# Patient Record
Sex: FEMALE | Race: BLACK OR AFRICAN AMERICAN | Employment: OTHER | URBAN - METROPOLITAN AREA
[De-identification: names, ages, dates, MRNs, and addresses within clinical notes are randomized per-mention and may not be internally consistent; named-entity substitution may affect disease eponyms.]

---

## 2017-01-03 RX ORDER — SPIRONOLACTONE 25 MG/1
25 TABLET ORAL DAILY
Qty: 30 TAB | Refills: 5 | Status: SHIPPED | OUTPATIENT
Start: 2017-01-03 | End: 2017-07-02 | Stop reason: SDUPTHER

## 2017-02-13 RX ORDER — CARVEDILOL 25 MG/1
TABLET ORAL
Qty: 180 TAB | Refills: 0 | Status: SHIPPED | OUTPATIENT
Start: 2017-02-13 | End: 2017-06-02 | Stop reason: SDUPTHER

## 2017-02-23 RX ORDER — GABAPENTIN 100 MG/1
100 CAPSULE ORAL DAILY
Qty: 30 CAP | Refills: 0 | Status: SHIPPED | OUTPATIENT
Start: 2017-02-23 | End: 2017-04-14 | Stop reason: SDUPTHER

## 2017-03-13 DIAGNOSIS — G62.9 NEUROPATHY: ICD-10-CM

## 2017-03-14 RX ORDER — GABAPENTIN 300 MG/1
300 CAPSULE ORAL
Qty: 90 CAP | Refills: 1 | Status: SHIPPED | OUTPATIENT
Start: 2017-03-14 | End: 2017-08-31 | Stop reason: SDUPTHER

## 2017-03-14 RX ORDER — VENLAFAXINE HYDROCHLORIDE 75 MG/1
75 CAPSULE, EXTENDED RELEASE ORAL DAILY
Qty: 30 CAP | Refills: 5 | Status: SHIPPED | OUTPATIENT
Start: 2017-03-14 | End: 2017-07-24

## 2017-03-17 NOTE — TELEPHONE ENCOUNTER
----- Message from [facility] sent at 3/17/2017 10:53 AM EDT -----  Regarding: Dr. Kaya Stevens stated her pharmacy will be faxing a request for \"Calcitriol' 0.5 mcg) today and would like the doctor to approve, because she is out of medication. Best contact number Malinda Jose [phone].

## 2017-03-20 ENCOUNTER — TELEPHONE (OUTPATIENT)
Dept: INTERNAL MEDICINE CLINIC | Age: 62
End: 2017-03-20

## 2017-03-20 ENCOUNTER — OFFICE VISIT (OUTPATIENT)
Dept: INTERNAL MEDICINE CLINIC | Age: 62
End: 2017-03-20

## 2017-03-20 VITALS
RESPIRATION RATE: 14 BRPM | BODY MASS INDEX: 41.25 KG/M2 | HEART RATE: 97 BPM | HEIGHT: 67 IN | TEMPERATURE: 98 F | SYSTOLIC BLOOD PRESSURE: 100 MMHG | WEIGHT: 262.8 LBS | DIASTOLIC BLOOD PRESSURE: 43 MMHG | OXYGEN SATURATION: 96 %

## 2017-03-20 DIAGNOSIS — I42.8 CARDIOMYOPATHY, NONISCHEMIC (HCC): ICD-10-CM

## 2017-03-20 DIAGNOSIS — L40.9 PSORIASIS: ICD-10-CM

## 2017-03-20 DIAGNOSIS — F34.1 DYSTHYMIA: ICD-10-CM

## 2017-03-20 DIAGNOSIS — E11.29 TYPE 2 DIABETES MELLITUS WITH OTHER KIDNEY COMPLICATION: Primary | ICD-10-CM

## 2017-03-20 DIAGNOSIS — E03.9 ACQUIRED HYPOTHYROIDISM: ICD-10-CM

## 2017-03-20 DIAGNOSIS — N18.3 CKD (CHRONIC KIDNEY DISEASE), STAGE 3 (MODERATE): ICD-10-CM

## 2017-03-20 DIAGNOSIS — I10 ESSENTIAL HYPERTENSION: ICD-10-CM

## 2017-03-20 RX ORDER — CALCITRIOL 0.5 UG/1
0.5 CAPSULE ORAL DAILY
Qty: 30 CAP | Refills: 5 | Status: SHIPPED | OUTPATIENT
Start: 2017-03-20 | End: 2017-07-24

## 2017-03-20 RX ORDER — CALCITRIOL 0.5 UG/1
0.5 CAPSULE ORAL DAILY
Qty: 30 CAP | Refills: 5 | OUTPATIENT
Start: 2017-03-20 | End: 2017-04-19

## 2017-03-20 NOTE — PROGRESS NOTES
HISTORY OF PRESENT ILLNESS  Lasandra Moritz is a 57 y.o. female. HPI     Diabetic Review of Systems - medication compliance: compliant all of the time, diabetic diet compliance: compliant most of the time, home glucose monitoring: is performed, further diabetic ROS: no polyuria or polydipsia, no chest pain, dyspnea or TIA's, no numbness, tingling or pain in extremities. Other symptoms and concerns: She saw Dr. Lew Arguello endocrinologist 3-4 months ago. She does not know her last A1C and states her sugars go up and down and she has crashed a few times. Pt not able to function when her sugar is in the 80's. Pt is trying to count carbohydrates. Pt has neuropathy from diabetes and takes Gabapentin. She does phototherapy for psoriasis. Pt is followed by Dr. Shana Saini. She states that it hurts to wear pants because of the psoriasis on her legs. She is taking Otezla 30 mg BID. She is not able to take Humira. Hypertension ROS: taking medications as instructed, no medication side effects noted, no TIA's, no chest pain on exertion, no dyspnea on exertion, no swelling of ankles. New concerns: Stable- bp was 100/43 in the office today. She is followed by Dr. Barbara Walters and kidneys have been stable. Cardiomyopathy- Cardiologist office called and said she did not do her defibrillator check or labs. Pt denies chest pain or shortness of breath. No increase in fluid level. Pt takes Bumex 2 mg in the morning and 1 mg at night and Atacand. Pt not able to take Lovastatin due to myalgias. She was not able to tolerate Lipitor due to myalgias. Pt is still taking Effexor and goes to psychotherapy once/week. Hypothyroidism:  Lab Results   Component Value Date/Time    TSH 0.519 02/21/2012 10:53 AM   Thyroid ROS: denies fatigue, weight changes, heat/cold intolerance, bowel/skin changes or CVS symptoms. Review of Systems   All other systems reviewed and are negative.       Physical Exam   Constitutional: She is oriented to person, place, and time. She appears well-developed and well-nourished. HENT:   Head: Normocephalic and atraumatic. Right Ear: External ear normal.   Left Ear: External ear normal.   Nose: Nose normal.   Mouth/Throat: Oropharynx is clear and moist.   Eyes: Conjunctivae and EOM are normal.   Neck: Normal range of motion. Neck supple. Carotid bruit is not present. No thyroid mass and no thyromegaly present. Cardiovascular: Normal rate, regular rhythm, S1 normal, S2 normal, normal heart sounds and intact distal pulses. Pulmonary/Chest: Effort normal and breath sounds normal.   Abdominal: Soft. Normal appearance and bowel sounds are normal. There is no hepatosplenomegaly. There is no tenderness. Musculoskeletal: Normal range of motion. Neurological: She is alert and oriented to person, place, and time. She has normal strength. No cranial nerve deficit or sensory deficit. Coordination normal.   Skin: Skin is warm, dry and intact. No abrasion and no rash noted. Psychiatric: She has a normal mood and affect. Her behavior is normal. Judgment and thought content normal.   Nursing note and vitals reviewed. ASSESSMENT and PLAN  Mckee  was seen today for medication evaluation. Diagnoses and all orders for this visit:    Type 2 diabetes mellitus with other kidney complication (HCC)  Stable- Continue current medications. Pt is followed by Dr. Jessa Lozano. Psoriasis  Psoriasis on her legs is really bothering her- she should continue current medications and phototherapy. Pt is followed by Dr. Lise Boateng. Essential hypertension  BP is at goal. I do not recommend any change in medications. CKD (chronic kidney disease), stage 3 (moderate)  Stable- Continue current medications. Pt is followed by Dr. Washington Lockhart.   -     calcitRIOL (ROCALTROL) 0.5 mcg capsule; Take 1 Cap by mouth daily for 30 days. Cardiomyopathy, nonischemic (HCC)  Stable- Continue current medications.  She should talk to cardiologist about cholesterol medication as she has had myalgias as side effects. Pt is part of a study and needs to do defibrillator check. Dysthymia  Stable- Continue current medications and psychotherapy. Acquired hypothyroidism  Stable- Continue current medications. reviewed medications and side effects in detail     Written by Mckenna Evans, as dictated by Abilio Patsor MD.     Current diagnosis and concerns discussed with pt at length. Understands risks and benefits or current treatment plan and medications and accepts the treatment and medication with any possible risks. Pt asks appropriate questions which were answered. Pt instructed to call with any concerns or problems.

## 2017-03-20 NOTE — TELEPHONE ENCOUNTER
Phuong Mcguire, from [facility], calling to request notification be given to provider that she needs a return visit to their office for repeat LDL and defibrillator check. The LDL needs to be done in their office as she is taking part in a fish oil study and defibrillator check needs to be done in office also, cannot be a phone check, as due to length of follow up time that has been disconnected. Please have her call their office.

## 2017-03-22 ENCOUNTER — CLINICAL SUPPORT (OUTPATIENT)
Dept: CARDIOLOGY CLINIC | Age: 62
End: 2017-03-22

## 2017-03-22 DIAGNOSIS — Z95.810 PRESENCE OF BIVENTRICULAR AUTOMATIC CARDIOVERTER/DEFIBRILLATOR (AICD): Primary | ICD-10-CM

## 2017-04-14 RX ORDER — GABAPENTIN 100 MG/1
100 CAPSULE ORAL DAILY
Qty: 30 CAP | Refills: 3 | Status: SHIPPED | OUTPATIENT
Start: 2017-04-14 | End: 2017-07-24

## 2017-04-20 DIAGNOSIS — M1A.9XX0 CHRONIC GOUT WITHOUT TOPHUS, UNSPECIFIED CAUSE, UNSPECIFIED SITE: Chronic | ICD-10-CM

## 2017-04-20 RX ORDER — ALLOPURINOL 100 MG/1
TABLET ORAL
Qty: 30 TAB | Refills: 3 | Status: SHIPPED | OUTPATIENT
Start: 2017-04-20 | End: 2017-08-30 | Stop reason: SDUPTHER

## 2017-07-02 RX ORDER — SPIRONOLACTONE 25 MG/1
TABLET ORAL
Qty: 30 TAB | Refills: 5 | Status: SHIPPED | OUTPATIENT
Start: 2017-07-02 | End: 2017-09-05 | Stop reason: SDUPTHER

## 2017-07-07 RX ORDER — SPIRONOLACTONE 25 MG/1
TABLET ORAL
Qty: 30 TAB | Refills: 5 | Status: SHIPPED | OUTPATIENT
Start: 2017-07-07 | End: 2017-07-24

## 2017-07-24 ENCOUNTER — CLINICAL SUPPORT (OUTPATIENT)
Dept: CARDIOLOGY CLINIC | Age: 62
End: 2017-07-24

## 2017-07-24 ENCOUNTER — OFFICE VISIT (OUTPATIENT)
Dept: CARDIOLOGY CLINIC | Age: 62
End: 2017-07-24

## 2017-07-24 VITALS
BODY MASS INDEX: 39.24 KG/M2 | OXYGEN SATURATION: 97 % | RESPIRATION RATE: 16 BRPM | DIASTOLIC BLOOD PRESSURE: 70 MMHG | HEART RATE: 92 BPM | WEIGHT: 250 LBS | HEIGHT: 67 IN | SYSTOLIC BLOOD PRESSURE: 100 MMHG

## 2017-07-24 DIAGNOSIS — Z95.810 PRESENCE OF BIVENTRICULAR AUTOMATIC CARDIOVERTER/DEFIBRILLATOR (AICD): Primary | ICD-10-CM

## 2017-07-24 DIAGNOSIS — R42 DIZZINESS: ICD-10-CM

## 2017-07-24 DIAGNOSIS — E11.29 TYPE 2 DIABETES MELLITUS WITH OTHER DIABETIC KIDNEY COMPLICATION (HCC): ICD-10-CM

## 2017-07-24 DIAGNOSIS — I42.8 CARDIOMYOPATHY, NONISCHEMIC (HCC): ICD-10-CM

## 2017-07-24 DIAGNOSIS — Z95.810 PRESENCE OF AUTOMATIC CARDIOVERTER/DEFIBRILLATOR (AICD): Primary | ICD-10-CM

## 2017-07-24 DIAGNOSIS — E66.09 NON MORBID OBESITY DUE TO EXCESS CALORIES: ICD-10-CM

## 2017-07-24 DIAGNOSIS — E66.9 OBESITY, UNSPECIFIED OBESITY SEVERITY, UNSPECIFIED OBESITY TYPE: ICD-10-CM

## 2017-07-24 NOTE — MR AVS SNAPSHOT
Visit Information Date & Time Provider Department Dept. Phone Encounter #  
 7/24/2017  1:40 PM Alexis Villavicencio MD [facility] [place] [phone] [id number] Your Appointments 7/26/2017  8:40 AM  
ESTABLISHED PATIENT with Fara De Anda MD  
[facility] ([facility]) Appt Note: 6 MO FUP; 6 Month Follow Up rs from 06/16  
 [address] [facility] [address]  
[phone]  
  
   
 [address] [facility] [address] [address] [address]  
  
    
 11/2/2017  1:30 PM  
PACEMAKER with Marx [facility] ([facility] SCHEDULING) Appt Note: sonido bi-v icd, rc, no CL b 7/24/17  
 [address] [facility] [address] [place] [address]  
[address] [facility]  
  
    
 11/30/2017 10:30 AM  
RESEARCH with RESEARCH, STFRANCES  
[facility] ([facility]) Appt Note: ANNUAL RESEARCH AT 11 30 [facility]; [address] [address] [facility] [address] [facility] [address]  
[phone]  
  
   
 [address] [facility] [address] [address]  
  
    
 11/30/2017 11:00 AM  
ESTABLISHED PATIENT with Mark Florez NP  
[facility] ([facility]) Appt Note: ANNUAL RESEARCH AT 11 00 sonia [address] [facility] [address]  
[phone]  
  
   
 [address] [address]  
  
    
 8/30/2018  1:00 PM  
PACEMAKER with Francisco J Fox [facility] ([facility] SCHEDULING) Appt Note: sonido bi-v icd, annual thresh/CL, see [place], no [address] [place] [address]  
[phone]  
  
    
 8/30/2018  1:00 PM  
ESTABLISHED PATIENT with Alexis Villavicencio MD  
[facility] ([facility]) Appt Note: mdt bi-v icd, annual th[place], [place] [address] [facility] [address] [place] [address]  
[address] [place] [address] Upcoming Health Maintenance Date Due Pneumococcal 19-64 Medium Risk (1 of 1 - PPSV23) 7/9/1973 DTaP/Tdap/Td series (1 - Tdap) 7/9/1975 BREAST CANCER SCRN MAMMOGRAM 12/10/2008 PAP AKA CERVICAL CYTOLOGY 12/10/2010 ZOSTER VACCINE AGE 60> 5/9/2014 COLONOSCOPY 12/10/2015 EYE EXAM RETINAL OR DILATED Q1 3/4/2016 FOOT EXAM Q1 1/4/2017 MICROALBUMIN Q1 1/4/2017 HEMOGLOBIN A1C Q6M 1/7/2017 LIPID PANEL Q1 8/15/2017 INFLUENZA AGE 9 TO ADULT 8/1/2017 Allergies as of 7/24/2017  Review Complete On: 7/24/2017 By: German Uribe MD  
  
 Severity Noted Reaction Type Reactions Ciprofloxacin High 01/23/2013   Systemic Anaphylaxis Shellfish Derived High 01/23/2013   Systemic Anaphylaxis Ace Inhibitors  12/10/2008    Unknown (comments) Biaxin [Clarithromycin]  09/14/2011    Other (comments) Metal taste Candesartan  08/12/2016    Cough Pcn [Penicillins]  12/10/2008    Hives Current Immunizations  Never Reviewed Name Date H1N1 FLU VACCINE 1/20/2010 Influenza Vaccine 10/1/2015 Influenza Vaccine Split 10/17/2012, 9/17/2010 Not reviewed this visit You Were Diagnosed With   
  
 Codes Comments Presence of biventricular automatic cardioverter/defibrillator (AICD)    -  Primary ICD-10-CM: Z95.810 ICD-9-CM: V45.02 Cardiomyopathy, nonischemic ([facility] [address]     ICD-10-CM: I42.8 ICD-9-CM: 425.4 Dizziness     ICD-10-CM: P89 ICD-9-CM: 780.4 Non morbid obesity due to excess calories     ICD-10-CM: E66.09 
ICD-9-CM: 278.00 Type 2 diabetes mellitus with other diabetic kidney complication (HCC)     SYJ-01-HV: E11.29 ICD-9-CM: 250.40 Vitals BP Pulse Resp Height(growth percentile) Weight(growth percentile) SpO2 100/70 (BP 1 Location: Right arm, BP Patient Position: Sitting) 92 16 5' 7\" (1.702 m) 250 lb (113.4 kg) 97% BMI OB Status Smoking Status 39.16 kg/m2 Postmenopausal Never Smoker BMI and BSA Data Body Mass Index Body Surface Area  
 39.16 kg/m 2 2.32 m 2 Preferred Pharmacy Pharmacy Name Phone [facility][id number] [address], [address] [facility] [facility] [phone] Your Updated Medication List  
  
   
This list is accurate as of: 7/24/17  2:41 PM.  Always use your most recent med list.  
  
  
  
  
 allopurinol 100 mg tablet Commonly known as:  ZYLOPRIM  
TAKE 1 TAB BY MOUTH DAILY FOR 30 DAYS. apremilast 30 mg Tab Take 60 mg by mouth. aspirin 81 mg tablet Take 81 mg by mouth daily. Azelastine 0.15 % (205.5 mcg) nasal spray Commonly known as:  ASTEPRO  
2 Sprays by Both Nostrils route two (2) times a day. bumetanide 0.5 mg tablet Commonly known as:  Samantha Lathe 1 pill by mouth every morning and 1/2 pill at night. calcipotriene 0.005 % topical cream  
Commonly known as:  Hwang Dawson Apply  to affected area three (3) times daily. candesartan 4 mg tablet Commonly known as:  ATACAND Take 1 Tab by mouth daily. Indications: CHRONIC HEART FAILURE  
  
 carvedilol 25 mg tablet Commonly known as:  COREG  
TAKE 1 TAB BY MOUTH TWO (2) TIMES A DAY FOR 30 DAYS. EPINEPHrine 0.3 mg/0.3 mL injection Commonly known as:  EPIPEN  
0.3 mL by IntraMUSCular route once as needed for 1 dose. FISH OIL 1,000 mg Cap Generic drug:  omega-3 fatty acids-vitamin e Take 2 Caps by mouth two (2) times a day.  
  
 gabapentin 300 mg capsule Commonly known as:  NEURONTIN Take 1 Cap by mouth nightly. hydrOXYzine HCl 10 mg tablet Commonly known as:  ATARAX Take  by mouth three (3) times daily as needed for Itching. insulin NPH/insulin regular 100 unit/mL (70-30) injection Commonly known as:  NovoLIN 70/30  
70 units two times a day Iron 325 mg (65 mg iron) tablet Generic drug:  ferrous sulfate Take  by mouth three (3) times daily (with meals). levothyroxine 100 mcg tablet Commonly known as:  SYNTHROID Take 1 Tab by mouth Daily (before breakfast). Take every day but Sunday  
  
 lovastatin 10 mg tablet Commonly known as:  MEVACOR Take 1 Tab by mouth nightly. meclizine 25 mg tablet Commonly known as:  ANTIVERT Take 1 Tab by mouth three (3) times daily as needed for Dizziness. multivitamin tablet Commonly known as:  ONE A DAY Take 1 Tab by mouth daily. PULMICORT IN Take 2 Puffs by inhalation every twelve (12) hours. As needed  
  
 spironolactone 25 mg tablet Commonly known as:  ALDACTONE  
TAKE 1 TAB BY MOUTH DAILY. STOOL SOFTENER PO Take 1 tablet by mouth daily. triamcinolone acetonide 0.5 % ointment Commonly known as:  KENALOG Apply  to affected area two (2) times a day. use thin layer ZyrTEC 10 mg Cap Generic drug:  Cetirizine Take  by mouth. Patient Instructions You will need to follow up in clinic with Dr. Diaz Plummer in 1 year. Introducing [facility]! Dear Barnet Heimlich: 
Thank you for requesting a [employer] account. Our records indicate that you already have an active [employer] account. You can access your account anytime at [email or web address] [employer] Did you know that you can access your hospital and ER discharge instructions at any time in [employer]? You can also review all of your test results from your hospital stay or ER visit. Additional Information If you have questions, please visit the Frequently Asked Questions section of the [employer] website at [email or web address] Remember, [employer] is NOT to be used for urgent needs. For medical emergencies, dial 911. Now available from your iPhone and Android! Please provide this summary of care documentation to your next provider. Your primary care clinician is listed as Kermit Francisco. If you have any questions after today's visit, please call [phone].

## 2017-07-24 NOTE — PROGRESS NOTES
Cardiac Electrophysiology Office Note     Subjective:      Nina Londono is a 60 y.o. patient who is seen for evaluation of BIV ICD  She has a Medtronic biventricular pacemaker AICD that I replaced back in 2015. Her left ventricular ejection fraction has normalized with bi-V pacing. She is NYHA Class I-II. She has not lost a lot of weight and is worried about her kidney function. The last lab tests showed she has stage 4 renal failure. Patient Active Problem List   Diagnosis Code    Anemia in chronic renal disease N18.9, D63.1    HTN (hypertension) I10    GERD (gastroesophageal reflux disease) K21.9    Gout M10.9    Pulmonary HTN ([facility] [address] I27.2    Obesity E66.9    Cardiomyopathy, nonischemic ([facility] [address] I42.8    DM (diabetes mellitus) ([facility] [address] E11.9    CKD (chronic kidney disease) N18.9    Dyslipidemia E78.5    ICD (implantable cardioverter-defibrillator), biventricular, in situ Z95.810    Acquired hypothyroidism E03.9    Dizziness R42    Dysthymia F34.1     Current Outpatient Prescriptions   Medication Sig Dispense Refill    spironolactone (ALDACTONE) 25 mg tablet TAKE 1 TAB BY MOUTH DAILY. 30 Tab 5    carvedilol (COREG) 25 mg tablet TAKE 1 TAB BY MOUTH TWO (2) TIMES A DAY FOR 30 DAYS. 180 Tab 1    allopurinol (ZYLOPRIM) 100 mg tablet TAKE 1 TAB BY MOUTH DAILY FOR 30 DAYS. 30 Tab 3    gabapentin (NEURONTIN) 300 mg capsule Take 1 Cap by mouth nightly. 90 Cap 1    bumetanide (BUMEX) 0.5 mg tablet 1 pill by mouth every morning and 1/2 pill at night. (Patient taking differently: 2 mg. 1 pill by mouth every morning and 1/2 pill at night.) 30 Tab 1    lovastatin (MEVACOR) 10 mg tablet Take 1 Tab by mouth nightly. 30 Tab 4    insulin NPH/insulin regular (NOVOLIN 70/30) 100 unit/mL (70-30) injection 70 units two times a day 10 mL 3    candesartan (ATACAND) 4 mg tablet Take 1 Tab by mouth daily. Indications: CHRONIC HEART FAILURE 30 Tab 6    apremilast 30 mg tab Take 60 mg by mouth.       levothyroxine (SYNTHROID) 100 mcg tablet Take 1 Tab by mouth Daily (before breakfast). Take every day but Sunday 30 Tab 5    hydrOXYzine (ATARAX) 10 mg tablet Take  by mouth three (3) times daily as needed for Itching.  calcipotriene (DOVONEX) 0.005 % topical cream Apply  to affected area three (3) times daily.  triamcinolone acetonide (KENALOG) 0.5 % ointment Apply  to affected area two (2) times a day. use thin layer      multivitamin (ONE A DAY) tablet Take 1 Tab by mouth daily.  DOCUSATE CALCIUM (STOOL SOFTENER PO) Take 1 tablet by mouth daily.  meclizine (ANTIVERT) 25 mg tablet Take 1 Tab by mouth three (3) times daily as needed for Dizziness. 20 Tab 3    Azelastine (ASTEPRO) 0.15 % (205.5 mcg) nasal spray 2 Sprays by Both Nostrils route two (2) times a day. (Patient taking differently: 2 Sprays by Both Nostrils route two (2) times daily as needed.) 1 Bottle 4    Cetirizine (ZYRTEC) 10 mg cap Take  by mouth.  omega-3 fatty acids-vitamin e (FISH OIL) 1,000 mg cap Take 2 Caps by mouth two (2) times a day.  EPINEPHrine (EPIPEN) 0.3 mg/0.3 mL injection 0.3 mL by IntraMUSCular route once as needed for 1 dose. 1 Each 3    ferrous sulfate (IRON) 325 mg (65 mg elemental iron) tablet Take  by mouth three (3) times daily (with meals).  aspirin 81 mg tablet Take 81 mg by mouth daily.  PULMICORT IN Take 2 Puffs by inhalation every twelve (12) hours.  As needed         Allergies   Allergen Reactions    Ciprofloxacin Anaphylaxis    Shellfish Derived Anaphylaxis    Ace Inhibitors Unknown (comments)    Biaxin [Clarithromycin] Other (comments)     Metal taste    Candesartan Cough    Pcn [Penicillins] Hives     Past Medical History:   Diagnosis Date    Acquired hypothyroidism 8/15/2016    Anemia     RED-HF study    Asthma     Cardiomyopathy, nonischemic ([facility] [address]     initial dx 2001, bivHF 2008 with EF 15%, s/p biV-ICD 9/08, significant improvment in EF to 45-50%    CKD (chronic kidney disease)     Dr Delisa Garcia    CKD (chronic kidney disease) 8/15/2012    Diabetes ([facility] [address]     Diabetic neuropathy ([facility] [address]     DM (diabetes mellitus) ([facility] [address] 8/15/2012    GERD (gastroesophageal reflux disease)     Gout     Hypothyroidism     ICD (implantable cardioverter-defibrillator), biventricular, in situ 6/5/2014    Psoriasis      Past Surgical History:   Procedure Laterality Date    CARDIAC CATHETERIZATION  2007; 01/06/15    normal cors    ECHO 2D ADULT  4/2010    EF 45%, improved from 1/09 (25%)    ECHO 2D ADULT  11/2011    LVH, EF 55-60%    HX ORTHOPAEDIC      knee    HX PACEMAKER PLACEMENT      AICD    STRESS TEST LEXISCAN/CARDIOLITE  3/21/12    normal perfusion, global HK 40%       Social History   Substance Use Topics    Smoking status: Never Smoker    Smokeless tobacco: Never Used    Alcohol use No        Review of Systems:   Constitutional: Negative for fever, chills, weight loss, + malaise/fatigue. HEENT: Negative for nosebleeds, vision changes. Respiratory: Negative for cough, hemoptysis  Cardiovascular: Negative for chest pain, palpitations, orthopnea, claudication, leg swelling, syncope, and PND. Gastrointestinal: Negative for nausea, vomiting, diarrhea, blood in stool and melena. Genitourinary: Negative for dysuria, and hematuria. Musculoskeletal: Negative for myalgias, arthralgia. Skin: Negative for rash. Heme: Does not bleed or bruise easily. Neurological: Negative for speech change and focal weakness     Objective:     Visit Vitals    /70 (BP 1 Location: Right arm, BP Patient Position: Sitting)    Pulse 92    Resp 16    Ht 5' 7\" (1.702 m)    Wt 250 lb (113.4 kg)    SpO2 97%    BMI 39.16 kg/m2      Physical Exam:   Constitutional: well-developed and well-nourished. No respiratory distress. Head: Normocephalic and atraumatic. Eyes: Pupils are equal, round  ENT: hearing normal  Neck: supple. No JVD present.    Cardiovascular: Normal rate, regular rhythm. Exam reveals no gallop and no friction rub. No murmur heard. Pulmonary/Chest: Effort normal and breath sounds normal. No wheezes. Abdominal: Soft, no tenderness. Obese  Musculoskeletal: no edema. Neurological: alert,oriented. Skin: Skin is warm and dry left sided BIV ICD  Psychiatric: normal mood and affect. Behavior is normal. Judgment and thought content normal.        Assessment/Plan:       ICD-10-CM ICD-9-CM    1. Presence of biventricular automatic cardioverter/defibrillator (AICD) Z95.810 V45.02    2. Cardiomyopathy, nonischemic (HCC) I42.8 425.4    3. Dizziness R42 780.4    4. Non morbid obesity due to excess calories E66.09 278.00    5. Type 2 diabetes mellitus with other diabetic kidney complication (HCC) M24.96 250.40    6. Obesity, unspecified obesity severity, unspecified obesity type E66.9 278.00      reviewed diet, exercise and weight control  reviewed medications and side effects in detail   I have reviewed with the patient the device check today. It has proper function for all 3 leads and she has no ventricular arrhythmia, atrial flutter or atrial fibrillation. She is biventricular paced at an adequate amount, more than 97%. The patient is in sinus rhythm with NYHA Class I-II. She will continue to take the guideline directed medical therapy and will probably repeat labs for kidney function in next few months with her primary care physician. Follow-up Disposition:  Return in about 1 year (around 7/24/2018). check device remotely every 3 months  She will see Dr Lyons Handing as usual    Thank you for involving me in this patient's care and please call with further concerns or questions. Jesica Oconnor M.D.   Electrophysiology/Cardiology  [facility] [place]  [address] [facility], [address]  [phone]

## 2017-08-30 DIAGNOSIS — M1A.9XX0 CHRONIC GOUT WITHOUT TOPHUS, UNSPECIFIED CAUSE, UNSPECIFIED SITE: Chronic | ICD-10-CM

## 2017-08-30 DIAGNOSIS — N18.3 CKD (CHRONIC KIDNEY DISEASE), STAGE 3 (MODERATE): ICD-10-CM

## 2017-08-30 DIAGNOSIS — I42.8 CARDIOMYOPATHY, NONISCHEMIC (HCC): ICD-10-CM

## 2017-08-30 RX ORDER — ALLOPURINOL 100 MG/1
TABLET ORAL
Qty: 30 TAB | Refills: 3 | Status: SHIPPED | OUTPATIENT
Start: 2017-08-30 | End: 2017-12-27 | Stop reason: SDUPTHER

## 2017-08-30 RX ORDER — CALCITRIOL 0.5 UG/1
CAPSULE ORAL
Qty: 30 CAP | Refills: 5 | Status: SHIPPED | OUTPATIENT
Start: 2017-08-30 | End: 2017-09-05 | Stop reason: SDUPTHER

## 2017-08-31 DIAGNOSIS — G62.9 NEUROPATHY: ICD-10-CM

## 2017-08-31 DIAGNOSIS — I42.8 CARDIOMYOPATHY, NONISCHEMIC (HCC): ICD-10-CM

## 2017-08-31 RX ORDER — CANDESARTAN 4 MG/1
4 TABLET ORAL DAILY
Qty: 45 TAB | Refills: 0 | Status: SHIPPED | OUTPATIENT
Start: 2017-08-31 | End: 2017-10-14 | Stop reason: SDUPTHER

## 2017-08-31 RX ORDER — HYDROXYZINE HYDROCHLORIDE 10 MG/1
10 TABLET, FILM COATED ORAL
Qty: 90 TAB | Refills: 0 | Status: SHIPPED | OUTPATIENT
Start: 2017-08-31 | End: 2017-10-17 | Stop reason: SDUPTHER

## 2017-08-31 RX ORDER — GABAPENTIN 300 MG/1
300 CAPSULE ORAL
Qty: 30 CAP | Refills: 0 | Status: SHIPPED | OUTPATIENT
Start: 2017-08-31 | End: 2017-09-12 | Stop reason: SDUPTHER

## 2017-08-31 RX ORDER — VENLAFAXINE HYDROCHLORIDE 75 MG/1
75 CAPSULE, EXTENDED RELEASE ORAL DAILY
Qty: 30 CAP | Refills: 0 | Status: SHIPPED | OUTPATIENT
Start: 2017-08-31 | End: 2017-10-09 | Stop reason: SDUPTHER

## 2017-09-01 DIAGNOSIS — I42.8 CARDIOMYOPATHY, NONISCHEMIC (HCC): ICD-10-CM

## 2017-09-01 RX ORDER — BUMETANIDE 0.5 MG/1
TABLET ORAL
Qty: 45 TAB | Refills: 3 | Status: SHIPPED | OUTPATIENT
Start: 2017-09-01 | End: 2018-03-12 | Stop reason: SDUPTHER

## 2017-09-05 RX ORDER — CALCITRIOL 0.5 UG/1
CAPSULE ORAL
Qty: 90 CAP | Refills: 3 | Status: SHIPPED | OUTPATIENT
Start: 2017-09-05 | End: 2018-10-08 | Stop reason: SDUPTHER

## 2017-09-05 RX ORDER — SPIRONOLACTONE 25 MG/1
TABLET ORAL
Qty: 90 TAB | Refills: 0 | Status: SHIPPED | OUTPATIENT
Start: 2017-09-05 | End: 2018-01-01 | Stop reason: SDUPTHER

## 2017-09-05 RX ORDER — BUMETANIDE 0.5 MG/1
TABLET ORAL
Qty: 30 TAB | Refills: 1 | OUTPATIENT
Start: 2017-09-05

## 2017-09-12 DIAGNOSIS — G62.9 NEUROPATHY: ICD-10-CM

## 2017-09-12 RX ORDER — GABAPENTIN 300 MG/1
300 CAPSULE ORAL
Qty: 30 CAP | Refills: 1 | Status: SHIPPED | OUTPATIENT
Start: 2017-09-12 | End: 2018-04-04 | Stop reason: ALTCHOICE

## 2017-09-15 RX ORDER — GABAPENTIN 100 MG/1
CAPSULE ORAL
Qty: 30 CAP | Refills: 3 | Status: SHIPPED | OUTPATIENT
Start: 2017-09-15 | End: 2018-04-04 | Stop reason: ALTCHOICE

## 2017-09-29 DIAGNOSIS — G62.9 NEUROPATHY: ICD-10-CM

## 2017-09-29 RX ORDER — HYDROXYZINE HYDROCHLORIDE 10 MG/1
TABLET, FILM COATED ORAL
Qty: 90 TAB | Refills: 0 | Status: CANCELLED | OUTPATIENT
Start: 2017-09-29

## 2017-10-14 DIAGNOSIS — I42.8 CARDIOMYOPATHY, NONISCHEMIC (HCC): ICD-10-CM

## 2017-10-14 RX ORDER — CANDESARTAN 4 MG/1
TABLET ORAL
Qty: 45 TAB | Refills: 0 | Status: SHIPPED | OUTPATIENT
Start: 2017-10-14 | End: 2018-01-02 | Stop reason: SDUPTHER

## 2017-10-16 ENCOUNTER — TELEPHONE (OUTPATIENT)
Dept: INTERNAL MEDICINE CLINIC | Age: 62
End: 2017-10-16

## 2017-10-17 ENCOUNTER — TELEPHONE (OUTPATIENT)
Dept: INTERNAL MEDICINE CLINIC | Age: 62
End: 2017-10-17

## 2017-10-17 RX ORDER — GABAPENTIN 300 MG/1
CAPSULE ORAL
Qty: 30 CAP | Refills: 0 | Status: SHIPPED | OUTPATIENT
Start: 2017-10-17 | End: 2019-04-22 | Stop reason: SDUPTHER

## 2017-10-17 RX ORDER — VENLAFAXINE HYDROCHLORIDE 75 MG/1
CAPSULE, EXTENDED RELEASE ORAL
Qty: 30 CAP | Refills: 0 | Status: SHIPPED | OUTPATIENT
Start: 2017-10-17 | End: 2017-11-14 | Stop reason: SDUPTHER

## 2017-10-17 RX ORDER — HYDROXYZINE HYDROCHLORIDE 10 MG/1
10 TABLET, FILM COATED ORAL
Qty: 30 TAB | Refills: 0 | Status: SHIPPED | OUTPATIENT
Start: 2017-10-17 | End: 2018-04-04 | Stop reason: ALTCHOICE

## 2017-10-17 NOTE — TELEPHONE ENCOUNTER
The Pharmacist is call on a follow up on two prescription hydrOXYzine HCl (ATARAX) 10 mg tablet  And her Effexor [phone]

## 2017-10-19 ENCOUNTER — OFFICE VISIT (OUTPATIENT)
Dept: INTERNAL MEDICINE CLINIC | Age: 62
End: 2017-10-19

## 2017-10-19 VITALS
TEMPERATURE: 97.9 F | WEIGHT: 256.4 LBS | DIASTOLIC BLOOD PRESSURE: 62 MMHG | RESPIRATION RATE: 14 BRPM | OXYGEN SATURATION: 99 % | HEART RATE: 91 BPM | HEIGHT: 67 IN | BODY MASS INDEX: 40.24 KG/M2 | SYSTOLIC BLOOD PRESSURE: 104 MMHG

## 2017-10-19 DIAGNOSIS — N18.9 ANEMIA IN CHRONIC KIDNEY DISEASE, UNSPECIFIED CKD STAGE: Chronic | ICD-10-CM

## 2017-10-19 DIAGNOSIS — E11.29 TYPE 2 DIABETES MELLITUS WITH OTHER DIABETIC KIDNEY COMPLICATION, WITH LONG-TERM CURRENT USE OF INSULIN (HCC): ICD-10-CM

## 2017-10-19 DIAGNOSIS — E78.5 DYSLIPIDEMIA: ICD-10-CM

## 2017-10-19 DIAGNOSIS — Z79.4 TYPE 2 DIABETES MELLITUS WITH OTHER DIABETIC KIDNEY COMPLICATION, WITH LONG-TERM CURRENT USE OF INSULIN (HCC): ICD-10-CM

## 2017-10-19 DIAGNOSIS — I42.8 CARDIOMYOPATHY, NONISCHEMIC (HCC): ICD-10-CM

## 2017-10-19 DIAGNOSIS — Z00.00 MEDICARE ANNUAL WELLNESS VISIT, SUBSEQUENT: Primary | ICD-10-CM

## 2017-10-19 DIAGNOSIS — E03.9 ACQUIRED HYPOTHYROIDISM: ICD-10-CM

## 2017-10-19 DIAGNOSIS — F34.1 DYSTHYMIA: ICD-10-CM

## 2017-10-19 DIAGNOSIS — D63.1 ANEMIA IN CHRONIC KIDNEY DISEASE, UNSPECIFIED CKD STAGE: Chronic | ICD-10-CM

## 2017-10-19 DIAGNOSIS — M10.9 GOUT, UNSPECIFIED CAUSE, UNSPECIFIED CHRONICITY, UNSPECIFIED SITE: ICD-10-CM

## 2017-10-19 DIAGNOSIS — I10 ESSENTIAL HYPERTENSION: ICD-10-CM

## 2017-10-19 RX ORDER — AZELASTINE HCL 205.5 UG/1
2 SPRAY NASAL 2 TIMES DAILY
Qty: 1 BOTTLE | Refills: 5 | Status: SHIPPED | OUTPATIENT
Start: 2017-10-19 | End: 2018-04-04 | Stop reason: ALTCHOICE

## 2017-10-19 NOTE — PROGRESS NOTES
This is a Subsequent Medicare Annual Wellness Exam (AWV) (Performed 12 months after IPPE or effective date of Medicare Part B enrollment)    I have reviewed the patient's medical history in detail and updated the computerized patient record. History     Past Medical History:   Diagnosis Date    Acquired hypothyroidism 8/15/2016    Anemia     RED-HF study    Asthma     Cardiomyopathy, nonischemic ([facility] [address]     initial dx 2001, bivHF 2008 with EF 15%, s/p biV-ICD 9/08, significant improvment in EF to 45-50%    CKD (chronic kidney disease)     Dr Leonard Worley CKD (chronic kidney disease) 8/15/2012    Diabetes ([facility] [address]     Diabetic neuropathy ([facility] [address]     DM (diabetes mellitus) ([facility] [address] 8/15/2012    GERD (gastroesophageal reflux disease)     Gout     Hypothyroidism     ICD (implantable cardioverter-defibrillator), biventricular, in situ 6/5/2014    Psoriasis       Past Surgical History:   Procedure Laterality Date    CARDIAC CATHETERIZATION  2007; 01/06/15    normal cors    ECHO 2D ADULT  4/2010    EF 45%, improved from 1/09 (25%)    ECHO 2D ADULT  11/2011    LVH, EF 55-60%    HX ORTHOPAEDIC      knee    HX PACEMAKER PLACEMENT      AICD    STRESS TEST LEXISCAN/CARDIOLITE  3/21/12    normal perfusion, global HK 40%     Current Outpatient Prescriptions   Medication Sig Dispense Refill    venlafaxine-SR (EFFEXOR-XR) 75 mg capsule TAKE 1 CAP BY MOUTH DAILY FOR 30 DAYS. 30 Cap 0    hydrOXYzine HCl (ATARAX) 10 mg tablet Take 1 Tab by mouth three (3) times daily as needed for Itching for up to 15 days. 30 Tab 0    candesartan (ATACAND) 4 mg tablet TAKE 1 TABLET BY MOUTH EVERY DAY 45 Tab 0    calcitRIOL (ROCALTROL) 0.5 mcg capsule TAKE 1 CAP BY MOUTH DAILY FOR 30 DAYS. 90 Cap 3    spironolactone (ALDACTONE) 25 mg tablet TAKE 1 TAB BY MOUTH DAILY. 90 Tab 0    bumetanide (BUMEX) 0.5 mg tablet 1 pill by mouth every morning and 1/2 pill at night.  45 Tab 3    allopurinol (ZYLOPRIM) 100 mg tablet TAKE 1 TAB BY MOUTH DAILY FOR 30 DAYS. 30 Tab 3    carvedilol (COREG) 25 mg tablet TAKE 1 TAB BY MOUTH TWO (2) TIMES A DAY FOR 30 DAYS. 180 Tab 1    lovastatin (MEVACOR) 10 mg tablet Take 1 Tab by mouth nightly. 30 Tab 4    insulin NPH/insulin regular (NOVOLIN 70/30) 100 unit/mL (70-30) injection 70 units two times a day 10 mL 3    apremilast 30 mg tab Take 60 mg by mouth.  levothyroxine (SYNTHROID) 100 mcg tablet Take 1 Tab by mouth Daily (before breakfast). Take every day but Sunday 30 Tab 5    calcipotriene (DOVONEX) 0.005 % topical cream Apply  to affected area three (3) times daily.  triamcinolone acetonide (KENALOG) 0.5 % ointment Apply  to affected area two (2) times a day. use thin layer      multivitamin (ONE A DAY) tablet Take 1 Tab by mouth daily.  DOCUSATE CALCIUM (STOOL SOFTENER PO) Take 1 tablet by mouth daily.  meclizine (ANTIVERT) 25 mg tablet Take 1 Tab by mouth three (3) times daily as needed for Dizziness. 20 Tab 3    Azelastine (ASTEPRO) 0.15 % (205.5 mcg) nasal spray 2 Sprays by Both Nostrils route two (2) times a day. (Patient taking differently: 2 Sprays by Both Nostrils route two (2) times daily as needed.) 1 Bottle 4    Cetirizine (ZYRTEC) 10 mg cap Take  by mouth.  omega-3 fatty acids-vitamin e (FISH OIL) 1,000 mg cap Take 2 Caps by mouth two (2) times a day.  candesartan (ATACAND) 4 mg tablet Take 1 Tab by mouth as directed for 30 days. One in am depending on pressure and 1/2 tablet at night 45 Tab 6    ferrous sulfate (IRON) 325 mg (65 mg elemental iron) tablet Take  by mouth three (3) times daily (with meals).  aspirin 81 mg tablet Take 81 mg by mouth daily.  PULMICORT IN Take 2 Puffs by inhalation every twelve (12) hours. As needed        gabapentin (NEURONTIN) 300 mg capsule TAKE 1 CAP BY MOUTH NIGHTLY FOR 30 DAYS.  30 Cap 0    gabapentin (NEURONTIN) 100 mg capsule TAKE ONE CAPSULE BY MOUTH EVERY DAY 30 Cap 3    gabapentin (NEURONTIN) 300 mg capsule Take 1 Cap by mouth nightly for 30 days. 30 Cap 1    EPINEPHrine (EPIPEN) 0.3 mg/0.3 mL injection 0.3 mL by IntraMUSCular route once as needed for 1 dose. 1 Each 3     Allergies   Allergen Reactions    Ciprofloxacin Anaphylaxis    Shellfish Derived Anaphylaxis    Ace Inhibitors Unknown (comments)    Biaxin [Clarithromycin] Other (comments)     Metal taste    Candesartan Cough    Pcn [Penicillins] Hives     History reviewed. No pertinent family history. Social History   Substance Use Topics    Smoking status: Never Smoker    Smokeless tobacco: Never Used    Alcohol use No     Patient Active Problem List   Diagnosis Code    Anemia in chronic renal disease N18.9, D63.1    HTN (hypertension) I10    GERD (gastroesophageal reflux disease) K21.9    Gout M10.9    Pulmonary HTN I27.20    Obesity E66.9    Cardiomyopathy, nonischemic ([facility] [address] I42.8    DM (diabetes mellitus) ([facility] [address] E11.9    CKD (chronic kidney disease) N18.9    Dyslipidemia E78.5    ICD (implantable cardioverter-defibrillator), biventricular, in situ Z95.810    Acquired hypothyroidism E03.9    Dizziness R42    Dysthymia F34.1       Depression Risk Factor Screening:     PHQ over the last two weeks 8/15/2016   Little interest or pleasure in doing things Not at all   Feeling down, depressed or hopeless Not at all   Total Score PHQ 2 0     Alcohol Risk Factor Screening: You do not drink alcohol or very rarely. Functional Ability and Level of Safety:   Hearing Loss  Hearing is good. Activities of Daily Living  The home contains: no safety equipment. Patient does need help with shower, needs help with houseworkl- she can dress herself     Fall RiskNo flowsheet data found.     Abuse Screen  Patient is not abused    Cognitive Screening   Evaluation of Cognitive Function:  Has your family/caregiver stated any concerns about your memory: no  Normal    Patient Care Team   Patient Care Team:  Nino Arguello MD as PCP - General  Stuart Dean Blanca Castro MD as Consulting Provider (Internal Medicine)  Jackson Osborne MD (Dermatology)  Ras Slater MD (Nephrology)  Dangelo Ga MD as Consulting Provider (Cardiology)    Assessment/Plan   Education and counseling provided:  Are appropriate based on today's review and evaluation    Diagnoses and all orders for this visit:    1. Type 2 diabetes mellitus with other diabetic kidney complication, with long-term current use of insulin (HCC)    2. Cardiomyopathy, nonischemic ([facility] [address]    3. Anemia in chronic kidney disease, unspecified CKD stage    4. Acquired hypothyroidism- as     5. Dysthymia-cont with effexor as doing     6. Essential hypertension- at goal     7. Dyslipidemia-stable on medicine     8.  Gout, unspecified cause, unspecified chronicity, unspecified site-no signs of gout        Health Maintenance Due   Topic Date Due    BREAST CANCER SCRN MAMMOGRAM  12/10/2008    PAP AKA CERVICAL CYTOLOGY  12/10/2010    COLONOSCOPY  12/10/2015    EYE EXAM RETINAL OR DILATED Q1  03/04/2016    FOOT EXAM Q1  01/04/2017    MICROALBUMIN Q1  01/04/2017    INFLUENZA AGE 9 TO ADULT  08/01/2017    LIPID PANEL Q1  08/15/2017    HEMOGLOBIN A1C Q6M  09/21/2017

## 2017-10-19 NOTE — PATIENT INSTRUCTIONS

## 2017-10-19 NOTE — PROGRESS NOTES
HISTORY OF PRESENT ILLNESS  Petra Chaudhry is a 60 y.o. female. HPI   Patient states nurse called and told her the kidney function had gotten worse and had followed up with nephrology, Dr. Maria Late. She states she has been taking fish oil. Patient denies more fluid build up. She continues on Bumex 2 mg during the day and 1 mg at night per Dr. Sherry Busch or  Dr. Ernesto Cassidy. She states the lower dosage of 1.5 mg daily, did not work and her legs would swell. Patient reports she is taking iron supplements. Patient continues on Effexor. She states she has been managing her mood. Patient continues on Xyzol for allergy management. Patient has not had any gout flare ups. hypothyroidism. Lab Results   Component Value Date/Time    TSH 0.519 02/21/2012 10:53 AM     Thyroid ROS: denies fatigue, weight changes, heat/cold intolerance, bowel/skin changes or CVS symptoms. Diabetic Review of Systems - medication compliance: compliant all of the time, diabetic diet compliance: compliant most of the time, home glucose monitoring: is performed sporadically, further diabetic ROS: no polyuria or polydipsia, no chest pain, dyspnea or TIA's, no numbness, tingling or pain in extremities. Other symptoms and concerns: Last A1C was 8.1% per patient. She states when checking her sugars, the number have been stable. Patient states she has been managing sugars with 70 units either once or twice a day, depending on her diet for that day. Hypertension ROS: taking medications as instructed, no medication side effects noted, no TIA's, no chest pain on exertion, no dyspnea on exertion, no swelling of ankles. New concerns:  Patient's BP in office today is 104/62. Hyperlipidemia:  Cardiovascular risk analysis - 60 y.o. female LDL goal is under 130. ROS: taking medications as instructed, no medication side effects noted, no TIA's, no chest pain on exertion, no dyspnea on exertion, no swelling of ankles.  Tolerating meds, no myalgias or other side effects noted  New concerns: Last LDL was 146. Patient reports her  helps her to shower and dress sometimes. She notes she has not fallen. She states overall he memory is doing well. She states her short term memory has been suffering. Review of Systems   All other systems reviewed and are negative. Physical Exam   Constitutional: She is oriented to person, place, and time. She appears well-developed and well-nourished. HENT:   Head: Normocephalic and atraumatic. Right Ear: External ear normal.   Left Ear: External ear normal.   Nose: Nose normal.   Mouth/Throat: Oropharynx is clear and moist.   Eyes: Conjunctivae and EOM are normal.   Neck: Normal range of motion. Neck supple. Carotid bruit is not present. No thyroid mass and no thyromegaly present. Cardiovascular: Normal rate, regular rhythm, S1 normal, S2 normal, normal heart sounds and intact distal pulses. Pulmonary/Chest: Effort normal and breath sounds normal.   Abdominal: Soft. Normal appearance and bowel sounds are normal. There is no hepatosplenomegaly. There is no tenderness. Musculoskeletal: Normal range of motion. Neurological: She is alert and oriented to person, place, and time. She has normal strength. No cranial nerve deficit or sensory deficit. Coordination normal.   Skin: Skin is warm, dry and intact. No abrasion and no rash noted. Psychiatric: She has a normal mood and affect. Her behavior is normal. Judgment and thought content normal.   Nursing note and vitals reviewed. ASSESSMENT and PLAN  Diagnoses and all orders for this visit:    1. Medicare annual wellness visit, subsequent    2. Type 2 diabetes mellitus with other diabetic kidney complication, with long-term current use of insulin (HCC)  Sugars stable. Continue to follow diabetic diet and monitor sugars. 3. Cardiomyopathy, nonischemic (HCC)  Stable, no acute SOB, swelling, or chest tightness - continue current medications. 4. Anemia in chronic kidney disease, unspecified CKD stage  Patient will follow up nephrology and monitor kidney function. 5. Acquired hypothyroidism  Stable. I do not recommend a change in medications. Continue to follow up with endocrinology. 6. Dysthymia  Mood stable overall, continue current medications. 7. Essential hypertension  BP is at goal. I do not recommend any change in medications. 8. Dyslipidemia  Stable, patient tolerating meds, no myalgias. I do not recommend any change in medications. 9. Gout, unspecified cause, unspecified chronicity, unspecified site  Stable - continue current medications. Other orders  -     Azelastine (ASTEPRO) 0.15 % (205.5 mcg) nasal spray; 2 Sprays by Both Nostrils route two (2) times a day. lab results and schedule of future lab studies reviewed with patient  reviewed diet, exercise and weight control    Written by Ronda Elizabeth, as dictated by Myah Ngo MD.     Current diagnosis and concerns discussed with pt at length. Understands risks and benefits or current treatment plan and medications and accepts the treatment and medication with any possible risks. Pt asks appropriate questions which were answered. Pt instructed to call with any concerns or problems.

## 2017-11-13 ENCOUNTER — HOSPITAL ENCOUNTER (EMERGENCY)
Age: 62
Discharge: HOME | End: 2017-11-13
Attending: EMERGENCY MEDICINE
Payer: MEDICARE

## 2017-11-13 VITALS
HEIGHT: 67 IN | BODY MASS INDEX: 40.02 KG/M2 | DIASTOLIC BLOOD PRESSURE: 62 MMHG | WEIGHT: 255 LBS | RESPIRATION RATE: 18 BRPM | SYSTOLIC BLOOD PRESSURE: 125 MMHG | TEMPERATURE: 97.4 F | OXYGEN SATURATION: 98 % | HEART RATE: 81 BPM

## 2017-11-13 DIAGNOSIS — R04.0 EPISTAXIS: Primary | ICD-10-CM

## 2017-11-13 PROCEDURE — 93005 ELECTROCARDIOGRAM TRACING: CPT

## 2017-11-13 PROCEDURE — 99283 EMERGENCY DEPT VISIT LOW MDM: CPT

## 2017-11-13 RX ORDER — DIAZEPAM 5 MG/1
5 TABLET ORAL
Status: DISCONTINUED | OUTPATIENT
Start: 2017-11-13 | End: 2017-11-13

## 2017-11-13 RX ORDER — IBUPROFEN 800 MG/1
800 TABLET ORAL
Status: DISCONTINUED | OUTPATIENT
Start: 2017-11-13 | End: 2017-11-13

## 2017-11-13 NOTE — ED TRIAGE NOTES
Pt reports nose bleed that began 2 hours ago. Pt c/o dizziness now. Bleeding appears to be controlled in triage.

## 2017-11-14 NOTE — ED NOTES
Pt ambulated to ecg room safely and steadily denying dizziness, sob, and cp. Pt placed back in holland bed with wheels locked and bed lowered. Pt has shoes and socks on.

## 2017-11-14 NOTE — DISCHARGE INSTRUCTIONS
Nosebleeds: Care Instructions  Your Care Instructions    Nosebleeds are common, especially if you have colds or allergies. Many things can cause a nosebleed. Some nosebleeds stop on their own with pressure. Others need packing. Some get cauterized (sealed). If you have gauze or other packing materials in your nose, you will need to follow up with your doctor to have the packing removed. You may need more treatment if you get nosebleeds a lot. The doctor has checked you carefully, but problems can develop later. If you notice any problems or new symptoms, get medical treatment right away. Follow-up care is a key part of your treatment and safety. Be sure to make and go to all appointments, and call your doctor if you are having problems. It's also a good idea to know your test results and keep a list of the medicines you take. How can you care for yourself at home? · If you get another nosebleed:  ¨ Sit up and tilt your head slightly forward. This keeps blood from going down your throat. ¨ Use your thumb and index finger to pinch your nose shut for 10 minutes. Use a clock. Do not check to see if the bleeding has stopped before the 10 minutes are up. If the bleeding has not stopped, pinch your nose shut for another 10 minutes. ¨ When the bleeding has stopped, try not to pick, rub, or blow your nose for 12 hours. Avoiding these things helps keep your nose from bleeding again. · If your doctor prescribed antibiotics, take them as directed. Do not stop taking them just because you feel better. You need to take the full course of antibiotics. To prevent nosebleeds  · Do not blow your nose too hard. · Try not to lift or strain after a nosebleed. · Raise your head on a pillow while you sleep. · Put a thin layer of a saline- or water-based nasal gel, such as NasoGel, inside your nose. Put it on the septum, which divides your nostrils. This will prevent dryness that can cause nosebleeds.   · Use a vaporizer or humidifier to add moisture to your bedroom. Follow the directions for cleaning the machine. · Do not use aspirin, ibuprofen (Advil, Motrin), or naproxen (Aleve) for 36 to 48 hours after a nosebleed unless your doctor tells you to. You can use acetaminophen (Tylenol) for pain relief. · Talk to your doctor about stopping any other medicines you are taking. Some medicines may make you more likely to get a nosebleed. · Do not use cold medicines or nasal sprays without first talking to your doctor. They can make your nose dry. When should you call for help? Call 911 anytime you think you may need emergency care. For example, call if:  ? · You passed out (lost consciousness). ?Call your doctor now or seek immediate medical care if:  ? · You get another nosebleed and your nose is still bleeding after you have applied pressure 3 times for 10 minutes each time (30 minutes total). ? · There is a lot of blood running down the back of your throat even after you pinch your nose and tilt your head forward. ? · You have a fever. ? · You have sinus pain. ? Watch closely for changes in your health, and be sure to contact your doctor if:  ? · You get nosebleeds often, even if they stop. ? · You do not get better as expected. Where can you learn more? Go to http://clemente-linda.info/. Enter S156 in the search box to learn more about \"Nosebleeds: Care Instructions. \"  Current as of: March 20, 2017  Content Version: 11.4  © 5761-1504 [employer]. Care instructions adapted under license by [employer] (which disclaims liability or warranty for this information). If you have questions about a medical condition or this instruction, always ask your healthcare professional. [address] any warranty or liability for your use of this information. We hope that we have addressed all of your medical concerns.  The examination and treatment you received in the Emergency Department were for an emergent problem and were not intended as complete care. It is important that you follow up with your healthcare provider(s) for ongoing care. If your symptoms worsen or do not improve as expected, and you are unable to reach your usual health care provider(s), you should return to the Emergency Department. Today's healthcare is undergoing tremendous change, and patient satisfaction surveys are one of the many tools to assess the quality of medical care. You may receive a survey from the [employer] organization regarding your experience in the Emergency Department. I hope that your experience has been completely positive, particularly the medical care that I provided. As such, please participate in the survey; anything less than excellent does not meet my expectations or intentions. [facility] and [facility] participate in nationally recognized quality of care measures. If your blood pressure is greater than 120/80, as reported below, we urge that you seek medical care to address the potential of high blood pressure, commonly known as hypertension. Hypertension can be hereditary or can be caused by certain medical conditions, pain, stress, or \"white coat syndrome. \"       Please make an appointment with your health care provider(s) for follow up of your Emergency Department visit. VITALS:   Patient Vitals for the past 8 hrs:   Temp Pulse Resp BP SpO2   11/13/17 1952 - - 16 129/65 98 %   11/13/17 1753 97.4 °F (36.3 °C) 87 18 133/63 96 %          Thank you for allowing us to provide you with medical care today. We realize that you have many choices for your emergency care needs. Please choose us in the future for any continued health care needs. Riddhi Flores, [facility].   Office: [phone]                No results found.

## 2017-11-14 NOTE — ED PROVIDER NOTES
HPI Comments: Sean José is a 60 y.o. female  who presents by private vehicle to ER with c/o Patient presents with:  Epistaxis  Dizziness  Patient reports nose bleed from left nare for 2 hours. Since resolved. Patient reports feeling dizzy after nose bleed. Denies chest pain or shortness of breath. She specifically denies any fevers, chills, nausea, vomiting, chest pain, shortness of breath, headache, rash, diarrhea, abdominal pain, urinary/bowel changes, sweating or weight loss. PCP: Jojo Dunaway MD   PMHx significant for: Past Medical History:  8/15/2016: Acquired hypothyroidism  No date: Anemia      Comment: RED-HF study  No date: Asthma  No date: Cardiomyopathy, nonischemic ([facility] [address]      Comment: initial dx 2001, bivHF 2008 with EF 15%, s/p                biV-ICD 9/08, significant improvment in EF to                45-50%  No date: CKD (chronic kidney disease)      Comment: Dr Humza Delgado  8/15/2012: CKD (chronic kidney disease)  No date: Diabetes ([facility] [address]  No date: Diabetic neuropathy ([facility] [address]  8/15/2012: DM (diabetes mellitus) ([facility] [address]  No date: GERD (gastroesophageal reflux disease)  No date: Gout  No date: Hypothyroidism  6/5/2014: ICD (implantable cardioverter-defibrillator), *  No date: Psoriasis   PSHx significant for: Past Surgical History:  2007; 01/06/15: CARDIAC CATHETERIZATION      Comment: normal cors  4/2010: ECHO 2D ADULT      Comment: EF 45%, improved from 1/09 (25%)  11/2011: ECHO 2D ADULT      Comment: LVH, EF 55-60%  No date: HX ORTHOPAEDIC      Comment: knee  No date: HX PACEMAKER PLACEMENT      Comment: AICD  3/21/12: STRESS TEST LEXISCAN/CARDIOLITE      Comment: normal perfusion, global HK 40%  Social Hx: Tobacco use: Smoking status: Never Smoker                                                              Smokeless status: Never Used                      ; EtOH use: The patient states she drinks 0 per week.; Illicit Drug use:      Allergies:   -- Ciprofloxacin -- Anaphylaxis   -- Shellfish Derived -- Anaphylaxis   -- Ace Inhibitors -- Unknown (comments)   -- Biaxin (Clarithromycin) -- Other (comments)    --  Metal taste   -- Candesartan -- Cough   -- Pcn (Penicillins) -- Hives    There are no other complaints, changes or physical findings at this time. Patient is a 60 y.o. female presenting with epistaxis and dizziness. The history is provided by the patient. Epistaxis    This is a new problem. The current episode started 1 to 2 hours ago. The problem occurs constantly. The problem has been resolved. The problem is associated with nothing. The bleeding has been from the left nare. She has tried nothing for the symptoms. Her past medical history is significant for HTN. Her past medical history does not include bleeding disorder, colds, sinus problems, frequent nosebleeds, cancer, allergies or trauma. Dizziness   Associated medical issues do not include trauma or bleeding disorder.         Past Medical History:   Diagnosis Date    Acquired hypothyroidism 8/15/2016    Anemia     RED-HF study    Asthma     Cardiomyopathy, nonischemic ([address]     initial dx 2001, bivHF 2008 with EF 15%, s/p biV-ICD 9/08, significant improvment in EF to 45-50%    CKD (chronic kidney disease)     Dr Hoang [facility] CKD (chronic kidney disease) 8/15/2012    Diabetes ([address]     Diabetic neuropathy ([address]     DM (diabetes mellitus) ([address] 8/15/2012    GERD (gastroesophageal reflux disease)     Gout     Hypothyroidism     ICD (implantable cardioverter-defibrillator), biventricular, in situ 6/5/2014    Psoriasis        Past Surgical History:   Procedure Laterality Date    CARDIAC CATHETERIZATION  2007; 01/06/15    normal cors    ECHO 2D ADULT  4/2010    EF 45%, improved from 1/09 (25%)    ECHO 2D ADULT  11/2011    LVH, EF 55-60%    HX ORTHOPAEDIC      knee    HX PACEMAKER PLACEMENT      AICD    STRESS TEST LEXISCAN/CARDIOLITE  3/21/12    normal perfusion, global HK 40%         No family history on file.    Social History     Social History    Marital status:      Spouse name: N/A    Number of children: N/A    Years of education: N/A     Occupational History    Not on file. Social History Main Topics    Smoking status: Never Smoker    Smokeless tobacco: Never Used    Alcohol use No    Drug use: No    Sexual activity: No     Other Topics Concern    Not on file     Social History Narrative    . Nonsmoker. Disability         ALLERGIES: Ciprofloxacin; Shellfish derived; Ace inhibitors; Biaxin [clarithromycin]; Candesartan; and Pcn [penicillins]    Review of Systems   Constitutional: Negative. HENT: Positive for nosebleeds. Eyes: Negative. Respiratory: Negative. Cardiovascular: Negative. Gastrointestinal: Negative. Endocrine: Negative. Genitourinary: Negative. Musculoskeletal: Negative. Skin: Negative. Allergic/Immunologic: Negative. Neurological: Positive for dizziness. Hematological: Negative. Psychiatric/Behavioral: Negative. All other systems reviewed and are negative. Vitals:    11/13/17 1753   BP: 133/63   Pulse: 87   Resp: 18   Temp: 97.4 °F (36.3 °C)   SpO2: 96%   Weight: 115.7 kg (255 lb)   Height: 5' 7\" (1.702 m)            Physical Exam   Constitutional: She is oriented to person, place, and time. She appears well-developed. Patient ambulatory to exam bed with out difficulty. HENT:   Head: Normocephalic and atraumatic. Right Ear: External ear normal.   Left Ear: External ear normal.   Nose: Nose normal. No mucosal edema, rhinorrhea, nose lacerations, sinus tenderness, nasal deformity, septal deviation or nasal septal hematoma. No epistaxis. No foreign bodies. Mouth/Throat: Oropharynx is clear and moist. No oropharyngeal exudate. Left septum appears irritated, no active bleeding. Eyes: Conjunctivae, EOM and lids are normal. Right eye exhibits no discharge. Left eye exhibits no discharge. Neck: Normal range of motion. No tracheal deviation present. No thyromegaly present. Cardiovascular: Normal rate, regular rhythm, normal heart sounds and intact distal pulses. Pulmonary/Chest: Effort normal and breath sounds normal.   Abdominal: Soft. Normal appearance and bowel sounds are normal.   Musculoskeletal: Normal range of motion. Neurological: She is alert and oriented to person, place, and time. GCS eye subscore is 4. GCS verbal subscore is 5. GCS motor subscore is 6. Skin: Skin is warm and dry. Psychiatric: She has a normal mood and affect. Judgment normal.        MDM  Number of Diagnoses or Management Options  Epistaxis:   Diagnosis management comments: Assesment/Plan- 60 y.o. Patient presents with:  Epistaxis  Dizziness  differential includes: nose bleed. No active nose bleed in ED. Normal orthostatics. Patient feeling better after resting in ED. Recommend PCP follow up. Patient educated on reasons to return to the ED. Amount and/or Complexity of Data Reviewed  Discuss the patient with other providers: yes (Attending- Dr. Pop Hampton, also saw patient and agrees with plan. )      ED Course       Procedures

## 2017-11-15 LAB
ATRIAL RATE: 87 BPM
CALCULATED R AXIS, ECG10: -98 DEGREES
CALCULATED T AXIS, ECG11: 0 DEGREES
DIAGNOSIS, 93000: NORMAL
Q-T INTERVAL, ECG07: 126 MS
QRS DURATION, ECG06: 130 MS

## 2017-11-28 RX ORDER — CARVEDILOL 25 MG/1
TABLET ORAL
Qty: 180 TAB | Refills: 1 | Status: SHIPPED | OUTPATIENT
Start: 2017-11-28 | End: 2018-06-08 | Stop reason: SDUPTHER

## 2017-12-08 ENCOUNTER — OFFICE VISIT (OUTPATIENT)
Dept: CARDIOLOGY CLINIC | Age: 62
End: 2017-12-08

## 2017-12-08 VITALS
HEIGHT: 67 IN | HEART RATE: 80 BPM | DIASTOLIC BLOOD PRESSURE: 60 MMHG | BODY MASS INDEX: 40.71 KG/M2 | RESPIRATION RATE: 18 BRPM | WEIGHT: 259.4 LBS | SYSTOLIC BLOOD PRESSURE: 94 MMHG | OXYGEN SATURATION: 96 %

## 2017-12-08 DIAGNOSIS — E78.5 DYSLIPIDEMIA: Primary | ICD-10-CM

## 2017-12-08 PROBLEM — E66.01 OBESITY, MORBID (HCC): Status: ACTIVE | Noted: 2017-12-08

## 2017-12-08 NOTE — MR AVS SNAPSHOT
Visit Information Date & Time Provider Department Dept. Phone Encounter #  
 12/8/2017 11:00 AM Silvano Escalante NP [facility] Reynal[facility] Stoddard [phone] [id number] Your Appointments 4/19/2018  3:00 PM  
ROUTINE CARE with Anthony Farmer MD  
[facility] [address]) Appt Note: [address] [facility] [address]  
  
   
 [address] [facility]  
  
    
 8/30/2018  1:00 PM  
PACEMAKER with Tutu Perez [facility] (RONEY SCHEDULING) Appt Note: mdt bi-v icd, annual thresh/CL, see [place], no [address] [place] [address]  
[address] [facility]  
  
    
 8/30/2018  1:00 PM  
ESTABLISHED PATIENT with Evan Robles MD  
[facility] ([address]) Appt Note: mdt bi-v icd, annual thresh/CL, see [address], no [address] [place] [address]  
[address] [id number] Upcoming Health Maintenance Date Due  
 BREAST CANCER SCRN MAMMOGRAM 12/10/2008 PAP AKA CERVICAL CYTOLOGY 12/10/2010 COLONOSCOPY 12/10/2015 EYE EXAM RETINAL OR DILATED Q1 3/4/2016 FOOT EXAM Q1 1/4/2017 MICROALBUMIN Q1 1/4/2017 Influenza Age 5 to Adult 8/1/2017 HEMOGLOBIN A1C Q6M 3/29/2018 LIPID PANEL Q1 9/29/2018 DTaP/Tdap/Td series (2 - Td) 10/19/2027 Allergies as of 12/8/2017  Review Complete On: 12/8/2017 By: Silvano Escalante NP Severity Noted Reaction Type Reactions Ciprofloxacin High 01/23/2013   Systemic Anaphylaxis Shellfish Derived High 01/23/2013   Systemic Anaphylaxis Ace Inhibitors  12/10/2008    Unknown (comments) Biaxin [Clarithromycin]  09/14/2011    Other (comments) Metal taste Candesartan  08/12/2016    Cough Pcn [Penicillins]  12/10/2008    Hives Current Immunizations  Never Reviewed Name Date H1N1 FLU VACCINE 1/20/2010 Influenza Vaccine 10/1/2015 Influenza Vaccine Split 10/17/2012, 9/17/2010 Not reviewed this visit You Were Diagnosed With   
  
 Codes Comments Dyslipidemia    -  Primary ICD-10-CM: E78.5 ICD-9-CM: 272.4 Vitals BP Pulse Resp Height(growth percentile) Weight(growth percentile) SpO2  
 94/60 80 18 5' 7\" (1.702 m) 259 lb 6.4 oz (117.7 kg) 96% BMI OB Status Smoking Status 40.63 kg/m2 Postmenopausal Never Smoker BMI and BSA Data Body Mass Index Body Surface Area  
 40.63 kg/m 2 2.36 m 2 Preferred Pharmacy Pharmacy Name Phone [id number] Jesse Allen, [address] [place] [address] [phone] Your Updated Medication List  
  
   
This list is accurate as of: 12/8/17 11:55 AM.  Always use your most recent med list.  
  
  
  
  
 allopurinol 100 mg tablet Commonly known as:  ZYLOPRIM  
TAKE 1 TAB BY MOUTH DAILY FOR 30 DAYS. apremilast 30 mg Tab Take 60 mg by mouth. aspirin 81 mg tablet Take 81 mg by mouth daily. * Azelastine 0.15 % (205.5 mcg) nasal spray Commonly known as:  ASTEPRO  
2 Sprays by Both Nostrils route two (2) times a day. * Azelastine 0.15 % (205.5 mcg) nasal spray Commonly known as:  ASTEPRO  
2 Sprays by Both Nostrils route two (2) times a day. bumetanide 0.5 mg tablet Commonly known as:  Shakir Hy 1 pill by mouth every morning and 1/2 pill at night. calcipotriene 0.005 % topical cream  
Commonly known as:  [place] Apply  to affected area three (3) times daily. calcitRIOL 0.5 mcg capsule Commonly known as:  ROCALTROL  
TAKE 1 CAP BY MOUTH DAILY FOR 30 DAYS. * candesartan 4 mg tablet Commonly known as:  ATACAND Take 1 Tab by mouth as directed for 30 days. One in am depending on pressure and 1/2 tablet at night * candesartan 4 mg tablet Commonly known as:  ATACAND TAKE 1 TABLET BY MOUTH EVERY DAY  
  
 carvedilol 25 mg tablet Commonly known as:  COREG  
TAKE 1 TABLET BY MOUTH TWICE A DAY  
  
 EPINEPHrine 0.3 mg/0.3 mL injection Commonly known as:  EPIPEN  
0.3 mL by IntraMUSCular route once as needed for 1 dose. FISH OIL 1,000 mg Cap Generic drug:  omega-3 fatty acids-vitamin e Take 2 Caps by mouth two (2) times a day. * gabapentin 300 mg capsule Commonly known as:  NEURONTIN Take 1 Cap by mouth nightly for 30 days. * gabapentin 100 mg capsule Commonly known as:  NEURONTIN  
TAKE ONE CAPSULE BY MOUTH EVERY DAY  
  
 * gabapentin 300 mg capsule Commonly known as:  NEURONTIN  
TAKE 1 CAP BY MOUTH NIGHTLY FOR 30 DAYS. hydrOXYzine HCl 10 mg tablet Commonly known as:  ATARAX Take 1 Tab by mouth three (3) times daily as needed for Itching for up to 15 days. insulin NPH/insulin regular 100 unit/mL (70-30) injection Commonly known as:  NovoLIN 70/30  
70 units two times a day Iron 325 mg (65 mg iron) tablet Generic drug:  ferrous sulfate Take  by mouth three (3) times daily (with meals). levothyroxine 100 mcg tablet Commonly known as:  SYNTHROID Take 1 Tab by mouth Daily (before breakfast). Take every day but Sunday  
  
 lovastatin 10 mg tablet Commonly known as:  MEVACOR Take 1 Tab by mouth nightly. meclizine 25 mg tablet Commonly known as:  ANTIVERT Take 1 Tab by mouth three (3) times daily as needed for Dizziness. multivitamin tablet Commonly known as:  ONE A DAY Take 1 Tab by mouth daily. PULMICORT IN Take 2 Puffs by inhalation every twelve (12) hours. As needed  
  
 spironolactone 25 mg tablet Commonly known as:  ALDACTONE  
TAKE 1 TAB BY MOUTH DAILY. STOOL SOFTENER PO Take 1 tablet by mouth daily. triamcinolone acetonide 0.5 % ointment Commonly known as:  KENALOG Apply  to affected area two (2) times a day. use thin layer  
  
 venlafaxine-SR 75 mg capsule Commonly known as:  EFFEXOR-XR  
TAKE ONE CAPSULE BY MOUTH EVERY DAY ZyrTEC 10 mg Cap Generic drug:  Cetirizine Take  by mouth. * Notice: This list has 7 medication(s) that are the same as other medications prescribed for you. Read the directions carefully, and ask your doctor or other care provider to review them with you. Introducing \[facility]\"" & HEALTH SERVICES! Dear Wing Flores: 
Thank you for requesting a ZUCHEM account. Our records indicate that you already have an active ZUCHEM account. You can access your account anytime at [email or web address] Did you know that you can access your hospital and ER discharge instructions at any time in ZUCHEM? You can also review all of your test results from your hospital stay or ER visit. Additional Information If you have questions, please visit the Frequently Asked Questions section of the ZUCHEM website at [email or web address] Remember, ZUCHEM is NOT to be used for urgent needs. For medical emergencies, dial 911. Now available from your iPhone and Android! Please provide this summary of care documentation to your next provider. Your primary care clinician is listed as Jaron Cordova. If you have any questions after today's visit, please call [phone].

## 2017-12-27 DIAGNOSIS — M1A.9XX0 CHRONIC GOUT WITHOUT TOPHUS, UNSPECIFIED CAUSE, UNSPECIFIED SITE: Chronic | ICD-10-CM

## 2017-12-27 RX ORDER — ALLOPURINOL 100 MG/1
TABLET ORAL
Qty: 30 TAB | Refills: 3 | Status: SHIPPED | OUTPATIENT
Start: 2017-12-27 | End: 2018-04-30 | Stop reason: SDUPTHER

## 2018-01-01 RX ORDER — SPIRONOLACTONE 25 MG/1
TABLET ORAL
Qty: 90 TAB | Refills: 0 | Status: SHIPPED | OUTPATIENT
Start: 2018-01-01 | End: 2018-03-27 | Stop reason: SDUPTHER

## 2018-01-16 ENCOUNTER — APPOINTMENT (OUTPATIENT)
Dept: CT IMAGING | Age: 63
End: 2018-01-16
Attending: EMERGENCY MEDICINE
Payer: MEDICARE

## 2018-01-16 ENCOUNTER — HOSPITAL ENCOUNTER (EMERGENCY)
Age: 63
Discharge: HOME | End: 2018-01-17
Attending: EMERGENCY MEDICINE
Payer: MEDICARE

## 2018-01-16 DIAGNOSIS — N93.9 VAGINAL BLEEDING: Primary | ICD-10-CM

## 2018-01-16 DIAGNOSIS — D25.9 UTERINE LEIOMYOMA, UNSPECIFIED LOCATION: ICD-10-CM

## 2018-01-16 LAB
ABO + RH BLD: NORMAL
ALBUMIN SERPL-MCNC: 3.8 G/DL (ref 3.5–5)
ALBUMIN/GLOB SERPL: 0.8 {RATIO} (ref 1.1–2.2)
ALP SERPL-CCNC: 79 U/L (ref 45–117)
ALT SERPL-CCNC: 24 U/L (ref 12–78)
ANION GAP SERPL CALC-SCNC: 8 MMOL/L (ref 5–15)
APPEARANCE UR: ABNORMAL
APTT PPP: 26.1 SEC (ref 22.1–32.5)
AST SERPL-CCNC: 18 U/L (ref 15–37)
BACTERIA URNS QL MICRO: NEGATIVE /HPF
BASOPHILS # BLD: 0 K/UL (ref 0–0.1)
BASOPHILS NFR BLD: 0 % (ref 0–1)
BILIRUB SERPL-MCNC: 0.4 MG/DL (ref 0.2–1)
BILIRUB UR QL: NEGATIVE
BLOOD GROUP ANTIBODIES SERPL: NORMAL
BUN SERPL-MCNC: 49 MG/DL (ref 6–20)
BUN/CREAT SERPL: 17 (ref 12–20)
CALCIUM SERPL-MCNC: 10.3 MG/DL (ref 8.5–10.1)
CHLORIDE SERPL-SCNC: 99 MMOL/L (ref 97–108)
CK MB CFR SERPL CALC: ABNORMAL % (ref 0–2.5)
CK MB SERPL-MCNC: <1 NG/ML (ref 5–25)
CK SERPL-CCNC: 465 U/L (ref 26–192)
CLUE CELLS VAG QL WET PREP: NORMAL
CO2 SERPL-SCNC: 30 MMOL/L (ref 21–32)
COLOR UR: ABNORMAL
CREAT SERPL-MCNC: 2.86 MG/DL (ref 0.55–1.02)
EOSINOPHIL # BLD: 0.4 K/UL (ref 0–0.4)
EOSINOPHIL NFR BLD: 3 % (ref 0–7)
EPITH CASTS URNS QL MICRO: ABNORMAL /LPF
ERYTHROCYTE [DISTWIDTH] IN BLOOD BY AUTOMATED COUNT: 14.8 % (ref 11.5–14.5)
GLOBULIN SER CALC-MCNC: 4.8 G/DL (ref 2–4)
GLUCOSE SERPL-MCNC: 104 MG/DL (ref 65–100)
GLUCOSE UR STRIP.AUTO-MCNC: NEGATIVE MG/DL
HCT VFR BLD AUTO: 38.5 % (ref 35–47)
HGB BLD-MCNC: 11.7 G/DL (ref 11.5–16)
HGB UR QL STRIP: ABNORMAL
HYALINE CASTS URNS QL MICRO: ABNORMAL /LPF (ref 0–5)
INR PPP: 1 (ref 0.9–1.1)
KETONES UR QL STRIP.AUTO: NEGATIVE MG/DL
LACTATE SERPL-SCNC: 1.2 MMOL/L (ref 0.4–2)
LEUKOCYTE ESTERASE UR QL STRIP.AUTO: ABNORMAL
LYMPHOCYTES # BLD: 1.6 K/UL (ref 0.8–3.5)
LYMPHOCYTES NFR BLD: 11 % (ref 12–49)
MCH RBC QN AUTO: 29.3 PG (ref 26–34)
MCHC RBC AUTO-ENTMCNC: 30.4 G/DL (ref 30–36.5)
MCV RBC AUTO: 96.5 FL (ref 80–99)
MONOCYTES # BLD: 0.8 K/UL (ref 0–1)
MONOCYTES NFR BLD: 6 % (ref 5–13)
NEUTS SEG # BLD: 11.3 K/UL (ref 1.8–8)
NEUTS SEG NFR BLD: 80 % (ref 32–75)
NITRITE UR QL STRIP.AUTO: NEGATIVE
PH UR STRIP: 5 [PH] (ref 5–8)
PLATELET # BLD AUTO: 241 K/UL (ref 150–400)
POTASSIUM SERPL-SCNC: 4.1 MMOL/L (ref 3.5–5.1)
PROT SERPL-MCNC: 8.6 G/DL (ref 6.4–8.2)
PROT UR STRIP-MCNC: ABNORMAL MG/DL
PROTHROMBIN TIME: 10.4 SEC (ref 9–11.1)
RBC # BLD AUTO: 3.99 M/UL (ref 3.8–5.2)
RBC #/AREA URNS HPF: >100 /HPF (ref 0–5)
SODIUM SERPL-SCNC: 137 MMOL/L (ref 136–145)
SP GR UR REFRACTOMETRY: 1.02 (ref 1–1.03)
SPECIMEN EXP DATE BLD: NORMAL
T VAGINALIS VAG QL WET PREP: NORMAL
THERAPEUTIC RANGE,PTTT: NORMAL SECS (ref 58–77)
TROPONIN I SERPL-MCNC: <0.04 NG/ML
UR CULT HOLD, URHOLD: NORMAL
UROBILINOGEN UR QL STRIP.AUTO: 0.2 EU/DL (ref 0.2–1)
WBC # BLD AUTO: 14.1 K/UL (ref 3.6–11)
WBC URNS QL MICRO: ABNORMAL /HPF (ref 0–4)

## 2018-01-16 PROCEDURE — 83605 ASSAY OF LACTIC ACID: CPT | Performed by: EMERGENCY MEDICINE

## 2018-01-16 PROCEDURE — 85610 PROTHROMBIN TIME: CPT | Performed by: PHYSICIAN ASSISTANT

## 2018-01-16 PROCEDURE — 81001 URINALYSIS AUTO W/SCOPE: CPT | Performed by: PHYSICIAN ASSISTANT

## 2018-01-16 PROCEDURE — 82550 ASSAY OF CK (CPK): CPT | Performed by: EMERGENCY MEDICINE

## 2018-01-16 PROCEDURE — 99285 EMERGENCY DEPT VISIT HI MDM: CPT

## 2018-01-16 PROCEDURE — 96360 HYDRATION IV INFUSION INIT: CPT

## 2018-01-16 PROCEDURE — 74011250636 HC RX REV CODE- 250/636: Performed by: EMERGENCY MEDICINE

## 2018-01-16 PROCEDURE — 74176 CT ABD & PELVIS W/O CONTRAST: CPT

## 2018-01-16 PROCEDURE — 86850 RBC ANTIBODY SCREEN: CPT | Performed by: PHYSICIAN ASSISTANT

## 2018-01-16 PROCEDURE — 85730 THROMBOPLASTIN TIME PARTIAL: CPT | Performed by: PHYSICIAN ASSISTANT

## 2018-01-16 PROCEDURE — 80053 COMPREHEN METABOLIC PANEL: CPT | Performed by: PHYSICIAN ASSISTANT

## 2018-01-16 PROCEDURE — 87210 SMEAR WET MOUNT SALINE/INK: CPT | Performed by: EMERGENCY MEDICINE

## 2018-01-16 PROCEDURE — 36415 COLL VENOUS BLD VENIPUNCTURE: CPT | Performed by: EMERGENCY MEDICINE

## 2018-01-16 PROCEDURE — 87491 CHLMYD TRACH DNA AMP PROBE: CPT | Performed by: EMERGENCY MEDICINE

## 2018-01-16 PROCEDURE — 93005 ELECTROCARDIOGRAM TRACING: CPT

## 2018-01-16 PROCEDURE — 85025 COMPLETE CBC W/AUTO DIFF WBC: CPT | Performed by: PHYSICIAN ASSISTANT

## 2018-01-16 PROCEDURE — 96361 HYDRATE IV INFUSION ADD-ON: CPT

## 2018-01-16 PROCEDURE — 84484 ASSAY OF TROPONIN QUANT: CPT | Performed by: EMERGENCY MEDICINE

## 2018-01-16 RX ADMIN — SODIUM CHLORIDE 1000 ML: 900 INJECTION, SOLUTION INTRAVENOUS at 20:49

## 2018-01-17 VITALS
WEIGHT: 255 LBS | SYSTOLIC BLOOD PRESSURE: 189 MMHG | HEIGHT: 67 IN | RESPIRATION RATE: 16 BRPM | BODY MASS INDEX: 40.02 KG/M2 | DIASTOLIC BLOOD PRESSURE: 104 MMHG | OXYGEN SATURATION: 96 % | TEMPERATURE: 99 F | HEART RATE: 102 BPM

## 2018-01-17 LAB
ATRIAL RATE: 125 BPM
CALCULATED R AXIS, ECG10: 125 DEGREES
CALCULATED T AXIS, ECG11: -8 DEGREES
DIAGNOSIS, 93000: NORMAL
Q-T INTERVAL, ECG07: 150 MS
QRS DURATION, ECG06: 68 MS
QTC CALCULATION (BEZET), ECG08: 265 MS
VENTRICULAR RATE, ECG03: 188 BPM

## 2018-01-17 NOTE — ED NOTES
Pt's heart rate between 140's-216. Dr. Gt Bazan made verbally aware. Dr. Gt Bazan at bedside. Pt in no acute distress. Pt denies any symptoms at this time. IVF started. Pt tried bearing down 3 times. Pt on cardiac monitor x 3 and hooked up to EKG machine. RN checked radial pulse manually and it was 94 bpm and reads 101 on pulse ox. No other orders received at this time. Pt reports having a defibrillator that is by [employer].

## 2018-01-17 NOTE — ED NOTES
Pt reports pt's heart rate on monitor always \"acts up\" from her ICD when she comes to the hospital. Pt's manual heart rate is 90 bpm radially. Hourly rounds completed. Concerns and questions addressed at this time. Pt in no acute distress at this time. Call bell within reach. Side rails x 2. Cardiac Monitor x 3. Stretcher locked in lowest position.

## 2018-01-17 NOTE — ED PROVIDER NOTES
HPI Comments: 60 y.o. female with past medical history significant for DM, asthma, hypothyroidism, CKD, diabetic neuropathy, cardiomyopathy, anemia ICD, acquired hypothyroidism, psoriasis, gout, and GERD who presents from home with chief complaint of diarrhea. Pt has been experiencing diarrhea and dizziness for the past 3 days and then began experiencing vaginal bleeding with clots today. She now c/o some lower abd \"cramping\" but her dizziness has somewhat resolved. Her LMP was 5 years ago. She reports hx of UTI 2 months ago. Pt denies dysuria, fever, chills, chest pain, cough, or SOB. There are no other acute medical concerns at this time. Social hx: no tobacco use; no EtOH use   PCP: Citlaly Cedeño MD    Note written by Brian Duarte, as dictated by Scott Soria MD 8:07 PM    The history is provided by the patient. No  was used. Past Medical History:   Diagnosis Date    Acquired hypothyroidism 8/15/2016    Anemia     RED-HF study    Asthma     Cardiomyopathy, nonischemic ([address]     initial dx 2001, bivHF 2008 with EF 15%, s/p biV-ICD 9/08, significant improvment in EF to 45-50%    CKD (chronic kidney disease)     Dr Gianni Israel CKD (chronic kidney disease) 8/15/2012    Diabetes ([address]     Diabetic neuropathy ([address]     DM (diabetes mellitus) ([address] 8/15/2012    GERD (gastroesophageal reflux disease)     Gout     Hypothyroidism     ICD (implantable cardioverter-defibrillator), biventricular, in situ 6/5/2014    Psoriasis        Past Surgical History:   Procedure Laterality Date    CARDIAC CATHETERIZATION  2007; 01/06/15    normal cors    ECHO 2D ADULT  4/2010    EF 45%, improved from 1/09 (25%)    ECHO 2D ADULT  11/2011    LVH, EF 55-60%    HX ORTHOPAEDIC      knee    HX PACEMAKER PLACEMENT      AICD    STRESS TEST LEXISCAN/CARDIOLITE  3/21/12    normal perfusion, global HK 40%         No family history on file.     Social History     Social History  Marital status:      Spouse name: N/A    Number of children: N/A    Years of education: N/A     Occupational History    Not on file. Social History Main Topics    Smoking status: Never Smoker    Smokeless tobacco: Never Used    Alcohol use No    Drug use: No    Sexual activity: No     Other Topics Concern    Not on file     Social History Narrative    . Nonsmoker. Disability         ALLERGIES: Ciprofloxacin; Shellfish derived; Ace inhibitors; Biaxin [clarithromycin]; Candesartan; and Pcn [penicillins]    Review of Systems   Constitutional: Negative for chills and fever. Respiratory: Negative for cough and shortness of breath. Cardiovascular: Negative for chest pain. Gastrointestinal: Positive for diarrhea. Genitourinary: Positive for vaginal bleeding. Negative for dysuria. Neurological: Positive for dizziness. All other systems reviewed and are negative. Vitals:    01/16/18 1936   BP: 149/76   Pulse: (!) 105   Resp: 16   Temp: 99 °F (37.2 °C)   SpO2: 99%   Weight: 115.7 kg (255 lb)   Height: 5' 7\" (1.702 m)            Physical Exam   Constitutional: She is oriented to person, place, and time. She appears well-developed and well-nourished. NAD, AxOx4, speaking in complete sentences, GCS = 15     HENT:   Head: Normocephalic and atraumatic. Nose: Nose normal.   Mouth/Throat: Oropharynx is clear and moist.   Cn intact       Eyes: Conjunctivae are normal. Pupils are equal, round, and reactive to light. Right eye exhibits no discharge. No scleral icterus. Neck: Normal range of motion. Neck supple. No JVD present. No tracheal deviation present. Cardiovascular: Normal rate, regular rhythm, normal heart sounds and intact distal pulses. Exam reveals no gallop and no friction rub. No murmur heard. L ant cw - noted AICD;    Pulmonary/Chest: Effort normal and breath sounds normal. No respiratory distress. She has no wheezes. She has no rales.  She exhibits no tenderness. Abdominal: Soft. Bowel sounds are normal. There is no tenderness. There is no rebound and no guarding. nttp   Genitourinary: No vaginal discharge found. Genitourinary Comments: Noted blood in vag vault/ from os / closed/ neg ttp;    Musculoskeletal: Normal range of motion. She exhibits no edema, tenderness or deformity. Neurological: She is alert and oriented to person, place, and time. She has normal reflexes. No cranial nerve deficit. She exhibits normal muscle tone. Coordination normal.   pt has motor/ CV/ Sensation grossly intact to all extremities, R = L in strength;   Skin: Skin is warm and dry. No rash noted. No erythema. No pallor. Psychiatric: She has a normal mood and affect. Her behavior is normal. Thought content normal.   Nursing note and vitals reviewed. [facility]  ED Course       Pelvic Exam  Date/Time: 1/16/2018 9:44 PM  Performed by: attending  Procedure duration (minutes): 15.  Documented by:  Last Dumont. Exam assisted by:  Nurse. Type of exam performed: speculum. External genitalia appearance: normal.    Vaginal exam:  bleeding and odor. Bleeding: mild and moderate  Cervical exam:  normal, no cervical motion tenderness and active bleeding from os. Specimen(s) collected:  chlamydia, GC and vaginal culture. Bimanual exam:  normal.    Patient tolerance: Patient tolerated the procedure well with no immediate complications          Chief Complaint   Patient presents with    Vaginal Bleeding    Diarrhea    Dizziness       8:50 PM  The patients presenting problems have been discussed, and they are in agreement with the care plan formulated and outlined with them. I have encouraged them to ask questions as they arise throughout their visit.     MEDICATIONS GIVEN:  Medications   sodium chloride 0.9 % bolus infusion 1,000 mL (1,000 mL IntraVENous New Bag 1/16/18 2049)       LABS REVIEWED:  Labs Reviewed   CBC WITH AUTOMATED DIFF - Abnormal; Notable for the following: Result Value    WBC 14.1 (*)     RDW 14.8 (*)     NEUTROPHILS 80 (*)     LYMPHOCYTES 11 (*)     ABS. NEUTROPHILS 11.3 (*)     All other components within normal limits   METABOLIC PANEL, COMPREHENSIVE - Abnormal; Notable for the following:     Glucose 104 (*)     BUN 49 (*)     Creatinine 2.86 (*)     GFR est AA 20 (*)     GFR est non-AA 17 (*)     Calcium 10.3 (*)     Protein, total 8.6 (*)     Globulin 4.8 (*)     A-G Ratio 0.8 (*)     All other components within normal limits   PROTHROMBIN TIME + INR   PTT   SAMPLES BEING HELD   TROPONIN I   LACTIC ACID   CK W/ CKMB & INDEX   URINALYSIS W/ RFLX MICROSCOPIC   SAMPLE TO BLOOD BANK   TYPE & SCREEN       RADIOLOGY RESULTS:  The following have been ordered and reviewed:  _____________________________________________________________________  _____________________________________________________________________    EKG interpretation: (Preliminary)  Rhythm: paced rhythm; and ir- regular due to a PVC;  . Rate (approx.): 104; Axis: normal; P wave: normal; QRS interval: normal ; ST/T wave: normal; Negative acute significant segmental elevations/ unchanged compared to study dated 11/13/2017    PROCEDURES:        CONSULTATIONS:       PROGRESS NOTES:      DIAGNOSIS:    No diagnosis found. PLAN:  1-      ED COURSE: The patients hospital course has been uncomplicated. 8:50 PM  Pt ambulated to restroom/ now tachycardic/ 'I didn't take any of my BP meds today';       10:09 PM  Change of shift. Care of patient signed over to Dr LONG Handoff complete.

## 2018-01-17 NOTE — ED NOTES
1000pm 1/16/18  Change of shift. Care of patient taken over from Dr. Dilip Strong; H&P reviewed, handoff complete. Awaiting labs/imaging/consultant. 1240am 1/17/18 - d/c patient home to f/u with OB about uterine fibroids  Ct Abd Pelv Wo Cont    Result Date: 1/16/2018  INDICATION: diarrhea/ vaginal bleeding COMPARISON: None TECHNIQUE: Noncontrast thin axial images were obtained through the abdomen and pelvis. Coronal and sagittal reconstructions were generated. CT dose reduction was achieved through use of a standardized protocol tailored for this examination and automatic exposure control for dose modulation. FINDINGS: LUNG BASES: No abnormality. LIVER: No mass or biliary dilatation. GALLBLADDER: Unremarkable. SPLEEN: No enlargement or lesion. PANCREAS: No mass or ductal dilatation. ADRENALS: No mass. KIDNEYS: 9 mm calculus in the left renal pelvis without significant hydronephrosis. Right extrarenal pelvis. Probable cyst lower pole right kidney. No right-sided hydronephrosis. Mild bilateral perinephric stranding. GI TRACT:  No bowel obstruction. Colonic diverticulosis without diverticulitis. Difficult to assess bowel wall thickening given lack of oral contrast material. PERITONEUM: No free air or free fluid. APPENDIX: Unremarkable. RETROPERITONEUM: No lymphadenopathy or aortic aneurysm. ADDITIONAL COMMENTS: N/A. URINARY BLADDER: Unremarkable. REPRODUCTIVE ORGANS: Uterus is present and contains calcified fibroids. LYMPH NODES:  None enlarged. FREE FLUID:  None. BONES: No destructive bone lesion. ADDITIONAL COMMENTS: N/A. IMPRESSION: 1. Colonic diverticulosis without diverticulitis. No bowel obstruction. 2. Uterine fibroids. 3. 9 mm calculus in the left renal pelvis without hydronephrosis.

## 2018-01-17 NOTE — ED TRIAGE NOTES
Dizziness with diarrhea times several days and now having vaginal bleeding. Post menopause times several years.

## 2018-01-17 NOTE — ED NOTES
Assumed care of pt from triage. Pt presents to ED with chief complaint of vaginal bleeding and diarrhea x a few days. PT reports dizziness as well x a few days, but denies dizziness today. Pt is A&O x 4. Pt denies any other symptoms at this time. Pt resting comfortably on the stretcher in a position of comfort. Pt in no acute distress at this time. Call bell within reach. Side rails x 2. Cardiac monitor x 3. Stretcher locked in the lowest position. Pt aware of plan to await for MD/PA-C/NP assessment, and pt/family verbalizes understanding. Will continue to monitor.

## 2018-01-19 LAB
C TRACH DNA SPEC QL NAA+PROBE: NEGATIVE
N GONORRHOEA DNA SPEC QL NAA+PROBE: NEGATIVE
SAMPLE TYPE: NORMAL
SERVICE CMNT-IMP: NORMAL
SPECIMEN SOURCE: NORMAL

## 2018-03-12 DIAGNOSIS — I42.8 CARDIOMYOPATHY, NONISCHEMIC (HCC): ICD-10-CM

## 2018-03-12 RX ORDER — BUMETANIDE 0.5 MG/1
TABLET ORAL
Qty: 45 TAB | Refills: 3 | Status: SHIPPED | OUTPATIENT
Start: 2018-03-12 | End: 2018-04-03 | Stop reason: DRUGHIGH

## 2018-03-27 RX ORDER — SPIRONOLACTONE 25 MG/1
TABLET ORAL
Qty: 90 TAB | Refills: 0 | Status: SHIPPED | OUTPATIENT
Start: 2018-03-27 | End: 2018-06-23 | Stop reason: SDUPTHER

## 2018-04-03 ENCOUNTER — OFFICE VISIT (OUTPATIENT)
Dept: CARDIOLOGY CLINIC | Age: 63
End: 2018-04-03

## 2018-04-03 VITALS
BODY MASS INDEX: 40.53 KG/M2 | OXYGEN SATURATION: 95 % | HEART RATE: 91 BPM | RESPIRATION RATE: 18 BRPM | TEMPERATURE: 98.6 F | DIASTOLIC BLOOD PRESSURE: 60 MMHG | WEIGHT: 258.8 LBS | SYSTOLIC BLOOD PRESSURE: 101 MMHG

## 2018-04-03 VITALS
TEMPERATURE: 98.6 F | SYSTOLIC BLOOD PRESSURE: 101 MMHG | HEIGHT: 67 IN | RESPIRATION RATE: 18 BRPM | HEART RATE: 91 BPM | DIASTOLIC BLOOD PRESSURE: 60 MMHG | BODY MASS INDEX: 40.62 KG/M2 | WEIGHT: 258.8 LBS | OXYGEN SATURATION: 95 %

## 2018-04-03 DIAGNOSIS — E78.5 DYSLIPIDEMIA: Primary | ICD-10-CM

## 2018-04-03 DIAGNOSIS — I10 ESSENTIAL HYPERTENSION: ICD-10-CM

## 2018-04-03 DIAGNOSIS — E78.5 DYSLIPIDEMIA: ICD-10-CM

## 2018-04-03 DIAGNOSIS — Z95.810 ICD (IMPLANTABLE CARDIOVERTER-DEFIBRILLATOR), BIVENTRICULAR, IN SITU: ICD-10-CM

## 2018-04-03 DIAGNOSIS — N18.30 STAGE 3 CHRONIC KIDNEY DISEASE (HCC): ICD-10-CM

## 2018-04-03 DIAGNOSIS — E11.21 TYPE 2 DIABETES WITH NEPHROPATHY (HCC): ICD-10-CM

## 2018-04-03 DIAGNOSIS — N18.9 ANEMIA IN CHRONIC KIDNEY DISEASE, UNSPECIFIED CKD STAGE: Chronic | ICD-10-CM

## 2018-04-03 DIAGNOSIS — I42.8 CARDIOMYOPATHY, NONISCHEMIC (HCC): Primary | ICD-10-CM

## 2018-04-03 DIAGNOSIS — E66.01 OBESITY, MORBID (HCC): ICD-10-CM

## 2018-04-03 DIAGNOSIS — I27.20 PULMONARY HTN (HCC): Chronic | ICD-10-CM

## 2018-04-03 DIAGNOSIS — E03.9 ACQUIRED HYPOTHYROIDISM: ICD-10-CM

## 2018-04-03 DIAGNOSIS — E11.22 TYPE 2 DIABETES MELLITUS WITH STAGE 3 CHRONIC KIDNEY DISEASE, WITH LONG-TERM CURRENT USE OF INSULIN (HCC): ICD-10-CM

## 2018-04-03 DIAGNOSIS — D63.1 ANEMIA IN CHRONIC KIDNEY DISEASE, UNSPECIFIED CKD STAGE: Chronic | ICD-10-CM

## 2018-04-03 DIAGNOSIS — Z79.4 TYPE 2 DIABETES MELLITUS WITH STAGE 3 CHRONIC KIDNEY DISEASE, WITH LONG-TERM CURRENT USE OF INSULIN (HCC): ICD-10-CM

## 2018-04-03 DIAGNOSIS — N18.30 TYPE 2 DIABETES MELLITUS WITH STAGE 3 CHRONIC KIDNEY DISEASE, WITH LONG-TERM CURRENT USE OF INSULIN (HCC): ICD-10-CM

## 2018-04-03 RX ORDER — BUMETANIDE 2 MG/1
0.5 TABLET ORAL DAILY
COMMUNITY
End: 2018-10-09 | Stop reason: SDUPTHER

## 2018-04-03 NOTE — PROGRESS NOTES
Patient seen for the End-of-study visit for the REDUCE-IT Study-  \"A Multi-Center, Prospective, Randomized, Double-Blind, Placebo-Controlled, Parallel-Group Study to Evaluate the Effect of QFN113 on Cardiovascular Health and Mortality in Hypertriglyceridemic Patients with Cardiovascular Disease or at [address] [facility] for Cardiovascular Disease:  REDUCE-IT (Reduction of Cardiovascular Events with EPA- Intervention Trial)    See research paper chart.

## 2018-04-03 NOTE — PATIENT INSTRUCTIONS
Please continue to work on staying active with walking. Monitor for worsening shortness of breath, change in fatigue or chest pain. We will update your Echo again in the near future and I will then call you with the near future. We will see you in 6 months, but please call with any questions or concerns.

## 2018-04-03 NOTE — PROGRESS NOTES
Visit Vitals    /60    Pulse 91    Temp 98.6 °F (37 °C)    Resp 18    Ht 5' 7\" (1.702 m)    Wt 258 lb 12.8 oz (117.4 kg)    SpO2 95%    BMI 40.53 kg/m2

## 2018-04-03 NOTE — PROGRESS NOTES
HISTORY OF PRESENT ILLNESS   Macy Johnson is a 60 y.o. female. Last seen 12/2014 by Dr. Rhonda Rios. Seen by JAIDA Holloway 12/2016. Here today for routine follow up visit and final visit for REDUCE-it study. Problem List  Date Reviewed: 4/3/2018          Codes Class Noted    Type 2 diabetes with nephropathy ([facility] ICD-10-CM: E11.21  ICD-9-CM: 250.40, 583.81  4/3/2018        Obesity, morbid ([facility] ICD-10-CM: E66.01  ICD-9-CM: 278.01  12/8/2017        Acquired hypothyroidism ICD-10-CM: E03.9  ICD-9-CM: 244.9  8/15/2016        Dizziness ICD-10-CM: R42  ICD-9-CM: 780.4  8/15/2016        Dysthymia ICD-10-CM: F34.1  ICD-9-CM: 300.4  8/15/2016        ICD (implantable cardioverter-defibrillator), biventricular, in situ ICD-10-CM: Z95.810  ICD-9-CM: V45.02  6/5/2014    Overview Signed 1/14/2015 11:11 AM by Jamal Walton MD     Generator [employer] change 1/14/2015             Dyslipidemia ICD-10-CM: O99.4  ICD-9-CM: 272.4  1/14/2014        CKD (chronic kidney disease) ICD-10-CM: N18.9  ICD-9-CM: 585.9  8/15/2012        Cardiomyopathy, nonischemic (HCC) ICD-10-CM: I42.8  ICD-9-CM: 425.4  Unknown    Overview Signed 10/10/2011  6:40 AM by Mallory Torres MD     initial dx 2001, bivHF 2008 with EF 15%, s/p biV-ICD 9/08, significant improvment in EF to 45-50%             Anemia in chronic renal disease (Chronic) ICD-10-CM: N18.9, D63.1  ICD-9-CM: 285.21  12/10/2008        HTN (hypertension) ICD-10-CM: I10  ICD-9-CM: 401.9  12/10/2008        GERD (gastroesophageal reflux disease) (Chronic) ICD-10-CM: K21.9  ICD-9-CM: 530.81  12/10/2008        Gout (Chronic) ICD-10-CM: M10.9  ICD-9-CM: 274.9  12/10/2008        Pulmonary HTN (HCC) (Chronic) ICD-10-CM: I27.20  ICD-9-CM: 416.8  12/10/2008            Cardiac testing:  Cardiac cath 2007 - normal cors   Echo 4/2010 - EF 45%, improved from 1/09 (25%)  Echo 11/2011 - LVH, EF 55-60%  Lexiscan/cardiolite 3/21/12 - normal perfusion, global HK 40%   5/21/2012: Scheduled [facility] download. There were 1135 ventricular sensing episodes noted with her night-time heart rate greater than 85 bpm for 7 days (HR from 122-128 bpm). This has not affected her CRT pacing percentage of 99.8% since January 2012. The Optivol fluid index began rising in April, 2012 and is ongoing, suggesting possible fluid accumulation (>200). 4/8/2013 BIV ICD scheduled carelink download 9 b NSVT 2/18/13, 1445 sensing episodes without BIV pacing probably AT longest one 1 min (98.9%  still)  Echo 6/8/14-EF 55 % to 60 %. Mitral valve: There was mild posterior annular calcification, Mild TR  Echo 6/18/14 - LVEF 55-60%,   Lexiscan cardiolite 12/16/14 - anterior,apical reversible defect, stress induced LV dilatation, EF 13%  Echo 1/13/15: EF 55-60%, no regional WMA, no change from previous echo of 6/14  Cath 1/6/2015 - EDP 11, no AVG, no LVG due to CKD, normal cors with right dominance. Echo 12/15/16 - EF 60-65%. No WMA. Grade 1 diastolic dysfunction. No significant change compared to previous study 1/9/15. HPI  Ms. Vi Denis denies any interval issues. She continues to feel chronic fatigue, unchanged. She remains active walking around stores. She feels safe and is able to avoid the weather with this plan. No regular exercise routine due to chronic joint pain. She denies any exertional chest pain. Stable mild HAILE with moderate activities. No orthopnea, edema or PND. She denies any palpitations, syncope, or near syncope. She is frustrated with a lack of weight loss. \"I'm really trying hard to eat the right things\". Dr. Raymundo Quinones is managing patient's DM and dyslipidemia. Last A1c was 8.1%. Dr. Inessa Montana follows her CKD and he is managing her fluid balance. Scheduled to see him again this week. Routine lab work per Dr. Blanca Meade. ICD followed by Dr. Lizz Walls. Last seen 7/2017.      Current Outpatient Prescriptions   Medication Sig Dispense Refill    bumetanide (BUMEX) 2 mg tablet Take 0.5 mg by mouth daily. TAKE 0.5  MG IN AM AND 1/2 TABLET IN PM      OTHER ZYZAL. TAKE 1 TABLET BY MOUTH DAILY      spironolactone (ALDACTONE) 25 mg tablet TAKE 1 TABLET BY MOUTH DAILY. 90 Tab 0    candesartan (ATACAND) 4 mg tablet TAKE 1 TABLET BY MOUTH EVERY DAY 45 Tab 5    allopurinol (ZYLOPRIM) 100 mg tablet TAKE 1 TAB BY MOUTH DAILY FOR 30 DAYS. 30 Tab 3    carvedilol (COREG) 25 mg tablet TAKE 1 TABLET BY MOUTH TWICE A  Tab 1    venlafaxine-SR (EFFEXOR-XR) 75 mg capsule TAKE ONE CAPSULE BY MOUTH EVERY DAY 30 Cap 5    gabapentin (NEURONTIN) 300 mg capsule TAKE 1 CAP BY MOUTH NIGHTLY FOR 30 DAYS. 30 Cap 0    calcitRIOL (ROCALTROL) 0.5 mcg capsule TAKE 1 CAP BY MOUTH DAILY FOR 30 DAYS. 90 Cap 3    insulin NPH/insulin regular (NOVOLIN 70/30) 100 unit/mL (70-30) injection 70 units two times a day 10 mL 3    apremilast 30 mg tab Take 60 mg by mouth.  levothyroxine (SYNTHROID) 100 mcg tablet Take 1 Tab by mouth Daily (before breakfast). Take every day but Sunday 30 Tab 5    calcipotriene (DOVONEX) 0.005 % topical cream Apply  to affected area three (3) times daily.  triamcinolone acetonide (KENALOG) 0.5 % ointment Apply  to affected area two (2) times a day. use thin layer      multivitamin (ONE A DAY) tablet Take 1 Tab by mouth daily.  meclizine (ANTIVERT) 25 mg tablet Take 1 Tab by mouth three (3) times daily as needed for Dizziness. 20 Tab 3    Azelastine (ASTEPRO) 0.15 % (205.5 mcg) nasal spray 2 Sprays by Both Nostrils route two (2) times a day. (Patient taking differently: 2 Sprays by Both Nostrils route two (2) times daily as needed.) 1 Bottle 4    omega-3 fatty acids-vitamin e (FISH OIL) 1,000 mg cap Take 2 Caps by mouth two (2) times a day.  EPINEPHrine (EPIPEN) 0.3 mg/0.3 mL injection 0.3 mL by IntraMUSCular route once as needed for 1 dose. 1 Each 3    ferrous sulfate (IRON) 325 mg (65 mg elemental iron) tablet Take  by mouth three (3) times daily (with meals).       aspirin 81 mg tablet Take 81 mg by mouth daily.  PULMICORT IN Take 2 Puffs by inhalation every twelve (12) hours. As needed        DOCUSATE CALCIUM (STOOL SOFTENER PO) Take 1 tablet by mouth daily.  Cetirizine (ZYRTEC) 10 mg cap Take  by mouth. Allergies   Allergen Reactions    Ciprofloxacin Anaphylaxis    Shellfish Derived Anaphylaxis    Ace Inhibitors Unknown (comments)    Biaxin [Clarithromycin] Other (comments)     Metal taste    Candesartan Cough    Pcn [Penicillins] Hives       Review of Systems   Constitutional: Positive for malaise/fatigue. Negative for fever, chills and diaphoresis. Respiratory:  Negative for shortness of breath, cough, hemoptysis, sputum production and wheezing. Cardiovascular:  Negative for chest pain, edema, palpitations, orthopnea, claudication and PND. Gastrointestinal: Negative for heartburn, nausea, vomiting, blood in stool and melena. Genitourinary: Negative for dysuria and flank pain. Musculoskeletal: Positive for joint pain. Negative for back pain. Skin: Negative for rash. Neurological: Negative for focal weakness, seizures, loss of consciousness, weakness and headaches. Endo/Heme/Allergies: Does not bruise/bleed easily. Psychiatric/Behavioral: Negative for memory loss. The patient does not have insomnia. Visit Vitals    /60    Pulse 91    Temp 98.6 °F (37 °C)    Resp 18    Ht 5' 7\" (1.702 m)    Wt 258 lb 12.8 oz (117.4 kg)    SpO2 95%    BMI 40.53 kg/m2     Wt Readings from Last 3 Encounters:   04/03/18 258 lb 12.8 oz (117.4 kg)   01/16/18 255 lb (115.7 kg)   12/08/17 259 lb 6.4 oz (117.7 kg)     Physical Exam   Vitals reviewed. Constitutional: She is oriented to person, place, and time. She appears well-developed. Obese. HENT:   Head: Normocephalic. Neck: Neck supple. No JVD present. No tracheal deviation present. Cardiovascular: Normal rate, regular rhythm, S1 normal and S2 normal.  PMI is not displaced.   Exam reveals no gallop and no friction rub. No murmur heard. Pulses: Carotid pulses are 2+ on the right side, and 2+ on the left side. Pulmonary/Chest: Effort normal and breath sounds normal. She has no decreased breath sounds, wheezes, rhonchi or rales. Abdominal: Soft. Normal appearance and normal aorta. No tenderness. She has no rebound. Musculoskeletal: She exhibits no edema. Neurological: She is alert and oriented to person, place, and time. Skin: Skin is warm and dry. Psychiatric: She has a normal mood and affect. Cardiographics:  EKG 1/14/14:  Atrial sensed, ventricular paced rhythm   EKG 12/5/14 - AV sequential pacing   EKG 4/3/18 - Atrial sense; intermittent ventricular pacing    ASSESSMENT and PLAN  Encounter Diagnoses   Name Primary?  Cardiomyopathy, nonischemic (HCC) Yes    Dyslipidemia     Essential hypertension     Pulmonary HTN ([address]     Obesity, morbid ([facility]     ICD (implantable cardioverter-defibrillator), biventricular, in situ     Stage 3 chronic kidney disease     Type 2 diabetes mellitus with stage 3 chronic kidney disease, with long-term current use of insulin (HCC)     Acquired hypothyroidism     Type 2 diabetes with nephropathy (HCC)     Anemia in chronic kidney disease, unspecified CKD stage      Ms. Dru Flanagan has a history of NICM with normalization of LV function per Echo 12/2016; following ICD-CRT therapy. She denies any progression of HF symptoms and is euvolemic on exam on her current diuretic regimen. Continue Aldactone, Coreg, Candersartan and Bumex. Plan to update Echo again in the near future. Chronic fatigue, likely multifactorial, is unchanged. She remains at risk for CAD due to diabetes and CKD, however cardiac catheterization 1/15 showed normal cors. Encouraged her to become more active and continue dietary modification to improve DM control and optimize weight loss.       The patient was encouraged to continue current medications and call with any new complaints or concerns. Follow-up Disposition:  Return in about 6 months (around 10/3/2018).      JAIDA Smith MD

## 2018-04-03 NOTE — MR AVS SNAPSHOT
[address] [facility] [address] 100[address] [facility] 
[phone] Patient: Hi Gutierrez MRN: [id number] NORMA:[birth year] Visit Information Date & Time Provider Department Dept. Phone Encounter #  
 4/3/2018  1:00 PM Iban Duncan MD [facility] [employer] [phone] [id number] Follow-up Instructions Return in about 6 months (around 10/3/2018). Your Appointments 4/19/2018  3:00 PM  
ROUTINE CARE with Kristian Miller MD  
[facility] [address] [place] [address]) [facility]t Note: [facility] [address]  
  
   
 [address] [place] [address]  
  
    
 4/24/2018  3:00 PM  
ECHO CARDIOGRAMS 2D with MIRTA GIVENS [facility] ([facility]ONEY SCHEDULING) Appt Note: echo Dr Cesar De La Rosa Pt wanted Pati Cedric office sll  
 [address] [facility] [address] [place] [address]  
[address] [place] [id number]  
  
    
 8/30/2018  1:00 PM  
PACEMAKER with Parker Bergman [facility] ([facility]ONEY SCHEDULING) Appt Note: mdt bi-v icd, annual thresh/CL, see [place], [address] [place]  
[address]   
  
    
 8/30/2018  1:00 PM  
ESTABLISHED PATIENT with Ruddy Jean MD  
[facility] ([address]) Appt Note: mdt bi-v icd, annual thresh/CL, see [address], no [address] [place]  
[address]   
  
    
 10/9/2018  2:40 PM  
ESTABLISHED PATIENT with Iban Duncan MD  
[facility] ([facility]ONEY SCHEDULING) Appt Note: 6 mo fu appt sll [address] [facility] 600 100[address] [facility]  
[phone] [address] [place] [address] Upcoming Health Maintenance Date Due  
 BREAST CANCER SCRN MAMMOGRAM 12/10/2008 PAP AKA CERVICAL CYTOLOGY 12/10/2010 COLONOSCOPY 12/10/2015 EYE EXAM RETINAL OR DILATED Q1 3/4/2016 FOOT EXAM Q1 1/4/2017 MICROALBUMIN Q1 1/4/2017 Influenza Age 5 to Adult 8/1/2017 HEMOGLOBIN A1C Q6M 6/9/2018 LIPID PANEL Q1 9/29/2018 MEDICARE YEARLY EXAM 10/20/2018 DTaP/Tdap/Td series (2 - Td) 10/19/2027 Allergies as of 4/3/2018  Review Complete On: 4/3/2018 By: Mallika Simon NP Severity Noted Reaction Type Reactions Ciprofloxacin High 01/23/2013   Systemic Anaphylaxis Shellfish Derived High 01/23/2013   Systemic Anaphylaxis Ace Inhibitors  12/10/2008    Unknown (comments) Biaxin [Clarithromycin]  09/14/2011    Other (comments) Metal taste Candesartan  08/12/2016    Cough Pcn [Penicillins]  12/10/2008    Hives Current Immunizations  Never Reviewed Name Date H1N1 FLU VACCINE 1/20/2010 Influenza Vaccine 10/1/2015 Influenza Vaccine Split 10/17/2012, 9/17/2010 Not reviewed this visit You Were Diagnosed With   
  
 Codes Comments Cardiomyopathy, nonischemic ([facility]    -  Primary ICD-10-CM: I42.8 ICD-9-CM: 425.4 Dyslipidemia     ICD-10-CM: E78.5 ICD-9-CM: 272.4 Essential hypertension     ICD-10-CM: I10 
ICD-9-CM: 401.9 Pulmonary HTN ([facility]     ICD-10-CM: I27.20 ICD-9-CM: 416.8 Obesity, morbid ([facility]     ICD-10-CM: E66.01 
ICD-9-CM: 278.01   
 ICD (implantable cardioverter-defibrillator), biventricular, in situ     ICD-10-CM: Z95.810 ICD-9-CM: V45.02 Stage 3 chronic kidney disease     ICD-10-CM: N18.3 ICD-9-CM: 564. 3 Type 2 diabetes mellitus with stage 3 chronic kidney disease, with long-term current use of insulin (HCC)     ICD-10-CM: E11.22, N18.3, Z79.4 ICD-9-CM: 250.40, 585.3, V58.67 Acquired hypothyroidism     ICD-10-CM: E03.9 ICD-9-CM: 555. 9 Type 2 diabetes with nephropathy (HCC)     ICD-10-CM: E11.21 
ICD-9-CM: 250.40, 583.81 Anemia in chronic kidney disease, unspecified CKD stage     ICD-10-CM: N18.9, D63.1 ICD-9-CM: 285.21 Vitals BP Pulse Temp Resp Height(growth percentile) Weight(growth percentile) 101/60 91 98.6 °F (37 °C) 18 5' 7\" (1.702 m) 258 lb 12.8 oz (117.4 kg) SpO2 BMI OB Status Smoking Status 95% 40.53 kg/m2 Postmenopausal Never Smoker BMI and BSA Data Body Mass Index Body Surface Area 40.53 kg/m 2 2.36 m 2 Preferred Pharmacy Pharmacy Name Phone [id number] [place], [address] [place], [place] [phone] Your Updated Medication List  
  
   
This list is accurate as of 4/3/18  1:58 PM.  Always use your most recent med list.  
  
  
  
  
 allopurinol 100 mg tablet Commonly known as:  ZYLOPRIM  
TAKE 1 TAB BY MOUTH DAILY FOR 30 DAYS. apremilast 30 mg Tab Take 60 mg by mouth. aspirin 81 mg tablet Take 81 mg by mouth daily. * Azelastine 0.15 % (205.5 mcg) nasal spray Commonly known as:  ASTEPRO  
2 Sprays by Both Nostrils route two (2) times a day. * Azelastine 0.15 % (205.5 mcg) nasal spray Commonly known as:  ASTEPRO  
2 Sprays by Both Nostrils route two (2) times a day. bumetanide 2 mg tablet Commonly known as:  Homero Dilling Take  by mouth daily. TAKE 2 MG IN AM AND 1/2 TABLET IN PM  
  
 calcipotriene 0.005 % topical cream  
Commonly known as:  Casimiro Xiomara Apply  to affected area three (3) times daily. calcitRIOL 0.5 mcg capsule Commonly known as:  ROCALTROL  
TAKE 1 CAP BY MOUTH DAILY FOR 30 DAYS. * candesartan 4 mg tablet Commonly known as:  ATACAND Take 1 Tab by mouth as directed for 30 days. One in am depending on pressure and 1/2 tablet at night * candesartan 4 mg tablet Commonly known as:  ATACAND TAKE 1 TABLET BY MOUTH EVERY DAY  
  
 carvedilol 25 mg tablet Commonly known as:  COREG  
TAKE 1 TABLET BY MOUTH TWICE A DAY  
  
 EPINEPHrine 0.3 mg/0.3 mL injection Commonly known as:  EPIPEN  
0.3 mL by IntraMUSCular route once as needed for 1 dose. FISH OIL 1,000 mg Cap Generic drug:  omega-3 fatty acids-vitamin e Take 2 Caps by mouth two (2) times a day. * gabapentin 300 mg capsule Commonly known as:  NEURONTIN Take 1 Cap by mouth nightly for 30 days. * gabapentin 100 mg capsule Commonly known as:  NEURONTIN  
TAKE ONE CAPSULE BY MOUTH EVERY DAY  
  
 * gabapentin 300 mg capsule Commonly known as:  NEURONTIN  
TAKE 1 CAP BY MOUTH NIGHTLY FOR 30 DAYS. hydrOXYzine HCl 10 mg tablet Commonly known as:  ATARAX Take 1 Tab by mouth three (3) times daily as needed for Itching for up to 15 days. insulin NPH/insulin regular 100 unit/mL (70-30) injection Commonly known as:  NovoLIN 70/30 U-100 Insulin 70 units two times a day Iron 325 mg (65 mg iron) tablet Generic drug:  ferrous sulfate Take  by mouth three (3) times daily (with meals). levothyroxine 100 mcg tablet Commonly known as:  SYNTHROID Take 1 Tab by mouth Daily (before breakfast). Take every day but Sunday  
  
 lovastatin 10 mg tablet Commonly known as:  MEVACOR Take 1 Tab by mouth nightly. meclizine 25 mg tablet Commonly known as:  ANTIVERT Take 1 Tab by mouth three (3) times daily as needed for Dizziness. multivitamin tablet Commonly known as:  ONE A DAY Take 1 Tab by mouth daily. OTHER  
ZYZAL. TAKE 1 TABLET BY MOUTH DAILY PULMICORT IN Take 2 Puffs by inhalation every twelve (12) hours. As needed  
  
 spironolactone 25 mg tablet Commonly known as:  ALDACTONE  
TAKE 1 TABLET BY MOUTH DAILY. STOOL SOFTENER PO Take 1 tablet by mouth daily. triamcinolone acetonide 0.5 % ointment Commonly known as:  KENALOG Apply  to affected area two (2) times a day. use thin layer venlafaxine-SR 75 mg capsule Commonly known as:  EFFEXOR-XR  
TAKE ONE CAPSULE BY MOUTH EVERY DAY ZyrTEC 10 mg Cap Generic drug:  Cetirizine Take  by mouth. * Notice: This list has 7 medication(s) that are the same as other medications prescribed for you. Read the directions carefully, and ask your doctor or other care provider to review them with you. Follow-up Instructions Return in about 6 months (around 10/3/2018). To-Do List   
 Around 04/03/2018 ECHO:  2D ECHO COMPLETE ADULT (TTE) W OR WO CONTR Patient Instructions Please continue to work on staying active with walking. Monitor for worsening shortness of breath, change in fatigue or chest pain. We will update your Echo again in the near future and I will then call you with the near future. We will see you in 6 months, but please call with any questions or concerns. Introducing [facility]! Dear Mary Mera: 
Thank you for requesting a [employer] account. Our records indicate that you already have an active [employer] account. You can access your account anytime at [email or web address] Did you know that you can access your hospital and ER discharge instructions at any time in [employer]? You can also review all of your test results from your hospital stay or ER visit. Additional Information If you have questions, please visit the Frequently Asked Questions section of the [employer] website at [email or web address] Remember, [employer] is NOT to be used for urgent needs. For medical emergencies, dial 911. Now available from your iPhone and Android! Please provide this summary of care documentation to your next provider. Your primary care clinician is listed as Valdez Pryor. If you have any questions after today's visit, please call [phone].

## 2018-04-11 ENCOUNTER — TELEPHONE (OUTPATIENT)
Dept: CARDIOLOGY CLINIC | Age: 63
End: 2018-04-11

## 2018-04-11 DIAGNOSIS — R89.9 ELEVATED LABORATORY TEST RESULT: Primary | ICD-10-CM

## 2018-04-11 NOTE — TELEPHONE ENCOUNTER
Lab work received for the REDUCE-IT study- drawn 4/3/2018. Troponin-T= 21.13 (<14.0 pg/ml)    The patient was asymptomatic for an chest pain at rest or exertion at the time of the blood draw. Information discussed with Dr. Mingo West. Repeat Troponin-I ordered. Left message for patient to have this re-drawn and return my phone call.

## 2018-04-24 ENCOUNTER — CLINICAL SUPPORT (OUTPATIENT)
Dept: CARDIOLOGY CLINIC | Age: 63
End: 2018-04-24

## 2018-04-24 DIAGNOSIS — I42.8 CARDIOMYOPATHY, NONISCHEMIC (HCC): ICD-10-CM

## 2018-04-27 RX ORDER — GABAPENTIN 100 MG/1
CAPSULE ORAL
Qty: 30 CAP | Refills: 3 | Status: SHIPPED | OUTPATIENT
Start: 2018-04-27 | End: 2018-06-07 | Stop reason: SDUPTHER

## 2018-05-14 RX ORDER — VENLAFAXINE HYDROCHLORIDE 75 MG/1
CAPSULE, EXTENDED RELEASE ORAL
Qty: 30 CAP | Refills: 5 | Status: SHIPPED | OUTPATIENT
Start: 2018-05-14 | End: 2018-07-17 | Stop reason: SDUPTHER

## 2018-06-07 ENCOUNTER — OFFICE VISIT (OUTPATIENT)
Dept: INTERNAL MEDICINE CLINIC | Age: 63
End: 2018-06-07

## 2018-06-07 ENCOUNTER — HOSPITAL ENCOUNTER (OUTPATIENT)
Dept: LAB | Age: 63
Discharge: HOME | End: 2018-06-07
Payer: MEDICARE

## 2018-06-07 VITALS
BODY MASS INDEX: 41.06 KG/M2 | SYSTOLIC BLOOD PRESSURE: 83 MMHG | DIASTOLIC BLOOD PRESSURE: 58 MMHG | RESPIRATION RATE: 16 BRPM | HEIGHT: 67 IN | OXYGEN SATURATION: 97 % | WEIGHT: 261.6 LBS | TEMPERATURE: 98.9 F | HEART RATE: 96 BPM

## 2018-06-07 DIAGNOSIS — D63.1 ANEMIA IN CHRONIC KIDNEY DISEASE, UNSPECIFIED CKD STAGE: Chronic | ICD-10-CM

## 2018-06-07 DIAGNOSIS — E78.5 DYSLIPIDEMIA: ICD-10-CM

## 2018-06-07 DIAGNOSIS — M10.9 GOUT, UNSPECIFIED CAUSE, UNSPECIFIED CHRONICITY, UNSPECIFIED SITE: ICD-10-CM

## 2018-06-07 DIAGNOSIS — I42.8 CARDIOMYOPATHY, NONISCHEMIC (HCC): ICD-10-CM

## 2018-06-07 DIAGNOSIS — N18.9 ANEMIA IN CHRONIC KIDNEY DISEASE, UNSPECIFIED CKD STAGE: Chronic | ICD-10-CM

## 2018-06-07 DIAGNOSIS — J30.1 SEASONAL ALLERGIC RHINITIS DUE TO POLLEN: ICD-10-CM

## 2018-06-07 DIAGNOSIS — E11.21 TYPE 2 DIABETES WITH NEPHROPATHY (HCC): ICD-10-CM

## 2018-06-07 DIAGNOSIS — L40.9 PSORIASIS: ICD-10-CM

## 2018-06-07 DIAGNOSIS — M1A.9XX0 CHRONIC GOUT WITHOUT TOPHUS, UNSPECIFIED CAUSE, UNSPECIFIED SITE: Primary | Chronic | ICD-10-CM

## 2018-06-07 PROCEDURE — 80061 LIPID PANEL: CPT

## 2018-06-07 PROCEDURE — 85027 COMPLETE CBC AUTOMATED: CPT

## 2018-06-07 PROCEDURE — 82043 UR ALBUMIN QUANTITATIVE: CPT

## 2018-06-07 PROCEDURE — 80053 COMPREHEN METABOLIC PANEL: CPT

## 2018-06-07 PROCEDURE — 83036 HEMOGLOBIN GLYCOSYLATED A1C: CPT

## 2018-06-07 PROCEDURE — 84550 ASSAY OF BLOOD/URIC ACID: CPT

## 2018-06-07 RX ORDER — GABAPENTIN 100 MG/1
CAPSULE ORAL
Qty: 90 CAP | Refills: 1 | Status: SHIPPED | OUTPATIENT
Start: 2018-06-07 | End: 2019-01-31 | Stop reason: SDUPTHER

## 2018-06-07 RX ORDER — LEVOCETIRIZINE DIHYDROCHLORIDE 5 MG/1
TABLET, FILM COATED ORAL
COMMUNITY
End: 2019-04-11 | Stop reason: SDUPTHER

## 2018-06-07 RX ORDER — ALLOPURINOL 100 MG/1
TABLET ORAL
Qty: 90 TAB | Refills: 1 | Status: SHIPPED | OUTPATIENT
Start: 2018-06-07 | End: 2018-12-12 | Stop reason: SDUPTHER

## 2018-06-07 NOTE — PROGRESS NOTES
HISTORY OF PRESENT ILLNESS  Yaritza Galan is a 60 y.o. female. HPI   Psoriasis: Patient reports she needs to be cleared to resume [place] with dermatology - Dr. Monserrat Francisco. Patient notes she may start on phototherapy as well to manage psoriasis. Patient has been holding since January. She was sick earlier this year. Anemia: Patient reports chronic fatigue. She continues to follow up with Dr. Zaida Whittaker. Cardiomyopathy: Denies regular exercise routine or exertional CP. She continues to follow up with Dr. Radha Pineda. Allergies: Patient reports congestion, cough in march. She was holding [facility]. She notes her congestion and cough have cleared. She continues allergy meds. Gout: She continues allopurinol, stable. Diabetic Review of Systems - further diabetic ROS: no polyuria or polydipsia, no chest pain, dyspnea or TIA's, no numbness, tingling or pain in extremities. Other symptoms and concerns: Patient continues to follow up with endocrinology, last visit was in 2/2018. Dyslipidemia:  Cardiovascular risk analysis - 60 y.o. female LDL goal is under 130. ROS: taking medications as instructed, no medication side effects noted, no TIA's, no chest pain on exertion, no dyspnea on exertion, no swelling of ankles. Tolerating meds, no myalgias or other side effects noted  New concerns: She continues fish oil. Review of Systems   All other systems reviewed and are negative. Physical Exam   Constitutional: She is oriented to person, place, and time. She appears well-developed and well-nourished. HENT:   Head: Normocephalic and atraumatic. Right Ear: External ear normal.   Left Ear: External ear normal.   Nose: Nose normal.   Mouth/Throat: Oropharynx is clear and moist.   Eyes: Conjunctivae and EOM are normal.   Neck: Normal range of motion. Neck supple. Carotid bruit is not present. No thyroid mass and no thyromegaly present.    Cardiovascular: Normal rate, regular rhythm, S1 normal, S2 normal, normal heart sounds and intact distal pulses. Pulmonary/Chest: Effort normal and breath sounds normal.   Abdominal: Soft. Normal appearance and bowel sounds are normal. There is no hepatosplenomegaly. There is no tenderness. Musculoskeletal: Normal range of motion. Neurological: She is alert and oriented to person, place, and time. She has normal strength. No cranial nerve deficit or sensory deficit. Coordination normal.   Skin: Skin is warm, dry and intact. No abrasion and no rash noted. Psychiatric: She has a normal mood and affect. Her behavior is normal. Judgment and thought content normal.   Nursing note and vitals reviewed. ASSESSMENT and PLAN  Diagnoses and all orders for this visit:    1. Chronic gout without tophus, unspecified cause, unspecified site  Stable and well managed on allopurinol. Continue current meds. -     allopurinol (ZYLOPRIM) 100 mg tablet; TAKE 1 TAB BY MOUTH DAILY    2. Type 2 diabetes with nephropathy (HCC)  Sugars stable. Continue to follow diabetic diet and monitor sugars.   -     gabapentin (NEURONTIN) 100 mg capsule; TAKE ONE CAPSULE BY MOUTH EVERY DAY  -     LIPID PANEL  -     HEMOGLOBIN A1C WITH EAG  -     MICROALBUMIN, UR, RAND    3. Cardiomyopathy, nonischemic (HCC)  Stable, patient continues to follow up with cardiology. Continue current medications.   -     METABOLIC PANEL, COMPREHENSIVE    4. Anemia in chronic kidney disease, unspecified CKD stage  Stable, unchanged. She continues to follow up with Dr. John Caldwell. Continue current meds. -     CBC W/O DIFF    5. Dyslipidemia  Stable, patient tolerating meds, no myalgias. I do not recommend any change in medications. 6. Psoriasis  We will contact [facility] to clear patient to resume [place]. 7. Gout, unspecified cause, unspecified chronicity, unspecified site  Stable, will monitor labs. -     URIC ACID    8. Seasonal allergic rhinitis due to pollen  Stable, no congestion or drainage today in office.  Continue current meds. lab results and schedule of future lab studies reviewed with patient  reviewed diet, exercise and weight control    Written by Lina Christensen, as dictated by Nicholas Marti MD.   This note will not be viewable in [address] Current diagnosis and concerns discussed with pt at length. Understands risks and benefits or current treatment plan and medications and accepts the treatment and medication with any possible risks. Pt asks appropriate questions which were answered. Pt instructed to call with any concerns or problems.

## 2018-06-08 ENCOUNTER — TELEPHONE (OUTPATIENT)
Dept: INTERNAL MEDICINE CLINIC | Age: 63
End: 2018-06-08

## 2018-06-08 LAB
ALBUMIN SERPL-MCNC: 4.3 G/DL (ref 3.6–4.8)
ALBUMIN/GLOB SERPL: 1.5 {RATIO} (ref 1.2–2.2)
ALP SERPL-CCNC: 72 IU/L (ref 39–117)
ALT SERPL-CCNC: 15 IU/L (ref 0–32)
AST SERPL-CCNC: 17 IU/L (ref 0–40)
BILIRUB SERPL-MCNC: 0.2 MG/DL (ref 0–1.2)
BUN SERPL-MCNC: 66 MG/DL (ref 8–27)
BUN/CREAT SERPL: 19 (ref 12–28)
CALCIUM SERPL-MCNC: 10 MG/DL (ref 8.7–10.3)
CHLORIDE SERPL-SCNC: 97 MMOL/L (ref 96–106)
CHOLEST SERPL-MCNC: 240 MG/DL (ref 100–199)
CO2 SERPL-SCNC: 22 MMOL/L (ref 18–29)
CREAT SERPL-MCNC: 3.48 MG/DL (ref 0.57–1)
ERYTHROCYTE [DISTWIDTH] IN BLOOD BY AUTOMATED COUNT: 16.2 % (ref 12.3–15.4)
EST. AVERAGE GLUCOSE BLD GHB EST-MCNC: 180 MG/DL
GFR SERPLBLD CREATININE-BSD FMLA CKD-EPI: 13 ML/MIN/1.73
GFR SERPLBLD CREATININE-BSD FMLA CKD-EPI: 15 ML/MIN/1.73
GLOBULIN SER CALC-MCNC: 2.8 G/DL (ref 1.5–4.5)
GLUCOSE SERPL-MCNC: 200 MG/DL (ref 65–99)
HBA1C MFR BLD: 7.9 % (ref 4.8–5.6)
HCT VFR BLD AUTO: 35 % (ref 34–46.6)
HDLC SERPL-MCNC: 47 MG/DL
HGB BLD-MCNC: 11 G/DL (ref 11.1–15.9)
INTERPRETATION, 910389: NORMAL
INTERPRETATION: NORMAL
LDLC SERPL CALC-MCNC: 147 MG/DL (ref 0–99)
Lab: NORMAL
MCH RBC QN AUTO: 29.3 PG (ref 26.6–33)
MCHC RBC AUTO-ENTMCNC: 31.4 G/DL (ref 31.5–35.7)
MCV RBC AUTO: 93 FL (ref 79–97)
MICROALBUMIN UR-MCNC: 7.4 UG/ML
PDF IMAGE, 910387: NORMAL
PLATELET # BLD AUTO: 223 X10E3/UL (ref 150–379)
POTASSIUM SERPL-SCNC: 5.1 MMOL/L (ref 3.5–5.2)
PROT SERPL-MCNC: 7.1 G/DL (ref 6–8.5)
RBC # BLD AUTO: 3.76 X10E6/UL (ref 3.77–5.28)
SODIUM SERPL-SCNC: 139 MMOL/L (ref 134–144)
TRIGL SERPL-MCNC: 231 MG/DL (ref 0–149)
URATE SERPL-MCNC: 5.5 MG/DL (ref 2.5–7.1)
VLDLC SERPL CALC-MCNC: 46 MG/DL (ref 5–40)
WBC # BLD AUTO: 8.9 X10E3/UL (ref 3.4–10.8)

## 2018-06-08 RX ORDER — CARVEDILOL 25 MG/1
TABLET ORAL
Qty: 180 TAB | Refills: 1 | Status: SHIPPED | OUTPATIENT
Start: 2018-06-08 | End: 2018-11-27 | Stop reason: SDUPTHER

## 2018-06-23 RX ORDER — SPIRONOLACTONE 25 MG/1
TABLET ORAL
Qty: 90 TAB | Refills: 0 | Status: SHIPPED | OUTPATIENT
Start: 2018-06-23 | End: 2018-09-23 | Stop reason: SDUPTHER

## 2018-07-17 RX ORDER — VENLAFAXINE HYDROCHLORIDE 75 MG/1
CAPSULE, EXTENDED RELEASE ORAL
Qty: 90 CAP | Refills: 0 | Status: SHIPPED | OUTPATIENT
Start: 2018-07-17 | End: 2018-11-15 | Stop reason: SDUPTHER

## 2018-08-18 DIAGNOSIS — I42.8 CARDIOMYOPATHY, NONISCHEMIC (HCC): ICD-10-CM

## 2018-08-18 RX ORDER — BUMETANIDE 0.5 MG/1
TABLET ORAL
Qty: 45 TAB | Refills: 3 | Status: SHIPPED | OUTPATIENT
Start: 2018-08-18 | End: 2018-10-09 | Stop reason: CLARIF

## 2018-09-23 RX ORDER — SPIRONOLACTONE 25 MG/1
TABLET ORAL
Qty: 90 TAB | Refills: 0 | Status: SHIPPED | OUTPATIENT
Start: 2018-09-23 | End: 2018-12-26 | Stop reason: SDUPTHER

## 2018-10-08 DIAGNOSIS — N18.30 STAGE 3 CHRONIC KIDNEY DISEASE (HCC): ICD-10-CM

## 2018-10-08 RX ORDER — CALCITRIOL 0.5 UG/1
CAPSULE ORAL
Qty: 90 CAP | Refills: 3 | Status: SHIPPED | OUTPATIENT
Start: 2018-10-08 | End: 2019-04-11 | Stop reason: SDUPTHER

## 2018-10-09 ENCOUNTER — OFFICE VISIT (OUTPATIENT)
Dept: CARDIOLOGY CLINIC | Age: 63
End: 2018-10-09

## 2018-10-09 VITALS
RESPIRATION RATE: 20 BRPM | DIASTOLIC BLOOD PRESSURE: 58 MMHG | SYSTOLIC BLOOD PRESSURE: 92 MMHG | OXYGEN SATURATION: 97 % | WEIGHT: 251 LBS | HEIGHT: 67 IN | BODY MASS INDEX: 39.39 KG/M2 | HEART RATE: 100 BPM

## 2018-10-09 DIAGNOSIS — E78.5 DYSLIPIDEMIA: ICD-10-CM

## 2018-10-09 DIAGNOSIS — I42.8 CARDIOMYOPATHY, NONISCHEMIC (HCC): Primary | ICD-10-CM

## 2018-10-09 DIAGNOSIS — N18.30 STAGE 3 CHRONIC KIDNEY DISEASE (HCC): ICD-10-CM

## 2018-10-09 DIAGNOSIS — Z95.810 ICD (IMPLANTABLE CARDIOVERTER-DEFIBRILLATOR), BIVENTRICULAR, IN SITU: ICD-10-CM

## 2018-10-09 DIAGNOSIS — Z79.4 TYPE 2 DIABETES MELLITUS WITH STAGE 3 CHRONIC KIDNEY DISEASE, WITH LONG-TERM CURRENT USE OF INSULIN (HCC): ICD-10-CM

## 2018-10-09 DIAGNOSIS — E66.01 OBESITY, MORBID (HCC): ICD-10-CM

## 2018-10-09 DIAGNOSIS — N18.30 TYPE 2 DIABETES MELLITUS WITH STAGE 3 CHRONIC KIDNEY DISEASE, WITH LONG-TERM CURRENT USE OF INSULIN (HCC): ICD-10-CM

## 2018-10-09 DIAGNOSIS — I10 ESSENTIAL HYPERTENSION: ICD-10-CM

## 2018-10-09 DIAGNOSIS — E11.22 TYPE 2 DIABETES MELLITUS WITH STAGE 3 CHRONIC KIDNEY DISEASE, WITH LONG-TERM CURRENT USE OF INSULIN (HCC): ICD-10-CM

## 2018-10-09 RX ORDER — CANDESARTAN 4 MG/1
2 TABLET ORAL DAILY
Qty: 45 TAB | Refills: 5 | Status: SHIPPED | OUTPATIENT
Start: 2018-10-09 | End: 2019-04-11 | Stop reason: SDUPTHER

## 2018-10-09 RX ORDER — BUMETANIDE 2 MG/1
2 TABLET ORAL 2 TIMES DAILY
Qty: 60 TAB | Refills: 5
Start: 2018-10-09 | End: 2019-04-11 | Stop reason: SDUPTHER

## 2018-10-09 NOTE — MR AVS SNAPSHOT
[address] [facility] [address] [facility] 
[phone] Patient: Juan Baez MRN: [id number] BKP:[id number]/[id number] Visit Information Date & Time Provider Department Dept. Phone Encounter #  
 10/9/2018  2:40 PM Wendi Ramos MD [facility] Anshu Worthy [phone] [id number] Follow-up Instructions Return in about 6 months (around 4/9/2019). Upcoming Health Maintenance Date Due Shingrix Vaccine Age 50> (1 of 2) 7/9/2004 BREAST CANCER SCRN MAMMOGRAM 12/10/2008 PAP AKA CERVICAL CYTOLOGY 12/10/2010 COLONOSCOPY 12/10/2015 EYE EXAM RETINAL OR DILATED Q1 3/4/2016 Influenza Age 5 to Adult 8/1/2018 MEDICARE YEARLY EXAM 10/20/2018 HEMOGLOBIN A1C Q6M 12/7/2018 FOOT EXAM Q1 6/7/2019 MICROALBUMIN Q1 6/7/2019 LIPID PANEL Q1 6/7/2019 DTaP/Tdap/Td series (2 - Td) 10/19/2027 Allergies as of 10/9/2018  Review Complete On: 10/9/2018 By: Aurora Corley Severity Noted Reaction Type Reactions Ciprofloxacin High 01/23/2013   Systemic Anaphylaxis Shellfish Derived High 01/23/2013   Systemic Anaphylaxis Ace Inhibitors  12/10/2008    Unknown (comments) Biaxin [Clarithromycin]  09/14/2011    Other (comments) Metal taste Candesartan  08/12/2016    Cough Pcn [Penicillins]  12/10/2008    Hives Current Immunizations  Never Reviewed Name Date H1N1 FLU VACCINE 1/20/2010 Influenza Vaccine 10/1/2015 Influenza Vaccine Split 10/17/2012, 9/17/2010 Not reviewed this visit You Were Diagnosed With   
  
 Codes Comments ICD (implantable cardioverter-defibrillator), biventricular, in situ    -  Primary ICD-10-CM: Z95.810 ICD-9-CM: V45.02 Cardiomyopathy, nonischemic ([facility]     ICD-10-CM: I42.8 ICD-9-CM: 425.4 Dyslipidemia     ICD-10-CM: E78.5 ICD-9-CM: 272.4 Essential hypertension     ICD-10-CM: I10 
ICD-9-CM: 401.9 Pulmonary HTN ([facility]     ICD-10-CM: I27.20 ICD-9-CM: 416.8 Type 2 diabetes mellitus with stage 3 chronic kidney disease, with long-term current use of insulin ([facility])     ICD-10-CM: E11.22, N18.3, Z79.4 ICD-9-CM: 250.40, 585.3, V58.67 Vitals BP Pulse Resp Height(growth percentile) Weight(growth percentile) SpO2  
 92/58 100 20 5' 7\" (1.702 m) 251 lb (113.9 kg) 97% BMI OB Status Smoking Status 39.31 kg/m2 Postmenopausal Never Smoker BMI and BSA Data Body Mass Index Body Surface Area  
 39.31 kg/m 2 2.32 m 2 Preferred Pharmacy Pharmacy Name Phone [id number] Mookrip Lopez, [address] [place] [phone] Your Updated Medication List  
  
   
This list is accurate as of 10/9/18  3:46 PM.  Always use your most recent med list.  
  
  
  
  
 allopurinol 100 mg tablet Commonly known as:  ZYLOPRIM  
TAKE 1 TAB BY MOUTH DAILY  
  
 apremilast 30 mg Tab Take 60 mg by mouth. aspirin 81 mg tablet Take 81 mg by mouth daily. Azelastine 0.15 % (205.5 mcg) nasal spray Commonly known as:  ASTEPRO  
2 Sprays by Both Nostrils route two (2) times a day. * bumetanide 2 mg tablet Commonly known as:  Jessie Triana Take 0.5 mg by mouth daily. TAKE 0.5  MG IN AM AND 1/2 TABLET IN PM  
  
 * bumetanide 0.5 mg tablet Commonly known as:  Jessie Triana TAKE 1 TABLET BY MOUTH EVERY MORNING AND 1/2 TABLET AT NIGHT  
  
 calcipotriene 0.005 % topical cream  
Commonly known as:  Sana Feathers Apply  to affected area three (3) times daily. calcitRIOL 0.5 mcg capsule Commonly known as:  ROCALTROL  
TAKE 1 CAPSULE BY MOUTH DAILY FOR 30 DAYS. candesartan 4 mg tablet Commonly known as:  ATACAND TAKE 1 TABLET BY MOUTH EVERY DAY  
  
 carvedilol 25 mg tablet Commonly known as:  COREG  
TAKE 1 TABLET BY MOUTH TWICE A DAY  
  
 EPINEPHrine 0.3 mg/0.3 mL injection Commonly known as:  Christina Ferrara  
 0.3 mL by IntraMUSCular route once as needed for 1 dose. FISH OIL 1,000 mg Cap Generic drug:  omega-3 fatty acids-vitamin e Take 2 Caps by mouth two (2) times a day. * gabapentin 300 mg capsule Commonly known as:  NEURONTIN  
TAKE 1 CAP BY MOUTH NIGHTLY FOR 30 DAYS. * gabapentin 100 mg capsule Commonly known as:  NEURONTIN  
TAKE ONE CAPSULE BY MOUTH EVERY DAY  
  
 insulin NPH/insulin regular 100 unit/mL (70-30) injection Commonly known as:  NovoLIN 70/30 U-100 Insulin 70 units two times a day Iron 325 mg (65 mg iron) tablet Generic drug:  ferrous sulfate Take  by mouth three (3) times daily (with meals). levothyroxine 100 mcg tablet Commonly known as:  SYNTHROID Take 1 Tab by mouth Daily (before breakfast). Take every day but Sunday  
  
 meclizine 25 mg tablet Commonly known as:  ANTIVERT Take 1 Tab by mouth three (3) times daily as needed for Dizziness. multivitamin tablet Commonly known as:  ONE A DAY Take 1 Tab by mouth daily. OTHER  
ZYZAL. TAKE 1 TABLET BY MOUTH DAILY PULMICORT IN Take 2 Puffs by inhalation every twelve (12) hours. As needed  
  
 spironolactone 25 mg tablet Commonly known as:  ALDACTONE  
TAKE 1 TABLET BY MOUTH EVERY DAY  
  
 STOOL SOFTENER PO Take 1 tablet by mouth daily. triamcinolone acetonide 0.5 % ointment Commonly known as:  KENALOG Apply  to affected area two (2) times a day. use thin layer  
  
 venlafaxine-SR 75 mg capsule Commonly known as:  EFFEXOR-XR  
TAKE ONE CAPSULE BY MOUTH EVERY DAY  
  
 XYZAL 5 mg tablet Generic drug:  levocetirizine Take  by mouth. ZyrTEC 10 mg Cap Generic drug:  Cetirizine Take  by mouth. * Notice: This list has 4 medication(s) that are the same as other medications prescribed for you. Read the directions carefully, and ask your doctor or other care provider to review them with you. Follow-up Instructions Return in about 6 months (around 4/9/2019). Patient Instructions Take 1/2 dose Atacand daily Introducing [facility]! Dear Tali Wick: 
Thank you for requesting a [employer] account. Our records indicate that you already have an active Beepi account. You can access your account anytime at [email or web address] Did you know that you can access your hospital and ER discharge instructions at any time in [employer]? You can also review all of your test results from your hospital stay or ER visit. Additional Information If you have questions, please visit the Frequently Asked Questions section of the Be[employer] website at [email or web address] Remember, Be[employer] is NOT to be used for urgent needs. For medical emergencies, dial 911. Now available from your iPhone and Android! Please provide this summary of care documentation to your next provider. Your primary care clinician is listed as Estephania Shaver. If you have any questions after today's visit, please call [phone].

## 2018-10-09 NOTE — PROGRESS NOTES
HISTORY OF PRESENT ILLNESS   Frank William is a 58 y.o. female. Last seen 6 months ago. Problem List  Date Reviewed: 8/30/2018          Codes Class Noted    Type 2 diabetes with nephropathy ([facility] ICD-10-CM: E11.21  ICD-9-CM: 250.40, 583.81  4/3/2018        Obesity, morbid ([facility] ICD-10-CM: E66.01  ICD-9-CM: 278.01  12/8/2017        Acquired hypothyroidism ICD-10-CM: E03.9  ICD-9-CM: 244.9  8/15/2016        Dizziness ICD-10-CM: R42  ICD-9-CM: 780.4  8/15/2016        Dysthymia ICD-10-CM: F34.1  ICD-9-CM: 300.4  8/15/2016        ICD (implantable cardioverter-defibrillator), biventricular, in situ ICD-10-CM: Z95.810  ICD-9-CM: V45.02  6/5/2014    Overview Signed 1/14/2015 11:11 AM by Penny German MD     Generator [employer] change 1/14/2015             Dyslipidemia ICD-10-CM: A52.8  ICD-9-CM: 272.4  1/14/2014        CKD (chronic kidney disease) ICD-10-CM: N18.9  ICD-9-CM: 585.9  8/15/2012        Cardiomyopathy, nonischemic (HCC) ICD-10-CM: I42.8  ICD-9-CM: 425.4  Unknown    Overview Signed 10/10/2011  6:40 AM by Ina Lundy MD     initial dx 2001, bivHF 2008 with EF 15%, s/p biV-ICD 9/08, significant improvment in EF to 45-50%             Anemia in chronic renal disease (Chronic) ICD-10-CM: N18.9, D63.1  ICD-9-CM: 285.21  12/10/2008        HTN (hypertension) ICD-10-CM: I10  ICD-9-CM: 401.9  12/10/2008        GERD (gastroesophageal reflux disease) (Chronic) ICD-10-CM: K21.9  ICD-9-CM: 530.81  12/10/2008        Gout (Chronic) ICD-10-CM: M10.9  ICD-9-CM: 274.9  12/10/2008        Pulmonary HTN (HCC) (Chronic) ICD-10-CM: I27.20  ICD-9-CM: 416.8  12/10/2008            Cardiac testing:  Cardiac cath 2007 - normal cors   Echo 4/2010 - EF 45%, improved from 1/09 (25%)  Echo 11/2011 - LVH, EF 55-60%  Lexiscan/cardiolite 3/21/12 - normal perfusion, global HK 40%   5/21/2012: Scheduled [facility] download.  There were 1135 ventricular sensing episodes noted with her night-time heart rate greater than 85 bpm for 7 days (HR from 122-128 bpm). This has not affected her CRT pacing percentage of 99.8% since January 2012. The Optivol fluid index began rising in April, 2012 and is ongoing, suggesting possible fluid accumulation (>200). 4/8/2013 BIV ICD scheduled carelink download 9 b NSVT 2/18/13, 1445 sensing episodes without BIV pacing probably AT longest one 1 min (98.9%  still)  Echo 6/8/14-EF 55 % to 60 %. Mitral valve: There was mild posterior annular calcification, Mild TR  Echo 6/18/14 - LVEF 55-60%,   Lexiscan cardiolite 12/16/14 - anterior,apical reversible defect, stress induced LV dilatation, EF 13%  Echo 1/13/15: EF 55-60%, no regional WMA, no change from previous echo of 6/14  Cath 1/6/2015 - EDP 11, no AVG, no LVG due to CKD, normal cors with right dominance. Echo 12/15/16 - EF 60-65%. No WMA. Grade 1 diastolic dysfunction. No significant change compared to previous study 1/9/15. Echo 4/24/18 - EF 60-65%. No WMA. HPI  Ms. Rebeca Nettles states she becomes fatigued 1-2 hours after waking up. She notes she often stays up late watching TV and playing games on her phone. She takes Bumex 2 mg QAM and 1 mg QPM per her nephrologist.     She checks her blood pressures at home intermittently. She states the systolic values are usually in the low 100s. Patient denies any exertional chest pain, dyspnea, palpitations, syncope, orthopnea, edema or paroxysmal nocturnal dyspnea. Current Outpatient Prescriptions   Medication Sig Dispense Refill    calcitRIOL (ROCALTROL) 0.5 mcg capsule TAKE 1 CAPSULE BY MOUTH DAILY FOR 30 DAYS.  90 Cap 3    spironolactone (ALDACTONE) 25 mg tablet TAKE 1 TABLET BY MOUTH EVERY DAY 90 Tab 0    bumetanide (BUMEX) 0.5 mg tablet TAKE 1 TABLET BY MOUTH EVERY MORNING AND 1/2 TABLET AT NIGHT 45 Tab 3    venlafaxine-SR (EFFEXOR-XR) 75 mg capsule TAKE ONE CAPSULE BY MOUTH EVERY DAY 90 Cap 0    carvedilol (COREG) 25 mg tablet TAKE 1 TABLET BY MOUTH TWICE A  Tab 1    levocetirizine (XYZAL) 5 mg tablet Take  by mouth.  gabapentin (NEURONTIN) 100 mg capsule TAKE ONE CAPSULE BY MOUTH EVERY DAY 90 Cap 1    allopurinol (ZYLOPRIM) 100 mg tablet TAKE 1 TAB BY MOUTH DAILY 90 Tab 1    bumetanide (BUMEX) 2 mg tablet Take 0.5 mg by mouth daily. TAKE 0.5  MG IN AM AND 1/2 TABLET IN PM      OTHER ZYZAL. TAKE 1 TABLET BY MOUTH DAILY      candesartan (ATACAND) 4 mg tablet TAKE 1 TABLET BY MOUTH EVERY DAY 45 Tab 5    gabapentin (NEURONTIN) 300 mg capsule TAKE 1 CAP BY MOUTH NIGHTLY FOR 30 DAYS. 30 Cap 0    insulin NPH/insulin regular (NOVOLIN 70/30) 100 unit/mL (70-30) injection 70 units two times a day 10 mL 3    apremilast 30 mg tab Take 60 mg by mouth.  levothyroxine (SYNTHROID) 100 mcg tablet Take 1 Tab by mouth Daily (before breakfast). Take every day but Sunday 30 Tab 5    calcipotriene (DOVONEX) 0.005 % topical cream Apply  to affected area three (3) times daily.  triamcinolone acetonide (KENALOG) 0.5 % ointment Apply  to affected area two (2) times a day. use thin layer      multivitamin (ONE A DAY) tablet Take 1 Tab by mouth daily.  DOCUSATE CALCIUM (STOOL SOFTENER PO) Take 1 tablet by mouth daily.  meclizine (ANTIVERT) 25 mg tablet Take 1 Tab by mouth three (3) times daily as needed for Dizziness. 20 Tab 3    Azelastine (ASTEPRO) 0.15 % (205.5 mcg) nasal spray 2 Sprays by Both Nostrils route two (2) times a day. (Patient taking differently: 2 Sprays by Both Nostrils route two (2) times daily as needed.) 1 Bottle 4    Cetirizine (ZYRTEC) 10 mg cap Take  by mouth.  omega-3 fatty acids-vitamin e (FISH OIL) 1,000 mg cap Take 2 Caps by mouth two (2) times a day.  EPINEPHrine (EPIPEN) 0.3 mg/0.3 mL injection 0.3 mL by IntraMUSCular route once as needed for 1 dose. 1 Each 3    ferrous sulfate (IRON) 325 mg (65 mg elemental iron) tablet Take  by mouth three (3) times daily (with meals).  aspirin 81 mg tablet Take 81 mg by mouth daily.       PULMICORT IN Take 2 Puffs by inhalation every twelve (12) hours. As needed         Allergies   Allergen Reactions    Ciprofloxacin Anaphylaxis    Shellfish Derived Anaphylaxis    Ace Inhibitors Unknown (comments)    Biaxin [Clarithromycin] Other (comments)     Metal taste    Candesartan Cough    Pcn [Penicillins] Hives       Review of Systems   Constitutional: Positive for malaise/fatigue. Negative for fever, chills and diaphoresis. Respiratory:  Negative for shortness of breath, cough, hemoptysis, sputum production and wheezing. Cardiovascular:  Negative for chest pain, edema, palpitations, orthopnea, claudication and PND. Gastrointestinal: Negative for heartburn, nausea, vomiting, blood in stool and melena. Genitourinary: Negative for dysuria and flank pain. Musculoskeletal: Positive for joint pain. Negative for back pain. Skin: Negative for rash. Neurological: Negative for focal weakness, seizures, loss of consciousness, weakness and headaches. Endo/Heme/Allergies: Does not bruise/bleed easily. Psychiatric/Behavioral: Negative for memory loss. The patient does not have insomnia. There were no vitals taken for this visit. Wt Readings from Last 3 Encounters:   06/07/18 261 lb 9.6 oz (118.7 kg)   04/03/18 258 lb 12.8 oz (117.4 kg)   04/03/18 258 lb 12.8 oz (117.4 kg)     Physical Exam   Vitals reviewed. Constitutional: She is oriented to person, place, and time. She appears well-developed. Obese. HENT:   Head: Normocephalic. Neck: Neck supple. No JVD present. No tracheal deviation present. Cardiovascular: Normal rate, regular rhythm, S1 normal and S2 normal.  PMI is not displaced. Exam reveals no gallop and no friction rub. No murmur heard. Pulses: Carotid pulses are 2+ on the right side, and 2+ on the left side. Pulmonary/Chest: Effort normal and breath sounds normal. She has no decreased breath sounds, wheezes, rhonchi or rales. Abdominal: Soft. Normal appearance and normal aorta. No tenderness. She has no rebound. Musculoskeletal: She exhibits no edema. Neurological: She is alert and oriented to person, place, and time. Skin: Skin is warm and dry. Psychiatric: She has a normal mood and affect. Cardiographics:  EKG 1/14/14:  Atrial sensed, ventricular paced rhythm   EKG 12/5/14 - AV sequential pacing   EKG 4/3/18 - Atrial sense; intermittent ventricular pacing    ASSESSMENT and PLAN  Encounter Diagnoses   Name Primary?  Cardiomyopathy, nonischemic ([address] Yes    ICD (implantable cardioverter-defibrillator), biventricular, in situ     Dyslipidemia     Essential hypertension     Type 2 diabetes mellitus with stage 3 chronic kidney disease, with long-term current use of insulin (HCC)     Obesity, morbid (HCC)     Stage 3 chronic kidney disease ([address]      Ms. Tae Fuentes has a history of NICM with normalization of LV function s/p CRT-d therapy. LV function has remained normal, most recently by echo 6 months ago. She has no HF sxs at a fair functional capacity. Due to her low BP, will reduce candesartan to 2 mg/d. Continue regular ICD checks. Chronic fatigue, likely due to poor sleep habits. T2DM managed by Dr. Darylene Rouse. Recent A1c from 9/2018 was 8.3%. She is on insulin alone. CKD managed by Dr. Dev Ghosh. Follow-up Disposition:  Return in about 6 months (around 4/9/2019). Written by Amy Hodgson, as dictated by Dr. Parker Cesar.      Parker Cesar MD

## 2018-10-10 ENCOUNTER — TELEPHONE (OUTPATIENT)
Dept: CARDIOLOGY CLINIC | Age: 63
End: 2018-10-10

## 2018-10-10 NOTE — LETTER
10/10/2018 11:15 AM 
 
Ms. Antonino Munoz [address] [facility] [address] We have missed you at you last appointments with Dr Abdias Roque and device check. It is important that we monitor you at least once a year in our office. Please contact our office at [phone] for a follow up appointment with Dr Abdias Roque and to check your device.    
 
 
 
 
 
 
 
Sincerely, 
 
 
Liliana Sierra MD

## 2018-10-10 NOTE — TELEPHONE ENCOUNTER
Per Dr Rod Barriga missed last two appts and device check. Need f/u appt with Dr Rod Barriga and device clinic. Message left to call for an appt and letter mailed to contact us for an appt.

## 2018-10-23 ENCOUNTER — CLINICAL SUPPORT (OUTPATIENT)
Dept: CARDIOLOGY CLINIC | Age: 63
End: 2018-10-23

## 2018-10-23 DIAGNOSIS — Z95.810 PRESENCE OF BIVENTRICULAR AICD: Primary | ICD-10-CM

## 2018-10-26 ENCOUNTER — TELEPHONE (OUTPATIENT)
Dept: CARDIOLOGY CLINIC | Age: 63
End: 2018-10-26

## 2018-10-26 NOTE — TELEPHONE ENCOUNTER
Patient stated she received her home device and took a reading today and she got a green check   Phone:  [phone]

## 2018-11-15 RX ORDER — VENLAFAXINE HYDROCHLORIDE 75 MG/1
CAPSULE, EXTENDED RELEASE ORAL
Qty: 90 CAP | Refills: 0 | Status: SHIPPED | OUTPATIENT
Start: 2018-11-15 | End: 2019-01-31 | Stop reason: SDUPTHER

## 2018-11-27 NOTE — TELEPHONE ENCOUNTER
Refill is per verbal order of Dr. Jen Bautista.     Requested Prescriptions     Pending Prescriptions Disp Refills    carvedilol (COREG) 25 mg tablet 180 Tab 1     Sig: TAKE 1 TABLET BY MOUTH TWICE A DAY

## 2018-11-28 RX ORDER — CARVEDILOL 25 MG/1
TABLET ORAL
Qty: 180 TAB | Refills: 1 | Status: SHIPPED | OUTPATIENT
Start: 2018-11-28 | End: 2019-04-11 | Stop reason: SDUPTHER

## 2018-12-27 RX ORDER — SPIRONOLACTONE 25 MG/1
TABLET ORAL
Qty: 90 TAB | Refills: 0 | Status: SHIPPED | OUTPATIENT
Start: 2018-12-27 | End: 2019-02-24 | Stop reason: SDUPTHER

## 2019-01-10 RX ORDER — AZELASTINE HCL 205.5 UG/1
SPRAY NASAL
Qty: 30 SPRAY | Refills: 2 | Status: SHIPPED | OUTPATIENT
Start: 2019-01-10 | End: 2019-04-22 | Stop reason: SDUPTHER

## 2019-01-14 DIAGNOSIS — M1A.9XX0 CHRONIC GOUT WITHOUT TOPHUS, UNSPECIFIED CAUSE, UNSPECIFIED SITE: Chronic | ICD-10-CM

## 2019-01-14 RX ORDER — ALLOPURINOL 100 MG/1
TABLET ORAL
Qty: 30 TAB | Refills: 0 | Status: SHIPPED | OUTPATIENT
Start: 2019-01-14 | End: 2019-02-15 | Stop reason: SDUPTHER

## 2019-01-31 ENCOUNTER — OFFICE VISIT (OUTPATIENT)
Dept: CARDIOLOGY CLINIC | Age: 64
End: 2019-01-31

## 2019-01-31 ENCOUNTER — CLINICAL SUPPORT (OUTPATIENT)
Dept: CARDIOLOGY CLINIC | Age: 64
End: 2019-01-31

## 2019-01-31 VITALS
HEART RATE: 97 BPM | OXYGEN SATURATION: 92 % | HEIGHT: 67 IN | DIASTOLIC BLOOD PRESSURE: 62 MMHG | RESPIRATION RATE: 14 BRPM | SYSTOLIC BLOOD PRESSURE: 110 MMHG | BODY MASS INDEX: 39.08 KG/M2 | WEIGHT: 249 LBS

## 2019-01-31 DIAGNOSIS — Z95.810 PRESENCE OF DOUBLE CHAMBER AUTOMATIC CARDIOVERTER/DEFIBRILLATOR (AICD): Primary | ICD-10-CM

## 2019-01-31 DIAGNOSIS — E11.21 TYPE 2 DIABETES WITH NEPHROPATHY (HCC): ICD-10-CM

## 2019-01-31 DIAGNOSIS — E66.01 OBESITY, MORBID (HCC): ICD-10-CM

## 2019-01-31 DIAGNOSIS — I42.8 CARDIOMYOPATHY, NONISCHEMIC (HCC): ICD-10-CM

## 2019-01-31 DIAGNOSIS — I10 ESSENTIAL HYPERTENSION: ICD-10-CM

## 2019-01-31 DIAGNOSIS — Z95.810 ICD (IMPLANTABLE CARDIOVERTER-DEFIBRILLATOR), BIVENTRICULAR, IN SITU: Primary | ICD-10-CM

## 2019-01-31 RX ORDER — GABAPENTIN 100 MG/1
CAPSULE ORAL
Qty: 90 CAP | Refills: 1 | Status: SHIPPED | OUTPATIENT
Start: 2019-01-31 | End: 2019-04-11 | Stop reason: SDUPTHER

## 2019-01-31 RX ORDER — VENLAFAXINE HYDROCHLORIDE 75 MG/1
CAPSULE, EXTENDED RELEASE ORAL
Qty: 90 CAP | Refills: 0 | Status: SHIPPED | OUTPATIENT
Start: 2019-01-31 | End: 2019-04-11 | Stop reason: SDUPTHER

## 2019-01-31 NOTE — PROGRESS NOTES
Cardiac Electrophysiology Office Note     Subjective:      Noemi Goff is a 58 y.o. patient who is seen for evaluation of BIV ICD    The patient had follow up for 18 months, but missed that appointment. She is doing well except for trying to lose weight but that has not happened. She remains on a low salt diet and does not have much lower extremity edema. She denies chest pain, shortness of breath, syncope. She has a [employer]tronic biventricular pacemaker AICD that I replaced back in 2015. Her left ventricular ejection fraction has normalized with bi-V pacing. She is NYHA Class I-II. She has not lost a lot of weight and is worried about her kidney function. The last lab tests showed she has stage 4 renal failure. Patient Active Problem List   Diagnosis Code    Anemia in chronic renal disease N18.9, D63.1    HTN (hypertension) I10    GERD (gastroesophageal reflux disease) K21.9    Gout M10.9    Pulmonary HTN (HCC) I27.20    Cardiomyopathy, nonischemic ([facility]) I42.8    CKD (chronic kidney disease) N18.9    Dyslipidemia E78.5    ICD (implantable cardioverter-defibrillator), biventricular, in situ Z95.810    Acquired hypothyroidism E03.9    Dysthymia F34.1    Obesity, morbid (HCC) E66.01    Type 2 diabetes with nephropathy ([facility]) E11.21     Current Outpatient Medications   Medication Sig Dispense Refill    venlafaxine-SR (EFFEXOR-XR) 75 mg capsule TAKE 1 CAPSULE BY MOUTH EVERY DAY 90 Cap 0    gabapentin (NEURONTIN) 100 mg capsule TAKE 1 CAPSULE BY MOUTH EVERY DAY 90 Cap 1    allopurinol (ZYLOPRIM) 100 mg tablet TAKE 1 TABLET BY MOUTH EVERY DAY 30 Tab 0    azelastine (ASTEPRO) 0.15 % (205.5 mcg) INHALE 2 SPRAYS BY BOTH NOSTRILS ROUTE TWO (2) TIMES A DAY.  30 Spray 2    spironolactone (ALDACTONE) 25 mg tablet TAKE 1 TABLET BY MOUTH EVERY DAY 90 Tab 0    carvedilol (COREG) 25 mg tablet TAKE 1 TABLET BY MOUTH TWICE A  Tab 1    candesartan (ATACAND) 4 mg tablet Take 0.5 Tabs by mouth daily. 45 Tab 5    bumetanide (BUMEX) 2 mg tablet Take 1 Tab by mouth two (2) times a day. TAKE 1 tab IN AM AND 1/2 TABLET IN PM 60 Tab 5    calcitRIOL (ROCALTROL) 0.5 mcg capsule TAKE 1 CAPSULE BY MOUTH DAILY FOR 30 DAYS. 90 Cap 3    levocetirizine (XYZAL) 5 mg tablet Take  by mouth.  gabapentin (NEURONTIN) 300 mg capsule TAKE 1 CAP BY MOUTH NIGHTLY FOR 30 DAYS. 30 Cap 0    insulin NPH/insulin regular (NOVOLIN 70/30) 100 unit/mL (70-30) injection 70 units two times a day 10 mL 3    levothyroxine (SYNTHROID) 100 mcg tablet Take 1 Tab by mouth Daily (before breakfast). Take every day but Sunday 30 Tab 5    calcipotriene (DOVONEX) 0.005 % topical cream Apply  to affected area three (3) times daily.  triamcinolone acetonide (KENALOG) 0.5 % ointment Apply  to affected area two (2) times a day. use thin layer      multivitamin (ONE A DAY) tablet Take 1 Tab by mouth daily.  DOCUSATE CALCIUM (STOOL SOFTENER PO) Take 1 tablet by mouth daily.  meclizine (ANTIVERT) 25 mg tablet Take 1 Tab by mouth three (3) times daily as needed for Dizziness. 20 Tab 3    Azelastine (ASTEPRO) 0.15 % (205.5 mcg) nasal spray 2 Sprays by Both Nostrils route two (2) times a day. (Patient taking differently: 2 Sprays by Both Nostrils route two (2) times daily as needed.) 1 Bottle 4    EPINEPHrine (EPIPEN) 0.3 mg/0.3 mL injection 0.3 mL by IntraMUSCular route once as needed for 1 dose. 1 Each 3    ferrous sulfate (IRON) 325 mg (65 mg elemental iron) tablet Take  by mouth three (3) times daily (with meals).  aspirin 81 mg tablet Take 81 mg by mouth daily.  PULMICORT IN Take 2 Puffs by inhalation every twelve (12) hours.  As needed         Allergies   Allergen Reactions    Ciprofloxacin Anaphylaxis    Shellfish Derived Anaphylaxis    Ace Inhibitors Unknown (comments)    Biaxin [Clarithromycin] Other (comments)     Metal taste    Candesartan Cough    Pcn [Penicillins] Hives     Past Medical History: Diagnosis Date    Acquired hypothyroidism 8/15/2016    Anemia     RED-HF study    Asthma     Cardiomyopathy, nonischemic ([facility]     initial dx 2001, bivHF 2008 with EF 15%, s/p biV-ICD 9/08, significant improvment in EF to 45-50%    CKD (chronic kidney disease)     Dr Jj Payton    CKD (chronic kidney disease) 8/15/2012    Diabetes ([facility]     Diabetic neuropathy ([facility]     DM (diabetes mellitus) ([facility] 8/15/2012    GERD (gastroesophageal reflux disease)     Gout     Hypothyroidism     ICD (implantable cardioverter-defibrillator), biventricular, in situ 6/5/2014    Psoriasis      Past Surgical History:   Procedure Laterality Date    CARDIAC CATHETERIZATION  2007; 01/06/15    normal cors    ECHO 2D ADULT  4/2010    EF 45%, improved from 1/09 (25%)    ECHO 2D ADULT  11/2011    LVH, EF 55-60%    HX ORTHOPAEDIC      knee    HX PACEMAKER PLACEMENT      AICD    STRESS TEST LEXISCAN/CARDIOLITE  3/21/12    normal perfusion, global HK 40%       Social History     Tobacco Use    Smoking status: Never Smoker    Smokeless tobacco: Never Used   Substance Use Topics    Alcohol use: No        Review of Systems:   Constitutional: Negative for fever, chills, weight loss  HEENT: Negative for nosebleeds, vision changes. Respiratory: Negative for cough, hemoptysis  Cardiovascular: Negative for chest pain, palpitations, orthopnea, claudication, leg swelling, syncope, and PND. Gastrointestinal: Negative for nausea, vomiting, diarrhea, blood in stool and melena. Genitourinary: Negative for dysuria, and hematuria. Musculoskeletal: Negative for myalgias, arthralgia. Skin: Negative for rash. Heme: Does not bleed or bruise easily.    Neurological: Negative for speech change and focal weakness     Objective:     Visit Vitals  /62 (BP 1 Location: Left arm, BP Patient Position: Sitting)   Pulse 97   Resp 14   Ht 5' 7\" (1.702 m)   Wt 249 lb (112.9 kg)   SpO2 92%   BMI 39.00 kg/m²      Physical Exam: Constitutional: well-developed and well-nourished. No respiratory distress. Head: Normocephalic and atraumatic. Eyes: Pupils are equal, round  ENT: hearing normal  Neck: supple. No JVD present. Cardiovascular: Normal rate, regular rhythm. Exam reveals no gallop and no friction rub. No murmur heard. Pulmonary/Chest: Effort normal and breath sounds normal. No wheezes. Abdominal: Soft, no tenderness. Obese  Musculoskeletal: no edema. Neurological: alert,oriented. Skin: Skin is warm and dry left sided BIV ICD  Psychiatric: normal mood and affect. Behavior is normal. Judgment and thought content normal.        Assessment/Plan:       ICD-10-CM ICD-9-CM    1. ICD (implantable cardioverter-defibrillator), biventricular, in situ Z95.810 V45.02    2. Cardiomyopathy, nonischemic (HCC) I42.8 425.4    3. Essential hypertension I10 401.9    4. Obesity, morbid ([facility] E66.01 278.01      reviewed diet, exercise and weight control  reviewed medications and side effects in detail   I have reviewed with the patient the device check today. It has proper function for all 3 leads and she has no ventricular arrhythmia, atrial flutter or atrial fibrillation. She is biventricular paced at an adequate amount, more than 96%. The patient is in sinus rhythm with NYHA Class I-II. She will continue to take the guideline directed medical therapy and will probably repeat labs for kidney function in next few months with her primary care physician. The device has 2.5 years battery life. There was a generator change on 1/14/15. Her initial implant was 9/25/08. Follow-up Disposition:  Return in about 1 year (around 1/31/2020). check device remotely every 3 months  She will see Dr Darrin Qureshi as usual    Thank you for involving me in this patient's care and please call with further concerns or questions. Kathlyne Snellen, M.D.   Electrophysiology/Cardiology  [facility] [place]  [address] [facility] Yvrose Eduardo [facility] [place], [address]                             [address] [place] [address]  [phone]

## 2019-01-31 NOTE — PROGRESS NOTES
Chief Complaint   Patient presents with    Pacemaker Check     1 yr follow up     1. Have you been to the ER, urgent care clinic since your last visit? Hospitalized since your last visit? No    2. Have you seen or consulted any other health care providers outside of the [address] [place] [address] since your last visit? Include any pap smears or colon screening.  No

## 2019-02-15 DIAGNOSIS — M1A.9XX0 CHRONIC GOUT WITHOUT TOPHUS, UNSPECIFIED CAUSE, UNSPECIFIED SITE: Chronic | ICD-10-CM

## 2019-02-15 RX ORDER — ALLOPURINOL 100 MG/1
TABLET ORAL
Qty: 30 TAB | Refills: 0 | Status: SHIPPED | OUTPATIENT
Start: 2019-02-15 | End: 2019-04-05 | Stop reason: SDUPTHER

## 2019-02-25 RX ORDER — SPIRONOLACTONE 25 MG/1
TABLET ORAL
Qty: 90 TAB | Refills: 0 | Status: SHIPPED | OUTPATIENT
Start: 2019-02-25 | End: 2019-04-05 | Stop reason: SDUPTHER

## 2019-04-03 ENCOUNTER — TELEPHONE (OUTPATIENT)
Dept: CARDIOLOGY CLINIC | Age: 64
End: 2019-04-03

## 2019-04-03 NOTE — TELEPHONE ENCOUNTER
Notified patient that her treatment assignment for the duration of the REDUCE-IT study was placebo.       REDUCE-IT Study-  \"A Multi-Center, Prospective, Randomized, Double-Blind, Placebo-Controlled, Parallel-Group Study to Evaluate the Effect of [id number] on Cardiovascular Health and Mortality in Hypertriglyceridemic Patients with Cardiovascular Disease or at [facility]:  REDUCE-IT (Reduction of Cardiovascular Events with EPA- Intervention Trial)

## 2019-04-05 DIAGNOSIS — M1A.9XX0 CHRONIC GOUT WITHOUT TOPHUS, UNSPECIFIED CAUSE, UNSPECIFIED SITE: Chronic | ICD-10-CM

## 2019-04-05 RX ORDER — ALLOPURINOL 100 MG/1
100 TABLET ORAL DAILY
Qty: 30 TAB | Refills: 0 | Status: SHIPPED | OUTPATIENT
Start: 2019-04-05 | End: 2019-04-11 | Stop reason: SDUPTHER

## 2019-04-05 RX ORDER — SPIRONOLACTONE 25 MG/1
TABLET ORAL
Qty: 30 TAB | Refills: 0 | Status: SHIPPED | OUTPATIENT
Start: 2019-04-05 | End: 2019-04-11 | Stop reason: SDUPTHER

## 2019-04-05 NOTE — TELEPHONE ENCOUNTER
Patient called following up on medication that was supposed to be sent to pharmacy. Patient stated that she was advised that enough medication would be filled of both medications until her next appointment 4/16/19 per Dr. Andrew Alvarenga. Please call patient if you have any questions regarding. Patient would like to  from pharmacy tonight.          [facility]/PHARMACY #[id number] Taniya Fenton, [address] [place] [address]  [phone]

## 2019-04-11 ENCOUNTER — OFFICE VISIT (OUTPATIENT)
Dept: PRIMARY CARE CLINIC | Age: 64
End: 2019-04-11

## 2019-04-11 VITALS
RESPIRATION RATE: 16 BRPM | HEIGHT: 67 IN | SYSTOLIC BLOOD PRESSURE: 119 MMHG | OXYGEN SATURATION: 97 % | BODY MASS INDEX: 39.99 KG/M2 | HEART RATE: 96 BPM | TEMPERATURE: 98.1 F | WEIGHT: 254.8 LBS | DIASTOLIC BLOOD PRESSURE: 78 MMHG

## 2019-04-11 DIAGNOSIS — N18.30 STAGE 3 CHRONIC KIDNEY DISEASE (HCC): ICD-10-CM

## 2019-04-11 DIAGNOSIS — I42.8 CARDIOMYOPATHY, NONISCHEMIC (HCC): ICD-10-CM

## 2019-04-11 DIAGNOSIS — I10 HYPERTENSION, UNSPECIFIED TYPE: Primary | ICD-10-CM

## 2019-04-11 DIAGNOSIS — E11.21 TYPE 2 DIABETES WITH NEPHROPATHY (HCC): ICD-10-CM

## 2019-04-11 DIAGNOSIS — E66.01 OBESITY, MORBID (HCC): ICD-10-CM

## 2019-04-11 DIAGNOSIS — Z76.89 ENCOUNTER TO ESTABLISH CARE: ICD-10-CM

## 2019-04-11 DIAGNOSIS — J30.89 ENVIRONMENTAL AND SEASONAL ALLERGIES: ICD-10-CM

## 2019-04-11 DIAGNOSIS — M1A.9XX0 CHRONIC GOUT WITHOUT TOPHUS, UNSPECIFIED CAUSE, UNSPECIFIED SITE: Chronic | ICD-10-CM

## 2019-04-11 DIAGNOSIS — Z76.0 ENCOUNTER FOR MEDICATION REFILL: ICD-10-CM

## 2019-04-11 DIAGNOSIS — G62.9 NEUROPATHY: ICD-10-CM

## 2019-04-11 DIAGNOSIS — E03.9 ACQUIRED HYPOTHYROIDISM: ICD-10-CM

## 2019-04-11 DIAGNOSIS — L40.0 PLAQUE PSORIASIS: ICD-10-CM

## 2019-04-11 RX ORDER — ALLOPURINOL 100 MG/1
100 TABLET ORAL DAILY
Qty: 90 TAB | Refills: 0 | Status: SHIPPED | OUTPATIENT
Start: 2019-04-11 | End: 2019-07-23 | Stop reason: SDUPTHER

## 2019-04-11 RX ORDER — BUMETANIDE 2 MG/1
2 TABLET ORAL 2 TIMES DAILY
Qty: 180 TAB | Refills: 0 | Status: SHIPPED | OUTPATIENT
Start: 2019-04-11 | End: 2019-04-22 | Stop reason: DRUGHIGH

## 2019-04-11 RX ORDER — GABAPENTIN 100 MG/1
CAPSULE ORAL
Qty: 90 CAP | Refills: 0 | Status: SHIPPED | OUTPATIENT
Start: 2019-04-11 | End: 2019-07-19 | Stop reason: SDUPTHER

## 2019-04-11 RX ORDER — LEVOTHYROXINE SODIUM 100 UG/1
100 TABLET ORAL
Qty: 90 TAB | Refills: 0 | Status: SHIPPED | OUTPATIENT
Start: 2019-04-11 | End: 2019-08-04 | Stop reason: SDUPTHER

## 2019-04-11 RX ORDER — LEVOCETIRIZINE DIHYDROCHLORIDE 5 MG/1
5 TABLET, FILM COATED ORAL DAILY
Qty: 90 TAB | Refills: 0 | Status: SHIPPED | OUTPATIENT
Start: 2019-04-11 | End: 2019-07-09 | Stop reason: SDUPTHER

## 2019-04-11 RX ORDER — VENLAFAXINE HYDROCHLORIDE 75 MG/1
CAPSULE, EXTENDED RELEASE ORAL
Qty: 90 CAP | Refills: 0 | Status: SHIPPED | OUTPATIENT
Start: 2019-04-11 | End: 2019-07-15 | Stop reason: SDUPTHER

## 2019-04-11 RX ORDER — SPIRONOLACTONE 25 MG/1
TABLET ORAL
Qty: 30 TAB | Refills: 0 | Status: SHIPPED | OUTPATIENT
Start: 2019-04-11 | End: 2019-08-27 | Stop reason: SDUPTHER

## 2019-04-11 RX ORDER — CARVEDILOL 25 MG/1
TABLET ORAL
Qty: 180 TAB | Refills: 0 | Status: SHIPPED | OUTPATIENT
Start: 2019-04-11 | End: 2019-10-04 | Stop reason: SDUPTHER

## 2019-04-11 RX ORDER — CALCITRIOL 0.5 UG/1
CAPSULE ORAL
Qty: 90 CAP | Refills: 0 | Status: SHIPPED | OUTPATIENT
Start: 2019-04-11 | End: 2019-10-23 | Stop reason: SDUPTHER

## 2019-04-11 RX ORDER — MECLIZINE HYDROCHLORIDE 25 MG/1
25 TABLET ORAL
Qty: 20 TAB | Refills: 3 | Status: CANCELLED | OUTPATIENT
Start: 2019-04-11

## 2019-04-11 RX ORDER — CANDESARTAN 4 MG/1
2 TABLET ORAL DAILY
Qty: 45 TAB | Refills: 0 | Status: SHIPPED | OUTPATIENT
Start: 2019-04-11 | End: 2019-10-23 | Stop reason: SDUPTHER

## 2019-04-11 RX ORDER — AZELASTINE HCL 205.5 UG/1
2 SPRAY NASAL
Qty: 3 BOTTLE | Refills: 1 | Status: SHIPPED | OUTPATIENT
Start: 2019-04-11 | End: 2021-01-08 | Stop reason: SDUPTHER

## 2019-04-11 RX ORDER — GABAPENTIN 300 MG/1
CAPSULE ORAL
Qty: 30 CAP | Refills: 0 | Status: CANCELLED | OUTPATIENT
Start: 2019-04-11

## 2019-04-11 NOTE — Clinical Note
I spoke with patient about establishing care with you as pcp. Very complicated chronic conditions.  Thanks, Lu

## 2019-04-11 NOTE — PROGRESS NOTES
Chief Complaint   Patient presents with    Establish Care    Medication Refill     Visit Vitals  /78 (BP 1 Location: Right arm, BP Patient Position: Sitting)   Pulse 96   Temp 98.1 °F (36.7 °C) (Oral)   Resp 16   Ht 5' 7\" (1.702 m)   Wt 254 lb 12.8 oz (115.6 kg)   SpO2 97%   BMI 39.91 kg/m²     1. Have you been to the ER, urgent care clinic since your last visit? Hospitalized since your last visit? No    2. Have you seen or consulted any other health care providers outside of the [address] [place] [address] since your last visit? Include any pap smears or colon screening.  Cardio

## 2019-04-11 NOTE — PROGRESS NOTES
This note will not be viewable in [address] Bairon Taveras is a  58 y.o. female presents for visit. Chief Complaint   Patient presents with    Establish Care    Medication Refill     HPI  Patient presents to establish care. She is a long-standing patient of Dr. Tejinder De Jesus but reports the office in [place] is too far for her to travel. Patient has multiple complex chronic medical conditions including cardiomyopathy, dyslipidemia, type 2 diabetes with neuropathy, hypothyroidism, chronic kidney disease, plaque psoriasis, gout and depression and anxiety. She is followed by multiple specialists including Cardiology with Cecilia Michel and Anshul Lezama. She has Medtronic biventricular pacemaker. Followed by [facility], Dr. Twyla Kate for diabetes and hypothyroidism. Patient reports she recently had an eye exam with Benigno Naylor O.D. She will request records forwarded here to update her chart. Mental health provider is Mike Crain. Takes effexor for depression/anxiety which is effective. Review of Systems   Constitutional: Negative for chills and fever. Cardiovascular: Negative for chest pain and palpitations. Musculoskeletal: Negative. Skin: Positive for rash (psoriasis). Psychiatric/Behavioral: Positive for depression (improved with Effexor). The patient is nervous/anxious (controlled with effexor. ). Visit Vitals  /78 (BP 1 Location: Right arm, BP Patient Position: Sitting)   Pulse 96   Temp 98.1 °F (36.7 °C) (Oral)   Resp 16   Ht 5' 7\" (1.702 m)   Wt 254 lb 12.8 oz (115.6 kg)   SpO2 97%   BMI 39.91 kg/m²     Physical Exam   Constitutional: She is oriented to person, place, and time. She appears well-developed and well-nourished. No distress. Obese   HENT:   Head: Normocephalic and atraumatic. Right Ear: External ear normal.   Left Ear: External ear normal.   Nose: Nose normal.   Eyes: Conjunctivae are normal.   Cardiovascular: Normal rate. Pulmonary/Chest: Effort normal and breath sounds normal. She has no wheezes. Neurological: She is alert and oriented to person, place, and time. Skin: Skin is warm and dry. Rash (Psoriasis plaques) noted. Psychiatric: She has a normal mood and affect. Her speech is normal and behavior is normal.   Nursing note and vitals reviewed. Patient Active Problem List    Diagnosis Date Noted    Type 2 diabetes with nephropathy ([facility] 04/03/2018    Obesity, morbid ([facility] 12/08/2017    Acquired hypothyroidism 08/15/2016    Dysthymia 08/15/2016    ICD (implantable cardioverter-defibrillator), biventricular, in situ 06/05/2014    Dyslipidemia 01/14/2014    CKD (chronic kidney disease) 08/15/2012    Cardiomyopathy, nonischemic ([facility]     Anemia in chronic renal disease 12/10/2008    HTN (hypertension) 12/10/2008    GERD (gastroesophageal reflux disease) 12/10/2008    Gout 12/10/2008    Pulmonary HTN ([facility] 12/10/2008         ASSESSMENT AND PLAN:      ICD-10-CM ICD-9-CM   1. Hypertension, unspecified type I10 401.9   2. Type 2 diabetes with nephropathy (HCC) E11.21 250.40     583.81   3. Cardiomyopathy, nonischemic (HCC) I42.8 425.4   4. Stage 3 chronic kidney disease (HCC) N18.3 585.3   5. Chronic gout without tophus, unspecified cause, unspecified site M1A. 9XX0 274.02   6. Neuropathy G62.9 355.9   7. Acquired hypothyroidism E03.9 244.9   8. Obesity, morbid ([facility] E66.01 278.01   9. Encounter for medication refill Z76.0 V68.1   10. Environmental and seasonal allergies J30.89 477.8   11. Plaque psoriasis L40.0 696.1   12. Encounter to establish care Z76.89 V65.8     Orders Placed This Encounter    apremilast (OTEZLA) 30 mg tab     Sig: Take  by mouth two (2) times a day.  allopurinol (ZYLOPRIM) 100 mg tablet     Sig: Take 1 Tab by mouth daily.  Indications: treatment to prevent acute gout attack     Dispense:  90 Tab     Refill:  0    spironolactone (ALDACTONE) 25 mg tablet     Sig: TAKE 1 TABLET BY MOUTH EVERY DAY     Dispense:  30 Tab     Refill:  0    venlafaxine-SR (EFFEXOR-XR) 75 mg capsule     Sig: TAKE 1 CAPSULE BY MOUTH EVERY DAY     Dispense:  90 Cap     Refill:  0    gabapentin (NEURONTIN) 100 mg capsule     Sig: TAKE 1 CAPSULE BY MOUTH EVERY DAY     Dispense:  90 Cap     Refill:  0    carvedilol (COREG) 25 mg tablet     Sig: TAKE 1 TABLET BY MOUTH TWICE A DAY  Indications: chronic heart failure, high blood pressure     Dispense:  180 Tab     Refill:  0    candesartan (ATACAND) 4 mg tablet     Sig: Take 0.5 Tabs by mouth daily. Dispense:  45 Tab     Refill:  0    bumetanide (BUMEX) 2 mg tablet     Sig: Take 1 Tab by mouth two (2) times a day. TAKE 1 tab IN AM AND 1/2 TABLET IN PM  Indications: Accumulation of Fluid Resulting from Chronic Heart Failure     Dispense:  180 Tab     Refill:  0    calcitRIOL (ROCALTROL) 0.5 mcg capsule     Sig: TAKE 1 CAPSULE BY MOUTH DAILY FOR 30 DAYS. Dispense:  90 Cap     Refill:  0    levocetirizine (XYZAL) 5 mg tablet     Sig: Take 1 Tab by mouth daily. Dispense:  90 Tab     Refill:  0    levothyroxine (SYNTHROID) 100 mcg tablet     Sig: Take 1 Tab by mouth Daily (before breakfast). Take every day but      Dispense:  90 Tab     Refill:  0    azelastine (ASTEPRO) 0.15 % (205.5 mcg)     Si Sprays by Both Nostrils route two (2) times daily as needed (congestion). Indications: Seasonal Runny Nose     Dispense:  3 Bottle     Refill:  1     Diagnoses and all orders for this visit:    1. Hypertension, unspecified type  -     carvedilol (COREG) 25 mg tablet; TAKE 1 TABLET BY MOUTH TWICE A DAY  Indications: chronic heart failure, high blood pressure  -     candesartan (ATACAND) 4 mg tablet; Take 0.5 Tabs by mouth daily. 2. Type 2 diabetes with nephropathy (HCC)  -     gabapentin (NEURONTIN) 100 mg capsule; TAKE 1 CAPSULE BY MOUTH EVERY DAY    3. Cardiomyopathy, nonischemic (HCC)  -     candesartan (ATACAND) 4 mg tablet;  Take 0.5 Tabs by mouth daily.  -     bumetanide (BUMEX) 2 mg tablet; Take 1 Tab by mouth two (2) times a day. TAKE 1 tab IN AM AND 1/2 TABLET IN PM  Indications: Accumulation of Fluid Resulting from Chronic Heart Failure    4. Stage 3 chronic kidney disease (HCC)  -     calcitRIOL (ROCALTROL) 0.5 mcg capsule; TAKE 1 CAPSULE BY MOUTH DAILY FOR 30 DAYS. 5. Chronic gout without tophus, unspecified cause, unspecified site  -     allopurinol (ZYLOPRIM) 100 mg tablet; Take 1 Tab by mouth daily. Indications: treatment to prevent acute gout attack    6. Neuropathy    7. Acquired hypothyroidism  -     levothyroxine (SYNTHROID) 100 mcg tablet; Take 1 Tab by mouth Daily (before breakfast). Take every day but Sunday    8. Obesity, morbid ([address]    9. Encounter for medication refill  -     allopurinol (ZYLOPRIM) 100 mg tablet; Take 1 Tab by mouth daily. Indications: treatment to prevent acute gout attack  -     spironolactone (ALDACTONE) 25 mg tablet; TAKE 1 TABLET BY MOUTH EVERY DAY  -     venlafaxine-SR (EFFEXOR-XR) 75 mg capsule; TAKE 1 CAPSULE BY MOUTH EVERY DAY  -     gabapentin (NEURONTIN) 100 mg capsule; TAKE 1 CAPSULE BY MOUTH EVERY DAY  -     carvedilol (COREG) 25 mg tablet; TAKE 1 TABLET BY MOUTH TWICE A DAY  Indications: chronic heart failure, high blood pressure  -     candesartan (ATACAND) 4 mg tablet; Take 0.5 Tabs by mouth daily. -     bumetanide (BUMEX) 2 mg tablet; Take 1 Tab by mouth two (2) times a day. TAKE 1 tab IN AM AND 1/2 TABLET IN PM  Indications: Accumulation of Fluid Resulting from Chronic Heart Failure  -     calcitRIOL (ROCALTROL) 0.5 mcg capsule; TAKE 1 CAPSULE BY MOUTH DAILY FOR 30 DAYS. -     levocetirizine (XYZAL) 5 mg tablet; Take 1 Tab by mouth daily. -     levothyroxine (SYNTHROID) 100 mcg tablet; Take 1 Tab by mouth Daily (before breakfast). Take every day but Sunday  -     azelastine (ASTEPRO) 0.15 % (205.5 mcg); 2 Sprays by Both Nostrils route two (2) times daily as needed (congestion).  Indications: Seasonal Runny Nose    10. Environmental and seasonal allergies  -     levocetirizine (XYZAL) 5 mg tablet; Take 1 Tab by mouth daily. -     azelastine (ASTEPRO) 0.15 % (205.5 mcg); 2 Sprays by Both Nostrils route two (2) times daily as needed (congestion). Indications: Seasonal Runny Nose    11. Plaque psoriasis    12. Encounter to establish care      reviewed diet, exercise and weight control        Follow-up and Dispositions    · Return in about 1 month (around 5/9/2019), or if symptoms worsen or fail to improve, for chronic care. Disclaimer:  Advised her to call back or return to office if symptoms worsen/change/persist.  Discussed expected course/resolution/complications of diagnosis in detail with patient. Medication risks/benefits/alternatives discussed with patient. She was given an after visit summary which includes diagnoses, current medications, & vitals. Discussed patient instructions and advised to read to all patient instructions regarding care. She expressed understanding with the diagnosis and plan.

## 2019-04-16 ENCOUNTER — TELEPHONE (OUTPATIENT)
Dept: INTERNAL MEDICINE CLINIC | Age: 64
End: 2019-04-16

## 2019-04-16 NOTE — TELEPHONE ENCOUNTER
Patient called to cancel her appointment for today. Patient stated that she is going to find another PCP closer to her home. Patient didn't want to just leave practice without letting DR. Epifanio Foley know. Stated that she loves DR. Epifanio Foley its just gotten hard to come see her as its further from her home than she would like. Patient stated that if you had any questions to call her back.      [phone]

## 2019-04-22 ENCOUNTER — OFFICE VISIT (OUTPATIENT)
Dept: CARDIOLOGY CLINIC | Age: 64
End: 2019-04-22

## 2019-04-22 VITALS
WEIGHT: 253 LBS | HEIGHT: 67 IN | HEART RATE: 98 BPM | SYSTOLIC BLOOD PRESSURE: 108 MMHG | DIASTOLIC BLOOD PRESSURE: 70 MMHG | BODY MASS INDEX: 39.71 KG/M2 | OXYGEN SATURATION: 98 %

## 2019-04-22 DIAGNOSIS — I10 ESSENTIAL HYPERTENSION: ICD-10-CM

## 2019-04-22 DIAGNOSIS — I42.8 CARDIOMYOPATHY, NONISCHEMIC (HCC): Primary | ICD-10-CM

## 2019-04-22 DIAGNOSIS — Z95.810 ICD (IMPLANTABLE CARDIOVERTER-DEFIBRILLATOR), BIVENTRICULAR, IN SITU: ICD-10-CM

## 2019-04-22 DIAGNOSIS — N18.30 TYPE 2 DIABETES MELLITUS WITH STAGE 3 CHRONIC KIDNEY DISEASE, WITH LONG-TERM CURRENT USE OF INSULIN (HCC): ICD-10-CM

## 2019-04-22 DIAGNOSIS — Z79.4 TYPE 2 DIABETES MELLITUS WITH STAGE 3 CHRONIC KIDNEY DISEASE, WITH LONG-TERM CURRENT USE OF INSULIN (HCC): ICD-10-CM

## 2019-04-22 DIAGNOSIS — E66.01 OBESITY, MORBID (HCC): ICD-10-CM

## 2019-04-22 DIAGNOSIS — E11.22 TYPE 2 DIABETES MELLITUS WITH STAGE 3 CHRONIC KIDNEY DISEASE, WITH LONG-TERM CURRENT USE OF INSULIN (HCC): ICD-10-CM

## 2019-04-22 RX ORDER — BUMETANIDE 2 MG/1
2 TABLET ORAL DAILY
Qty: 90 TAB | Refills: 3
Start: 2019-04-22 | End: 2020-02-20

## 2019-04-22 RX ORDER — BUMETANIDE 1 MG/1
1 TABLET ORAL DAILY
Qty: 90 TAB | Refills: 3 | Status: SHIPPED | OUTPATIENT
Start: 2019-04-22 | End: 2020-03-19

## 2019-04-22 NOTE — PROGRESS NOTES
HISTORY OF PRESENT ILLNESS   Orquidea Chow is a 58 y.o. female. Last seen 6 months ago. Problem List  Date Reviewed: 1/31/2019          Codes Class Noted    Type 2 diabetes with nephropathy ([facility] ICD-10-CM: E11.21  ICD-9-CM: 250.40, 583.81  4/3/2018        Obesity, morbid ([facility] ICD-10-CM: E66.01  ICD-9-CM: 278.01  12/8/2017        Acquired hypothyroidism ICD-10-CM: E03.9  ICD-9-CM: 244.9  8/15/2016        Dysthymia ICD-10-CM: F34.1  ICD-9-CM: 300.4  8/15/2016        ICD (implantable cardioverter-defibrillator), biventricular, in situ ICD-10-CM: Z95.810  ICD-9-CM: V45.02  6/5/2014    Overview Signed 1/14/2015 11:11 AM by Charu Gomes MD     Generator [employer] change 1/14/2015             Dyslipidemia ICD-10-CM: S93.7  ICD-9-CM: 272.4  1/14/2014        CKD (chronic kidney disease) ICD-10-CM: N18.9  ICD-9-CM: 585.9  8/15/2012        Cardiomyopathy, nonischemic (HCC) ICD-10-CM: I42.8  ICD-9-CM: 425.4  Unknown    Overview Signed 10/10/2011  6:40 AM by Vinh Mills MD     initial dx 2001, bivHF 2008 with EF 15%, s/p biV-ICD 9/08, significant improvment in EF to 45-50%             Anemia in chronic renal disease (Chronic) ICD-10-CM: N18.9, D63.1  ICD-9-CM: 285.21  12/10/2008        HTN (hypertension) ICD-10-CM: I10  ICD-9-CM: 401.9  12/10/2008        GERD (gastroesophageal reflux disease) (Chronic) ICD-10-CM: K21.9  ICD-9-CM: 530.81  12/10/2008        Gout (Chronic) ICD-10-CM: M10.9  ICD-9-CM: 274.9  12/10/2008        Pulmonary HTN (HCC) (Chronic) ICD-10-CM: I27.20  ICD-9-CM: 416.8  12/10/2008            Cardiac testing:  Cardiac cath 2007 - normal cors   Echo 4/2010 - EF 45%, improved from 1/09 (25%)  Echo 11/2011 - LVH, EF 55-60%  Lexiscan/cardiolite 3/21/12 - normal perfusion, global HK 40%   5/21/2012: Scheduled [facility] download. There were 1135 ventricular sensing episodes noted with her night-time heart rate greater than 85 bpm for 7 days (HR from 122-128 bpm).  This has not affected her CRT pacing percentage of 99.8% since January 2012. The Optivol fluid index began rising in April, 2012 and is ongoing, suggesting possible fluid accumulation (>200). 4/8/2013 BIV ICD scheduled carelink download 9 b NSVT 2/18/13, 1445 sensing episodes without BIV pacing probably AT longest one 1 min (98.9%  still)  Echo 6/8/14-EF 55 % to 60 %. Mitral valve: There was mild posterior annular calcification, Mild TR  Echo 6/18/14 - LVEF 55-60%,   Lexiscan cardiolite 12/16/14 - anterior,apical reversible defect, stress induced LV dilatation, EF 13%  Echo 1/13/15: EF 55-60%, no regional WMA, no change from previous echo of 6/14  Cath 1/6/2015 - EDP 11, no AVG, no LVG due to CKD, normal cors with right dominance. Echo 12/15/16 - EF 60-65%. No WMA. Grade 1 diastolic dysfunction. No significant change compared to previous study 1/9/15. Echo 4/24/18 - EF 60-65%. No WMA. Echo 4/22/19 - EF 60%    HPI  Ms. Leonel Samano reports she's doing well; Attempts to keep active; reports she does enjoy \"retail therapy\"  walking around different shops; does fatigue but denies any saritha dyspnea or chest pain     Diabetes is controlled on insulin BID, managed by Dr. Ervin Nash. Patient denies any exertional chest pain, dyspnea, palpitations, syncope, orthopnea, edema or paroxysmal nocturnal dyspnea. .      Current Outpatient Medications   Medication Sig Dispense Refill    bumetanide (BUMEX) 1 mg tablet Take 1 Tab by mouth daily. Take at 4pm  Indications: Accumulation of Fluid Resulting from Chronic Heart Failure 90 Tab 3    bumetanide (BUMEX) 2 mg tablet Take 1 Tab by mouth daily. Indications: Accumulation of Fluid Resulting from Chronic Heart Failure 90 Tab 3    apremilast (OTEZLA) 30 mg tab Take  by mouth two (2) times a day.  allopurinol (ZYLOPRIM) 100 mg tablet Take 1 Tab by mouth daily.  Indications: treatment to prevent acute gout attack 90 Tab 0    spironolactone (ALDACTONE) 25 mg tablet TAKE 1 TABLET BY MOUTH EVERY DAY 30 Tab 0    venlafaxine-SR (EFFEXOR-XR) 75 mg capsule TAKE 1 CAPSULE BY MOUTH EVERY DAY 90 Cap 0    gabapentin (NEURONTIN) 100 mg capsule TAKE 1 CAPSULE BY MOUTH EVERY DAY 90 Cap 0    carvedilol (COREG) 25 mg tablet TAKE 1 TABLET BY MOUTH TWICE A DAY  Indications: chronic heart failure, high blood pressure 180 Tab 0    candesartan (ATACAND) 4 mg tablet Take 0.5 Tabs by mouth daily. 45 Tab 0    levocetirizine (XYZAL) 5 mg tablet Take 1 Tab by mouth daily. 90 Tab 0    levothyroxine (SYNTHROID) 100 mcg tablet Take 1 Tab by mouth Daily (before breakfast). Take every day but Sunday 90 Tab 0    azelastine (ASTEPRO) 0.15 % (205.5 mcg) 2 Sprays by Both Nostrils route two (2) times daily as needed (congestion). Indications: Seasonal Runny Nose 3 Bottle 1    insulin NPH/insulin regular (NOVOLIN 70/30) 100 unit/mL (70-30) injection 70 units two times a day 10 mL 3    calcipotriene (DOVONEX) 0.005 % topical cream Apply  to affected area three (3) times daily.  triamcinolone acetonide (KENALOG) 0.5 % ointment Apply  to affected area two (2) times a day. use thin layer      multivitamin (ONE A DAY) tablet Take 1 Tab by mouth daily.  DOCUSATE CALCIUM (STOOL SOFTENER PO) Take 1 tablet by mouth daily.  meclizine (ANTIVERT) 25 mg tablet Take 1 Tab by mouth three (3) times daily as needed for Dizziness. 20 Tab 3    EPINEPHrine (EPIPEN) 0.3 mg/0.3 mL injection 0.3 mL by IntraMUSCular route once as needed for 1 dose. 1 Each 3    ferrous sulfate (IRON) 325 mg (65 mg elemental iron) tablet Take  by mouth three (3) times daily (with meals).  aspirin 81 mg tablet Take 81 mg by mouth daily.  PULMICORT IN Take 2 Puffs by inhalation every twelve (12) hours. As needed        calcitRIOL (ROCALTROL) 0.5 mcg capsule TAKE 1 CAPSULE BY MOUTH DAILY FOR 30 DAYS.  90 Cap 0     Allergies   Allergen Reactions    Ciprofloxacin Anaphylaxis    Shellfish Derived Anaphylaxis    Ace Inhibitors Unknown (comments)    Biaxin [Clarithromycin] Other (comments)     Metal taste    Candesartan Cough    Pcn [Penicillins] Hives       Review of Systems   Constitutional: Positive for malaise/fatigue. Negative for fever, chills and diaphoresis. Respiratory:  Negative for shortness of breath, cough, hemoptysis, sputum production and wheezing. Cardiovascular:  Negative for chest pain, edema, palpitations, orthopnea, claudication and PND. Gastrointestinal: Negative for heartburn, nausea, vomiting, blood in stool and melena. Genitourinary: Negative for dysuria and flank pain. Musculoskeletal: Positive for joint pain. Negative for back pain. Skin: Negative for rash. Neurological: Negative for focal weakness, seizures, loss of consciousness, weakness and headaches. Endo/Heme/Allergies: Does not bruise/bleed easily. Psychiatric/Behavioral: Negative for memory loss. The patient does not have insomnia. Visit Vitals  /70 (BP 1 Location: Right arm, BP Patient Position: Sitting)   Pulse 98   Ht 5' 7\" (1.702 m)   Wt 253 lb (114.8 kg)   SpO2 98%   BMI 39.63 kg/m²     Wt Readings from Last 3 Encounters:   04/30/19 253 lb 9.6 oz (115 kg)   04/22/19 253 lb (114.8 kg)   04/11/19 254 lb 12.8 oz (115.6 kg)     Physical Exam   Vitals reviewed. Constitutional: She is oriented to person, place, and time. She appears well-developed. Obese. HENT:   Head: Normocephalic. Neck: Neck supple. No JVD present. No tracheal deviation present. Cardiovascular: Normal rate, regular rhythm, S1 normal and S2 normal.  PMI is not displaced. Exam reveals no gallop and no friction rub. No murmur heard. Pulses: Carotid pulses are 2+ on the right side, and 2+ on the left side. Pulmonary/Chest: Effort normal and breath sounds normal. She has no decreased breath sounds, wheezes, rhonchi or rales. Abdominal: Soft. Normal appearance and normal aorta. No tenderness. She has no rebound. Musculoskeletal: She exhibits no edema.    Neurological: She is alert and oriented to person, place, and time. Skin: Skin is warm and dry. Psychiatric: She has a normal mood and affect. Cardiographics:  EKG 14:  Atrial sensed, ventricular paced rhythm   EKG 14 - AV sequential pacing   EKG 4/3/18 - Atrial sense; intermittent ventricular pacing  Echo 19 - EF 60%   EK19 -  A sensed V paced     ASSESSMENT and PLAN  Encounter Diagnoses   Name Primary?  Cardiomyopathy, nonischemic ([address] Yes    ICD (implantable cardioverter-defibrillator), biventricular, in situ     Essential hypertension     Obesity, morbid ([address]     Type 2 diabetes mellitus with stage 3 chronic kidney disease, with long-term current use of insulin ([facility]      Ms. Cole Pelletier has a history of NICM with normalization of LV function s/p CRT-d therapy.  LV function has remained normal, most recently by echo today. She has no HF sxs at a fair functional capacity. her medical regimen is good, consider Jardiance given her HF history. she will discuss with Dr. Veronica Quinn regular ICD checks. T2DM managed by Dr. Cirilo Corado. She is on insulin alone. Consider addition of jardiance for Cv benefit - see above     CKD managed by Dr. Alejo Eugene. Written by Margarito Fisher, as dictated by Dr. Mandi Mccall Md.    Mandi Mccall MD

## 2019-04-22 NOTE — PATIENT INSTRUCTIONS
Talk to Dr. Ayala Monaco about adding Fercho Parra to Diabetes regimen  Will Take Bumex 1 mg in the early afternoon (4 pm) continue 2 mg in the morning

## 2019-04-22 NOTE — PROGRESS NOTES
Patient says that she is tired all the time  Patient says that she would like to take to have bumetanide 0.5 instead of 2 mg that she has to cut.   Visit Vitals  /70 (BP 1 Location: Right arm, BP Patient Position: Sitting)   Pulse 98   Ht 5' 7\" (1.702 m)   Wt 253 lb (114.8 kg)   SpO2 98%   BMI 39.63 kg/m²

## 2019-04-30 ENCOUNTER — OFFICE VISIT (OUTPATIENT)
Dept: PRIMARY CARE CLINIC | Age: 64
End: 2019-04-30

## 2019-04-30 ENCOUNTER — HOSPITAL ENCOUNTER (OUTPATIENT)
Dept: MAMMOGRAPHY | Age: 64
Discharge: HOME | End: 2019-04-30
Attending: INTERNAL MEDICINE
Payer: MEDICARE

## 2019-04-30 VITALS
SYSTOLIC BLOOD PRESSURE: 118 MMHG | WEIGHT: 253.6 LBS | OXYGEN SATURATION: 98 % | BODY MASS INDEX: 39.8 KG/M2 | RESPIRATION RATE: 17 BRPM | HEIGHT: 67 IN | HEART RATE: 100 BPM | TEMPERATURE: 98.5 F | DIASTOLIC BLOOD PRESSURE: 77 MMHG

## 2019-04-30 DIAGNOSIS — I42.8 CARDIOMYOPATHY, NONISCHEMIC (HCC): ICD-10-CM

## 2019-04-30 DIAGNOSIS — N18.30 STAGE 3 CHRONIC KIDNEY DISEASE (HCC): ICD-10-CM

## 2019-04-30 DIAGNOSIS — Z23 ENCOUNTER FOR IMMUNIZATION: ICD-10-CM

## 2019-04-30 DIAGNOSIS — M1A.9XX0 CHRONIC GOUT WITHOUT TOPHUS, UNSPECIFIED CAUSE, UNSPECIFIED SITE: ICD-10-CM

## 2019-04-30 DIAGNOSIS — E03.9 ACQUIRED HYPOTHYROIDISM: ICD-10-CM

## 2019-04-30 DIAGNOSIS — K57.90 DIVERTICULOSIS: ICD-10-CM

## 2019-04-30 DIAGNOSIS — N20.0 RENAL STONE: ICD-10-CM

## 2019-04-30 DIAGNOSIS — L40.0 PLAQUE PSORIASIS: ICD-10-CM

## 2019-04-30 DIAGNOSIS — Z00.00 MEDICARE ANNUAL WELLNESS VISIT, INITIAL: Primary | ICD-10-CM

## 2019-04-30 DIAGNOSIS — Z78.0 POST-MENOPAUSAL: ICD-10-CM

## 2019-04-30 DIAGNOSIS — Z12.39 BREAST CANCER SCREENING: ICD-10-CM

## 2019-04-30 DIAGNOSIS — E11.21 TYPE 2 DIABETES WITH NEPHROPATHY (HCC): ICD-10-CM

## 2019-04-30 DIAGNOSIS — I10 HYPERTENSION, UNSPECIFIED TYPE: ICD-10-CM

## 2019-04-30 PROCEDURE — 77063 BREAST TOMOSYNTHESIS BI: CPT

## 2019-04-30 NOTE — ACP (ADVANCE CARE PLANNING)
Advance Care Planning (ACP) Provider Note - Comprehensive Date of ACP Conversation: 04/30/19 Persons included in Conversation:  patient Length of ACP Conversation in minutes:  <16 minutes (Non-Billable) Authorized Decision Maker (if patient is incapable of making informed decisions): This person is: 
pts , Sadaf Nix General ACP for ALL Patients with Decision Making Capacity: 
 Importance of advance care planning, including choosing a healthcare agent to communicate patient's healthcare decisions if patient lost the ability to make decisions, such as after a sudden illness or accident Review of Existing Advance Directive: 
Does this advance directive still reflect your preferences? Yes (Provide new form/Refer for assistance in updating) For Serious or Chronic Illness: 
Understanding of medical condition Interventions Provided: 
Reviewed existing Advance Directive

## 2019-04-30 NOTE — PATIENT INSTRUCTIONS
Medicare Wellness Visit, Female The best way to live healthy is to have a lifestyle where you eat a well-balanced diet, exercise regularly, limit alcohol use, and quit all forms of tobacco/nicotine, if applicable. Regular preventive services are another way to keep healthy. Preventive services (vaccines, screening tests, monitoring & exams) can help personalize your care plan, which helps you manage your own care. Screening tests can find health problems at the earliest stages, when they are easiest to treat. [facility] follows the current, evidence-based guidelines published by the [facility] ([facility]) when recommending preventive services for our patients. Because we follow these guidelines, sometimes recommendations change over time as research supports it. (For example, mammograms used to be recommended annually. Even though Medicare will still pay for an annual mammogram, the newer guidelines recommend a mammogram every two years for women of average risk.) Of course, you and your doctor may decide to screen more often for some diseases, based on your risk and your health status. Preventive services for you include: - Medicare offers their members a free annual wellness visit, which is time for you and your primary care provider to discuss and plan for your preventive service needs. Take advantage of this benefit every year! 
-All adults over the age of 72 should receive the recommended pneumonia vaccines. Current USPSTF guidelines recommend a series of two vaccines for the best pneumonia protection.  
-All adults should have a flu vaccine yearly and a tetanus vaccine every 10 years. All adults age 61 and older should receive a shingles vaccine once in their lifetime.   
-A bone mass density test is recommended when a woman turns 65 to screen for osteoporosis. This test is only recommended one time, as a screening. Some providers will use this same test as a disease monitoring tool if you already have osteoporosis. -All adults age 38-68 who are overweight should have a diabetes screening test once every three years.  
-Other screening tests and preventive services for persons with diabetes include: an eye exam to screen for diabetic retinopathy, a kidney function test, a foot exam, and stricter control over your cholesterol.  
-Cardiovascular screening for adults with routine risk involves an electrocardiogram (ECG) at intervals determined by your doctor.  
-Colorectal cancer screenings should be done for adults age 54-65 with no increased risk factors for colorectal cancer. There are a number of acceptable methods of screening for this type of cancer. Each test has its own benefits and drawbacks. Discuss with your doctor what is most appropriate for you during your annual wellness visit. The different tests include: colonoscopy (considered the best screening method), a fecal occult blood test, a fecal DNA test, and sigmoidoscopy. -Breast cancer screenings are recommended every other year for women of normal risk, age 54-69. 
-Cervical cancer screenings for women over age 72 are only recommended with certain risk factors.  
-All adults born between [facility] should be screened once for Hepatitis C. Here is a list of your current Health Maintenance items (your personalized list of preventive services) with a due date: 
Health Maintenance Due Topic Date Due  Pneumococcal Vaccine (1 of 1 - PPSV23) 07/09/1961  Shingles Vaccine (1 of 2) 07/09/2004  Colonoscopy  12/10/2015 [address] [place] [address] Eye Exam  07/10/2016 [address] [place] [address] Annual Well Visit  10/20/2018  Hemoglobin A1C    12/07/2018  Bone Mineral Density   07/09/2019

## 2019-04-30 NOTE — PROGRESS NOTES
This is an Initial Medicare Annual Wellness Exam (AWV) (Performed 12 months after IPPE or effective date of Medicare Part B enrollment, Once in a lifetime) I have reviewed the patient's medical history in detail and updated the computerized patient record. History Past Medical History:  
Diagnosis Date  Acquired hypothyroidism 8/15/2016  Anemia RED-HF study  Asthma  Cardiomyopathy, nonischemic ([facility]   
 initial dx 2001, bivHF 2008 with EF 15%, s/p biV-ICD 9/08, significant improvment in EF to 45-50%  CKD (chronic kidney disease) Dr Donald Mckinney  CKD (chronic kidney disease) 8/15/2012  Depression  Diabetes ([facility]  Diabetic neuropathy ([facility]  DM (diabetes mellitus) ([facility] 8/15/2012  GERD (gastroesophageal reflux disease)  Gout  Hypothyroidism  ICD (implantable cardioverter-defibrillator), biventricular, in situ 6/5/2014  Psoriasis Past Surgical History:  
Procedure Laterality Date [address]  2007; 01/06/15  
 normal cors  ECHO 2D ADULT  4/2010 EF 45%, improved from 1/09 (25%)  ECHO 2D ADULT  11/2011 LVH, EF 55-60%  HX ORTHOPAEDIC    
 knee [facility]  STRESS TEST LEXISCAN/CARDIOLITE  3/21/12  
 normal perfusion, global HK 40% Current Outpatient Medications Medication Sig Dispense Refill  bumetanide (BUMEX) 1 mg tablet Take 1 Tab by mouth daily. Take at 4pm  Indications: Accumulation of Fluid Resulting from Chronic Heart Failure 90 Tab 3  
 bumetanide (BUMEX) 2 mg tablet Take 1 Tab by mouth daily. Indications: Accumulation of Fluid Resulting from Chronic Heart Failure 90 Tab 3  
 apremilast (OTEZLA) 30 mg tab Take  by mouth two (2) times a day.  allopurinol (ZYLOPRIM) 100 mg tablet Take 1 Tab by mouth daily.  Indications: treatment to prevent acute gout attack 90 Tab 0  
 spironolactone (ALDACTONE) 25 mg tablet TAKE 1 TABLET BY MOUTH EVERY DAY 30 Tab 0  
  venlafaxine-SR (EFFEXOR-XR) 75 mg capsule TAKE 1 CAPSULE BY MOUTH EVERY DAY 90 Cap 0  
 gabapentin (NEURONTIN) 100 mg capsule TAKE 1 CAPSULE BY MOUTH EVERY DAY 90 Cap 0  carvedilol (COREG) 25 mg tablet TAKE 1 TABLET BY MOUTH TWICE A DAY  Indications: chronic heart failure, high blood pressure 180 Tab 0  
 candesartan (ATACAND) 4 mg tablet Take 0.5 Tabs by mouth daily. 45 Tab 0  
 calcitRIOL (ROCALTROL) 0.5 mcg capsule TAKE 1 CAPSULE BY MOUTH DAILY FOR 30 DAYS. 90 Cap 0  
 levocetirizine (XYZAL) 5 mg tablet Take 1 Tab by mouth daily. 90 Tab 0  
 levothyroxine (SYNTHROID) 100 mcg tablet Take 1 Tab by mouth Daily (before breakfast). Take every day but Sunday 90 Tab 0  
 insulin NPH/insulin regular (NOVOLIN 70/30) 100 unit/mL (70-30) injection 70 units two times a day 10 mL 3  
 calcipotriene (DOVONEX) 0.005 % topical cream Apply  to affected area three (3) times daily.  multivitamin (ONE A DAY) tablet Take 1 Tab by mouth daily.  ferrous sulfate (IRON) 325 mg (65 mg elemental iron) tablet Take  by mouth three (3) times daily (with meals).  aspirin 81 mg tablet Take 81 mg by mouth daily.  azelastine (ASTEPRO) 0.15 % (205.5 mcg) 2 Sprays by Both Nostrils route two (2) times daily as needed (congestion). Indications: Seasonal Runny Nose 3 Bottle 1  
 triamcinolone acetonide (KENALOG) 0.5 % ointment Apply  to affected area two (2) times a day. use thin layer  DOCUSATE CALCIUM (STOOL SOFTENER PO) Take 1 tablet by mouth daily.  meclizine (ANTIVERT) 25 mg tablet Take 1 Tab by mouth three (3) times daily as needed for Dizziness. 20 Tab 3  
 EPINEPHrine (EPIPEN) 0.3 mg/0.3 mL injection 0.3 mL by IntraMUSCular route once as needed for 1 dose. 1 Each 3  
 PULMICORT IN Take 2 Puffs by inhalation every twelve (12) hours. As needed Allergies Allergen Reactions  Ciprofloxacin Anaphylaxis  Shellfish Derived Anaphylaxis  Ace Inhibitors Unknown (comments)  Biaxin [Clarithromycin] Other (comments) Metal taste  Candesartan Cough  Pcn [Penicillins] Hives Family History Problem Relation Age of Onset  Heart Disease Mother  Hypertension Mother  Lupus Sister  Diabetes Brother Social History Tobacco Use  Smoking status: Never Smoker  Smokeless tobacco: Never Used Substance Use Topics  Alcohol use: No  
 
Patient Active Problem List  
Diagnosis Code  Anemia in chronic renal disease N18.9, D63.1  HTN (hypertension) I10  
 GERD (gastroesophageal reflux disease) K21.9  Gout M10.9  Pulmonary HTN (HCC) I27.20  Cardiomyopathy, nonischemic (HCC) I42.8  CKD (chronic kidney disease) N18.9  Dyslipidemia E78.5  ICD (implantable cardioverter-defibrillator), biventricular, in situ Z95.810  
 Acquired hypothyroidism E03.9  Dysthymia F34.1  Obesity, morbid ([facility] E66.01  
 Type 2 diabetes with nephropathy ([facility] E11.21 Depression Risk Factor Screening:  
 
3 most recent PHQ Screens 4/30/2019 PHQ Not Done Active Diagnosis of Depression or Bipolar Disorder Little interest or pleasure in doing things - Feeling down, depressed, irritable, or hopeless - Total Score PHQ 2 - Alcohol Risk Factor Screening: You do not drink alcohol or very rarely. Functional Ability and Level of Safety:  
 
Hearing Loss Hearing is good. Activities of Daily Living The home contains: no safety equipment. and has raised toilet seat, grab bars Patient does total self care Fall Risk Fall Risk Assessment, last 12 mths 4/30/2019 Able to walk? Yes Fall in past 12 months? No  
 
 
Abuse Screen Patient is not abused Cognitive Screening Evaluation of Cognitive Function: 
Has your family/caregiver stated any concerns about your memory: no 
Normal 
 
Patient Care Team  
Patient Care Team: 
Steven Barnett NP as PCP - General (Nurse Practitioner) Annabelle Bolton MD as Consulting Provider (Internal Medicine) Yvonne West MD (Dermatology) Bridget Evans MD (Nephrology) Jose Juan Light MD as Consulting Provider (Cardiology) Bryce Egan MD (Cardiology) Assessment/Plan Education and counseling provided: 
Are appropriate based on today's review and evaluation Diagnoses and all orders for this visit: 
 
1. Medicare annual wellness visit, initial 
 
2. Type 2 diabetes with nephropathy (HCC) -     METABOLIC PANEL, COMPREHENSIVE 
-     LIPID PANEL 
-     HEMOGLOBIN A1C WITH EAG 
-     MICROALBUMIN, UR, RAND W/ MICROALB/CREAT RATIO; Future -     CBC WITH AUTOMATED DIFF 3. Hypertension, unspecified type 4. Cardiomyopathy, nonischemic ([facility] 5. Acquired hypothyroidism 
-     TSH 3RD GENERATION 6. Plaque psoriasis 7. Chronic gout without tophus, unspecified cause, unspecified site 
-     URIC ACID 8. Stage 3 chronic kidney disease ([facility] 9. Post-menopausal 
 
10. Breast cancer screening -     [facility] 3D ALBERTO W MAMMO BI SCREENING INCL CAD; Future 11. Diverticulosis 12. Renal stone 13. Encounter for immunization -     ADMIN PNEUMOCOCCAL VACCINE 
-     PNEUMOCOCCAL CONJ VACCINE 13 VALENT IM Health Maintenance Due Topic Date Due  Pneumococcal 0-64 years (1 of 1 - PPSV23) 07/09/1961  Shingrix Vaccine Age 50> (1 of 2) 07/09/2004  COLONOSCOPY  12/10/2015  
 EYE EXAM RETINAL OR DILATED  07/10/2016  MEDICARE YEARLY EXAM  10/20/2018  HEMOGLOBIN A1C Q6M  12/07/2018  Bone Densitometry (Dexa) Screening  07/09/2019 Left ear wax - pt wants to try OTC ear drops TDAP - pt declined

## 2019-05-01 ENCOUNTER — TELEPHONE (OUTPATIENT)
Dept: PRIMARY CARE CLINIC | Age: 64
End: 2019-05-01

## 2019-05-01 LAB
ALBUMIN SERPL-MCNC: 4.3 G/DL (ref 3.6–4.8)
ALBUMIN/CREAT UR: 28 MG/G CREAT (ref 0–30)
ALBUMIN/GLOB SERPL: 1.5 {RATIO} (ref 1.2–2.2)
ALP SERPL-CCNC: 83 IU/L (ref 39–117)
ALT SERPL-CCNC: 6 IU/L (ref 0–32)
APPEARANCE UR: CLEAR
AST SERPL-CCNC: 12 IU/L (ref 0–40)
BACTERIA #/AREA URNS HPF: ABNORMAL /[HPF]
BASOPHILS # BLD AUTO: 0 X10E3/UL (ref 0–0.2)
BASOPHILS NFR BLD AUTO: 0 %
BILIRUB SERPL-MCNC: <0.2 MG/DL (ref 0–1.2)
BILIRUB UR QL STRIP: NEGATIVE
BUN SERPL-MCNC: 67 MG/DL (ref 8–27)
BUN/CREAT SERPL: 22 (ref 12–28)
CALCIUM SERPL-MCNC: 10.4 MG/DL (ref 8.7–10.3)
CASTS URNS QL MICRO: ABNORMAL /LPF
CHLORIDE SERPL-SCNC: 97 MMOL/L (ref 96–106)
CHOLEST SERPL-MCNC: 249 MG/DL (ref 100–199)
CO2 SERPL-SCNC: 20 MMOL/L (ref 20–29)
COLOR UR: YELLOW
CREAT SERPL-MCNC: 2.98 MG/DL (ref 0.57–1)
CREAT UR-MCNC: 127.9 MG/DL
EOSINOPHIL # BLD AUTO: 0.2 X10E3/UL (ref 0–0.4)
EOSINOPHIL NFR BLD AUTO: 2 %
EPI CELLS #/AREA URNS HPF: ABNORMAL /HPF (ref 0–10)
ERYTHROCYTE [DISTWIDTH] IN BLOOD BY AUTOMATED COUNT: 15.2 % (ref 12.3–15.4)
EST. AVERAGE GLUCOSE BLD GHB EST-MCNC: 183 MG/DL
GLOBULIN SER CALC-MCNC: 2.8 G/DL (ref 1.5–4.5)
GLUCOSE SERPL-MCNC: 224 MG/DL (ref 65–99)
GLUCOSE UR QL: NEGATIVE
HBA1C MFR BLD: 8 % (ref 4.8–5.6)
HCT VFR BLD AUTO: 34.9 % (ref 34–46.6)
HDLC SERPL-MCNC: 46 MG/DL
HGB BLD-MCNC: 11 G/DL (ref 11.1–15.9)
HGB UR QL STRIP: NEGATIVE
IMM GRANULOCYTES # BLD AUTO: 0 X10E3/UL (ref 0–0.1)
IMM GRANULOCYTES NFR BLD AUTO: 0 %
KETONES UR QL STRIP: NEGATIVE
LDLC SERPL CALC-MCNC: 150 MG/DL (ref 0–99)
LEUKOCYTE ESTERASE UR QL STRIP: ABNORMAL
LYMPHOCYTES # BLD AUTO: 1.5 X10E3/UL (ref 0.7–3.1)
LYMPHOCYTES NFR BLD AUTO: 14 %
MCH RBC QN AUTO: 28.6 PG (ref 26.6–33)
MCHC RBC AUTO-ENTMCNC: 31.5 G/DL (ref 31.5–35.7)
MCV RBC AUTO: 91 FL (ref 79–97)
MICRO URNS: ABNORMAL
MICROALBUMIN UR-MCNC: 35.8 UG/ML
MONOCYTES # BLD AUTO: 0.3 X10E3/UL (ref 0.1–0.9)
MONOCYTES NFR BLD AUTO: 3 %
MUCOUS THREADS URNS QL MICRO: PRESENT
NEUTROPHILS # BLD AUTO: 8.1 X10E3/UL (ref 1.4–7)
NEUTROPHILS NFR BLD AUTO: 81 %
NITRITE UR QL STRIP: NEGATIVE
PH UR STRIP: 5 [PH] (ref 5–7.5)
PLATELET # BLD AUTO: 245 X10E3/UL (ref 150–379)
POTASSIUM SERPL-SCNC: 4.7 MMOL/L (ref 3.5–5.2)
PROT SERPL-MCNC: 7.1 G/DL (ref 6–8.5)
PROT UR QL STRIP: NEGATIVE
RBC # BLD AUTO: 3.84 X10E6/UL (ref 3.77–5.28)
RBC #/AREA URNS HPF: ABNORMAL /HPF (ref 0–2)
SODIUM SERPL-SCNC: 137 MMOL/L (ref 134–144)
SP GR UR: 1.02 (ref 1–1.03)
TRIGL SERPL-MCNC: 264 MG/DL (ref 0–149)
TSH SERPL DL<=0.005 MIU/L-ACNC: 2.74 UIU/ML (ref 0.45–4.5)
URATE SERPL-MCNC: 5.7 MG/DL (ref 2.5–7.1)
UROBILINOGEN UR STRIP-MCNC: 0.2 MG/DL (ref 0.2–1)
VLDLC SERPL CALC-MCNC: 53 MG/DL (ref 5–40)
WBC # BLD AUTO: 10.1 X10E3/UL (ref 3.4–10.8)
WBC #/AREA URNS HPF: ABNORMAL /HPF (ref 0–5)

## 2019-05-01 NOTE — TELEPHONE ENCOUNTER
----- Message from Annalee Hoover MD sent at 5/1/2019  2:31 PM EDT -----  Urine analysis shows , leukocyte esterase pos and WBC. If pt has symptoms of dysuria, frequent uriantion I can send abx to pharmacy.

## 2019-05-01 NOTE — PROGRESS NOTES
Patient:   MARIA TERESA MAIN            MRN: [id number]            FIN: [id number]              Age:   24 years     Sex:  FEMALE     :  94   Associated Diagnoses:   Single Liveborn, Born in Hospital, Delivered without Mention of  Section  Author:   URSULA KEMP     Basic Information   Admitted from:  Labor and delivery.    Present at bedside:  Family member, Mother.      Review of Systems   Not applicable:  Patient is .      Health Status   Allergies:    Allergic Reactions (All)  No Known Medication Allergies   Current medications: None   Problem list:    Medical  Pregnant / [id number] / Confirmed, unremarkable     Histories   [place] information     Full term.     Normal vaginal delivery.     Family History: unremarkable     Physical Examination   VS/Measurements   Measurements from flowsheet : Height and Weight   19 07:59 CLINICALWEIGHT 93.6 kg    Weight Method Stated    MEDDOSEWT 93.6 kg    CLINICALHEIGHT 170 cm    Height Method Stated    BSA - Medical History 2.05    BMI-Medical History 32.4 kg/m2     ,      Vitals between:   2019 14:34:34   TO   2019 14:34:34                   LAST RESULT MINIMUM MAXIMUM  Temperature 36.5 36.5 36.9  Heart Rate 74 74 92  Respiratory Rate 16 16 20  NISBP           125 112 134  NIDBP           85 66 90  NIMBP           88 83 91  SpO2                    100 98 100    General:  No acute distress, Alert, Responsive, Under radiant warmer, Playful.   Eye:  Pupils are equal, round and reactive to light, Normal conjunctiva.         Red reflex: Bilaterally, Present, Symmetrical.    HENT:  Normocephalic, Nares patent, Anterior fontanelle open/soft/flat, Ears normally set and rotated, Palate intact.   Neck:  Supple, No lymphadenopathy, Full range of motion.    Respiratory:  Lungs are clear to auscultation, Respirations are non-labored, Breath sounds are equal, Symmetrical chest wall expansion.   Cardiovascular:  Normal rate, Regular  Urine analysis shows , leukocyte esterase pos and WBC. If pt has symptoms of dysuria, frequent uriantion I can send abx to pharmacy. rhythm, No murmur, Good pulses equal in all extremities, Normal peripheral perfusion, No edema.   Gastrointestinal:  Soft, Non-tender, Non-distended, Normal bowel sounds, No organomegaly, Anus patent.   Genitourinary:  Normal genitalia for age and sex, No scrotal tenderness, No inguinal tenderness, Testes descended bilaterally, No lesions.   Musculoskeletal     Normal range of motion.     Normal strength.     No tenderness.     No swelling.     No deformity.     No hip clicks.     Normal Chan's.     Normal Ortolani's.     Upper extremity exam: Upper extremity exam is within normal limits.    Spine/torso exam: spine/torso exam is within normal limits.     Lower extremity exam: Lower extremity exam is within normal limits.    Integumentary:  Warm, Pink, Intact, Moist, No pallor, No rash.    Neurologic:  Alert, Normal sensory, Normal motor function, Moves all extremities appropriately, [place], rooting, sucking reflexes are normal, No focal deficits, Gag reflex normal, Hand grasp present, Toe grasp present.      Review / Management   Results review:       Labs between:  2019 14:34 to 2019 14:34    CBC:                 WBC  HgB  Hct  Plt  MCV  RDW   2019 9.3  12.0  36.6  249  79.4  (H) 15.5     DIFF:                 Seg  Neutroph//ABS  Lymph//ABS  Mono//ABS  EOS/ABS  2019 NOT APPLICABLE  70 // 6.4 22 // 2.1 7 // 0.7 1 // 0.1                 .      Health Maintenance   Medication Administered:  Medication administered Phytonadione, and erythromycin 0.5% ophthalmic ointment applied in both eyes.   Care Practices/ Screens   Care Practices/ Screens (R).   state screen: Pending.       Impression and Plan   Diagnosis     Single Liveborn, Born in Hospital, Delivered without Mention of  Section ([id number] Z38.00, Diagnosis, Hospitalized, Medical).    No signs of D.S.     Course:  Progressing as expected.    Education and Follow-up:          Counseled: Family, Guardian, Regarding  diagnosis, Regarding treatment, Regarding medications.         Discharge Planning: Plan to discharge ( To home ).

## 2019-05-02 DIAGNOSIS — N39.0 URINARY TRACT INFECTION WITHOUT HEMATURIA, SITE UNSPECIFIED: Primary | ICD-10-CM

## 2019-05-02 RX ORDER — NITROFURANTOIN (MACROCRYSTALS) 100 MG/1
100 CAPSULE ORAL 2 TIMES DAILY
Qty: 14 CAP | Refills: 0 | Status: SHIPPED | OUTPATIENT
Start: 2019-05-02 | End: 2019-05-09

## 2019-05-13 ENCOUNTER — OFFICE VISIT (OUTPATIENT)
Dept: CARDIOLOGY CLINIC | Age: 64
End: 2019-05-13

## 2019-05-13 DIAGNOSIS — Z95.810 PRESENCE OF BIVENTRICULAR AICD: Primary | ICD-10-CM

## 2019-07-09 DIAGNOSIS — Z76.0 ENCOUNTER FOR MEDICATION REFILL: ICD-10-CM

## 2019-07-09 DIAGNOSIS — J30.89 ENVIRONMENTAL AND SEASONAL ALLERGIES: ICD-10-CM

## 2019-07-11 RX ORDER — LEVOCETIRIZINE DIHYDROCHLORIDE 5 MG/1
TABLET, FILM COATED ORAL
Qty: 90 TAB | Refills: 0 | Status: SHIPPED | OUTPATIENT
Start: 2019-07-11 | End: 2019-10-12 | Stop reason: SDUPTHER

## 2019-07-15 DIAGNOSIS — Z76.0 ENCOUNTER FOR MEDICATION REFILL: ICD-10-CM

## 2019-07-15 RX ORDER — VENLAFAXINE HYDROCHLORIDE 75 MG/1
CAPSULE, EXTENDED RELEASE ORAL
Qty: 90 CAP | Refills: 0 | Status: SHIPPED | OUTPATIENT
Start: 2019-07-15 | End: 2019-10-13 | Stop reason: SDUPTHER

## 2019-07-19 DIAGNOSIS — Z76.0 ENCOUNTER FOR MEDICATION REFILL: ICD-10-CM

## 2019-07-19 DIAGNOSIS — E11.21 TYPE 2 DIABETES WITH NEPHROPATHY (HCC): ICD-10-CM

## 2019-07-22 RX ORDER — GABAPENTIN 100 MG/1
CAPSULE ORAL
Qty: 90 CAP | Refills: 0 | Status: SHIPPED | OUTPATIENT
Start: 2019-07-22 | End: 2019-08-29 | Stop reason: SDUPTHER

## 2019-07-23 DIAGNOSIS — M1A.9XX0 CHRONIC GOUT WITHOUT TOPHUS, UNSPECIFIED CAUSE, UNSPECIFIED SITE: Chronic | ICD-10-CM

## 2019-07-23 DIAGNOSIS — Z76.0 ENCOUNTER FOR MEDICATION REFILL: ICD-10-CM

## 2019-07-23 RX ORDER — ALLOPURINOL 100 MG/1
100 TABLET ORAL DAILY
Qty: 90 TAB | Refills: 0 | Status: SHIPPED | OUTPATIENT
Start: 2019-07-23 | End: 2019-10-23 | Stop reason: SDUPTHER

## 2019-08-04 DIAGNOSIS — Z76.0 ENCOUNTER FOR MEDICATION REFILL: ICD-10-CM

## 2019-08-04 DIAGNOSIS — E03.9 ACQUIRED HYPOTHYROIDISM: ICD-10-CM

## 2019-08-06 RX ORDER — LEVOTHYROXINE SODIUM 100 UG/1
100 TABLET ORAL
Qty: 90 TAB | Refills: 0 | Status: SHIPPED | OUTPATIENT
Start: 2019-08-06 | End: 2019-10-29 | Stop reason: SDUPTHER

## 2019-08-21 ENCOUNTER — OFFICE VISIT (OUTPATIENT)
Dept: CARDIOLOGY CLINIC | Age: 64
End: 2019-08-21

## 2019-08-21 DIAGNOSIS — Z95.810 PRESENCE OF BIVENTRICULAR AUTOMATIC CARDIOVERTER/DEFIBRILLATOR (AICD): Primary | ICD-10-CM

## 2019-08-29 DIAGNOSIS — Z76.0 ENCOUNTER FOR MEDICATION REFILL: ICD-10-CM

## 2019-08-29 DIAGNOSIS — E11.21 TYPE 2 DIABETES WITH NEPHROPATHY (HCC): ICD-10-CM

## 2019-08-30 RX ORDER — GABAPENTIN 100 MG/1
CAPSULE ORAL
Qty: 90 CAP | Refills: 0 | Status: SHIPPED | OUTPATIENT
Start: 2019-08-30 | End: 2019-12-20 | Stop reason: SDUPTHER

## 2019-08-30 NOTE — TELEPHONE ENCOUNTER
----- Message from Yulia Smith sent at 8/29/2019  4:33 PM EDT -----  Regarding: Dr. Berger/ Telephone   Contact: [phone]  Pt would like to know if her medication \"Spiralactone\" can be called into [facility]. Pt is following up on a request that was already put in.  Phone: [phone]

## 2019-09-25 DIAGNOSIS — E11.21 TYPE 2 DIABETES WITH NEPHROPATHY (HCC): ICD-10-CM

## 2019-09-25 DIAGNOSIS — Z76.0 ENCOUNTER FOR MEDICATION REFILL: ICD-10-CM

## 2019-09-25 RX ORDER — GABAPENTIN 100 MG/1
CAPSULE ORAL
Qty: 90 CAP | Refills: 0 | OUTPATIENT
Start: 2019-09-25

## 2019-10-12 DIAGNOSIS — J30.89 ENVIRONMENTAL AND SEASONAL ALLERGIES: ICD-10-CM

## 2019-10-12 DIAGNOSIS — Z76.0 ENCOUNTER FOR MEDICATION REFILL: ICD-10-CM

## 2019-10-13 DIAGNOSIS — Z76.0 ENCOUNTER FOR MEDICATION REFILL: ICD-10-CM

## 2019-10-14 RX ORDER — VENLAFAXINE HYDROCHLORIDE 75 MG/1
CAPSULE, EXTENDED RELEASE ORAL
Qty: 90 CAP | Refills: 0 | Status: SHIPPED | OUTPATIENT
Start: 2019-10-14 | End: 2020-01-04

## 2019-10-14 RX ORDER — LEVOCETIRIZINE DIHYDROCHLORIDE 5 MG/1
TABLET, FILM COATED ORAL
Qty: 90 TAB | Refills: 0 | Status: SHIPPED | OUTPATIENT
Start: 2019-10-14 | End: 2019-10-15 | Stop reason: SDUPTHER

## 2019-10-15 ENCOUNTER — OFFICE VISIT (OUTPATIENT)
Dept: PRIMARY CARE CLINIC | Age: 64
End: 2019-10-15

## 2019-10-15 VITALS
BODY MASS INDEX: 39.55 KG/M2 | HEIGHT: 67 IN | SYSTOLIC BLOOD PRESSURE: 94 MMHG | TEMPERATURE: 97.9 F | DIASTOLIC BLOOD PRESSURE: 70 MMHG | RESPIRATION RATE: 16 BRPM | OXYGEN SATURATION: 98 % | HEART RATE: 104 BPM | WEIGHT: 252 LBS

## 2019-10-15 DIAGNOSIS — J30.89 ENVIRONMENTAL AND SEASONAL ALLERGIES: ICD-10-CM

## 2019-10-15 DIAGNOSIS — E83.52 HYPERCALCEMIA: ICD-10-CM

## 2019-10-15 DIAGNOSIS — E11.21 TYPE 2 DIABETES WITH NEPHROPATHY (HCC): Primary | ICD-10-CM

## 2019-10-15 DIAGNOSIS — E66.01 OBESITY, MORBID (HCC): ICD-10-CM

## 2019-10-15 DIAGNOSIS — Z76.0 ENCOUNTER FOR MEDICATION REFILL: ICD-10-CM

## 2019-10-15 DIAGNOSIS — J30.1 SEASONAL ALLERGIC RHINITIS DUE TO POLLEN: ICD-10-CM

## 2019-10-15 DIAGNOSIS — L40.0 PLAQUE PSORIASIS: ICD-10-CM

## 2019-10-15 DIAGNOSIS — E03.9 ACQUIRED HYPOTHYROIDISM: ICD-10-CM

## 2019-10-15 DIAGNOSIS — N20.0 LEFT RENAL STONE: ICD-10-CM

## 2019-10-15 DIAGNOSIS — N18.30 STAGE 3 CHRONIC KIDNEY DISEASE (HCC): ICD-10-CM

## 2019-10-15 DIAGNOSIS — N39.0 URINARY TRACT INFECTION WITHOUT HEMATURIA, SITE UNSPECIFIED: ICD-10-CM

## 2019-10-15 RX ORDER — LEVOCETIRIZINE DIHYDROCHLORIDE 5 MG/1
5 TABLET, FILM COATED ORAL DAILY
Qty: 90 TAB | Refills: 0 | Status: SHIPPED | OUTPATIENT
Start: 2019-10-15 | End: 2020-03-19

## 2019-10-15 RX ORDER — EZETIMIBE 10 MG/1
10 TABLET ORAL DAILY
Qty: 30 TAB | Refills: 3 | Status: SHIPPED | OUTPATIENT
Start: 2019-10-15 | End: 2020-01-31

## 2019-10-16 NOTE — PROGRESS NOTES
Sybil Oneal is a 71 y.o.  female and presents with Chief Complaint Patient presents with  Blood Pressure Check  Diabetes 90 day refills  Hypothyroidism Pt is here for follow up. Pt sees various specialists. Pt says she feels fine today. No chest pain, SOB, orthopnea, elg swelling. Past Medical History:  
Diagnosis Date  Acquired hypothyroidism 8/15/2016  Anemia RED-HF study  Asthma  Cardiomyopathy, nonischemic ([facility] [address]   
 initial dx 2001, bivHF 2008 with EF 15%, s/p biV-ICD 9/08, significant improvment in EF to 45-50%  CKD (chronic kidney disease) Dr Carissa Rose  CKD (chronic kidney disease) 8/15/2012  Depression  Diabetes ([facility] [address]  Diabetic neuropathy ([facility] [address]  DM (diabetes mellitus) ([facility] [address] 8/15/2012  GERD (gastroesophageal reflux disease)  Gout  Hypothyroidism  ICD (implantable cardioverter-defibrillator), biventricular, in situ 6/5/2014  Psoriasis Past Surgical History:  
Procedure Laterality Date [address]  2007; 01/06/15  
 normal cors  ECHO 2D ADULT  4/2010 EF 45%, improved from 1/09 (25%)  ECHO 2D ADULT  11/2011 LVH, EF 55-60%  HX ORTHOPAEDIC    
 knee [facility]  STRESS TEST LEXISCAN/CARDIOLITE  3/21/12  
 normal perfusion, global HK 40% Current Outpatient Medications Medication Sig  levocetirizine (XYZAL) 5 mg tablet Take 1 Tab by mouth daily.  ezetimibe (ZETIA) 10 mg tablet Take 1 Tab by mouth daily.  venlafaxine-SR (EFFEXOR-XR) 75 mg capsule TAKE ONE CAPSULE BY MOUTH EVERY DAY  carvedilol (COREG) 25 mg tablet TAKE 1 TABLET BY MOUTH TWICE A DAY INDICATIONS: CHRONIC HEART FAILURE, HIGH BLOOD PRESSURE  
 spironolactone (ALDACTONE) 25 mg tablet TAKE 1 TABLET BY MOUTH EVERY DAY  gabapentin (NEURONTIN) 100 mg capsule TAKE 1 CAPSULE BY MOUTH EVERY DAY  levothyroxine (SYNTHROID) 100 mcg tablet TAKE 1 TAB BY MOUTH DAILY (BEFORE BREAKFAST). TAKE EVERY DAY BUT SUNDAY  allopurinol (ZYLOPRIM) 100 mg tablet TAKE 1 TAB BY MOUTH DAILY. INDICATIONS: TREATMENT TO PREVENT ACUTE GOUT ATTACK  bumetanide (BUMEX) 1 mg tablet Take 1 Tab by mouth daily. Take at 4pm  Indications: Accumulation of Fluid Resulting from Chronic Heart Failure  bumetanide (BUMEX) 2 mg tablet Take 1 Tab by mouth daily. Indications: Accumulation of Fluid Resulting from Chronic Heart Failure  apremilast (OTEZLA) 30 mg tab Take  by mouth two (2) times a day.  candesartan (ATACAND) 4 mg tablet Take 0.5 Tabs by mouth daily.  calcitRIOL (ROCALTROL) 0.5 mcg capsule TAKE 1 CAPSULE BY MOUTH DAILY FOR 30 DAYS.  azelastine (ASTEPRO) 0.15 % (205.5 mcg) 2 Sprays by Both Nostrils route two (2) times daily as needed (congestion). Indications: Seasonal Runny Nose  insulin NPH/insulin regular (NOVOLIN 70/30) 100 unit/mL (70-30) injection 70 units two times a day  calcipotriene (DOVONEX) 0.005 % topical cream Apply  to affected area three (3) times daily.  triamcinolone acetonide (KENALOG) 0.5 % ointment Apply  to affected area two (2) times a day. use thin layer  multivitamin (ONE A DAY) tablet Take 1 Tab by mouth daily.  DOCUSATE CALCIUM (STOOL SOFTENER PO) Take 1 tablet by mouth daily.  meclizine (ANTIVERT) 25 mg tablet Take 1 Tab by mouth three (3) times daily as needed for Dizziness.  EPINEPHrine (EPIPEN) 0.3 mg/0.3 mL injection 0.3 mL by IntraMUSCular route once as needed for 1 dose.  ferrous sulfate (IRON) 325 mg (65 mg elemental iron) tablet Take  by mouth three (3) times daily (with meals).  aspirin 81 mg tablet Take 81 mg by mouth daily.  PULMICORT IN Take 2 Puffs by inhalation every twelve (12) hours. As needed No current facility-administered medications for this visit. Health Maintenance Topic Date Due  Shingrix Vaccine Age 50> (1 of 2) 07/09/2004  COLONOSCOPY  12/10/2015  EYE EXAM RETINAL OR DILATED  07/10/2016  
 FOOT EXAM Q1  06/07/2019  GLAUCOMA SCREENING Q2Y  07/09/2019  Bone Densitometry (Dexa) Screening  07/09/2019  Influenza Age 5 to Adult  08/01/2019  
 HEMOGLOBIN A1C Q6M  10/30/2019  MEDICARE YEARLY EXAM  04/30/2020  BREAST CANCER SCRN MAMMOGRAM  04/30/2020  MICROALBUMIN Q1  04/30/2020  LIPID PANEL Q1  04/30/2020  Pneumococcal 65+ years (2 of 2 - PPSV23) 04/30/2020  
 DTaP/Tdap/Td series (2 - Td) 10/19/2027  Hepatitis C Screening  Addressed Immunization History Administered Date(s) Administered  H1N1 Influenza Virus Vaccine 01/20/2010  Influenza Vaccine 10/01/2015  Influenza Vaccine Split 09/17/2010, 10/17/2012  Pneumococcal Conjugate (PCV-13) 04/30/2019  TB Skin Test (PPD) 01/01/2008 No LMP recorded. Patient is postmenopausal. 
 
 
 
Allergies and Intolerances: Allergies Allergen Reactions  Ciprofloxacin Anaphylaxis  Shellfish Derived Anaphylaxis  Ace Inhibitors Unknown (comments)  Biaxin [Clarithromycin] Other (comments) Metal taste  Candesartan Cough  Pcn [Penicillins] Hives Family History:  
Family History Problem Relation Age of Onset  Heart Disease Mother  Hypertension Mother  Lupus Sister  Diabetes Brother Social History: She  reports that she has never smoked. She has never used smokeless tobacco.  She  reports that she does not drink alcohol. Review of Systems:  
General: negative for - chills, fatigue, fever, weight change Psych: negative for - anxiety, depression, irritability or mood swings ENT: negative for - headaches, hearing change, nasal congestion, oral lesions, sneezing or sore throat Heme/ Lymph: negative for - bleeding problems, bruising, pallor or swollen lymph nodes Endo: negative for - hot flashes, polydipsia/polyuria or temperature intolerance Resp: negative for - cough, shortness of breath or wheezing CV: negative for - chest pain, edema or palpitations GI: negative for - abdominal pain, change in bowel habits, constipation, diarrhea or nausea/vomiting : negative for - dysuria, hematuria, incontinence, pelvic pain or vulvar/vaginal symptoms MSK: negative for - joint pain, joint swelling or muscle pain Neuro: negative for - confusion, headaches, seizures or weakness Derm: negative for - dry skin, hair changes, rash or skin lesion changes Physical:  
Vitals:  
Vitals:  
 10/15/19 1426 BP: 94/70 Pulse: (!) 104 Resp: 16 Temp: 97.9 °F (36.6 °C) TempSrc: Oral  
SpO2: 98% Weight: 252 lb (114.3 kg) Height: 5' 7\" (1.702 m) Exam:  
HEENT- atraumatic,normocephalic, awake, oriented, well nourished Neck - supple,no enlarged lymph nodes, no JVD, no thyromegaly Chest- CTA, no rhonchi, no crackles Heart- rrr, no murmurs / gallop/rub Abdomen- soft,BS+,NT, no hepatosplenomegaly Ext - no c/c/edema Neuro- no focal deficits. Power 5/5 all extremities Skin - warm,dry, no obvious rashes. Review of Data:  
LABS:  
Lab Results Component Value Date/Time WBC 10.1 04/30/2019 11:21 AM  
 HGB 11.0 (L) 04/30/2019 11:21 AM  
 HCT 34.9 04/30/2019 11:21 AM  
 PLATELET 395 85/77/3588 11:21 AM  
 
Lab Results Component Value Date/Time Sodium 137 04/30/2019 11:21 AM  
 Potassium 4.7 04/30/2019 11:21 AM  
 Chloride 97 04/30/2019 11:21 AM  
 CO2 20 04/30/2019 11:21 AM  
 Glucose 224 (H) 04/30/2019 11:21 AM  
 BUN 67 (H) 04/30/2019 11:21 AM  
 Creatinine 2.98 (H) 04/30/2019 11:21 AM  
 
Lab Results Component Value Date/Time Cholesterol, total 249 (H) 04/30/2019 11:21 AM  
 HDL Cholesterol 46 04/30/2019 11:21 AM  
 LDL, calculated 150 (H) 04/30/2019 11:21 AM  
 Triglyceride 264 (H) 04/30/2019 11:21 AM  
 
No results found for: GPT Impression / Plan: ICD-10-CM ICD-9-CM 1.  Type 2 diabetes with nephropathy (HCC) E11.21 250.40 HEMOGLOBIN A1C WITH EAG  
 583.81 LIPID PANEL  
   METABOLIC PANEL, COMPREHENSIVE 2. Environmental and seasonal allergies J30.89 477.8 levocetirizine (XYZAL) 5 mg tablet 3. Encounter for medication refill Z76.0 V68.1 levocetirizine (XYZAL) 5 mg tablet 4. Plaque psoriasis L40.0 696.1 5. Stage 3 chronic kidney disease (HCC) N18.3 585.3 6. Acquired hypothyroidism E03.9 244.9 7. Obesity, morbid ([address] E66.01 278.01   
8. Urinary tract infection without hematuria, site unspecified N39.0 599.0 URINALYSIS W/ RFLX MICROSCOPIC  
   CULTURE, URINE  
   URINALYSIS W/ RFLX MICROSCOPIC 9. Hypercalcemia E83.52 275.42 CALCIUM, IONIZED  
   PTH INTACT 10. Left renal stone N20.0 592.0 11. Seasonal allergic rhinitis due to pollen J30.1 477.0 ezetimibe (ZETIA) 10 mg tablet Explained to patient risk benefits of the medications. Advised patient to stop meds if having any side effects. Pt verbalized understanding of the instructions. I have discussed the diagnosis with the patient and the intended plan as seen in the above orders. The patient has received an after-visit summary and questions were answered concerning future plans. I have discussed medication side effects and warnings with the patient as well. I have reviewed the plan of care with the patient, accepted their input and they are in agreement with the treatment goals. Reviewed plan of care. Patient has provided input and agrees with goals. Follow-up and Dispositions · Return in about 2 months (around 12/15/2019).  
  
 
 
Nita Ruiz MD

## 2019-10-23 DIAGNOSIS — M1A.9XX0 CHRONIC GOUT WITHOUT TOPHUS, UNSPECIFIED CAUSE, UNSPECIFIED SITE: Chronic | ICD-10-CM

## 2019-10-23 DIAGNOSIS — Z76.0 ENCOUNTER FOR MEDICATION REFILL: ICD-10-CM

## 2019-10-23 DIAGNOSIS — I42.8 CARDIOMYOPATHY, NONISCHEMIC (HCC): ICD-10-CM

## 2019-10-23 DIAGNOSIS — N18.30 STAGE 3 CHRONIC KIDNEY DISEASE (HCC): ICD-10-CM

## 2019-10-23 DIAGNOSIS — I10 HYPERTENSION, UNSPECIFIED TYPE: ICD-10-CM

## 2019-10-23 RX ORDER — CALCITRIOL 0.5 UG/1
CAPSULE ORAL
Qty: 90 CAP | Refills: 0 | Status: SHIPPED | OUTPATIENT
Start: 2019-10-23 | End: 2020-01-22

## 2019-10-23 RX ORDER — SPIRONOLACTONE 25 MG/1
TABLET ORAL
Qty: 30 TAB | Refills: 0 | Status: SHIPPED | OUTPATIENT
Start: 2019-10-23 | End: 2019-11-21 | Stop reason: SDUPTHER

## 2019-10-23 RX ORDER — CANDESARTAN 4 MG/1
2 TABLET ORAL DAILY
Qty: 45 TAB | Refills: 0 | Status: SHIPPED | OUTPATIENT
Start: 2019-10-23 | End: 2020-01-22

## 2019-10-23 RX ORDER — ALLOPURINOL 100 MG/1
100 TABLET ORAL DAILY
Qty: 90 TAB | Refills: 0 | Status: SHIPPED | OUTPATIENT
Start: 2019-10-23 | End: 2019-11-24 | Stop reason: SDUPTHER

## 2019-10-29 DIAGNOSIS — E03.9 ACQUIRED HYPOTHYROIDISM: ICD-10-CM

## 2019-10-29 DIAGNOSIS — Z76.0 ENCOUNTER FOR MEDICATION REFILL: ICD-10-CM

## 2019-10-29 RX ORDER — LEVOTHYROXINE SODIUM 100 UG/1
100 TABLET ORAL
Qty: 90 TAB | Refills: 0 | Status: SHIPPED | OUTPATIENT
Start: 2019-10-29 | End: 2020-01-20

## 2019-11-18 ENCOUNTER — OFFICE VISIT (OUTPATIENT)
Dept: CARDIOLOGY CLINIC | Age: 64
End: 2019-11-18

## 2019-11-18 DIAGNOSIS — Z95.810 PRESENCE OF BIVENTRICULAR AUTOMATIC CARDIOVERTER/DEFIBRILLATOR (AICD): Primary | ICD-10-CM

## 2019-11-24 DIAGNOSIS — M1A.9XX0 CHRONIC GOUT WITHOUT TOPHUS, UNSPECIFIED CAUSE, UNSPECIFIED SITE: Chronic | ICD-10-CM

## 2019-11-24 DIAGNOSIS — Z76.0 ENCOUNTER FOR MEDICATION REFILL: ICD-10-CM

## 2019-11-25 RX ORDER — ALLOPURINOL 100 MG/1
100 TABLET ORAL DAILY
Qty: 90 TAB | Refills: 0 | Status: SHIPPED | OUTPATIENT
Start: 2019-11-25 | End: 2020-11-04 | Stop reason: ALTCHOICE

## 2019-11-26 ENCOUNTER — OFFICE VISIT (OUTPATIENT)
Dept: CARDIOLOGY CLINIC | Age: 64
End: 2019-11-26

## 2019-11-26 VITALS
OXYGEN SATURATION: 93 % | SYSTOLIC BLOOD PRESSURE: 108 MMHG | HEART RATE: 69 BPM | HEIGHT: 67 IN | BODY MASS INDEX: 39.9 KG/M2 | WEIGHT: 254.2 LBS | DIASTOLIC BLOOD PRESSURE: 50 MMHG

## 2019-11-26 DIAGNOSIS — I42.8 CARDIOMYOPATHY, NONISCHEMIC (HCC): Primary | ICD-10-CM

## 2019-11-26 DIAGNOSIS — N18.30 STAGE 3 CHRONIC KIDNEY DISEASE (HCC): ICD-10-CM

## 2019-11-26 DIAGNOSIS — E66.01 OBESITY, MORBID (HCC): ICD-10-CM

## 2019-11-26 DIAGNOSIS — Z95.810 ICD (IMPLANTABLE CARDIOVERTER-DEFIBRILLATOR), BIVENTRICULAR, IN SITU: ICD-10-CM

## 2019-11-26 DIAGNOSIS — E11.21 TYPE 2 DIABETES WITH NEPHROPATHY (HCC): ICD-10-CM

## 2019-11-26 NOTE — LETTER
11/29/19 Patient: Gilberto Hernandez YOB: 1955 Date of Visit: 11/26/2019 Jasper Gilman MD 
[address] [place] [id number] VIA In Basket Dear Jasper Gilman MD, Thank you for referring Ms. Gilberto Hernandez to [facility] for evaluation. My notes for this consultation are attached. If you have questions, please do not hesitate to call me. I look forward to following your patient along with you. Sincerely, Liliana Lechuga MD

## 2019-11-26 NOTE — PROGRESS NOTES
Luis Mcleangenaro Hines, [facility] [address]  [address],  [address]  [place], [address] [facility]    Office [phone]  Fax [phone]  Cell [phone]      Uriel Witt is a 71 y.o. female. Last seen by me 7 months ago. DIAGNOSES  Encounter Diagnoses     ICD-10-CM ICD-9-CM   1. Cardiomyopathy, nonischemic (HCC) I42.8 425.4   2. Type 2 diabetes with nephropathy (HCC) E11.21 250.40     583.81   3. Stage 3 chronic kidney disease (HCC) N18.3 585.3   4. Obesity, morbid ([facility] [address] E66.01 278.01   5. ICD (implantable cardioverter-defibrillator), biventricular, in situ Z95.810 V45.02       ASSESSMENT/PLAN    Hx of NICM with normalization of LV function s/p CRT-d therapy. 2015 LV function has remained normal, most recently by echo April 2019. She has class 1 HF. Her medical regimen is good. Reassess LV function in 6 months    S/p CRT-D. Continue regular ICD checks. T2DM managed by Dr. Ramu Aguirre. She is on insulin alone. CKD previously managed by Dr. Jaswant Guidry, who is now retired. She is looking for another nephrologist.     Follow-up and Dispositions    · Return in about 6 months (around 5/26/2020). HPI    Uriel Witt reports she is feeling well other than feeling fatigue often. She sleeps about 10 hours nightly. She reports a stable pattern of HAILE with stairs and moderate activity. Patient denies any exertional chest pain, palpitations, syncope, orthopnea, edema or paroxysmal nocturnal dyspnea. Diabetes is controlled on insulin BID, managed by Dr. Ramu Aguirre. Her kidney specialist Dr. Jaswant Guidry retired. She has not found a new one. .    Cardiac testing  Cardiac cath 2007 - normal cors   Echo 4/2010 - EF 45%, improved from 1/09 (25%)  Echo 11/2011 - LVH, EF 55-60%  Lexiscan/cardiolite 3/21/12 - normal perfusion, global HK 40%   5/21/2012: Scheduled [facility] download.  There were 1135 ventricular sensing episodes noted with her night-time heart rate greater than 85 bpm for 7 days (HR from 122-128 bpm). This has not affected her CRT pacing percentage of 99.8% since January 2012. The Optivol fluid index began rising in April, 2012 and is ongoing, suggesting possible fluid accumulation (>200). 4/8/2013 BIV ICD scheduled carelink download 9 b NSVT 2/18/13, 1445 sensing episodes without BIV pacing probably AT longest one 1 min (98.9%  still)  Echo 6/8/14-EF 55 % to 60 %. Mitral valve: There was mild posterior annular calcification, Mild TR  Echo 6/18/14 - LVEF 55-60%,   Lexiscan cardiolite 12/16/14 - anterior,apical reversible defect, stress induced LV dilatation, EF 13%  Echo 1/13/15: EF 55-60%, no regional WMA, no change from previous echo of 6/14  Cath 1/6/2015 - EDP 11, no AVG, no LVG due to CKD, normal cors with right dominance. Echo 12/15/16 - EF 60-65%. No WMA. Grade 1 diastolic dysfunction. No significant change compared to previous study 1/9/15. Echo 4/24/18 - EF 60-65%. No WMA. Echo 4/22/19 - EF 60%      Current Outpatient Medications   Medication Sig Dispense Refill    allopurinol (ZYLOPRIM) 100 mg tablet TAKE 1 TAB BY MOUTH DAILY. INDICATIONS: TREATMENT TO PREVENT ACUTE GOUT ATTACK 90 Tab 0    spironolactone (ALDACTONE) 25 mg tablet TAKE 1 TABLET BY MOUTH EVERY DAY 30 Tab 0    levothyroxine (SYNTHROID) 100 mcg tablet TAKE 1 TAB BY MOUTH DAILY (BEFORE BREAKFAST). TAKE EVERY DAY BUT SUNDAY 90 Tab 0    calcitRIOL (ROCALTROL) 0.5 mcg capsule TAKE 1 CAPSULE BY MOUTH EVERY DAY 90 Cap 0    candesartan (ATACAND) 4 mg tablet TAKE 0.5 TABS BY MOUTH DAILY. 45 Tab 0    levocetirizine (XYZAL) 5 mg tablet Take 1 Tab by mouth daily. 90 Tab 0    ezetimibe (ZETIA) 10 mg tablet Take 1 Tab by mouth daily.  30 Tab 3    venlafaxine-SR (EFFEXOR-XR) 75 mg capsule TAKE ONE CAPSULE BY MOUTH EVERY DAY 90 Cap 0    carvedilol (COREG) 25 mg tablet TAKE 1 TABLET BY MOUTH TWICE A DAY INDICATIONS: CHRONIC HEART FAILURE, HIGH BLOOD PRESSURE 180 Tab 0    gabapentin (NEURONTIN) 100 mg capsule TAKE 1 CAPSULE BY MOUTH EVERY DAY 90 Cap 0    bumetanide (BUMEX) 1 mg tablet Take 1 Tab by mouth daily. Take at 4pm  Indications: Accumulation of Fluid Resulting from Chronic Heart Failure (Patient taking differently: Take 1 mg by mouth daily. Take at 4pm  Indications: Accumulation of Fluid Resulting from Chronic Heart Failure, 2mg in the a.m. and 1 in p.m.) 90 Tab 3    bumetanide (BUMEX) 2 mg tablet Take 1 Tab by mouth daily. Indications: Accumulation of Fluid Resulting from Chronic Heart Failure 90 Tab 3    apremilast (OTEZLA) 30 mg tab Take  by mouth two (2) times a day.  azelastine (ASTEPRO) 0.15 % (205.5 mcg) 2 Sprays by Both Nostrils route two (2) times daily as needed (congestion). Indications: Seasonal Runny Nose 3 Bottle 1    insulin NPH/insulin regular (NOVOLIN 70/30) 100 unit/mL (70-30) injection 70 units two times a day 10 mL 3    calcipotriene (DOVONEX) 0.005 % topical cream Apply  to affected area three (3) times daily.  triamcinolone acetonide (KENALOG) 0.5 % ointment Apply  to affected area two (2) times a day. use thin layer      multivitamin (ONE A DAY) tablet Take 1 Tab by mouth daily.  DOCUSATE CALCIUM (STOOL SOFTENER PO) Take 1 tablet by mouth daily.  meclizine (ANTIVERT) 25 mg tablet Take 1 Tab by mouth three (3) times daily as needed for Dizziness. 20 Tab 3    EPINEPHrine (EPIPEN) 0.3 mg/0.3 mL injection 0.3 mL by IntraMUSCular route once as needed for 1 dose. 1 Each 3    ferrous sulfate (IRON) 325 mg (65 mg elemental iron) tablet Take  by mouth three (3) times daily (with meals).  aspirin 81 mg tablet Take 81 mg by mouth daily.  PULMICORT IN Take 2 Puffs by inhalation every twelve (12) hours.  As needed         Allergies   Allergen Reactions    Ciprofloxacin Anaphylaxis    Shellfish Derived Anaphylaxis    Ace Inhibitors Unknown (comments)    Biaxin [Clarithromycin] Other (comments)     Metal taste    Candesartan Cough    Pcn [Penicillins] Hives       Review of Systems   Constitutional:  Negative for fever, chills and diaphoresis. +fatigue  Respiratory:  Negative for shortness of breath, cough, hemoptysis, sputum production and wheezing. +HAILE  Cardiovascular:  Negative for chest pain, edema, palpitations, orthopnea, claudication and PND. Gastrointestinal: Negative for heartburn, nausea, vomiting, blood in stool and melena. Genitourinary: Negative for dysuria and flank pain. Musculoskeletal: Negative for back pain. Skin: Negative for rash. Neurological: Negative for focal weakness, seizures, loss of consciousness, weakness and headaches. Endo/Heme/Allergies: Does not bruise/bleed easily. Psychiatric/Behavioral: Negative for memory loss. The patient does not have insomnia. Visit Vitals  /50 (BP 1 Location: Left arm, BP Patient Position: Sitting)   Pulse 69   Ht 5' 7\" (1.702 m)   Wt 254 lb 3.2 oz (115.3 kg)   SpO2 93%   BMI 39.81 kg/m²     Wt Readings from Last 3 Encounters:   11/26/19 254 lb 3.2 oz (115.3 kg)   10/15/19 252 lb (114.3 kg)   04/30/19 253 lb 9.6 oz (115 kg)     Physical Exam   Vitals reviewed. Constitutional: She is oriented to person, place, and time. She appears well-developed. Obese. HENT:   Head: Normocephalic. Neck: Neck supple. No JVD present. No tracheal deviation present. Cardiovascular: Normal rate, regular rhythm, S1 normal and S2 normal.  PMI is not displaced. Exam reveals no gallop and no friction rub. No murmur heard. Pulses: Carotid pulses are 2+ on the right side, and 2+ on the left side. Pulmonary/Chest: Effort normal and breath sounds normal. She has no decreased breath sounds, wheezes, rhonchi or rales. Abdominal: Soft. Normal appearance and normal aorta. No tenderness. She has no rebound. Musculoskeletal: She exhibits no edema. Neurological: She is alert and oriented to person, place, and time. Skin: Skin is warm and dry. Psychiatric: She has a normal mood and affect. Cardiographics:  EKG 14:  Atrial sensed, ventricular paced rhythm   EKG 14 - AV sequential pacing   EKG 4/3/18 - Atrial sense; intermittent ventricular pacing  Echo 19 - EF 60%   EK19 -  A sensed V paced             Written by Christinia Severe, as dictated by Kathy Kennedy M.D.      Kathy Kennedy MD

## 2019-11-26 NOTE — PROGRESS NOTES
Sheyla Jon is a 71 y.o. female    Chief Complaint   Patient presents with    Cardiomyopathy    Cholesterol Problem    Hypertension       Chest pain No    SOB patient states some SOB with walking     Dizziness No    Swelling No    Refills No    Visit Vitals  /50 (BP 1 Location: Left arm, BP Patient Position: Sitting)   Pulse 69   Ht 5' 7\" (1.702 m)   Wt 254 lb 3.2 oz (115.3 kg)   SpO2 93%   BMI 39.81 kg/m²       1. Have you been to the ER, urgent care clinic since your last visit? Hospitalized since your last visit? No    2. Have you seen or consulted any other health care providers outside of the [address] [place] [address] since your last visit? Include any pap smears or colon screening.   No

## 2019-12-05 DIAGNOSIS — Z76.0 ENCOUNTER FOR MEDICATION REFILL: ICD-10-CM

## 2019-12-05 RX ORDER — SPIRONOLACTONE 25 MG/1
TABLET ORAL
Qty: 30 TAB | Refills: 0 | Status: SHIPPED | OUTPATIENT
Start: 2019-12-05 | End: 2019-12-12 | Stop reason: SDUPTHER

## 2019-12-05 NOTE — TELEPHONE ENCOUNTER
----- Message from Elisa Leon sent at 12/4/2019 11:24 AM EST -----  Regarding: Dr. Bao Turner (if not patient):      Relationship of caller (if not patient):      Best contact number(s):[phone]      Name of medication and dosage if known: Spironolactone E 25 milligrams      Is patient out of this medication (yes/no): yes      Pharmacy name: [facility]    Pharmacy listed in chart? (yes/no): yes  Pharmacy phone number:      Details to clarify the request: Patient is out of the medication Spironolactone and needs a refill sent to the [facility] pharmacy on file      Elisa Leon

## 2019-12-06 RX ORDER — MECLIZINE HYDROCHLORIDE 25 MG/1
25 TABLET ORAL
Qty: 20 TAB | Refills: 3 | Status: SHIPPED | OUTPATIENT
Start: 2019-12-06 | End: 2020-06-10

## 2019-12-12 DIAGNOSIS — Z76.0 ENCOUNTER FOR MEDICATION REFILL: ICD-10-CM

## 2019-12-12 RX ORDER — SPIRONOLACTONE 25 MG/1
TABLET ORAL
Qty: 30 TAB | Refills: 0 | OUTPATIENT
Start: 2019-12-12

## 2019-12-12 RX ORDER — SPIRONOLACTONE 25 MG/1
TABLET ORAL
Qty: 30 TAB | Refills: 0 | Status: SHIPPED | OUTPATIENT
Start: 2019-12-12 | End: 2020-01-12

## 2019-12-20 DIAGNOSIS — E11.21 TYPE 2 DIABETES WITH NEPHROPATHY (HCC): ICD-10-CM

## 2019-12-20 DIAGNOSIS — Z76.0 ENCOUNTER FOR MEDICATION REFILL: ICD-10-CM

## 2019-12-23 RX ORDER — GABAPENTIN 100 MG/1
100 CAPSULE ORAL
Qty: 90 CAP | Refills: 0 | Status: SHIPPED | OUTPATIENT
Start: 2019-12-23 | End: 2020-03-31

## 2020-01-02 ENCOUNTER — OFFICE VISIT (OUTPATIENT)
Dept: PRIMARY CARE CLINIC | Age: 65
End: 2020-01-02

## 2020-01-02 VITALS
TEMPERATURE: 98.3 F | RESPIRATION RATE: 16 BRPM | OXYGEN SATURATION: 99 % | HEIGHT: 67 IN | BODY MASS INDEX: 39.87 KG/M2 | SYSTOLIC BLOOD PRESSURE: 99 MMHG | DIASTOLIC BLOOD PRESSURE: 50 MMHG | WEIGHT: 254 LBS | HEART RATE: 89 BPM

## 2020-01-02 DIAGNOSIS — E11.40 TYPE 2 DIABETES MELLITUS WITH DIABETIC NEUROPATHY, UNSPECIFIED WHETHER LONG TERM INSULIN USE (HCC): ICD-10-CM

## 2020-01-02 DIAGNOSIS — N20.0 LEFT RENAL STONE: ICD-10-CM

## 2020-01-02 DIAGNOSIS — E03.9 ACQUIRED HYPOTHYROIDISM: ICD-10-CM

## 2020-01-02 DIAGNOSIS — E66.01 OBESITY, MORBID (HCC): ICD-10-CM

## 2020-01-02 DIAGNOSIS — N20.0 RENAL STONE: ICD-10-CM

## 2020-01-02 DIAGNOSIS — E83.52 HYPERCALCEMIA: ICD-10-CM

## 2020-01-02 DIAGNOSIS — L40.0 PLAQUE PSORIASIS: ICD-10-CM

## 2020-01-02 DIAGNOSIS — E11.21 TYPE 2 DIABETES WITH NEPHROPATHY (HCC): Primary | ICD-10-CM

## 2020-01-02 DIAGNOSIS — E79.0 HYPERURICEMIA: ICD-10-CM

## 2020-01-02 DIAGNOSIS — R82.81 PYURIA: ICD-10-CM

## 2020-01-02 DIAGNOSIS — N18.30 STAGE 3 CHRONIC KIDNEY DISEASE (HCC): ICD-10-CM

## 2020-01-02 NOTE — PROGRESS NOTES
Chief Complaint   Patient presents with    Blood Pressure Check     2 mo follow up     Diabetes     1. Have you been to the ER, urgent care clinic since your last visit? Hospitalized since your last visit? No    2. Have you seen or consulted any other health care providers outside of the [facility] since your last visit? Include any pap smears or colon screening.  No

## 2020-01-02 NOTE — PROGRESS NOTES
Mary Blackwood is a 71 y.o.  female and presents with     Chief Complaint   Patient presents with    Blood Pressure Check     2 mo follow up     Diabetes    Cholesterol Problem    Anemia    Chronic Kidney Disease    Hypothyroidism    Gout   Patient is here for a follow-up visit. She has not done the repeat labs as ordered. She used to see an endocrinologist and nephrologist in the past.  Lab work reveals mild anemia. She is has a history of hyperuricemia but she reports that she never had gout. She does see a cardiologist.  She is currently taking spironolactone. She does not have any symptoms of fluid overload. She is also taking Bumex as recommended by cardiology. Past Medical History:   Diagnosis Date    Acquired hypothyroidism 8/15/2016    Anemia     RED-HF study    Asthma     Cardiomyopathy, nonischemic ([facility]     initial dx 2001, bivHF 2008 with EF 15%, s/p biV-ICD 9/08, significant improvment in EF to 45-50%    CKD (chronic kidney disease)     Dr Keri Mladonado CKD (chronic kidney disease) 8/15/2012    Depression     Diabetes ([facility]     Diabetic neuropathy ([facility]     DM (diabetes mellitus) ([facility] 8/15/2012    GERD (gastroesophageal reflux disease)     Gout     Hypothyroidism     ICD (implantable cardioverter-defibrillator), biventricular, in situ 6/5/2014    Psoriasis      Past Surgical History:   Procedure Laterality Date    CARDIAC CATHETERIZATION  2007; 01/06/15    normal cors    ECHO 2D ADULT  4/2010    EF 45%, improved from 1/09 (25%)    ECHO 2D ADULT  11/2011    LVH, EF 55-60%    HX ORTHOPAEDIC      knee    HX PACEMAKER PLACEMENT      AICD    STRESS TEST LEXISCAN/CARDIOLITE  3/21/12    normal perfusion, global HK 40%     Current Outpatient Medications   Medication Sig    gabapentin (NEURONTIN) 100 mg capsule Take 1 Cap by mouth nightly. Max Daily Amount: 100 mg.     spironolactone (ALDACTONE) 25 mg tablet One po daily    meclizine (ANTIVERT) 25 mg tablet Take 1 Tab by mouth three (3) times daily as needed for Dizziness.  allopurinol (ZYLOPRIM) 100 mg tablet TAKE 1 TAB BY MOUTH DAILY. INDICATIONS: TREATMENT TO PREVENT ACUTE GOUT ATTACK    levothyroxine (SYNTHROID) 100 mcg tablet TAKE 1 TAB BY MOUTH DAILY (BEFORE BREAKFAST). TAKE EVERY DAY BUT SUNDAY    calcitRIOL (ROCALTROL) 0.5 mcg capsule TAKE 1 CAPSULE BY MOUTH EVERY DAY    candesartan (ATACAND) 4 mg tablet TAKE 0.5 TABS BY MOUTH DAILY.  levocetirizine (XYZAL) 5 mg tablet Take 1 Tab by mouth daily.  ezetimibe (ZETIA) 10 mg tablet Take 1 Tab by mouth daily.  venlafaxine-SR (EFFEXOR-XR) 75 mg capsule TAKE ONE CAPSULE BY MOUTH EVERY DAY    carvedilol (COREG) 25 mg tablet TAKE 1 TABLET BY MOUTH TWICE A DAY INDICATIONS: CHRONIC HEART FAILURE, HIGH BLOOD PRESSURE    bumetanide (BUMEX) 1 mg tablet Take 1 Tab by mouth daily. Take at 4pm  Indications: Accumulation of Fluid Resulting from Chronic Heart Failure (Patient taking differently: Take 1 mg by mouth daily. Take at 4pm  Indications: Accumulation of Fluid Resulting from Chronic Heart Failure, 2mg in the a.m. and 1 in p.m.)    bumetanide (BUMEX) 2 mg tablet Take 1 Tab by mouth daily. Indications: Accumulation of Fluid Resulting from Chronic Heart Failure    apremilast (OTEZLA) 30 mg tab Take  by mouth two (2) times a day.  azelastine (ASTEPRO) 0.15 % (205.5 mcg) 2 Sprays by Both Nostrils route two (2) times daily as needed (congestion). Indications: Seasonal Runny Nose    insulin NPH/insulin regular (NOVOLIN 70/30) 100 unit/mL (70-30) injection 70 units two times a day    calcipotriene (DOVONEX) 0.005 % topical cream Apply  to affected area three (3) times daily.  triamcinolone acetonide (KENALOG) 0.5 % ointment Apply  to affected area two (2) times a day. use thin layer    multivitamin (ONE A DAY) tablet Take 1 Tab by mouth daily.  DOCUSATE CALCIUM (STOOL SOFTENER PO) Take 1 tablet by mouth daily.     EPINEPHrine (EPIPEN) 0.3 mg/0.3 mL injection 0.3 mL by IntraMUSCular route once as needed for 1 dose.  ferrous sulfate (IRON) 325 mg (65 mg elemental iron) tablet Take  by mouth three (3) times daily (with meals).  aspirin 81 mg tablet Take 81 mg by mouth daily.  PULMICORT IN Take 2 Puffs by inhalation every twelve (12) hours. As needed       No current facility-administered medications for this visit. Health Maintenance   Topic Date Due    Shingrix Vaccine Age 49> (1 of 2) 07/09/2004    COLONOSCOPY  12/10/2015    EYE EXAM RETINAL OR DILATED  07/10/2016    FOOT EXAM Q1  06/07/2019    GLAUCOMA SCREENING Q2Y  07/09/2019    Bone Densitometry (Dexa) Screening  07/09/2019    Influenza Age 9 to Adult  08/01/2019    HEMOGLOBIN A1C Q6M  10/30/2019    BREAST CANCER SCRN MAMMOGRAM  04/30/2020    MEDICARE YEARLY EXAM  04/30/2020    MICROALBUMIN Q1  04/30/2020    LIPID PANEL Q1  04/30/2020    Pneumococcal 65+ years (2 of 2 - PPSV23) 04/30/2020    DTaP/Tdap/Td series (2 - Td) 10/19/2027    Hepatitis C Screening  Addressed     Immunization History   Administered Date(s) Administered    H1N1 Influenza Virus Vaccine 01/20/2010    Influenza Vaccine 10/01/2015    Influenza Vaccine Split 09/17/2010, 10/17/2012    Pneumococcal Conjugate (PCV-13) 04/30/2019    TB Skin Test (PPD) 01/01/2008     No LMP recorded. Patient is postmenopausal.        Allergies and Intolerances: Allergies   Allergen Reactions    Ciprofloxacin Anaphylaxis    Shellfish Derived Anaphylaxis    Ace Inhibitors Unknown (comments)    Biaxin [Clarithromycin] Other (comments)     Metal taste    Candesartan Cough    Pcn [Penicillins] Hives       Family History:   Family History   Problem Relation Age of Onset    Heart Disease Mother     Hypertension Mother     Lupus Sister     Diabetes Brother        Social History:   She  reports that she has never smoked. She has never used smokeless tobacco.  She  reports no history of alcohol use.             Review of Systems:   General: negative for - chills, fatigue, fever, weight change  Psych: negative for - anxiety, depression, irritability or mood swings  ENT: negative for - headaches, hearing change, nasal congestion, oral lesions, sneezing or sore throat  Heme/ Lymph: negative for - bleeding problems, bruising, pallor or swollen lymph nodes  Endo: negative for - hot flashes, polydipsia/polyuria or temperature intolerance  Resp: negative for - cough, shortness of breath or wheezing  CV: negative for - chest pain, edema or palpitations  GI: negative for - abdominal pain, change in bowel habits, constipation, diarrhea or nausea/vomiting  : negative for - dysuria, hematuria, incontinence, pelvic pain or vulvar/vaginal symptoms  MSK: negative for - joint pain, joint swelling or muscle pain  Neuro: negative for - confusion, headaches, seizures or weakness  Derm: negative for - dry skin, hair changes, rash or skin lesion changes          Physical:   Vitals:   Vitals:    01/02/20 1634   BP: 99/50   Pulse: 89   Resp: 16   Temp: 98.3 °F (36.8 °C)   TempSrc: Oral   SpO2: 99%   Weight: 254 lb (115.2 kg)   Height: 5' 7\" (1.702 m)           Exam:   HEENT- atraumatic,normocephalic, awake, oriented, well nourished  Neck - supple,no enlarged lymph nodes, no JVD, no thyromegaly  Chest- CTA, no rhonchi, no crackles  Heart- rrr, no murmurs / gallop/rub  Abdomen- soft,BS+,NT, no hepatosplenomegaly  Ext - no c/c/edema   Neuro- no focal deficits. Power 5/5 all extremities  Skin - warm,dry, no obvious rashes.           Review of Data:   LABS:   Lab Results   Component Value Date/Time    WBC 10.1 04/30/2019 11:21 AM    HGB 11.0 (L) 04/30/2019 11:21 AM    HCT 34.9 04/30/2019 11:21 AM    PLATELET 481 64/84/0734 11:21 AM     Lab Results   Component Value Date/Time    Sodium 137 04/30/2019 11:21 AM    Potassium 4.7 04/30/2019 11:21 AM    Chloride 97 04/30/2019 11:21 AM    CO2 20 04/30/2019 11:21 AM    Glucose 224 (H) 04/30/2019 11:21 AM    BUN 67 (H) 04/30/2019 11:21 AM    Creatinine 2.98 (H) 04/30/2019 11:21 AM     Lab Results   Component Value Date/Time    Cholesterol, total 249 (H) 04/30/2019 11:21 AM    HDL Cholesterol 46 04/30/2019 11:21 AM    LDL, calculated 150 (H) 04/30/2019 11:21 AM    Triglyceride 264 (H) 04/30/2019 11:21 AM     No results found for: GPT        Impression / Plan:        ICD-10-CM ICD-9-CM    1. Type 2 diabetes with nephropathy ([facility]) G83.07 048.08 METABOLIC PANEL, COMPREHENSIVE     583.81 LIPID PANEL      HEMOGLOBIN A1C WITH EAG   2. Acquired hypothyroidism E03.9 244.9    3. Stage 3 chronic kidney disease ([facility]) N18.3 585. 3 PRINCESS COMPREHENSIVE PANEL   4. Plaque psoriasis L40.0 696.1    5. Obesity, morbid ([facility] [address] E66.01 278.01    6. Left renal stone N20.0 592.0    7. Hypercalcemia E83.52 275.42 PTH INTACT      CALCIUM, IONIZED      ANGIOTENSIN CONVERTING ENZYME   8. Renal stone N20.0 592.0    9. Pyuria R82.81 791.9 URINALYSIS W/ RFLX MICROSCOPIC   10. Hyperuricemia E79.0 790.6 URIC ACID   11. Type 2 diabetes mellitus with diabetic neuropathy, unspecified whether long term insulin use ([facility]) E11.40 250.60      357.2      History of kidney stones, may need repeat CT scan. History of hyperuricemia, recent uric acid levels have been normal, asked patient to stop Spironolactone. Will repeat uric acid levels. Chronic kidney disease-stop spironolactone. Hypercalcemia-rule out sarcoidosis, patient has a family history of sarcoidosis. Explained to patient risk benefits of the medications. Advised patient to stop meds if having any side effects. Pt verbalized understanding of the instructions. I have discussed the diagnosis with the patient and the intended plan as seen in the above orders. The patient has received an after-visit summary and questions were answered concerning future plans. I have discussed medication side effects and warnings with the patient as well.  I have reviewed the plan of care with the patient, accepted their input and they are in agreement with the treatment goals. Reviewed plan of care. Patient has provided input and agrees with goals. Follow-up and Dispositions    · Return in about 3 months (around 4/2/2020).          Kalyan Staton MD

## 2020-01-03 DIAGNOSIS — I10 HYPERTENSION, UNSPECIFIED TYPE: ICD-10-CM

## 2020-01-03 DIAGNOSIS — Z76.0 ENCOUNTER FOR MEDICATION REFILL: ICD-10-CM

## 2020-01-04 DIAGNOSIS — Z76.0 ENCOUNTER FOR MEDICATION REFILL: ICD-10-CM

## 2020-01-04 RX ORDER — CARVEDILOL 25 MG/1
TABLET ORAL
Qty: 180 TAB | Refills: 0 | Status: SHIPPED | OUTPATIENT
Start: 2020-01-04 | End: 2020-03-19

## 2020-01-04 RX ORDER — VENLAFAXINE HYDROCHLORIDE 75 MG/1
CAPSULE, EXTENDED RELEASE ORAL
Qty: 90 CAP | Refills: 0 | Status: SHIPPED | OUTPATIENT
Start: 2020-01-04 | End: 2020-03-19

## 2020-01-10 DIAGNOSIS — Z76.0 ENCOUNTER FOR MEDICATION REFILL: ICD-10-CM

## 2020-01-12 RX ORDER — SPIRONOLACTONE 25 MG/1
TABLET ORAL
Qty: 30 TAB | Refills: 0 | Status: SHIPPED | OUTPATIENT
Start: 2020-01-12 | End: 2020-04-13

## 2020-01-19 DIAGNOSIS — E03.9 ACQUIRED HYPOTHYROIDISM: ICD-10-CM

## 2020-01-19 DIAGNOSIS — Z76.0 ENCOUNTER FOR MEDICATION REFILL: ICD-10-CM

## 2020-01-20 RX ORDER — LEVOTHYROXINE SODIUM 100 UG/1
TABLET ORAL
Qty: 90 TAB | Refills: 0 | Status: SHIPPED | OUTPATIENT
Start: 2020-01-20 | End: 2020-04-20

## 2020-01-21 DIAGNOSIS — N18.30 STAGE 3 CHRONIC KIDNEY DISEASE (HCC): ICD-10-CM

## 2020-01-21 DIAGNOSIS — I42.8 CARDIOMYOPATHY, NONISCHEMIC (HCC): ICD-10-CM

## 2020-01-21 DIAGNOSIS — I10 HYPERTENSION, UNSPECIFIED TYPE: ICD-10-CM

## 2020-01-21 DIAGNOSIS — Z76.0 ENCOUNTER FOR MEDICATION REFILL: ICD-10-CM

## 2020-01-22 RX ORDER — CANDESARTAN 4 MG/1
TABLET ORAL
Qty: 45 TAB | Refills: 0 | Status: SHIPPED | OUTPATIENT
Start: 2020-01-22 | End: 2020-04-20

## 2020-01-22 RX ORDER — CALCITRIOL 0.5 UG/1
CAPSULE ORAL
Qty: 90 CAP | Refills: 0 | Status: SHIPPED | OUTPATIENT
Start: 2020-01-22 | End: 2020-04-20

## 2020-01-31 DIAGNOSIS — J30.1 SEASONAL ALLERGIC RHINITIS DUE TO POLLEN: ICD-10-CM

## 2020-01-31 RX ORDER — EZETIMIBE 10 MG/1
TABLET ORAL
Qty: 90 TAB | Refills: 1 | Status: SHIPPED | OUTPATIENT
Start: 2020-01-31 | End: 2020-11-04

## 2020-02-20 ENCOUNTER — OFFICE VISIT (OUTPATIENT)
Dept: CARDIOLOGY CLINIC | Age: 65
End: 2020-02-20

## 2020-02-20 ENCOUNTER — CLINICAL SUPPORT (OUTPATIENT)
Dept: CARDIOLOGY CLINIC | Age: 65
End: 2020-02-20

## 2020-02-20 VITALS
BODY MASS INDEX: 40.34 KG/M2 | HEIGHT: 67 IN | SYSTOLIC BLOOD PRESSURE: 112 MMHG | DIASTOLIC BLOOD PRESSURE: 70 MMHG | WEIGHT: 257 LBS | HEART RATE: 100 BPM

## 2020-02-20 DIAGNOSIS — E66.01 OBESITY, MORBID (HCC): ICD-10-CM

## 2020-02-20 DIAGNOSIS — Z95.810 PRESENCE OF BIVENTRICULAR AUTOMATIC CARDIOVERTER/DEFIBRILLATOR (AICD): Primary | ICD-10-CM

## 2020-02-20 DIAGNOSIS — E11.21 TYPE 2 DIABETES WITH NEPHROPATHY (HCC): ICD-10-CM

## 2020-02-20 DIAGNOSIS — I42.8 NICM (NONISCHEMIC CARDIOMYOPATHY) (HCC): ICD-10-CM

## 2020-02-20 DIAGNOSIS — I10 ESSENTIAL HYPERTENSION: ICD-10-CM

## 2020-02-20 DIAGNOSIS — N18.30 STAGE 3 CHRONIC KIDNEY DISEASE (HCC): ICD-10-CM

## 2020-02-20 RX ORDER — GUAIFENESIN 600 MG/1
600 TABLET, EXTENDED RELEASE ORAL AS NEEDED
COMMUNITY
End: 2021-01-01

## 2020-02-20 RX ORDER — HYDROXYZINE HYDROCHLORIDE 10 MG/1
TABLET, FILM COATED ORAL
COMMUNITY
End: 2020-06-10

## 2020-02-20 NOTE — PROGRESS NOTES
Cardiac Electrophysiology Office Note     Subjective:      Deepthi Huber is a 71 y.o. patient who is seen for evaluation of BIV ICD    She has a Medtronic biventricular pacemaker AICD that I replaced back in 2015. Her left ventricular ejection fraction has normalized with bi-V pacing. She is NYHA Class I-II. She has chronic renal failure    04/22/19   ECHO ADULT COMPLETE 04/23/2019 4/23/2019    Narrative · Heart rate 67 bpm.  · Mild aortic valve sclerosis with no significant stenosis. · Calculated left ventricular ejection fraction is 60%. Left ventricular   mild concentric hypertrophy. No regional wall motion abnormality noted. Age-appropriate left ventricular diastolic function. · Mitral valve thickening. Trace mitral valve regurgitation.         Signed by: Carie Marti MD         Problem List  Date Reviewed: 2/20/2020          Codes Class Noted    Type 2 diabetes mellitus with diabetic neuropathy ([facility] ICD-10-CM: E11.40  ICD-9-CM: 250.60, 357.2  1/2/2020        Type 2 diabetes with nephropathy ([facility] ICD-10-CM: E11.21  ICD-9-CM: 250.40, 583.81  4/3/2018        Obesity, morbid ([facility] [address] ICD-10-CM: E66.01  ICD-9-CM: 278.01  12/8/2017        Acquired hypothyroidism ICD-10-CM: E03.9  ICD-9-CM: 244.9  8/15/2016        Dysthymia ICD-10-CM: F34.1  ICD-9-CM: 300.4  8/15/2016        ICD (implantable cardioverter-defibrillator), biventricular, in situ ICD-10-CM: Z95.810  ICD-9-CM: V45.02  6/5/2014    Overview Signed 1/14/2015 11:11 AM by Vick Syed MD     Generator Medtronic change 1/14/2015             Dyslipidemia ICD-10-CM: X26.7  ICD-9-CM: 272.4  1/14/2014        CKD (chronic kidney disease) ICD-10-CM: N18.9  ICD-9-CM: 585.9  8/15/2012        Cardiomyopathy, nonischemic ([facility] ICD-10-CM: I42.8  ICD-9-CM: 425.4  Unknown    Overview Signed 10/10/2011  6:40 AM by Carie Marti MD     initial dx 2001, bivHF 2008 with EF 15%, s/p biV-ICD 9/08, significant improvment in EF to 45-50% Anemia in chronic renal disease (Chronic) ICD-10-CM: N18.9, D63.1  ICD-9-CM: 285.21  12/10/2008        HTN (hypertension) ICD-10-CM: I10  ICD-9-CM: 401.9  12/10/2008        GERD (gastroesophageal reflux disease) (Chronic) ICD-10-CM: K21.9  ICD-9-CM: 530.81  12/10/2008        Gout (Chronic) ICD-10-CM: M10.9  ICD-9-CM: 274.9  12/10/2008        Pulmonary HTN (HCC) (Chronic) ICD-10-CM: A32.45  ICD-9-CM: 416.8  12/10/2008              Current Outpatient Medications   Medication Sig Dispense Refill    hydroxyzine HCL (ATARAX) 10 mg tablet 2 tab(s)      guaiFENesin ER (MUCINEX) 600 mg ER tablet Take 600 mg by mouth as needed for Congestion.  ezetimibe (ZETIA) 10 mg tablet TAKE 1 TABLET BY MOUTH EVERY DAY 90 Tab 1    calcitRIOL (ROCALTROL) 0.5 mcg capsule TAKE 1 CAPSULE BY MOUTH EVERY DAY 90 Cap 0    candesartan (ATACAND) 4 mg tablet TAKE 1/2 TABLET BY MOUTH EVERY DAY 45 Tab 0    levothyroxine (SYNTHROID) 100 mcg tablet TAKE 1 TABLET BY MOUTH EVERY MORNING BEFORE BREAKFAST (DO NOT TAKE ON SUNDAY) 90 Tab 0    spironolactone (ALDACTONE) 25 mg tablet TAKE 1 TABLET BY MOUTH EVERY DAY 30 Tab 0    carvedilol (COREG) 25 mg tablet TAKE 1 TABLET BY MOUTH TWICE A DAY INDICATIONS: CHRONIC HEART FAILURE, HIGH BLOOD PRESSURE 180 Tab 0    venlafaxine-SR (EFFEXOR-XR) 75 mg capsule TAKE 1 CAPSULE BY MOUTH EVERY DAY 90 Cap 0    gabapentin (NEURONTIN) 100 mg capsule Take 1 Cap by mouth nightly. Max Daily Amount: 100 mg. 90 Cap 0    meclizine (ANTIVERT) 25 mg tablet Take 1 Tab by mouth three (3) times daily as needed for Dizziness. 20 Tab 3    allopurinol (ZYLOPRIM) 100 mg tablet TAKE 1 TAB BY MOUTH DAILY. INDICATIONS: TREATMENT TO PREVENT ACUTE GOUT ATTACK 90 Tab 0    levocetirizine (XYZAL) 5 mg tablet Take 1 Tab by mouth daily. 90 Tab 0    bumetanide (BUMEX) 1 mg tablet Take 1 Tab by mouth daily. Take at 4pm  Indications:  Accumulation of Fluid Resulting from Chronic Heart Failure (Patient taking differently: Take 1 mg by mouth daily. Take at 4pm  Indications: Accumulation of Fluid Resulting from Chronic Heart Failure, 2mg in the a.m. and 1 in p.m.) 90 Tab 3    apremilast (OTEZLA) 30 mg tab Take  by mouth two (2) times a day.  azelastine (ASTEPRO) 0.15 % (205.5 mcg) 2 Sprays by Both Nostrils route two (2) times daily as needed (congestion). Indications: Seasonal Runny Nose 3 Bottle 1    insulin NPH/insulin regular (NOVOLIN 70/30) 100 unit/mL (70-30) injection 70 units two times a day 10 mL 3    calcipotriene (DOVONEX) 0.005 % topical cream Apply  to affected area three (3) times daily.  triamcinolone acetonide (KENALOG) 0.5 % ointment Apply  to affected area two (2) times a day. use thin layer      multivitamin (ONE A DAY) tablet Take 1 Tab by mouth daily.  DOCUSATE CALCIUM (STOOL SOFTENER PO) Take 1 tablet by mouth daily.  EPINEPHrine (EPIPEN) 0.3 mg/0.3 mL injection 0.3 mL by IntraMUSCular route once as needed for 1 dose. 1 Each 3    ferrous sulfate (IRON) 325 mg (65 mg elemental iron) tablet Take  by mouth three (3) times daily (with meals).  aspirin 81 mg tablet Take 81 mg by mouth daily.  PULMICORT IN Take 2 Puffs by inhalation every twelve (12) hours. As needed        bumetanide (BUMEX) 2 mg tablet Take 1 Tab by mouth daily. Indications:  Accumulation of Fluid Resulting from Chronic Heart Failure 90 Tab 3     Allergies   Allergen Reactions    Ciprofloxacin Anaphylaxis    Shellfish Derived Anaphylaxis    Ace Inhibitors Unknown (comments)    Biaxin [Clarithromycin] Other (comments)     Metal taste    Candesartan Cough    Pcn [Penicillins] Hives     Past Medical History:   Diagnosis Date    Acquired hypothyroidism 8/15/2016    Anemia     RED-HF study    Asthma     Cardiomyopathy, nonischemic ([facility]     initial dx 2001, bivHF 2008 with EF 15%, s/p biV-ICD 9/08, significant improvment in EF to 45-50%    CKD (chronic kidney disease)     Dr Leonard Barnhart CKD (chronic kidney disease) 8/15/2012    Depression     Diabetes ([facility]     Diabetic neuropathy (HCC)     DM (diabetes mellitus) ([facility] 8/15/2012    GERD (gastroesophageal reflux disease)     Gout     Hypothyroidism     ICD (implantable cardioverter-defibrillator), biventricular, in situ 6/5/2014    Psoriasis      Past Surgical History:   Procedure Laterality Date    CARDIAC CATHETERIZATION  2007; 01/06/15    normal cors    ECHO 2D ADULT  4/2010    EF 45%, improved from 1/09 (25%)    ECHO 2D ADULT  11/2011    LVH, EF 55-60%    HX ORTHOPAEDIC      knee    HX PACEMAKER PLACEMENT      AICD    STRESS TEST LEXISCAN/CARDIOLITE  3/21/12    normal perfusion, global HK 40%       Social History     Tobacco Use    Smoking status: Never Smoker    Smokeless tobacco: Never Used   Substance Use Topics    Alcohol use: No        Review of Systems:   Constitutional: Negative for fever, chills, weight loss  HEENT: Negative for nosebleeds, vision changes. Respiratory: Negative for cough, hemoptysis  Cardiovascular: Negative for chest pain, palpitations, orthopnea, claudication, leg swelling, syncope, and PND. Gastrointestinal: Negative for nausea, vomiting, diarrhea, blood in stool and melena. Genitourinary: Negative for dysuria, and hematuria. Musculoskeletal: Negative for myalgias, arthralgia. Skin: Negative for rash. Heme: Does not bleed or bruise easily. Neurological: Negative for speech change and focal weakness     Objective:     Visit Vitals  /70   Pulse 100   Ht 5' 7\" (1.702 m)   Wt 257 lb (116.6 kg)   BMI 40.25 kg/m²      Physical Exam:   Constitutional: well-developed and well-nourished. No respiratory distress. Head: Normocephalic and atraumatic. Eyes: Pupils are equal, round  ENT: hearing normal  Neck: supple. No JVD present. Cardiovascular: Normal rate, regular rhythm. Exam reveals no gallop and no friction rub. No murmur heard. Pulmonary/Chest: Effort normal and breath sounds normal. No wheezes. Abdominal: Soft, no tenderness. Obese  Musculoskeletal: + edema. Neurological: alert,oriented. Skin: Skin is warm and dry left sided BIV ICD  Psychiatric: normal mood and affect. Behavior is normal. Judgment and thought content normal.        Assessment/Plan:       ICD-10-CM ICD-9-CM    1. Presence of biventricular automatic cardioverter/defibrillator (AICD) Z95.810 V45.02    2. Stage 3 chronic kidney disease (HCC) N18.3 585.3    3. Type 2 diabetes with nephropathy ([facility]) E11.21 250.40      583.81    4. Obesity, morbid ([facility] E66.01 278.01    5. NICM (nonischemic cardiomyopathy) ([facility]) I42.8 425.4    6. Essential hypertension I10 401.9      reviewed diet, exercise and weight control  reviewed medications and side effects in detail   She is on GDMT  I have reviewed with the patient the device check today. It shows proper function  PCP stopped aldactone and she is on zetia  The device has 2 years battery life. She had a generator change on 1/14/15. Her initial implant was 9/25/08. Follow-up and Dispositions    · Return in about 1 year (around 2/20/2021). check device remotely every 3 months  She will see Dr Hien Zambrano as usual    Thank you for involving me in this patient's care and please call with further concerns or questions. Kunal Cobian M.D.   Electrophysiology/Cardiology  [facility]  [address] [place] [address]  [phone]

## 2020-03-13 ENCOUNTER — TELEPHONE (OUTPATIENT)
Dept: PRIMARY CARE CLINIC | Age: 65
End: 2020-03-13

## 2020-03-13 DIAGNOSIS — L40.0 PLAQUE PSORIASIS: Primary | ICD-10-CM

## 2020-03-13 NOTE — TELEPHONE ENCOUNTER
Patient states she will be seeing Dr. Harjit Hennessy at [facility] Dermat[facility] office.  Please send in referral for psoriasis

## 2020-03-16 NOTE — TELEPHONE ENCOUNTER
I dont do authorzation.  If the dermatologist is not in pts network , pt needs to call insurance company

## 2020-03-19 DIAGNOSIS — Z76.0 ENCOUNTER FOR MEDICATION REFILL: ICD-10-CM

## 2020-03-19 DIAGNOSIS — J30.89 ENVIRONMENTAL AND SEASONAL ALLERGIES: ICD-10-CM

## 2020-03-19 DIAGNOSIS — I10 HYPERTENSION, UNSPECIFIED TYPE: ICD-10-CM

## 2020-03-19 RX ORDER — CARVEDILOL 25 MG/1
TABLET ORAL
Qty: 180 TAB | Refills: 0 | Status: SHIPPED | OUTPATIENT
Start: 2020-03-19 | End: 2020-06-10

## 2020-03-19 RX ORDER — LEVOCETIRIZINE DIHYDROCHLORIDE 5 MG/1
TABLET, FILM COATED ORAL
Qty: 90 TAB | Refills: 0 | Status: SHIPPED | OUTPATIENT
Start: 2020-03-19 | End: 2020-06-10

## 2020-03-19 RX ORDER — VENLAFAXINE HYDROCHLORIDE 75 MG/1
CAPSULE, EXTENDED RELEASE ORAL
Qty: 90 CAP | Refills: 0 | Status: SHIPPED | OUTPATIENT
Start: 2020-03-19 | End: 2020-06-10

## 2020-03-20 RX ORDER — BUMETANIDE 1 MG/1
TABLET ORAL
Qty: 90 TAB | Refills: 1 | Status: SHIPPED | OUTPATIENT
Start: 2020-03-20 | End: 2020-08-24

## 2020-03-20 NOTE — TELEPHONE ENCOUNTER
Requested Prescriptions     Pending Prescriptions Disp Refills    bumetanide (BUMEX) 1 mg tablet [Pharmacy Med Name: BUMETANIDE 1 MG TABLET] 90 Tab 1     Sig: TAKE 1 TABLET BY MOUTH EVERY DAY (AT 4 PM FOR FLUID RESULTING FROM CHRONIC HEART FAILURE)     DONAVON Fountain

## 2020-03-24 ENCOUNTER — TELEPHONE (OUTPATIENT)
Dept: PRIMARY CARE CLINIC | Age: 65
End: 2020-03-24

## 2020-03-24 NOTE — TELEPHONE ENCOUNTER
Patient states she wanted a new derm doctor due to Dr. Anju Ledezma being too far from patients home. Patient has been instructed to call her insurance company to see who would be covered as far as dermatology.  Call the office back with the dermatology information and Dr. José Miguel Fontenot will place referral in system for patient

## 2020-03-31 DIAGNOSIS — Z76.0 ENCOUNTER FOR MEDICATION REFILL: ICD-10-CM

## 2020-03-31 DIAGNOSIS — E11.21 TYPE 2 DIABETES WITH NEPHROPATHY (HCC): ICD-10-CM

## 2020-03-31 RX ORDER — GABAPENTIN 100 MG/1
CAPSULE ORAL
Qty: 90 CAP | Refills: 0 | Status: SHIPPED | OUTPATIENT
Start: 2020-03-31 | End: 2020-06-28

## 2020-04-10 DIAGNOSIS — Z76.0 ENCOUNTER FOR MEDICATION REFILL: ICD-10-CM

## 2020-04-13 RX ORDER — SPIRONOLACTONE 25 MG/1
TABLET ORAL
Qty: 30 TAB | Refills: 0 | Status: SHIPPED | OUTPATIENT
Start: 2020-04-13 | End: 2020-05-07

## 2020-04-18 DIAGNOSIS — I10 HYPERTENSION, UNSPECIFIED TYPE: ICD-10-CM

## 2020-04-18 DIAGNOSIS — Z76.0 ENCOUNTER FOR MEDICATION REFILL: ICD-10-CM

## 2020-04-18 DIAGNOSIS — N18.30 STAGE 3 CHRONIC KIDNEY DISEASE (HCC): ICD-10-CM

## 2020-04-18 DIAGNOSIS — I42.8 CARDIOMYOPATHY, NONISCHEMIC (HCC): ICD-10-CM

## 2020-04-18 DIAGNOSIS — E03.9 ACQUIRED HYPOTHYROIDISM: ICD-10-CM

## 2020-04-20 RX ORDER — LEVOTHYROXINE SODIUM 100 UG/1
TABLET ORAL
Qty: 30 TAB | Refills: 1 | Status: SHIPPED | OUTPATIENT
Start: 2020-04-20 | End: 2020-11-04 | Stop reason: SDUPTHER

## 2020-04-20 RX ORDER — CALCITRIOL 0.5 UG/1
CAPSULE ORAL
Qty: 30 CAP | Refills: 1 | Status: SHIPPED | OUTPATIENT
Start: 2020-04-20 | End: 2020-05-18

## 2020-04-20 RX ORDER — CANDESARTAN 4 MG/1
TABLET ORAL
Qty: 15 TAB | Refills: 1 | Status: SHIPPED | OUTPATIENT
Start: 2020-04-20 | End: 2020-05-18

## 2020-04-20 NOTE — TELEPHONE ENCOUNTER
One months supply with one refill done. P tneeds to make follow up appt in 6- 8 weeks. WIll need to do labs during the visit .

## 2020-04-20 NOTE — TELEPHONE ENCOUNTER
LVM for patient conveying message per Dr. Sundeep Dasilva.  Patient to call office to schedule for follow up and further medication refills

## 2020-04-27 ENCOUNTER — VIRTUAL VISIT (OUTPATIENT)
Dept: PRIMARY CARE CLINIC | Age: 65
End: 2020-04-27

## 2020-04-27 ENCOUNTER — TELEPHONE (OUTPATIENT)
Dept: PRIMARY CARE CLINIC | Age: 65
End: 2020-04-27

## 2020-04-27 DIAGNOSIS — L40.0 PLAQUE PSORIASIS: ICD-10-CM

## 2020-04-27 DIAGNOSIS — N18.30 STAGE 3 CHRONIC KIDNEY DISEASE (HCC): Primary | ICD-10-CM

## 2020-04-27 DIAGNOSIS — E03.9 ACQUIRED HYPOTHYROIDISM: ICD-10-CM

## 2020-04-27 DIAGNOSIS — E11.21 TYPE 2 DIABETES WITH NEPHROPATHY (HCC): ICD-10-CM

## 2020-04-27 DIAGNOSIS — N20.0 RENAL STONE: ICD-10-CM

## 2020-04-27 DIAGNOSIS — G62.9 NEUROPATHY: ICD-10-CM

## 2020-04-27 DIAGNOSIS — I10 HYPERTENSION, UNSPECIFIED TYPE: ICD-10-CM

## 2020-04-27 DIAGNOSIS — I42.8 CARDIOMYOPATHY, NONISCHEMIC (HCC): ICD-10-CM

## 2020-04-27 NOTE — PROGRESS NOTES
Pradeep Moonye is a 71 y.o. female who was seen by synchronous (real-time) audio-video technology on 4/27/2020. Consent: Pradeep Mooney, who was seen by synchronous (real-time) audio-video technology, and/or her healthcare decision maker, is aware that this patient-initiated, Telehealth encounter on 4/27/2020 is a billable service, with coverage as determined by her insurance carrier. She is aware that she may receive a bill and has provided verbal consent to proceed: Yes. Assessment & Plan:  
Diagnoses and all orders for this visit: 1. Stage 3 chronic kidney disease ([facility] [address] 2. Acquired hypothyroidism 3. Cardiomyopathy, nonischemic ([facility] [address] 4. Type 2 diabetes with nephropathy (HCC) 5. Hypertension, unspecified type 6. Renal stone 7. Neuropathy 8. Plaque psoriasis 
 
sees Endocrinology, cardiology , Dermatologist. 
Will need to see GI, Nephrology in the future. Will ask nurse to call [facility] pharmacy to check what meds the pt needs refills on At this time DM/HTN/hyperchol/ renal disease - seem to be stable I spent at least 15 minutes on this visit with this established patient. Subjective:  
Pradeep Mooney is a 71 y.o. female who was seen for Diabetes; Chronic Kidney Disease; Hypertension; Gout; and Hypothyroidism Prior to Admission medications Medication Sig Start Date End Date Taking?  Authorizing Provider  
calcitRIOL (ROCALTROL) 0.5 mcg capsule TAKE 1 CAPSULE BY MOUTH EVERY DAY 4/20/20   Kaci Gould MD  
levothyroxine (SYNTHROID) 100 mcg tablet TAKE 1 TABLET BY MOUTH EVERY MORNING BEFORE BREAKFAST (DO NOT TAKE ON SUNDAY) 4/20/20   Kaci Gould MD  
candesartan (ATACAND) 4 mg tablet TAKE 1/2 TABLET BY MOUTH EVERY DAY 4/20/20   Kaci Gould MD  
spironolactone (ALDACTONE) 25 mg tablet TAKE 1 TABLET BY MOUTH EVERY DAY 4/13/20   Kaci Gould MD  
 gabapentin (NEURONTIN) 100 mg capsule TAKE 1 CAPSULE BY MOUTH NIGHTLY 3/31/20   Margarito Britt MD  
bumetanide (BUMEX) 1 mg tablet TAKE 1 TABLET BY MOUTH EVERY DAY (AT 4 PM FOR FLUID RESULTING FROM CHRONIC HEART FAILURE) 3/20/20   Susana Reed MD  
levocetirizine (XYZAL) 5 mg tablet TAKE 1 TABLET BY MOUTH EVERY DAY 3/19/20   Holger Medina MD  
venlafaxine-SR (EFFEXOR-XR) 75 mg capsule TAKE 1 CAPSULE BY MOUTH EVERY DAY 3/19/20   Holger Medina MD  
carvediloL (COREG) 25 mg tablet TAKE 1 TABLET BY MOUTH TWICE A DAY 3/19/20   Holger Medina MD  
hydroxyzine HCL (ATARAX) 10 mg tablet 2 tab(s)    Provider, Historical  
guaiFENesin ER (MUCINEX) 600 mg ER tablet Take 600 mg by mouth as needed for Congestion. Provider, Historical  
ezetimibe (ZETIA) 10 mg tablet TAKE 1 TABLET BY MOUTH EVERY DAY 1/31/20   Holger Medina MD  
meclizine (ANTIVERT) 25 mg tablet Take 1 Tab by mouth three (3) times daily as needed for Dizziness. 12/6/19   Holger Medina MD  
allopurinol (ZYLOPRIM) 100 mg tablet TAKE 1 TAB BY MOUTH DAILY. INDICATIONS: TREATMENT TO PREVENT ACUTE GOUT ATTACK 11/25/19   Jazlyn Carrillo NP  
apremilast (OTEZLA) 30 mg tab Take  by mouth two (2) times a day. Provider, Historical  
azelastine (ASTEPRO) 0.15 % (205.5 mcg) 2 Sprays by Both Nostrils route two (2) times daily as needed (congestion). Indications: Seasonal Runny Nose 4/11/19   Jazlyn Carrillo NP  
insulin NPH/insulin regular (NOVOLIN 70/30) 100 unit/mL (70-30) injection 70 units two times a day 8/15/16   Triston Holland MD  
calcipotriene (DOVONEX) 0.005 % topical cream Apply  to affected area three (3) times daily. Provider, Historical  
triamcinolone acetonide (KENALOG) 0.5 % ointment Apply  to affected area two (2) times a day. use thin layer    Provider, Historical  
multivitamin (ONE A DAY) tablet Take 1 Tab by mouth daily.     Provider, Historical  
 DOCUSATE CALCIUM (STOOL SOFTENER PO) Take 1 tablet by mouth daily. Provider, Historical  
EPINEPHrine (EPIPEN) 0.3 mg/0.3 mL injection 0.3 mL by IntraMUSCular route once as needed for 1 dose. 1/4/12   Fransico Holland MD  
ferrous sulfate (IRON) 325 mg (65 mg elemental iron) tablet Take  by mouth three (3) times daily (with meals). Provider, Historical  
aspirin 81 mg tablet Take 81 mg by mouth daily. Provider, Historical  
PULMICORT IN Take 2 Puffs by inhalation every twelve (12) hours. As needed Provider, Historical  
 
Allergies Allergen Reactions  Ciprofloxacin Anaphylaxis  Shellfish Derived Anaphylaxis  Ace Inhibitors Unknown (comments)  Biaxin [Clarithromycin] Other (comments) Metal taste  Candesartan Cough  Pcn [Penicillins] Hives History. Pt reports that she is doing fine today. Pt had a virtual visit with her Endocrinologist.Kale new medicien was precribed but it did not work out with her insurance so she is taking NOvolin 70/30 40-60 units twice daily. She has been administering it afetr breakfast and dinner. She stopped taking allopurinaol. She used to see Nephrology about 4 years back. She denies chest pain, SOB, leg swelling, bleeding No gout  Attack in in long time ROS Objective:  
Vital Signs: (As obtained by patient/caregiver at home) There were no vitals taken for this visit. [INSTRUCTIONS:  \"[x]\" Indicates a positive item  \"[]\" Indicates a negative item  -- DELETE ALL ITEMS NOT EXAMINED] Constitutional: [x] Appears well-developed and well-nourished [x] No apparent distress   
  [] Abnormal - Mental status: [x] Alert and awake  [x] Oriented to person/place/time [x] Able to follow commands   
[] Abnormal - Eyes:   EOM    [x]  Normal    [] Abnormal -  
Sclera  [x]  Normal    [] Abnormal - 
        Discharge [x]  None visible   [] Abnormal - HENT: [x] Normocephalic, atraumatic  [] Abnormal -  
 [x] Mouth/Throat: Mucous membranes are moist 
 
External Ears [x] Normal  [] Abnormal - Neck: [x] No visualized mass [] Abnormal - Pulmonary/Chest: [x] Respiratory effort normal   [x] No visualized signs of difficulty breathing or respiratory distress 
      [] Abnormal - Musculoskeletal:   [x] Normal gait with no signs of ataxia [x] Normal range of motion of neck [] Abnormal -  
 
Neurological:        [x] No Facial Asymmetry (Cranial nerve 7 motor function) (limited exam due to video visit) [x] No gaze palsy  
     [] Abnormal -   
     
Skin:        [x] No significant exanthematous lesions or discoloration noted on facial skin   
     [] Abnormal - Psychiatric:       [x] Normal Affect [] Abnormal -  
     [x] No Hallucinations Other pertinent observable physical exam findings:- 
 
 
 
We discussed the expected course, resolution and complications of the diagnosis(es) in detail. Medication risks, benefits, costs, interactions, and alternatives were discussed as indicated. I advised her to contact the office if her condition worsens, changes or fails to improve as anticipated. She expressed understanding with the diagnosis(es) and plan. Cynthia Fischer is a 71 y.o. female who was evaluated by a video visit encounter for concerns as above. Patient identification was verified prior to start of the visit. A caregiver was present when appropriate. Due to this being a TeleHealth encounter (During ELE-10 public health emergency), evaluation of the following organ systems was limited: Vitals/Constitutional/EENT/Resp/CV/GI//MS/Neuro/Skin/Heme-Lymph-Imm.  
Pursuant to the emergency declaration under the [facility] waiver authority and the [employer] Act, this Virtual  Visit was conducted, with patient's (and/or legal guardian's) consent, to reduce the patient's risk of exposure to COVID-19 and provide necessary medical care. Services were provided through a video synchronous discussion virtually to substitute for in-person clinic visit. Patient and provider were located at their individual homes. Emanuel Chaves MD 
kidney

## 2020-04-27 NOTE — TELEPHONE ENCOUNTER
Pt says currently damian ha all the meds but she will need refills on her meds. She is not sure shat meds she needs refills on. She is on a lot of meds ans dome of them I refulled last week. [facility] and get list of meds she needs refills on and pend them , I can refill them.

## 2020-05-07 DIAGNOSIS — Z76.0 ENCOUNTER FOR MEDICATION REFILL: ICD-10-CM

## 2020-05-07 RX ORDER — SPIRONOLACTONE 25 MG/1
TABLET ORAL
Qty: 30 TAB | Refills: 0 | Status: SHIPPED | OUTPATIENT
Start: 2020-05-07 | End: 2020-06-07

## 2020-05-16 DIAGNOSIS — I10 HYPERTENSION, UNSPECIFIED TYPE: ICD-10-CM

## 2020-05-16 DIAGNOSIS — I42.8 CARDIOMYOPATHY, NONISCHEMIC (HCC): ICD-10-CM

## 2020-05-16 DIAGNOSIS — Z76.0 ENCOUNTER FOR MEDICATION REFILL: ICD-10-CM

## 2020-05-16 DIAGNOSIS — N18.30 STAGE 3 CHRONIC KIDNEY DISEASE (HCC): ICD-10-CM

## 2020-05-18 RX ORDER — CANDESARTAN 4 MG/1
TABLET ORAL
Qty: 15 TAB | Refills: 1 | Status: SHIPPED | OUTPATIENT
Start: 2020-05-18 | End: 2020-06-15

## 2020-05-18 RX ORDER — CALCITRIOL 0.5 UG/1
CAPSULE ORAL
Qty: 30 CAP | Refills: 1 | Status: SHIPPED | OUTPATIENT
Start: 2020-05-18 | End: 2020-06-15

## 2020-06-07 DIAGNOSIS — Z76.0 ENCOUNTER FOR MEDICATION REFILL: ICD-10-CM

## 2020-06-07 RX ORDER — SPIRONOLACTONE 25 MG/1
TABLET ORAL
Qty: 30 TAB | Refills: 0 | Status: SHIPPED | OUTPATIENT
Start: 2020-06-07 | End: 2020-07-07 | Stop reason: SDUPTHER

## 2020-06-10 DIAGNOSIS — J30.89 ENVIRONMENTAL AND SEASONAL ALLERGIES: ICD-10-CM

## 2020-06-10 DIAGNOSIS — I10 HYPERTENSION, UNSPECIFIED TYPE: ICD-10-CM

## 2020-06-10 DIAGNOSIS — Z76.0 ENCOUNTER FOR MEDICATION REFILL: ICD-10-CM

## 2020-06-10 RX ORDER — LEVOCETIRIZINE DIHYDROCHLORIDE 5 MG/1
TABLET, FILM COATED ORAL
Qty: 90 TAB | Refills: 0 | Status: SHIPPED | OUTPATIENT
Start: 2020-06-10 | End: 2020-07-06

## 2020-06-10 RX ORDER — VENLAFAXINE HYDROCHLORIDE 75 MG/1
CAPSULE, EXTENDED RELEASE ORAL
Qty: 90 CAP | Refills: 0 | Status: SHIPPED | OUTPATIENT
Start: 2020-06-10 | End: 2020-07-26

## 2020-06-10 RX ORDER — CARVEDILOL 25 MG/1
TABLET ORAL
Qty: 180 TAB | Refills: 0 | Status: SHIPPED | OUTPATIENT
Start: 2020-06-10 | End: 2020-07-26

## 2020-06-15 DIAGNOSIS — Z76.0 ENCOUNTER FOR MEDICATION REFILL: ICD-10-CM

## 2020-06-15 DIAGNOSIS — I10 HYPERTENSION, UNSPECIFIED TYPE: ICD-10-CM

## 2020-06-15 DIAGNOSIS — N18.30 STAGE 3 CHRONIC KIDNEY DISEASE (HCC): ICD-10-CM

## 2020-06-15 DIAGNOSIS — I42.8 CARDIOMYOPATHY, NONISCHEMIC (HCC): ICD-10-CM

## 2020-06-15 RX ORDER — CALCITRIOL 0.5 UG/1
CAPSULE ORAL
Qty: 30 CAP | Refills: 1 | Status: SHIPPED | OUTPATIENT
Start: 2020-06-15 | End: 2020-07-07

## 2020-06-15 RX ORDER — CANDESARTAN 4 MG/1
TABLET ORAL
Qty: 15 TAB | Refills: 1 | Status: SHIPPED | OUTPATIENT
Start: 2020-06-15 | End: 2020-07-09

## 2020-06-28 DIAGNOSIS — Z76.0 ENCOUNTER FOR MEDICATION REFILL: ICD-10-CM

## 2020-06-28 DIAGNOSIS — E11.21 TYPE 2 DIABETES WITH NEPHROPATHY (HCC): ICD-10-CM

## 2020-06-28 RX ORDER — GABAPENTIN 100 MG/1
CAPSULE ORAL
Qty: 90 CAP | Refills: 0 | Status: SHIPPED | OUTPATIENT
Start: 2020-06-28 | End: 2020-10-21 | Stop reason: SDUPTHER

## 2020-07-03 DIAGNOSIS — J30.89 ENVIRONMENTAL AND SEASONAL ALLERGIES: ICD-10-CM

## 2020-07-03 DIAGNOSIS — Z76.0 ENCOUNTER FOR MEDICATION REFILL: ICD-10-CM

## 2020-07-06 RX ORDER — LEVOCETIRIZINE DIHYDROCHLORIDE 5 MG/1
TABLET, FILM COATED ORAL
Qty: 90 TAB | Refills: 0 | Status: SHIPPED | OUTPATIENT
Start: 2020-07-06 | End: 2020-12-31 | Stop reason: SDUPTHER

## 2020-07-07 DIAGNOSIS — Z76.0 ENCOUNTER FOR MEDICATION REFILL: ICD-10-CM

## 2020-07-07 DIAGNOSIS — N18.30 STAGE 3 CHRONIC KIDNEY DISEASE (HCC): ICD-10-CM

## 2020-07-07 RX ORDER — CALCITRIOL 0.5 UG/1
CAPSULE ORAL
Qty: 30 CAP | Refills: 1 | Status: SHIPPED | OUTPATIENT
Start: 2020-07-07 | End: 2020-08-03

## 2020-07-07 RX ORDER — SPIRONOLACTONE 25 MG/1
25 TABLET ORAL DAILY
Qty: 30 TAB | Refills: 0 | Status: SHIPPED | OUTPATIENT
Start: 2020-07-07 | End: 2020-07-27 | Stop reason: SDUPTHER

## 2020-07-08 DIAGNOSIS — Z76.0 ENCOUNTER FOR MEDICATION REFILL: ICD-10-CM

## 2020-07-08 DIAGNOSIS — I10 HYPERTENSION, UNSPECIFIED TYPE: ICD-10-CM

## 2020-07-08 DIAGNOSIS — I42.8 CARDIOMYOPATHY, NONISCHEMIC (HCC): ICD-10-CM

## 2020-07-09 RX ORDER — CANDESARTAN 4 MG/1
TABLET ORAL
Qty: 15 TAB | Refills: 1 | Status: SHIPPED | OUTPATIENT
Start: 2020-07-09 | End: 2020-11-04 | Stop reason: SDUPTHER

## 2020-07-25 DIAGNOSIS — I10 HYPERTENSION, UNSPECIFIED TYPE: ICD-10-CM

## 2020-07-25 DIAGNOSIS — Z76.0 ENCOUNTER FOR MEDICATION REFILL: ICD-10-CM

## 2020-07-26 RX ORDER — VENLAFAXINE HYDROCHLORIDE 75 MG/1
CAPSULE, EXTENDED RELEASE ORAL
Qty: 90 CAP | Refills: 0 | Status: SHIPPED | OUTPATIENT
Start: 2020-07-26 | End: 2020-12-30 | Stop reason: SDUPTHER

## 2020-07-26 RX ORDER — CARVEDILOL 25 MG/1
TABLET ORAL
Qty: 180 TAB | Refills: 0 | Status: SHIPPED | OUTPATIENT
Start: 2020-07-26 | End: 2020-11-04 | Stop reason: SDUPTHER

## 2020-07-27 DIAGNOSIS — Z76.0 ENCOUNTER FOR MEDICATION REFILL: ICD-10-CM

## 2020-07-27 RX ORDER — SPIRONOLACTONE 25 MG/1
25 TABLET ORAL DAILY
Qty: 30 TAB | Refills: 0 | Status: SHIPPED | OUTPATIENT
Start: 2020-07-27 | End: 2020-09-23 | Stop reason: SDUPTHER

## 2020-08-03 DIAGNOSIS — Z76.0 ENCOUNTER FOR MEDICATION REFILL: ICD-10-CM

## 2020-08-03 DIAGNOSIS — N18.30 STAGE 3 CHRONIC KIDNEY DISEASE (HCC): ICD-10-CM

## 2020-08-03 RX ORDER — CALCITRIOL 0.5 UG/1
CAPSULE ORAL
Qty: 30 CAP | Refills: 1 | Status: SHIPPED | OUTPATIENT
Start: 2020-08-03 | End: 2020-09-08 | Stop reason: SDUPTHER

## 2020-09-08 DIAGNOSIS — Z76.0 ENCOUNTER FOR MEDICATION REFILL: ICD-10-CM

## 2020-09-08 DIAGNOSIS — N18.30 STAGE 3 CHRONIC KIDNEY DISEASE (HCC): ICD-10-CM

## 2020-09-14 RX ORDER — CALCITRIOL 0.5 UG/1
0.5 CAPSULE ORAL DAILY
Qty: 30 CAP | Refills: 1 | Status: SHIPPED | OUTPATIENT
Start: 2020-09-14 | End: 2020-10-12

## 2020-09-23 DIAGNOSIS — Z76.0 ENCOUNTER FOR MEDICATION REFILL: ICD-10-CM

## 2020-09-24 RX ORDER — SPIRONOLACTONE 25 MG/1
25 TABLET ORAL DAILY
Qty: 30 TAB | Refills: 0 | Status: SHIPPED | OUTPATIENT
Start: 2020-09-24 | End: 2021-01-01

## 2020-10-12 DIAGNOSIS — Z76.0 ENCOUNTER FOR MEDICATION REFILL: ICD-10-CM

## 2020-10-12 DIAGNOSIS — N18.30 STAGE 3 CHRONIC KIDNEY DISEASE (HCC): ICD-10-CM

## 2020-10-12 RX ORDER — CALCITRIOL 0.5 UG/1
CAPSULE ORAL
Qty: 30 CAP | Refills: 1 | Status: SHIPPED | OUTPATIENT
Start: 2020-10-12 | End: 2020-11-09

## 2020-11-04 ENCOUNTER — OFFICE VISIT (OUTPATIENT)
Dept: PRIMARY CARE CLINIC | Age: 65
End: 2020-11-04
Payer: MEDICARE

## 2020-11-04 VITALS
RESPIRATION RATE: 16 BRPM | HEART RATE: 100 BPM | SYSTOLIC BLOOD PRESSURE: 110 MMHG | BODY MASS INDEX: 41.44 KG/M2 | HEIGHT: 67 IN | TEMPERATURE: 97.1 F | OXYGEN SATURATION: 97 % | DIASTOLIC BLOOD PRESSURE: 72 MMHG | WEIGHT: 264 LBS

## 2020-11-04 DIAGNOSIS — N18.30 STAGE 3 CHRONIC KIDNEY DISEASE, UNSPECIFIED WHETHER STAGE 3A OR 3B CKD (HCC): ICD-10-CM

## 2020-11-04 DIAGNOSIS — I10 HYPERTENSION, UNSPECIFIED TYPE: ICD-10-CM

## 2020-11-04 DIAGNOSIS — M1A.9XX0 CHRONIC GOUT WITHOUT TOPHUS, UNSPECIFIED CAUSE, UNSPECIFIED SITE: ICD-10-CM

## 2020-11-04 DIAGNOSIS — N20.0 LEFT RENAL STONE: ICD-10-CM

## 2020-11-04 DIAGNOSIS — Z76.0 ENCOUNTER FOR MEDICATION REFILL: ICD-10-CM

## 2020-11-04 DIAGNOSIS — E79.0 HYPERURICEMIA: ICD-10-CM

## 2020-11-04 DIAGNOSIS — D64.9 ANEMIA, UNSPECIFIED TYPE: ICD-10-CM

## 2020-11-04 DIAGNOSIS — L40.0 PLAQUE PSORIASIS: ICD-10-CM

## 2020-11-04 DIAGNOSIS — E83.52 HYPERCALCEMIA: ICD-10-CM

## 2020-11-04 DIAGNOSIS — N20.0 RENAL STONE: ICD-10-CM

## 2020-11-04 DIAGNOSIS — N18.30 STAGE 3 CHRONIC KIDNEY DISEASE (HCC): ICD-10-CM

## 2020-11-04 DIAGNOSIS — E55.9 VITAMIN D DEFICIENCY: ICD-10-CM

## 2020-11-04 DIAGNOSIS — E11.21 TYPE 2 DIABETES WITH NEPHROPATHY (HCC): Primary | ICD-10-CM

## 2020-11-04 DIAGNOSIS — E03.9 ACQUIRED HYPOTHYROIDISM: ICD-10-CM

## 2020-11-04 DIAGNOSIS — L85.3 XEROSIS OF SKIN: ICD-10-CM

## 2020-11-04 DIAGNOSIS — Z12.11 COLON CANCER SCREENING: ICD-10-CM

## 2020-11-04 DIAGNOSIS — I42.8 CARDIOMYOPATHY, NONISCHEMIC (HCC): ICD-10-CM

## 2020-11-04 PROCEDURE — 3017F COLORECTAL CA SCREEN DOC REV: CPT | Performed by: INTERNAL MEDICINE

## 2020-11-04 PROCEDURE — G9899 SCRN MAM PERF RSLTS DOC: HCPCS | Performed by: INTERNAL MEDICINE

## 2020-11-04 PROCEDURE — G9717 DOC PT DX DEP/BP F/U NT REQ: HCPCS | Performed by: INTERNAL MEDICINE

## 2020-11-04 PROCEDURE — 3046F HEMOGLOBIN A1C LEVEL >9.0%: CPT | Performed by: INTERNAL MEDICINE

## 2020-11-04 PROCEDURE — G8752 SYS BP LESS 140: HCPCS | Performed by: INTERNAL MEDICINE

## 2020-11-04 PROCEDURE — G8536 NO DOC ELDER MAL SCRN: HCPCS | Performed by: INTERNAL MEDICINE

## 2020-11-04 PROCEDURE — G8417 CALC BMI ABV UP PARAM F/U: HCPCS | Performed by: INTERNAL MEDICINE

## 2020-11-04 PROCEDURE — 2022F DILAT RTA XM EVC RTNOPTHY: CPT | Performed by: INTERNAL MEDICINE

## 2020-11-04 PROCEDURE — G8754 DIAS BP LESS 90: HCPCS | Performed by: INTERNAL MEDICINE

## 2020-11-04 PROCEDURE — 1090F PRES/ABSN URINE INCON ASSESS: CPT | Performed by: INTERNAL MEDICINE

## 2020-11-04 PROCEDURE — G8427 DOCREV CUR MEDS BY ELIG CLIN: HCPCS | Performed by: INTERNAL MEDICINE

## 2020-11-04 PROCEDURE — 99214 OFFICE O/P EST MOD 30 MIN: CPT | Performed by: INTERNAL MEDICINE

## 2020-11-04 PROCEDURE — G8400 PT W/DXA NO RESULTS DOC: HCPCS | Performed by: INTERNAL MEDICINE

## 2020-11-04 PROCEDURE — 1101F PT FALLS ASSESS-DOCD LE1/YR: CPT | Performed by: INTERNAL MEDICINE

## 2020-11-04 RX ORDER — CANDESARTAN 4 MG/1
4 TABLET ORAL DAILY
Qty: 90 TAB | Refills: 1 | Status: SHIPPED | OUTPATIENT
Start: 2020-11-04 | End: 2021-01-01

## 2020-11-04 RX ORDER — LEVOTHYROXINE SODIUM 100 UG/1
100 TABLET ORAL
Qty: 90 TAB | Refills: 1 | Status: SHIPPED | OUTPATIENT
Start: 2020-11-04 | End: 2021-01-01

## 2020-11-04 RX ORDER — GABAPENTIN 100 MG/1
100 CAPSULE ORAL DAILY
Qty: 90 CAP | Refills: 1 | Status: SHIPPED | OUTPATIENT
Start: 2020-11-04 | End: 2021-01-01

## 2020-11-04 RX ORDER — AMMONIUM LACTATE 12 G/100G
CREAM TOPICAL 2 TIMES DAILY
Qty: 280 G | Refills: 2 | Status: SHIPPED | OUTPATIENT
Start: 2020-11-04

## 2020-11-04 RX ORDER — CARVEDILOL 25 MG/1
25 TABLET ORAL 2 TIMES DAILY WITH MEALS
Qty: 180 TAB | Refills: 1 | Status: SHIPPED | OUTPATIENT
Start: 2020-11-04 | End: 2021-01-01

## 2020-11-04 RX ORDER — ALLOPURINOL 100 MG/1
100 TABLET ORAL DAILY
Qty: 90 TAB | Refills: 1 | Status: SHIPPED | OUTPATIENT
Start: 2020-11-04 | End: 2021-01-01

## 2020-11-04 NOTE — PROGRESS NOTES
Sujata Cole is a 76 y.o. female Chief Complaint Patient presents with  Diabetes  Chronic Kidney Disease  Cardiomyopathy  Medication Refill Health Maintenance Due Topic Date Due  Shingrix Vaccine Age 50> (1 of 2) 07/09/2004  Colorectal Cancer Screening Combo  12/10/2015  Eye Exam Retinal or Dilated  07/10/2016  Foot Exam Q1  06/07/2019  Bone Densitometry (Dexa) Screening  07/09/2019  Medicare Yearly Exam  04/30/2020  A1C test (Diabetic or Prediabetic)  04/30/2020  MICROALBUMIN Q1  04/30/2020  Lipid Screen  04/30/2020  Pneumococcal 65+ years (2 of 2 - PPSV23) 04/30/2020  Flu Vaccine (1) 09/01/2020 Visit Vitals /72 (BP 1 Location: Right arm, BP Patient Position: Sitting) Pulse 100 Temp 97.1 °F (36.2 °C) (Temporal) Resp 16 Ht 5' 7\" (1.702 m) Wt 264 lb (119.7 kg) SpO2 97% BMI 41.35 kg/m² 1. Have you been to the ER, urgent care clinic since your last visit? Hospitalized since your last visit? No 
 
2. Have you seen or consulted any other health care providers outside of the [address] [place] [address] since your last visit? Include any pap smears or colon screening.  No

## 2020-11-05 DIAGNOSIS — E03.9 ACQUIRED HYPOTHYROIDISM: ICD-10-CM

## 2020-11-05 DIAGNOSIS — N18.30 STAGE 3 CHRONIC KIDNEY DISEASE, UNSPECIFIED WHETHER STAGE 3A OR 3B CKD (HCC): ICD-10-CM

## 2020-11-05 DIAGNOSIS — E11.21 TYPE 2 DIABETES WITH NEPHROPATHY (HCC): ICD-10-CM

## 2020-11-05 DIAGNOSIS — E83.52 HYPERCALCEMIA: ICD-10-CM

## 2020-11-05 DIAGNOSIS — E79.0 HYPERURICEMIA: ICD-10-CM

## 2020-11-05 DIAGNOSIS — E55.9 VITAMIN D DEFICIENCY: ICD-10-CM

## 2020-11-05 DIAGNOSIS — D64.9 ANEMIA, UNSPECIFIED TYPE: ICD-10-CM

## 2020-11-05 DIAGNOSIS — N20.0 RENAL STONE: ICD-10-CM

## 2020-11-05 LAB
COMMENT, HOLDF: NORMAL
SAMPLES BEING HELD,HOLD: NORMAL

## 2020-11-05 NOTE — PROGRESS NOTES
Leobardo Whitley is a 76 y.o.  female and presents with Chief Complaint Patient presents with  Diabetes  Chronic Kidney Disease  Cardiomyopathy  Medication Refill Patient is here for a follow-up. She has not been seen in a while. She is running out of her medications and is requesting refills. She has dry skin over her lower legs. She has a history of psoriasis. She has a history of kidney stone and chronic renal disease. She is currently not seeing a nephrologist.  She informed that she used to see a nephrologist but he retired She is also not seen her cardiologist. 
Patient says she is 76. She says her creatinine has been around 3 for many years. She informed that she has a history of gout but has not had a gout attack in a while. Past Medical History:  
Diagnosis Date  Acquired hypothyroidism 8/15/2016  Anemia RED-HF study  Asthma  Cardiomyopathy, nonischemic ([facility] [address]   
 initial dx 2001, bivHF 2008 with EF 15%, s/p biV-ICD 9/08, significant improvment in EF to 45-50%  CKD (chronic kidney disease) Dr Farzana Bradley  CKD (chronic kidney disease) 8/15/2012  Depression  Diabetes ([address]  Diabetic neuropathy ([address]  DM (diabetes mellitus) ([facility] [address] 8/15/2012  GERD (gastroesophageal reflux disease)  Gout  Hypothyroidism  ICD (implantable cardioverter-defibrillator), biventricular, in situ 6/5/2014  Psoriasis Past Surgical History:  
Procedure Laterality Date [address]  2007; 01/06/15  
 normal cors  ECHO 2D ADULT  4/2010 EF 45%, improved from 1/09 (25%)  ECHO 2D ADULT  11/2011 LVH, EF 55-60%  HX ORTHOPAEDIC    
 knee [facility]  STRESS TEST LEXISCAN/CARDIOLITE  3/21/12  
 normal perfusion, global HK 40% Current Outpatient Medications Medication Sig  
 candesartan (ATACAND) 4 mg tablet Take 1 Tab by mouth daily.  levothyroxine (SYNTHROID) 100 mcg tablet Take 1 Tab by mouth Daily (before breakfast).  carvediloL (COREG) 25 mg tablet Take 1 Tab by mouth two (2) times daily (with meals).  allopurinoL (ZYLOPRIM) 100 mg tablet Take 1 Tab by mouth daily.  gabapentin (NEURONTIN) 100 mg capsule Take 1 Cap by mouth daily. Max Daily Amount: 100 mg.  ammonium lactate (AMLACTIN) 12 % topical cream Apply  to affected area two (2) times a day. rub in to affected area well  calcitRIOL (ROCALTROL) 0.5 mcg capsule TAKE 1 CAPSULE BY MOUTH EVERY DAY  spironolactone (ALDACTONE) 25 mg tablet Take 1 Tab by mouth daily.  bumetanide (BUMEX) 1 mg tablet TAKE 1 TABLET BY MOUTH EVERY DAY (AT 4 PM FOR FLUID RESULTING FROM CHRONIC HEART FAILURE)  venlafaxine-SR (EFFEXOR-XR) 75 mg capsule TAKE 1 CAPSULE BY MOUTH EVERY DAY  levocetirizine (XYZAL) 5 mg tablet TAKE 1 TABLET BY MOUTH EVERY DAY  guaiFENesin ER (MUCINEX) 600 mg ER tablet Take 600 mg by mouth as needed for Congestion.  apremilast (OTEZLA) 30 mg tab Take  by mouth two (2) times a day.  azelastine (ASTEPRO) 0.15 % (205.5 mcg) 2 Sprays by Both Nostrils route two (2) times daily as needed (congestion). Indications: Seasonal Runny Nose  insulin NPH/insulin regular (NOVOLIN 70/30) 100 unit/mL (70-30) injection 70 units two times a day  calcipotriene (DOVONEX) 0.005 % topical cream Apply  to affected area three (3) times daily.  triamcinolone acetonide (KENALOG) 0.5 % ointment Apply  to affected area two (2) times a day. use thin layer  multivitamin (ONE A DAY) tablet Take 1 Tab by mouth daily.  DOCUSATE CALCIUM (STOOL SOFTENER PO) Take 1 tablet by mouth daily.  EPINEPHrine (EPIPEN) 0.3 mg/0.3 mL injection 0.3 mL by IntraMUSCular route once as needed for 1 dose.  ferrous sulfate (IRON) 325 mg (65 mg elemental iron) tablet Take  by mouth three (3) times daily (with meals).  aspirin 81 mg tablet Take 81 mg by mouth daily.  PULMICORT IN Take 2 Puffs by inhalation every twelve (12) hours. As needed No current facility-administered medications for this visit. Health Maintenance Topic Date Due  Shingrix Vaccine Age 50> (1 of 2) 07/09/2004  Colorectal Cancer Screening Combo  12/10/2015  Eye Exam Retinal or Dilated  07/10/2016  Foot Exam Q1  06/07/2019  Bone Densitometry (Dexa) Screening  07/09/2019  Medicare Yearly Exam  04/30/2020  A1C test (Diabetic or Prediabetic)  04/30/2020  MICROALBUMIN Q1  04/30/2020  Lipid Screen  04/30/2020  Pneumococcal 65+ years (2 of 2 - PPSV23) 04/30/2020  Flu Vaccine (1) 09/01/2020  GLAUCOMA SCREENING Q2Y  04/09/2021  Breast Cancer Screen Mammogram  04/30/2021  
 DTaP/Tdap/Td series (2 - Td) 10/19/2027  Hepatitis C Screening  Addressed Immunization History Administered Date(s) Administered  H1N1 Influenza Virus Vaccine 01/20/2010  Influenza Vaccine 10/01/2015  Influenza Vaccine Split 09/17/2010, 10/17/2012  Pneumococcal Conjugate (PCV-13) 04/30/2019  TB Skin Test (PPD) 01/01/2008 No LMP recorded. Patient is postmenopausal. 
 
 
 
Allergies and Intolerances: Allergies Allergen Reactions  Ciprofloxacin Anaphylaxis  Shellfish Derived Anaphylaxis  Ace Inhibitors Unknown (comments)  Biaxin [Clarithromycin] Other (comments) Metal taste  Candesartan Cough  Pcn [Penicillins] Hives Family History:  
Family History Problem Relation Age of Onset  Heart Disease Mother  Hypertension Mother  Lupus Sister  Diabetes Brother Social History: She  reports that she has never smoked. She has never used smokeless tobacco.  She  reports no history of alcohol use. Review of Systems:  
General: negative for - chills, fatigue, fever, weight change Psych: negative for - anxiety, depression, irritability or mood swings ENT: negative for - headaches, hearing change, nasal congestion, oral lesions, sneezing or sore throat Heme/ Lymph: negative for - bleeding problems, bruising, pallor or swollen lymph nodes Endo: negative for - hot flashes, polydipsia/polyuria or temperature intolerance Resp: negative for - cough, shortness of breath or wheezing CV: negative for - chest pain, edema or palpitations GI: negative for - abdominal pain, change in bowel habits, constipation, diarrhea or nausea/vomiting : negative for - dysuria, hematuria, incontinence, pelvic pain or vulvar/vaginal symptoms MSK: negative for - joint pain, joint swelling or muscle pain Neuro: negative for - confusion, headaches, seizures or weakness Derm: negative for - dry skin, hair changes, rash or skin lesion changes Physical:  
Vitals:  
Vitals:  
 11/04/20 1126 BP: 110/72 Pulse: 100 Resp: 16 Temp: 97.1 °F (36.2 °C) TempSrc: Temporal  
SpO2: 97% Weight: 264 lb (119.7 kg) Height: 5' 7\" (1.702 m) Exam:  
HEENT- atraumatic,normocephalic, awake, oriented, well nourished Neck - supple,no enlarged lymph nodes, no JVD, no thyromegaly Chest- CTA, no rhonchi, no crackles Heart- rrr, no murmurs / gallop/rub Abdomen- soft,BS+,NT, no hepatosplenomegaly Ext - no c/c/edema , xerosis of both lower extremities, postinflammatory hyperpigmentation from repeated scratching. Neuro- no focal deficits. Power 5/5 all extremities Skin - warm,dry, no obvious rashes. Review of Data:  
LABS:  
Lab Results Component Value Date/Time WBC 10.1 04/30/2019 11:21 AM  
 HGB 11.0 (L) 04/30/2019 11:21 AM  
 HCT 34.9 04/30/2019 11:21 AM  
 PLATELET 780 47/11/1893 11:21 AM  
 
Lab Results Component Value Date/Time  Sodium 137 04/30/2019 11:21 AM  
 Potassium 4.7 04/30/2019 11:21 AM  
 Chloride 97 04/30/2019 11:21 AM  
 CO2 20 04/30/2019 11:21 AM  
 Glucose 224 (H) 04/30/2019 11:21 AM  
 BUN 67 (H) 04/30/2019 11:21 AM  
 Creatinine 2.98 (H) 04/30/2019 11:21 AM  
 
Lab Results Component Value Date/Time Cholesterol, total 249 (H) 04/30/2019 11:21 AM  
 HDL Cholesterol 46 04/30/2019 11:21 AM  
 LDL, calculated 150 (H) 04/30/2019 11:21 AM  
 Triglyceride 264 (H) 04/30/2019 11:21 AM  
 
No components found for: GPT Impression / Plan: ICD-10-CM ICD-9-CM 1. Type 2 diabetes with nephropathy (HCC)  E11.21 250.40 HEMOGLOBIN A1C WITH EAG  
  612.38 METABOLIC PANEL, COMPREHENSIVE  
   LIPID PANEL  
   CBC WITH AUTOMATED DIFF  
   gabapentin (NEURONTIN) 100 mg capsule 2. Cardiomyopathy, nonischemic (HCC)  I42.8 425.4 candesartan (ATACAND) 4 mg tablet 3. Hypertension, unspecified type  I10 401.9 candesartan (ATACAND) 4 mg tablet  
   carvediloL (COREG) 25 mg tablet 4. Stage 3 chronic kidney disease, unspecified whether stage 3a or 3b CKD  N18.30 585.3 REFERRAL TO NEPHROLOGY  
   ERYTHROPOIETIN  
5. Acquired hypothyroidism  E03.9 244.9 TSH 3RD GENERATION  
   levothyroxine (SYNTHROID) 100 mcg tablet 6. Left renal stone  N20.0 592.0 REFERRAL TO NEPHROLOGY 7. Hyperuricemia  E79.0 790.6 URIC ACID 8. Renal stone  N20.0 592.0 URINALYSIS W/ RFLX MICROSCOPIC 9. Plaque psoriasis  L40.0 696.1 10. Chronic gout without tophus, unspecified cause, unspecified site  M1A. 9XX0 274.02   
11. Hypercalcemia  E83.52 275.42 ANGIOTENSIN CONVERTING ENZYME  
   CALCIUM, IONIZED  
   PTH INTACT 12. Encounter for medication refill  Z76.0 V68.1 candesartan (ATACAND) 4 mg tablet  
   levothyroxine (SYNTHROID) 100 mcg tablet  
   carvediloL (COREG) 25 mg tablet  
   gabapentin (NEURONTIN) 100 mg capsule 13. Stage 3 chronic kidney disease  N18.30 585.3 14. Anemia, unspecified type  D64.9 285.9 ERYTHROPOIETIN  
   IRON PROFILE FERRITIN 15. Vitamin D deficiency  E55.9 268.9 VITAMIN D, 25 HYDROXY 16. Colon cancer screening  Z12.11 V76.51 COLOGUARD TEST (FECAL DNA COLORECTAL CANCER SCREENING) 17. Xerosis of skin  L85.3 706.8 ammonium lactate (AMLACTIN) 12 % topical cream  
 
 
 
Explained to patient risk benefits of the medications. Advised patient to stop meds if having any side effects. Pt verbalized understanding of the instructions. I have discussed the diagnosis with the patient and the intended plan as seen in the above orders. The patient has received an after-visit summary and questions were answered concerning future plans. I have discussed medication side effects and warnings with the patient as well. I have reviewed the plan of care with the patient, accepted their input and they are in agreement with the treatment goals. Reviewed plan of care. Patient has provided input and agrees with goals. Follow-up and Dispositions · Return in about 3 months (around 2/4/2021).  
  
 
 
Billie Baxter MD

## 2020-11-06 ENCOUNTER — TELEPHONE (OUTPATIENT)
Dept: PRIMARY CARE CLINIC | Age: 65
End: 2020-11-06

## 2020-11-06 LAB
25(OH)D3 SERPL-MCNC: 11.5 NG/ML (ref 30–100)
ACE SERPL-CCNC: 44 U/L (ref 14–82)
ALBUMIN SERPL-MCNC: 3.6 G/DL (ref 3.5–5)
ALBUMIN/GLOB SERPL: 1 {RATIO} (ref 1.1–2.2)
ALP SERPL-CCNC: 76 U/L (ref 45–117)
ALT SERPL-CCNC: 18 U/L (ref 12–78)
ANION GAP SERPL CALC-SCNC: 8 MMOL/L (ref 5–15)
APPEARANCE UR: CLEAR
AST SERPL-CCNC: 10 U/L (ref 15–37)
BACTERIA URNS QL MICRO: NEGATIVE /HPF
BASOPHILS # BLD: 0.1 K/UL (ref 0–0.1)
BASOPHILS NFR BLD: 1 % (ref 0–1)
BILIRUB SERPL-MCNC: 0.3 MG/DL (ref 0.2–1)
BILIRUB UR QL: NEGATIVE
BUN SERPL-MCNC: 43 MG/DL (ref 6–20)
BUN/CREAT SERPL: 13 (ref 12–20)
CA-I SERPL ISE-MCNC: 5.2 MG/DL (ref 4.5–5.6)
CALCIUM SERPL-MCNC: 10 MG/DL (ref 8.5–10.1)
CALCIUM SERPL-MCNC: 10.1 MG/DL (ref 8.5–10.1)
CHLORIDE SERPL-SCNC: 100 MMOL/L (ref 97–108)
CHOLEST SERPL-MCNC: 255 MG/DL
CO2 SERPL-SCNC: 30 MMOL/L (ref 21–32)
COLOR UR: ABNORMAL
CREAT SERPL-MCNC: 3.39 MG/DL (ref 0.55–1.02)
DIFFERENTIAL METHOD BLD: ABNORMAL
EOSINOPHIL # BLD: 0.3 K/UL (ref 0–0.4)
EOSINOPHIL NFR BLD: 3 % (ref 0–7)
EPITH CASTS URNS QL MICRO: ABNORMAL /LPF
EPO SERPL-ACNC: 26.5 MIU/ML (ref 2.6–18.5)
ERYTHROCYTE [DISTWIDTH] IN BLOOD BY AUTOMATED COUNT: 14.7 % (ref 11.5–14.5)
EST. AVERAGE GLUCOSE BLD GHB EST-MCNC: 177 MG/DL
FERRITIN SERPL-MCNC: 900 NG/ML (ref 8–252)
GLOBULIN SER CALC-MCNC: 3.6 G/DL (ref 2–4)
GLUCOSE SERPL-MCNC: 135 MG/DL (ref 65–100)
GLUCOSE UR STRIP.AUTO-MCNC: NEGATIVE MG/DL
HBA1C MFR BLD: 7.8 % (ref 4–5.6)
HCT VFR BLD AUTO: 37.1 % (ref 35–47)
HDLC SERPL-MCNC: 67 MG/DL
HDLC SERPL: 3.8 {RATIO} (ref 0–5)
HGB BLD-MCNC: 10.6 G/DL (ref 11.5–16)
HGB UR QL STRIP: NEGATIVE
HYALINE CASTS URNS QL MICRO: ABNORMAL /LPF (ref 0–5)
IMM GRANULOCYTES # BLD AUTO: 0.2 K/UL (ref 0–0.04)
IMM GRANULOCYTES NFR BLD AUTO: 2 % (ref 0–0.5)
IRON SATN MFR SERPL: 20 % (ref 20–50)
IRON SERPL-MCNC: 56 UG/DL (ref 35–150)
KETONES UR QL STRIP.AUTO: NEGATIVE MG/DL
LDLC SERPL CALC-MCNC: 153 MG/DL (ref 0–100)
LEUKOCYTE ESTERASE UR QL STRIP.AUTO: ABNORMAL
LIPID PROFILE,FLP: ABNORMAL
LYMPHOCYTES # BLD: 1.5 K/UL (ref 0.8–3.5)
LYMPHOCYTES NFR BLD: 15 % (ref 12–49)
MCH RBC QN AUTO: 28 PG (ref 26–34)
MCHC RBC AUTO-ENTMCNC: 28.6 G/DL (ref 30–36.5)
MCV RBC AUTO: 97.9 FL (ref 80–99)
MONOCYTES # BLD: 0.7 K/UL (ref 0–1)
MONOCYTES NFR BLD: 7 % (ref 5–13)
NEUTS SEG # BLD: 7.2 K/UL (ref 1.8–8)
NEUTS SEG NFR BLD: 72 % (ref 32–75)
NITRITE UR QL STRIP.AUTO: NEGATIVE
NRBC # BLD: 0 K/UL (ref 0–0.01)
NRBC BLD-RTO: 0 PER 100 WBC
PH UR STRIP: 5 [PH] (ref 5–8)
PLATELET # BLD AUTO: 277 K/UL (ref 150–400)
PMV BLD AUTO: 9.4 FL (ref 8.9–12.9)
POTASSIUM SERPL-SCNC: 5 MMOL/L (ref 3.5–5.1)
PROT SERPL-MCNC: 7.2 G/DL (ref 6.4–8.2)
PROT UR STRIP-MCNC: NEGATIVE MG/DL
PTH-INTACT SERPL-MCNC: 68 PG/ML (ref 18.4–88)
RBC # BLD AUTO: 3.79 M/UL (ref 3.8–5.2)
RBC #/AREA URNS HPF: ABNORMAL /HPF (ref 0–5)
RBC MORPH BLD: ABNORMAL
SODIUM SERPL-SCNC: 138 MMOL/L (ref 136–145)
SP GR UR REFRACTOMETRY: 1.01 (ref 1–1.03)
TIBC SERPL-MCNC: 277 UG/DL (ref 250–450)
TRIGL SERPL-MCNC: 175 MG/DL (ref ?–150)
TSH SERPL DL<=0.05 MIU/L-ACNC: 1.01 UIU/ML (ref 0.36–3.74)
URATE SERPL-MCNC: 10.6 MG/DL (ref 2.6–6)
UROBILINOGEN UR QL STRIP.AUTO: 0.2 EU/DL (ref 0.2–1)
VLDLC SERPL CALC-MCNC: 35 MG/DL
WBC # BLD AUTO: 10 K/UL (ref 3.6–11)
WBC URNS QL MICRO: ABNORMAL /HPF (ref 0–4)

## 2020-11-06 NOTE — PROGRESS NOTES
Vitamin D is low. Already prescribed calcitriol . Uric acid is 10.6. Pt should take allopurinol daily as prescribed. Avoid sea food, red meat. I referred pt to Nephrology , pt should make appt with Nephrology.

## 2020-11-06 NOTE — TELEPHONE ENCOUNTER
----- Message from [employer] sent at 11/6/2020  3:21 PM EST -----  Regarding: /Telephone  Contact: [phone]  General Message/Vendor Calls    Caller's first and last name:pt      Reason for call:discuss POC       Callback required yes/no and why:yes to confirm POC      Best contact number(s):[phone]      Details to clarify the request:pt says she wanted to review her labs and things that she should avoid eating/doing to reduce negative findings, please advise      [employer]

## 2020-11-06 NOTE — PROGRESS NOTES
Called [facility] lab results are on mychart but Dr. Maria Guadalupe Aragon has also made some suggestions about diet and a referral

## 2020-11-08 DIAGNOSIS — N18.30 STAGE 3 CHRONIC KIDNEY DISEASE (HCC): ICD-10-CM

## 2020-11-08 DIAGNOSIS — Z76.0 ENCOUNTER FOR MEDICATION REFILL: ICD-10-CM

## 2020-11-09 RX ORDER — CALCITRIOL 0.5 UG/1
CAPSULE ORAL
Qty: 30 CAP | Refills: 1 | Status: SHIPPED | OUTPATIENT
Start: 2020-11-09 | End: 2020-12-02

## 2020-11-18 RX ORDER — BUMETANIDE 1 MG/1
TABLET ORAL
Qty: 90 TAB | Refills: 0 | Status: SHIPPED | OUTPATIENT
Start: 2020-11-18 | End: 2021-01-01 | Stop reason: SDUPTHER

## 2020-12-01 DIAGNOSIS — N18.30 STAGE 3 CHRONIC KIDNEY DISEASE (HCC): ICD-10-CM

## 2020-12-01 DIAGNOSIS — Z76.0 ENCOUNTER FOR MEDICATION REFILL: ICD-10-CM

## 2020-12-02 RX ORDER — CALCITRIOL 0.5 UG/1
CAPSULE ORAL
Qty: 30 CAP | Refills: 1 | Status: SHIPPED | OUTPATIENT
Start: 2020-12-02 | End: 2020-12-07 | Stop reason: SDUPTHER

## 2020-12-07 DIAGNOSIS — Z76.0 ENCOUNTER FOR MEDICATION REFILL: ICD-10-CM

## 2020-12-07 DIAGNOSIS — N18.30 STAGE 3 CHRONIC KIDNEY DISEASE (HCC): ICD-10-CM

## 2020-12-08 RX ORDER — CALCITRIOL 0.5 UG/1
CAPSULE ORAL
Qty: 90 CAP | Refills: 0 | Status: SHIPPED | OUTPATIENT
Start: 2020-12-08 | End: 2021-01-01 | Stop reason: SDUPTHER

## 2020-12-08 NOTE — TELEPHONE ENCOUNTER
Rx sent to pharmacy on 12/2/2020, receipt confirmed but spoke with pharmacist who states the patient's insurance plan requires a 90 day supply. Need new script sent in for 90-day supply instead.

## 2020-12-30 DIAGNOSIS — Z76.0 ENCOUNTER FOR MEDICATION REFILL: ICD-10-CM

## 2020-12-31 DIAGNOSIS — J30.89 ENVIRONMENTAL AND SEASONAL ALLERGIES: ICD-10-CM

## 2020-12-31 DIAGNOSIS — Z76.0 ENCOUNTER FOR MEDICATION REFILL: ICD-10-CM

## 2020-12-31 RX ORDER — VENLAFAXINE HYDROCHLORIDE 75 MG/1
75 CAPSULE, EXTENDED RELEASE ORAL DAILY
Qty: 90 CAP | Refills: 0 | Status: SHIPPED | OUTPATIENT
Start: 2020-12-31 | End: 2021-01-01

## 2020-12-31 RX ORDER — LEVOCETIRIZINE DIHYDROCHLORIDE 5 MG/1
5 TABLET, FILM COATED ORAL DAILY
Qty: 90 TAB | Refills: 0 | Status: SHIPPED | OUTPATIENT
Start: 2020-12-31 | End: 2021-01-01

## 2021-01-01 ENCOUNTER — TELEPHONE (OUTPATIENT)
Dept: CARDIOLOGY CLINIC | Age: 66
End: 2021-01-01

## 2021-01-01 ENCOUNTER — OFFICE VISIT (OUTPATIENT)
Dept: PRIMARY CARE CLINIC | Age: 66
End: 2021-01-01
Payer: MEDICARE

## 2021-01-01 ENCOUNTER — OFFICE VISIT (OUTPATIENT)
Dept: CARDIOLOGY CLINIC | Age: 66
End: 2021-01-01
Payer: MEDICARE

## 2021-01-01 ENCOUNTER — CLINICAL SUPPORT (OUTPATIENT)
Dept: CARDIOLOGY CLINIC | Age: 66
End: 2021-01-01
Payer: MEDICARE

## 2021-01-01 ENCOUNTER — TELEPHONE (OUTPATIENT)
Dept: PRIMARY CARE CLINIC | Age: 66
End: 2021-01-01

## 2021-01-01 ENCOUNTER — PATIENT OUTREACH (OUTPATIENT)
Dept: CASE MANAGEMENT | Age: 66
End: 2021-01-01

## 2021-01-01 ENCOUNTER — HOSPITAL ENCOUNTER (OUTPATIENT)
Dept: GENERAL RADIOLOGY | Age: 66
Discharge: HOME | End: 2021-05-26
Payer: MEDICARE

## 2021-01-01 ENCOUNTER — VIRTUAL VISIT (OUTPATIENT)
Dept: PRIMARY CARE CLINIC | Age: 66
End: 2021-01-01
Payer: MEDICARE

## 2021-01-01 ENCOUNTER — IMMUNIZATION (OUTPATIENT)
Dept: INTERNAL MEDICINE CLINIC | Age: 66
End: 2021-01-01
Payer: MEDICARE

## 2021-01-01 ENCOUNTER — APPOINTMENT (OUTPATIENT)
Dept: NON INVASIVE DIAGNOSTICS | Age: 66
DRG: 286 | End: 2021-01-01
Attending: FAMILY MEDICINE
Payer: MEDICARE

## 2021-01-01 ENCOUNTER — DOCUMENTATION ONLY (OUTPATIENT)
Dept: CARDIOLOGY CLINIC | Age: 66
End: 2021-01-01

## 2021-01-01 ENCOUNTER — PATIENT MESSAGE (OUTPATIENT)
Dept: PRIMARY CARE CLINIC | Age: 66
End: 2021-01-01

## 2021-01-01 ENCOUNTER — APPOINTMENT (OUTPATIENT)
Dept: GENERAL RADIOLOGY | Age: 66
DRG: 286 | End: 2021-01-01
Attending: EMERGENCY MEDICINE
Payer: MEDICARE

## 2021-01-01 ENCOUNTER — HOSPITAL ENCOUNTER (INPATIENT)
Age: 66
LOS: 7 days | Discharge: HOME | DRG: 286 | End: 2021-12-13
Attending: STUDENT IN AN ORGANIZED HEALTH CARE EDUCATION/TRAINING PROGRAM | Admitting: FAMILY MEDICINE
Payer: MEDICARE

## 2021-01-01 ENCOUNTER — HOSPITAL ENCOUNTER (OUTPATIENT)
Dept: GENERAL RADIOLOGY | Age: 66
Discharge: HOME | End: 2021-12-15
Attending: INTERNAL MEDICINE
Payer: MEDICARE

## 2021-01-01 VITALS
WEIGHT: 255.4 LBS | HEIGHT: 67 IN | TEMPERATURE: 99.4 F | HEART RATE: 103 BPM | SYSTOLIC BLOOD PRESSURE: 112 MMHG | DIASTOLIC BLOOD PRESSURE: 72 MMHG | OXYGEN SATURATION: 94 % | BODY MASS INDEX: 40.09 KG/M2 | RESPIRATION RATE: 18 BRPM

## 2021-01-01 VITALS
DIASTOLIC BLOOD PRESSURE: 84 MMHG | HEART RATE: 100 BPM | OXYGEN SATURATION: 97 % | WEIGHT: 255.4 LBS | SYSTOLIC BLOOD PRESSURE: 134 MMHG | RESPIRATION RATE: 14 BRPM | BODY MASS INDEX: 40.09 KG/M2 | HEIGHT: 67 IN

## 2021-01-01 VITALS
OXYGEN SATURATION: 98 % | SYSTOLIC BLOOD PRESSURE: 118 MMHG | RESPIRATION RATE: 16 BRPM | HEIGHT: 67 IN | DIASTOLIC BLOOD PRESSURE: 75 MMHG | TEMPERATURE: 97.5 F | BODY MASS INDEX: 41.4 KG/M2 | HEART RATE: 103 BPM | WEIGHT: 263.8 LBS

## 2021-01-01 VITALS
DIASTOLIC BLOOD PRESSURE: 66 MMHG | TEMPERATURE: 98.2 F | OXYGEN SATURATION: 93 % | HEIGHT: 67 IN | RESPIRATION RATE: 18 BRPM | HEART RATE: 106 BPM | WEIGHT: 256 LBS | SYSTOLIC BLOOD PRESSURE: 98 MMHG | BODY MASS INDEX: 40.18 KG/M2

## 2021-01-01 VITALS
RESPIRATION RATE: 20 BRPM | OXYGEN SATURATION: 99 % | HEIGHT: 67 IN | WEIGHT: 256.4 LBS | DIASTOLIC BLOOD PRESSURE: 78 MMHG | HEART RATE: 117 BPM | SYSTOLIC BLOOD PRESSURE: 96 MMHG | BODY MASS INDEX: 40.24 KG/M2 | TEMPERATURE: 97.1 F

## 2021-01-01 VITALS
TEMPERATURE: 98 F | RESPIRATION RATE: 16 BRPM | HEIGHT: 67 IN | OXYGEN SATURATION: 96 % | WEIGHT: 264.8 LBS | DIASTOLIC BLOOD PRESSURE: 68 MMHG | HEART RATE: 92 BPM | SYSTOLIC BLOOD PRESSURE: 110 MMHG | BODY MASS INDEX: 41.56 KG/M2

## 2021-01-01 VITALS
OXYGEN SATURATION: 94 % | SYSTOLIC BLOOD PRESSURE: 110 MMHG | DIASTOLIC BLOOD PRESSURE: 60 MMHG | HEIGHT: 67 IN | WEIGHT: 261 LBS | HEART RATE: 99 BPM | RESPIRATION RATE: 16 BRPM | BODY MASS INDEX: 40.97 KG/M2

## 2021-01-01 DIAGNOSIS — R09.89 RESPIRATORY CRACKLES AT RIGHT LUNG BASE: Primary | ICD-10-CM

## 2021-01-01 DIAGNOSIS — Z12.11 COLON CANCER SCREENING: ICD-10-CM

## 2021-01-01 DIAGNOSIS — I42.9 CARDIOMYOPATHY, UNSPECIFIED TYPE (HCC): ICD-10-CM

## 2021-01-01 DIAGNOSIS — E11.21 TYPE 2 DIABETES WITH NEPHROPATHY (HCC): ICD-10-CM

## 2021-01-01 DIAGNOSIS — Z95.810 PRESENCE OF BIVENTRICULAR AUTOMATIC CARDIOVERTER/DEFIBRILLATOR (AICD): Primary | ICD-10-CM

## 2021-01-01 DIAGNOSIS — Z23 ENCOUNTER FOR IMMUNIZATION: Primary | ICD-10-CM

## 2021-01-01 DIAGNOSIS — R09.89 RESPIRATORY CRACKLES AT RIGHT LUNG BASE: ICD-10-CM

## 2021-01-01 DIAGNOSIS — N76.0 ACUTE VAGINITIS: ICD-10-CM

## 2021-01-01 DIAGNOSIS — Z00.00 MEDICARE ANNUAL WELLNESS VISIT, INITIAL: Primary | ICD-10-CM

## 2021-01-01 DIAGNOSIS — I10 HYPERTENSION, UNSPECIFIED TYPE: ICD-10-CM

## 2021-01-01 DIAGNOSIS — J30.1 SEASONAL ALLERGIC RHINITIS DUE TO POLLEN: ICD-10-CM

## 2021-01-01 DIAGNOSIS — E55.9 VITAMIN D DEFICIENCY: ICD-10-CM

## 2021-01-01 DIAGNOSIS — D63.8 ANEMIA, CHRONIC DISEASE: ICD-10-CM

## 2021-01-01 DIAGNOSIS — Z76.0 ENCOUNTER FOR MEDICATION REFILL: ICD-10-CM

## 2021-01-01 DIAGNOSIS — R06.09 DYSPNEA ON MINIMAL EXERTION: ICD-10-CM

## 2021-01-01 DIAGNOSIS — E03.9 ACQUIRED HYPOTHYROIDISM: ICD-10-CM

## 2021-01-01 DIAGNOSIS — E79.0 HYPERURICEMIA: ICD-10-CM

## 2021-01-01 DIAGNOSIS — I50.23 ACUTE ON CHRONIC SYSTOLIC CONGESTIVE HEART FAILURE (HCC): ICD-10-CM

## 2021-01-01 DIAGNOSIS — E11.21 TYPE 2 DIABETES WITH NEPHROPATHY (HCC): Primary | ICD-10-CM

## 2021-01-01 DIAGNOSIS — R06.83 SNORING: ICD-10-CM

## 2021-01-01 DIAGNOSIS — N18.4 STAGE 4 CHRONIC KIDNEY DISEASE (HCC): ICD-10-CM

## 2021-01-01 DIAGNOSIS — R06.09 DOE (DYSPNEA ON EXERTION): ICD-10-CM

## 2021-01-01 DIAGNOSIS — E66.01 OBESITY, MORBID (HCC): ICD-10-CM

## 2021-01-01 DIAGNOSIS — I42.8 CARDIOMYOPATHY, NONISCHEMIC (HCC): ICD-10-CM

## 2021-01-01 DIAGNOSIS — I10 HYPERTENSION, UNSPECIFIED TYPE: Primary | ICD-10-CM

## 2021-01-01 DIAGNOSIS — N18.30 STAGE 3 CHRONIC KIDNEY DISEASE (HCC): ICD-10-CM

## 2021-01-01 DIAGNOSIS — E78.00 HYPERCHOLESTEREMIA: ICD-10-CM

## 2021-01-01 DIAGNOSIS — I50.22 SYSTOLIC CHF, CHRONIC (HCC): ICD-10-CM

## 2021-01-01 DIAGNOSIS — R05.9 COUGH: ICD-10-CM

## 2021-01-01 DIAGNOSIS — I10 PRIMARY HYPERTENSION: Primary | ICD-10-CM

## 2021-01-01 DIAGNOSIS — R11.0 NAUSEA: ICD-10-CM

## 2021-01-01 DIAGNOSIS — J30.89 ENVIRONMENTAL AND SEASONAL ALLERGIES: ICD-10-CM

## 2021-01-01 DIAGNOSIS — R05.9 COUGH: Primary | ICD-10-CM

## 2021-01-01 DIAGNOSIS — Z78.0 POST-MENOPAUSAL: ICD-10-CM

## 2021-01-01 DIAGNOSIS — N18.31 STAGE 3A CHRONIC KIDNEY DISEASE (HCC): ICD-10-CM

## 2021-01-01 DIAGNOSIS — H66.90 EAR INFECTION: ICD-10-CM

## 2021-01-01 DIAGNOSIS — I27.20 PULMONARY HYPERTENSION (HCC): ICD-10-CM

## 2021-01-01 DIAGNOSIS — I50.9 ACUTE ON CHRONIC CONGESTIVE HEART FAILURE, UNSPECIFIED HEART FAILURE TYPE (HCC): Primary | ICD-10-CM

## 2021-01-01 DIAGNOSIS — Z23 ENCOUNTER FOR IMMUNIZATION: ICD-10-CM

## 2021-01-01 DIAGNOSIS — I42.8 NICM (NONISCHEMIC CARDIOMYOPATHY) (HCC): Primary | ICD-10-CM

## 2021-01-01 DIAGNOSIS — I42.8 NICM (NONISCHEMIC CARDIOMYOPATHY) (HCC): ICD-10-CM

## 2021-01-01 DIAGNOSIS — Z12.31 ENCOUNTER FOR SCREENING MAMMOGRAM FOR MALIGNANT NEOPLASM OF BREAST: ICD-10-CM

## 2021-01-01 DIAGNOSIS — G62.9 NEUROPATHY: ICD-10-CM

## 2021-01-01 DIAGNOSIS — Z95.810 PRESENCE OF BIVENTRICULAR AUTOMATIC CARDIOVERTER/DEFIBRILLATOR (AICD): ICD-10-CM

## 2021-01-01 DIAGNOSIS — J30.2 SEASONAL ALLERGIC RHINITIS, UNSPECIFIED TRIGGER: ICD-10-CM

## 2021-01-01 DIAGNOSIS — I10 ESSENTIAL HYPERTENSION: ICD-10-CM

## 2021-01-01 DIAGNOSIS — N18.32 STAGE 3B CHRONIC KIDNEY DISEASE (HCC): Primary | ICD-10-CM

## 2021-01-01 DIAGNOSIS — N18.32 STAGE 3B CHRONIC KIDNEY DISEASE (HCC): ICD-10-CM

## 2021-01-01 DIAGNOSIS — N18.4 CHRONIC KIDNEY DISEASE (CKD), STAGE IV (SEVERE) (HCC): ICD-10-CM

## 2021-01-01 DIAGNOSIS — H93.8X9 SENSATION OF FULLNESS IN EAR, UNSPECIFIED LATERALITY: ICD-10-CM

## 2021-01-01 LAB
ALBUMIN SERPL-MCNC: 3.1 G/DL (ref 3.5–5)
ALBUMIN/GLOB SERPL: 0.6 {RATIO} (ref 1.1–2.2)
ALP SERPL-CCNC: 77 U/L (ref 45–117)
ALT SERPL-CCNC: 19 U/L (ref 12–78)
ANION GAP SERPL CALC-SCNC: 4 MMOL/L (ref 5–15)
ANION GAP SERPL CALC-SCNC: 5 MMOL/L (ref 5–15)
ANION GAP SERPL CALC-SCNC: 6 MMOL/L (ref 5–15)
ANION GAP SERPL CALC-SCNC: 7 MMOL/L (ref 5–15)
ANION GAP SERPL CALC-SCNC: 8 MMOL/L (ref 5–15)
ANION GAP SERPL CALC-SCNC: 9 MMOL/L (ref 5–15)
AST SERPL-CCNC: 9 U/L (ref 15–37)
ATRIAL RATE: 103 BPM
BASOPHILS # BLD: 0 K/UL (ref 0–0.1)
BASOPHILS # BLD: 0 K/UL (ref 0–0.1)
BASOPHILS NFR BLD: 0 % (ref 0–1)
BASOPHILS NFR BLD: 0 % (ref 0–1)
BILIRUB SERPL-MCNC: 0.4 MG/DL (ref 0.2–1)
BNP SERPL-MCNC: 7908 PG/ML (ref 0–125)
BUN SERPL-MCNC: 35 MG/DL (ref 6–20)
BUN SERPL-MCNC: 38 MG/DL (ref 6–20)
BUN SERPL-MCNC: 43 MG/DL (ref 6–20)
BUN SERPL-MCNC: 51 MG/DL (ref 6–20)
BUN SERPL-MCNC: 57 MG/DL (ref 6–20)
BUN SERPL-MCNC: 59 MG/DL (ref 6–20)
BUN SERPL-MCNC: 60 MG/DL (ref 6–20)
BUN SERPL-MCNC: 60 MG/DL (ref 6–20)
BUN/CREAT SERPL: 14 (ref 12–20)
BUN/CREAT SERPL: 16 (ref 12–20)
BUN/CREAT SERPL: 16 (ref 12–20)
BUN/CREAT SERPL: 20 (ref 12–20)
BUN/CREAT SERPL: 21 (ref 12–20)
BUN/CREAT SERPL: 21 (ref 12–20)
BUN/CREAT SERPL: 23 (ref 12–20)
BUN/CREAT SERPL: 23 (ref 12–20)
CALCIUM SERPL-MCNC: 9.4 MG/DL (ref 8.5–10.1)
CALCIUM SERPL-MCNC: 9.5 MG/DL (ref 8.5–10.1)
CALCIUM SERPL-MCNC: 9.5 MG/DL (ref 8.5–10.1)
CALCIUM SERPL-MCNC: 9.6 MG/DL (ref 8.5–10.1)
CALCIUM SERPL-MCNC: 9.6 MG/DL (ref 8.5–10.1)
CALCIUM SERPL-MCNC: 9.8 MG/DL (ref 8.5–10.1)
CALCIUM SERPL-MCNC: 9.8 MG/DL (ref 8.5–10.1)
CALCIUM SERPL-MCNC: 9.9 MG/DL (ref 8.5–10.1)
CALCULATED P AXIS, ECG09: 48 DEGREES
CALCULATED R AXIS, ECG10: -46 DEGREES
CALCULATED T AXIS, ECG11: 95 DEGREES
CHLORIDE SERPL-SCNC: 100 MMOL/L (ref 97–108)
CHLORIDE SERPL-SCNC: 101 MMOL/L (ref 97–108)
CHLORIDE SERPL-SCNC: 101 MMOL/L (ref 97–108)
CHLORIDE SERPL-SCNC: 102 MMOL/L (ref 97–108)
CHLORIDE SERPL-SCNC: 103 MMOL/L (ref 97–108)
CHLORIDE SERPL-SCNC: 103 MMOL/L (ref 97–108)
CO2 SERPL-SCNC: 27 MMOL/L (ref 21–32)
CO2 SERPL-SCNC: 29 MMOL/L (ref 21–32)
CO2 SERPL-SCNC: 31 MMOL/L (ref 21–32)
CO2 SERPL-SCNC: 32 MMOL/L (ref 21–32)
COMMENT, HOLDF: NORMAL
COVID-19 RAPID TEST, COVR: NOT DETECTED
CREAT SERPL-MCNC: 2.45 MG/DL (ref 0.55–1.02)
CREAT SERPL-MCNC: 2.47 MG/DL (ref 0.55–1.02)
CREAT SERPL-MCNC: 2.5 MG/DL (ref 0.55–1.02)
CREAT SERPL-MCNC: 2.5 MG/DL (ref 0.55–1.02)
CREAT SERPL-MCNC: 2.59 MG/DL (ref 0.55–1.02)
CREAT SERPL-MCNC: 2.61 MG/DL (ref 0.55–1.02)
CREAT SERPL-MCNC: 2.8 MG/DL (ref 0.55–1.02)
CREAT SERPL-MCNC: 2.97 MG/DL (ref 0.55–1.02)
DIAGNOSIS, 93000: NORMAL
DIFFERENTIAL METHOD BLD: ABNORMAL
DIFFERENTIAL METHOD BLD: ABNORMAL
ECHO AO ROOT DIAM: 3.35 CM
ECHO AV AREA PEAK VELOCITY: 2.05 CM2
ECHO AV AREA/BSA PEAK VELOCITY: 0.9 CM2/M2
ECHO AV PEAK GRADIENT: 4.8 MMHG
ECHO AV PEAK VELOCITY: 109.52 CM/S
ECHO EST RA PRESSURE: 3 MMHG
ECHO LA AREA 4C: 18.96 CM2
ECHO LA MAJOR AXIS: 4.12 CM
ECHO LA MINOR AXIS: 1.85 CM
ECHO LA VOL 4C: 52.39 ML (ref 22–52)
ECHO LA VOLUME INDEX A4C: 23.49 ML/M2 (ref 16–28)
ECHO LV E' LATERAL VELOCITY: 6.59 CM/S
ECHO LV E' SEPTAL VELOCITY: 7.28 CM/S
ECHO LV EDV A2C: 69.79 ML
ECHO LV EDV A4C: 41.94 ML
ECHO LV EDV BP: 58.62 ML (ref 56–104)
ECHO LV EDV INDEX A4C: 18.8 ML/M2
ECHO LV EDV INDEX BP: 26.3 ML/M2
ECHO LV EDV NDEX A2C: 31.3 ML/M2
ECHO LV EJECTION FRACTION A2C: 63 PERCENT
ECHO LV EJECTION FRACTION A4C: 137 PERCENT
ECHO LV EJECTION FRACTION BIPLANE: 82.2 PERCENT (ref 55–100)
ECHO LV ESV A2C: 113.74 ML
ECHO LV ESV A4C: 99.48 ML
ECHO LV ESV BP: 106.82 ML (ref 19–49)
ECHO LV ESV INDEX A2C: 51 ML/M2
ECHO LV ESV INDEX A4C: 44.6 ML/M2
ECHO LV ESV INDEX BP: 47.9 ML/M2
ECHO LV INTERNAL DIMENSION DIASTOLIC: 5.09 CM (ref 3.9–5.3)
ECHO LV INTERNAL DIMENSION SYSTOLIC: 4.14 CM
ECHO LV IVSD: 1.1 CM (ref 0.6–0.9)
ECHO LV MASS 2D: 189.9 G (ref 67–162)
ECHO LV MASS INDEX 2D: 85.2 G/M2 (ref 43–95)
ECHO LV POSTERIOR WALL DIASTOLIC: 0.92 CM (ref 0.6–0.9)
ECHO LVOT DIAM: 2 CM
ECHO LVOT PEAK GRADIENT: 2.06 MMHG
ECHO LVOT PEAK VELOCITY: 71.71 CM/S
ECHO LVOT SV: 43.6 ML
ECHO LVOT VTI: 13.94 CM
ECHO MV E VELOCITY: 121.38 CM/S
ECHO MV E/E' LATERAL: 18.42
ECHO MV E/E' RATIO (AVERAGED): 17.55
ECHO MV E/E' SEPTAL: 16.67
ECHO PV PEAK INSTANTANEOUS GRADIENT SYSTOLIC: 1.05 MMHG
ECHO RIGHT VENTRICULAR SYSTOLIC PRESSURE (RVSP): 47.8 MMHG
ECHO RV TAPSE: 1.94 CM (ref 1.5–2)
ECHO TV REGURGITANT MAX VELOCITY: 334.67 CM/S
ECHO TV REGURGITANT PEAK GRADIENT: 44.8 MMHG
EOSINOPHIL # BLD: 0.2 K/UL (ref 0–0.4)
EOSINOPHIL # BLD: 0.2 K/UL (ref 0–0.4)
EOSINOPHIL NFR BLD: 2 % (ref 0–7)
EOSINOPHIL NFR BLD: 3 % (ref 0–7)
ERYTHROCYTE [DISTWIDTH] IN BLOOD BY AUTOMATED COUNT: 17.9 % (ref 11.5–14.5)
ERYTHROCYTE [DISTWIDTH] IN BLOOD BY AUTOMATED COUNT: 18.3 % (ref 11.5–14.5)
GLOBULIN SER CALC-MCNC: 4.8 G/DL (ref 2–4)
GLUCOSE BLD STRIP.AUTO-MCNC: 104 MG/DL (ref 65–117)
GLUCOSE BLD STRIP.AUTO-MCNC: 112 MG/DL (ref 65–117)
GLUCOSE BLD STRIP.AUTO-MCNC: 129 MG/DL (ref 65–117)
GLUCOSE BLD STRIP.AUTO-MCNC: 143 MG/DL (ref 65–117)
GLUCOSE BLD STRIP.AUTO-MCNC: 145 MG/DL (ref 65–117)
GLUCOSE BLD STRIP.AUTO-MCNC: 157 MG/DL (ref 65–117)
GLUCOSE BLD STRIP.AUTO-MCNC: 171 MG/DL (ref 65–117)
GLUCOSE BLD STRIP.AUTO-MCNC: 180 MG/DL (ref 65–117)
GLUCOSE BLD STRIP.AUTO-MCNC: 184 MG/DL (ref 65–117)
GLUCOSE BLD STRIP.AUTO-MCNC: 194 MG/DL (ref 65–117)
GLUCOSE BLD STRIP.AUTO-MCNC: 209 MG/DL (ref 65–117)
GLUCOSE BLD STRIP.AUTO-MCNC: 210 MG/DL (ref 65–117)
GLUCOSE BLD STRIP.AUTO-MCNC: 211 MG/DL (ref 65–117)
GLUCOSE BLD STRIP.AUTO-MCNC: 212 MG/DL (ref 65–117)
GLUCOSE BLD STRIP.AUTO-MCNC: 222 MG/DL (ref 65–117)
GLUCOSE BLD STRIP.AUTO-MCNC: 235 MG/DL (ref 65–117)
GLUCOSE BLD STRIP.AUTO-MCNC: 237 MG/DL (ref 65–117)
GLUCOSE BLD STRIP.AUTO-MCNC: 242 MG/DL (ref 65–117)
GLUCOSE BLD STRIP.AUTO-MCNC: 252 MG/DL (ref 65–117)
GLUCOSE BLD STRIP.AUTO-MCNC: 253 MG/DL (ref 65–117)
GLUCOSE BLD STRIP.AUTO-MCNC: 260 MG/DL (ref 65–117)
GLUCOSE BLD STRIP.AUTO-MCNC: 261 MG/DL (ref 65–117)
GLUCOSE BLD STRIP.AUTO-MCNC: 270 MG/DL (ref 65–117)
GLUCOSE BLD STRIP.AUTO-MCNC: 287 MG/DL (ref 65–117)
GLUCOSE BLD STRIP.AUTO-MCNC: 291 MG/DL (ref 65–117)
GLUCOSE BLD STRIP.AUTO-MCNC: 295 MG/DL (ref 65–117)
GLUCOSE BLD STRIP.AUTO-MCNC: 302 MG/DL (ref 65–117)
GLUCOSE SERPL-MCNC: 136 MG/DL (ref 65–100)
GLUCOSE SERPL-MCNC: 142 MG/DL (ref 65–100)
GLUCOSE SERPL-MCNC: 170 MG/DL (ref 65–100)
GLUCOSE SERPL-MCNC: 199 MG/DL (ref 65–100)
GLUCOSE SERPL-MCNC: 206 MG/DL (ref 65–100)
GLUCOSE SERPL-MCNC: 207 MG/DL (ref 65–100)
GLUCOSE SERPL-MCNC: 235 MG/DL (ref 65–100)
GLUCOSE SERPL-MCNC: 263 MG/DL (ref 65–100)
HCT VFR BLD AUTO: 36.8 % (ref 35–47)
HCT VFR BLD AUTO: 41.2 % (ref 35–47)
HGB BLD-MCNC: 11.2 G/DL (ref 11.5–16)
HGB BLD-MCNC: 9.8 G/DL (ref 11.5–16)
IMM GRANULOCYTES # BLD AUTO: 0.1 K/UL (ref 0–0.04)
IMM GRANULOCYTES # BLD AUTO: 0.1 K/UL (ref 0–0.04)
IMM GRANULOCYTES NFR BLD AUTO: 1 % (ref 0–0.5)
IMM GRANULOCYTES NFR BLD AUTO: 1 % (ref 0–0.5)
LYMPHOCYTES # BLD: 1.2 K/UL (ref 0.8–3.5)
LYMPHOCYTES # BLD: 1.3 K/UL (ref 0.8–3.5)
LYMPHOCYTES NFR BLD: 12 % (ref 12–49)
LYMPHOCYTES NFR BLD: 14 % (ref 12–49)
MAGNESIUM SERPL-MCNC: 2.3 MG/DL (ref 1.6–2.4)
MAGNESIUM SERPL-MCNC: 2.4 MG/DL (ref 1.6–2.4)
MAGNESIUM SERPL-MCNC: 2.4 MG/DL (ref 1.6–2.4)
MCH RBC QN AUTO: 24.6 PG (ref 26–34)
MCH RBC QN AUTO: 24.6 PG (ref 26–34)
MCHC RBC AUTO-ENTMCNC: 26.6 G/DL (ref 30–36.5)
MCHC RBC AUTO-ENTMCNC: 27.2 G/DL (ref 30–36.5)
MCV RBC AUTO: 90.5 FL (ref 80–99)
MCV RBC AUTO: 92.5 FL (ref 80–99)
MONOCYTES # BLD: 0.5 K/UL (ref 0–1)
MONOCYTES # BLD: 0.6 K/UL (ref 0–1)
MONOCYTES NFR BLD: 5 % (ref 5–13)
MONOCYTES NFR BLD: 6 % (ref 5–13)
NEUTS SEG # BLD: 7.2 K/UL (ref 1.8–8)
NEUTS SEG # BLD: 7.5 K/UL (ref 1.8–8)
NEUTS SEG NFR BLD: 77 % (ref 32–75)
NEUTS SEG NFR BLD: 79 % (ref 32–75)
NRBC # BLD: 0 K/UL (ref 0–0.01)
NRBC # BLD: 0.02 K/UL (ref 0–0.01)
NRBC BLD-RTO: 0 PER 100 WBC
NRBC BLD-RTO: 0.2 PER 100 WBC
P-R INTERVAL, ECG05: 152 MS
PLATELET # BLD AUTO: 352 K/UL (ref 150–400)
PLATELET # BLD AUTO: 381 K/UL (ref 150–400)
PLATELET COMMENTS,PCOM: ABNORMAL
PMV BLD AUTO: 8.9 FL (ref 8.9–12.9)
PMV BLD AUTO: 8.9 FL (ref 8.9–12.9)
POTASSIUM SERPL-SCNC: 4.2 MMOL/L (ref 3.5–5.1)
POTASSIUM SERPL-SCNC: 4.2 MMOL/L (ref 3.5–5.1)
POTASSIUM SERPL-SCNC: 4.4 MMOL/L (ref 3.5–5.1)
POTASSIUM SERPL-SCNC: 4.7 MMOL/L (ref 3.5–5.1)
POTASSIUM SERPL-SCNC: 4.8 MMOL/L (ref 3.5–5.1)
POTASSIUM SERPL-SCNC: 4.8 MMOL/L (ref 3.5–5.1)
PROT SERPL-MCNC: 7.9 G/DL (ref 6.4–8.2)
Q-T INTERVAL, ECG07: 408 MS
QRS DURATION, ECG06: 112 MS
QTC CALCULATION (BEZET), ECG08: 534 MS
RBC # BLD AUTO: 3.98 M/UL (ref 3.8–5.2)
RBC # BLD AUTO: 4.55 M/UL (ref 3.8–5.2)
RBC MORPH BLD: ABNORMAL
RBC MORPH BLD: ABNORMAL
SAMPLES BEING HELD,HOLD: NORMAL
SERVICE CMNT-IMP: ABNORMAL
SERVICE CMNT-IMP: NORMAL
SERVICE CMNT-IMP: NORMAL
SODIUM SERPL-SCNC: 135 MMOL/L (ref 136–145)
SODIUM SERPL-SCNC: 136 MMOL/L (ref 136–145)
SODIUM SERPL-SCNC: 137 MMOL/L (ref 136–145)
SODIUM SERPL-SCNC: 139 MMOL/L (ref 136–145)
SODIUM SERPL-SCNC: 139 MMOL/L (ref 136–145)
SODIUM SERPL-SCNC: 140 MMOL/L (ref 136–145)
SOURCE, COVRS: NORMAL
TROPONIN-HIGH SENSITIVITY: 28 NG/L (ref 0–51)
VENTRICULAR RATE, ECG03: 103 BPM
WBC # BLD AUTO: 9.3 K/UL (ref 3.6–11)
WBC # BLD AUTO: 9.6 K/UL (ref 3.6–11)

## 2021-01-01 PROCEDURE — 87635 SARS-COV-2 COVID-19 AMP PRB: CPT

## 2021-01-01 PROCEDURE — 99233 SBSQ HOSP IP/OBS HIGH 50: CPT | Performed by: INTERNAL MEDICINE

## 2021-01-01 PROCEDURE — 99152 MOD SED SAME PHYS/QHP 5/>YRS: CPT | Performed by: SPECIALIST

## 2021-01-01 PROCEDURE — G8754 DIAS BP LESS 90: HCPCS | Performed by: INTERNAL MEDICINE

## 2021-01-01 PROCEDURE — 85025 COMPLETE CBC W/AUTO DIFF WBC: CPT

## 2021-01-01 PROCEDURE — 93288 INTERROG EVL PM/LDLS PM IP: CPT | Performed by: INTERNAL MEDICINE

## 2021-01-01 PROCEDURE — 99223 1ST HOSP IP/OBS HIGH 75: CPT | Performed by: INTERNAL MEDICINE

## 2021-01-01 PROCEDURE — 82962 GLUCOSE BLOOD TEST: CPT

## 2021-01-01 PROCEDURE — 2022F DILAT RTA XM EVC RTNOPTHY: CPT | Performed by: INTERNAL MEDICINE

## 2021-01-01 PROCEDURE — 74011000250 HC RX REV CODE- 250: Performed by: FAMILY MEDICINE

## 2021-01-01 PROCEDURE — G8752 SYS BP LESS 140: HCPCS | Performed by: INTERNAL MEDICINE

## 2021-01-01 PROCEDURE — 97161 PT EVAL LOW COMPLEX 20 MIN: CPT

## 2021-01-01 PROCEDURE — G0009 ADMIN PNEUMOCOCCAL VACCINE: HCPCS | Performed by: INTERNAL MEDICINE

## 2021-01-01 PROCEDURE — 74011636637 HC RX REV CODE- 636/637: Performed by: FAMILY MEDICINE

## 2021-01-01 PROCEDURE — G0463 HOSPITAL OUTPT CLINIC VISIT: HCPCS | Performed by: NURSE PRACTITIONER

## 2021-01-01 PROCEDURE — G8427 DOCREV CUR MEDS BY ELIG CLIN: HCPCS | Performed by: INTERNAL MEDICINE

## 2021-01-01 PROCEDURE — 65660000000 HC RM CCU STEPDOWN

## 2021-01-01 PROCEDURE — G8417 CALC BMI ABV UP PARAM F/U: HCPCS | Performed by: INTERNAL MEDICINE

## 2021-01-01 PROCEDURE — 74011000250 HC RX REV CODE- 250: Performed by: HOSPITALIST

## 2021-01-01 PROCEDURE — 74011000250 HC RX REV CODE- 250: Performed by: SPECIALIST

## 2021-01-01 PROCEDURE — C1751 CATH, INF, PER/CENT/MIDLINE: HCPCS | Performed by: SPECIALIST

## 2021-01-01 PROCEDURE — 71046 X-RAY EXAM CHEST 2 VIEWS: CPT

## 2021-01-01 PROCEDURE — G9717 DOC PT DX DEP/BP F/U NT REQ: HCPCS | Performed by: INTERNAL MEDICINE

## 2021-01-01 PROCEDURE — 99214 OFFICE O/P EST MOD 30 MIN: CPT | Performed by: NURSE PRACTITIONER

## 2021-01-01 PROCEDURE — 80048 BASIC METABOLIC PNL TOTAL CA: CPT

## 2021-01-01 PROCEDURE — 99233 SBSQ HOSP IP/OBS HIGH 50: CPT | Performed by: SPECIALIST

## 2021-01-01 PROCEDURE — G8400 PT W/DXA NO RESULTS DOC: HCPCS | Performed by: INTERNAL MEDICINE

## 2021-01-01 PROCEDURE — 36415 COLL VENOUS BLD VENIPUNCTURE: CPT

## 2021-01-01 PROCEDURE — G0438 PPPS, INITIAL VISIT: HCPCS | Performed by: INTERNAL MEDICINE

## 2021-01-01 PROCEDURE — 94640 AIRWAY INHALATION TREATMENT: CPT

## 2021-01-01 PROCEDURE — 74011250637 HC RX REV CODE- 250/637: Performed by: HOSPITALIST

## 2021-01-01 PROCEDURE — G9717 DOC PT DX DEP/BP F/U NT REQ: HCPCS | Performed by: NURSE PRACTITIONER

## 2021-01-01 PROCEDURE — 74011250636 HC RX REV CODE- 250/636: Performed by: SPECIALIST

## 2021-01-01 PROCEDURE — 99214 OFFICE O/P EST MOD 30 MIN: CPT | Performed by: INTERNAL MEDICINE

## 2021-01-01 PROCEDURE — 74011250637 HC RX REV CODE- 250/637: Performed by: FAMILY MEDICINE

## 2021-01-01 PROCEDURE — 71045 X-RAY EXAM CHEST 1 VIEW: CPT

## 2021-01-01 PROCEDURE — 93005 ELECTROCARDIOGRAM TRACING: CPT

## 2021-01-01 PROCEDURE — 74011250636 HC RX REV CODE- 250/636: Performed by: FAMILY MEDICINE

## 2021-01-01 PROCEDURE — 3046F HEMOGLOBIN A1C LEVEL >9.0%: CPT | Performed by: INTERNAL MEDICINE

## 2021-01-01 PROCEDURE — 77010033678 HC OXYGEN DAILY

## 2021-01-01 PROCEDURE — C1769 GUIDE WIRE: HCPCS | Performed by: SPECIALIST

## 2021-01-01 PROCEDURE — 74011250637 HC RX REV CODE- 250/637: Performed by: INTERNAL MEDICINE

## 2021-01-01 PROCEDURE — 97116 GAIT TRAINING THERAPY: CPT

## 2021-01-01 PROCEDURE — 83735 ASSAY OF MAGNESIUM: CPT

## 2021-01-01 PROCEDURE — 1101F PT FALLS ASSESS-DOCD LE1/YR: CPT | Performed by: INTERNAL MEDICINE

## 2021-01-01 PROCEDURE — 97166 OT EVAL MOD COMPLEX 45 MIN: CPT

## 2021-01-01 PROCEDURE — 1090F PRES/ABSN URINE INCON ASSESS: CPT | Performed by: INTERNAL MEDICINE

## 2021-01-01 PROCEDURE — G0463 HOSPITAL OUTPT CLINIC VISIT: HCPCS | Performed by: INTERNAL MEDICINE

## 2021-01-01 PROCEDURE — 91300 COVID-19, MRNA, LNP-S, PF, 30MCG/0.3ML DOSE(PFIZER): CPT | Performed by: FAMILY MEDICINE

## 2021-01-01 PROCEDURE — 1090F PRES/ABSN URINE INCON ASSESS: CPT | Performed by: NURSE PRACTITIONER

## 2021-01-01 PROCEDURE — 90732 PPSV23 VACC 2 YRS+ SUBQ/IM: CPT | Performed by: INTERNAL MEDICINE

## 2021-01-01 PROCEDURE — 97535 SELF CARE MNGMENT TRAINING: CPT

## 2021-01-01 PROCEDURE — 3017F COLORECTAL CA SCREEN DOC REV: CPT | Performed by: INTERNAL MEDICINE

## 2021-01-01 PROCEDURE — 97530 THERAPEUTIC ACTIVITIES: CPT

## 2021-01-01 PROCEDURE — G8536 NO DOC ELDER MAL SCRN: HCPCS | Performed by: NURSE PRACTITIONER

## 2021-01-01 PROCEDURE — 99442 PR PHYS/QHP TELEPHONE EVALUATION 11-20 MIN: CPT | Performed by: INTERNAL MEDICINE

## 2021-01-01 PROCEDURE — 74011636637 HC RX REV CODE- 636/637: Performed by: HOSPITALIST

## 2021-01-01 PROCEDURE — 77030013797 HC KT TRNSDUC PRSSR EDWD -A: Performed by: SPECIALIST

## 2021-01-01 PROCEDURE — 3051F HG A1C>EQUAL 7.0%<8.0%: CPT | Performed by: INTERNAL MEDICINE

## 2021-01-01 PROCEDURE — 99153 MOD SED SAME PHYS/QHP EA: CPT | Performed by: SPECIALIST

## 2021-01-01 PROCEDURE — G8536 NO DOC ELDER MAL SCRN: HCPCS | Performed by: INTERNAL MEDICINE

## 2021-01-01 PROCEDURE — 0002A COVID-19, MRNA, LNP-S, PF, 30MCG/0.3ML DOSE(PFIZER): CPT | Performed by: FAMILY MEDICINE

## 2021-01-01 PROCEDURE — 93296 REM INTERROG EVL PM/IDS: CPT | Performed by: INTERNAL MEDICINE

## 2021-01-01 PROCEDURE — 0001A COVID-19, MRNA, LNP-S, PF, 30MCG/0.3ML DOSE(PFIZER): CPT | Performed by: FAMILY MEDICINE

## 2021-01-01 PROCEDURE — 1111F DSCHRG MED/CURRENT MED MERGE: CPT | Performed by: NURSE PRACTITIONER

## 2021-01-01 PROCEDURE — 99496 TRANSJ CARE MGMT HIGH F2F 7D: CPT | Performed by: INTERNAL MEDICINE

## 2021-01-01 PROCEDURE — 4A023N6 MEASUREMENT OF CARDIAC SAMPLING AND PRESSURE, RIGHT HEART, PERCUTANEOUS APPROACH: ICD-10-PCS | Performed by: SPECIALIST

## 2021-01-01 PROCEDURE — 74011000250 HC RX REV CODE- 250: Performed by: STUDENT IN AN ORGANIZED HEALTH CARE EDUCATION/TRAINING PROGRAM

## 2021-01-01 PROCEDURE — G8400 PT W/DXA NO RESULTS DOC: HCPCS | Performed by: NURSE PRACTITIONER

## 2021-01-01 PROCEDURE — G8752 SYS BP LESS 140: HCPCS | Performed by: NURSE PRACTITIONER

## 2021-01-01 PROCEDURE — 83880 ASSAY OF NATRIURETIC PEPTIDE: CPT

## 2021-01-01 PROCEDURE — 80053 COMPREHEN METABOLIC PANEL: CPT

## 2021-01-01 PROCEDURE — 1101F PT FALLS ASSESS-DOCD LE1/YR: CPT | Performed by: NURSE PRACTITIONER

## 2021-01-01 PROCEDURE — 99284 EMERGENCY DEPT VISIT MOD MDM: CPT

## 2021-01-01 PROCEDURE — 93451 RIGHT HEART CATH: CPT | Performed by: SPECIALIST

## 2021-01-01 PROCEDURE — G9899 SCRN MAM PERF RSLTS DOC: HCPCS | Performed by: NURSE PRACTITIONER

## 2021-01-01 PROCEDURE — G8754 DIAS BP LESS 90: HCPCS | Performed by: NURSE PRACTITIONER

## 2021-01-01 PROCEDURE — 3017F COLORECTAL CA SCREEN DOC REV: CPT | Performed by: NURSE PRACTITIONER

## 2021-01-01 PROCEDURE — 93284 PRGRMG EVAL IMPLANTABLE DFB: CPT | Performed by: INTERNAL MEDICINE

## 2021-01-01 PROCEDURE — 77030039046 HC PAD DEFIB RADIOTRNSPNT CNMD -B: Performed by: SPECIALIST

## 2021-01-01 PROCEDURE — G9899 SCRN MAM PERF RSLTS DOC: HCPCS | Performed by: INTERNAL MEDICINE

## 2021-01-01 PROCEDURE — 93295 DEV INTERROG REMOTE 1/2/MLT: CPT | Performed by: INTERNAL MEDICINE

## 2021-01-01 PROCEDURE — G8427 DOCREV CUR MEDS BY ELIG CLIN: HCPCS | Performed by: NURSE PRACTITIONER

## 2021-01-01 PROCEDURE — 93306 TTE W/DOPPLER COMPLETE: CPT

## 2021-01-01 PROCEDURE — 84484 ASSAY OF TROPONIN QUANT: CPT

## 2021-01-01 PROCEDURE — 99213 OFFICE O/P EST LOW 20 MIN: CPT | Performed by: INTERNAL MEDICINE

## 2021-01-01 PROCEDURE — 96374 THER/PROPH/DIAG INJ IV PUSH: CPT

## 2021-01-01 PROCEDURE — B2141ZZ FLUOROSCOPY OF RIGHT HEART USING LOW OSMOLAR CONTRAST: ICD-10-PCS | Performed by: SPECIALIST

## 2021-01-01 PROCEDURE — C1894 INTRO/SHEATH, NON-LASER: HCPCS | Performed by: SPECIALIST

## 2021-01-01 PROCEDURE — 77030027138 HC INCENT SPIROMETER -A

## 2021-01-01 PROCEDURE — 93306 TTE W/DOPPLER COMPLETE: CPT | Performed by: SPECIALIST

## 2021-01-01 PROCEDURE — G8417 CALC BMI ABV UP PARAM F/U: HCPCS | Performed by: NURSE PRACTITIONER

## 2021-01-01 RX ORDER — VENLAFAXINE HYDROCHLORIDE 37.5 MG/1
75 CAPSULE, EXTENDED RELEASE ORAL DAILY
Status: DISCONTINUED | OUTPATIENT
Start: 2021-01-01 | End: 2021-01-01 | Stop reason: HOSPADM

## 2021-01-01 RX ORDER — MECLIZINE HYDROCHLORIDE 25 MG/1
25 TABLET ORAL
Qty: 20 TAB | Refills: 3 | Status: ON HOLD | OUTPATIENT
Start: 2021-01-01 | End: 2022-01-01

## 2021-01-01 RX ORDER — LEVOCETIRIZINE DIHYDROCHLORIDE 5 MG/1
TABLET, FILM COATED ORAL
Qty: 90 TAB | Refills: 0 | Status: SHIPPED | OUTPATIENT
Start: 2021-01-01 | End: 2021-01-01

## 2021-01-01 RX ORDER — GABAPENTIN 100 MG/1
100 CAPSULE ORAL DAILY
Status: DISCONTINUED | OUTPATIENT
Start: 2021-01-01 | End: 2021-01-01 | Stop reason: HOSPADM

## 2021-01-01 RX ORDER — ONDANSETRON 2 MG/ML
4 INJECTION INTRAMUSCULAR; INTRAVENOUS
Status: DISCONTINUED | OUTPATIENT
Start: 2021-01-01 | End: 2021-01-01 | Stop reason: HOSPADM

## 2021-01-01 RX ORDER — ISOSORBIDE DINITRATE 5 MG/1
5 TABLET ORAL 3 TIMES DAILY
Qty: 270 TABLET | Refills: 1 | Status: SHIPPED | OUTPATIENT
Start: 2021-01-01

## 2021-01-01 RX ORDER — HYDROXYZINE HYDROCHLORIDE 10 MG/1
TABLET, FILM COATED ORAL
Status: ON HOLD | COMMUNITY
End: 2022-01-01

## 2021-01-01 RX ORDER — GABAPENTIN 100 MG/1
100 CAPSULE ORAL DAILY
Qty: 90 CAPSULE | Refills: 0 | Status: CANCELLED | OUTPATIENT
Start: 2021-01-01

## 2021-01-01 RX ORDER — SIMETHICONE 80 MG
80 TABLET,CHEWABLE ORAL
Status: DISCONTINUED | OUTPATIENT
Start: 2021-01-01 | End: 2021-01-01 | Stop reason: HOSPADM

## 2021-01-01 RX ORDER — SODIUM CHLORIDE 0.9 % (FLUSH) 0.9 %
5-40 SYRINGE (ML) INJECTION AS NEEDED
Status: DISCONTINUED | OUTPATIENT
Start: 2021-01-01 | End: 2021-01-01 | Stop reason: HOSPADM

## 2021-01-01 RX ORDER — BUMETANIDE 1 MG/1
1 TABLET ORAL DAILY
Qty: 90 TABLET | Refills: 0 | Status: SHIPPED | OUTPATIENT
Start: 2021-01-01 | End: 2021-01-01

## 2021-01-01 RX ORDER — BENZONATATE 100 MG/1
100 CAPSULE ORAL
COMMUNITY

## 2021-01-01 RX ORDER — GABAPENTIN 100 MG/1
100 CAPSULE ORAL
Qty: 30 CAPSULE | Refills: 0 | Status: SHIPPED | OUTPATIENT
Start: 2021-01-01

## 2021-01-01 RX ORDER — CARVEDILOL 25 MG/1
TABLET ORAL
Qty: 180 TABLET | Refills: 0 | Status: SHIPPED | OUTPATIENT
Start: 2021-01-01 | End: 2021-01-01

## 2021-01-01 RX ORDER — BUMETANIDE 0.25 MG/ML
2 INJECTION INTRAMUSCULAR; INTRAVENOUS 2 TIMES DAILY
Status: DISCONTINUED | OUTPATIENT
Start: 2021-01-01 | End: 2021-01-01

## 2021-01-01 RX ORDER — CARVEDILOL 6.25 MG/1
25 TABLET ORAL 2 TIMES DAILY WITH MEALS
Status: DISCONTINUED | OUTPATIENT
Start: 2021-01-01 | End: 2021-01-01

## 2021-01-01 RX ORDER — BUDESONIDE 0.25 MG/2ML
250 INHALANT ORAL 2 TIMES DAILY
Status: DISCONTINUED | OUTPATIENT
Start: 2021-01-01 | End: 2021-01-01

## 2021-01-01 RX ORDER — FLUTICASONE PROPIONATE 50 MCG
SPRAY, SUSPENSION (ML) NASAL
Qty: 1 EACH | Refills: 3 | Status: ON HOLD | OUTPATIENT
Start: 2021-01-01 | End: 2022-01-01

## 2021-01-01 RX ORDER — MONTELUKAST SODIUM 10 MG/1
TABLET ORAL
Qty: 90 TABLET | Refills: 0 | Status: ON HOLD | OUTPATIENT
Start: 2021-01-01 | End: 2022-01-01

## 2021-01-01 RX ORDER — BENZONATATE 100 MG/1
100 CAPSULE ORAL
Qty: 30 CAPSULE | Refills: 0 | Status: SHIPPED | OUTPATIENT
Start: 2021-01-01 | End: 2021-01-01

## 2021-01-01 RX ORDER — CARVEDILOL 6.25 MG/1
6.25 TABLET ORAL 2 TIMES DAILY WITH MEALS
Qty: 60 TABLET | Refills: 0 | Status: SHIPPED | OUTPATIENT
Start: 2021-01-01 | End: 2021-01-01 | Stop reason: SDUPTHER

## 2021-01-01 RX ORDER — ROSUVASTATIN CALCIUM 5 MG/1
5 TABLET, COATED ORAL
Qty: 90 TABLET | Refills: 1 | Status: SHIPPED | OUTPATIENT
Start: 2021-01-01 | End: 2021-01-01

## 2021-01-01 RX ORDER — ENOXAPARIN SODIUM 100 MG/ML
40 INJECTION SUBCUTANEOUS DAILY
Status: DISCONTINUED | OUTPATIENT
Start: 2021-01-01 | End: 2021-01-01

## 2021-01-01 RX ORDER — HYDRALAZINE HYDROCHLORIDE 10 MG/1
10 TABLET, FILM COATED ORAL 3 TIMES DAILY
Qty: 180 TABLET | Refills: 1 | Status: SHIPPED | OUTPATIENT
Start: 2021-01-01

## 2021-01-01 RX ORDER — ASPIRIN 81 MG/1
81 TABLET ORAL DAILY
Status: DISCONTINUED | OUTPATIENT
Start: 2021-01-01 | End: 2021-01-01 | Stop reason: HOSPADM

## 2021-01-01 RX ORDER — VENLAFAXINE HYDROCHLORIDE 75 MG/1
CAPSULE, EXTENDED RELEASE ORAL
Qty: 90 CAP | Refills: 0 | Status: SHIPPED | OUTPATIENT
Start: 2021-01-01 | End: 2021-01-01

## 2021-01-01 RX ORDER — VENLAFAXINE HYDROCHLORIDE 75 MG/1
CAPSULE, EXTENDED RELEASE ORAL
Qty: 90 CAPSULE | Refills: 0 | Status: ON HOLD | OUTPATIENT
Start: 2021-01-01 | End: 2022-01-01

## 2021-01-01 RX ORDER — MONTELUKAST SODIUM 10 MG/1
10 TABLET ORAL DAILY
Status: DISCONTINUED | OUTPATIENT
Start: 2021-01-01 | End: 2021-01-01 | Stop reason: HOSPADM

## 2021-01-01 RX ORDER — CANDESARTAN 4 MG/1
TABLET ORAL
Qty: 90 TABLET | Refills: 0 | Status: SHIPPED | OUTPATIENT
Start: 2021-01-01 | End: 2021-01-01

## 2021-01-01 RX ORDER — FENTANYL CITRATE 50 UG/ML
INJECTION, SOLUTION INTRAMUSCULAR; INTRAVENOUS AS NEEDED
Status: DISCONTINUED | OUTPATIENT
Start: 2021-01-01 | End: 2021-01-01 | Stop reason: HOSPADM

## 2021-01-01 RX ORDER — ISOSORBIDE DINITRATE 5 MG/1
5 TABLET ORAL 3 TIMES DAILY
Qty: 90 TABLET | Refills: 0 | Status: SHIPPED | OUTPATIENT
Start: 2021-01-01 | End: 2021-01-01 | Stop reason: SDUPTHER

## 2021-01-01 RX ORDER — BUMETANIDE 2 MG/1
TABLET ORAL
Qty: 135 TABLET | Refills: 1 | Status: SHIPPED | OUTPATIENT
Start: 2021-01-01

## 2021-01-01 RX ORDER — ONDANSETRON 4 MG/1
4 TABLET, ORALLY DISINTEGRATING ORAL
Status: DISCONTINUED | OUTPATIENT
Start: 2021-01-01 | End: 2021-01-01 | Stop reason: HOSPADM

## 2021-01-01 RX ORDER — LEVOCETIRIZINE DIHYDROCHLORIDE 5 MG/1
TABLET, FILM COATED ORAL
Qty: 90 TABLET | Refills: 0 | Status: ON HOLD | OUTPATIENT
Start: 2021-01-01 | End: 2022-01-01

## 2021-01-01 RX ORDER — BUMETANIDE 2 MG/1
TABLET ORAL
Qty: 120 TABLET | Refills: 5 | OUTPATIENT
Start: 2021-01-01

## 2021-01-01 RX ORDER — CHLORHEXIDINE GLUCONATE 4 G/100ML
SOLUTION TOPICAL
Qty: 1 EACH | Refills: 0 | Status: ON HOLD | OUTPATIENT
Start: 2021-01-01 | End: 2022-01-01

## 2021-01-01 RX ORDER — SODIUM CHLORIDE 0.9 % (FLUSH) 0.9 %
5-40 SYRINGE (ML) INJECTION EVERY 8 HOURS
Status: DISCONTINUED | OUTPATIENT
Start: 2021-01-01 | End: 2021-01-01 | Stop reason: HOSPADM

## 2021-01-01 RX ORDER — AZELASTINE HCL 205.5 UG/1
SPRAY NASAL
Qty: 90 SPRAY | Refills: 1 | Status: SHIPPED | OUTPATIENT
Start: 2021-01-01 | End: 2021-01-01 | Stop reason: SDUPTHER

## 2021-01-01 RX ORDER — ACETAMINOPHEN 500 MG
2000 TABLET ORAL 2 TIMES DAILY
Qty: 180 CAPSULE | Refills: 0 | Status: ON HOLD | OUTPATIENT
Start: 2021-01-01 | End: 2022-01-01

## 2021-01-01 RX ORDER — CALCITRIOL 0.5 UG/1
CAPSULE ORAL
Qty: 90 CAP | Refills: 0 | Status: SHIPPED | OUTPATIENT
Start: 2021-01-01 | End: 2021-01-01

## 2021-01-01 RX ORDER — BUMETANIDE 2 MG/1
TABLET ORAL
Qty: 135 TABLET | Refills: 0 | OUTPATIENT
Start: 2021-01-01

## 2021-01-01 RX ORDER — BUMETANIDE 0.25 MG/ML
1 INJECTION INTRAMUSCULAR; INTRAVENOUS 2 TIMES DAILY
Status: DISCONTINUED | OUTPATIENT
Start: 2021-01-01 | End: 2021-01-01

## 2021-01-01 RX ORDER — TERCONAZOLE 4 MG/G
1 CREAM VAGINAL
Qty: 45 G | Refills: 0 | Status: SHIPPED | OUTPATIENT
Start: 2021-01-01 | End: 2021-01-01

## 2021-01-01 RX ORDER — ISOSORBIDE DINITRATE 10 MG/1
5 TABLET ORAL 3 TIMES DAILY
Status: DISCONTINUED | OUTPATIENT
Start: 2021-01-01 | End: 2021-01-01 | Stop reason: HOSPADM

## 2021-01-01 RX ORDER — AZITHROMYCIN 250 MG/1
TABLET, FILM COATED ORAL
Qty: 6 TAB | Refills: 0 | Status: SHIPPED | OUTPATIENT
Start: 2021-01-01 | End: 2021-01-01

## 2021-01-01 RX ORDER — INSULIN LISPRO 100 [IU]/ML
INJECTION, SOLUTION INTRAVENOUS; SUBCUTANEOUS
Status: DISCONTINUED | OUTPATIENT
Start: 2021-01-01 | End: 2021-01-01 | Stop reason: HOSPADM

## 2021-01-01 RX ORDER — BUDESONIDE 0.25 MG/2ML
250 INHALANT ORAL
Status: DISCONTINUED | OUTPATIENT
Start: 2021-01-01 | End: 2021-01-01 | Stop reason: HOSPADM

## 2021-01-01 RX ORDER — CETIRIZINE HCL 10 MG
10 TABLET ORAL DAILY
Status: DISCONTINUED | OUTPATIENT
Start: 2021-01-01 | End: 2021-01-01 | Stop reason: HOSPADM

## 2021-01-01 RX ORDER — MONTELUKAST SODIUM 10 MG/1
10 TABLET ORAL DAILY
Qty: 30 TABLET | Refills: 2 | Status: SHIPPED | OUTPATIENT
Start: 2021-01-01 | End: 2021-01-01

## 2021-01-01 RX ORDER — AZELASTINE HCL 205.5 UG/1
2 SPRAY NASAL 2 TIMES DAILY
Qty: 1 EACH | Refills: 3 | Status: ON HOLD | OUTPATIENT
Start: 2021-01-01 | End: 2022-01-01

## 2021-01-01 RX ORDER — BUMETANIDE 2 MG/1
TABLET ORAL
Qty: 135 TABLET | Refills: 1 | Status: SHIPPED | OUTPATIENT
Start: 2021-01-01 | End: 2021-01-01

## 2021-01-01 RX ORDER — BUMETANIDE 0.25 MG/ML
1 INJECTION INTRAMUSCULAR; INTRAVENOUS 2 TIMES DAILY
Status: DISCONTINUED | OUTPATIENT
Start: 2021-01-01 | End: 2021-01-01 | Stop reason: HOSPADM

## 2021-01-01 RX ORDER — GABAPENTIN 100 MG/1
CAPSULE ORAL
Qty: 90 CAPSULE | Refills: 0 | Status: ON HOLD | OUTPATIENT
Start: 2021-01-01 | End: 2022-01-01

## 2021-01-01 RX ORDER — CARVEDILOL 6.25 MG/1
6.25 TABLET ORAL 2 TIMES DAILY WITH MEALS
Status: DISCONTINUED | OUTPATIENT
Start: 2021-01-01 | End: 2021-01-01 | Stop reason: HOSPADM

## 2021-01-01 RX ORDER — MECLIZINE HCL 12.5 MG 12.5 MG/1
TABLET ORAL
COMMUNITY
End: 2021-01-01

## 2021-01-01 RX ORDER — CALCITRIOL 0.25 UG/1
0.5 CAPSULE ORAL DAILY
Status: DISCONTINUED | OUTPATIENT
Start: 2021-01-01 | End: 2021-01-01 | Stop reason: HOSPADM

## 2021-01-01 RX ORDER — BUMETANIDE 2 MG/1
TABLET ORAL
Qty: 135 TABLET | Refills: 1 | Status: SHIPPED | OUTPATIENT
Start: 2021-01-01 | End: 2021-01-01 | Stop reason: SDUPTHER

## 2021-01-01 RX ORDER — CALCITRIOL 0.5 UG/1
CAPSULE ORAL
Qty: 90 CAPSULE | Refills: 0 | Status: SHIPPED | OUTPATIENT
Start: 2021-01-01 | End: 2021-01-01

## 2021-01-01 RX ORDER — BUMETANIDE 2 MG/1
TABLET ORAL
Qty: 135 TABLET | Refills: 1 | OUTPATIENT
Start: 2021-01-01

## 2021-01-01 RX ORDER — LEVOTHYROXINE SODIUM 100 UG/1
100 TABLET ORAL
Status: DISCONTINUED | OUTPATIENT
Start: 2021-01-01 | End: 2021-01-01 | Stop reason: HOSPADM

## 2021-01-01 RX ORDER — MAGNESIUM SULFATE 100 %
4 CRYSTALS MISCELLANEOUS AS NEEDED
Status: DISCONTINUED | OUTPATIENT
Start: 2021-01-01 | End: 2021-01-01 | Stop reason: HOSPADM

## 2021-01-01 RX ORDER — BUMETANIDE 1 MG/1
1 TABLET ORAL EVERY EVENING
Status: DISCONTINUED | OUTPATIENT
Start: 2021-01-01 | End: 2021-01-01

## 2021-01-01 RX ORDER — LEVOTHYROXINE SODIUM 100 UG/1
TABLET ORAL
Qty: 90 TABLET | Refills: 1 | Status: ON HOLD | OUTPATIENT
Start: 2021-01-01 | End: 2022-01-01

## 2021-01-01 RX ORDER — HEPARIN SODIUM 5000 [USP'U]/ML
5000 INJECTION, SOLUTION INTRAVENOUS; SUBCUTANEOUS EVERY 8 HOURS
Status: DISCONTINUED | OUTPATIENT
Start: 2021-01-01 | End: 2021-01-01 | Stop reason: HOSPADM

## 2021-01-01 RX ORDER — HYDRALAZINE HYDROCHLORIDE 10 MG/1
10 TABLET, FILM COATED ORAL 3 TIMES DAILY
Qty: 60 TABLET | Refills: 0 | Status: SHIPPED | OUTPATIENT
Start: 2021-01-01 | End: 2021-01-01 | Stop reason: SDUPTHER

## 2021-01-01 RX ORDER — ACETAMINOPHEN 325 MG/1
650 TABLET ORAL
Status: DISCONTINUED | OUTPATIENT
Start: 2021-01-01 | End: 2021-01-01 | Stop reason: HOSPADM

## 2021-01-01 RX ORDER — CALCITRIOL 0.5 UG/1
CAPSULE ORAL
Qty: 90 CAPSULE | Refills: 0 | Status: ON HOLD | OUTPATIENT
Start: 2021-01-01 | End: 2022-01-01

## 2021-01-01 RX ORDER — ACETAMINOPHEN 650 MG/1
650 SUPPOSITORY RECTAL
Status: DISCONTINUED | OUTPATIENT
Start: 2021-01-01 | End: 2021-01-01 | Stop reason: HOSPADM

## 2021-01-01 RX ORDER — LEVOCETIRIZINE DIHYDROCHLORIDE 5 MG/1
TABLET, FILM COATED ORAL
Qty: 90 TABLET | Refills: 0 | Status: SHIPPED | OUTPATIENT
Start: 2021-01-01 | End: 2021-01-01

## 2021-01-01 RX ORDER — VENLAFAXINE HYDROCHLORIDE 75 MG/1
CAPSULE, EXTENDED RELEASE ORAL
Qty: 90 CAPSULE | Refills: 0 | Status: SHIPPED | OUTPATIENT
Start: 2021-01-01 | End: 2021-01-01

## 2021-01-01 RX ORDER — BUMETANIDE 1 MG/1
2 TABLET ORAL DAILY
Status: DISCONTINUED | OUTPATIENT
Start: 2021-01-01 | End: 2021-01-01

## 2021-01-01 RX ORDER — MONTELUKAST SODIUM 10 MG/1
TABLET ORAL
Qty: 90 TABLET | Refills: 0 | Status: SHIPPED | OUTPATIENT
Start: 2021-01-01 | End: 2021-01-01

## 2021-01-01 RX ORDER — ALLOPURINOL 100 MG/1
TABLET ORAL
Qty: 90 TABLET | Refills: 0 | Status: ON HOLD | OUTPATIENT
Start: 2021-01-01 | End: 2022-01-01

## 2021-01-01 RX ORDER — GABAPENTIN 100 MG/1
100 CAPSULE ORAL DAILY
Qty: 90 CAPSULE | Refills: 0 | Status: SHIPPED | OUTPATIENT
Start: 2021-01-01 | End: 2021-01-01

## 2021-01-01 RX ORDER — ALLOPURINOL 100 MG/1
TABLET ORAL
Qty: 90 TABLET | Refills: 0 | Status: SHIPPED | OUTPATIENT
Start: 2021-01-01 | End: 2021-01-01

## 2021-01-01 RX ORDER — ALLOPURINOL 100 MG/1
100 TABLET ORAL DAILY
Status: DISCONTINUED | OUTPATIENT
Start: 2021-01-01 | End: 2021-01-01 | Stop reason: HOSPADM

## 2021-01-01 RX ORDER — DEXTROSE 50 % IN WATER (D50W) INTRAVENOUS SYRINGE
25-50 AS NEEDED
Status: DISCONTINUED | OUTPATIENT
Start: 2021-01-01 | End: 2021-01-01 | Stop reason: HOSPADM

## 2021-01-01 RX ORDER — GABAPENTIN 100 MG/1
100 CAPSULE ORAL
Status: DISCONTINUED | OUTPATIENT
Start: 2021-01-01 | End: 2021-01-01 | Stop reason: HOSPADM

## 2021-01-01 RX ORDER — CARVEDILOL 25 MG/1
TABLET ORAL
Qty: 180 TABLET | Refills: 0 | Status: ON HOLD | OUTPATIENT
Start: 2021-01-01 | End: 2021-01-01 | Stop reason: SDUPTHER

## 2021-01-01 RX ORDER — LIDOCAINE HYDROCHLORIDE 10 MG/ML
INJECTION INFILTRATION; PERINEURAL AS NEEDED
Status: DISCONTINUED | OUTPATIENT
Start: 2021-01-01 | End: 2021-01-01 | Stop reason: HOSPADM

## 2021-01-01 RX ORDER — CARVEDILOL 6.25 MG/1
6.25 TABLET ORAL 2 TIMES DAILY WITH MEALS
Qty: 180 TABLET | Refills: 1 | Status: SHIPPED | OUTPATIENT
Start: 2021-01-01

## 2021-01-01 RX ORDER — BUMETANIDE 0.25 MG/ML
1 INJECTION INTRAMUSCULAR; INTRAVENOUS
Status: COMPLETED | OUTPATIENT
Start: 2021-01-01 | End: 2021-01-01

## 2021-01-01 RX ORDER — MIDAZOLAM HYDROCHLORIDE 1 MG/ML
INJECTION, SOLUTION INTRAMUSCULAR; INTRAVENOUS AS NEEDED
Status: DISCONTINUED | OUTPATIENT
Start: 2021-01-01 | End: 2021-01-01 | Stop reason: HOSPADM

## 2021-01-01 RX ORDER — HYDRALAZINE HYDROCHLORIDE 10 MG/1
10 TABLET, FILM COATED ORAL 3 TIMES DAILY
Status: DISCONTINUED | OUTPATIENT
Start: 2021-01-01 | End: 2021-01-01 | Stop reason: HOSPADM

## 2021-01-01 RX ORDER — ACETAMINOPHEN 500 MG
2000 TABLET ORAL 2 TIMES DAILY
Qty: 90 CAPSULE | Refills: 1 | Status: SHIPPED | OUTPATIENT
Start: 2021-01-01 | End: 2021-01-01

## 2021-01-01 RX ADMIN — INSULIN LISPRO 3 UNITS: 100 INJECTION, SOLUTION INTRAVENOUS; SUBCUTANEOUS at 12:49

## 2021-01-01 RX ADMIN — CARVEDILOL 25 MG: 6.25 TABLET, FILM COATED ORAL at 09:16

## 2021-01-01 RX ADMIN — CALCITRIOL CAPSULES 0.25 MCG 0.5 MCG: 0.25 CAPSULE ORAL at 08:30

## 2021-01-01 RX ADMIN — INSULIN LISPRO 3 UNITS: 100 INJECTION, SOLUTION INTRAVENOUS; SUBCUTANEOUS at 11:47

## 2021-01-01 RX ADMIN — LEVOTHYROXINE SODIUM 100 MCG: 0.1 TABLET ORAL at 07:05

## 2021-01-01 RX ADMIN — HEPARIN SODIUM 5000 UNITS: 5000 INJECTION INTRAVENOUS; SUBCUTANEOUS at 14:01

## 2021-01-01 RX ADMIN — CETIRIZINE HYDROCHLORIDE 10 MG: 10 TABLET, FILM COATED ORAL at 09:36

## 2021-01-01 RX ADMIN — CALCITRIOL CAPSULES 0.25 MCG 0.5 MCG: 0.25 CAPSULE ORAL at 08:35

## 2021-01-01 RX ADMIN — BUDESONIDE 250 MCG: 0.25 INHALANT RESPIRATORY (INHALATION) at 20:52

## 2021-01-01 RX ADMIN — GABAPENTIN 100 MG: 100 CAPSULE ORAL at 21:32

## 2021-01-01 RX ADMIN — CARVEDILOL 25 MG: 6.25 TABLET, FILM COATED ORAL at 17:10

## 2021-01-01 RX ADMIN — BUDESONIDE 250 MCG: 0.25 INHALANT RESPIRATORY (INHALATION) at 20:58

## 2021-01-01 RX ADMIN — HEPARIN SODIUM 5000 UNITS: 5000 INJECTION INTRAVENOUS; SUBCUTANEOUS at 06:35

## 2021-01-01 RX ADMIN — INSULIN LISPRO 3 UNITS: 100 INJECTION, SOLUTION INTRAVENOUS; SUBCUTANEOUS at 12:17

## 2021-01-01 RX ADMIN — GABAPENTIN 100 MG: 100 CAPSULE ORAL at 22:46

## 2021-01-01 RX ADMIN — HYDRALAZINE HYDROCHLORIDE 10 MG: 10 TABLET, FILM COATED ORAL at 21:19

## 2021-01-01 RX ADMIN — Medication 10 ML: at 13:57

## 2021-01-01 RX ADMIN — Medication 10 ML: at 22:47

## 2021-01-01 RX ADMIN — GABAPENTIN 100 MG: 100 CAPSULE ORAL at 21:35

## 2021-01-01 RX ADMIN — VENLAFAXINE HYDROCHLORIDE 75 MG: 37.5 CAPSULE, EXTENDED RELEASE ORAL at 09:12

## 2021-01-01 RX ADMIN — CARVEDILOL 6.25 MG: 6.25 TABLET, FILM COATED ORAL at 09:22

## 2021-01-01 RX ADMIN — CETIRIZINE HYDROCHLORIDE 10 MG: 10 TABLET, FILM COATED ORAL at 09:23

## 2021-01-01 RX ADMIN — ASPIRIN 81 MG: 81 TABLET, COATED ORAL at 09:12

## 2021-01-01 RX ADMIN — HYDRALAZINE HYDROCHLORIDE 10 MG: 10 TABLET, FILM COATED ORAL at 16:47

## 2021-01-01 RX ADMIN — MONTELUKAST 10 MG: 10 TABLET, FILM COATED ORAL at 08:34

## 2021-01-01 RX ADMIN — ISOSORBIDE DINITRATE 5 MG: 10 TABLET ORAL at 16:47

## 2021-01-01 RX ADMIN — CARVEDILOL 25 MG: 6.25 TABLET, FILM COATED ORAL at 09:37

## 2021-01-01 RX ADMIN — BUMETANIDE 1 MG: 0.25 INJECTION INTRAMUSCULAR; INTRAVENOUS at 18:24

## 2021-01-01 RX ADMIN — HEPARIN SODIUM 5000 UNITS: 5000 INJECTION INTRAVENOUS; SUBCUTANEOUS at 22:46

## 2021-01-01 RX ADMIN — CARVEDILOL 6.25 MG: 6.25 TABLET, FILM COATED ORAL at 08:31

## 2021-01-01 RX ADMIN — HYDRALAZINE HYDROCHLORIDE 10 MG: 10 TABLET, FILM COATED ORAL at 08:31

## 2021-01-01 RX ADMIN — INSULIN LISPRO 2 UNITS: 100 INJECTION, SOLUTION INTRAVENOUS; SUBCUTANEOUS at 09:16

## 2021-01-01 RX ADMIN — HYDRALAZINE HYDROCHLORIDE 10 MG: 10 TABLET, FILM COATED ORAL at 22:47

## 2021-01-01 RX ADMIN — CETIRIZINE HYDROCHLORIDE 10 MG: 10 TABLET, FILM COATED ORAL at 09:12

## 2021-01-01 RX ADMIN — HYDRALAZINE HYDROCHLORIDE 10 MG: 10 TABLET, FILM COATED ORAL at 09:12

## 2021-01-01 RX ADMIN — LEVOTHYROXINE SODIUM 100 MCG: 0.1 TABLET ORAL at 06:43

## 2021-01-01 RX ADMIN — HYDRALAZINE HYDROCHLORIDE 10 MG: 10 TABLET, FILM COATED ORAL at 08:35

## 2021-01-01 RX ADMIN — BUMETANIDE 2 MG: 0.25 INJECTION INTRAMUSCULAR; INTRAVENOUS at 09:15

## 2021-01-01 RX ADMIN — BUDESONIDE 250 MCG: 0.25 INHALANT RESPIRATORY (INHALATION) at 12:09

## 2021-01-01 RX ADMIN — ISOSORBIDE DINITRATE 5 MG: 10 TABLET ORAL at 21:35

## 2021-01-01 RX ADMIN — HUMAN INSULIN 40 UNITS: 100 INJECTION, SUSPENSION SUBCUTANEOUS at 17:16

## 2021-01-01 RX ADMIN — CARVEDILOL 6.25 MG: 6.25 TABLET, FILM COATED ORAL at 16:46

## 2021-01-01 RX ADMIN — GABAPENTIN 100 MG: 100 CAPSULE ORAL at 09:23

## 2021-01-01 RX ADMIN — HYDRALAZINE HYDROCHLORIDE 10 MG: 10 TABLET, FILM COATED ORAL at 21:35

## 2021-01-01 RX ADMIN — BUMETANIDE 2 MG: 1 TABLET ORAL at 08:30

## 2021-01-01 RX ADMIN — BUDESONIDE 250 MCG: 0.25 INHALANT RESPIRATORY (INHALATION) at 09:23

## 2021-01-01 RX ADMIN — BUDESONIDE 250 MCG: 0.25 INHALANT RESPIRATORY (INHALATION) at 09:30

## 2021-01-01 RX ADMIN — INSULIN LISPRO 1 UNITS: 100 INJECTION, SOLUTION INTRAVENOUS; SUBCUTANEOUS at 21:40

## 2021-01-01 RX ADMIN — CALCITRIOL CAPSULES 0.25 MCG 0.5 MCG: 0.25 CAPSULE ORAL at 09:34

## 2021-01-01 RX ADMIN — VENLAFAXINE HYDROCHLORIDE 75 MG: 37.5 CAPSULE, EXTENDED RELEASE ORAL at 08:34

## 2021-01-01 RX ADMIN — CALCITRIOL CAPSULES 0.25 MCG 0.5 MCG: 0.25 CAPSULE ORAL at 08:46

## 2021-01-01 RX ADMIN — ALLOPURINOL 100 MG: 100 TABLET ORAL at 09:37

## 2021-01-01 RX ADMIN — CARVEDILOL 25 MG: 6.25 TABLET, FILM COATED ORAL at 07:09

## 2021-01-01 RX ADMIN — INSULIN LISPRO 2 UNITS: 100 INJECTION, SOLUTION INTRAVENOUS; SUBCUTANEOUS at 21:39

## 2021-01-01 RX ADMIN — Medication 10 ML: at 07:05

## 2021-01-01 RX ADMIN — ASPIRIN 81 MG: 81 TABLET, COATED ORAL at 08:30

## 2021-01-01 RX ADMIN — ISOSORBIDE DINITRATE 5 MG: 10 TABLET ORAL at 09:12

## 2021-01-01 RX ADMIN — ALLOPURINOL 100 MG: 100 TABLET ORAL at 08:35

## 2021-01-01 RX ADMIN — VENLAFAXINE HYDROCHLORIDE 75 MG: 37.5 CAPSULE, EXTENDED RELEASE ORAL at 09:15

## 2021-01-01 RX ADMIN — HUMAN INSULIN 30 UNITS: 100 INJECTION, SUSPENSION SUBCUTANEOUS at 09:34

## 2021-01-01 RX ADMIN — HYDRALAZINE HYDROCHLORIDE 10 MG: 10 TABLET, FILM COATED ORAL at 16:45

## 2021-01-01 RX ADMIN — ALLOPURINOL 100 MG: 100 TABLET ORAL at 09:16

## 2021-01-01 RX ADMIN — ASPIRIN 81 MG: 81 TABLET, COATED ORAL at 09:23

## 2021-01-01 RX ADMIN — ALLOPURINOL 100 MG: 100 TABLET ORAL at 08:30

## 2021-01-01 RX ADMIN — GABAPENTIN 100 MG: 100 CAPSULE ORAL at 08:31

## 2021-01-01 RX ADMIN — INSULIN LISPRO 4 UNITS: 100 INJECTION, SOLUTION INTRAVENOUS; SUBCUTANEOUS at 17:09

## 2021-01-01 RX ADMIN — HYDRALAZINE HYDROCHLORIDE 10 MG: 10 TABLET, FILM COATED ORAL at 09:23

## 2021-01-01 RX ADMIN — HEPARIN SODIUM 5000 UNITS: 5000 INJECTION INTRAVENOUS; SUBCUTANEOUS at 13:06

## 2021-01-01 RX ADMIN — CALCITRIOL CAPSULES 0.25 MCG 0.5 MCG: 0.25 CAPSULE ORAL at 09:12

## 2021-01-01 RX ADMIN — CALCITRIOL CAPSULES 0.25 MCG 0.5 MCG: 0.25 CAPSULE ORAL at 09:22

## 2021-01-01 RX ADMIN — ISOSORBIDE DINITRATE 5 MG: 10 TABLET ORAL at 16:45

## 2021-01-01 RX ADMIN — BUMETANIDE 1 MG: 0.25 INJECTION INTRAMUSCULAR; INTRAVENOUS at 08:35

## 2021-01-01 RX ADMIN — HUMAN INSULIN 38 UNITS: 100 INJECTION, SUSPENSION SUBCUTANEOUS at 18:24

## 2021-01-01 RX ADMIN — LEVOTHYROXINE SODIUM 100 MCG: 0.1 TABLET ORAL at 06:10

## 2021-01-01 RX ADMIN — Medication 10 ML: at 06:09

## 2021-01-01 RX ADMIN — CETIRIZINE HYDROCHLORIDE 10 MG: 10 TABLET, FILM COATED ORAL at 09:16

## 2021-01-01 RX ADMIN — MONTELUKAST 10 MG: 10 TABLET, FILM COATED ORAL at 08:47

## 2021-01-01 RX ADMIN — INSULIN LISPRO 3 UNITS: 100 INJECTION, SOLUTION INTRAVENOUS; SUBCUTANEOUS at 12:00

## 2021-01-01 RX ADMIN — ISOSORBIDE DINITRATE 5 MG: 10 TABLET ORAL at 08:31

## 2021-01-01 RX ADMIN — MONTELUKAST 10 MG: 10 TABLET, FILM COATED ORAL at 08:30

## 2021-01-01 RX ADMIN — HEPARIN SODIUM 5000 UNITS: 5000 INJECTION INTRAVENOUS; SUBCUTANEOUS at 06:43

## 2021-01-01 RX ADMIN — INSULIN LISPRO 2 UNITS: 100 INJECTION, SOLUTION INTRAVENOUS; SUBCUTANEOUS at 16:45

## 2021-01-01 RX ADMIN — MONTELUKAST 10 MG: 10 TABLET, FILM COATED ORAL at 09:12

## 2021-01-01 RX ADMIN — VENLAFAXINE HYDROCHLORIDE 75 MG: 37.5 CAPSULE, EXTENDED RELEASE ORAL at 09:34

## 2021-01-01 RX ADMIN — INSULIN LISPRO 3 UNITS: 100 INJECTION, SOLUTION INTRAVENOUS; SUBCUTANEOUS at 17:15

## 2021-01-01 RX ADMIN — ISOSORBIDE DINITRATE 5 MG: 10 TABLET ORAL at 08:34

## 2021-01-01 RX ADMIN — LEVOTHYROXINE SODIUM 100 MCG: 0.1 TABLET ORAL at 06:08

## 2021-01-01 RX ADMIN — GABAPENTIN 100 MG: 100 CAPSULE ORAL at 21:52

## 2021-01-01 RX ADMIN — Medication 80 MG: at 07:07

## 2021-01-01 RX ADMIN — Medication 10 ML: at 21:57

## 2021-01-01 RX ADMIN — GABAPENTIN 100 MG: 100 CAPSULE ORAL at 09:35

## 2021-01-01 RX ADMIN — CARVEDILOL 6.25 MG: 6.25 TABLET, FILM COATED ORAL at 16:47

## 2021-01-01 RX ADMIN — HUMAN INSULIN 40 UNITS: 100 INJECTION, SUSPENSION SUBCUTANEOUS at 08:30

## 2021-01-01 RX ADMIN — BUMETANIDE 2 MG: 1 TABLET ORAL at 09:11

## 2021-01-01 RX ADMIN — HUMAN INSULIN 48 UNITS: 100 INJECTION, SUSPENSION SUBCUTANEOUS at 08:34

## 2021-01-01 RX ADMIN — VENLAFAXINE HYDROCHLORIDE 75 MG: 37.5 CAPSULE, EXTENDED RELEASE ORAL at 11:08

## 2021-01-01 RX ADMIN — CETIRIZINE HYDROCHLORIDE 10 MG: 10 TABLET, FILM COATED ORAL at 08:31

## 2021-01-01 RX ADMIN — CARVEDILOL 6.25 MG: 6.25 TABLET, FILM COATED ORAL at 08:34

## 2021-01-01 RX ADMIN — GABAPENTIN 100 MG: 100 CAPSULE ORAL at 21:31

## 2021-01-01 RX ADMIN — LEVOTHYROXINE SODIUM 100 MCG: 0.1 TABLET ORAL at 06:44

## 2021-01-01 RX ADMIN — Medication 10 ML: at 05:34

## 2021-01-01 RX ADMIN — HUMAN INSULIN 40 UNITS: 100 INJECTION, SUSPENSION SUBCUTANEOUS at 16:45

## 2021-01-01 RX ADMIN — HUMAN INSULIN 30 UNITS: 100 INJECTION, SUSPENSION SUBCUTANEOUS at 17:18

## 2021-01-01 RX ADMIN — Medication 10 ML: at 02:00

## 2021-01-01 RX ADMIN — LEVOTHYROXINE SODIUM 100 MCG: 0.1 TABLET ORAL at 07:20

## 2021-01-01 RX ADMIN — HYDRALAZINE HYDROCHLORIDE 10 MG: 10 TABLET, FILM COATED ORAL at 15:42

## 2021-01-01 RX ADMIN — Medication 10 ML: at 14:00

## 2021-01-01 RX ADMIN — ASPIRIN 81 MG: 81 TABLET, COATED ORAL at 09:36

## 2021-01-01 RX ADMIN — HUMAN INSULIN 40 UNITS: 100 INJECTION, SUSPENSION SUBCUTANEOUS at 17:02

## 2021-01-01 RX ADMIN — INSULIN LISPRO 3 UNITS: 100 INJECTION, SOLUTION INTRAVENOUS; SUBCUTANEOUS at 17:18

## 2021-01-01 RX ADMIN — CALCITRIOL CAPSULES 0.25 MCG 0.5 MCG: 0.25 CAPSULE ORAL at 09:00

## 2021-01-01 RX ADMIN — Medication 10 ML: at 09:43

## 2021-01-01 RX ADMIN — Medication 10 ML: at 21:34

## 2021-01-01 RX ADMIN — HUMAN INSULIN 48 UNITS: 100 INJECTION, SUSPENSION SUBCUTANEOUS at 09:21

## 2021-01-01 RX ADMIN — BUMETANIDE 2 MG: 1 TABLET ORAL at 09:36

## 2021-01-01 RX ADMIN — INSULIN LISPRO 2 UNITS: 100 INJECTION, SOLUTION INTRAVENOUS; SUBCUTANEOUS at 16:47

## 2021-01-01 RX ADMIN — ASPIRIN 81 MG: 81 TABLET, COATED ORAL at 08:34

## 2021-01-01 RX ADMIN — GABAPENTIN 100 MG: 100 CAPSULE ORAL at 09:11

## 2021-01-01 RX ADMIN — MONTELUKAST 10 MG: 10 TABLET, FILM COATED ORAL at 09:16

## 2021-01-01 RX ADMIN — BUMETANIDE 1 MG: 1 TABLET ORAL at 17:02

## 2021-01-01 RX ADMIN — ALLOPURINOL 100 MG: 100 TABLET ORAL at 09:23

## 2021-01-01 RX ADMIN — Medication 10 ML: at 15:02

## 2021-01-01 RX ADMIN — ALLOPURINOL 100 MG: 100 TABLET ORAL at 08:47

## 2021-01-01 RX ADMIN — HYDRALAZINE HYDROCHLORIDE 10 MG: 10 TABLET, FILM COATED ORAL at 12:48

## 2021-01-01 RX ADMIN — MONTELUKAST 10 MG: 10 TABLET, FILM COATED ORAL at 11:08

## 2021-01-01 RX ADMIN — CARVEDILOL 25 MG: 6.25 TABLET, FILM COATED ORAL at 17:17

## 2021-01-01 RX ADMIN — BUMETANIDE 1 MG: 1 TABLET ORAL at 17:16

## 2021-01-01 RX ADMIN — HEPARIN SODIUM 5000 UNITS: 5000 INJECTION INTRAVENOUS; SUBCUTANEOUS at 06:02

## 2021-01-01 RX ADMIN — BUMETANIDE 1 MG: 1 TABLET ORAL at 17:17

## 2021-01-01 RX ADMIN — BUDESONIDE 250 MCG: 0.25 INHALANT RESPIRATORY (INHALATION) at 19:33

## 2021-01-01 RX ADMIN — BUDESONIDE 250 MCG: 0.25 INHALANT RESPIRATORY (INHALATION) at 20:40

## 2021-01-01 RX ADMIN — CETIRIZINE HYDROCHLORIDE 10 MG: 10 TABLET, FILM COATED ORAL at 08:47

## 2021-01-01 RX ADMIN — GABAPENTIN 100 MG: 100 CAPSULE ORAL at 21:19

## 2021-01-01 RX ADMIN — HEPARIN SODIUM 5000 UNITS: 5000 INJECTION INTRAVENOUS; SUBCUTANEOUS at 21:52

## 2021-01-01 RX ADMIN — INSULIN LISPRO 3 UNITS: 100 INJECTION, SOLUTION INTRAVENOUS; SUBCUTANEOUS at 12:03

## 2021-01-01 RX ADMIN — CARVEDILOL 6.25 MG: 6.25 TABLET, FILM COATED ORAL at 09:12

## 2021-01-01 RX ADMIN — CETIRIZINE HYDROCHLORIDE 10 MG: 10 TABLET, FILM COATED ORAL at 08:35

## 2021-01-01 RX ADMIN — INSULIN LISPRO 1 UNITS: 100 INJECTION, SOLUTION INTRAVENOUS; SUBCUTANEOUS at 23:13

## 2021-01-01 RX ADMIN — BUDESONIDE 250 MCG: 0.25 INHALANT RESPIRATORY (INHALATION) at 08:57

## 2021-01-01 RX ADMIN — ISOSORBIDE DINITRATE 5 MG: 10 TABLET ORAL at 09:35

## 2021-01-01 RX ADMIN — BUDESONIDE 250 MCG: 0.25 INHALANT RESPIRATORY (INHALATION) at 08:08

## 2021-01-01 RX ADMIN — Medication 10 ML: at 21:20

## 2021-01-01 RX ADMIN — HYDRALAZINE HYDROCHLORIDE 10 MG: 10 TABLET, FILM COATED ORAL at 21:33

## 2021-01-01 RX ADMIN — INSULIN LISPRO 1 UNITS: 100 INJECTION, SOLUTION INTRAVENOUS; SUBCUTANEOUS at 21:38

## 2021-01-01 RX ADMIN — Medication 80 MG: at 10:29

## 2021-01-01 RX ADMIN — INSULIN LISPRO 2 UNITS: 100 INJECTION, SOLUTION INTRAVENOUS; SUBCUTANEOUS at 09:34

## 2021-01-01 RX ADMIN — ISOSORBIDE DINITRATE 5 MG: 10 TABLET ORAL at 22:46

## 2021-01-01 RX ADMIN — VENLAFAXINE HYDROCHLORIDE 75 MG: 37.5 CAPSULE, EXTENDED RELEASE ORAL at 08:30

## 2021-01-01 RX ADMIN — BUDESONIDE 250 MCG: 0.25 INHALANT RESPIRATORY (INHALATION) at 21:14

## 2021-01-01 RX ADMIN — CARVEDILOL 6.25 MG: 6.25 TABLET, FILM COATED ORAL at 17:16

## 2021-01-01 RX ADMIN — HEPARIN SODIUM 5000 UNITS: 5000 INJECTION INTRAVENOUS; SUBCUTANEOUS at 14:00

## 2021-01-01 RX ADMIN — ISOSORBIDE DINITRATE 5 MG: 10 TABLET ORAL at 09:22

## 2021-01-01 RX ADMIN — MONTELUKAST 10 MG: 10 TABLET, FILM COATED ORAL at 09:36

## 2021-01-01 RX ADMIN — HUMAN INSULIN 40 UNITS: 100 INJECTION, SUSPENSION SUBCUTANEOUS at 09:20

## 2021-01-01 RX ADMIN — ISOSORBIDE DINITRATE 5 MG: 10 TABLET ORAL at 21:33

## 2021-01-01 RX ADMIN — BUMETANIDE 1 MG: 0.25 INJECTION INTRAMUSCULAR; INTRAVENOUS at 18:00

## 2021-01-01 RX ADMIN — VENLAFAXINE HYDROCHLORIDE 75 MG: 37.5 CAPSULE, EXTENDED RELEASE ORAL at 08:46

## 2021-01-01 RX ADMIN — BUMETANIDE 1 MG: 0.25 INJECTION INTRAMUSCULAR; INTRAVENOUS at 09:23

## 2021-01-01 RX ADMIN — BUMETANIDE 1 MG: 0.25 INJECTION INTRAMUSCULAR; INTRAVENOUS at 16:09

## 2021-01-01 RX ADMIN — Medication 10 ML: at 07:20

## 2021-01-01 RX ADMIN — GABAPENTIN 100 MG: 100 CAPSULE ORAL at 08:35

## 2021-01-01 RX ADMIN — INSULIN LISPRO 2 UNITS: 100 INJECTION, SOLUTION INTRAVENOUS; SUBCUTANEOUS at 12:54

## 2021-01-01 RX ADMIN — LEVOTHYROXINE SODIUM 100 MCG: 0.1 TABLET ORAL at 07:09

## 2021-01-01 RX ADMIN — BUMETANIDE 2 MG: 0.25 INJECTION INTRAMUSCULAR; INTRAVENOUS at 08:45

## 2021-01-01 RX ADMIN — ALLOPURINOL 100 MG: 100 TABLET ORAL at 09:12

## 2021-01-01 RX ADMIN — HEPARIN SODIUM 5000 UNITS: 5000 INJECTION INTRAVENOUS; SUBCUTANEOUS at 21:28

## 2021-01-01 RX ADMIN — ISOSORBIDE DINITRATE 5 MG: 10 TABLET ORAL at 15:42

## 2021-01-01 RX ADMIN — BUMETANIDE 2 MG: 0.25 INJECTION INTRAMUSCULAR; INTRAVENOUS at 17:13

## 2021-01-07 DIAGNOSIS — J30.89 ENVIRONMENTAL AND SEASONAL ALLERGIES: ICD-10-CM

## 2021-01-07 DIAGNOSIS — R11.0 NAUSEA: Primary | ICD-10-CM

## 2021-01-07 DIAGNOSIS — Z76.0 ENCOUNTER FOR MEDICATION REFILL: ICD-10-CM

## 2021-01-07 NOTE — TELEPHONE ENCOUNTER
Pt is calling in for request for meclizine refill for 25 mg tablet along with refill request for nose spray, was unaware the name.   Please call back at [phone]

## 2021-01-08 DIAGNOSIS — Z76.0 ENCOUNTER FOR MEDICATION REFILL: ICD-10-CM

## 2021-01-08 DIAGNOSIS — J30.89 ENVIRONMENTAL AND SEASONAL ALLERGIES: ICD-10-CM

## 2021-01-08 RX ORDER — AZELASTINE HCL 205.5 UG/1
2 SPRAY NASAL
Qty: 3 BOTTLE | Refills: 1 | Status: CANCELLED | OUTPATIENT
Start: 2021-01-08

## 2021-01-08 RX ORDER — AZELASTINE HCL 205.5 UG/1
2 SPRAY NASAL
Qty: 3 BOTTLE | Refills: 1 | Status: SHIPPED | OUTPATIENT
Start: 2021-01-08 | End: 2021-01-01

## 2021-01-08 RX ORDER — MECLIZINE HYDROCHLORIDE 25 MG/1
25 TABLET ORAL
Qty: 20 TAB | Refills: 3 | Status: SHIPPED | OUTPATIENT
Start: 2021-01-08 | End: 2021-01-01

## 2021-01-12 DIAGNOSIS — R11.0 NAUSEA: ICD-10-CM

## 2021-01-12 RX ORDER — MECLIZINE HYDROCHLORIDE 25 MG/1
25 TABLET ORAL
Qty: 20 TAB | Refills: 3 | OUTPATIENT
Start: 2021-01-12

## 2021-02-12 NOTE — PROGRESS NOTES
Ervin Sanchez is a 76 y.o. female, evaluated via audio-only technology on 2/12/2021 for Ear Fullness [facility] Billing Assessment & Plan:  
Diagnoses and all orders for this visit: 
 
1. Type 2 diabetes with nephropathy (HCC) 2. Cardiomyopathy, nonischemic ([address] 3. Acquired hypothyroidism 4. Sensation of fullness in ear, unspecified laterality 5. Ear infection 
-     azithromycin (ZITHROMAX) 250 mg tablet; Take 2 tablets today, then take 1 tablet daily 6. Acute vaginitis -     terconazole (TERAZOL 7) 0.4 % vaginal cream; Insert 1 Applicator into vagina nightly. 12 
Subjective:  
 
 
Prior to Admission medications Medication Sig Start Date End Date Taking? Authorizing Provider  
azithromycin (ZITHROMAX) 250 mg tablet Take 2 tablets today, then take 1 tablet daily 2/12/21 2/17/21 Yes Holger Medina MD  
terconazole (TERAZOL 7) 0.4 % vaginal cream Insert 1 Applicator into vagina nightly. 2/12/21  Yes Holger Medina MD  
azelastine (Astepro) 0.15 % (205.5 mcg) 2 Sprays by Both Nostrils route two (2) times daily as needed (congestion). Indications: seasonal runny nose 1/8/21   Holger Medina MD  
meclizine (ANTIVERT) 25 mg tablet Take 1 Tab by mouth three (3) times daily as needed for Dizziness. 1/8/21   Holger Medina MD  
venlafaxine-SR [facility] 75 mg capsule Take 1 Cap by mouth daily. 12/31/20   Holger Medina MD  
levocetirizine (XYZAL) 5 mg tablet Take 1 Tab by mouth daily. 12/31/20   Holger Medina MD  
calcitRIOL (ROCALTROL) 0.5 mcg capsule TAKE 1 CAPSULE BY MOUTH EVERY DAY 12/8/20   Margarito Britt MD  
bumetanide (BUMEX) 1 mg tablet TAKE 1 TABLET BY MOUTH EVERY DAY (AT 4 PM FOR FLUID RESULTING FROM CHRONIC HEART FAILURE) 11/18/20   Sari Raya NP  
candesartan (ATACAND) 4 mg tablet Take 1 Tab by mouth daily.  11/4/20   Holger Medina MD  
 levothyroxine (SYNTHROID) 100 mcg tablet Take 1 Tab by mouth Daily (before breakfast). 11/4/20   Bang Flynn MD  
carvediloL (COREG) 25 mg tablet Take 1 Tab by mouth two (2) times daily (with meals). 11/4/20   Bang Flynn MD  
allopurinoL (ZYLOPRIM) 100 mg tablet Take 1 Tab by mouth daily. 11/4/20   Bang Flynn MD  
gabapentin (NEURONTIN) 100 mg capsule Take 1 Cap by mouth daily. Max Daily Amount: 100 mg. 11/4/20   Bang Flynn MD  
ammonium lactate (AMLACTIN) 12 % topical cream Apply  to affected area two (2) times a day. rub in to affected area well 11/4/20   Bang Flynn MD  
spironolactone (ALDACTONE) 25 mg tablet Take 1 Tab by mouth daily. 9/24/20   Bang Flynn MD  
guaiFENesin ER (MUCINEX) 600 mg ER tablet Take 600 mg by mouth as needed for Congestion. Provider, Historical  
apremilast (OTEZLA) 30 mg tab Take  by mouth two (2) times a day. Provider, Historical  
insulin NPH/insulin regular (NOVOLIN 70/30) 100 unit/mL (70-30) injection 70 units two times a day 8/15/16   Brandon Holland MD  
calcipotriene (DOVONEX) 0.005 % topical cream Apply  to affected area three (3) times daily. Provider, Historical  
triamcinolone acetonide (KENALOG) 0.5 % ointment Apply  to affected area two (2) times a day. use thin layer    Provider, Historical  
multivitamin (ONE A DAY) tablet Take 1 Tab by mouth daily. Provider, Historical  
DOCUSATE CALCIUM (STOOL SOFTENER PO) Take 1 tablet by mouth daily. Provider, Historical  
EPINEPHrine (EPIPEN) 0.3 mg/0.3 mL injection 0.3 mL by IntraMUSCular route once as needed for 1 dose. 1/4/12   Brandon Holland MD  
ferrous sulfate (IRON) 325 mg (65 mg elemental iron) tablet Take  by mouth three (3) times daily (with meals). Provider, Historical  
aspirin 81 mg tablet Take 81 mg by mouth daily. Provider, Historical  
PULMICORT IN Take 2 Puffs by inhalation every twelve (12) hours. As needed Provider, Historical  
 
HIstory Patient could not logon to a virtual video visit so this was a phone call only visit. Patient reported that she has been having ear fullness in both ears for the past few weeks. She is concerned about infection. She is not sure if she has wax in her ears. She tried some over-the-counter medications to clean her ears and that has not helped. She wants to try the antibiotics and if that does not improve she wants to make an appointment in the office. She reports that she makes good urine output and drinks plenty of fluids. She also reports that she has not had the gout attack and or pain in her toes. She says she is not allergic to Z-Humphrey although she had reaction to clarithromycin. She is also requesting antifungal cream as she gets yeast infection after taking antibiotic. ROS No flowsheet data found. Kirsitn Hernandez, who was evaluated through a patient-initiated, synchronous (real-time) audio only encounter, and/or her healthcare decision maker, is aware that it is a billable service, with coverage as determined by her insurance carrier. She provided verbal consent to proceed: Yes. She has not had a related appointment within my department in the past 7 days or scheduled within the next 24 hours. Total Time: minutes: 11-20 minutes Kenn Knox MD

## 2021-04-09 NOTE — TELEPHONE ENCOUNTER
Left a detailed voice message on 4/9/2021 requesting a return call in attempt to coordinate an overdue in-office Pacer and Dr. Poonam Gonsalez appointment. Attempt to reach patient has been made multiple times by clinic via [employer] and/or Phone.

## 2021-04-21 NOTE — LETTER
4/21/2021 11:07 AM 
 
Ms. Delta Maldonado 
[address] [facility] [id number] After careful review of your remote check of your implated device on 3-03-29. I have concluded that your device is working properly. Your next: In clinic device check is scheduled for   7-13-21 at  1:20pm. 
 
 
 
 
If you have any questions, please call [facility] [address] at [phone]. Additional Comments: ________________________________________________ 
 
__________________________________________________________________ 
 
__________________________________________________________________ Kuldeep Medrano MD Elzie Ache

## 2021-05-26 NOTE — PROGRESS NOTES
Chief Complaint Patient presents with  Sinus Infection  Cough Visit Vitals /75 (BP 1 Location: Left upper arm, BP Patient Position: Sitting, BP Cuff Size: Adult) Pulse (!) 103 Temp 97.5 °F (36.4 °C) (Temporal) Resp 16 Ht 5' 7\" (1.702 m) Wt 263 lb 12.8 oz (119.7 kg) SpO2 98% BMI 41.32 kg/m² 1. Have you been to the ER, urgent care clinic since your last visit? Hospitalized since your last visit? No 
 
2. Have you seen or consulted any other health care providers outside of the [address] [place] [address] since your last visit? Include any pap smears or colon screening.  No

## 2021-05-26 NOTE — PROGRESS NOTES
William Bee is a 76 y.o.  female and presents with Chief Complaint Patient presents with  Sinus Infection  Cough  Diabetes  Gout  Hypothyroidism  Hypertension Patient has not been in the clinic in a while. She missed appointment with cardiology. She also did not make appointment with nephrology. Patient has a history of chronic renal insufficiency. She stopped spironolactone as directed She is on Bumex for leg swelling She has been having postnasal drip and cough, she does not bring up any phlegm She takes the gout medications when she remembers. She has not been checking her blood sugars She is taking medication for allergies including Xyzal and Flonase but still has some dry cough and postnasal drip Past Medical History:  
Diagnosis Date  Acquired hypothyroidism 8/15/2016  Anemia RED-HF study  Asthma  Cardiomyopathy, nonischemic ([facility]   
 initial dx 2001, bivHF 2008 with EF 15%, s/p biV-ICD 9/08, significant improvment in EF to 45-50%  CKD (chronic kidney disease) Dr Livan Murrieta  CKD (chronic kidney disease) 8/15/2012  Depression  Diabetes ([address]  Diabetic neuropathy ([address]  DM (diabetes mellitus) ([facility] 8/15/2012  GERD (gastroesophageal reflux disease)  Gout  Hypothyroidism  ICD (implantable cardioverter-defibrillator), biventricular, in situ 6/5/2014  Psoriasis Past Surgical History:  
Procedure Laterality Date [address]  2007; 01/06/15  
 normal cors  ECHO 2D ADULT  4/2010 EF 45%, improved from 1/09 (25%)  ECHO 2D ADULT  11/2011 [facility], EF 55-60%  HX ORTHOPAEDIC    
 knee [facility]  STRESS TEST LEXISCAN/CARDIOLITE  3/21/12  
 normal perfusion, global HK 40% Current Outpatient Medications Medication Sig  
 montelukast (SINGULAIR) 10 mg tablet Take 1 Tablet by mouth daily.   
 benzonatate (TESSALON) 100 mg capsule Take 1 Capsule by mouth three (3) times daily as needed for Cough for up to 10 days.  carvediloL (COREG) 25 mg tablet TAKE 1 TABLET BY MOUTH TWICE A DAY WITH MEALS  meclizine (ANTIVERT) 25 mg tablet TAKE 1 TAB BY MOUTH THREE (3) TIMES DAILY AS NEEDED FOR DIZZINESS.  allopurinoL (ZYLOPRIM) 100 mg tablet TAKE 1 TABLET BY MOUTH EVERY DAY  candesartan (ATACAND) 4 mg tablet TAKE 1 TABLET BY MOUTH EVERY DAY  calcitRIOL (ROCALTROL) 0.5 mcg capsule TAKE 1 CAPSULE BY MOUTH EVERY DAY  venlafaxine-SR (EFFEXOR-XR) 75 mg capsule TAKE 1 CAPSULE BY MOUTH EVERY DAY  levocetirizine (XYZAL) 5 mg tablet TAKE 1 TABLET BY MOUTH EVERY DAY  terconazole (TERAZOL 7) 0.4 % vaginal cream Insert 1 Applicator into vagina nightly.  azelastine (Astepro) 0.15 % (205.5 mcg) 2 Sprays by Both Nostrils route two (2) times daily as needed (congestion). Indications: seasonal runny nose  bumetanide (BUMEX) 1 mg tablet TAKE 1 TABLET BY MOUTH EVERY DAY (AT 4 PM FOR FLUID RESULTING FROM CHRONIC HEART FAILURE)  levothyroxine (SYNTHROID) 100 mcg tablet Take 1 Tab by mouth Daily (before breakfast).  gabapentin (NEURONTIN) 100 mg capsule Take 1 Cap by mouth daily. Max Daily Amount: 100 mg.  ammonium lactate (AMLACTIN) 12 % topical cream Apply  to affected area two (2) times a day. rub in to affected area well  
 guaiFENesin ER (MUCINEX) 600 mg ER tablet Take 600 mg by mouth as needed for Congestion.  apremilast (OTEZLA) 30 mg tab Take  by mouth two (2) times a day.  insulin NPH/insulin regular (NOVOLIN 70/30) 100 unit/mL (70-30) injection 70 units two times a day  calcipotriene (DOVONEX) 0.005 % topical cream Apply  to affected area three (3) times daily.  triamcinolone acetonide (KENALOG) 0.5 % ointment Apply  to affected area two (2) times a day. use thin layer  multivitamin (ONE A DAY) tablet Take 1 Tab by mouth daily.  DOCUSATE CALCIUM (STOOL SOFTENER PO) Take 1 tablet by mouth daily.   
 EPINEPHrine (EPIPEN) 0.3 mg/0.3 mL injection 0.3 mL by IntraMUSCular route once as needed for 1 dose.  ferrous sulfate (IRON) 325 mg (65 mg elemental iron) tablet Take  by mouth three (3) times daily (with meals).  aspirin 81 mg tablet Take 81 mg by mouth daily.  PULMICORT IN Take 2 Puffs by inhalation every twelve (12) hours. As needed No current facility-administered medications for this visit. Health Maintenance Topic Date Due  Shingrix Vaccine Age 50> (1 of 2) Never done  Colorectal Cancer Screening Combo  12/10/2015  Eye Exam Retinal or Dilated  07/10/2016  Foot Exam Q1  06/07/2019  Bone Densitometry (Dexa) Screening  Never done  Medicare Yearly Exam  04/30/2020  MICROALBUMIN Q1  04/30/2020  Pneumococcal 65+ years (2 of 2 - PPSV23) 04/30/2020  Breast Cancer Screen Mammogram  04/30/2021  Flu Vaccine (Season Ended) 09/01/2021  A1C test (Diabetic or Prediabetic)  11/05/2021  Lipid Screen  11/05/2021  
 DTaP/Tdap/Td series (2 - Td) 10/19/2027  COVID-19 Vaccine  Completed  Hepatitis C Screening  Addressed Immunization History Administered Date(s) Administered  COVID-19, PFIZER, MRNA, LNP-S, PF, 30MCG/0.3ML DOSE 03/15/2021, 04/05/2021  
 H1N1 Influenza Virus Vaccine 01/20/2010  Influenza Vaccine 10/01/2015  Influenza Vaccine Split 09/17/2010, 10/17/2012  Pneumococcal Conjugate (PCV-13) 04/30/2019  TB Skin Test (PPD) 01/01/2008 No LMP recorded. Patient is postmenopausal. 
 
 
 
Allergies and Intolerances: Allergies Allergen Reactions  Ciprofloxacin Anaphylaxis  Shellfish Derived Anaphylaxis  Ace Inhibitors Unknown (comments)  Biaxin [Clarithromycin] Other (comments) Metal taste  Candesartan Cough  Pcn [Penicillins] Hives Family History:  
Family History Problem Relation Age of Onset  Heart Disease Mother  Hypertension Mother  Lupus Sister  Diabetes Brother Social History: She  reports that she has never smoked. She has never used smokeless tobacco.  She  reports no history of alcohol use. Review of Systems:  
General: negative for - chills, fatigue, fever, weight change Psych: negative for - anxiety, depression, irritability or mood swings ENT: negative for - headaches, hearing change, nasal congestion, oral lesions, sneezing or sore throat Heme/ Lymph: negative for - bleeding problems, bruising, pallor or swollen lymph nodes Endo: negative for - hot flashes, polydipsia/polyuria or temperature intolerance Resp: negative for - cough, shortness of breath or wheezing CV: negative for - chest pain, edema or palpitations GI: negative for - abdominal pain, change in bowel habits, constipation, diarrhea or nausea/vomiting : negative for - dysuria, hematuria, incontinence, pelvic pain or vulvar/vaginal symptoms MSK: negative for - joint pain, joint swelling or muscle pain Neuro: negative for - confusion, headaches, seizures or weakness Derm: negative for - dry skin, hair changes, rash or skin lesion changes Physical:  
Vitals:  
Vitals:  
 05/26/21 1007 BP: 118/75 Pulse: (!) 103 Resp: 16 Temp: 97.5 °F (36.4 °C) TempSrc: Temporal  
SpO2: 98% Weight: 263 lb 12.8 oz (119.7 kg) Height: 5' 7\" (1.702 m) Exam:  
HEENT- atraumatic,normocephalic, awake, oriented, well nourished Neck - supple,no enlarged lymph nodes, no JVD, no thyromegaly Chest- CTA, no rhonchi, crackles right base Heart- rrr, no murmurs / gallop/rub Abdomen- soft,BS+,NT, no hepatosplenomegaly Ext - no c/c/edema Neuro- no focal deficits. Power 5/5 all extremities Skin - warm,dry, no obvious rashes. Review of Data:  
LABS:  
Lab Results Component Value Date/Time WBC 10.0 11/05/2020 09:11 AM  
 HGB 10.6 (L) 11/05/2020 09:11 AM  
 HCT 37.1 11/05/2020 09:11 AM  
 PLATELET 672 05/36/7144 09:11 AM  
 
Lab Results Component Value Date/Time  Sodium 138 11/05/2020 09:11 AM  
 Potassium 5.0 11/05/2020 09:11 AM  
 Chloride 100 11/05/2020 09:11 AM  
 CO2 30 11/05/2020 09:11 AM  
 Glucose 135 (H) 11/05/2020 09:11 AM  
 BUN 43 (H) 11/05/2020 09:11 AM  
 Creatinine 3.39 (H) 11/05/2020 09:11 AM  
 
Lab Results Component Value Date/Time Cholesterol, total 255 (H) 11/05/2020 09:11 AM  
 HDL Cholesterol 67 11/05/2020 09:11 AM  
 LDL, calculated 153 (H) 11/05/2020 09:11 AM  
 Triglyceride 175 (H) 11/05/2020 09:11 AM  
 
No components found for: GPT Impression / Plan: ICD-10-CM ICD-9-CM 1. Hypertension, unspecified type  I10 401.9 2. Cardiomyopathy, nonischemic (HCC)  I42.8 425.4 3. Stage 3a chronic kidney disease (HCC)  N18.31 585.3 4. Type 2 diabetes with nephropathy (HCC)  E11.21 250.40 LIPID PANEL  
  255.52 METABOLIC PANEL, COMPREHENSIVE  
   HEMOGLOBIN A1C WITH EAG  
5. Acquired hypothyroidism  E03.9 244.9 6. Hyperuricemia  E79.0 790.6 URIC ACID 7. Vitamin D deficiency  E55.9 268.9 VITAMIN D, 25 HYDROXY 8. Cough  R05 786.2 9. Seasonal allergic rhinitis due to pollen  J30.1 477.0 montelukast (SINGULAIR) 10 mg tablet  
   benzonatate (TESSALON) 100 mg capsule REFERRAL TO ENT-OTOLARYNGOLOGY 10. Respiratory crackles at right lung base  R09.89 786.7 XR CHEST PA LAT Rule out fluid overload. Chronic kidney disease -asked patient to make appointment with nephrology Explained to patient risk benefits of the medications. Advised patient to stop meds if having any side effects. Pt verbalized understanding of the instructions. I have discussed the diagnosis with the patient and the intended plan as seen in the above orders. The patient has received an after-visit summary and questions were answered concerning future plans. I have discussed medication side effects and warnings with the patient as well. I have reviewed the plan of care with the patient, accepted their input and they are in agreement with the treatment goals. Reviewed plan of care.  Patient has provided input and agrees with goals. Follow-up and Dispositions · Return in about 6 weeks (around 7/7/2021).  
  
 
 
Fernando Calloway MD

## 2021-07-13 NOTE — TELEPHONE ENCOUNTER
Received refill from [facility] stating patient has Bumex 2 mg- take 1 tablet by mouth in AM and 0.5 tab in PM. VO per Dr. Shay Kang refill as previously prescribed.

## 2021-07-13 NOTE — PROGRESS NOTES
Cardiac Electrophysiology Office Note     Subjective:      Carly Villanueva is a 78 y.o. patient who is seen for evaluation of BIV ICD    She has a Medtronic biventricular pacemaker AICD that I replaced back in 2015. Her left ventricular ejection fraction has normalized with bi-V pacing. She is NYHA Class II. She has chronic renal failure now stage IV so she cannot take aldactone any more  She said she has increased bumex per PCP  She said 3 mg every day and still makes urine, not on dialysis    04/22/19   ECHO ADULT COMPLETE 04/23/2019 4/23/2019    Narrative · Heart rate 67 bpm.  · Mild aortic valve sclerosis with no significant stenosis. · Calculated left ventricular ejection fraction is 60%. Left ventricular   mild concentric hypertrophy. No regional wall motion abnormality noted. Age-appropriate left ventricular diastolic function. · Mitral valve thickening. Trace mitral valve regurgitation.         Signed by: Jairo Long MD         Problem List  Date Reviewed: 7/13/2021        Codes Class Noted    Type 2 diabetes mellitus with diabetic neuropathy ([facility] ICD-10-CM: E11.40  ICD-9-CM: 250.60, 357.2  1/2/2020        Type 2 diabetes with nephropathy ([facility] ICD-10-CM: E11.21  ICD-9-CM: 250.40, 583.81  4/3/2018        Obesity, morbid ([facility] ICD-10-CM: E66.01  ICD-9-CM: 278.01  12/8/2017        Acquired hypothyroidism ICD-10-CM: E03.9  ICD-9-CM: 244.9  8/15/2016        Dysthymia ICD-10-CM: F34.1  ICD-9-CM: 300.4  8/15/2016        ICD (implantable cardioverter-defibrillator), biventricular, in situ ICD-10-CM: Z95.810  ICD-9-CM: V45.02  6/5/2014    Overview Signed 1/14/2015 11:11 AM by James Valiente MD     Generator Medtronic change 1/14/2015             Dyslipidemia ICD-10-CM: J90.0  ICD-9-CM: 272.4  1/14/2014        CKD (chronic kidney disease) ICD-10-CM: N18.9  ICD-9-CM: 585.9  8/15/2012        Cardiomyopathy, nonischemic (HCC) ICD-10-CM: I42.8  ICD-9-CM: 425.4  Unknown    Overview Signed 10/10/2011  6:40 AM by Brigido Nur MD     initial dx 2001, bivHF 2008 with EF 15%, s/p biV-ICD 9/08, significant improvment in EF to 45-50%             Anemia in chronic renal disease (Chronic) ICD-10-CM: N18.9, D63.1  ICD-9-CM: 285.21  12/10/2008        HTN (hypertension) ICD-10-CM: I10  ICD-9-CM: 401.9  12/10/2008        GERD (gastroesophageal reflux disease) (Chronic) ICD-10-CM: K21.9  ICD-9-CM: 530.81  12/10/2008        Gout (Chronic) ICD-10-CM: M10.9  ICD-9-CM: 274.9  12/10/2008        Pulmonary HTN (HCC) (Chronic) ICD-10-CM: N68.80  ICD-9-CM: 416.8  12/10/2008              Current Outpatient Medications   Medication Sig Dispense Refill    hydrOXYzine HCL (ATARAX) 10 mg tablet 2 tab(s)      benzonatate (TESSALON) 100 mg capsule TAKE 1 CAPSULE BY MOUTH THREE (3) TIMES DAILY AS NEEDED FOR COUGH FOR UP TO 10 DAYS. 30 Capsule 0    calcitRIOL (ROCALTROL) 0.5 mcg capsule TAKE 1 CAPSULE BY MOUTH EVERY DAY 90 Capsule 0    allopurinoL (ZYLOPRIM) 100 mg tablet TAKE 1 TABLET BY MOUTH EVERY DAY 90 Tablet 0    candesartan (ATACAND) 4 mg tablet TAKE 1 TABLET BY MOUTH EVERY DAY 90 Tablet 0    bumetanide (BUMEX) 1 mg tablet Take 1 Tablet by mouth daily. 90 Tablet 0    gabapentin (NEURONTIN) 100 mg capsule TAKE 1 CAP BY MOUTH DAILY. MAX DAILY AMOUNT: 100 MG. 90 Capsule 0    levothyroxine (SYNTHROID) 100 mcg tablet TAKE 1 TABLET BY MOUTH EVERY DAY BEFORE BREAKFAST 90 Tablet 1    azelastine (ASTEPRO) 205.5 mcg (0.15 %) USE 2 SPRAYS BY BOTH NOSTRILS ROUTE TWO (2) TIMES DAILY AS NEEDED (CONGESTION) 90 Spray 1    levocetirizine (XYZAL) 5 mg tablet TAKE 1 TABLET BY MOUTH EVERY DAY 90 Tablet 0    venlafaxine-SR (EFFEXOR-XR) 75 mg capsule TAKE 1 CAPSULE BY MOUTH EVERY DAY 90 Capsule 0    budesonide (PULMICORT) 180 mcg/actuation aepb inhaler Take 2 Puffs by inhalation two (2) times a day. 2 Inhaler 5    montelukast (SINGULAIR) 10 mg tablet Take 1 Tablet by mouth daily.  30 Tablet 2    carvediloL (COREG) 25 mg tablet TAKE 1 TABLET BY MOUTH TWICE A DAY WITH MEALS 180 Tablet 0    meclizine (ANTIVERT) 25 mg tablet TAKE 1 TAB BY MOUTH THREE (3) TIMES DAILY AS NEEDED FOR DIZZINESS. 20 Tab 3    ammonium lactate (AMLACTIN) 12 % topical cream Apply  to affected area two (2) times a day. rub in to affected area well 280 g 2    apremilast (OTEZLA) 30 mg tab Take  by mouth two (2) times a day.  insulin NPH/insulin regular (NOVOLIN 70/30) 100 unit/mL (70-30) injection 70 units two times a day 10 mL 3    calcipotriene (DOVONEX) 0.005 % topical cream Apply  to affected area three (3) times daily.  triamcinolone acetonide (KENALOG) 0.5 % ointment Apply  to affected area two (2) times a day. use thin layer      multivitamin (ONE A DAY) tablet Take 1 Tab by mouth daily.  DOCUSATE CALCIUM (STOOL SOFTENER PO) Take 1 tablet by mouth daily.  EPINEPHrine (EPIPEN) 0.3 mg/0.3 mL injection 0.3 mL by IntraMUSCular route once as needed for 1 dose. 1 Each 3    ferrous sulfate (IRON) 325 mg (65 mg elemental iron) tablet Take  by mouth three (3) times daily (with meals).  aspirin 81 mg tablet Take 81 mg by mouth daily.  PULMICORT IN Take 2 Puffs by inhalation every twelve (12) hours.  As needed         Allergies   Allergen Reactions    Ciprofloxacin Anaphylaxis    Shellfish Derived Anaphylaxis    Ace Inhibitors Unknown (comments)    Biaxin [Clarithromycin] Other (comments)     Metal taste    Candesartan Cough    Pcn [Penicillins] Hives     Past Medical History:   Diagnosis Date    Acquired hypothyroidism 8/15/2016    Anemia     RED-HF study    Asthma     Cardiomyopathy, nonischemic ([facility]     initial dx 2001, bivHF 2008 with EF 15%, s/p biV-ICD 9/08, significant improvment in EF to 45-50%    CKD (chronic kidney disease)     Dr Janis Abbott    CKD (chronic kidney disease) 8/15/2012    Depression     Diabetes ([facility]     Diabetic neuropathy ([facility]     DM (diabetes mellitus) ([facility] 8/15/2012    GERD (gastroesophageal reflux disease)     Gout     Hypothyroidism     ICD (implantable cardioverter-defibrillator), biventricular, in situ 6/5/2014    Psoriasis      Past Surgical History:   Procedure Laterality Date    CARDIAC CATHETERIZATION  2007; 01/06/15    normal cors    ECHO 2D ADULT  4/2010    EF 45%, improved from 1/09 (25%)    ECHO 2D ADULT  11/2011    LVH, EF 55-60%    HX ORTHOPAEDIC      knee    HX PACEMAKER PLACEMENT      AICD    STRESS TEST LEXISCAN/CARDIOLITE  3/21/12    normal perfusion, global HK 40%       Social History     Tobacco Use    Smoking status: Never Smoker    Smokeless tobacco: Never Used   Substance Use Topics    Alcohol use: No        Review of Systems:   Constitutional: Negative for fever, chills, weight loss  HEENT: Negative for nosebleeds, vision changes. Respiratory: Negative for cough, hemoptysis  Cardiovascular: Negative for chest pain, palpitations, orthopnea, claudication, leg swelling, syncope, and PND. Gastrointestinal: Negative for nausea, vomiting, diarrhea, blood in stool and melena. Genitourinary: Negative for dysuria, and hematuria. Musculoskeletal: Negative for myalgias, arthralgia. Skin: Negative for rash. Heme: Does not bleed or bruise easily. Neurological: Negative for speech change and focal weakness     Objective:     Visit Vitals  /60   Pulse 99   Resp 16   Ht 5' 7\" (1.702 m)   Wt 261 lb (118.4 kg)   SpO2 94%   BMI 40.88 kg/m²      Physical Exam:   Constitutional: well-developed and well-nourished. No respiratory distress. Head: Normocephalic and atraumatic. Eyes: Pupils are equal, round  ENT: hearing normal  Neck: supple. No JVD present. Cardiovascular: Normal rate, regular rhythm. Exam reveals no gallop and no friction rub. No murmur heard. Pulmonary/Chest: Effort normal and breath sounds normal. No wheezes. Abdominal: Soft, no tenderness. Obese  Musculoskeletal: + 1 leg edema. Neurological: alert,oriented.    Skin: unremarkable left sided BIV ICD  Psychiatric: normal mood and affect. Behavior is normal. Judgment and thought content normal.        Assessment/Plan:       ICD-10-CM ICD-9-CM    1. Presence of biventricular automatic cardioverter/defibrillator (AICD)  Z95.810 V45.02 ECHO ADULT COMPLETE   2. Type 2 diabetes with nephropathy (HCC)  E11.21 250.40      583.81    3. Chronic kidney disease (CKD), stage IV (severe) (HCC)  N18.4 585.4 ECHO ADULT COMPLETE   4. NICM (nonischemic cardiomyopathy) ([facility])  I42.8 425.4 ECHO ADULT COMPLETE   5. Obesity, morbid ([address]  E66.01 278.01    6. Essential hypertension  I10 401.9      reviewed diet, exercise and weight control  reviewed medications and side effects in detail   She is on GDMT  Ok to stop aldactone given hx of worsening CKD and K was 5  Need to clarify if she takes addition one mg bumex based on weight or daily extra  I have reviewed with the patient the device check today. It shows proper function  PAT noted   She is asymptomatic  Battery 11 months  Check 2 D echo before BIV ICD generator change next 11 months  Future Appointments   Date Time Provider Lizabeth Ennis   7/14/2021 10:30 AM Esequiel Gonzalez MD [facility] BS AMB   10/13/2021  3:00 PM REMOTE1, MIRTA MEDINA BS AMB   1/17/2022  2:45 PM REMOTE1, MIRTA MEDINA BS AMB   4/18/2022 11:45 AM REMOTE1, MIRTA MEDINA BS AMB   6/20/2022  1:00 PM ECHOTWOMIRTA BS AMB   7/21/2022  2:20 PM PACEMAKER3, MIRTA MEDINA BS AMB   7/21/2022  2:40 PM Rebeca Holden MD [facility] BS AMB       Thank you for involving me in this patient's care and please call with further concerns or questions. Patrick Aragon M.D.   Electrophysiology/Cardiology  [facility] [place]  [address] , [address]  [address]  [phone]

## 2021-07-14 NOTE — PROGRESS NOTES
Catrina Mortimer is a 78 y.o.  female and presents with     Chief Complaint   Patient presents with    Diabetes    Gout    Anemia    Cholesterol Problem    Chronic Kidney Disease    Cough     Patient here for a follow-up visit. She reported after she took the Singulair her cough is improved and she is happy about it. She saw cardiologist who agreed that she should stop spironolactone due to her kidney function being diminished. She has not seen nephrology yet. She does want a referral.  She wonders why she is anemic. She denies bleeding from any site. Past work-up indicated she has anemia of chronic disease. She is taking allopurinol low dose and her uric acid has improved. Also her kidney function has improved. Cholesterol is elevated. In the past she had muscle aches from cholesterol medicine. However she is willing to try a low-dose of cholesterol medicine  She has a follow-up appointment with cardiology. Overall she feels much better.         Past Medical History:   Diagnosis Date    Acquired hypothyroidism 8/15/2016    Anemia     RED-HF study    Asthma     Cardiomyopathy, nonischemic ([address]     initial dx 2001, bivHF 2008 with EF 15%, s/p biV-ICD 9/08, significant improvment in EF to 45-50%    CKD (chronic kidney disease)     Dr Wade Oquendo CKD (chronic kidney disease) 8/15/2012    Depression     Diabetes ([address]     Diabetic neuropathy ([address]     DM (diabetes mellitus) ([address] 8/15/2012    GERD (gastroesophageal reflux disease)     Gout     Hypothyroidism     ICD (implantable cardioverter-defibrillator), biventricular, in situ 6/5/2014    Psoriasis      Past Surgical History:   Procedure Laterality Date    CARDIAC CATHETERIZATION  2007; 01/06/15    normal cors    ECHO 2D ADULT  4/2010    EF 45%, improved from 1/09 (25%)    ECHO 2D ADULT  11/2011    LVH, EF 55-60%    HX ORTHOPAEDIC      knee    HX PACEMAKER PLACEMENT      AICD    STRESS TEST LEXISCAN/CARDIOLITE  3/21/12 normal perfusion, global HK 40%     Current Outpatient Medications   Medication Sig    OTHER     rosuvastatin (CRESTOR) 5 mg tablet Take 1 Tablet by mouth nightly.  cholecalciferol (VITAMIN D3) (2,000 UNITS /50 MCG) cap capsule Take 1 Capsule by mouth two (2) times a day.  hydrOXYzine HCL (ATARAX) 10 mg tablet 2 tab(s)    bumetanide (BUMEX) 2 mg tablet Take one tablet by mouth every morning and take one-half tablet by mouth every evening    allopurinoL (ZYLOPRIM) 100 mg tablet TAKE 1 TABLET BY MOUTH EVERY DAY    gabapentin (NEURONTIN) 100 mg capsule TAKE 1 CAP BY MOUTH DAILY. MAX DAILY AMOUNT: 100 MG.  levothyroxine (SYNTHROID) 100 mcg tablet TAKE 1 TABLET BY MOUTH EVERY DAY BEFORE BREAKFAST    azelastine (ASTEPRO) 205.5 mcg (0.15 %) USE 2 SPRAYS BY BOTH NOSTRILS ROUTE TWO (2) TIMES DAILY AS NEEDED (CONGESTION)    levocetirizine (XYZAL) 5 mg tablet TAKE 1 TABLET BY MOUTH EVERY DAY    venlafaxine-SR (EFFEXOR-XR) 75 mg capsule TAKE 1 CAPSULE BY MOUTH EVERY DAY    budesonide (PULMICORT) 180 mcg/actuation aepb inhaler Take 2 Puffs by inhalation two (2) times a day.  montelukast (SINGULAIR) 10 mg tablet Take 1 Tablet by mouth daily.  carvediloL (COREG) 25 mg tablet TAKE 1 TABLET BY MOUTH TWICE A DAY WITH MEALS    meclizine (ANTIVERT) 25 mg tablet TAKE 1 TAB BY MOUTH THREE (3) TIMES DAILY AS NEEDED FOR DIZZINESS.  apremilast (OTEZLA) 30 mg tab Take  by mouth two (2) times a day.  insulin NPH/insulin regular (NOVOLIN 70/30) 100 unit/mL (70-30) injection 70 units two times a day    triamcinolone acetonide (KENALOG) 0.5 % ointment Apply  to affected area two (2) times a day. use thin layer    multivitamin (ONE A DAY) tablet Take 1 Tab by mouth daily.  DOCUSATE CALCIUM (STOOL SOFTENER PO) Take 1 tablet by mouth daily.  EPINEPHrine (EPIPEN) 0.3 mg/0.3 mL injection 0.3 mL by IntraMUSCular route once as needed for 1 dose.     ferrous sulfate (IRON) 325 mg (65 mg elemental iron) tablet Take  by mouth three (3) times daily (with meals).  aspirin 81 mg tablet Take 81 mg by mouth daily.  benzonatate (TESSALON) 100 mg capsule TAKE 1 CAPSULE BY MOUTH THREE (3) TIMES DAILY AS NEEDED FOR COUGH FOR UP TO 10 DAYS. (Patient not taking: Reported on 7/14/2021)    calcitRIOL (ROCALTROL) 0.5 mcg capsule TAKE 1 CAPSULE BY MOUTH EVERY DAY    candesartan (ATACAND) 4 mg tablet TAKE 1 TABLET BY MOUTH EVERY DAY    ammonium lactate (AMLACTIN) 12 % topical cream Apply  to affected area two (2) times a day. rub in to affected area well    calcipotriene (DOVONEX) 0.005 % topical cream Apply  to affected area three (3) times daily. No current facility-administered medications for this visit. Health Maintenance   Topic Date Due    Shingrix Vaccine Age 49> (1 of 2) Never done    Colorectal Cancer Screening Combo  12/10/2015    Eye Exam Retinal or Dilated  07/10/2016    Foot Exam Q1  06/07/2019    Medicare Yearly Exam  04/30/2020    MICROALBUMIN Q1  04/30/2020    Pneumococcal 65+ yrs at Risk Vaccine (2 of 2 - PPSV23) 04/30/2020    Breast Cancer Screen Mammogram  04/30/2021    Bone Densitometry (Dexa) Screening  07/14/2022 (Originally 7/9/2019)    Flu Vaccine (1) 09/01/2021    A1C test (Diabetic or Prediabetic)  07/13/2022    Lipid Screen  07/13/2022    DTaP/Tdap/Td series (2 - Td or Tdap) 10/19/2027    COVID-19 Vaccine  Completed    Hepatitis C Screening  Addressed     Immunization History   Administered Date(s) Administered    COVID-19, PFIZER, MRNA, LNP-S, PF, 30MCG/0.3ML DOSE 03/15/2021, 04/05/2021    H1N1 Influenza Virus Vaccine 01/20/2010    Influenza Vaccine 10/01/2015    Influenza Vaccine Split 09/17/2010, 10/17/2012    Pneumococcal Conjugate (PCV-13) 04/30/2019    TB Skin Test (PPD) 01/01/2008     No LMP recorded. Patient is postmenopausal.        Allergies and Intolerances:    Allergies   Allergen Reactions    Ciprofloxacin Anaphylaxis    Shellfish Derived Anaphylaxis    Ace Inhibitors Unknown (comments)    Biaxin [Clarithromycin] Other (comments)     Metal taste    Candesartan Cough    Pcn [Penicillins] Hives       Family History:   Family History   Problem Relation Age of Onset    Heart Disease Mother     Hypertension Mother     Lupus Sister     Diabetes Brother        Social History:   She  reports that she has never smoked. She has never used smokeless tobacco.  She  reports no history of alcohol use. Review of Systems:   General: negative for - chills, fatigue, fever, weight change  Psych: negative for - anxiety, depression, irritability or mood swings  ENT: negative for - headaches, hearing change, nasal congestion, oral lesions, sneezing or sore throat  Heme/ Lymph: negative for - bleeding problems, bruising, pallor or swollen lymph nodes  Endo: negative for - hot flashes, polydipsia/polyuria or temperature intolerance  Resp: negative for - cough, shortness of breath or wheezing  CV: negative for - chest pain, edema or palpitations  GI: negative for - abdominal pain, change in bowel habits, constipation, diarrhea or nausea/vomiting  : negative for - dysuria, hematuria, incontinence, pelvic pain or vulvar/vaginal symptoms  MSK: negative for - joint pain, joint swelling or muscle pain  Neuro: negative for - confusion, headaches, seizures or weakness  Derm: negative for - dry skin, hair changes, rash or skin lesion changes          Physical:   Vitals:   Vitals:    07/14/21 1027   BP: 110/68   Pulse: 92   Resp: 16   Temp: 98 °F (36.7 °C)   TempSrc: Oral   SpO2: 96%   Weight: 264 lb 12.8 oz (120.1 kg)   Height: 5' 7\" (1.702 m)           Exam:   HEENT- atraumatic,normocephalic, awake, oriented, well nourished  Neck - supple,no enlarged lymph nodes, no JVD, no thyromegaly  Chest- CTA, no rhonchi, no crackles  Heart- rrr, no murmurs / gallop/rub  Abdomen- soft,BS+,NT, no hepatosplenomegaly  Ext - no c/c/edema   Neuro- no focal deficits. Power 5/5 all extremities  Skin - warm,dry, no obvious rashes. Review of Data:   LABS:   Lab Results   Component Value Date/Time    WBC 10.0 11/05/2020 09:11 AM    HGB 10.6 (L) 11/05/2020 09:11 AM    HCT 37.1 11/05/2020 09:11 AM    PLATELET 525 70/29/2183 09:11 AM     Lab Results   Component Value Date/Time    Sodium 137 07/13/2021 04:00 PM    Potassium 4.9 07/13/2021 04:00 PM    Chloride 100 07/13/2021 04:00 PM    CO2 29 07/13/2021 04:00 PM    Glucose 291 (H) 07/13/2021 04:00 PM    BUN 32 (H) 07/13/2021 04:00 PM    Creatinine 2.51 (H) 07/13/2021 04:00 PM     Lab Results   Component Value Date/Time    Cholesterol, total 282 (H) 07/13/2021 04:00 PM    HDL Cholesterol 65 07/13/2021 04:00 PM    LDL, calculated 178.2 (H) 07/13/2021 04:00 PM    Triglyceride 194 (H) 07/13/2021 04:00 PM     No components found for: GPT        Impression / Plan:        ICD-10-CM ICD-9-CM    1. Stage 3b chronic kidney disease (HCC)  N18.32 585.3 REFERRAL TO NEPHROLOGY   2. Acquired hypothyroidism  E03.9 244.9    3. Type 2 diabetes with nephropathy (HCC)  E11.21 250.40      583.81    4. Hypertension, unspecified type  I10 401.9    5. Cardiomyopathy, nonischemic (HCC)  I42.8 425.4    6. Cough  R05 786.2    7. Hyperuricemia  E79.0 790.6    8. Seasonal allergic rhinitis due to pollen  J30.1 477.0    9. Hypercholesteremia  E78.00 272.0 rosuvastatin (CRESTOR) 5 mg tablet   10. Anemia, chronic disease  D63.8 285.29 REFERRAL TO NEPHROLOGY   11. Vitamin D deficiency  E55.9 268.9 cholecalciferol (VITAMIN D3) (2,000 UNITS /50 MCG) cap capsule     Hyperuricemia-improved    Chronic kidney disease-avoid NSAIDs, referred to nephrology  Cough-resolved    Hypertension/cardiomyopathy-stable    Patient declined mammogram and bone density. Explained to patient risk benefits of the medications. Advised patient to stop meds if having any side effects. Pt verbalized understanding of the instructions.     I have discussed the diagnosis with the patient and the intended plan as seen in the above orders. The patient has received an after-visit summary and questions were answered concerning future plans. I have discussed medication side effects and warnings with the patient as well. I have reviewed the plan of care with the patient, accepted their input and they are in agreement with the treatment goals. Reviewed plan of care. Patient has provided input and agrees with goals. Follow-up and Dispositions    · Return in about 4 months (around 11/14/2021) for FULL PHYSICAL - 30 minutes.          Howard Bailey MD

## 2021-07-14 NOTE — PROGRESS NOTES
Chief Complaint   Patient presents with    Diabetes        Visit Vitals  /68 (BP 1 Location: Right upper arm, BP Patient Position: Sitting)   Pulse 92   Temp 98 °F (36.7 °C) (Oral)   Resp 16   Ht 5' 7\" (1.702 m)   Wt 264 lb 12.8 oz (120.1 kg)   SpO2 96%   BMI 41.47 kg/m²        1. Have you been to the ER, urgent care clinic since your last visit? Hospitalized since your last visit? No    2. Have you seen or consulted any other health care providers outside of the [facility] since your last visit? Include any pap smears or colon screening.  No

## 2021-07-16 NOTE — TELEPHONE ENCOUNTER
Verified patient with two types of identifiers. Patient states the way it was sent in is correct. No further questions at this time. Patient verbalized understanding and will call with any other questions.

## 2021-08-11 NOTE — TELEPHONE ENCOUNTER
Received alert for LV lead warning on 08/06/2021. Impedance gradually increasing, now at >3000 Ohms. Dr. Rosangela Calderon saw patient on 07/13/2021, reviewed device check at that time, showed proper function. Generator longevity then 11 mos. Pacing impedance 1615 then, so has almost doubled in the past month. Will forward to Dr. Rosangela Calderon for further advisement.

## 2021-08-12 NOTE — TELEPHONE ENCOUNTER
Need to recheck and confirm abnormality in person check before considering lead is fractured and need replacement  If so then get PA and lat CXR echo and office discussion visit with me afterwards

## 2021-08-13 NOTE — TELEPHONE ENCOUNTER
LVM for patient asking her to call back to make an appt to see us in the device clinic when Dr. Reginold Bumpers is here. He is off next week so asked if she was available on the 24th as we have times all day. Will wait for return call.

## 2021-08-19 NOTE — TELEPHONE ENCOUNTER
Pt stating she still cough will like refill of benzonatate. Pt states this medication is helping her cough.  Please reach out to pt if medication cant be refilled

## 2021-08-24 NOTE — PROGRESS NOTES
See scanned ICD report in Chart Review.   Chargeable visit -  Interrogation only-  Assessment of LV lead

## 2021-08-25 NOTE — PROGRESS NOTES
Medtronic left ventricular lead from September 25, 2008 with the LV tip not capturing anymore. Currently she is pacing from LV ring to RV coil with a pacing threshold 1.375 V at 0.4 ms but the pacing impedance is 247 ohms and there are 9 months left on the battery.       She will likely need a new left ventricular pacing lead when the battery is replaced    Future Appointments   Date Time Provider Lizabeth Ennis   10/13/2021  3:00 PM REMOTE1, MIRTA HAYWARD AMB   1/17/2022  2:45 PM REMOTE1, MIRTA HAYWARD AMB   4/18/2022 11:45 AM REMOTE1, MIRTA HAYWARD AMB   6/20/2022  1:00 PM ECHOTWO, MIRTA HAYWARD AMB   7/21/2022  2:20 PM PACEMAKER3, MIRTA HAYWARD AMB   7/21/2022  2:40 PM MD ADAM Fraser BS AMB

## 2021-10-13 NOTE — LETTER
10/13/2021 2:03 PM    Ms. Ervin Dumont 85 [id number]        Dear Patient,    This letter is to remind you that as of  [facility] will no longer be sending out confirmation letters for remote transmissions through the [employer]. All letters in the future will be posted in an electronic medical records format therefore we highly encourage enrollment in [employer] patient's portal. Once enrolled you will have access to any letters we send as well as appointment information that can be found in your medical record. Our EP team would contact you via phone if there are significant abnormal findings so you can discuss with Dr. Martha Green further in office. Missed transmission letters will also be digitized in [employer] but we will continue to send those out through the mail as well. How to register for [employer]:    - Visit [employer]  - Click Create an Account  - Provide your name, address, and   - You will then be asked a short series of questions to verify your identity. This helps to ensure that no one else can access your information.   - If you have any further questions, please contact our office at [phone]. If you choose not to enroll in [employer] or do not have internet access, please kindly let device clinic know and we will accommodate your preference. We are grateful for your understanding and looking forward to this new, improved and more efficient way of communication.            Warm Regards,  [facility] Staff

## 2021-10-13 NOTE — LETTER
10/13/2021 2:09 PM    Ms. Vaca Noss  [address] [facility] [phone]              This letter confirms that we have received your scheduled remote check of your implanted     device on 10-13-21 . Our EP team will contact you via phone if there are significant abnormal    findings. Your next remote check from home is scheduled for 1-17-22 . If you have any questions, please call [address] [place] [address] at [phone].                Sincerely,    Tonja Davalos MD [facility] - [place], [place]

## 2021-10-13 NOTE — LETTER
10/13/2021 2:08 PM    Ms. Luis Alberto Dumont 85 [phone]      Dear Patient,    This letter is to remind you that as of  [facility] will no longer be sending out confirmation letters for remote transmissions through the [employer]. All letters in the future will be posted in an electronic medical records format therefore we highly encourage enrollment in [email or web address] patient's portal. Once enrolled you will have access to any letters we send as well as appointment information that can be found in your medical record. Our EP team would contact you via phone if there are significant abnormal findings so you can discuss with Dr. Eduar Meraz further in office. Missed transmission letters will also be digitized in [email or web address] but we will continue to send those out through the mail as well. How to register for [email or web address]:    - Visit [email or web address]  - Click Create an Account  - Provide your name, address, and   - You will then be asked a short series of questions to verify your identity. This helps to ensure that no one else can access your information.   - If you have any further questions, please contact our office at [phone]. If you choose not to enroll in [email or web address] or do not have internet access, please kindly let device clinic know and we will accommodate your preference. We are grateful for your understanding and looking forward to this new, improved and more efficient way of communication.            Warm Regards,  [facility] Staff

## 2021-10-13 NOTE — LETTER
10/13/2021 2:03 PM    Ms. Erna Parish  [address] [facility] [phone]              This letter confirms that we have received your scheduled remote check of your implanted     device on 10-13-21 . Our EP team will contact you via phone if there are significant abnormal    findings. Your next remote check from home is scheduled for 1-17-22 . If you have any questions, please call [address] [place] [address] at [phone].                Sincerely,    Damaris Slaughter MD [facility] - [place]

## 2021-10-22 NOTE — TELEPHONE ENCOUNTER
Requested Prescriptions     Pending Prescriptions Disp Refills    gabapentin (NEURONTIN) 100 mg capsule 90 Capsule 0     Sig: Take 1 Capsule by mouth daily. Max Daily Amount: 100 mg.         Last Visit 07/14/21  Last Refill 07/07/21

## 2021-10-25 NOTE — TELEPHONE ENCOUNTER
Request for Bumex 2 mg- take 1 tab in the AM and 0.5 tab in the PM.  Last office visit 7/13/21, next office visit 7/21/22. Refills per verbal order from Dr. Carmita Barry.

## 2021-10-26 NOTE — TELEPHONE ENCOUNTER
Patient calling about medication refill gabapentin wants the nurse to call her back about this medication and why it hasn't been refilled

## 2021-10-26 NOTE — TELEPHONE ENCOUNTER
Patient aware Dr. Ingrid Roblero is out of the office, waiting on him to approved gabapentin. The wait time is 48 to 72 hours for medication refills.

## 2021-10-26 NOTE — TELEPHONE ENCOUNTER
Please schedule an office visit for a complete physical.    Per Dr. Cathy Ely notes from 7/14/2021    Follow-up and Dispositions    · Return in about 4 months (around 11/14/2021) for FULL PHYSICAL - 30 minutes.

## 2021-10-27 NOTE — TELEPHONE ENCOUNTER
Spoke to pt regarding gabapentin, pt reports that she will take a extra pill when she is in more pain than usual Pt has ran out of medication on 10/19/21. Pt understands that the order is only for one pill a day.  Pt is scheduled for a physical on 11/01/21 at 3:45 pm.

## 2021-10-27 NOTE — TELEPHONE ENCOUNTER
Requested Prescriptions     Pending Prescriptions Disp Refills    gabapentin (NEURONTIN) 100 mg capsule 90 Capsule 0     Sig: Take 1 Capsule by mouth daily. Max Daily Amount: 100 mg.         Last Visit 07/4/21  Last Refill 07/07/21

## 2021-10-27 NOTE — TELEPHONE ENCOUNTER
Patient would like Dr. Rafal Gutierrez nurse to call her back. The patient had questions regarding her prescriptions and also had questions regarding what does a physical appointment [place].

## 2021-10-28 NOTE — TELEPHONE ENCOUNTER
Per Gwendolyn Dowell to pt regarding gabapentin, pt reports that she will take a extra pill when she is in more pain than usual Pt has ran out of medication on 10/19/21. Pt understands that the order is only for one pill a day.  Pt is scheduled for a physical on 11/01/21 at 3:45 pm.

## 2021-11-01 NOTE — PROGRESS NOTES
Jordon Post is a 78 y.o.  female and presents with     Chief Complaint   Patient presents with    Physical    Cough     cough x 2 weeks     Patient is here for a follow-up visit. She informed that she did  the gabapentin for the restless leg syndrome. She has a mild cough. She mentioned that it is better than before since she started taking montelukast.  However the cough still gets worse at night. He is taking medication for diabetes, hypothyroidism and gout. Denies any acute flareup of gout. Past Medical History:   Diagnosis Date    Acquired hypothyroidism 8/15/2016    Anemia     RED-HF study    Asthma     Cardiomyopathy, nonischemic ([facility] [address]     initial dx 2001, bivHF 2008 with EF 15%, s/p biV-ICD 9/08, significant improvment in EF to 45-50%    CKD (chronic kidney disease)     Dr Olvin Fiore CKD (chronic kidney disease) 8/15/2012    Depression     Diabetes ([facility] [address]     Diabetic neuropathy ([facility] [address]     DM (diabetes mellitus) ([facility] [address] 8/15/2012    GERD (gastroesophageal reflux disease)     Gout     Hypothyroidism     ICD (implantable cardioverter-defibrillator), biventricular, in situ 6/5/2014    Psoriasis      Past Surgical History:   Procedure Laterality Date    CARDIAC CATHETERIZATION  2007; 01/06/15    normal cors    ECHO 2D ADULT  4/2010    EF 45%, improved from 1/09 (25%)    ECHO 2D ADULT  11/2011    LVH, EF 55-60%    HX ORTHOPAEDIC      knee    HX PACEMAKER PLACEMENT      AICD    STRESS TEST LEXISCAN/CARDIOLITE  3/21/12    normal perfusion, global HK 40%     Current Outpatient Medications   Medication Sig    fluticasone propionate (FLONASE) 50 mcg/actuation nasal spray One spray each nostril daily    gabapentin (NEURONTIN) 100 mg capsule Take 1 Capsule by mouth nightly.  Max Daily Amount: 100 mg.    venlafaxine-SR (EFFEXOR-XR) 75 mg capsule TAKE 1 CAPSULE BY MOUTH EVERY DAY    cholecalciferol (VITAMIN D3) (2,000 UNITS /50 MCG) cap capsule TAKE 1 CAPSULE BY MOUTH TWO (2) TIMES A DAY.  levocetirizine (XYZAL) 5 mg tablet TAKE 1 TABLET BY MOUTH EVERY DAY    allopurinoL (ZYLOPRIM) 100 mg tablet TAKE 1 TABLET BY MOUTH EVERY DAY    carvediloL (COREG) 25 mg tablet TAKE 1 TABLET BY MOUTH TWICE A DAY WITH MEALS    montelukast (SINGULAIR) 10 mg tablet TAKE 1 TABLET BY MOUTH EVERY DAY    OTHER     hydrOXYzine HCL (ATARAX) 10 mg tablet 2 tab(s)    candesartan (ATACAND) 4 mg tablet TAKE 1 TABLET BY MOUTH EVERY DAY    gabapentin (NEURONTIN) 100 mg capsule TAKE 1 CAP BY MOUTH DAILY. MAX DAILY AMOUNT: 100 MG.  levothyroxine (SYNTHROID) 100 mcg tablet TAKE 1 TABLET BY MOUTH EVERY DAY BEFORE BREAKFAST    azelastine (ASTEPRO) 205.5 mcg (0.15 %) USE 2 SPRAYS BY BOTH NOSTRILS ROUTE TWO (2) TIMES DAILY AS NEEDED (CONGESTION)    budesonide (PULMICORT) 180 mcg/actuation aepb inhaler Take 2 Puffs by inhalation two (2) times a day.  meclizine (ANTIVERT) 25 mg tablet TAKE 1 TAB BY MOUTH THREE (3) TIMES DAILY AS NEEDED FOR DIZZINESS.  ammonium lactate (AMLACTIN) 12 % topical cream Apply  to affected area two (2) times a day. rub in to affected area well    apremilast (OTEZLA) 30 mg tab Take  by mouth two (2) times a day.  insulin NPH/insulin regular (NOVOLIN 70/30) 100 unit/mL (70-30) injection 70 units two times a day    calcipotriene (DOVONEX) 0.005 % topical cream Apply  to affected area three (3) times daily.  triamcinolone acetonide (KENALOG) 0.5 % ointment Apply  to affected area two (2) times a day. use thin layer    multivitamin (ONE A DAY) tablet Take 1 Tab by mouth daily.  DOCUSATE CALCIUM (STOOL SOFTENER PO) Take 1 tablet by mouth daily.  EPINEPHrine (EPIPEN) 0.3 mg/0.3 mL injection 0.3 mL by IntraMUSCular route once as needed for 1 dose.  ferrous sulfate (IRON) 325 mg (65 mg elemental iron) tablet Take  by mouth three (3) times daily (with meals).  aspirin 81 mg tablet Take 81 mg by mouth daily.     calcitRIOL (ROCALTROL) 0.5 mcg capsule TAKE 1 CAPSULE BY MOUTH EVERY DAY     No current facility-administered medications for this visit. Health Maintenance   Topic Date Due    Colorectal Cancer Screening Combo  12/10/2015    Eye Exam Retinal or Dilated  07/10/2016    Foot Exam Q1  06/07/2019    Medicare Yearly Exam  04/30/2020    MICROALBUMIN Q1  04/30/2020    Pneumococcal 65+ yrs at Risk Vaccine (2 of 2 - PPSV23) 04/30/2020    Breast Cancer Screen Mammogram  04/30/2021    COVID-19 Vaccine (3 - Pfizer booster) 10/05/2021    Shingrix Vaccine Age 50> (1 of 2) 02/06/2022 (Originally 7/9/2004)    Flu Vaccine (1) 06/30/2022 (Originally 9/1/2021)    Bone Densitometry (Dexa) Screening  07/14/2022 (Originally 7/9/2019)    A1C test (Diabetic or Prediabetic)  07/13/2022    Lipid Screen  07/13/2022    DTaP/Tdap/Td series (2 - Td or Tdap) 10/19/2027    Hepatitis C Screening  Addressed     Immunization History   Administered Date(s) Administered    COVID-19, PFIZER, MRNA, LNP-S, PF, 30MCG/0.3ML DOSE 03/15/2021, 04/05/2021    H1N1 Influenza Virus Vaccine 01/20/2010    Influenza Vaccine 10/01/2015    Influenza Vaccine Split 09/17/2010, 10/17/2012    Pneumococcal Conjugate (PCV-13) 04/30/2019    TB Skin Test (PPD) 01/01/2008     No LMP recorded. Patient is postmenopausal.        Allergies and Intolerances: Allergies   Allergen Reactions    Ciprofloxacin Anaphylaxis    Shellfish Derived Anaphylaxis    Ace Inhibitors Unknown (comments)    Biaxin [Clarithromycin] Other (comments)     Metal taste    Candesartan Cough    Pcn [Penicillins] Hives       Family History:   Family History   Problem Relation Age of Onset    Heart Disease Mother     Hypertension Mother     Lupus Sister     Diabetes Brother        Social History:   She  reports that she has never smoked. She has never used smokeless tobacco.  She  reports no history of alcohol use.             Review of Systems:   General: negative for - chills, fatigue, fever, weight change  Psych: negative for - anxiety, depression, irritability or mood swings  ENT: negative for - headaches, hearing change, nasal congestion, oral lesions, sneezing or sore throat  Heme/ Lymph: negative for - bleeding problems, bruising, pallor or swollen lymph nodes  Endo: negative for - hot flashes, polydipsia/polyuria or temperature intolerance  Resp: negative for - cough, shortness of breath or wheezing  CV: negative for - chest pain, edema or palpitations  GI: negative for - abdominal pain, change in bowel habits, constipation, diarrhea or nausea/vomiting  : negative for - dysuria, hematuria, incontinence, pelvic pain or vulvar/vaginal symptoms  MSK: negative for - joint pain, joint swelling or muscle pain  Neuro: negative for - confusion, headaches, seizures or weakness  Derm: negative for - dry skin, hair changes, rash or skin lesion changes          Physical:   Vitals:   Vitals:    11/01/21 1550   BP: 112/72   Pulse: (!) 103   Resp: 18   Temp: 99.4 °F (37.4 °C)   TempSrc: Oral   SpO2: 94%   Weight: 255 lb 6.4 oz (115.8 kg)   Height: 5' 7\" (1.702 m)           Exam:   HEENT- atraumatic,normocephalic, awake, oriented, well nourished  Neck - supple,no enlarged lymph nodes, no JVD, no thyromegaly  Chest- CTA, no rhonchi, no crackles  Heart- rrr, no murmurs / gallop/rub  Abdomen- soft,BS+,NT, no hepatosplenomegaly  Ext - no c/c/edema   Neuro- no focal deficits. Power 5/5 all extremities  Skin - warm,dry, no obvious rashes.           Review of Data:   LABS:   Lab Results   Component Value Date/Time    WBC 10.0 11/05/2020 09:11 AM    HGB 10.6 (L) 11/05/2020 09:11 AM    HCT 37.1 11/05/2020 09:11 AM    PLATELET 911 84/64/4552 09:11 AM     Lab Results   Component Value Date/Time    Sodium 137 07/13/2021 04:00 PM    Potassium 4.9 07/13/2021 04:00 PM    Chloride 100 07/13/2021 04:00 PM    CO2 29 07/13/2021 04:00 PM    Glucose 291 (H) 07/13/2021 04:00 PM    BUN 32 (H) 07/13/2021 04:00 PM    Creatinine 2.51 (H) 07/13/2021 04:00 PM Lab Results   Component Value Date/Time    Cholesterol, total 282 (H) 07/13/2021 04:00 PM    HDL Cholesterol 65 07/13/2021 04:00 PM    LDL, calculated 178.2 (H) 07/13/2021 04:00 PM    Triglyceride 194 (H) 07/13/2021 04:00 PM     No components found for: GPT        Impression / Plan:        ICD-10-CM ICD-9-CM    1. Encounter for immunization  Z23 V03.89 PNEUMOCOCCAL POLYSACCHARIDE VACCINE, 23-VALENT, ADULT OR IMMUNOSUPPRESSED PT DOSE,   2. Encounter for screening mammogram for malignant neoplasm of breast  Z12.31 V76.12 REGINA 3D ALBERTO W MAMMO BI SCREENING INCL CAD   3. Type 2 diabetes with nephropathy (HCC)  E11.21 250.40 HEMOGLOBIN A1C WITH EAG     583.81    4. Vitamin D deficiency  E55.9 268.9 VITAMIN D, 25 HYDROXY   5. Hypertension, unspecified type  I10 401.9    6. Acquired hypothyroidism  E03.9 244.9 TSH 3RD GENERATION      T4, FREE   7. Hyperuricemia  E79.0 790.6    8. Hypercholesteremia  E78.00 272.0    9. Medicare annual wellness visit, initial  Z00.00 V70.0 LIPID PANEL      METABOLIC PANEL, COMPREHENSIVE   10. Cough  R05.9 786.2    11. Seasonal allergic rhinitis, unspecified trigger  J30.2 477.9 fluticasone propionate (FLONASE) 50 mcg/actuation nasal spray      REFERRAL TO ENT-OTOLARYNGOLOGY         Explained to patient risk benefits of the medications. Advised patient to stop meds if having any side effects. Pt verbalized understanding of the instructions. I have discussed the diagnosis with the patient and the intended plan as seen in the above orders. The patient has received an after-visit summary and questions were answered concerning future plans. I have discussed medication side effects and warnings with the patient as well. I have reviewed the plan of care with the patient, accepted their input and they are in agreement with the treatment goals. Reviewed plan of care. Patient has provided input and agrees with goals.         Alyse Kohler, MD      ---------------------------------------------------------------------------------------------------------------------------------------------------------------------------    Leo Price) The Initial Medicare Annual Wellness Exam PROGRESS NOTE    This is an Initial Medicare Annual Wellness Exam (AWV) (Performed 12 months after IPPE or effective date of Medicare Part B enrollment, Once in a lifetime)    I have reviewed the patient's medical history in detail and updated the computerized patient record. Billy Ramirez is a 78 y.o.  female and presents for an annual wellness exam         ROS   Negative except mild cough, allergies    All other systems reviewed and are negative.     History     Past Medical History:   Diagnosis Date    Acquired hypothyroidism 8/15/2016    Anemia     RED-HF study    Asthma     Cardiomyopathy, nonischemic ([facility] [address]     initial dx 2001, bivHF 2008 with EF 15%, s/p biV-ICD 9/08, significant improvment in EF to 45-50%    CKD (chronic kidney disease)     Dr Soliz Slidedylan    CKD (chronic kidney disease) 8/15/2012    Depression     Diabetes ([facility] [address]     Diabetic neuropathy ([facility] [address]     DM (diabetes mellitus) ([facility] [address] 8/15/2012    GERD (gastroesophageal reflux disease)     Gout     Hypothyroidism     ICD (implantable cardioverter-defibrillator), biventricular, in situ 6/5/2014    Psoriasis       Past Surgical History:   Procedure Laterality Date    CARDIAC CATHETERIZATION  2007; 01/06/15    normal cors    ECHO 2D ADULT  4/2010    EF 45%, improved from 1/09 (25%)    ECHO 2D ADULT  11/2011    LVH, EF 55-60%    HX ORTHOPAEDIC      knee    HX PACEMAKER PLACEMENT      AICD    STRESS TEST LEXISCAN/CARDIOLITE  3/21/12    normal perfusion, global HK 40%     Current Outpatient Medications   Medication Sig Dispense Refill    fluticasone propionate (FLONASE) 50 mcg/actuation nasal spray One spray each nostril daily 1 Each 3    gabapentin (NEURONTIN) 100 mg capsule Take 1 Capsule by mouth nightly. Max Daily Amount: 100 mg. 30 Capsule 0    venlafaxine-SR (EFFEXOR-XR) 75 mg capsule TAKE 1 CAPSULE BY MOUTH EVERY DAY 90 Capsule 0    cholecalciferol (VITAMIN D3) (2,000 UNITS /50 MCG) cap capsule TAKE 1 CAPSULE BY MOUTH TWO (2) TIMES A DAY. 180 Capsule 0    levocetirizine (XYZAL) 5 mg tablet TAKE 1 TABLET BY MOUTH EVERY DAY 90 Tablet 0    allopurinoL (ZYLOPRIM) 100 mg tablet TAKE 1 TABLET BY MOUTH EVERY DAY 90 Tablet 0    carvediloL (COREG) 25 mg tablet TAKE 1 TABLET BY MOUTH TWICE A DAY WITH MEALS 180 Tablet 0    montelukast (SINGULAIR) 10 mg tablet TAKE 1 TABLET BY MOUTH EVERY DAY 90 Tablet 0    OTHER       hydrOXYzine HCL (ATARAX) 10 mg tablet 2 tab(s)      candesartan (ATACAND) 4 mg tablet TAKE 1 TABLET BY MOUTH EVERY DAY 90 Tablet 0    gabapentin (NEURONTIN) 100 mg capsule TAKE 1 CAP BY MOUTH DAILY. MAX DAILY AMOUNT: 100 MG. 90 Capsule 0    levothyroxine (SYNTHROID) 100 mcg tablet TAKE 1 TABLET BY MOUTH EVERY DAY BEFORE BREAKFAST 90 Tablet 1    azelastine (ASTEPRO) 205.5 mcg (0.15 %) USE 2 SPRAYS BY BOTH NOSTRILS ROUTE TWO (2) TIMES DAILY AS NEEDED (CONGESTION) 90 Spray 1    budesonide (PULMICORT) 180 mcg/actuation aepb inhaler Take 2 Puffs by inhalation two (2) times a day. 2 Inhaler 5    meclizine (ANTIVERT) 25 mg tablet TAKE 1 TAB BY MOUTH THREE (3) TIMES DAILY AS NEEDED FOR DIZZINESS. 20 Tab 3    ammonium lactate (AMLACTIN) 12 % topical cream Apply  to affected area two (2) times a day. rub in to affected area well 280 g 2    apremilast (OTEZLA) 30 mg tab Take  by mouth two (2) times a day.  insulin NPH/insulin regular (NOVOLIN 70/30) 100 unit/mL (70-30) injection 70 units two times a day 10 mL 3    calcipotriene (DOVONEX) 0.005 % topical cream Apply  to affected area three (3) times daily.  triamcinolone acetonide (KENALOG) 0.5 % ointment Apply  to affected area two (2) times a day.  use thin layer      multivitamin (ONE A DAY) tablet Take 1 Tab by mouth daily.  DOCUSATE CALCIUM (STOOL SOFTENER PO) Take 1 tablet by mouth daily.  EPINEPHrine (EPIPEN) 0.3 mg/0.3 mL injection 0.3 mL by IntraMUSCular route once as needed for 1 dose. 1 Each 3    ferrous sulfate (IRON) 325 mg (65 mg elemental iron) tablet Take  by mouth three (3) times daily (with meals).  aspirin 81 mg tablet Take 81 mg by mouth daily.       calcitRIOL (ROCALTROL) 0.5 mcg capsule TAKE 1 CAPSULE BY MOUTH EVERY DAY 90 Capsule 0     Allergies   Allergen Reactions    Ciprofloxacin Anaphylaxis    Shellfish Derived Anaphylaxis    Ace Inhibitors Unknown (comments)    Biaxin [Clarithromycin] Other (comments)     Metal taste    Candesartan Cough    Pcn [Penicillins] Hives     Family History   Problem Relation Age of Onset    Heart Disease Mother     Hypertension Mother    [employer] Lupus Sister     Diabetes Brother      Social History     Tobacco Use    Smoking status: Never Smoker    Smokeless tobacco: Never Used   Substance Use Topics    Alcohol use: No     Patient Active Problem List   Diagnosis Code    Anemia in chronic renal disease N18.9, D63.1    HTN (hypertension) I10    GERD (gastroesophageal reflux disease) K21.9    Gout M10.9    Pulmonary HTN (HCC) I27.20    Cardiomyopathy, nonischemic (HCC) I42.8    CKD (chronic kidney disease) N18.9    Dyslipidemia E78.5    ICD (implantable cardioverter-defibrillator), biventricular, in situ Z95.810    Acquired hypothyroidism E03.9    Dysthymia F34.1    Obesity, morbid ([address] E66.01    Type 2 diabetes with nephropathy ([address] E11.21    Type 2 diabetes mellitus with diabetic neuropathy ([address] E11.40       Health Maintenance History  Immunizations reviewed, dtap allergic to the vaccine , pneumovax - never, flu- allergic to , zoster- refused  colonoscopy:refused  Eye exam:- sees Ophthal  Mammo - 2019          Depression Risk Factor Screening:      Patient Health Questionnaire (PHQ-2)   Over the last 2 weeks, how often have you been bothered by any of the following problems? · Little interest or pleasure in doing things? · Not at all. [0]  · Feeling down, depressed, or hopeless? · Not at all. [0]    Total Score: 0/6  PHQ-2 Assessment Scoring:   A score of 2 or more requires further screening with the PHQ-9    Alcohol Risk Factor Screening:     Women: On any occasion during the past 3 months, have you had more than 3 drinks containing alcohol? Do you average more than 7 drinks per week? Men: On any occasion during the past 3 months, have you had more than 4 drinks containing alcohol? Do you average more than 14 drinks per week? Functional Ability and Level of Safety:     Hearing Loss    Hearing is good. Activities of Daily Living   Self-care. Requires assistance with: no ADLs    Fall Risk   No fall risk factors    Abuse Screen   None    Examination   Physical Examination  Vitals:    11/01/21 1550   BP: 112/72   Pulse: (!) 103   Resp: 18   Temp: 99.4 °F (37.4 °C)   TempSrc: Oral   SpO2: 94%   Weight: 255 lb 6.4 oz (115.8 kg)   Height: 5' 7\" (1.702 m)   PainSc:   0 - No pain      Body mass index is 40 kg/m².      Evaluation of Cognitive Function:  Mood/affect:good  Appearance:normal  Family member/caregiver input:none    alert, well appearing, and in no distress    Patient Care Team:  Norvel Mcburney, MD as PCP - General (Internal Medicine)  Norvel Mcburney, MD as PCP - [facility] Empaneled Provider  Tia Nogueira MD as Consulting Provider (Internal Medicine)  Rox Glaser MD (Dermatology)  Popeye Caldwell MD (Nephrology)  Javan Teran MD as Consulting Provider (Cardiology)  Dodie Licea MD (Cardiology)    End-of-life planning  Advanced Directive in the case than an injury or illness causes the patient to be unable to make health care decisions    Health Care Directive or Living Will: yes    Advice/Referrals/Counselling/Plan:   Education and counseling provided:  Are appropriate based on today's review and evaluation  Include in education list (weight loss, physical activity, smoking cessation, fall prevention, and nutrition)  current treatment plan is effective, no change in therapy. Brief written plan, checklist    I have discussed the diagnosis with the patient and the intended plan as seen in the above orders. The patient has received an after-visit summary and questions were answered concerning future plans. I have discussed medication side effects and warnings with the patient as well. I have reviewed the plan of care with the patient, accepted their input and they are in agreement with the treatment goals.                ____________________________________________________________

## 2021-11-01 NOTE — PROGRESS NOTES
Chief Complaint   Patient presents with    Physical    Cough     cough x 2 weeks        Visit Vitals  /72 (BP 1 Location: Left upper arm, BP Patient Position: Sitting)   Pulse (!) 103   Temp 99.4 °F (37.4 °C) (Oral)   Resp 18   Ht 5' 7\" (1.702 m)   Wt 255 lb 6.4 oz (115.8 kg)   SpO2 94%   BMI 40.00 kg/m²        1. Have you been to the ER, urgent care clinic since your last visit? Hospitalized since your last visit? No    2. Have you seen or consulted any other health care providers outside of the [address] [place] [address] since your last visit? Include any pap smears or colon screening.  No

## 2021-11-04 NOTE — TELEPHONE ENCOUNTER
Patient said she would like a refill of her water pill perscription. Pt said she forgot the name but it starts with a B.

## 2021-11-04 NOTE — TELEPHONE ENCOUNTER
Called and left a message for the patient- unsure of what medication patient is wanting refilled.  Needing the patient to call and let us know

## 2021-11-04 NOTE — TELEPHONE ENCOUNTER
From: Erna Parish  To: Madeleine Hernandez MD  Sent: 11/4/2021 8:14 AM EDT  Subject: Please fix the my prescription for bumetanide. Please fix the my prescription for bumetanide. I take 2mg tablet in day and 2 mg tablet at night. My after visit notes said stop taking bumetanide. I have no more of them. This is my heart medication that's needed everyday. Please refill.  Thanks

## 2021-11-04 NOTE — TELEPHONE ENCOUNTER
Pt sent a [employer] message re the prescription for Bumetanide. She needs a refill and needs the OV not updated.

## 2021-11-04 NOTE — TELEPHONE ENCOUNTER
Requested Prescriptions     Pending Prescriptions Disp Refills    bumetanide (BUMEX) 2 mg tablet 135 Tablet 1     Sig: Take one tablet by mouth every morning and  every evening        Last Visit 11/1/21  Last Refill 10/25/21

## 2021-11-04 NOTE — TELEPHONE ENCOUNTER
Requested Prescriptions     Pending Prescriptions Disp Refills    bumetanide (BUMEX) 2 mg tablet 135 Tablet 0     Sig: Take one tablet by mouth every morning and  every evening        Last Visit 11/1/21  Last Refill 10/25/21

## 2021-11-05 NOTE — TELEPHONE ENCOUNTER
Patient request refill, patient states she is completely out of her prescription for bumatanide Bumex 2.0mg        [facility] pharmacy [phone]

## 2021-11-05 NOTE — TELEPHONE ENCOUNTER
Verified patient with two types of identifiers. Notified patient that a refill was sent in on 10/25/21. Patient reports pharmacy never received prescription. Also noted that Dr. Roc Kennedy recent d/c due to not being a current medication. Patient states she is still taking Bumex. Refill VO per Dr. Fritz Tsai. Verified pharmacy. Patient verbalized understanding and will call with any other questions.       Future Appointments   Date Time Provider Lizabeth Ennis   1/17/2022  2:45 PM Jayme Jones BS AMB   4/18/2022 11:45 AM [email or web address] BS AMB   6/20/2022  1:00 PM [email or web address] BS AMB   7/21/2022  2:20 PM [email or web address], MIRTA HAYWARD AMB   7/21/2022  2:40 PM MD ADAM Buck BS AMB

## 2021-12-06 PROBLEM — I50.9 CHF EXACERBATION (HCC): Status: ACTIVE | Noted: 2021-01-01

## 2021-12-06 NOTE — ED TRIAGE NOTES
Triage: pt c/o cough and SOB starting x2 weeks ago. +COVID vaccinated. Denies fever, N/V/D, nasal congestion, chest pain.

## 2021-12-06 NOTE — PROGRESS NOTES
1850: TRANSFER - IN REPORT:    Verbal report received from Levy(name) on Pilar Mitts A Claudia Bulla  being received from [facility](unit) for routine progression of care      Report consisted of patients Situation, Background, Assessment and   Recommendations(SBAR). Information from the following report(s) SBAR, Kardex, Intake/Output, MAR, Recent Results and Med Rec Status was reviewed with the receiving nurse. Opportunity for questions and clarification was provided. Asked RN at [facility] to inquire about performing rapid covid test prior to transfer to [facility]. Assessment completed upon patients arrival to unit and care assumed. 2005: Bedside and Verbal shift change report given to Funmi Hoang RN (oncoming nurse) by Meir Cotto RN (offgoing nurse). Report included the following information SBAR, Kardex, Intake/Output, MAR, Recent Results and Med Rec Status.

## 2021-12-06 NOTE — ED NOTES
TRANSFER - OUT REPORT:    Verbal report given to ARTURO DIALLO(name) on Cynthia Fischer  being transferred to [facility](unit) for routine progression of care       Report consisted of patients Situation, Background, Assessment and   Recommendations(SBAR). Information from the following report(s) SBAR, Kardex, ED Summary, [facility] and Recent Results was reviewed with the receiving nurse. Lines:   Peripheral IV 12/06/21 Anterior; Distal; Left Forearm (Active)        Opportunity for questions and clarification was provided.       Patient transported with:   Monitor

## 2021-12-06 NOTE — ED PROVIDER NOTES
Patient is a 42-year-old female with multiple comorbidities who presents with 2 weeks of increasing dyspnea on exertion and shortness of breath. States she has been on multiple therapeutics for her symptoms which include steroid and fluid pill, all without relief. She states her weight has been stable she weighed herself every day and has not noticed a weight gain. Denies any fever, chest pain, vomiting, diarrhea, bloody bowel movements, syncope, leg pain or swelling, pleurisy. She has been vaccinated against Covid. Exertion seems to make her symptoms worse, rest makes it better. She does not wear oxygen at home at baseline. Was found to be 91% at triage and therefore placed on 2 L by nasal cannula. No other complaints today. Cough  Associated symptoms include shortness of breath (see HPI). Pertinent negatives include no chest pain, no chills, no headaches and no vomiting.         Past Medical History:   Diagnosis Date    Acquired hypothyroidism 8/15/2016    Anemia     RED-HF study    Asthma     Cardiomyopathy, nonischemic ([facility] [address]     initial dx 2001, bivHF 2008 with EF 15%, s/p biV-ICD 9/08, significant improvment in EF to 45-50%    CKD (chronic kidney disease)     Dr Tosin Quiros    CKD (chronic kidney disease) 8/15/2012    Depression     Diabetes ([address]     Diabetic neuropathy ([address]     DM (diabetes mellitus) ([address] 8/15/2012    GERD (gastroesophageal reflux disease)     Gout     Hypothyroidism     ICD (implantable cardioverter-defibrillator), biventricular, in situ 6/5/2014    Psoriasis        Past Surgical History:   Procedure Laterality Date    CARDIAC CATHETERIZATION  2007; 01/06/15    normal cors    ECHO 2D ADULT  4/2010    EF 45%, improved from 1/09 (25%)    ECHO 2D ADULT  11/2011    LVH, EF 55-60%    HX ORTHOPAEDIC      knee    HX PACEMAKER PLACEMENT      AICD    STRESS TEST LEXISCAN/CARDIOLITE  3/21/12    normal perfusion, global HK 40%         Family History:   Problem Relation Age of Onset    Heart Disease Mother     Hypertension Mother    Floydene Ada Lupus Sister     Diabetes Brother        Social History     Socioeconomic History    Marital status:      Spouse name: Not on file    Number of children: Not on file    Years of education: Not on file    Highest education level: Not on file   Occupational History    Not on file   Tobacco Use    Smoking status: Never Smoker    Smokeless tobacco: Never Used   Substance and Sexual Activity    Alcohol use: No    Drug use: No    Sexual activity: Never   Other Topics Concern    Not on file   Social History Narrative    . Nonsmoker. Disability     Social Determinants of Health     Financial Resource Strain:     Difficulty of Paying Living Expenses: Not on file   Food Insecurity:     Worried About Running Out of Food in the Last Year: Not on file    Gladis of Food in the Last Year: Not on file   Transportation Needs:     Lack of Transportation (Medical): Not on file    Lack of Transportation (Non-Medical):  Not on file   Physical Activity:     Days of Exercise per Week: Not on file    Minutes of Exercise per Session: Not on file   Stress:     Feeling of Stress : Not on file   Social Connections:     Frequency of Communication with Friends and Family: Not on file    Frequency of Social Gatherings with Friends and Family: Not on file    Attends Caodaism Services: Not on file    Active Member of [address] [place] [address] [employer] or Organizations: Not on file    Attends Club or Organization Meetings: Not on file    Marital Status: Not on file   Intimate Partner Violence:     Fear of Current or Ex-Partner: Not on file    Emotionally Abused: Not on file    Physically Abused: Not on file    Sexually Abused: Not on file   Housing Stability:     Unable to Pay for Housing in the Last Year: Not on file    Number of [place] in the Last Year: Not on file    Unstable Housing in the Last Year: Not on file         ALLERGIES: Ciprofloxacin, Shellfish derived, Ace inhibitors, Biaxin [clarithromycin], Candesartan, and Pcn [penicillins]    Review of Systems   Constitutional: Positive for fatigue. Negative for chills and fever. Respiratory: Positive for cough and shortness of breath (see HPI). Cardiovascular: Negative for chest pain and leg swelling. Gastrointestinal: Negative for abdominal pain, blood in stool and vomiting. Neurological: Negative for syncope and headaches. All other systems reviewed and are negative. Vitals:    12/06/21 1429   BP: 93/68   Pulse: (!) 103   Resp: 24   Temp: 98.8 °F (37.1 °C)   SpO2: 91%            Physical Exam  Vitals and nursing note reviewed. Constitutional:       General: She is not in acute distress. Appearance: She is well-developed. She is obese. HENT:      Head: Normocephalic and atraumatic. Eyes:      Conjunctiva/sclera: Conjunctivae normal.   Cardiovascular:      Rate and Rhythm: Normal rate and regular rhythm. Comments: Paced on monitor    Pulmonary:      Effort: Tachypnea and accessory muscle usage present. No respiratory distress. Abdominal:      Palpations: Abdomen is soft. Tenderness: There is no abdominal tenderness. There is no guarding. Musculoskeletal:         General: Normal range of motion. Cervical back: Normal range of motion and neck supple. Right lower leg: Edema (trace) present. Left lower leg: Edema (trace) present. Skin:     General: Skin is warm and dry. Neurological:      Mental Status: She is alert and oriented to person, place, and time. Motor: No abnormal muscle tone. [facility]       Procedures      Perfect Serve Consult for Admission  3:55 PM    ED Room Number: SER02/02  Patient Name and age:  Rosey Oppenheim 78 y.o.  female  Working Diagnosis:   1. Acute on chronic congestive heart failure, unspecified heart failure type ([address]    2.  Dyspnea on minimal exertion        COVID-19 Suspicion:  no  Sepsis present:  no Reassessment needed: no  Code Status:  Full Code  Readmission: no  Isolation Requirements:  no  Recommended Level of Care:  telemetry  Department:[place] [facility] - [phone]  Other:  Bumex 1 mg initiated.

## 2021-12-07 NOTE — PROGRESS NOTES
Problem: Self Care Deficits Care Plan (Adult)  Goal: *Acute Goals and Plan of Care (Insert Text)  Description: FUNCTIONAL STATUS PRIOR TO ADMISSION: Patient was modified independent using a single point cane for functional mobility. Patient required moderate assistance for basic and instrumental ADLs from spouse at baseline for LB dressing and bathing. Fairly sedentary lifestyle per patient, not driving. HOME SUPPORT: The patient lived with spouse and required moderate assistance  for ADLs and IADLs. Occupational Therapy Goals  Initiated 12/7/2021  1. Patient will perform grooming at sink with supervision/set-up within 7 day(s). 2.  Patient will perform bathing with MOD A within 7 day(s). 3.  Patient will perform lower body dressing with moderate assistance  within 7 day(s). 4.  Patient will perform toilet transfers with supervision/set-up within 7 day(s). 5.  Patient will perform all aspects of toileting with supervision/set-up within 7 day(s). 6.  Patient will participate in upper extremity therapeutic exercise/activities with supervision/set-up for 5 minutes within 7 day(s). 7.  Patient will utilize energy conservation techniques during functional activities with verbal cues within 7 day(s). Outcome: Progressing Towards Goal     OCCUPATIONAL THERAPY EVALUATION  Patient: Ervin Sanchez (2 y.o. female)  Date: 12/7/2021  Primary Diagnosis: CHF exacerbation ([facility] [I50.9]        Precautions:        ASSESSMENT  Based on the objective data described below, the patient presents with fair activity tolerance, endurance and strength s/p admission with CHF and SOB, currently titrated to 1L to maintain SPO2 >92% with activity, tolerated up out of chair and seated ADls with overall Setup/SBA, decreased endurance with ADls with HAILE, patient with good compensatory methods already in place, recommend 12 more OT sessions and HH OT at this time with husbands A.     Current Level of Function Impacting Discharge (ADLs/self-care): SBA up to  mod A for LB ADLs    Functional Outcome Measure: The patient scored Total: 55/100 on the Barthel Index outcome measure which is indicative of being below baseline in basic self-care. Other factors to consider for discharge: sedentary lifestyle per patient     Patient will benefit from skilled therapy intervention to address the above noted impairments. PLAN :  Recommendations and Planned Interventions: self care training, functional mobility training, therapeutic exercise, balance training, therapeutic activities, endurance activities, patient education, and home safety training    Frequency/Duration: Patient will be followed by occupational therapy 3 times a week to address goals. Recommendation for discharge: (in order for the patient to meet his/her long term goals)  Occupational therapy at least 2 days/week in the home AND ensure assist and/or supervision for safety with ADLs    This discharge recommendation:  Has not yet been discussed the attending provider and/or case management    IF patient discharges home will need the following DME: shower chair       SUBJECTIVE:   Patient stated my  helps me with everything.     OBJECTIVE DATA SUMMARY:   HISTORY:   Past Medical History:   Diagnosis Date    Acquired hypothyroidism 8/15/2016    Anemia     RED-HF study    Asthma     Cardiomyopathy, nonischemic ([facility] [address]     initial dx 2001, bivHF 2008 with EF 15%, s/p biV-ICD 9/08, significant improvment in EF to 45-50%    CKD (chronic kidney disease)     Dr Darrin Roberts    CKD (chronic kidney disease) 8/15/2012    Depression     Diabetes ([address]     Diabetic neuropathy ([facility] [address]     DM (diabetes mellitus) ([facility] [address] 8/15/2012    GERD (gastroesophageal reflux disease)     Gout     Hypothyroidism     ICD (implantable cardioverter-defibrillator), biventricular, in situ 6/5/2014    Psoriasis      Past Surgical History:   Procedure Laterality Date    CARDIAC CATHETERIZATION  2007; 01/06/15    normal cors    ECHO 2D ADULT  4/2010    EF 45%, improved from 1/09 (25%)    ECHO 2D ADULT  11/2011    LVH, EF 55-60%    HX ORTHOPAEDIC      knee    HX PACEMAKER PLACEMENT      AICD    STRESS TEST LEXISCAN/CARDIOLITE  3/21/12    normal perfusion, global HK 40%       Expanded or extensive additional review of patient history:     Home Situation  Home Environment: Private residence  # Steps to Enter: 4  Rails to Enter: Yes  [employer] : Right  One/Two Story Residence: One story  Living Alone: No  Support Systems: Spouse/Significant Other  Current DME Used/Available at Home: Cane, straight  Tub or Shower Type: Shower    Hand dominance: Right    EXAMINATION OF PERFORMANCE DEFICITS:  Cognitive/Behavioral Status:  Neurologic State: Alert; Appropriate for age  Orientation Level: Oriented X4  Cognition: Appropriate decision making; Appropriate for age attention/concentration; Appropriate safety awareness  Perception: Appears intact          Skin: intact    Edema: mild    Hearing: Auditory  Auditory Impairment: None    Vision/Perceptual:                           Acuity: Within Defined Limits; Impaired near vision; Impaired far vision         Range of Motion:  B UE  AROM: Generally decreased, functional                         Strength:  B UE  Strength: Generally decreased, functional                Coordination:     Fine Motor Skills-Upper: Left Intact; Right Intact         Tone & Sensation:  B UE  Tone: Normal  Sensation: Impaired (neuropathy)                      Balance:  Sitting: Intact  Standing: Intact    Functional Mobility and Transfers for ADLs:  Bed Mobility:  Supine to Sit:  (up in chair up arrival)    Transfers:  Sit to Stand: Stand-by assistance  Stand to Sit: Stand-by assistance  Bed to Chair: Stand-by assistance  Bathroom Mobility: Stand-by assistance  Toilet Transfer : Stand-by assistance    ADL Assessment:  Feeding: Modified independent;  Additional time    Oral Facial Hygiene/Grooming: Setup; Additional time    Bathing: Minimum assistance; Additional time    Upper Body Dressing: Setup; Additional time    Lower Body Dressing: Minimum assistance; Additional time    Toileting: Contact guard assistance; Additional time         Completed OT evaluation and ADLs seated EOB and standing as able with HHA for balance. Educated on safety and endurance training with encouragement for full participation in ADLs while in hospital. Good understanding noted. ADL Intervention and task modifications:  Feeding  Feeding Assistance: Independent    Grooming  Position Performed: Seated in chair  Washing Face: Set-up; Compensatory technique training  Washing Hands: Set-up  Brushing Teeth: Set-up  Cues: Visual/perceptual training/retraining; Verbal cues provided       Patient instructed and indicated understanding the benefits of maintaining activity tolerance, functional mobility, and independence with self care tasks during acute stay  to ensure safe return home and to baseline. Encouraged patient to increase frequency and duration OOB, be out of bed for all meals, perform daily ADLs (as approved by RN/MD regarding bathing etc), and performing functional mobility to/from bathroom. Patient instructed and indicated understanding home modifications (ie raise height of ADL objects, appropriate chair heights, recliner safety), safety (ie change of floor surfaces, clear pathways), to increase independence and fall prevention with SPC. Lower Body Dressing Assistance  Socks: Set-up; Compensatory technique training  Position Performed: Seated in chair  Cues: Visual/perceptual training/retraining;  Sharon Benz    Toileting  Bladder Hygiene: Stand-by assistance  Cues: Verbal cues provided; Visual/perceptual training/retraining         Therapeutic Exercise:     Functional Measure:    Barthel Index:  Bathin  Bladder: 10  Bowels: 10  Groomin  Dressin  Feeding: 10  Mobility: 0  Stairs: 0  Toilet Use: 5  Transfer (Bed to Chair and Back): 10  Total: 55/100      The Barthel ADL Index: Guidelines  1. The index should be used as a record of what a patient does, not as a record of what a patient could do. 2. The main aim is to establish degree of independence from any help, physical or verbal, however minor and for whatever reason. 3. The need for supervision renders the patient not independent. 4. A patient's performance should be established using the best available evidence. Asking the patient, friends/relatives and nurses are the usual sources, but direct observation and common sense are also important. However direct testing is not needed. 5. Usually the patient's performance over the preceding 24-48 hours is important, but occasionally longer periods will be relevant. 6. Middle categories imply that the patient supplies over 50 per cent of the effort. 7. Use of aids to be independent is allowed. Score Interpretation (from [address] [address])    Independent   60-79 Minimally independent   40-59 Partially dependent   20-39 Very dependent   <20 Totally dependent     -Javier Mariano., Barthel, D.W. (1965). Functional evaluation: the Barthel Index. [facility] ([address] (1997). The Barthel activities of daily living index: self-reporting versus actual performance in the old (> or = 75 years). Journal [address] 45(7), [facility], ..M.F, Hank Corona., Mabel Felty. (1999). Measuring the change in disability after inpatient rehabilitation; comparison of the responsiveness of the Barthel Index and Functional [place] Measure. Journal of Neurology, Neurosurgery, and Psychiatry, 66(4), 338-531. Nadya Santiago, N.J.A, JOSHUA Vitale.CYNTHIA, & Eben Valera M.A. (2004) Assessment of post-stroke quality of life in cost-effectiveness studies: The usefulness of the Barthel Index and the EuroQoL-5D.  Quality of Life Research, 13, 689-     Occupational Therapy Evaluation Charge Determination   History Examination Decision-Making   MEDIUM Complexity : Expanded review of history including physical, cognitive and psychosocial  history  MEDIUM Complexity : 3-5 performance deficits relating to physical, cognitive , or psychosocial skils that result in activity limitations and / or participation restrictions MEDIUM Complexity : Patient may present with comorbidities that affect occupational performnce. Miniml to moderate modification of tasks or assistance (eg, physical or verbal ) with assesment(s) is necessary to enable patient to complete evaluation       Based on the above components, the patient evaluation is determined to be of the following complexity level: MEDIUM  Pain Rating:  None noted    Activity Tolerance:   Fair, desaturates with exertion and requires oxygen, requires rest breaks, and observed SOB with activity    After treatment patient left in no apparent distress:    Sitting in chair and Call bell within reach    COMMUNICATION/EDUCATION:   The patients plan of care was discussed with: Physical therapist and Registered nurse. Home safety education was provided and the patient/caregiver indicated understanding., Patient/family have participated as able in goal setting and plan of care. , and Patient/family agree to work toward stated goals and plan of care. This patients plan of care is appropriate for delegation to [facility].     Thank you for this referral.  [place] Doroteo Avila OT  Time Calculation: 37 mins

## 2021-12-07 NOTE — PROGRESS NOTES
1958: Bedside and Verbal shift change report given to Trish Schmidt RN (oncoming nurse) by Modesto Dyer RN (offgoing nurse). Report included the following information SBAR, Kardex, Intake/Output, MAR, Recent Results, Med Rec Status and Cardiac Rhythm Biventricular Paced. I agree with Chano Rodgers RN's documentation as her preceptor.     Problem: Heart Failure: Day 1  Goal: Activity/Safety  Outcome: Progressing Towards Goal  Goal: Consults, if ordered  Outcome: Progressing Towards Goal  Goal: Diagnostic Test/Procedures  Outcome: Progressing Towards Goal  Goal: Nutrition/Diet  Outcome: Progressing Towards Goal  Goal: Discharge Planning  Outcome: Progressing Towards Goal  Goal: Medications  Outcome: Progressing Towards Goal  Goal: Respiratory  Outcome: Progressing Towards Goal  Goal: Treatments/Interventions/Procedures  Outcome: Progressing Towards Goal  Goal: Psychosocial  Outcome: Progressing Towards Goal  Goal: *Oxygen saturation within defined limits  Outcome: Progressing Towards Goal  Goal: *Hemodynamically stable  Outcome: Progressing Towards Goal  Goal: *Optimal pain control at patient's stated goal  Outcome: Progressing Towards Goal  Goal: *Anxiety reduced or absent  Outcome: Progressing Towards Goal

## 2021-12-07 NOTE — PROGRESS NOTES
Transition of Care Plan   RUR- Low 12%   DISPOSITION: Pending medical progress, likely home with spouse +/- home health    F/U with PCP/Specialist     Transport: Spouse    Reason for Admission:  Acute on chronic congestive heart failure                     RUR Score:   12%                  Plan for utilizing home health:      Pending PT/OT jarod, no hx of [place]    PCP: First and Last name:  Cruz Tobin MD     Name of Practice:    Are you a current patient: Yes/No: Yes   Approximate date of last visit: Nov 2021   Can you participate in a virtual visit with your PCP:                     Current Advanced Directive/Advance Care Plan: Full Code      Healthcare Decision Maker:   Click here to complete [address] including selection of the [address] Relationship (ie \"Primary\")             Primary Decision MakerMjudie Montana - Spouse - [phone]                  Transition of Care Plan:                   CM met with patient at bedside to introduce self and role. Living situation: lives with spouse in 1 story home, 4 steps to enter  ADLs: needs assistance with bathing/dressing and IADLs from spouse  DME: cane  Previous IPR/SNF: none  Previous home health: none  Demographics: confirmed  Pharm[facility] [address]  Main point of contact: Brook Cbaral [phone]    FARZANA to follow patient progress and assist as recommended with JAD plan. Care Management Interventions  PCP Verified by CM: Yes  Palliative Care Criteria Met (RRAT>21 & CHF Dx)?: No  Mode of Transport at Discharge:  Other (see comment) (spouse)  MyChart Signup: Yes  Discharge Durable Medical Equipment: No  Health Maintenance Reviewed: Yes  Physical Therapy Consult: Yes  Occupational Therapy Consult: Yes  Speech Therapy Consult: No  Support Systems: Spouse/Significant Other  Confirm Follow Up Transport: Family  Discharge Location  Discharge Placement: Home with home health    Medicare pt has received, reviewed, and signed 2nd IM letter informing them of their right to appeal the discharge. Signed copy has been placed on pt bedside chart. BELLA Izquierdo.

## 2021-12-07 NOTE — ED NOTES
Bedside verbal report given to [facility]. Transfer Assessment: Patient A&O x4 and in no distress. Physical re-examination reveals improvement in pts condition with reassessment of vital signs completed at the time of admission transfer. Verification of hospital location [facility] and room 652 completed with EMS provider.

## 2021-12-07 NOTE — ED NOTES
Bedside verbal report from Tim Bravo communication provided and informed of purposeful rounding to include collaboration of entire care team; patient acknowledged understanding.

## 2021-12-07 NOTE — PROGRESS NOTES
Problem: Mobility Impaired (Adult and Pediatric)  Goal: *Acute Goals and Plan of Care (Insert Text)  Description: FUNCTIONAL STATUS PRIOR TO ADMISSION: At baseline patient ambulates indep without device, requires paced activity due to fatigue/ SOB. HOME SUPPORT PRIOR TO ADMISSION: The patient lived with spouse but did not require assist.    Physical Therapy Goals  Initiated 12/7/2021  1. Patient will move from supine to sit and sit to supine  in bed with independence within 7 day(s). 2.  Patient will transfer from bed to chair and chair to bed with independence using the least restrictive device within 7 day(s). 3.  Patient will perform sit to stand with independence within 7 day(s). 4.  Patient will ambulate with independence for 75 feet with the least restrictive device within 7 day(s). 5.  Patient will ascend/descend 4 stairs with single handrail(s) with supervision/set-up within 7 day(s). Outcome: Not Met   PHYSICAL THERAPY EVALUATION  Patient: Billy Ramirez (88 y.o. female)  Date: 12/7/2021  Primary Diagnosis: CHF exacerbation ([facility] [address] [I50.9]        Precautions:        ASSESSMENT  Based on the objective data described below, the patient presents with near baseline LOF s/p admit with acute on chronic CHF. Pt received in chair with 2 L O2 in place. Removed O2 to assess pt tolerance to activity on RA. Noted fluctuating SpO2 down to 86% at rest, therefore reapplied O2 @ 1 L. Pt mobilized with SBA for transfers and household distance amb without device; maintained O2 sat with 1L in place. Pt instructed in PLB due to HAILE. Agreed to return to chair at end of session. Discussed paced activity and energy conservation strategies; pt familiar due to use at baseline. Encouraged pt to continue sitting in chair and mobilizing with nursing staff as tolerated. Will benefit from acute PT for mobility progression in prep for return home with .  Do not anticipate follow up PT needs at d/c.    Current Level of Function Impacting Discharge (mobility/balance): SBA transfers and household distance amb    Functional Outcome Measure: The patient scored 25/28 on the Tinetti outcome measure which is indicative of low risk for fall. Other factors to consider for discharge: ongoing O2 needs     Patient will benefit from skilled therapy intervention to address the above noted impairments. PLAN :  Recommendations and Planned Interventions: bed mobility training, transfer training, gait training, therapeutic exercises, patient and family training/education, and therapeutic activities      Frequency/Duration: Patient will be followed by physical therapy:  4 times a week to address goals. Recommendation for discharge: (in order for the patient to meet his/her long term goals)  No skilled physical therapy/ follow up rehabilitation needs identified at this time. This discharge recommendation:  Has not yet been discussed the attending provider and/or case management    IF patient discharges home will need the following DME: none         SUBJECTIVE:   Patient stated I have to do a little then rest, then do a little more at home.     OBJECTIVE DATA SUMMARY:   HISTORY:    Past Medical History:   Diagnosis Date    Acquired hypothyroidism 8/15/2016    Anemia     RED-HF study    Asthma     Cardiomyopathy, nonischemic ([facility] [address]     initial dx 2001, bivHF 2008 with EF 15%, s/p biV-ICD 9/08, significant improvment in EF to 45-50%    CKD (chronic kidney disease)     Dr Violette Terry    CKD (chronic kidney disease) 8/15/2012    Depression     Diabetes ([facility] [address]     Diabetic neuropathy ([facility] [address]     DM (diabetes mellitus) ([facility] [address] 8/15/2012    GERD (gastroesophageal reflux disease)     Gout     Hypothyroidism     ICD (implantable cardioverter-defibrillator), biventricular, in situ 6/5/2014    Psoriasis      Past Surgical History:   Procedure Laterality Date    CARDIAC CATHETERIZATION  2007; 01/06/15    normal cors    ECHO 2D ADULT  4/2010    EF 45%, improved from 1/09 (25%)    ECHO 2D ADULT  11/2011    LVH, EF 55-60%    HX ORTHOPAEDIC      knee    HX PACEMAKER PLACEMENT      AICD    STRESS TEST LEXISCAN/CARDIOLITE  3/21/12    normal perfusion, global HK 40%       Personal factors and/or comorbidities impacting plan of care: h/o CHF    Home Situation  Home Environment: Private residence  # Steps to Enter: 4  Rails to Enter: Yes  Hand Rails : Right  One/Two Story Residence: One story  Living Alone: No  Support Systems: Spouse/Significant Other  Current DME Used/Available at Home: Cane, straight  Tub or Shower Type: Shower    EXAMINATION/PRESENTATION/DECISION MAKING:   Critical Behavior:  Neurologic State: Alert, Appropriate for age  Orientation Level: Oriented X4  Cognition: Appropriate decision making, Appropriate for age attention/concentration, Appropriate safety awareness     Hearing: Auditory  Auditory Impairment: None  Skin:  Dry scaly skin on UEs/ LEs; reports h/o psoriasis    Range Of Motion:  AROM: Generally decreased, functional                       Strength:    Strength: Generally decreased, functional                    Tone & Sensation:   Tone: Normal              Sensation: Impaired (neuropathy)               Coordination:     Vision:   Acuity: Within Defined Limits; Impaired near vision;  Impaired far vision  Functional Mobility:  Bed Mobility:     Supine to Sit:  (up in chair up arrival)        Transfers:  Sit to Stand: Stand-by assistance  Stand to Sit: Stand-by assistance        Bed to Chair: Stand-by assistance              Balance:   Sitting: Intact  Standing: Intact  Ambulation/Gait Training:  Distance (ft): 50 Feet (ft)  Assistive Device:  (none)  Ambulation - Level of Assistance: Stand-by assistance            Functional Measure:  Tinetti test:    Sitting Balance: 1  Arises: 1  Attempts to Rise: 2  Immediate Standing Balance: 2  Standing Balance: 2  Nudged: 2  Eyes Closed: 1  Turn 360 Degrees - Continuous/Discontinuous: 1  Turn 360 Degrees - Steady/Unsteady: 1  Sitting Down: 1  Balance Score: 14 Balance total score  Indication of Gait: 1  R Step Length/Height: 1  L Step Length/Height: 1  R Foot Clearance: 1  L Foot Clearance: 1  Step Symmetry: 1  Step Continuity: 1  Path: 2  Trunk: 2  Walking Time: 0  Gait Score: 11 Gait total score  Total Score: 25/28 Overall total score         Tinetti Tool Score Risk of Falls  <19 = High Fall Risk  19-24 = Moderate Fall Risk  25-28 = Low Fall Risk  Tinetti ME. Performance-Oriented Assessment of Mobility Problems in Elderly Patients. [facility] 66; [id number]. (Scoring Description: PT Bulletin Feb. 10, 1993)    Older adults: Sony Loera et al, 2009; n = 1000 [facility] elderly evaluated with ABC, NINI, ADL, and IADL)  · Mean NINI score for males aged 69-68 years = 26.21(3.40)  · Mean NINI score for females age 69-68 years = 25.16(4.30)  · Mean NINI score for males over 80 years = 23.29(6.02)  · Mean NINI score for females over 80 years = 17.20(8.32)           Physical Therapy Evaluation Charge Determination   History Examination Presentation Decision-Making   HIGH Complexity :3+ comorbidities / personal factors will impact the outcome/ POC  MEDIUM Complexity : 3 Standardized tests and measures addressing body structure, function, activity limitation and / or participation in recreation  MEDIUM Complexity : Evolving with changing characteristics  LOW Complexity : FOTO score of       Based on the above components, the patient evaluation is determined to be of the following complexity level: LOW     Pain Rating:  No c/o pain    Activity Tolerance:   Fair, desaturates with exertion and requires oxygen and observed SOB with activity    After treatment patient left in no apparent distress:   Sitting in chair, Heels elevated for pressure relief and Call bell within reach    COMMUNICATION/EDUCATION:   The patients plan of care was discussed with: Registered nurse.      Fall prevention education was provided and the patient/caregiver indicated understanding., Patient/family have participated as able in goal setting and plan of care. and Patient/family agree to work toward stated goals and plan of care.     Thank you for this referral.  Kalie Mackenzie, PT   Time Calculation: 30 mins

## 2021-12-07 NOTE — PROGRESS NOTES
Spiritual Care Partner Volunteer visited patient in  [id number] on 12/07/2021. Documented by:  Chaplain Perez MDiv, [address] PRAY ([id number])

## 2021-12-07 NOTE — PROGRESS NOTES
Bedside and Verbal shift change report given to [address] [place] [address] (oncoming nurse) by Jose Alfredo Phillips (offgoing nurse). Report included the following information SBAR, Kardex, ED Summary, Intake/Output, MAR, Cardiac Rhythm SR/ST and Dual Neuro Assessment.

## 2021-12-07 NOTE — NURSE NAVIGATOR
Chart reviewed by Heart Failure Nurse Navigator. Heart Failure database completed. EF:  Prior echo 4/2019 60%; repeat echo pending    ACEi/ARB/ARNi: not currently indicated    BB: coreg 25 mg twice daily    Aldosterone Antagonist: not currently indicated    Obstructive Sleep Apnea Screening: screening priority 1   STOP-BANG score:   Referred to Sleep Medicine:     CRT: ICD implanted. NYHA Functional Class documentation requested. Heart Failure Teach Back in Patient Education. Heart Failure Avoiding Triggers on Discharge Instructions. Cardiologist: not yet consulted      Post discharge follow up phone call to be made within 48-72 hours of discharge.

## 2021-12-07 NOTE — PROGRESS NOTES
Hospitalist    Pt seen and examined  Admitted by Cecilia Mendosa this AM   Admitted for SOb and o2 sat 91% on RA  Improved Bumex    Visit Vitals  BP 95/82 (BP Patient Position: Other (Comment); Sitting)   Pulse (!) 101   Temp 98 °F (36.7 °C)   Resp 20   Wt 119.2 kg (262 lb 12.6 oz)   SpO2 95%   BMI 41.16 kg/m²     Ao3  Lung: decrease BS bilaterally  CVS: RRR  ABd; soft NT ND   Ext: no edema     Recent Results (from the past 12 hour(s))   METABOLIC PANEL, BASIC    Collection Time: 12/07/21  3:33 AM   Result Value Ref Range    Sodium 139 136 - 145 mmol/L    Potassium 4.4 3.5 - 5.1 mmol/L    Chloride 102 97 - 108 mmol/L    CO2 29 21 - 32 mmol/L    Anion gap 8 5 - 15 mmol/L    Glucose 263 (H) 65 - 100 mg/dL    BUN 38 (H) 6 - 20 MG/DL    Creatinine 2.45 (H) 0.55 - 1.02 MG/DL    BUN/Creatinine ratio 16 12 - 20      GFR est AA 24 (L) >60 ml/min/1.73m2    GFR est non-AA 20 (L) >60 ml/min/1.73m2    Calcium 9.6 8.5 - 10.1 MG/DL   CBC WITH AUTOMATED DIFF    Collection Time: 12/07/21  3:33 AM   Result Value Ref Range    WBC 9.6 3.6 - 11.0 K/uL    RBC 3.98 3.80 - 5.20 M/uL    HGB 9.8 (L) 11.5 - 16.0 g/dL    HCT 36.8 35.0 - 47.0 %    MCV 92.5 80.0 - 99.0 FL    MCH 24.6 (L) 26.0 - 34.0 PG    MCHC 26.6 (L) 30.0 - 36.5 g/dL    RDW 17.9 (H) 11.5 - 14.5 %    PLATELET 969 521 - 642 K/uL    MPV 8.9 8.9 - 12.9 FL    NRBC 0.0 0  WBC    ABSOLUTE NRBC 0.00 0.00 - 0.01 K/uL    NEUTROPHILS 79 (H) 32 - 75 %    LYMPHOCYTES 12 12 - 49 %    MONOCYTES 6 5 - 13 %    EOSINOPHILS 2 0 - 7 %    BASOPHILS 0 0 - 1 %    IMMATURE GRANULOCYTES 1 (H) 0.0 - 0.5 %    ABS. NEUTROPHILS 7.5 1.8 - 8.0 K/UL    ABS. LYMPHOCYTES 1.2 0.8 - 3.5 K/UL    ABS. MONOCYTES 0.6 0.0 - 1.0 K/UL    ABS. EOSINOPHILS 0.2 0.0 - 0.4 K/UL    ABS. BASOPHILS 0.0 0.0 - 0.1 K/UL    ABS. IMM.  GRANS. 0.1 (H) 0.00 - 0.04 K/UL    DF SMEAR SCANNED      PLATELET COMMENTS Large Platelets      RBC COMMENTS POLYCHROMASIA  1+        RBC COMMENTS ANISOCYTOSIS  1+       GLUCOSE, POC    Collection Time: 12/07/21  6:43 AM   Result Value Ref Range    Glucose (POC) 194 (H) 65 - 117 mg/dL    Performed by Eric Aguilera ([facility])    GLUCOSE, POC    Collection Time: 12/07/21 11:08 AM   Result Value Ref Range    Glucose (POC) 291 (H) 65 - 117 mg/dL    Performed by Garland Bae  PCT          #Acute on chronic HFrEF with improved EF, s/p ICD, NYHA IV  Continue bumex IV 2 mg, strict I&O, daily weights  Continued GDMT with carvedilol, ACE/ARB indicated in the setting of CKD  Echocardiogram pending, will get Cardiology consult if change in EF      #Asthma  Continue Pulmicort per hospital formulary, and Singulair     #DM2 with neuropathy  ISS  Continue Neurontin     #CKD IV  Creatinine at baseline   Avoid renal toxins, or hypotension.   Renally dose medications  Continue calcitriol     #Gout  Continue allopurinol     #Hypothyroidism  Continue levothyroxine     #Depression  Continue Ieshaexor    Laly Angel MD  Internal Medicine  Mobile/text: [phone]

## 2021-12-07 NOTE — H&P
[facility] Adult  Hospitalist Group  History and Physical    Primary Care Provider: Yvan Sawyer MD  Date of Service:  12/7/2021    Subjective:     Billy Ramirez is a 78 y.o. female with past medical history of heart failure 2/2 and NICM (last EF 60% 2019) s/p ICD, DM 2, asthma, CKD, depression, GERD, and hypothyroidism who presents with a chief complaint of dyspnea at rest.  She was taking a nasal decongestant prescribed by her PCP with no improvement in her symptoms. Associated symptoms include orthopnea, cough, and PND. She denies palpitations, lightheadedness, chest pain, or  lower extremity edema. In the ED, she was initially tachycardic to 103, 91% on room air with improvement to 96% on 2 L nasal cannula. Labs were significant for glucose of 206, creatinine 2.5 (b/l 2-3), and proBNP 7,908. Rapid Covid was negative. Chest x-ray with poor inspiration. In the ED, she was treated with 1 mg Bumex IV. Review of Systems:    All other review of systems were negative except for that written in the History of Present Illness.      Past Medical History:   Diagnosis Date    Acquired hypothyroidism 8/15/2016    Anemia     RED-HF study    Asthma     Cardiomyopathy, nonischemic ([address]     initial dx 2001, bivHF 2008 with EF 15%, s/p biV-ICD 9/08, significant improvment in EF to 45-50%    CKD (chronic kidney disease)     Dr Heydi Prabhakar    CKD (chronic kidney disease) 8/15/2012    Depression     Diabetes ([address]     Diabetic neuropathy ([address]     DM (diabetes mellitus) ([address] 8/15/2012    GERD (gastroesophageal reflux disease)     Gout     Hypothyroidism     ICD (implantable cardioverter-defibrillator), biventricular, in situ 6/5/2014    Psoriasis       Past Surgical History:   Procedure Laterality Date    CARDIAC CATHETERIZATION  2007; 01/06/15    normal cors    ECHO 2D ADULT  4/2010    EF 45%, improved from 1/09 (25%)    ECHO 2D ADULT  11/2011    LVH, EF 55-60%    HX ORTHOPAEDIC      knee    HX PACEMAKER PLACEMENT      AICD    STRESS TEST LEXISCAN/CARDIOLITE  3/21/12    normal perfusion, global HK 40%     Prior to Admission medications    Medication Sig Start Date End Date Taking? Authorizing Provider   benzonatate (Tessalon Perles) 100 mg capsule Take 100 mg by mouth three (3) times daily as needed for Cough. Yes Other, MD Bee   carvediloL (COREG) 25 mg tablet TAKE 1 TABLET BY MOUTH TWICE A DAY WITH MEALS 12/4/21   Ricardo MARIE MD   gabapentin (NEURONTIN) 100 mg capsule TAKE 1 CAP BY MOUTH DAILY. 11/23/21   Holger Medina MD   montelukast (SINGULAIR) 10 mg tablet TAKE 1 TABLET BY MOUTH EVERY DAY 11/5/21   Holger Medina MD   bumetanide (BUMEX) 2 mg tablet Take 1 tab in the morning and 0.5 tab in the evening 11/5/21   Sam Whitley MD   fluticasone propionate [facility]) 50 mcg/actuation nasal spray One spray each nostril daily 11/1/21   Holger Medina MD   gabapentin (NEURONTIN) 100 mg capsule Take 1 Capsule by mouth nightly. Max Daily Amount: 100 mg. 10/29/21   Holger Medina MD   venlafaxine-SR [facility] 75 mg capsule TAKE 1 CAPSULE BY MOUTH EVERY DAY 10/21/21   Holger Medina MD   cholecalciferol (VITAMIN D3) (2,000 UNITS /50 MCG) cap capsule TAKE 1 CAPSULE BY MOUTH TWO (2) TIMES A DAY.  10/18/21   Tramaine Castillo MD   levocetirizine (XYZAL) 5 mg tablet TAKE 1 TABLET BY MOUTH EVERY DAY 10/10/21   Holger Medina MD   allopurinoL (ZYLOPRIM) 100 mg tablet TAKE 1 TABLET BY MOUTH EVERY DAY 10/5/21   Holger Medina MD   calcitRIOL (ROCALTROL) 0.5 mcg capsule TAKE 1 CAPSULE BY MOUTH EVERY DAY 10/5/21   Holger Medina MD   OTHER     Provider, Historical   hydrOXYzine HCL (ATARAX) 10 mg tablet 2 tab(s)    Provider, Historical   candesartan (ATACAND) 4 mg tablet TAKE 1 TABLET BY MOUTH EVERY DAY 7/11/21   Holger Medina MD   levothyroxine (SYNTHROID) 100 mcg tablet TAKE 1 TABLET BY MOUTH EVERY DAY BEFORE BREAKFAST 7/7/21 Dalton Lang MD   azelastine (ASTEPRO) 205.5 mcg (0.15 %) USE 2 SPRAYS BY BOTH NOSTRILS ROUTE TWO (2) TIMES DAILY AS NEEDED (CONGESTION) 7/1/21   Scot MAIRE MD   budesonide (PULMICORT) 180 mcg/actuation aepb inhaler Take 2 Puffs by inhalation two (2) times a day. 5/27/21   Dalton Lang MD   meclizine (ANTIVERT) 25 mg tablet TAKE 1 TAB BY MOUTH THREE (3) TIMES DAILY AS NEEDED FOR DIZZINESS. 4/30/21   Dalton Lang MD   ammonium lactate (AMLACTIN) 12 % topical cream Apply  to affected area two (2) times a day. rub in to affected area well 11/4/20   Dalton Lang MD   apremilast (OTEZLA) 30 mg tab Take  by mouth two (2) times a day. Provider, Historical   insulin NPH/insulin regular (NOVOLIN 70/30) 100 unit/mL (70-30) injection 70 units two times a day 8/15/16   Fransico Holland MD   calcipotriene (DOVONEX) 0.005 % topical cream Apply  to affected area three (3) times daily. Provider, Historical   triamcinolone acetonide (KENALOG) 0.5 % ointment Apply  to affected area two (2) times a day. use thin layer    Provider, Historical   multivitamin (ONE A DAY) tablet Take 1 Tab by mouth daily. Provider, Historical   DOCUSATE CALCIUM (STOOL SOFTENER PO) Take 1 tablet by mouth daily. Provider, Historical   EPINEPHrine (EPIPEN) 0.3 mg/0.3 mL injection 0.3 mL by IntraMUSCular route once as needed for 1 dose. 1/4/12   Fransico Holland MD   ferrous sulfate (IRON) 325 mg (65 mg elemental iron) tablet Take  by mouth three (3) times daily (with meals). Provider, Historical   aspirin 81 mg tablet Take 81 mg by mouth daily.     Provider, Historical     Allergies   Allergen Reactions    Ciprofloxacin Anaphylaxis    Shellfish Derived Anaphylaxis    Ace Inhibitors Unknown (comments)    Biaxin [Clarithromycin] Other (comments)     Metal taste    Candesartan Cough    Pcn [Penicillins] Hives      Family History   Problem Relation Age of Onset    Heart Disease Mother    Med Zaidi Hypertension Mother     Lupus Sister     Diabetes Brother         SOCIAL HISTORY:  Patient resides at Home. Patient ambulates with cane. Smoking history: none  Alcohol history: none        Objective:       Physical Exam:   Visit Vitals  BP (!) 118/103 (BP 1 Location: Left upper arm, BP Patient Position: At rest)   Pulse (!) 101   Temp 98.6 °F (37 °C)   Resp 17   Wt 119.2 kg (262 lb 12.6 oz)   SpO2 90%   BMI 41.16 kg/m²     General:  Alert, cooperative, no distress, appears stated age. Head:  Normocephalic, without obvious abnormality, atraumatic. Eyes:  Conjunctivae/corneas clear. EOMs intact. Nose: Nares normal. Septum midline. Throat: Lips, mucosa, and tongue normal.    Neck: Supple, symmetrical, trachea midline   Back:   Symmetric, no curvature. ROM normal. No CVA tenderness. Lungs:   Clear to auscultation bilaterally. Chest wall:  No tenderness or deformity. Heart:  Regular rate and rhythm, S1, S2 normal, no murmur, click, rub or gallop. Abdomen:   Soft, non-tender. Bowel sounds normal.            Extremities: Extremities normal, atraumatic, no cyanosis or edema. Pulses: 2+ radial pulses   Skin: Skin color, texture, turgor normal. No rashes or lesions. ECG: ordered, pending    Data Review: All diagnostic labs and studies have been reviewed. Chest x-ray: poor inspiratory effort. Assessment:     Active Problems:    CHF exacerbation ([facility] [address] (12/6/2021)        Plan:     #Acute on chronic HFrEF with improved EF, s/p ICD, NYHA IV  Continue bumex IV 2 mg, strict I&O, daily weights  Continued GDMT with carvedilol, ACE/ARB indicated in the setting of CKD  Echocardiogram    #Asthma  Continue Pulmicort per hospital formulary, and Singulair    #DM2 with neuropathy  ISS  Continue Neurontin    #CKD IV  Creatinine 2.5  Avoid renal toxins, or hypotension.   Renally dose medications  Continue calcitriol    #Gout  Continue allopurinol    #Hypothyroidism  Continue levothyroxine    #Depression  Continue Effexor    FEN: cardiac, diabetic  dvt ppx: heparin  MPOA: Renea Slovenian (spouse)  Code: Full    FUNCTIONAL STATUS PRIOR TO HOSPITALIZATION Ambulatory with Use of Assistive Devices     Signed By: Sabrina Bustos MD     December 7, 2021

## 2021-12-08 NOTE — PROGRESS NOTES
Transition of Care Plan   RUR- Low 13%   DISPOSITION: Home with spouse when stable + home O2   F/U with PCP/Specialist     Transport: Family    Patient declined home health services at this time. Patient expressed interest in home O2. CM spoke with Dr. Aamir Campbell, patient to go home on 2L. Plan to send referral to [facility] once documentation/testing is available. 11:55am: CM fax attached O2 testing, DC summary and order in [employer] for [facility] and spoke with Jese Burleson [phone], liaison regarding referral.     3:01pm: Patient's home O2 delivered at the bedside. Medicare pt has received, reviewed, and signed 2nd IM letter informing them of their right to appeal the discharge. Signed copy has been placed on pt bedside chart. Darnell Najjar) Barbar Cosier, M.S.W.

## 2021-12-08 NOTE — DISCHARGE SUMMARY
Discharge Summary     Patient:  Kaci Tamayo       MRN: [id number]       YOB: 1955       Age: 78 y.o. Date of admission:  12/6/2021    Date of discharge:  12/8/2021    Primary care provider: Dr. Jose Manuel Aguero MD    Admitting provider:  Dyana Ferris MD    Discharging provider:  Taylor Wu MD - [phone]  If unavailable, call [phone] and ask the  to page the triage hospitalist.    Consultations  · None    Procedures  · * No surgery found *    Discharge destination: HOME. The patient is stable for discharge. Admission diagnosis  · CHF exacerbation ([facility] [I50.9]    Current Discharge Medication List      CONTINUE these medications which have NOT CHANGED    Details   benzonatate (Tessalon Perles) 100 mg capsule Take 100 mg by mouth three (3) times daily as needed for Cough. carvediloL (COREG) 25 mg tablet TAKE 1 TABLET BY MOUTH TWICE A DAY WITH MEALS  Qty: 180 Tablet, Refills: 0    Associated Diagnoses: Hypertension, unspecified type; Encounter for medication refill      !! gabapentin (NEURONTIN) 100 mg capsule TAKE 1 CAP BY MOUTH DAILY. Qty: 90 Capsule, Refills: 0    Comments: Not to exceed 5 additional fills before 01/03/2022 DX Code Needed  NEEDS REFILLS. Associated Diagnoses: Type 2 diabetes with nephropathy ([facility]  Encounter for medication refill      montelukast (SINGULAIR) 10 mg tablet TAKE 1 TABLET BY MOUTH EVERY DAY  Qty: 90 Tablet, Refills: 0    Associated Diagnoses: Seasonal allergic rhinitis due to pollen      bumetanide (BUMEX) 2 mg tablet Take 1 tab in the morning and 0.5 tab in the evening  Qty: 135 Tablet, Refills: 1      fluticasone propionate (FLONASE) 50 mcg/actuation nasal spray One spray each nostril daily  Qty: 1 Each, Refills: 3    Associated Diagnoses: Seasonal allergic rhinitis, unspecified trigger      !! gabapentin (NEURONTIN) 100 mg capsule Take 1 Capsule by mouth nightly. Max Daily Amount: 100 mg. Qty: 30 Capsule, Refills: 0    Associated Diagnoses: Neuropathy      venlafaxine-SR (EFFEXOR-XR) 75 mg capsule TAKE 1 CAPSULE BY MOUTH EVERY DAY  Qty: 90 Capsule, Refills: 0    Associated Diagnoses: Encounter for medication refill      cholecalciferol (VITAMIN D3) (2,000 UNITS /50 MCG) cap capsule TAKE 1 CAPSULE BY MOUTH TWO (2) TIMES A DAY. Qty: 180 Capsule, Refills: 0    Associated Diagnoses: Vitamin D deficiency      levocetirizine (XYZAL) 5 mg tablet TAKE 1 TABLET BY MOUTH EVERY DAY  Qty: 90 Tablet, Refills: 0    Associated Diagnoses: Environmental and seasonal allergies; Encounter for medication refill      allopurinoL (ZYLOPRIM) 100 mg tablet TAKE 1 TABLET BY MOUTH EVERY DAY  Qty: 90 Tablet, Refills: 0      calcitRIOL (ROCALTROL) 0.5 mcg capsule TAKE 1 CAPSULE BY MOUTH EVERY DAY  Qty: 90 Capsule, Refills: 0    Associated Diagnoses: Stage 3 chronic kidney disease ([facility] Encounter for medication refill      OTHER       hydrOXYzine HCL (ATARAX) 10 mg tablet 2 tab(s)      candesartan (ATACAND) 4 mg tablet TAKE 1 TABLET BY MOUTH EVERY DAY  Qty: 90 Tablet, Refills: 0    Associated Diagnoses: Cardiomyopathy, nonischemic ([facility] Hypertension, unspecified type; Encounter for medication refill      levothyroxine (SYNTHROID) 100 mcg tablet TAKE 1 TABLET BY MOUTH EVERY DAY BEFORE BREAKFAST  Qty: 90 Tablet, Refills: 1    Comments: DX Code Needed  . Associated Diagnoses: Acquired hypothyroidism; Encounter for medication refill      azelastine (ASTEPRO) 205.5 mcg (0.15 %) USE 2 SPRAYS BY BOTH NOSTRILS ROUTE TWO (2) TIMES DAILY AS NEEDED (CONGESTION)  Qty: 90 Spray, Refills: 1    Associated Diagnoses: Environmental and seasonal allergies; Encounter for medication refill      budesonide (PULMICORT) 180 mcg/actuation aepb inhaler Take 2 Puffs by inhalation two (2) times a day.   Qty: 2 Inhaler, Refills: 5    Associated Diagnoses: Respiratory crackles at right lung base meclizine (ANTIVERT) 25 mg tablet TAKE 1 TAB BY MOUTH THREE (3) TIMES DAILY AS NEEDED FOR DIZZINESS. Qty: 20 Tab, Refills: 3    Comments: DX Code Needed  . Associated Diagnoses: Nausea      ammonium lactate (AMLACTIN) 12 % topical cream Apply  to affected area two (2) times a day. rub in to affected area well  Qty: 280 g, Refills: 2    Associated Diagnoses: Xerosis of skin      apremilast (OTEZLA) 30 mg tab Take  by mouth two (2) times a day. insulin NPH/insulin regular (NOVOLIN 70/30) 100 unit/mL (70-30) injection 70 units two times a day  Qty: 10 mL, Refills: 3      calcipotriene (DOVONEX) 0.005 % topical cream Apply  to affected area three (3) times daily. triamcinolone acetonide (KENALOG) 0.5 % ointment Apply  to affected area two (2) times a day. use thin layer      multivitamin (ONE A DAY) tablet Take 1 Tab by mouth daily. DOCUSATE CALCIUM (STOOL SOFTENER PO) Take 1 tablet by mouth daily. EPINEPHrine (EPIPEN) 0.3 mg/0.3 mL injection 0.3 mL by IntraMUSCular route once as needed for 1 dose. Qty: 1 Each, Refills: 3    Associated Diagnoses: Rash      ferrous sulfate (IRON) 325 mg (65 mg elemental iron) tablet Take  by mouth three (3) times daily (with meals). aspirin 81 mg tablet Take 81 mg by mouth daily. !! - Potential duplicate medications found. Please discuss with provider. Follow-up Information     Follow up With Specialties Details Why Richard Aguilar MD Internal Medicine In 2 weeks Discharge follow up  [address]      Aimee Mendoza MD Cardiology, Clinical Cardiac Electrophysiology In 2 weeks [facility] follow up  [address] [facility] [address] [facility] [phone]            Final discharge diagnoses and brief hospital course  Please also refer to the admission H&P for details on the presenting problem.      78 y.o. female with past medical history of heart failure 2/2 and NICM (last EF 60% 2019) s/p ICD, DM 2, asthma, CKD, depression, GERD, and hypothyroidism who presents with a chief complaint of dyspnea at rest.  She was taking a nasal decongestant prescribed by her PCP with no improvement in her symptoms. Associated symptoms include orthopnea, cough, and PND. #Acute on chronic CHF, suspect diastolic as her EF is normal, s/p ICD, NYHA IV  improved with IV bumex, resume home dose on discharge   Continued GDMT with carvedilol, ACE/ARB indicated in the setting of CKD  Echocardiogram pending      #Asthma  Continue Pulmicort per hospital formulary, and Singulair     #DM2 with neuropathy  ISS  Continue Neurontin     #CKD IV  Creatinine at baseline   Avoid renal toxins, or hypotension.  Renally dose medications  Continue calcitriol     #Gout  Continue allopurinol     #Hypothyroidism  Continue levothyroxine     #Depression  Continue Effexor      FOLLOW-UP TESTS recommended: NONE  - monitor weight daily  - limit fluids to 1250-1350ml per day  - 2gram sodium diet      PENDING TEST RESULTS:  At the time of your discharge the following test results are still pending: NONE  Please make sure you review these results with your outpatient follow-up provider(s). SYMPTOMS to watch for: chest pain, shortness of breath, fever, chills, nausea, vomiting, diarrhea, change in mentation, falling, weakness, bleeding.     DIET/what to eat:  Cardiac Diet    ACTIVITY:  Activity as tolerated    WOUND CARE: NONE    EQUIPMENT needed:  NONE      Physical examination at discharge  Visit Vitals  /73   Pulse (!) 102   Temp 98.5 °F (36.9 °C)   Resp 23   Wt 114.7 kg (252 lb 12.8 oz)   SpO2 94%   BMI 39.59 kg/m²     Ao3  Lung clear  CVS: RRR  Abd: soft NT ND   Ext: no edema     Pertinent imaging studies:        Recent Labs     12/07/21  0333 12/06/21  1444   WBC 9.6 9.3   HGB 9.8* 11.2*   HCT 36.8 41.2    381     Recent Labs     12/08/21  0620 12/07/21  0333 12/06/21  1444    139 140   K 4.8 4.4 4.2    102 100   CO2 32 29 32   BUN 43* 38* 35*   CREA 2.61* 2.45* 2.50*   * 263* 206*   CA 9.9 9.6 9.8   MG 2.4  --   --      Recent Labs     12/06/21  1444   AP 77   TP 7.9   ALB 3.1*   GLOB 4.8*     No results for input(s): INR, PTP, APTT, INREXT in the last 72 hours. No results for input(s): FE, TIBC, PSAT, FERR in the last 72 hours. No results for input(s): PH, PCO2, PO2 in the last 72 hours. No results for input(s): CPK, CKMB in the last 72 hours.     No lab exists for component: TROPONINI  No components found for: [place]    Chronic Diagnoses:    Problem List as of 12/8/2021 Date Reviewed: 7/13/2021          Codes Class Noted - Resolved    CHF exacerbation ([facility] ICD-10-CM: I50.9  ICD-9-CM: 428.0  12/6/2021 - Present        Type 2 diabetes mellitus with diabetic neuropathy ([facility] ICD-10-CM: E11.40  ICD-9-CM: 250.60, 357.2  1/2/2020 - Present        Type 2 diabetes with nephropathy ([facility] ICD-10-CM: E11.21  ICD-9-CM: 250.40, 583.81  4/3/2018 - Present        Obesity, morbid ([facility] ICD-10-CM: E66.01  ICD-9-CM: 278.01  12/8/2017 - Present        Acquired hypothyroidism ICD-10-CM: E03.9  ICD-9-CM: 244.9  8/15/2016 - Present        Dysthymia ICD-10-CM: F34.1  ICD-9-CM: 300.4  8/15/2016 - Present        ICD (implantable cardioverter-defibrillator), biventricular, in situ ICD-10-CM: Z95.810  ICD-9-CM: V45.02  6/5/2014 - Present    Overview Signed 1/14/2015 11:11 AM by Noni Capps MD     Generator [employer] change 1/14/2015             Dyslipidemia ICD-10-CM: M62.7  ICD-9-CM: 272.4  1/14/2014 - Present        CKD (chronic kidney disease) ICD-10-CM: N18.9  ICD-9-CM: 585.9  8/15/2012 - Present        Cardiomyopathy, nonischemic ([facility] ICD-10-CM: I42.8  ICD-9-CM: 425.4  Unknown - Present    Overview Signed 10/10/2011  6:40 AM by Ray Miles MD     initial dx 2001, bivHF 2008 with EF 15%, s/p biV-ICD 9/08, significant improvment in EF to 45-50%             Anemia in chronic renal disease (Chronic) ICD-10-CM: N18.9, D63.1  ICD-9-CM: 285.21  12/10/2008 - Present        HTN (hypertension) ICD-10-CM: I10  ICD-9-CM: 401.9  12/10/2008 - Present        GERD (gastroesophageal reflux disease) (Chronic) ICD-10-CM: K21.9  ICD-9-CM: 530.81  12/10/2008 - Present        Gout (Chronic) ICD-10-CM: M10.9  ICD-9-CM: 274.9  12/10/2008 - Present        Pulmonary HTN (HCC) (Chronic) ICD-10-CM: I27.20  ICD-9-CM: 416.8  12/10/2008 - Present        RESOLVED: Dizziness ICD-10-CM: R42  ICD-9-CM: 780.4  8/15/2016 - 10/9/2018        RESOLVED: DM (diabetes mellitus) ([facility] ICD-10-CM: E11.9  ICD-9-CM: 250.00  8/15/2012 - 4/3/2018        RESOLVED: Other primary cardiomyopathies ICD-10-CM: I42.8  ICD-9-CM: 425.4  10/18/2010 - 1/14/2014        RESOLVED: Other and unspecified hyperlipidemia ICD-10-CM: E78.5  ICD-9-CM: 272.4  10/18/2010 - 1/14/2014        RESOLVED: Obesity ICD-10-CM: E66.9  ICD-9-CM: 278.00  10/18/2010 - 4/3/2018        RESOLVED: Diabetes ([facility] (Chronic) ICD-10-CM: E11.9  ICD-9-CM: 250.00  12/10/2008 - 1/14/2014              Time spent on discharge related activities today greater than 30 minutes.       Signed:  Jose Menezes MD                 Hospitalist, Internal Medicine      Cc: Matthew Holter, MD

## 2021-12-08 NOTE — PROGRESS NOTES
Monitoring patients O2 status    SpO2: 83% on RA at rest  SpO2: 92% on 2L NC at rest    Ambulated patient in the [place]:     SpO2: 89% on 2L NC with activity  SpO2: 94% on 3 L NC with activity    Discussed with the patient and MD.  Patient will require 3L NC for at home oxygen. She will continue to monitor her SpO2 and SOB at home.

## 2021-12-08 NOTE — CONSULTS
Cardiac Electrophysiology Hospital Consultation Note   REFERRING PROVIDER: Dr Elizabeth العراقي  Subjective:      Erna Parish is a 78 y.o. patient who is seen for evaluation of  Biventricular pacing since she is having low LVEF again on echo  She was admitted with acute systolic CHF  She is still dyspneic but improved    Cardiac cath in 2015 at [facility]: There was no angiographic evidence for coronary artery disease. [facility]    ECHO ADULT COMPLETE 12/08/2021 12/8/2021    Interpretation Summary  · LV: Estimated LVEF is 15 - 20%. Normal cavity size. Upper normal wall thickness. Severely reduced systolic function. Left ventricular diastolic dysfunction. · LA: Moderately dilated left atrium. · RV: Borderline low systolic function. Pacer/ICD present. · RA: Mildly dilated right atrium. · MV: Mitral valve non-specific thickening. Mild to moderate mitral valve regurgitation is present. · TV: Mild tricuspid valve regurgitation is present. · PA: Mild to moderate pulmonary hypertension. Pulmonary arterial systolic pressure is 47 mmHg. Signed by: Burke Baker MD on 12/8/2021  1:01 PM    She has a Medtronic biventricular pacemaker AICD that I replaced back in 2015. Her left ventricular ejection fraction had normalized with bi-V pacing. She has chronic renal failure stage IV so she cannot take aldactone             04/22/19   ECHO ADULT COMPLETE 04/23/2019 4/23/2019     Narrative · Heart rate 67 bpm.  · Mild aortic valve sclerosis with no significant stenosis. · Calculated left ventricular ejection fraction is 60%. Left ventricular   mild concentric hypertrophy. No regional wall motion abnormality noted. Age-appropriate left ventricular diastolic function. · Mitral valve thickening.  Trace mitral valve regurgitation.          Signed by: Diony Gardner MD         Patient Active Problem List   Diagnosis Code    Anemia in chronic renal disease N18.9, D63.1    HTN (hypertension) I10    GERD (gastroesophageal reflux disease) K21.9    Gout M10.9    Pulmonary HTN (HCC) I27.20    Cardiomyopathy, nonischemic (HCC) I42.8    CKD (chronic kidney disease) N18.9    Dyslipidemia E78.5    ICD (implantable cardioverter-defibrillator), biventricular, in situ Z95.810    Acquired hypothyroidism E03.9    Dysthymia F34.1    Obesity, morbid ([facility]) E66.01    Type 2 diabetes with nephropathy (HCC) E11.21    Type 2 diabetes mellitus with diabetic neuropathy ([facility]) E11.40    CHF exacerbation ([facility]) I50.9     Current Facility-Administered Medications   Medication Dose Route Frequency Provider Last Rate Last Admin    [START ON 12/9/2021] bumetanide (BUMEX) tablet 2 mg  2 mg Oral DAILY Ajay uLx MD        bumetanide (BUMEX) tablet 1 mg  1 mg Oral QPM Ajay Lux MD   1 mg at 12/08/21 1717    [START ON 12/9/2021] aspirin delayed-release tablet 81 mg  81 mg Oral DAILY Marquis Lux MD        [START ON 12/9/2021] gabapentin (NEURONTIN) capsule 100 mg  100 mg Oral DAILY Ajay Lux MD        insulin NPH (NOVOLIN N, HUMULIN N) injection 30 Units  30 Units SubCUTAneous ACB&D Kun Maldonado MD   30 Units at 12/08/21 1718    sodium chloride (NS) flush 5-40 mL  5-40 mL IntraVENous Q8H Rafael Parker MD   10 mL at 12/08/21 1502    sodium chloride (NS) flush 5-40 mL  5-40 mL IntraVENous PRN Rafael Parker MD        acetaminophen (TYLENOL) tablet 650 mg  650 mg Oral Q6H PRN Rafael Parker MD        Or    acetaminophen (TYLENOL) suppository 650 mg  650 mg Rectal Q6H PRN Rafael Parker MD        ondansetron (ZOFRAN ODT) tablet 4 mg  4 mg Oral Q8H PRN Rafael Parker MD        Or    ondansetron [facility]) injection 4 mg  4 mg IntraVENous Q6H PRN Rafael Parker MD        glucose chewable tablet 16 g  4 Tablet Oral PRN Rafael Parker MD        dextrose (D50W) injection syrg 12.5-25 g  25-50 mL IntraVENous PRN Rafael Parker MD        glucagon [facility]) injection 1 mg  1 mg IntraMUSCular PRN Dany Simons MD        insulin lispro (HUMALOG) injection   SubCUTAneous AC&HS Dany Simons MD   3 Units at 12/08/21 1718    allopurinoL (ZYLOPRIM) tablet 100 mg  100 mg Oral DAILY Dany Simons MD   100 mg at 12/08/21 0652    calcitRIOL (ROCALTROL) capsule 0.5 mcg  0.5 mcg Oral DAILY Dany Simons MD   0.5 mcg at 12/08/21 0900    carvediloL (COREG) tablet 25 mg  25 mg Oral BID WITH MEALS Dany Simons MD   25 mg at 12/08/21 1717    gabapentin (NEURONTIN) capsule 100 mg  100 mg Oral QHS Dany Simons MD   100 mg at 12/07/21 2152    cetirizine (ZYRTEC) tablet 10 mg  10 mg Oral DAILY Dany Simons MD   10 mg at 12/08/21 7934    levothyroxine (SYNTHROID) tablet 100 mcg  100 mcg Oral ACB Dany Simons MD   100 mcg at 12/08/21 2590    montelukast (SINGULAIR) tablet 10 mg  10 mg Oral DAILY Dany Simons MD   10 mg at 12/08/21 0969    venlafaxine-SR (EFFEXOR-XR) capsule 75 mg  75 mg Oral DAILY Dany Simons MD   75 mg at 12/08/21 0915    heparin (porcine) injection 5,000 Units  5,000 Units SubCUTAneous Q8H Dany Simons MD   5,000 Units at 12/08/21 1400    simethicone (MYLICON) tablet 80 mg  80 mg Oral QID PRN Rickey Jefferson MD   80 mg at 12/07/21 1029    budesonide (PULMICORT) 250 mcg/2ml nebulizer susp  250 mcg Nebulization BID RT Rickey Jefferson MD   250 mcg at 12/08/21 6980     Allergies   Allergen Reactions    Ciprofloxacin Anaphylaxis    Shellfish Derived Anaphylaxis    Ace Inhibitors Unknown (comments)    Biaxin [Clarithromycin] Other (comments)     Metal taste    Candesartan Cough    Pcn [Penicillins] Hives     Past Medical History:   Diagnosis Date    Acquired hypothyroidism 8/15/2016    Anemia     RED-HF study    Asthma     Cardiomyopathy, nonischemic ([facility]     initial dx 2001, bivHF 2008 with EF 15%, s/p biV-ICD 9/08, significant improvment in EF to 45-50%    CKD (chronic kidney disease)     Dr Anselm Galeazzi CKD (chronic kidney disease) 8/15/2012    Depression     Diabetes ([facility]     Diabetic neuropathy (HCC)     DM (diabetes mellitus) ([facility] 8/15/2012    GERD (gastroesophageal reflux disease)     Gout     Hypothyroidism     ICD (implantable cardioverter-defibrillator), biventricular, in situ 6/5/2014    Psoriasis      Past Surgical History:   Procedure Laterality Date    CARDIAC CATHETERIZATION  2007; 01/06/15    normal cors    ECHO 2D ADULT  4/2010    EF 45%, improved from 1/09 (25%)    ECHO 2D ADULT  11/2011    LVH, EF 55-60%    HX ORTHOPAEDIC      knee    HX PACEMAKER PLACEMENT      AICD    STRESS TEST LEXISCAN/CARDIOLITE  3/21/12    normal perfusion, global HK 40%     Family History   Problem Relation Age of Onset    Heart Disease Mother     Hypertension Mother    Angus Loser Lupus Sister     Diabetes Brother      Social History     Tobacco Use    Smoking status: Never Smoker    Smokeless tobacco: Never Used   Substance Use Topics    Alcohol use: No        Review of Systems:   Constitutional: Negative for fever, chills, weight loss, + malaise/fatigue. HEENT: Negative for nosebleeds, vision changes. Respiratory: Negative for cough, hemoptysis  Cardiovascular: Negative for chest pain, palpitations, orthopnea, claudication, + leg swelling, no syncope, and PND. Gastrointestinal: Negative for nausea, vomiting, diarrhea, blood in stool and melena. Genitourinary: Negative for dysuria, and hematuria. Musculoskeletal: Negative for myalgias, arthralgia. Skin: Negative for rash. Heme: Does not bleed or bruise easily. Neurological: Negative for speech change and focal weakness     Objective:     Visit Vitals  /78   Pulse 94   Temp 98 °F (36.7 °C)   Resp 21   Ht 5' 7\" (1.702 m)   Wt 252 lb (114.3 kg)   SpO2 98%   BMI 39.47 kg/m²      Physical Exam:   Constitutional: well-developed and well-nourished. No respiratory distress. Head: Normocephalic and atraumatic.    Eyes: Pupils are equal, round  ENT: hearing normal  Neck: supple. No JVD present. Cardiovascular: Normal rate, regular rhythm. Exam reveals no gallop and no friction rub. 2/6 systolic LSB murmur heard. Pulmonary/Chest: Effort normal and breath sounds normal. No wheezes. Abdominal: Soft, moderate obesity   Musculoskeletal: 1+ leg edema. Neurological: alert, oriented. Skin: Skin is warm and dry  Psychiatric: normal mood and affect. Behavior is normal. Judgment and thought content normal.      CMP:   Lab Results   Component Value Date/Time     12/08/2021 06:20 AM    K 4.8 12/08/2021 06:20 AM     12/08/2021 06:20 AM    CO2 32 12/08/2021 06:20 AM    AGAP 8 12/08/2021 06:20 AM     (H) 12/08/2021 06:20 AM    BUN 43 (H) 12/08/2021 06:20 AM    CREA 2.61 (H) 12/08/2021 06:20 AM    GFRAA 22 (L) 12/08/2021 06:20 AM    GFRNA 18 (L) 12/08/2021 06:20 AM    CA 9.9 12/08/2021 06:20 AM    MG 2.4 12/08/2021 06:20 AM        Assessment/Plan:   Acute systolic CHF decompensation with LVEF down to 15%  CKD and so she cannot be on ACEI or ARB    on August 25 2021: Medtronic left ventricular lead from September 25, 2008 with the LV tip not capturing anymore. it was then pacing from LV ring to RV coil with a pacing threshold 1.375 V at 0.4 ms but the pacing impedance is 247 ohms (low) and there are 9 months left on the battery. ECG 12/7 showed atrial sensing and biventricular pacing    Will check her lead again   I spoke to [employer] rep Jody Molina who will do this  Will arrange for BIV ICD generator change with lead replacement if it does not work but since it was reprogrammed in August it appears to be BIV pacing  She wants to do inpatient   bp is not high enough to add more meds  I do not think she needs another cardiac cath now given her renal failure is severe and it was known that she has non ischemic cardiomyopathy    Thank you for involving me in this patient's care and please call with further concerns or questions.       Claudell Hotter, M.D.  Electrophysiology/Cardiology  [facility] [place]  [address] [place] [address], [address] [place] [address] Avenue                                [phone]

## 2021-12-08 NOTE — PROGRESS NOTES
[address] [facility] Adult  Hospitalist Group                                                                                          Hospitalist Progress Note  Kaycee Lewis MD  Answering service: [phone] OR 36 from in house phone        Date of Service:  2021  NAME:  Patrick Cabello  :  1955  MRN:  [id number]      Admission Summary:   78 y.o. female with past medical history of heart failure 2/2 and NICM (last EF 60% ) s/p ICD, DM 2, asthma, CKD, depression, GERD, and hypothyroidism who presents with a chief complaint of dyspnea at rest    Interval history / Subjective:   Pt seen and examined  Breathing has improved however is hypoxic when trying to wean   SpO2: 83% on RA at rest  SpO2: 92% on 2L NC at rest  SpO2: 89% on 2L NC with activity  SpO2: 94% on 3 L NC with activity     Patient had normal EF 2019 on ECHO   ECHO today showed EF 15-20%     Assessment & Plan:     #Acute on chronic HFrEF, EF had improved now 15-20%, s/p ICD, NYHA IV  resume Bumex PO 2mg Am and 1mg PM  - ECHO with EF 15-20%  --d/w , patient's LV lead does not capture which is likely reason for LV EF drop.  Plan for BiV ICD LV Lead reimplant per EP   Continued GDMT with carvedilol, ACE/ARB indicated in the setting of CKD  Echo noted  --Will need home o2 on discharge, cm aware, discussed with patient who is agreeable.     #Asthma  Continue Pulmicort per hospital formulary, and Singulair     #DM2 with neuropathy  ISS  Continue Neurontin  --start NPH 30U BID, home med dose of 70/30 70U BID will confirm with patient      #CKD IV  Creatinine at baseline   Avoid renal toxins, or hypotension.  Renally dose medications  Continue calcitriol     #Gout  Continue allopurinol     #Hypothyroidism  Continue levothyroxine     #Depression  Continue Effexor    Code status: FULL   DVT prophylaxis:  discussed with: Patient/Family, Nurse and Consultant   Anticipated Disposition: Home w/Family  Anticipated Discharge: 24 hours to 48 hours     Hospital Problems  Date Reviewed: 7/13/2021          Codes Class Noted POA    CHF exacerbation ([address] ICD-10-CM: I50.9  ICD-9-CM: 428.0  12/6/2021 Unknown                Review of Systems:   A comprehensive review of systems was negative except for that written in the HPI. Vital Signs:    Last 24hrs VS reviewed since prior progress note. Most recent are:  Visit Vitals  /78   Pulse 94   Temp 98 °F (36.7 °C)   Resp 21   Ht 5' 7\" (1.702 m)   Wt 114.3 kg (252 lb)   SpO2 98%   BMI 39.47 kg/m²         Intake/Output Summary (Last 24 hours) at 12/8/2021 1532  Last data filed at 12/8/2021 0800  Gross per 24 hour   Intake 1650 ml   Output 900 ml   Net 750 ml        Physical Examination:     I had a face to face encounter with this patient and independently examined them on 12/8/2021 as outlined below:          Constitutional:  No acute distress, cooperative, pleasant    ENT:  Oral mucosa moist, oropharynx benign. Resp:  decreased BS    CV:  Regular rhythm, normal rate,    GI:  Soft, non distended, non tender. normoactive bowel sounds,     Musculoskeletal:  No edema, warm, 2+ pulses throughout    Neurologic:  Moves all extremities.   AAOx3, CN II-XII reviewed            Data Review:    Review and/or order of clinical lab test  Review and/or order of tests in the radiology section of CPT  Review and/or order of tests in the medicine section of CPT      Labs:     Recent Labs     12/07/21  0333 12/06/21  1444   WBC 9.6 9.3   HGB 9.8* 11.2*   HCT 36.8 41.2    381     Recent Labs     12/08/21  0620 12/07/21  0333 12/06/21  1444    139 140   K 4.8 4.4 4.2    102 100   CO2 32 29 32   BUN 43* 38* 35*   CREA 2.61* 2.45* 2.50*   * 263* 206*   CA 9.9 9.6 9.8   MG 2.4  --   --      Recent Labs     12/06/21  1444   ALT 19   AP 77   TBILI 0.4   TP 7.9   ALB 3.1*   GLOB 4.8*     No results for input(s): INR, PTP, APTT, INREXT in the last 72 hours. No results for input(s): FE, TIBC, PSAT, FERR in the last 72 hours. No results found for: FOL, RBCF   No results for input(s): PH, PCO2, PO2 in the last 72 hours. No results for input(s): CPK, CKNDX, TROIQ in the last 72 hours.     No lab exists for component: CPKMB  Lab Results   Component Value Date/Time    Cholesterol, total 282 (H) 07/13/2021 04:00 PM    HDL Cholesterol 65 07/13/2021 04:00 PM    LDL, calculated 178.2 (H) 07/13/2021 04:00 PM    Triglyceride 194 (H) 07/13/2021 04:00 PM    CHOL/HDL Ratio 4.3 07/13/2021 04:00 PM     Lab Results   Component Value Date/Time    Glucose (POC) 287 (H) 12/08/2021 11:09 AM    Glucose (POC) 212 (H) 12/08/2021 08:41 AM    Glucose (POC) 242 (H) 12/07/2021 10:35 PM    Glucose (POC) 302 (H) 12/07/2021 05:00 PM    Glucose (POC) 291 (H) 12/07/2021 11:08 AM     Lab Results   Component Value Date/Time    Color YELLOW/STRAW 11/05/2020 09:11 AM    Appearance CLEAR 11/05/2020 09:11 AM    Specific gravity 1.012 11/05/2020 09:11 AM    pH (UA) 5.0 11/05/2020 09:11 AM    Protein Negative 11/05/2020 09:11 AM    Glucose Negative 11/05/2020 09:11 AM    Ketone Negative 11/05/2020 09:11 AM    Bilirubin Negative 11/05/2020 09:11 AM    Urobilinogen 0.2 11/05/2020 09:11 AM    Nitrites Negative 11/05/2020 09:11 AM    Leukocyte Esterase SMALL (A) 11/05/2020 09:11 AM    Epithelial cells MANY (A) 11/05/2020 09:11 AM    Bacteria Negative 11/05/2020 09:11 AM    WBC 5-10 11/05/2020 09:11 AM    RBC 0-5 11/05/2020 09:11 AM         Medications Reviewed:     Current Facility-Administered Medications   Medication Dose Route Frequency    sodium chloride (NS) flush 5-40 mL  5-40 mL IntraVENous Q8H    sodium chloride (NS) flush 5-40 mL  5-40 mL IntraVENous PRN    acetaminophen (TYLENOL) tablet 650 mg  650 mg Oral Q6H PRN    Or    acetaminophen (TYLENOL) suppository 650 mg  650 mg Rectal Q6H PRN    ondansetron (ZOFRAN ODT) tablet 4 mg  4 mg Oral Q8H PRN    Or    ondansetron (ZOFRAN) injection 4 mg  4 mg IntraVENous Q6H PRN    glucose chewable tablet 16 g  4 Tablet Oral PRN    dextrose (D50W) injection syrg 12.5-25 g  25-50 mL IntraVENous PRN    glucagon (GLUCAGEN) injection 1 mg  1 mg IntraMUSCular PRN    insulin lispro (HUMALOG) injection   SubCUTAneous AC&HS    allopurinoL (ZYLOPRIM) tablet 100 mg  100 mg Oral DAILY    calcitRIOL (ROCALTROL) capsule 0.5 mcg  0.5 mcg Oral DAILY    carvediloL (COREG) tablet 25 mg  25 mg Oral BID WITH MEALS    gabapentin (NEURONTIN) capsule 100 mg  100 mg Oral QHS    cetirizine (ZYRTEC) tablet 10 mg  10 mg Oral DAILY    levothyroxine (SYNTHROID) tablet 100 mcg  100 mcg Oral ACB    montelukast (SINGULAIR) tablet 10 mg  10 mg Oral DAILY    venlafaxine-SR (EFFEXOR-XR) capsule 75 mg  75 mg Oral DAILY    bumetanide (BUMEX) injection 2 mg  2 mg IntraVENous BID    heparin (porcine) injection 5,000 Units  5,000 Units SubCUTAneous Q8H    simethicone (MYLICON) tablet 80 mg  80 mg Oral QID PRN    budesonide (PULMICORT) 250 mcg/2ml nebulizer susp  250 mcg Nebulization BID RT     ______________________________________________________________________  EXPECTED LENGTH OF STAY: 3d 0h  ACTUAL LENGTH OF STAY:          2                 Benjamin Johnson MD

## 2021-12-08 NOTE — NURSE NAVIGATOR
[facility] secured post hospital follow up appt with Soledad Michael NP (CAV). First available appointment is 12.20.21 @ 940 AM.  AVS updated. email sent to [facility] specialist for PCP appt.   Reese White RN-[facility]FN/[facility]

## 2021-12-08 NOTE — PROGRESS NOTES
Pharmacist Discharge Medication Reconciliation    Discharging Provider: Dr. Jenelle Madrigal PMH:   Past Medical History:   Diagnosis Date    Acquired hypothyroidism 8/15/2016    Anemia     RED-HF study    Asthma     Cardiomyopathy, nonischemic ([facility]     initial dx 2001, bivHF 2008 with EF 15%, s/p biV-ICD 9/08, significant improvment in EF to 45-50%    CKD (chronic kidney disease)     Dr Alvin Abbott    CKD (chronic kidney disease) 8/15/2012    Depression     Diabetes ([facility]     Diabetic neuropathy (HCC)     DM (diabetes mellitus) ([facility] 8/15/2012    GERD (gastroesophageal reflux disease)     Gout     Hypothyroidism     ICD (implantable cardioverter-defibrillator), biventricular, in situ 6/5/2014    Psoriasis      Chief Complaint for this Admission:   Chief Complaint   Patient presents with    Cough     Allergies: Ciprofloxacin, Shellfish derived, Ace inhibitors, Biaxin [clarithromycin], Candesartan, and Pcn [penicillins]    Discharge Medications:   Current Discharge Medication List        CONTINUE these medications which have NOT CHANGED    Details   benzonatate (Tessalon Perles) 100 mg capsule Take 100 mg by mouth three (3) times daily as needed for Cough. carvediloL (COREG) 25 mg tablet TAKE 1 TABLET BY MOUTH TWICE A DAY WITH MEALS  Qty: 180 Tablet, Refills: 0    Associated Diagnoses: Hypertension, unspecified type; Encounter for medication refill      !! gabapentin (NEURONTIN) 100 mg capsule TAKE 1 CAP BY MOUTH DAILY. Qty: 90 Capsule, Refills: 0    Comments: Not to exceed 5 additional fills before 01/03/2022 DX Code Needed  NEEDS REFILLS. Associated Diagnoses: Type 2 diabetes with nephropathy ([facility]  Encounter for medication refill      montelukast (SINGULAIR) 10 mg tablet TAKE 1 TABLET BY MOUTH EVERY DAY  Qty: 90 Tablet, Refills: 0    Associated Diagnoses: Seasonal allergic rhinitis due to pollen      bumetanide (BUMEX) 2 mg tablet Take 1 tab in the morning and 0.5 tab in the evening  Qty: 135 Tablet, Refills: 1      fluticasone propionate (FLONASE) 50 mcg/actuation nasal spray One spray each nostril daily  Qty: 1 Each, Refills: 3    Associated Diagnoses: Seasonal allergic rhinitis, unspecified trigger      !! gabapentin (NEURONTIN) 100 mg capsule Take 1 Capsule by mouth nightly. Max Daily Amount: 100 mg. Qty: 30 Capsule, Refills: 0    Associated Diagnoses: Neuropathy      venlafaxine-SR (EFFEXOR-XR) 75 mg capsule TAKE 1 CAPSULE BY MOUTH EVERY DAY  Qty: 90 Capsule, Refills: 0    Associated Diagnoses: Encounter for medication refill      cholecalciferol (VITAMIN D3) (2,000 UNITS /50 MCG) cap capsule TAKE 1 CAPSULE BY MOUTH TWO (2) TIMES A DAY. Qty: 180 Capsule, Refills: 0    Associated Diagnoses: Vitamin D deficiency      levocetirizine (XYZAL) 5 mg tablet TAKE 1 TABLET BY MOUTH EVERY DAY  Qty: 90 Tablet, Refills: 0    Associated Diagnoses: Environmental and seasonal allergies; Encounter for medication refill      allopurinoL (ZYLOPRIM) 100 mg tablet TAKE 1 TABLET BY MOUTH EVERY DAY  Qty: 90 Tablet, Refills: 0      calcitRIOL (ROCALTROL) 0.5 mcg capsule TAKE 1 CAPSULE BY MOUTH EVERY DAY  Qty: 90 Capsule, Refills: 0    Associated Diagnoses: Stage 3 chronic kidney disease ([facility] Encounter for medication refill      OTHER       hydrOXYzine HCL (ATARAX) 10 mg tablet 2 tab(s)      candesartan (ATACAND) 4 mg tablet TAKE 1 TABLET BY MOUTH EVERY DAY  Qty: 90 Tablet, Refills: 0    Associated Diagnoses: Cardiomyopathy, nonischemic ([facility] Hypertension, unspecified type; Encounter for medication refill      levothyroxine (SYNTHROID) 100 mcg tablet TAKE 1 TABLET BY MOUTH EVERY DAY BEFORE BREAKFAST  Qty: 90 Tablet, Refills: 1    Comments: DX Code Needed  .   Associated Diagnoses: Acquired hypothyroidism; Encounter for medication refill      azelastine (ASTEPRO) 205.5 mcg (0.15 %) USE 2 SPRAYS BY BOTH NOSTRILS ROUTE TWO (2) TIMES DAILY AS NEEDED (CONGESTION)  Qty: 90 Spray, Refills: 1    Associated Diagnoses: Environmental and seasonal allergies; Encounter for medication refill      budesonide (PULMICORT) 180 mcg/actuation aepb inhaler Take 2 Puffs by inhalation two (2) times a day. Qty: 2 Inhaler, Refills: 5    Associated Diagnoses: Respiratory crackles at right lung base      meclizine (ANTIVERT) 25 mg tablet TAKE 1 TAB BY MOUTH THREE (3) TIMES DAILY AS NEEDED FOR DIZZINESS. Qty: 20 Tab, Refills: 3    Comments: DX Code Needed  . Associated Diagnoses: Nausea      ammonium lactate (AMLACTIN) 12 % topical cream Apply  to affected area two (2) times a day. rub in to affected area well  Qty: 280 g, Refills: 2    Associated Diagnoses: Xerosis of skin      apremilast (OTEZLA) 30 mg tab Take  by mouth two (2) times a day. insulin NPH/insulin regular (NOVOLIN 70/30) 100 unit/mL (70-30) injection 70 units two times a day  Qty: 10 mL, Refills: 3      calcipotriene (DOVONEX) 0.005 % topical cream Apply  to affected area three (3) times daily. triamcinolone acetonide (KENALOG) 0.5 % ointment Apply  to affected area two (2) times a day. use thin layer      multivitamin (ONE A DAY) tablet Take 1 Tab by mouth daily. DOCUSATE CALCIUM (STOOL SOFTENER PO) Take 1 tablet by mouth daily. EPINEPHrine (EPIPEN) 0.3 mg/0.3 mL injection 0.3 mL by IntraMUSCular route once as needed for 1 dose. Qty: 1 Each, Refills: 3    Associated Diagnoses: Rash      ferrous sulfate (IRON) 325 mg (65 mg elemental iron) tablet Take  by mouth three (3) times daily (with meals). aspirin 81 mg tablet Take 81 mg by mouth daily. !! - Potential duplicate medications found. Please discuss with provider. The patient's chart, MAR and AVS were reviewed by Estee Goff.

## 2021-12-08 NOTE — PROGRESS NOTES
Attempted to schedule hospital follow up PCP appointment. Office nurse will contact the patient with appointment information as there are no available appointments in [place] [id number] within the required time frames.   Ko Cortez, Care Management Specialist.

## 2021-12-08 NOTE — PROGRESS NOTES
Problem: Patient Education: Go to Patient Education Activity  Goal: Patient/Family Education  Outcome: Progressing Towards Goal     Problem: Heart Failure: Day 1  Goal: Off Pathway (Use only if patient is Off Pathway)  Outcome: Progressing Towards Goal  Goal: Activity/Safety  Outcome: Progressing Towards Goal  Goal: Consults, if ordered  Outcome: Progressing Towards Goal  Goal: Diagnostic Test/Procedures  Outcome: Progressing Towards Goal  Goal: Nutrition/Diet  Outcome: Progressing Towards Goal  Goal: Discharge Planning  Outcome: Progressing Towards Goal  Goal: Medications  Outcome: Progressing Towards Goal  Goal: Respiratory  Outcome: Progressing Towards Goal  Goal: Treatments/Interventions/Procedures  Outcome: Progressing Towards Goal  Goal: Psychosocial  Outcome: Progressing Towards Goal  Goal: *Oxygen saturation within defined limits  Outcome: Progressing Towards Goal  Goal: *Hemodynamically stable  Outcome: Progressing Towards Goal  Goal: *Optimal pain control at patient's stated goal  Outcome: Progressing Towards Goal  Goal: *Anxiety reduced or absent  Outcome: Progressing Towards Goal     Problem: Heart Failure: Day 2  Goal: Off Pathway (Use only if patient is Off Pathway)  Outcome: Progressing Towards Goal  Goal: Activity/Safety  Outcome: Progressing Towards Goal  Goal: Consults, if ordered  Outcome: Progressing Towards Goal  Goal: Diagnostic Test/Procedures  Outcome: Progressing Towards Goal  Goal: Nutrition/Diet  Outcome: Progressing Towards Goal  Goal: Discharge Planning  Outcome: Progressing Towards Goal  Goal: Medications  Outcome: Progressing Towards Goal  Goal: Respiratory  Outcome: Progressing Towards Goal  Goal: Treatments/Interventions/Procedures  Outcome: Progressing Towards Goal  Goal: Psychosocial  Outcome: Progressing Towards Goal  Goal: *Oxygen saturation within defined limits  Outcome: Progressing Towards Goal  Goal: *Hemodynamically stable  Outcome: Progressing Towards Goal  Goal: *Optimal pain control at patient's stated goal  Outcome: Progressing Towards Goal  Goal: *Anxiety reduced or absent  Outcome: Progressing Towards Goal  Goal: *Demonstrates progressive activity  Outcome: Progressing Towards Goal     Problem: Heart Failure: Day 3  Goal: Off Pathway (Use only if patient is Off Pathway)  Outcome: Progressing Towards Goal  Goal: Activity/Safety  Outcome: Progressing Towards Goal  Goal: Diagnostic Test/Procedures  Outcome: Progressing Towards Goal  Goal: Nutrition/Diet  Outcome: Progressing Towards Goal  Goal: Discharge Planning  Outcome: Progressing Towards Goal  Goal: Medications  Outcome: Progressing Towards Goal  Goal: Respiratory  Outcome: Progressing Towards Goal  Goal: Treatments/Interventions/Procedures  Outcome: Progressing Towards Goal  Goal: Psychosocial  Outcome: Progressing Towards Goal  Goal: *Oxygen saturation within defined limits  Outcome: Progressing Towards Goal  Goal: *Hemodynamically stable  Outcome: Progressing Towards Goal  Goal: *Optimal pain control at patient's stated goal  Outcome: Progressing Towards Goal  Goal: *Anxiety reduced or absent  Outcome: Progressing Towards Goal  Goal: *Demonstrates progressive activity  Outcome: Progressing Towards Goal

## 2021-12-08 NOTE — TELEPHONE ENCOUNTER
Mendy the Care Coordinator at [facility] call to see if she can make an appointment for the patient for  transitional care 12//10/21or 12/13/21. Sandy Mondragon said the patient is being released from the hospital today and the patient also has an appointment on 12/20/21 to see a cardiologist. Sandy Mondragon asked if we can call the patient back it to schedule her an appointment with Dr. Indio Silveira.

## 2021-12-09 NOTE — PROGRESS NOTES
f  Problem: Heart Failure: Day 3  Goal: Off Pathway (Use only if patient is Off Pathway)  Outcome: Progressing Towards Goal  Goal: Activity/Safety  Outcome: Progressing Towards Goal  Goal: Diagnostic Test/Procedures  Outcome: Progressing Towards Goal  Goal: Nutrition/Diet  Outcome: Progressing Towards Goal  Goal: Discharge Planning  Outcome: Progressing Towards Goal  Goal: Medications  Outcome: Progressing Towards Goal  Goal: Respiratory  Outcome: Progressing Towards Goal  Goal: Treatments/Interventions/Procedures  Outcome: Progressing Towards Goal  Goal: Psychosocial  Outcome: Progressing Towards Goal  Goal: *Oxygen saturation within defined limits  Outcome: Progressing Towards Goal  Goal: *Hemodynamically stable  Outcome: Progressing Towards Goal  Goal: *Optimal pain control at patient's stated goal  Outcome: Progressing Towards Goal  Goal: *Anxiety reduced or absent  Outcome: Progressing Towards Goal  Goal: *Demonstrates progressive activity  Outcome: Progressing Towards Goal

## 2021-12-09 NOTE — PROGRESS NOTES
Transition of Care Plan   RUR- Low 13%   DISPOSITION: Home with spouse when stable + home O2   F/U with PCP/Specialist     Transport: Family    CM provided update to [facility] regarding patient's expected discharge date. Patient pending cath today or tomorrow. CM to continue to follow. BELLA Urias.

## 2021-12-09 NOTE — PROGRESS NOTES
[facility]                                                                                          Hospitalist Progress Note  Dao Carbajal MD  Answering service: [phone] from in house phone        Date of Service:  2021  NAME:  Ervin Sanchez  :  1955  MRN:  [id number]      Admission Summary:   78 y.o. female with past medical history of heart failure 2/2 and NICM (last EF 60% ) s/p ICD, DM 2, asthma, CKD, depression, GERD, and hypothyroidism who presents with a chief complaint of dyspnea at rest    Interval history / Subjective:   Pt seen and examined  Breathing has improved however is hypoxic when trying to wean   SpO2: 83% on RA at rest  SpO2: 92% on 2L NC at rest  SpO2: 89% on 2L NC with activity  SpO2: 94% on 3 L NC with activity     Patient had normal EF  on ECHO   ECHO today showed EF 15-20%     Assessment & Plan:     #Acute on chronic HFrEF, EF had improved now 15-20%, s/p ICD, NYHA IV  resume Bumex PO 2mg Am and 1mg PM  - ECHO with EF 15-20%  --d/w , plan for cardiac cath in AM , NPO at mN   Continued GDMT with carvedilol, ACE/ARB indicated in the setting of CKD  Echo noted  --Will need home o2 on discharge, cm aware, discussed with patient who is agreeable.     #Asthma  Continue Pulmicort per hospital formulary, and Singulair     #DM2 with neuropathy  ISS  Continue Neurontin  --start NPH 30U BID, home med dose of 70/30 70U BID will confirm with patient      #CKD IV  Creatinine at baseline   Avoid renal toxins, or hypotension.  Renally dose medications  Continue calcitriol     #Gout  Continue allopurinol     #Hypothyroidism  Continue levothyroxine     #Depression  Continue Effexor    Code status: FULL   DVT prophylaxis: [facility] discussed with: Patient/Family, Nurse and Consultant   Anticipated Disposition: Home w/Family  Anticipated Discharge: 24 hours to 48 hours     Hospital Problems  Date Reviewed: 7/13/2021          Codes Class Noted POA    CHF exacerbation ([facility] [address] ICD-10-CM: I50.9  ICD-9-CM: 428.0  12/6/2021 Unknown                Review of Systems:   A comprehensive review of systems was negative except for that written in the HPI. Vital Signs:    Last 24hrs VS reviewed since prior progress note. Most recent are:  Visit Vitals  BP 92/60   Pulse 95   Temp 97.6 °F (36.4 °C)   Resp 19   Ht 5' 7\" (1.702 m)   Wt 114.3 kg (252 lb)   SpO2 95%   BMI 39.47 kg/m²         Intake/Output Summary (Last 24 hours) at 12/9/2021 1426  Last data filed at 12/9/2021 0800  Gross per 24 hour   Intake 450 ml   Output 400 ml   Net 50 ml        Physical Examination:     I had a face to face encounter with this patient and independently examined them on 12/9/2021 as outlined below:          Constitutional:  No acute distress, cooperative, pleasant    ENT:  Oral mucosa moist, oropharynx benign. Resp:  decreased BS    CV:  Regular rhythm, normal rate,    GI:  Soft, non distended, non tender. normoactive bowel sounds,     Musculoskeletal:  No edema, warm, 2+ pulses throughout    Neurologic:  Moves all extremities. AAOx3, CN II-XII reviewed            Data Review:    Review and/or order of clinical lab test  Review and/or order of tests in the radiology section of CPT  Review and/or order of tests in the medicine section of CPT      Labs:     Recent Labs     12/07/21  0333 12/06/21  1444   WBC 9.6 9.3   HGB 9.8* 11.2*   HCT 36.8 41.2    381     Recent Labs     12/09/21  0653 12/08/21  0620 12/07/21  0333    140 139   K 4.2 4.8 4.4    100 102   CO2 29 32 29   BUN 51* 43* 38*   CREA 2.47* 2.61* 2.45*   * 207* 263*   CA 9.4 9.9 9.6   MG 2.3 2.4  --      Recent Labs     12/06/21  1444   ALT 19   AP 77   TBILI 0.4   TP 7.9   ALB 3.1*   GLOB 4.8*     No results for input(s): INR, PTP, APTT, INREXT, INREXT in the last 72 hours.    No results for input(s): FE, TIBC, PSAT, FERR in the last 72 hours. No results found for: FOL, RBCF   No results for input(s): PH, PCO2, PO2 in the last 72 hours. No results for input(s): CPK, CKNDX, TROIQ in the last 72 hours.     No lab exists for component: CPKMB  Lab Results   Component Value Date/Time    Cholesterol, total 282 (H) 07/13/2021 04:00 PM    HDL Cholesterol 65 07/13/2021 04:00 PM    LDL, calculated 178.2 (H) 07/13/2021 04:00 PM    Triglyceride 194 (H) 07/13/2021 04:00 PM    CHOL/HDL Ratio 4.3 07/13/2021 04:00 PM     Lab Results   Component Value Date/Time    Glucose (POC) 253 (H) 12/09/2021 11:47 AM    Glucose (POC) 211 (H) 12/09/2021 08:55 AM    Glucose (POC) 237 (H) 12/08/2021 09:32 PM    Glucose (POC) 270 (H) 12/08/2021 04:49 PM    Glucose (POC) 287 (H) 12/08/2021 11:09 AM     Lab Results   Component Value Date/Time    Color YELLOW/STRAW 11/05/2020 09:11 AM    Appearance CLEAR 11/05/2020 09:11 AM    Specific gravity 1.012 11/05/2020 09:11 AM    pH (UA) 5.0 11/05/2020 09:11 AM    Protein Negative 11/05/2020 09:11 AM    Glucose Negative 11/05/2020 09:11 AM    Ketone Negative 11/05/2020 09:11 AM    Bilirubin Negative 11/05/2020 09:11 AM    Urobilinogen 0.2 11/05/2020 09:11 AM    Nitrites Negative 11/05/2020 09:11 AM    Leukocyte Esterase SMALL (A) 11/05/2020 09:11 AM    Epithelial cells MANY (A) 11/05/2020 09:11 AM    Bacteria Negative 11/05/2020 09:11 AM    WBC 5-10 11/05/2020 09:11 AM    RBC 0-5 11/05/2020 09:11 AM         Medications Reviewed:     Current Facility-Administered Medications   Medication Dose Route Frequency    carvediloL (COREG) tablet 6.25 mg  6.25 mg Oral BID WITH MEALS    hydrALAZINE (APRESOLINE) tablet 10 mg  10 mg Oral TID    isosorbide dinitrate (ISORDIL) tablet 5 mg  5 mg Oral TID    insulin NPH (NOVOLIN N, HUMULIN N) injection 40 Units  40 Units SubCUTAneous ACB&D    bumetanide (BUMEX) tablet 2 mg  2 mg Oral DAILY    bumetanide (BUMEX) tablet 1 mg  1 mg Oral QPM    aspirin delayed-release tablet 81 mg  81 mg Oral DAILY  gabapentin (NEURONTIN) capsule 100 mg  100 mg Oral DAILY    sodium chloride (NS) flush 5-40 mL  5-40 mL IntraVENous Q8H    sodium chloride (NS) flush 5-40 mL  5-40 mL IntraVENous PRN    acetaminophen (TYLENOL) tablet 650 mg  650 mg Oral Q6H PRN    Or    acetaminophen (TYLENOL) suppository 650 mg  650 mg Rectal Q6H PRN    ondansetron (ZOFRAN ODT) tablet 4 mg  4 mg Oral Q8H PRN    Or    ondansetron (ZOFRAN) injection 4 mg  4 mg IntraVENous Q6H PRN    glucose chewable tablet 16 g  4 Tablet Oral PRN    dextrose (D50W) injection syrg 12.5-25 g  25-50 mL IntraVENous PRN    glucagon (GLUCAGEN) injection 1 mg  1 mg IntraMUSCular PRN    insulin lispro (HUMALOG) injection   SubCUTAneous AC&HS    allopurinoL (ZYLOPRIM) tablet 100 mg  100 mg Oral DAILY    calcitRIOL (ROCALTROL) capsule 0.5 mcg  0.5 mcg Oral DAILY    gabapentin (NEURONTIN) capsule 100 mg  100 mg Oral QHS    cetirizine (ZYRTEC) tablet 10 mg  10 mg Oral DAILY    levothyroxine (SYNTHROID) tablet 100 mcg  100 mcg Oral ACB    montelukast (SINGULAIR) tablet 10 mg  10 mg Oral DAILY    venlafaxine-SR (EFFEXOR-XR) capsule 75 mg  75 mg Oral DAILY    heparin (porcine) injection 5,000 Units  5,000 Units SubCUTAneous Q8H    simethicone (MYLICON) tablet 80 mg  80 mg Oral QID PRN    budesonide (PULMICORT) 250 mcg/2ml nebulizer susp  250 mcg Nebulization BID RT     ______________________________________________________________________  EXPECTED LENGTH OF STAY: 3d 19h  ACTUAL LENGTH OF STAY:          3                 Jose Menezes MD

## 2021-12-09 NOTE — PROGRESS NOTES
Cardiac Electrophysiology Hospital Consultation Note   REFERRING PROVIDER: Dr Kamron Samuels  Subjective:      Madelin Dean is a 78 y.o. patient who is seen for evaluation of  biventricular pacing since she is having low LVEF again on echo. Interim:  Check of biventricular ICD showed proper lead function, good CRT %. Generator longevity estimated 4 months. Appears euvolemic on exam.    Biventricular paced rhythm on monitor. BP trends low normal on current dose of carvedilol. Cr marginally increased from initial, now 2.61. HPI:  Admitted with acute systolic CHF, LVEF now 56-87%. Still dyspneic but improved. Cardiac cath in 2015 at [facility] showed no evidence of CAD. She has a Medtronic biventricular pacemaker AICD that I replaced back in 2015. NICM, LVEF had previously normalized with CRT pacing. She did require LV lead reprogramming over the summer, but good LV capture since. Previous:  CKD stage IV.            Patient Active Problem List   Diagnosis Code    Anemia in chronic renal disease N18.9, D63.1    HTN (hypertension) I10    GERD (gastroesophageal reflux disease) K21.9    Gout M10.9    Pulmonary HTN (HCC) I27.20    Cardiomyopathy, nonischemic ([facility]) I42.8    CKD (chronic kidney disease) N18.9    Dyslipidemia E78.5    ICD (implantable cardioverter-defibrillator), biventricular, in situ Z95.810    Acquired hypothyroidism E03.9    Dysthymia F34.1    Obesity, morbid ([facility]) E66.01    Type 2 diabetes with nephropathy (HCC) E11.21    Type 2 diabetes mellitus with diabetic neuropathy ([facility]) E11.40    CHF exacerbation ([facility]) I50.9     Current Facility-Administered Medications   Medication Dose Route Frequency Provider Last Rate Last Admin    bumetanide (BUMEX) tablet 2 mg  2 mg Oral DAILY Ajay Lux MD        bumetanide (BUMEX) tablet 1 mg  1 mg Oral QPM Abel Chiu MD   1 mg at 12/08/21 1717    aspirin delayed-release tablet 81 mg  81 mg Oral DAILY Shefali Panchal MD        gabapentin (NEURONTIN) capsule 100 mg  100 mg Oral DAILY Ajay Lux MD        insulin NPH (NOVOLIN N, HUMULIN N) injection 30 Units  30 Units SubCUTAneous ACB&D Shefali Panchal MD   30 Units at 12/08/21 1718    sodium chloride (NS) flush 5-40 mL  5-40 mL IntraVENous Q8H Kalina Kang MD   10 mL at 12/08/21 1502    sodium chloride (NS) flush 5-40 mL  5-40 mL IntraVENous PRN Kalina Kang MD        acetaminophen (TYLENOL) tablet 650 mg  650 mg Oral Q6H PRN Kalnia Kang MD        Or    acetaminophen (TYLENOL) suppository 650 mg  650 mg Rectal Q6H PRN Kalina Kang MD        ondansetron (ZOFRAN ODT) tablet 4 mg  4 mg Oral Q8H PRN Kalina Kang MD        Or    ondansetron [facility]) injection 4 mg  4 mg IntraVENous Q6H PRN Kalina Kang MD        glucose chewable tablet 16 g  4 Tablet Oral PRN Kalina Kang MD        dextrose (D50W) injection syrg 12.5-25 g  25-50 mL IntraVENous PRN Kalina Kang MD        glucagon (GLUCAGEN) injection 1 mg  1 mg IntraMUSCular PRN Kalina Kang MD        insulin lispro (HUMALOG) injection   SubCUTAneous AC&HS Kalina Kang MD   1 Units at 12/08/21 2138    allopurinoL (ZYLOPRIM) tablet 100 mg  100 mg Oral DAILY Kalina Kang MD   100 mg at 12/08/21 7514    calcitRIOL (ROCALTROL) capsule 0.5 mcg  0.5 mcg Oral DAILY Kalina Kang MD   0.5 mcg at 12/08/21 0900    carvediloL (COREG) tablet 25 mg  25 mg Oral BID WITH MEALS Kalina Kang MD   25 mg at 12/08/21 1717    gabapentin (NEURONTIN) capsule 100 mg  100 mg Oral QHS Kalina Kang MD   100 mg at 12/08/21 2131    cetirizine (ZYRTEC) tablet 10 mg  10 mg Oral DAILY Kalina Kang MD   10 mg at 12/08/21 0916    levothyroxine (SYNTHROID) tablet 100 mcg  100 mcg Oral ACB Kalina Kang MD   100 mcg at 12/09/21 0643    montelukast (SINGULAIR) tablet 10 mg  10 mg Oral DAILY Kalina Kang MD   10 mg at 12/08/21 0916    venlafaxine-SR (EFFEXOR-XR) capsule 75 mg  75 mg Oral DAILY Dany Simons MD   75 mg at 12/08/21 0915    heparin (porcine) injection 5,000 Units  5,000 Units SubCUTAneous Q8H Dany Simons MD   5,000 Units at 12/09/21 6805    simethicone (MYLICON) tablet 80 mg  80 mg Oral QID PRN Rickey Jefferson MD   80 mg at 12/07/21 1029    budesonide (PULMICORT) 250 mcg/2ml nebulizer susp  250 mcg Nebulization BID RT Rickey Jefferson MD   250 mcg at 12/08/21 2058     Allergies   Allergen Reactions    Ciprofloxacin Anaphylaxis    Shellfish Derived Anaphylaxis    Ace Inhibitors Unknown (comments)    Biaxin [Clarithromycin] Other (comments)     Metal taste    Candesartan Cough    Pcn [Penicillins] Hives     Past Medical History:   Diagnosis Date    Acquired hypothyroidism 8/15/2016    Anemia     RED-HF study    Asthma     Cardiomyopathy, nonischemic ([facility] [address]     initial dx 2001, bivHF 2008 with EF 15%, s/p biV-ICD 9/08, significant improvment in EF to 45-50%    CKD (chronic kidney disease)     Dr Homar Stein    CKD (chronic kidney disease) 8/15/2012    Depression     Diabetes ([address]     Diabetic neuropathy ([address]     DM (diabetes mellitus) ([facility] [address] 8/15/2012    GERD (gastroesophageal reflux disease)     Gout     Hypothyroidism     ICD (implantable cardioverter-defibrillator), biventricular, in situ 6/5/2014    Psoriasis      Past Surgical History:   Procedure Laterality Date    CARDIAC CATHETERIZATION  2007; 01/06/15    normal cors    ECHO 2D ADULT  4/2010    EF 45%, improved from 1/09 (25%)    ECHO 2D ADULT  11/2011    LVH, EF 55-60%    HX ORTHOPAEDIC      knee    HX PACEMAKER PLACEMENT      AICD    STRESS TEST LEXISCAN/CARDIOLITE  3/21/12    normal perfusion, global HK 40%     Family History   Problem Relation Age of Onset    Heart Disease Mother     Hypertension Mother     Lupus Sister     Diabetes Brother      Social History     Tobacco Use    Smoking status: Never Smoker    Smokeless tobacco: Never Used   Substance Use Topics    Alcohol use:  No Review of Systems:   Constitutional: Negative for fever, chills, weight loss, + malaise/fatigue. HEENT: Negative for nosebleeds, vision changes. Respiratory: Negative for cough, hemoptysis  Cardiovascular: Negative for chest pain, palpitations, orthopnea, claudication, + leg swelling, no syncope, and PND. + HAILE  Gastrointestinal: Negative for nausea, vomiting, diarrhea, blood in stool and melena. Genitourinary: Negative for dysuria, and hematuria. Musculoskeletal: Negative for myalgias, arthralgia. Skin: Negative for rash. Heme: Does not bleed or bruise easily. Neurological: Negative for speech change and focal weakness. Objective:     Visit Vitals  BP 94/77   Pulse 88   Temp 97.6 °F (36.4 °C)   Resp 17   Ht 5' 7\" (1.702 m)   Wt 252 lb (114.3 kg)   SpO2 97%   BMI 39.47 kg/m²      Physical Exam:   Constitutional: Well-developed and well-nourished. No respiratory distress. Head: Normocephalic and atraumatic. Eyes: Pupils are equal, round. ENT: Hearing grossly normal.  Neck: supple. No JVD present. Cardiovascular: Normal rate, regular rhythm. Exam reveals no gallop and no friction rub. 2/6 systolic LSB murmur heard. Pulmonary/Chest: Effort normal and breath sounds normal. No wheezes. Abdominal: Soft, moderate obesity. Musculoskeletal: No edema. Neurological: alert, oriented. Skin: Skin is warm and dry. Psychiatric: Normal mood and affect. Behavior is normal. Judgment and thought content normal.        Assessment/Plan:     Imaging/Studies:  Echo (12/08/2021): LVEF 15-20%, upper normal wall thickness, LV diastolic dysfunction. Borderline low RVEF. Mod dilated LA, mildly dilated RA. Mild to mod MR. Mild TR. Mild to mod PH. LHC/RHC (01/2015): No significant CAD. Acute on chronic CHF: Historically NICM, previously had LVEF normalize with CRT. LVEF now 15-20%. Proper LV pacing noted via device check, not cause for decline. NYHA II-III. Now euvolemic.   Continue carvedilol, but no ACEi/ARB/ARNi due to CKD. Low BP limits ability to titrate meds. Prior cardiac cath in 2015 showed no significant CAD & Cr is high, so I asked Dr Melody Rico to do [address] and may hold off [facility] as I do not think she has CAD that caused this LVEF to decline. Medtronic biventricular ICD (gen change 01/14/2015, leads 09/25/2008): Device check 12/08/2021 showed proper lead & generator function. Adequate CRT. Generator longevity estimated 4 mos. She will need generator change soon, but will eval worsening CHF first.      Roney Ortega, SUMAN [facility] [address] [facility] [place]  12/09/21    Addendum from EP attending: This patient was seen and examined by me in a face-to-face visit today. I reviewed the medical record including lab results, imaging and other provider notes. I confirmed the history as described above. I spoke to the patient, obtained history and examined the patient. I discussed assessments and plans in details with nurse practitioner. Below are my treatment plans and recommendations. She is feeling better and was going home when echo result came back and Dr Nedra Jorgensen asked me to see her  Visit Vitals  BP 94/77   Pulse 88   Temp 97.6 °F (36.4 °C)   Resp 17   Ht 5' 7\" (1.702 m)   Wt 252 lb (114.3 kg)   SpO2 97%   BMI 39.47 kg/m²       Exam shows     Nursing note and vitals reviewed. Constitutional: well-developed and well-nourished. No distress. Head: Normocephalic and atraumatic. Eyes: Pupils are equal, round   Neck: Neck supple. No JVD present. Cardiovascular: Normal rate, regular rhythm and normal heart sounds. Exam reveals no gallop and no friction rub. No murmur heard. Pulmonary/Chest: Effort normal and breath sounds normal. No wheezes. Abdominal: Soft, + obesity  Musculoskeletal: no edema and no tenderness. Neurological: alert,oriented. Skin: Skin is warm and dry   Psychiatric: normal mood and affect.  Behavior is normal. Judgment and thought content normal.      Assessment and Plan:   Acute systolic CHF with severe decline LVEF: non ischemic with normal LVEF and BIV pacing from BIV ICD in the past  Medtronic has checked device and has 4 months left with LV capturing so she did not have LVEF declined as a result of lack of biv pacing  Etiology of non ischemic cardiomyopathy was not known. MRI is not possible with 4194 LV lead (2008). Unless she has a form of amyloid that can be treated, I do not think MRI will add more information for treatment. May consider PYP nuc test but usually result came back equivocal.  Will reduce coreg and add hydralazine and isordil      Thank you for involving me in this patient's care and please call with further concerns or questions. Jonnie Perez M.D.   Electrophysiology/Cardiology  [facility] [place]  [address] [place] [address]                                [phone]

## 2021-12-09 NOTE — TELEPHONE ENCOUNTER
Patient stated she is in [facility] room 652.   Patient aware to give us a call to schedule a Hospital follow up once she is release from the hospital.

## 2021-12-09 NOTE — PROGRESS NOTES
I will ask Dr Olivia Person to do right and left heart cath today or tomorrow since today cath lab is full  Patient agrees  He will schedule  Her biv icd has 4 month battery and LV lead actually is capturing since we made adjustment in clinic

## 2021-12-09 NOTE — PROGRESS NOTES
Problem: Heart Failure: Day 3  Goal: Off Pathway (Use only if patient is Off Pathway)  Outcome: Progressing Towards Goal  Goal: Activity/Safety  Outcome: Progressing Towards Goal  Goal: Diagnostic Test/Procedures  Outcome: Progressing Towards Goal  Goal: Nutrition/Diet  Outcome: Progressing Towards Goal  Goal: Discharge Planning  Outcome: Progressing Towards Goal  Goal: Medications  Outcome: Progressing Towards Goal  Goal: Respiratory  Outcome: Progressing Towards Goal  Goal: Treatments/Interventions/Procedures  Outcome: Progressing Towards Goal  Goal: Psychosocial  Outcome: Progressing Towards Goal  Goal: *Oxygen saturation within defined limits  Outcome: Progressing Towards Goal  Goal: *Hemodynamically stable  Outcome: Progressing Towards Goal  Goal: *Optimal pain control at patient's stated goal  Outcome: Progressing Towards Goal  Goal: *Anxiety reduced or absent  Outcome: Progressing Towards Goal  Goal: *Demonstrates progressive activity  Outcome: Progressing Towards Goal

## 2021-12-09 NOTE — PROGRESS NOTES
Physician Progress Note      PATIENT:               Aleena Larry  CSN #:                  [id number]  :                       1955  ADMIT DATE:       2021 2:32 PM  [address] [place] DATE:  RESPONDING  PROVIDER #:        Sveta Hernández MD          QUERY TEXT:    Dr. Eve Wilson,    Pt admitted with acute on chronic CHF. Pt with asthma noted to have presented after 2 weeks of increasing dyspnea on exertion and shortness of breath. Pt had RA pulse ox of 91 percent at admission and pulse ox as low as 83 percent on RA during admission. Pt did not wear O2 at home prior to admission but is being discharged with O2. If possible, please document in the progress notes and discharge summary if you are evaluating and/or treating any of the following: The medical record reflects the following:    Risk Factors: CHF, asthma    Clinical Indicators:  -- ED noted that the pt presented with 2 weeks of increasing dyspnea on exertion and shortness of breath, does not wear oxygen at home at baseline  -- D/C summary noted acute on chronic CHF, suspect diastolic as her EF is normal, s/p ICD, NYHA IV & asthma  -- Pulse ox = 91% on RA on  at admission, 86% on RA on , 83% on RA on , 89% on 2 LPM on   -- Care Management noted that the pt expressed interest in home O2.  CM spoke with Dr. Eve Wilson, patient to go home on 2 LPM.    Treatment: Supplemental O2 up to 3 LPM, plans to d/c with home O2, IV Bumex, GDMT for CHF, Pulmicort, Singulair    Thank-you,  Mena Miller RN, [facility]  Clinical   [phone]  Options provided:  -- Acute respiratory failure with hypoxia POA  -- Acute respiratory failure with hypoxia not POA  -- Chronic respiratory failure with hypoxia  -- No respiratory failure  -- Other - I will add my own diagnosis  -- Disagree - Not applicable / Not valid  -- Disagree - Clinically unable to determine / Unknown  -- Refer to Clinical Documentation Reviewer    PROVIDER RESPONSE TEXT:    This patient was in acute respiratory failure with hypoxia, which was present on admission.     Query created by: Kiki Harley on 12/9/2021 6:50 AM      Electronically signed by:  Lai Mercer MD 12/9/2021 7:45 AM

## 2021-12-10 NOTE — PROGRESS NOTES
[address] [facility] Adult  Hospitalist Group                                                                                          Hospitalist Progress Note  Regino Blanco MD  Answering service: [phone] OR 36 from in house phone        Date of Service:  12/10/2021  NAME:  Yuko Denton  :  1955  MRN:  [id number]      Admission Summary:   78 y.o. female with past medical history of heart failure 2/2 and NICM (last EF 60% ) s/p ICD, DM 2, asthma, CKD, depression, GERD, and hypothyroidism who presents with a chief complaint of dyspnea at rest    Interval history / Subjective:   Pt seen and examined  Breathing has improved however is hypoxic when trying to wean  For [facility] Cath today      Assessment & Plan:     #Acute on chronic HFrEF, EF had improved now 15-20%, s/p ICD, NYHA IV  resume Bumex PO 2mg Am and 1mg PM  - ECHO with EF 15-20%  --d/w   --s/p Rt Heart cath on 12/10, severe pulm HTN, will ask Pulmonary to see in AM   --did not do LHC due to CKD , likely will get outpatient Stress and  to address LV Lead outpatient   Continued GDMT with carvedilol, ACE/ARB indicated in the setting of CKD  Echo noted  --Will need home o2 on discharge, cm aware, discussed with patient who is agreeable.     #Asthma  Continue Pulmicort per hospital formulary, and Singulair     #DM2 with neuropathy  ISS  Continue Neurontin  --start NPH 30U BID, home med dose of 70/30 70U BID will confirm with patient      #CKD IV  Creatinine at baseline   Avoid renal toxins, or hypotension.  Renally dose medications  Continue calcitriol     #Gout  Continue allopurinol     #Hypothyroidism  Continue levothyroxine     #Depression  Continue Effexor    Code status: FULL   DVT prophylaxis:  discussed with: Patient/Family, Nurse and Consultant   Anticipated Disposition: Home w/Family  Anticipated Discharge: 24 hours to 48 hours     Hospital Problems  Date Reviewed: 7/13/2021          Codes Class Noted POA    CHF exacerbation ([facility] [address] ICD-10-CM: I50.9  ICD-9-CM: 428.0  12/6/2021 Unknown                Review of Systems:   A comprehensive review of systems was negative except for that written in the HPI. Vital Signs:    Last 24hrs VS reviewed since prior progress note. Most recent are:  Visit Vitals  BP 94/75 (BP 1 Location: Left upper arm, BP Patient Position: At rest)   Pulse 95   Temp 97.8 °F (36.6 °C)   Resp 19   Ht 5' 7\" (1.702 m)   Wt 111.1 kg (244 lb 14.9 oz)   SpO2 100%   BMI 38.36 kg/m²         Intake/Output Summary (Last 24 hours) at 12/10/2021 1749  Last data filed at 12/9/2021 2359  Gross per 24 hour   Intake 100 ml   Output    Net 100 ml        Physical Examination:     I had a face to face encounter with this patient and independently examined them on 12/10/2021 as outlined below:          Constitutional:  No acute distress, cooperative, pleasant    ENT:  Oral mucosa moist, oropharynx benign. Resp:  decreased BS    CV:  Regular rhythm, normal rate,    GI:  Soft, non distended, non tender. normoactive bowel sounds,     Musculoskeletal:  No edema, warm, 2+ pulses throughout    Neurologic:  Moves all extremities. AAOx3, CN II-XII reviewed            Data Review:    Review and/or order of clinical lab test  Review and/or order of tests in the radiology section of CPT  Review and/or order of tests in the medicine section of CPT      Labs:     No results for input(s): WBC, HGB, HCT, PLT, HGBEXT, HCTEXT, PLTEXT, HGBEXT, HCTEXT, PLTEXT in the last 72 hours. Recent Labs     12/10/21  0305 12/09/21  0653 12/08/21  0620   * 139 140   K 4.8 4.2 4.8    101 100   CO2 27 29 32   BUN 60* 51* 43*   CREA 2.80* 2.47* 2.61*   * 136* 207*   CA 9.5 9.4 9.9   MG 2.3 2.3 2.4     No results for input(s): ALT, AP, TBIL, TBILI, TP, ALB, GLOB, GGT, AML, LPSE in the last 72 hours.     No lab exists for component: SGOT, GPT, AMYP, HLPSE  No results for input(s): INR, PTP, APTT, INREXT, INREXT in the last 72 hours. No results for input(s): FE, TIBC, PSAT, FERR in the last 72 hours. No results found for: FOL, RBCF   No results for input(s): PH, PCO2, PO2 in the last 72 hours. No results for input(s): CPK, CKNDX, TROIQ in the last 72 hours.     No lab exists for component: CPKMB  Lab Results   Component Value Date/Time    Cholesterol, total 282 (H) 07/13/2021 04:00 PM    HDL Cholesterol 65 07/13/2021 04:00 PM    LDL, calculated 178.2 (H) 07/13/2021 04:00 PM    Triglyceride 194 (H) 07/13/2021 04:00 PM    CHOL/HDL Ratio 4.3 07/13/2021 04:00 PM     Lab Results   Component Value Date/Time    Glucose (POC) 143 (H) 12/10/2021 04:52 PM    Glucose (POC) 235 (H) 12/10/2021 12:49 PM    Glucose (POC) 180 (H) 12/10/2021 08:08 AM    Glucose (POC) 171 (H) 12/09/2021 10:50 PM    Glucose (POC) 252 (H) 12/09/2021 04:22 PM     Lab Results   Component Value Date/Time    Color YELLOW/STRAW 11/05/2020 09:11 AM    Appearance CLEAR 11/05/2020 09:11 AM    Specific gravity 1.012 11/05/2020 09:11 AM    pH (UA) 5.0 11/05/2020 09:11 AM    Protein Negative 11/05/2020 09:11 AM    Glucose Negative 11/05/2020 09:11 AM    Ketone Negative 11/05/2020 09:11 AM    Bilirubin Negative 11/05/2020 09:11 AM    Urobilinogen 0.2 11/05/2020 09:11 AM    Nitrites Negative 11/05/2020 09:11 AM    Leukocyte Esterase SMALL (A) 11/05/2020 09:11 AM    Epithelial cells MANY (A) 11/05/2020 09:11 AM    Bacteria Negative 11/05/2020 09:11 AM    WBC 5-10 11/05/2020 09:11 AM    RBC 0-5 11/05/2020 09:11 AM         Medications Reviewed:     Current Facility-Administered Medications   Medication Dose Route Frequency    carvediloL (COREG) tablet 6.25 mg  6.25 mg Oral BID WITH MEALS    hydrALAZINE (APRESOLINE) tablet 10 mg  10 mg Oral TID    isosorbide dinitrate (ISORDIL) tablet 5 mg  5 mg Oral TID    insulin NPH (NOVOLIN N, HUMULIN N) injection 40 Units  40 Units SubCUTAneous ACB&D    bumetanide (BUMEX) tablet 2 mg  2 mg Oral DAILY    bumetanide (BUMEX) tablet 1 mg  1 mg Oral QPM    aspirin delayed-release tablet 81 mg  81 mg Oral DAILY    gabapentin (NEURONTIN) capsule 100 mg  100 mg Oral DAILY    sodium chloride (NS) flush 5-40 mL  5-40 mL IntraVENous Q8H    sodium chloride (NS) flush 5-40 mL  5-40 mL IntraVENous PRN    acetaminophen (TYLENOL) tablet 650 mg  650 mg Oral Q6H PRN    Or    acetaminophen (TYLENOL) suppository 650 mg  650 mg Rectal Q6H PRN    ondansetron (ZOFRAN ODT) tablet 4 mg  4 mg Oral Q8H PRN    Or    ondansetron (ZOFRAN) injection 4 mg  4 mg IntraVENous Q6H PRN    glucose chewable tablet 16 g  4 Tablet Oral PRN    dextrose (D50W) injection syrg 12.5-25 g  25-50 mL IntraVENous PRN    glucagon (GLUCAGEN) injection 1 mg  1 mg IntraMUSCular PRN    insulin lispro (HUMALOG) injection   SubCUTAneous AC&HS    allopurinoL (ZYLOPRIM) tablet 100 mg  100 mg Oral DAILY    calcitRIOL (ROCALTROL) capsule 0.5 mcg  0.5 mcg Oral DAILY    gabapentin (NEURONTIN) capsule 100 mg  100 mg Oral QHS    cetirizine (ZYRTEC) tablet 10 mg  10 mg Oral DAILY    levothyroxine (SYNTHROID) tablet 100 mcg  100 mcg Oral ACB    montelukast (SINGULAIR) tablet 10 mg  10 mg Oral DAILY    venlafaxine-SR (EFFEXOR-XR) capsule 75 mg  75 mg Oral DAILY    [Held by provider] heparin (porcine) injection 5,000 Units  5,000 Units SubCUTAneous Q8H    simethicone (MYLICON) tablet 80 mg  80 mg Oral QID PRN    budesonide (PULMICORT) 250 mcg/2ml nebulizer susp  250 mcg Nebulization BID RT     ______________________________________________________________________  EXPECTED LENGTH OF STAY: 5d 4h  ACTUAL LENGTH OF STAY:          [address] [facility] Eleuterio Rust MD

## 2021-12-10 NOTE — PROGRESS NOTES
Patient is seen this morning  She agrees to cardiac cath  She is convinced that the LV lead stopped pacing for some time and caused her LV dysfunction   She wants to have BIV ICD replaced and possible LV lead replacement  I told her I will decide with her after cardiac cath in afternoon today

## 2021-12-10 NOTE — PROGRESS NOTES
Physical Therapy Note  12/10/2021    Chart reviewed in prep for PT tx session. Attempted to see however currently off unit in CCL. Will continue to follow.     Thank you,  Dwight Daly, PT, DPT

## 2021-12-10 NOTE — PROGRESS NOTES
Bedside shift change report given to Aishwarya Torres RN (oncoming nurse) by [facility], RN (offgoing nurse). Report included the following information SBAR and Cardiac Rhythm bivent paced.

## 2021-12-10 NOTE — PROGRESS NOTES
1520  TRANSFER - IN REPORT:    Verbal report received from Allan Lee 58 on Pam Vogel  being received from [facility] Procedural area for routine progression of care. Report consisted of patients Situation, Background, Assessment and Recommendations(SBAR). Information from the following report(s) SBAR, OR Summary, Procedure Summary, MAR and Recent Results was reviewed with the receiving clinician. Opportunity for questions and clarification was provided. Assessment completed upon patients arrival to [address] [place] [address] and care assumed. Cardiac Cath Lab Recovery Arrival Note:    Pam Vogel arrived to [facility] recovery area. Patient procedure= RHC. Patient on cardiac monitor, non-invasive blood pressure, SPO2 monitor. On O2 @ 2 lpm via NC.  IV saline locked. Patient status doing well without problems. Patient is A&Ox 4. Patient reports no complaints. PROCEDURE SITE CHECK:    Procedure site:without any bleeding and no hematoma, no pain/discomfort reported at procedure site. No change in patient status. Continue to monitor patient and status.

## 2021-12-10 NOTE — PROGRESS NOTES
Occupational Therapy    Patient chart reviewed up to date. Attempted visit, however patient currently PENELOPE to [facility]. Will defer and continue to follow as able and appropriate. Thank you.   Vangie Davis, OTR/L

## 2021-12-10 NOTE — PROGRESS NOTES
Transition of Care Plan   RUR- Low 13%   DISPOSITION: Home when stable with home O2   F/U with PCP/Specialist     Transport: Family        Patient/family seen: YES      Informed patient/family of BPCI-A Bundle Program. Also advised of potential outreach by Care Transitions Team.    Bundle Payment Care Improvement Beneficiary Letter Delivered to Beneficiary or Representative:YES. Date BCPI -A was given:12/10/21    BELLA Blancas.

## 2021-12-10 NOTE — PROCEDURES
RHC done  No complications  See report  Marked severe PA HTN with high PCWP  Will continue diuresis and work up Pulmonary HTN

## 2021-12-10 NOTE — PROGRESS NOTES
Cardiac Electrophysiology Hospital Consultation Note   REFERRING PROVIDER: Dr Vee Magaña  Subjective:      Pradeep Mooney is a 78 y.o. patient who is seen for evaluation of  biventricular pacing since she is having low LVEF again on echo. Interim:  Check of biventricular ICD on 12/08/2021 showed proper lead function, good CRT %. Generator longevity estimated 4 months. Still with appropriately biventricular paced rhythm on monitor. Reduced carvedilol yesterday, added hydralazine & Isordil. Scheduled for cardiac cath today. Appears euvolemic on exam, but has mild SOB. BP trends low normal on current dose of carvedilol. Cr mildly increased, now 2.8. HPI:  Admitted with acute systolic CHF, LVEF now 77-13%. Still dyspneic but improved. Cardiac cath in 2015 at [facility] showed no evidence of CAD. She has a Medtronic biventricular pacemaker AICD that I replaced back in 2015. NICM, LVEF had previously normalized with CRT pacing. She did require LV lead reprogramming over the summer, but good LV capture since. Previous:  CKD stage IV.            Patient Active Problem List   Diagnosis Code    Anemia in chronic renal disease N18.9, D63.1    HTN (hypertension) I10    GERD (gastroesophageal reflux disease) K21.9    Gout M10.9    Pulmonary HTN (HCC) I27.20    Cardiomyopathy, nonischemic (HCC) I42.8    CKD (chronic kidney disease) N18.9    Dyslipidemia E78.5    ICD (implantable cardioverter-defibrillator), biventricular, in situ Z95.810    Acquired hypothyroidism E03.9    Dysthymia F34.1    Obesity, morbid (HCC) E66.01    Type 2 diabetes with nephropathy (HCC) E11.21    Type 2 diabetes mellitus with diabetic neuropathy (HCC) E11.40    CHF exacerbation ([facility]) I50.9     Current Facility-Administered Medications   Medication Dose Route Frequency Provider Last Rate Last Admin    carvediloL (COREG) tablet 6.25 mg  6.25 mg Oral BID WITH MEALS Modesta Kwan MD   6.25 mg at 12/10/21 0912    hydrALAZINE (APRESOLINE) tablet 10 mg  10 mg Oral TID Aurora Mcconnell MD   10 mg at 12/10/21 4890    isosorbide dinitrate (ISORDIL) tablet 5 mg  5 mg Oral TID Aurora Mcconnell MD   5 mg at 12/10/21 0912    insulin NPH (NOVOLIN N, HUMULIN N) injection 40 Units  40 Units SubCUTAneous ACB&D Bibi Zamarripa MD   40 Units at 12/10/21 0920    bumetanide (BUMEX) tablet 2 mg  2 mg Oral DAILY Bibi Zamarripa MD   2 mg at 12/10/21 0911    bumetanide (BUMEX) tablet 1 mg  1 mg Oral QPM Bibi Zamarripa MD   1 mg at 12/09/21 1716    aspirin delayed-release tablet 81 mg  81 mg Oral DAILY Bibi Zamarripa MD   81 mg at 12/10/21 0912    gabapentin (NEURONTIN) capsule 100 mg  100 mg Oral DAILY Bibi Zamarripa MD   100 mg at 12/10/21 0911    sodium chloride (NS) flush 5-40 mL  5-40 mL IntraVENous Q8H Verdene Runner, MD   10 mL at 12/09/21 2247    sodium chloride (NS) flush 5-40 mL  5-40 mL IntraVENous PRN Verdene Runner, MD        acetaminophen (TYLENOL) tablet 650 mg  650 mg Oral Q6H PRN Verdene Runner, MD        Or    acetaminophen (TYLENOL) suppository 650 mg  650 mg Rectal Q6H PRN Verdene Runner, MD        ondansetron (ZOFRAN ODT) tablet 4 mg  4 mg Oral Q8H PRN Verdene Runner, MD        Or    ondansetron [facility]) injection 4 mg  4 mg IntraVENous Q6H PRN Verdene Runner, MD        glucose chewable tablet 16 g  4 Tablet Oral PRN Verdene Runner, MD        dextrose (D50W) injection syrg 12.5-25 g  25-50 mL IntraVENous PRN Verdene Runner, MD        glucagon (GLUCAGEN) injection 1 mg  1 mg IntraMUSCular PRN Verdene Runner, MD        insulin lispro (HUMALOG) injection   SubCUTAneous AC&HS Verdene Runner, MD   3 Units at 12/09/21 1715    allopurinoL (ZYLOPRIM) tablet 100 mg  100 mg Oral DAILY Verdene Runner, MD   100 mg at 12/10/21 4074    calcitRIOL (ROCALTROL) capsule 0.5 mcg  0.5 mcg Oral DAILY Verdene Runner, MD   0.5 mcg at 12/10/21 0912    gabapentin (NEURONTIN) capsule 100 mg  100 mg Oral QHS Trell Darling MD   100 mg at 12/09/21 2246    cetirizine (ZYRTEC) tablet 10 mg  10 mg Oral DAILY Trell Darling MD   10 mg at 12/10/21 0912    levothyroxine (SYNTHROID) tablet 100 mcg  100 mcg Oral ACB Trell Darling MD   100 mcg at 12/10/21 0644    montelukast (SINGULAIR) tablet 10 mg  10 mg Oral DAILY Trell Darling MD   10 mg at 12/10/21 0912    venlafaxine-SR (EFFEXOR-XR) capsule 75 mg  75 mg Oral DAILY Trell Darling MD   75 mg at 12/10/21 0912    [Held by provider] heparin (porcine) injection 5,000 Units  5,000 Units SubCUTAneous Q8H Trell Darling MD   5,000 Units at 12/09/21 2246    simethicone (MYLICON) tablet 80 mg  80 mg Oral QID PRN Stuart Zacarias MD   80 mg at 12/10/21 0707    budesonide (PULMICORT) 250 mcg/2ml nebulizer susp  250 mcg Nebulization BID RT Stuart Zacarias MD   250 mcg at 12/10/21 0930     Allergies   Allergen Reactions    Ciprofloxacin Anaphylaxis    Shellfish Derived Anaphylaxis    Ace Inhibitors Unknown (comments)    Biaxin [Clarithromycin] Other (comments)     Metal taste    Candesartan Cough    Pcn [Penicillins] Hives     Past Medical History:   Diagnosis Date    Acquired hypothyroidism 8/15/2016    Anemia     RED-HF study    Asthma     Cardiomyopathy, nonischemic ([facility] [address]     initial dx 2001, bivHF 2008 with EF 15%, s/p biV-ICD 9/08, significant improvment in EF to 45-50%    CKD (chronic kidney disease)     Dr Alvaro Carranza    CKD (chronic kidney disease) 8/15/2012    Depression     Diabetes ([address]     Diabetic neuropathy ([address]     DM (diabetes mellitus) ([address] 8/15/2012    GERD (gastroesophageal reflux disease)     Gout     Hypothyroidism     ICD (implantable cardioverter-defibrillator), biventricular, in situ 6/5/2014    Psoriasis      Past Surgical History:   Procedure Laterality Date    CARDIAC CATHETERIZATION  2007; 01/06/15    normal cors    ECHO 2D ADULT  4/2010    EF 45%, improved from 1/09 (25%)    ECHO 2D ADULT  11/2011    LVH, EF 55-60%    HX ORTHOPAEDIC      knee    HX PACEMAKER PLACEMENT      AICD    STRESS TEST LEXISCAN/CARDIOLITE  3/21/12    normal perfusion, global HK 40%     Family History   Problem Relation Age of Onset    Heart Disease Mother     Hypertension Mother     Lupus Sister     Diabetes Brother      Social History     Tobacco Use    Smoking status: Never Smoker    Smokeless tobacco: Never Used   Substance Use Topics    Alcohol use: No        Review of Systems:   Constitutional: Negative for fever, chills, weight loss, + malaise/fatigue. HEENT: Negative for nosebleeds, vision changes. Respiratory: Negative for cough, hemoptysis  Cardiovascular: Negative for chest pain, palpitations, orthopnea, claudication, + leg swelling, no syncope, and PND. + SOB  Gastrointestinal: Negative for nausea, vomiting, diarrhea, blood in stool and melena. Genitourinary: Negative for dysuria, and hematuria. Musculoskeletal: Negative for myalgias, arthralgia. Skin: Negative for rash. Heme: Does not bleed or bruise easily. Neurological: Negative for speech change and focal weakness. Objective:     Visit Vitals  /85   Pulse 95   Temp 98.1 °F (36.7 °C)   Resp 20   Ht 5' 7\" (1.702 m)   Wt 244 lb 14.9 oz (111.1 kg)   SpO2 98%   BMI 38.36 kg/m²      Physical Exam:   Constitutional: Well-developed and well-nourished. No respiratory distress. Head: Normocephalic and atraumatic. Eyes: Pupils are equal, round. ENT: Hearing grossly normal.  Neck: Supple. No JVD present. Cardiovascular: Normal rate, regular rhythm. Exam reveals no gallop and no friction rub. 2/6 systolic LSB murmur heard. Pulmonary/Chest: Effort normal and breath sounds normal. No wheezes. Abdominal: Soft, moderate obesity. Musculoskeletal: No edema. Neurological: Alert, oriented. Skin: Skin is warm and dry. Psychiatric: Normal mood and affect.  Behavior is normal. Judgment and thought content normal.        Assessment/Plan:     Imaging/Studies:  Echo (12/08/2021): LVEF 15-20%, upper normal wall thickness, LV diastolic dysfunction. Borderline low RVEF. Mod dilated LA, mildly dilated RA. Mild to mod MR. Mild TR. Mild to mod PH. LHC/RHC (01/2015): No significant CAD. Acute on chronic CHF: Historically NICM, previously had LVEF normalize with CRT. Still with proper biventricular pacing per device check during current admission. LVEF now 15-20%. Proper LV pacing noted via device check, not cause for decline. NYHA II-III. Now euvolemic. Continue carvedilol/hydralazine/isordil, but no ACEi/ARB/ARNi due to CKD. Low BP limits ability to titrate meds. Prior cardiac cath in 2015 showed no significant CAD & Cr is high. Planning right & left heart cath today for further evaluation, but need to use caution with amount of contrast dye. MRI is not possible with 4194 LV lead (2008). Unless she has a form of amyloid that can be treated, I do not think MRI will add more information for treatment. May consider PYP nuc test but usually results come back equivocal.    Medtronic biventricular ICD (gen change 01/14/2015, leads 09/25/2008): Device check 12/08/2021 showed proper lead & generator function. Adequate CRT. Generator longevity estimated 4 mos. She will need generator change soon, but will eval cause of worsening CHF first.      SUMAN HerreraTaran  [facility] [place]  12/10/21    Addendum from EP attending: This patient was seen and examined by me in a face-to-face visit today. I reviewed the medical record including lab results, imaging and other provider notes. I confirmed the history as described above. I spoke to the patient, obtained history and examined the patient. I discussed assessments and plans in details with nurse practitioner. Below are my treatment plans and recommendations.       She is feeling better and was going home when echo result came back and Dr Wiliam Chamorro asked me to see her  She says she does not see  Jennifer Gaytan at his new office  Dr. Logan Huertas is planning to do the right and possible left heart cath today  Exam shows      Constitutional: well-developed and well-nourished. No distress. Head: Normocephalic and atraumatic. Eyes: Pupils are equal, round   Neck: Neck supple. No JVD present. Cardiovascular: Normal rate, regular rhythm and normal heart sounds. Exam reveals no gallop and no friction rub. No murmur heard. Pulmonary/Chest: Effort normal and breath sounds normal. No wheezes. Abdominal: Soft, + obesity  Musculoskeletal: no edema and no tenderness. Neurological: alert,oriented. Skin: Skin is warm and dry   Psychiatric: normal mood and affect. Behavior is normal. Judgment and thought content normal.      Assessment and Plan:   Acute systolic CHF with severe decline LVEF: She has a history of non ischemic but previously normal LVEF after BIV pacing from BIV ICD   Medtronic has checked device and it has 4 months left with LV capturing so she did not have LVEF declined as a result of lack of biv pacing. However the patient is convinced that her left ventricular ejection fraction declined because of the intermittent loss of LV pacing and she wants to generator along with the LV lead be replaced  Etiology of non ischemic cardiomyopathy was not known clearly at this time. MRI is not possible with 4194 LV lead (2008). Her blood pressure tolerate lower dose of Coreg and hydralazine/isordil  Right heart cath and possible left heart cath will be done today. She has underlying severe kidney failure so low amount of contrast must be used. I plan to replace the patient's biventricular ICD outpatient and will test for left ventricular lead intraoperatively and replace it if there is issue with proper capturing. For now on telemetry, she has appropriate biventricular pacing.   Med[employer] representative did check the device and the lead and also confirmed that there was proper left ventricular pacing threshold at the current pacing vector    Thank you for involving me in this patient's care and please call with further concerns or questions. Adri Verdin M.D.   Electrophysiology/Cardiology  [facility] [place]  [address] [place] [address]                                [phone]

## 2021-12-10 NOTE — PROGRESS NOTES
Problem: Heart Failure: Discharge Outcomes  Goal: *Left ventricular function assessment completed prior to or during stay, or planned for post-discharge  Outcome: Progressing Towards Goal  Goal: *ACEI prescribed if LVEF less than 40% and no contraindications or ARB prescribed  Outcome: Progressing Towards Goal  Goal: *Verbalizes understanding and describes prescribed diet  Outcome: Progressing Towards Goal     Problem: Falls - Risk of  Goal: *Absence of Falls  Description: Document Selvin Fall Risk and appropriate interventions in the flowsheet.   Outcome: Progressing Towards Goal  Note: Fall Risk Interventions:  Mobility Interventions: Communicate number of staff needed for ambulation/transfer, Patient to call before getting OOB, PT Consult for mobility concerns         Medication Interventions: Patient to call before getting OOB, Teach patient to arise slowly

## 2021-12-10 NOTE — PROGRESS NOTES
1625  .TRANSFER - OUT REPORT:    Verbal report given to [place](name) on Lio Goetz  being transferred to 6S (unit) for routine progression of care       Report consisted of patients Situation, Background, Assessment and   Recommendations(SBAR). Information from the following report(s) SBAR, Procedure Summary, MAR and Recent Results was reviewed with the receiving nurse. Lines:   Peripheral IV 12/06/21 Anterior; Distal; Left Forearm (Active)   Site Assessment Clean, dry, & intact 12/10/21 1630   Phlebitis Assessment 0 12/10/21 1630   Infiltration Assessment 0 12/10/21 1630   Dressing Status Clean, dry, & intact 12/10/21 1630   Dressing Type Transparent; Tape 12/10/21 1630   Hub Color/Line Status Pink 12/10/21 1630   Action Taken Open ports on tubing capped 12/10/21 1630   Alcohol Cap Used Yes 12/10/21 1630        Opportunity for questions and clarification was provided. Patient transported with:   Monitor  Registered Nurse    1630  Pt transfer complete. No complications.

## 2021-12-11 NOTE — PROGRESS NOTES
Pt seen full note to follow. Agree with home if rest of team agrees. Home with oxygen. Will follow up In office.

## 2021-12-11 NOTE — PROGRESS NOTES
Problem: Heart Failure: Day 4  Goal: Activity/Safety  Outcome: Progressing Towards Goal  Goal: Diagnostic Test/Procedures  Outcome: Progressing Towards Goal  Goal: Nutrition/Diet  Outcome: Progressing Towards Goal  Goal: Discharge Planning  Outcome: Progressing Towards Goal  Goal: Medications  Outcome: Progressing Towards Goal  Goal: Respiratory  Outcome: Progressing Towards Goal  Goal: Treatments/Interventions/Procedures  Outcome: Progressing Towards Goal  Goal: Psychosocial  Outcome: Progressing Towards Goal  Goal: *Oxygen saturation within defined limits  Outcome: Progressing Towards Goal  Goal: *Hemodynamically stable  Outcome: Progressing Towards Goal  Goal: *Optimal pain control at patient's stated goal  Outcome: Progressing Towards Goal  Goal: *Anxiety reduced or absent  Outcome: Progressing Towards Goal  Goal: *Demonstrates progressive activity  Outcome: Progressing Towards Goal

## 2021-12-11 NOTE — CONSULTS
Pulmonary, Critical Care, and Sleep Medicine    Initial Patient Consult    Name: Rafael Johns MRN: [id number]   : 1955 Hospital: [place] [address]   Date: 2021        IMPRESSION:   · New diagnosis of congestive heart failure with an EF of about 15% has mild to moderate mitral regurg and tricuspid regurg  · Secondary pulmonary hypertension, noted to have an increased wedge pressure on right heart cath. · Rhinitis and postnasal drainage. · Increased orthopnea  · Obese  · Acute on chronic renal sufficiency  · Anemia with a hemoglobin of 10.9  · Hypertension but now with low blood pressure as she is being treated for congestive heart failure  · Lifelong nonsmoker  · Secondhand smoke from her father      RECOMMENDATIONS:   · At this point would agree with treatment of her underlying cardiomyopathy  · She can follow up with Dr. Whitney Wilson as an outpatient  · Agree with ongoing diuretics  · She may benefit from an outpatient sleep evaluation  · Agree with discharge had discussed this with patient's nurse     Subjective: This patient has been seen and evaluated at the request of Dr. Alisia Green for above. Patient is a 78 y.o. female who was admitted on 2021. Had a chief complaint of cough and shortness of breath. Have been going on for about 2 weeks prior to admission. Patient had increased chest congestion and chest pressure and orthopnea; these symptoms had worsened over several weeks prior to admission. She fell at the cough may have been underlying allergic rhinitis issues. Had been  taken nasal steroids without much improvement. Her cough had been an hacking cough and was nonproductive. Her cough did not improve with Tessalon Perles. She denies significant leg pain or swelling. Denies any fevers or chills. She has been a lifelong nonsmoker but she does report her father was a smoker.   She did report some anorexia because of difficulty with her breathing about several weeks prior to admission. She is currently retired from teaching    Past Medical History:   Diagnosis Date    Acquired hypothyroidism 8/15/2016    Anemia     RED-HF study    Asthma     Cardiomyopathy, nonischemic ([facility]     initial dx 2001, bivHF 2008 with EF 15%, s/p biV-ICD 9/08, significant improvment in EF to 45-50%    CKD (chronic kidney disease)     Dr Leah Izaguirre    CKD (chronic kidney disease) 8/15/2012    Depression     Diabetes ([facility]     [facility]     DM (diabetes mellitus) ([facility] 8/15/2012    GERD (gastroesophageal reflux disease)     Gout     Hypothyroidism     ICD (implantable cardioverter-defibrillator), biventricular, in situ 6/5/2014    Psoriasis       Past Surgical History:   Procedure Laterality Date    CARDIAC CATHETERIZATION  2007; 01/06/15    normal cors    ECHO 2D ADULT  4/2010    EF 45%, improved from 1/09 (25%)    ECHO 2D ADULT  11/2011    LVH, EF 55-60%    HX ORTHOPAEDIC      knee    HX PACEMAKER PLACEMENT      AICD    STRESS TEST LEXISCAN/CARDIOLITE  3/21/12    normal perfusion, global HK 40%      Prior to Admission medications    Medication Sig Start Date End Date Taking? Authorizing Provider   benzonatate (Tessalon Perles) 100 mg capsule Take 100 mg by mouth three (3) times daily as needed for Cough. Yes Other, MD Bee   carvediloL (COREG) 25 mg tablet TAKE 1 TABLET BY MOUTH TWICE A DAY WITH MEALS 12/4/21   Jessi MARIE MD   gabapentin (NEURONTIN) 100 mg capsule TAKE 1 CAP BY MOUTH DAILY. 11/23/21   Bert Barba MD   montelukast (SINGULAIR) 10 mg tablet TAKE 1 TABLET BY MOUTH EVERY DAY 11/5/21   Bert Barba MD   bumetanide (BUMEX) 2 mg tablet Take 1 tab in the morning and 0.5 tab in the evening 11/5/21   Kwaku James MD   fluticasone propionate [facility]) 50 mcg/actuation nasal spray One spray each nostril daily 11/1/21   Bert Barba MD   gabapentin (NEURONTIN) 100 mg capsule Take 1 Capsule by mouth nightly.  Max Daily Amount: 100 mg. 10/29/21   Whitley Morrison MD   venlafaxine-SR [facility] 75 mg capsule TAKE 1 CAPSULE BY MOUTH EVERY DAY 10/21/21   Whitley Morrison MD   cholecalciferol (VITAMIN D3) (2,000 UNITS /50 MCG) cap capsule TAKE 1 CAPSULE BY MOUTH TWO (2) TIMES A DAY. 10/18/21   Tramaine Castillo MD   levocetirizine (XYZAL) 5 mg tablet TAKE 1 TABLET BY MOUTH EVERY DAY 10/10/21   Whitley Morrison MD   allopurinoL (ZYLOPRIM) 100 mg tablet TAKE 1 TABLET BY MOUTH EVERY DAY 10/5/21   Whitley Morrison MD   calcitRIOL (ROCALTROL) 0.5 mcg capsule TAKE 1 CAPSULE BY MOUTH EVERY DAY 10/5/21   Whitley Morrison MD   OTHER     Provider, Historical   hydrOXYzine HCL (ATARAX) 10 mg tablet 2 tab(s)    Provider, Historical   candesartan (ATACAND) 4 mg tablet TAKE 1 TABLET BY MOUTH EVERY DAY 7/11/21   Whitley Morrison MD   levothyroxine (SYNTHROID) 100 mcg tablet TAKE 1 TABLET BY MOUTH EVERY DAY BEFORE BREAKFAST 7/7/21   Whitley Morrison MD   azelastine (ASTEPRO) 205.5 mcg (0.15 %) USE 2 SPRAYS BY BOTH NOSTRILS ROUTE TWO (2) TIMES DAILY AS NEEDED (CONGESTION) 7/1/21   Maranda MARIE MD   budesonide (PULMICORT) 180 mcg/actuation aepb inhaler Take 2 Puffs by inhalation two (2) times a day. 5/27/21   Whitley Morrison MD   meclizine (ANTIVERT) 25 mg tablet TAKE 1 TAB BY MOUTH THREE (3) TIMES DAILY AS NEEDED FOR DIZZINESS. 4/30/21   Whitley Morrison MD   ammonium lactate (AMLACTIN) 12 % topical cream Apply  to affected area two (2) times a day. rub in to affected area well 11/4/20   Whitley Morrison MD   apremilast (OTEZLA) 30 mg tab Take  by mouth two (2) times a day. Provider, Historical   insulin NPH/insulin regular (NOVOLIN 70/30) 100 unit/mL (70-30) injection 70 units two times a day 8/15/16   Pia Holland MD   calcipotriene (DOVONEX) 0.005 % topical cream Apply  to affected area three (3) times daily.     Provider, Samra   triamcinolone acetonide (KENALOG) 0.5 % ointment Apply  to affected area two (2) times a day. use thin layer    Provider, Historical   multivitamin (ONE A DAY) tablet Take 1 Tab by mouth daily. Provider, Historical   DOCUSATE CALCIUM (STOOL SOFTENER PO) Take 1 tablet by mouth daily. Provider, Historical   EPINEPHrine (EPIPEN) 0.3 mg/0.3 mL injection 0.3 mL by IntraMUSCular route once as needed for 1 dose. 1/4/12   Elena Holland MD   ferrous sulfate (IRON) 325 mg (65 mg elemental iron) tablet Take  by mouth three (3) times daily (with meals). Provider, Historical   aspirin 81 mg tablet Take 81 mg by mouth daily.     Provider, Historical     Allergies   Allergen Reactions    Ciprofloxacin Anaphylaxis    Shellfish Derived Anaphylaxis    Ace Inhibitors Unknown (comments)    Biaxin [Clarithromycin] Other (comments)     Metal taste    Candesartan Cough    Pcn [Penicillins] Hives      Social History     Tobacco Use    Smoking status: Never Smoker    Smokeless tobacco: Never Used   Substance Use Topics    Alcohol use: No      Family History   Problem Relation Age of Onset    Heart Disease Mother     Hypertension Mother     Lupus Sister     Diabetes Brother         Current Facility-Administered Medications   Medication Dose Route Frequency    carvediloL (COREG) tablet 6.25 mg  6.25 mg Oral BID WITH MEALS    hydrALAZINE (APRESOLINE) tablet 10 mg  10 mg Oral TID    isosorbide dinitrate (ISORDIL) tablet 5 mg  5 mg Oral TID    insulin NPH (NOVOLIN N, HUMULIN N) injection 40 Units  40 Units SubCUTAneous ACB&D    bumetanide (BUMEX) tablet 2 mg  2 mg Oral DAILY    bumetanide (BUMEX) tablet 1 mg  1 mg Oral QPM    aspirin delayed-release tablet 81 mg  81 mg Oral DAILY    gabapentin (NEURONTIN) capsule 100 mg  100 mg Oral DAILY    sodium chloride (NS) flush 5-40 mL  5-40 mL IntraVENous Q8H    insulin lispro (HUMALOG) injection   SubCUTAneous AC&HS    allopurinoL (ZYLOPRIM) tablet 100 mg  100 mg Oral DAILY    calcitRIOL (ROCALTROL) capsule 0.5 mcg  0.5 mcg Oral DAILY    gabapentin (NEURONTIN) capsule 100 mg  100 mg Oral QHS    cetirizine (ZYRTEC) tablet 10 mg  10 mg Oral DAILY    levothyroxine (SYNTHROID) tablet 100 mcg  100 mcg Oral ACB    montelukast (SINGULAIR) tablet 10 mg  10 mg Oral DAILY    venlafaxine-SR (EFFEXOR-XR) capsule 75 mg  75 mg Oral DAILY    [Held by provider] heparin (porcine) injection 5,000 Units  5,000 Units SubCUTAneous Q8H    budesonide (PULMICORT) 250 mcg/2ml nebulizer susp  250 mcg Nebulization BID RT       Review of Systems:  Constitutional: positive for fatigue  Eyes: negative  Ears, nose, mouth, throat, and face: positive for nasal congestion  Respiratory: positive for cough or dyspnea on exertion  Cardiovascular: positive for chest pressure/discomfort, dyspnea, fatigue, orthopnea, paroxysmal nocturnal dyspnea, dyspnea on exertion  Gastrointestinal: negative  Genitourinary:negative  Integument/breast: negative  Hematologic/lymphatic: negative  Musculoskeletal:negative  Neurological: negative  Behavioral/Psych: negative  Endocrine: negative  Allergic/Immunologic: positive for hay fever    Objective:   Vital Signs:    Visit Vitals  BP 91/71   Pulse (!) 106   Temp 99.5 °F (37.5 °C)   Resp 20   Ht 5' 7\" (1.702 m)   Wt 114.4 kg (252 lb 3.3 oz)   SpO2 95%   BMI 39.50 kg/m²       O2 Device: Nasal cannula   O2 Flow Rate (L/min): 2 l/min   Temp (24hrs), Av.3 °F (36.8 °C), Min:97.8 °F (36.6 °C), Max:99.5 °F (37.5 °C)       Intake/Output:   Last shift:      701 - 1900  In: 120 [P.O.:120]  Out: 400 [Urine:400]  Last 3 shifts: 1901 -  0700  In: 320 [P.O.:320]  Out: 1500 [Urine:1500]    Intake/Output Summary (Last 24 hours) at 2021 1155  Last data filed at 2021 0735  Gross per 24 hour   Intake 340 ml   Output 1900 ml   Net -1560 ml      Physical Exam:   General:  Alert, cooperative, no distress, appears stated age. Head:  Normocephalic, without obvious abnormality, atraumatic.    Eyes:  Conjunctivae/corneas clear. PERRL, EOMs intact. Nose: Nares normal. Septum midline. Mucosa normal. No drainage or sinus tenderness. Throat: Lips, mucosa, and tongue normal. Teeth and gums normal.   Neck: Supple, symmetrical, trachea midline, no adenopathy, thyroid: no enlargment/tenderness/nodules, no carotid bruit and no JVD. Back:   Symmetric, no curvature. ROM normal.   Lungs:   Clear to auscultation bilaterally. Chest wall:  No tenderness or deformity. Heart:  Regular rate and rhythm, S1, S2 normal, no murmur, click, rub or gallop. Abdomen:   Soft, non-tender. Bowel sounds normal. No masses,  No organomegaly. Extremities: Extremities normal, atraumatic, no cyanosis or edema. Pulses: 2+ and symmetric all extremities.    Skin: Skin color, texture, turgor normal. No rashes or lesions   Lymph nodes: Cervical, supraclavicular, and axillary nodes normal.   Neurologic: Grossly nonfocal     Data review:     Recent Results (from the past 24 hour(s))   GLUCOSE, POC    Collection Time: 12/10/21 12:49 PM   Result Value Ref Range    Glucose (POC) 235 (H) 65 - 117 mg/dL    Performed by Teo Darby    GLUCOSE, POC    Collection Time: 12/10/21  4:52 PM   Result Value Ref Range    Glucose (POC) 143 (H) 65 - 117 mg/dL    Performed by Kalyani Maynard    GLUCOSE, POC    Collection Time: 12/10/21  9:19 PM   Result Value Ref Range    Glucose (POC) 104 65 - 117 mg/dL    Performed by Arturo Bright    METABOLIC PANEL, BASIC    Collection Time: 12/11/21  1:24 AM   Result Value Ref Range    Sodium 140 136 - 145 mmol/L    Potassium 4.7 3.5 - 5.1 mmol/L    Chloride 103 97 - 108 mmol/L    CO2 31 21 - 32 mmol/L    Anion gap 6 5 - 15 mmol/L    Glucose 142 (H) 65 - 100 mg/dL    BUN 59 (H) 6 - 20 MG/DL    Creatinine 2.97 (H) 0.55 - 1.02 MG/DL    BUN/Creatinine ratio 20 12 - 20      GFR est AA 19 (L) >60 ml/min/1.73m2    GFR est non-AA 16 (L) >60 ml/min/1.73m2    Calcium 9.5 8.5 - 10.1 MG/DL   MAGNESIUM    Collection Time: 12/11/21  1:24 AM   Result Value Ref Range    Magnesium 2.3 1.6 - 2.4 mg/dL   GLUCOSE, POC    Collection Time: 12/11/21  7:23 AM   Result Value Ref Range    Glucose (POC) 129 (H) 65 - 117 mg/dL    Performed by Adry Rapp    GLUCOSE, POC    Collection Time: 12/11/21 11:22 AM   Result Value Ref Range    Glucose (POC) 295 (H) 65 - 117 mg/dL    Performed by Harriet Huynh ([employer])          Dr. Lua Room did RHC: on 12/10/21:   Noted to have Severe Pulm Htn: but also had increased PCWP. Imaging:  I have personally reviewed the patients radiographs and have reviewed the reports:  12-6-21: CXR:   Comparison:5/26/2021     Findings: Cardiac silhouette is enlarged. Pulmonary vasculature is not engorged. There are no focal parenchymal opacities, effusions, or pneumothorax. Left chest  AICD unchanged     IMPRESSION  1.  Enlarged cardiac silhouette, otherwise no acute disease        Yulissa Nunes MD

## 2021-12-11 NOTE — PROGRESS NOTES
Bedside and Verbal shift change report given to Leonel Carver RN (oncoming nurse) by Rubina Silvestre (offgoing nurse). Report included the following information SBAR, Kardex, Intake/Output, MAR, Recent Results, Cardiac Rhythm paved and Dual Neuro Assessment.

## 2021-12-11 NOTE — PROGRESS NOTES
12/11/2021   Allyssa Rob MD  [facility]    . Mallika Tena Lora Sanchez is a 78 y.o. female   LOS 5   Feels much better for first time in awhile after diuresis  However still very sob just going to the bathroom and HR went to 114 (paced)  No chest pain  Mild lower extremity edema    No palps  She had thought this was pacer but RHC yesterday showed very high wedge pressure in the mid 30s and PA pressures in the 56Y systolic  Mag and K are ok this am  On bumex and coreg  Discussion/Plans/Recs  1. Acute CHF systolic    EF 99-51% echo 12-8-21 -mid mod MR, TR   Cath [facility] 2015 no CAD  With high LDL she may have developed CAD since not taking statin, need to clarify if intolerant  Non-ischemic cardiomyopathy (NICM)   High wedge and PA pressure  CHF not as apparent by exam but RHC very helpful  Change to IV diuretics for next 2-3 days, follow renal fx, BUN and Bicarb  Renal fx may tolerate IV diuretics better than po   Long term will be difficult to balance renal fx and diuresis, aim for patient ADLs and consider for hemodialysis  She is still having HAILE at less than 25 feet  Reduced Coreg 2 days ago, added bidil drugs    2. BiV ICD   on 12-8-21 showed proper fx and good CRT %    3. CKD 4  Cr runs high now 2.8 -2.97  Lab Results   Component Value Date/Time    Creatinine (POC) 2.1 (H) 01/23/2013 04:12 PM    Creatinine 2.97 (H) 12/11/2021 01:24 AM      4. Chronic anemia  Lab Results   Component Value Date/Time    Hemoglobin (POC) 10.9 (L) 01/23/2013 04:12 PM    HGB 9.8 (L) 12/07/2021 03:33 AM      5.  HTN  Now low bp with CHF and CHF meds  At goal , meds and possible side effects reviewed and patient denies  PTA meds  Key CAD CHF Meds             carvediloL (COREG) 25 mg tablet TAKE 1 TABLET BY MOUTH TWICE A DAY WITH MEALS    bumetanide (BUMEX) 2 mg tablet Take 1 tab in the morning and 0.5 tab in the evening    candesartan (ATACAND) 4 mg tablet TAKE 1 TABLET BY MOUTH EVERY DAY aspirin 81 mg tablet Take 81 mg by mouth daily. BP Readings from Last 6 Encounters:   12/11/21 91/71   11/01/21 112/72   07/14/21 110/68   07/13/21 110/60   05/26/21 118/75   11/04/20 110/72        6. XOL/MD2- cardiovascular disease risk factors   NOT At goal , denies excess muscle aches or new liver issues  Key Antihyperlipidemia Meds     The patient is on no antihyperlipidemia meds. Lab Results   Component Value Date/Time    LDL, calculated 178.2 (H) 07/13/2021 04:00 PM          Cardiac Studies/Hx:  Cardiac cath 2007 - normal cors   Echo 4/2010 - EF 45%, improved from 1/09 (25%)  Echo 11/2011 - LVH, EF 55-60%  Lexiscan/cardiolite 3/21/12 - normal perfusion, global HK 40%   5/21/2012: Scheduled [facility] download. There were 1135 ventricular sensing episodes noted with her night-time heart rate greater than 85 bpm for 7 days (HR from 122-128 bpm). This has not affected her CRT pacing percentage of 99.8% since January 2012. The Optivol fluid index began rising in April, 2012 and is ongoing, suggesting possible fluid accumulation (>200). 4/8/2013 BIV ICD scheduled carelink download 9 b NSVT 2/18/13, 1445 sensing episodes without BIV pacing probably AT longest one 1 min (98.9%  still)  Echo 6/8/14-EF 55 % to 60 %. Mitral valve: There was mild posterior annular calcification, Mild TR  Echo 6/18/14 - LVEF 55-60%,   Lexiscan cardiolite 12/16/14 - anterior,apical reversible defect, stress induced LV dilatation, EF 13%  Echo 1/13/15: EF 55-60%, no regional WMA, no change from previous echo of 6/14  Cath 1/6/2015 - EDP 11, no AVG, no LVG due to CKD, normal cors with right dominance. Echo 12/15/16 - EF 60-65%. No WMA. Grade 1 diastolic dysfunction. No significant change compared to previous study 1/9/15. Echo 4/24/18 - EF 60-65%. No WMA.   Echo 4/22/19 - EF 60%   Past Medical History:   Diagnosis Date    Acquired hypothyroidism 8/15/2016    Anemia     RED-HF study    Asthma     Cardiomyopathy, nonischemic ([facility] [address]     initial dx 2001, bivHF 2008 with EF 15%, s/p biV-ICD 9/08, significant improvment in EF to 45-50%    CKD (chronic kidney disease)     Dr Waunita Kocher CKD (chronic kidney disease) 8/15/2012    Depression     Diabetes ([facility] [address]     Diabetic neuropathy ([facility] [address]     [facility] 8/15/2012    GERD (gastroesophageal reflux disease)     Gout     Hypothyroidism     ICD (implantable cardioverter-defibrillator), biventricular, in situ 6/5/2014    Psoriasis       ROS-pertinents  negative except as above  The pertinent portions of the medical history,physician and nursing notes, meds,vitals , labs and Ins/Outs,are reviewed in the electronic record. Results for orders placed or performed during the hospital encounter of 12/06/21   EKG, 12 LEAD, INITIAL   Result Value Ref Range    Ventricular Rate 103 BPM    Atrial Rate 103 BPM    P-R Interval 152 ms    QRS Duration 112 ms    Q-T Interval 408 ms    QTC Calculation (Bezet) 534 ms    Calculated P Axis 48 degrees    Calculated R Axis -46 degrees    Calculated T Axis 95 degrees    Diagnosis       Electronic ventricular pacemaker  When compared with ECG of 16-JAN-2018 20:33,  Vent. rate has decreased BY  85 BPM  Confirmed by Joseluis Brown M.D., Paco Burnham ([id number]) on 12/8/2021 2:09:18 PM     Results for orders placed or performed in visit on 10/03/11   AMB POC EKG ROUTINE W/ 12 LEADS, INTER & REP    Narrative    See scanned report. 12/06/21    ECHO ADULT COMPLETE 12/08/2021 12/8/2021    Interpretation Summary  · LV: Estimated LVEF is 15 - 20%. Normal cavity size. Upper normal wall thickness. Severely reduced systolic function. Left ventricular diastolic dysfunction. · LA: Moderately dilated left atrium. · RV: Borderline low systolic function. Pacer/ICD present. · RA: Mildly dilated right atrium. · MV: Mitral valve non-specific thickening. Mild to moderate mitral valve regurgitation is present.   · TV: Mild tricuspid valve regurgitation is present. · PA: Mild to moderate pulmonary hypertension. Pulmonary arterial systolic pressure is 47 mmHg.     Signed by: Mirna Urbina MD on 12/8/2021  1:01 PM         Objective:    Physical Exam:   BP 91/71   Pulse (!) 108   Temp 99.5 °F (37.5 °C)   Resp 20   Ht 5' 7\" (1.702 m)   Wt 252 lb 3.3 oz (114.4 kg)   SpO2 95%   BMI 39.50 kg/m²    General:  alert, cooperative, mild distress, appears stated age   ENT, Neck:  no jvd   Chest Wall: inspection normal - no chest wall deformities or tenderness, respiratory effort normal, barrel chest   Lung: diminished breath sounds R base, L base   Heart:  normal rate, regular rhythm, normal S1, S2, no murmurs, rubs, clicks or gallops   Abdomen: nondistended   Extremities: no ulcers, gangrene or trophic changes, edema 1+      Patient Vitals for the past 12 hrs:   Temp Pulse Resp BP SpO2   12/11/21 1200  (!) 108      12/11/21 1000  (!) 106      12/11/21 0947 99.5 °F (37.5 °C) (!) 107 20 91/71 95 %   12/11/21 0830  (!) 116  (!) 110/91    12/11/21 0600  96      12/11/21 0551 98.2 °F (36.8 °C) 97 16 131/77 97 %   12/11/21 0356  (!) 104      12/11/21 0218  96      12/11/21 0200 98.2 °F (36.8 °C) 95 19 129/73 99 %      Lab Results   Component Value Date/Time    WBC 9.6 12/07/2021 03:33 AM    Hemoglobin (POC) 10.9 (L) 01/23/2013 04:12 PM    HGB 9.8 (L) 12/07/2021 03:33 AM    Hematocrit (POC) 32 (L) 01/23/2013 04:12 PM    HCT 36.8 12/07/2021 03:33 AM    PLATELET 710 14/07/8269 03:33 AM    MCV 92.5 12/07/2021 03:33 AM     Lab Results   Component Value Date/Time    Sodium 140 12/11/2021 01:24 AM    Potassium 4.7 12/11/2021 01:24 AM    Chloride 103 12/11/2021 01:24 AM    CO2 31 12/11/2021 01:24 AM    Anion gap 6 12/11/2021 01:24 AM    Glucose 142 (H) 12/11/2021 01:24 AM    BUN 59 (H) 12/11/2021 01:24 AM    Creatinine 2.97 (H) 12/11/2021 01:24 AM    BUN/Creatinine ratio 20 12/11/2021 01:24 AM    GFR est AA 19 (L) 12/11/2021 01:24 AM    GFR est non-AA 16 (L) 12/11/2021 01:24 AM    Calcium 9.5 12/11/2021 01:24 AM     Lab Results   Component Value Date/Time     (H) 01/16/2018 08:05 PM    CK - MB <1.0 01/16/2018 08:05 PM    CK-MB Index Cannot be calculated 01/16/2018 08:05 PM    Troponin-I, Qt. <0.04 01/16/2018 08:05 PM     Lab Results   Component Value Date/Time    NT pro-BNP 7,908 (H) 12/06/2021 02:44 PM    NT pro-BNP 21 01/23/2013 04:00 PM    NT pro-BNP 54 03/04/2011 01:42 AM       reports that she has never smoked. She has never used smokeless tobacco. She reports that she does not drink alcohol and does not use drugs. family history includes Diabetes in her brother; Heart Disease in her mother; Hypertension in her mother; Lupus in her sister. Last 24hr Input/Output:    Intake/Output Summary (Last 24 hours) at 12/11/2021 1324  Last data filed at 12/11/2021 0735  Gross per 24 hour   Intake 340 ml   Output 1900 ml   Net -1560 ml        Data Review:   Lab Results   Component Value Date/Time    WBC 9.6 12/07/2021 03:33 AM    Hemoglobin (POC) 10.9 (L) 01/23/2013 04:12 PM    HGB 9.8 (L) 12/07/2021 03:33 AM    Hematocrit (POC) 32 (L) 01/23/2013 04:12 PM    HCT 36.8 12/07/2021 03:33 AM    PLATELET 226 00/22/4295 03:33 AM    MCV 92.5 12/07/2021 03:33 AM     Lab Results   Component Value Date/Time    Sodium 140 12/11/2021 01:24 AM    Potassium 4.7 12/11/2021 01:24 AM    Chloride 103 12/11/2021 01:24 AM    CO2 31 12/11/2021 01:24 AM    Anion gap 6 12/11/2021 01:24 AM    Glucose 142 (H) 12/11/2021 01:24 AM    BUN 59 (H) 12/11/2021 01:24 AM    Creatinine 2.97 (H) 12/11/2021 01:24 AM    BUN/Creatinine ratio 20 12/11/2021 01:24 AM    GFR est AA 19 (L) 12/11/2021 01:24 AM    GFR est non-AA 16 (L) 12/11/2021 01:24 AM    Calcium 9.5 12/11/2021 01:24 AM    Bilirubin, total 0.4 12/06/2021 02:44 PM    Alk.  phosphatase 77 12/06/2021 02:44 PM    Protein, total 7.9 12/06/2021 02:44 PM    Albumin 3.1 (L) 12/06/2021 02:44 PM    Globulin 4.8 (H) 12/06/2021 02:44 PM    A-G Ratio 0.6 (L) 12/06/2021 02:44 PM    ALT (SGPT) 19 12/06/2021 02:44 PM    AST (SGOT) 9 (L) 12/06/2021 02:44 PM     Lab Results   Component Value Date/Time     (H) 01/16/2018 08:05 PM    CK - MB <1.0 01/16/2018 08:05 PM    CK-MB Index Cannot be calculated 01/16/2018 08:05 PM    Troponin-I, Qt. <0.04 01/16/2018 08:05 PM     Lab Results   Component Value Date/Time    NT pro-BNP 7,908 (H) 12/06/2021 02:44 PM    NT pro-BNP 21 01/23/2013 04:00 PM    NT pro-BNP 54 03/04/2011 01:42 AM       Recent Results (from the past 24 hour(s))   GLUCOSE, POC    Collection Time: 12/10/21  4:52 PM   Result Value Ref Range    Glucose (POC) 143 (H) 65 - 117 mg/dL    Performed by Valdo Aj    GLUCOSE, POC    Collection Time: 12/10/21  9:19 PM   Result Value Ref Range    Glucose (POC) 104 65 - 117 mg/dL    Performed by Marcella Ash    METABOLIC PANEL, BASIC    Collection Time: 12/11/21  1:24 AM   Result Value Ref Range    Sodium 140 136 - 145 mmol/L    Potassium 4.7 3.5 - 5.1 mmol/L    Chloride 103 97 - 108 mmol/L    CO2 31 21 - 32 mmol/L    Anion gap 6 5 - 15 mmol/L    Glucose 142 (H) 65 - 100 mg/dL    BUN 59 (H) 6 - 20 MG/DL    Creatinine 2.97 (H) 0.55 - 1.02 MG/DL    BUN/Creatinine ratio 20 12 - 20      GFR est AA 19 (L) >60 ml/min/1.73m2    GFR est non-AA 16 (L) >60 ml/min/1.73m2    Calcium 9.5 8.5 - 10.1 MG/DL   MAGNESIUM    Collection Time: 12/11/21  1:24 AM   Result Value Ref Range    Magnesium 2.3 1.6 - 2.4 mg/dL   GLUCOSE, POC    Collection Time: 12/11/21  7:23 AM   Result Value Ref Range    Glucose (POC) 129 (H) 65 - 117 mg/dL    Performed by Marcella Ash    GLUCOSE, POC    Collection Time: 12/11/21 11:22 AM   Result Value Ref Range    Glucose (POC) 295 (H) 65 - 117 mg/dL    Performed by Angela Dickson (CNA Traveler)           Future Appointments   Date Time Provider Lizabeth Loli   12/20/2021  9:40 AM JAIDA Morton BS AMB   1/17/2022  2:45 PM KASSIMIRTA AMB   4/18/2022 11:45 AM Antwon Vo ADAM BS AMB   6/20/2022  1:00 PM ECHOTWOMIRTA BS AMB   7/21/2022  2:20 PM [id number] AMB   7/21/2022  2:40 PM MD Phillip Brandon MD 12/11/2021

## 2021-12-11 NOTE — PROGRESS NOTES
[facility]  Hospitalist Group                                                                                          Hospitalist Progress Note  Wandy Cason MD  Answering service: [phone] from in house phone        Date of Service:  2021  NAME:  Erna Parish  :  1955  MRN:  [id number]      Admission Summary:   78 y.o. female with past medical history of heart failure 2/2 and NICM (last EF 60% ) s/p ICD, DM 2, asthma, CKD, depression, GERD, and hypothyroidism who presents with a chief complaint of dyspnea at rest    Interval history / Subjective:   Breathing better than yesterday but still w/ significant dyspnea with minimal exertion. No chest pain, cough, n/v/d, leg swelling. Wants to go home. Assessment & Plan:     #Acute on chronic HFrEF, EF had improved now 15-20%, s/p ICD, NYHA IV  diuretics changed to IV per cardiology  - ECHO with EF 15-20%  --s/p Rt Heart cath on 12/10, severe pulm HTN w/ elevated PCWP. Pulm consulted today - can f/u as an outpatient.    --did not do LHC due to CKD , likely will get outpatient Stress and  to address LV Lead outpatient   Continued GDMT with carvedilol, ACE/ARB indicated in the setting of CKD  --Will need home o2 on discharge, cm aware, discussed with patient who is agreeable.     #Asthma  Continue Pulmicort per hospital formulary, and Singulair     #DM2 with neuropathy  ISS  Continue Neurontin  --continue NPH     #CKD IV  Creatinine at baseline - monitor w/ diuresis   Avoid renal toxins, or hypotension.  Renally dose medications  Continue calcitriol     #Gout  Continue allopurinol     #Hypothyroidism  Continue levothyroxine     #Depression  Continue Effexor    Code status: FULL   DVT prophylaxis: [facility] discussed with: Patient/Family, Nurse and Consultant   Anticipated Disposition: Home w/Family  Anticipated Discharge: 24 hours to 48 hours     Hospital Problems  Date Reviewed: 7/13/2021          Codes Class Noted POA    CHF exacerbation ([facility] [address] ICD-10-CM: I50.9  ICD-9-CM: 428.0  12/6/2021 Unknown                Review of Systems:   Review of systems not obtained due to patient factors. Vital Signs:    Last 24hrs VS reviewed since prior progress note. Most recent are:  Visit Vitals  /75   Pulse (!) 103   Temp 98.4 °F (36.9 °C)   Resp 20   Ht 5' 7\" (1.702 m)   Wt 114.4 kg (252 lb 3.3 oz)   SpO2 95%   BMI 39.50 kg/m²         Intake/Output Summary (Last 24 hours) at 12/11/2021 1625  Last data filed at 12/11/2021 0735  Gross per 24 hour   Intake 340 ml   Output 1900 ml   Net -1560 ml        Physical Examination:     I had a face to face encounter with this patient and independently examined them on 12/11/2021 as outlined below:          Constitutional:  No acute distress, cooperative, pleasant    ENT:  anicteric sclerae   Resp:  CTA b/l, normal WOB   CV:  Regular rhythm, normal rate,    GI:  Soft, non distended, non tender. normoactive bowel sounds,     Musculoskeletal:  No edema, warm    Neurologic:  Moves all extremities. AAOx3, CN II-XII reviewed            Data Review:    Review and/or order of clinical lab test  Review and/or order of tests in the radiology section of CPT  Review and/or order of tests in the medicine section of CPT      Labs:     No results for input(s): WBC, HGB, HCT, PLT, HGBEXT, HCTEXT, PLTEXT, HGBEXT, HCTEXT, PLTEXT in the last 72 hours. Recent Labs     12/11/21  0124 12/10/21  0305 12/09/21  0653    135* 139   K 4.7 4.8 4.2    103 101   CO2 31 27 29   BUN 59* 60* 51*   CREA 2.97* 2.80* 2.47*   * 235* 136*   CA 9.5 9.5 9.4   MG 2.3 2.3 2.3     No results for input(s): ALT, AP, TBIL, TBILI, TP, ALB, GLOB, GGT, AML, LPSE in the last 72 hours. No lab exists for component: SGOT, GPT, AMYP, HLPSE  No results for input(s): INR, PTP, APTT, INREXT, INREXT in the last 72 hours.    No results for input(s): FE, TIBC, PSAT, FERR in the last 72 hours. No results found for: FOL, RBCF   No results for input(s): PH, PCO2, PO2 in the last 72 hours. No results for input(s): CPK, CKNDX, TROIQ in the last 72 hours.     No lab exists for component: CPKMB  Lab Results   Component Value Date/Time    Cholesterol, total 282 (H) 07/13/2021 04:00 PM    HDL Cholesterol 65 07/13/2021 04:00 PM    LDL, calculated 178.2 (H) 07/13/2021 04:00 PM    Triglyceride 194 (H) 07/13/2021 04:00 PM    CHOL/HDL Ratio 4.3 07/13/2021 04:00 PM     Lab Results   Component Value Date/Time    Glucose (POC) 295 (H) 12/11/2021 11:22 AM    Glucose (POC) 129 (H) 12/11/2021 07:23 AM    Glucose (POC) 104 12/10/2021 09:19 PM    Glucose (POC) 143 (H) 12/10/2021 04:52 PM    Glucose (POC) 235 (H) 12/10/2021 12:49 PM     Lab Results   Component Value Date/Time    Color YELLOW/STRAW 11/05/2020 09:11 AM    Appearance CLEAR 11/05/2020 09:11 AM    Specific gravity 1.012 11/05/2020 09:11 AM    pH (UA) 5.0 11/05/2020 09:11 AM    Protein Negative 11/05/2020 09:11 AM    Glucose Negative 11/05/2020 09:11 AM    Ketone Negative 11/05/2020 09:11 AM    Bilirubin Negative 11/05/2020 09:11 AM    Urobilinogen 0.2 11/05/2020 09:11 AM    Nitrites Negative 11/05/2020 09:11 AM    Leukocyte Esterase SMALL (A) 11/05/2020 09:11 AM    Epithelial cells MANY (A) 11/05/2020 09:11 AM    Bacteria Negative 11/05/2020 09:11 AM    WBC 5-10 11/05/2020 09:11 AM    RBC 0-5 11/05/2020 09:11 AM         Medications Reviewed:     Current Facility-Administered Medications   Medication Dose Route Frequency    bumetanide (BUMEX) injection 1 mg  1 mg IntraVENous BID    carvediloL (COREG) tablet 6.25 mg  6.25 mg Oral BID WITH MEALS    hydrALAZINE (APRESOLINE) tablet 10 mg  10 mg Oral TID    isosorbide dinitrate (ISORDIL) tablet 5 mg  5 mg Oral TID    insulin NPH (NOVOLIN N, HUMULIN N) injection 40 Units  40 Units SubCUTAneous ACB&D    aspirin delayed-release tablet 81 mg  81 mg Oral DAILY    gabapentin (NEURONTIN) capsule 100 mg 100 mg Oral DAILY    sodium chloride (NS) flush 5-40 mL  5-40 mL IntraVENous Q8H    sodium chloride (NS) flush 5-40 mL  5-40 mL IntraVENous PRN    acetaminophen (TYLENOL) tablet 650 mg  650 mg Oral Q6H PRN    Or    acetaminophen (TYLENOL) suppository 650 mg  650 mg Rectal Q6H PRN    ondansetron (ZOFRAN ODT) tablet 4 mg  4 mg Oral Q8H PRN    Or    ondansetron (ZOFRAN) injection 4 mg  4 mg IntraVENous Q6H PRN    glucose chewable tablet 16 g  4 Tablet Oral PRN    dextrose (D50W) injection syrg 12.5-25 g  25-50 mL IntraVENous PRN    glucagon (GLUCAGEN) injection 1 mg  1 mg IntraMUSCular PRN    insulin lispro (HUMALOG) injection   SubCUTAneous AC&HS    allopurinoL (ZYLOPRIM) tablet 100 mg  100 mg Oral DAILY    calcitRIOL (ROCALTROL) capsule 0.5 mcg  0.5 mcg Oral DAILY    gabapentin (NEURONTIN) capsule 100 mg  100 mg Oral QHS    cetirizine (ZYRTEC) tablet 10 mg  10 mg Oral DAILY    levothyroxine (SYNTHROID) tablet 100 mcg  100 mcg Oral ACB    montelukast (SINGULAIR) tablet 10 mg  10 mg Oral DAILY    venlafaxine-SR (EFFEXOR-XR) capsule 75 mg  75 mg Oral DAILY    [Held by provider] heparin (porcine) injection 5,000 Units  5,000 Units SubCUTAneous Q8H    simethicone (MYLICON) tablet 80 mg  80 mg Oral QID PRN    budesonide (PULMICORT) 250 mcg/2ml nebulizer susp  250 mcg Nebulization BID RT     ______________________________________________________________________  EXPECTED LENGTH OF STAY: 5d 4h  ACTUAL LENGTH OF STAY:          5                 Yahaira Leiva MD

## 2021-12-12 NOTE — PROGRESS NOTES
[address] [facility] Adult  Hospitalist Group                                                                                          Hospitalist Progress Note  Maxim Ceballos MD  Answering service: [phone] OR 36 from in house phone        Date of Service:  2021  NAME:  Adal Kincaid  :  1955  MRN:  [id number]      Admission Summary:   78 y.o. female with past medical history of heart failure 2/2 and NICM (last EF 60% ) s/p ICD, DM 2, asthma, CKD, depression, GERD, and hypothyroidism who presents with a chief complaint of dyspnea at rest    Interval history / Subjective:   Feels better today after initiating IV diuretics, no chest pain, n/v/d     Assessment & Plan:     #Acute on chronic HFrEF, EF had improved now 15-20%, s/p ICD, NYHA IV  diuretics changed to IV per cardiology - pt tolerating well  - ECHO with EF 15-20%  --s/p Rt Heart cath on 12/10, severe pulm HTN w/ elevated PCWP. Pulm consulted - can f/u as an outpatient.    --did not do LHC due to CKD, likely will get outpatient Stress and  to address LV Lead outpatient   Continued GDMT with carvedilol, ACE/ARB indicated in the setting of CKD  --Will need home o2 on discharge, cm aware, discussed with patient who is agreeable.     #Asthma  Continue Pulmicort per hospital formulary, and Singulair     #DM2 with neuropathy  ISS  Continue Neurontin  --continue NPH - increase AM dose today     #CKD IV  Creatinine at baseline - monitor w/ diuresis   Avoid renal toxins, or hypotension.  Renally dose medications  Continue calcitriol     #Gout  Continue allopurinol     #Hypothyroidism  Continue levothyroxine     #Depression  Continue Effexor    Code status: FULL   DVT prophylaxis:  [facility] discussed with: Patient/Family, Nurse and Consultant   Anticipated Disposition: Home w/Family  Anticipated Discharge: 24 hours to 48 hours     Hospital Problems  Date Reviewed: 2021          Codes Class Noted POA    CHF exacerbation ([facility] [address] ICD-10-CM: I50.9  ICD-9-CM: 428.0  12/6/2021 Unknown                Review of Systems:   Pertinent items are noted in HPI. Vital Signs:    Last 24hrs VS reviewed since prior progress note. Most recent are:  Visit Vitals  /74   Pulse (!) 103   Temp 98.5 °F (36.9 °C)   Resp 20   Ht 5' 7\" (1.702 m)   Wt 116.3 kg (256 lb 6.4 oz)   SpO2 96%   BMI 40.16 kg/m²         Intake/Output Summary (Last 24 hours) at 12/12/2021 1135  Last data filed at 12/12/2021 0800  Gross per 24 hour   Intake 100 ml   Output 1000 ml   Net -900 ml        Physical Examination:     I had a face to face encounter with this patient and independently examined them on 12/12/2021 as outlined below:          Constitutional:  No acute distress, cooperative, pleasant    ENT:  anicteric sclerae   Resp:  CTA b/l, normal WOB   CV:  Regular rhythm, normal rate,    GI:  Soft, non distended, non tender. normoactive bowel sounds,     Musculoskeletal:  No edema, warm    Neurologic:  Moves all extremities. AAOx3, CN II-XII reviewed            Data Review:    Review and/or order of clinical lab test  Review and/or order of tests in the radiology section of CPT  Review and/or order of tests in the medicine section of CPT      Labs:     No results for input(s): WBC, HGB, HCT, PLT, HGBEXT, HCTEXT, PLTEXT, HGBEXT, HCTEXT, PLTEXT in the last 72 hours. Recent Labs     12/12/21  0205 12/11/21  0124 12/10/21  0305    140 135*   K 4.7 4.7 4.8    103 103   CO2 29 31 27   BUN 60* 59* 60*   CREA 2.59* 2.97* 2.80*   * 142* 235*   CA 9.6 9.5 9.5   MG 2.4 2.3 2.3     No results for input(s): ALT, AP, TBIL, TBILI, TP, ALB, GLOB, GGT, AML, LPSE in the last 72 hours. No lab exists for component: SGOT, GPT, AMYP, HLPSE  No results for input(s): INR, PTP, APTT, INREXT, INREXT in the last 72 hours. No results for input(s): FE, TIBC, PSAT, FERR in the last 72 hours.    No results found for: FOL, RBCF   No results for input(s): PH, PCO2, PO2 in the last 72 hours. No results for input(s): CPK, CKNDX, TROIQ in the last 72 hours.     No lab exists for component: CPKMB  Lab Results   Component Value Date/Time    Cholesterol, total 282 (H) 07/13/2021 04:00 PM    HDL Cholesterol 65 07/13/2021 04:00 PM    LDL, calculated 178.2 (H) 07/13/2021 04:00 PM    Triglyceride 194 (H) 07/13/2021 04:00 PM    CHOL/HDL Ratio 4.3 07/13/2021 04:00 PM     Lab Results   Component Value Date/Time    Glucose (POC) 157 (H) 12/12/2021 11:20 AM    Glucose (POC) 112 12/12/2021 08:00 AM    Glucose (POC) 222 (H) 12/11/2021 09:32 PM    Glucose (POC) 210 (H) 12/11/2021 04:35 PM    Glucose (POC) 295 (H) 12/11/2021 11:22 AM     Lab Results   Component Value Date/Time    Color YELLOW/STRAW 11/05/2020 09:11 AM    Appearance CLEAR 11/05/2020 09:11 AM    Specific gravity 1.012 11/05/2020 09:11 AM    pH (UA) 5.0 11/05/2020 09:11 AM    Protein Negative 11/05/2020 09:11 AM    Glucose Negative 11/05/2020 09:11 AM    Ketone Negative 11/05/2020 09:11 AM    Bilirubin Negative 11/05/2020 09:11 AM    Urobilinogen 0.2 11/05/2020 09:11 AM    Nitrites Negative 11/05/2020 09:11 AM    Leukocyte Esterase SMALL (A) 11/05/2020 09:11 AM    Epithelial cells MANY (A) 11/05/2020 09:11 AM    Bacteria Negative 11/05/2020 09:11 AM    WBC 5-10 11/05/2020 09:11 AM    RBC 0-5 11/05/2020 09:11 AM         Medications Reviewed:     Current Facility-Administered Medications   Medication Dose Route Frequency    insulin NPH (NOVOLIN N, HUMULIN N) injection 48 Units  48 Units SubCUTAneous ACB&D    bumetanide (BUMEX) injection 1 mg  1 mg IntraVENous BID    carvediloL (COREG) tablet 6.25 mg  6.25 mg Oral BID WITH MEALS    hydrALAZINE (APRESOLINE) tablet 10 mg  10 mg Oral TID    isosorbide dinitrate (ISORDIL) tablet 5 mg  5 mg Oral TID    aspirin delayed-release tablet 81 mg  81 mg Oral DAILY    gabapentin (NEURONTIN) capsule 100 mg  100 mg Oral DAILY    sodium chloride (NS) flush 5-40 mL  5-40 mL IntraVENous Q8H    sodium chloride (NS) flush 5-40 mL  5-40 mL IntraVENous PRN    acetaminophen (TYLENOL) tablet 650 mg  650 mg Oral Q6H PRN    Or    acetaminophen (TYLENOL) suppository 650 mg  650 mg Rectal Q6H PRN    ondansetron (ZOFRAN ODT) tablet 4 mg  4 mg Oral Q8H PRN    Or    ondansetron (ZOFRAN) injection 4 mg  4 mg IntraVENous Q6H PRN    glucose chewable tablet 16 g  4 Tablet Oral PRN    dextrose (D50W) injection syrg 12.5-25 g  25-50 mL IntraVENous PRN    glucagon (GLUCAGEN) injection 1 mg  1 mg IntraMUSCular PRN    insulin lispro (HUMALOG) injection   SubCUTAneous AC&HS    allopurinoL (ZYLOPRIM) tablet 100 mg  100 mg Oral DAILY    calcitRIOL (ROCALTROL) capsule 0.5 mcg  0.5 mcg Oral DAILY    gabapentin (NEURONTIN) capsule 100 mg  100 mg Oral QHS    cetirizine (ZYRTEC) tablet 10 mg  10 mg Oral DAILY    levothyroxine (SYNTHROID) tablet 100 mcg  100 mcg Oral ACB    montelukast (SINGULAIR) tablet 10 mg  10 mg Oral DAILY    venlafaxine-SR (EFFEXOR-XR) capsule 75 mg  75 mg Oral DAILY    [Held by provider] heparin (porcine) injection 5,000 Units  5,000 Units SubCUTAneous Q8H    simethicone (MYLICON) tablet 80 mg  80 mg Oral QID PRN    budesonide (PULMICORT) 250 mcg/2ml nebulizer susp  250 mcg Nebulization BID RT     ______________________________________________________________________  EXPECTED LENGTH OF STAY: 5d 4h  ACTUAL LENGTH OF STAY:          6                 Kitty Espinoza MD

## 2021-12-12 NOTE — PROGRESS NOTES
12/12/2021   Jyotsna Beltran MD  [facility]    . Austen Denton is a 78 y.o. female   LOS 6   Today--- she is glad she stayed , she feels shortness of breath is better, with IV diuretics the creatinine is down      Feels much better for first time in awhile after diuresis  Yesterday  still very sob just going to the bathroom and HR went to 114 (paced)  No chest pain  Mild lower extremity edema    No palps  She had thought this was pacer but RHC Friday  showed very high wedge pressure in the mid 30s and PA pressures in the 70I systolic  Mag and K are ok this am  On Bumex and Coreg    Discussion/Plans/Recs  1. Acute CHF systolic    EF 04-47% echo 12-8-21 -mid mod MR, TR   Cath [facility] 2015 no CAD  With high LDL she may have developed CAD since not taking statin, need to clarify if intolerant  Non-ischemic cardiomyopathy (NICM)   High wedge and PA pressure  CHF not as apparent by exam but RHC very helpful  Change to IV diuretics for next 2-3 days, follow renal fx, BUN and Bicarb  Renal fx may tolerate IV diuretics better than po   Long term will be difficult to balance renal fx and diuresis, aim for patient ADLs and consider for hemodialysis  She is still having HAILE at less than 25 feet  Reduced Coreg 2 days ago, added bidil drugs    2. BiV ICD   on 12-8-21 showed proper fx and good CRT %    3. CKD 4  Cr runs high now 2.8 -2.97  Lab Results   Component Value Date/Time    Creatinine (POC) 2.1 (H) 01/23/2013 04:12 PM    Creatinine 2.59 (H) 12/12/2021 02:05 AM      4. Chronic anemia  Lab Results   Component Value Date/Time    Hemoglobin (POC) 10.9 (L) 01/23/2013 04:12 PM    HGB 9.8 (L) 12/07/2021 03:33 AM      5. HTN  Now low bp with CHF and CHF meds  At goal , meds and possible side effects reviewed and patient denies  PTA meds  Key CAD CHF Meds             carvediloL (COREG) 6.25 mg tablet (Taking) Take 1 Tablet by mouth two (2) times daily (with meals).     isosorbide dinitrate (ISORDIL) 5 mg tablet (Taking) Take 1 Tablet by mouth three (3) times daily. hydrALAZINE (APRESOLINE) 10 mg tablet (Taking) Take 1 Tablet by mouth three (3) times daily. bumetanide (BUMEX) 2 mg tablet Take 1 tab in the morning and 0.5 tab in the evening    candesartan (ATACAND) 4 mg tablet TAKE 1 TABLET BY MOUTH EVERY DAY    aspirin 81 mg tablet Take 81 mg by mouth daily. BP Readings from Last 6 Encounters:   12/12/21 (!) 89/71   11/01/21 112/72   07/14/21 110/68   07/13/21 110/60   05/26/21 118/75   11/04/20 110/72        6. XOL/MD2- cardiovascular disease risk factors   NOT At goal , denies excess muscle aches or new liver issues  Key Antihyperlipidemia Meds     The patient is on no antihyperlipidemia meds. Lab Results   Component Value Date/Time    LDL, calculated 178.2 (H) 07/13/2021 04:00 PM          Cardiac Studies/Hx:  Cardiac cath 2007 - normal cors   Echo 4/2010 - EF 45%, improved from 1/09 (25%)  Echo 11/2011 - LVH, EF 55-60%  Lexiscan/cardiolite 3/21/12 - normal perfusion, global HK 40%   5/21/2012: Scheduled [facility] download. There were 1135 ventricular sensing episodes noted with her night-time heart rate greater than 85 bpm for 7 days (HR from 122-128 bpm). This has not affected her CRT pacing percentage of 99.8% since January 2012. The Optivol fluid index began rising in April, 2012 and is ongoing, suggesting possible fluid accumulation (>200). 4/8/2013 BIV ICD scheduled carelink download 9 b NSVT 2/18/13, 1445 sensing episodes without BIV pacing probably AT longest one 1 min (98.9%  still)  Echo 6/8/14-EF 55 % to 60 %. Mitral valve:  There was mild posterior annular calcification, Mild TR  Echo 6/18/14 - LVEF 55-60%,   Lexiscan cardiolite 12/16/14 - anterior,apical reversible defect, stress induced LV dilatation, EF 13%  Echo 1/13/15: EF 55-60%, no regional WMA, no change from previous echo of 6/14  Cath 1/6/2015 - EDP 11, no AVG, no LVG due to CKD, normal cors with right dominance. Echo 12/15/16 - EF 60-65%. No WMA. Grade 1 diastolic dysfunction. No significant change compared to previous study 1/9/15. Echo 4/24/18 - EF 60-65%. No WMA. Echo 4/22/19 - EF 60%   Past Medical History:   Diagnosis Date    Acquired hypothyroidism 8/15/2016    Anemia     RED-HF study    Asthma     Cardiomyopathy, nonischemic ([facility] [address]     initial dx 2001, bivHF 2008 with EF 15%, s/p biV-ICD 9/08, significant improvment in EF to 45-50%    CKD (chronic kidney disease)     Dr Dante Dunbar CKD (chronic kidney disease) 8/15/2012    Depression     Diabetes ([facility] [address]     Diabetic neuropathy ([facility] [address]     DM (diabetes mellitus) ([facility] [address] 8/15/2012    GERD (gastroesophageal reflux disease)     Gout     Hypothyroidism     ICD (implantable cardioverter-defibrillator), biventricular, in situ 6/5/2014    Psoriasis       ROS-pertinents  negative except as above  The pertinent portions of the medical history,physician and nursing notes, meds,vitals , labs and Ins/Outs,are reviewed in the electronic record. Results for orders placed or performed during the hospital encounter of 12/06/21   EKG, 12 LEAD, INITIAL   Result Value Ref Range    Ventricular Rate 103 BPM    Atrial Rate 103 BPM    P-R Interval 152 ms    QRS Duration 112 ms    Q-T Interval 408 ms    QTC Calculation (Bezet) 534 ms    Calculated P Axis 48 degrees    Calculated R Axis -46 degrees    Calculated T Axis 95 degrees    Diagnosis       Electronic ventricular pacemaker  When compared with ECG of 16-JAN-2018 20:33,  Vent. rate has decreased BY  85 BPM  Confirmed by Jeremy Sharpe M.D., [facility] ([id number]) on 12/8/2021 2:09:18 PM     Results for orders placed or performed in visit on 10/03/11   AMB POC EKG ROUTINE W/ 12 LEADS, INTER & REP    Narrative    See scanned report. 12/06/21    ECHO ADULT COMPLETE 12/08/2021 12/8/2021    Interpretation Summary  · LV: Estimated LVEF is 15 - 20%. Normal cavity size. Upper normal wall thickness.  Severely reduced systolic function. Left ventricular diastolic dysfunction. · LA: Moderately dilated left atrium. · RV: Borderline low systolic function. Pacer/ICD present. · RA: Mildly dilated right atrium. · MV: Mitral valve non-specific thickening. Mild to moderate mitral valve regurgitation is present. · TV: Mild tricuspid valve regurgitation is present. · PA: Mild to moderate pulmonary hypertension. Pulmonary arterial systolic pressure is 47 mmHg.     Signed by: Brett Patton MD on 12/8/2021  1:01 PM         Objective:    Physical Exam:   BP (!) 89/71   Pulse (!) 106   Temp 98.1 °F (36.7 °C)   Resp 23   Ht 5' 7\" (1.702 m)   Wt 256 lb 6.4 oz (116.3 kg)   SpO2 92%   BMI 40.16 kg/m²    General:  alert, cooperative, mild distress, appears stated age   ENT, Neck:  no jvd   Chest Wall: inspection normal - no chest wall deformities or tenderness, respiratory effort normal, barrel chest   Lung: diminished breath sounds R base, L base   Heart:  normal rate, regular rhythm, normal S1, S2, no murmurs, rubs, clicks or gallops   Abdomen: nondistended   Extremities: no ulcers, gangrene or trophic changes, edema 1+      Patient Vitals for the past 12 hrs:   Temp Pulse Resp BP SpO2   12/12/21 1400  (!) 106      12/12/21 1355 98.1 °F (36.7 °C) (!) 108 23 (!) 89/71 92 %   12/12/21 1200  (!) 102      12/12/21 1011 98.5 °F (36.9 °C) (!) 103 20 105/74 96 %   12/12/21 0834  (!) 116  120/86    12/12/21 0600 98.5 °F (36.9 °C) (!) 105 17 (!) 126/94 99 %      Lab Results   Component Value Date/Time    WBC 9.6 12/07/2021 03:33 AM    Hemoglobin (POC) 10.9 (L) 01/23/2013 04:12 PM    HGB 9.8 (L) 12/07/2021 03:33 AM    Hematocrit (POC) 32 (L) 01/23/2013 04:12 PM    HCT 36.8 12/07/2021 03:33 AM    PLATELET 513 43/47/2704 03:33 AM    MCV 92.5 12/07/2021 03:33 AM     Lab Results   Component Value Date/Time    Sodium 137 12/12/2021 02:05 AM    Potassium 4.7 12/12/2021 02:05 AM    Chloride 101 12/12/2021 02:05 AM    CO2 29 12/12/2021 02:05 AM    Anion gap 7 12/12/2021 02:05 AM    Glucose 199 (H) 12/12/2021 02:05 AM    BUN 60 (H) 12/12/2021 02:05 AM    Creatinine 2.59 (H) 12/12/2021 02:05 AM    BUN/Creatinine ratio 23 (H) 12/12/2021 02:05 AM    GFR est AA 22 (L) 12/12/2021 02:05 AM    GFR est non-AA 18 (L) 12/12/2021 02:05 AM    Calcium 9.6 12/12/2021 02:05 AM     Lab Results   Component Value Date/Time     (H) 01/16/2018 08:05 PM    CK - MB <1.0 01/16/2018 08:05 PM    CK-MB Index Cannot be calculated 01/16/2018 08:05 PM    Troponin-I, Qt. <0.04 01/16/2018 08:05 PM     Lab Results   Component Value Date/Time    NT pro-BNP 7,908 (H) 12/06/2021 02:44 PM    NT pro-BNP 21 01/23/2013 04:00 PM    NT pro-BNP 54 03/04/2011 01:42 AM       reports that she has never smoked. She has never used smokeless tobacco. She reports that she does not drink alcohol and does not use drugs. family history includes Diabetes in her brother; Heart Disease in her mother; Hypertension in her mother; Lupus in her sister.    Last 24hr Input/Output:    Intake/Output Summary (Last 24 hours) at 12/12/2021 1426  Last data filed at 12/12/2021 0800  Gross per 24 hour   Intake 100 ml   Output 1000 ml   Net -900 ml        Data Review:   Lab Results   Component Value Date/Time    WBC 9.6 12/07/2021 03:33 AM    Hemoglobin (POC) 10.9 (L) 01/23/2013 04:12 PM    HGB 9.8 (L) 12/07/2021 03:33 AM    Hematocrit (POC) 32 (L) 01/23/2013 04:12 PM    HCT 36.8 12/07/2021 03:33 AM    PLATELET 767 95/65/6057 03:33 AM    MCV 92.5 12/07/2021 03:33 AM     Lab Results   Component Value Date/Time    Sodium 137 12/12/2021 02:05 AM    Potassium 4.7 12/12/2021 02:05 AM    Chloride 101 12/12/2021 02:05 AM    CO2 29 12/12/2021 02:05 AM    Anion gap 7 12/12/2021 02:05 AM    Glucose 199 (H) 12/12/2021 02:05 AM    BUN 60 (H) 12/12/2021 02:05 AM    Creatinine 2.59 (H) 12/12/2021 02:05 AM    BUN/Creatinine ratio 23 (H) 12/12/2021 02:05 AM    GFR est AA 22 (L) 12/12/2021 02:05 AM    GFR est non-AA 18 (L) 12/12/2021 02:05 AM    Calcium 9.6 12/12/2021 02:05 AM    Bilirubin, total 0.4 12/06/2021 02:44 PM    Alk.  phosphatase 77 12/06/2021 02:44 PM    Protein, total 7.9 12/06/2021 02:44 PM    Albumin 3.1 (L) 12/06/2021 02:44 PM    Globulin 4.8 (H) 12/06/2021 02:44 PM    A-G Ratio 0.6 (L) 12/06/2021 02:44 PM    ALT (SGPT) 19 12/06/2021 02:44 PM    AST (SGOT) 9 (L) 12/06/2021 02:44 PM     Lab Results   Component Value Date/Time     (H) 01/16/2018 08:05 PM    CK - MB <1.0 01/16/2018 08:05 PM    CK-MB Index Cannot be calculated 01/16/2018 08:05 PM    Troponin-I, Qt. <0.04 01/16/2018 08:05 PM     Lab Results   Component Value Date/Time    NT pro-BNP 7,908 (H) 12/06/2021 02:44 PM    NT pro-BNP 21 01/23/2013 04:00 PM    NT pro-BNP 54 03/04/2011 01:42 AM       Recent Results (from the past 24 hour(s))   GLUCOSE, POC    Collection Time: 12/11/21  4:35 PM   Result Value Ref Range    Glucose (POC) 210 (H) 65 - 117 mg/dL    Performed by Azra Bowling ([employer])    GLUCOSE, POC    Collection Time: 12/11/21  9:32 PM   Result Value Ref Range    Glucose (POC) 222 (H) 65 - 117 mg/dL    Performed by Mauricio Liu    METABOLIC PANEL, BASIC    Collection Time: 12/12/21  2:05 AM   Result Value Ref Range    Sodium 137 136 - 145 mmol/L    Potassium 4.7 3.5 - 5.1 mmol/L    Chloride 101 97 - 108 mmol/L    CO2 29 21 - 32 mmol/L    Anion gap 7 5 - 15 mmol/L    Glucose 199 (H) 65 - 100 mg/dL    BUN 60 (H) 6 - 20 MG/DL    Creatinine 2.59 (H) 0.55 - 1.02 MG/DL    BUN/Creatinine ratio 23 (H) 12 - 20      GFR est AA 22 (L) >60 ml/min/1.73m2    GFR est non-AA 18 (L) >60 ml/min/1.73m2    Calcium 9.6 8.5 - 10.1 MG/DL   MAGNESIUM    Collection Time: 12/12/21  2:05 AM   Result Value Ref Range    Magnesium 2.4 1.6 - 2.4 mg/dL   GLUCOSE, POC    Collection Time: 12/12/21  8:00 AM   Result Value Ref Range    Glucose (POC) 112 65 - 117 mg/dL    Performed by Dimas Canales [id number], POC    Collection Time: 12/12/21 11:20 AM   Result Value Ref Range Glucose (POC) 157 (H) 65 - 117 mg/dL    Performed by Melyssa Barron           Future Appointments   Date Time Provider Lizabeth Ennis   12/20/2021  9:40 AM JAIDA Grady AMB   1/17/2022  2:45 PM REMOTE1, MIRTA HAYWARD AMB   4/18/2022 11:45 AM REMOTE1, MIRTA HAYWARD AMB   6/20/2022  1:00 PM ECHOTWOMIRTA AMB   7/21/2022  2:20 PM PACEMAKER3, MIRTA HAYWARD AMB   7/21/2022  2:40 PM MD ADAM Barton BS AMB        Marion Mcdaniels MD 12/12/2021

## 2021-12-13 NOTE — PROGRESS NOTES
Transition of Care Plan   RUR- Low  13%   DISPOSITION: Home with O2 ([address])   F/U with PCP/Specialist     Transport: Family    CM provided update on DC to [facility]. Patient's O2 was delivered to the bedside last week, follow up info placed on AVS.    Medicare pt has received, reviewed, and signed 2nd IM letter informing them of their right to appeal the discharge. Signed copy has been placed on pt bedside chart. 11:19am: [address] has requested new testing for Home O2, CM informed patient's RN, Basim Morales. 11:51am: New testing for home O2 uploaded in [employer]. Taylor Weber, M.S.W.

## 2021-12-13 NOTE — PROGRESS NOTES
Attempted to schedule hospital follow up PCP appointment. Office nurse will contact the patient with appointment information as there are no available appointments in [place] [address] within the required time frames. Catarina Ashford, Care Management Specialist.     Received call back from practice. Hospital follow-up PCP transitional care appointment has been scheduled with Dr. Kalia Jeong for Wednesday, 12/15/21 at 7:45 a.m. Spoke with RN providing appointment information.  Catarina Ashford, Care Management Specialist.

## 2021-12-13 NOTE — DISCHARGE INSTRUCTIONS
Please bring this form with you to show your primary care provider at your follow-up appointment. Primary care provider:  Dr. Monse Newman MD    Discharging provider:  Anni Julian MD    You have been admitted to the hospital with the following diagnoses:  CHF exacerbation [facility]) [I50.9]    FOLLOW-UP CARE RECOMMENDATIONS:    APPOINTMENTS:  Follow-up Information     Follow up With Specialties Details Why Ang Gant MD Internal Medicine In 2 weeks Discharge follow up  [place] [id number]       Vince Lala NP Nurse Practitioner On 12/20/2021 APPT TIME [address]  [address] [facility] [address]  [facility] [address] [facility]      Oliver De La Rosa MD Pulmonary Disease In 2 weeks Hospital Follow Up.   [address] [facility]  [address]  [facility] [address] Alena Hills MD Cardiology, Clinical Cardiac Electrophysiology Schedule an appointment as soon as possible for a visit  [place] [address]  [address] [facility]  [phone]              FOLLOW-UP TESTS recommended: NONE  - monitor weight daily  - 2gram sodium diet      PENDING TEST RESULTS:  At the time of your discharge the following test results are still pending: NONE  Please make sure you review these results with your outpatient follow-up provider(s). SYMPTOMS to watch for: chest pain, shortness of breath, fever, chills, nausea, vomiting, diarrhea, change in mentation, falling, weakness, bleeding. DIET/what to eat:  Cardiac Diet    ACTIVITY:  Activity as tolerated    WOUND CARE: NONE    EQUIPMENT needed:  NONE      What to do if new or unexpected symptoms occur? If you experience any of the above symptoms (or should other concerns or questions arise after discharge) please call your primary care physician. Return to the emergency room if you cannot get hold of your doctor.     · It is very important that you keep your follow-up appointment(s). · Please bring discharge papers, medication list (and/or medication bottles) to your follow-up appointments for review by your outpatient provider(s). · Please check the list of medications and be sure it includes every medication (even non-prescription medications) that your provider wants you to take. · It is important that you take the medication exactly as they are prescribed. · Keep your medication in the bottles provided by the pharmacist and keep a list of the medication names, dosages, and times to be taken in your wallet. · Do not take other medications without consulting your doctor. · If you have any questions about your medications or other instructions, please talk to your nurse or care provider before you leave the hospital.    I understand that if any problems occur once I am at home I am to contact my physician. These instructions were explained to me and I had the opportunity to ask questions.

## 2021-12-13 NOTE — PROGRESS NOTES
Problem: Heart Failure: Discharge Outcomes  Goal: *Demonstrates ability to perform prescribed activity without shortness of breath or discomfort  Outcome: Progressing Towards Goal  Goal: *Left ventricular function assessment completed prior to or during stay, or planned for post-discharge  Outcome: Progressing Towards Goal  Goal: *ACEI prescribed if LVEF less than 40% and no contraindications or ARB prescribed  Outcome: Progressing Towards Goal  Goal: *Verbalizes understanding and describes prescribed diet  Outcome: Progressing Towards Goal  Goal: *Verbalizes understanding/describes prescribed medications  Outcome: Progressing Towards Goal  Goal: *Describes available resources and support systems  Description: (eg: Home Health, Palliative Care, Advanced Medical Directive)  Outcome: Progressing Towards Goal  Goal: *Describes smoking cessation resources  Outcome: Progressing Towards Goal  Goal: *Understands and describes signs and symptoms to report to providers(Stroke Metric)  Outcome: Progressing Towards Goal  Goal: *Describes/verbalizes understanding of follow-up/return appt  Description: (eg: to physicians, diabetes treatment coordinator, and other resources  Outcome: Progressing Towards Goal  Goal: *Describes importance of continuing daily weights and changes to report to physician  Outcome: Progressing Towards Goal

## 2021-12-13 NOTE — PROGRESS NOTES
Bedside shift change report given to Duane Carton, RN (oncoming nurse) by Jocelyne Jimenez RN (offgoing nurse). Report included the following information SBAR and Cardiac Rhythm biventricular paced.

## 2021-12-13 NOTE — PROGRESS NOTES
Monitoring patient's Oxygen status:    SpO2: 83% on RA at rest  SpO2: 93% on 2L NC at rest    Ambulated patient in room:   SpO2: 86% on 1L NC with activity  SpO2: 93% on 2L NC with activity    Discussed with patient and MD. Patient will require 2L NC for at home oxygen. She will continue to monitor her SpO2 and shortness of breath at home.

## 2021-12-13 NOTE — DISCHARGE SUMMARY
Discharge Summary     Patient: Nick Whitley MRN: [id number]  SSN: [id number]    YOB: 1955  Age: 78 y.o. Sex: female       Admit Date: 12/6/2021    Discharge Date: 12/13/2021      Admission Diagnoses: CHF exacerbation ([facility] [address] [I50.9]    Discharge Diagnoses:   Acute on chronic HFrEF  Severe pulmonary hypertension    Chronic diagnoses:  Asthma  Type 2 DM  CKD stage 4  Gout  Hypothyroidism  Depression  Morbid obesity      Discharge Condition: Stable    Hospital Course: 78 y.o. female with past medical history of heart failure 2/2 and NICM (last EF 60% 2019) s/p ICD, DM 2, asthma, CKD, depression, GERD, and hypothyroidism who presents with a chief complaint of dyspnea at rest.    Acute on chronic HFrEF, LVEF now 15-20%, s/p ICD. NYHA IV  -improved with IV diuresis  -Rt heart cath showed severe pulmonary HTN w/ elevated PCWP  -did not undergo a LHC due to renal dysfunction, will be followed up by Dr. Neal Jin as an outpatient  -added hydralazine and isosorbide, candesartan stopped, carvedilol dose reduced  -required home O2 for discharge which has been arranged      Consults: Cardiology and Pulmonary/Critical Care     Significant Diagnostic Studies: see above    Disposition: home, she refused home health    S: feels much better today, wants to go home    Visit Vitals  BP 98/66 (BP 1 Location: Left upper arm, BP Patient Position: At rest)   Pulse (!) 110   Temp 98.2 °F (36.8 °C)   Resp 18   Ht 5' 7\" (1.702 m)   Wt 116.1 kg (256 lb)   SpO2 93%   BMI 40.10 kg/m²     NAD  Heart RR tachycardic  Lungs CTAB  No edema      Discharge Medications:   Current Discharge Medication List      START taking these medications    Details   isosorbide dinitrate (ISORDIL) 5 mg tablet Take 1 Tablet by mouth three (3) times daily. Qty: 90 Tablet, Refills: 0      hydrALAZINE (APRESOLINE) 10 mg tablet Take 1 Tablet by mouth three (3) times daily.   Qty: 60 Tablet, Refills: 0         CONTINUE these medications which have CHANGED    Details   carvediloL (COREG) 6.25 mg tablet Take 1 Tablet by mouth two (2) times daily (with meals). Qty: 60 Tablet, Refills: 0    Associated Diagnoses: Hypertension, unspecified type; Encounter for medication refill         CONTINUE these medications which have NOT CHANGED    Details   benzonatate (Tessalon Perles) 100 mg capsule Take 100 mg by mouth three (3) times daily as needed for Cough. !! gabapentin (NEURONTIN) 100 mg capsule TAKE 1 CAP BY MOUTH DAILY. Qty: 90 Capsule, Refills: 0    Comments: Not to exceed 5 additional fills before 01/03/2022 DX Code Needed  NEEDS REFILLS. Associated Diagnoses: Type 2 diabetes with nephropathy ([facility] [address] Encounter for medication refill      montelukast (SINGULAIR) 10 mg tablet TAKE 1 TABLET BY MOUTH EVERY DAY  Qty: 90 Tablet, Refills: 0    Associated Diagnoses: Seasonal allergic rhinitis due to pollen      bumetanide (BUMEX) 2 mg tablet Take 1 tab in the morning and 0.5 tab in the evening  Qty: 135 Tablet, Refills: 1      fluticasone propionate (FLONASE) 50 mcg/actuation nasal spray One spray each nostril daily  Qty: 1 Each, Refills: 3    Associated Diagnoses: Seasonal allergic rhinitis, unspecified trigger      !! gabapentin (NEURONTIN) 100 mg capsule Take 1 Capsule by mouth nightly. Max Daily Amount: 100 mg. Qty: 30 Capsule, Refills: 0    Associated Diagnoses: Neuropathy      venlafaxine-SR (EFFEXOR-XR) 75 mg capsule TAKE 1 CAPSULE BY MOUTH EVERY DAY  Qty: 90 Capsule, Refills: 0    Associated Diagnoses: Encounter for medication refill      cholecalciferol (VITAMIN D3) (2,000 UNITS /50 MCG) cap capsule TAKE 1 CAPSULE BY MOUTH TWO (2) TIMES A DAY. Qty: 180 Capsule, Refills: 0    Associated Diagnoses: Vitamin D deficiency      levocetirizine (XYZAL) 5 mg tablet TAKE 1 TABLET BY MOUTH EVERY DAY  Qty: 90 Tablet, Refills: 0    Associated Diagnoses: Environmental and seasonal allergies;  Encounter for medication refill      allopurinoL (ZYLOPRIM) 100 mg tablet TAKE 1 TABLET BY MOUTH EVERY DAY  Qty: 90 Tablet, Refills: 0      calcitRIOL (ROCALTROL) 0.5 mcg capsule TAKE 1 CAPSULE BY MOUTH EVERY DAY  Qty: 90 Capsule, Refills: 0    Associated Diagnoses: Stage 3 chronic kidney disease ([facility] [address] Encounter for medication refill      OTHER       hydrOXYzine HCL (ATARAX) 10 mg tablet 2 tab(s)      levothyroxine (SYNTHROID) 100 mcg tablet TAKE 1 TABLET BY MOUTH EVERY DAY BEFORE BREAKFAST  Qty: 90 Tablet, Refills: 1    Comments: DX Code Needed  . Associated Diagnoses: Acquired hypothyroidism; Encounter for medication refill      azelastine (ASTEPRO) 205.5 mcg (0.15 %) USE 2 SPRAYS BY BOTH NOSTRILS ROUTE TWO (2) TIMES DAILY AS NEEDED (CONGESTION)  Qty: 90 Spray, Refills: 1    Associated Diagnoses: Environmental and seasonal allergies; Encounter for medication refill      budesonide (PULMICORT) 180 mcg/actuation aepb inhaler Take 2 Puffs by inhalation two (2) times a day. Qty: 2 Inhaler, Refills: 5    Associated Diagnoses: Respiratory crackles at right lung base      meclizine (ANTIVERT) 25 mg tablet TAKE 1 TAB BY MOUTH THREE (3) TIMES DAILY AS NEEDED FOR DIZZINESS. Qty: 20 Tab, Refills: 3    Comments: DX Code Needed  . Associated Diagnoses: Nausea      ammonium lactate (AMLACTIN) 12 % topical cream Apply  to affected area two (2) times a day. rub in to affected area well  Qty: 280 g, Refills: 2    Associated Diagnoses: Xerosis of skin      apremilast (OTEZLA) 30 mg tab Take  by mouth two (2) times a day. insulin NPH/insulin regular (NOVOLIN 70/30) 100 unit/mL (70-30) injection 70 units two times a day  Qty: 10 mL, Refills: 3      calcipotriene (DOVONEX) 0.005 % topical cream Apply  to affected area three (3) times daily. triamcinolone acetonide (KENALOG) 0.5 % ointment Apply  to affected area two (2) times a day. use thin layer      multivitamin (ONE A DAY) tablet Take 1 Tab by mouth daily. DOCUSATE CALCIUM (STOOL SOFTENER PO) Take 1 tablet by mouth daily. EPINEPHrine (EPIPEN) 0.3 mg/0.3 mL injection 0.3 mL by IntraMUSCular route once as needed for 1 dose. Qty: 1 Each, Refills: 3    Associated Diagnoses: Rash      ferrous sulfate (IRON) 325 mg (65 mg elemental iron) tablet Take  by mouth three (3) times daily (with meals). aspirin 81 mg tablet Take 81 mg by mouth daily. !! - Potential duplicate medications found. Please discuss with provider. STOP taking these medications       candesartan (ATACAND) 4 mg tablet Comments:   Reason for Stopping: Follow-up Information     Follow up With Specialties Details Why Juan Sood MD Internal Medicine In 2 weeks Discharge follow up  [facility] [address] [place] [address]      Sofie Langley NP Nurse Practitioner On 12/20/2021 APPT TIME [address] [facility],  [facility] [address]      Yves Lindsey MD Pulmonary Disease In 2 weeks Hospital Follow Up.   [address] [facility]  [address]  [address] Alena Jackson MD Cardiology, Clinical Cardiac Electrophysiology Schedule an appointment as soon as possible for a visit  [address]  [phone]            Signed By: Alejandra Guzman MD     December 13, 2021      Greater than 30 minutes spent on discharge management.

## 2021-12-13 NOTE — PROGRESS NOTES
Cardiac Electrophysiology Hospital Progress Note   REFERRING PROVIDER: Dr Torrey Reyes  Subjective:      Adal Kincaid is a 78 y.o. patient who is seen for evaluation of  biventricular pacing since she is having low LVEF again on echo. She has improved with diuretic while in hospital  Breathing is more comfortable and there is no orthopnea    Interim:  Check of biventricular ICD on 12/08/2021 showed proper lead function, good CRT %. Generator longevity estimated 4 months. Still with appropriately biventricular paced rhythm on monitor. Tolerating carvedilol with addition of hydralazine & Isordil. [address] on 12/10/2021 had shown wedge pressure 34 mmHg & PAP 80 mmHg. Diuresed over the weekend, down 1L in the past 24 hrs. Cr still elevated (2.5), but trending down. Still on supplemental oxygen (2 LPM), but vocalizes breathing easier. BP trends low normal on current dose of carvedilol. Cr mildly increased, now 2.8. HPI:  Admitted with acute systolic CHF, LVEF now 21-55%. Still dyspneic but improved. Cardiac cath in 2015 at [facility] showed no evidence of CAD. She has a Medtronic biventricular pacemaker AICD that I replaced back in 2015. NICM, LVEF had previously normalized with CRT pacing. She did require LV lead reprogramming over the summer, but good LV capture since. Previous:  S/p Medtronic biventricular ICD (gen change 39/401600, leads 09/25/2008). CKD stage IV. Previously followed by Dr. Michelle Pond, states did not follow him to new practice.      Patient Active Problem List   Diagnosis Code    Anemia in chronic renal disease N18.9, D63.1    HTN (hypertension) I10    GERD (gastroesophageal reflux disease) K21.9    Gout M10.9    Pulmonary HTN (HCC) I27.20    Cardiomyopathy, nonischemic (HCC) I42.8    CKD (chronic kidney disease) N18.9    Dyslipidemia E78.5    ICD (implantable cardioverter-defibrillator), biventricular, in situ Z95.810    Acquired hypothyroidism E03.9    Dysthymia F34.1    Obesity, morbid (HCC) E66.01    Type 2 diabetes with nephropathy ([facility]) E11.21    Type 2 diabetes mellitus with diabetic neuropathy ([facility]) E11.40    CHF exacerbation ([facility] [address] I50.9     Current Facility-Administered Medications   Medication Dose Route Frequency Provider Last Rate Last Admin    insulin NPH (Lang Grise N, HUMULIN N) injection 48 Units  48 Units SubCUTAneous DAILY April Anderson MD   48 Units at 12/13/21 0921    insulin NPH (NOVOLIN N, HUMULIN N) injection 38 Units  38 Units SubCUTAneous QPM April Anderson MD   38 Units at 12/12/21 1824    bumetanide (BUMEX) injection 1 mg  1 mg IntraVENous BID Ayad Pablo MD   1 mg at 12/13/21 9018    carvediloL (COREG) tablet 6.25 mg  6.25 mg Oral BID WITH MEALS Davian Medina MD   6.25 mg at 12/13/21 3331    hydrALAZINE (APRESOLINE) tablet 10 mg  10 mg Oral TID Davian Medina MD   10 mg at 12/13/21 9794    isosorbide dinitrate (ISORDIL) tablet 5 mg  5 mg Oral TID Davian Medina MD   5 mg at 12/13/21 0098    aspirin delayed-release tablet 81 mg  81 mg Oral DAILY Norm Echevarria MD   81 mg at 12/13/21 5580    gabapentin (NEURONTIN) capsule 100 mg  100 mg Oral DAILY Norm Echevarria MD   100 mg at 12/13/21 0058    sodium chloride (NS) flush 5-40 mL  5-40 mL IntraVENous Q8H Peter William MD   10 mL at 12/12/21 0720    sodium chloride (NS) flush 5-40 mL  5-40 mL IntraVENous PRN Peter William MD        acetaminophen (TYLENOL) tablet 650 mg  650 mg Oral Q6H PRN Peter William MD        Or    acetaminophen (TYLENOL) suppository 650 mg  650 mg Rectal Q6H PRN Peter William MD        ondansetron (ZOFRAN ODT) tablet 4 mg  4 mg Oral Q8H PRN Peter William MD        Or    ondansetron [facility]) injection 4 mg  4 mg IntraVENous Q6H PRN Peter William MD        glucose chewable tablet 16 g  4 Tablet Oral PRN Peter William MD        dextrose (D50W) injection syrg 12.5-25 g  25-50 mL IntraVENous PRN Marsh Ice, Charito Aguirre MD        glucagon (GLUCAGEN) injection 1 mg  1 mg IntraMUSCular PRN Feliz Oconnor MD        insulin lispro (HUMALOG) injection   SubCUTAneous AC&HS Feliz Oconnor MD   2 Units at 12/12/21 2139    allopurinoL (ZYLOPRIM) tablet 100 mg  100 mg Oral DAILY Feliz Oconnor MD   100 mg at 12/13/21 0706    calcitRIOL (ROCALTROL) capsule 0.5 mcg  0.5 mcg Oral DAILY Feliz Oconnor MD   0.5 mcg at 12/13/21 3897    gabapentin (NEURONTIN) capsule 100 mg  100 mg Oral QHS Feliz Oconnor MD   100 mg at 12/12/21 2135    cetirizine (ZYRTEC) tablet 10 mg  10 mg Oral DAILY Feliz Oconnor MD   10 mg at 12/13/21 5067    levothyroxine (SYNTHROID) tablet 100 mcg  100 mcg Oral ACB Feliz Oconnor MD   100 mcg at 12/13/21 0610    montelukast (SINGULAIR) tablet 10 mg  10 mg Oral DAILY Feliz Oconnor MD   10 mg at 12/12/21 0834    venlafaxine-SR (EFFEXOR-XR) capsule 75 mg  75 mg Oral DAILY Feliz Oconnor MD   75 mg at 12/12/21 2860    [Held by provider] heparin (porcine) injection 5,000 Units  5,000 Units SubCUTAneous Q8H Feliz Oconnor MD   5,000 Units at 12/09/21 2246    simethicone (MYLICON) tablet 80 mg  80 mg Oral QID PRN Karla Sawyer MD   80 mg at 12/10/21 0707    budesonide (PULMICORT) 250 mcg/2ml nebulizer susp  250 mcg Nebulization BID RT Karla Sawyer MD   250 mcg at 12/13/21 8421     Allergies   Allergen Reactions    Ciprofloxacin Anaphylaxis    Shellfish Derived Anaphylaxis    Ace Inhibitors Unknown (comments)    Biaxin [Clarithromycin] Other (comments)     Metal taste    Candesartan Cough    Pcn [Penicillins] Hives     Past Medical History:   Diagnosis Date    Acquired hypothyroidism 8/15/2016    Anemia     RED-HF study    Asthma     Cardiomyopathy, nonischemic ([facility] [address]     initial dx 2001, bivHF 2008 with EF 15%, s/p biV-ICD 9/08, significant improvment in EF to 45-50%    CKD (chronic kidney disease)     Dr Wes Jansen    CKD (chronic kidney disease) 8/15/2012    Depression     Di[facility]     Diabetic neuropathy ([facility]     DM (diabetes mellitus) ([facility] 8/15/2012    GERD (gastroesophageal reflux disease)     Gout     Hypothyroidism     ICD (implantable cardioverter-defibrillator), biventricular, in situ 6/5/2014    Psoriasis      Past Surgical History:   Procedure Laterality Date    CARDIAC CATHETERIZATION  2007; 01/06/15    normal cors    ECHO 2D ADULT  4/2010    EF 45%, improved from 1/09 (25%)    ECHO 2D ADULT  11/2011    LVH, EF 55-60%    HX ORTHOPAEDIC      knee    HX PACEMAKER PLACEMENT      AICD    STRESS TEST LEXISCAN/CARDIOLITE  3/21/12    normal perfusion, global HK 40%     Family History   Problem Relation Age of Onset    Heart Disease Mother     Hypertension Mother     Lupus Sister     Diabetes Brother      Social History     Tobacco Use    Smoking status: Never Smoker    Smokeless tobacco: Never Used   Substance Use Topics    Alcohol use: No        Review of Systems:   Constitutional: Negative for fever, chills, weight loss, + malaise/fatigue. HEENT: Negative for nosebleeds, vision changes. Respiratory: Negative for cough, hemoptysis  Cardiovascular: Negative for chest pain, palpitations, orthopnea, claudication, + leg swelling, no syncope, and PND. + SOB  Gastrointestinal: Negative for nausea, vomiting, diarrhea, blood in stool and melena. Genitourinary: Negative for dysuria, and hematuria. Musculoskeletal: Negative for myalgias, arthralgia. Skin: Negative for rash. Heme: Does not bleed or bruise easily. Neurological: Negative for speech change and focal weakness. Objective:     Visit Vitals  BP 98/66 (BP 1 Location: Left upper arm, BP Patient Position: At rest)   Pulse (!) 110   Temp 98.2 °F (36.8 °C)   Resp 18   Ht 5' 7\" (1.702 m)   Wt 256 lb (116.1 kg)   SpO2 93%   BMI 40.10 kg/m²      Physical Exam:   Constitutional: Well-developed and well-nourished. No respiratory distress. Head: Normocephalic and atraumatic. Eyes: Pupils are equal, round.   ENT: Hearing grossly normal.  Neck: Supple. No JVD present. Cardiovascular: Mildly tachy rate, regular rhythm. Exam reveals no gallop and no friction rub. 2/6 systolic LSB murmur heard. Pulmonary/Chest: Effort normal and breath sounds normal. No wheezes. Abdominal: Soft, moderate obesity. Musculoskeletal: No edema. Neurological: Alert, oriented. Skin: Skin is warm and dry. Psychiatric: Normal mood and affect. Behavior is normal. Judgment and thought content normal.        Assessment/Plan:     Imaging/Studies:  Echo (12/08/2021): LVEF 15-20%, upper normal wall thickness, LV diastolic dysfunction. Borderline low RVEF. Mod dilated LA, mildly dilated RA. Mild to mod MR. Mild TR. Mild to mod PH. LHC/RHC (01/2015): No significant CAD. Acute on chronic systolic CHF: Historically NICM, previously had LVEF normalize with CRT. Still with proper biventricular pacing per device check during current admission. LVEF now 15-20%. Proper LV pacing noted via device check, not cause for decline. NYHA II-III. Continue carvedilol/hydralazine/isordil, but no ACEi/ARB/ARNi due to CKD. Low BP limits ability to titrate meds. Diuresing well, had wedge pressure 34 mmHg & PAP 80 mmHg noted on RHC. Recommend discharging home on Bumex 1 mg po bid. Prior cardiac cath in 2015 showed no significant CAD & Cr is high, thus LHC avoided. MRI is not possible with 4194 LV lead (2008). Unless she has a form of amyloid that can be treated, I do not think MRI will add more information for treatment. May consider PYP nuc test but usually results come back equivocal.    Medtronic biventricular ICD (gen change 01/14/2015, leads 09/25/2008): Device check 12/08/2021 showed proper lead & generator function. Adequate CRT. Generator longevity estimated 4 mos. She will need generator change soon       Follow up in clinic as noted below.     Future Appointments   Date Time Provider Lizabeth Ennis   12/20/2021  9:40 AM Marquis Hernandez Luma Crowell, JAIDA MEDINA [facility]   1/17/2022  2:45 PM REMOTE1, MIRTA MEDINA BS AMB   4/18/2022 11:45 AM REMOTE1, MIRTA MEDINA  AMB   6/20/2022  1:00 PM ECHOTWO, MIRTA MEDINA  AMB   7/21/2022  2:20 PM PACEMAKER3, MIRTA MEDINA [facility]   7/21/2022  2:40 PM MD ADAM Gould [facility]     May Haines, FNP-C  [facility]  12/13/21    Addendum from EP attending: This patient was seen and examined by me in a face-to-face visit today. I reviewed the medical record including lab results, imaging and other provider notes. I confirmed the history as described above. I spoke to the patient, obtained history and examined the patient. I discussed assessments and plans in details with nurse practitioner. Below are my treatment plans and recommendations. She is feeling better and was going home when echo result came back and Dr Wiliam Chamorro asked me to see her  She says she does not see Dr. Archana Espinal at his new office  Dr. Charlie Rodgers did the right and deferred left heart cath due to severe CKD and previous 2 cardiac caths without obstructive CAD    Exam shows      Constitutional: well-developed and well-nourished. No distress. Head: Normocephalic and atraumatic. Eyes: Pupils are equal, round   Neck: Neck supple. No JVD present. Cardiovascular: Normal rate, regular rhythm and normal heart sounds. Exam reveals no gallop and no friction rub. No murmur heard. Pulmonary/Chest: Effort normal and breath sounds normal. No wheezes. Abdominal: Soft, + obesity  Musculoskeletal: no edema and no tenderness. Neurological: alert,oriented. Skin: Skin is warm and dry   Psychiatric: normal mood and affect.  Behavior is normal. Judgment and thought content normal.      Assessment and Plan:   Acute systolic CHF with severe decline LVEF: She has a history of non ischemic but previously normal LVEF after BIV pacing from BIV ICD   Medtronic has checked device and it has 4 months left with LV capturing so she did not have LVEF declined as a result of lack of biv pacing. However the patient is convinced that her left ventricular ejection fraction declined because of the intermittent loss of LV pacing and she wants to generator along with the LV lead be replaced  Etiology of non ischemic cardiomyopathy was not known clearly at this time. MRI is not possible with 4194 LV lead (2008). Her blood pressure tolerated lower dose of Coreg and hydralazine/isordil and bumex  Right heart cath did not show severely depressed cardiac output but she had severe pulmonary hypertension and high wedge pressure  I plan to replace the patient's biventricular ICD outpatient and will test for left ventricular lead intraoperatively and replace it if there is issue with proper capturing. If she can afford I will add corlanor    Thank you for involving me in this patient's care and please call with further concerns or questions. Keturah Chiu M.D.   Electrophysiology/Cardiology  [facility]  [address] [place] [address]                                [phone]

## 2021-12-13 NOTE — PROGRESS NOTES
1311: I have reviewed discharge instructions with the patient. The patient verbalized understanding. PIV discontinued.     1430: Patient discharged to home via family vehicle    1510: Patient voicemail left regarding f/u appointment with PCP for 12/15/21 at 7:45am.

## 2021-12-13 NOTE — TELEPHONE ENCOUNTER
----- Message from Ursula Harrington sent at 12/13/2021 12:21 PM EST -----  Subject: Hospital Follow Up    QUESTIONS  What hospital [facility] the [facility]  Date of Discharge? 2021-12-13  Discharge Location? Home  Reason for hospitalization as patient stated? Patient was in hospital for   congestive heart failure. Patient was admitted on Dec. 6 and is being   discharged home today with home health. Set to see cardiologist on Dec.   20. Isiah Armstrong [phone]  What question does the patient have, if applicable?   ---------------------------------------------------------------------------  --------------  CALL BACK INFO  What is the best way for the office to contact you? OK to leave message on   voicemail  Preferred Call Back Phone Number? [phone]  ---------------------------------------------------------------------------  --------------  SCRIPT ANSWERS  Relationship to Patient? Third Party  Representative Name?  Isiah Armstrong

## 2021-12-14 NOTE — LETTER
12/15/2021 10:03 AM    Ms. Mariela Church  [address]    Dear Mariela Church    My name is Harpal Akins and I am a Care Transition Nurse who partners with  Dr Balbir Gomes to improve patients' health. I've been trying to reach you via phone to let you know that, as a member of your care team, I will work with other providers involved in your care, offer education for your specific health conditions, and connect you with more resources as needed. This program is designed to provide you with the opportunity to have a ([address] [facility]/[address]) care manager partner with you for the following situations:     1) if you come home from the hospital or emergency room   2) to help manage your disease   3) when you would like assistance coordinating services or appointments    This added support is provided at no additional cost to you. My primary focus is to help you achieve specific goals and improve your health. Please call me at [phone] to further discuss how I can support your health care needs.     Sincerely,    Harpal Akins MSN, RN, [address] [place] [address] Transition Nurse  [phone]

## 2021-12-15 NOTE — PROGRESS NOTES
Chief Complaint   Patient presents with   [facility] Follow Up     12/6/2021 [facility]        Visit Vitals  BP 96/78 (BP 1 Location: Left upper arm, BP Patient Position: Sitting)   Pulse (!) 117   Temp 97.1 °F (36.2 °C) (Temporal)   Resp 20   Ht 5' 7\" (1.702 m)   Wt 256 lb 6.4 oz (116.3 kg)   BMI 40.16 kg/m²        1. Have you been to the ER, urgent care clinic since your last visit? Hospitalized since your last visit? Yes When: 12/6/2021 [facility]    2. Have you seen or consulted any other health care providers outside of the [address] [place] [address] since your last visit? Include any pap smears or colon screening.  No

## 2021-12-15 NOTE — PROGRESS NOTES
Pradeep Mooney is a 78 y.o.  female and presents with     Chief Complaint   Patient presents with   [facility] Follow Up     2021 [address] [place] [address] note    Admit Date: 2021     Discharge Date: 2021    Reason for Admission  - SOB  Discharge diagnosis - acute on chronic CHF    Pt is here for post hospital visit. Pt was admitted with  and leg swelling  Pt ahd ECHO that showed reduction in EF to 15-20 percent. Rt heart cath showed pulmonary hypertension. Pt does snore. Pt was referred to Pulmonary specilaist  Atacnad was stopped and coreg was reduced to 6.25 mg bid from 25 mg po bid. Pt was started on isosoribid. Hyradlazine 10 mg tid was started. Pt has chronic renal insuff. HAs not seen Nephrology. Pt was sent home on 2 liters of oxgen. Pt has follow up appt with cardiology  Pt feels tired. NO fluid or chills  FLuid was pulled from the system.     Past Medical History:   Diagnosis Date    Acquired hypothyroidism 8/15/2016    Anemia     RED-HF study    Asthma     Cardiomyopathy, nonischemic ([address]     initial dx , bivHF  with EF 15%, s/p biV-ICD , significant improvment in EF to 45-50%    CKD (chronic kidney disease)     Dr Alvaro Carranza    CKD (chronic kidney disease) 8/15/2012    Depression     Diabetes ([address]     Diabetic neuropathy ([address]     DM (di[facility] mellitus) [facility] 8/15/2012    GERD (gastroesophageal reflux disease)     Gout     Hypothyroidism     ICD (implantable cardioverter-defibrillator), biventricular, in situ 2014    Psoriasis      Past Surgical History:   Procedure Laterality Date    CARDIAC CATHETERIZATION  ; 01/06/15    normal cors    ECHO 2D ADULT  2010    EF 45%, improved from  (25%)    ECHO 2D ADULT  2011    LVH, EF 55-60%    HX ORTHOPAEDIC      knee    HX PACEMAKER PLACEMENT      AICD    STRESS TEST LEXISCAN/CARDIOLITE  3/21/12    normal perfusion, global HK 40%     Current Outpatient Medications   Medication Sig    azelastine (ASTEPRO) 205.5 mcg (0.15 %) 2 Sprays by Both Nostrils route two (2) times a day.  carvediloL (COREG) 6.25 mg tablet Take 1 Tablet by mouth two (2) times daily (with meals).  isosorbide dinitrate (ISORDIL) 5 mg tablet Take 1 Tablet by mouth three (3) times daily.  bumetanide (BUMEX) 2 mg tablet Take 1 tab in the morning and 0.5 tab in the evening    cholecalciferol (VITAMIN D3) (2,000 UNITS /50 MCG) cap capsule TAKE 1 CAPSULE BY MOUTH TWO (2) TIMES A DAY.  allopurinoL (ZYLOPRIM) 100 mg tablet TAKE 1 TABLET BY MOUTH EVERY DAY    calcitRIOL (ROCALTROL) 0.5 mcg capsule TAKE 1 CAPSULE BY MOUTH EVERY DAY    hydrOXYzine HCL (ATARAX) 10 mg tablet 2 tab(s)    budesonide (PULMICORT) 180 mcg/actuation aepb inhaler Take 2 Puffs by inhalation two (2) times a day.  ammonium lactate (AMLACTIN) 12 % topical cream Apply  to affected area two (2) times a day. rub in to affected area well    calcipotriene (DOVONEX) 0.005 % topical cream Apply  to affected area three (3) times daily.  aspirin 81 mg tablet Take 81 mg by mouth daily.  hydrALAZINE (APRESOLINE) 10 mg tablet Take 1 Tablet by mouth three (3) times daily.  benzonatate (Tessalon Perles) 100 mg capsule Take 100 mg by mouth three (3) times daily as needed for Cough.  gabapentin (NEURONTIN) 100 mg capsule TAKE 1 CAP BY MOUTH DAILY.  montelukast (SINGULAIR) 10 mg tablet TAKE 1 TABLET BY MOUTH EVERY DAY    fluticasone propionate (FLONASE) 50 mcg/actuation nasal spray One spray each nostril daily    gabapentin (NEURONTIN) 100 mg capsule Take 1 Capsule by mouth nightly.  Max Daily Amount: 100 mg.    venlafaxine-SR (EFFEXOR-XR) 75 mg capsule TAKE 1 CAPSULE BY MOUTH EVERY DAY    levocetirizine (XYZAL) 5 mg tablet TAKE 1 TABLET BY MOUTH EVERY DAY    OTHER     levothyroxine (SYNTHROID) 100 mcg tablet TAKE 1 TABLET BY MOUTH EVERY DAY BEFORE BREAKFAST    meclizine (ANTIVERT) 25 mg tablet TAKE 1 TAB BY MOUTH THREE (3) TIMES DAILY AS NEEDED FOR DIZZINESS.  apremilast (OTEZLA) 30 mg tab Take  by mouth two (2) times a day.  insulin NPH/insulin regular (NOVOLIN 70/30) 100 unit/mL (70-30) injection 70 units two times a day    triamcinolone acetonide (KENALOG) 0.5 % ointment Apply  to affected area two (2) times a day. use thin layer    multivitamin (ONE A DAY) tablet Take 1 Tab by mouth daily.  DOCUSATE CALCIUM (STOOL SOFTENER PO) Take 1 tablet by mouth daily.  EPINEPHrine (EPIPEN) 0.3 mg/0.3 mL injection 0.3 mL by IntraMUSCular route once as needed for 1 dose.  ferrous sulfate (IRON) 325 mg (65 mg elemental iron) tablet Take  by mouth three (3) times daily (with meals). No current facility-administered medications for this visit. Health Maintenance   Topic Date Due    Colorectal Cancer Screening Combo  12/10/2015    Eye Exam Retinal or Dilated  07/10/2016    Foot Exam Q1  06/07/2019    MICROALBUMIN Q1  04/30/2020    Breast Cancer Screen Mammogram  04/30/2021    COVID-19 Vaccine (3 - Booster for Pfizer series) 10/05/2021    Shingrix Vaccine Age 50> (1 of 2) 02/06/2022 (Originally 7/9/2004)    Flu Vaccine (1) 06/30/2022 (Originally 9/1/2021)    Bone Densitometry (Dexa) Screening  07/14/2022 (Originally 7/9/2019)    A1C test (Diabetic or Prediabetic)  07/13/2022    Lipid Screen  07/13/2022    Medicare Yearly Exam  11/02/2022    DTaP/Tdap/Td series (2 - Td or Tdap) 10/19/2027    Pneumococcal 65+ yrs at Risk Vaccine  Completed    Hepatitis C Screening  Addressed     Immunization History   Administered Date(s) Administered    COVID-19, PFIZER, MRNA, LNP-S, PF, 30MCG/0.3ML DOSE 03/15/2021, 04/05/2021    H1N1 Influenza Virus Vaccine 01/20/2010    Influenza Vaccine 10/01/2015    Influenza Vaccine Split 09/17/2010, 10/17/2012    Pneumococcal Conjugate (PCV-13) 04/30/2019    Pneumococcal Polysaccharide (PPSV-23) 11/01/2021    TB Skin Test (PPD) 01/01/2008     No LMP recorded. Patient is postmenopausal.        Allergies and Intolerances: Allergies   Allergen Reactions    Ciprofloxacin Anaphylaxis    Shellfish Derived Anaphylaxis    Ace Inhibitors Unknown (comments)    Biaxin [Clarithromycin] Other (comments)     Metal taste    Candesartan Cough    Pcn [Penicillins] Hives       Family History:   Family History   Problem Relation Age of Onset    Heart Disease Mother     Hypertension Mother     Lupus Sister     Diabetes Brother        Social History:   She  reports that she has never smoked. She has never used smokeless tobacco.  She  reports no history of alcohol use.             Review of Systems:   General: negative for - chills, fatigue, fever, weight change  Psych: negative for - anxiety, depression, irritability or mood swings  ENT: negative for - headaches, hearing change, nasal congestion, oral lesions, sneezing or sore throat  Heme/ Lymph: negative for - bleeding problems, bruising, pallor or swollen lymph nodes  Endo: negative for - hot flashes, polydipsia/polyuria or temperature intolerance  Resp: negative for - cough, shortness of breath or wheezing  CV: negative for - chest pain, edema or palpitations  GI: negative for - abdominal pain, change in bowel habits, constipation, diarrhea or nausea/vomiting  : negative for - dysuria, hematuria, incontinence, pelvic pain or vulvar/vaginal symptoms  MSK: negative for - joint pain, joint swelling or muscle pain  Neuro: negative for - confusion, headaches, seizures or weakness  Derm: negative for - dry skin, hair changes, rash or skin lesion changes          Physical:   Vitals:   Vitals:    12/15/21 1156 12/15/21 2016   BP: 96/78    Pulse: (!) 117    Resp: 20    Temp: 97.1 °F (36.2 °C)    TempSrc: Temporal    SpO2: (!) 85% 99%   Weight: 256 lb 6.4 oz (116.3 kg)    Height: 5' 7\" (1.702 m)            Exam:   HEENT- atraumatic,normocephalic, awake, oriented, well nourished, on 2 liters of oxygen , sats 98 % after increasing the oxygen to 3 liters in the office  Neck - supple,no enlarged lymph nodes, no JVD, no thyromegaly  Chest- CTA, no rhonchi, no crackles  Heart- rrr, no murmurs / gallop/rub  Abdomen- soft,BS+,NT, no hepatosplenomegaly  Ext - no c/c/edema   Neuro- no focal deficits. Power 5/5 all extremities  Skin - warm,dry, no obvious rashes. Review of Data:   LABS:   Lab Results   Component Value Date/Time    WBC 9.6 12/07/2021 03:33 AM    HGB 9.8 (L) 12/07/2021 03:33 AM    HCT 36.8 12/07/2021 03:33 AM    PLATELET 734 69/68/9896 03:33 AM     Lab Results   Component Value Date/Time    Sodium 136 12/13/2021 06:14 AM    Potassium 4.7 12/13/2021 06:14 AM    Chloride 100 12/13/2021 06:14 AM    CO2 32 12/13/2021 06:14 AM    Glucose 170 (H) 12/13/2021 06:14 AM    BUN 57 (H) 12/13/2021 06:14 AM    Creatinine 2.50 (H) 12/13/2021 06:14 AM     Lab Results   Component Value Date/Time    Cholesterol, total 282 (H) 07/13/2021 04:00 PM    HDL Cholesterol 65 07/13/2021 04:00 PM    LDL, calculated 178.2 (H) 07/13/2021 04:00 PM    Triglyceride 194 (H) 07/13/2021 04:00 PM     No components found for: GPT        Impression / Plan:        ICD-10-CM ICD-9-CM    1. Hypertension, unspecified type  I10 401.9    2. Type 2 diabetes with nephropathy (HCC)  E11.21 250.40      583.81    3. Acquired hypothyroidism  E03.9 244.9 TSH 3RD GENERATION      T4, FREE   4. Hypercholesteremia  E78.00 272.0    5. Cardiomyopathy, nonischemic (HCC)  H55.6 114.4 METABOLIC PANEL, COMPREHENSIVE      CBC WITH AUTOMATED DIFF   6. Stage 3b chronic kidney disease (HCC)  N18.32 585.3    7. HAILE (dyspnea on exertion)  R06.00 786.09 NT-PRO BNP      D DIMER      XR CHEST PA LAT   8. Pulmonary hypertension (HCC)  I27.20 416.8 REFERRAL TO PULMONARY DISEASE   9. Snoring  R06.83 786.09 SLEEP MEDICINE REFERRAL   10. Vitamin D deficiency  E55.9 268.9 VITAMIN D, 25 HYDROXY   11. Environmental and seasonal allergies  J30.89 477.8 azelastine (ASTEPRO) 205.5 mcg (0.15 %)   12.  Encounter for medication refill  Z76.0 V68.1 azelastine (ASTEPRO) 205.5 mcg (0.15 %)     Pt does not appear to be in CHF. R/o PE, pneumonia. Increased oxygen to 3 liters per min. Make appt with Pulmonary. Explained to patient risk benefits of the medications. Advised patient to stop meds if having any side effects. Pt verbalized understanding of the instructions. I have discussed the diagnosis with the patient and the intended plan as seen in the above orders. The patient has received an after-visit summary and questions were answered concerning future plans. I have discussed medication side effects and warnings with the patient as well. I have reviewed the plan of care with the patient, accepted their input and they are in agreement with the treatment goals. Reviewed plan of care. Patient has provided input and agrees with goals. Follow-up and Dispositions    · Return in about 1 month (around 1/15/2022).          Osvaldo Mariano MD

## 2021-12-15 NOTE — TELEPHONE ENCOUNTER
Per call center pt unable to come to hospital fuv appt of today. Pt is asking to be rescheduled to another day. No avail appt's.   Please advice

## 2021-12-16 NOTE — PROGRESS NOTES
I saw the patient in the office today as a post hospital follow-up for CHF  Patient saturations were around 85 % on 2 L of oxygen. I ordered repeat CXR     As per radiology, chest x-ray done today suggested interstitial opacity. There is a mention of possible Covid infection. Patient should get checked for Covid infection Or Go to the ER for rule out Covid pneumonia.   I am not sure if patient acquired COVID infection while in the hospital

## 2021-12-17 NOTE — PROGRESS NOTES
Just saw this message today, Tried to call and left message as im not sure if the patient has been contacted or not

## 2021-12-20 NOTE — PROGRESS NOTES
Cardiac Electrophysiology OFFICE Note     Subjective:      Adal Kincaid is a 78 y.o. patient who presents for hospital follow up of NICM, systolic CHF.      Admitted with acute systolic CHF earlier this month, discharged 12/13/2021. Down 1 lb today compared to discharge weight. She was discharged home on supplemental oxygen (3 LPM currently), has PFTs scheduled for next month. NICM, LVEF noted 15-20% during recent admission. LVEF had previously normalized with CRT pacing. NYHA II-III; still dyspneic after diuresis but improved. No ACEi/ARB due to renal dysfunction. GDMT dosing limited by low BP. [address] 12/10/2021 showed severe pulmonary HTN (wedge 34 mmHg, PAP 80 mmHg). Cardiac cath in 2015 at [facility] showed no evidence of CAD.     She did require LV lead reprogramming over the summer, but good LV capture since.   Good capture/function when checked during admission. BP controlled.        Previous:  S/p Medtronic biventricular ICD (gen change 01/994213, leads 09/25/2008).    CKD stage IV.      Previously followed by Dr. Michelle Pond, states did not follow him to new practice.       Problem List  Date Reviewed: 7/13/2021          Codes Class Noted    CHF exacerbation ([facility] ICD-10-CM: I50.9  ICD-9-CM: 428.0  12/6/2021        Type 2 diabetes mellitus with diabetic neuropathy ([facility] ICD-10-CM: E11.40  ICD-9-CM: 250.60, 357.2  1/2/2020        Type 2 diabetes with nephropathy ([facility] ICD-10-CM: E11.21  ICD-9-CM: 250.40, 583.81  4/3/2018        Obesity, morbid ([facility] ICD-10-CM: E66.01  ICD-9-CM: 278.01  12/8/2017        Acquired hypothyroidism ICD-10-CM: E03.9  ICD-9-CM: 244.9  8/15/2016        Dysthymia ICD-10-CM: F34.1  ICD-9-CM: 300.4  8/15/2016        ICD (implantable cardioverter-defibrillator), biventricular, in situ ICD-10-CM: Z95.810  ICD-9-CM: V45.02  6/5/2014    Overview Signed 1/14/2015 11:11 AM by Aimee Mendoza MD     Generator Medtronic change 1/14/2015             Dyslipidemia ICD-10-CM: E78.5  ICD-9-CM: 272.4  1/14/2014        CKD (chronic kidney disease) ICD-10-CM: N18.9  ICD-9-CM: 585.9  8/15/2012        Cardiomyopathy, nonischemic ([facility] ICD-10-CM: I42.8  ICD-9-CM: 425.4  Unknown    Overview Signed 10/10/2011  6:40 AM by Shazia Garcia MD     initial dx 2001, bivHF 2008 with EF 15%, s/p biV-ICD 9/08, significant improvment in EF to 45-50%             Anemia in chronic renal disease (Chronic) ICD-10-CM: N18.9, D63.1  ICD-9-CM: 285.21  12/10/2008        HTN (hypertension) ICD-10-CM: I10  ICD-9-CM: 401.9  12/10/2008        GERD (gastroesophageal reflux disease) (Chronic) ICD-10-CM: K21.9  ICD-9-CM: 530.81  12/10/2008        Gout (Chronic) ICD-10-CM: M10.9  ICD-9-CM: 274.9  12/10/2008        Pulmonary HTN (HCC) (Chronic) ICD-10-CM: I27.20  ICD-9-CM: 416.8  12/10/2008              Current Outpatient Medications   Medication Sig Dispense Refill    carvediloL (COREG) 6.25 mg tablet Take 1 Tablet by mouth two (2) times daily (with meals). 180 Tablet 1    isosorbide dinitrate (ISORDIL) 5 mg tablet Take 1 Tablet by mouth three (3) times daily. 270 Tablet 1    hydrALAZINE (APRESOLINE) 10 mg tablet Take 1 Tablet by mouth three (3) times daily. 180 Tablet 1    chlorhexidine (Hibiclens) 4 % liquid Apply to the upper chest area from shoulder/neck to mid line of chest and to below the nipple every day, 5 days prior to the procedure. 1 Each 0    azelastine (ASTEPRO) 205.5 mcg (0.15 %) 2 Sprays by Both Nostrils route two (2) times a day. 1 Each 3    benzonatate (Tessalon Perles) 100 mg capsule Take 100 mg by mouth three (3) times daily as needed for Cough.       montelukast (SINGULAIR) 10 mg tablet TAKE 1 TABLET BY MOUTH EVERY DAY 90 Tablet 0    bumetanide (BUMEX) 2 mg tablet Take 1 tab in the morning and 0.5 tab in the evening 135 Tablet 1    fluticasone propionate (FLONASE) 50 mcg/actuation nasal spray One spray each nostril daily 1 Each 3    gabapentin (NEURONTIN) 100 mg capsule Take 1 Capsule by mouth nightly. Max Daily Amount: 100 mg. 30 Capsule 0    venlafaxine-SR (EFFEXOR-XR) 75 mg capsule TAKE 1 CAPSULE BY MOUTH EVERY DAY 90 Capsule 0    cholecalciferol (VITAMIN D3) (2,000 UNITS /50 MCG) cap capsule TAKE 1 CAPSULE BY MOUTH TWO (2) TIMES A DAY. 180 Capsule 0    levocetirizine (XYZAL) 5 mg tablet TAKE 1 TABLET BY MOUTH EVERY DAY 90 Tablet 0    allopurinoL (ZYLOPRIM) 100 mg tablet TAKE 1 TABLET BY MOUTH EVERY DAY 90 Tablet 0    calcitRIOL (ROCALTROL) 0.5 mcg capsule TAKE 1 CAPSULE BY MOUTH EVERY DAY 90 Capsule 0    hydrOXYzine HCL (ATARAX) 10 mg tablet 2 tab(s)      levothyroxine (SYNTHROID) 100 mcg tablet TAKE 1 TABLET BY MOUTH EVERY DAY BEFORE BREAKFAST 90 Tablet 1    budesonide (PULMICORT) 180 mcg/actuation aepb inhaler Take 2 Puffs by inhalation two (2) times a day. 2 Inhaler 5    meclizine (ANTIVERT) 25 mg tablet TAKE 1 TAB BY MOUTH THREE (3) TIMES DAILY AS NEEDED FOR DIZZINESS. 20 Tab 3    ammonium lactate (AMLACTIN) 12 % topical cream Apply  to affected area two (2) times a day. rub in to affected area well 280 g 2    apremilast (OTEZLA) 30 mg tab Take  by mouth two (2) times a day.  insulin NPH/insulin regular (NOVOLIN 70/30) 100 unit/mL (70-30) injection 70 units two times a day 10 mL 3    calcipotriene (DOVONEX) 0.005 % topical cream Apply  to affected area three (3) times daily.  triamcinolone acetonide (KENALOG) 0.5 % ointment Apply  to affected area two (2) times a day. use thin layer      multivitamin (ONE A DAY) tablet Take 1 Tab by mouth daily.  DOCUSATE CALCIUM (STOOL SOFTENER PO) Take 1 tablet by mouth daily.  EPINEPHrine (EPIPEN) 0.3 mg/0.3 mL injection 0.3 mL by IntraMUSCular route once as needed for 1 dose. 1 Each 3    ferrous sulfate (IRON) 325 mg (65 mg elemental iron) tablet Take  by mouth two (2) times a day.  aspirin 81 mg tablet Take 81 mg by mouth daily.  gabapentin (NEURONTIN) 100 mg capsule TAKE 1 CAP BY MOUTH DAILY.  (Patient not taking: Reported on 12/22/2021) 90 Capsule 0    OTHER  (Patient not taking: Reported on 12/22/2021)       Allergies   Allergen Reactions    Ciprofloxacin Anaphylaxis    Shellfish Derived Anaphylaxis    Ace Inhibitors Unknown (comments)    Biaxin [Clarithromycin] Other (comments)     Metal taste    Candesartan Cough    Pcn [Penicillins] Hives     Past Medical History:   Diagnosis Date    Acquired hypothyroidism 8/15/2016    Anemia     RED-HF study    Asthma     Cardiomyopathy, nonischemic ([facility]     initial dx 2001, bivHF 2008 with EF 15%, s/p biV-ICD 9/08, significant improvment in EF to 45-50%    CKD (chronic kidney disease)     Dr Brent Busby    CKD (chronic kidney disease) 8/15/2012    Depression     Diabetes ([address]     Diabetic neuropathy ([address]     DM (diabetes mellitus) ([facility] 8/15/2012    GERD (gastroesophageal reflux disease)     Gout     Hypothyroidism     ICD (implantable cardioverter-defibrillator), biventricular, in situ 6/5/2014    Psoriasis      Past Surgical History:   Procedure Laterality Date    CARDIAC CATHETERIZATION  2007; 01/06/15    normal cors    ECHO 2D ADULT  4/2010    EF 45%, improved from 1/09 (25%)    ECHO 2D ADULT  11/2011    LVH, EF 55-60%    HX ORTHOPAEDIC      knee    HX PACEMAKER PLACEMENT      AICD    STRESS TEST LEXISCAN/CARDIOLITE  3/21/12    normal perfusion, global HK 40%     Family History   Problem Relation Age of Onset    Heart Disease Mother     Hypertension Mother    [facility] Lupus Sister     Diabetes Brother      Social History     Tobacco Use    Smoking status: Never Smoker    Smokeless tobacco: Never Used   Substance Use Topics    Alcohol use: No        Review of Systems: Review of all other systems otherwise negative. Constitutional: Negative for fever, chills, weight loss, + malaise/fatigue. HEENT: Negative for nosebleeds, vision changes. Respiratory: Negative for cough, hemoptysis.   Cardiovascular: Negative for chest pain, palpitations, orthopnea, claudication, + leg swelling, no syncope, and PND. + SOB  Gastrointestinal: Negative for nausea, vomiting, diarrhea, blood in stool and melena. Genitourinary: Negative for dysuria, and hematuria. Musculoskeletal: Negative for myalgias, arthralgia. Skin: Negative for rash. Heme: Does not bleed or bruise easily. Neurological: Negative for speech change and focal weakness. Objective:     Visit Vitals  /84 (BP 1 Location: Left arm, BP Patient Position: Sitting, BP Cuff Size: Adult)   Pulse 100   Resp 14   Ht 5' 7\" (1.702 m)   Wt 255 lb 6.4 oz (115.8 kg)   SpO2 97%   BMI 40.00 kg/m²        Physical Exam:   Constitutional: Well-developed and well-nourished. No respiratory distress. Head: Normocephalic and atraumatic. Eyes: Pupils are equal, round. ENT: Hearing grossly normal.  Neck: Supple. No JVD present. Cardiovascular: Mildly tachy rate, regular rhythm. Exam reveals no gallop and no friction rub. 2/6 systolic LSB murmur heard. Pulmonary/Chest: Effort normal on supplemental oxygen (3 LPM). Coarse in left base. No wheezes. Abdominal: Soft, moderate obesity. Musculoskeletal: Moves extremities independently. Vasc/lymphatic: No edema. Neurological: Alert, oriented. Skin: Skin is warm and dry. Left chest ICD site well healed. Psychiatric: Normal mood and affect. Behavior is normal. Judgment and thought content normal.      Assessment/Plan:     Imaging/Studies:  Echo (12/08/2021): LVEF 15-20%, upper normal wall thickness, LV diastolic dysfunction. Borderline low RVEF. Mod dilated LA, mildly dilated RA. Mild to mod MR. Mild TR. Mild to mod PH.     LHC/RHC (01/2015): No significant CAD. ICD-10-CM ICD-9-CM    1. NICM (nonischemic cardiomyopathy) (HCC)  I42.8 425.4 CBC WITH AUTOMATED DIFF      METABOLIC PANEL, BASIC   2. Presence of biventricular automatic cardioverter/defibrillator (AICD)  Z95.810 V45.02 CBC WITH AUTOMATED DIFF      METABOLIC PANEL, BASIC   3. Essential hypertension  I10 401.9 CBC WITH AUTOMATED DIFF      METABOLIC PANEL, BASIC   4. Systolic CHF, chronic (HCC)  I50.22 428.22 CBC WITH AUTOMATED DIFF     901.2 METABOLIC PANEL, BASIC   5. Hypertension, unspecified type  I10 401.9 carvediloL (COREG) 6.25 mg tablet      CBC WITH AUTOMATED DIFF      METABOLIC PANEL, BASIC   6. Encounter for medication refill  Z76.0 V68.1 carvediloL (COREG) 6.25 mg tablet      CBC WITH AUTOMATED DIFF      METABOLIC PANEL, BASIC       NICM:  Recent admission for acute on chronic CHF. Previously had LVEF normalize with CRT. Still with proper biventricular pacing per device check during recent admission, not cause for decline. LVEF now 15-20%. NYHA II-III. Severe pulmonary HTN noted on RHC. Last LHC in 2015 showed no significant CAD. Continue carvedilol/hydralazine/isordil, but no ACEi/ARB/ARNi due to CKD. Intermittently low BP limits ability to titrate meds. Currently taking Bumex 2 mg po q AM & 1 mg po q PM, slowly decreasing weight but still with HAILE & coarse breath sounds in left base. She has a CXR pending later today (ordered by a different provider). She will increase to 2 mg po bid x 2 days, then go back to current dosing.     MRI is not possible with 4194 LV lead (2008). Unless she has a form of amyloid that can be treated, I do not think MRI will add more information for treatment. May consider PYP nuc test but usually results come back equivocal.     Medtronic biventricular ICD (gen change 01/14/2015, leads 09/25/2008): Device check 12/08/2021 showed proper lead & generator function. Adequate CRT. Generator longevity estimated 4 mos. Reviewed risks/benefits of ICD generator change. Shared decision making was utilized between the physician/provider and patient/family in regards to ICD generator change and therapy. She agrees to proceed with scheduling. Labs ordered. Reviewed Hibiclens.   Discussed necessary COVID test.    HTN: BP intermittently trends low normal on current regimen, ok to continue.       She will see me in approximately 1 month for H&P update, can recheck CHF status then as well. Future Appointments   Date Time Provider Lizabeth Loli   1/17/2022  2:45 PM Brett HAYWARD AMB   1/28/2022 11:00 AM JAIDA Magdaleno AMB   2/25/2022 10:20 AM PACEMAKER3, MIRTA HAYWARD AMB   2/25/2022 10:30 AM WOUND CHECKS, MIRTA HAYWARD AMB   4/18/2022 11:45 AM REMOTE1, MIRTA HAYWARD AMB   6/20/2022  1:00 PM ECHOTWO, MIRTA HAYWARD AMB   7/21/2022  2:20 PM PACEMAKER3, MIRTA HAYWARD AMB   7/21/2022  2:40 PM MD ADAM Meza BS AMB     Thank you for involving me in this patient's care and please call with further concerns or questions.     SUMAN Eldridge  [facility] [place]  12/22/21

## 2021-12-20 NOTE — TELEPHONE ENCOUNTER
Returned patient call, left message to call office. RECOMMENDATIONS:  I saw the patient in the office today as a post hospital follow-up for CHF   Patient saturations were around 85 % on 2 L of oxygen. I ordered repeat CXR     As per radiology, chest x-ray done today suggested interstitial opacity. Amedeo Nails is a mention of possible Covid infection.  Patient should get checked for Covid infection Or Go to the ER for rule out Covid pneumonia.    I am not sure if patient acquired COVID infection while in the hospital

## 2021-12-20 NOTE — TELEPHONE ENCOUNTER
----- Message from Aletha Schultz sent at 12/18/2021 10:24 AM EST -----  Subject: Message to Provider    QUESTIONS  Information for Provider? Pt is returning a call to the office. Please   advise.   ---------------------------------------------------------------------------  --------------  CALL BACK INFO  What is the best way for the office to contact you? OK to leave message on   voicemail  Preferred Call Back Phone Number?  [phone]  ---------------------------------------------------------------------------  --------------  SCRIPT ANSWERS  undefined

## 2021-12-20 NOTE — TELEPHONE ENCOUNTER
Spoke with pt. Pt does not believe she has pneumonia  Asked pt to alteast repeat the CXR.   Informed that it was read by the Radiologist.Pt agreed to do so

## 2021-12-22 NOTE — PATIENT INSTRUCTIONS
Increase bumex to 2 mg twice a day for two days, then go back to taking 2 mg every morning and 1 mg every evening. Your BiV ICD generator change procedure has been scheduled for 02/11/2022 at 1:00 PM, at [facility].    Please report to Admitting Department by 11:00 AM, or 2 hours prior to your scheduled procedure. Please bring a list of your current medications and medication bottles, if able, to the hospital on this day. You will be unable to drive after your procedure so please make sure to bring someone with you to your procedure. You will need to have nothing to eat or drink after midnight, the night prior to your procedure. You may have small sips of water, if needed, to take with your medication. You will need labs drawn prior to your procedure. Please go to have this done no sooner than 01/10/2022 and no later than 02/04/2022. You will also need to see Dr. Angie Aguilar Nurse Practitioner, Mansi Reyna, in office prior to your procedure. An appointment has been scheduled for 01/28/2022 at 11:00. You should not stop your medication prior to your scheduled procedure. After your procedure, you will need to follow up with Dr. Angie Aguilar nurse for a wound and device check. Your follow-up appointment has been scheduled for 02/25/2022 at 10:30AM.     Hibiclens 4% topical solution has been ordered and sent into your pharmacy  Patient it start Hibiclens application 5 days prior to procedure date    Directions Hibiclens 4%: Start cleanse 5 days prior to procedure   1. Rinse area (upper chest and upper arms) with water. 2. Apply minimum amount necessary to scrub the upper chest area from shoulder/neck to mid line of chest and to below the nipple each of  5 nights before the day of the procedure  3. Let solution dry. COVID testing 3-4 day prior to procedure.   [facility]:  Ann location: Patient discharge area at the [facility] and [address] [place] [address] Road: [place] [id number]  Hours: Monday-Friday 7 am to 3 pm

## 2021-12-28 NOTE — PROGRESS NOTES
12/28/21   No response to call or letter, episode closed.     Krystal Monday MSN, RN, CCM / Care Transition Nurse / [phone]

## 2022-01-01 ENCOUNTER — DOCUMENTATION ONLY (OUTPATIENT)
Dept: CARDIOLOGY CLINIC | Age: 67
End: 2022-01-01

## 2022-01-01 ENCOUNTER — APPOINTMENT (OUTPATIENT)
Dept: GENERAL RADIOLOGY | Age: 67
DRG: 215 | End: 2022-01-01
Attending: NURSE PRACTITIONER
Payer: MEDICARE

## 2022-01-01 ENCOUNTER — APPOINTMENT (OUTPATIENT)
Dept: NUCLEAR MEDICINE | Age: 67
DRG: 215 | End: 2022-01-01
Attending: INTERNAL MEDICINE
Payer: MEDICARE

## 2022-01-01 ENCOUNTER — HOSPITAL ENCOUNTER (OUTPATIENT)
Dept: NON INVASIVE DIAGNOSTICS | Age: 67
Discharge: HOME | DRG: 215 | End: 2022-01-18
Attending: THORACIC SURGERY (CARDIOTHORACIC VASCULAR SURGERY)
Payer: MEDICARE

## 2022-01-01 ENCOUNTER — APPOINTMENT (OUTPATIENT)
Dept: NON INVASIVE DIAGNOSTICS | Age: 67
DRG: 215 | End: 2022-01-01
Attending: INTERNAL MEDICINE
Payer: MEDICARE

## 2022-01-01 ENCOUNTER — TRANSCRIBE ORDER (OUTPATIENT)
Dept: CARDIAC REHAB | Age: 67
End: 2022-01-01

## 2022-01-01 ENCOUNTER — APPOINTMENT (OUTPATIENT)
Dept: GENERAL RADIOLOGY | Age: 67
DRG: 215 | End: 2022-01-01
Attending: INTERNAL MEDICINE
Payer: MEDICARE

## 2022-01-01 ENCOUNTER — APPOINTMENT (OUTPATIENT)
Dept: GENERAL RADIOLOGY | Age: 67
DRG: 215 | End: 2022-01-01
Attending: PHYSICIAN ASSISTANT
Payer: MEDICARE

## 2022-01-01 ENCOUNTER — APPOINTMENT (OUTPATIENT)
Dept: NON INVASIVE DIAGNOSTICS | Age: 67
DRG: 215 | End: 2022-01-01
Attending: NURSE PRACTITIONER
Payer: MEDICARE

## 2022-01-01 ENCOUNTER — APPOINTMENT (OUTPATIENT)
Dept: VASCULAR SURGERY | Age: 67
DRG: 215 | End: 2022-01-01
Attending: NURSE PRACTITIONER
Payer: MEDICARE

## 2022-01-01 ENCOUNTER — APPOINTMENT (OUTPATIENT)
Dept: GENERAL RADIOLOGY | Age: 67
DRG: 215 | End: 2022-01-01
Attending: ANESTHESIOLOGY
Payer: MEDICARE

## 2022-01-01 ENCOUNTER — HOSPITAL ENCOUNTER (INPATIENT)
Age: 67
LOS: 30 days | DRG: 215 | End: 2022-02-03
Attending: EMERGENCY MEDICINE | Admitting: HOSPITALIST
Payer: MEDICARE

## 2022-01-01 ENCOUNTER — APPOINTMENT (OUTPATIENT)
Dept: GENERAL RADIOLOGY | Age: 67
DRG: 215 | End: 2022-01-01
Attending: EMERGENCY MEDICINE
Payer: MEDICARE

## 2022-01-01 ENCOUNTER — APPOINTMENT (OUTPATIENT)
Dept: CT IMAGING | Age: 67
DRG: 215 | End: 2022-01-01
Attending: INTERNAL MEDICINE
Payer: MEDICARE

## 2022-01-01 ENCOUNTER — APPOINTMENT (OUTPATIENT)
Dept: ULTRASOUND IMAGING | Age: 67
DRG: 215 | End: 2022-01-01
Attending: NURSE PRACTITIONER
Payer: MEDICARE

## 2022-01-01 ENCOUNTER — ANESTHESIA (OUTPATIENT)
Dept: SURGERY | Age: 67
DRG: 215 | End: 2022-01-01
Payer: MEDICARE

## 2022-01-01 ENCOUNTER — ANESTHESIA EVENT (OUTPATIENT)
Dept: SURGERY | Age: 67
DRG: 215 | End: 2022-01-01
Payer: MEDICARE

## 2022-01-01 ENCOUNTER — PATIENT MESSAGE (OUTPATIENT)
Dept: SLEEP MEDICINE | Age: 67
End: 2022-01-01

## 2022-01-01 ENCOUNTER — APPOINTMENT (OUTPATIENT)
Dept: NUCLEAR MEDICINE | Age: 67
DRG: 215 | End: 2022-01-01
Attending: FAMILY MEDICINE
Payer: MEDICARE

## 2022-01-01 ENCOUNTER — TELEPHONE (OUTPATIENT)
Dept: CARDIOLOGY CLINIC | Age: 67
End: 2022-01-01

## 2022-01-01 ENCOUNTER — APPOINTMENT (OUTPATIENT)
Dept: ULTRASOUND IMAGING | Age: 67
DRG: 215 | End: 2022-01-01
Attending: INTERNAL MEDICINE
Payer: MEDICARE

## 2022-01-01 ENCOUNTER — APPOINTMENT (OUTPATIENT)
Dept: GENERAL RADIOLOGY | Age: 67
DRG: 215 | End: 2022-01-01
Attending: FAMILY MEDICINE
Payer: MEDICARE

## 2022-01-01 VITALS
OXYGEN SATURATION: 97 % | HEART RATE: 80 BPM | HEIGHT: 67 IN | BODY MASS INDEX: 36.61 KG/M2 | DIASTOLIC BLOOD PRESSURE: 75 MMHG | TEMPERATURE: 101.5 F | WEIGHT: 233.25 LBS | SYSTOLIC BLOOD PRESSURE: 107 MMHG | RESPIRATION RATE: 14 BRPM

## 2022-01-01 DIAGNOSIS — I50.23 ACUTE ON CHRONIC SYSTOLIC CONGESTIVE HEART FAILURE (HCC): ICD-10-CM

## 2022-01-01 DIAGNOSIS — J30.1 SEASONAL ALLERGIC RHINITIS DUE TO POLLEN: ICD-10-CM

## 2022-01-01 DIAGNOSIS — Z51.5 PALLIATIVE CARE BY SPECIALIST: ICD-10-CM

## 2022-01-01 DIAGNOSIS — J30.2 SEASONAL ALLERGIC RHINITIS, UNSPECIFIED TRIGGER: ICD-10-CM

## 2022-01-01 DIAGNOSIS — Z76.0 ENCOUNTER FOR MEDICATION REFILL: ICD-10-CM

## 2022-01-01 DIAGNOSIS — E55.9 VITAMIN D DEFICIENCY: ICD-10-CM

## 2022-01-01 DIAGNOSIS — N18.6 ESRD (END STAGE RENAL DISEASE) (HCC): ICD-10-CM

## 2022-01-01 DIAGNOSIS — I50.9 HEART FAILURE, UNSPECIFIED HF CHRONICITY, UNSPECIFIED HEART FAILURE TYPE (HCC): ICD-10-CM

## 2022-01-01 DIAGNOSIS — I42.8 CARDIOMYOPATHY, NONISCHEMIC (HCC): ICD-10-CM

## 2022-01-01 DIAGNOSIS — E66.01 OBESITY, MORBID (HCC): ICD-10-CM

## 2022-01-01 DIAGNOSIS — J30.89 ENVIRONMENTAL AND SEASONAL ALLERGIES: ICD-10-CM

## 2022-01-01 DIAGNOSIS — R06.09 DYSPNEA ON EXERTION: ICD-10-CM

## 2022-01-01 DIAGNOSIS — Z79.899 RECEIVING INOTROPIC MEDICATION: ICD-10-CM

## 2022-01-01 DIAGNOSIS — J81.0 ACUTE PULMONARY EDEMA (HCC): Primary | ICD-10-CM

## 2022-01-01 DIAGNOSIS — E03.9 ACQUIRED HYPOTHYROIDISM: ICD-10-CM

## 2022-01-01 DIAGNOSIS — Z95.810 ICD (IMPLANTABLE CARDIOVERTER-DEFIBRILLATOR), BIVENTRICULAR, IN SITU: ICD-10-CM

## 2022-01-01 DIAGNOSIS — I25.10 CORONARY ARTERY DISEASE, UNSPECIFIED VESSEL OR LESION TYPE, UNSPECIFIED WHETHER ANGINA PRESENT, UNSPECIFIED WHETHER NATIVE OR TRANSPLANTED HEART: ICD-10-CM

## 2022-01-01 DIAGNOSIS — N18.9 CHRONIC KIDNEY DISEASE, UNSPECIFIED CKD STAGE: ICD-10-CM

## 2022-01-01 DIAGNOSIS — N18.30 STAGE 3 CHRONIC KIDNEY DISEASE (HCC): ICD-10-CM

## 2022-01-01 DIAGNOSIS — D63.1 ANEMIA IN CHRONIC KIDNEY DISEASE, UNSPECIFIED CKD STAGE: Chronic | ICD-10-CM

## 2022-01-01 DIAGNOSIS — Z01.818 PREOPERATIVE EVALUATION TO RULE OUT SURGICAL CONTRAINDICATION: ICD-10-CM

## 2022-01-01 DIAGNOSIS — Z95.5 STENTED CORONARY ARTERY: Primary | ICD-10-CM

## 2022-01-01 DIAGNOSIS — R79.89 ELEVATED D-DIMER: ICD-10-CM

## 2022-01-01 DIAGNOSIS — J96.01 ACUTE RESPIRATORY FAILURE WITH HYPOXIA (HCC): ICD-10-CM

## 2022-01-01 DIAGNOSIS — N18.9 ANEMIA IN CHRONIC KIDNEY DISEASE, UNSPECIFIED CKD STAGE: Chronic | ICD-10-CM

## 2022-01-01 DIAGNOSIS — R57.0 CARDIOGENIC SHOCK (HCC): ICD-10-CM

## 2022-01-01 LAB
25(OH)D3 SERPL-MCNC: 25.2 NG/ML (ref 30–100)
25(OH)D3 SERPL-MCNC: 39.7 NG/ML (ref 30–100)
ABO + RH BLD: NORMAL
ACT BLD: 267 SECS (ref 79–138)
ACT BLD: 309 SECS (ref 79–138)
ADMINISTERED INITIALS, ADMINIT: NORMAL
ALBUMIN SERPL ELPH-MCNC: 2.9 G/DL (ref 2.9–4.4)
ALBUMIN SERPL-MCNC: 2.5 G/DL (ref 3.5–5)
ALBUMIN SERPL-MCNC: 2.6 G/DL (ref 3.5–5)
ALBUMIN SERPL-MCNC: 2.7 G/DL (ref 3.5–5)
ALBUMIN SERPL-MCNC: 2.8 G/DL (ref 3.5–5)
ALBUMIN SERPL-MCNC: 2.9 G/DL (ref 3.5–5)
ALBUMIN SERPL-MCNC: 3 G/DL (ref 3.5–5)
ALBUMIN SERPL-MCNC: 3 G/DL (ref 3.5–5)
ALBUMIN SERPL-MCNC: 3.1 G/DL (ref 3.5–5)
ALBUMIN SERPL-MCNC: 3.2 G/DL (ref 3.5–5)
ALBUMIN SERPL-MCNC: 3.3 G/DL (ref 3.5–5)
ALBUMIN SERPL-MCNC: 3.4 G/DL (ref 3.5–5)
ALBUMIN SERPL-MCNC: 3.5 G/DL (ref 3.5–5)
ALBUMIN SERPL-MCNC: 3.6 G/DL (ref 3.5–5)
ALBUMIN SERPL-MCNC: 3.7 G/DL (ref 3.5–5)
ALBUMIN SERPL-MCNC: 3.8 G/DL (ref 3.5–5)
ALBUMIN SERPL-MCNC: 3.8 G/DL (ref 3.5–5)
ALBUMIN SERPL-MCNC: 4 G/DL (ref 3.5–5)
ALBUMIN/GLOB SERPL: 0.6 {RATIO} (ref 1.1–2.2)
ALBUMIN/GLOB SERPL: 0.7 {RATIO} (ref 1.1–2.2)
ALBUMIN/GLOB SERPL: 0.8 {RATIO} (ref 1.1–2.2)
ALBUMIN/GLOB SERPL: 0.9 {RATIO} (ref 0.7–1.7)
ALBUMIN/GLOB SERPL: 0.9 {RATIO} (ref 1.1–2.2)
ALBUMIN/GLOB SERPL: 1 {RATIO} (ref 1.1–2.2)
ALBUMIN/GLOB SERPL: 1.1 {RATIO} (ref 1.1–2.2)
ALBUMIN/GLOB SERPL: 1.1 {RATIO} (ref 1.1–2.2)
ALP SERPL-CCNC: 101 U/L (ref 45–117)
ALP SERPL-CCNC: 112 U/L (ref 45–117)
ALP SERPL-CCNC: 112 U/L (ref 45–117)
ALP SERPL-CCNC: 116 U/L (ref 45–117)
ALP SERPL-CCNC: 143 U/L (ref 45–117)
ALP SERPL-CCNC: 58 U/L (ref 45–117)
ALP SERPL-CCNC: 60 U/L (ref 45–117)
ALP SERPL-CCNC: 64 U/L (ref 45–117)
ALP SERPL-CCNC: 65 U/L (ref 45–117)
ALP SERPL-CCNC: 67 U/L (ref 45–117)
ALP SERPL-CCNC: 69 U/L (ref 45–117)
ALP SERPL-CCNC: 69 U/L (ref 45–117)
ALP SERPL-CCNC: 70 U/L (ref 45–117)
ALP SERPL-CCNC: 72 U/L (ref 45–117)
ALP SERPL-CCNC: 72 U/L (ref 45–117)
ALP SERPL-CCNC: 75 U/L (ref 45–117)
ALP SERPL-CCNC: 75 U/L (ref 45–117)
ALP SERPL-CCNC: 77 U/L (ref 45–117)
ALP SERPL-CCNC: 79 U/L (ref 45–117)
ALP SERPL-CCNC: 80 U/L (ref 45–117)
ALP SERPL-CCNC: 84 U/L (ref 45–117)
ALP SERPL-CCNC: 87 U/L (ref 45–117)
ALP SERPL-CCNC: 88 U/L (ref 45–117)
ALPHA1 GLOB SERPL ELPH-MCNC: 0.3 G/DL (ref 0–0.4)
ALPHA2 GLOB SERPL ELPH-MCNC: 0.8 G/DL (ref 0.4–1)
ALT SERPL-CCNC: 10 U/L (ref 12–78)
ALT SERPL-CCNC: 107 U/L (ref 12–78)
ALT SERPL-CCNC: 12 U/L (ref 12–78)
ALT SERPL-CCNC: 12 U/L (ref 12–78)
ALT SERPL-CCNC: 163 U/L (ref 12–78)
ALT SERPL-CCNC: 26 U/L (ref 12–78)
ALT SERPL-CCNC: 29 U/L (ref 12–78)
ALT SERPL-CCNC: 32 U/L (ref 12–78)
ALT SERPL-CCNC: 32 U/L (ref 12–78)
ALT SERPL-CCNC: 37 U/L (ref 12–78)
ALT SERPL-CCNC: 48 U/L (ref 12–78)
ALT SERPL-CCNC: 58 U/L (ref 12–78)
ALT SERPL-CCNC: 6 U/L (ref 12–78)
ALT SERPL-CCNC: 7 U/L (ref 12–78)
ALT SERPL-CCNC: 8 U/L (ref 12–78)
ALT SERPL-CCNC: 80 U/L (ref 12–78)
ALT SERPL-CCNC: 9 U/L (ref 12–78)
ALT SERPL-CCNC: 9 U/L (ref 12–78)
ALT SERPL-CCNC: <6 U/L (ref 12–78)
AMPHET UR QL SCN: NEGATIVE
ANA TITR SER IF: NEGATIVE {TITER}
ANION GAP SERPL CALC-SCNC: 10 MMOL/L (ref 5–15)
ANION GAP SERPL CALC-SCNC: 12 MMOL/L (ref 5–15)
ANION GAP SERPL CALC-SCNC: 2 MMOL/L (ref 5–15)
ANION GAP SERPL CALC-SCNC: 3 MMOL/L (ref 5–15)
ANION GAP SERPL CALC-SCNC: 4 MMOL/L (ref 5–15)
ANION GAP SERPL CALC-SCNC: 5 MMOL/L (ref 5–15)
ANION GAP SERPL CALC-SCNC: 6 MMOL/L (ref 5–15)
ANION GAP SERPL CALC-SCNC: 7 MMOL/L (ref 5–15)
ANION GAP SERPL CALC-SCNC: 8 MMOL/L (ref 5–15)
ANION GAP SERPL CALC-SCNC: 8 MMOL/L (ref 5–15)
ANION GAP SERPL CALC-SCNC: 9 MMOL/L (ref 5–15)
APPEARANCE UR: CLEAR
APPEARANCE UR: CLEAR
APTT PPP: 23.1 SEC (ref 22.1–31)
APTT PPP: 24.5 SEC (ref 22.1–31)
APTT PPP: 24.9 SEC (ref 22.1–31)
APTT PPP: 25.4 SEC (ref 22.1–31)
APTT PPP: 25.9 SEC (ref 22.1–31)
APTT PPP: 26.3 SEC (ref 22.1–31)
APTT PPP: 26.4 SEC (ref 22.1–31)
APTT PPP: 26.7 SEC (ref 22.1–31)
APTT PPP: 26.8 SEC (ref 22.1–31)
APTT PPP: 27.5 SEC (ref 22.1–31)
APTT PPP: 30.7 SEC (ref 22.1–31)
APTT PPP: 38 SEC (ref 22.1–31)
APTT PPP: 42.4 SEC (ref 22.1–31)
APTT PPP: 50.2 SEC (ref 22.1–31)
APTT PPP: 52.2 SEC (ref 22.1–31)
APTT PPP: 52.6 SEC (ref 22.1–31)
APTT PPP: 53 SEC (ref 22.1–31)
APTT PPP: 54 SEC (ref 22.1–31)
APTT PPP: 54.7 SEC (ref 22.1–31)
APTT PPP: 54.9 SEC (ref 22.1–31)
APTT PPP: 55 SEC (ref 22.1–31)
APTT PPP: 55.1 SEC (ref 22.1–31)
APTT PPP: 55.1 SEC (ref 22.1–31)
APTT PPP: 55.5 SEC (ref 22.1–31)
APTT PPP: 55.7 SEC (ref 22.1–31)
APTT PPP: 56.1 SEC (ref 22.1–31)
APTT PPP: 56.4 SEC (ref 22.1–31)
APTT PPP: 56.5 SEC (ref 22.1–31)
APTT PPP: 57.5 SEC (ref 22.1–31)
APTT PPP: 58.4 SEC (ref 22.1–31)
APTT PPP: 59.5 SEC (ref 22.1–31)
APTT PPP: 59.5 SEC (ref 22.1–31)
APTT PPP: 59.8 SEC (ref 22.1–31)
APTT PPP: 60.4 SEC (ref 22.1–31)
APTT PPP: 60.6 SEC (ref 22.1–31)
APTT PPP: 63.5 SEC (ref 22.1–31)
APTT PPP: 77.5 SEC (ref 22.1–31)
APTT PPP: >130 SEC (ref 22.1–31)
APTT PPP: >130 SEC (ref 22.1–31)
ARTERIAL PATENCY WRIST A: ABNORMAL
ARTERIAL PATENCY WRIST A: POSITIVE
AST SERPL-CCNC: 10 U/L (ref 15–37)
AST SERPL-CCNC: 11 U/L (ref 15–37)
AST SERPL-CCNC: 13 U/L (ref 15–37)
AST SERPL-CCNC: 14 U/L (ref 15–37)
AST SERPL-CCNC: 14 U/L (ref 15–37)
AST SERPL-CCNC: 16 U/L (ref 15–37)
AST SERPL-CCNC: 16 U/L (ref 15–37)
AST SERPL-CCNC: 17 U/L (ref 15–37)
AST SERPL-CCNC: 17 U/L (ref 15–37)
AST SERPL-CCNC: 19 U/L (ref 15–37)
AST SERPL-CCNC: 191 U/L (ref 15–37)
AST SERPL-CCNC: 20 U/L (ref 15–37)
AST SERPL-CCNC: 20 U/L (ref 15–37)
AST SERPL-CCNC: 21 U/L (ref 15–37)
AST SERPL-CCNC: 22 U/L (ref 15–37)
AST SERPL-CCNC: 24 U/L (ref 15–37)
AST SERPL-CCNC: 4 U/L (ref 15–37)
AST SERPL-CCNC: 5 U/L (ref 15–37)
AST SERPL-CCNC: 7 U/L (ref 15–37)
AST SERPL-CCNC: 8 U/L (ref 15–37)
ATRIAL RATE: 116 BPM
ATRIAL RATE: 37 BPM
ATRIAL RATE: 76 BPM
B PERT DNA SPEC QL NAA+PROBE: NOT DETECTED
B-GLOBULIN SERPL ELPH-MCNC: 0.9 G/DL (ref 0.7–1.3)
BACTERIA SPEC CULT: ABNORMAL
BACTERIA SPEC CULT: ABNORMAL
BACTERIA SPEC CULT: NORMAL
BACTERIA URNS QL MICRO: NEGATIVE /HPF
BACTERIA URNS QL MICRO: NEGATIVE /HPF
BARBITURATES UR QL SCN: NEGATIVE
BASE EXCESS BLD CALC-SCNC: 2.3 MMOL/L
BASE EXCESS BLD CALC-SCNC: 2.9 MMOL/L
BASE EXCESS BLD CALC-SCNC: 5.4 MMOL/L
BASE EXCESS BLD CALC-SCNC: 7.5 MMOL/L
BASE EXCESS BLD CALC-SCNC: 7.5 MMOL/L
BASE EXCESS BLD CALC-SCNC: 8.2 MMOL/L
BASE EXCESS BLD CALC-SCNC: 9.1 MMOL/L
BASE EXCESS BLD CALC-SCNC: 9.6 MMOL/L
BASE EXCESS BLDA CALC-SCNC: 1.5 MMOL/L
BASE EXCESS BLDA CALC-SCNC: 3 MMOL/L
BASE EXCESS BLDA CALC-SCNC: 5.3 MMOL/L
BASE EXCESS BLDV CALC-SCNC: 5.9 MMOL/L
BASOPHILS # BLD: 0 K/UL (ref 0–0.1)
BASOPHILS # BLD: 0.1 K/UL (ref 0–0.1)
BASOPHILS NFR BLD: 0 % (ref 0–1)
BASOPHILS NFR BLD: 1 % (ref 0–1)
BDY SITE: ABNORMAL
BDY SITE: NORMAL
BENZODIAZ UR QL: POSITIVE
BILIRUB SERPL-MCNC: 0.4 MG/DL (ref 0.2–1)
BILIRUB SERPL-MCNC: 0.5 MG/DL (ref 0.2–1)
BILIRUB SERPL-MCNC: 0.6 MG/DL (ref 0.2–1)
BILIRUB SERPL-MCNC: 0.7 MG/DL (ref 0.2–1)
BILIRUB SERPL-MCNC: 0.7 MG/DL (ref 0.2–1)
BILIRUB SERPL-MCNC: 0.8 MG/DL (ref 0.2–1)
BILIRUB SERPL-MCNC: 0.9 MG/DL (ref 0.2–1)
BILIRUB SERPL-MCNC: 1.2 MG/DL (ref 0.2–1)
BILIRUB UR QL: NEGATIVE
BILIRUB UR QL: NEGATIVE
BLD PROD TYP BPU: NORMAL
BLOOD GROUP ANTIBODIES SERPL: NORMAL
BNP SERPL-MCNC: 5397 PG/ML
BNP SERPL-MCNC: 7240 PG/ML
BNP SERPL-MCNC: 7472 PG/ML
BNP SERPL-MCNC: 9144 PG/ML
BNP SERPL-MCNC: 9219 PG/ML
BNP SERPL-MCNC: 9884 PG/ML
BNP SERPL-MCNC: ABNORMAL PG/ML
BNP SERPL-MCNC: ABNORMAL PG/ML (ref 0–125)
BORDETELLA PARAPERTUSSIS PCR, BORPAR: NOT DETECTED
BPU ID: NORMAL
BUN SERPL-MCNC: 110 MG/DL (ref 6–20)
BUN SERPL-MCNC: 39 MG/DL (ref 6–20)
BUN SERPL-MCNC: 42 MG/DL (ref 6–20)
BUN SERPL-MCNC: 44 MG/DL (ref 6–20)
BUN SERPL-MCNC: 46 MG/DL (ref 6–20)
BUN SERPL-MCNC: 46 MG/DL (ref 6–20)
BUN SERPL-MCNC: 50 MG/DL (ref 6–20)
BUN SERPL-MCNC: 50 MG/DL (ref 6–20)
BUN SERPL-MCNC: 51 MG/DL (ref 6–20)
BUN SERPL-MCNC: 51 MG/DL (ref 6–20)
BUN SERPL-MCNC: 52 MG/DL (ref 6–20)
BUN SERPL-MCNC: 53 MG/DL (ref 6–20)
BUN SERPL-MCNC: 54 MG/DL (ref 6–20)
BUN SERPL-MCNC: 54 MG/DL (ref 6–20)
BUN SERPL-MCNC: 55 MG/DL (ref 6–20)
BUN SERPL-MCNC: 56 MG/DL (ref 6–20)
BUN SERPL-MCNC: 57 MG/DL (ref 6–20)
BUN SERPL-MCNC: 59 MG/DL (ref 6–20)
BUN SERPL-MCNC: 61 MG/DL (ref 6–20)
BUN SERPL-MCNC: 63 MG/DL (ref 6–20)
BUN SERPL-MCNC: 64 MG/DL (ref 6–20)
BUN SERPL-MCNC: 65 MG/DL (ref 6–20)
BUN SERPL-MCNC: 67 MG/DL (ref 6–20)
BUN SERPL-MCNC: 68 MG/DL (ref 6–20)
BUN SERPL-MCNC: 68 MG/DL (ref 6–20)
BUN SERPL-MCNC: 69 MG/DL (ref 6–20)
BUN SERPL-MCNC: 70 MG/DL (ref 6–20)
BUN SERPL-MCNC: 71 MG/DL (ref 6–20)
BUN SERPL-MCNC: 72 MG/DL (ref 6–20)
BUN SERPL-MCNC: 73 MG/DL (ref 6–20)
BUN SERPL-MCNC: 73 MG/DL (ref 6–20)
BUN SERPL-MCNC: 76 MG/DL (ref 6–20)
BUN SERPL-MCNC: 77 MG/DL (ref 6–20)
BUN SERPL-MCNC: 78 MG/DL (ref 6–20)
BUN SERPL-MCNC: 80 MG/DL (ref 6–20)
BUN SERPL-MCNC: 83 MG/DL (ref 6–20)
BUN SERPL-MCNC: 85 MG/DL (ref 6–20)
BUN SERPL-MCNC: 86 MG/DL (ref 6–20)
BUN SERPL-MCNC: 86 MG/DL (ref 6–20)
BUN SERPL-MCNC: 98 MG/DL (ref 6–20)
BUN/CREAT SERPL: 18 (ref 12–20)
BUN/CREAT SERPL: 18 (ref 12–20)
BUN/CREAT SERPL: 19 (ref 12–20)
BUN/CREAT SERPL: 19 (ref 12–20)
BUN/CREAT SERPL: 20 (ref 12–20)
BUN/CREAT SERPL: 20 (ref 12–20)
BUN/CREAT SERPL: 21 (ref 12–20)
BUN/CREAT SERPL: 21 (ref 12–20)
BUN/CREAT SERPL: 22 (ref 12–20)
BUN/CREAT SERPL: 23 (ref 12–20)
BUN/CREAT SERPL: 24 (ref 12–20)
BUN/CREAT SERPL: 24 (ref 12–20)
BUN/CREAT SERPL: 25 (ref 12–20)
BUN/CREAT SERPL: 26 (ref 12–20)
BUN/CREAT SERPL: 26 (ref 12–20)
BUN/CREAT SERPL: 27 (ref 12–20)
BUN/CREAT SERPL: 28 (ref 12–20)
BUN/CREAT SERPL: 29 (ref 12–20)
BUN/CREAT SERPL: 30 (ref 12–20)
BUN/CREAT SERPL: 31 (ref 12–20)
BUN/CREAT SERPL: 32 (ref 12–20)
BUN/CREAT SERPL: 33 (ref 12–20)
C PNEUM DNA SPEC QL NAA+PROBE: NOT DETECTED
CA-I BLD-SCNC: 1.17 MMOL/L (ref 1.12–1.32)
CALCIUM SERPL-MCNC: 10 MG/DL (ref 8.5–10.1)
CALCIUM SERPL-MCNC: 10.1 MG/DL (ref 8.5–10.1)
CALCIUM SERPL-MCNC: 10.2 MG/DL (ref 8.5–10.1)
CALCIUM SERPL-MCNC: 10.4 MG/DL (ref 8.5–10.1)
CALCIUM SERPL-MCNC: 10.6 MG/DL (ref 8.5–10.1)
CALCIUM SERPL-MCNC: 8.1 MG/DL (ref 8.5–10.1)
CALCIUM SERPL-MCNC: 8.3 MG/DL (ref 8.5–10.1)
CALCIUM SERPL-MCNC: 8.7 MG/DL (ref 8.5–10.1)
CALCIUM SERPL-MCNC: 8.8 MG/DL (ref 8.5–10.1)
CALCIUM SERPL-MCNC: 8.8 MG/DL (ref 8.5–10.1)
CALCIUM SERPL-MCNC: 8.9 MG/DL (ref 8.5–10.1)
CALCIUM SERPL-MCNC: 8.9 MG/DL (ref 8.5–10.1)
CALCIUM SERPL-MCNC: 9 MG/DL (ref 8.5–10.1)
CALCIUM SERPL-MCNC: 9.1 MG/DL (ref 8.5–10.1)
CALCIUM SERPL-MCNC: 9.1 MG/DL (ref 8.5–10.1)
CALCIUM SERPL-MCNC: 9.2 MG/DL (ref 8.5–10.1)
CALCIUM SERPL-MCNC: 9.3 MG/DL (ref 8.5–10.1)
CALCIUM SERPL-MCNC: 9.4 MG/DL (ref 8.5–10.1)
CALCIUM SERPL-MCNC: 9.5 MG/DL (ref 8.5–10.1)
CALCIUM SERPL-MCNC: 9.6 MG/DL (ref 8.5–10.1)
CALCIUM SERPL-MCNC: 9.7 MG/DL (ref 8.5–10.1)
CALCIUM SERPL-MCNC: 9.8 MG/DL (ref 8.5–10.1)
CALCIUM SERPL-MCNC: 9.9 MG/DL (ref 8.5–10.1)
CALCULATED P AXIS, ECG09: 36 DEGREES
CALCULATED R AXIS, ECG10: -5 DEGREES
CALCULATED R AXIS, ECG10: -62 DEGREES
CALCULATED R AXIS, ECG10: -87 DEGREES
CALCULATED T AXIS, ECG11: 107 DEGREES
CALCULATED T AXIS, ECG11: 113 DEGREES
CALCULATED T AXIS, ECG11: 87 DEGREES
CANNABINOIDS UR QL SCN: NEGATIVE
CHLORIDE SERPL-SCNC: 100 MMOL/L (ref 97–108)
CHLORIDE SERPL-SCNC: 100 MMOL/L (ref 97–108)
CHLORIDE SERPL-SCNC: 101 MMOL/L (ref 97–108)
CHLORIDE SERPL-SCNC: 102 MMOL/L (ref 97–108)
CHLORIDE SERPL-SCNC: 104 MMOL/L (ref 97–108)
CHLORIDE SERPL-SCNC: 105 MMOL/L (ref 97–108)
CHLORIDE SERPL-SCNC: 105 MMOL/L (ref 97–108)
CHLORIDE SERPL-SCNC: 106 MMOL/L (ref 97–108)
CHLORIDE SERPL-SCNC: 106 MMOL/L (ref 97–108)
CHLORIDE SERPL-SCNC: 107 MMOL/L (ref 97–108)
CHLORIDE SERPL-SCNC: 108 MMOL/L (ref 97–108)
CHLORIDE SERPL-SCNC: 109 MMOL/L (ref 97–108)
CHLORIDE SERPL-SCNC: 110 MMOL/L (ref 97–108)
CHLORIDE SERPL-SCNC: 110 MMOL/L (ref 97–108)
CHLORIDE SERPL-SCNC: 111 MMOL/L (ref 97–108)
CHLORIDE SERPL-SCNC: 113 MMOL/L (ref 97–108)
CHLORIDE SERPL-SCNC: 114 MMOL/L (ref 97–108)
CHLORIDE SERPL-SCNC: 115 MMOL/L (ref 97–108)
CHLORIDE SERPL-SCNC: 115 MMOL/L (ref 97–108)
CHLORIDE SERPL-SCNC: 96 MMOL/L (ref 97–108)
CHLORIDE SERPL-SCNC: 97 MMOL/L (ref 97–108)
CHLORIDE SERPL-SCNC: 98 MMOL/L (ref 97–108)
CHLORIDE SERPL-SCNC: 99 MMOL/L (ref 97–108)
CHOLEST SERPL-MCNC: 151 MG/DL
CK MB CFR SERPL CALC: 2 % (ref 0–2.5)
CK MB CFR SERPL CALC: NORMAL % (ref 0–2.5)
CK MB SERPL-MCNC: 1.2 NG/ML (ref 5–25)
CK MB SERPL-MCNC: <1 NG/ML (ref 5–25)
CK SERPL-CCNC: 35 U/L (ref 26–192)
CK SERPL-CCNC: 49 U/L (ref 26–192)
CK SERPL-CCNC: 60 U/L (ref 26–192)
CK SERPL-CCNC: 62 U/L (ref 26–192)
CMV IGG SERPL IA-ACNC: >10 U/ML (ref 0–0.59)
CO2 SERPL-SCNC: 25 MMOL/L (ref 21–32)
CO2 SERPL-SCNC: 25 MMOL/L (ref 21–32)
CO2 SERPL-SCNC: 26 MMOL/L (ref 21–32)
CO2 SERPL-SCNC: 26 MMOL/L (ref 21–32)
CO2 SERPL-SCNC: 27 MMOL/L (ref 21–32)
CO2 SERPL-SCNC: 27 MMOL/L (ref 21–32)
CO2 SERPL-SCNC: 28 MMOL/L (ref 21–32)
CO2 SERPL-SCNC: 29 MMOL/L (ref 21–32)
CO2 SERPL-SCNC: 30 MMOL/L (ref 21–32)
CO2 SERPL-SCNC: 31 MMOL/L (ref 21–32)
CO2 SERPL-SCNC: 32 MMOL/L (ref 21–32)
CO2 SERPL-SCNC: 33 MMOL/L (ref 21–32)
CO2 SERPL-SCNC: 34 MMOL/L (ref 21–32)
CO2 SERPL-SCNC: 35 MMOL/L (ref 21–32)
COCAINE UR QL SCN: NEGATIVE
COHGB MFR BLD: 0.8 % (ref 1–2)
COHGB MFR BLD: 0.8 % (ref 1–2)
COHGB MFR BLD: 0.9 % (ref 1–2)
COHGB MFR BLD: 1 % (ref 1–2)
COHGB MFR BLD: 1.1 % (ref 1–2)
COHGB MFR BLD: 1.2 % (ref 1–2)
COHGB MFR BLD: 1.3 % (ref 1–2)
COHGB MFR BLD: 1.4 % (ref 1–2)
COHGB MFR BLD: 1.5 % (ref 1–2)
COHGB MFR BLD: 1.6 % (ref 1–2)
COHGB MFR BLD: 1.8 % (ref 1–2)
COHGB MFR BLD: 1.8 % (ref 1–2)
COHGB MFR BLD: 1.9 % (ref 1–2)
COHGB MFR BLD: 1.9 % (ref 1–2)
COHGB MFR BLD: 2.3 % (ref 1–2)
COLOR UR: ABNORMAL
COLOR UR: ABNORMAL
COMMENT, HOLDF: NORMAL
CORTIS SERPL-MCNC: 18.7 UG/DL
CORTIS SERPL-MCNC: 29.3 UG/DL
COTININE UR QL SCN: NEGATIVE NG/ML
COVID-19 RAPID TEST, COVR: ABNORMAL
COVID-19 RAPID TEST, COVR: NOT DETECTED
COXSACKIE A7 IGM, 163291: NEGATIVE TITER
CREAT SERPL-MCNC: 2.18 MG/DL (ref 0.55–1.02)
CREAT SERPL-MCNC: 2.22 MG/DL (ref 0.55–1.02)
CREAT SERPL-MCNC: 2.24 MG/DL (ref 0.55–1.02)
CREAT SERPL-MCNC: 2.25 MG/DL (ref 0.55–1.02)
CREAT SERPL-MCNC: 2.25 MG/DL (ref 0.55–1.02)
CREAT SERPL-MCNC: 2.27 MG/DL (ref 0.55–1.02)
CREAT SERPL-MCNC: 2.28 MG/DL (ref 0.55–1.02)
CREAT SERPL-MCNC: 2.29 MG/DL (ref 0.55–1.02)
CREAT SERPL-MCNC: 2.32 MG/DL (ref 0.55–1.02)
CREAT SERPL-MCNC: 2.33 MG/DL (ref 0.55–1.02)
CREAT SERPL-MCNC: 2.33 MG/DL (ref 0.55–1.02)
CREAT SERPL-MCNC: 2.34 MG/DL (ref 0.55–1.02)
CREAT SERPL-MCNC: 2.36 MG/DL (ref 0.55–1.02)
CREAT SERPL-MCNC: 2.37 MG/DL (ref 0.55–1.02)
CREAT SERPL-MCNC: 2.38 MG/DL (ref 0.55–1.02)
CREAT SERPL-MCNC: 2.39 MG/DL (ref 0.55–1.02)
CREAT SERPL-MCNC: 2.42 MG/DL (ref 0.55–1.02)
CREAT SERPL-MCNC: 2.42 MG/DL (ref 0.55–1.02)
CREAT SERPL-MCNC: 2.43 MG/DL (ref 0.55–1.02)
CREAT SERPL-MCNC: 2.44 MG/DL (ref 0.55–1.02)
CREAT SERPL-MCNC: 2.44 MG/DL (ref 0.55–1.02)
CREAT SERPL-MCNC: 2.54 MG/DL (ref 0.55–1.02)
CREAT SERPL-MCNC: 2.56 MG/DL (ref 0.55–1.02)
CREAT SERPL-MCNC: 2.62 MG/DL (ref 0.55–1.02)
CREAT SERPL-MCNC: 2.62 MG/DL (ref 0.55–1.02)
CREAT SERPL-MCNC: 2.68 MG/DL (ref 0.55–1.02)
CREAT SERPL-MCNC: 2.69 MG/DL (ref 0.55–1.02)
CREAT SERPL-MCNC: 2.7 MG/DL (ref 0.55–1.02)
CREAT SERPL-MCNC: 2.74 MG/DL (ref 0.55–1.02)
CREAT SERPL-MCNC: 2.74 MG/DL (ref 0.55–1.02)
CREAT SERPL-MCNC: 2.76 MG/DL (ref 0.55–1.02)
CREAT SERPL-MCNC: 2.84 MG/DL (ref 0.55–1.02)
CREAT SERPL-MCNC: 2.89 MG/DL (ref 0.55–1.02)
CREAT SERPL-MCNC: 2.95 MG/DL (ref 0.55–1.02)
CREAT SERPL-MCNC: 3.04 MG/DL (ref 0.55–1.02)
CREAT SERPL-MCNC: 3.36 MG/DL (ref 0.55–1.02)
CREAT SERPL-MCNC: 3.49 MG/DL (ref 0.55–1.02)
CREAT SERPL-MCNC: 3.5 MG/DL (ref 0.55–1.02)
CREAT SERPL-MCNC: 3.95 MG/DL (ref 0.55–1.02)
CREAT UR-MCNC: 65.8 MG/DL
CROSSMATCH RESULT,%XM: NORMAL
CRP SERPL HS-MCNC: >9.5 MG/L
CV A16 IGG TITR SER IF: NEGATIVE TITER
CV A16 IGM TITR SER IF: NEGATIVE TITER
CV A24 IGG TITR SER IF: NEGATIVE TITER
CV A24 IGM TITR SER IF: NEGATIVE TITER
CV A7 IGG TITR SER IF: NEGATIVE TITER
CV A9 IGG TITR SER IF: NEGATIVE TITER
CV A9 IGM TITR SER IF: NEGATIVE TITER
CV B1 AB TITR SER CF: NORMAL {TITER}
CV B2 AB TITR SER CF: NORMAL {TITER}
CV B3 AB TITR SER CF: NORMAL {TITER}
CV B4 AB TITR SER CF: NORMAL {TITER}
CV B5 AB TITR SER CF: NORMAL {TITER}
CV B6 AB TITR SER CF: NORMAL {TITER}
D DIMER PPP FEU-MCNC: >35.2 MG/L FEU (ref 0–0.65)
D50 ADMINISTERED, D50ADM: 0 ML
D50 ORDER, D50ORD: 0 ML
DIAGNOSIS, 93000: NORMAL
DIFFERENTIAL METHOD BLD: ABNORMAL
DIGOXIN SERPL-MCNC: 0.3 NG/ML (ref 0.9–2)
DIGOXIN SERPL-MCNC: 0.4 NG/ML (ref 0.9–2)
DIGOXIN SERPL-MCNC: 0.5 NG/ML (ref 0.9–2)
DIGOXIN SERPL-MCNC: 0.6 NG/ML (ref 0.9–2)
DIGOXIN SERPL-MCNC: 0.7 NG/ML (ref 0.9–2)
DIGOXIN SERPL-MCNC: 0.8 NG/ML (ref 0.9–2)
DIGOXIN SERPL-MCNC: 0.9 NG/ML (ref 0.9–2)
DIGOXIN SERPL-MCNC: 1 NG/ML (ref 0.9–2)
DIGOXIN SERPL-MCNC: 1.2 NG/ML (ref 0.9–2)
DIGOXIN SERPL-MCNC: 1.2 NG/ML (ref 0.9–2)
DRUG SCREEN COMMENT:, 753798: NORMAL
DRUG SCRN COMMENT,DRGCM: ABNORMAL
EBV NA IGG SER-ACNC: 414 U/ML (ref 0–17.9)
ECHO AR MAX VEL PISA: 4.4 M/S
ECHO AV REGURGITANT PHT: 429.9 MILLISECOND
ECHO EST RA PRESSURE: 3 MMHG
ECHO LV FRACTIONAL SHORTENING: 13 % (ref 28–44)
ECHO LV FRACTIONAL SHORTENING: 15 % (ref 28–44)
ECHO LV FRACTIONAL SHORTENING: 16 % (ref 28–44)
ECHO LV INTERNAL DIMENSION DIASTOLE INDEX: 2.29 CM/M2
ECHO LV INTERNAL DIMENSION DIASTOLE INDEX: 2.43 CM/M2
ECHO LV INTERNAL DIMENSION DIASTOLE INDEX: 2.66 CM/M2
ECHO LV INTERNAL DIMENSION DIASTOLIC MMODE: 5.6 CM (ref 3.9–5.3)
ECHO LV INTERNAL DIMENSION DIASTOLIC: 5 CM (ref 3.9–5.3)
ECHO LV INTERNAL DIMENSION DIASTOLIC: 5.2 CM (ref 3.9–5.3)
ECHO LV INTERNAL DIMENSION DIASTOLIC: 5.9 CM (ref 3.9–5.3)
ECHO LV INTERNAL DIMENSION SYSTOLIC INDEX: 1.93 CM/M2
ECHO LV INTERNAL DIMENSION SYSTOLIC INDEX: 2.1 CM/M2
ECHO LV INTERNAL DIMENSION SYSTOLIC INDEX: 2.25 CM/M2
ECHO LV INTERNAL DIMENSION SYSTOLIC MMODE: 4.6 CM
ECHO LV INTERNAL DIMENSION SYSTOLIC: 4.2 CM
ECHO LV INTERNAL DIMENSION SYSTOLIC: 4.5 CM
ECHO LV INTERNAL DIMENSION SYSTOLIC: 5 CM
ECHO LV IVSD MMODE: 1 CM (ref 0.6–0.9)
ECHO LV IVSD: 0.8 CM (ref 0.6–0.9)
ECHO LV IVSD: 1.1 CM (ref 0.6–0.9)
ECHO LV IVSD: 1.2 CM (ref 0.6–0.9)
ECHO LV IVSS MMODE: 1.4 CM
ECHO LV MASS 2D: 146.8 G (ref 67–162)
ECHO LV MASS 2D: 220.8 G (ref 67–162)
ECHO LV MASS 2D: 239.9 G (ref 67–162)
ECHO LV MASS INDEX 2D: 103.2 G/M2 (ref 43–95)
ECHO LV MASS INDEX 2D: 108.1 G/M2 (ref 43–95)
ECHO LV MASS INDEX 2D: 67.4 G/M2 (ref 43–95)
ECHO LV POSTERIOR WALL DIASTOLIC MMODE: 0.9 CM (ref 0.6–0.9)
ECHO LV POSTERIOR WALL DIASTOLIC: 0.9 CM (ref 0.6–0.9)
ECHO LV POSTERIOR WALL DIASTOLIC: 0.9 CM (ref 0.6–0.9)
ECHO LV POSTERIOR WALL DIASTOLIC: 1 CM (ref 0.6–0.9)
ECHO LV RELATIVE WALL THICKNESS RATIO: 0.31
ECHO LV RELATIVE WALL THICKNESS RATIO: 0.36
ECHO LV RELATIVE WALL THICKNESS RATIO: 0.38
ECHO RIGHT VENTRICULAR SYSTOLIC PRESSURE (RVSP): 88 MMHG
ECHO RV FREE WALL PEAK S': 11 CM/S
ECHO RV INTERNAL DIMENSION: 4.9 CM
ECHO RV TAPSE: 1.3 CM (ref 1.5–2)
ECHO RV TAPSE: 1.5 CM (ref 1.5–2)
ECHO RV TAPSE: 1.9 CM (ref 1.5–2)
ECHO TV REGURGITANT MAX VELOCITY: 2.17 M/S
ECHO TV REGURGITANT MAX VELOCITY: 4.61 M/S
ECHO TV REGURGITANT PEAK GRADIENT: 19 MMHG
ECHO TV REGURGITANT PEAK GRADIENT: 85 MMHG
EOSINOPHIL # BLD: 0 K/UL (ref 0–0.4)
EOSINOPHIL # BLD: 0.1 K/UL (ref 0–0.4)
EOSINOPHIL # BLD: 0.2 K/UL (ref 0–0.4)
EOSINOPHIL # BLD: 0.2 K/UL (ref 0–0.4)
EOSINOPHIL # BLD: 0.4 K/UL (ref 0–0.4)
EOSINOPHIL NFR BLD: 0 % (ref 0–7)
EOSINOPHIL NFR BLD: 1 % (ref 0–7)
EOSINOPHIL NFR BLD: 2 % (ref 0–7)
EOSINOPHIL NFR BLD: 2 % (ref 0–7)
EOSINOPHIL NFR BLD: 5 % (ref 0–7)
EPITH CASTS URNS QL MICRO: ABNORMAL /LPF
EPITH CASTS URNS QL MICRO: ABNORMAL /LPF
EPO SERPL-ACNC: 145 MIU/ML (ref 2.6–18.5)
EPO SERPL-ACNC: 145.2 MIU/ML (ref 2.6–18.5)
EPO SERPL-ACNC: 31.4 MIU/ML (ref 2.6–18.5)
ERYTHROCYTE [DISTWIDTH] IN BLOOD BY AUTOMATED COUNT: 18.6 % (ref 11.5–14.5)
ERYTHROCYTE [DISTWIDTH] IN BLOOD BY AUTOMATED COUNT: 18.7 % (ref 11.5–14.5)
ERYTHROCYTE [DISTWIDTH] IN BLOOD BY AUTOMATED COUNT: 18.8 % (ref 11.5–14.5)
ERYTHROCYTE [DISTWIDTH] IN BLOOD BY AUTOMATED COUNT: 18.8 % (ref 11.5–14.5)
ERYTHROCYTE [DISTWIDTH] IN BLOOD BY AUTOMATED COUNT: 18.9 % (ref 11.5–14.5)
ERYTHROCYTE [DISTWIDTH] IN BLOOD BY AUTOMATED COUNT: 18.9 % (ref 11.5–14.5)
ERYTHROCYTE [DISTWIDTH] IN BLOOD BY AUTOMATED COUNT: 19 % (ref 11.5–14.5)
ERYTHROCYTE [DISTWIDTH] IN BLOOD BY AUTOMATED COUNT: 19 % (ref 11.5–14.5)
ERYTHROCYTE [DISTWIDTH] IN BLOOD BY AUTOMATED COUNT: 19.1 % (ref 11.5–14.5)
ERYTHROCYTE [DISTWIDTH] IN BLOOD BY AUTOMATED COUNT: 19.2 % (ref 11.5–14.5)
ERYTHROCYTE [DISTWIDTH] IN BLOOD BY AUTOMATED COUNT: 19.8 % (ref 11.5–14.5)
ERYTHROCYTE [DISTWIDTH] IN BLOOD BY AUTOMATED COUNT: 19.9 % (ref 11.5–14.5)
ERYTHROCYTE [DISTWIDTH] IN BLOOD BY AUTOMATED COUNT: 19.9 % (ref 11.5–14.5)
ERYTHROCYTE [DISTWIDTH] IN BLOOD BY AUTOMATED COUNT: 20 % (ref 11.5–14.5)
ERYTHROCYTE [DISTWIDTH] IN BLOOD BY AUTOMATED COUNT: 20.3 % (ref 11.5–14.5)
ERYTHROCYTE [DISTWIDTH] IN BLOOD BY AUTOMATED COUNT: 20.4 % (ref 11.5–14.5)
ERYTHROCYTE [DISTWIDTH] IN BLOOD BY AUTOMATED COUNT: 20.4 % (ref 11.5–14.5)
ERYTHROCYTE [DISTWIDTH] IN BLOOD BY AUTOMATED COUNT: 20.6 % (ref 11.5–14.5)
ERYTHROCYTE [DISTWIDTH] IN BLOOD BY AUTOMATED COUNT: 20.7 % (ref 11.5–14.5)
ERYTHROCYTE [DISTWIDTH] IN BLOOD BY AUTOMATED COUNT: 20.8 % (ref 11.5–14.5)
ERYTHROCYTE [DISTWIDTH] IN BLOOD BY AUTOMATED COUNT: 20.9 % (ref 11.5–14.5)
ERYTHROCYTE [DISTWIDTH] IN BLOOD BY AUTOMATED COUNT: 21 % (ref 11.5–14.5)
ERYTHROCYTE [DISTWIDTH] IN BLOOD BY AUTOMATED COUNT: 21.1 % (ref 11.5–14.5)
ERYTHROCYTE [SEDIMENTATION RATE] IN BLOOD: 5 MM/HR (ref 0–30)
ERYTHROCYTE [SEDIMENTATION RATE] IN BLOOD: 68 MM/HR (ref 0–30)
ERYTHROCYTE [SEDIMENTATION RATE] IN BLOOD: 81 MM/HR (ref 0–30)
ERYTHROCYTE [SEDIMENTATION RATE] IN BLOOD: 90 MM/HR (ref 0–30)
ERYTHROCYTE [SEDIMENTATION RATE] IN BLOOD: 97 MM/HR (ref 0–30)
EST. AVERAGE GLUCOSE BLD GHB EST-MCNC: 174 MG/DL
F2 GENE MUT ANL BLD/T: NORMAL
FERRITIN SERPL-MCNC: 126 NG/ML (ref 26–388)
FERRITIN SERPL-MCNC: 1815 NG/ML (ref 26–388)
FERRITIN SERPL-MCNC: 764 NG/ML (ref 8–252)
FIO2 ON VENT: 40 %
FLUAV H1 2009 PAND RNA SPEC QL NAA+PROBE: NOT DETECTED
FLUAV H1 RNA SPEC QL NAA+PROBE: NOT DETECTED
FLUAV H3 RNA SPEC QL NAA+PROBE: NOT DETECTED
FLUAV SUBTYP SPEC NAA+PROBE: NOT DETECTED
FLUBV RNA SPEC QL NAA+PROBE: NOT DETECTED
G6PD BLD QN: 379 U/10E12 RBC (ref 127–427)
GAMMA GLOB SERPL ELPH-MCNC: 1.2 G/DL (ref 0.4–1.8)
GAS FLOW.O2 O2 DELIVERY SYS: ABNORMAL L/MIN
GAS FLOW.O2 SETTING OXYMISER: 12 BPM
GAS FLOW.O2 SETTING OXYMISER: 12 L/MIN
GAS FLOW.O2 SETTING OXYMISER: 14 BPM
GAS FLOW.O2 SETTING OXYMISER: 16 BPM
GAS FLOW.O2 SETTING OXYMISER: 8 BPM
GAS FLOW.O2 SETTING OXYMISER: 8 BPM
GLOBULIN SER CALC-MCNC: 3.1 G/DL (ref 2–4)
GLOBULIN SER CALC-MCNC: 3.2 G/DL (ref 2–4)
GLOBULIN SER CALC-MCNC: 3.5 G/DL (ref 2–4)
GLOBULIN SER CALC-MCNC: 3.6 G/DL (ref 2–4)
GLOBULIN SER CALC-MCNC: 3.6 G/DL (ref 2–4)
GLOBULIN SER CALC-MCNC: 3.7 G/DL (ref 2–4)
GLOBULIN SER CALC-MCNC: 3.8 G/DL (ref 2–4)
GLOBULIN SER CALC-MCNC: 3.8 G/DL (ref 2–4)
GLOBULIN SER CALC-MCNC: 3.9 G/DL (ref 2–4)
GLOBULIN SER CALC-MCNC: 4.1 G/DL (ref 2–4)
GLOBULIN SER CALC-MCNC: 4.2 G/DL (ref 2–4)
GLOBULIN SER CALC-MCNC: 4.3 G/DL (ref 2–4)
GLOBULIN SER CALC-MCNC: 4.4 G/DL (ref 2–4)
GLOBULIN SER CALC-MCNC: 4.4 G/DL (ref 2–4)
GLOBULIN SER CALC-MCNC: 4.5 G/DL (ref 2–4)
GLOBULIN SER CALC-MCNC: 4.5 G/DL (ref 2–4)
GLOBULIN SER CALC-MCNC: 4.6 G/DL (ref 2–4)
GLOBULIN SER CALC-MCNC: 4.6 G/DL (ref 2–4)
GLOBULIN SER-MCNC: 3.3 G/DL (ref 2.2–3.9)
GLSCOM COMMENTS: NORMAL
GLUCOSE BLD STRIP.AUTO-MCNC: 103 MG/DL (ref 65–117)
GLUCOSE BLD STRIP.AUTO-MCNC: 107 MG/DL (ref 65–117)
GLUCOSE BLD STRIP.AUTO-MCNC: 110 MG/DL (ref 65–117)
GLUCOSE BLD STRIP.AUTO-MCNC: 112 MG/DL (ref 65–117)
GLUCOSE BLD STRIP.AUTO-MCNC: 113 MG/DL (ref 65–117)
GLUCOSE BLD STRIP.AUTO-MCNC: 116 MG/DL (ref 65–117)
GLUCOSE BLD STRIP.AUTO-MCNC: 117 MG/DL (ref 65–117)
GLUCOSE BLD STRIP.AUTO-MCNC: 119 MG/DL (ref 65–117)
GLUCOSE BLD STRIP.AUTO-MCNC: 121 MG/DL (ref 65–117)
GLUCOSE BLD STRIP.AUTO-MCNC: 123 MG/DL (ref 65–117)
GLUCOSE BLD STRIP.AUTO-MCNC: 125 MG/DL (ref 65–117)
GLUCOSE BLD STRIP.AUTO-MCNC: 131 MG/DL (ref 65–117)
GLUCOSE BLD STRIP.AUTO-MCNC: 136 MG/DL (ref 65–117)
GLUCOSE BLD STRIP.AUTO-MCNC: 141 MG/DL (ref 65–117)
GLUCOSE BLD STRIP.AUTO-MCNC: 142 MG/DL (ref 65–117)
GLUCOSE BLD STRIP.AUTO-MCNC: 142 MG/DL (ref 65–117)
GLUCOSE BLD STRIP.AUTO-MCNC: 143 MG/DL (ref 65–117)
GLUCOSE BLD STRIP.AUTO-MCNC: 145 MG/DL (ref 65–117)
GLUCOSE BLD STRIP.AUTO-MCNC: 145 MG/DL (ref 65–117)
GLUCOSE BLD STRIP.AUTO-MCNC: 146 MG/DL (ref 65–117)
GLUCOSE BLD STRIP.AUTO-MCNC: 147 MG/DL (ref 65–117)
GLUCOSE BLD STRIP.AUTO-MCNC: 151 MG/DL (ref 65–117)
GLUCOSE BLD STRIP.AUTO-MCNC: 153 MG/DL (ref 65–117)
GLUCOSE BLD STRIP.AUTO-MCNC: 153 MG/DL (ref 65–117)
GLUCOSE BLD STRIP.AUTO-MCNC: 155 MG/DL (ref 65–117)
GLUCOSE BLD STRIP.AUTO-MCNC: 156 MG/DL (ref 65–117)
GLUCOSE BLD STRIP.AUTO-MCNC: 158 MG/DL (ref 65–117)
GLUCOSE BLD STRIP.AUTO-MCNC: 161 MG/DL (ref 65–117)
GLUCOSE BLD STRIP.AUTO-MCNC: 161 MG/DL (ref 65–117)
GLUCOSE BLD STRIP.AUTO-MCNC: 163 MG/DL (ref 65–117)
GLUCOSE BLD STRIP.AUTO-MCNC: 164 MG/DL (ref 65–117)
GLUCOSE BLD STRIP.AUTO-MCNC: 166 MG/DL (ref 65–117)
GLUCOSE BLD STRIP.AUTO-MCNC: 167 MG/DL (ref 65–117)
GLUCOSE BLD STRIP.AUTO-MCNC: 180 MG/DL (ref 65–117)
GLUCOSE BLD STRIP.AUTO-MCNC: 181 MG/DL (ref 65–117)
GLUCOSE BLD STRIP.AUTO-MCNC: 183 MG/DL (ref 65–117)
GLUCOSE BLD STRIP.AUTO-MCNC: 184 MG/DL (ref 65–117)
GLUCOSE BLD STRIP.AUTO-MCNC: 187 MG/DL (ref 65–117)
GLUCOSE BLD STRIP.AUTO-MCNC: 188 MG/DL (ref 65–117)
GLUCOSE BLD STRIP.AUTO-MCNC: 190 MG/DL (ref 65–117)
GLUCOSE BLD STRIP.AUTO-MCNC: 192 MG/DL (ref 65–117)
GLUCOSE BLD STRIP.AUTO-MCNC: 193 MG/DL (ref 65–117)
GLUCOSE BLD STRIP.AUTO-MCNC: 194 MG/DL (ref 65–117)
GLUCOSE BLD STRIP.AUTO-MCNC: 195 MG/DL (ref 65–117)
GLUCOSE BLD STRIP.AUTO-MCNC: 195 MG/DL (ref 65–117)
GLUCOSE BLD STRIP.AUTO-MCNC: 197 MG/DL (ref 65–117)
GLUCOSE BLD STRIP.AUTO-MCNC: 197 MG/DL (ref 65–117)
GLUCOSE BLD STRIP.AUTO-MCNC: 202 MG/DL (ref 65–117)
GLUCOSE BLD STRIP.AUTO-MCNC: 202 MG/DL (ref 65–117)
GLUCOSE BLD STRIP.AUTO-MCNC: 204 MG/DL (ref 65–117)
GLUCOSE BLD STRIP.AUTO-MCNC: 207 MG/DL (ref 65–117)
GLUCOSE BLD STRIP.AUTO-MCNC: 207 MG/DL (ref 65–117)
GLUCOSE BLD STRIP.AUTO-MCNC: 208 MG/DL (ref 65–117)
GLUCOSE BLD STRIP.AUTO-MCNC: 209 MG/DL (ref 65–117)
GLUCOSE BLD STRIP.AUTO-MCNC: 210 MG/DL (ref 65–117)
GLUCOSE BLD STRIP.AUTO-MCNC: 211 MG/DL (ref 65–117)
GLUCOSE BLD STRIP.AUTO-MCNC: 212 MG/DL (ref 65–117)
GLUCOSE BLD STRIP.AUTO-MCNC: 214 MG/DL (ref 65–117)
GLUCOSE BLD STRIP.AUTO-MCNC: 216 MG/DL (ref 65–117)
GLUCOSE BLD STRIP.AUTO-MCNC: 216 MG/DL (ref 65–117)
GLUCOSE BLD STRIP.AUTO-MCNC: 218 MG/DL (ref 65–117)
GLUCOSE BLD STRIP.AUTO-MCNC: 221 MG/DL (ref 65–117)
GLUCOSE BLD STRIP.AUTO-MCNC: 223 MG/DL (ref 65–117)
GLUCOSE BLD STRIP.AUTO-MCNC: 224 MG/DL (ref 65–117)
GLUCOSE BLD STRIP.AUTO-MCNC: 225 MG/DL (ref 65–117)
GLUCOSE BLD STRIP.AUTO-MCNC: 225 MG/DL (ref 65–117)
GLUCOSE BLD STRIP.AUTO-MCNC: 227 MG/DL (ref 65–117)
GLUCOSE BLD STRIP.AUTO-MCNC: 231 MG/DL (ref 65–117)
GLUCOSE BLD STRIP.AUTO-MCNC: 234 MG/DL (ref 65–117)
GLUCOSE BLD STRIP.AUTO-MCNC: 238 MG/DL (ref 65–117)
GLUCOSE BLD STRIP.AUTO-MCNC: 239 MG/DL (ref 65–117)
GLUCOSE BLD STRIP.AUTO-MCNC: 241 MG/DL (ref 65–117)
GLUCOSE BLD STRIP.AUTO-MCNC: 243 MG/DL (ref 65–117)
GLUCOSE BLD STRIP.AUTO-MCNC: 243 MG/DL (ref 65–117)
GLUCOSE BLD STRIP.AUTO-MCNC: 245 MG/DL (ref 65–117)
GLUCOSE BLD STRIP.AUTO-MCNC: 247 MG/DL (ref 65–117)
GLUCOSE BLD STRIP.AUTO-MCNC: 249 MG/DL (ref 65–117)
GLUCOSE BLD STRIP.AUTO-MCNC: 252 MG/DL (ref 65–117)
GLUCOSE BLD STRIP.AUTO-MCNC: 253 MG/DL (ref 65–117)
GLUCOSE BLD STRIP.AUTO-MCNC: 254 MG/DL (ref 65–117)
GLUCOSE BLD STRIP.AUTO-MCNC: 255 MG/DL (ref 65–117)
GLUCOSE BLD STRIP.AUTO-MCNC: 256 MG/DL (ref 65–117)
GLUCOSE BLD STRIP.AUTO-MCNC: 260 MG/DL (ref 65–117)
GLUCOSE BLD STRIP.AUTO-MCNC: 260 MG/DL (ref 65–117)
GLUCOSE BLD STRIP.AUTO-MCNC: 261 MG/DL (ref 65–117)
GLUCOSE BLD STRIP.AUTO-MCNC: 263 MG/DL (ref 65–117)
GLUCOSE BLD STRIP.AUTO-MCNC: 263 MG/DL (ref 65–117)
GLUCOSE BLD STRIP.AUTO-MCNC: 264 MG/DL (ref 65–117)
GLUCOSE BLD STRIP.AUTO-MCNC: 264 MG/DL (ref 65–117)
GLUCOSE BLD STRIP.AUTO-MCNC: 265 MG/DL (ref 65–117)
GLUCOSE BLD STRIP.AUTO-MCNC: 268 MG/DL (ref 65–117)
GLUCOSE BLD STRIP.AUTO-MCNC: 269 MG/DL (ref 65–117)
GLUCOSE BLD STRIP.AUTO-MCNC: 273 MG/DL (ref 65–117)
GLUCOSE BLD STRIP.AUTO-MCNC: 273 MG/DL (ref 65–117)
GLUCOSE BLD STRIP.AUTO-MCNC: 275 MG/DL (ref 65–117)
GLUCOSE BLD STRIP.AUTO-MCNC: 275 MG/DL (ref 65–117)
GLUCOSE BLD STRIP.AUTO-MCNC: 278 MG/DL (ref 65–117)
GLUCOSE BLD STRIP.AUTO-MCNC: 281 MG/DL (ref 65–117)
GLUCOSE BLD STRIP.AUTO-MCNC: 284 MG/DL (ref 65–117)
GLUCOSE BLD STRIP.AUTO-MCNC: 285 MG/DL (ref 65–117)
GLUCOSE BLD STRIP.AUTO-MCNC: 287 MG/DL (ref 65–117)
GLUCOSE BLD STRIP.AUTO-MCNC: 291 MG/DL (ref 65–117)
GLUCOSE BLD STRIP.AUTO-MCNC: 292 MG/DL (ref 65–117)
GLUCOSE BLD STRIP.AUTO-MCNC: 296 MG/DL (ref 65–117)
GLUCOSE BLD STRIP.AUTO-MCNC: 297 MG/DL (ref 65–117)
GLUCOSE BLD STRIP.AUTO-MCNC: 300 MG/DL (ref 65–117)
GLUCOSE BLD STRIP.AUTO-MCNC: 303 MG/DL (ref 65–117)
GLUCOSE BLD STRIP.AUTO-MCNC: 303 MG/DL (ref 65–117)
GLUCOSE BLD STRIP.AUTO-MCNC: 313 MG/DL (ref 65–117)
GLUCOSE BLD STRIP.AUTO-MCNC: 316 MG/DL (ref 65–117)
GLUCOSE BLD STRIP.AUTO-MCNC: 348 MG/DL (ref 65–117)
GLUCOSE BLD STRIP.AUTO-MCNC: 353 MG/DL (ref 65–117)
GLUCOSE BLD STRIP.AUTO-MCNC: 372 MG/DL (ref 65–117)
GLUCOSE BLD STRIP.AUTO-MCNC: 379 MG/DL (ref 65–117)
GLUCOSE BLD STRIP.AUTO-MCNC: 45 MG/DL (ref 65–117)
GLUCOSE BLD STRIP.AUTO-MCNC: 60 MG/DL (ref 65–117)
GLUCOSE BLD STRIP.AUTO-MCNC: 61 MG/DL (ref 65–117)
GLUCOSE BLD STRIP.AUTO-MCNC: 75 MG/DL (ref 65–117)
GLUCOSE BLD STRIP.AUTO-MCNC: 96 MG/DL (ref 65–117)
GLUCOSE SERPL-MCNC: 103 MG/DL (ref 65–100)
GLUCOSE SERPL-MCNC: 115 MG/DL (ref 65–100)
GLUCOSE SERPL-MCNC: 116 MG/DL (ref 65–100)
GLUCOSE SERPL-MCNC: 118 MG/DL (ref 65–100)
GLUCOSE SERPL-MCNC: 137 MG/DL (ref 65–100)
GLUCOSE SERPL-MCNC: 141 MG/DL (ref 65–100)
GLUCOSE SERPL-MCNC: 144 MG/DL (ref 65–100)
GLUCOSE SERPL-MCNC: 148 MG/DL (ref 65–100)
GLUCOSE SERPL-MCNC: 158 MG/DL (ref 65–100)
GLUCOSE SERPL-MCNC: 159 MG/DL (ref 65–100)
GLUCOSE SERPL-MCNC: 164 MG/DL (ref 65–100)
GLUCOSE SERPL-MCNC: 165 MG/DL (ref 65–100)
GLUCOSE SERPL-MCNC: 168 MG/DL (ref 65–100)
GLUCOSE SERPL-MCNC: 173 MG/DL (ref 65–100)
GLUCOSE SERPL-MCNC: 176 MG/DL (ref 65–100)
GLUCOSE SERPL-MCNC: 179 MG/DL (ref 65–100)
GLUCOSE SERPL-MCNC: 185 MG/DL (ref 65–100)
GLUCOSE SERPL-MCNC: 192 MG/DL (ref 65–100)
GLUCOSE SERPL-MCNC: 193 MG/DL (ref 65–100)
GLUCOSE SERPL-MCNC: 196 MG/DL (ref 65–100)
GLUCOSE SERPL-MCNC: 199 MG/DL (ref 65–100)
GLUCOSE SERPL-MCNC: 204 MG/DL (ref 65–100)
GLUCOSE SERPL-MCNC: 204 MG/DL (ref 65–100)
GLUCOSE SERPL-MCNC: 206 MG/DL (ref 65–100)
GLUCOSE SERPL-MCNC: 207 MG/DL (ref 65–100)
GLUCOSE SERPL-MCNC: 215 MG/DL (ref 65–100)
GLUCOSE SERPL-MCNC: 217 MG/DL (ref 65–100)
GLUCOSE SERPL-MCNC: 218 MG/DL (ref 65–100)
GLUCOSE SERPL-MCNC: 230 MG/DL (ref 65–100)
GLUCOSE SERPL-MCNC: 233 MG/DL (ref 65–100)
GLUCOSE SERPL-MCNC: 233 MG/DL (ref 65–100)
GLUCOSE SERPL-MCNC: 238 MG/DL (ref 65–100)
GLUCOSE SERPL-MCNC: 242 MG/DL (ref 65–100)
GLUCOSE SERPL-MCNC: 244 MG/DL (ref 65–100)
GLUCOSE SERPL-MCNC: 251 MG/DL (ref 65–100)
GLUCOSE SERPL-MCNC: 256 MG/DL (ref 65–100)
GLUCOSE SERPL-MCNC: 262 MG/DL (ref 65–100)
GLUCOSE SERPL-MCNC: 262 MG/DL (ref 65–100)
GLUCOSE SERPL-MCNC: 264 MG/DL (ref 65–100)
GLUCOSE SERPL-MCNC: 265 MG/DL (ref 65–100)
GLUCOSE SERPL-MCNC: 270 MG/DL (ref 65–100)
GLUCOSE SERPL-MCNC: 283 MG/DL (ref 65–100)
GLUCOSE SERPL-MCNC: 283 MG/DL (ref 65–100)
GLUCOSE SERPL-MCNC: 287 MG/DL (ref 65–100)
GLUCOSE SERPL-MCNC: 293 MG/DL (ref 65–100)
GLUCOSE SERPL-MCNC: 295 MG/DL (ref 65–100)
GLUCOSE SERPL-MCNC: 311 MG/DL (ref 65–100)
GLUCOSE SERPL-MCNC: 84 MG/DL (ref 65–100)
GLUCOSE SERPL-MCNC: 93 MG/DL (ref 65–100)
GLUCOSE UR STRIP.AUTO-MCNC: NEGATIVE MG/DL
GLUCOSE UR STRIP.AUTO-MCNC: NEGATIVE MG/DL
GLUCOSE, GLC: 170 MG/DL
GLUCOSE, GLC: 205 MG/DL
GLUCOSE, GLC: 231 MG/DL
GRAM STN SPEC: ABNORMAL
H PYLORI IGA SER-ACNC: <9 UNITS (ref 0–8.9)
H PYLORI IGG SER IA-ACNC: 0.14 INDEX VALUE (ref 0–0.79)
H PYLORI IGM SER-ACNC: <9 UNITS (ref 0–8.9)
HADV DNA SPEC QL NAA+PROBE: NOT DETECTED
HAV AB SER QL IA: NEGATIVE
HAV IGM SER QL: NONREACTIVE
HAV IGM SERPL QL IA: NEGATIVE
HBA1C MFR BLD: 7.7 % (ref 4–5.6)
HBV CORE IGM SER QL: NONREACTIVE
HBV CORE IGM SER QL: NONREACTIVE
HBV SURFACE AB SER QL: NONREACTIVE
HBV SURFACE AB SER-ACNC: <3.1 MIU/ML
HBV SURFACE AG SER QL: <0.1 INDEX
HBV SURFACE AG SER QL: <0.1 INDEX
HBV SURFACE AG SER QL: NEGATIVE
HBV SURFACE AG SER QL: NEGATIVE
HCG UR QL: NEGATIVE
HCO3 BLD-SCNC: 27.1 MMOL/L (ref 22–26)
HCO3 BLD-SCNC: 27.3 MMOL/L (ref 22–26)
HCO3 BLD-SCNC: 31 MMOL/L (ref 22–26)
HCO3 BLD-SCNC: 31 MMOL/L (ref 22–26)
HCO3 BLD-SCNC: 31.5 MMOL/L (ref 22–26)
HCO3 BLD-SCNC: 32.9 MMOL/L (ref 22–26)
HCO3 BLD-SCNC: 33.3 MMOL/L (ref 22–26)
HCO3 BLD-SCNC: 34.7 MMOL/L (ref 22–26)
HCO3 BLDA-SCNC: 27 MMOL/L (ref 22–26)
HCO3 BLDA-SCNC: 27 MMOL/L (ref 22–26)
HCO3 BLDA-SCNC: 30 MMOL/L (ref 22–26)
HCO3 BLDV-SCNC: 29 MMOL/L (ref 23–28)
HCOV 229E RNA SPEC QL NAA+PROBE: NOT DETECTED
HCOV HKU1 RNA SPEC QL NAA+PROBE: NOT DETECTED
HCOV NL63 RNA SPEC QL NAA+PROBE: NOT DETECTED
HCOV OC43 RNA SPEC QL NAA+PROBE: NOT DETECTED
HCT VFR BLD AUTO: 24.1 % (ref 35–47)
HCT VFR BLD AUTO: 26.4 % (ref 35–47)
HCT VFR BLD AUTO: 26.6 % (ref 35–47)
HCT VFR BLD AUTO: 27.1 % (ref 35–47)
HCT VFR BLD AUTO: 27.2 % (ref 35–47)
HCT VFR BLD AUTO: 27.3 % (ref 35–47)
HCT VFR BLD AUTO: 27.3 % (ref 35–47)
HCT VFR BLD AUTO: 27.9 % (ref 35–47)
HCT VFR BLD AUTO: 28.1 % (ref 35–47)
HCT VFR BLD AUTO: 28.5 % (ref 35–47)
HCT VFR BLD AUTO: 28.5 % (ref 35–47)
HCT VFR BLD AUTO: 28.8 % (ref 35–47)
HCT VFR BLD AUTO: 29 % (ref 35–47)
HCT VFR BLD AUTO: 29.1 % (ref 35–47)
HCT VFR BLD AUTO: 29.2 % (ref 35–47)
HCT VFR BLD AUTO: 29.4 % (ref 35–47)
HCT VFR BLD AUTO: 29.9 % (ref 35–47)
HCT VFR BLD AUTO: 30 % (ref 35–47)
HCT VFR BLD AUTO: 30.1 % (ref 35–47)
HCT VFR BLD AUTO: 30.3 % (ref 35–47)
HCT VFR BLD AUTO: 30.7 % (ref 35–47)
HCT VFR BLD AUTO: 30.7 % (ref 35–47)
HCT VFR BLD AUTO: 30.9 % (ref 35–47)
HCT VFR BLD AUTO: 31 % (ref 35–47)
HCT VFR BLD AUTO: 31 % (ref 35–47)
HCT VFR BLD AUTO: 31.3 % (ref 35–47)
HCT VFR BLD AUTO: 31.4 % (ref 35–47)
HCT VFR BLD AUTO: 31.6 % (ref 35–47)
HCT VFR BLD AUTO: 31.8 % (ref 35–47)
HCT VFR BLD AUTO: 32.1 % (ref 35–47)
HCT VFR BLD AUTO: 32.6 % (ref 35–47)
HCT VFR BLD AUTO: 33 % (ref 35–47)
HCT VFR BLD AUTO: 35 % (ref 35–47)
HCT VFR BLD AUTO: 37.8 % (ref 35–47)
HCT VFR BLD AUTO: 38.3 % (ref 35–47)
HCV AB SERPL QL IA: NONREACTIVE
HDLC SERPL-MCNC: 60 MG/DL
HDLC SERPL: 2.5 {RATIO} (ref 0–5)
HGB BLD OXIMETRY-MCNC: 10.1 G/DL (ref 14–17)
HGB BLD OXIMETRY-MCNC: 10.9 G/DL (ref 14–17)
HGB BLD OXIMETRY-MCNC: 11.2 G/DL (ref 14–17)
HGB BLD OXIMETRY-MCNC: 15.1 G/DL (ref 14–17)
HGB BLD OXIMETRY-MCNC: 5.2 G/DL (ref 14–17)
HGB BLD OXIMETRY-MCNC: 7.3 G/DL (ref 14–17)
HGB BLD OXIMETRY-MCNC: 7.5 G/DL (ref 14–17)
HGB BLD OXIMETRY-MCNC: 7.6 G/DL (ref 14–17)
HGB BLD OXIMETRY-MCNC: 7.7 G/DL (ref 14–17)
HGB BLD OXIMETRY-MCNC: 8 G/DL (ref 14–17)
HGB BLD OXIMETRY-MCNC: 8.1 G/DL (ref 14–17)
HGB BLD OXIMETRY-MCNC: 8.3 G/DL (ref 14–17)
HGB BLD OXIMETRY-MCNC: 8.6 G/DL (ref 14–17)
HGB BLD OXIMETRY-MCNC: 8.7 G/DL (ref 14–17)
HGB BLD OXIMETRY-MCNC: 8.8 G/DL (ref 14–17)
HGB BLD OXIMETRY-MCNC: 9.1 G/DL (ref 14–17)
HGB BLD OXIMETRY-MCNC: 9.2 G/DL (ref 14–17)
HGB BLD OXIMETRY-MCNC: 9.5 G/DL (ref 14–17)
HGB BLD OXIMETRY-MCNC: 9.6 G/DL (ref 14–17)
HGB BLD-MCNC: 10 G/DL (ref 11.5–16)
HGB BLD-MCNC: 6.4 G/DL (ref 11.5–16)
HGB BLD-MCNC: 6.7 G/DL (ref 11.5–16)
HGB BLD-MCNC: 6.9 G/DL (ref 11.5–16)
HGB BLD-MCNC: 7 G/DL (ref 11.5–16)
HGB BLD-MCNC: 7.1 G/DL (ref 11.5–16)
HGB BLD-MCNC: 7.2 G/DL (ref 11.5–16)
HGB BLD-MCNC: 7.3 G/DL (ref 11.5–16)
HGB BLD-MCNC: 7.4 G/DL (ref 11.5–16)
HGB BLD-MCNC: 7.4 G/DL (ref 11.5–16)
HGB BLD-MCNC: 7.5 G/DL (ref 11.5–16)
HGB BLD-MCNC: 7.6 G/DL (ref 11.5–16)
HGB BLD-MCNC: 7.7 G/DL (ref 11.5–16)
HGB BLD-MCNC: 7.7 G/DL (ref 11.5–16)
HGB BLD-MCNC: 7.8 G/DL (ref 11.5–16)
HGB BLD-MCNC: 7.8 G/DL (ref 11.5–16)
HGB BLD-MCNC: 7.9 G/DL (ref 11.5–16)
HGB BLD-MCNC: 8 G/DL (ref 11.5–16)
HGB BLD-MCNC: 8.1 G/DL (ref 11.5–16)
HGB BLD-MCNC: 8.1 G/DL (ref 11.5–16)
HGB BLD-MCNC: 8.2 G/DL (ref 11.5–16)
HGB BLD-MCNC: 8.2 G/DL (ref 11.5–16)
HGB BLD-MCNC: 8.3 G/DL (ref 11.5–16)
HGB BLD-MCNC: 8.4 G/DL (ref 11.5–16)
HGB BLD-MCNC: 8.5 G/DL (ref 11.5–16)
HGB BLD-MCNC: 8.7 G/DL (ref 11.5–16)
HGB BLD-MCNC: 8.7 G/DL (ref 11.5–16)
HGB BLD-MCNC: 8.8 G/DL (ref 11.5–16)
HGB BLD-MCNC: 9.7 G/DL (ref 11.5–16)
HGB BLD-MCNC: 9.8 G/DL (ref 11.5–16)
HGB FREE PLAS-MCNC: 0.7 MG/DL (ref 0–4.9)
HGB FREE PLAS-MCNC: 3.2 MG/DL (ref 0–4.9)
HGB UR QL STRIP: ABNORMAL
HGB UR QL STRIP: ABNORMAL
HIGH TARGET, HITG: 140 MG/DL
HISTORY CHECKED?,CKHIST: NORMAL
HIV 1+2 AB+HIV1 P24 AG SERPL QL IA: NONREACTIVE
HIV12 RESULT COMMENT, HHIVC: NORMAL
HMPV RNA SPEC QL NAA+PROBE: NOT DETECTED
HPIV1 RNA SPEC QL NAA+PROBE: NOT DETECTED
HPIV2 RNA SPEC QL NAA+PROBE: NOT DETECTED
HPIV3 RNA SPEC QL NAA+PROBE: NOT DETECTED
HPIV4 RNA SPEC QL NAA+PROBE: NOT DETECTED
HSV1 DNA SPEC QL NAA+PROBE: NEGATIVE
HSV2 DNA SPEC QL NAA+PROBE: NEGATIVE
HYALINE CASTS URNS QL MICRO: ABNORMAL /LPF (ref 0–5)
HYALINE CASTS URNS QL MICRO: ABNORMAL /LPF (ref 0–5)
IGA SERPL-MCNC: 207 MG/DL (ref 87–352)
IGG SERPL-MCNC: 1188 MG/DL (ref 586–1602)
IGM SERPL-MCNC: 175 MG/DL (ref 26–217)
IMM GRANULOCYTES # BLD AUTO: 0.1 K/UL (ref 0–0.04)
IMM GRANULOCYTES # BLD AUTO: 0.2 K/UL (ref 0–0.04)
IMM GRANULOCYTES # BLD AUTO: 0.2 K/UL (ref 0–0.04)
IMM GRANULOCYTES NFR BLD AUTO: 1 % (ref 0–0.5)
IMM GRANULOCYTES NFR BLD AUTO: 2 % (ref 0–0.5)
IMM GRANULOCYTES NFR BLD AUTO: 2 % (ref 0–0.5)
INR PPP: 1.2 (ref 0.9–1.1)
INR PPP: 1.4 (ref 0.9–1.1)
INSPIRATION.DURATION SETTING TIME VENT: 12 SEC
INSULIN ADMINSTERED, INSADM: 5.1 UNITS/HOUR
INSULIN ADMINSTERED, INSADM: 5.5 UNITS/HOUR
INSULIN ADMINSTERED, INSADM: 5.8 UNITS/HOUR
INSULIN ORDER, INSORD: 5.1 UNITS/HOUR
INSULIN ORDER, INSORD: 5.5 UNITS/HOUR
INSULIN ORDER, INSORD: 5.8 UNITS/HOUR
INTERPRETATION SERPL IEP-IMP: ABNORMAL
INTERPRETATION, 117893: NORMAL
IRON SATN MFR SERPL: 17 % (ref 20–50)
IRON SATN MFR SERPL: 8 % (ref 20–50)
IRON SATN MFR SERPL: ABNORMAL % (ref 20–50)
IRON SERPL-MCNC: 20 UG/DL (ref 35–150)
IRON SERPL-MCNC: 22 UG/DL (ref 35–150)
IRON SERPL-MCNC: <5 UG/DL (ref 35–150)
KAPPA LC FREE SER-MCNC: 87.1 MG/L (ref 3.3–19.4)
KAPPA LC FREE/LAMBDA FREE SER: 1.37 {RATIO} (ref 0.26–1.65)
KETONES UR QL STRIP.AUTO: NEGATIVE MG/DL
KETONES UR QL STRIP.AUTO: NEGATIVE MG/DL
LACTATE SERPL-SCNC: 0.4 MMOL/L (ref 0.4–2)
LACTATE SERPL-SCNC: 0.5 MMOL/L (ref 0.4–2)
LACTATE SERPL-SCNC: 0.5 MMOL/L (ref 0.4–2)
LACTATE SERPL-SCNC: 0.6 MMOL/L (ref 0.4–2)
LACTATE SERPL-SCNC: 0.7 MMOL/L (ref 0.4–2)
LACTATE SERPL-SCNC: 0.7 MMOL/L (ref 0.4–2)
LACTATE SERPL-SCNC: 0.8 MMOL/L (ref 0.4–2)
LACTATE SERPL-SCNC: 0.8 MMOL/L (ref 0.4–2)
LACTATE SERPL-SCNC: 0.9 MMOL/L (ref 0.4–2)
LACTATE SERPL-SCNC: 0.9 MMOL/L (ref 0.4–2)
LACTATE SERPL-SCNC: 1.1 MMOL/L (ref 0.4–2)
LACTATE SERPL-SCNC: 1.2 MMOL/L (ref 0.4–2)
LACTATE SERPL-SCNC: 1.3 MMOL/L (ref 0.4–2)
LACTATE SERPL-SCNC: 1.4 MMOL/L (ref 0.4–2)
LACTATE SERPL-SCNC: 3.1 MMOL/L (ref 0.4–2)
LACTATE SERPL-SCNC: 3.1 MMOL/L (ref 0.4–2)
LAMBDA LC FREE SERPL-MCNC: 63.8 MG/L (ref 5.7–26.3)
LDH SERPL L TO P-CCNC: 217 U/L (ref 81–246)
LDH SERPL L TO P-CCNC: 244 U/L (ref 81–246)
LDH SERPL L TO P-CCNC: 256 U/L (ref 81–246)
LDH SERPL L TO P-CCNC: 275 U/L (ref 81–246)
LDH SERPL L TO P-CCNC: 315 U/L (ref 81–246)
LDH SERPL L TO P-CCNC: 320 U/L (ref 81–246)
LDH SERPL L TO P-CCNC: 329 U/L (ref 81–246)
LDH SERPL L TO P-CCNC: 360 U/L (ref 81–246)
LDH SERPL L TO P-CCNC: 361 U/L (ref 81–246)
LDH SERPL L TO P-CCNC: 385 U/L (ref 81–246)
LDH SERPL L TO P-CCNC: 388 U/L (ref 81–246)
LDH SERPL L TO P-CCNC: 406 U/L (ref 81–246)
LDH SERPL L TO P-CCNC: 425 U/L (ref 81–246)
LDH SERPL L TO P-CCNC: 446 U/L (ref 81–246)
LDH SERPL L TO P-CCNC: 566 U/L (ref 81–246)
LDH SERPL L TO P-CCNC: 631 U/L (ref 81–246)
LDLC SERPL CALC-MCNC: 75.8 MG/DL (ref 0–100)
LEFT ABI: 1.77
LEFT CCA DIST DIAS: 11.4 CM/S
LEFT CCA DIST SYS: 76.2 CM/S
LEFT CCA PROX DIAS: 15.7 CM/S
LEFT CCA PROX SYS: 89.2 CM/S
LEFT ECA DIAS: 7.5 CM/S
LEFT ECA SYS: 71.3 CM/S
LEFT ICA DIST DIAS: 26.1 CM/S
LEFT ICA DIST SYS: 84.1 CM/S
LEFT ICA MID DIAS: 22.2 CM/S
LEFT ICA MID SYS: 69.4 CM/S
LEFT ICA PROX DIAS: 16.3 CM/S
LEFT ICA PROX SYS: 67.4 CM/S
LEFT ICA/CCA SYS: 1.1
LEFT POSTERIOR TIBIAL: 138 MMHG
LEFT TBI: 0.76
LEFT TOE PRESSURE: 77 MMHG
LEFT VERTEBRAL DIAS: 15.3 CM/S
LEFT VERTEBRAL SYS: 67.4 CM/S
LEUKOCYTE ESTERASE UR QL STRIP.AUTO: ABNORMAL
LEUKOCYTE ESTERASE UR QL STRIP.AUTO: NEGATIVE
LOW TARGET, LOT: 100 MG/DL
LYMPHOCYTES # BLD: 0.5 K/UL (ref 0.8–3.5)
LYMPHOCYTES # BLD: 0.8 K/UL (ref 0.8–3.5)
LYMPHOCYTES # BLD: 0.8 K/UL (ref 0.8–3.5)
LYMPHOCYTES # BLD: 0.9 K/UL (ref 0.8–3.5)
LYMPHOCYTES # BLD: 1 K/UL (ref 0.8–3.5)
LYMPHOCYTES NFR BLD: 10 % (ref 12–49)
LYMPHOCYTES NFR BLD: 6 % (ref 12–49)
LYMPHOCYTES NFR BLD: 7 % (ref 12–49)
LYMPHOCYTES NFR BLD: 7 % (ref 12–49)
LYMPHOCYTES NFR BLD: 8 % (ref 12–49)
LYMPHOCYTES NFR BLD: 8 % (ref 12–49)
LYMPHOCYTES NFR BLD: 9 % (ref 12–49)
Lab: NORMAL
M PNEUMO DNA SPEC QL NAA+PROBE: NOT DETECTED
M PNEUMO IGG SER IA-ACNC: 869 U/ML (ref 0–99)
M PNEUMO IGM SER IA-ACNC: <770 U/ML (ref 0–769)
M PROTEIN SERPL ELPH-MCNC: ABNORMAL G/DL
M TB IFN-G BLD-IMP: NEGATIVE
MAGNESIUM SERPL-MCNC: 1.4 MG/DL (ref 1.6–2.4)
MAGNESIUM SERPL-MCNC: 1.5 MG/DL (ref 1.6–2.4)
MAGNESIUM SERPL-MCNC: 1.5 MG/DL (ref 1.6–2.4)
MAGNESIUM SERPL-MCNC: 1.6 MG/DL (ref 1.6–2.4)
MAGNESIUM SERPL-MCNC: 1.8 MG/DL (ref 1.6–2.4)
MAGNESIUM SERPL-MCNC: 1.9 MG/DL (ref 1.6–2.4)
MAGNESIUM SERPL-MCNC: 1.9 MG/DL (ref 1.6–2.4)
MAGNESIUM SERPL-MCNC: 2 MG/DL (ref 1.6–2.4)
MAGNESIUM SERPL-MCNC: 2.1 MG/DL (ref 1.6–2.4)
MAGNESIUM SERPL-MCNC: 2.2 MG/DL (ref 1.6–2.4)
MAGNESIUM SERPL-MCNC: 2.2 MG/DL (ref 1.6–2.4)
MAGNESIUM SERPL-MCNC: 2.3 MG/DL (ref 1.6–2.4)
MAGNESIUM SERPL-MCNC: 2.4 MG/DL (ref 1.6–2.4)
MAGNESIUM SERPL-MCNC: 2.5 MG/DL (ref 1.6–2.4)
MAGNESIUM SERPL-MCNC: 2.6 MG/DL (ref 1.6–2.4)
MAGNESIUM SERPL-MCNC: 2.7 MG/DL (ref 1.6–2.4)
MAGNESIUM SERPL-MCNC: 2.8 MG/DL (ref 1.6–2.4)
MAGNESIUM SERPL-MCNC: 2.8 MG/DL (ref 1.6–2.4)
MAGNESIUM SERPL-MCNC: 2.9 MG/DL (ref 1.6–2.4)
MAGNESIUM SERPL-MCNC: 3 MG/DL (ref 1.6–2.4)
MAGNESIUM SERPL-MCNC: 3.2 MG/DL (ref 1.6–2.4)
MCH RBC QN AUTO: 23.6 PG (ref 26–34)
MCH RBC QN AUTO: 23.7 PG (ref 26–34)
MCH RBC QN AUTO: 23.8 PG (ref 26–34)
MCH RBC QN AUTO: 23.9 PG (ref 26–34)
MCH RBC QN AUTO: 23.9 PG (ref 26–34)
MCH RBC QN AUTO: 24 PG (ref 26–34)
MCH RBC QN AUTO: 24.1 PG (ref 26–34)
MCH RBC QN AUTO: 24.2 PG (ref 26–34)
MCH RBC QN AUTO: 24.2 PG (ref 26–34)
MCH RBC QN AUTO: 24.3 PG (ref 26–34)
MCH RBC QN AUTO: 24.3 PG (ref 26–34)
MCH RBC QN AUTO: 24.4 PG (ref 26–34)
MCH RBC QN AUTO: 24.5 PG (ref 26–34)
MCH RBC QN AUTO: 24.6 PG (ref 26–34)
MCH RBC QN AUTO: 24.7 PG (ref 26–34)
MCH RBC QN AUTO: 24.7 PG (ref 26–34)
MCH RBC QN AUTO: 24.9 PG (ref 26–34)
MCH RBC QN AUTO: 25 PG (ref 26–34)
MCH RBC QN AUTO: 25.1 PG (ref 26–34)
MCHC RBC AUTO-ENTMCNC: 25.4 G/DL (ref 30–36.5)
MCHC RBC AUTO-ENTMCNC: 25.8 G/DL (ref 30–36.5)
MCHC RBC AUTO-ENTMCNC: 25.8 G/DL (ref 30–36.5)
MCHC RBC AUTO-ENTMCNC: 25.9 G/DL (ref 30–36.5)
MCHC RBC AUTO-ENTMCNC: 26 G/DL (ref 30–36.5)
MCHC RBC AUTO-ENTMCNC: 26.1 G/DL (ref 30–36.5)
MCHC RBC AUTO-ENTMCNC: 26.3 G/DL (ref 30–36.5)
MCHC RBC AUTO-ENTMCNC: 26.3 G/DL (ref 30–36.5)
MCHC RBC AUTO-ENTMCNC: 26.4 G/DL (ref 30–36.5)
MCHC RBC AUTO-ENTMCNC: 26.5 G/DL (ref 30–36.5)
MCHC RBC AUTO-ENTMCNC: 26.5 G/DL (ref 30–36.5)
MCHC RBC AUTO-ENTMCNC: 26.6 G/DL (ref 30–36.5)
MCHC RBC AUTO-ENTMCNC: 26.6 G/DL (ref 30–36.5)
MCHC RBC AUTO-ENTMCNC: 26.8 G/DL (ref 30–36.5)
MCHC RBC AUTO-ENTMCNC: 26.8 G/DL (ref 30–36.5)
MCHC RBC AUTO-ENTMCNC: 26.9 G/DL (ref 30–36.5)
MCHC RBC AUTO-ENTMCNC: 27.1 G/DL (ref 30–36.5)
MCHC RBC AUTO-ENTMCNC: 27.1 G/DL (ref 30–36.5)
MCHC RBC AUTO-ENTMCNC: 27.4 G/DL (ref 30–36.5)
MCHC RBC AUTO-ENTMCNC: 27.4 G/DL (ref 30–36.5)
MCHC RBC AUTO-ENTMCNC: 27.6 G/DL (ref 30–36.5)
MCHC RBC AUTO-ENTMCNC: 27.7 G/DL (ref 30–36.5)
MCHC RBC AUTO-ENTMCNC: 28.1 G/DL (ref 30–36.5)
MCV RBC AUTO: 87.2 FL (ref 80–99)
MCV RBC AUTO: 88.2 FL (ref 80–99)
MCV RBC AUTO: 89.1 FL (ref 80–99)
MCV RBC AUTO: 89.5 FL (ref 80–99)
MCV RBC AUTO: 89.8 FL (ref 80–99)
MCV RBC AUTO: 90 FL (ref 80–99)
MCV RBC AUTO: 90.1 FL (ref 80–99)
MCV RBC AUTO: 90.3 FL (ref 80–99)
MCV RBC AUTO: 90.9 FL (ref 80–99)
MCV RBC AUTO: 90.9 FL (ref 80–99)
MCV RBC AUTO: 91.3 FL (ref 80–99)
MCV RBC AUTO: 91.4 FL (ref 80–99)
MCV RBC AUTO: 91.9 FL (ref 80–99)
MCV RBC AUTO: 91.9 FL (ref 80–99)
MCV RBC AUTO: 92.1 FL (ref 80–99)
MCV RBC AUTO: 92.1 FL (ref 80–99)
MCV RBC AUTO: 92.2 FL (ref 80–99)
MCV RBC AUTO: 92.3 FL (ref 80–99)
MCV RBC AUTO: 92.4 FL (ref 80–99)
MCV RBC AUTO: 92.5 FL (ref 80–99)
MCV RBC AUTO: 92.6 FL (ref 80–99)
MCV RBC AUTO: 93 FL (ref 80–99)
MCV RBC AUTO: 93 FL (ref 80–99)
MCV RBC AUTO: 93.7 FL (ref 80–99)
MCV RBC AUTO: 93.8 FL (ref 80–99)
MCV RBC AUTO: 94.1 FL (ref 80–99)
MCV RBC AUTO: 94.3 FL (ref 80–99)
METHADONE UR QL: NEGATIVE
METHGB MFR BLD: 0.1 % (ref 0–1.4)
METHGB MFR BLD: 0.1 % (ref 0–1.4)
METHGB MFR BLD: 0.2 % (ref 0–1.4)
METHGB MFR BLD: 0.3 % (ref 0–1.4)
METHGB MFR BLD: 0.4 % (ref 0–1.4)
METHGB MFR BLD: 0.5 % (ref 0–1.4)
METHGB MFR BLD: 0.5 % (ref 0–1.4)
MEV IGG SER IA-ACNC: >300 AU/ML
MICROALBUMIN UR-MCNC: 17.3 MG/DL
MICROALBUMIN/CREAT UR-RTO: 263 MG/G (ref 0–30)
MINUTES UNTIL NEXT BG, NBG: 60 MIN
MONOCYTES # BLD: 0.6 K/UL (ref 0–1)
MONOCYTES # BLD: 0.8 K/UL (ref 0–1)
MONOCYTES # BLD: 0.9 K/UL (ref 0–1)
MONOCYTES NFR BLD: 6 % (ref 5–13)
MONOCYTES NFR BLD: 6 % (ref 5–13)
MONOCYTES NFR BLD: 7 % (ref 5–13)
MONOCYTES NFR BLD: 8 % (ref 5–13)
MULTIPLIER, MUL: 0.03
MULTIPLIER, MUL: 0.04
MULTIPLIER, MUL: 0.05
MUV IGG SER IA-ACNC: >300 AU/ML
MYOGLOBIN SERPL-MCNC: 109 NG/ML (ref 13–71)
NEUTS SEG # BLD: 12.2 K/UL (ref 1.8–8)
NEUTS SEG # BLD: 6.2 K/UL (ref 1.8–8)
NEUTS SEG # BLD: 6.7 K/UL (ref 1.8–8)
NEUTS SEG # BLD: 8.5 K/UL (ref 1.8–8)
NEUTS SEG # BLD: 9.1 K/UL (ref 1.8–8)
NEUTS SEG # BLD: 9.3 K/UL (ref 1.8–8)
NEUTS SEG # BLD: 9.8 K/UL (ref 1.8–8)
NEUTS SEG NFR BLD: 79 % (ref 32–75)
NEUTS SEG NFR BLD: 81 % (ref 32–75)
NEUTS SEG NFR BLD: 82 % (ref 32–75)
NEUTS SEG NFR BLD: 83 % (ref 32–75)
NEUTS SEG NFR BLD: 86 % (ref 32–75)
NITRITE UR QL STRIP.AUTO: NEGATIVE
NITRITE UR QL STRIP.AUTO: NEGATIVE
NRBC # BLD: 0 K/UL (ref 0–0.01)
NRBC # BLD: 0.02 K/UL (ref 0–0.01)
NRBC # BLD: 0.03 K/UL (ref 0–0.01)
NRBC # BLD: 0.04 K/UL (ref 0–0.01)
NRBC # BLD: 0.07 K/UL (ref 0–0.01)
NRBC # BLD: 0.09 K/UL (ref 0–0.01)
NRBC BLD-RTO: 0 PER 100 WBC
NRBC BLD-RTO: 0.2 PER 100 WBC
NRBC BLD-RTO: 0.3 PER 100 WBC
NRBC BLD-RTO: 0.4 PER 100 WBC
NRBC BLD-RTO: 0.5 PER 100 WBC
NRBC BLD-RTO: 0.8 PER 100 WBC
NRBC BLD-RTO: 0.8 PER 100 WBC
NRBC BLD-RTO: 0.9 PER 100 WBC
O2/TOTAL GAS SETTING VFR VENT: 100 %
O2/TOTAL GAS SETTING VFR VENT: 35 %
O2/TOTAL GAS SETTING VFR VENT: 4 %
O2/TOTAL GAS SETTING VFR VENT: 40 %
O2/TOTAL GAS SETTING VFR VENT: 40 %
O2/TOTAL GAS SETTING VFR VENT: 50 %
O2/TOTAL GAS SETTING VFR VENT: 50 %
OPIATES UR QL: NEGATIVE
ORDER INITIALS, ORDINIT: NORMAL
OXYHGB MFR BLD: 44.9 % (ref 94–97)
OXYHGB MFR BLD: 55.7 % (ref 94–97)
OXYHGB MFR BLD: 58.1 % (ref 94–97)
OXYHGB MFR BLD: 59.2 % (ref 94–97)
OXYHGB MFR BLD: 64.4 % (ref 94–97)
OXYHGB MFR BLD: 64.4 % (ref 94–97)
OXYHGB MFR BLD: 65.8 % (ref 94–97)
OXYHGB MFR BLD: 67.6 % (ref 94–97)
OXYHGB MFR BLD: 68.6 % (ref 94–97)
OXYHGB MFR BLD: 69.9 % (ref 94–97)
OXYHGB MFR BLD: 71.8 % (ref 94–97)
OXYHGB MFR BLD: 72.7 % (ref 94–97)
OXYHGB MFR BLD: 75.9 % (ref 94–97)
OXYHGB MFR BLD: 80.8 % (ref 94–97)
OXYHGB MFR BLD: 83.5 % (ref 94–97)
OXYHGB MFR BLD: 95.7 % (ref 94–97)
OXYHGB MFR BLD: 95.7 % (ref 94–97)
OXYHGB MFR BLD: 96.3 % (ref 94–97)
OXYHGB MFR BLD: 98.3 % (ref 94–97)
P-R INTERVAL, ECG05: 122 MS
PCO2 BLD: 31.8 MMHG (ref 35–45)
PCO2 BLD: 40.3 MMHG (ref 35–45)
PCO2 BLD: 40.8 MMHG (ref 35–45)
PCO2 BLD: 42.2 MMHG (ref 35–45)
PCO2 BLD: 48.5 MMHG (ref 35–45)
PCO2 BLD: 48.9 MMHG (ref 35–45)
PCO2 BLD: 49.6 MMHG (ref 35–45)
PCO2 BLD: 50.4 MMHG (ref 35–45)
PCO2 BLDA: 39 MMHG (ref 35–45)
PCO2 BLDA: 45 MMHG (ref 35–45)
PCO2 BLDA: 48 MMHG (ref 35–45)
PCO2 BLDV: 37.6 MMHG (ref 41–51)
PCP UR QL: NEGATIVE
PEEP RESPIRATORY: 12 CMH2O
PEEP RESPIRATORY: 5 CMH2O
PEEP RESPIRATORY: 5 CMH2O
PEEP RESPIRATORY: 5 CM[H2O]
PEEP RESPIRATORY: 8 CMH2O
PEEP RESPIRATORY: 8 CMH2O
PF4 HEPARIN CMPLX AB SER-ACNC: 0.08 OD (ref 0–0.4)
PH BLD: 7.4 [PH] (ref 7.35–7.45)
PH BLD: 7.42 [PH] (ref 7.35–7.45)
PH BLD: 7.43 [PH] (ref 7.35–7.45)
PH BLD: 7.44 [PH] (ref 7.35–7.45)
PH BLD: 7.44 [PH] (ref 7.35–7.45)
PH BLD: 7.46 [PH] (ref 7.35–7.45)
PH BLD: 7.5 [PH] (ref 7.35–7.45)
PH BLD: 7.6 [PH] (ref 7.35–7.45)
PH BLDA: 7.39 [PH] (ref 7.35–7.45)
PH BLDA: 7.42 [PH] (ref 7.35–7.45)
PH BLDA: 7.46 [PH] (ref 7.35–7.45)
PH BLDV: 7.51 [PH] (ref 7.32–7.42)
PH UR STRIP: 5 [PH] (ref 5–8)
PH UR STRIP: 6 [PH] (ref 5–8)
PHOSPHATE SERPL-MCNC: 1.6 MG/DL (ref 2.6–4.7)
PHOSPHATE SERPL-MCNC: 1.6 MG/DL (ref 2.6–4.7)
PHOSPHATE SERPL-MCNC: 2.4 MG/DL (ref 2.6–4.7)
PHOSPHATE SERPL-MCNC: 2.5 MG/DL (ref 2.6–4.7)
PHOSPHATE SERPL-MCNC: 2.5 MG/DL (ref 2.6–4.7)
PHOSPHATE SERPL-MCNC: 2.6 MG/DL (ref 2.6–4.7)
PHOSPHATE SERPL-MCNC: 2.7 MG/DL (ref 2.6–4.7)
PHOSPHATE SERPL-MCNC: 2.8 MG/DL (ref 2.6–4.7)
PHOSPHATE SERPL-MCNC: 2.8 MG/DL (ref 2.6–4.7)
PHOSPHATE SERPL-MCNC: 3.1 MG/DL (ref 2.6–4.7)
PHOSPHATE SERPL-MCNC: 3.2 MG/DL (ref 2.6–4.7)
PHOSPHATE SERPL-MCNC: 3.5 MG/DL (ref 2.6–4.7)
PHOSPHATE SERPL-MCNC: 3.5 MG/DL (ref 2.6–4.7)
PHOSPHATE SERPL-MCNC: 3.6 MG/DL (ref 2.6–4.7)
PHOSPHATE SERPL-MCNC: 4.5 MG/DL (ref 2.6–4.7)
PHOSPHATE SERPL-MCNC: 4.7 MG/DL (ref 2.6–4.7)
PLATELET # BLD AUTO: 174 K/UL (ref 150–400)
PLATELET # BLD AUTO: 184 K/UL (ref 150–400)
PLATELET # BLD AUTO: 188 K/UL (ref 150–400)
PLATELET # BLD AUTO: 188 K/UL (ref 150–400)
PLATELET # BLD AUTO: 194 K/UL (ref 150–400)
PLATELET # BLD AUTO: 195 K/UL (ref 150–400)
PLATELET # BLD AUTO: 210 K/UL (ref 150–400)
PLATELET # BLD AUTO: 212 K/UL (ref 150–400)
PLATELET # BLD AUTO: 215 K/UL (ref 150–400)
PLATELET # BLD AUTO: 258 K/UL (ref 150–400)
PLATELET # BLD AUTO: 259 K/UL (ref 150–400)
PLATELET # BLD AUTO: 259 K/UL (ref 150–400)
PLATELET # BLD AUTO: 269 K/UL (ref 150–400)
PLATELET # BLD AUTO: 277 K/UL (ref 150–400)
PLATELET # BLD AUTO: 281 K/UL (ref 150–400)
PLATELET # BLD AUTO: 283 K/UL (ref 150–400)
PLATELET # BLD AUTO: 298 K/UL (ref 150–400)
PLATELET # BLD AUTO: 306 K/UL (ref 150–400)
PLATELET # BLD AUTO: 313 K/UL (ref 150–400)
PLATELET # BLD AUTO: 328 K/UL (ref 150–400)
PLATELET # BLD AUTO: 331 K/UL (ref 150–400)
PLATELET # BLD AUTO: 344 K/UL (ref 150–400)
PLATELET # BLD AUTO: 360 K/UL (ref 150–400)
PLATELET # BLD AUTO: 364 K/UL (ref 150–400)
PLATELET # BLD AUTO: 365 K/UL (ref 150–400)
PLATELET # BLD AUTO: 400 K/UL (ref 150–400)
PLATELET # BLD AUTO: 409 K/UL (ref 150–400)
PLATELET # BLD AUTO: 435 K/UL (ref 150–400)
PLATELET # BLD AUTO: 436 K/UL (ref 150–400)
PLATELET COMMENTS,PCOM: ABNORMAL
PMV BLD AUTO: 10.2 FL (ref 8.9–12.9)
PMV BLD AUTO: 10.3 FL (ref 8.9–12.9)
PMV BLD AUTO: 10.7 FL (ref 8.9–12.9)
PMV BLD AUTO: 8.6 FL (ref 8.9–12.9)
PMV BLD AUTO: 8.8 FL (ref 8.9–12.9)
PMV BLD AUTO: 8.9 FL (ref 8.9–12.9)
PMV BLD AUTO: 9 FL (ref 8.9–12.9)
PMV BLD AUTO: 9.1 FL (ref 8.9–12.9)
PMV BLD AUTO: 9.2 FL (ref 8.9–12.9)
PMV BLD AUTO: 9.3 FL (ref 8.9–12.9)
PMV BLD AUTO: 9.4 FL (ref 8.9–12.9)
PMV BLD AUTO: 9.6 FL (ref 8.9–12.9)
PMV BLD AUTO: 9.6 FL (ref 8.9–12.9)
PMV BLD AUTO: 9.7 FL (ref 8.9–12.9)
PMV BLD AUTO: 9.8 FL (ref 8.9–12.9)
PMV BLD AUTO: 9.8 FL (ref 8.9–12.9)
PMV BLD AUTO: 9.9 FL (ref 8.9–12.9)
PO2 BLD: 107 MMHG (ref 80–100)
PO2 BLD: 151 MMHG (ref 80–100)
PO2 BLD: 173 MMHG (ref 80–100)
PO2 BLD: 393 MMHG (ref 80–100)
PO2 BLD: 73 MMHG (ref 80–100)
PO2 BLD: 87 MMHG (ref 80–100)
PO2 BLD: 89 MMHG (ref 80–100)
PO2 BLD: 97 MMHG (ref 80–100)
PO2 BLDA: 100 MMHG (ref 80–100)
PO2 BLDA: 139 MMHG (ref 80–100)
PO2 BLDA: 97 MMHG (ref 80–100)
PO2 BLDV: 36 MMHG (ref 25–40)
PO2 BLDV: 39 MMHG (ref 25–40)
POTASSIUM SERPL-SCNC: 3 MMOL/L (ref 3.5–5.1)
POTASSIUM SERPL-SCNC: 3.1 MMOL/L (ref 3.5–5.1)
POTASSIUM SERPL-SCNC: 3.1 MMOL/L (ref 3.5–5.1)
POTASSIUM SERPL-SCNC: 3.4 MMOL/L (ref 3.5–5.1)
POTASSIUM SERPL-SCNC: 3.5 MMOL/L (ref 3.5–5.1)
POTASSIUM SERPL-SCNC: 3.6 MMOL/L (ref 3.5–5.1)
POTASSIUM SERPL-SCNC: 3.7 MMOL/L (ref 3.5–5.1)
POTASSIUM SERPL-SCNC: 3.8 MMOL/L (ref 3.5–5.1)
POTASSIUM SERPL-SCNC: 3.8 MMOL/L (ref 3.5–5.1)
POTASSIUM SERPL-SCNC: 3.9 MMOL/L (ref 3.5–5.1)
POTASSIUM SERPL-SCNC: 4 MMOL/L (ref 3.5–5.1)
POTASSIUM SERPL-SCNC: 4.1 MMOL/L (ref 3.5–5.1)
POTASSIUM SERPL-SCNC: 4.2 MMOL/L (ref 3.5–5.1)
POTASSIUM SERPL-SCNC: 4.3 MMOL/L (ref 3.5–5.1)
POTASSIUM SERPL-SCNC: 4.4 MMOL/L (ref 3.5–5.1)
POTASSIUM SERPL-SCNC: 4.4 MMOL/L (ref 3.5–5.1)
POTASSIUM SERPL-SCNC: 4.5 MMOL/L (ref 3.5–5.1)
POTASSIUM SERPL-SCNC: 4.5 MMOL/L (ref 3.5–5.1)
POTASSIUM SERPL-SCNC: 5.2 MMOL/L (ref 3.5–5.1)
POTASSIUM SERPL-SCNC: 5.5 MMOL/L (ref 3.5–5.1)
POTASSIUM SERPL-SCNC: 5.7 MMOL/L (ref 3.5–5.1)
PREALB SERPL-MCNC: 13.3 MG/DL (ref 20–40)
PRESSURE SUPPORT SETTING VENT: 5 CMH2O
PROCALCITONIN SERPL-MCNC: 0.42 NG/ML
PROCALCITONIN SERPL-MCNC: 0.51 NG/ML
PROCALCITONIN SERPL-MCNC: 0.52 NG/ML
PROCALCITONIN SERPL-MCNC: 0.55 NG/ML
PROCALCITONIN SERPL-MCNC: 0.57 NG/ML
PROCALCITONIN SERPL-MCNC: 0.62 NG/ML
PROCALCITONIN SERPL-MCNC: 0.62 NG/ML
PROCALCITONIN SERPL-MCNC: 0.67 NG/ML
PROCALCITONIN SERPL-MCNC: 0.68 NG/ML
PROCALCITONIN SERPL-MCNC: 0.71 NG/ML
PROCALCITONIN SERPL-MCNC: 0.75 NG/ML
PROCALCITONIN SERPL-MCNC: 0.77 NG/ML
PROCALCITONIN SERPL-MCNC: 0.9 NG/ML
PROCALCITONIN SERPL-MCNC: 0.95 NG/ML
PROCALCITONIN SERPL-MCNC: 0.98 NG/ML
PROCALCITONIN SERPL-MCNC: 2.11 NG/ML
PROT C AG PPP IA-ACNC: 85 % (ref 60–150)
PROT S ACT/NOR PPP: 54 % (ref 63–140)
PROT SERPL-MCNC: 6.2 G/DL (ref 6–8.5)
PROT SERPL-MCNC: 6.6 G/DL (ref 6.4–8.2)
PROT SERPL-MCNC: 6.7 G/DL (ref 6.4–8.2)
PROT SERPL-MCNC: 6.8 G/DL (ref 6.4–8.2)
PROT SERPL-MCNC: 6.9 G/DL (ref 6.4–8.2)
PROT SERPL-MCNC: 7 G/DL (ref 6.4–8.2)
PROT SERPL-MCNC: 7 G/DL (ref 6.4–8.2)
PROT SERPL-MCNC: 7.2 G/DL (ref 6.4–8.2)
PROT SERPL-MCNC: 7.3 G/DL (ref 6.4–8.2)
PROT SERPL-MCNC: 7.5 G/DL (ref 6.4–8.2)
PROT SERPL-MCNC: 7.5 G/DL (ref 6.4–8.2)
PROT SERPL-MCNC: 7.6 G/DL (ref 6.4–8.2)
PROT SERPL-MCNC: 7.7 G/DL (ref 6.4–8.2)
PROT SERPL-MCNC: 7.9 G/DL (ref 6.4–8.2)
PROT SERPL-MCNC: 8 G/DL (ref 6.4–8.2)
PROT SERPL-MCNC: 8.1 G/DL (ref 6.4–8.2)
PROT UR STRIP-MCNC: 30 MG/DL
PROT UR STRIP-MCNC: 30 MG/DL
PROTHROMBIN TIME: 12.4 SEC (ref 9–11.1)
PROTHROMBIN TIME: 14 SEC (ref 9–11.1)
PTH-INTACT SERPL-MCNC: 133.3 PG/ML (ref 18.4–88)
PTH-INTACT SERPL-MCNC: 151.1 PG/ML (ref 18.4–88)
Q-T INTERVAL, ECG07: 400 MS
Q-T INTERVAL, ECG07: 404 MS
Q-T INTERVAL, ECG07: 454 MS
QRS DURATION, ECG06: 140 MS
QRS DURATION, ECG06: 140 MS
QRS DURATION, ECG06: 146 MS
QTC CALCULATION (BEZET), ECG08: 523 MS
QTC CALCULATION (BEZET), ECG08: 561 MS
QTC CALCULATION (BEZET), ECG08: 586 MS
QUANTIFERON CRITERIA, QFI1T: NORMAL
QUANTIFERON MITOGEN VALUE: 1.05 IU/ML
QUANTIFERON NIL VALUE: 0 IU/ML
QUANTIFERON TB1 AG: 0 IU/ML
QUANTIFERON TB2 AG: 0 IU/ML
RBC # BLD AUTO: 2.56 M/UL (ref 3.8–5.2)
RBC # BLD AUTO: 2.8 M/UL (ref 3.8–5.2)
RBC # BLD AUTO: 2.95 M/UL (ref 3.8–5.2)
RBC # BLD AUTO: 3.02 M/UL (ref 3.8–5.2)
RBC # BLD AUTO: 3.02 M/UL (ref 3.8–5.2)
RBC # BLD AUTO: 3.05 M/UL (ref 3.8–5.2)
RBC # BLD AUTO: 3.08 M/UL (ref 3.8–5.2)
RBC # BLD AUTO: 3.14 M/UL (ref 3.8–5.2)
RBC # BLD AUTO: 3.16 M/UL (ref 3.8–5.2)
RBC # BLD AUTO: 3.19 M/UL (ref 3.8–5.2)
RBC # BLD AUTO: 3.2 M/UL (ref 3.8–5.2)
RBC # BLD AUTO: 3.2 M/UL (ref 3.8–5.2)
RBC # BLD AUTO: 3.29 M/UL (ref 3.8–5.2)
RBC # BLD AUTO: 3.29 M/UL (ref 3.8–5.2)
RBC # BLD AUTO: 3.32 M/UL (ref 3.8–5.2)
RBC # BLD AUTO: 3.34 M/UL (ref 3.8–5.2)
RBC # BLD AUTO: 3.35 M/UL (ref 3.8–5.2)
RBC # BLD AUTO: 3.36 M/UL (ref 3.8–5.2)
RBC # BLD AUTO: 3.39 M/UL (ref 3.8–5.2)
RBC # BLD AUTO: 3.46 M/UL (ref 3.8–5.2)
RBC # BLD AUTO: 3.52 M/UL (ref 3.8–5.2)
RBC # BLD AUTO: 3.54 X10E6/UL (ref 3.77–5.28)
RBC # BLD AUTO: 3.59 M/UL (ref 3.8–5.2)
RBC # BLD AUTO: 3.97 M/UL (ref 3.8–5.2)
RBC # BLD AUTO: 4.14 M/UL (ref 3.8–5.2)
RBC # BLD AUTO: 4.24 M/UL (ref 3.8–5.2)
RBC #/AREA URNS HPF: ABNORMAL /HPF (ref 0–5)
RBC #/AREA URNS HPF: ABNORMAL /HPF (ref 0–5)
RBC MORPH BLD: ABNORMAL
REFERENCE LAB,REFLB: NORMAL
RIGHT ABI: 1.35
RIGHT ARM BP: 101 MMHG
RIGHT ECA DIAS: 12.4 CM/S
RIGHT ECA SYS: 108.7 CM/S
RIGHT ICA DIST DIAS: 20.8 CM/S
RIGHT ICA DIST SYS: 68.9 CM/S
RIGHT ICA MID DIAS: 23.3 CM/S
RIGHT ICA MID SYS: 70.6 CM/S
RIGHT ICA PROX DIAS: 21.6 CM/S
RIGHT ICA PROX SYS: 65.5 CM/S
RIGHT TBI: 0.77
RIGHT TOE PRESSURE: 78 MMHG
RIGHT VERTEBRAL DIAS: 20.2 CM/S
RIGHT VERTEBRAL SYS: 72.3 CM/S
RPR SER QL: NONREACTIVE
RSV RNA SPEC QL NAA+PROBE: NOT DETECTED
RUBV IGG SER-IMP: REACTIVE
RUBV IGG SERPL IA-ACNC: >500 IU/ML
RV+EV RNA SPEC QL NAA+PROBE: NOT DETECTED
SAMPLES BEING HELD,HOLD: NORMAL
SAO2 % BLD: 100 % (ref 92–97)
SAO2 % BLD: 46 % (ref 95–99)
SAO2 % BLD: 56 % (ref 95–99)
SAO2 % BLD: 59 % (ref 95–99)
SAO2 % BLD: 60 % (ref 95–99)
SAO2 % BLD: 65 % (ref 95–99)
SAO2 % BLD: 66 % (ref 95–99)
SAO2 % BLD: 68 % (ref 95–99)
SAO2 % BLD: 69 % (ref 95–99)
SAO2 % BLD: 70 % (ref 95–99)
SAO2 % BLD: 72 % (ref 95–99)
SAO2 % BLD: 73 % (ref 95–99)
SAO2 % BLD: 74 % (ref 95–99)
SAO2 % BLD: 77 % (ref 95–99)
SAO2 % BLD: 82 % (ref 95–99)
SAO2 % BLD: 86 % (ref 95–99)
SAO2 % BLD: 94.7 % (ref 92–97)
SAO2 % BLD: 96.7 % (ref 92–97)
SAO2 % BLD: 97.2 % (ref 92–97)
SAO2 % BLD: 97.3 % (ref 92–97)
SAO2 % BLD: 98 % (ref 95–99)
SAO2 % BLD: 98.2 % (ref 92–97)
SAO2 % BLD: 99 % (ref 92–97)
SAO2 % BLD: 99 % (ref 95–99)
SAO2 % BLD: 99.4 % (ref 92–97)
SAO2 % BLD: 99.8 % (ref 92–97)
SAO2% DEVICE SAO2% SENSOR NAME: ABNORMAL
SAO2% DEVICE SAO2% SENSOR NAME: NORMAL
SARS-COV-2 PCR, COVPCR: NOT DETECTED
SCREEN APTT: 27.7 SEC (ref 0–51.9)
SCREEN DRVVT: 37.9 SEC (ref 0–47)
SERVICE CMNT-IMP: ABNORMAL
SERVICE CMNT-IMP: NORMAL
SODIUM SERPL-SCNC: 133 MMOL/L (ref 136–145)
SODIUM SERPL-SCNC: 134 MMOL/L (ref 136–145)
SODIUM SERPL-SCNC: 135 MMOL/L (ref 136–145)
SODIUM SERPL-SCNC: 135 MMOL/L (ref 136–145)
SODIUM SERPL-SCNC: 136 MMOL/L (ref 136–145)
SODIUM SERPL-SCNC: 137 MMOL/L (ref 136–145)
SODIUM SERPL-SCNC: 138 MMOL/L (ref 136–145)
SODIUM SERPL-SCNC: 139 MMOL/L (ref 136–145)
SODIUM SERPL-SCNC: 140 MMOL/L (ref 136–145)
SODIUM SERPL-SCNC: 141 MMOL/L (ref 136–145)
SODIUM SERPL-SCNC: 142 MMOL/L (ref 136–145)
SODIUM SERPL-SCNC: 143 MMOL/L (ref 136–145)
SODIUM SERPL-SCNC: 144 MMOL/L (ref 136–145)
SODIUM SERPL-SCNC: 145 MMOL/L (ref 136–145)
SODIUM SERPL-SCNC: 145 MMOL/L (ref 136–145)
SODIUM SERPL-SCNC: 146 MMOL/L (ref 136–145)
SODIUM SERPL-SCNC: 146 MMOL/L (ref 136–145)
SODIUM SERPL-SCNC: 147 MMOL/L (ref 136–145)
SODIUM SERPL-SCNC: 150 MMOL/L (ref 136–145)
SODIUM SERPL-SCNC: 151 MMOL/L (ref 136–145)
SODIUM SERPL-SCNC: 152 MMOL/L (ref 136–145)
SODIUM SERPL-SCNC: 154 MMOL/L (ref 136–145)
SOURCE, COVRS: ABNORMAL
SOURCE, COVRS: NORMAL
SP GR UR REFRACTOMETRY: 1.01 (ref 1–1.03)
SP GR UR REFRACTOMETRY: 1.01 (ref 1–1.03)
SP1: NORMAL
SP2: NORMAL
SP3: NORMAL
SPECIMEN EXP DATE BLD: NORMAL
SPECIMEN SITE: ABNORMAL
SPECIMEN SITE: NORMAL
SPECIMEN SOURCE: NORMAL
SPECIMEN TYPE: ABNORMAL
STATUS OF UNIT,%ST: NORMAL
STRESS TARGET HR: 153 BPM
T GONDII IGG SERPL IA-ACNC: <3 IU/ML (ref 0–7.1)
T3FREE SERPL-MCNC: 1.1 PG/ML (ref 2.2–4)
T4 FREE SERPL-MCNC: 1 NG/DL (ref 0.8–1.5)
TEST DESCRIPTION:,ATST: NORMAL
THERAPEUTIC RANGE,PTTT: ABNORMAL SECS (ref 58–77)
THERAPEUTIC RANGE,PTTT: NORMAL SECS (ref 58–77)
TIBC SERPL-MCNC: 133 UG/DL (ref 250–450)
TIBC SERPL-MCNC: 262 UG/DL (ref 250–450)
TIBC SERPL-MCNC: <8 UG/DL (ref 250–450)
TRIGL SERPL-MCNC: 151 MG/DL (ref ?–150)
TRIGL SERPL-MCNC: 76 MG/DL (ref ?–150)
TROPONIN-HIGH SENSITIVITY: 1108 NG/L (ref 0–51)
TROPONIN-HIGH SENSITIVITY: 1217 NG/L (ref 0–51)
TROPONIN-HIGH SENSITIVITY: 470 NG/L (ref 0–51)
TROPONIN-HIGH SENSITIVITY: 49 NG/L (ref 0–51)
TROPONIN-HIGH SENSITIVITY: 55 NG/L (ref 0–51)
TROPONIN-HIGH SENSITIVITY: 66 NG/L (ref 0–51)
TSH SERPL DL<=0.05 MIU/L-ACNC: 3.08 UIU/ML (ref 0.36–3.74)
UA: UC IF INDICATED,UAUC: ABNORMAL
UA: UC IF INDICATED,UAUC: ABNORMAL
UNIT DIVISION, %UDIV: 0
URATE SERPL-MCNC: 7.1 MG/DL (ref 2.6–6)
URATE SERPL-MCNC: 8.2 MG/DL (ref 2.6–6)
UROBILINOGEN UR QL STRIP.AUTO: 0.2 EU/DL (ref 0.2–1)
UROBILINOGEN UR QL STRIP.AUTO: 0.2 EU/DL (ref 0.2–1)
VAS LEFT DORSALIS PEDIS BP: 179 MMHG
VAS RIGHT DORSALIS PEDIS BP: 136 MMHG
VENTILATION MODE VENT: ABNORMAL
VENTRICULAR RATE, ECG03: 116 BPM
VENTRICULAR RATE, ECG03: 129 BPM
VENTRICULAR RATE, ECG03: 80 BPM
VLDLC SERPL CALC-MCNC: 15.2 MG/DL
VT SETTING VENT: 460 ML
VZV IGG SER IA-ACNC: 419 INDEX
WBC # BLD AUTO: 10 K/UL (ref 3.6–11)
WBC # BLD AUTO: 10.3 K/UL (ref 3.6–11)
WBC # BLD AUTO: 10.3 K/UL (ref 3.6–11)
WBC # BLD AUTO: 10.9 K/UL (ref 3.6–11)
WBC # BLD AUTO: 11.1 K/UL (ref 3.6–11)
WBC # BLD AUTO: 11.3 K/UL (ref 3.6–11)
WBC # BLD AUTO: 11.3 K/UL (ref 3.6–11)
WBC # BLD AUTO: 11.4 K/UL (ref 3.6–11)
WBC # BLD AUTO: 11.7 K/UL (ref 3.6–11)
WBC # BLD AUTO: 14.2 K/UL (ref 3.6–11)
WBC # BLD AUTO: 16.5 K/UL (ref 3.6–11)
WBC # BLD AUTO: 21.3 K/UL (ref 3.6–11)
WBC # BLD AUTO: 7.1 K/UL (ref 3.6–11)
WBC # BLD AUTO: 7.2 K/UL (ref 3.6–11)
WBC # BLD AUTO: 7.3 K/UL (ref 3.6–11)
WBC # BLD AUTO: 7.4 K/UL (ref 3.6–11)
WBC # BLD AUTO: 7.9 K/UL (ref 3.6–11)
WBC # BLD AUTO: 8 K/UL (ref 3.6–11)
WBC # BLD AUTO: 8.2 K/UL (ref 3.6–11)
WBC # BLD AUTO: 8.3 K/UL (ref 3.6–11)
WBC # BLD AUTO: 8.3 K/UL (ref 3.6–11)
WBC # BLD AUTO: 8.5 K/UL (ref 3.6–11)
WBC # BLD AUTO: 8.5 K/UL (ref 3.6–11)
WBC # BLD AUTO: 8.8 K/UL (ref 3.6–11)
WBC # BLD AUTO: 9 K/UL (ref 3.6–11)
WBC # BLD AUTO: 9.1 K/UL (ref 3.6–11)
WBC # BLD AUTO: 9.5 K/UL (ref 3.6–11)
WBC # BLD AUTO: 9.6 K/UL (ref 3.6–11)
WBC # BLD AUTO: 9.7 K/UL (ref 3.6–11)
WBC URNS QL MICRO: ABNORMAL /HPF (ref 0–4)
WBC URNS QL MICRO: ABNORMAL /HPF (ref 0–4)

## 2022-01-01 PROCEDURE — 74011250636 HC RX REV CODE- 250/636: Performed by: NURSE PRACTITIONER

## 2022-01-01 PROCEDURE — 82962 GLUCOSE BLOOD TEST: CPT

## 2022-01-01 PROCEDURE — 93308 TTE F-UP OR LMTD: CPT | Performed by: INTERNAL MEDICINE

## 2022-01-01 PROCEDURE — 83615 LACTATE (LD) (LDH) ENZYME: CPT

## 2022-01-01 PROCEDURE — 74011250636 HC RX REV CODE- 250/636: Performed by: THORACIC SURGERY (CARDIOTHORACIC VASCULAR SURGERY)

## 2022-01-01 PROCEDURE — 71045 X-RAY EXAM CHEST 1 VIEW: CPT

## 2022-01-01 PROCEDURE — 74011250636 HC RX REV CODE- 250/636: Performed by: EMERGENCY MEDICINE

## 2022-01-01 PROCEDURE — 74011250636 HC RX REV CODE- 250/636: Performed by: HOSPITALIST

## 2022-01-01 PROCEDURE — 84100 ASSAY OF PHOSPHORUS: CPT

## 2022-01-01 PROCEDURE — 99152 MOD SED SAME PHYS/QHP 5/>YRS: CPT | Performed by: INTERNAL MEDICINE

## 2022-01-01 PROCEDURE — 74011250636 HC RX REV CODE- 250/636: Performed by: INTERNAL MEDICINE

## 2022-01-01 PROCEDURE — 97161 PT EVAL LOW COMPLEX 20 MIN: CPT

## 2022-01-01 PROCEDURE — 74011000250 HC RX REV CODE- 250: Performed by: NURSE PRACTITIONER

## 2022-01-01 PROCEDURE — 74011250637 HC RX REV CODE- 250/637: Performed by: FAMILY MEDICINE

## 2022-01-01 PROCEDURE — 77010033678 HC OXYGEN DAILY

## 2022-01-01 PROCEDURE — 80162 ASSAY OF DIGOXIN TOTAL: CPT

## 2022-01-01 PROCEDURE — C1751 CATH, INF, PER/CENT/MIDLINE: HCPCS

## 2022-01-01 PROCEDURE — 94003 VENT MGMT INPAT SUBQ DAY: CPT

## 2022-01-01 PROCEDURE — 82668 ASSAY OF ERYTHROPOIETIN: CPT

## 2022-01-01 PROCEDURE — 65620000000 HC RM CCU GENERAL

## 2022-01-01 PROCEDURE — 80069 RENAL FUNCTION PANEL: CPT

## 2022-01-01 PROCEDURE — 97535 SELF CARE MNGMENT TRAINING: CPT

## 2022-01-01 PROCEDURE — 80053 COMPREHEN METABOLIC PANEL: CPT

## 2022-01-01 PROCEDURE — 84484 ASSAY OF TROPONIN QUANT: CPT

## 2022-01-01 PROCEDURE — 74011000250 HC RX REV CODE- 250: Performed by: PHYSICIAN ASSISTANT

## 2022-01-01 PROCEDURE — 74011250637 HC RX REV CODE- 250/637: Performed by: INTERNAL MEDICINE

## 2022-01-01 PROCEDURE — 74011000250 HC RX REV CODE- 250: Performed by: FAMILY MEDICINE

## 2022-01-01 PROCEDURE — 82803 BLOOD GASES ANY COMBINATION: CPT

## 2022-01-01 PROCEDURE — 84132 ASSAY OF SERUM POTASSIUM: CPT

## 2022-01-01 PROCEDURE — 74011000258 HC RX REV CODE- 258: Performed by: NURSE PRACTITIONER

## 2022-01-01 PROCEDURE — 85025 COMPLETE CBC W/AUTO DIFF WBC: CPT

## 2022-01-01 PROCEDURE — 83605 ASSAY OF LACTIC ACID: CPT

## 2022-01-01 PROCEDURE — 82550 ASSAY OF CK (CPK): CPT

## 2022-01-01 PROCEDURE — 83735 ASSAY OF MAGNESIUM: CPT

## 2022-01-01 PROCEDURE — 74011636637 HC RX REV CODE- 636/637: Performed by: PHYSICIAN ASSISTANT

## 2022-01-01 PROCEDURE — 82375 ASSAY CARBOXYHB QUANT: CPT

## 2022-01-01 PROCEDURE — 74011636637 HC RX REV CODE- 636/637: Performed by: ANESTHESIOLOGY

## 2022-01-01 PROCEDURE — P9045 ALBUMIN (HUMAN), 5%, 250 ML: HCPCS

## 2022-01-01 PROCEDURE — 74011636637 HC RX REV CODE- 636/637: Performed by: EMERGENCY MEDICINE

## 2022-01-01 PROCEDURE — 74011250637 HC RX REV CODE- 250/637: Performed by: PHYSICIAN ASSISTANT

## 2022-01-01 PROCEDURE — 30233N1 TRANSFUSION OF NONAUTOLOGOUS RED BLOOD CELLS INTO PERIPHERAL VEIN, PERCUTANEOUS APPROACH: ICD-10-PCS | Performed by: INTERNAL MEDICINE

## 2022-01-01 PROCEDURE — 83880 ASSAY OF NATRIURETIC PEPTIDE: CPT

## 2022-01-01 PROCEDURE — 33990 INSJ PERQ VAD L HRT ARTERIAL: CPT | Performed by: THORACIC SURGERY (CARDIOTHORACIC VASCULAR SURGERY)

## 2022-01-01 PROCEDURE — 74011000250 HC RX REV CODE- 250: Performed by: EMERGENCY MEDICINE

## 2022-01-01 PROCEDURE — 94760 N-INVAS EAR/PLS OXIMETRY 1: CPT

## 2022-01-01 PROCEDURE — 94660 CPAP INITIATION&MGMT: CPT

## 2022-01-01 PROCEDURE — 77030013744: Performed by: INTERNAL MEDICINE

## 2022-01-01 PROCEDURE — 85027 COMPLETE CBC AUTOMATED: CPT

## 2022-01-01 PROCEDURE — 94640 AIRWAY INHALATION TREATMENT: CPT

## 2022-01-01 PROCEDURE — 74011250637 HC RX REV CODE- 250/637: Performed by: NURSE PRACTITIONER

## 2022-01-01 PROCEDURE — 80307 DRUG TEST PRSMV CHEM ANLYZR: CPT

## 2022-01-01 PROCEDURE — 0DB68ZX EXCISION OF STOMACH, VIA NATURAL OR ARTIFICIAL OPENING ENDOSCOPIC, DIAGNOSTIC: ICD-10-PCS | Performed by: INTERNAL MEDICINE

## 2022-01-01 PROCEDURE — 36415 COLL VENOUS BLD VENIPUNCTURE: CPT

## 2022-01-01 PROCEDURE — 74011000250 HC RX REV CODE- 250: Performed by: ANESTHESIOLOGY

## 2022-01-01 PROCEDURE — 2709999900 HC NON-CHARGEABLE SUPPLY

## 2022-01-01 PROCEDURE — 87040 BLOOD CULTURE FOR BACTERIA: CPT

## 2022-01-01 PROCEDURE — 03HC33Z INSERTION OF INFUSION DEVICE INTO LEFT RADIAL ARTERY, PERCUTANEOUS APPROACH: ICD-10-PCS | Performed by: STUDENT IN AN ORGANIZED HEALTH CARE EDUCATION/TRAINING PROGRAM

## 2022-01-01 PROCEDURE — 77030018729 HC ELECTRD DEFIB PAD CARD -B

## 2022-01-01 PROCEDURE — 93325 DOPPLER ECHO COLOR FLOW MAPG: CPT | Performed by: INTERNAL MEDICINE

## 2022-01-01 PROCEDURE — 97165 OT EVAL LOW COMPLEX 30 MIN: CPT

## 2022-01-01 PROCEDURE — 74011250636 HC RX REV CODE- 250/636: Performed by: ANESTHESIOLOGY

## 2022-01-01 PROCEDURE — 74011250636 HC RX REV CODE- 250/636: Performed by: STUDENT IN AN ORGANIZED HEALTH CARE EDUCATION/TRAINING PROGRAM

## 2022-01-01 PROCEDURE — 93308 TTE F-UP OR LMTD: CPT

## 2022-01-01 PROCEDURE — 85018 HEMOGLOBIN: CPT

## 2022-01-01 PROCEDURE — 93005 ELECTROCARDIOGRAM TRACING: CPT

## 2022-01-01 PROCEDURE — 77030018798 HC PMP KT ENTRL FED COVD -A

## 2022-01-01 PROCEDURE — P9047 ALBUMIN (HUMAN), 25%, 50ML: HCPCS | Performed by: NURSE PRACTITIONER

## 2022-01-01 PROCEDURE — 87389 HIV-1 AG W/HIV-1&-2 AB AG IA: CPT

## 2022-01-01 PROCEDURE — APPSS60 APP SPLIT SHARED TIME 46-60 MINUTES: Performed by: NURSE PRACTITIONER

## 2022-01-01 PROCEDURE — 83874 ASSAY OF MYOGLOBIN: CPT

## 2022-01-01 PROCEDURE — 94664 DEMO&/EVAL PT USE INHALER: CPT

## 2022-01-01 PROCEDURE — P9045 ALBUMIN (HUMAN), 5%, 250 ML: HCPCS | Performed by: NURSE PRACTITIONER

## 2022-01-01 PROCEDURE — 74011636637 HC RX REV CODE- 636/637: Performed by: INTERNAL MEDICINE

## 2022-01-01 PROCEDURE — 77030029684 HC NEB SM VOL KT MONA -A

## 2022-01-01 PROCEDURE — 83051 HEMOGLOBIN PLASMA: CPT

## 2022-01-01 PROCEDURE — 82728 ASSAY OF FERRITIN: CPT

## 2022-01-01 PROCEDURE — 76937 US GUIDE VASCULAR ACCESS: CPT

## 2022-01-01 PROCEDURE — P9016 RBC LEUKOCYTES REDUCED: HCPCS

## 2022-01-01 PROCEDURE — 36573 INSJ PICC RS&I 5 YR+: CPT | Performed by: INTERNAL MEDICINE

## 2022-01-01 PROCEDURE — 36430 TRANSFUSION BLD/BLD COMPNT: CPT

## 2022-01-01 PROCEDURE — 5A0221D ASSISTANCE WITH CARDIAC OUTPUT USING IMPELLER PUMP, CONTINUOUS: ICD-10-PCS | Performed by: THORACIC SURGERY (CARDIOTHORACIC VASCULAR SURGERY)

## 2022-01-01 PROCEDURE — A9540 TC99M MAA: HCPCS

## 2022-01-01 PROCEDURE — 74011000250 HC RX REV CODE- 250: Performed by: INTERNAL MEDICINE

## 2022-01-01 PROCEDURE — 94002 VENT MGMT INPAT INIT DAY: CPT

## 2022-01-01 PROCEDURE — 86644 CMV ANTIBODY: CPT

## 2022-01-01 PROCEDURE — 86900 BLOOD TYPING SEROLOGIC ABO: CPT

## 2022-01-01 PROCEDURE — 83970 ASSAY OF PARATHORMONE: CPT

## 2022-01-01 PROCEDURE — 74011636637 HC RX REV CODE- 636/637: Performed by: FAMILY MEDICINE

## 2022-01-01 PROCEDURE — 02HA3RZ INSERTION OF SHORT-TERM EXTERNAL HEART ASSIST SYSTEM INTO HEART, PERCUTANEOUS APPROACH: ICD-10-PCS | Performed by: THORACIC SURGERY (CARDIOTHORACIC VASCULAR SURGERY)

## 2022-01-01 PROCEDURE — P9040 RBC LEUKOREDUCED IRRADIATED: HCPCS

## 2022-01-01 PROCEDURE — 77030041076 HC DRSG AG OPTICELL MDII -A

## 2022-01-01 PROCEDURE — 86480 TB TEST CELL IMMUN MEASURE: CPT

## 2022-01-01 PROCEDURE — 80048 BASIC METABOLIC PNL TOTAL CA: CPT

## 2022-01-01 PROCEDURE — 83036 HEMOGLOBIN GLYCOSYLATED A1C: CPT

## 2022-01-01 PROCEDURE — 99233 SBSQ HOSP IP/OBS HIGH 50: CPT | Performed by: INTERNAL MEDICINE

## 2022-01-01 PROCEDURE — 99233 SBSQ HOSP IP/OBS HIGH 50: CPT | Performed by: NURSE PRACTITIONER

## 2022-01-01 PROCEDURE — C9113 INJ PANTOPRAZOLE SODIUM, VIA: HCPCS | Performed by: EMERGENCY MEDICINE

## 2022-01-01 PROCEDURE — 85730 THROMBOPLASTIN TIME PARTIAL: CPT

## 2022-01-01 PROCEDURE — 77030026908

## 2022-01-01 PROCEDURE — C9113 INJ PANTOPRAZOLE SODIUM, VIA: HCPCS | Performed by: ANESTHESIOLOGY

## 2022-01-01 PROCEDURE — 77030002996 HC SUT SLK J&J -A: Performed by: THORACIC SURGERY (CARDIOTHORACIC VASCULAR SURGERY)

## 2022-01-01 PROCEDURE — 74011250636 HC RX REV CODE- 250/636: Performed by: PHYSICIAN ASSISTANT

## 2022-01-01 PROCEDURE — P9045 ALBUMIN (HUMAN), 5%, 250 ML: HCPCS | Performed by: STUDENT IN AN ORGANIZED HEALTH CARE EDUCATION/TRAINING PROGRAM

## 2022-01-01 PROCEDURE — 93321 DOPPLER ECHO F-UP/LMTD STD: CPT | Performed by: INTERNAL MEDICINE

## 2022-01-01 PROCEDURE — 74011636637 HC RX REV CODE- 636/637: Performed by: HOSPITALIST

## 2022-01-01 PROCEDURE — 77030008771 HC TU NG SALEM SUMP -A: Performed by: STUDENT IN AN ORGANIZED HEALTH CARE EDUCATION/TRAINING PROGRAM

## 2022-01-01 PROCEDURE — 99232 SBSQ HOSP IP/OBS MODERATE 35: CPT | Performed by: INTERNAL MEDICINE

## 2022-01-01 PROCEDURE — 77030010505 HC ADH BIOGLU CRYO -F: Performed by: THORACIC SURGERY (CARDIOTHORACIC VASCULAR SURGERY)

## 2022-01-01 PROCEDURE — 65660000001 HC RM ICU INTERMED STEPDOWN

## 2022-01-01 PROCEDURE — 34716 OPN AX/SUBCLA ART EXPOS CNDT: CPT | Performed by: THORACIC SURGERY (CARDIOTHORACIC VASCULAR SURGERY)

## 2022-01-01 PROCEDURE — 77030013797 HC KT TRNSDUC PRSSR EDWD -A

## 2022-01-01 PROCEDURE — 86592 SYPHILIS TEST NON-TREP QUAL: CPT

## 2022-01-01 PROCEDURE — 77030008462 HC STPLR SKN PROX J&J -A: Performed by: THORACIC SURGERY (CARDIOTHORACIC VASCULAR SURGERY)

## 2022-01-01 PROCEDURE — APPSS180 APP SPLIT SHARED TIME > 60 MINUTES: Performed by: NURSE PRACTITIONER

## 2022-01-01 PROCEDURE — 5A09357 ASSISTANCE WITH RESPIRATORY VENTILATION, LESS THAN 24 CONSECUTIVE HOURS, CONTINUOUS POSITIVE AIRWAY PRESSURE: ICD-10-PCS | Performed by: INTERNAL MEDICINE

## 2022-01-01 PROCEDURE — 74011250637 HC RX REV CODE- 250/637: Performed by: EMERGENCY MEDICINE

## 2022-01-01 PROCEDURE — 81001 URINALYSIS AUTO W/SCOPE: CPT

## 2022-01-01 PROCEDURE — 70450 CT HEAD/BRAIN W/O DYE: CPT

## 2022-01-01 PROCEDURE — APPSS45 APP SPLIT SHARED TIME 31-45 MINUTES: Performed by: NURSE PRACTITIONER

## 2022-01-01 PROCEDURE — 74011250636 HC RX REV CODE- 250/636

## 2022-01-01 PROCEDURE — 84145 PROCALCITONIN (PCT): CPT

## 2022-01-01 PROCEDURE — 87529 HSV DNA AMP PROBE: CPT

## 2022-01-01 PROCEDURE — 92978 ENDOLUMINL IVUS OCT C 1ST: CPT | Performed by: INTERNAL MEDICINE

## 2022-01-01 PROCEDURE — 2709999900 HC NON-CHARGEABLE SUPPLY: Performed by: INTERNAL MEDICINE

## 2022-01-01 PROCEDURE — C1769 GUIDE WIRE: HCPCS | Performed by: INTERNAL MEDICINE

## 2022-01-01 PROCEDURE — 86022 PLATELET ANTIBODIES: CPT

## 2022-01-01 PROCEDURE — 77030020365 HC SOL INJ SOD CL 0.9% 50ML

## 2022-01-01 PROCEDURE — 85652 RBC SED RATE AUTOMATED: CPT

## 2022-01-01 PROCEDURE — 77030041076 HC DRSG AG OPTICELL MDII -A: Performed by: THORACIC SURGERY (CARDIOTHORACIC VASCULAR SURGERY)

## 2022-01-01 PROCEDURE — 74011000250 HC RX REV CODE- 250: Performed by: NURSE ANESTHETIST, CERTIFIED REGISTERED

## 2022-01-01 PROCEDURE — 93308 TTE F-UP OR LMTD: CPT | Performed by: SPECIALIST

## 2022-01-01 PROCEDURE — 74176 CT ABD & PELVIS W/O CONTRAST: CPT

## 2022-01-01 PROCEDURE — 027034Z DILATION OF CORONARY ARTERY, ONE ARTERY WITH DRUG-ELUTING INTRALUMINAL DEVICE, PERCUTANEOUS APPROACH: ICD-10-PCS | Performed by: INTERNAL MEDICINE

## 2022-01-01 PROCEDURE — 97530 THERAPEUTIC ACTIVITIES: CPT

## 2022-01-01 PROCEDURE — 86141 C-REACTIVE PROTEIN HS: CPT

## 2022-01-01 PROCEDURE — 74018 RADEX ABDOMEN 1 VIEW: CPT

## 2022-01-01 PROCEDURE — 85379 FIBRIN DEGRADATION QUANT: CPT

## 2022-01-01 PROCEDURE — 77030005402 HC CATH RAD ART LN KT TELE -B

## 2022-01-01 PROCEDURE — 92928 PRQ TCAT PLMT NTRAC ST 1 LES: CPT | Performed by: INTERNAL MEDICINE

## 2022-01-01 PROCEDURE — 86923 COMPATIBILITY TEST ELECTRIC: CPT

## 2022-01-01 PROCEDURE — 77030040922 HC BLNKT HYPOTHRM STRY -A

## 2022-01-01 PROCEDURE — 76770 US EXAM ABDO BACK WALL COMP: CPT

## 2022-01-01 PROCEDURE — 84478 ASSAY OF TRIGLYCERIDES: CPT

## 2022-01-01 PROCEDURE — 77030042317 HC BND COMPR HEMSTAT -B: Performed by: INTERNAL MEDICINE

## 2022-01-01 PROCEDURE — 74011250637 HC RX REV CODE- 250/637: Performed by: HOSPITALIST

## 2022-01-01 PROCEDURE — C1769 GUIDE WIRE: HCPCS | Performed by: THORACIC SURGERY (CARDIOTHORACIC VASCULAR SURGERY)

## 2022-01-01 PROCEDURE — 85347 COAGULATION TIME ACTIVATED: CPT

## 2022-01-01 PROCEDURE — 77030014008 HC SPNG HEMSTAT J&J -C: Performed by: THORACIC SURGERY (CARDIOTHORACIC VASCULAR SURGERY)

## 2022-01-01 PROCEDURE — B2151ZZ FLUOROSCOPY OF LEFT HEART USING LOW OSMOLAR CONTRAST: ICD-10-PCS | Performed by: INTERNAL MEDICINE

## 2022-01-01 PROCEDURE — 77030011220 HC DEV ELECSURG COVD -B: Performed by: THORACIC SURGERY (CARDIOTHORACIC VASCULAR SURGERY)

## 2022-01-01 PROCEDURE — 77030020291 HC FLEXSEAL FMS BMS -C

## 2022-01-01 PROCEDURE — 02HV33Z INSERTION OF INFUSION DEVICE INTO SUPERIOR VENA CAVA, PERCUTANEOUS APPROACH: ICD-10-PCS | Performed by: ANESTHESIOLOGY

## 2022-01-01 PROCEDURE — 93460 R&L HRT ART/VENTRICLE ANGIO: CPT | Performed by: INTERNAL MEDICINE

## 2022-01-01 PROCEDURE — 76010000149 HC OR TIME 1 TO 1.5 HR: Performed by: INTERNAL MEDICINE

## 2022-01-01 PROCEDURE — 83540 ASSAY OF IRON: CPT

## 2022-01-01 PROCEDURE — 73610000026 HC INO THERAPY EACH HOUR

## 2022-01-01 PROCEDURE — C1753 CATH, INTRAVAS ULTRASOUND: HCPCS | Performed by: INTERNAL MEDICINE

## 2022-01-01 PROCEDURE — 85610 PROTHROMBIN TIME: CPT

## 2022-01-01 PROCEDURE — 93880 EXTRACRANIAL BILAT STUDY: CPT

## 2022-01-01 PROCEDURE — 77030010938 HC CLP LIG TELE -A: Performed by: THORACIC SURGERY (CARDIOTHORACIC VASCULAR SURGERY)

## 2022-01-01 PROCEDURE — 36600 WITHDRAWAL OF ARTERIAL BLOOD: CPT

## 2022-01-01 PROCEDURE — 77030013715 HC INFL SYS MRTM -B: Performed by: INTERNAL MEDICINE

## 2022-01-01 PROCEDURE — 84550 ASSAY OF BLOOD/URIC ACID: CPT

## 2022-01-01 PROCEDURE — 82955 ASSAY OF G6PD ENZYME: CPT

## 2022-01-01 PROCEDURE — 86705 HEP B CORE ANTIBODY IGM: CPT

## 2022-01-01 PROCEDURE — 87340 HEPATITIS B SURFACE AG IA: CPT

## 2022-01-01 PROCEDURE — 77030010221 HC SPLNT WR POS TELE -B: Performed by: INTERNAL MEDICINE

## 2022-01-01 PROCEDURE — 77030013798 HC KT TRNSDUC PRSSR EDWD -B

## 2022-01-01 PROCEDURE — 86709 HEPATITIS A IGM ANTIBODY: CPT

## 2022-01-01 PROCEDURE — B241ZZ3 ULTRASONOGRAPHY OF MULTIPLE CORONARY ARTERIES, INTRAVASCULAR: ICD-10-PCS | Performed by: INTERNAL MEDICINE

## 2022-01-01 PROCEDURE — 84134 ASSAY OF PREALBUMIN: CPT

## 2022-01-01 PROCEDURE — 99232 SBSQ HOSP IP/OBS MODERATE 35: CPT | Performed by: NURSE PRACTITIONER

## 2022-01-01 PROCEDURE — 76700 US EXAM ABDOM COMPLETE: CPT

## 2022-01-01 PROCEDURE — 85302 CLOT INHIBIT PROT C ANTIGEN: CPT

## 2022-01-01 PROCEDURE — 84481 FREE ASSAY (FT-3): CPT

## 2022-01-01 PROCEDURE — 86765 RUBEOLA ANTIBODY: CPT

## 2022-01-01 PROCEDURE — 87077 CULTURE AEROBIC IDENTIFY: CPT

## 2022-01-01 PROCEDURE — 99222 1ST HOSP IP/OBS MODERATE 55: CPT | Performed by: INTERNAL MEDICINE

## 2022-01-01 PROCEDURE — 86762 RUBELLA ANTIBODY: CPT

## 2022-01-01 PROCEDURE — 76060000037 HC ANESTHESIA 3 TO 3.5 HR: Performed by: THORACIC SURGERY (CARDIOTHORACIC VASCULAR SURGERY)

## 2022-01-01 PROCEDURE — 80061 LIPID PANEL: CPT

## 2022-01-01 PROCEDURE — 77030008684 HC TU ET CUF COVD -B: Performed by: STUDENT IN AN ORGANIZED HEALTH CARE EDUCATION/TRAINING PROGRAM

## 2022-01-01 PROCEDURE — 86658 ENTEROVIRUS ANTIBODY: CPT

## 2022-01-01 PROCEDURE — 86738 MYCOPLASMA ANTIBODY: CPT

## 2022-01-01 PROCEDURE — 74011000250 HC RX REV CODE- 250: Performed by: STUDENT IN AN ORGANIZED HEALTH CARE EDUCATION/TRAINING PROGRAM

## 2022-01-01 PROCEDURE — 4A023N8 MEASUREMENT OF CARDIAC SAMPLING AND PRESSURE, BILATERAL, PERCUTANEOUS APPROACH: ICD-10-PCS | Performed by: INTERNAL MEDICINE

## 2022-01-01 PROCEDURE — 74011250637 HC RX REV CODE- 250/637: Performed by: ANESTHESIOLOGY

## 2022-01-01 PROCEDURE — 88342 IMHCHEM/IMCYTCHM 1ST ANTB: CPT

## 2022-01-01 PROCEDURE — 82043 UR ALBUMIN QUANTITATIVE: CPT

## 2022-01-01 PROCEDURE — 85306 CLOT INHIBIT PROT S FREE: CPT

## 2022-01-01 PROCEDURE — 82306 VITAMIN D 25 HYDROXY: CPT

## 2022-01-01 PROCEDURE — 86787 VARICELLA-ZOSTER ANTIBODY: CPT

## 2022-01-01 PROCEDURE — 77030013406 HC CATH CTRL EDWD -B

## 2022-01-01 PROCEDURE — 77030009426 HC FCPS BIOP ENDOSC BSC -B: Performed by: INTERNAL MEDICINE

## 2022-01-01 PROCEDURE — 99232 SBSQ HOSP IP/OBS MODERATE 35: CPT | Performed by: SPECIALIST

## 2022-01-01 PROCEDURE — 77030014491 HC PLEDG PTFE BARD -A: Performed by: THORACIC SURGERY (CARDIOTHORACIC VASCULAR SURGERY)

## 2022-01-01 PROCEDURE — 77030020847 HC STATLOK BARD -A

## 2022-01-01 PROCEDURE — 84443 ASSAY THYROID STIM HORMONE: CPT

## 2022-01-01 PROCEDURE — 99233 SBSQ HOSP IP/OBS HIGH 50: CPT | Performed by: SPECIALIST

## 2022-01-01 PROCEDURE — 77030029641 HC PMP CARD PERC IMPELLA CP ABIM -L: Performed by: THORACIC SURGERY (CARDIOTHORACIC VASCULAR SURGERY)

## 2022-01-01 PROCEDURE — 77030020061 HC IV BLD WRMR ADMIN SET 3M -B: Performed by: STUDENT IN AN ORGANIZED HEALTH CARE EDUCATION/TRAINING PROGRAM

## 2022-01-01 PROCEDURE — 77030003029 HC SUT VCRL J&J -B: Performed by: THORACIC SURGERY (CARDIOTHORACIC VASCULAR SURGERY)

## 2022-01-01 PROCEDURE — 86706 HEP B SURFACE ANTIBODY: CPT

## 2022-01-01 PROCEDURE — 82533 TOTAL CORTISOL: CPT

## 2022-01-01 PROCEDURE — 77030002524 HC INSTR CLMP FGRTY EDWD -B: Performed by: THORACIC SURGERY (CARDIOTHORACIC VASCULAR SURGERY)

## 2022-01-01 PROCEDURE — 82784 ASSAY IGA/IGD/IGG/IGM EACH: CPT

## 2022-01-01 PROCEDURE — 74011000258 HC RX REV CODE- 258: Performed by: INTERNAL MEDICINE

## 2022-01-01 PROCEDURE — 80321 ALCOHOLS BIOMARKERS 1OR 2: CPT

## 2022-01-01 PROCEDURE — 77030019569 HC BND COMPR RAD TERU -B: Performed by: INTERNAL MEDICINE

## 2022-01-01 PROCEDURE — 86677 HELICOBACTER PYLORI ANTIBODY: CPT

## 2022-01-01 PROCEDURE — 77030037878 HC DRSG MEPILEX >48IN BORD MOLN -B

## 2022-01-01 PROCEDURE — 0BH17EZ INSERTION OF ENDOTRACHEAL AIRWAY INTO TRACHEA, VIA NATURAL OR ARTIFICIAL OPENING: ICD-10-PCS | Performed by: THORACIC SURGERY (CARDIOTHORACIC VASCULAR SURGERY)

## 2022-01-01 PROCEDURE — P9047 ALBUMIN (HUMAN), 25%, 50ML: HCPCS | Performed by: INTERNAL MEDICINE

## 2022-01-01 PROCEDURE — 85613 RUSSELL VIPER VENOM DILUTED: CPT

## 2022-01-01 PROCEDURE — 74011000636 HC RX REV CODE- 636: Performed by: INTERNAL MEDICINE

## 2022-01-01 PROCEDURE — 99231 SBSQ HOSP IP/OBS SF/LOW 25: CPT | Performed by: INTERNAL MEDICINE

## 2022-01-01 PROCEDURE — C1887 CATHETER, GUIDING: HCPCS | Performed by: INTERNAL MEDICINE

## 2022-01-01 PROCEDURE — 74011250636 HC RX REV CODE- 250/636: Performed by: FAMILY MEDICINE

## 2022-01-01 PROCEDURE — 77030005513 HC CATH URETH FOL11 MDII -B: Performed by: THORACIC SURGERY (CARDIOTHORACIC VASCULAR SURGERY)

## 2022-01-01 PROCEDURE — 81025 URINE PREGNANCY TEST: CPT

## 2022-01-01 PROCEDURE — B2111ZZ FLUOROSCOPY OF MULTIPLE CORONARY ARTERIES USING LOW OSMOLAR CONTRAST: ICD-10-PCS | Performed by: INTERNAL MEDICINE

## 2022-01-01 PROCEDURE — 97116 GAIT TRAINING THERAPY: CPT

## 2022-01-01 PROCEDURE — C1874 STENT, COATED/COV W/DEL SYS: HCPCS | Performed by: INTERNAL MEDICINE

## 2022-01-01 PROCEDURE — 86790 VIRUS ANTIBODY NOS: CPT

## 2022-01-01 PROCEDURE — 86777 TOXOPLASMA ANTIBODY: CPT

## 2022-01-01 PROCEDURE — 93922 UPR/L XTREMITY ART 2 LEVELS: CPT

## 2022-01-01 PROCEDURE — 65660000000 HC RM CCU STEPDOWN

## 2022-01-01 PROCEDURE — C1725 CATH, TRANSLUMIN NON-LASER: HCPCS | Performed by: INTERNAL MEDICINE

## 2022-01-01 PROCEDURE — 77030004532 HC CATH ANGI DX IMP BSC -A: Performed by: INTERNAL MEDICINE

## 2022-01-01 PROCEDURE — 0202U NFCT DS 22 TRGT SARS-COV-2: CPT

## 2022-01-01 PROCEDURE — 2709999900 HC NON-CHARGEABLE SUPPLY: Performed by: THORACIC SURGERY (CARDIOTHORACIC VASCULAR SURGERY)

## 2022-01-01 PROCEDURE — 74011000258 HC RX REV CODE- 258: Performed by: THORACIC SURGERY (CARDIOTHORACIC VASCULAR SURGERY)

## 2022-01-01 PROCEDURE — P9047 ALBUMIN (HUMAN), 25%, 50ML: HCPCS | Performed by: EMERGENCY MEDICINE

## 2022-01-01 PROCEDURE — APPNB30 APP NON BILLABLE TIME 0-30 MINS: Performed by: NURSE PRACTITIONER

## 2022-01-01 PROCEDURE — 86038 ANTINUCLEAR ANTIBODIES: CPT

## 2022-01-01 PROCEDURE — 87070 CULTURE OTHR SPECIMN AEROBIC: CPT

## 2022-01-01 PROCEDURE — 99223 1ST HOSP IP/OBS HIGH 75: CPT | Performed by: INTERNAL MEDICINE

## 2022-01-01 PROCEDURE — C1894 INTRO/SHEATH, NON-LASER: HCPCS | Performed by: INTERNAL MEDICINE

## 2022-01-01 PROCEDURE — 77030013406 HC CATH CTRL EDWD -B: Performed by: INTERNAL MEDICINE

## 2022-01-01 PROCEDURE — 77030002986 HC SUT PROL J&J -A: Performed by: THORACIC SURGERY (CARDIOTHORACIC VASCULAR SURGERY)

## 2022-01-01 PROCEDURE — 77030027957 HC TBNG IRR ENDOGTR BUSS -B: Performed by: INTERNAL MEDICINE

## 2022-01-01 PROCEDURE — 77030004532 HC CATH ANGI DX IMP BSC -A: Performed by: THORACIC SURGERY (CARDIOTHORACIC VASCULAR SURGERY)

## 2022-01-01 PROCEDURE — A9538 TC99M PYROPHOSPHATE: HCPCS

## 2022-01-01 PROCEDURE — 84439 ASSAY OF FREE THYROXINE: CPT

## 2022-01-01 PROCEDURE — 99153 MOD SED SAME PHYS/QHP EA: CPT | Performed by: INTERNAL MEDICINE

## 2022-01-01 PROCEDURE — 33990 INSJ PERQ VAD L HRT ARTERIAL: CPT | Performed by: PHYSICIAN ASSISTANT

## 2022-01-01 PROCEDURE — 76060000034 HC ANESTHESIA 1.5 TO 2 HR: Performed by: INTERNAL MEDICINE

## 2022-01-01 PROCEDURE — B24BZZ4 ULTRASONOGRAPHY OF HEART WITH AORTA, TRANSESOPHAGEAL: ICD-10-PCS | Performed by: STUDENT IN AN ORGANIZED HEALTH CARE EDUCATION/TRAINING PROGRAM

## 2022-01-01 PROCEDURE — 77030037877 HC DRSG MEPILEX >48IN BORD MOLN -A

## 2022-01-01 PROCEDURE — 87635 SARS-COV-2 COVID-19 AMP PRB: CPT

## 2022-01-01 PROCEDURE — 76010000110 HC CV SURG 3 TO 3.5 HR: Performed by: THORACIC SURGERY (CARDIOTHORACIC VASCULAR SURGERY)

## 2022-01-01 PROCEDURE — 74011636637 HC RX REV CODE- 636/637: Performed by: THORACIC SURGERY (CARDIOTHORACIC VASCULAR SURGERY)

## 2022-01-01 PROCEDURE — 77030040361 HC SLV COMPR DVT MDII -B

## 2022-01-01 PROCEDURE — 94762 N-INVAS EAR/PLS OXIMTRY CONT: CPT

## 2022-01-01 PROCEDURE — 0DBH8ZZ EXCISION OF CECUM, VIA NATURAL OR ARTIFICIAL OPENING ENDOSCOPIC: ICD-10-PCS | Performed by: INTERNAL MEDICINE

## 2022-01-01 PROCEDURE — 82553 CREATINE MB FRACTION: CPT

## 2022-01-01 PROCEDURE — 76937 US GUIDE VASCULAR ACCESS: CPT | Performed by: INTERNAL MEDICINE

## 2022-01-01 PROCEDURE — 86735 MUMPS ANTIBODY: CPT

## 2022-01-01 PROCEDURE — 88305 TISSUE EXAM BY PATHOLOGIST: CPT

## 2022-01-01 PROCEDURE — C1768 GRAFT, VASCULAR: HCPCS | Performed by: THORACIC SURGERY (CARDIOTHORACIC VASCULAR SURGERY)

## 2022-01-01 PROCEDURE — 34716 OPN AX/SUBCLA ART EXPOS CNDT: CPT | Performed by: PHYSICIAN ASSISTANT

## 2022-01-01 PROCEDURE — 77030041955 HC PMP IMPELLA ABIM -L: Performed by: THORACIC SURGERY (CARDIOTHORACIC VASCULAR SURGERY)

## 2022-01-01 PROCEDURE — 81240 F2 GENE: CPT

## 2022-01-01 PROCEDURE — 71250 CT THORAX DX C-: CPT

## 2022-01-01 PROCEDURE — 93355 ECHO TRANSESOPHAGEAL (TEE): CPT | Performed by: THORACIC SURGERY (CARDIOTHORACIC VASCULAR SURGERY)

## 2022-01-01 PROCEDURE — 74011250636 HC RX REV CODE- 250/636: Performed by: NURSE ANESTHETIST, CERTIFIED REGISTERED

## 2022-01-01 PROCEDURE — 87186 SC STD MICRODIL/AGAR DIL: CPT

## 2022-01-01 PROCEDURE — 86664 EPSTEIN-BARR NUCLEAR ANTIGEN: CPT

## 2022-01-01 PROCEDURE — 99285 EMERGENCY DEPT VISIT HI MDM: CPT

## 2022-01-01 PROCEDURE — 97166 OT EVAL MOD COMPLEX 45 MIN: CPT

## 2022-01-01 PROCEDURE — 5A1955Z RESPIRATORY VENTILATION, GREATER THAN 96 CONSECUTIVE HOURS: ICD-10-PCS | Performed by: INTERNAL MEDICINE

## 2022-01-01 PROCEDURE — 02HV33Z INSERTION OF INFUSION DEVICE INTO SUPERIOR VENA CAVA, PERCUTANEOUS APPROACH: ICD-10-PCS | Performed by: OBSTETRICS & GYNECOLOGY

## 2022-01-01 PROCEDURE — 77030026438 HC STYL ET INTUB CARD -A: Performed by: STUDENT IN AN ORGANIZED HEALTH CARE EDUCATION/TRAINING PROGRAM

## 2022-01-01 PROCEDURE — 80074 ACUTE HEPATITIS PANEL: CPT

## 2022-01-01 DEVICE — STENT RONYX27512UX RESOLUTE ONYX 2.75X12
Type: IMPLANTABLE DEVICE | Status: FUNCTIONAL
Brand: RESOLUTE ONYX™

## 2022-01-01 DEVICE — HEMASHIELD PLATINUM WOVEN STRAIGHT DOUBLE VELOUR VASCULAR GRAFT WITH GRAFT SIZER ACCESSORY
Type: IMPLANTABLE DEVICE | Site: AXILLARY | Status: FUNCTIONAL
Brand: HEMASHIELD

## 2022-01-01 RX ORDER — VENLAFAXINE 37.5 MG/1
75 TABLET ORAL DAILY
Status: DISCONTINUED | OUTPATIENT
Start: 2022-01-01 | End: 2022-01-01 | Stop reason: ALTCHOICE

## 2022-01-01 RX ORDER — ARFORMOTEROL TARTRATE 15 UG/2ML
15 SOLUTION RESPIRATORY (INHALATION)
Status: DISCONTINUED | OUTPATIENT
Start: 2022-01-01 | End: 2022-02-04 | Stop reason: HOSPADM

## 2022-01-01 RX ORDER — LEVOTHYROXINE SODIUM 50 UG/1
100 TABLET ORAL
Status: DISCONTINUED | OUTPATIENT
Start: 2022-01-01 | End: 2022-02-04 | Stop reason: HOSPADM

## 2022-01-01 RX ORDER — DEXTROSE MONOHYDRATE 50 MG/ML
75 INJECTION, SOLUTION INTRAVENOUS CONTINUOUS
Status: DISCONTINUED | OUTPATIENT
Start: 2022-01-01 | End: 2022-02-04 | Stop reason: HOSPADM

## 2022-01-01 RX ORDER — MILRINONE LACTATE 0.2 MG/ML
0.2 INJECTION, SOLUTION INTRAVENOUS CONTINUOUS
Status: DISCONTINUED | OUTPATIENT
Start: 2022-01-01 | End: 2022-01-01

## 2022-01-01 RX ORDER — MORPHINE SULFATE 4 MG/ML
4 INJECTION INTRAVENOUS ONCE
Status: COMPLETED | OUTPATIENT
Start: 2022-01-01 | End: 2022-01-01

## 2022-01-01 RX ORDER — MIDAZOLAM HYDROCHLORIDE 1 MG/ML
1 INJECTION, SOLUTION INTRAMUSCULAR; INTRAVENOUS
Status: DISCONTINUED | OUTPATIENT
Start: 2022-01-01 | End: 2022-01-01

## 2022-01-01 RX ORDER — HEPARIN SODIUM 5000 [USP'U]/100ML
4-20 INJECTION, SOLUTION INTRAVENOUS
Status: DISCONTINUED | OUTPATIENT
Start: 2022-01-01 | End: 2022-01-01

## 2022-01-01 RX ORDER — ROCURONIUM BROMIDE 10 MG/ML
INJECTION, SOLUTION INTRAVENOUS AS NEEDED
Status: DISCONTINUED | OUTPATIENT
Start: 2022-01-01 | End: 2022-01-01 | Stop reason: HOSPADM

## 2022-01-01 RX ORDER — ONDANSETRON 2 MG/ML
4 INJECTION INTRAMUSCULAR; INTRAVENOUS
Status: DISCONTINUED | OUTPATIENT
Start: 2022-01-01 | End: 2022-02-04 | Stop reason: HOSPADM

## 2022-01-01 RX ORDER — BACITRACIN 500 UNIT/G
1 PACKET (EA) TOPICAL AS NEEDED
Status: DISCONTINUED | OUTPATIENT
Start: 2022-01-01 | End: 2022-02-04 | Stop reason: HOSPADM

## 2022-01-01 RX ORDER — DEXMEDETOMIDINE HYDROCHLORIDE 4 UG/ML
.1-1.5 INJECTION, SOLUTION INTRAVENOUS
Status: DISCONTINUED | OUTPATIENT
Start: 2022-01-01 | End: 2022-02-04 | Stop reason: HOSPADM

## 2022-01-01 RX ORDER — BUMETANIDE 0.25 MG/ML
1 INJECTION INTRAMUSCULAR; INTRAVENOUS 2 TIMES DAILY
Status: DISCONTINUED | OUTPATIENT
Start: 2022-01-01 | End: 2022-02-04 | Stop reason: HOSPADM

## 2022-01-01 RX ORDER — ALBUMIN HUMAN 50 G/1000ML
SOLUTION INTRAVENOUS
Status: COMPLETED
Start: 2022-01-01 | End: 2022-01-01

## 2022-01-01 RX ORDER — BUMETANIDE 0.25 MG/ML
1 INJECTION INTRAMUSCULAR; INTRAVENOUS ONCE
Status: COMPLETED | OUTPATIENT
Start: 2022-01-01 | End: 2022-01-01

## 2022-01-01 RX ORDER — MORPHINE SULFATE 2 MG/ML
2 INJECTION, SOLUTION INTRAMUSCULAR; INTRAVENOUS
Status: DISCONTINUED | OUTPATIENT
Start: 2022-01-01 | End: 2022-01-01 | Stop reason: HOSPADM

## 2022-01-01 RX ORDER — LIDOCAINE HYDROCHLORIDE 10 MG/ML
INJECTION INFILTRATION; PERINEURAL AS NEEDED
Status: DISCONTINUED | OUTPATIENT
Start: 2022-01-01 | End: 2022-01-01 | Stop reason: HOSPADM

## 2022-01-01 RX ORDER — GUAIFENESIN 100 MG/5ML
81 LIQUID (ML) ORAL DAILY
Status: DISCONTINUED | OUTPATIENT
Start: 2022-01-01 | End: 2022-02-04 | Stop reason: HOSPADM

## 2022-01-01 RX ORDER — MILRINONE LACTATE 0.2 MG/ML
0.12 INJECTION, SOLUTION INTRAVENOUS CONTINUOUS
Status: DISCONTINUED | OUTPATIENT
Start: 2022-01-01 | End: 2022-01-01

## 2022-01-01 RX ORDER — POTASSIUM CHLORIDE 29.8 MG/ML
20 INJECTION INTRAVENOUS
Status: COMPLETED | OUTPATIENT
Start: 2022-01-01 | End: 2022-01-01

## 2022-01-01 RX ORDER — PHENYLEPHRINE HCL IN 0.9% NACL 0.4MG/10ML
SYRINGE (ML) INTRAVENOUS
Status: COMPLETED
Start: 2022-01-01 | End: 2022-01-01

## 2022-01-01 RX ORDER — BUMETANIDE 0.25 MG/ML
2 INJECTION INTRAMUSCULAR; INTRAVENOUS ONCE
Status: COMPLETED | OUTPATIENT
Start: 2022-01-01 | End: 2022-01-01

## 2022-01-01 RX ORDER — SODIUM CHLORIDE 9 MG/ML
25 INJECTION, SOLUTION INTRAVENOUS CONTINUOUS
Status: DISCONTINUED | OUTPATIENT
Start: 2022-01-01 | End: 2022-01-01 | Stop reason: HOSPADM

## 2022-01-01 RX ORDER — SODIUM CHLORIDE 0.9 % (FLUSH) 0.9 %
10 SYRINGE (ML) INJECTION DAILY
Status: DISCONTINUED | OUTPATIENT
Start: 2022-01-01 | End: 2022-02-04 | Stop reason: HOSPADM

## 2022-01-01 RX ORDER — AZELASTINE HCL 205.5 UG/1
1 SPRAY NASAL
COMMUNITY

## 2022-01-01 RX ORDER — MAGNESIUM SULFATE HEPTAHYDRATE 40 MG/ML
2 INJECTION, SOLUTION INTRAVENOUS ONCE
Status: DISPENSED | OUTPATIENT
Start: 2022-01-01 | End: 2022-01-01

## 2022-01-01 RX ORDER — AMOXICILLIN 250 MG
2 CAPSULE ORAL 2 TIMES DAILY
Status: DISCONTINUED | OUTPATIENT
Start: 2022-01-01 | End: 2022-01-01 | Stop reason: SDUPTHER

## 2022-01-01 RX ORDER — DIPHENHYDRAMINE HYDROCHLORIDE 50 MG/ML
25 INJECTION, SOLUTION INTRAMUSCULAR; INTRAVENOUS
Status: DISCONTINUED | OUTPATIENT
Start: 2022-01-01 | End: 2022-01-01

## 2022-01-01 RX ORDER — ACETAMINOPHEN 650 MG/1
650 SUPPOSITORY RECTAL
Status: DISCONTINUED | OUTPATIENT
Start: 2022-01-01 | End: 2022-02-04 | Stop reason: HOSPADM

## 2022-01-01 RX ORDER — POTASSIUM CHLORIDE 29.8 MG/ML
20 INJECTION INTRAVENOUS ONCE
Status: COMPLETED | OUTPATIENT
Start: 2022-01-01 | End: 2022-01-01

## 2022-01-01 RX ORDER — SODIUM CHLORIDE, SODIUM LACTATE, POTASSIUM CHLORIDE, CALCIUM CHLORIDE 600; 310; 30; 20 MG/100ML; MG/100ML; MG/100ML; MG/100ML
100 INJECTION, SOLUTION INTRAVENOUS CONTINUOUS
Status: DISCONTINUED | OUTPATIENT
Start: 2022-01-01 | End: 2022-01-01 | Stop reason: HOSPADM

## 2022-01-01 RX ORDER — PROPOFOL 10 MG/ML
INJECTION, EMULSION INTRAVENOUS
Status: DISCONTINUED | OUTPATIENT
Start: 2022-01-01 | End: 2022-01-01 | Stop reason: HOSPADM

## 2022-01-01 RX ORDER — POTASSIUM CHLORIDE 29.8 MG/ML
20 INJECTION INTRAVENOUS
Status: DISCONTINUED | OUTPATIENT
Start: 2022-01-01 | End: 2022-01-01

## 2022-01-01 RX ORDER — SODIUM CHLORIDE 9 MG/ML
500 INJECTION, SOLUTION INTRAVENOUS CONTINUOUS
Status: DISCONTINUED | OUTPATIENT
Start: 2022-01-01 | End: 2022-01-01

## 2022-01-01 RX ORDER — DOBUTAMINE HYDROCHLORIDE 200 MG/100ML
0-10 INJECTION INTRAVENOUS
Status: DISCONTINUED | OUTPATIENT
Start: 2022-01-01 | End: 2022-02-04 | Stop reason: HOSPADM

## 2022-01-01 RX ORDER — FENTANYL CITRATE 50 UG/ML
INJECTION, SOLUTION INTRAMUSCULAR; INTRAVENOUS AS NEEDED
Status: DISCONTINUED | OUTPATIENT
Start: 2022-01-01 | End: 2022-01-01 | Stop reason: HOSPADM

## 2022-01-01 RX ORDER — SODIUM CHLORIDE 0.9 % (FLUSH) 0.9 %
5-40 SYRINGE (ML) INJECTION EVERY 8 HOURS
Status: DISCONTINUED | OUTPATIENT
Start: 2022-01-01 | End: 2022-01-01

## 2022-01-01 RX ORDER — ROPIVACAINE HYDROCHLORIDE 5 MG/ML
30 INJECTION, SOLUTION EPIDURAL; INFILTRATION; PERINEURAL AS NEEDED
Status: DISCONTINUED | OUTPATIENT
Start: 2022-01-01 | End: 2022-01-01 | Stop reason: HOSPADM

## 2022-01-01 RX ORDER — LEVOCETIRIZINE DIHYDROCHLORIDE 5 MG/1
TABLET, FILM COATED ORAL
Qty: 90 TABLET | Refills: 0 | Status: SHIPPED | OUTPATIENT
Start: 2022-01-01

## 2022-01-01 RX ORDER — INSULIN LISPRO 100 [IU]/ML
INJECTION, SOLUTION INTRAVENOUS; SUBCUTANEOUS EVERY 6 HOURS
Status: DISCONTINUED | OUTPATIENT
Start: 2022-01-01 | End: 2022-02-04 | Stop reason: HOSPADM

## 2022-01-01 RX ORDER — ALBUMIN HUMAN 50 G/1000ML
12.5 SOLUTION INTRAVENOUS
Status: DISCONTINUED | OUTPATIENT
Start: 2022-01-01 | End: 2022-01-01

## 2022-01-01 RX ORDER — FACIAL-BODY WIPES
10 EACH TOPICAL DAILY PRN
Status: DISCONTINUED | OUTPATIENT
Start: 2022-01-01 | End: 2022-02-04 | Stop reason: HOSPADM

## 2022-01-01 RX ORDER — AMOXICILLIN 250 MG
1 CAPSULE ORAL 2 TIMES DAILY
Status: DISCONTINUED | OUTPATIENT
Start: 2022-01-01 | End: 2022-02-04 | Stop reason: HOSPADM

## 2022-01-01 RX ORDER — CHLORHEXIDINE GLUCONATE 1.2 MG/ML
15 RINSE ORAL EVERY 12 HOURS
Status: DISCONTINUED | OUTPATIENT
Start: 2022-01-01 | End: 2022-02-04 | Stop reason: HOSPADM

## 2022-01-01 RX ORDER — DOBUTAMINE HYDROCHLORIDE 200 MG/100ML
0-10 INJECTION INTRAVENOUS
Status: DISCONTINUED | OUTPATIENT
Start: 2022-01-01 | End: 2022-01-01

## 2022-01-01 RX ORDER — MAGNESIUM SULFATE 1 G/100ML
1 INJECTION INTRAVENOUS AS NEEDED
Status: DISCONTINUED | OUTPATIENT
Start: 2022-01-01 | End: 2022-02-04 | Stop reason: HOSPADM

## 2022-01-01 RX ORDER — MAGNESIUM SULFATE 100 %
4 CRYSTALS MISCELLANEOUS AS NEEDED
Status: DISCONTINUED | OUTPATIENT
Start: 2022-01-01 | End: 2022-02-04 | Stop reason: HOSPADM

## 2022-01-01 RX ORDER — CALCITRIOL 0.5 UG/1
CAPSULE ORAL
Qty: 90 CAPSULE | Refills: 0 | Status: SHIPPED | OUTPATIENT
Start: 2022-01-01

## 2022-01-01 RX ORDER — INSULIN LISPRO 100 [IU]/ML
INJECTION, SOLUTION INTRAVENOUS; SUBCUTANEOUS
Status: DISCONTINUED | OUTPATIENT
Start: 2022-01-01 | End: 2022-01-01

## 2022-01-01 RX ORDER — ALBUMIN HUMAN 250 G/1000ML
12.5 SOLUTION INTRAVENOUS DAILY
Status: DISCONTINUED | OUTPATIENT
Start: 2022-01-01 | End: 2022-02-04 | Stop reason: HOSPADM

## 2022-01-01 RX ORDER — ALBUMIN HUMAN 250 G/1000ML
12.5 SOLUTION INTRAVENOUS
Status: COMPLETED | OUTPATIENT
Start: 2022-01-01 | End: 2022-01-01

## 2022-01-01 RX ORDER — SODIUM CHLORIDE 9 MG/ML
INJECTION, SOLUTION INTRAVENOUS
Status: DISCONTINUED | OUTPATIENT
Start: 2022-01-01 | End: 2022-01-01 | Stop reason: HOSPADM

## 2022-01-01 RX ORDER — SODIUM CHLORIDE 9 MG/ML
10 INJECTION, SOLUTION INTRAVENOUS CONTINUOUS
Status: DISCONTINUED | OUTPATIENT
Start: 2022-01-01 | End: 2022-01-01

## 2022-01-01 RX ORDER — MUPIROCIN 20 MG/G
OINTMENT TOPICAL 2 TIMES DAILY
Status: COMPLETED | OUTPATIENT
Start: 2022-01-01 | End: 2022-01-01

## 2022-01-01 RX ORDER — SODIUM CHLORIDE 9 MG/ML
9 INJECTION, SOLUTION INTRAVENOUS CONTINUOUS
Status: DISCONTINUED | OUTPATIENT
Start: 2022-01-01 | End: 2022-02-04 | Stop reason: HOSPADM

## 2022-01-01 RX ORDER — SODIUM CHLORIDE 0.9 % (FLUSH) 0.9 %
5-40 SYRINGE (ML) INJECTION AS NEEDED
Status: DISCONTINUED | OUTPATIENT
Start: 2022-01-01 | End: 2022-01-01 | Stop reason: HOSPADM

## 2022-01-01 RX ORDER — CISATRACURIUM BESYLATE 2 MG/ML
INJECTION, SOLUTION INTRAVENOUS AS NEEDED
Status: DISCONTINUED | OUTPATIENT
Start: 2022-01-01 | End: 2022-01-01 | Stop reason: HOSPADM

## 2022-01-01 RX ORDER — PROPOFOL 10 MG/ML
0-50 VIAL (ML) INTRAVENOUS
Status: DISCONTINUED | OUTPATIENT
Start: 2022-01-01 | End: 2022-01-01

## 2022-01-01 RX ORDER — HYDRALAZINE HYDROCHLORIDE 20 MG/ML
5 INJECTION INTRAMUSCULAR; INTRAVENOUS
Status: DISCONTINUED | OUTPATIENT
Start: 2022-01-01 | End: 2022-02-04 | Stop reason: HOSPADM

## 2022-01-01 RX ORDER — PEPPERMINT OIL
SPIRIT ORAL ONCE
Status: COMPLETED | OUTPATIENT
Start: 2022-01-01 | End: 2022-01-01

## 2022-01-01 RX ORDER — GABAPENTIN 100 MG/1
100 CAPSULE ORAL
Status: DISCONTINUED | OUTPATIENT
Start: 2022-01-01 | End: 2022-02-04 | Stop reason: HOSPADM

## 2022-01-01 RX ORDER — BUMETANIDE 0.25 MG/ML
2 INJECTION INTRAMUSCULAR; INTRAVENOUS 3 TIMES DAILY
Status: DISCONTINUED | OUTPATIENT
Start: 2022-01-01 | End: 2022-01-01

## 2022-01-01 RX ORDER — ALBUMIN HUMAN 50 G/1000ML
25 SOLUTION INTRAVENOUS ONCE
Status: ACTIVE | OUTPATIENT
Start: 2022-01-01 | End: 2022-01-01

## 2022-01-01 RX ORDER — CETIRIZINE HCL 10 MG
10 TABLET ORAL DAILY
Status: DISCONTINUED | OUTPATIENT
Start: 2022-01-01 | End: 2022-01-01

## 2022-01-01 RX ORDER — LEVOTHYROXINE SODIUM 100 UG/1
100 TABLET ORAL
Status: DISCONTINUED | OUTPATIENT
Start: 2022-01-01 | End: 2022-01-01 | Stop reason: SDUPTHER

## 2022-01-01 RX ORDER — BUMETANIDE 1 MG/1
1 TABLET ORAL 2 TIMES DAILY
Status: DISCONTINUED | OUTPATIENT
Start: 2022-01-01 | End: 2022-01-01

## 2022-01-01 RX ORDER — SODIUM CHLORIDE 9 MG/ML
250 INJECTION, SOLUTION INTRAVENOUS AS NEEDED
Status: DISCONTINUED | OUTPATIENT
Start: 2022-01-01 | End: 2022-01-01

## 2022-01-01 RX ORDER — FENTANYL CITRATE 50 UG/ML
25 INJECTION, SOLUTION INTRAMUSCULAR; INTRAVENOUS
Status: DISCONTINUED | OUTPATIENT
Start: 2022-01-01 | End: 2022-01-01 | Stop reason: HOSPADM

## 2022-01-01 RX ORDER — POLYETHYLENE GLYCOL 3350 17 G/17G
17 POWDER, FOR SOLUTION ORAL DAILY
Status: CANCELLED | OUTPATIENT
Start: 2022-01-01

## 2022-01-01 RX ORDER — MORPHINE SULFATE 2 MG/ML
2 INJECTION, SOLUTION INTRAMUSCULAR; INTRAVENOUS
Status: DISCONTINUED | OUTPATIENT
Start: 2022-01-01 | End: 2022-02-04 | Stop reason: HOSPADM

## 2022-01-01 RX ORDER — ALBUMIN HUMAN 50 G/1000ML
12.5 SOLUTION INTRAVENOUS ONCE
Status: COMPLETED | OUTPATIENT
Start: 2022-01-01 | End: 2022-01-01

## 2022-01-01 RX ORDER — MIDAZOLAM HYDROCHLORIDE 1 MG/ML
1 INJECTION, SOLUTION INTRAMUSCULAR; INTRAVENOUS AS NEEDED
Status: DISCONTINUED | OUTPATIENT
Start: 2022-01-01 | End: 2022-01-01 | Stop reason: HOSPADM

## 2022-01-01 RX ORDER — CARVEDILOL 6.25 MG/1
12.5 TABLET ORAL 2 TIMES DAILY WITH MEALS
Status: DISCONTINUED | OUTPATIENT
Start: 2022-01-01 | End: 2022-01-01

## 2022-01-01 RX ORDER — MILRINONE LACTATE 0.2 MG/ML
0.12 INJECTION, SOLUTION INTRAVENOUS CONTINUOUS
Status: DISCONTINUED | OUTPATIENT
Start: 2022-01-01 | End: 2022-02-04 | Stop reason: HOSPADM

## 2022-01-01 RX ORDER — BUMETANIDE 0.25 MG/ML
1 INJECTION INTRAMUSCULAR; INTRAVENOUS EVERY 12 HOURS
Status: DISCONTINUED | OUTPATIENT
Start: 2022-01-01 | End: 2022-01-01

## 2022-01-01 RX ORDER — OXYCODONE HYDROCHLORIDE 5 MG/1
10 TABLET ORAL
Status: DISCONTINUED | OUTPATIENT
Start: 2022-01-01 | End: 2022-01-01

## 2022-01-01 RX ORDER — DIPHENHYDRAMINE HYDROCHLORIDE 50 MG/ML
INJECTION, SOLUTION INTRAMUSCULAR; INTRAVENOUS AS NEEDED
Status: DISCONTINUED | OUTPATIENT
Start: 2022-01-01 | End: 2022-01-01 | Stop reason: HOSPADM

## 2022-01-01 RX ORDER — HEPARIN SODIUM 1000 [USP'U]/ML
INJECTION, SOLUTION INTRAVENOUS; SUBCUTANEOUS AS NEEDED
Status: DISCONTINUED | OUTPATIENT
Start: 2022-01-01 | End: 2022-01-01 | Stop reason: HOSPADM

## 2022-01-01 RX ORDER — CHLORHEXIDINE GLUCONATE 1.2 MG/ML
15 RINSE ORAL EVERY 12 HOURS
Status: DISCONTINUED | OUTPATIENT
Start: 2022-01-01 | End: 2022-01-01 | Stop reason: SDUPTHER

## 2022-01-01 RX ORDER — HYDROXYZINE HYDROCHLORIDE 10 MG/1
20 TABLET, FILM COATED ORAL
Status: DISCONTINUED | OUTPATIENT
Start: 2022-01-01 | End: 2022-01-01

## 2022-01-01 RX ORDER — CLOPIDOGREL 300 MG/1
600 TABLET, FILM COATED ORAL ONCE
Status: COMPLETED | OUTPATIENT
Start: 2022-01-01 | End: 2022-01-01

## 2022-01-01 RX ORDER — TRIAMCINOLONE ACETONIDE 1 MG/G
CREAM TOPICAL 2 TIMES DAILY
Status: DISCONTINUED | OUTPATIENT
Start: 2022-01-01 | End: 2022-02-04 | Stop reason: HOSPADM

## 2022-01-01 RX ORDER — INSULIN LISPRO 100 [IU]/ML
10 INJECTION, SOLUTION INTRAVENOUS; SUBCUTANEOUS
Status: DISCONTINUED | OUTPATIENT
Start: 2022-01-01 | End: 2022-01-01

## 2022-01-01 RX ORDER — CARVEDILOL 6.25 MG/1
6.25 TABLET ORAL 2 TIMES DAILY WITH MEALS
Status: DISCONTINUED | OUTPATIENT
Start: 2022-01-01 | End: 2022-01-01

## 2022-01-01 RX ORDER — NITROGLYCERIN 20 MG/100ML
16.5 INJECTION INTRAVENOUS CONTINUOUS
Status: DISCONTINUED | OUTPATIENT
Start: 2022-01-01 | End: 2022-01-01

## 2022-01-01 RX ORDER — MAGNESIUM SULFATE HEPTAHYDRATE 40 MG/ML
2 INJECTION, SOLUTION INTRAVENOUS ONCE
Status: COMPLETED | OUTPATIENT
Start: 2022-01-01 | End: 2022-01-01

## 2022-01-01 RX ORDER — MIDAZOLAM HYDROCHLORIDE 1 MG/ML
INJECTION, SOLUTION INTRAMUSCULAR; INTRAVENOUS AS NEEDED
Status: DISCONTINUED | OUTPATIENT
Start: 2022-01-01 | End: 2022-01-01 | Stop reason: HOSPADM

## 2022-01-01 RX ORDER — MAGNESIUM SULFATE 1 G/100ML
1 INJECTION INTRAVENOUS ONCE
Status: COMPLETED | OUTPATIENT
Start: 2022-01-01 | End: 2022-01-01

## 2022-01-01 RX ORDER — ALLOPURINOL 100 MG/1
100 TABLET ORAL DAILY
Status: DISCONTINUED | OUTPATIENT
Start: 2022-01-01 | End: 2022-02-04 | Stop reason: HOSPADM

## 2022-01-01 RX ORDER — GABAPENTIN 100 MG/1
100 CAPSULE ORAL DAILY
Status: DISCONTINUED | OUTPATIENT
Start: 2022-01-01 | End: 2022-01-01

## 2022-01-01 RX ORDER — ALBUMIN HUMAN 50 G/1000ML
12.5 SOLUTION INTRAVENOUS
Status: COMPLETED | OUTPATIENT
Start: 2022-01-01 | End: 2022-01-01

## 2022-01-01 RX ORDER — CLOPIDOGREL BISULFATE 75 MG/1
75 TABLET ORAL DAILY
Status: DISCONTINUED | OUTPATIENT
Start: 2022-01-01 | End: 2022-02-04 | Stop reason: HOSPADM

## 2022-01-01 RX ORDER — HEPARIN SODIUM 10000 [USP'U]/100ML
12-36 INJECTION, SOLUTION INTRAVENOUS
Status: DISCONTINUED | OUTPATIENT
Start: 2022-01-01 | End: 2022-01-01

## 2022-01-01 RX ORDER — ONDANSETRON 2 MG/ML
4 INJECTION INTRAMUSCULAR; INTRAVENOUS
Status: DISCONTINUED | OUTPATIENT
Start: 2022-01-01 | End: 2022-01-01

## 2022-01-01 RX ORDER — DOCUSATE SODIUM 100 MG/1
100 CAPSULE, LIQUID FILLED ORAL
COMMUNITY

## 2022-01-01 RX ORDER — ALLOPURINOL 100 MG/1
50 TABLET ORAL DAILY
Status: DISCONTINUED | OUTPATIENT
Start: 2022-01-01 | End: 2022-01-01

## 2022-01-01 RX ORDER — SODIUM CHLORIDE 9 MG/ML
INJECTION, SOLUTION INTRAVENOUS
Status: COMPLETED | OUTPATIENT
Start: 2022-01-01 | End: 2022-01-01

## 2022-01-01 RX ORDER — VENLAFAXINE HYDROCHLORIDE 37.5 MG/1
75 CAPSULE, EXTENDED RELEASE ORAL
Status: DISCONTINUED | OUTPATIENT
Start: 2022-01-01 | End: 2022-02-04 | Stop reason: HOSPADM

## 2022-01-01 RX ORDER — ISOSORBIDE DINITRATE 20 MG/1
10 TABLET ORAL 3 TIMES DAILY
Status: DISCONTINUED | OUTPATIENT
Start: 2022-01-01 | End: 2022-01-01

## 2022-01-01 RX ORDER — ALBUMIN HUMAN 250 G/1000ML
12.5 SOLUTION INTRAVENOUS 2 TIMES DAILY
Status: DISCONTINUED | OUTPATIENT
Start: 2022-01-01 | End: 2022-01-01

## 2022-01-01 RX ORDER — SODIUM CHLORIDE 0.9 % (FLUSH) 0.9 %
5-40 SYRINGE (ML) INJECTION EVERY 8 HOURS
Status: DISCONTINUED | OUTPATIENT
Start: 2022-01-01 | End: 2022-02-04 | Stop reason: HOSPADM

## 2022-01-01 RX ORDER — SODIUM CHLORIDE, SODIUM LACTATE, POTASSIUM CHLORIDE, CALCIUM CHLORIDE 600; 310; 30; 20 MG/100ML; MG/100ML; MG/100ML; MG/100ML
INJECTION, SOLUTION INTRAVENOUS
Status: DISCONTINUED | OUTPATIENT
Start: 2022-01-01 | End: 2022-01-01 | Stop reason: HOSPADM

## 2022-01-01 RX ORDER — HYDRALAZINE HYDROCHLORIDE 10 MG/1
10 TABLET, FILM COATED ORAL 3 TIMES DAILY
Status: DISCONTINUED | OUTPATIENT
Start: 2022-01-01 | End: 2022-01-01

## 2022-01-01 RX ORDER — DEXTROSE 50 % IN WATER (D50W) INTRAVENOUS SYRINGE
25-50 AS NEEDED
Status: DISCONTINUED | OUTPATIENT
Start: 2022-01-01 | End: 2022-02-04 | Stop reason: HOSPADM

## 2022-01-01 RX ORDER — PANTOPRAZOLE SODIUM 40 MG/10ML
40 INJECTION, POWDER, LYOPHILIZED, FOR SOLUTION INTRAVENOUS DAILY
Status: DISCONTINUED | OUTPATIENT
Start: 2022-01-01 | End: 2022-01-01

## 2022-01-01 RX ORDER — CARVEDILOL 6.25 MG/1
12.5 TABLET ORAL 2 TIMES DAILY WITH MEALS
Status: CANCELLED | OUTPATIENT
Start: 2022-01-01

## 2022-01-01 RX ORDER — ALBUTEROL SULFATE 0.83 MG/ML
2.5 SOLUTION RESPIRATORY (INHALATION)
Status: DISCONTINUED | OUTPATIENT
Start: 2022-01-01 | End: 2022-02-04 | Stop reason: HOSPADM

## 2022-01-01 RX ORDER — DIGOXIN 125 MCG
0.06 TABLET ORAL EVERY OTHER DAY
Status: DISCONTINUED | OUTPATIENT
Start: 2022-01-01 | End: 2022-02-04 | Stop reason: HOSPADM

## 2022-01-01 RX ORDER — POLYETHYLENE GLYCOL 3350 17 G/17G
17 POWDER, FOR SOLUTION ORAL DAILY
Status: DISCONTINUED | OUTPATIENT
Start: 2022-01-01 | End: 2022-02-04 | Stop reason: HOSPADM

## 2022-01-01 RX ORDER — CEFAZOLIN SODIUM 1 G/3ML
INJECTION, POWDER, FOR SOLUTION INTRAMUSCULAR; INTRAVENOUS AS NEEDED
Status: DISCONTINUED | OUTPATIENT
Start: 2022-01-01 | End: 2022-01-01 | Stop reason: HOSPADM

## 2022-01-01 RX ORDER — VENLAFAXINE HYDROCHLORIDE 75 MG/1
CAPSULE, EXTENDED RELEASE ORAL
Qty: 90 CAPSULE | Refills: 0 | Status: SHIPPED | OUTPATIENT
Start: 2022-01-01

## 2022-01-01 RX ORDER — ONDANSETRON 2 MG/ML
INJECTION INTRAMUSCULAR; INTRAVENOUS AS NEEDED
Status: DISCONTINUED | OUTPATIENT
Start: 2022-01-01 | End: 2022-01-01 | Stop reason: HOSPADM

## 2022-01-01 RX ORDER — MIDAZOLAM HYDROCHLORIDE 1 MG/ML
0.5 INJECTION, SOLUTION INTRAMUSCULAR; INTRAVENOUS
Status: DISCONTINUED | OUTPATIENT
Start: 2022-01-01 | End: 2022-01-01 | Stop reason: HOSPADM

## 2022-01-01 RX ORDER — SODIUM CHLORIDE 0.9 % (FLUSH) 0.9 %
5-40 SYRINGE (ML) INJECTION EVERY 8 HOURS
Status: DISCONTINUED | OUTPATIENT
Start: 2022-01-01 | End: 2022-01-01 | Stop reason: HOSPADM

## 2022-01-01 RX ORDER — ALBUMIN HUMAN 50 G/1000ML
25 SOLUTION INTRAVENOUS ONCE
Status: DISCONTINUED | OUTPATIENT
Start: 2022-01-01 | End: 2022-01-01

## 2022-01-01 RX ORDER — OXYCODONE HYDROCHLORIDE 5 MG/1
5 TABLET ORAL
Status: DISCONTINUED | OUTPATIENT
Start: 2022-01-01 | End: 2022-01-01

## 2022-01-01 RX ORDER — HYDROCORTISONE SODIUM SUCCINATE 100 MG/2ML
INJECTION, POWDER, FOR SOLUTION INTRAMUSCULAR; INTRAVENOUS AS NEEDED
Status: DISCONTINUED | OUTPATIENT
Start: 2022-01-01 | End: 2022-01-01 | Stop reason: HOSPADM

## 2022-01-01 RX ORDER — LANOLIN ALCOHOL/MO/W.PET/CERES
3-6 CREAM (GRAM) TOPICAL
Status: DISCONTINUED | OUTPATIENT
Start: 2022-01-01 | End: 2022-01-01

## 2022-01-01 RX ORDER — BUMETANIDE 0.25 MG/ML
2 INJECTION INTRAMUSCULAR; INTRAVENOUS EVERY 12 HOURS
Status: DISCONTINUED | OUTPATIENT
Start: 2022-01-01 | End: 2022-01-01

## 2022-01-01 RX ORDER — POLYETHYLENE GLYCOL 3350 17 G/17G
17 POWDER, FOR SOLUTION ORAL DAILY
Status: DISCONTINUED | OUTPATIENT
Start: 2022-01-01 | End: 2022-01-01

## 2022-01-01 RX ORDER — FENTANYL CITRATE 50 UG/ML
50 INJECTION, SOLUTION INTRAMUSCULAR; INTRAVENOUS AS NEEDED
Status: DISCONTINUED | OUTPATIENT
Start: 2022-01-01 | End: 2022-01-01 | Stop reason: HOSPADM

## 2022-01-01 RX ORDER — ETOMIDATE 2 MG/ML
INJECTION INTRAVENOUS AS NEEDED
Status: DISCONTINUED | OUTPATIENT
Start: 2022-01-01 | End: 2022-01-01 | Stop reason: HOSPADM

## 2022-01-01 RX ORDER — MILRINONE LACTATE 0.2 MG/ML
0.38 INJECTION, SOLUTION INTRAVENOUS CONTINUOUS
Status: DISCONTINUED | OUTPATIENT
Start: 2022-01-01 | End: 2022-01-01

## 2022-01-01 RX ORDER — CHLORHEXIDINE GLUCONATE 1.2 MG/ML
10 RINSE ORAL EVERY 12 HOURS
Status: DISCONTINUED | OUTPATIENT
Start: 2022-01-01 | End: 2022-01-01 | Stop reason: SDUPTHER

## 2022-01-01 RX ORDER — MUPIROCIN 20 MG/G
OINTMENT TOPICAL DAILY
Status: DISCONTINUED | OUTPATIENT
Start: 2022-01-01 | End: 2022-01-01 | Stop reason: SDUPTHER

## 2022-01-01 RX ORDER — PANTOPRAZOLE SODIUM 40 MG/10ML
40 INJECTION, POWDER, LYOPHILIZED, FOR SOLUTION INTRAVENOUS EVERY 12 HOURS
Status: DISCONTINUED | OUTPATIENT
Start: 2022-01-01 | End: 2022-02-04 | Stop reason: HOSPADM

## 2022-01-01 RX ORDER — PROPOFOL 10 MG/ML
INJECTION, EMULSION INTRAVENOUS AS NEEDED
Status: DISCONTINUED | OUTPATIENT
Start: 2022-01-01 | End: 2022-01-01 | Stop reason: HOSPADM

## 2022-01-01 RX ORDER — ALBUMIN HUMAN 50 G/1000ML
SOLUTION INTRAVENOUS
Status: DISCONTINUED
Start: 2022-01-01 | End: 2022-01-01

## 2022-01-01 RX ORDER — SODIUM CHLORIDE 0.9 % (FLUSH) 0.9 %
5-40 SYRINGE (ML) INJECTION AS NEEDED
Status: DISCONTINUED | OUTPATIENT
Start: 2022-01-01 | End: 2022-01-01

## 2022-01-01 RX ORDER — DIPHENHYDRAMINE HCL 25 MG
25 CAPSULE ORAL
Status: DISCONTINUED | OUTPATIENT
Start: 2022-01-01 | End: 2022-01-01

## 2022-01-01 RX ORDER — LEVOTHYROXINE SODIUM 100 UG/1
100 TABLET ORAL
Status: DISCONTINUED | OUTPATIENT
Start: 2022-01-01 | End: 2022-01-01

## 2022-01-01 RX ORDER — LANOLIN ALCOHOL/MO/W.PET/CERES
400 CREAM (GRAM) TOPICAL 2 TIMES DAILY
Status: DISCONTINUED | OUTPATIENT
Start: 2022-01-01 | End: 2022-02-04 | Stop reason: HOSPADM

## 2022-01-01 RX ORDER — HYDROMORPHONE HYDROCHLORIDE 1 MG/ML
0.5 INJECTION, SOLUTION INTRAMUSCULAR; INTRAVENOUS; SUBCUTANEOUS
Status: DISCONTINUED | OUTPATIENT
Start: 2022-01-01 | End: 2022-01-01

## 2022-01-01 RX ORDER — BUMETANIDE 0.25 MG/ML
2 INJECTION INTRAMUSCULAR; INTRAVENOUS 2 TIMES DAILY
Status: DISCONTINUED | OUTPATIENT
Start: 2022-01-01 | End: 2022-01-01

## 2022-01-01 RX ORDER — MILRINONE LACTATE 0.2 MG/ML
0.25 INJECTION, SOLUTION INTRAVENOUS CONTINUOUS
Status: DISCONTINUED | OUTPATIENT
Start: 2022-01-01 | End: 2022-01-01

## 2022-01-01 RX ORDER — INSULIN LISPRO 100 [IU]/ML
3 INJECTION, SOLUTION INTRAVENOUS; SUBCUTANEOUS ONCE
Status: COMPLETED | OUTPATIENT
Start: 2022-01-01 | End: 2022-01-01

## 2022-01-01 RX ORDER — LIDOCAINE HYDROCHLORIDE 10 MG/ML
0.1 INJECTION, SOLUTION EPIDURAL; INFILTRATION; INTRACAUDAL; PERINEURAL AS NEEDED
Status: DISCONTINUED | OUTPATIENT
Start: 2022-01-01 | End: 2022-01-01

## 2022-01-01 RX ORDER — DOCUSATE SODIUM 100 MG/1
100 CAPSULE, LIQUID FILLED ORAL 2 TIMES DAILY
Status: DISCONTINUED | OUTPATIENT
Start: 2022-01-01 | End: 2022-01-01

## 2022-01-01 RX ORDER — POTASSIUM CHLORIDE 29.8 MG/ML
20 INJECTION INTRAVENOUS ONCE
Status: DISPENSED | OUTPATIENT
Start: 2022-01-01 | End: 2022-01-01

## 2022-01-01 RX ORDER — HEPARIN SODIUM 1000 [USP'U]/ML
80 INJECTION, SOLUTION INTRAVENOUS; SUBCUTANEOUS ONCE
Status: COMPLETED | OUTPATIENT
Start: 2022-01-01 | End: 2022-01-01

## 2022-01-01 RX ORDER — APREMILAST 30 MG/1
30 TABLET, FILM COATED ORAL
COMMUNITY

## 2022-01-01 RX ORDER — BUMETANIDE 0.25 MG/ML
2 INJECTION INTRAMUSCULAR; INTRAVENOUS ONCE
Status: ACTIVE | OUTPATIENT
Start: 2022-01-01 | End: 2022-01-01

## 2022-01-01 RX ORDER — HYDROMORPHONE HYDROCHLORIDE 1 MG/ML
1 INJECTION, SOLUTION INTRAMUSCULAR; INTRAVENOUS; SUBCUTANEOUS
Status: DISCONTINUED | OUTPATIENT
Start: 2022-01-01 | End: 2022-01-01

## 2022-01-01 RX ORDER — SIMETHICONE 80 MG
80 TABLET,CHEWABLE ORAL
Status: DISCONTINUED | OUTPATIENT
Start: 2022-01-01 | End: 2022-01-01

## 2022-01-01 RX ORDER — CARVEDILOL 12.5 MG/1
6.25 TABLET ORAL 2 TIMES DAILY WITH MEALS
Status: DISCONTINUED | OUTPATIENT
Start: 2022-01-01 | End: 2022-01-01

## 2022-01-01 RX ORDER — DIPHENHYDRAMINE HYDROCHLORIDE 50 MG/ML
12.5 INJECTION, SOLUTION INTRAMUSCULAR; INTRAVENOUS AS NEEDED
Status: DISCONTINUED | OUTPATIENT
Start: 2022-01-01 | End: 2022-01-01 | Stop reason: HOSPADM

## 2022-01-01 RX ORDER — ALBUMIN HUMAN 250 G/1000ML
25 SOLUTION INTRAVENOUS ONCE
Status: COMPLETED | OUTPATIENT
Start: 2022-01-01 | End: 2022-01-01

## 2022-01-01 RX ORDER — ACETAMINOPHEN 500 MG
2000 TABLET ORAL 2 TIMES DAILY
Qty: 180 CAPSULE | Refills: 0 | Status: SHIPPED | OUTPATIENT
Start: 2022-01-01

## 2022-01-01 RX ORDER — PANTOPRAZOLE SODIUM 40 MG/1
40 TABLET, DELAYED RELEASE ORAL
Status: DISCONTINUED | OUTPATIENT
Start: 2022-01-01 | End: 2022-01-01

## 2022-01-01 RX ORDER — ACETAMINOPHEN 325 MG/1
650 TABLET ORAL
Status: DISCONTINUED | OUTPATIENT
Start: 2022-01-01 | End: 2022-02-04 | Stop reason: HOSPADM

## 2022-01-01 RX ORDER — HEPARIN SODIUM 200 [USP'U]/100ML
INJECTION, SOLUTION INTRAVENOUS
Status: COMPLETED | OUTPATIENT
Start: 2022-01-01 | End: 2022-01-01

## 2022-01-01 RX ORDER — MONTELUKAST SODIUM 10 MG/1
TABLET ORAL
Qty: 90 TABLET | Refills: 0 | Status: SHIPPED | OUTPATIENT
Start: 2022-01-01

## 2022-01-01 RX ORDER — ALBUMIN HUMAN 50 G/1000ML
12.5 SOLUTION INTRAVENOUS 2 TIMES DAILY
Status: DISCONTINUED | OUTPATIENT
Start: 2022-01-01 | End: 2022-01-01

## 2022-01-01 RX ORDER — HEPARIN SOD,PORCINE/0.9 % NACL 30K/1000ML
50-1000 INTRAVENOUS SOLUTION INTRAVENOUS AS NEEDED
Status: DISCONTINUED | OUTPATIENT
Start: 2022-01-01 | End: 2022-02-04 | Stop reason: HOSPADM

## 2022-01-01 RX ORDER — SODIUM,POTASSIUM PHOSPHATES 280-250MG
1 POWDER IN PACKET (EA) ORAL 4 TIMES DAILY
Status: COMPLETED | OUTPATIENT
Start: 2022-01-01 | End: 2022-01-01

## 2022-01-01 RX ORDER — LEVOTHYROXINE SODIUM 100 UG/1
100 TABLET ORAL
COMMUNITY

## 2022-01-01 RX ORDER — SODIUM CHLORIDE 0.9 % (FLUSH) 0.9 %
10 SYRINGE (ML) INJECTION DAILY
Status: DISCONTINUED | OUTPATIENT
Start: 2022-01-01 | End: 2022-01-01

## 2022-01-01 RX ORDER — BUMETANIDE 0.25 MG/ML
2 INJECTION INTRAMUSCULAR; INTRAVENOUS EVERY 8 HOURS
Status: DISCONTINUED | OUTPATIENT
Start: 2022-01-01 | End: 2022-01-01

## 2022-01-01 RX ORDER — BUMETANIDE 0.25 MG/ML
1 INJECTION INTRAMUSCULAR; INTRAVENOUS
Status: DISCONTINUED | OUTPATIENT
Start: 2022-01-01 | End: 2022-02-04 | Stop reason: HOSPADM

## 2022-01-01 RX ORDER — HEPARIN SODIUM 5000 [USP'U]/ML
5000 INJECTION, SOLUTION INTRAVENOUS; SUBCUTANEOUS EVERY 8 HOURS
Status: DISCONTINUED | OUTPATIENT
Start: 2022-01-01 | End: 2022-01-01

## 2022-01-01 RX ORDER — LEVOTHYROXINE SODIUM 100 UG/1
TABLET ORAL
Qty: 90 TABLET | Refills: 1 | Status: SHIPPED | OUTPATIENT
Start: 2022-01-01 | End: 2022-01-01

## 2022-01-01 RX ORDER — ALLOPURINOL 100 MG/1
TABLET ORAL
Qty: 90 TABLET | Refills: 0 | Status: SHIPPED | OUTPATIENT
Start: 2022-01-01

## 2022-01-01 RX ORDER — ONDANSETRON 2 MG/ML
4 INJECTION INTRAMUSCULAR; INTRAVENOUS AS NEEDED
Status: DISCONTINUED | OUTPATIENT
Start: 2022-01-01 | End: 2022-01-01 | Stop reason: HOSPADM

## 2022-01-01 RX ORDER — DIGOXIN 125 MCG
0.06 TABLET ORAL DAILY
Status: DISCONTINUED | OUTPATIENT
Start: 2022-01-01 | End: 2022-01-01

## 2022-01-01 RX ORDER — ACETAMINOPHEN 325 MG/1
650 TABLET ORAL ONCE
Status: DISCONTINUED | OUTPATIENT
Start: 2022-01-01 | End: 2022-01-01 | Stop reason: HOSPADM

## 2022-01-01 RX ORDER — SODIUM CHLORIDE 0.9 % (FLUSH) 0.9 %
5-40 SYRINGE (ML) INJECTION AS NEEDED
Status: DISCONTINUED | OUTPATIENT
Start: 2022-01-01 | End: 2022-02-04 | Stop reason: HOSPADM

## 2022-01-01 RX ORDER — SODIUM CHLORIDE 450 MG/100ML
10 INJECTION, SOLUTION INTRAVENOUS CONTINUOUS
Status: DISCONTINUED | OUTPATIENT
Start: 2022-01-01 | End: 2022-01-01

## 2022-01-01 RX ORDER — IPRATROPIUM BROMIDE AND ALBUTEROL SULFATE 2.5; .5 MG/3ML; MG/3ML
3 SOLUTION RESPIRATORY (INHALATION)
Status: DISCONTINUED | OUTPATIENT
Start: 2022-01-01 | End: 2022-01-01 | Stop reason: SDUPTHER

## 2022-01-01 RX ORDER — FLUTICASONE PROPIONATE 50 MCG
SPRAY, SUSPENSION (ML) NASAL
Qty: 1 EACH | Refills: 3 | Status: SHIPPED | OUTPATIENT
Start: 2022-01-01

## 2022-01-01 RX ORDER — MONTELUKAST SODIUM 10 MG/1
10 TABLET ORAL DAILY
Status: DISCONTINUED | OUTPATIENT
Start: 2022-01-01 | End: 2022-02-04 | Stop reason: HOSPADM

## 2022-01-01 RX ORDER — ACETAMINOPHEN 500 MG
1000 TABLET ORAL EVERY 6 HOURS
Status: DISCONTINUED | OUTPATIENT
Start: 2022-01-01 | End: 2022-01-01

## 2022-01-01 RX ORDER — ONDANSETRON 4 MG/1
4 TABLET, ORALLY DISINTEGRATING ORAL
Status: DISCONTINUED | OUTPATIENT
Start: 2022-01-01 | End: 2022-01-01

## 2022-01-01 RX ORDER — BUDESONIDE 0.5 MG/2ML
500 INHALANT ORAL
Status: DISCONTINUED | OUTPATIENT
Start: 2022-01-01 | End: 2022-02-04 | Stop reason: HOSPADM

## 2022-01-01 RX ORDER — NALOXONE HYDROCHLORIDE 0.4 MG/ML
0.4 INJECTION, SOLUTION INTRAMUSCULAR; INTRAVENOUS; SUBCUTANEOUS AS NEEDED
Status: DISCONTINUED | OUTPATIENT
Start: 2022-01-01 | End: 2022-02-04 | Stop reason: HOSPADM

## 2022-01-01 RX ORDER — HEPARIN SODIUM 200 [USP'U]/100ML
INJECTION, SOLUTION INTRAVENOUS
Status: DISCONTINUED | OUTPATIENT
Start: 2022-01-01 | End: 2022-01-01 | Stop reason: HOSPADM

## 2022-01-01 RX ORDER — HYDROMORPHONE HYDROCHLORIDE 1 MG/ML
0.5 INJECTION, SOLUTION INTRAMUSCULAR; INTRAVENOUS; SUBCUTANEOUS
Status: DISCONTINUED | OUTPATIENT
Start: 2022-01-01 | End: 2022-01-01 | Stop reason: HOSPADM

## 2022-01-01 RX ADMIN — BIVALIRUDIN 0.1 MG/KG/HR: 250 INJECTION, POWDER, LYOPHILIZED, FOR SOLUTION INTRAVENOUS at 04:22

## 2022-01-01 RX ADMIN — DEXMEDETOMIDINE HYDROCHLORIDE 0.5 MCG/KG/HR: 400 INJECTION INTRAVENOUS at 08:39

## 2022-01-01 RX ADMIN — ISOSORBIDE DINITRATE 10 MG: 20 TABLET ORAL at 18:51

## 2022-01-01 RX ADMIN — ALLOPURINOL 50 MG: 100 TABLET ORAL at 10:25

## 2022-01-01 RX ADMIN — SODIUM CHLORIDE, POTASSIUM CHLORIDE, SODIUM LACTATE AND CALCIUM CHLORIDE: 600; 310; 30; 20 INJECTION, SOLUTION INTRAVENOUS at 07:49

## 2022-01-01 RX ADMIN — BUMETANIDE 1 MG: 0.25 INJECTION INTRAMUSCULAR; INTRAVENOUS at 09:30

## 2022-01-01 RX ADMIN — HEPARIN SODIUM 5000 UNITS: 5000 INJECTION INTRAVENOUS; SUBCUTANEOUS at 12:05

## 2022-01-01 RX ADMIN — AMIODARONE HYDROCHLORIDE 1 MG/MIN: 50 INJECTION, SOLUTION INTRAVENOUS at 13:22

## 2022-01-01 RX ADMIN — GABAPENTIN 100 MG: 100 CAPSULE ORAL at 22:35

## 2022-01-01 RX ADMIN — SENNOSIDES AND DOCUSATE SODIUM 2 TABLET: 50; 8.6 TABLET ORAL at 17:22

## 2022-01-01 RX ADMIN — POTASSIUM CHLORIDE 20 MEQ: 29.8 INJECTION, SOLUTION INTRAVENOUS at 06:29

## 2022-01-01 RX ADMIN — MILRINONE LACTATE IN DEXTROSE 0.12 MCG/KG/MIN: 200 INJECTION, SOLUTION INTRAVENOUS at 17:11

## 2022-01-01 RX ADMIN — HEPARIN SODIUM 9580 UNITS: 1000 INJECTION INTRAVENOUS; SUBCUTANEOUS at 06:07

## 2022-01-01 RX ADMIN — LEVOTHYROXINE SODIUM 100 MCG: 100 TABLET ORAL at 06:24

## 2022-01-01 RX ADMIN — CETIRIZINE HYDROCHLORIDE 10 MG: 10 TABLET, FILM COATED ORAL at 09:25

## 2022-01-01 RX ADMIN — Medication 4 UNITS: at 21:22

## 2022-01-01 RX ADMIN — TRIAMCINOLONE ACETONIDE: 1 CREAM TOPICAL at 09:23

## 2022-01-01 RX ADMIN — SODIUM CHLORIDE 9 ML/HR: 9 INJECTION, SOLUTION INTRAVENOUS at 05:47

## 2022-01-01 RX ADMIN — ALBUMIN (HUMAN) 12.5 G: 0.25 INJECTION, SOLUTION INTRAVENOUS at 18:44

## 2022-01-01 RX ADMIN — Medication 30 UNITS: at 21:12

## 2022-01-01 RX ADMIN — DIGOXIN 0.06 MG: 125 TABLET ORAL at 08:38

## 2022-01-01 RX ADMIN — Medication 10 UNITS: at 18:24

## 2022-01-01 RX ADMIN — CHLORHEXIDINE GLUCONATE 15 ML: 1.2 RINSE ORAL at 08:41

## 2022-01-01 RX ADMIN — CETIRIZINE HYDROCHLORIDE 10 MG: 10 TABLET, FILM COATED ORAL at 09:52

## 2022-01-01 RX ADMIN — DIGOXIN 0.06 MG: 125 TABLET ORAL at 09:24

## 2022-01-01 RX ADMIN — BUMETANIDE 1 MG: 0.25 INJECTION INTRAMUSCULAR; INTRAVENOUS at 08:32

## 2022-01-01 RX ADMIN — SODIUM CHLORIDE, PRESERVATIVE FREE 10 ML: 5 INJECTION INTRAVENOUS at 05:04

## 2022-01-01 RX ADMIN — CLOPIDOGREL 75 MG: 75 TABLET, FILM COATED ORAL at 09:30

## 2022-01-01 RX ADMIN — POTASSIUM CHLORIDE 20 MEQ: 29.8 INJECTION, SOLUTION INTRAVENOUS at 15:05

## 2022-01-01 RX ADMIN — CLOPIDOGREL 75 MG: 75 TABLET, FILM COATED ORAL at 08:19

## 2022-01-01 RX ADMIN — HEPARIN SODIUM 7000 UNITS: 1000 INJECTION INTRAVENOUS; SUBCUTANEOUS at 14:38

## 2022-01-01 RX ADMIN — PROPOFOL 15 MCG/KG/MIN: 10 INJECTION, EMULSION INTRAVENOUS at 22:23

## 2022-01-01 RX ADMIN — HEPARIN SODIUM 5000 UNITS: 5000 INJECTION INTRAVENOUS; SUBCUTANEOUS at 01:00

## 2022-01-01 RX ADMIN — CETIRIZINE HYDROCHLORIDE 10 MG: 10 TABLET, FILM COATED ORAL at 11:21

## 2022-01-01 RX ADMIN — MONTELUKAST 10 MG: 10 TABLET, FILM COATED ORAL at 08:14

## 2022-01-01 RX ADMIN — MONTELUKAST 10 MG: 10 TABLET, FILM COATED ORAL at 08:57

## 2022-01-01 RX ADMIN — SODIUM CHLORIDE, PRESERVATIVE FREE 10 ML: 5 INJECTION INTRAVENOUS at 17:57

## 2022-01-01 RX ADMIN — ALTEPLASE 2 MG: 2.2 INJECTION, POWDER, LYOPHILIZED, FOR SOLUTION INTRAVENOUS at 05:06

## 2022-01-01 RX ADMIN — POTASSIUM BICARBONATE 40 MEQ: 782 TABLET, EFFERVESCENT ORAL at 08:20

## 2022-01-01 RX ADMIN — DEXMEDETOMIDINE HYDROCHLORIDE 1.5 MCG/KG/HR: 400 INJECTION INTRAVENOUS at 10:00

## 2022-01-01 RX ADMIN — HYDRALAZINE HYDROCHLORIDE 10 MG: 10 TABLET, FILM COATED ORAL at 17:26

## 2022-01-01 RX ADMIN — Medication 4 UNITS: at 05:22

## 2022-01-01 RX ADMIN — DEXMEDETOMIDINE HYDROCHLORIDE 1.3 MCG/KG/HR: 400 INJECTION INTRAVENOUS at 01:12

## 2022-01-01 RX ADMIN — TRIAMCINOLONE ACETONIDE: 1 CREAM TOPICAL at 17:17

## 2022-01-01 RX ADMIN — CARVEDILOL 6.25 MG: 6.25 TABLET, FILM COATED ORAL at 18:52

## 2022-01-01 RX ADMIN — HYDRALAZINE HYDROCHLORIDE 5 MG: 20 INJECTION INTRAMUSCULAR; INTRAVENOUS at 19:41

## 2022-01-01 RX ADMIN — SODIUM CHLORIDE 5.1 UNITS/HR: 9 INJECTION, SOLUTION INTRAVENOUS at 14:26

## 2022-01-01 RX ADMIN — ALLOPURINOL 50 MG: 100 TABLET ORAL at 08:14

## 2022-01-01 RX ADMIN — HEPARIN SODIUM 5000 UNITS: 5000 INJECTION INTRAVENOUS; SUBCUTANEOUS at 09:12

## 2022-01-01 RX ADMIN — SODIUM CHLORIDE, PRESERVATIVE FREE 10 ML: 5 INJECTION INTRAVENOUS at 13:02

## 2022-01-01 RX ADMIN — IVABRADINE 7.5 MG: 7.5 TABLET, FILM COATED ORAL at 17:57

## 2022-01-01 RX ADMIN — SODIUM CHLORIDE, PRESERVATIVE FREE 10 ML: 5 INJECTION INTRAVENOUS at 13:34

## 2022-01-01 RX ADMIN — DIGOXIN 0.06 MG: 125 TABLET ORAL at 10:27

## 2022-01-01 RX ADMIN — GABAPENTIN 100 MG: 100 CAPSULE ORAL at 21:00

## 2022-01-01 RX ADMIN — WATER 1 G: 1 INJECTION INTRAMUSCULAR; INTRAVENOUS; SUBCUTANEOUS at 16:16

## 2022-01-01 RX ADMIN — HEPARIN SODIUM 5000 UNITS: 5000 INJECTION INTRAVENOUS; SUBCUTANEOUS at 21:57

## 2022-01-01 RX ADMIN — GABAPENTIN 100 MG: 100 CAPSULE ORAL at 21:31

## 2022-01-01 RX ADMIN — MONTELUKAST 10 MG: 10 TABLET, FILM COATED ORAL at 10:26

## 2022-01-01 RX ADMIN — POTASSIUM CHLORIDE 20 MEQ: 29.8 INJECTION, SOLUTION INTRAVENOUS at 06:30

## 2022-01-01 RX ADMIN — PROPOFOL 40 MCG/KG/MIN: 10 INJECTION, EMULSION INTRAVENOUS at 15:42

## 2022-01-01 RX ADMIN — DEXMEDETOMIDINE HYDROCHLORIDE 1.5 MCG/KG/HR: 400 INJECTION INTRAVENOUS at 07:17

## 2022-01-01 RX ADMIN — BUMETANIDE 0.5 MG/HR: 0.25 INJECTION INTRAMUSCULAR; INTRAVENOUS at 12:07

## 2022-01-01 RX ADMIN — MUPIROCIN: 20 OINTMENT TOPICAL at 09:25

## 2022-01-01 RX ADMIN — POLYETHYLENE GLYCOL 3350 17 G: 17 POWDER, FOR SOLUTION ORAL at 08:36

## 2022-01-01 RX ADMIN — SODIUM CHLORIDE: 900 INJECTION, SOLUTION INTRAVENOUS at 12:41

## 2022-01-01 RX ADMIN — BUDESONIDE 500 MCG: 0.5 INHALANT RESPIRATORY (INHALATION) at 08:21

## 2022-01-01 RX ADMIN — Medication 2 UNITS: at 17:31

## 2022-01-01 RX ADMIN — DEXMEDETOMIDINE HYDROCHLORIDE 0.5 MCG/KG/HR: 400 INJECTION INTRAVENOUS at 06:28

## 2022-01-01 RX ADMIN — SODIUM CHLORIDE, PRESERVATIVE FREE 10 ML: 5 INJECTION INTRAVENOUS at 16:19

## 2022-01-01 RX ADMIN — BIVALIRUDIN 0.1 MG/KG/HR: 250 INJECTION, POWDER, LYOPHILIZED, FOR SOLUTION INTRAVENOUS at 09:20

## 2022-01-01 RX ADMIN — DEXMEDETOMIDINE HYDROCHLORIDE 1.5 MCG/KG/HR: 400 INJECTION INTRAVENOUS at 12:35

## 2022-01-01 RX ADMIN — Medication 2 UNITS: at 12:16

## 2022-01-01 RX ADMIN — BIVALIRUDIN 0.1 MG/KG/HR: 250 INJECTION, POWDER, LYOPHILIZED, FOR SOLUTION INTRAVENOUS at 16:12

## 2022-01-01 RX ADMIN — Medication 4 UNITS: at 12:34

## 2022-01-01 RX ADMIN — ARFORMOTEROL TARTRATE 15 MCG: 15 SOLUTION RESPIRATORY (INHALATION) at 07:51

## 2022-01-01 RX ADMIN — WATER 1 G: 1 INJECTION INTRAMUSCULAR; INTRAVENOUS; SUBCUTANEOUS at 14:02

## 2022-01-01 RX ADMIN — SODIUM CHLORIDE, PRESERVATIVE FREE 10 ML: 5 INJECTION INTRAVENOUS at 21:38

## 2022-01-01 RX ADMIN — IVABRADINE 5 MG: 5 TABLET, FILM COATED ORAL at 09:30

## 2022-01-01 RX ADMIN — POTASSIUM CHLORIDE 20 MEQ: 29.8 INJECTION, SOLUTION INTRAVENOUS at 19:02

## 2022-01-01 RX ADMIN — IVABRADINE 5 MG: 5 TABLET, FILM COATED ORAL at 08:58

## 2022-01-01 RX ADMIN — PANTOPRAZOLE SODIUM 40 MG: 40 INJECTION, POWDER, FOR SOLUTION INTRAVENOUS at 20:33

## 2022-01-01 RX ADMIN — ALBUMIN (HUMAN) 12.5 G: 0.25 INJECTION, SOLUTION INTRAVENOUS at 08:19

## 2022-01-01 RX ADMIN — SODIUM CHLORIDE, PRESERVATIVE FREE 10 ML: 5 INJECTION INTRAVENOUS at 23:48

## 2022-01-01 RX ADMIN — ISOSORBIDE DINITRATE 10 MG: 20 TABLET ORAL at 22:55

## 2022-01-01 RX ADMIN — DEXMEDETOMIDINE HYDROCHLORIDE 1 MCG/KG/HR: 400 INJECTION INTRAVENOUS at 12:45

## 2022-01-01 RX ADMIN — SODIUM CHLORIDE, PRESERVATIVE FREE 10 ML: 5 INJECTION INTRAVENOUS at 22:00

## 2022-01-01 RX ADMIN — PANTOPRAZOLE SODIUM 40 MG: 40 INJECTION, POWDER, FOR SOLUTION INTRAVENOUS at 21:20

## 2022-01-01 RX ADMIN — CHLORHEXIDINE GLUCONATE 15 ML: 1.2 RINSE ORAL at 21:01

## 2022-01-01 RX ADMIN — PROPOFOL 40 MCG/KG/MIN: 10 INJECTION, EMULSION INTRAVENOUS at 18:17

## 2022-01-01 RX ADMIN — CETIRIZINE HYDROCHLORIDE 10 MG: 10 TABLET, FILM COATED ORAL at 09:24

## 2022-01-01 RX ADMIN — VENLAFAXINE HYDROCHLORIDE 75 MG: 37.5 CAPSULE, EXTENDED RELEASE ORAL at 08:57

## 2022-01-01 RX ADMIN — DEXMEDETOMIDINE HYDROCHLORIDE 1.5 MCG/KG/HR: 400 INJECTION INTRAVENOUS at 15:00

## 2022-01-01 RX ADMIN — BIVALIRUDIN 0.09 MG/KG/HR: 250 INJECTION, POWDER, LYOPHILIZED, FOR SOLUTION INTRAVENOUS at 10:00

## 2022-01-01 RX ADMIN — Medication 25 UNITS: at 08:28

## 2022-01-01 RX ADMIN — TRIAMCINOLONE ACETONIDE: 1 CREAM TOPICAL at 11:30

## 2022-01-01 RX ADMIN — TRIAMCINOLONE ACETONIDE: 1 CREAM TOPICAL at 18:00

## 2022-01-01 RX ADMIN — CEFTRIAXONE SODIUM 2 G: 2 INJECTION, POWDER, FOR SOLUTION INTRAMUSCULAR; INTRAVENOUS at 08:19

## 2022-01-01 RX ADMIN — SODIUM CHLORIDE, PRESERVATIVE FREE 10 ML: 5 INJECTION INTRAVENOUS at 08:17

## 2022-01-01 RX ADMIN — SENNOSIDES AND DOCUSATE SODIUM 2 TABLET: 50; 8.6 TABLET ORAL at 09:15

## 2022-01-01 RX ADMIN — Medication 4 UNITS: at 06:05

## 2022-01-01 RX ADMIN — PROPOFOL 50 MCG/KG/MIN: 10 INJECTION, EMULSION INTRAVENOUS at 22:19

## 2022-01-01 RX ADMIN — SODIUM CHLORIDE, PRESERVATIVE FREE 10 ML: 5 INJECTION INTRAVENOUS at 21:01

## 2022-01-01 RX ADMIN — WATER 1 G: 1 INJECTION INTRAMUSCULAR; INTRAVENOUS; SUBCUTANEOUS at 02:18

## 2022-01-01 RX ADMIN — MORPHINE SULFATE INJ 2 MG/ML 5 MG: 2 SOLUTION at 10:19

## 2022-01-01 RX ADMIN — Medication 400 MG: at 09:22

## 2022-01-01 RX ADMIN — Medication 10 UNITS: at 22:24

## 2022-01-01 RX ADMIN — SODIUM BICARBONATE: 84 INJECTION, SOLUTION INTRAVENOUS at 20:36

## 2022-01-01 RX ADMIN — WATER 1 G: 1 INJECTION INTRAMUSCULAR; INTRAVENOUS; SUBCUTANEOUS at 13:15

## 2022-01-01 RX ADMIN — AMIODARONE HYDROCHLORIDE 0.5 MG/MIN: 50 INJECTION, SOLUTION INTRAVENOUS at 20:58

## 2022-01-01 RX ADMIN — Medication 2 UNITS: at 23:25

## 2022-01-01 RX ADMIN — LEVOTHYROXINE SODIUM 100 MCG: 100 TABLET ORAL at 05:06

## 2022-01-01 RX ADMIN — Medication 50 UNITS: at 14:01

## 2022-01-01 RX ADMIN — ALBUMIN (HUMAN) 12.5 G: 12.5 INJECTION, SOLUTION INTRAVENOUS at 10:02

## 2022-01-01 RX ADMIN — Medication 6 UNITS: at 12:56

## 2022-01-01 RX ADMIN — Medication 10 UNITS: at 21:26

## 2022-01-01 RX ADMIN — POTASSIUM CHLORIDE 20 MEQ: 29.8 INJECTION, SOLUTION INTRAVENOUS at 05:49

## 2022-01-01 RX ADMIN — SODIUM CHLORIDE, PRESERVATIVE FREE 10 ML: 5 INJECTION INTRAVENOUS at 21:48

## 2022-01-01 RX ADMIN — Medication 2 UNITS: at 12:00

## 2022-01-01 RX ADMIN — SODIUM CHLORIDE, PRESERVATIVE FREE 10 ML: 5 INJECTION INTRAVENOUS at 09:23

## 2022-01-01 RX ADMIN — BUDESONIDE 500 MCG: 0.5 INHALANT RESPIRATORY (INHALATION) at 20:58

## 2022-01-01 RX ADMIN — Medication 400 MG: at 17:58

## 2022-01-01 RX ADMIN — Medication 4 UNITS: at 11:20

## 2022-01-01 RX ADMIN — ALLOPURINOL 50 MG: 100 TABLET ORAL at 14:02

## 2022-01-01 RX ADMIN — BUMETANIDE 2 MG: 0.25 INJECTION, SOLUTION INTRAMUSCULAR; INTRAVENOUS at 05:03

## 2022-01-01 RX ADMIN — HEPARIN SODIUM 5000 UNITS: 5000 INJECTION INTRAVENOUS; SUBCUTANEOUS at 21:08

## 2022-01-01 RX ADMIN — MORPHINE SULFATE 4 MG: 4 INJECTION INTRAVENOUS at 17:27

## 2022-01-01 RX ADMIN — PANTOPRAZOLE SODIUM 40 MG: 40 INJECTION, POWDER, FOR SOLUTION INTRAVENOUS at 21:02

## 2022-01-01 RX ADMIN — BUDESONIDE 500 MCG: 0.5 INHALANT RESPIRATORY (INHALATION) at 20:20

## 2022-01-01 RX ADMIN — ALLOPURINOL 50 MG: 100 TABLET ORAL at 09:31

## 2022-01-01 RX ADMIN — WATER 1 G: 1 INJECTION INTRAMUSCULAR; INTRAVENOUS; SUBCUTANEOUS at 01:59

## 2022-01-01 RX ADMIN — DEXMEDETOMIDINE HYDROCHLORIDE 0.6 MCG/KG/HR: 400 INJECTION INTRAVENOUS at 04:13

## 2022-01-01 RX ADMIN — SODIUM BICARBONATE: 84 INJECTION, SOLUTION INTRAVENOUS at 13:56

## 2022-01-01 RX ADMIN — SIMETHICONE 80 MG: 80 TABLET, CHEWABLE ORAL at 22:33

## 2022-01-01 RX ADMIN — DEXMEDETOMIDINE HYDROCHLORIDE 1.5 MCG/KG/HR: 400 INJECTION INTRAVENOUS at 03:22

## 2022-01-01 RX ADMIN — POTASSIUM CHLORIDE 20 MEQ: 29.8 INJECTION INTRAVENOUS at 06:05

## 2022-01-01 RX ADMIN — SODIUM CHLORIDE, PRESERVATIVE FREE 10 ML: 5 INJECTION INTRAVENOUS at 21:19

## 2022-01-01 RX ADMIN — DEXMEDETOMIDINE HYDROCHLORIDE 1.5 MCG/KG/HR: 400 INJECTION INTRAVENOUS at 16:30

## 2022-01-01 RX ADMIN — Medication 400 MG: at 08:26

## 2022-01-01 RX ADMIN — POTASSIUM CHLORIDE 20 MEQ: 29.8 INJECTION, SOLUTION INTRAVENOUS at 12:47

## 2022-01-01 RX ADMIN — BUDESONIDE 500 MCG: 0.5 INHALANT RESPIRATORY (INHALATION) at 19:44

## 2022-01-01 RX ADMIN — ALBUMIN (HUMAN) 12.5 G: 0.25 INJECTION, SOLUTION INTRAVENOUS at 10:38

## 2022-01-01 RX ADMIN — ALBUMIN (HUMAN) 12.5 G: 12.5 INJECTION, SOLUTION INTRAVENOUS at 08:06

## 2022-01-01 RX ADMIN — SODIUM CHLORIDE, PRESERVATIVE FREE 10 ML: 5 INJECTION INTRAVENOUS at 08:44

## 2022-01-01 RX ADMIN — ASPIRIN 81 MG CHEWABLE TABLET 81 MG: 81 TABLET CHEWABLE at 08:20

## 2022-01-01 RX ADMIN — POTASSIUM BICARBONATE 40 MEQ: 782 TABLET, EFFERVESCENT ORAL at 09:10

## 2022-01-01 RX ADMIN — Medication 2 UNITS: at 21:45

## 2022-01-01 RX ADMIN — SODIUM CHLORIDE 10 ML/HR: 9 INJECTION, SOLUTION INTRAVENOUS at 20:58

## 2022-01-01 RX ADMIN — Medication 25 UNITS: at 10:01

## 2022-01-01 RX ADMIN — ROCURONIUM BROMIDE 10 MG: 10 SOLUTION INTRAVENOUS at 13:38

## 2022-01-01 RX ADMIN — PROPOFOL 50 MCG/KG/MIN: 10 INJECTION, EMULSION INTRAVENOUS at 03:22

## 2022-01-01 RX ADMIN — Medication 10 UNITS: at 21:17

## 2022-01-01 RX ADMIN — POTASSIUM CHLORIDE 20 MEQ: 29.8 INJECTION INTRAVENOUS at 07:17

## 2022-01-01 RX ADMIN — Medication 400 MG: at 10:55

## 2022-01-01 RX ADMIN — BUMETANIDE 0.25 MG/HR: 0.25 INJECTION INTRAMUSCULAR; INTRAVENOUS at 10:47

## 2022-01-01 RX ADMIN — BUMETANIDE 1 MG/HR: 0.25 INJECTION INTRAMUSCULAR; INTRAVENOUS at 06:47

## 2022-01-01 RX ADMIN — SODIUM CHLORIDE, PRESERVATIVE FREE 10 ML: 5 INJECTION INTRAVENOUS at 14:43

## 2022-01-01 RX ADMIN — ARFORMOTEROL TARTRATE 15 MCG: 15 SOLUTION RESPIRATORY (INHALATION) at 20:47

## 2022-01-01 RX ADMIN — Medication 20 UNITS: at 21:00

## 2022-01-01 RX ADMIN — CHLORHEXIDINE GLUCONATE 15 ML: 1.2 RINSE ORAL at 08:14

## 2022-01-01 RX ADMIN — SENNOSIDES AND DOCUSATE SODIUM 1 TABLET: 50; 8.6 TABLET ORAL at 09:31

## 2022-01-01 RX ADMIN — Medication 20 UNITS: at 21:08

## 2022-01-01 RX ADMIN — SODIUM CHLORIDE, PRESERVATIVE FREE 10 ML: 5 INJECTION INTRAVENOUS at 22:32

## 2022-01-01 RX ADMIN — HEPARIN SODIUM 12 UNITS/KG/HR: 10000 INJECTION, SOLUTION INTRAVENOUS at 06:10

## 2022-01-01 RX ADMIN — POTASSIUM CHLORIDE 20 MEQ: 29.8 INJECTION, SOLUTION INTRAVENOUS at 13:42

## 2022-01-01 RX ADMIN — Medication 400 MG: at 17:22

## 2022-01-01 RX ADMIN — ALBUMIN (HUMAN) 12.5 G: 0.25 INJECTION, SOLUTION INTRAVENOUS at 09:48

## 2022-01-01 RX ADMIN — Medication 25 UNITS: at 08:36

## 2022-01-01 RX ADMIN — IVABRADINE 7.5 MG: 7.5 TABLET, FILM COATED ORAL at 17:00

## 2022-01-01 RX ADMIN — MONTELUKAST 10 MG: 10 TABLET, FILM COATED ORAL at 09:24

## 2022-01-01 RX ADMIN — Medication 10 UNITS: at 19:06

## 2022-01-01 RX ADMIN — HEPARIN SODIUM 5000 UNITS: 5000 INJECTION INTRAVENOUS; SUBCUTANEOUS at 17:22

## 2022-01-01 RX ADMIN — SODIUM BICARBONATE: 84 INJECTION, SOLUTION INTRAVENOUS at 04:14

## 2022-01-01 RX ADMIN — Medication 60 UNITS: at 09:00

## 2022-01-01 RX ADMIN — POTASSIUM CHLORIDE 20 MEQ: 29.8 INJECTION, SOLUTION INTRAVENOUS at 08:14

## 2022-01-01 RX ADMIN — Medication 60 UNITS: at 22:25

## 2022-01-01 RX ADMIN — Medication 4.4 MILLICURIE: at 15:15

## 2022-01-01 RX ADMIN — Medication 2 UNITS: at 05:43

## 2022-01-01 RX ADMIN — MILRINONE LACTATE IN DEXTROSE 0.2 MCG/KG/MIN: 200 INJECTION, SOLUTION INTRAVENOUS at 20:58

## 2022-01-01 RX ADMIN — VENLAFAXINE HYDROCHLORIDE 75 MG: 25 TABLET ORAL at 09:15

## 2022-01-01 RX ADMIN — ALBUMIN (HUMAN) 12.5 G: 0.25 INJECTION, SOLUTION INTRAVENOUS at 12:20

## 2022-01-01 RX ADMIN — SODIUM CHLORIDE, PRESERVATIVE FREE 10 ML: 5 INJECTION INTRAVENOUS at 16:18

## 2022-01-01 RX ADMIN — DEXMEDETOMIDINE HYDROCHLORIDE 1.4 MCG/KG/HR: 400 INJECTION INTRAVENOUS at 09:29

## 2022-01-01 RX ADMIN — MILRINONE LACTATE 0.2 MCG/KG/MIN: 0.2 INJECTION, SOLUTION INTRAVENOUS at 14:56

## 2022-01-01 RX ADMIN — MILRINONE LACTATE 0.38 MCG/KG/MIN: 0.2 INJECTION, SOLUTION INTRAVENOUS at 23:14

## 2022-01-01 RX ADMIN — Medication 2 UNITS: at 13:02

## 2022-01-01 RX ADMIN — GABAPENTIN 100 MG: 100 CAPSULE ORAL at 09:16

## 2022-01-01 RX ADMIN — ARFORMOTEROL TARTRATE 15 MCG: 15 SOLUTION RESPIRATORY (INHALATION) at 08:08

## 2022-01-01 RX ADMIN — BUMETANIDE 1 MG/HR: 0.25 INJECTION INTRAMUSCULAR; INTRAVENOUS at 17:36

## 2022-01-01 RX ADMIN — DIGOXIN 0.06 MG: 125 TABLET ORAL at 08:45

## 2022-01-01 RX ADMIN — ALLOPURINOL 100 MG: 100 TABLET ORAL at 09:08

## 2022-01-01 RX ADMIN — DEXMEDETOMIDINE HYDROCHLORIDE 1.5 MCG/KG/HR: 400 INJECTION INTRAVENOUS at 18:33

## 2022-01-01 RX ADMIN — SODIUM CHLORIDE, PRESERVATIVE FREE 10 ML: 5 INJECTION INTRAVENOUS at 23:38

## 2022-01-01 RX ADMIN — VASOPRESSIN 0.04 UNITS/MIN: 20 INJECTION INTRAVENOUS at 11:29

## 2022-01-01 RX ADMIN — TRIAMCINOLONE ACETONIDE: 1 CREAM TOPICAL at 17:45

## 2022-01-01 RX ADMIN — WATER 1 G: 1 INJECTION INTRAMUSCULAR; INTRAVENOUS; SUBCUTANEOUS at 14:54

## 2022-01-01 RX ADMIN — BUDESONIDE 500 MCG: 0.5 INHALANT RESPIRATORY (INHALATION) at 19:23

## 2022-01-01 RX ADMIN — DIGOXIN 0.06 MG: 125 TABLET ORAL at 12:19

## 2022-01-01 RX ADMIN — WATER 1 G: 1 INJECTION INTRAMUSCULAR; INTRAVENOUS; SUBCUTANEOUS at 13:23

## 2022-01-01 RX ADMIN — CHLORHEXIDINE GLUCONATE 15 ML: 1.2 RINSE ORAL at 20:05

## 2022-01-01 RX ADMIN — DEXMEDETOMIDINE HYDROCHLORIDE 1.5 MCG/KG/HR: 400 INJECTION INTRAVENOUS at 17:45

## 2022-01-01 RX ADMIN — Medication 400 MG: at 17:36

## 2022-01-01 RX ADMIN — SODIUM CHLORIDE, PRESERVATIVE FREE 10 ML: 5 INJECTION INTRAVENOUS at 13:56

## 2022-01-01 RX ADMIN — MUPIROCIN: 20 OINTMENT TOPICAL at 17:47

## 2022-01-01 RX ADMIN — ALLOPURINOL 50 MG: 100 TABLET ORAL at 09:24

## 2022-01-01 RX ADMIN — MILRINONE LACTATE 0.38 MCG/KG/MIN: 0.2 INJECTION, SOLUTION INTRAVENOUS at 17:31

## 2022-01-01 RX ADMIN — ASPIRIN 81 MG CHEWABLE TABLET 81 MG: 81 TABLET CHEWABLE at 09:08

## 2022-01-01 RX ADMIN — POTASSIUM PHOSPHATE, MONOBASIC POTASSIUM PHOSPHATE, DIBASIC: 224; 236 INJECTION, SOLUTION, CONCENTRATE INTRAVENOUS at 10:02

## 2022-01-01 RX ADMIN — Medication 10 UNITS: at 12:35

## 2022-01-01 RX ADMIN — Medication 2 UNITS: at 23:05

## 2022-01-01 RX ADMIN — SODIUM CHLORIDE, PRESERVATIVE FREE 10 ML: 5 INJECTION INTRAVENOUS at 13:19

## 2022-01-01 RX ADMIN — TRIAMCINOLONE ACETONIDE: 1 CREAM TOPICAL at 08:44

## 2022-01-01 RX ADMIN — CHLORHEXIDINE GLUCONATE 15 ML: 1.2 RINSE ORAL at 10:13

## 2022-01-01 RX ADMIN — Medication 4 UNITS: at 12:51

## 2022-01-01 RX ADMIN — MILRINONE LACTATE IN DEXTROSE 0.25 MCG/KG/MIN: 200 INJECTION, SOLUTION INTRAVENOUS at 13:22

## 2022-01-01 RX ADMIN — WATER 2 G: 1 INJECTION INTRAMUSCULAR; INTRAVENOUS; SUBCUTANEOUS at 09:32

## 2022-01-01 RX ADMIN — Medication 8 UNITS: at 11:39

## 2022-01-01 RX ADMIN — POLYETHYLENE GLYCOL 3350 17 G: 17 POWDER, FOR SOLUTION ORAL at 08:20

## 2022-01-01 RX ADMIN — DOCUSATE SODIUM 100 MG: 100 CAPSULE, LIQUID FILLED ORAL at 09:52

## 2022-01-01 RX ADMIN — Medication 100 MCG/HR: at 11:51

## 2022-01-01 RX ADMIN — SODIUM CHLORIDE, PRESERVATIVE FREE 10 ML: 5 INJECTION INTRAVENOUS at 14:42

## 2022-01-01 RX ADMIN — ASPIRIN 81 MG CHEWABLE TABLET 81 MG: 81 TABLET CHEWABLE at 08:57

## 2022-01-01 RX ADMIN — PROPOFOL 30 MCG/KG/MIN: 10 INJECTION, EMULSION INTRAVENOUS at 23:18

## 2022-01-01 RX ADMIN — DEXMEDETOMIDINE HYDROCHLORIDE 1.5 MCG/KG/HR: 400 INJECTION INTRAVENOUS at 19:04

## 2022-01-01 RX ADMIN — PROPOFOL 20 MCG/KG/MIN: 10 INJECTION, EMULSION INTRAVENOUS at 12:54

## 2022-01-01 RX ADMIN — SODIUM CHLORIDE, PRESERVATIVE FREE 10 ML: 5 INJECTION INTRAVENOUS at 06:24

## 2022-01-01 RX ADMIN — ALBUMIN (HUMAN) 12.5 G: 0.25 INJECTION, SOLUTION INTRAVENOUS at 08:21

## 2022-01-01 RX ADMIN — HEPARIN SODIUM 5000 UNITS: 5000 INJECTION INTRAVENOUS; SUBCUTANEOUS at 22:08

## 2022-01-01 RX ADMIN — CARVEDILOL 6.25 MG: 6.25 TABLET, FILM COATED ORAL at 10:03

## 2022-01-01 RX ADMIN — SODIUM CHLORIDE 9 ML/HR: 9 INJECTION, SOLUTION INTRAVENOUS at 03:56

## 2022-01-01 RX ADMIN — BUDESONIDE 500 MCG: 0.5 INHALANT RESPIRATORY (INHALATION) at 21:00

## 2022-01-01 RX ADMIN — BUDESONIDE 500 MCG: 0.5 INHALANT RESPIRATORY (INHALATION) at 20:14

## 2022-01-01 RX ADMIN — ISOSORBIDE DINITRATE 10 MG: 20 TABLET ORAL at 22:00

## 2022-01-01 RX ADMIN — EPOETIN ALFA-EPBX 20000 UNITS: 20000 INJECTION, SOLUTION INTRAVENOUS; SUBCUTANEOUS at 20:39

## 2022-01-01 RX ADMIN — CLOPIDOGREL 75 MG: 75 TABLET, FILM COATED ORAL at 09:08

## 2022-01-01 RX ADMIN — ONDANSETRON 4 MG: 2 INJECTION INTRAMUSCULAR; INTRAVENOUS at 09:15

## 2022-01-01 RX ADMIN — DEXMEDETOMIDINE HYDROCHLORIDE 0.5 MCG/KG/HR: 400 INJECTION INTRAVENOUS at 20:07

## 2022-01-01 RX ADMIN — SENNOSIDES AND DOCUSATE SODIUM 1 TABLET: 50; 8.6 TABLET ORAL at 17:45

## 2022-01-01 RX ADMIN — BUDESONIDE 500 MCG: 0.5 INHALANT RESPIRATORY (INHALATION) at 07:39

## 2022-01-01 RX ADMIN — BIVALIRUDIN 0.1 MG/KG/HR: 250 INJECTION, POWDER, LYOPHILIZED, FOR SOLUTION INTRAVENOUS at 02:00

## 2022-01-01 RX ADMIN — DEXMEDETOMIDINE HYDROCHLORIDE 1.5 MCG/KG/HR: 400 INJECTION INTRAVENOUS at 01:30

## 2022-01-01 RX ADMIN — TRIAMCINOLONE ACETONIDE: 1 CREAM TOPICAL at 15:50

## 2022-01-01 RX ADMIN — IVABRADINE 7.5 MG: 7.5 TABLET, FILM COATED ORAL at 09:04

## 2022-01-01 RX ADMIN — VASOPRESSIN 0.04 UNITS/MIN: 20 INJECTION INTRAVENOUS at 12:55

## 2022-01-01 RX ADMIN — SODIUM BICARBONATE: 84 INJECTION, SOLUTION INTRAVENOUS at 14:09

## 2022-01-01 RX ADMIN — ASPIRIN 81 MG CHEWABLE TABLET 81 MG: 81 TABLET CHEWABLE at 08:22

## 2022-01-01 RX ADMIN — Medication 100 MCG/HR: at 03:59

## 2022-01-01 RX ADMIN — IVABRADINE 5 MG: 5 TABLET, FILM COATED ORAL at 18:58

## 2022-01-01 RX ADMIN — Medication 20 UNITS: at 08:38

## 2022-01-01 RX ADMIN — HEPARIN SODIUM 5000 UNITS: 5000 INJECTION INTRAVENOUS; SUBCUTANEOUS at 05:08

## 2022-01-01 RX ADMIN — HEPARIN SODIUM 5000 UNITS: 5000 INJECTION INTRAVENOUS; SUBCUTANEOUS at 05:00

## 2022-01-01 RX ADMIN — Medication 30 UNITS: at 23:25

## 2022-01-01 RX ADMIN — PANTOPRAZOLE SODIUM 40 MG: 40 INJECTION, POWDER, FOR SOLUTION INTRAVENOUS at 08:21

## 2022-01-01 RX ADMIN — PROPOFOL 30 MCG/KG/MIN: 10 INJECTION, EMULSION INTRAVENOUS at 16:05

## 2022-01-01 RX ADMIN — ALBUMIN (HUMAN) 12.5 G: 12.5 INJECTION, SOLUTION INTRAVENOUS at 18:22

## 2022-01-01 RX ADMIN — BUDESONIDE 500 MCG: 0.5 INHALANT RESPIRATORY (INHALATION) at 19:39

## 2022-01-01 RX ADMIN — BUDESONIDE 500 MCG: 0.5 INHALANT RESPIRATORY (INHALATION) at 20:23

## 2022-01-01 RX ADMIN — Medication 30 UNITS: at 00:19

## 2022-01-01 RX ADMIN — Medication 2 UNITS: at 17:46

## 2022-01-01 RX ADMIN — GABAPENTIN 100 MG: 100 CAPSULE ORAL at 21:07

## 2022-01-01 RX ADMIN — Medication 10 UNITS: at 18:50

## 2022-01-01 RX ADMIN — BUDESONIDE 500 MCG: 0.5 INHALANT RESPIRATORY (INHALATION) at 20:16

## 2022-01-01 RX ADMIN — AMIODARONE HYDROCHLORIDE 0.5 MG/MIN: 50 INJECTION, SOLUTION INTRAVENOUS at 11:26

## 2022-01-01 RX ADMIN — Medication 8 UNITS: at 05:05

## 2022-01-01 RX ADMIN — SODIUM CHLORIDE 10 ML/HR: 9 INJECTION, SOLUTION INTRAVENOUS at 00:00

## 2022-01-01 RX ADMIN — CLOPIDOGREL 75 MG: 75 TABLET, FILM COATED ORAL at 08:22

## 2022-01-01 RX ADMIN — Medication 6 UNITS: at 17:50

## 2022-01-01 RX ADMIN — ALLOPURINOL 50 MG: 100 TABLET ORAL at 08:45

## 2022-01-01 RX ADMIN — BUMETANIDE 1 MG/HR: 0.25 INJECTION INTRAMUSCULAR; INTRAVENOUS at 11:47

## 2022-01-01 RX ADMIN — DIGOXIN 0.06 MG: 125 TABLET ORAL at 08:15

## 2022-01-01 RX ADMIN — PANTOPRAZOLE SODIUM 40 MG: 40 INJECTION, POWDER, FOR SOLUTION INTRAVENOUS at 08:38

## 2022-01-01 RX ADMIN — MONTELUKAST 10 MG: 10 TABLET, FILM COATED ORAL at 09:11

## 2022-01-01 RX ADMIN — DEXMEDETOMIDINE HYDROCHLORIDE 1.5 MCG/KG/HR: 400 INJECTION INTRAVENOUS at 09:53

## 2022-01-01 RX ADMIN — ALLOPURINOL 100 MG: 100 TABLET ORAL at 09:31

## 2022-01-01 RX ADMIN — HEPARIN SODIUM 5000 UNITS: 5000 INJECTION INTRAVENOUS; SUBCUTANEOUS at 18:16

## 2022-01-01 RX ADMIN — ARFORMOTEROL TARTRATE 15 MCG: 15 SOLUTION RESPIRATORY (INHALATION) at 21:17

## 2022-01-01 RX ADMIN — Medication 60 UNITS: at 10:40

## 2022-01-01 RX ADMIN — BUMETANIDE 0.25 MG/HR: 0.25 INJECTION INTRAMUSCULAR; INTRAVENOUS at 04:01

## 2022-01-01 RX ADMIN — CHLORHEXIDINE GLUCONATE 15 ML: 1.2 RINSE ORAL at 09:10

## 2022-01-01 RX ADMIN — TRIAMCINOLONE ACETONIDE: 1 CREAM TOPICAL at 10:13

## 2022-01-01 RX ADMIN — LEVOTHYROXINE SODIUM 100 MCG: 100 TABLET ORAL at 05:19

## 2022-01-01 RX ADMIN — SODIUM CHLORIDE, PRESERVATIVE FREE 10 ML: 5 INJECTION INTRAVENOUS at 06:00

## 2022-01-01 RX ADMIN — POTASSIUM BICARBONATE 40 MEQ: 782 TABLET, EFFERVESCENT ORAL at 08:22

## 2022-01-01 RX ADMIN — SODIUM CHLORIDE, PRESERVATIVE FREE 10 ML: 5 INJECTION INTRAVENOUS at 05:03

## 2022-01-01 RX ADMIN — GABAPENTIN 100 MG: 100 CAPSULE ORAL at 21:02

## 2022-01-01 RX ADMIN — HYDRALAZINE HYDROCHLORIDE 5 MG: 20 INJECTION INTRAMUSCULAR; INTRAVENOUS at 10:46

## 2022-01-01 RX ADMIN — SODIUM CHLORIDE, PRESERVATIVE FREE 10 ML: 5 INJECTION INTRAVENOUS at 21:26

## 2022-01-01 RX ADMIN — MUPIROCIN: 20 OINTMENT TOPICAL at 19:15

## 2022-01-01 RX ADMIN — Medication 3 UNITS: at 18:25

## 2022-01-01 RX ADMIN — IVABRADINE 5 MG: 5 TABLET, FILM COATED ORAL at 08:44

## 2022-01-01 RX ADMIN — MIDAZOLAM 1 MG: 1 INJECTION INTRAMUSCULAR; INTRAVENOUS at 12:08

## 2022-01-01 RX ADMIN — LEVOTHYROXINE SODIUM 100 MCG: 100 TABLET ORAL at 05:03

## 2022-01-01 RX ADMIN — ARFORMOTEROL TARTRATE 15 MCG: 15 SOLUTION RESPIRATORY (INHALATION) at 19:43

## 2022-01-01 RX ADMIN — HYDRALAZINE HYDROCHLORIDE 10 MG: 10 TABLET, FILM COATED ORAL at 10:04

## 2022-01-01 RX ADMIN — Medication 2 UNITS: at 21:57

## 2022-01-01 RX ADMIN — SENNOSIDES AND DOCUSATE SODIUM 1 TABLET: 50; 8.6 TABLET ORAL at 09:08

## 2022-01-01 RX ADMIN — HYDRALAZINE HYDROCHLORIDE 10 MG: 10 TABLET, FILM COATED ORAL at 23:47

## 2022-01-01 RX ADMIN — POLYETHYLENE GLYCOL 3350 17 G: 17 POWDER, FOR SOLUTION ORAL at 08:59

## 2022-01-01 RX ADMIN — SODIUM CHLORIDE 9 ML/HR: 9 INJECTION, SOLUTION INTRAVENOUS at 16:47

## 2022-01-01 RX ADMIN — SENNOSIDES AND DOCUSATE SODIUM 1 TABLET: 50; 8.6 TABLET ORAL at 08:19

## 2022-01-01 RX ADMIN — SENNOSIDES AND DOCUSATE SODIUM 1 TABLET: 50; 8.6 TABLET ORAL at 08:15

## 2022-01-01 RX ADMIN — POTASSIUM CHLORIDE 20 MEQ: 29.8 INJECTION, SOLUTION INTRAVENOUS at 02:30

## 2022-01-01 RX ADMIN — ARFORMOTEROL TARTRATE 15 MCG: 15 SOLUTION RESPIRATORY (INHALATION) at 19:39

## 2022-01-01 RX ADMIN — SODIUM CHLORIDE, PRESERVATIVE FREE 10 ML: 5 INJECTION INTRAVENOUS at 23:22

## 2022-01-01 RX ADMIN — MILRINONE LACTATE 0.2 MCG/KG/MIN: 0.2 INJECTION, SOLUTION INTRAVENOUS at 01:39

## 2022-01-01 RX ADMIN — TRIAMCINOLONE ACETONIDE: 1 CREAM TOPICAL at 10:30

## 2022-01-01 RX ADMIN — MONTELUKAST 10 MG: 10 TABLET, FILM COATED ORAL at 08:15

## 2022-01-01 RX ADMIN — TRIAMCINOLONE ACETONIDE: 1 CREAM TOPICAL at 08:42

## 2022-01-01 RX ADMIN — DEXMEDETOMIDINE HYDROCHLORIDE 1.2 MCG/KG/HR: 400 INJECTION INTRAVENOUS at 15:55

## 2022-01-01 RX ADMIN — SODIUM CHLORIDE, PRESERVATIVE FREE 10 ML: 5 INJECTION INTRAVENOUS at 08:20

## 2022-01-01 RX ADMIN — SODIUM BICARBONATE: 84 INJECTION, SOLUTION INTRAVENOUS at 19:38

## 2022-01-01 RX ADMIN — PROPOFOL 30 MCG/KG/MIN: 10 INJECTION, EMULSION INTRAVENOUS at 02:37

## 2022-01-01 RX ADMIN — WATER 1 G: 1 INJECTION INTRAMUSCULAR; INTRAVENOUS; SUBCUTANEOUS at 13:56

## 2022-01-01 RX ADMIN — POTASSIUM CHLORIDE 20 MEQ: 29.8 INJECTION, SOLUTION INTRAVENOUS at 09:12

## 2022-01-01 RX ADMIN — SODIUM CHLORIDE, PRESERVATIVE FREE 10 ML: 5 INJECTION INTRAVENOUS at 08:39

## 2022-01-01 RX ADMIN — GABAPENTIN 100 MG: 100 CAPSULE ORAL at 21:18

## 2022-01-01 RX ADMIN — EPOETIN ALFA-EPBX 20000 UNITS: 20000 INJECTION, SOLUTION INTRAVENOUS; SUBCUTANEOUS at 00:20

## 2022-01-01 RX ADMIN — PROPOFOL 40 MCG/KG/MIN: 10 INJECTION, EMULSION INTRAVENOUS at 14:29

## 2022-01-01 RX ADMIN — PANTOPRAZOLE SODIUM 40 MG: 40 INJECTION, POWDER, FOR SOLUTION INTRAVENOUS at 10:35

## 2022-01-01 RX ADMIN — PROPOFOL 25 MCG/KG/MIN: 10 INJECTION, EMULSION INTRAVENOUS at 18:12

## 2022-01-01 RX ADMIN — ASPIRIN 81 MG CHEWABLE TABLET 81 MG: 81 TABLET CHEWABLE at 08:38

## 2022-01-01 RX ADMIN — Medication 6 UNITS: at 06:42

## 2022-01-01 RX ADMIN — GABAPENTIN 100 MG: 100 CAPSULE ORAL at 21:26

## 2022-01-01 RX ADMIN — BUDESONIDE 500 MCG: 0.5 INHALANT RESPIRATORY (INHALATION) at 20:05

## 2022-01-01 RX ADMIN — PROPOFOL 30 MCG/KG/MIN: 10 INJECTION, EMULSION INTRAVENOUS at 02:28

## 2022-01-01 RX ADMIN — DEXMEDETOMIDINE HYDROCHLORIDE 1.5 MCG/KG/HR: 400 INJECTION INTRAVENOUS at 05:57

## 2022-01-01 RX ADMIN — ACETAMINOPHEN 650 MG: 325 TABLET ORAL at 01:09

## 2022-01-01 RX ADMIN — ALBUMIN (HUMAN) 12.5 G: 12.5 INJECTION, SOLUTION INTRAVENOUS at 17:25

## 2022-01-01 RX ADMIN — Medication 10 UNITS: at 10:02

## 2022-01-01 RX ADMIN — SIMETHICONE 80 MG: 80 TABLET, CHEWABLE ORAL at 21:07

## 2022-01-01 RX ADMIN — HYDRALAZINE HYDROCHLORIDE 5 MG: 20 INJECTION INTRAMUSCULAR; INTRAVENOUS at 16:30

## 2022-01-01 RX ADMIN — MILRINONE LACTATE 0.38 MCG/KG/MIN: 0.2 INJECTION, SOLUTION INTRAVENOUS at 02:05

## 2022-01-01 RX ADMIN — DEXMEDETOMIDINE HYDROCHLORIDE 1.5 MCG/KG/HR: 400 INJECTION INTRAVENOUS at 10:48

## 2022-01-01 RX ADMIN — POTASSIUM CHLORIDE 20 MEQ: 29.8 INJECTION, SOLUTION INTRAVENOUS at 16:13

## 2022-01-01 RX ADMIN — Medication 100 MCG/HR: at 15:36

## 2022-01-01 RX ADMIN — CARVEDILOL 6.25 MG: 12.5 TABLET, FILM COATED ORAL at 09:16

## 2022-01-01 RX ADMIN — POTASSIUM CHLORIDE 20 MEQ: 29.8 INJECTION, SOLUTION INTRAVENOUS at 06:42

## 2022-01-01 RX ADMIN — HEPARIN SODIUM 5000 UNITS: 5000 INJECTION INTRAVENOUS; SUBCUTANEOUS at 09:30

## 2022-01-01 RX ADMIN — MONTELUKAST 10 MG: 10 TABLET, FILM COATED ORAL at 10:36

## 2022-01-01 RX ADMIN — Medication 4 UNITS: at 12:11

## 2022-01-01 RX ADMIN — EPOETIN ALFA-EPBX 20000 UNITS: 20000 INJECTION, SOLUTION INTRAVENOUS; SUBCUTANEOUS at 23:49

## 2022-01-01 RX ADMIN — SENNOSIDES AND DOCUSATE SODIUM 1 TABLET: 50; 8.6 TABLET ORAL at 08:38

## 2022-01-01 RX ADMIN — BUMETANIDE 1 MG: 0.25 INJECTION INTRAMUSCULAR; INTRAVENOUS at 07:03

## 2022-01-01 RX ADMIN — EPOETIN ALFA-EPBX 20000 UNITS: 20000 INJECTION, SOLUTION INTRAVENOUS; SUBCUTANEOUS at 21:00

## 2022-01-01 RX ADMIN — SODIUM CHLORIDE, PRESERVATIVE FREE 10 ML: 5 INJECTION INTRAVENOUS at 05:36

## 2022-01-01 RX ADMIN — POTASSIUM BICARBONATE 40 MEQ: 782 TABLET, EFFERVESCENT ORAL at 10:14

## 2022-01-01 RX ADMIN — BUMETANIDE 2 MG: 0.25 INJECTION INTRAMUSCULAR; INTRAVENOUS at 21:46

## 2022-01-01 RX ADMIN — HEPARIN SODIUM 5000 UNITS: 5000 INJECTION INTRAVENOUS; SUBCUTANEOUS at 12:21

## 2022-01-01 RX ADMIN — ARFORMOTEROL TARTRATE 15 MCG: 15 SOLUTION RESPIRATORY (INHALATION) at 20:20

## 2022-01-01 RX ADMIN — ARFORMOTEROL TARTRATE 15 MCG: 15 SOLUTION RESPIRATORY (INHALATION) at 20:51

## 2022-01-01 RX ADMIN — SODIUM CHLORIDE, PRESERVATIVE FREE 10 ML: 5 INJECTION INTRAVENOUS at 13:23

## 2022-01-01 RX ADMIN — POLYETHYLENE GLYCOL 3350 17 G: 17 POWDER, FOR SOLUTION ORAL at 09:22

## 2022-01-01 RX ADMIN — Medication 2 UNITS: at 12:34

## 2022-01-01 RX ADMIN — SODIUM CHLORIDE, PRESERVATIVE FREE 10 ML: 5 INJECTION INTRAVENOUS at 00:26

## 2022-01-01 RX ADMIN — Medication 20 UNITS: at 21:18

## 2022-01-01 RX ADMIN — PROPOFOL 29.98 MCG/KG/MIN: 10 INJECTION, EMULSION INTRAVENOUS at 20:01

## 2022-01-01 RX ADMIN — ALBUMIN (HUMAN) 12.5 G: 0.25 INJECTION, SOLUTION INTRAVENOUS at 08:44

## 2022-01-01 RX ADMIN — BUDESONIDE 500 MCG: 0.5 INHALANT RESPIRATORY (INHALATION) at 07:22

## 2022-01-01 RX ADMIN — VENLAFAXINE HYDROCHLORIDE 75 MG: 37.5 CAPSULE, EXTENDED RELEASE ORAL at 09:23

## 2022-01-01 RX ADMIN — ROCURONIUM BROMIDE 40 MG: 10 SOLUTION INTRAVENOUS at 13:39

## 2022-01-01 RX ADMIN — HEPARIN SODIUM 5000 UNITS: 5000 INJECTION INTRAVENOUS; SUBCUTANEOUS at 22:26

## 2022-01-01 RX ADMIN — CARVEDILOL 6.25 MG: 6.25 TABLET, FILM COATED ORAL at 08:57

## 2022-01-01 RX ADMIN — Medication 25 UNITS: at 08:33

## 2022-01-01 RX ADMIN — PROPOFOL 40 MCG/KG/MIN: 10 INJECTION, EMULSION INTRAVENOUS at 14:53

## 2022-01-01 RX ADMIN — HYDRALAZINE HYDROCHLORIDE 5 MG: 20 INJECTION INTRAMUSCULAR; INTRAVENOUS at 14:49

## 2022-01-01 RX ADMIN — DEXMEDETOMIDINE HYDROCHLORIDE 0.6 MCG/KG/HR: 400 INJECTION INTRAVENOUS at 22:23

## 2022-01-01 RX ADMIN — DEXMEDETOMIDINE HYDROCHLORIDE 1.5 MCG/KG/HR: 400 INJECTION INTRAVENOUS at 10:01

## 2022-01-01 RX ADMIN — DEXMEDETOMIDINE HYDROCHLORIDE 1.5 MCG/KG/HR: 400 INJECTION INTRAVENOUS at 00:23

## 2022-01-01 RX ADMIN — MUPIROCIN: 20 OINTMENT TOPICAL at 17:34

## 2022-01-01 RX ADMIN — ARFORMOTEROL TARTRATE 15 MCG: 15 SOLUTION RESPIRATORY (INHALATION) at 08:31

## 2022-01-01 RX ADMIN — DEXMEDETOMIDINE HYDROCHLORIDE 1.5 MCG/KG/HR: 400 INJECTION INTRAVENOUS at 19:57

## 2022-01-01 RX ADMIN — FENTANYL CITRATE 75 MCG: 50 INJECTION INTRAMUSCULAR; INTRAVENOUS at 12:08

## 2022-01-01 RX ADMIN — CHLORHEXIDINE GLUCONATE 15 ML: 1.2 RINSE ORAL at 21:00

## 2022-01-01 RX ADMIN — DEXMEDETOMIDINE HYDROCHLORIDE 1.5 MCG/KG/HR: 400 INJECTION INTRAVENOUS at 22:30

## 2022-01-01 RX ADMIN — SODIUM CHLORIDE 9 ML/HR: 9 INJECTION, SOLUTION INTRAVENOUS at 00:00

## 2022-01-01 RX ADMIN — DEXMEDETOMIDINE HYDROCHLORIDE 0.4 MCG/KG/HR: 400 INJECTION INTRAVENOUS at 05:13

## 2022-01-01 RX ADMIN — IRON SUCROSE 200 MG: 20 INJECTION, SOLUTION INTRAVENOUS at 16:45

## 2022-01-01 RX ADMIN — VENLAFAXINE HYDROCHLORIDE 75 MG: 37.5 CAPSULE, EXTENDED RELEASE ORAL at 10:25

## 2022-01-01 RX ADMIN — POTASSIUM CHLORIDE 20 MEQ: 29.8 INJECTION, SOLUTION INTRAVENOUS at 20:26

## 2022-01-01 RX ADMIN — ALBUMIN (HUMAN) 12.5 G: 12.5 INJECTION, SOLUTION INTRAVENOUS at 08:09

## 2022-01-01 RX ADMIN — SODIUM CHLORIDE, PRESERVATIVE FREE 10 ML: 5 INJECTION INTRAVENOUS at 07:04

## 2022-01-01 RX ADMIN — ALBUMIN (HUMAN) 12.5 G: 0.25 INJECTION, SOLUTION INTRAVENOUS at 10:20

## 2022-01-01 RX ADMIN — TRIAMCINOLONE ACETONIDE: 1 CREAM TOPICAL at 09:11

## 2022-01-01 RX ADMIN — DOCUSATE SODIUM 100 MG: 100 CAPSULE, LIQUID FILLED ORAL at 18:44

## 2022-01-01 RX ADMIN — ARFORMOTEROL TARTRATE 15 MCG: 15 SOLUTION RESPIRATORY (INHALATION) at 19:12

## 2022-01-01 RX ADMIN — HEPARIN SODIUM 5000 UNITS: 5000 INJECTION INTRAVENOUS; SUBCUTANEOUS at 12:46

## 2022-01-01 RX ADMIN — BUDESONIDE 500 MCG: 0.5 INHALANT RESPIRATORY (INHALATION) at 19:12

## 2022-01-01 RX ADMIN — PANTOPRAZOLE SODIUM 40 MG: 40 INJECTION, POWDER, FOR SOLUTION INTRAVENOUS at 08:20

## 2022-01-01 RX ADMIN — BUMETANIDE 1 MG: 0.25 INJECTION, SOLUTION INTRAMUSCULAR; INTRAVENOUS at 17:22

## 2022-01-01 RX ADMIN — SODIUM CHLORIDE, PRESERVATIVE FREE 10 ML: 5 INJECTION INTRAVENOUS at 21:21

## 2022-01-01 RX ADMIN — ARFORMOTEROL TARTRATE 15 MCG: 15 SOLUTION RESPIRATORY (INHALATION) at 08:09

## 2022-01-01 RX ADMIN — SODIUM CHLORIDE, PRESERVATIVE FREE 10 ML: 5 INJECTION INTRAVENOUS at 14:00

## 2022-01-01 RX ADMIN — Medication 2 UNITS: at 17:29

## 2022-01-01 RX ADMIN — GABAPENTIN 100 MG: 100 CAPSULE ORAL at 22:51

## 2022-01-01 RX ADMIN — IVABRADINE 5 MG: 5 TABLET, FILM COATED ORAL at 09:53

## 2022-01-01 RX ADMIN — PROPOFOL 30 MCG/KG/MIN: 10 INJECTION, EMULSION INTRAVENOUS at 05:55

## 2022-01-01 RX ADMIN — HEPARIN SODIUM 3000 UNITS: 1000 INJECTION INTRAVENOUS; SUBCUTANEOUS at 14:48

## 2022-01-01 RX ADMIN — SODIUM CHLORIDE, PRESERVATIVE FREE 10 ML: 5 INJECTION INTRAVENOUS at 06:40

## 2022-01-01 RX ADMIN — CETIRIZINE HYDROCHLORIDE 10 MG: 10 TABLET, FILM COATED ORAL at 09:31

## 2022-01-01 RX ADMIN — SODIUM CHLORIDE, PRESERVATIVE FREE 10 ML: 5 INJECTION INTRAVENOUS at 08:14

## 2022-01-01 RX ADMIN — ALBUMIN (HUMAN) 12.5 G: 0.25 INJECTION, SOLUTION INTRAVENOUS at 09:27

## 2022-01-01 RX ADMIN — VENLAFAXINE HYDROCHLORIDE 75 MG: 37.5 CAPSULE, EXTENDED RELEASE ORAL at 10:03

## 2022-01-01 RX ADMIN — Medication 2 UNITS: at 22:23

## 2022-01-01 RX ADMIN — GABAPENTIN 100 MG: 100 CAPSULE ORAL at 22:08

## 2022-01-01 RX ADMIN — ALBUMIN (HUMAN) 25 G: 0.25 INJECTION, SOLUTION INTRAVENOUS at 16:30

## 2022-01-01 RX ADMIN — WATER 250 MG: 1 INJECTION INTRAMUSCULAR; INTRAVENOUS; SUBCUTANEOUS at 12:36

## 2022-01-01 RX ADMIN — ARFORMOTEROL TARTRATE 15 MCG: 15 SOLUTION RESPIRATORY (INHALATION) at 20:19

## 2022-01-01 RX ADMIN — LEVOTHYROXINE SODIUM 100 MCG: 100 TABLET ORAL at 06:05

## 2022-01-01 RX ADMIN — MONTELUKAST 10 MG: 10 TABLET, FILM COATED ORAL at 10:55

## 2022-01-01 RX ADMIN — HYDRALAZINE HYDROCHLORIDE 5 MG: 20 INJECTION INTRAMUSCULAR; INTRAVENOUS at 18:30

## 2022-01-01 RX ADMIN — BUDESONIDE 500 MCG: 0.5 INHALANT RESPIRATORY (INHALATION) at 07:29

## 2022-01-01 RX ADMIN — WATER 1 G: 1 INJECTION INTRAMUSCULAR; INTRAVENOUS; SUBCUTANEOUS at 13:55

## 2022-01-01 RX ADMIN — ARFORMOTEROL TARTRATE 15 MCG: 15 SOLUTION RESPIRATORY (INHALATION) at 20:48

## 2022-01-01 RX ADMIN — DIGOXIN 0.06 MG: 125 TABLET ORAL at 09:16

## 2022-01-01 RX ADMIN — SODIUM CHLORIDE 500 ML: 9 INJECTION, SOLUTION INTRAVENOUS at 16:25

## 2022-01-01 RX ADMIN — PANTOPRAZOLE SODIUM 40 MG: 40 INJECTION, POWDER, FOR SOLUTION INTRAVENOUS at 21:12

## 2022-01-01 RX ADMIN — CHLORHEXIDINE GLUCONATE 15 ML: 1.2 RINSE ORAL at 21:12

## 2022-01-01 RX ADMIN — SODIUM CHLORIDE, PRESERVATIVE FREE 10 ML: 5 INJECTION INTRAVENOUS at 17:07

## 2022-01-01 RX ADMIN — ARFORMOTEROL TARTRATE 15 MCG: 15 SOLUTION RESPIRATORY (INHALATION) at 20:42

## 2022-01-01 RX ADMIN — SODIUM CHLORIDE, PRESERVATIVE FREE 10 ML: 5 INJECTION INTRAVENOUS at 21:39

## 2022-01-01 RX ADMIN — HYDRALAZINE HYDROCHLORIDE 10 MG: 10 TABLET, FILM COATED ORAL at 18:59

## 2022-01-01 RX ADMIN — BUDESONIDE 500 MCG: 0.5 INHALANT RESPIRATORY (INHALATION) at 08:35

## 2022-01-01 RX ADMIN — ROCURONIUM BROMIDE 20 MG: 10 SOLUTION INTRAVENOUS at 13:51

## 2022-01-01 RX ADMIN — CLOPIDOGREL 75 MG: 75 TABLET, FILM COATED ORAL at 08:15

## 2022-01-01 RX ADMIN — POLYETHYLENE GLYCOL-3350 AND ELECTROLYTES 2000 ML: 236; 6.74; 5.86; 2.97; 22.74 POWDER, FOR SOLUTION ORAL at 01:02

## 2022-01-01 RX ADMIN — ARFORMOTEROL TARTRATE 15 MCG: 15 SOLUTION RESPIRATORY (INHALATION) at 07:47

## 2022-01-01 RX ADMIN — Medication 3 UNITS: at 07:03

## 2022-01-01 RX ADMIN — GABAPENTIN 100 MG: 100 CAPSULE ORAL at 21:57

## 2022-01-01 RX ADMIN — GABAPENTIN 100 MG: 100 CAPSULE ORAL at 21:17

## 2022-01-01 RX ADMIN — SODIUM BICARBONATE: 84 INJECTION, SOLUTION INTRAVENOUS at 00:00

## 2022-01-01 RX ADMIN — SODIUM CHLORIDE, PRESERVATIVE FREE 10 ML: 5 INJECTION INTRAVENOUS at 21:52

## 2022-01-01 RX ADMIN — POLYETHYLENE GLYCOL 3350 17 G: 17 POWDER, FOR SOLUTION ORAL at 09:10

## 2022-01-01 RX ADMIN — ARFORMOTEROL TARTRATE 15 MCG: 15 SOLUTION RESPIRATORY (INHALATION) at 08:07

## 2022-01-01 RX ADMIN — POLYETHYLENE GLYCOL 3350 17 G: 17 POWDER, FOR SOLUTION ORAL at 09:24

## 2022-01-01 RX ADMIN — TRIAMCINOLONE ACETONIDE: 1 CREAM TOPICAL at 08:21

## 2022-01-01 RX ADMIN — ALBUMIN (HUMAN) 12.5 G: 12.5 INJECTION, SOLUTION INTRAVENOUS at 00:00

## 2022-01-01 RX ADMIN — MUPIROCIN: 20 OINTMENT TOPICAL at 08:47

## 2022-01-01 RX ADMIN — TRIAMCINOLONE ACETONIDE: 1 CREAM TOPICAL at 09:25

## 2022-01-01 RX ADMIN — TRIAMCINOLONE ACETONIDE: 1 CREAM TOPICAL at 08:27

## 2022-01-01 RX ADMIN — PROPOFOL 30 MCG/KG/MIN: 10 INJECTION, EMULSION INTRAVENOUS at 11:45

## 2022-01-01 RX ADMIN — SODIUM CHLORIDE, PRESERVATIVE FREE 10 ML: 5 INJECTION INTRAVENOUS at 21:17

## 2022-01-01 RX ADMIN — GABAPENTIN 100 MG: 100 CAPSULE ORAL at 22:28

## 2022-01-01 RX ADMIN — Medication 6 UNITS: at 05:23

## 2022-01-01 RX ADMIN — SENNOSIDES AND DOCUSATE SODIUM 1 TABLET: 50; 8.6 TABLET ORAL at 17:47

## 2022-01-01 RX ADMIN — MILRINONE LACTATE IN DEXTROSE 0.2 MCG/KG/MIN: 200 INJECTION, SOLUTION INTRAVENOUS at 10:00

## 2022-01-01 RX ADMIN — HEPARIN SODIUM 5000 UNITS: 5000 INJECTION INTRAVENOUS; SUBCUTANEOUS at 04:02

## 2022-01-01 RX ADMIN — ASPIRIN 81 MG CHEWABLE TABLET 81 MG: 81 TABLET CHEWABLE at 10:55

## 2022-01-01 RX ADMIN — LEVOTHYROXINE SODIUM 100 MCG: 100 TABLET ORAL at 07:08

## 2022-01-01 RX ADMIN — POTASSIUM CHLORIDE 20 MEQ: 29.8 INJECTION, SOLUTION INTRAVENOUS at 10:26

## 2022-01-01 RX ADMIN — CHLORHEXIDINE GLUCONATE 15 ML: 1.2 RINSE ORAL at 20:35

## 2022-01-01 RX ADMIN — CEFTRIAXONE SODIUM 2 G: 2 INJECTION, POWDER, FOR SOLUTION INTRAMUSCULAR; INTRAVENOUS at 08:21

## 2022-01-01 RX ADMIN — CARVEDILOL 12.5 MG: 6.25 TABLET, FILM COATED ORAL at 19:09

## 2022-01-01 RX ADMIN — CARVEDILOL 12.5 MG: 6.25 TABLET, FILM COATED ORAL at 17:57

## 2022-01-01 RX ADMIN — SODIUM CHLORIDE, PRESERVATIVE FREE 10 ML: 5 INJECTION INTRAVENOUS at 05:28

## 2022-01-01 RX ADMIN — HYDRALAZINE HYDROCHLORIDE 10 MG: 10 TABLET, FILM COATED ORAL at 22:27

## 2022-01-01 RX ADMIN — CETIRIZINE HYDROCHLORIDE 10 MG: 10 TABLET, FILM COATED ORAL at 10:27

## 2022-01-01 RX ADMIN — SENNOSIDES AND DOCUSATE SODIUM 2 TABLET: 50; 8.6 TABLET ORAL at 17:49

## 2022-01-01 RX ADMIN — HYPROMELLOSE 2910 1 DROP: 5 SOLUTION OPHTHALMIC at 17:27

## 2022-01-01 RX ADMIN — DEXMEDETOMIDINE HYDROCHLORIDE 1.5 MCG/KG/HR: 400 INJECTION INTRAVENOUS at 14:08

## 2022-01-01 RX ADMIN — BUMETANIDE 2 MG: 0.25 INJECTION INTRAMUSCULAR; INTRAVENOUS at 22:26

## 2022-01-01 RX ADMIN — CHLORHEXIDINE GLUCONATE 15 ML: 1.2 RINSE ORAL at 08:17

## 2022-01-01 RX ADMIN — ASPIRIN 81 MG CHEWABLE TABLET 81 MG: 81 TABLET CHEWABLE at 08:46

## 2022-01-01 RX ADMIN — ETOMIDATE 8 MG: 2 INJECTION INTRAVENOUS at 13:38

## 2022-01-01 RX ADMIN — IVABRADINE 5 MG: 5 TABLET, FILM COATED ORAL at 08:00

## 2022-01-01 RX ADMIN — Medication 20 UNITS: at 09:23

## 2022-01-01 RX ADMIN — PROPOFOL 20 MCG/KG/MIN: 10 INJECTION, EMULSION INTRAVENOUS at 14:08

## 2022-01-01 RX ADMIN — HYDRALAZINE HYDROCHLORIDE 10 MG: 10 TABLET, FILM COATED ORAL at 09:10

## 2022-01-01 RX ADMIN — MILRINONE LACTATE IN DEXTROSE 0.2 MCG/KG/MIN: 200 INJECTION, SOLUTION INTRAVENOUS at 11:47

## 2022-01-01 RX ADMIN — SODIUM CHLORIDE, PRESERVATIVE FREE 10 ML: 5 INJECTION INTRAVENOUS at 08:21

## 2022-01-01 RX ADMIN — ARFORMOTEROL TARTRATE 15 MCG: 15 SOLUTION RESPIRATORY (INHALATION) at 20:10

## 2022-01-01 RX ADMIN — Medication 20 UNITS: at 22:03

## 2022-01-01 RX ADMIN — ALBUMIN (HUMAN) 12.5 G: 12.5 INJECTION, SOLUTION INTRAVENOUS at 14:59

## 2022-01-01 RX ADMIN — TRIAMCINOLONE ACETONIDE: 1 CREAM TOPICAL at 17:21

## 2022-01-01 RX ADMIN — ASPIRIN 81 MG CHEWABLE TABLET 81 MG: 81 TABLET CHEWABLE at 08:13

## 2022-01-01 RX ADMIN — Medication 6 UNITS: at 13:55

## 2022-01-01 RX ADMIN — DEXMEDETOMIDINE HYDROCHLORIDE 0.6 MCG/KG/HR: 400 INJECTION INTRAVENOUS at 16:40

## 2022-01-01 RX ADMIN — CARVEDILOL 6.25 MG: 6.25 TABLET, FILM COATED ORAL at 09:15

## 2022-01-01 RX ADMIN — PROPOFOL 45 MCG/KG/MIN: 10 INJECTION, EMULSION INTRAVENOUS at 04:59

## 2022-01-01 RX ADMIN — Medication 3 UNITS: at 22:51

## 2022-01-01 RX ADMIN — ALBUMIN (HUMAN) 12.5 G: 0.25 INJECTION, SOLUTION INTRAVENOUS at 09:12

## 2022-01-01 RX ADMIN — DEXMEDETOMIDINE HYDROCHLORIDE 1.5 MCG/KG/HR: 400 INJECTION INTRAVENOUS at 02:29

## 2022-01-01 RX ADMIN — CLOPIDOGREL 75 MG: 75 TABLET, FILM COATED ORAL at 08:32

## 2022-01-01 RX ADMIN — ARFORMOTEROL TARTRATE 15 MCG: 15 SOLUTION RESPIRATORY (INHALATION) at 20:05

## 2022-01-01 RX ADMIN — DOCUSATE SODIUM 100 MG: 100 CAPSULE, LIQUID FILLED ORAL at 09:11

## 2022-01-01 RX ADMIN — SODIUM CHLORIDE, PRESERVATIVE FREE 10 ML: 5 INJECTION INTRAVENOUS at 05:12

## 2022-01-01 RX ADMIN — PROPOFOL 20 MCG/KG/MIN: 10 INJECTION, EMULSION INTRAVENOUS at 08:12

## 2022-01-01 RX ADMIN — BUMETANIDE 1 MG: 1 TABLET ORAL at 09:12

## 2022-01-01 RX ADMIN — BUDESONIDE 500 MCG: 0.5 INHALANT RESPIRATORY (INHALATION) at 19:34

## 2022-01-01 RX ADMIN — BUMETANIDE 1 MG: 1 TABLET ORAL at 10:36

## 2022-01-01 RX ADMIN — Medication 400 MG: at 17:47

## 2022-01-01 RX ADMIN — LEVOTHYROXINE SODIUM 100 MCG: 100 TABLET ORAL at 06:42

## 2022-01-01 RX ADMIN — Medication 400 MG: at 17:07

## 2022-01-01 RX ADMIN — SODIUM CHLORIDE, PRESERVATIVE FREE 10 ML: 5 INJECTION INTRAVENOUS at 08:53

## 2022-01-01 RX ADMIN — CHLORHEXIDINE GLUCONATE 15 ML: 1.2 RINSE ORAL at 21:21

## 2022-01-01 RX ADMIN — BUMETANIDE 1 MG: 0.25 INJECTION INTRAMUSCULAR; INTRAVENOUS at 05:44

## 2022-01-01 RX ADMIN — BUDESONIDE 500 MCG: 0.5 INHALANT RESPIRATORY (INHALATION) at 20:19

## 2022-01-01 RX ADMIN — HEPARIN SODIUM 5000 UNITS: 5000 INJECTION INTRAVENOUS; SUBCUTANEOUS at 04:31

## 2022-01-01 RX ADMIN — PROPOFOL 40 MCG/KG/MIN: 10 INJECTION, EMULSION INTRAVENOUS at 06:18

## 2022-01-01 RX ADMIN — HYDRALAZINE HYDROCHLORIDE 10 MG: 10 TABLET, FILM COATED ORAL at 10:36

## 2022-01-01 RX ADMIN — CETIRIZINE HYDROCHLORIDE 10 MG: 10 TABLET, FILM COATED ORAL at 10:36

## 2022-01-01 RX ADMIN — CHLORHEXIDINE GLUCONATE 15 ML: 1.2 RINSE ORAL at 08:21

## 2022-01-01 RX ADMIN — IVABRADINE 5 MG: 5 TABLET, FILM COATED ORAL at 10:03

## 2022-01-01 RX ADMIN — Medication 400 MG: at 08:38

## 2022-01-01 RX ADMIN — CARVEDILOL 12.5 MG: 6.25 TABLET, FILM COATED ORAL at 09:24

## 2022-01-01 RX ADMIN — DEXMEDETOMIDINE HYDROCHLORIDE 0.6 MCG/KG/HR: 400 INJECTION INTRAVENOUS at 18:40

## 2022-01-01 RX ADMIN — GABAPENTIN 100 MG: 100 CAPSULE ORAL at 21:01

## 2022-01-01 RX ADMIN — MILRINONE LACTATE IN DEXTROSE 0.2 MCG/KG/MIN: 200 INJECTION, SOLUTION INTRAVENOUS at 13:11

## 2022-01-01 RX ADMIN — DEXMEDETOMIDINE HYDROCHLORIDE 1.5 MCG/KG/HR: 400 INJECTION INTRAVENOUS at 02:43

## 2022-01-01 RX ADMIN — TRIAMCINOLONE ACETONIDE: 1 CREAM TOPICAL at 19:46

## 2022-01-01 RX ADMIN — IVABRADINE 7.5 MG: 7.5 TABLET, FILM COATED ORAL at 10:27

## 2022-01-01 RX ADMIN — BUMETANIDE 1 MG/HR: 0.25 INJECTION INTRAMUSCULAR; INTRAVENOUS at 18:37

## 2022-01-01 RX ADMIN — DEXMEDETOMIDINE HYDROCHLORIDE 1.3 MCG/KG/HR: 400 INJECTION INTRAVENOUS at 09:23

## 2022-01-01 RX ADMIN — Medication 6 UNITS: at 12:17

## 2022-01-01 RX ADMIN — SENNOSIDES AND DOCUSATE SODIUM 2 TABLET: 50; 8.6 TABLET ORAL at 19:09

## 2022-01-01 RX ADMIN — MONTELUKAST 10 MG: 10 TABLET, FILM COATED ORAL at 11:17

## 2022-01-01 RX ADMIN — PROPOFOL 25 MCG/KG/MIN: 10 INJECTION, EMULSION INTRAVENOUS at 18:32

## 2022-01-01 RX ADMIN — ASPIRIN 81 MG CHEWABLE TABLET 81 MG: 81 TABLET CHEWABLE at 09:31

## 2022-01-01 RX ADMIN — ARFORMOTEROL TARTRATE 15 MCG: 15 SOLUTION RESPIRATORY (INHALATION) at 20:58

## 2022-01-01 RX ADMIN — HYDRALAZINE HYDROCHLORIDE 5 MG: 20 INJECTION INTRAMUSCULAR; INTRAVENOUS at 03:51

## 2022-01-01 RX ADMIN — POTASSIUM CHLORIDE 20 MEQ: 29.8 INJECTION INTRAVENOUS at 23:06

## 2022-01-01 RX ADMIN — Medication 400 MG: at 09:10

## 2022-01-01 RX ADMIN — SODIUM CHLORIDE, PRESERVATIVE FREE 10 ML: 5 INJECTION INTRAVENOUS at 21:46

## 2022-01-01 RX ADMIN — BUDESONIDE 500 MCG: 0.5 INHALANT RESPIRATORY (INHALATION) at 20:47

## 2022-01-01 RX ADMIN — ARFORMOTEROL TARTRATE 15 MCG: 15 SOLUTION RESPIRATORY (INHALATION) at 08:01

## 2022-01-01 RX ADMIN — TRIAMCINOLONE ACETONIDE: 1 CREAM TOPICAL at 17:36

## 2022-01-01 RX ADMIN — Medication 30 UNITS: at 21:31

## 2022-01-01 RX ADMIN — PROPOFOL 30 MCG/KG/MIN: 10 INJECTION, EMULSION INTRAVENOUS at 06:12

## 2022-01-01 RX ADMIN — PROPOFOL 50 MG: 10 INJECTION, EMULSION INTRAVENOUS at 08:36

## 2022-01-01 RX ADMIN — ARFORMOTEROL TARTRATE 15 MCG: 15 SOLUTION RESPIRATORY (INHALATION) at 08:47

## 2022-01-01 RX ADMIN — MILRINONE LACTATE 0.2 MCG/KG/MIN: 0.2 INJECTION, SOLUTION INTRAVENOUS at 07:32

## 2022-01-01 RX ADMIN — WATER 1 G: 1 INJECTION INTRAMUSCULAR; INTRAVENOUS; SUBCUTANEOUS at 02:05

## 2022-01-01 RX ADMIN — TRIAMCINOLONE ACETONIDE: 1 CREAM TOPICAL at 17:58

## 2022-01-01 RX ADMIN — Medication 400 MG: at 08:14

## 2022-01-01 RX ADMIN — ALLOPURINOL 50 MG: 100 TABLET ORAL at 09:53

## 2022-01-01 RX ADMIN — CEFAZOLIN 2 G: 330 INJECTION, POWDER, FOR SOLUTION INTRAMUSCULAR; INTRAVENOUS at 13:55

## 2022-01-01 RX ADMIN — BUMETANIDE 1 MG: 1 TABLET ORAL at 18:23

## 2022-01-01 RX ADMIN — Medication 6 UNITS: at 00:06

## 2022-01-01 RX ADMIN — SODIUM CHLORIDE, PRESERVATIVE FREE 10 ML: 5 INJECTION INTRAVENOUS at 05:37

## 2022-01-01 RX ADMIN — POTASSIUM CHLORIDE 20 MEQ: 29.8 INJECTION, SOLUTION INTRAVENOUS at 01:30

## 2022-01-01 RX ADMIN — POTASSIUM CHLORIDE 20 MEQ: 29.8 INJECTION, SOLUTION INTRAVENOUS at 07:45

## 2022-01-01 RX ADMIN — Medication 100 MCG/HR: at 11:19

## 2022-01-01 RX ADMIN — CHLORHEXIDINE GLUCONATE 15 ML: 1.2 RINSE ORAL at 20:30

## 2022-01-01 RX ADMIN — ARFORMOTEROL TARTRATE 15 MCG: 15 SOLUTION RESPIRATORY (INHALATION) at 19:34

## 2022-01-01 RX ADMIN — Medication 100 MCG/HR: at 20:22

## 2022-01-01 RX ADMIN — Medication 35 UNITS: at 09:24

## 2022-01-01 RX ADMIN — Medication 6 UNITS: at 05:14

## 2022-01-01 RX ADMIN — Medication 6 UNITS: at 05:28

## 2022-01-01 RX ADMIN — POTASSIUM BICARBONATE 40 MEQ: 782 TABLET, EFFERVESCENT ORAL at 09:30

## 2022-01-01 RX ADMIN — MONTELUKAST 10 MG: 10 TABLET, FILM COATED ORAL at 09:10

## 2022-01-01 RX ADMIN — BUDESONIDE 500 MCG: 0.5 INHALANT RESPIRATORY (INHALATION) at 19:43

## 2022-01-01 RX ADMIN — CHLORHEXIDINE GLUCONATE 15 ML: 1.2 RINSE ORAL at 08:40

## 2022-01-01 RX ADMIN — HYDRALAZINE HYDROCHLORIDE 5 MG: 20 INJECTION INTRAMUSCULAR; INTRAVENOUS at 13:36

## 2022-01-01 RX ADMIN — BIVALIRUDIN 0.1 MG/KG/HR: 250 INJECTION, POWDER, LYOPHILIZED, FOR SOLUTION INTRAVENOUS at 00:08

## 2022-01-01 RX ADMIN — ISOSORBIDE DINITRATE 10 MG: 20 TABLET ORAL at 09:11

## 2022-01-01 RX ADMIN — SENNOSIDES AND DOCUSATE SODIUM 1 TABLET: 50; 8.6 TABLET ORAL at 08:57

## 2022-01-01 RX ADMIN — BUDESONIDE 500 MCG: 0.5 INHALANT RESPIRATORY (INHALATION) at 08:26

## 2022-01-01 RX ADMIN — ASPIRIN 81 MG CHEWABLE TABLET 81 MG: 81 TABLET CHEWABLE at 08:19

## 2022-01-01 RX ADMIN — CHLORHEXIDINE GLUCONATE 15 ML: 1.2 RINSE ORAL at 21:03

## 2022-01-01 RX ADMIN — Medication 6 UNITS: at 00:37

## 2022-01-01 RX ADMIN — BUMETANIDE 2 MG: 0.25 INJECTION INTRAMUSCULAR; INTRAVENOUS at 17:32

## 2022-01-01 RX ADMIN — Medication 400 MG: at 08:23

## 2022-01-01 RX ADMIN — POLYETHYLENE GLYCOL-3350 AND ELECTROLYTES 2000 ML: 236; 6.74; 5.86; 2.97; 22.74 POWDER, FOR SOLUTION ORAL at 18:33

## 2022-01-01 RX ADMIN — Medication 6 UNITS: at 23:38

## 2022-01-01 RX ADMIN — MONTELUKAST 10 MG: 10 TABLET, FILM COATED ORAL at 09:22

## 2022-01-01 RX ADMIN — BUMETANIDE 0.5 MG/HR: 0.25 INJECTION INTRAMUSCULAR; INTRAVENOUS at 18:34

## 2022-01-01 RX ADMIN — Medication 4 UNITS: at 11:36

## 2022-01-01 RX ADMIN — WATER 1 G: 1 INJECTION INTRAMUSCULAR; INTRAVENOUS; SUBCUTANEOUS at 01:54

## 2022-01-01 RX ADMIN — Medication 100 MCG/HR: at 06:38

## 2022-01-01 RX ADMIN — PROPOFOL 30 MCG/KG/MIN: 10 INJECTION, EMULSION INTRAVENOUS at 03:47

## 2022-01-01 RX ADMIN — BUDESONIDE 500 MCG: 0.5 INHALANT RESPIRATORY (INHALATION) at 08:09

## 2022-01-01 RX ADMIN — ARFORMOTEROL TARTRATE 15 MCG: 15 SOLUTION RESPIRATORY (INHALATION) at 21:09

## 2022-01-01 RX ADMIN — HYDRALAZINE HYDROCHLORIDE 5 MG: 20 INJECTION INTRAMUSCULAR; INTRAVENOUS at 15:22

## 2022-01-01 RX ADMIN — HEPARIN SODIUM 5000 UNITS: 5000 INJECTION INTRAVENOUS; SUBCUTANEOUS at 18:24

## 2022-01-01 RX ADMIN — BUMETANIDE 1 MG: 0.25 INJECTION INTRAMUSCULAR; INTRAVENOUS at 22:08

## 2022-01-01 RX ADMIN — SODIUM CHLORIDE, PRESERVATIVE FREE 10 ML: 5 INJECTION INTRAVENOUS at 05:02

## 2022-01-01 RX ADMIN — MILRINONE LACTATE IN DEXTROSE 0.25 MCG/KG/MIN: 200 INJECTION, SOLUTION INTRAVENOUS at 09:04

## 2022-01-01 RX ADMIN — DEXMEDETOMIDINE HYDROCHLORIDE 0.6 MCG/KG/HR: 400 INJECTION INTRAVENOUS at 10:04

## 2022-01-01 RX ADMIN — Medication 4 UNITS: at 17:50

## 2022-01-01 RX ADMIN — Medication 60 UNITS: at 10:02

## 2022-01-01 RX ADMIN — Medication 30 UNITS: at 09:07

## 2022-01-01 RX ADMIN — HYDRALAZINE HYDROCHLORIDE 10 MG: 10 TABLET, FILM COATED ORAL at 22:00

## 2022-01-01 RX ADMIN — Medication 2 UNITS: at 18:00

## 2022-01-01 RX ADMIN — HYDRALAZINE HYDROCHLORIDE 5 MG: 20 INJECTION INTRAMUSCULAR; INTRAVENOUS at 13:44

## 2022-01-01 RX ADMIN — BUMETANIDE 2 MG: 0.25 INJECTION, SOLUTION INTRAMUSCULAR; INTRAVENOUS at 19:11

## 2022-01-01 RX ADMIN — ALBUMIN (HUMAN) 12.5 G: 0.25 INJECTION, SOLUTION INTRAVENOUS at 08:13

## 2022-01-01 RX ADMIN — DEXMEDETOMIDINE HYDROCHLORIDE 1.5 MCG/KG/HR: 400 INJECTION INTRAVENOUS at 14:49

## 2022-01-01 RX ADMIN — DEXMEDETOMIDINE HYDROCHLORIDE 1.5 MCG/KG/HR: 400 INJECTION INTRAVENOUS at 22:01

## 2022-01-01 RX ADMIN — VENLAFAXINE HYDROCHLORIDE 75 MG: 37.5 CAPSULE, EXTENDED RELEASE ORAL at 09:15

## 2022-01-01 RX ADMIN — Medication 400 MG: at 08:20

## 2022-01-01 RX ADMIN — MILRINONE LACTATE 0.2 MCG/KG/MIN: 0.2 INJECTION, SOLUTION INTRAVENOUS at 16:43

## 2022-01-01 RX ADMIN — ISOSORBIDE DINITRATE 10 MG: 20 TABLET ORAL at 23:55

## 2022-01-01 RX ADMIN — BUMETANIDE 0.25 MG/HR: 0.25 INJECTION INTRAMUSCULAR; INTRAVENOUS at 13:27

## 2022-01-01 RX ADMIN — DEXMEDETOMIDINE HYDROCHLORIDE 1.5 MCG/KG/HR: 400 INJECTION INTRAVENOUS at 00:45

## 2022-01-01 RX ADMIN — HEPARIN SODIUM 5000 UNITS: 5000 INJECTION INTRAVENOUS; SUBCUTANEOUS at 13:03

## 2022-01-01 RX ADMIN — Medication 2 UNITS: at 06:34

## 2022-01-01 RX ADMIN — BUDESONIDE 500 MCG: 0.5 INHALANT RESPIRATORY (INHALATION) at 08:11

## 2022-01-01 RX ADMIN — TRIAMCINOLONE ACETONIDE: 1 CREAM TOPICAL at 08:48

## 2022-01-01 RX ADMIN — DEXMEDETOMIDINE HYDROCHLORIDE 0.5 MCG/KG/HR: 400 INJECTION INTRAVENOUS at 02:00

## 2022-01-01 RX ADMIN — VENLAFAXINE HYDROCHLORIDE 75 MG: 37.5 CAPSULE, EXTENDED RELEASE ORAL at 08:22

## 2022-01-01 RX ADMIN — MONTELUKAST 10 MG: 10 TABLET, FILM COATED ORAL at 08:19

## 2022-01-01 RX ADMIN — SENNOSIDES AND DOCUSATE SODIUM 1 TABLET: 50; 8.6 TABLET ORAL at 08:20

## 2022-01-01 RX ADMIN — Medication 400 MG: at 17:20

## 2022-01-01 RX ADMIN — LEVOTHYROXINE SODIUM 100 MCG: 100 TABLET ORAL at 05:27

## 2022-01-01 RX ADMIN — MILRINONE LACTATE 0.2 MCG/KG/MIN: 0.2 INJECTION, SOLUTION INTRAVENOUS at 10:59

## 2022-01-01 RX ADMIN — SODIUM CHLORIDE, PRESERVATIVE FREE 10 ML: 5 INJECTION INTRAVENOUS at 09:00

## 2022-01-01 RX ADMIN — HEPARIN SODIUM 5000 UNITS: 5000 INJECTION INTRAVENOUS; SUBCUTANEOUS at 05:20

## 2022-01-01 RX ADMIN — BUMETANIDE 1 MG: 0.25 INJECTION INTRAMUSCULAR; INTRAVENOUS at 14:11

## 2022-01-01 RX ADMIN — PANTOPRAZOLE SODIUM 40 MG: 40 INJECTION, POWDER, FOR SOLUTION INTRAVENOUS at 09:22

## 2022-01-01 RX ADMIN — DEXMEDETOMIDINE HYDROCHLORIDE 0.5 MCG/KG/HR: 400 INJECTION INTRAVENOUS at 19:37

## 2022-01-01 RX ADMIN — BUMETANIDE 1 MG: 0.25 INJECTION INTRAMUSCULAR; INTRAVENOUS at 17:21

## 2022-01-01 RX ADMIN — ISOSORBIDE DINITRATE 10 MG: 20 TABLET ORAL at 18:29

## 2022-01-01 RX ADMIN — DEXMEDETOMIDINE HYDROCHLORIDE 0.6 MCG/KG/HR: 400 INJECTION INTRAVENOUS at 13:10

## 2022-01-01 RX ADMIN — Medication 80 MCG: at 11:20

## 2022-01-01 RX ADMIN — SODIUM PHOSPHATE, MONOBASIC, MONOHYDRATE: 276; 142 INJECTION, SOLUTION INTRAVENOUS at 09:00

## 2022-01-01 RX ADMIN — DEXMEDETOMIDINE HYDROCHLORIDE 1 MCG/KG/HR: 400 INJECTION INTRAVENOUS at 04:26

## 2022-01-01 RX ADMIN — TRIAMCINOLONE ACETONIDE: 1 CREAM TOPICAL at 17:47

## 2022-01-01 RX ADMIN — PROPOFOL 30 MCG/KG/MIN: 10 INJECTION, EMULSION INTRAVENOUS at 10:01

## 2022-01-01 RX ADMIN — WATER 2 G: 1 INJECTION INTRAMUSCULAR; INTRAVENOUS; SUBCUTANEOUS at 11:34

## 2022-01-01 RX ADMIN — BUMETANIDE 2 MG: 0.25 INJECTION INTRAMUSCULAR; INTRAVENOUS at 00:19

## 2022-01-01 RX ADMIN — IVABRADINE 7.5 MG: 7.5 TABLET, FILM COATED ORAL at 17:50

## 2022-01-01 RX ADMIN — BUMETANIDE 1 MG/HR: 0.25 INJECTION INTRAMUSCULAR; INTRAVENOUS at 00:29

## 2022-01-01 RX ADMIN — DEXMEDETOMIDINE HYDROCHLORIDE 1.5 MCG/KG/HR: 400 INJECTION INTRAVENOUS at 00:59

## 2022-01-01 RX ADMIN — DEXMEDETOMIDINE HYDROCHLORIDE 1.5 MCG/KG/HR: 400 INJECTION INTRAVENOUS at 03:20

## 2022-01-01 RX ADMIN — SODIUM CHLORIDE, PRESERVATIVE FREE 10 ML: 5 INJECTION INTRAVENOUS at 21:14

## 2022-01-01 RX ADMIN — BUMETANIDE 1 MG: 0.25 INJECTION, SOLUTION INTRAMUSCULAR; INTRAVENOUS at 08:20

## 2022-01-01 RX ADMIN — TRIAMCINOLONE ACETONIDE: 1 CREAM TOPICAL at 08:22

## 2022-01-01 RX ADMIN — DEXMEDETOMIDINE HYDROCHLORIDE 1.5 MCG/KG/HR: 400 INJECTION INTRAVENOUS at 06:48

## 2022-01-01 RX ADMIN — SODIUM CHLORIDE, PRESERVATIVE FREE 10 ML: 5 INJECTION INTRAVENOUS at 06:05

## 2022-01-01 RX ADMIN — IRON SUCROSE 200 MG: 20 INJECTION, SOLUTION INTRAVENOUS at 15:15

## 2022-01-01 RX ADMIN — Medication 4 UNITS: at 12:57

## 2022-01-01 RX ADMIN — CARVEDILOL 12.5 MG: 6.25 TABLET, FILM COATED ORAL at 09:53

## 2022-01-01 RX ADMIN — Medication 400 MG: at 08:19

## 2022-01-01 RX ADMIN — HEPARIN SODIUM 5000 UNITS: 5000 INJECTION INTRAVENOUS; SUBCUTANEOUS at 18:51

## 2022-01-01 RX ADMIN — Medication 3 UNITS: at 13:40

## 2022-01-01 RX ADMIN — PANTOPRAZOLE SODIUM 40 MG: 40 INJECTION, POWDER, FOR SOLUTION INTRAVENOUS at 08:23

## 2022-01-01 RX ADMIN — Medication 30 UNITS: at 21:58

## 2022-01-01 RX ADMIN — TRIAMCINOLONE ACETONIDE: 1 CREAM TOPICAL at 19:16

## 2022-01-01 RX ADMIN — MORPHINE SULFATE 2 MG: 2 INJECTION, SOLUTION INTRAMUSCULAR; INTRAVENOUS at 00:05

## 2022-01-01 RX ADMIN — PANTOPRAZOLE SODIUM 40 MG: 40 INJECTION, POWDER, FOR SOLUTION INTRAVENOUS at 08:26

## 2022-01-01 RX ADMIN — PROPOFOL 40 MCG/KG/MIN: 10 INJECTION, EMULSION INTRAVENOUS at 18:00

## 2022-01-01 RX ADMIN — Medication 25 UNITS: at 11:19

## 2022-01-01 RX ADMIN — ARFORMOTEROL TARTRATE 15 MCG: 15 SOLUTION RESPIRATORY (INHALATION) at 08:26

## 2022-01-01 RX ADMIN — PROPOFOL 50 MG: 10 INJECTION, EMULSION INTRAVENOUS at 08:13

## 2022-01-01 RX ADMIN — CARVEDILOL 6.25 MG: 6.25 TABLET, FILM COATED ORAL at 17:31

## 2022-01-01 RX ADMIN — HYDRALAZINE HYDROCHLORIDE 5 MG: 20 INJECTION INTRAMUSCULAR; INTRAVENOUS at 16:34

## 2022-01-01 RX ADMIN — SODIUM CHLORIDE, PRESERVATIVE FREE 10 ML: 5 INJECTION INTRAVENOUS at 05:55

## 2022-01-01 RX ADMIN — AMIODARONE HYDROCHLORIDE 0.5 MG/MIN: 50 INJECTION, SOLUTION INTRAVENOUS at 01:31

## 2022-01-01 RX ADMIN — PROPOFOL 40 MCG/KG/MIN: 10 INJECTION, EMULSION INTRAVENOUS at 21:05

## 2022-01-01 RX ADMIN — BUDESONIDE 500 MCG: 0.5 INHALANT RESPIRATORY (INHALATION) at 07:58

## 2022-01-01 RX ADMIN — HEPARIN SODIUM 5000 UNITS: 5000 INJECTION INTRAVENOUS; SUBCUTANEOUS at 01:43

## 2022-01-01 RX ADMIN — BUDESONIDE 500 MCG: 0.5 INHALANT RESPIRATORY (INHALATION) at 08:08

## 2022-01-01 RX ADMIN — SENNOSIDES AND DOCUSATE SODIUM 1 TABLET: 50; 8.6 TABLET ORAL at 17:07

## 2022-01-01 RX ADMIN — Medication 40 UNITS: at 21:52

## 2022-01-01 RX ADMIN — POTASSIUM BICARBONATE 40 MEQ: 782 TABLET, EFFERVESCENT ORAL at 08:32

## 2022-01-01 RX ADMIN — BUMETANIDE 1 MG: 0.25 INJECTION INTRAMUSCULAR; INTRAVENOUS at 23:24

## 2022-01-01 RX ADMIN — Medication 400 MG: at 08:45

## 2022-01-01 RX ADMIN — DEXMEDETOMIDINE HYDROCHLORIDE 1.5 MCG/KG/HR: 400 INJECTION INTRAVENOUS at 12:32

## 2022-01-01 RX ADMIN — WATER 1 G: 1 INJECTION INTRAMUSCULAR; INTRAVENOUS; SUBCUTANEOUS at 01:06

## 2022-01-01 RX ADMIN — POTASSIUM & SODIUM PHOSPHATES POWDER PACK 280-160-250 MG 1 PACKET: 280-160-250 PACK at 12:51

## 2022-01-01 RX ADMIN — DOCUSATE SODIUM 100 MG: 100 CAPSULE, LIQUID FILLED ORAL at 09:30

## 2022-01-01 RX ADMIN — Medication 2 UNITS: at 06:43

## 2022-01-01 RX ADMIN — POLYETHYLENE GLYCOL 3350 17 G: 17 POWDER, FOR SOLUTION ORAL at 09:16

## 2022-01-01 RX ADMIN — DEXMEDETOMIDINE HYDROCHLORIDE 0.6 MCG/KG/HR: 400 INJECTION INTRAVENOUS at 06:47

## 2022-01-01 RX ADMIN — LEVOTHYROXINE SODIUM 100 MCG: 100 TABLET ORAL at 05:48

## 2022-01-01 RX ADMIN — SODIUM CHLORIDE 9 ML/HR: 9 INJECTION, SOLUTION INTRAVENOUS at 12:00

## 2022-01-01 RX ADMIN — VENLAFAXINE HYDROCHLORIDE 75 MG: 37.5 CAPSULE, EXTENDED RELEASE ORAL at 08:20

## 2022-01-01 RX ADMIN — PROPOFOL 40 MCG/KG/MIN: 10 INJECTION, EMULSION INTRAVENOUS at 00:27

## 2022-01-01 RX ADMIN — Medication 2 UNITS: at 17:36

## 2022-01-01 RX ADMIN — CARVEDILOL 12.5 MG: 6.25 TABLET, FILM COATED ORAL at 09:31

## 2022-01-01 RX ADMIN — Medication 400 MG: at 09:31

## 2022-01-01 RX ADMIN — Medication 2 UNITS: at 23:37

## 2022-01-01 RX ADMIN — Medication 10 UNITS: at 23:25

## 2022-01-01 RX ADMIN — SODIUM CHLORIDE 9 ML/HR: 9 INJECTION, SOLUTION INTRAVENOUS at 04:00

## 2022-01-01 RX ADMIN — MILRINONE LACTATE 0.2 MCG/KG/MIN: 0.2 INJECTION, SOLUTION INTRAVENOUS at 21:56

## 2022-01-01 RX ADMIN — SODIUM CHLORIDE, PRESERVATIVE FREE 10 ML: 5 INJECTION INTRAVENOUS at 21:02

## 2022-01-01 RX ADMIN — CHLORHEXIDINE GLUCONATE 15 ML: 1.2 RINSE ORAL at 09:23

## 2022-01-01 RX ADMIN — ISOSORBIDE DINITRATE 10 MG: 20 TABLET ORAL at 22:27

## 2022-01-01 RX ADMIN — IVABRADINE 5 MG: 5 TABLET, FILM COATED ORAL at 18:21

## 2022-01-01 RX ADMIN — PANTOPRAZOLE SODIUM 40 MG: 40 INJECTION, POWDER, FOR SOLUTION INTRAVENOUS at 21:17

## 2022-01-01 RX ADMIN — SODIUM CHLORIDE, PRESERVATIVE FREE 10 ML: 5 INJECTION INTRAVENOUS at 10:12

## 2022-01-01 RX ADMIN — HEPARIN SODIUM 5000 UNITS: 5000 INJECTION INTRAVENOUS; SUBCUTANEOUS at 05:48

## 2022-01-01 RX ADMIN — BUDESONIDE 500 MCG: 0.5 INHALANT RESPIRATORY (INHALATION) at 08:50

## 2022-01-01 RX ADMIN — POTASSIUM CHLORIDE 20 MEQ: 29.8 INJECTION, SOLUTION INTRAVENOUS at 08:40

## 2022-01-01 RX ADMIN — Medication 100 MCG/HR: at 20:03

## 2022-01-01 RX ADMIN — ARFORMOTEROL TARTRATE 15 MCG: 15 SOLUTION RESPIRATORY (INHALATION) at 08:02

## 2022-01-01 RX ADMIN — BUMETANIDE 1 MG: 0.25 INJECTION INTRAMUSCULAR; INTRAVENOUS at 21:51

## 2022-01-01 RX ADMIN — BUMETANIDE 1.5 MG/HR: 0.25 INJECTION INTRAMUSCULAR; INTRAVENOUS at 15:30

## 2022-01-01 RX ADMIN — Medication 400 MCG: at 11:00

## 2022-01-01 RX ADMIN — ALLOPURINOL 100 MG: 100 TABLET ORAL at 08:19

## 2022-01-01 RX ADMIN — Medication 40 UNITS: at 08:21

## 2022-01-01 RX ADMIN — DEXMEDETOMIDINE HYDROCHLORIDE 0.4 MCG/KG/HR: 400 INJECTION INTRAVENOUS at 16:58

## 2022-01-01 RX ADMIN — LEVOTHYROXINE SODIUM 100 MCG: 0.1 TABLET ORAL at 09:21

## 2022-01-01 RX ADMIN — ALBUMIN (HUMAN) 12.5 G: 0.25 INJECTION, SOLUTION INTRAVENOUS at 17:43

## 2022-01-01 RX ADMIN — HEPARIN SODIUM 5000 UNITS: 5000 INJECTION INTRAVENOUS; SUBCUTANEOUS at 06:39

## 2022-01-01 RX ADMIN — ARFORMOTEROL TARTRATE 15 MCG: 15 SOLUTION RESPIRATORY (INHALATION) at 21:00

## 2022-01-01 RX ADMIN — ALBUMIN (HUMAN) 12.5 G: 0.25 INJECTION, SOLUTION INTRAVENOUS at 16:16

## 2022-01-01 RX ADMIN — POLYETHYLENE GLYCOL 3350 17 G: 17 POWDER, FOR SOLUTION ORAL at 09:23

## 2022-01-01 RX ADMIN — CARVEDILOL 12.5 MG: 6.25 TABLET, FILM COATED ORAL at 18:44

## 2022-01-01 RX ADMIN — BUDESONIDE 500 MCG: 0.5 INHALANT RESPIRATORY (INHALATION) at 07:51

## 2022-01-01 RX ADMIN — BIVALIRUDIN 0.1 MG/KG/HR: 250 INJECTION, POWDER, LYOPHILIZED, FOR SOLUTION INTRAVENOUS at 21:07

## 2022-01-01 RX ADMIN — IVABRADINE 7.5 MG: 7.5 TABLET, FILM COATED ORAL at 09:34

## 2022-01-01 RX ADMIN — Medication 2 UNITS: at 06:05

## 2022-01-01 RX ADMIN — DOBUTAMINE IN DEXTROSE 2 MCG/KG/MIN: 200 INJECTION, SOLUTION INTRAVENOUS at 13:15

## 2022-01-01 RX ADMIN — BUMETANIDE 1 MG: 0.25 INJECTION INTRAMUSCULAR; INTRAVENOUS at 00:17

## 2022-01-01 RX ADMIN — CARVEDILOL 12.5 MG: 6.25 TABLET, FILM COATED ORAL at 17:49

## 2022-01-01 RX ADMIN — BUDESONIDE 500 MCG: 0.5 INHALANT RESPIRATORY (INHALATION) at 20:51

## 2022-01-01 RX ADMIN — MILRINONE LACTATE IN DEXTROSE 0.2 MCG/KG/MIN: 200 INJECTION, SOLUTION INTRAVENOUS at 09:51

## 2022-01-01 RX ADMIN — DIGOXIN 0.06 MG: 125 TABLET ORAL at 09:22

## 2022-01-01 RX ADMIN — SIMETHICONE 80 MG: 80 TABLET, CHEWABLE ORAL at 23:45

## 2022-01-01 RX ADMIN — MONTELUKAST 10 MG: 10 TABLET, FILM COATED ORAL at 08:21

## 2022-01-01 RX ADMIN — DEXMEDETOMIDINE HYDROCHLORIDE 0.6 MCG/KG/HR: 400 INJECTION INTRAVENOUS at 16:12

## 2022-01-01 RX ADMIN — Medication 25 UNITS: at 18:15

## 2022-01-01 RX ADMIN — HEPARIN SODIUM 5000 UNITS: 5000 INJECTION INTRAVENOUS; SUBCUTANEOUS at 23:15

## 2022-01-01 RX ADMIN — Medication 400 MG: at 17:45

## 2022-01-01 RX ADMIN — DIGOXIN 0.06 MG: 125 TABLET ORAL at 08:14

## 2022-01-01 RX ADMIN — DOCUSATE SODIUM 100 MG: 100 CAPSULE, LIQUID FILLED ORAL at 18:09

## 2022-01-01 RX ADMIN — Medication 25 UNITS: at 08:20

## 2022-01-01 RX ADMIN — ALLOPURINOL 100 MG: 100 TABLET ORAL at 08:22

## 2022-01-01 RX ADMIN — DEXMEDETOMIDINE HYDROCHLORIDE 1.5 MCG/KG/HR: 400 INJECTION INTRAVENOUS at 21:00

## 2022-01-01 RX ADMIN — Medication 4 UNITS: at 23:22

## 2022-01-01 RX ADMIN — SODIUM CHLORIDE, PRESERVATIVE FREE 10 ML: 5 INJECTION INTRAVENOUS at 08:28

## 2022-01-01 RX ADMIN — PROPOFOL 20 MCG/KG/MIN: 10 INJECTION, EMULSION INTRAVENOUS at 15:18

## 2022-01-01 RX ADMIN — ALBUMIN (HUMAN) 12.5 G: 0.25 INJECTION, SOLUTION INTRAVENOUS at 08:17

## 2022-01-01 RX ADMIN — CHLORHEXIDINE GLUCONATE 15 ML: 1.2 RINSE ORAL at 08:59

## 2022-01-01 RX ADMIN — Medication 10 UNITS: at 13:05

## 2022-01-01 RX ADMIN — BUMETANIDE 1 MG: 1 TABLET ORAL at 18:52

## 2022-01-01 RX ADMIN — ALLOPURINOL 50 MG: 100 TABLET ORAL at 08:38

## 2022-01-01 RX ADMIN — CEFTRIAXONE SODIUM 2 G: 2 INJECTION, POWDER, FOR SOLUTION INTRAMUSCULAR; INTRAVENOUS at 09:10

## 2022-01-01 RX ADMIN — DEXMEDETOMIDINE HYDROCHLORIDE 1.5 MCG/KG/HR: 400 INJECTION INTRAVENOUS at 05:04

## 2022-01-01 RX ADMIN — MAGNESIUM SULFATE HEPTAHYDRATE 2 G: 40 INJECTION, SOLUTION INTRAVENOUS at 08:59

## 2022-01-01 RX ADMIN — CARVEDILOL 12.5 MG: 6.25 TABLET, FILM COATED ORAL at 17:21

## 2022-01-01 RX ADMIN — Medication 10 UNITS: at 18:07

## 2022-01-01 RX ADMIN — CETIRIZINE HYDROCHLORIDE 10 MG: 10 TABLET, FILM COATED ORAL at 08:57

## 2022-01-01 RX ADMIN — TRIAMCINOLONE ACETONIDE: 1 CREAM TOPICAL at 18:45

## 2022-01-01 RX ADMIN — BUDESONIDE 500 MCG: 0.5 INHALANT RESPIRATORY (INHALATION) at 20:42

## 2022-01-01 RX ADMIN — Medication 3 UNITS: at 19:07

## 2022-01-01 RX ADMIN — Medication 4 UNITS: at 17:45

## 2022-01-01 RX ADMIN — MUPIROCIN: 20 OINTMENT TOPICAL at 08:59

## 2022-01-01 RX ADMIN — Medication 4 UNITS: at 17:20

## 2022-01-01 RX ADMIN — Medication 30 UNITS: at 21:14

## 2022-01-01 RX ADMIN — Medication 15 UNITS: at 23:27

## 2022-01-01 RX ADMIN — MILRINONE LACTATE IN DEXTROSE 0.2 MCG/KG/MIN: 200 INJECTION, SOLUTION INTRAVENOUS at 00:29

## 2022-01-01 RX ADMIN — BUMETANIDE 1 MG/HR: 0.25 INJECTION INTRAMUSCULAR; INTRAVENOUS at 13:19

## 2022-01-01 RX ADMIN — DEXMEDETOMIDINE HYDROCHLORIDE 0.6 MCG/KG/HR: 400 INJECTION INTRAVENOUS at 04:43

## 2022-01-01 RX ADMIN — CHLORHEXIDINE GLUCONATE 15 ML: 1.2 RINSE ORAL at 10:36

## 2022-01-01 RX ADMIN — DEXMEDETOMIDINE HYDROCHLORIDE 1.5 MCG/KG/HR: 400 INJECTION INTRAVENOUS at 21:44

## 2022-01-01 RX ADMIN — CHLORHEXIDINE GLUCONATE 15 ML: 1.2 RINSE ORAL at 08:26

## 2022-01-01 RX ADMIN — HYDRALAZINE HYDROCHLORIDE 10 MG: 10 TABLET, FILM COATED ORAL at 18:23

## 2022-01-01 RX ADMIN — PROPOFOL 30 MCG/KG/MIN: 10 INJECTION, EMULSION INTRAVENOUS at 08:13

## 2022-01-01 RX ADMIN — CARVEDILOL 12.5 MG: 6.25 TABLET, FILM COATED ORAL at 10:25

## 2022-01-01 RX ADMIN — MONTELUKAST 10 MG: 10 TABLET, FILM COATED ORAL at 08:26

## 2022-01-01 RX ADMIN — ARFORMOTEROL TARTRATE 15 MCG: 15 SOLUTION RESPIRATORY (INHALATION) at 20:21

## 2022-01-01 RX ADMIN — TRIAMCINOLONE ACETONIDE: 1 CREAM TOPICAL at 17:34

## 2022-01-01 RX ADMIN — Medication 60 UNITS: at 22:09

## 2022-01-01 RX ADMIN — BUDESONIDE 500 MCG: 0.5 INHALANT RESPIRATORY (INHALATION) at 08:07

## 2022-01-01 RX ADMIN — Medication: at 23:55

## 2022-01-01 RX ADMIN — DEXMEDETOMIDINE HYDROCHLORIDE 0.5 MCG/KG/HR: 400 INJECTION INTRAVENOUS at 13:33

## 2022-01-01 RX ADMIN — ALBUMIN (HUMAN) 12.5 G: 0.25 INJECTION, SOLUTION INTRAVENOUS at 09:23

## 2022-01-01 RX ADMIN — TRIAMCINOLONE ACETONIDE: 1 CREAM TOPICAL at 18:15

## 2022-01-01 RX ADMIN — Medication 30 UNITS: at 09:55

## 2022-01-01 RX ADMIN — ALBUMIN (HUMAN) 12.5 G: 0.25 INJECTION, SOLUTION INTRAVENOUS at 09:51

## 2022-01-01 RX ADMIN — SODIUM CHLORIDE, PRESERVATIVE FREE 10 ML: 5 INJECTION INTRAVENOUS at 13:07

## 2022-01-01 RX ADMIN — IVABRADINE 5 MG: 5 TABLET, FILM COATED ORAL at 17:27

## 2022-01-01 RX ADMIN — LEVOTHYROXINE SODIUM 100 MCG: 0.1 TABLET ORAL at 07:07

## 2022-01-01 RX ADMIN — DEXMEDETOMIDINE HYDROCHLORIDE 0.5 MCG/KG/HR: 400 INJECTION INTRAVENOUS at 16:10

## 2022-01-01 RX ADMIN — EPOETIN ALFA-EPBX 20000 UNITS: 20000 INJECTION, SOLUTION INTRAVENOUS; SUBCUTANEOUS at 21:57

## 2022-01-01 RX ADMIN — VENLAFAXINE HYDROCHLORIDE 75 MG: 37.5 CAPSULE, EXTENDED RELEASE ORAL at 09:30

## 2022-01-01 RX ADMIN — Medication 6 UNITS: at 05:54

## 2022-01-01 RX ADMIN — WATER 1 G: 1 INJECTION INTRAMUSCULAR; INTRAVENOUS; SUBCUTANEOUS at 01:00

## 2022-01-01 RX ADMIN — IVABRADINE 7.5 MG: 7.5 TABLET, FILM COATED ORAL at 19:10

## 2022-01-01 RX ADMIN — MORPHINE SULFATE 2 MG: 2 INJECTION, SOLUTION INTRAMUSCULAR; INTRAVENOUS at 12:46

## 2022-01-01 RX ADMIN — PROPOFOL 40 MCG/KG/MIN: 10 INJECTION, EMULSION INTRAVENOUS at 08:26

## 2022-01-01 RX ADMIN — ARFORMOTEROL TARTRATE 15 MCG: 15 SOLUTION RESPIRATORY (INHALATION) at 20:14

## 2022-01-01 RX ADMIN — TRIAMCINOLONE ACETONIDE: 1 CREAM TOPICAL at 17:46

## 2022-01-01 RX ADMIN — BIVALIRUDIN 0.1 MG/KG/HR: 250 INJECTION, POWDER, LYOPHILIZED, FOR SOLUTION INTRAVENOUS at 11:15

## 2022-01-01 RX ADMIN — ALBUMIN (HUMAN) 12.5 G: 0.25 INJECTION, SOLUTION INTRAVENOUS at 08:35

## 2022-01-01 RX ADMIN — Medication 2 UNITS: at 05:28

## 2022-01-01 RX ADMIN — PANTOPRAZOLE SODIUM 40 MG: 40 INJECTION, POWDER, FOR SOLUTION INTRAVENOUS at 08:13

## 2022-01-01 RX ADMIN — DEXMEDETOMIDINE HYDROCHLORIDE 0.4 MCG/KG/HR: 400 INJECTION INTRAVENOUS at 13:47

## 2022-01-01 RX ADMIN — ASPIRIN 81 MG CHEWABLE TABLET 81 MG: 81 TABLET CHEWABLE at 08:26

## 2022-01-01 RX ADMIN — IVABRADINE 5 MG: 5 TABLET, FILM COATED ORAL at 18:23

## 2022-01-01 RX ADMIN — TRIAMCINOLONE ACETONIDE: 1 CREAM TOPICAL at 17:22

## 2022-01-01 RX ADMIN — MONTELUKAST 10 MG: 10 TABLET, FILM COATED ORAL at 09:23

## 2022-01-01 RX ADMIN — Medication 4 UNITS: at 17:39

## 2022-01-01 RX ADMIN — MORPHINE SULFATE 2 MG: 2 INJECTION, SOLUTION INTRAMUSCULAR; INTRAVENOUS at 08:34

## 2022-01-01 RX ADMIN — DEXMEDETOMIDINE HYDROCHLORIDE 0.4 MCG/KG/HR: 400 INJECTION INTRAVENOUS at 21:17

## 2022-01-01 RX ADMIN — HYDRALAZINE HYDROCHLORIDE 5 MG: 20 INJECTION INTRAMUSCULAR; INTRAVENOUS at 22:09

## 2022-01-01 RX ADMIN — Medication 400 MG: at 08:32

## 2022-01-01 RX ADMIN — EPOETIN ALFA-EPBX 20000 UNITS: 20000 INJECTION, SOLUTION INTRAVENOUS; SUBCUTANEOUS at 22:06

## 2022-01-01 RX ADMIN — ARFORMOTEROL TARTRATE 15 MCG: 15 SOLUTION RESPIRATORY (INHALATION) at 07:58

## 2022-01-01 RX ADMIN — HYDRALAZINE HYDROCHLORIDE 10 MG: 10 TABLET, FILM COATED ORAL at 22:52

## 2022-01-01 RX ADMIN — BUMETANIDE 2 MG: 0.25 INJECTION INTRAMUSCULAR; INTRAVENOUS at 09:28

## 2022-01-01 RX ADMIN — PANTOPRAZOLE SODIUM 40 MG: 40 INJECTION, POWDER, FOR SOLUTION INTRAVENOUS at 08:32

## 2022-01-01 RX ADMIN — ALBUMIN HUMAN 12.5 G: 50 SOLUTION INTRAVENOUS at 00:00

## 2022-01-01 RX ADMIN — MONTELUKAST 10 MG: 10 TABLET, FILM COATED ORAL at 09:30

## 2022-01-01 RX ADMIN — CHLORHEXIDINE GLUCONATE 15 ML: 1.2 RINSE ORAL at 21:31

## 2022-01-01 RX ADMIN — SODIUM CHLORIDE, PRESERVATIVE FREE 10 ML: 5 INJECTION INTRAVENOUS at 05:33

## 2022-01-01 RX ADMIN — DEXMEDETOMIDINE HYDROCHLORIDE 0.6 MCG/KG/HR: 400 INJECTION INTRAVENOUS at 10:02

## 2022-01-01 RX ADMIN — POTASSIUM CHLORIDE 20 MEQ: 29.8 INJECTION, SOLUTION INTRAVENOUS at 06:24

## 2022-01-01 RX ADMIN — HEPARIN SODIUM 5000 UNITS: 5000 INJECTION INTRAVENOUS; SUBCUTANEOUS at 13:16

## 2022-01-01 RX ADMIN — BUDESONIDE 500 MCG: 0.5 INHALANT RESPIRATORY (INHALATION) at 21:17

## 2022-01-01 RX ADMIN — PROPOFOL 25 MCG/KG/MIN: 10 INJECTION, EMULSION INTRAVENOUS at 20:56

## 2022-01-01 RX ADMIN — ALBUMIN (HUMAN) 12.5 G: 0.25 INJECTION, SOLUTION INTRAVENOUS at 08:26

## 2022-01-01 RX ADMIN — CEFTRIAXONE SODIUM 2 G: 2 INJECTION, POWDER, FOR SOLUTION INTRAMUSCULAR; INTRAVENOUS at 08:32

## 2022-01-01 RX ADMIN — POTASSIUM CHLORIDE 20 MEQ: 29.8 INJECTION, SOLUTION INTRAVENOUS at 16:31

## 2022-01-01 RX ADMIN — TRIAMCINOLONE ACETONIDE: 1 CREAM TOPICAL at 20:30

## 2022-01-01 RX ADMIN — ARFORMOTEROL TARTRATE 15 MCG: 15 SOLUTION RESPIRATORY (INHALATION) at 07:29

## 2022-01-01 RX ADMIN — SODIUM CHLORIDE, PRESERVATIVE FREE 10 ML: 5 INJECTION INTRAVENOUS at 13:05

## 2022-01-01 RX ADMIN — HYDRALAZINE HYDROCHLORIDE 5 MG: 20 INJECTION INTRAMUSCULAR; INTRAVENOUS at 13:42

## 2022-01-01 RX ADMIN — BUMETANIDE 1 MG: 0.25 INJECTION INTRAMUSCULAR; INTRAVENOUS at 08:56

## 2022-01-01 RX ADMIN — ALLOPURINOL 50 MG: 100 TABLET ORAL at 09:22

## 2022-01-01 RX ADMIN — SODIUM CHLORIDE, PRESERVATIVE FREE 10 ML: 5 INJECTION INTRAVENOUS at 05:44

## 2022-01-01 RX ADMIN — BUMETANIDE 2 MG: 0.25 INJECTION, SOLUTION INTRAMUSCULAR; INTRAVENOUS at 13:02

## 2022-01-01 RX ADMIN — IVABRADINE 7.5 MG: 7.5 TABLET, FILM COATED ORAL at 09:51

## 2022-01-01 RX ADMIN — CHLORHEXIDINE GLUCONATE 15 ML: 1.2 RINSE ORAL at 21:18

## 2022-01-01 RX ADMIN — BUDESONIDE 500 MCG: 0.5 INHALANT RESPIRATORY (INHALATION) at 08:47

## 2022-01-01 RX ADMIN — Medication 400 MG: at 08:16

## 2022-01-01 RX ADMIN — DIGOXIN 0.06 MG: 125 TABLET ORAL at 09:52

## 2022-01-01 RX ADMIN — MAGNESIUM SULFATE HEPTAHYDRATE 1 G: 1 INJECTION, SOLUTION INTRAVENOUS at 18:42

## 2022-01-01 RX ADMIN — BUMETANIDE 1.5 MG/HR: 0.25 INJECTION INTRAMUSCULAR; INTRAVENOUS at 07:40

## 2022-01-01 RX ADMIN — Medication 30 UNITS: at 09:51

## 2022-01-01 RX ADMIN — PROPOFOL 30 MCG/KG/MIN: 10 INJECTION, EMULSION INTRAVENOUS at 14:02

## 2022-01-01 RX ADMIN — GABAPENTIN 100 MG: 100 CAPSULE ORAL at 21:42

## 2022-01-01 RX ADMIN — HEPARIN SODIUM 5000 UNITS: 5000 INJECTION INTRAVENOUS; SUBCUTANEOUS at 07:07

## 2022-01-01 RX ADMIN — Medication 400 MG: at 17:53

## 2022-01-01 RX ADMIN — ARFORMOTEROL TARTRATE 15 MCG: 15 SOLUTION RESPIRATORY (INHALATION) at 20:00

## 2022-01-01 RX ADMIN — ALBUMIN (HUMAN) 12.5 G: 0.25 INJECTION, SOLUTION INTRAVENOUS at 17:57

## 2022-01-01 RX ADMIN — HEPARIN SODIUM AND DEXTROSE 7.9 ML/HR: 5000; 5 INJECTION INTRAVENOUS at 16:30

## 2022-01-01 RX ADMIN — ARFORMOTEROL TARTRATE 15 MCG: 15 SOLUTION RESPIRATORY (INHALATION) at 08:21

## 2022-01-01 RX ADMIN — SODIUM CHLORIDE, PRESERVATIVE FREE 10 ML: 5 INJECTION INTRAVENOUS at 08:26

## 2022-01-01 RX ADMIN — CISATRACURIUM BESYLATE 10 MG: 2 INJECTION INTRAVENOUS at 08:12

## 2022-01-01 RX ADMIN — HEPARIN SODIUM 5000 UNITS: 5000 INJECTION INTRAVENOUS; SUBCUTANEOUS at 08:32

## 2022-01-01 RX ADMIN — SODIUM CHLORIDE, PRESERVATIVE FREE 10 ML: 5 INJECTION INTRAVENOUS at 09:11

## 2022-01-01 RX ADMIN — BUMETANIDE 1 MG: 0.25 INJECTION INTRAMUSCULAR; INTRAVENOUS at 06:05

## 2022-01-01 RX ADMIN — BUDESONIDE 500 MCG: 0.5 INHALANT RESPIRATORY (INHALATION) at 08:22

## 2022-01-01 RX ADMIN — LEVOTHYROXINE SODIUM 100 MCG: 100 TABLET ORAL at 05:44

## 2022-01-01 RX ADMIN — ALBUMIN (HUMAN) 12.5 G: 12.5 INJECTION, SOLUTION INTRAVENOUS at 09:24

## 2022-01-01 RX ADMIN — DIGOXIN 0.06 MG: 125 TABLET ORAL at 09:23

## 2022-01-01 RX ADMIN — IPRATROPIUM BROMIDE AND ALBUTEROL SULFATE 3 ML: .5; 3 SOLUTION RESPIRATORY (INHALATION) at 01:17

## 2022-01-01 RX ADMIN — BUMETANIDE 1 MG/HR: 0.25 INJECTION INTRAMUSCULAR; INTRAVENOUS at 09:16

## 2022-01-01 RX ADMIN — MUPIROCIN: 20 OINTMENT TOPICAL at 09:23

## 2022-01-01 RX ADMIN — MAGNESIUM SULFATE HEPTAHYDRATE 2 G: 40 INJECTION, SOLUTION INTRAVENOUS at 00:27

## 2022-01-01 RX ADMIN — TRIAMCINOLONE ACETONIDE: 1 CREAM TOPICAL at 17:33

## 2022-01-01 RX ADMIN — BUMETANIDE 1 MG: 1 TABLET ORAL at 17:26

## 2022-01-01 RX ADMIN — HEPARIN SODIUM 5000 UNITS: 5000 INJECTION INTRAVENOUS; SUBCUTANEOUS at 00:02

## 2022-01-01 RX ADMIN — SENNOSIDES AND DOCUSATE SODIUM 1 TABLET: 50; 8.6 TABLET ORAL at 08:26

## 2022-01-01 RX ADMIN — Medication 2 UNITS: at 09:25

## 2022-01-01 RX ADMIN — SIMETHICONE 80 MG: 80 TABLET, CHEWABLE ORAL at 14:43

## 2022-01-01 RX ADMIN — Medication 2 UNITS: at 21:51

## 2022-01-01 RX ADMIN — PROPOFOL 40 MCG/KG/MIN: 10 INJECTION, EMULSION INTRAVENOUS at 11:08

## 2022-01-01 RX ADMIN — HYDRALAZINE HYDROCHLORIDE 10 MG: 10 TABLET, FILM COATED ORAL at 18:45

## 2022-01-01 RX ADMIN — DIGOXIN 0.06 MG: 125 TABLET ORAL at 08:27

## 2022-01-01 RX ADMIN — DIGOXIN 0.06 MG: 125 TABLET ORAL at 08:57

## 2022-01-01 RX ADMIN — BUMETANIDE 1.5 MG/HR: 0.25 INJECTION INTRAMUSCULAR; INTRAVENOUS at 20:25

## 2022-01-01 RX ADMIN — MONTELUKAST 10 MG: 10 TABLET, FILM COATED ORAL at 08:22

## 2022-01-01 RX ADMIN — Medication 1 UNITS: at 22:24

## 2022-01-01 RX ADMIN — DOCUSATE SODIUM 100 MG: 100 CAPSULE, LIQUID FILLED ORAL at 17:31

## 2022-01-01 RX ADMIN — Medication 6 UNITS: at 23:28

## 2022-01-01 RX ADMIN — GABAPENTIN 100 MG: 100 CAPSULE ORAL at 22:00

## 2022-01-01 RX ADMIN — Medication 4 UNITS: at 18:32

## 2022-01-01 RX ADMIN — ARFORMOTEROL TARTRATE 15 MCG: 15 SOLUTION RESPIRATORY (INHALATION) at 11:48

## 2022-01-01 RX ADMIN — HYDRALAZINE HYDROCHLORIDE 10 MG: 10 TABLET, FILM COATED ORAL at 20:33

## 2022-01-01 RX ADMIN — DEXMEDETOMIDINE HYDROCHLORIDE 1.5 MCG/KG/HR: 400 INJECTION INTRAVENOUS at 17:24

## 2022-01-01 RX ADMIN — SODIUM CHLORIDE, PRESERVATIVE FREE 10 ML: 5 INJECTION INTRAVENOUS at 23:06

## 2022-01-01 RX ADMIN — PROPOFOL 15 MCG/KG/MIN: 10 INJECTION, EMULSION INTRAVENOUS at 05:00

## 2022-01-01 RX ADMIN — DIGOXIN 0.06 MG: 125 TABLET ORAL at 08:16

## 2022-01-01 RX ADMIN — TRIAMCINOLONE ACETONIDE: 1 CREAM TOPICAL at 10:56

## 2022-01-01 RX ADMIN — GABAPENTIN 100 MG: 100 CAPSULE ORAL at 21:53

## 2022-01-01 RX ADMIN — CARVEDILOL 6.25 MG: 6.25 TABLET, FILM COATED ORAL at 18:24

## 2022-01-01 RX ADMIN — DIGOXIN 0.06 MG: 125 TABLET ORAL at 09:08

## 2022-01-01 RX ADMIN — Medication 3 UNITS: at 18:15

## 2022-01-01 RX ADMIN — Medication 20 UNITS: at 08:14

## 2022-01-01 RX ADMIN — DEXMEDETOMIDINE HYDROCHLORIDE 1.3 MCG/KG/HR: 400 INJECTION INTRAVENOUS at 06:34

## 2022-01-01 RX ADMIN — ALLOPURINOL 50 MG: 100 TABLET ORAL at 08:39

## 2022-01-01 RX ADMIN — Medication 40 UNITS: at 21:21

## 2022-01-01 RX ADMIN — POLYETHYLENE GLYCOL 3350 17 G: 17 POWDER, FOR SOLUTION ORAL at 10:19

## 2022-01-01 RX ADMIN — Medication 400 MG: at 17:46

## 2022-01-01 RX ADMIN — ARFORMOTEROL TARTRATE 15 MCG: 15 SOLUTION RESPIRATORY (INHALATION) at 08:50

## 2022-01-01 RX ADMIN — DEXMEDETOMIDINE HYDROCHLORIDE 1.2 MCG/KG/HR: 400 INJECTION INTRAVENOUS at 13:00

## 2022-01-01 RX ADMIN — SODIUM CHLORIDE, PRESERVATIVE FREE 10 ML: 5 INJECTION INTRAVENOUS at 22:42

## 2022-01-01 RX ADMIN — POLYETHYLENE GLYCOL 3350 17 G: 17 POWDER, FOR SOLUTION ORAL at 08:27

## 2022-01-01 RX ADMIN — POTASSIUM CHLORIDE 20 MEQ: 29.8 INJECTION, SOLUTION INTRAVENOUS at 08:00

## 2022-01-01 RX ADMIN — Medication 100 MCG/HR: at 21:09

## 2022-01-01 RX ADMIN — DEXMEDETOMIDINE HYDROCHLORIDE 1.2 MCG/KG/HR: 400 INJECTION INTRAVENOUS at 19:45

## 2022-01-01 RX ADMIN — SODIUM CHLORIDE, PRESERVATIVE FREE 10 ML: 5 INJECTION INTRAVENOUS at 13:41

## 2022-01-01 RX ADMIN — SODIUM CHLORIDE, PRESERVATIVE FREE 10 ML: 5 INJECTION INTRAVENOUS at 06:58

## 2022-01-01 RX ADMIN — Medication 4 UNITS: at 17:26

## 2022-01-01 RX ADMIN — Medication 3 UNITS: at 09:30

## 2022-01-01 RX ADMIN — CHLORHEXIDINE GLUCONATE 15 ML: 1.2 RINSE ORAL at 09:24

## 2022-01-01 RX ADMIN — CARVEDILOL 6.25 MG: 6.25 TABLET, FILM COATED ORAL at 10:36

## 2022-01-01 RX ADMIN — BUDESONIDE 500 MCG: 0.5 INHALANT RESPIRATORY (INHALATION) at 20:01

## 2022-01-01 RX ADMIN — GABAPENTIN 100 MG: 100 CAPSULE ORAL at 21:21

## 2022-01-01 RX ADMIN — WATER 1 G: 1 INJECTION INTRAMUSCULAR; INTRAVENOUS; SUBCUTANEOUS at 02:45

## 2022-01-01 RX ADMIN — Medication 50 MCG/HR: at 01:01

## 2022-01-01 RX ADMIN — ALBUMIN (HUMAN) 12.5 G: 12.5 INJECTION, SOLUTION INTRAVENOUS at 16:34

## 2022-01-01 RX ADMIN — BUDESONIDE 500 MCG: 0.5 INHALANT RESPIRATORY (INHALATION) at 20:10

## 2022-01-01 RX ADMIN — Medication 400 MG: at 18:14

## 2022-01-01 RX ADMIN — IVABRADINE 5 MG: 5 TABLET, FILM COATED ORAL at 09:17

## 2022-01-01 RX ADMIN — PROPOFOL 25 MCG/KG/MIN: 10 INJECTION, EMULSION INTRAVENOUS at 13:03

## 2022-01-01 RX ADMIN — POTASSIUM CHLORIDE 20 MEQ: 29.8 INJECTION, SOLUTION INTRAVENOUS at 01:00

## 2022-01-01 RX ADMIN — WATER 1 G: 1 INJECTION INTRAMUSCULAR; INTRAVENOUS; SUBCUTANEOUS at 13:01

## 2022-01-01 RX ADMIN — DEXMEDETOMIDINE HYDROCHLORIDE 1.3 MCG/KG/HR: 400 INJECTION INTRAVENOUS at 03:54

## 2022-01-01 RX ADMIN — EPINEPHRINE 3 MCG/MIN: 1 INJECTION INTRAMUSCULAR; INTRAVENOUS; SUBCUTANEOUS at 13:32

## 2022-01-01 RX ADMIN — MAGNESIUM SULFATE 1 G: 1 INJECTION INTRAVENOUS at 00:10

## 2022-01-01 RX ADMIN — LEVOTHYROXINE SODIUM 100 MCG: 100 TABLET ORAL at 05:17

## 2022-01-01 RX ADMIN — ACETAMINOPHEN 650 MG: 325 TABLET ORAL at 08:23

## 2022-01-01 RX ADMIN — SODIUM CHLORIDE, PRESERVATIVE FREE 5 ML: 5 INJECTION INTRAVENOUS at 06:00

## 2022-01-01 RX ADMIN — Medication 100 MCG/HR: at 02:04

## 2022-01-01 RX ADMIN — ALLOPURINOL 50 MG: 100 TABLET ORAL at 10:55

## 2022-01-01 RX ADMIN — LEVOTHYROXINE SODIUM 100 MCG: 100 TABLET ORAL at 06:58

## 2022-01-01 RX ADMIN — DOCUSATE SODIUM 100 MG: 100 CAPSULE, LIQUID FILLED ORAL at 18:23

## 2022-01-01 RX ADMIN — CETIRIZINE HYDROCHLORIDE 10 MG: 10 TABLET, FILM COATED ORAL at 09:10

## 2022-01-01 RX ADMIN — BIVALIRUDIN 0.05 MG/KG/HR: 250 INJECTION, POWDER, LYOPHILIZED, FOR SOLUTION INTRAVENOUS at 14:00

## 2022-01-01 RX ADMIN — MORPHINE SULFATE 2 MG: 2 INJECTION, SOLUTION INTRAMUSCULAR; INTRAVENOUS at 18:26

## 2022-01-01 RX ADMIN — PROPOFOL 30 MCG/KG/MIN: 10 INJECTION, EMULSION INTRAVENOUS at 19:15

## 2022-01-01 RX ADMIN — VENLAFAXINE HYDROCHLORIDE 75 MG: 37.5 CAPSULE, EXTENDED RELEASE ORAL at 09:52

## 2022-01-01 RX ADMIN — BUDESONIDE 500 MCG: 0.5 INHALANT RESPIRATORY (INHALATION) at 19:37

## 2022-01-01 RX ADMIN — CHLORHEXIDINE GLUCONATE 15 ML: 1.2 RINSE ORAL at 20:02

## 2022-01-01 RX ADMIN — ASPIRIN 81 MG CHEWABLE TABLET 81 MG: 81 TABLET CHEWABLE at 08:32

## 2022-01-01 RX ADMIN — SENNOSIDES AND DOCUSATE SODIUM 1 TABLET: 50; 8.6 TABLET ORAL at 17:46

## 2022-01-01 RX ADMIN — MILRINONE LACTATE 0.38 MCG/KG/MIN: 0.2 INJECTION, SOLUTION INTRAVENOUS at 16:36

## 2022-01-01 RX ADMIN — DOCUSATE SODIUM 100 MG: 100 CAPSULE, LIQUID FILLED ORAL at 18:51

## 2022-01-01 RX ADMIN — Medication 2 UNITS: at 22:41

## 2022-01-01 RX ADMIN — VENLAFAXINE HYDROCHLORIDE 75 MG: 37.5 CAPSULE, EXTENDED RELEASE ORAL at 09:11

## 2022-01-01 RX ADMIN — GABAPENTIN 100 MG: 100 CAPSULE ORAL at 21:14

## 2022-01-01 RX ADMIN — Medication 30 UNITS: at 08:19

## 2022-01-01 RX ADMIN — BUDESONIDE 500 MCG: 0.5 INHALANT RESPIRATORY (INHALATION) at 07:47

## 2022-01-01 RX ADMIN — ACETAMINOPHEN 650 MG: 325 TABLET ORAL at 17:22

## 2022-01-01 RX ADMIN — Medication 50 UNITS: at 22:51

## 2022-01-01 RX ADMIN — SODIUM CHLORIDE, PRESERVATIVE FREE 10 ML: 5 INJECTION INTRAVENOUS at 05:56

## 2022-01-01 RX ADMIN — WATER 1 G: 1 INJECTION INTRAMUSCULAR; INTRAVENOUS; SUBCUTANEOUS at 01:03

## 2022-01-01 RX ADMIN — MORPHINE SULFATE 2 MG: 2 INJECTION, SOLUTION INTRAMUSCULAR; INTRAVENOUS at 05:27

## 2022-01-01 RX ADMIN — BUDESONIDE 500 MCG: 0.5 INHALANT RESPIRATORY (INHALATION) at 20:21

## 2022-01-01 RX ADMIN — ARFORMOTEROL TARTRATE 15 MCG: 15 SOLUTION RESPIRATORY (INHALATION) at 20:23

## 2022-01-01 RX ADMIN — SODIUM CHLORIDE, PRESERVATIVE FREE 10 ML: 5 INJECTION INTRAVENOUS at 05:48

## 2022-01-01 RX ADMIN — Medication 100 MCG/HR: at 03:37

## 2022-01-01 RX ADMIN — BUMETANIDE 1.5 MG/HR: 0.25 INJECTION INTRAMUSCULAR; INTRAVENOUS at 01:55

## 2022-01-01 RX ADMIN — ISOSORBIDE DINITRATE 10 MG: 20 TABLET ORAL at 17:31

## 2022-01-01 RX ADMIN — Medication 10 UNITS: at 17:25

## 2022-01-01 RX ADMIN — PANTOPRAZOLE SODIUM 40 MG: 40 INJECTION, POWDER, FOR SOLUTION INTRAVENOUS at 08:28

## 2022-01-01 RX ADMIN — VASOPRESSIN 0.02 UNITS/MIN: 20 INJECTION INTRAVENOUS at 21:26

## 2022-01-01 RX ADMIN — CARVEDILOL 12.5 MG: 6.25 TABLET, FILM COATED ORAL at 18:09

## 2022-01-01 RX ADMIN — MILRINONE LACTATE IN DEXTROSE 0.12 MCG/KG/MIN: 200 INJECTION, SOLUTION INTRAVENOUS at 08:19

## 2022-01-01 RX ADMIN — AMIODARONE HYDROCHLORIDE 300 MG: 50 INJECTION, SOLUTION INTRAVENOUS at 13:06

## 2022-01-01 RX ADMIN — SENNOSIDES AND DOCUSATE SODIUM 2 TABLET: 50; 8.6 TABLET ORAL at 17:57

## 2022-01-01 RX ADMIN — DIGOXIN 0.06 MG: 125 TABLET ORAL at 08:22

## 2022-01-01 RX ADMIN — MILRINONE LACTATE 0.12 MCG/KG/MIN: 0.2 INJECTION, SOLUTION INTRAVENOUS at 19:18

## 2022-01-01 RX ADMIN — IVABRADINE 5 MG: 5 TABLET, FILM COATED ORAL at 18:08

## 2022-01-01 RX ADMIN — ACETAMINOPHEN 650 MG: 325 TABLET ORAL at 10:14

## 2022-01-01 RX ADMIN — CHLORHEXIDINE GLUCONATE 15 ML: 1.2 RINSE ORAL at 20:22

## 2022-01-01 RX ADMIN — DOCUSATE SODIUM 100 MG: 100 CAPSULE, LIQUID FILLED ORAL at 08:57

## 2022-01-01 RX ADMIN — ALBUMIN (HUMAN) 12.5 G: 0.25 INJECTION, SOLUTION INTRAVENOUS at 09:06

## 2022-01-01 RX ADMIN — MONTELUKAST 10 MG: 10 TABLET, FILM COATED ORAL at 09:52

## 2022-01-01 RX ADMIN — LEVOTHYROXINE SODIUM 100 MCG: 100 TABLET ORAL at 05:33

## 2022-01-01 RX ADMIN — Medication 4 UNITS: at 23:20

## 2022-01-01 RX ADMIN — DEXMEDETOMIDINE HYDROCHLORIDE 1.5 MCG/KG/HR: 400 INJECTION INTRAVENOUS at 19:03

## 2022-01-01 RX ADMIN — Medication 100 MCG/HR: at 12:03

## 2022-01-01 RX ADMIN — BUDESONIDE 500 MCG: 0.5 INHALANT RESPIRATORY (INHALATION) at 11:48

## 2022-01-01 RX ADMIN — MILRINONE LACTATE 0.38 MCG/KG/MIN: 0.2 INJECTION, SOLUTION INTRAVENOUS at 08:31

## 2022-01-01 RX ADMIN — Medication 30 UNITS: at 09:31

## 2022-01-01 RX ADMIN — EPOETIN ALFA-EPBX 10000 UNITS: 10000 INJECTION, SOLUTION INTRAVENOUS; SUBCUTANEOUS at 21:19

## 2022-01-01 RX ADMIN — BUMETANIDE 1 MG: 0.25 INJECTION, SOLUTION INTRAMUSCULAR; INTRAVENOUS at 12:57

## 2022-01-01 RX ADMIN — Medication 4 UNITS: at 18:33

## 2022-01-01 RX ADMIN — PROPOFOL 20 MCG/KG/MIN: 10 INJECTION, EMULSION INTRAVENOUS at 10:41

## 2022-01-01 RX ADMIN — MONTELUKAST 10 MG: 10 TABLET, FILM COATED ORAL at 10:04

## 2022-01-01 RX ADMIN — HYDRALAZINE HYDROCHLORIDE 10 MG: 10 TABLET, FILM COATED ORAL at 09:31

## 2022-01-01 RX ADMIN — CHLORHEXIDINE GLUCONATE 15 ML: 1.2 RINSE ORAL at 08:22

## 2022-01-01 RX ADMIN — SENNOSIDES AND DOCUSATE SODIUM 1 TABLET: 50; 8.6 TABLET ORAL at 17:20

## 2022-01-01 RX ADMIN — BUMETANIDE 2 MG: 0.25 INJECTION, SOLUTION INTRAMUSCULAR; INTRAVENOUS at 21:00

## 2022-01-01 RX ADMIN — POTASSIUM CHLORIDE 20 MEQ: 29.8 INJECTION, SOLUTION INTRAVENOUS at 17:20

## 2022-01-01 RX ADMIN — DEXMEDETOMIDINE HYDROCHLORIDE 1.5 MCG/KG/HR: 400 INJECTION INTRAVENOUS at 13:24

## 2022-01-01 RX ADMIN — MUPIROCIN: 20 OINTMENT TOPICAL at 18:24

## 2022-01-01 RX ADMIN — WATER 250 MG: 1 INJECTION INTRAMUSCULAR; INTRAVENOUS; SUBCUTANEOUS at 08:59

## 2022-01-01 RX ADMIN — POLYETHYLENE GLYCOL 3350 17 G: 17 POWDER, FOR SOLUTION ORAL at 08:14

## 2022-01-01 RX ADMIN — ARFORMOTEROL TARTRATE 15 MCG: 15 SOLUTION RESPIRATORY (INHALATION) at 19:37

## 2022-01-01 RX ADMIN — SENNOSIDES AND DOCUSATE SODIUM 2 TABLET: 50; 8.6 TABLET ORAL at 10:26

## 2022-01-01 RX ADMIN — SODIUM CHLORIDE, PRESERVATIVE FREE 10 ML: 5 INJECTION INTRAVENOUS at 17:32

## 2022-01-01 RX ADMIN — BUMETANIDE 2 MG: 0.25 INJECTION INTRAMUSCULAR; INTRAVENOUS at 09:30

## 2022-01-01 RX ADMIN — BUDESONIDE 500 MCG: 0.5 INHALANT RESPIRATORY (INHALATION) at 08:01

## 2022-01-01 RX ADMIN — POTASSIUM CHLORIDE 20 MEQ: 29.8 INJECTION, SOLUTION INTRAVENOUS at 08:18

## 2022-01-01 RX ADMIN — Medication 2 UNITS: at 18:32

## 2022-01-01 RX ADMIN — PROPOFOL 15 MCG/KG/MIN: 10 INJECTION, EMULSION INTRAVENOUS at 05:02

## 2022-01-01 RX ADMIN — IVABRADINE 7.5 MG: 7.5 TABLET, FILM COATED ORAL at 09:15

## 2022-01-01 RX ADMIN — LEVOTHYROXINE SODIUM 100 MCG: 100 TABLET ORAL at 05:53

## 2022-01-01 RX ADMIN — DEXMEDETOMIDINE HYDROCHLORIDE 1.5 MCG/KG/HR: 400 INJECTION INTRAVENOUS at 05:08

## 2022-01-01 RX ADMIN — IRON SUCROSE 300 MG: 20 INJECTION, SOLUTION INTRAVENOUS at 14:43

## 2022-01-01 RX ADMIN — Medication 4 UNITS: at 05:34

## 2022-01-01 RX ADMIN — ALBUMIN (HUMAN) 12.5 G: 0.25 INJECTION, SOLUTION INTRAVENOUS at 17:09

## 2022-01-01 RX ADMIN — MILRINONE LACTATE IN DEXTROSE 0.2 MCG/KG/MIN: 200 INJECTION, SOLUTION INTRAVENOUS at 21:26

## 2022-01-01 RX ADMIN — ACETAMINOPHEN 650 MG: 325 TABLET ORAL at 04:23

## 2022-01-01 RX ADMIN — ROCURONIUM BROMIDE 30 MG: 10 SOLUTION INTRAVENOUS at 15:25

## 2022-01-01 RX ADMIN — IVABRADINE 7.5 MG: 7.5 TABLET, FILM COATED ORAL at 17:39

## 2022-01-01 RX ADMIN — MILRINONE LACTATE IN DEXTROSE 0.2 MCG/KG/MIN: 200 INJECTION, SOLUTION INTRAVENOUS at 21:57

## 2022-01-01 RX ADMIN — HYDRALAZINE HYDROCHLORIDE 5 MG: 20 INJECTION INTRAMUSCULAR; INTRAVENOUS at 07:06

## 2022-01-01 RX ADMIN — BUMETANIDE 1 MG/HR: 0.25 INJECTION INTRAMUSCULAR; INTRAVENOUS at 17:11

## 2022-01-01 RX ADMIN — BUMETANIDE 2 MG: 0.25 INJECTION, SOLUTION INTRAMUSCULAR; INTRAVENOUS at 09:15

## 2022-01-01 RX ADMIN — SODIUM CHLORIDE, PRESERVATIVE FREE 10 ML: 5 INJECTION INTRAVENOUS at 13:03

## 2022-01-01 RX ADMIN — DEXMEDETOMIDINE HYDROCHLORIDE 0.5 MCG/KG/HR: 400 INJECTION INTRAVENOUS at 23:23

## 2022-01-01 RX ADMIN — DEXTROSE MONOHYDRATE 75 ML/HR: 50 INJECTION, SOLUTION INTRAVENOUS at 07:04

## 2022-01-01 RX ADMIN — SODIUM CHLORIDE, PRESERVATIVE FREE 10 ML: 5 INJECTION INTRAVENOUS at 05:21

## 2022-01-01 RX ADMIN — Medication 50 MCG/HR: at 22:19

## 2022-01-01 RX ADMIN — SENNOSIDES AND DOCUSATE SODIUM 1 TABLET: 50; 8.6 TABLET ORAL at 08:14

## 2022-01-01 RX ADMIN — GABAPENTIN 100 MG: 100 CAPSULE ORAL at 21:39

## 2022-01-01 RX ADMIN — SODIUM CHLORIDE, PRESERVATIVE FREE 10 ML: 5 INJECTION INTRAVENOUS at 05:53

## 2022-01-01 RX ADMIN — IRON SUCROSE 300 MG: 20 INJECTION, SOLUTION INTRAVENOUS at 14:52

## 2022-01-01 RX ADMIN — HEPARIN SODIUM 5000 UNITS: 5000 INJECTION INTRAVENOUS; SUBCUTANEOUS at 00:20

## 2022-01-01 RX ADMIN — LEVOTHYROXINE SODIUM 100 MCG: 100 TABLET ORAL at 06:00

## 2022-01-01 RX ADMIN — GABAPENTIN 100 MG: 100 CAPSULE ORAL at 23:23

## 2022-01-01 RX ADMIN — DEXMEDETOMIDINE HYDROCHLORIDE 1.5 MCG/KG/HR: 400 INJECTION INTRAVENOUS at 16:00

## 2022-01-01 RX ADMIN — ALBUMIN (HUMAN) 12.5 G: 0.25 INJECTION, SOLUTION INTRAVENOUS at 19:09

## 2022-01-01 RX ADMIN — HEPARIN SODIUM 5000 UNITS: 5000 INJECTION INTRAVENOUS; SUBCUTANEOUS at 14:43

## 2022-01-01 RX ADMIN — PROPOFOL 15 MCG/KG/MIN: 10 INJECTION, EMULSION INTRAVENOUS at 13:40

## 2022-01-01 RX ADMIN — CHLORHEXIDINE GLUCONATE 15 ML: 1.2 RINSE ORAL at 16:39

## 2022-01-01 RX ADMIN — BUMETANIDE 1 MG: 0.25 INJECTION INTRAMUSCULAR; INTRAVENOUS at 08:57

## 2022-01-01 RX ADMIN — BUDESONIDE 500 MCG: 0.5 INHALANT RESPIRATORY (INHALATION) at 08:31

## 2022-01-01 RX ADMIN — MONTELUKAST 10 MG: 10 TABLET, FILM COATED ORAL at 09:15

## 2022-01-01 RX ADMIN — MILRINONE LACTATE 0.2 MCG/KG/MIN: 0.2 INJECTION, SOLUTION INTRAVENOUS at 04:50

## 2022-01-01 RX ADMIN — CHLORHEXIDINE GLUCONATE 15 ML: 1.2 RINSE ORAL at 23:24

## 2022-01-01 RX ADMIN — POTASSIUM CHLORIDE 20 MEQ: 29.8 INJECTION, SOLUTION INTRAVENOUS at 04:51

## 2022-01-01 RX ADMIN — ARFORMOTEROL TARTRATE 15 MCG: 15 SOLUTION RESPIRATORY (INHALATION) at 20:16

## 2022-01-01 RX ADMIN — LEVOTHYROXINE SODIUM 100 MCG: 100 TABLET ORAL at 07:01

## 2022-01-01 RX ADMIN — POTASSIUM CHLORIDE 20 MEQ: 29.8 INJECTION, SOLUTION INTRAVENOUS at 00:08

## 2022-01-01 RX ADMIN — PROPOFOL 30 MCG/KG/MIN: 10 INJECTION, EMULSION INTRAVENOUS at 23:39

## 2022-01-01 RX ADMIN — HYDRALAZINE HYDROCHLORIDE 5 MG: 20 INJECTION INTRAMUSCULAR; INTRAVENOUS at 02:02

## 2022-01-01 RX ADMIN — PROPOFOL 40 MCG/KG/MIN: 10 INJECTION, EMULSION INTRAVENOUS at 01:55

## 2022-01-01 RX ADMIN — ASPIRIN 81 MG CHEWABLE TABLET 81 MG: 81 TABLET CHEWABLE at 09:22

## 2022-01-01 RX ADMIN — ARFORMOTEROL TARTRATE 15 MCG: 15 SOLUTION RESPIRATORY (INHALATION) at 08:11

## 2022-01-01 RX ADMIN — SODIUM CHLORIDE, PRESERVATIVE FREE 10 ML: 5 INJECTION INTRAVENOUS at 13:18

## 2022-01-01 RX ADMIN — HYDRALAZINE HYDROCHLORIDE 10 MG: 10 TABLET, FILM COATED ORAL at 17:31

## 2022-01-01 RX ADMIN — LEVOTHYROXINE SODIUM 100 MCG: 100 TABLET ORAL at 06:04

## 2022-01-01 RX ADMIN — PROPOFOL 40 MCG/KG/MIN: 10 INJECTION, EMULSION INTRAVENOUS at 02:58

## 2022-01-01 RX ADMIN — DEXMEDETOMIDINE HYDROCHLORIDE 1.5 MCG/KG/HR: 400 INJECTION INTRAVENOUS at 07:29

## 2022-01-01 RX ADMIN — SODIUM CHLORIDE, PRESERVATIVE FREE 10 ML: 5 INJECTION INTRAVENOUS at 13:01

## 2022-01-01 RX ADMIN — TRIAMCINOLONE ACETONIDE: 1 CREAM TOPICAL at 09:26

## 2022-01-01 RX ADMIN — BUMETANIDE 2 MG: 0.25 INJECTION INTRAMUSCULAR; INTRAVENOUS at 18:09

## 2022-01-01 RX ADMIN — DEXMEDETOMIDINE HYDROCHLORIDE 1.5 MCG/KG/HR: 400 INJECTION INTRAVENOUS at 08:31

## 2022-01-01 RX ADMIN — PROPOFOL 30 MCG/KG/MIN: 10 INJECTION, EMULSION INTRAVENOUS at 20:46

## 2022-01-01 RX ADMIN — ARFORMOTEROL TARTRATE 15 MCG: 15 SOLUTION RESPIRATORY (INHALATION) at 08:35

## 2022-01-01 RX ADMIN — Medication 40 UNITS: at 21:02

## 2022-01-01 RX ADMIN — EPOETIN ALFA-EPBX 20000 UNITS: 20000 INJECTION, SOLUTION INTRAVENOUS; SUBCUTANEOUS at 20:06

## 2022-01-01 RX ADMIN — LEVOTHYROXINE SODIUM 100 MCG: 100 TABLET ORAL at 06:15

## 2022-01-01 RX ADMIN — DEXMEDETOMIDINE HYDROCHLORIDE 1.5 MCG/KG/HR: 400 INJECTION INTRAVENOUS at 23:25

## 2022-01-01 RX ADMIN — Medication 3 UNITS: at 18:09

## 2022-01-01 RX ADMIN — MONTELUKAST 10 MG: 10 TABLET, FILM COATED ORAL at 09:16

## 2022-01-01 RX ADMIN — HEPARIN SODIUM 5000 UNITS: 5000 INJECTION INTRAVENOUS; SUBCUTANEOUS at 13:06

## 2022-01-01 RX ADMIN — Medication 400 MG: at 17:30

## 2022-01-01 RX ADMIN — SODIUM CHLORIDE, PRESERVATIVE FREE 10 ML: 5 INJECTION INTRAVENOUS at 13:29

## 2022-01-01 RX ADMIN — SENNOSIDES AND DOCUSATE SODIUM 2 TABLET: 50; 8.6 TABLET ORAL at 09:24

## 2022-01-01 RX ADMIN — PROPOFOL 40 MCG/KG/MIN: 10 INJECTION, EMULSION INTRAVENOUS at 19:57

## 2022-01-01 RX ADMIN — DOCUSATE SODIUM 100 MG: 100 CAPSULE, LIQUID FILLED ORAL at 09:00

## 2022-01-01 RX ADMIN — ARFORMOTEROL TARTRATE 15 MCG: 15 SOLUTION RESPIRATORY (INHALATION) at 08:22

## 2022-01-01 RX ADMIN — CHLORHEXIDINE GLUCONATE 15 ML: 1.2 RINSE ORAL at 11:30

## 2022-01-01 RX ADMIN — EPOETIN ALFA-EPBX 10000 UNITS: 10000 INJECTION, SOLUTION INTRAVENOUS; SUBCUTANEOUS at 21:21

## 2022-01-01 RX ADMIN — Medication 25 UNITS: at 08:13

## 2022-01-01 RX ADMIN — DEXMEDETOMIDINE HYDROCHLORIDE 0.6 MCG/KG/HR: 400 INJECTION INTRAVENOUS at 01:31

## 2022-01-01 RX ADMIN — Medication 4 UNITS: at 12:46

## 2022-01-01 RX ADMIN — Medication 3 UNITS: at 13:29

## 2022-01-01 RX ADMIN — Medication 4 UNITS: at 17:58

## 2022-01-01 RX ADMIN — Medication 25 UNITS: at 08:44

## 2022-01-01 RX ADMIN — ARFORMOTEROL TARTRATE 15 MCG: 15 SOLUTION RESPIRATORY (INHALATION) at 19:23

## 2022-01-01 RX ADMIN — POTASSIUM CHLORIDE 20 MEQ: 29.8 INJECTION, SOLUTION INTRAVENOUS at 02:56

## 2022-01-01 RX ADMIN — BIVALIRUDIN 0.1 MG/KG/HR: 250 INJECTION, POWDER, LYOPHILIZED, FOR SOLUTION INTRAVENOUS at 18:38

## 2022-01-01 RX ADMIN — CHLORHEXIDINE GLUCONATE 15 ML: 1.2 RINSE ORAL at 08:48

## 2022-01-01 RX ADMIN — ALBUMIN (HUMAN) 12.5 G: 0.25 INJECTION, SOLUTION INTRAVENOUS at 17:49

## 2022-01-01 RX ADMIN — HEPARIN SODIUM 5000 UNITS: 5000 INJECTION INTRAVENOUS; SUBCUTANEOUS at 13:53

## 2022-01-01 RX ADMIN — SENNOSIDES AND DOCUSATE SODIUM 2 TABLET: 50; 8.6 TABLET ORAL at 17:39

## 2022-01-01 RX ADMIN — Medication 30 UNITS: at 21:54

## 2022-01-01 RX ADMIN — PROPOFOL 30 MCG/KG/MIN: 10 INJECTION, EMULSION INTRAVENOUS at 16:29

## 2022-01-01 RX ADMIN — LEVOTHYROXINE SODIUM 100 MCG: 100 TABLET ORAL at 07:02

## 2022-01-01 RX ADMIN — SODIUM BICARBONATE: 84 INJECTION, SOLUTION INTRAVENOUS at 04:29

## 2022-01-01 RX ADMIN — SODIUM CHLORIDE, PRESERVATIVE FREE 10 ML: 5 INJECTION INTRAVENOUS at 11:35

## 2022-01-01 RX ADMIN — POLYETHYLENE GLYCOL 3350 17 G: 17 POWDER, FOR SOLUTION ORAL at 08:13

## 2022-01-01 RX ADMIN — LEVOTHYROXINE SODIUM 100 MCG: 100 TABLET ORAL at 05:55

## 2022-01-01 RX ADMIN — PROPOFOL 30 MCG/KG/MIN: 10 INJECTION, EMULSION INTRAVENOUS at 06:16

## 2022-01-01 RX ADMIN — BUMETANIDE 1 MG/HR: 0.25 INJECTION INTRAMUSCULAR; INTRAVENOUS at 20:33

## 2022-01-01 RX ADMIN — DEXTROSE MONOHYDRATE 75 ML/HR: 50 INJECTION, SOLUTION INTRAVENOUS at 06:05

## 2022-01-01 RX ADMIN — MUPIROCIN: 20 OINTMENT TOPICAL at 18:35

## 2022-01-01 RX ADMIN — POTASSIUM CHLORIDE 20 MEQ: 29.8 INJECTION, SOLUTION INTRAVENOUS at 04:13

## 2022-01-01 RX ADMIN — HEPARIN SODIUM 5000 UNITS: 5000 INJECTION INTRAVENOUS; SUBCUTANEOUS at 05:19

## 2022-01-01 RX ADMIN — ALLOPURINOL 50 MG: 100 TABLET ORAL at 09:15

## 2022-01-01 RX ADMIN — CLOPIDOGREL 75 MG: 75 TABLET, FILM COATED ORAL at 08:21

## 2022-01-01 RX ADMIN — HYDRALAZINE HYDROCHLORIDE 5 MG: 20 INJECTION INTRAMUSCULAR; INTRAVENOUS at 01:19

## 2022-01-01 RX ADMIN — ISOSORBIDE DINITRATE 10 MG: 20 TABLET ORAL at 10:04

## 2022-01-01 RX ADMIN — Medication 20 UNITS: at 22:35

## 2022-01-01 RX ADMIN — ROCURONIUM BROMIDE 30 MG: 10 SOLUTION INTRAVENOUS at 14:33

## 2022-01-01 RX ADMIN — TRIAMCINOLONE ACETONIDE: 1 CREAM TOPICAL at 08:15

## 2022-01-01 RX ADMIN — HYDRALAZINE HYDROCHLORIDE 5 MG: 20 INJECTION INTRAMUSCULAR; INTRAVENOUS at 08:23

## 2022-01-01 RX ADMIN — Medication 6 UNITS: at 00:26

## 2022-01-01 RX ADMIN — CHLOROTHIAZIDE SODIUM 500 MG: 500 INJECTION, POWDER, LYOPHILIZED, FOR SOLUTION INTRAVENOUS at 11:35

## 2022-01-01 RX ADMIN — SENNOSIDES AND DOCUSATE SODIUM 1 TABLET: 50; 8.6 TABLET ORAL at 09:22

## 2022-01-01 RX ADMIN — CHLORHEXIDINE GLUCONATE 15 ML: 1.2 RINSE ORAL at 08:44

## 2022-01-01 RX ADMIN — Medication 400 MG: at 18:40

## 2022-01-01 RX ADMIN — BUDESONIDE 500 MCG: 0.5 INHALANT RESPIRATORY (INHALATION) at 08:02

## 2022-01-01 RX ADMIN — SODIUM CHLORIDE, PRESERVATIVE FREE 10 ML: 5 INJECTION INTRAVENOUS at 14:30

## 2022-01-01 RX ADMIN — ISOSORBIDE DINITRATE 10 MG: 20 TABLET ORAL at 10:36

## 2022-01-01 RX ADMIN — PANTOPRAZOLE SODIUM 40 MG: 40 INJECTION, POWDER, FOR SOLUTION INTRAVENOUS at 08:44

## 2022-01-01 RX ADMIN — PANTOPRAZOLE SODIUM 40 MG: 40 INJECTION, POWDER, FOR SOLUTION INTRAVENOUS at 08:58

## 2022-01-01 RX ADMIN — Medication 6 UNITS: at 18:05

## 2022-01-01 RX ADMIN — Medication 6 UNITS: at 23:57

## 2022-01-01 RX ADMIN — MORPHINE SULFATE 4 MG: 4 INJECTION INTRAVENOUS at 17:54

## 2022-01-01 RX ADMIN — CETIRIZINE HYDROCHLORIDE 10 MG: 10 TABLET, FILM COATED ORAL at 10:03

## 2022-01-01 RX ADMIN — POLYETHYLENE GLYCOL 3350 17 G: 17 POWDER, FOR SOLUTION ORAL at 09:30

## 2022-01-01 RX ADMIN — ASPIRIN 81 MG CHEWABLE TABLET 81 MG: 81 TABLET CHEWABLE at 08:16

## 2022-01-01 RX ADMIN — SODIUM CHLORIDE, PRESERVATIVE FREE 10 ML: 5 INJECTION INTRAVENOUS at 07:11

## 2022-01-01 RX ADMIN — ALLOPURINOL 50 MG: 100 TABLET ORAL at 08:57

## 2022-01-01 RX ADMIN — PROPOFOL 25 MCG/KG/MIN: 10 INJECTION, EMULSION INTRAVENOUS at 04:25

## 2022-01-01 RX ADMIN — Medication 8 UNITS: at 00:04

## 2022-01-01 RX ADMIN — BUMETANIDE 1 MG/HR: 0.25 INJECTION INTRAMUSCULAR; INTRAVENOUS at 06:07

## 2022-01-01 RX ADMIN — DEXMEDETOMIDINE HYDROCHLORIDE 1.5 MCG/KG/HR: 400 INJECTION INTRAVENOUS at 06:06

## 2022-01-01 RX ADMIN — DEXTROSE MONOHYDRATE 75 ML/HR: 50 INJECTION, SOLUTION INTRAVENOUS at 01:31

## 2022-01-01 RX ADMIN — SODIUM BICARBONATE: 84 INJECTION, SOLUTION INTRAVENOUS at 06:03

## 2022-01-01 RX ADMIN — Medication 400 MG: at 19:15

## 2022-01-01 RX ADMIN — PROPOFOL 40 MCG/KG/MIN: 10 INJECTION, EMULSION INTRAVENOUS at 00:25

## 2022-01-01 RX ADMIN — VENLAFAXINE HYDROCHLORIDE 75 MG: 37.5 CAPSULE, EXTENDED RELEASE ORAL at 10:36

## 2022-01-01 RX ADMIN — PROPOFOL 15 MCG/KG/MIN: 10 INJECTION, EMULSION INTRAVENOUS at 20:57

## 2022-01-01 RX ADMIN — DEXMEDETOMIDINE HYDROCHLORIDE 0.6 MCG/KG/HR: 400 INJECTION INTRAVENOUS at 00:00

## 2022-01-01 RX ADMIN — VASOPRESSIN 0.02 UNITS/MIN: 20 INJECTION INTRAVENOUS at 22:10

## 2022-01-01 RX ADMIN — Medication 10 UNITS: at 23:40

## 2022-01-01 RX ADMIN — IVABRADINE 7.5 MG: 7.5 TABLET, FILM COATED ORAL at 17:23

## 2022-01-01 RX ADMIN — SODIUM CHLORIDE, PRESERVATIVE FREE 10 ML: 5 INJECTION INTRAVENOUS at 15:46

## 2022-01-01 RX ADMIN — Medication 400 MG: at 08:22

## 2022-01-01 RX ADMIN — MILRINONE LACTATE 0.2 MCG/KG/MIN: 0.2 INJECTION, SOLUTION INTRAVENOUS at 05:24

## 2022-01-01 RX ADMIN — MONTELUKAST 10 MG: 10 TABLET, FILM COATED ORAL at 08:45

## 2022-01-01 RX ADMIN — EPOETIN ALFA-EPBX 20000 UNITS: 20000 INJECTION, SOLUTION INTRAVENOUS; SUBCUTANEOUS at 20:42

## 2022-01-01 RX ADMIN — GABAPENTIN 100 MG: 100 CAPSULE ORAL at 00:18

## 2022-01-01 RX ADMIN — BUMETANIDE 1.5 MG/HR: 0.25 INJECTION INTRAMUSCULAR; INTRAVENOUS at 16:08

## 2022-01-01 RX ADMIN — Medication 35 UNITS: at 09:25

## 2022-01-01 RX ADMIN — DEXMEDETOMIDINE HYDROCHLORIDE 1.5 MCG/KG/HR: 400 INJECTION INTRAVENOUS at 00:08

## 2022-01-01 RX ADMIN — GABAPENTIN 100 MG: 100 CAPSULE ORAL at 20:33

## 2022-01-01 RX ADMIN — MORPHINE SULFATE 2 MG: 2 INJECTION, SOLUTION INTRAMUSCULAR; INTRAVENOUS at 17:15

## 2022-01-01 RX ADMIN — DEXMEDETOMIDINE HYDROCHLORIDE 1.5 MCG/KG/HR: 400 INJECTION INTRAVENOUS at 11:59

## 2022-01-01 RX ADMIN — SODIUM CHLORIDE, PRESERVATIVE FREE 20 ML: 5 INJECTION INTRAVENOUS at 13:56

## 2022-01-01 RX ADMIN — MILRINONE LACTATE 0.38 MCG/KG/MIN: 0.2 INJECTION, SOLUTION INTRAVENOUS at 00:02

## 2022-01-01 RX ADMIN — SODIUM BICARBONATE: 84 INJECTION, SOLUTION INTRAVENOUS at 08:55

## 2022-01-01 RX ADMIN — MONTELUKAST 10 MG: 10 TABLET, FILM COATED ORAL at 08:38

## 2022-01-01 RX ADMIN — SENNOSIDES AND DOCUSATE SODIUM 1 TABLET: 50; 8.6 TABLET ORAL at 17:58

## 2022-01-01 RX ADMIN — POTASSIUM & SODIUM PHOSPHATES POWDER PACK 280-160-250 MG 1 PACKET: 280-160-250 PACK at 08:44

## 2022-01-01 RX ADMIN — Medication 4 UNITS: at 23:43

## 2022-01-01 RX ADMIN — BUMETANIDE 1 MG/HR: 0.25 INJECTION INTRAMUSCULAR; INTRAVENOUS at 05:53

## 2022-01-01 RX ADMIN — SODIUM CHLORIDE, PRESERVATIVE FREE 10 ML: 5 INJECTION INTRAVENOUS at 21:32

## 2022-01-01 RX ADMIN — FENTANYL CITRATE 50 MCG: 50 INJECTION, SOLUTION INTRAMUSCULAR; INTRAVENOUS at 13:38

## 2022-01-01 RX ADMIN — WATER 1 G: 1 INJECTION INTRAMUSCULAR; INTRAVENOUS; SUBCUTANEOUS at 13:36

## 2022-01-01 RX ADMIN — ACETAMINOPHEN 650 MG: 325 TABLET ORAL at 04:29

## 2022-01-01 RX ADMIN — BUMETANIDE 1 MG/HR: 0.25 INJECTION INTRAMUSCULAR; INTRAVENOUS at 09:00

## 2022-01-01 RX ADMIN — ROCURONIUM BROMIDE 20 MG: 10 SOLUTION INTRAVENOUS at 15:42

## 2022-01-01 RX ADMIN — ACETAMINOPHEN 650 MG: 325 TABLET ORAL at 06:48

## 2022-01-01 RX ADMIN — NICARDIPINE HYDROCHLORIDE 5 MG/HR: 25 INJECTION, SOLUTION INTRAVENOUS at 06:47

## 2022-01-01 RX ADMIN — DEXMEDETOMIDINE HYDROCHLORIDE 0.6 MCG/KG/HR: 400 INJECTION INTRAVENOUS at 22:08

## 2022-01-01 RX ADMIN — HEPARIN SODIUM 5000 UNITS: 5000 INJECTION INTRAVENOUS; SUBCUTANEOUS at 22:06

## 2022-01-01 RX ADMIN — SENNOSIDES AND DOCUSATE SODIUM 1 TABLET: 50; 8.6 TABLET ORAL at 17:22

## 2022-01-01 RX ADMIN — CETIRIZINE HYDROCHLORIDE 10 MG: 10 TABLET, FILM COATED ORAL at 09:16

## 2022-01-01 RX ADMIN — Medication 2 UNITS: at 12:20

## 2022-01-01 RX ADMIN — VENLAFAXINE HYDROCHLORIDE 75 MG: 37.5 CAPSULE, EXTENDED RELEASE ORAL at 08:33

## 2022-01-01 RX ADMIN — DEXMEDETOMIDINE HYDROCHLORIDE 0.4 MCG/KG/HR: 400 INJECTION INTRAVENOUS at 14:46

## 2022-01-01 RX ADMIN — Medication 2 UNITS: at 17:32

## 2022-01-01 RX ADMIN — DOCUSATE SODIUM 100 MG: 100 CAPSULE, LIQUID FILLED ORAL at 17:26

## 2022-01-01 RX ADMIN — ARFORMOTEROL TARTRATE 15 MCG: 15 SOLUTION RESPIRATORY (INHALATION) at 20:13

## 2022-01-01 RX ADMIN — CLOPIDOGREL BISULFATE 600 MG: 300 TABLET, FILM COATED ORAL at 16:04

## 2022-01-01 RX ADMIN — CARVEDILOL 6.25 MG: 6.25 TABLET, FILM COATED ORAL at 17:26

## 2022-01-01 RX ADMIN — PROPOFOL 20 MCG/KG/MIN: 10 INJECTION, EMULSION INTRAVENOUS at 04:11

## 2022-01-01 RX ADMIN — SENNOSIDES AND DOCUSATE SODIUM 1 TABLET: 50; 8.6 TABLET ORAL at 18:41

## 2022-01-01 RX ADMIN — MIDAZOLAM 1 MG: 1 INJECTION INTRAMUSCULAR; INTRAVENOUS at 17:19

## 2022-01-01 RX ADMIN — DEXMEDETOMIDINE HYDROCHLORIDE 1.5 MCG/KG/HR: 400 INJECTION INTRAVENOUS at 08:09

## 2022-01-01 RX ADMIN — DEXMEDETOMIDINE HYDROCHLORIDE 1.5 MCG/KG/HR: 400 INJECTION INTRAVENOUS at 19:48

## 2022-01-01 RX ADMIN — MUPIROCIN: 20 OINTMENT TOPICAL at 08:15

## 2022-01-01 RX ADMIN — SODIUM CHLORIDE, PRESERVATIVE FREE 10 ML: 5 INJECTION INTRAVENOUS at 06:06

## 2022-01-01 RX ADMIN — HYDRALAZINE HYDROCHLORIDE 5 MG: 20 INJECTION INTRAMUSCULAR; INTRAVENOUS at 08:45

## 2022-01-01 RX ADMIN — Medication 400 MG: at 17:25

## 2022-01-01 RX ADMIN — SODIUM CHLORIDE, PRESERVATIVE FREE 10 ML: 5 INJECTION INTRAVENOUS at 23:23

## 2022-01-01 RX ADMIN — SENNOSIDES AND DOCUSATE SODIUM 1 TABLET: 50; 8.6 TABLET ORAL at 17:32

## 2022-01-01 RX ADMIN — SODIUM CHLORIDE, PRESERVATIVE FREE 10 ML: 5 INJECTION INTRAVENOUS at 10:36

## 2022-01-01 RX ADMIN — BUMETANIDE 1 MG: 1 TABLET ORAL at 10:03

## 2022-01-01 RX ADMIN — HEPARIN SODIUM 5000 UNITS: 5000 INJECTION INTRAVENOUS; SUBCUTANEOUS at 17:26

## 2022-01-01 RX ADMIN — Medication 15 UNITS: at 21:01

## 2022-01-01 RX ADMIN — CARVEDILOL 12.5 MG: 6.25 TABLET, FILM COATED ORAL at 09:15

## 2022-01-01 RX ADMIN — GABAPENTIN 100 MG: 100 CAPSULE ORAL at 23:49

## 2022-01-01 RX ADMIN — GABAPENTIN 100 MG: 100 CAPSULE ORAL at 08:32

## 2022-01-01 RX ADMIN — ARFORMOTEROL TARTRATE 15 MCG: 15 SOLUTION RESPIRATORY (INHALATION) at 07:39

## 2022-01-01 RX ADMIN — HYDRALAZINE HYDROCHLORIDE 5 MG: 20 INJECTION INTRAMUSCULAR; INTRAVENOUS at 02:35

## 2022-01-01 RX ADMIN — LEVOTHYROXINE SODIUM 100 MCG: 100 TABLET ORAL at 05:20

## 2022-01-01 RX ADMIN — SODIUM CHLORIDE 9 ML/HR: 9 INJECTION, SOLUTION INTRAVENOUS at 04:13

## 2022-01-01 RX ADMIN — SODIUM CHLORIDE, PRESERVATIVE FREE 10 ML: 5 INJECTION INTRAVENOUS at 21:59

## 2022-01-01 RX ADMIN — POTASSIUM CHLORIDE 20 MEQ: 29.8 INJECTION, SOLUTION INTRAVENOUS at 17:37

## 2022-01-01 RX ADMIN — ASPIRIN 81 MG CHEWABLE TABLET 81 MG: 81 TABLET CHEWABLE at 08:14

## 2022-01-01 RX ADMIN — HYDRALAZINE HYDROCHLORIDE 5 MG: 20 INJECTION INTRAMUSCULAR; INTRAVENOUS at 15:20

## 2022-01-01 RX ADMIN — MILRINONE LACTATE 0.38 MCG/KG/MIN: 0.2 INJECTION, SOLUTION INTRAVENOUS at 14:23

## 2022-01-01 RX ADMIN — BUMETANIDE 1 MG/HR: 0.25 INJECTION INTRAMUSCULAR; INTRAVENOUS at 18:40

## 2022-01-01 RX ADMIN — ALLOPURINOL 50 MG: 100 TABLET ORAL at 08:29

## 2022-01-01 RX ADMIN — Medication 60 UNITS: at 23:48

## 2022-01-01 RX ADMIN — Medication 50 MCG/HR: at 21:22

## 2022-01-01 RX ADMIN — POTASSIUM CHLORIDE 20 MEQ: 29.8 INJECTION, SOLUTION INTRAVENOUS at 12:14

## 2022-01-01 RX ADMIN — WATER 2 G: 1 INJECTION INTRAMUSCULAR; INTRAVENOUS; SUBCUTANEOUS at 10:07

## 2022-01-01 RX ADMIN — SODIUM CHLORIDE, PRESERVATIVE FREE 10 ML: 5 INJECTION INTRAVENOUS at 22:29

## 2022-01-01 RX ADMIN — ALLOPURINOL 100 MG: 100 TABLET ORAL at 08:21

## 2022-01-01 RX ADMIN — ISOSORBIDE DINITRATE 10 MG: 20 TABLET ORAL at 17:26

## 2022-01-01 RX ADMIN — ARFORMOTEROL TARTRATE 15 MCG: 15 SOLUTION RESPIRATORY (INHALATION) at 07:22

## 2022-01-01 RX ADMIN — IRON SUCROSE 300 MG: 20 INJECTION, SOLUTION INTRAVENOUS at 16:01

## 2022-01-01 RX ADMIN — BUMETANIDE 2 MG: 0.25 INJECTION INTRAMUSCULAR; INTRAVENOUS at 08:57

## 2022-01-01 RX ADMIN — SODIUM CHLORIDE, PRESERVATIVE FREE 10 ML: 5 INJECTION INTRAVENOUS at 08:58

## 2022-01-01 RX ADMIN — PANTOPRAZOLE SODIUM 40 MG: 40 INJECTION, POWDER, FOR SOLUTION INTRAVENOUS at 09:32

## 2022-01-01 RX ADMIN — MILRINONE LACTATE IN DEXTROSE 0.2 MCG/KG/MIN: 200 INJECTION, SOLUTION INTRAVENOUS at 15:46

## 2022-01-01 RX ADMIN — HEPARIN SODIUM 5000 UNITS: 5000 INJECTION INTRAVENOUS; SUBCUTANEOUS at 22:01

## 2022-01-01 RX ADMIN — TRIAMCINOLONE ACETONIDE: 1 CREAM TOPICAL at 17:59

## 2022-01-01 RX ADMIN — PANTOPRAZOLE SODIUM 40 MG: 40 INJECTION, POWDER, FOR SOLUTION INTRAVENOUS at 21:31

## 2022-01-01 RX ADMIN — Medication 10 UNITS: at 21:00

## 2022-01-01 RX ADMIN — POLYETHYLENE GLYCOL 3350 17 G: 17 POWDER, FOR SOLUTION ORAL at 08:45

## 2022-01-01 RX ADMIN — MILRINONE LACTATE 0.38 MCG/KG/MIN: 0.2 INJECTION, SOLUTION INTRAVENOUS at 07:04

## 2022-01-01 RX ADMIN — DEXMEDETOMIDINE HYDROCHLORIDE 1.5 MCG/KG/HR: 400 INJECTION INTRAVENOUS at 22:20

## 2022-01-01 RX ADMIN — DEXMEDETOMIDINE HYDROCHLORIDE 1.5 MCG/KG/HR: 400 INJECTION INTRAVENOUS at 04:00

## 2022-01-01 RX ADMIN — HEPARIN SODIUM 5000 UNITS: 5000 INJECTION INTRAVENOUS; SUBCUTANEOUS at 21:41

## 2022-01-01 RX ADMIN — BUDESONIDE 500 MCG: 0.5 INHALANT RESPIRATORY (INHALATION) at 21:09

## 2022-01-01 RX ADMIN — PANTOPRAZOLE SODIUM 40 MG: 40 INJECTION, POWDER, FOR SOLUTION INTRAVENOUS at 09:11

## 2022-01-01 RX ADMIN — Medication 40 UNITS: at 09:30

## 2022-01-01 RX ADMIN — Medication 10 UNITS: at 09:09

## 2022-01-01 RX ADMIN — BUDESONIDE 500 MCG: 0.5 INHALANT RESPIRATORY (INHALATION) at 20:48

## 2022-01-01 RX ADMIN — CARVEDILOL 12.5 MG: 6.25 TABLET, FILM COATED ORAL at 17:39

## 2022-01-01 RX ADMIN — IVABRADINE 7.5 MG: 7.5 TABLET, FILM COATED ORAL at 09:23

## 2022-01-01 RX ADMIN — NITROGLYCERIN 1 INCH: 20 OINTMENT TOPICAL at 06:04

## 2022-01-01 RX ADMIN — ALLOPURINOL 100 MG: 100 TABLET ORAL at 08:23

## 2022-01-01 RX ADMIN — SENNOSIDES AND DOCUSATE SODIUM 1 TABLET: 50; 8.6 TABLET ORAL at 08:45

## 2022-01-01 RX ADMIN — DEXMEDETOMIDINE HYDROCHLORIDE 1.2 MCG/KG/HR: 400 INJECTION INTRAVENOUS at 22:28

## 2022-01-01 RX ADMIN — POTASSIUM CHLORIDE 20 MEQ: 29.8 INJECTION, SOLUTION INTRAVENOUS at 03:23

## 2022-01-01 RX ADMIN — DOCUSATE SODIUM 100 MG: 100 CAPSULE, LIQUID FILLED ORAL at 10:04

## 2022-01-01 RX ADMIN — CHLORHEXIDINE GLUCONATE 15 ML: 1.2 RINSE ORAL at 21:17

## 2022-01-04 PROBLEM — J96.01 ACUTE RESPIRATORY FAILURE WITH HYPOXIA (HCC): Status: ACTIVE | Noted: 2022-01-01

## 2022-01-04 NOTE — Clinical Note
Patient transported with a Registered Nurse, [address] [place] [address] / Patient Care Tech, Monitor, Oxygen and Respiratory Therapist.   Patient transported to: CCU.    Report given to CCU rn by Lexii Sequeira RN

## 2022-01-04 NOTE — PROGRESS NOTES
Transition of Care Plan   RUR- 20% High Risk   DISPOSITION: The disposition plan is pending medical progression and recommendation.  F/U with PCP/Specialist     Transport: Family - Spouse - Rony Aquino [phone]    Reason for Admission:  [address]                   RUR Score:  20% High Risk                   Plan for utilizing home health: N/A         PCP: First and Last name:  Padmini Jameson MD     Name of Practice: [facility]   Are you a current patient: Yes/No: Yes   Approximate date of last visit: November 2021   Can you participate in a virtual visit with your PCP: N/A                    Current Advanced Directive/Advance Care Plan: Full Code  Has ACP documentation on file at this time. Healthcare Decision Maker:   Click here to complete [address] including selection of the Healthcare Decision Maker Relationship (ie \"Primary\")             Primary Decision MakerJuan Schultz - Zackary - [phone]                  Transition of Care Plan:  Pending recommendation. Reviewed chart for transitions of care and discussed in rounds. CM met with patient at bedside to explain role and offer support. Patient is alert and oriented x4 and confirmed demographics. Baseline: DME - cane  ADLs/IDALS: Needs assistance   Previous Home Health: N/A  Previous SNF/IPR: N/A  ER Contact: [address] [place] [address] - [phone]    Patient lives in a 1 level house with 4 steps to enter. Patient lives with her spouse. Patient needs assistance with ADLs/IDALs. Patient uses DMEs (cane)to ambulate. Patient's preferred pharmacy is [facility] located on [address] [facility] for her prescriptions. Patient's spouse is expected to transport at discharge.      Readmission Assessment  Number of days since last admission?: 8-30 days  Previous disposition: Home with Family (DME - [facility], Home O2 was provided at discharge.)  Who is being interviewed?: Patient  What was the patient's/caregiver's perception as to why they think they needed to return back to the hospital?: Other (Comment)  Did you visit your Primary Care Physician after you left the hospital, before you returned this time?: No  Why weren't you able to visit your PCP?: Did not have an appointment  Did you see a specialist, such as Cardiac, Pulmonary, Orthopedic Physician, etc. after you left the hospital?: No  Who advised the patient to return to the hospital?: Self-referral  Does the patient report anything that got in the way of taking their medications?: No  In our efforts to provide the best possible care to you and others like you, can you think of anything that we could have done to help you after you left the hospital the first time, so that you might not have needed to return so soon?: Other (Comment)    Care Management Interventions  PCP Verified by CM: Yes  Palliative Care Criteria Met (RRAT>21 & CHF Dx)?: No  Mode of Transport at Discharge: Other (see comment)  Transition of Care Consult (CM Consult): Discharge Planning  MyChart Signup: Yes  Discharge Durable Medical Equipment: No  Physical Therapy Consult: No  Speech Therapy Consult: No  Support Systems: Spouse/Significant Other  Confirm Follow Up Transport: Family  The Patient and/or Patient Representative was Provided with a Choice of Provider and Agrees with the Discharge Plan?: Yes  Freedom of Choice List was Provided with Basic Dialogue that Supports the Patient's Individualized Plan of Care/Goals, Treatment Preferences and Shares the Quality Data Associated with the Providers?: Yes  The [employer] Information Provided?: No    Medicare pt has received, reviewed, and signed 1st IM letter informing them of their right to appeal the discharge. Signed copy has been placed on pt bedside chart. CM will continue to provide updates as they become available. CM will continue to follow, provide support and assist with JAD needs as they arise.     Gely Fletcher, MSW, CRM, LMHP-e

## 2022-01-04 NOTE — PROGRESS NOTES
TRANSFER - IN REPORT:    Verbal report received from Mario Deleon (name) on Pamela Stinson  being received from [facility](unit) for routine progression of care      Report consisted of patients Situation, Background, Assessment and   Recommendations(SBAR). Information from the following report(s) SBAR, Kardex, [facility] and Recent Results was reviewed with the receiving nurse. Opportunity for questions and clarification was provided. Assessment completed upon patients arrival to unit and care assumed.

## 2022-01-04 NOTE — ED NOTES
TRANSFER - OUT REPORT:    Verbal report given to Elizabeth(name) on Juan Talyor  being transferred to [address] [place] [address](unit) for routine progression of care    Report consisted of patients Situation, Background, Assessment and   Recommendations(SBAR). Information from the following report(s) SBAR, ED Summary, [facility] and Recent Results was reviewed with the receiving nurse. Lines:   Peripheral IV 01/04/22 Left Antecubital (Active)   Site Assessment Clean, dry, & intact 01/04/22 0325   Phlebitis Assessment 0 01/04/22 0325   Infiltration Assessment 0 01/04/22 0325   Dressing Status Clean, dry, & intact 01/04/22 0325   Dressing Type Transparent 01/04/22 0325   Hub Color/Line Status Pink 01/04/22 0325   Action Taken Blood drawn 01/04/22 0325   Alcohol Cap Used Yes 01/04/22 0325       Peripheral IV 01/04/22 Left;Posterior Hand (Active)        Opportunity for questions and clarification was provided. Patient transported with: [address] [place] [address] Road truck. 1335: Notified Receiving ARTURO Wan of update on PTT resulted >130 so heparin drip was paused and re sent another PTT at 1340 to verify it was not contaminated by running heparin infusion.

## 2022-01-04 NOTE — NURSE NAVIGATOR
Chart reviewed by Heart Failure Nurse Navigator. Heart Failure database completed. EF:  15/20% - echo done 12/8/21    ACEi/ARB/ARNi: need documentation as to contraindication    BB: coreg 6.25 mg twice daily    Aldosterone Antagonist: need documentation as to contraindication    Obstructive Sleep Apnea Screening: screening priority 1   STOP-BANG score:   Referred to Sleep Medicine:     CRT: ICD implanted. NYHA Functional Class documentation requested. Heart Failure Teach Back in Patient Education. Heart Failure Avoiding Triggers on Discharge Instructions. Cardiologist: not yet consulted      Post discharge follow up phone call to be made within 48-72 hours of discharge. 30 day HF Readmission:    Prior hospitalization 12/6/21-12/13/21  - Primary dx: Hypertensive heart and chronic kidney disease with heart failure and stage 1 through stage 4 chronic kidney disease, or unspecified chronic kidney disease    Discharging Unit: 6S - N[facility]U    Discharging MD: Dr. Robert Hurt    Patient was discharged with scheduled PCP appointment - Dr. Vicki Bazan on 12/15/21 at 7:45 AM.  Patient did attend this appointment. No additional problems identified during visit, per office note. Marya Maguire, care transition nurse, was unable to reach patient for follow up phone call. Patient also was scheduled for cardiology follow up appointment with Janett Hoffmann NP on 12/20/21 at 9:40 AM.  Patient attended this appointment. Per office note, patient's weight was down 1 pound from discharge weight. No problems identified. Has appointment with device clinic (remote)  on 1/17/22 and appointment with cardiology NP on 1/28/22. Patient presented to [facility] on 1/4/22 with complaint of shortness of breath, O2 sats 77% on room air. Elevated NT pro BNP - 15,826. Of note, patients weight upon previous discharge (12/13/21) was 256.6 pounds. On admission 1/4/22, weight is 264.1 pounds.  Transferred and admitted to [facility] - [id number]

## 2022-01-04 NOTE — ED NOTES
Bedside and Verbal shift change report given to Juan C Fofana RN (oncoming nurse) by Sparkle Dahl RN (offgoing nurse). Report included the following information SBAR, Kardex, ED Summary, Intake/Output and MAR.

## 2022-01-04 NOTE — Clinical Note
Mallampati: Unable to assess. ASA: Class 4 - patient with severe systemic disease that is a constant threat to life.

## 2022-01-04 NOTE — ED TRIAGE NOTES
Triage note:  Pt arrived with c/o SOB that started 2 days ago. Pt stated she has been out of her home oxygen for 2 days. Sats on pulse ox is 77% on RA. Up to 96% 5L NC. Dr Jackie Gaspar at bedside.

## 2022-01-04 NOTE — ED PROVIDER NOTES
14-year-old female presents with shortness of breath, weakness, dizziness that started 2 days ago. She has been out of her home oxygen for the past couple days as well. She states that her legs were not swollen. She denies any sick contacts. She is vaccinated for Covid. She denies any current pain. Denies any fevers or chills. Chart review: 14-year-old female with history of nonischemic cardiomyopathy, CHF with an left ventricular ejection fraction of 15 to 20%. She has CKD stage IV. Also has a history of pulmonary hypertension. She had a right heart catheterization that showed severe pulmonary hypertension. She is scheduled to have her pacemakers replaced.       Shortness of Breath         Past Medical History:   Diagnosis Date    Acquired hypothyroidism 8/15/2016    Anemia     RED-HF study    Asthma     Cardiomyopathy, nonischemic ([address]     initial dx 2001, bivHF 2008 with EF 15%, s/p biV-ICD 9/08, significant improvment in EF to 45-50%    CKD (chronic kidney disease)     Dr Ruba العراقي    CKD (chronic kidney disease) 8/15/2012    Depression     Diabetes ([address]     Diabetic neuropathy ([address]     DM (diabetes mellitus) ([address] 8/15/2012    GERD (gastroesophageal reflux disease)     Gout     Hypothyroidism     ICD (implantable cardioverter-defibrillator), biventricular, in situ 6/5/2014    Psoriasis        Past Surgical History:   Procedure Laterality Date    CARDIAC CATHETERIZATION  2007; 01/06/15    normal cors    ECHO 2D ADULT  4/2010    EF 45%, improved from 1/09 (25%)    ECHO 2D ADULT  11/2011    LVH, EF 55-60%    HX ORTHOPAEDIC      knee    HX PACEMAKER PLACEMENT      AICD    STRESS TEST LEXISCAN/CARDIOLITE  3/21/12    normal perfusion, global HK 40%         Family History:   Problem Relation Age of Onset    Heart Disease Mother     Hypertension Mother    Nassar Lupus Sister     Diabetes Brother        Social History     Socioeconomic History    Marital status:      Spouse name: Not on file    Number of children: Not on file    Years of education: Not on file    Highest education level: Not on file   Occupational History    Not on file   Tobacco Use    Smoking status: Never Smoker    Smokeless tobacco: Never Used   Substance and Sexual Activity    Alcohol use: No    Drug use: No    Sexual activity: Never   Other Topics Concern    Not on file   Social History Narrative    . Nonsmoker. Disability     Social Determinants of Health     Financial Resource Strain:     Difficulty of Paying Living Expenses: Not on file   Food Insecurity:     Worried About Running Out of Food in the Last Year: Not on file    Gladis of Food in the Last Year: Not on file   Transportation Needs:     Lack of Transportation (Medical): Not on file    Lack of Transportation (Non-Medical): Not on file   Physical Activity:     Days of Exercise per Week: Not on file    Minutes of Exercise per Session: Not on file   Stress:     Feeling of Stress : Not on file   Social Connections:     Frequency of Communication with Friends and Family: Not on file    Frequency of Social Gatherings with Friends and Family: Not on file    Attends Nondenominational Services: Not on file    Active Member of [address] [place] [address] or Organizations: Not on file    Attends Club or Organization Meetings: Not on file    Marital Status: Not on file   Intimate Partner Violence:     Fear of Current or Ex-Partner: Not on file    Emotionally Abused: Not on file    Physically Abused: Not on file    Sexually Abused: Not on file   Housing Stability:     Unable to Pay for Housing in the Last Year: Not on file    Number of [place] in the Last Year: Not on file    Unstable Housing in the Last Year: Not on file         ALLERGIES: Ciprofloxacin, Shellfish derived, Ace inhibitors, Biaxin [clarithromycin], Candesartan, and Pcn [penicillins]    Review of Systems   Respiratory: Positive for shortness of breath.     All other systems reviewed and are negative. There were no vitals filed for this visit. Physical Exam  Vitals and nursing note reviewed. Constitutional:       General: She is not in acute distress. Appearance: She is obese. She is ill-appearing (Chronically). HENT:      Head: Normocephalic and atraumatic. Mouth/Throat:      Mouth: Mucous membranes are moist.   Eyes:      General: No scleral icterus. Conjunctiva/sclera: Conjunctivae normal.      Pupils: Pupils are equal, round, and reactive to light. Neck:      Trachea: No tracheal deviation. Cardiovascular:      Rate and Rhythm: Normal rate and regular rhythm. Pulmonary:      Effort: Pulmonary effort is normal. No respiratory distress. Breath sounds: No wheezing or rales. Comments: Decreased at bilateral bases  Abdominal:      General: There is no distension. Palpations: Abdomen is soft. Tenderness: There is no abdominal tenderness. Genitourinary:     Comments: deferred  Musculoskeletal:         General: No deformity. Cervical back: Neck supple. Right lower leg: Edema present. Left lower leg: Edema present. Skin:     General: Skin is warm and dry. Neurological:      General: No focal deficit present. Mental Status: She is alert. Psychiatric:         Mood and Affect: Mood normal.          MDM  Number of Diagnoses or Management Options  Acute pulmonary edema (HCC)  Elevated d-dimer  Diagnosis management comments: 19-year-old female presents with hypoxia with differential diagnosis of CHF, PE, pneumonia, COVID-19. ED Course as of 01/04/22 0542   Tue Jan 04, 2022   1511 EKG shows atrial sensed ventricular paced rhythm at rate is 116 [TT]   0518 D-dimer(!): >35.20  D-dimer markedly elevated. Kidney function will not tolerate CTA.   Will need VQ scan in hospital. [TT]      ED Course User Index  [TT] Charlotte Perez MD       Procedures        Perfect Serve Consult for Admission  5:42 AM    ED Room Number: SER06/06  Patient Name and age:  Linda Jones 78 y.o.  female  Working Diagnosis:   1. Acute pulmonary edema (HCC)    2. Elevated d-dimer        COVID-19 Suspicion:  no  Sepsis present:  no  Reassessment needed: N/A  Code Status:  Full Code  Readmission: no  Isolation Requirements:  no  Recommended Level of Care:  telemetry  Department:[facility] - [phone]  Other: Came in hypoxic with sats in the 70s. Has chronic kidney disease. D-dimer returned markedly elevated. Started on heparin drip. Will need VQ scan. Chest x-ray shows pulmonary edema. History of CHF with an EF of 15 to 20%. Total critical care time spent exclusive of procedures:  34 minutes    Sebastián Avendaño MD

## 2022-01-05 NOTE — CONSULTS
[facility]   [address] [facility], [address] [facility]  Phone: [phone]   [phone] NOTE     Patient: Pamela Stinson MRN: [id number]  PCP: Gilberto Rodriguez MD   :     1955  Age:   78 y.o. Sex:  female      Referring physician: Jason Kirkpatrick MD  Reason for consultation: 78 y.o. female with Acute respiratory failure with hypoxia ([address] [J96.01] with CKD stage IV  Admission Date: 2022  2:48 AM  LOS: 1 day      ASSESSMENT and PLAN :   CKD stage IV:  -Etiology: DM, HTN, CRS  -UA in the past no proteinuria or hematuria. -Ordered UA, urine protein to creatinine ratio, renal ultrasound  -Recent decline in GFR could be 2/2 worsening CRS  -She may need inotropic support.  -She has advanced CKD and unlikely to be a candidate for LVAD  -Increase Bumex to 2 mg every 12 hours and will wait for cardiology evaluation to make further changes.  -Daily labs    Acute on chronic HFrEF  NI CMP, LVEF 15 to 20%  NYHA class III-IV  S/p BiV pacer/ICD-s/p CRT  [facility] 12/10/2021: PCWP 34, PA P 80  -Cardiology consulted    Morbid obesity  Type II DM  HTN  Hypothyroidism  Pulmonary hypertension  Gout  Psoriasis    Care Plan discussed with: Patient, Dr. Charu Posadas and cardiology        Thank you for consulting [facility] in the care of your patient. Subjective:   HPI: Pamela Stinson is a 78 y.o.  female who has been admitted to the hospital for worsening shortness of breath and dyspnea on exertion. On admission she was noted to be in acute on chronic CHF. She was also noted to have severe degree renal insufficiency for which nephrology has been consulted. She she has not seen a nephrologist in  10 years. She was previously seen by Dr. Noemy Garces in . She had an renal ultrasound back in  that suggested chronic kidney disease. She has longstanding hypertension and diabetes and morbid obesity.   She has progressive nonischemic cardiomyopathy. She was followed by Dr. Jermain Yen and Dr. Asa Vasquez. She has prior BiV pacer/AICD. Recently she was noted to have worsening and I CMP with EF down to 15 to 20%. She underwent right heart catheterization at [facility] that showed  elevated filling pressureS. She was diuresed but continued to have an NYHA class II to class III symptoms on discharge. [facility] [place] [facility] [place] [facility] with complaints of worsening shortness of breath. Past Medical Hx:   Past Medical History:   Diagnosis Date    Acquired hypothyroidism 8/15/2016    Anemia     RED-HF study    Asthma     Cardiomyopathy, nonischemic [address] [address] [address]     initial dx 2001, bivHF 2008 with EF 15%, s/p biV-ICD 9/08, significant improvment in EF to 45-50%    CKD (chronic kidney disease)     Dr Wilfred Mccabe    CKD (chronic kidney disease) 8/15/2012    Depression     Diabetes ([facility]     Diabetic neuropathy ([facility] [address]     DM (diabetes mellitus) ([facility] [address] [facility] 8/15/2012    GERD (gastroesophageal reflux disease)     Gout     Hypothyroidism     ICD (implantable cardioverter-defibrillator), biventricular, in situ 6/5/2014    Psoriasis         Past Surgical Hx:     Past Surgical History:   Procedure Laterality Date    CARDIAC CATHETERIZATION  2007; 01/06/15    normal cors    ECHO 2D ADULT  4/2010    EF 45%, improved from 1/09 (25%)    ECHO 2D ADULT  11/2011    LVH, EF 55-60%    HX ORTHOPAEDIC      knee    HX PACEMAKER PLACEMENT      AICD    STRESS TEST LEXISCAN/CARDIOLITE  3/21/12    normal perfusion, global HK 40%         Allergies   Allergen Reactions    Ciprofloxacin Anaphylaxis    Shellfish Derived Anaphylaxis    Ace Inhibitors Unknown (comments)    Biaxin [Clarithromycin] Other (comments)     Metal taste    Candesartan Cough    Pcn [Penicillins] Hives       Social Hx:  reports that she has never smoked. She has never used smokeless tobacco. She reports that she does not drink alcohol and does not use drugs. Family History   Problem Relation Age of Onset    Heart Disease Mother     Hypertension Mother     Lupus Sister     Diabetes Brother        Review of Systems:  A thorough twelve point review of system was performed today. Pertinent positives and negatives are mentioned in the HPI. The reminder of the ROS is negative and noncontributory.      Objective:    Vitals:    Vitals:    01/05/22 0759 01/05/22 0931 01/05/22 1000 01/05/22 1034   BP:  118/85  118/85   Pulse: (!) 116 (!) 114 (!) 111 (!) 111   Resp:  22 22   Temp:  97.6 °F (36.4 °C)  97.6 °F (36.4 °C)   SpO2:  93%     Weight:       Height:         I&O's:  01/04 0701 - 01/05 0700  In: 120 [P.O.:120]  Out: -   Visit Vitals  /85   Pulse (!) 111   Temp 97.6 °F (36.4 °C)   Resp 22   Ht 5' 7\" (1.702 m)   Wt 119.8 kg (264 lb 1.8 oz)   SpO2 93%   BMI 41.37 kg/m²       Physical Exam:  General: Morbidly obese  HEENT: PERRL,  No Pallor , No Icterus  Neck: Supple,no mass palpable  Lungs : CTA  CVS: RRR, S1 S2 normal, No murmur   Abdomen: Soft, NT, BS +  Extremities: Edema  Skin: Psoriasis-not active  MS: No joint swelling, erythema, warmth  Neurologic: non focal, AAO x 3  Psych: normal affect    Laboratory Results:    Recent Labs     01/05/22  0329 01/04/22  0430 01/04/22 0317   *  --  137   K 5.5* 5.2* 5.7*   CL 96*  --  97   CO2 25  --  30   *  --  287*   BUN 63*  --  50*   CREA 2.74*  --  2.54*   CA 9.3  --  10.1   ALB  --   --  3.5   ALT  --   --  163*     Recent Labs     01/05/22  0329 01/04/22  0424 01/04/22 0317   WBC 14.2* 11.7* 10.3   HGB 9.7* 9.8* 10.0*   HCT 35.0 37.8 38.3   * 436* 435*     Lab Results   Component Value Date/Time    Specimen Description: URINE 10/22/2013 01:32 PM    Specimen Description: URINE 01/23/2013 04:40 PM    Specimen Description: LEG TISSUE 02/12/2009 12:00 AM     Lab Results   Component Value Date/Time    Culture result: MIXED SKIN ROSANNA ISOLATED 10/22/2013 01:32 PM    Culture result:  01/23/2013 04:40 PM     ENTEROCOCCUS FAECALIS GROUP D  MIXED SKIN ROSANNA ISOLATED    Culture result:  02/12/2009 12:00 AM     LIGHT STAPHYLOCOCCUS EPIDERMIDIS (OXACILLIN RESISTANT)  LIGHT 2ND STRAIN OF STAPHYLOCOCCUS EPIDERMIDIS (OXACILLIN RESISTANT)     Recent Results (from the past 24 hour(s))   PTT    Collection Time: 01/04/22 12:10 PM   Result Value Ref Range    aPTT >130.0 (HH) 22.1 - 31.0 sec    aPTT, therapeutic range     58.0 - 77.0 SECS   GLUCOSE, POC    Collection Time: 01/04/22 12:15 PM   Result Value Ref Range    Glucose (POC) 224 (H) 65 - 117 mg/dL    Performed by Richie Rojas    PTT    Collection Time: 01/04/22  1:24 PM   Result Value Ref Range    aPTT 77.5 (H) 22.1 - 31.0 sec    aPTT, therapeutic range     58.0 - 77.0 SECS   GLUCOSE, POC    Collection Time: 01/04/22  7:09 PM   Result Value Ref Range    Glucose (POC) 281 (H) 65 - 117 mg/dL    Performed by Melissa Anne    GLUCOSE, POC    Collection Time: 01/04/22 10:28 PM   Result Value Ref Range    Glucose (POC) 303 (H) 65 - 117 mg/dL    [facility] by [facility]    RESPIRATORY VIRUS PANEL W/COVID-19, PCR    Collection Time: 01/05/22  3:25 AM    Specimen: Nasopharyngeal   Result Value Ref Range    Adenovirus Not detected NOTD      Coronavirus 229E Not detected NOTD      Coronavirus HKU1 Not detected NOTD      Coronavirus CVNL63 Not detected NOTD      Coronavirus OC43 Not detected NOTD      SARS-CoV-2, PCR Not detected NOTD      Metapneumovirus Not detected NOTD      Rhinovirus and Enterovirus Not detected NOTD      Influenza A Not detected NOTD      Influenza A, subtype H1 Not detected NOTD      Influenza A, subtype H3 Not detected NOTD      INFLUENZA A H1N1 PCR Not detected NOTD      Influenza B Not detected NOTD      Parainfluenza 1 Not detected NOTD      Parainfluenza 2 Not detected NOTD      Parainfluenza 3 Not detected NOTD      Parainfluenza virus 4 Not detected NOTD      RSV by PCR Not detected NOTD      B. parapertussis, PCR Not detected NOTD Bordetella pertussis - PCR Not detected NOTD      Chlamydophila pneumoniae DNA, QL, PCR Not detected NOTD      Mycoplasma pneumoniae DNA, QL, PCR Not detected NOTD     PTT    Collection Time: 01/05/22  3:29 AM   Result Value Ref Range    aPTT 26.8 22.1 - 31.0 sec    aPTT, therapeutic range     58.0 - 21.9 SECS   METABOLIC PANEL, BASIC    Collection Time: 01/05/22  3:29 AM   Result Value Ref Range    Sodium 133 (L) 136 - 145 mmol/L    Potassium 5.5 (H) 3.5 - 5.1 mmol/L    Chloride 96 (L) 97 - 108 mmol/L    CO2 25 21 - 32 mmol/L    Anion gap 12 5 - 15 mmol/L    Glucose 283 (H) 65 - 100 mg/dL    BUN 63 (H) 6 - 20 MG/DL    Creatinine 2.74 (H) 0.55 - 1.02 MG/DL    BUN/Creatinine ratio 23 (H) 12 - 20      GFR est AA 21 (L) >60 ml/min/1.73m2    GFR est non-AA 17 (L) >60 ml/min/1.73m2    Calcium 9.3 8.5 - 10.1 MG/DL   CBC WITH AUTOMATED DIFF    Collection Time: 01/05/22  3:29 AM   Result Value Ref Range    WBC 14.2 (H) 3.6 - 11.0 K/uL    RBC 3.97 3.80 - 5.20 M/uL    HGB 9.7 (L) 11.5 - 16.0 g/dL    HCT 35.0 35.0 - 47.0 %    MCV 88.2 80.0 - 99.0 FL    MCH 24.4 (L) 26.0 - 34.0 PG    MCHC 27.7 (L) 30.0 - 36.5 g/dL    RDW 19.0 (H) 11.5 - 14.5 %    PLATELET 743 (H) 827 - 400 K/uL    MPV 9.7 8.9 - 12.9 FL    NRBC 0.5 (H) 0  WBC    ABSOLUTE NRBC 0.07 (H) 0.00 - 0.01 K/uL    NEUTROPHILS 86 (H) 32 - 75 %    LYMPHOCYTES 7 (L) 12 - 49 %    MONOCYTES 6 5 - 13 %    EOSINOPHILS 0 0 - 7 %    BASOPHILS 0 0 - 1 %    IMMATURE GRANULOCYTES 1 (H) 0.0 - 0.5 %    ABS. NEUTROPHILS 12.2 (H) 1.8 - 8.0 K/UL    ABS. LYMPHOCYTES 1.0 0.8 - 3.5 K/UL    ABS. MONOCYTES 0.9 0.0 - 1.0 K/UL    ABS. EOSINOPHILS 0.0 0.0 - 0.4 K/UL    ABS. BASOPHILS 0.0 0.0 - 0.1 K/UL    ABS. IMM.  GRANS. 0.1 (H) 0.00 - 0.04 K/UL    DF SMEAR SCANNED      PLATELET COMMENTS Large Platelets      RBC COMMENTS ANISOCYTOSIS  1+        RBC COMMENTS POLYCHROMASIA  PRESENT        RBC COMMENTS HYPOCHROMIA  1+       GLUCOSE, POC    Collection Time: 01/05/22  6:41 AM   Result Value Ref Range    Glucose (POC) 296 (H) 65 - 117 mg/dL    Performed by [facility] Graciela          Urine dipstick:   Lab Results   Component Value Date/Time    Color YELLOW/STRAW 11/05/2020 09:11 AM    Appearance CLEAR 11/05/2020 09:11 AM    Specific gravity 1.012 11/05/2020 09:11 AM    pH (UA) 5.0 11/05/2020 09:11 AM    Protein Negative 11/05/2020 09:11 AM    Glucose Negative 11/05/2020 09:11 AM    Ketone Negative 11/05/2020 09:11 AM    Bilirubin Negative 11/05/2020 09:11 AM    Urobilinogen 0.2 11/05/2020 09:11 AM    Nitrites Negative 11/05/2020 09:11 AM    Leukocyte Esterase SMALL (A) 11/05/2020 09:11 AM    Epithelial cells MANY (A) 11/05/2020 09:11 AM    Bacteria Negative 11/05/2020 09:11 AM    WBC 5-10 11/05/2020 09:11 AM    RBC 0-5 11/05/2020 09:11 AM       I have reviewed the following: All pertinent labs, microbiology data, radiology imaging for my assessment     Medications list Personally Reviewed   [x]      Yes     []               No       Medications:  Prior to Admission medications    Medication Sig Start Date End Date Taking? Authorizing Provider   carvediloL (COREG) 6.25 mg tablet Take 1 Tablet by mouth two (2) times daily (with meals). 12/22/21  Yes Benjaminsoledad DORSEY NP   isosorbide dinitrate (ISORDIL) 5 mg tablet Take 1 Tablet by mouth three (3) times daily. 12/22/21  Yes Benjamin Karvaibhav DORSEY NP   hydrALAZINE (APRESOLINE) 10 mg tablet Take 1 Tablet by mouth three (3) times daily. 12/22/21  Yes Jazmin Schneider NP   chlorhexidine (Hibiclens) 4 % liquid Apply to the upper chest area from shoulder/neck to mid line of chest and to below the nipple every day, 5 days prior to the procedure. 12/22/21  Yes Benjamin Karma JAIDA DORSEY   gabapentin (NEURONTIN) 100 mg capsule TAKE 1 CAP BY MOUTH DAILY.  11/23/21  Yes Inell Dakins, MD   montelukast (SINGULAIR) 10 mg tablet TAKE 1 TABLET BY MOUTH EVERY DAY 11/5/21  Yes Inell Dakins, MD   bumetanide (BUMEX) 2 mg tablet Take 1 tab in the morning and 0.5 tab in the evening 11/5/21  Yes Paddy Brink MD   fluticasone propionate [facility]) 50 mcg/actuation nasal spray One spray each nostril daily 11/1/21  Yes Kentrell Kaur MD   gabapentin (NEURONTIN) 100 mg capsule Take 1 Capsule by mouth nightly. Max Daily Amount: 100 mg. 10/29/21  Yes Kentrell Kaur MD   venlafaxine-SR [facility] 75 mg capsule TAKE 1 CAPSULE BY MOUTH EVERY DAY 10/21/21  Yes Kentrell Kaur MD   cholecalciferol (VITAMIN D3) (2,000 UNITS /50 MCG) cap capsule TAKE 1 CAPSULE BY MOUTH TWO (2) TIMES A DAY. 10/18/21  Yes Tramaine Castillo MD   levocetirizine (XYZAL) 5 mg tablet TAKE 1 TABLET BY MOUTH EVERY DAY 10/10/21  Yes Kentrell Kaur MD   OTHER    Yes Provider, Historical   hydrOXYzine HCL (ATARAX) 10 mg tablet 2 tab(s)   Yes Provider, Historical   budesonide (PULMICORT) 180 mcg/actuation aepb inhaler Take 2 Puffs by inhalation two (2) times a day. 5/27/21  Yes Kentrell Kaur MD   meclizine (ANTIVERT) 25 mg tablet TAKE 1 TAB BY MOUTH THREE (3) TIMES DAILY AS NEEDED FOR DIZZINESS. 4/30/21  Yes Kentrell Kaur MD   apremilast (OTEZLA) 30 mg tab Take  by mouth two (2) times a day. Yes Provider, Historical   insulin NPH/insulin regular (NOVOLIN 70/30) 100 unit/mL (70-30) injection 70 units two times a day 8/15/16  Yes Florida Holland MD   calcipotriene (DOVONEX) 0.005 % topical cream Apply  to affected area three (3) times daily. Yes Provider, Historical   triamcinolone acetonide (KENALOG) 0.5 % ointment Apply  to affected area two (2) times a day. use thin layer   Yes Provider, Historical   multivitamin (ONE A DAY) tablet Take 1 Tab by mouth daily. Yes Provider, Historical   DOCUSATE CALCIUM (STOOL SOFTENER PO) Take 1 tablet by mouth daily. Yes Provider, Historical   ferrous sulfate (IRON) 325 mg (65 mg elemental iron) tablet Take  by mouth two (2) times a day. Yes Provider, Historical   aspirin 81 mg tablet Take 81 mg by mouth daily.    Yes Provider, Historical levothyroxine (SYNTHROID) 100 mcg tablet TAKE 1 TABLET BY MOUTH EVERY DAY BEFORE BREAKFAST 1/5/22   Kaley Obrien MD   allopurinoL (ZYLOPRIM) 100 mg tablet TAKE 1 TABLET BY MOUTH EVERY DAY 1/5/22   Kaley Obrien MD   calcitRIOL (ROCALTROL) 0.5 mcg capsule TAKE 1 CAPSULE BY MOUTH EVERY DAY 1/5/22   Kaley Obrien MD   azelastine (ASTEPRO) 205.5 mcg (0.15 %) 2 Sprays by Both Nostrils route two (2) times a day. 12/15/21   Kaley Obrien MD   benzonatate (Tessalon Perles) 100 mg capsule Take 100 mg by mouth three (3) times daily as needed for Cough. Other, MD Bee   ammonium lactate (AMLACTIN) 12 % topical cream Apply  to affected area two (2) times a day. rub in to affected area well 11/4/20   Kaley Obrien MD   EPINEPHrine (EPIPEN) 0.3 mg/0.3 mL injection 0.3 mL by IntraMUSCular route once as needed for 1 dose. Patient not taking: Reported on 1/4/2022 1/4/12   Kacy Barker MD        Thank you for allowing us to participate in the care of this patient. We will follow patient. Please dont hesitate to call with any questions    Paul Baeza MD  [facility]   [address] [address]  Phone - [phone]   Fax - [phone]  [email or web address]

## 2022-01-05 NOTE — PROGRESS NOTES
01/05/22 0150   Vitals   Temp 97.3 °F (36.3 °C)   Temp Source Oral   Pulse (Heart Rate) (!) 116   Heart Rate Source Monitor   Resp Rate 18   O2 Sat (%) 94 %   Level of Consciousness Alert (0)   /83   MAP (Calculated) 94   BP 1 Location Left upper arm   BP 1 Method Automatic   BP Patient Position At rest;Sitting   MEWS Score 3     MD notified. Will continue to monitor.

## 2022-01-05 NOTE — CONSULTS
Cardiac Electrophysiology [facility] Initial Visit Note     Subjective:       Linda Jones is a 78 y.o. patient who is seen for evaluation/management of acute on chronic CHF. Her nephrologist spoke directly to me about her severe renal failure but it is not much worse than last admission     She presented to the ER yesterday with hypoxia, improved on supplemental oxygen.       Labs showed stable anemia (Hgb 9.7 today).  WBC elevated, hyponatremic, hyperkalemic.  D-dimer elevated.  Chronic CKD, but above baseline with Cr 2.74. Rapid COVID negative.  CXR showed pulmonary edema vs atypical pneumonia.  VQ scan showed low probability for PE. NICM, LVEF noted 15-20% during recent admission.  LVEF had previously normalized with CRT pacing.  NYHA III-IV; No ACEi/ARB due to renal dysfunction.  GDMT dosing limited by low BP.       RHC 12/10/2021 showed severe pulmonary HTN (wedge 34 mmHg, PAP 80 mmHg).      Cardiac cath in 2015 at [facility] showed no evidence of CAD.       She did require LV lead reprogramming over the summer, but good LV capture since.  Good capture/function when checked during recent admission.       BP controlled.           Previous:   Admitted with acute systolic CHF 14/1315.  Discharged on supplemental oxygen. S/p Medtronic biventricular ICD (gen change 01/142015, leads 09/25/2008).        CKD stage IV.        Previously followed by Dr. Shakila Rodriguez, states did not follow him to new practice.         Problem List   Acute respiratory failure with hypoxia [facility]) ICD-10-CM: J96.01   ICD-9-CM: 518.81 1/4/2022     CHF exacerbation ([facility] [address] ICD-10-CM: I50.9   ICD-9-CM: 428.0 12/6/2021     Type 2 diabetes mellitus with diabetic neuropathy ([facility] [address] ICD-10-CM: E11.40   ICD-9-CM: 250.60, 357.2 1/2/2020     Type 2 diabetes with nephropathy ([facility] [address] ICD-10-CM: E11.21   ICD-9-CM: 250.40, 583.81 4/3/2018     Obesity, morbid ([facility] [address] ICD-10-CM: E66.01   ICD-9-CM: 278.01 12/8/2017     Acquired hypothyroidism ICD-10-CM: E03.9   ICD-9-CM: 244.9 8/15/2016     Dysthymia ICD-10-CM: F34.1   ICD-9-CM: 300.4 8/15/2016     ICD (implantable cardioverter-defibrillator), biventricular, in situ ICD-10-CM: Z95.810   ICD-9-CM: V45.02 6/5/2014   Overview Signed 1/14/2015 11:11 AM by Barrie Lau MD     Generator Medtronic change 1/14/2015         Dyslipidemia ICD-10-CM: F28.1   ICD-9-CM: 272.4 1/14/2014     CKD (chronic kidney disease) ICD-10-CM: N18.9   ICD-9-CM: 585.9 8/15/2012     Cardiomyopathy, nonischemic (HCC) ICD-10-CM: I42.8   ICD-9-CM: 425.4 Unknown   Overview Signed 10/10/2011  6:40 AM by Peter Juarez MD     initial dx 2001, bivHF 2008 with EF 15%, s/p biV-ICD 9/08, significant improvment in EF to 45-50%         Anemia in chronic renal disease (Chronic) ICD-10-CM: N18.9, D63.1   ICD-9-CM: 285.21 12/10/2008     HTN (hypertension) ICD-10-CM: I10   ICD-9-CM: 401.9 12/10/2008     GERD (gastroesophageal reflux disease) (Chronic) ICD-10-CM: K21.9   ICD-9-CM: 530.81 12/10/2008     Gout (Chronic) ICD-10-CM: M10.9   ICD-9-CM: 274.9 12/10/2008     Pulmonary HTN (HCC) (Chronic) ICD-10-CM: I27.20   ICD-9-CM: 416.8 12/10/2008         Current Facility-Administered Medications   Medication Dose Route Frequency    insulin NPH (NOVOLIN N, HUMULIN N) injection 25 Units  25 Units SubCUTAneous ACB&D    levothyroxine (SYNTHROID) tablet 100 mcg  100 mcg Oral ACB    gabapentin (NEURONTIN) capsule 100 mg  100 mg Oral DAILY    venlafaxine (EFFEXOR) tablet 75 mg  75 mg Oral DAILY    hydrOXYzine HCL (ATARAX) tablet 20 mg  20 mg Oral TID PRN    bumetanide (BUMEX) injection 2 mg  2 mg IntraVENous Q12H    milrinone (PRIMACOR) 20 MG/100 ML D5W infusion  0.375 mcg/kg/min IntraVENous CONTINUOUS    heparin (porcine) injection 5,000 Units  5,000 Units SubCUTAneous Q8H    sodium chloride (NS) flush 5-40 mL  5-40 mL IntraVENous Q8H    sodium chloride (NS) flush 5-40 mL  5-40 mL IntraVENous PRN    acetaminophen (TYLENOL) tablet 650 mg  650 mg Oral Q6H PRN Or    acetaminophen (TYLENOL) suppository 650 mg  650 mg Rectal Q6H PRN    ondansetron (ZOFRAN ODT) tablet 4 mg  4 mg Oral Q8H PRN    Or    ondansetron (ZOFRAN) injection 4 mg  4 mg IntraVENous Q6H PRN    glucose chewable tablet 16 g  4 Tablet Oral PRN    dextrose (D50W) injection syrg 12.5-25 g  25-50 mL IntraVENous PRN    glucagon (GLUCAGEN) injection 1 mg  1 mg IntraMUSCular PRN    insulin lispro (HUMALOG) injection   SubCUTAneous AC&HS    albuterol-ipratropium (DUO-NEB) 2.5 MG-0.5 MG/3 ML  3 mL Nebulization Q6H PRN      No current facility-administered medications on file prior to encounter. Current Outpatient Medications on File Prior to Encounter   Medication Sig Dispense Refill    carvediloL (COREG) 6.25 mg tablet Take 1 Tablet by mouth two (2) times daily (with meals). 180 Tablet 1    isosorbide dinitrate (ISORDIL) 5 mg tablet Take 1 Tablet by mouth three (3) times daily. 270 Tablet 1    hydrALAZINE (APRESOLINE) 10 mg tablet Take 1 Tablet by mouth three (3) times daily. 180 Tablet 1    chlorhexidine (Hibiclens) 4 % liquid Apply to the upper chest area from shoulder/neck to mid line of chest and to below the nipple every day, 5 days prior to the procedure. 1 Each 0    gabapentin (NEURONTIN) 100 mg capsule TAKE 1 CAP BY MOUTH DAILY. 90 Capsule 0    montelukast (SINGULAIR) 10 mg tablet TAKE 1 TABLET BY MOUTH EVERY DAY 90 Tablet 0    bumetanide (BUMEX) 2 mg tablet Take 1 tab in the morning and 0.5 tab in the evening 135 Tablet 1    fluticasone propionate (FLONASE) 50 mcg/actuation nasal spray One spray each nostril daily 1 Each 3    gabapentin (NEURONTIN) 100 mg capsule Take 1 Capsule by mouth nightly. Max Daily Amount: 100 mg. 30 Capsule 0    venlafaxine-SR (EFFEXOR-XR) 75 mg capsule TAKE 1 CAPSULE BY MOUTH EVERY DAY 90 Capsule 0    cholecalciferol (VITAMIN D3) (2,000 UNITS /50 MCG) cap capsule TAKE 1 CAPSULE BY MOUTH TWO (2) TIMES A DAY.  180 Capsule 0    levocetirizine (XYZAL) 5 mg tablet TAKE 1 TABLET BY MOUTH EVERY DAY 90 Tablet 0    OTHER       hydrOXYzine HCL (ATARAX) 10 mg tablet 2 tab(s)      budesonide (PULMICORT) 180 mcg/actuation aepb inhaler Take 2 Puffs by inhalation two (2) times a day. 2 Inhaler 5    meclizine (ANTIVERT) 25 mg tablet TAKE 1 TAB BY MOUTH THREE (3) TIMES DAILY AS NEEDED FOR DIZZINESS. 20 Tab 3    apremilast (OTEZLA) 30 mg tab Take  by mouth two (2) times a day.  insulin NPH/insulin regular (NOVOLIN 70/30) 100 unit/mL (70-30) injection 70 units two times a day 10 mL 3    calcipotriene (DOVONEX) 0.005 % topical cream Apply  to affected area three (3) times daily.  triamcinolone acetonide (KENALOG) 0.5 % ointment Apply  to affected area two (2) times a day. use thin layer      multivitamin (ONE A DAY) tablet Take 1 Tab by mouth daily.  DOCUSATE CALCIUM (STOOL SOFTENER PO) Take 1 tablet by mouth daily.  ferrous sulfate (IRON) 325 mg (65 mg elemental iron) tablet Take  by mouth two (2) times a day.  aspirin 81 mg tablet Take 81 mg by mouth daily.  azelastine (ASTEPRO) 205.5 mcg (0.15 %) 2 Sprays by Both Nostrils route two (2) times a day. 1 Each 3    benzonatate (Tessalon Perles) 100 mg capsule Take 100 mg by mouth three (3) times daily as needed for Cough.  ammonium lactate (AMLACTIN) 12 % topical cream Apply  to affected area two (2) times a day. rub in to affected area well 280 g 2    EPINEPHrine (EPIPEN) 0.3 mg/0.3 mL injection 0.3 mL by IntraMUSCular route once as needed for 1 dose.  (Patient not taking: Reported on 1/4/2022) 1 Each 3     Allergies   Allergen Reactions   · Ciprofloxacin Anaphylaxis   · Shellfish Derived Anaphylaxis   · Ace Inhibitors Unknown (comments)   · Biaxin [Clarithromycin] Other (comments)   Metal taste   · Candesartan Cough   · Pcn [Penicillins] Hives     Past Medical History:   Diagnosis Date   · Acquired hypothyroidism 8/15/2016   · Anemia   RED-HF study   · Asthma   · Cardiomyopathy, nonischemic [facility])   initial dx 2001, bivHF 2008 with EF 15%, s/p biV-ICD 9/08, significant improvment in EF to 45-50%   · CKD (chronic kidney disease)   Dr Brii Julien   · CKD (chronic kidney disease) 8/15/2012   · Depression   · Diabetes ([address]   · Diabetic neuropathy ([facility] [address]   · DM (diabetes mellitus) ([facility] [address] 8/15/2012   · GERD (gastroesophageal reflux disease)   · Gout   · Hypothyroidism   · ICD (implantable cardioverter-defibrillator), biventricular, in situ 6/5/2014   · Psoriasis     Past Surgical History:   Procedure Laterality Date   · CARDIAC CATHETERIZATION 2007; 01/06/15   normal cors   · ECHO 2D ADULT 4/2010   EF 45%, improved from 1/09 (25%)   · ECHO 2D ADULT 11/2011   LVH, EF 55-60%   · HX ORTHOPAEDIC   knee   · HX PACEMAKER PLACEMENT   AICD   · STRESS TEST LEXISCAN/CARDIOLITE 3/21/12   normal perfusion, global HK 40%     Family History   Problem Relation Age of Onset   · Heart Disease Mother   · Hypertension Mother   · Lupus Sister   · Diabetes Brother     Social History     Tobacco Use   · Smoking status: Never Smoker   · Smokeless tobacco: Never Used   Substance Use Topics   · Alcohol use: No         Review of Systems: Review of all other systems otherwise negative. Constitutional: Negative for fever, chills, weight loss, + malaise/fatigue. HEENT: Negative for nosebleeds, vision changes. Respiratory: Negative for cough, hemoptysis. Cardiovascular: Negative for chest pain, palpitations, orthopnea, claudication, + leg swelling, no syncope, and PND. + SOB   Gastrointestinal: Negative for nausea, vomiting, diarrhea, blood in stool and melena. Genitourinary: Negative for dysuria, and hematuria. Musculoskeletal: Negative for myalgias, arthralgia. Skin: Negative for rash. Heme: Does not bleed or bruise easily.    Neurological: Negative for speech change and focal weakness         Objective:   Visit Vitals  /80   Pulse (!) 102   Temp 97.8 °F (36.6 °C)   Resp 22   Ht 5' 7\" (1.702 m)   Wt 264 lb 1.8 oz (119.8 kg)   SpO2 96%   BMI 41.37 kg/m²         Physical Exam:   Constitutional: Well-developed and well-nourished. No respiratory distress. Head: Normocephalic and atraumatic. Eyes: Pupils are equal, round. ENT: Hearing grossly normal.   Neck: Supple. No JVD present. Cardiovascular: Mildly tachy rate, regular rhythm. Exam reveals no gallop and no friction rub. 2/6 systolic LSB murmur heard. Pulmonary/Chest: Effort normal on supplemental oxygen.  + rhonchi, wheezing. Abdominal: Soft, moderate obesity. Musculoskeletal: Moves extremities independently. Vasc/lymphatic: 4+ leg edema. Neurological: Alert, oriented. Skin: Skin is warm and dry.  Left chest ICD site well healed. Psychiatric: Normal mood and affect. Behavior is normal. Judgment and thought content normal.       Assessment/Plan:     Imaging/Studies:   Echo (12/08/2021): LVEF 15-20%, upper normal wall thickness, LV diastolic dysfunction.  Borderline low RVEF.  Mod dilated LA, mildly dilated RA.  Mild to mod MR. Cory العراقي TR.  Mild to mod PH.       LHC/RHC (01/2015): No significant CAD. NICM:  no new left heart cath due to renal failure  She will not do well with dialysis  Recent admission for acute on chronic CHF.  Previously had LVEF normalize with CRT. She has proper biventricular pacing per device check during recent admission, but LVEF now 15-20%.  NYHA class IV.  Severe pulmonary HTN noted on recent RHC. I do not think LV lead reimplant is a solution to improve her LVEF      She failed carvedilol/hydralazine/isordil, but no ACEi/ARB/ARNi due to CKD.    Will start her on milrinone       MRI is not possible with 4194 LV lead (2008).  Unless she has a form of amyloid that can be treated, I do not think MRI will add more information for treatment.  May consider PYP nuc test but usually results come back equivocal.       Medtronic biventricular ICD (gen change 01/14/2015, leads 09/25/2008):  Device check 12/08/2021 showed proper lead & generator function.  Adequate CRT.  Generator longevity estimated 4 mos.  Currently planning generator change for 02/2022.       HTN: BP intermittently trends low normal      Thank you for involving me in this patient's care and please call with further concerns or questions. Luevenia Epley, M.D.    Electrophysiology/Cardiology   [facility] and Vascular Issue   [address], [id number] [facility], [address] [place] [address]   [phone]

## 2022-01-05 NOTE — H&P
[address] [facility] Adult  Hospitalist Group  History and Physical    Primary Care Provider: Gracie Ng MD  Date of Service:  1/5/2022    Subjective:     Betty Dooley is a 78 y.o. female with hx NICM, chronic hypoxic respiratory failure 2/2 pulmonary htn, ckd, hypothyroidism, GERD, and  DM II who presents as a transfer from [facility] for acute on chronic hypoxic respiratory failure. She wears 3L o2 at baseline, and reportedly ran out of her home oxygen. In the ED, she was hypoxic to th 70's, with improvement ot 94% on 4Lnc. Labs showed stable anemia with Hgb 10, K+ 5.2, lactic 3.1, trop 55>66, d-dimer >35, creatinine 2.54, and probnp 15,826. CXR showed diffuse interstitial airway disease. Rapid covid was negative. In the ED, she was started on a heparin gtt, and sent to [facility] for VQ scan. She received bumex and nitropaste.           Review of Systems:    All other ROS negative except those mentioned in HPI    Past Medical History:   Diagnosis Date    Acquired hypothyroidism 8/15/2016    Anemia     RED-HF study    Asthma     Cardiomyopathy, nonischemic ([address]     initial dx 2001, bivHF 2008 with EF 15%, s/p biV-ICD 9/08, significant improvment in EF to 45-50%    CKD (chronic kidney disease)     Dr Myranda Simons CKD (chronic kidney disease) 8/15/2012    Depression     Diabetes ([address]     Diabetic neuropathy ([address]     DM (diabetes mellitus) ([address] 8/15/2012    GERD (gastroesophageal reflux disease)     Gout     Hypothyroidism     ICD (implantable cardioverter-defibrillator), biventricular, in situ 6/5/2014    Psoriasis       Past Surgical History:   Procedure Laterality Date    CARDIAC CATHETERIZATION  2007; 01/06/15    normal cors    ECHO 2D ADULT  4/2010    EF 45%, improved from 1/09 (25%)    ECHO 2D ADULT  11/2011    LVH, EF 55-60%    HX ORTHOPAEDIC      knee    HX PACEMAKER PLACEMENT      AICD    STRESS TEST LEXISCAN/CARDIOLITE  3/21/12    normal perfusion, global HK 40%     Prior to Admission medications    Medication Sig Start Date End Date Taking? Authorizing Provider   carvediloL (COREG) 6.25 mg tablet Take 1 Tablet by mouth two (2) times daily (with meals). 12/22/21  Yes Kane DORSEY NP   isosorbide dinitrate (ISORDIL) 5 mg tablet Take 1 Tablet by mouth three (3) times daily. 12/22/21  Yes Kane DORSEY NP   hydrALAZINE (APRESOLINE) 10 mg tablet Take 1 Tablet by mouth three (3) times daily. 12/22/21  Yes Greg Crockett NP   chlorhexidine (Hibiclens) 4 % liquid Apply to the upper chest area from shoulder/neck to mid line of chest and to below the nipple every day, 5 days prior to the procedure. 12/22/21  Yes Kane DORSEY NP   gabapentin (NEURONTIN) 100 mg capsule TAKE 1 CAP BY MOUTH DAILY. 11/23/21  Yes Jairon Cottrell MD   montelukast (SINGULAIR) 10 mg tablet TAKE 1 TABLET BY MOUTH EVERY DAY 11/5/21  Yes Jairon Cottrell MD   bumetanide (BUMEX) 2 mg tablet Take 1 tab in the morning and 0.5 tab in the evening 11/5/21  Yes Silviano Parisi MD   fluticasone propionate (FLONASE) 50 mcg/actuation nasal spray One spray each nostril daily 11/1/21  Yes Jairon Cottrell MD   gabapentin (NEURONTIN) 100 mg capsule Take 1 Capsule by mouth nightly. Max Daily Amount: 100 mg. 10/29/21  Yes Jairon Cottrell MD   venlafaxine-SR [facility] 75 mg capsule TAKE 1 CAPSULE BY MOUTH EVERY DAY 10/21/21  Yes Jairon Cottrell MD   cholecalciferol (VITAMIN D3) (2,000 UNITS /50 MCG) cap capsule TAKE 1 CAPSULE BY MOUTH TWO (2) TIMES A DAY.  10/18/21  Yes Tramaine Castillo MD   levocetirizine (XYZAL) 5 mg tablet TAKE 1 TABLET BY MOUTH EVERY DAY 10/10/21  Yes Jairon Cottrell MD   allopurinoL (ZYLOPRIM) 100 mg tablet TAKE 1 TABLET BY MOUTH EVERY DAY 10/5/21  Yes Jairon Cottrell MD   calcitRIOL (ROCALTROL) 0.5 mcg capsule TAKE 1 CAPSULE BY MOUTH EVERY DAY 10/5/21  Yes Jairon Cottrell MD   OTHER    Yes Provider, Historical   hydrOXYzine HCL (ATARAX) 10 mg tablet 2 tab(s)   Yes Provider, Historical   levothyroxine (SYNTHROID) 100 mcg tablet TAKE 1 TABLET BY MOUTH EVERY DAY BEFORE BREAKFAST 7/7/21  Yes Dotty Agarwal MD   budesonide (PULMICORT) 180 mcg/actuation aepb inhaler Take 2 Puffs by inhalation two (2) times a day. 5/27/21  Yes Dotty Agarwal MD   meclizine (ANTIVERT) 25 mg tablet TAKE 1 TAB BY MOUTH THREE (3) TIMES DAILY AS NEEDED FOR DIZZINESS. 4/30/21  Yes Dotty Agarwal MD   apremilast (OTEZLA) 30 mg tab Take  by mouth two (2) times a day. Yes Provider, Historical   insulin NPH/insulin regular (NOVOLIN 70/30) 100 unit/mL (70-30) injection 70 units two times a day 8/15/16  Yes Marquis Holland MD   calcipotriene (DOVONEX) 0.005 % topical cream Apply  to affected area three (3) times daily. Yes Provider, Historical   triamcinolone acetonide (KENALOG) 0.5 % ointment Apply  to affected area two (2) times a day. use thin layer   Yes Provider, Historical   multivitamin (ONE A DAY) tablet Take 1 Tab by mouth daily. Yes Provider, Historical   DOCUSATE CALCIUM (STOOL SOFTENER PO) Take 1 tablet by mouth daily. Yes Provider, Historical   ferrous sulfate (IRON) 325 mg (65 mg elemental iron) tablet Take  by mouth two (2) times a day. Yes Provider, Historical   aspirin 81 mg tablet Take 81 mg by mouth daily. Yes Provider, Historical   azelastine (ASTEPRO) 205.5 mcg (0.15 %) 2 Sprays by Both Nostrils route two (2) times a day. 12/15/21   Dotty Agarwal MD   benzonatate (Tessalon Perles) 100 mg capsule Take 100 mg by mouth three (3) times daily as needed for Cough. Other, MD Bee   ammonium lactate (AMLACTIN) 12 % topical cream Apply  to affected area two (2) times a day. rub in to affected area well 11/4/20   Dotty Agarwal MD   EPINEPHrine (EPIPEN) 0.3 mg/0.3 mL injection 0.3 mL by IntraMUSCular route once as needed for 1 dose.   Patient not taking: Reported on 1/4/2022 1/4/12   Denise Alas MD     Allergies   Allergen Reactions    Ciprofloxacin Anaphylaxis    Shellfish Derived Anaphylaxis    Ace Inhibitors Unknown (comments)    Biaxin [Clarithromycin] Other (comments)     Metal taste    Candesartan Cough    Pcn [Penicillins] Hives      Family History   Problem Relation Age of Onset    Heart Disease Mother     Hypertension Mother     Lupus Sister     Diabetes Brother         SOCIAL HISTORY:  Patient resides at Home. Patient ambulates with walker. Objective:       Physical Exam:   Visit Vitals  /83 (BP 1 Location: Left upper arm, BP Patient Position: At rest;Sitting)   Pulse (!) 117   Temp 97.3 °F (36.3 °C)   Resp 18   Ht 5' 7\" (1.702 m)   Wt 119.8 kg (264 lb 1.8 oz)   SpO2 94%   BMI 41.37 kg/m²     General:  Alert, cooperative, no distress, appears stated age. Head:  Normocephalic, without obvious abnormality, atraumatic. Eyes:  Conjunctivae/corneas clear. EOMs intact. Ears:  Normal TMs and external ear canals both ears. Nose: Nares normal. Septum midline. Throat: Lips, mucosa, and tongue normal. 4   Neck: Supple, symmetrical, trachea midline. Back:   Symmetric, no curvature. ROM normal. No CVA tenderness. Lungs:   B/l upper extremity rhonci, and fine expiratory wheezing diffusely   Chest wall:  No tenderness or deformity. Heart:  Regular rate and rhythm, S1, S2 normal, no murmur, click, rub or gallop. Abdomen:   Soft, non-tender. Bowel sounds normal. No masses,  No organomegaly. Extremities: Extremities normal, atraumatic, no cyanosis or edema. Pulses: 2+ and symmetric all extremities. Skin: Skin color, texture, turgor normal. No rashes or lesions. ECG: sinus tachycardia, V-paced    Data Review: All diagnostic labs and studies have been reviewed.     Chest x-ray b/l interstitial airspace opacities    Assessment:     Active Problems:    Acute respiratory failure with hypoxia (HCC) (1/4/2022)        Plan:     #Acute on Chronic Hypoxic Respiratory Failure  #Pulmonary Edema   -continue diuresis with bumex  -strict I&O, and daily weight  -supplemental O2  -consult cards, and renal for help with volume management  -stop heparin 2/2 low probability V/Q scan  -d-dimer elevated,and rapid covid negative, but will check pcr (pt. Vaccinated times two with last pfizer vaccine 6/2021)      #Asthma  #Seasonal Allergies  -continueo brovana, pulmicort  -prn duonebs  -continue singulair    FEN: cardiac, renal  dvt ppx: was no heparin gtt, ptt was elevated, heprain d/ce'd when VQ low prob for PE.     Code: Full    Signed By: Jil Lopez MD     January 5, 2022

## 2022-01-05 NOTE — PROGRESS NOTES
[address] [facility] Adult  Hospitalist Group                                                                                          Hospitalist Progress Note  Henry Helms MD  Answering service: [phone] from in house phone        Date of Service:  2022  NAME:  Rohith Gibbons  :  1955  MRN:  [id number]      Admission Summary:     Rohith Gibbons is a 78 y.o. female with hx NICM, chronic hypoxic respiratory failure 2/2 pulmonary htn, ckd, hypothyroidism, GERD, and  DM II who presents as a transfer from [facility] for acute on chronic hypoxic respiratory failure. She wears 3L o2 at baseline, and reportedly ran out of her home oxygen.     In the ED, she was hypoxic to th 70's, with improvement ot 94% on 4Lnc. Labs showed stable anemia with Hgb 10, K+ 5.2, lactic 3.1, trop 55>66, d-dimer >35, creatinine 2.54, and probnp 15,826. CXR showed diffuse interstitial airway disease. Rapid covid was negative.     In the ED, she was started on a heparin gtt, and sent to [facility] for VQ scan. She received bumex and nitropaste.        Interval history / Subjective:     She said her shortness of breath the same, no left side chest pain     Assessment & Plan:     Acute on chronic hypoxic respiratory failure due to acute pulmonary edema  -non complaint to oxygen, reportedly run out of home oxygen, she uses 3 l/m  -COVID 19 negative   -probnp 15,826  -on bumex 2 mg bid, oxygen support, monitor pulse ox     Acute on chronic systolic CHF, BJ58-10%, NYHA Class IV s/p ICD   -on bumex 2 mg bid, add coreg 3.25 mg   -seen by cardiologist and started on milrinone gtt  -home hydralazine and isordil on hold due to soft BP  -not on ACEi/ARB due to renal insufficiency   -monitor I/O and daily weight   -cardiologist consulted    CKD stage IV  -creatinine stable  -on bumex  -avoid nephrotoxin   -monitor renal function   -nephrology is consulted    Hyperkalemia   -K 5.5  -low potassium diet  -repeat bmp    Hyponatremia possible due to diuretic, renal insufficiency   -Na 133  -repeat bmp     Leukocytosis and tachycardia SIRS POA  -afebrile   -chest x ray interval worsening of interstitial/airspace opacities which again could be due  to pulmonary edema and/or viral/atypical pneumonia.  -respiratory panel and COVID 19 negative  -repeat cbc and watch off antibiotics    T2DM with neuropathy  -A1c 7.3  -add NPH, sliding scale and monitor fingers stick glucose   -add home gabapentin    Hypothyroidism   -add home synthroid     Hx of gout  -stable    Hx of asthma  -not bronchospastic    Hx of depression  -stable  -add hydroxyzine     Morbid obesity   -advised diet and weight loss program          Code status: Full Code  DVT prophylaxis: heparin     Care Plan discussed with: Patient/Family, Nurse and   Anticipated Disposition: TBD  Anticipated Discharge: Greater than 48 hours     Hospital Problems  Date Reviewed: 12/22/2021          Codes Class Noted POA    Acute respiratory failure with hypoxia [facility]) ICD-10-CM: J96.01  ICD-9-CM: 518.81  1/4/2022 Unknown              Vital Signs:    Last 24hrs VS reviewed since prior progress note. Most recent are:  Visit Vitals  /71 (BP 1 Location: Right upper arm, BP Patient Position: Sitting)   Pulse (!) 116   Temp 97.7 °F (36.5 °C)   Resp 20   Ht 5' 7\" (1.702 m)   Wt 119.8 kg (264 lb 1.8 oz)   SpO2 91%   BMI 41.37 kg/m²         Intake/Output Summary (Last 24 hours) at 1/5/2022 4001  Last data filed at 1/4/2022 1800  Gross per 24 hour   Intake 120 ml   Output    Net 120 ml        Physical Examination:     I had a face to face encounter with this patient and independently examined them on 1/5/2022 as outlined below:          Constitutional:  No acute distress, cooperative, pleasant    ENT:  Oral mucosa moist, oropharynx benign. Resp:  Decreased Bronchial breath sound bilaterally. No wheezing/rhonchi/rales.  No accessory muscle use   CV:  Regular rhythm, normal rate, no murmurs, gallops, rubs    GI:  Soft, non distended, non tender. normoactive bowel sounds, no hepatosplenomegaly     Musculoskeletal:  Bilateral pretibial and pedal edema    Neurologic:  Moves all extremities. AAOx3, CN II-XII reviewed            Data Review:    Review and/or order of clinical lab test  Review and/or order of tests in the radiology section of CPT  Review and/or order of tests in the medicine section of CPT      Labs:     Recent Labs     01/05/22 0329 01/04/22  0424   WBC 14.2* 11.7*   HGB 9.7* 9.8*   HCT 35.0 37.8   * 436*     Recent Labs     01/05/22  0329 01/04/22  0430 01/04/22  0317   *  --  137   K 5.5* 5.2* 5.7*   CL 96*  --  97   CO2 25  --  30   BUN 63*  --  50*   CREA 2.74*  --  2.54*   *  --  287*   CA 9.3  --  10.1     Recent Labs     01/04/22  0317   *   *   TBILI 1.2*   TP 8.1   ALB 3.5   GLOB 4.6*     Recent Labs     01/05/22  0329 01/04/22  1324 01/04/22  1210   APTT 26.8 77.5* >130.0*      No results for input(s): FE, TIBC, PSAT, FERR in the last 72 hours. No results found for: FOL, RBCF   No results for input(s): PH, PCO2, PO2 in the last 72 hours. No results for input(s): CPK, CKNDX, TROIQ in the last 72 hours.     No lab exists for component: CPKMB  Lab Results   Component Value Date/Time    Cholesterol, total 282 (H) 07/13/2021 04:00 PM    HDL Cholesterol 65 07/13/2021 04:00 PM    LDL, calculated 178.2 (H) 07/13/2021 04:00 PM    Triglyceride 194 (H) 07/13/2021 04:00 PM    CHOL/HDL Ratio 4.3 07/13/2021 04:00 PM     Lab Results   Component Value Date/Time    Glucose (POC) 296 (H) 01/05/2022 06:41 AM    Glucose (POC) 303 (H) 01/04/2022 10:28 PM    Glucose (POC) 281 (H) 01/04/2022 07:09 PM    Glucose (POC) 224 (H) 01/04/2022 12:15 PM    Glucose (POC) 261 (H) 12/13/2021 11:46 AM     Lab Results   Component Value Date/Time    Color YELLOW/STRAW 11/05/2020 09:11 AM    Appearance CLEAR 11/05/2020 09:11 AM    Specific gravity 1.012 11/05/2020 09:11 AM    pH (UA) 5.0 11/05/2020 09:11 AM    Protein Negative 11/05/2020 09:11 AM    Glucose Negative 11/05/2020 09:11 AM    Ketone Negative 11/05/2020 09:11 AM    Bilirubin Negative 11/05/2020 09:11 AM    Urobilinogen 0.2 11/05/2020 09:11 AM    Nitrites Negative 11/05/2020 09:11 AM    Leukocyte Esterase SMALL (A) 11/05/2020 09:11 AM    Epithelial cells MANY (A) 11/05/2020 09:11 AM    Bacteria Negative 11/05/2020 09:11 AM    WBC 5-10 11/05/2020 09:11 AM    RBC 0-5 11/05/2020 09:11 AM         Medications Reviewed:     Current Facility-Administered Medications   Medication Dose Route Frequency    bumetanide (BUMEX) injection 1 mg  1 mg IntraVENous Q12H    sodium chloride (NS) flush 5-40 mL  5-40 mL IntraVENous Q8H    sodium chloride (NS) flush 5-40 mL  5-40 mL IntraVENous PRN    acetaminophen (TYLENOL) tablet 650 mg  650 mg Oral Q6H PRN    Or    acetaminophen (TYLENOL) suppository 650 mg  650 mg Rectal Q6H PRN    ondansetron (ZOFRAN ODT) tablet 4 mg  4 mg Oral Q8H PRN    Or    ondansetron (ZOFRAN) injection 4 mg  4 mg IntraVENous Q6H PRN    glucose chewable tablet 16 g  4 Tablet Oral PRN    dextrose (D50W) injection syrg 12.5-25 g  25-50 mL IntraVENous PRN    glucagon (GLUCAGEN) injection 1 mg  1 mg IntraMUSCular PRN    insulin lispro (HUMALOG) injection   SubCUTAneous AC&HS    albuterol-ipratropium (DUO-NEB) 2.5 MG-0.5 MG/3 ML  3 mL Nebulization Q6H PRN     ______________________________________________________________________  EXPECTED LENGTH OF STAY: - - -  ACTUAL LENGTH OF STAY:          1                 Henry Helms MD

## 2022-01-05 NOTE — PROGRESS NOTES
Problem: Falls - Risk of  Goal: *Absence of Falls  Description: Document Marcella Eaton Fall Risk and appropriate interventions in the flowsheet.   Outcome: Progressing Towards Goal  Note: Fall Risk Interventions:  Mobility Interventions: Communicate number of staff needed for ambulation/transfer,Patient to call before getting OOB              Elimination Interventions: Call light in reach,Patient to call for help with toileting needs              Problem: Patient Education: Go to Patient Education Activity  Goal: Patient/Family Education  Outcome: Progressing Towards Goal

## 2022-01-05 NOTE — PROGRESS NOTES
Problem: Self Care Deficits Care Plan (Adult)  Goal: *Acute Goals and Plan of Care (Insert Text)  Description: FUNCTIONAL STATUS PRIOR TO ADMISSION: Patient was modified independent using a single point cane for functional mobility. Patient reports she was on 4L NC O2 at baseline. HOME SUPPORT: The patient lived with spouse who assisted her with bathing/dressing tasks as needed. Occupational Therapy Goals  Initiated 1/5/2022  1. Patient will perform lower body dressing with supervision/set-up within 7 day(s). 2.  Patient will perform upper body dressing with supervision/set-up within 7 day(s). 3.  Patient will perform grooming with supervision/set-up within 7 day(s). 4.  Patient will perform toilet transfers with supervision/set-up within 7 day(s). 5.  Patient will perform all aspects of toileting with supervision/set-up within 7 day(s). 6.  Patient will participate in upper extremity therapeutic exercise/activities with supervision/set-up for 5 minutes within 7 day(s). 7.  Patient will utilize energy conservation techniques during functional activities with verbal cues within 7 day(s). Outcome: Progressing Towards Goal   OCCUPATIONAL THERAPY EVALUATION  Patient: Marcela Murray (64 y.o. female)  Date: 1/5/2022  Primary Diagnosis: Acute respiratory failure with hypoxia (HCC) [J96.01]        Precautions: Fall    ASSESSMENT  Based on the objective data described below, the patient presents with generalized weakness, decreased endurance (O2 sats 86-91% on 6L NC O2 in session), decreased activity tolerance, and with c/o dizziness while sitting up/standing during OT eval (BP stable; HR elevated to 100s). Patient with recent hospitalization and was discharged 12/13. Patient now re-admitted due to running out of oxygen for a couple of days and now with increased BLE edema.  Anticipate ability to discharge home with [facility] and continued assistance from her  pending medical/functional progression. Current Level of Function Impacting Discharge (ADLs/self-care): up to MOD A for LB ADLs    Functional Outcome Measure: The patient scored 50/100 on the Barthel Index outcome measure which is indicative of partial dependence with ADLs. Other factors to consider for discharge: EF 15-20%; CKD stage 4; h/o pulmonary hypertension     Patient will benefit from skilled therapy intervention to address the above noted impairments. PLAN :  Recommendations and Planned Interventions: self care training, functional mobility training, therapeutic exercise, balance training, therapeutic activities, endurance activities, patient education, home safety training, and family training/education    Frequency/Duration: Patient will be followed by occupational therapy 5 times a week to address goals. Recommendation for discharge: (in order for the patient to meet his/her long term goals)  Occupational therapy at least 2 days/week in the home AND ensure assist and/or supervision for safety with ADLs and IADL tasks including physical A from  for IADLs and LB ADLs as needed; if this is not an option, recommend SNF    This discharge recommendation:  Has not yet been discussed the attending provider and/or case management    IF patient discharges home will need the following DME: TBD       SUBJECTIVE:   Patient stated I feel dizzy.     OBJECTIVE DATA SUMMARY:   HISTORY:   Past Medical History:   Diagnosis Date    Acquired hypothyroidism 8/15/2016    Anemia     RED-HF study    Asthma     Cardiomyopathy, nonischemic ([facility] [address]     initial dx 2001, bivHF 2008 with EF 15%, s/p biV-ICD 9/08, significant improvment in EF to 45-50%    CKD (chronic kidney disease)     Dr Lidya Flores    CKD (chronic kidney disease) 8/15/2012    Depression     Diabetes ([facility] [address]     Diabetic neuropathy (HCC)     DM (diabetes mellitus) ([facility] [address] 8/15/2012    GERD (gastroesophageal reflux disease)     Gout     Hypothyroidism     ICD (implantable cardioverter-defibrillator), biventricular, in situ 6/5/2014    Psoriasis      Past Surgical History:   Procedure Laterality Date    CARDIAC CATHETERIZATION  2007; 01/06/15    normal cors    ECHO 2D ADULT  4/2010    EF 45%, improved from 1/09 (25%)    ECHO 2D ADULT  11/2011    LVH, EF 55-60%    HX ORTHOPAEDIC      knee    HX PACEMAKER PLACEMENT      AICD    STRESS TEST LEXISCAN/CARDIOLITE  3/21/12    normal perfusion, global HK 40%       Expanded or extensive additional review of patient history:     Home Situation  Home Environment: Private residence  # Steps to Enter: 4  Living Alone: No  Support Systems: Spouse/Significant Other  Patient Expects to be Discharged to[de-identified] House  Current DME Used/Available at Home: Cane, straight  Tub or Shower Type: Shower    Hand dominance: Right    EXAMINATION OF PERFORMANCE DEFICITS:  Cognitive/Behavioral Status:  Neurologic State: Alert  Orientation Level: Oriented X4  Cognition: Appropriate for age attention/concentration  Perception: Appears intact  Perseveration: No perseveration noted  Safety/Judgement: Decreased insight into deficits    Skin: exposed areas grossly intact    Edema: BLE    Hearing: Auditory  Auditory Impairment: None    Vision/Perceptual:                                Corrective Lenses: Contacts    Range of Motion:  BUE  AROM: Generally decreased, functional                         Strength:  BUE  Strength: Generally decreased, functional                Coordination:  Coordination: Generally decreased, functional  Fine Motor Skills-Upper: Left Intact; Right Intact    Gross Motor Skills-Upper: Left Intact; Right Intact    Tone & Sensation:  Tone: Normal  Sensation: Impaired (neuropathy)                      Balance:  Sitting: Intact; Without support  Standing: Impaired; Without support  Standing - Static: Good  Standing - Dynamic : Fair    Functional Mobility and Transfers for ADLs:  Bed Mobility:  Supine to Sit: Supervision  Sit to Supine: Supervision    Transfers:  Sit to Stand: Contact guard assistance  Stand to Sit: Contact guard assistance    ADL Assessment:  Feeding: Setup;Supervision    Oral Facial Hygiene/Grooming: Contact guard assistance (infer 2* dynamic standing balance)    Bathing: Moderate assistance (infer 2* A for BLE)    Upper Body Dressing: Contact guard assistance    Lower Body Dressing: Moderate assistance (infer A for BLE)    Toileting: Minimum assistance (infer A for pericare)                ADL Intervention and task modifications:    Lower Body Dressing Assistance  Socks: Total assistance (dependent)    Cognitive Retraining  Safety/Judgement: Decreased insight into deficits    Functional Measure:    Barthel Index:  Bathin  Bladder: 10  Bowels: 10  Groomin  Dressin  Feedin  Mobility: 0  Stairs: 0  Toilet Use: 5  Transfer (Bed to Chair and Back): 10  Total: 50/100      The Barthel ADL Index: Guidelines  1. The index should be used as a record of what a patient does, not as a record of what a patient could do. 2. The main aim is to establish degree of independence from any help, physical or verbal, however minor and for whatever reason. 3. The need for supervision renders the patient not independent. 4. A patient's performance should be established using the best available evidence. Asking the patient, friends/relatives and nurses are the usual sources, but direct observation and common sense are also important. However direct testing is not needed. 5. Usually the patient's performance over the preceding 24-48 hours is important, but occasionally longer periods will be relevant. 6. Middle categories imply that the patient supplies over 50 per cent of the effort. 7. Use of aids to be independent is allowed. Score Interpretation (from [address] [place] [address])    Independent   60-79 Minimally independent   40-59 Partially dependent   20-39 Very dependent   <20 Totally dependent     -Javier Mariano., Barthel, D. W. (2387). Functional evaluation: the Barthel Index. [address] ([address] [facility] [address] (1997). The Barthel activities of daily living index: self-reporting versus actual performance in the old (> or = 75 years). Journal of [address] [place] [address] 45(7), [address] [facility], ROBERTA, Thais Mcclain., Nieves Castillo. (1999). Measuring the change in disability after inpatient rehabilitation; comparison of the responsiveness of the Barthel Index and Functional [place] Measure. Journal of Neurology, Neurosurgery, and Psychiatry, 66(4), 271-352. GORDON Phillips, LYNSEY Vitale, & Gina Mcelroy M.A. (2004) Assessment of post-stroke quality of life in cost-effectiveness studies: The usefulness of the Barthel Index and the EuroQoL-5D. Quality of Life Research, 15, 440-85        Occupational Therapy Evaluation Charge Determination   History Examination Decision-Making   MEDIUM Complexity : Expanded review of history including physical, cognitive and psychosocial  history  MEDIUM Complexity : 3-5 performance deficits relating to physical, cognitive , or psychosocial skils that result in activity limitations and / or participation restrictions MEDIUM Complexity : Patient may present with comorbidities that affect occupational performnce. Miniml to moderate modification of tasks or assistance (eg, physical or verbal ) with assesment(s) is necessary to enable patient to complete evaluation       Based on the above components, the patient evaluation is determined to be of the following complexity level: MEDIUM  Pain Rating:  No complaints    Activity Tolerance:   Fair and requires rest breaks    After treatment patient left in no apparent distress:    Supine in bed, Call bell within reach, and Side rails x 3    COMMUNICATION/EDUCATION:   The patients plan of care was discussed with: Physical therapist and Registered nurse.      Patient/family agree to work toward stated goals and plan of care. This patients plan of care is appropriate for delegation to SUNITHA.     Thank you for this referral.  Janna Bias  Time Calculation: 16 mins

## 2022-01-05 NOTE — PROGRESS NOTES
Bedside and Verbal shift change report given to Jyoti (oncoming nurse) by Pratima Barnett (offgoing nurse). Report included the following information SBAR, Kardex, MAR and Recent Results.

## 2022-01-06 NOTE — PROGRESS NOTES
Cardiac Electrophysiology Hospital Progress Note     Subjective:       Penny Hatchet is a 78 y.o. patient who is seen for evaluation/management of acute on chronic CHF. Her nephrologist spoke directly to me about her severe renal failure but it is not much worse than last admission     She presented to the ER yesterday with hypoxia, improved on supplemental oxygen.       Labs showed stable anemia (Hgb 9.7 today).  WBC elevated, hyponatremic, hyperkalemic.  D-dimer elevated.  Chronic CKD, but above baseline with Cr 2.74. Rapid COVID negative.  CXR showed pulmonary edema vs atypical pneumonia.  VQ scan showed low probability for PE. NICM, LVEF noted 15-20% during recent admission.  LVEF had previously normalized with CRT pacing.  NYHA III-IV; No ACEi/ARB due to renal dysfunction.  GDMT dosing limited by low BP.       RHC 12/10/2021 showed severe pulmonary HTN (wedge 34 mmHg, PAP 80 mmHg).       Cardiac cath in 2015 at [facility] showed no evidence of CAD.       She did require LV lead reprogramming over the summer, but good LV capture since.  Good capture/function when checked during recent admission.       BP controlled.           Previous:   Admitted with acute systolic CHF 13/1361.  Discharged on supplemental oxygen. S/p Medtronic biventricular ICD (gen change 01/142015, leads 09/25/2008).        CKD stage IV.        Previously followed by Dr. Jermain Yen, states did not follow him to new practice.         Problem List   Acute respiratory failure with hypoxia [facility]) ICD-10-CM: J96.01   ICD-9-CM: 518.81 1/4/2022     CHF exacerbation ([facility] ICD-10-CM: I50.9   ICD-9-CM: 428.0 12/6/2021     Type 2 diabetes mellitus with diabetic neuropathy ([facility] ICD-10-CM: E11.40   ICD-9-CM: 250.60, 357.2 1/2/2020     Type 2 diabetes with nephropathy ([facility] ICD-10-CM: E11.21   ICD-9-CM: 250.40, 583.81 4/3/2018     Obesity, morbid ([facility] ICD-10-CM: E66.01   ICD-9-CM: 278.01 12/8/2017     Acquired hypothyroidism ICD-10-CM: E03.9   ICD-9-CM: 244.9 8/15/2016     Dysthymia ICD-10-CM: F34.1   ICD-9-CM: 300.4 8/15/2016     ICD (implantable cardioverter-defibrillator), biventricular, in situ ICD-10-CM: Z95.810   ICD-9-CM: V45.02 6/5/2014   Overview Signed 1/14/2015 11:11 AM by Silviano Parisi MD     Generator Medtronic change 1/14/2015         Dyslipidemia ICD-10-CM: J77.0   ICD-9-CM: 272.4 1/14/2014     CKD (chronic kidney disease) ICD-10-CM: N18.9   ICD-9-CM: 585.9 8/15/2012     Cardiomyopathy, nonischemic (HCC) ICD-10-CM: I42.8   ICD-9-CM: 425.4 Unknown   Overview Signed 10/10/2011  6:40 AM by Shelly Delgado MD     initial dx 2001, bivHF 2008 with EF 15%, s/p biV-ICD 9/08, significant improvment in EF to 45-50%         Anemia in chronic renal disease (Chronic) ICD-10-CM: N18.9, D63.1   ICD-9-CM: 285.21 12/10/2008     HTN (hypertension) ICD-10-CM: I10   ICD-9-CM: 401.9 12/10/2008     GERD (gastroesophageal reflux disease) (Chronic) ICD-10-CM: K21.9   ICD-9-CM: 530.81 12/10/2008     Gout (Chronic) ICD-10-CM: M10.9   ICD-9-CM: 274.9 12/10/2008     Pulmonary HTN (HCC) (Chronic) ICD-10-CM: I27.20   ICD-9-CM: 416.8 12/10/2008        Current Facility-Administered Medications   Medication Dose Route Frequency    insulin NPH (NOVOLIN N, HUMULIN N) injection 25 Units  25 Units SubCUTAneous ACB&D    levothyroxine (SYNTHROID) tablet 100 mcg  100 mcg Oral ACB    gabapentin (NEURONTIN) capsule 100 mg  100 mg Oral DAILY    venlafaxine (EFFEXOR) tablet 75 mg  75 mg Oral DAILY    hydrOXYzine HCL (ATARAX) tablet 20 mg  20 mg Oral TID PRN    bumetanide (BUMEX) injection 2 mg  2 mg IntraVENous Q12H    milrinone (PRIMACOR) 20 MG/100 ML D5W infusion  0.375 mcg/kg/min IntraVENous CONTINUOUS    heparin (porcine) injection 5,000 Units  5,000 Units SubCUTAneous Q8H    sodium chloride (NS) flush 5-40 mL  5-40 mL IntraVENous Q8H    sodium chloride (NS) flush 5-40 mL  5-40 mL IntraVENous PRN    acetaminophen (TYLENOL) tablet 650 mg  650 mg Oral Q6H PRN   Or    acetaminophen (TYLENOL) suppository 650 mg  650 mg Rectal Q6H PRN    ondansetron (ZOFRAN ODT) tablet 4 mg  4 mg Oral Q8H PRN     Or    ondansetron (ZOFRAN) injection 4 mg  4 mg IntraVENous Q6H PRN    glucose chewable tablet 16 g  4 Tablet Oral PRN    dextrose (D50W) injection syrg 12.5-25 g  25-50 mL IntraVENous PRN    glucagon (GLUCAGEN) injection 1 mg  1 mg IntraMUSCular PRN    insulin lispro (HUMALOG) injection   SubCUTAneous AC&HS    albuterol-ipratropium (DUO-NEB) 2.5 MG-0.5 MG/3 ML  3 mL Nebulization Q6H PRN      No current facility-administered medications on file prior to encounter.             Current Outpatient Medications on File Prior to Encounter   Medication Sig Dispense Refill    carvediloL (COREG) 6.25 mg tablet Take 1 Tablet by mouth two (2) times daily (with meals). 180 Tablet 1    isosorbide dinitrate (ISORDIL) 5 mg tablet Take 1 Tablet by mouth three (3) times daily. 270 Tablet 1    hydrALAZINE (APRESOLINE) 10 mg tablet Take 1 Tablet by mouth three (3) times daily. 180 Tablet 1    chlorhexidine (Hibiclens) 4 % liquid Apply to the upper chest area from shoulder/neck to mid line of chest and to below the nipple every day, 5 days prior to the procedure. 1 Each 0    gabapentin (NEURONTIN) 100 mg capsule TAKE 1 CAP BY MOUTH DAILY. 90 Capsule 0    montelukast (SINGULAIR) 10 mg tablet TAKE 1 TABLET BY MOUTH EVERY DAY 90 Tablet 0    bumetanide (BUMEX) 2 mg tablet Take 1 tab in the morning and 0.5 tab in the evening 135 Tablet 1    fluticasone propionate (FLONASE) 50 mcg/actuation nasal spray One spray each nostril daily 1 Each 3    gabapentin (NEURONTIN) 100 mg capsule Take 1 Capsule by mouth nightly. Max Daily Amount: 100 mg. 30 Capsule 0    venlafaxine-SR (EFFEXOR-XR) 75 mg capsule TAKE 1 CAPSULE BY MOUTH EVERY DAY 90 Capsule 0    cholecalciferol (VITAMIN D3) (2,000 UNITS /50 MCG) cap capsule TAKE 1 CAPSULE BY MOUTH TWO (2) TIMES A DAY.  180 Capsule 0    levocetirizine (XYZAL) 5 mg tablet TAKE 1 TABLET BY MOUTH EVERY DAY 90 Tablet 0    OTHER          hydrOXYzine HCL (ATARAX) 10 mg tablet 2 tab(s)        budesonide (PULMICORT) 180 mcg/actuation aepb inhaler Take 2 Puffs by inhalation two (2) times a day. 2 Inhaler 5    meclizine (ANTIVERT) 25 mg tablet TAKE 1 TAB BY MOUTH THREE (3) TIMES DAILY AS NEEDED FOR DIZZINESS. 20 Tab 3    apremilast (OTEZLA) 30 mg tab Take  by mouth two (2) times a day.        insulin NPH/insulin regular (NOVOLIN 70/30) 100 unit/mL (70-30) injection 70 units two times a day 10 mL 3    calcipotriene (DOVONEX) 0.005 % topical cream Apply  to affected area three (3) times daily.        triamcinolone acetonide (KENALOG) 0.5 % ointment Apply  to affected area two (2) times a day. use thin layer        multivitamin (ONE A DAY) tablet Take 1 Tab by mouth daily.        DOCUSATE CALCIUM (STOOL SOFTENER PO) Take 1 tablet by mouth daily.        ferrous sulfate (IRON) 325 mg (65 mg elemental iron) tablet Take  by mouth two (2) times a day.        aspirin 81 mg tablet Take 81 mg by mouth daily.        azelastine (ASTEPRO) 205.5 mcg (0.15 %) 2 Sprays by Both Nostrils route two (2) times a day. 1 Each 3    benzonatate (Tessalon Perles) 100 mg capsule Take 100 mg by mouth three (3) times daily as needed for Cough.        ammonium lactate (AMLACTIN) 12 % topical cream Apply  to affected area two (2) times a day. rub in to affected area well 280 g 2    EPINEPHrine (EPIPEN) 0.3 mg/0.3 mL injection 0.3 mL by IntraMUSCular route once as needed for 1 dose.  (Patient not taking: Reported on 1/4/2022) 1 Each 3     Allergies   Allergen Reactions   · Ciprofloxacin Anaphylaxis   · Shellfish Derived Anaphylaxis   · Ace Inhibitors Unknown (comments)   · Biaxin [Clarithromycin] Other (comments)   Metal taste   · Candesartan Cough   · Pcn [Penicillins] Hives     Past Medical History:   Diagnosis Date   · Acquired hypothyroidism 8/15/2016   · Anemia   RED-HF study   · Asthma   · Cardiomyopathy, nonischemic ([facility] [address]   initial dx 2001, bivHF 2008 with EF 15%, s/p biV-ICD 9/08, significant improvment in EF to 45-50%   · CKD (chronic kidney disease)   Dr Viky Kendall   · CKD (chronic kidney disease) 8/15/2012   · Depression   · Diabetes ([facility]   · Diabetic neuropathy ([facility]   · DM (diabetes mellitus) ([facility] 8/15/2012   · GERD (gastroesophageal reflux disease)   · Gout   · Hypothyroidism   · ICD (implantable cardioverter-defibrillator), biventricular, in situ 6/5/2014   · Psoriasis     Past Surgical History:   Procedure Laterality Date   · CARDIAC CATHETERIZATION 2007; 01/06/15   normal cors   · ECHO 2D ADULT 4/2010   EF 45%, improved from 1/09 (25%)   · ECHO 2D ADULT 11/2011   LVH, EF 55-60%   · HX ORTHOPAEDIC   knee   · HX PACEMAKER PLACEMENT   AICD   · STRESS TEST LEXISCAN/CARDIOLITE 3/21/12   normal perfusion, global HK 40%     Family History   Problem Relation Age of Onset   · Heart Disease Mother   · Hypertension Mother   · Lupus Sister   · Diabetes Brother     Social History     Tobacco Use   · Smoking status: Never Smoker   · Smokeless tobacco: Never Used   Substance Use Topics   · Alcohol use: No         Review of Systems: Review of all other systems otherwise negative. Constitutional: Negative for fever, chills, weight loss, + malaise/fatigue. HEENT: Negative for nosebleeds, vision changes. Respiratory: Negative for cough, hemoptysis. Cardiovascular: Negative for chest pain, palpitations, orthopnea, claudication, + leg swelling, no syncope, and PND. + SOB   Gastrointestinal: Negative for nausea, vomiting, diarrhea, blood in stool and melena. Genitourinary: Negative for dysuria, and hematuria. Musculoskeletal: Negative for myalgias, arthralgia. Skin: Negative for rash. Heme: Does not bleed or bruise easily.    Neurological: Negative for speech change and focal weakness         Objective:   Visit Vitals  BP (!) 132/52   Pulse 98   Temp 97.6 °F (36.4 °C)   Resp 18   Ht 5' 7\" (1.702 m)   Wt 266 lb 5.1 oz (120.8 kg)   SpO2 100%   BMI 41.71 kg/m²         Physical Exam:   Constitutional: Well-developed and well-nourished. No respiratory distress. Head: Normocephalic and atraumatic. Eyes: Pupils are equal, round. ENT: Hearing grossly normal.   Neck: Supple. No JVD present. Cardiovascular: normal rate, regular rhythm. Exam reveals no gallop and no friction rub. 2/6 systolic LSB murmur heard. Pulmonary/Chest: Effort normal on supplemental oxygen.  + rhonchi, wheezing. Abdominal: Soft, moderate obesity. Musculoskeletal: Moves extremities independently. Vasc/lymphatic: 4+ leg edema. Neurological: Alert, oriented. Skin: Skin is warm and dry.  Left chest ICD site well healed. Psychiatric: Normal mood and affect. Behavior is normal. Judgment and thought content normal.       Assessment/Plan:     Imaging/Studies:   Echo (12/08/2021): LVEF 15-20%, upper normal wall thickness, LV diastolic dysfunction.  Borderline low RVEF.  Mod dilated LA, mildly dilated RA.  Mild to mod MR. Mabel Charles TR.  Mild to mod PH.       LHC/RHC (01/2015): No significant CAD. NICM:  no new left heart cath due to renal failure  She will not do well with dialysis. Nephrologist does not think she will be candidate  Recent admission for acute on chronic CHF.  Previously had LVEF normalize with CRT. She has proper biventricular pacing per device check during recent admission, but LVEF now 15-20%.  NYHA class IV.  Severe pulmonary HTN noted on recent RHC. She failed carvedilol/hydralazine/isordil, but no ACEi/ARB/ARNi due to CKD.    She feels better with milrinone   Cardiac cath x 2 in the past had shown normal coronary arteries so this is not likely CAD driven  She is recommended nuclear stress test but not stable enough to do   Last left heart cath 2015 and IV dye is high risk for renal failure and she cannot do dialysis     MRI is not possible with 4194 LV lead (2008).   Unless she has a form of amyloid that can be treated, I will get PYP nuc test for amyloid and SPEP UPEP      Medtronic biventricular ICD (gen change 01/14/2015, leads 09/25/2008): Device check 12/08/2021 showed proper lead & generator function.  Adequate CRT.  Generator longevity estimated 4 mos. Currently planning generator change for 02/2022.       HTN: BP intermittently trends low normal, continue with meds      Thank you for involving me in this patient's care and please call with further concerns or questions. Phyllis Zeng M.D.    Electrophysiology/Cardiology   [facility] [place]   [place] [address]                     [address] [facility], [address] [place] [address]   [phone]

## 2022-01-06 NOTE — PROGRESS NOTES
Cardiac Electrophysiology Hospital Progress Note     Subjective:      Penny Hatchet is a 78 y.o. patient who is seen for evaluation/management of acute on chronic CHF. Interim:  SOB improved on milrinone, edema improved. Biventricular pacing 100 bpm, BP stable. Dr. Erna Hutson saw patient, states dialysis isn't an option due to cardiac status. HPI:  Presented to the ER 01/04/2021 with hypoxia, improved on supplemental oxygen. Labs showed stable anemia. WBC elevated, hyponatremic, hypokalemic. D-dimer elevated. Chronic CKD. Nephrologist spoke to me directly regarding severe renal failure, but not much worse than last admission. Rapid COVID negative. CXR showed pulmonary edema vs atypical pneumonia. VQ scan showed low probability for PE. NICM, LVEF 15-20% during recent admission. LVEF previously normalized with CRT. NYHA III-IV. No ACEi/ARB due to renal dysfunction. GDMT dosing limited by low BP. [address] 12/10/2021 showed severe pulmonary HTN (wedge 34 mmHg, PAP 80 mmHg). Cardiac cath in 2015 at [facility] showed no evidence of CAD.       She did require LV lead reprogramming over the summer, but good LV capture since.  Good capture/function when checked during recent admission.       BP controlled.           Previous:   Admitted with acute systolic CHF 28/5598.  Discharged on supplemental oxygen. S/p Medtronic biventricular ICD (gen change 01/579929, leads 09/25/2008).        CKD stage IV.        Previously followed by Dr. Jermain Yen, states did not follow him to new practice.          Problem List  Date Reviewed: 12/22/2021          Codes Class Noted    Acute respiratory failure with hypoxia [facility]) ICD-10-CM: J96.01  ICD-9-CM: 518.81  1/4/2022        [facility] exacerbation ([facility] ICD-10-CM: I50.9  ICD-9-CM: 428.0  12/6/2021        Type 2 diabetes mellitus with diabetic neuropathy ([facility] ICD-10-CM: E11.40  ICD-9-CM: 250.60, 357.2  1/2/2020        Type 2 diabetes with nephropathy ([facility] ICD-10-CM: E11.21  ICD-9-CM: 250.40, 583.81  4/3/2018        Obesity, morbid ([facility] ICD-10-CM: E66.01  ICD-9-CM: 278.01  12/8/2017        Acquired hypothyroidism ICD-10-CM: E03.9  ICD-9-CM: 244.9  8/15/2016        Dysthymia ICD-10-CM: F34.1  ICD-9-CM: 300.4  8/15/2016        ICD (implantable cardioverter-defibrillator), biventricular, in situ ICD-10-CM: Z95.810  ICD-9-CM: V45.02  6/5/2014    Overview Signed 1/14/2015 11:11 AM by Maryam Yanes MD     Generator Medtronic change 1/14/2015             Dyslipidemia ICD-10-CM: X62.2  ICD-9-CM: 272.4  1/14/2014        CKD (chronic kidney disease) ICD-10-CM: N18.9  ICD-9-CM: 585.9  8/15/2012        Cardiomyopathy, nonischemic (HCC) ICD-10-CM: I42.8  ICD-9-CM: 425.4  Unknown    Overview Signed 10/10/2011  6:40 AM by Tanya Shane MD     initial dx 2001, bivHF 2008 with EF 15%, s/p biV-ICD 9/08, significant improvment in EF to 45-50%             Anemia in chronic renal disease (Chronic) ICD-10-CM: N18.9, D63.1  ICD-9-CM: 285.21  12/10/2008        HTN (hypertension) ICD-10-CM: I10  ICD-9-CM: 401.9  12/10/2008        GERD (gastroesophageal reflux disease) (Chronic) ICD-10-CM: K21.9  ICD-9-CM: 530.81  12/10/2008        Gout (Chronic) ICD-10-CM: M10.9  ICD-9-CM: 274.9  12/10/2008        Pulmonary HTN (HCC) (Chronic) ICD-10-CM: I27.20  ICD-9-CM: 416.8  12/10/2008              Current Facility-Administered Medications   Medication Dose Route Frequency Provider Last Rate Last Admin    arformoteroL (BROVANA) neb solution 15 mcg  15 mcg Nebulization BID RT Merrick Thayer MD   15 mcg at 01/05/22 2013    And    budesonide (PULMICORT) 500 mcg/2 ml nebulizer suspension  500 mcg Nebulization BID RT Merrick Thayer MD   500 mcg at 01/05/22 2014    insulin NPH (NOVOLIN N, HUMULIN N) injection 25 Units  25 Units SubCUTAneous ACB&D Maryam Yanes MD   25 Units at 01/05/22 1815    levothyroxine (SYNTHROID) tablet 100 mcg  100 mcg Oral ACB Maryam Yanes MD   100 mcg at 01/06/22 0707    gabapentin (NEURONTIN) capsule 100 mg  100 mg Oral DAILY Claudia Lawler MD   100 mg at 01/05/22 0916    venlafaxine (EFFEXOR) tablet 75 mg  75 mg Oral DAILY Claudia Lawler MD   75 mg at 01/05/22 0915    hydrOXYzine HCL (ATARAX) tablet 20 mg  20 mg Oral TID PRN Claudia Lawler MD        bumetanide Perrikennedy Johnsonkel) injection 2 mg  2 mg IntraVENous Q12H Claudia Lawler MD   2 mg at 01/05/22 2146    milrinone (PRIMACOR) 20 MG/100 ML D5W infusion  0.375 mcg/kg/min IntraVENous CONTINUOUS Claudia Lawler MD 13.5 mL/hr at 01/06/22 0002 0.375 mcg/kg/min at 01/06/22 0002    heparin (porcine) injection 5,000 Units  5,000 Units SubCUTAneous Q8H Maria Eugenia Shea MD   5,000 Units at 01/06/22 0002    ivabradine (CORLANOR) tablet 5 mg  5 mg Oral BID WITH MEALS Claudia Lawler MD   5 mg at 01/05/22 1821    sodium chloride (NS) flush 5-40 mL  5-40 mL IntraVENous Q8H Claudia Lawler MD   5 mL at 01/06/22 0600    sodium chloride (NS) flush 5-40 mL  5-40 mL IntraVENous PRN Claudia Lawler MD        acetaminophen (TYLENOL) tablet 650 mg  650 mg Oral Q6H PRN Claudia Lawler MD        Or   [address] [place] [address] acetaminophen (TYLENOL) suppository 650 mg  650 mg Rectal Q6H PRN Claudia Lawler MD        ondansetron (ZOFRAN ODT) tablet 4 mg  4 mg Oral Q8H PRN Claudia Lawler MD        Or    ondansetron [facility]) injection 4 mg  4 mg IntraVENous Q6H PRN Claudia Lawler MD   4 mg at 01/05/22 0915    glucose chewable tablet 16 g  4 Tablet Oral PRN Claudia Lawler MD        dextrose (D50W) injection syrg 12.5-25 g  25-50 mL IntraVENous PRN Claudia Lawler MD        glucagon (GLUCAGEN) injection 1 mg  1 mg IntraMUSCular PRN Claudia Lawler MD        insulin lispro (HUMALOG) injection   SubCUTAneous AC&HS Claudia Lawler MD   2 Units at 01/05/22 3612    albuterol-ipratropium (DUO-NEB) 2.5 MG-0.5 MG/3 ML  3 mL Nebulization Q6H PRN Claudia Lawler MD         Allergies   Allergen Reactions    Ciprofloxacin Anaphylaxis    Shellfish Derived Anaphylaxis    Ace Inhibitors Unknown (comments)    Biaxin [Clarithromycin] Other (comments)     Metal taste    Candesartan Cough    Pcn [Penicillins] Hives     Past Medical History:   Diagnosis Date    Acquired hypothyroidism 8/15/2016    Anemia     RED-HF study    Asthma     Cardiomyopathy, nonischemic ([facility]     initial dx 2001, bivHF 2008 with EF 15%, s/p biV-ICD 9/08, significant improvment in EF to 45-50%    CKD (chronic kidney disease)     Dr Ninfa Sampson    CKD (chronic kidney disease) 8/15/2012    Depression     Diabetes ([facility]     Diabetic neuropathy ([facility]     [facility] 8/15/2012    GERD (gastroesophageal reflux disease)     Gout     Hypothyroidism     ICD (implantable cardioverter-defibrillator), biventricular, in situ 6/5/2014    Psoriasis      Past Surgical History:   Procedure Laterality Date    CARDIAC CATHETERIZATION  2007; 01/06/15    normal cors    ECHO 2D ADULT  4/2010    EF 45%, improved from 1/09 (25%)    ECHO 2D ADULT  11/2011    LVH, EF 55-60%    HX ORTHOPAEDIC      knee    HX PACEMAKER PLACEMENT      AICD    STRESS TEST LEXISCAN/CARDIOLITE  3/21/12    normal perfusion, global HK 40%     Family History   Problem Relation Age of Onset    Heart Disease Mother     Hypertension Mother    [facility] Lupus Sister     Diabetes Brother      Social History     Tobacco Use    Smoking status: Never Smoker    Smokeless tobacco: Never Used   Substance Use Topics    Alcohol use: No        Review of Systems: Review of all other systems otherwise negative. Constitutional: Negative for fever, chills, weight loss, malaise/fatigue. HEENT: Negative for nosebleeds, vision changes. Respiratory: Negative for cough, hemoptysis  Cardiovascular: Negative for chest pain, palpitations, orthopnea, claudication, leg swelling improved, no syncope, and PND. + SOB, improved  Gastrointestinal: Negative for nausea, vomiting, diarrhea, blood in stool and melena. Genitourinary: Negative for dysuria, and hematuria. Musculoskeletal: Negative for myalgias, arthralgia. Skin: Negative for rash. Heme: Does not bleed or bruise easily. Neurological: Negative for speech change and focal weakness. Objective:     Visit Vitals  /62 (BP 1 Location: Right upper arm, BP Patient Position: At rest)   Pulse 98   Temp 97.6 °F (36.4 °C)   Resp 18   Ht 5' 7\" (1.702 m)   Wt 266 lb 5.1 oz (120.8 kg)   SpO2 100%   BMI 41.71 kg/m²      Physical Exam:   Constitutional: Well-developed and well-nourished. No respiratory distress. Head: Normocephalic and atraumatic. Eyes: Pupils are equal, round  ENT: Hearing normal  Neck: Supple. No JVD present. Cardiovascular: Normal rate, regular rhythm. Exam reveals no gallop and no friction rub. 2/6 systolic LSB murmur. Pulmonary/Chest: Effort normal and breath sounds normal. No wheezes. Abdominal: Soft, no tenderness. Obese. Musculoskeletal: Moves extremities independently. Vasc/lymphatic: 1+ bilat lower extremity edema. Neurological: Alert,oriented. Skin: Skin is warm and dry. Left chest ICD site well healed. Psychiatric: Normal mood and affect. Behavior is normal. Judgment and thought content normal.        Assessment/Plan:     Imaging/Studies:  Echo (12/08/2021): LVEF 15-20%, upper normal wall thickness, LV diastolic dysfunction.  Borderline low RVEF.  Mod dilated LA, mildly dilated RA.  Mild to mod MR. Katheryne Dakin TR.  Mild to mod PH.     LHC/RHC (01/2015): No significant CAD. NICM: Recent admission for acute on chronic CHF. Previously had LVEF normalize with CRT. Still with proper biventricular pacing. LVEF now 15-20%. NYHA III. Severe pulmonary HTN on recent RHC. Last LHC in 2015 showed no significant CAD. Continue GDMT, but no ACEi/ARB/ARNi due to CKD. Continue milrinone for now. Intermittently low BP limits ability to titrate meds. Unable to do LHC due to CKD, but will order Lexiscan cardiolite stress test for ischemic evaluation.       MRI not possible with 4199 lead (2008). Unless she has a form of amyloid that is treatable, MRI has low utility. May consider PYP nuc test, but usually results come back equivocal.    Medtronic biventricular ICD (gen change 01/14/2015, leads 09/25/2008): Device check 12/08/2021 showed proper lead & generator function, adequate CRT. Generator longevity estimated 4 mos. Currently planning generator change for 02/2022. HTN: BP intermittently trends low normal, but ok during admission. SUMAN Pulliam [address] [facility] [place]  01/06/22    Thank you for involving me in this patient's care and please call with further concerns or questions. Zollie Prader, M.D.   Electrophysiology/Cardiology  [facility] [place]  [facility], [address] [place] [address]  [phone]

## 2022-01-06 NOTE — PROGRESS NOTES
[facility]   [address] [facility], [id number] [place] [address]  Phone: [phone]   [phone]    [email or web address]     Nephrology Progress Note    Patient Name : Pamela Stinson      : 1955     MRN : [id number]  Date of Admission : 2022  Date of Servive : 22    CC:  Follow up for ARF       Assessment and Plan   CKD stage IV:  -Etiology: DM, HTN, CRS  - UA ordered and still pending   - renal US: suggestive of CKD, B/L benign renal cysts   - Cr worse -->- reduced Bumex to 1 mg BID  - agree w/ Amyloid screen however renal US does not support amyloidosis of Kidneys   - Inotropes per cardiology   - appears to be a poor candidate for dialysis unless she improves w/ inotropes   - Daily labs, strict I.O and daily weights      Acute on chronic HFrEF  NI CMP, LVEF 15 to 20%  NYHA class III-IV  S/p BiV pacer/ICD-s/p CRT  [facility] 12/10/2021: PCWP 34, PA P 80  -Cardiology plans noted and appreciate help      Morbid obesity  Type II DM  HTN  Hypothyroidism  Pulmonary hypertension  Gout  Psoriasis        Interval History:  Seen and examined. She was seen by cardiology and started on Milrinone   Noted plans for Amyloid screen   Cr worse   UOP not charted     Review of Systems: Review of systems not obtained due to patient factors.     Current Medications:   Current Facility-Administered Medications   Medication Dose Route Frequency    montelukast (SINGULAIR) tablet 10 mg  10 mg Oral DAILY    bumetanide (BUMEX) injection 1 mg  1 mg IntraVENous Q12H    arformoteroL (BROVANA) neb solution 15 mcg  15 mcg Nebulization BID RT    And    budesonide (PULMICORT) 500 mcg/2 ml nebulizer suspension  500 mcg Nebulization BID RT    insulin NPH (NOVOLIN N, HUMULIN N) injection 25 Units  25 Units SubCUTAneous ACB&D    levothyroxine (SYNTHROID) tablet 100 mcg  100 mcg Oral ACB    gabapentin (NEURONTIN) capsule 100 mg  100 mg Oral DAILY    venlafaxine (EFFEXOR) tablet 75 mg  75 mg Oral DAILY    hydrOXYzine HCL (ATARAX) tablet 20 mg  20 mg Oral TID PRN    milrinone (PRIMACOR) 20 MG/100 ML D5W infusion  0.375 mcg/kg/min IntraVENous CONTINUOUS    heparin (porcine) injection 5,000 Units  5,000 Units SubCUTAneous Q8H    ivabradine (CORLANOR) tablet 5 mg  5 mg Oral BID WITH MEALS    sodium chloride (NS) flush 5-40 mL  5-40 mL IntraVENous Q8H    sodium chloride (NS) flush 5-40 mL  5-40 mL IntraVENous PRN    acetaminophen (TYLENOL) tablet 650 mg  650 mg Oral Q6H PRN    Or    acetaminophen (TYLENOL) suppository 650 mg  650 mg Rectal Q6H PRN    ondansetron (ZOFRAN ODT) tablet 4 mg  4 mg Oral Q8H PRN    Or    ondansetron (ZOFRAN) injection 4 mg  4 mg IntraVENous Q6H PRN    glucose chewable tablet 16 g  4 Tablet Oral PRN    dextrose (D50W) injection syrg 12.5-25 g  25-50 mL IntraVENous PRN    glucagon (GLUCAGEN) injection 1 mg  1 mg IntraMUSCular PRN    insulin lispro (HUMALOG) injection   SubCUTAneous AC&HS    albuterol-ipratropium (DUO-NEB) 2.5 MG-0.5 MG/3 ML  3 mL Nebulization Q6H PRN      Allergies   Allergen Reactions    Ciprofloxacin Anaphylaxis    Shellfish Derived Anaphylaxis    Ace Inhibitors Unknown (comments)    Biaxin [Clarithromycin] Other (comments)     Metal taste    Candesartan Cough    Pcn [Penicillins] Hives       Objective:  Vitals:    Vitals:    01/06/22 0557 01/06/22 0623 01/06/22 0657 01/06/22 0831   BP:   117/62 (!) 132/52   Pulse: 97  98    Resp:   18    Temp:       SpO2:   100%    Weight:  120.8 kg (266 lb 5.1 oz)     Height:         Intake and Output:  No intake/output data recorded.   01/04 1901 - 01/06 0700  In: 480 [P.O.:480]  Out: -     Physical Examination:    Pt intubated    Yes / No    General: NAD,Conversant   Neck:  Supple, no mass  Resp:  Lungs CTA B/L, no wheezing , normal respiratory effort  CV:  RRR,  no murmur or rub, LE edema  GI:  Soft, NT, + BS, no HS megaly  Neurologic:  Non focal  Psych:             AAO x 3 appropriate affect   Skin:  No Rash  :  Henley + / -  Dialysis Access :     []    High complexity decision making was performed  []    Patient is at high-risk of decompensation with multiple organ involvement    Lab Data Personally Reviewed: I have reviewed all the pertinent labs, microbiology data and radiology studies during assessment.     Recent Labs     01/06/22 0132 01/05/22 0329 01/04/22  0430 01/04/22 0317   * 133*  --  137   K 4.5 5.5* 5.2* 5.7*   CL 96* 96*  --  97   CO2 27 25  --  30   * 283*  --  287*   BUN 83* 63*  --  50*   CREA 3.36* 2.74*  --  2.54*   CA 8.7 9.3  --  10.1   MG 2.4  --   --   --    ALB  --   --   --  3.5   ALT  --   --   --  163*     Recent Labs     01/06/22 0132 01/05/22 0329 01/04/22  0424 01/04/22 0317   WBC 11.3* 14.2* 11.7* 10.3   HGB 8.4* 9.7* 9.8* 10.0*   HCT 31.6* 35.0 37.8 38.3    409* 436* 435*     Lab Results   Component Value Date/Time    Specimen Description: URINE 10/22/2013 01:32 PM    Specimen Description: URINE 01/23/2013 04:40 PM    Specimen Description: LEG TISSUE 02/12/2009 12:00 AM    Specimen Description: LEG (LEFT) 01/29/2009 03:06 AM     Lab Results   Component Value Date/Time    Culture result: MIXED SKIN ROSANNA ISOLATED 10/22/2013 01:32 PM    Culture result:  01/23/2013 04:40 PM     ENTEROCOCCUS FAECALIS GROUP D  MIXED SKIN ROSANNA ISOLATED    Culture result:  02/12/2009 12:00 AM     LIGHT STAPHYLOCOCCUS EPIDERMIDIS (OXACILLIN RESISTANT)  LIGHT 2ND STRAIN OF STAPHYLOCOCCUS EPIDERMIDIS (OXACILLIN RESISTANT)    Culture result: NO GROWTH 2 DAYS 01/29/2009 03:06 AM     Recent Results (from the past 24 hour(s))   LACTIC ACID    Collection Time: 01/05/22 11:57 AM   Result Value Ref Range    Lactic acid 3.1 (HH) 0.4 - 2.0 MMOL/L   GLUCOSE, POC    Collection Time: 01/05/22  6:02 PM   Result Value Ref Range    Glucose (POC) 291 (H) 65 - 117 mg/dL    Performed by Rena Allen, PATRICIA    Collection Time: 01/05/22  9:28 PM   Result Value Ref Range    Glucose (POC) 313 (H) 65 - 117 mg/dL    Performed by Mercy Ozzy    PTT    Collection Time: 01/06/22  1:32 AM   Result Value Ref Range    aPTT 26.7 22.1 - 31.0 sec    aPTT, therapeutic range     58.0 - 40.0 SECS   METABOLIC PANEL, BASIC    Collection Time: 01/06/22  1:32 AM   Result Value Ref Range    Sodium 133 (L) 136 - 145 mmol/L    Potassium 4.5 3.5 - 5.1 mmol/L    Chloride 96 (L) 97 - 108 mmol/L    CO2 27 21 - 32 mmol/L    Anion gap 10 5 - 15 mmol/L    Glucose 264 (H) 65 - 100 mg/dL    BUN 83 (H) 6 - 20 MG/DL    Creatinine 3.36 (H) 0.55 - 1.02 MG/DL    BUN/Creatinine ratio 25 (H) 12 - 20      GFR est AA 17 (L) >60 ml/min/1.73m2    GFR est non-AA 14 (L) >60 ml/min/1.73m2    Calcium 8.7 8.5 - 10.1 MG/DL   MAGNESIUM    Collection Time: 01/06/22  1:32 AM   Result Value Ref Range    Magnesium 2.4 1.6 - 2.4 mg/dL   CBC WITH AUTOMATED DIFF    Collection Time: 01/06/22  1:32 AM   Result Value Ref Range    WBC 11.3 (H) 3.6 - 11.0 K/uL    RBC 3.52 (L) 3.80 - 5.20 M/uL    HGB 8.4 (L) 11.5 - 16.0 g/dL    HCT 31.6 (L) 35.0 - 47.0 %    MCV 89.8 80.0 - 99.0 FL    MCH 23.9 (L) 26.0 - 34.0 PG    MCHC 26.6 (L) 30.0 - 36.5 g/dL    RDW 18.7 (H) 11.5 - 14.5 %    PLATELET 614 909 - 654 K/uL    MPV 9.1 8.9 - 12.9 FL    NRBC 0.8 (H) 0  WBC    ABSOLUTE NRBC 0.09 (H) 0.00 - 0.01 K/uL    NEUTROPHILS 82 (H) 32 - 75 %    LYMPHOCYTES 8 (L) 12 - 49 %    MONOCYTES 7 5 - 13 %    EOSINOPHILS 1 0 - 7 %    BASOPHILS 0 0 - 1 %    IMMATURE GRANULOCYTES 2 (H) 0.0 - 0.5 %    ABS. NEUTROPHILS 9.3 (H) 1.8 - 8.0 K/UL    ABS. LYMPHOCYTES 0.9 0.8 - 3.5 K/UL    ABS. MONOCYTES 0.8 0.0 - 1.0 K/UL    ABS. EOSINOPHILS 0.1 0.0 - 0.4 K/UL    ABS. BASOPHILS 0.0 0.0 - 0.1 K/UL    ABS. IMM.  GRANS. 0.2 (H) 0.00 - 0.04 K/UL    DF SMEAR SCANNED      RBC COMMENTS ANISOCYTOSIS  1+        RBC COMMENTS HYPOCHROMIA  1+        RBC COMMENTS POLYCHROMASIA  PRESENT       NT-PRO BNP    Collection Time: 01/06/22  1:32 AM   Result Value Ref Range    NT pro-BNP 18,242 (H) <125 PG/ML   GLUCOSE, POC    Collection Time: 01/06/22  8:12 AM   Result Value Ref Range    Glucose (POC) 278 (H) 65 - 117 mg/dL    Performed by Patsy Aguila I have reviewed the flowsheets. Chart and Pertinent Notes have been reviewed. No change in PMH ,family and social history from Consult note.       Emelyn Painter [facility]

## 2022-01-06 NOTE — PROGRESS NOTES
Bedside shift change report given to [address] [place] [address] (oncoming nurse) by Julia Krueger RN (offgoing nurse). Report included the following information SBAR, Kardex, ED Summary, Intake/Output, MAR, Recent Results and Cardiac Rhythm AV paced.

## 2022-01-06 NOTE — PROGRESS NOTES
Admission Medication Reconciliation:    Information obtained from:  Patient   RxQuery data available¹:  NO    Comments/Recommendations: Updated PTA meds/reviewed patient's allergies. 1)  Information obtained from patient; seems to be reliable historian; rx query not available. 2)  Medication changes (since last review): Adjusted  - changed otezla to PRN (prescribed BID but she doesn't take it regularly)  -changed docusate, kenalog, amlactin and calcipotriene to PRN  -changed ferrous sulfate to daily  -changed levothyroxine to 100 mcg Mon-Sat; doesn't take on Sunday         ¹RxQuery pharmacy benefit data reflects medications filled and processed through the patient's insurance, however   this data does NOT capture whether the medication was picked up or is currently being taken by the patient.     Allergies:  Ciprofloxacin, Shellfish derived, Ace inhibitors, Biaxin [clarithromycin], Candesartan, and Pcn [penicillins]    Significant PMH/Disease States:   Past Medical History:   Diagnosis Date    Acquired hypothyroidism 8/15/2016    Anemia     RED-HF study    Asthma     Cardiomyopathy, nonischemic ([facility] [address]     initial dx 2001, bivHF 2008 with EF 15%, s/p biV-ICD 9/08, significant improvment in EF to 45-50%    CKD (chronic kidney disease)     Dr Le Jara    CKD (chronic kidney disease) 8/15/2012    Depression     Diabetes ([facility] [address]     Diabetic neuropathy (HCC)     DM (diabetes mellitus) ([facility] [address] 8/15/2012    GERD (gastroesophageal reflux disease)     Gout     Hypothyroidism     ICD (implantable cardioverter-defibrillator), biventricular, in situ 6/5/2014    Psoriasis      Chief Complaint for this Admission:    Chief Complaint   Patient presents with    Shortness of Breath     Prior to Admission Medications:   Prior to Admission Medications   Prescriptions Last Dose Informant Taking?   allopurinoL (ZYLOPRIM) 100 mg tablet   Yes   Sig: TAKE 1 TABLET BY MOUTH EVERY DAY   ammonium lactate (AMLACTIN) 12 % topical cream Yes   Sig: Apply  to affected area two (2) times a day. rub in to affected area well   apremilast (Otezla) 30 mg tab   Yes   Sig: Take 30 mg by mouth two (2) times daily as needed. aspirin 81 mg tablet 1/3/2022 at Unknown time  Yes   Sig: Take 81 mg by mouth daily. azelastine (ASTEPRO) 205.5 mcg (0.15 %)   Yes   Si Spray by Both Nostrils route two (2) times daily as needed. benzonatate (Tessalon Perles) 100 mg capsule   Yes   Sig: Take 100 mg by mouth three (3) times daily as needed for Cough. budesonide (PULMICORT) 180 mcg/actuation aepb inhaler 1/3/2022 at Unknown time  Yes   Sig: Take 2 Puffs by inhalation two (2) times a day. bumetanide (BUMEX) 2 mg tablet 1/3/2022 at Unknown time  Yes   Sig: Take 1 tab in the morning and 0.5 tab in the evening   calcipotriene (DOVONEX) 0.005 % topical cream 1/3/2022 at Unknown time  Yes   Sig: Apply  to affected area three (3) times daily. calcitRIOL (ROCALTROL) 0.5 mcg capsule   Yes   Sig: TAKE 1 CAPSULE BY MOUTH EVERY DAY   carvediloL (COREG) 6.25 mg tablet 1/3/2022 at Unknown time  Yes   Sig: Take 1 Tablet by mouth two (2) times daily (with meals). cholecalciferol (VITAMIN D3) (2,000 UNITS /50 MCG) cap capsule 1/3/2022 at Unknown time  Yes   Sig: TAKE 1 CAPSULE BY MOUTH TWO (2) TIMES A DAY. docusate sodium (COLACE) 100 mg capsule   Yes   Sig: Take 100 mg by mouth daily as needed for Constipation. ferrous sulfate (IRON) 325 mg (65 mg elemental iron) tablet 1/3/2022 at Unknown time  Yes   Sig: Take 325 mg by mouth daily. fluticasone propionate (FLONASE) 50 mcg/actuation nasal spray 1/3/2022 at Unknown time  Yes   Sig: One spray each nostril daily   gabapentin (NEURONTIN) 100 mg capsule 1/3/2022 at Unknown time  Yes   Sig: Take 1 Capsule by mouth nightly. Max Daily Amount: 100 mg.   hydrALAZINE (APRESOLINE) 10 mg tablet 1/3/2022 at Unknown time  Yes   Sig: Take 1 Tablet by mouth three (3) times daily.    insulin NPH/insulin regular (NOVOLIN 70/30) 100 unit/mL (70-30) injection 1/3/2022 at Unknown time  Yes   Si units two times a day   isosorbide dinitrate (ISORDIL) 5 mg tablet 1/3/2022 at Unknown time  Yes   Sig: Take 1 Tablet by mouth three (3) times daily. levocetirizine (XYZAL) 5 mg tablet   Yes   Sig: TAKE 1 TABLET BY MOUTH EVERY DAY   levothyroxine (SYNTHROID) 100 mcg tablet   Yes   Sig: Take 100 mcg by mouth six (6) days a week. Everyday except    montelukast (SINGULAIR) 10 mg tablet 1/3/2022 at Unknown time  Yes   Sig: TAKE 1 TABLET BY MOUTH EVERY DAY   multivitamin (ONE A DAY) tablet 1/3/2022 at Unknown time  Yes   Sig: Take 1 Tab by mouth daily. triamcinolone acetonide (KENALOG) 0.5 % ointment 1/3/2022 at Unknown time  Yes   Sig: Apply  to affected area two (2) times daily as needed. use thin layer    venlafaxine-SR (EFFEXOR-XR) 75 mg capsule 1/3/2022 at Unknown time  Yes   Sig: TAKE 1 CAPSULE BY MOUTH EVERY DAY      Facility-Administered Medications: None     Please contact the main inpatient pharmacy with any questions or concerns at [phone] and we will direct you to the clinical pharmacist covering this patient's care while in-house.    Gen Shaw, TOMD

## 2022-01-06 NOTE — PROGRESS NOTES
Problem: Falls - Risk of  Goal: *Absence of Falls  Description: Document Jessica Beltre Fall Risk and appropriate interventions in the flowsheet.   Outcome: Progressing Towards Goal  Note: Fall Risk Interventions:  Mobility Interventions: Bed/chair exit alarm,Patient to call before getting OOB         Medication Interventions: Bed/chair exit alarm,Patient to call before getting OOB    Elimination Interventions: Call light in reach              Problem: Patient Education: Go to Patient Education Activity  Goal: Patient/Family Education  Outcome: Progressing Towards Goal     Problem: Patient Education: Go to Patient Education Activity  Goal: Patient/Family Education  Outcome: Progressing Towards Goal

## 2022-01-07 NOTE — PROGRESS NOTES
Problem: Self Care Deficits Care Plan (Adult)  Goal: *Acute Goals and Plan of Care (Insert Text)  Description: FUNCTIONAL STATUS PRIOR TO ADMISSION: Patient was modified independent using a single point cane for functional mobility. Patient reports she was on 4L NC O2 at baseline. HOME SUPPORT: The patient lived with spouse who assisted her with bathing/dressing tasks as needed. Occupational Therapy Goals  Initiated 1/5/2022  1. Patient will perform lower body dressing with supervision/set-up within 7 day(s). 2.  Patient will perform upper body dressing with supervision/set-up within 7 day(s). 3.  Patient will perform grooming with supervision/set-up within 7 day(s). 4.  Patient will perform toilet transfers with supervision/set-up within 7 day(s). 5.  Patient will perform all aspects of toileting with supervision/set-up within 7 day(s). 6.  Patient will participate in upper extremity therapeutic exercise/activities with supervision/set-up for 5 minutes within 7 day(s). 7.  Patient will utilize energy conservation techniques during functional activities with verbal cues within 7 day(s). Outcome: Progressing Towards Goal     OCCUPATIONAL THERAPY TREATMENT  Patient: Samantha Castro (33 y.o. female)  Date: 1/7/2022  Diagnosis: Acute respiratory failure with hypoxia ([facility] [address] [J96.01] <principal problem not specified>       Precautions: Fall  Chart, occupational therapy assessment, plan of care, and goals were reviewed. ASSESSMENT  Patient continues with skilled OT services and is progressing towards goals. Patient today received in bed on 4L via NC with saturations at 95% at rest. She is agreeable to OOB ADLs and today is able to mobilize to [facility] with overall CGA using RW. She is able to manage toileting hygiene with overall SBA and is observed to desaturate to 80% with activity, requiring cues for focused rest and PLB. HR ranged from 112 bpm at rest to up to 130 bpm with activity.  She recovers quickly and is motivated to perform additional OOB activity in standing prior to returning to supine. Continue to recommend return home with 's support at discharge. Current Level of Function Impacting Discharge (ADLs): up to CGA for ADL transfers with RW; up to min A for toileting    Other factors to consider for discharge: advanced CHF; home O2 at baseline         PLAN :  Patient continues to benefit from skilled intervention to address the above impairments. Continue treatment per established plan of care to address goals. Recommend with staff: OOB to chair for meals; ADLs with assist; BSC for toileting    Recommend next OT session: standing tolerance; UE HEP; ECT handout    Recommendation for discharge: (in order for the patient to meet his/her long term goals)  Occupational therapy at least 2 days/week in the home AND ensure assist and/or supervision for safety with ADLs; if this is not an option, may benefit from rehab    This discharge recommendation:  Has been made in collaboration with the attending provider and/or case management    IF patient discharges home will need the following DME: TBD       SUBJECTIVE:   Patient stated Khoi Le had a stress test this morning.     OBJECTIVE DATA SUMMARY:   Cognitive/Behavioral Status:  Neurologic State: Alert  Orientation Level: Oriented X4  Cognition: Appropriate decision making; Appropriate for age attention/concentration; Appropriate safety awareness; Follows commands  Perception: Appears intact  Perseveration: No perseveration noted  Safety/Judgement: Awareness of environment; Fall prevention    Functional Mobility and Transfers for ADLs:  Bed Mobility:  Supine to Sit: Supervision    Transfers:  Sit to Stand: Contact guard assistance  Functional Transfers  Toilet Transfer : Contact guard assistance;Assist x1 (for SPT to [facility])  Adaptive Equipment: Bedside commode;Walker (comment)    Balance:  Sitting: Intact  Standing: Impaired  Standing - Static: Good  Standing - Dynamic : Fair    ADL Intervention:  Toileting  Bladder Hygiene: Stand-by assistance  Bowel Hygiene: Stand-by assistance  Clothing Management: Minimum assistance  Cues: Verbal cues provided  Adaptive Equipment: Elverna Corti; Other (comment) ([facility])    Cognitive Retraining  Safety/Judgement: Awareness of environment; Fall prevention    Pain:  Pt reporting minimal pain    Activity Tolerance:   Fair, desaturates with exertion and requires oxygen and requires rest breaks    After treatment patient left in no apparent distress:   Supine in bed and Call bell within reach    COMMUNICATION/COLLABORATION:   The patients plan of care was discussed with: Physical therapist and Registered nurse.      Johnson Garcia OT  Time Calculation: 25 mins

## 2022-01-07 NOTE — PROGRESS NOTES
Card[facility]rophysiology [facility] Progress Note     Subjective:       James Ceron is a 78 y. o.patient who is seen for evaluation/management of acute on chronic CHF. Her nephrologist spoke directly to me about her poor candidacy for hemodialysis and could be peritoneal dialysis IF she must have it so the goal is to avoid ESRD     She presented to the ER with hypoxia, improved on supplemental oxygen.       Labs showed stable anemia.    Chronic CKD     Rapid COVID negative.  CXR showed pulmonary edema vs atypical pneumonia.  VQ scan showed low probability for PE. NICM, LVEF noted 15-20% during recent admission.  LVEF had previously normalized with CRT pacing.  NYHA III-IV; No ACEi/ARB due to renal dysfunction.  GDMT dosing limited by low BP.       RHC 12/10/2021 showed severe pulmonary HTN (wedge 34 mmHg, PAP 80 mmHg).       Cardiac cath in [facility] at [facility] showed no evidence of CAD.       She did require LV lead reprogramming over the summer, but good LV capture since.  Good capture/function when checked during recent admission.       BP controlled.           Previous:   Admitted with acute systolic CHF 43/9731.  Discharged on supplemental oxygen. S/p Medtronic biventricular ICD (gen change 01/465487, leads 09/25/2008).        CKD stage IV.        Previously followed by Dr. Jaylin Bennett, states did not follow him to new practice.         Problem List   Acute respiratory failure with hypoxia [facility]) ICD-10-CM: J96.01   ICD-9-CM: 518.81 1/4/2022     CHF exacerbation ([facility] ICD-10-CM: I50.9   ICD-9-CM: 428.0 12/6/2021     Type 2 diabetes mellitus with diabetic neuropathy ([facility] ICD-10-CM: E11.40   ICD-9-CM: 250.60, 357.2 1/2/2020     Type 2 diabetes with nephropathy ([facility] ICD-10-CM: E11.21   ICD-9-CM: 250.40, 583.81 4/3/2018     Obesity, morbid ([facility] ICD-10-CM: E66.01   ICD-9-CM: 278.01 12/8/2017     Acquired hypothyroidism ICD-10-CM: E03.9   ICD-9-CM: 244.9 8/15/2016     Dysthymia ICD-10-CM: F34.1   ICD-9-CM: 300.4 8/15/2016     ICD (implantable cardioverter-defibrillator), biventricular, in situ ICD-10-CM: Z95.810   ICD-9-CM: V45.02 6/5/2014   Overview Signed 1/14/2015 11:11 AM by Bhargav Villegas MD     Generator Medtronic change 1/14/2015         Dyslipidemia ICD-10-CM: F85.2   ICD-9-CM: 272.4 1/14/2014     CKD (chronic kidney disease) ICD-10-CM: N18.9   ICD-9-CM: 585.9 8/15/2012     Cardiomyopathy, nonischemic (HCC) ICD-10-CM: I42.8   ICD-9-CM: 425.4 Unknown   Overview Signed 10/10/2011  6:40 AM by Yuly Harvey MD     initial dx 2001, bivHF 2008 with EF 15%, s/p biV-ICD 9/08, significant improvment in EF to 45-50%         Anemia in chronic renal disease (Chronic) ICD-10-CM: N18.9, D63.1   ICD-9-CM: 285.21 12/10/2008     HTN (hypertension) ICD-10-CM: I10   ICD-9-CM: 401.9 12/10/2008     GERD (gastroesophageal reflux disease) (Chronic) ICD-10-CM: K21.9   ICD-9-CM: 530.81 12/10/2008     Gout (Chronic) ICD-10-CM: M10.9   ICD-9-CM: 274.9 12/10/2008     Pulmonary HTN (HCC) (Chronic) ICD-10-CM: I27.20   ICD-9-CM: 416.8 12/10/2008        Current Facility-Administered Medications   Medication Dose Route Frequency    docusate sodium (COLACE) capsule 100 mg  100 mg Oral BID    insulin NPH (NOVOLIN N, HUMULIN N) injection 50 Units  50 Units SubCUTAneous Q12H    insulin lispro (HUMALOG) injection 10 Units  10 Units SubCUTAneous TIDAC    carvediloL (COREG) tablet 6.25 mg  6.25 mg Oral BID WITH MEALS    hydrALAZINE (APRESOLINE) tablet 10 mg  10 mg Oral TID    isosorbide dinitrate (ISORDIL) tablet 10 mg  10 mg Oral TID    bumetanide (BUMEX) tablet 1 mg  1 mg Oral BID    montelukast (SINGULAIR) tablet 10 mg  10 mg Oral DAILY    levothyroxine (SYNTHROID) tablet 100 mcg  100 mcg Oral Once per day on Mon Tue Wed Thu Fri Sat    cetirizine (ZYRTEC) tablet 10 mg  10 mg Oral DAILY    hydroxypropyl methylcellulose (ISOPTO TEARS) 0.5 % ophthalmic solution 1 Drop  1 Drop Both Eyes PRN    venlafaxine-SR (EFFEXOR-XR) capsule 75 mg 75 mg Oral DAILY WITH BREAKFAST    gabapentin (NEURONTIN) capsule 100 mg  100 mg Oral QHS    arformoteroL (BROVANA) neb solution 15 mcg  15 mcg Nebulization BID RT    And    budesonide (PULMICORT) 500 mcg/2 ml nebulizer suspension  500 mcg Nebulization BID RT    hydrOXYzine HCL (ATARAX) tablet 20 mg  20 mg Oral TID PRN    milrinone (PRIMACOR) 20 MG/100 ML D5W infusion  0.2 mcg/kg/min IntraVENous CONTINUOUS    heparin (porcine) injection 5,000 Units  5,000 Units SubCUTAneous Q8H    ivabradine (CORLANOR) tablet 5 mg  5 mg Oral BID WITH MEALS    sodium chloride (NS) flush 5-40 mL  5-40 mL IntraVENous Q8H    sodium chloride (NS) flush 5-40 mL  5-40 mL IntraVENous PRN    acetaminophen (TYLENOL) tablet 650 mg  650 mg Oral Q6H PRN    Or    acetaminophen (TYLENOL) suppository 650 mg  650 mg Rectal Q6H PRN    ondansetron (ZOFRAN ODT) tablet 4 mg  4 mg Oral Q8H PRN    Or    ondansetron (ZOFRAN) injection 4 mg  4 mg IntraVENous Q6H PRN    glucose chewable tablet 16 g  4 Tablet Oral PRN    dextrose (D50W) injection syrg 12.5-25 g  25-50 mL IntraVENous PRN    glucagon (GLUCAGEN) injection 1 mg  1 mg IntraMUSCular PRN    insulin lispro (HUMALOG) injection   SubCUTAneous AC&HS    albuterol-ipratropium (DUO-NEB) 2.5 MG-0.5 MG/3 ML  3 mL Nebulization Q6H PRN                    Current Outpatient Medications on File Prior to Encounter   Medication Sig Dispense Refill    carvediloL (COREG) 6.25 mg tablet Take 1 Tablet by mouth two (2) times daily (with meals). 180 Tablet 1    isosorbide dinitrate (ISORDIL) 5 mg tablet Take 1 Tablet by mouth three (3) times daily. 270 Tablet 1    hydrALAZINE (APRESOLINE) 10 mg tablet Take 1 Tablet by mouth three (3) times daily. 180 Tablet 1    chlorhexidine (Hibiclens) 4 % liquid Apply to the upper chest area from shoulder/neck to mid line of chest and to below the nipple every day, 5 days prior to the procedure.  1 Each 0    gabapentin (NEURONTIN) 100 mg capsule TAKE 1 CAP BY MOUTH DAILY. 90 Capsule 0    montelukast (SINGULAIR) 10 mg tablet TAKE 1 TABLET BY MOUTH EVERY DAY 90 Tablet 0    bumetanide (BUMEX) 2 mg tablet Take 1 tab in the morning and 0.5 tab in the evening 135 Tablet 1    fluticasone propionate (FLONASE) 50 mcg/actuation nasal spray One spray each nostril daily 1 Each 3    gabapentin (NEURONTIN) 100 mg capsule Take 1 Capsule by mouth nightly. Max Daily Amount: 100 mg. 30 Capsule 0    venlafaxine-SR (EFFEXOR-XR) 75 mg capsule TAKE 1 CAPSULE BY MOUTH EVERY DAY 90 Capsule 0    cholecalciferol (VITAMIN D3) (2,000 UNITS /50 MCG) cap capsule TAKE 1 CAPSULE BY MOUTH TWO (2) TIMES A DAY. 180 Capsule 0    levocetirizine (XYZAL) 5 mg tablet TAKE 1 TABLET BY MOUTH EVERY DAY 90 Tablet 0    OTHER          hydrOXYzine HCL (ATARAX) 10 mg tablet 2 tab(s)        budesonide (PULMICORT) 180 mcg/actuation aepb inhaler Take 2 Puffs by inhalation two (2) times a day. 2 Inhaler 5    meclizine (ANTIVERT) 25 mg tablet TAKE 1 TAB BY MOUTH THREE (3) TIMES DAILY AS NEEDED FOR DIZZINESS. 20 Tab 3    apremilast (OTEZLA) 30 mg tab Take  by mouth two (2) times a day.        insulin NPH/insulin regular (NOVOLIN 70/30) 100 unit/mL (70-30) injection 70 units two times a day 10 mL 3    calcipotriene (DOVONEX) 0.005 % topical cream Apply  to affected area three (3) times daily.        triamcinolone acetonide (KENALOG) 0.5 % ointment Apply  to affected area two (2) times a day. use thin layer        multivitamin (ONE A DAY) tablet Take 1 Tab by mouth daily.        DOCUSATE CALCIUM (STOOL SOFTENER PO) Take 1 tablet by mouth daily.        ferrous sulfate (IRON) 325 mg (65 mg elemental iron) tablet Take  by mouth two (2) times a day.        aspirin 81 mg tablet Take 81 mg by mouth daily.        azelastine (ASTEPRO) 205.5 mcg (0.15 %) 2 Sprays by Both Nostrils route two (2) times a day.  1 Each 3    benzonatate (Tessalon Perles) 100 mg capsule Take 100 mg by mouth three (3) times daily as needed for Cough.        ammonium lactate (AMLACTIN) 12 % topical cream Apply  to affected area two (2) times a day. rub in to affected area well 280 g 2    EPINEPHrine (EPIPEN) 0.3 mg/0.3 mL injection 0.3 mL by IntraMUSCular route once as needed for 1 dose. (Patient not taking: Reported on 1/4/2022) 1 Each 3     Allergies   Allergen Reactions   · Ciprofloxacin Anaphylaxis   · Shellfish Derived Anaphylaxis   · Ace Inhibitors Unknown (comments)   · Biaxin [Clarithromycin] Other (comments)   Metal taste   · Candesartan Cough   · Pcn [Penicillins] Hives     Past Medical History:   Diagnosis Date   · Acquired hypothyroidism 8/15/2016   · Anemia   RED-HF study   · Asthma   · Cardiomyopathy, nonischemic ([facility]   initial dx 2001, bivHF 2008 with EF 15%, s/p biV-ICD 9/08, significant improvment in EF to 45-50%   · CKD (chronic kidney disease)   Dr Maci Mark   · CKD (chronic kidney disease) 8/15/2012   · Depression   · Diabetes ([facility]   · Diabetic neuropathy ([address]   · DM (diabetes mellitus) ([facility] 8/15/2012   · GERD (gastroesophageal reflux disease)   · Gout   · Hypothyroidism   · ICD (implantable cardioverter-defibrillator), biventricular, in situ 6/5/2014   · Psoriasis     Past Surgical History:   Procedure Laterality Date   · CARDIAC CATHETERIZATION 2007; 01/06/15   normal cors   · ECHO 2D ADULT 4/2010   EF 45%, improved from 1/09 (25%)   · ECHO 2D ADULT 11/2011   LVH, EF 55-60%   · HX ORTHOPAEDIC   knee   · HX PACEMAKER PLACEMENT   AICD   · STRESS TEST LEXISCAN/CARDIOLITE 3/21/12   normal perfusion, global HK 40%     Family History   Problem Relation Age of Onset   · Heart Disease Mother   · Hypertension Mother   · Lupus Sister   · Diabetes Brother     Social History     Tobacco Use   · Smoking status: Never Smoker   · Smokeless tobacco: Never Used   Substance Use Topics   · Alcohol use: No         Review of Systems: Review of all other systems otherwise negative.    Constitutional: Negative for fever, chills, weight loss, + malaise/fatigue. HEENT: Negative for nosebleeds, vision changes. Respiratory: Negative for cough, hemoptysis. Cardiovascular: Negative for chest pain, palpitations, orthopnea, claudication, + leg swelling, no syncope, and PND. + SOB   Gastrointestinal: Negative for nausea, vomiting, diarrhea, blood in stool and melena. Genitourinary: Negative for dysuria, and hematuria. Musculoskeletal: Negative for myalgias, arthralgia. Skin: Negative for rash. Heme: Does not bleed or bruise easily. Neurological: Negative for speech change and focal weakness         Objective:   Visit Vitals  BP (!) 153/60   Pulse (!) 114   Temp 97.5 °F (36.4 °C)   Resp 20   Ht 5' 7\" (1.702 m)   Wt 266 lb 5.1 oz (120.8 kg)   SpO2 96%   BMI 41.71 kg/m²         Physical Exam:   Constitutional: Well-developed and well-nourished. No respiratory distress. Head: Normocephalic and atraumatic. Eyes: Pupils are equal, round. ENT: Hearing grossly normal.   Neck: Supple. No JVD present. Cardiovascular: fast rate, regular rhythm. Exam reveals no gallop and no friction rub. 2/6 systolic LSB murmur heard. Pulmonary/Chest: Effort normal on supplemental oxygen.  + rhonchi, wheezing. Abdominal: Soft, moderate obesity. Musculoskeletal: Moves extremities independently. Vasc/lymphatic: 4+ leg edema. Neurological: Alert, oriented. Skin: Skin is warm and dry.  Left chest ICD site well healed. Psychiatric: Normal mood and affect. Behavior is normal. Judgment and thought content normal.       Assessment/Plan:     Imaging/Studies:   Echo (12/08/2021): LVEF 15-20%, upper normal wall thickness, LV diastolic dysfunction.  Borderline low RVEF.  Mod dilated LA, mildly dilated RA.  Mild to mod MR. Wander Lopez TR.  Mild to mod PH.       LHC/RHC (01/2015): No significant CAD. High wedge and high PA pressure      NICM: no new left heart cath due to severe renal failure  She will not do well with hemodialysis.   Nephrologist does not think she will be candidate  Recent admission for acute on chronic CHF.  Previously had LVEF normalize with CRT. She has proper biventricular pacing per device check during recent admission, but LVEF now 15-20%.  NYHA class IV.  Severe pulmonary HTN noted on recent RHC.   She feels better with milrinone last 2 days  I will reduce to 0.2 mcg/kg/min and try to taper off over the weekend  She will resume coreg and hydralazine isordil  Cannot be on ACEI or ARB or ARNI due to chronic renal failure  Change IV bumex to PO bumex  Continue with corlanor 5 mg bid  Cardiac cath x 2 in the past had shown normal coronary arteries so this is not likely CAD driven  She may have nuclear stress test but not stable enough to do   Last left heart cath 2015 and IV dye is high risk for renal failure and she cannot do dialysis     MRI is not possible with 4194 LV lead (2008).    I ordered PYP nuc test for amyloid and SPEP UPEP but none are done yet    She is not candidate for heart transplant or LVAD with severe renal failure      Medtronic biventricular ICD (gen change 01/14/2015, leads 09/25/2008): Device check 12/08/2021 showed proper lead & generator function.  Adequate CRT.  Generator longevity estimated 4 mos. Currently planning generator change for 02/2022.       Renal failure: nephrologist follows, BUN and creatinine are up. bumex IV changed to PO in preparation for discharge next     Anemia of chronic disease    Dr Noah Mcelroy will see on weekend and I will resume care next week. Will consider BIV ICD generator change sooner if there is EP  Lab slot time next week. Battery is still 3 months left. Will test LV lead again then and if not optimal, will replace      Thank you for involving me in this patient's care and please call with further concerns or questions. Jarvis Ortiz M.D.    Electrophysiology/Cardiology   [facility] [place]   [address]                     [address], [address] [facility], [address] [place] [id number]   [phone]                                        [phone]

## 2022-01-07 NOTE — PROGRESS NOTES
[facility]   [address] [facility], [id number] [place] [address]  Phone: [phone]   [phone]    [email or web address]     Nephrology Progress Note    Patient Name : Deborah Salvador      : 1955     MRN : [id number]  Date of Admission : 2022  Date of Servive : 22    CC:  Follow up for ARF       Assessment and Plan   CKD stage IV:  - Etiology: DM, HTN, CRS  - UA still pending   - renal US: suggestive of CKD, B/L benign renal cysts   - continue w/ Bumex 1 mg BID   - discussed that she is a poor candidate for hemodialysis. PD can be an option but I have not discussed this with her about this     - Daily labs, strict I.O and daily weights      Acute on chronic HFrEF  NI CMP, LVEF 15 to 20%  NYHA class III-IV  S/p BiV pacer/ICD-s/p CRT  C 12/10/2021: PCWP 34, PA P 80   - per cards     Morbid obesity  Type II DM  HTN  Hypothyroidism  Pulmonary hypertension  Gout  Psoriasis        Interval History:  Seen and examined. Tolerating Milrinone. Feels a little better. HR 110s  Labs pending   UOP not charted   UA ordered but not done     Review of Systems: Review of systems not obtained due to patient factors.     Current Medications:   Current Facility-Administered Medications   Medication Dose Route Frequency    docusate sodium (COLACE) capsule 100 mg  100 mg Oral BID    montelukast (SINGULAIR) tablet 10 mg  10 mg Oral DAILY    bumetanide (BUMEX) injection 1 mg  1 mg IntraVENous Q12H    insulin NPH (NOVOLIN N, HUMULIN N) injection 40 Units  40 Units SubCUTAneous Q12H    levothyroxine (SYNTHROID) tablet 100 mcg  100 mcg Oral Once per day on  Sat    cetirizine (ZYRTEC) tablet 10 mg  10 mg Oral DAILY    hydroxypropyl methylcellulose (ISOPTO TEARS) 0.5 % ophthalmic solution 1 Drop  1 Drop Both Eyes PRN    venlafaxine-SR (EFFEXOR-XR) capsule 75 mg  75 mg Oral DAILY WITH BREAKFAST    gabapentin (NEURONTIN) capsule 100 mg  100 mg Oral QHS    arformoteroL (BROVANA) neb solution 15 mcg  15 mcg Nebulization BID RT    And    budesonide (PULMICORT) 500 mcg/2 ml nebulizer suspension  500 mcg Nebulization BID RT    hydrOXYzine HCL (ATARAX) tablet 20 mg  20 mg Oral TID PRN    milrinone (PRIMACOR) 20 MG/100 ML D5W infusion  0.375 mcg/kg/min IntraVENous CONTINUOUS    heparin (porcine) injection 5,000 Units  5,000 Units SubCUTAneous Q8H    ivabradine (CORLANOR) tablet 5 mg  5 mg Oral BID WITH MEALS    sodium chloride (NS) flush 5-40 mL  5-40 mL IntraVENous Q8H    sodium chloride (NS) flush 5-40 mL  5-40 mL IntraVENous PRN    acetaminophen (TYLENOL) tablet 650 mg  650 mg Oral Q6H PRN    Or    acetaminophen (TYLENOL) suppository 650 mg  650 mg Rectal Q6H PRN    ondansetron (ZOFRAN ODT) tablet 4 mg  4 mg Oral Q8H PRN    Or    ondansetron (ZOFRAN) injection 4 mg  4 mg IntraVENous Q6H PRN    glucose chewable tablet 16 g  4 Tablet Oral PRN    dextrose (D50W) injection syrg 12.5-25 g  25-50 mL IntraVENous PRN    glucagon (GLUCAGEN) injection 1 mg  1 mg IntraMUSCular PRN    insulin lispro (HUMALOG) injection   SubCUTAneous AC&HS    albuterol-ipratropium (DUO-NEB) 2.5 MG-0.5 MG/3 ML  3 mL Nebulization Q6H PRN      Allergies   Allergen Reactions    Ciprofloxacin Anaphylaxis    Shellfish Derived Anaphylaxis    Ace Inhibitors Unknown (comments)    Biaxin [Clarithromycin] Other (comments)     Metal taste    Candesartan Cough    Pcn [Penicillins] Hives       Objective:  Vitals:    Vitals:    01/07/22 0400 01/07/22 0600 01/07/22 0826 01/07/22 0837   BP: (!) 140/70   137/64   Pulse: (!) 104 (!) 109  (!) 110   Resp:    20   Temp: 98.5 °F (36.9 °C)   98.6 °F (37 °C)   SpO2: 97%  96% 100%   Weight:       Height:         Intake and Output:  No intake/output data recorded. No intake/output data recorded.     Physical Examination:    Pt intubated    Yes / No    General: NAD,Conversant   Neck:  Supple, no mass  Resp:  Lungs CTA B/L, no wheezing , normal respiratory effort  CV:  RRR,  no murmur or rub, LE edema  GI:  Soft, NT, + BS, no HS megaly  Neurologic:  Non focal  Psych:             AAO x 3 appropriate affect   Skin:  No Rash  :  [place] + / -  Dialysis Access :     []    High complexity decision making was performed  []    Patient is at high-risk of decompensation with multiple organ involvement    Lab Data Personally Reviewed: I have reviewed all the pertinent labs, microbiology data and radiology studies during assessment.     Recent Labs     01/06/22 2325 01/06/22 0132 01/05/22  0329   * 133* 133*   K 4.2 4.5 5.5*   CL 97 96* 96*   CO2 28 27 25   * 264* 283*   BUN 85* 83* 63*   CREA 3.49* 3.36* 2.74*   CA 8.1* 8.7 9.3   MG 2.1 2.4  --      Recent Labs     01/06/22 2325 01/06/22 0132 01/05/22 0329   WBC 11.1* 11.3* 14.2*   HGB 7.8* 8.4* 9.7*   HCT 28.5* 31.6* 35.0    298 409*     Lab Results   Component Value Date/Time    Specimen Description: URINE 10/22/2013 01:32 PM    Specimen Description: URINE 01/23/2013 04:40 PM    Specimen Description: LEG TISSUE 02/12/2009 12:00 AM    Specimen Description: LEG (LEFT) 01/29/2009 03:06 AM     Lab Results   Component Value Date/Time    Culture result: MIXED SKIN ROSANNA ISOLATED 10/22/2013 01:32 PM    Culture result:  01/23/2013 04:40 PM     ENTEROCOCCUS FAECALIS GROUP D  MIXED SKIN ROSANNA ISOLATED    Culture result:  02/12/2009 12:00 AM     LIGHT STAPHYLOCOCCUS EPIDERMIDIS (OXACILLIN RESISTANT)  LIGHT 2ND STRAIN OF STAPHYLOCOCCUS EPIDERMIDIS (OXACILLIN RESISTANT)    Culture result: NO GROWTH 2 DAYS 01/29/2009 03:06 AM     Recent Results (from the past 24 hour(s))   GLUCOSE, POC    Collection Time: 01/06/22 12:27 PM   Result Value Ref Range    Glucose (POC) 260 (H) 65 - 117 mg/dL    Performed by Kike Groves    GLUCOSE, POC    Collection Time: 01/06/22  4:56 PM   Result Value Ref Range    Glucose (POC) 303 (H) 65 - 117 mg/dL    Performed by Erlin Bowman 85, POC    Collection Time: 01/06/22  9:29 PM   Result Value Ref Range    Glucose (POC) 268 (H) 65 - 117 mg/dL    Performed by [address] [place] [address], [facility]    Collection Time: 01/06/22 11:25 PM   Result Value Ref Range    Sodium 133 (L) 136 - 145 mmol/L    Potassium 4.2 3.5 - 5.1 mmol/L    Chloride 97 97 - 108 mmol/L    CO2 28 21 - 32 mmol/L    Anion gap 8 5 - 15 mmol/L    Glucose 283 (H) 65 - 100 mg/dL    BUN 85 (H) 6 - 20 MG/DL    Creatinine 3.49 (H) 0.55 - 1.02 MG/DL    BUN/Creatinine ratio 24 (H) 12 - 20      GFR est AA 16 (L) >60 ml/min/1.73m2    GFR est non-AA 13 (L) >60 ml/min/1.73m2    Calcium 8.1 (L) 8.5 - 10.1 MG/DL   MAGNESIUM    Collection Time: 01/06/22 11:25 PM   Result Value Ref Range    Magnesium 2.1 1.6 - 2.4 mg/dL   CBC WITH AUTOMATED DIFF    Collection Time: 01/06/22 11:25 PM   Result Value Ref Range    WBC 11.1 (H) 3.6 - 11.0 K/uL    RBC 3.20 (L) 3.80 - 5.20 M/uL    HGB 7.8 (L) 11.5 - 16.0 g/dL    HCT 28.5 (L) 35.0 - 47.0 %    MCV 89.1 80.0 - 99.0 FL    MCH 24.4 (L) 26.0 - 34.0 PG    MCHC 27.4 (L) 30.0 - 36.5 g/dL    RDW 18.8 (H) 11.5 - 14.5 %    PLATELET 450 399 - 522 K/uL    MPV 9.4 8.9 - 12.9 FL    NRBC 0.8 (H) 0  WBC    ABSOLUTE NRBC 0.09 (H) 0.00 - 0.01 K/uL    NEUTROPHILS 82 (H) 32 - 75 %    LYMPHOCYTES 7 (L) 12 - 49 %    MONOCYTES 7 5 - 13 %    EOSINOPHILS 2 0 - 7 %    BASOPHILS 0 0 - 1 %    IMMATURE GRANULOCYTES 2 (H) 0.0 - 0.5 %    ABS. NEUTROPHILS 9.1 (H) 1.8 - 8.0 K/UL    ABS. LYMPHOCYTES 0.8 0.8 - 3.5 K/UL    ABS. MONOCYTES 0.8 0.0 - 1.0 K/UL    ABS. EOSINOPHILS 0.2 0.0 - 0.4 K/UL    ABS. BASOPHILS 0.0 0.0 - 0.1 K/UL    ABS. IMM.  GRANS. 0.2 (H) 0.00 - 0.04 K/UL    DF SMEAR SCANNED      RBC COMMENTS ANISOCYTOSIS  1+        RBC COMMENTS HYPOCHROMIA  1+       NUCLEAR CARDIAC AMYLOID SPECT    Collection Time: 01/07/22  6:57 AM   Result Value Ref Range    Stress Target  bpm   GLUCOSE, POC    Collection Time: 01/07/22  8:35 AM   Result Value Ref Range    Glucose (POC) 253 (H) 65 - 117 mg/dL    Performed by Diana Lopez            I have reviewed the flowsheets. Chart and Pertinent Notes have been reviewed. No change in PMH ,family and social history from Consult note.       Emelyn Painter [facility]

## 2022-01-07 NOTE — PROGRESS NOTES
[address] [facility] Adult  Hospitalist Group                                                                                          Hospitalist Progress Note  Skip Leung MD  Answering service: [phone] from in house phone        Date of Service:  2022  NAME:  Marlon Harris  :  1955  MRN:  [id number]      Admission Summary:     Marlon Harris is a 78 y.o. female with hx NICM, chronic hypoxic respiratory failure 2/2 pulmonary htn, ckd, hypothyroidism, GERD, and  DM II who presents as a transfer from [address] [place] [address] for acute on chronic hypoxic respiratory failure. She wears 3L o2 at baseline, and reportedly ran out of her home oxygen.     In the ED, she was hypoxic to th 70's, with improvement ot 94% on 4Lnc. Labs showed stable anemia with Hgb 10, K+ 5.2, lactic 3.1, trop 55>66, d-dimer >35, creatinine 2.54, and probnp 15,826. CXR showed diffuse interstitial airway disease. Rapid covid was negative.     In the ED, she was started on a heparin gtt, and sent to [facility] for VQ scan. She received bumex and nitropaste. Interval history / Subjective:   2022. Saw patient today just before being taken downstairs for nuclear study, patient denies any fever, chills, nausea, vomiting, appears weak but very pleasant and cooperative with physical examination. Assessment & Plan:     Acute on chronic respiratory failure with oxygen  Likely due to acute pulmonary edema in the setting of acute systolic heart failure exacerbation. COVID-19 negative. Mild leukocytosis. Afebrile. VQ scan low probability for PE.    CXR positive for interstitial airspace disease likely pulmonary edema or atypical pneumonia. Improving, SPO2 WNL on 4 L nasal cannula. Continue Bumex, supplemental, cardiac diet, as needed DuoNeb's. Continue treating underlying acute systolic heart failure. Acute on chronic systolic heart failure  NYHA class IV. Status post ICD placement. LVEF 15 to 20%. proBNP [id number]. Continue Bumex, Coreg and milrinone. Not a candidate for ARB/ACE due to renal insufficiency. Cardiac diet, strict I/O, daily weight. Low-sodium diet. Urology on board, recommendations noted. CKD stage IV  At baseline, electrolytes WNL. Volume overloaded. Monitor volume status electrolytes, replace electrolytes as needed. 5/2022. Renal ultrasound negative for hydronephrosis. Small bilateral renal cysts. Avoid nephrotoxic meds, renally dose medications. Nephrology on board, recommendations noted. Hyperkalemia  Resolved, hyperkalemia protocol. Daily BMP. Low potassium diet. Hyponatremia   Improving, likely diuretic induced. Daily BMP, monitor volume status and electrolytes. Leukocytosis  CXR positive for interstitial airspace disease likely pulmonary edema or atypical pneumonia. Afebrile. Respiratory panel negative for COVID-19. Monitor closely, start on empiric antibiotics if any sign of infection. Type 2 diabetes. Hemoglobin A1c 7.3%. Continue basal, prandial and correctional insulin. Adjust meds as needed. Hypoglycemic protocol, Accu-Chek. Resume home meds upon discharge. Outpatient PCP and endocrinology follow-up    Hypothyroidism   Resume Synthroid. Depression  Denies any suicidal or homicidal ideation. Continue current meds. Morbid obesity   BMI 41.7. Diet and lifestyle modification recommended. Psoriasis   Multiple plaque psoriasis in the extensor surface of upper extremities. Outpatient PCP and rheumatology follow-up.     Code status: Full Code  DVT prophylaxis: heparin     Care Plan discussed with: Patient/Family, Nurse and   Anticipated Disposition: TBD  Anticipated Discharge: Greater than 48 hours     Hospital Problems  Date Reviewed: 12/22/2021          Codes Class Noted POA    Acute respiratory failure with hypoxia [facility]) ICD-10-CM: J96.01  ICD-9-CM: 518.81  1/4/2022 Unknown              Vital Signs:    Last 24hrs VS reviewed since prior progress note. Most recent are:  Visit Vitals  /64   Pulse (!) 111   Temp 98.6 °F (37 °C)   Resp 20   Ht 5' 7\" (1.702 m)   Wt 120.8 kg (266 lb 5.1 oz)   SpO2 100%   BMI 41.71 kg/m²       No intake or output data in the 24 hours ending 01/07/22 1309     Physical Examination:     I had a face to face encounter with this patient and independently examined them on 1/7/2022 as outlined below:          Constitutional:  No acute distress, cooperative, pleasant    ENT:  Oral mucosa moist, oropharynx benign. Resp:  Bibasilar crackles. No wheezing/rhonchi/rales. No accessory muscle use   CV:  Regular rhythm, normal rate, no murmurs, gallops, rubs    GI:  Soft, non distended, non tender. normoactive bowel sounds, no hepatosplenomegaly     Musculoskeletal:  Bilateral pretibial and pedal edema    Neurologic:  Moves all extremities. AAOx3, CN II-XII reviewed            Data Review:    Review and/or order of clinical lab test  Review and/or order of tests in the radiology section of CPT  Review and/or order of tests in the medicine section of CPT      Labs:     Recent Labs     01/06/22 2325 01/06/22 0132   WBC 11.1* 11.3*   HGB 7.8* 8.4*   HCT 28.5* 31.6*    298     Recent Labs     01/06/22 2325 01/06/22 0132 01/05/22  0329   * 133* 133*   K 4.2 4.5 5.5*   CL 97 96* 96*   CO2 28 27 25   BUN 85* 83* 63*   CREA 3.49* 3.36* 2.74*   * 264* 283*   CA 8.1* 8.7 9.3   MG 2.1 2.4  --      No results for input(s): ALT, AP, TBIL, TBILI, TP, ALB, GLOB, GGT, AML, LPSE in the last 72 hours. No lab exists for component: SGOT, GPT, AMYP, HLPSE  Recent Labs     01/06/22 0132 01/05/22 0329 01/04/22  1324   APTT 26.7 26.8 77.5*      No results for input(s): FE, TIBC, PSAT, FERR in the last 72 hours. No results found for: FOL, RBCF   No results for input(s): PH, PCO2, PO2 in the last 72 hours. No results for input(s): CPK, CKNDX, TROIQ in the last 72 hours.     No lab exists for component: CPKMB  Lab Results   Component Value Date/Time    Cholesterol, total 282 (H) 07/13/2021 04:00 PM    HDL Cholesterol 65 07/13/2021 04:00 PM    LDL, calculated 178.2 (H) 07/13/2021 04:00 PM    Triglyceride 194 (H) 07/13/2021 04:00 PM    CHOL/HDL Ratio 4.3 07/13/2021 04:00 PM     Lab Results   Component Value Date/Time    Glucose (POC) 253 (H) 01/07/2022 08:35 AM    Glucose (POC) 268 (H) 01/06/2022 09:29 PM    Glucose (POC) 303 (H) 01/06/2022 04:56 PM    Glucose (POC) 260 (H) 01/06/2022 12:27 PM    Glucose (POC) 278 (H) 01/06/2022 08:12 AM     Lab Results   Component Value Date/Time    Color YELLOW/STRAW 11/05/2020 09:11 AM    Appearance CLEAR 11/05/2020 09:11 AM    Specific gravity 1.012 11/05/2020 09:11 AM    pH (UA) 5.0 11/05/2020 09:11 AM    Protein Negative 11/05/2020 09:11 AM    Glucose Negative 11/05/2020 09:11 AM    Ketone Negative 11/05/2020 09:11 AM    Bilirubin Negative 11/05/2020 09:11 AM    Urobilinogen 0.2 11/05/2020 09:11 AM    Nitrites Negative 11/05/2020 09:11 AM    Leukocyte Esterase SMALL (A) 11/05/2020 09:11 AM    Epithelial cells MANY (A) 11/05/2020 09:11 AM    Bacteria Negative 11/05/2020 09:11 AM    WBC 5-10 11/05/2020 09:11 AM    RBC 0-5 11/05/2020 09:11 AM         Medications Reviewed:     Current Facility-Administered Medications   Medication Dose Route Frequency    docusate sodium (COLACE) capsule 100 mg  100 mg Oral BID    montelukast (SINGULAIR) tablet 10 mg  10 mg Oral DAILY    bumetanide (BUMEX) injection 1 mg  1 mg IntraVENous Q12H    insulin NPH (NOVOLIN N, HUMULIN N) injection 40 Units  40 Units SubCUTAneous Q12H    levothyroxine (SYNTHROID) tablet 100 mcg  100 mcg Oral Once per day on Mon Tue Wed Thu Fri Sat    cetirizine (ZYRTEC) tablet 10 mg  10 mg Oral DAILY    hydroxypropyl methylcellulose (ISOPTO TEARS) 0.5 % ophthalmic solution 1 Drop  1 Drop Both Eyes PRN    venlafaxine-SR (EFFEXOR-XR) capsule 75 mg  75 mg Oral DAILY WITH BREAKFAST    gabapentin (NEURONTIN) capsule 100 mg  100 mg Oral QHS    arformoteroL (BROVANA) neb solution 15 mcg  15 mcg Nebulization BID RT    And    budesonide (PULMICORT) 500 mcg/2 ml nebulizer suspension  500 mcg Nebulization BID RT    hydrOXYzine HCL (ATARAX) tablet 20 mg  20 mg Oral TID PRN    milrinone (PRIMACOR) 20 MG/100 ML D5W infusion  0.375 mcg/kg/min IntraVENous CONTINUOUS    heparin (porcine) injection 5,000 Units  5,000 Units SubCUTAneous Q8H    ivabradine (CORLANOR) tablet 5 mg  5 mg Oral BID WITH MEALS    sodium chloride (NS) flush 5-40 mL  5-40 mL IntraVENous Q8H    sodium chloride (NS) flush 5-40 mL  5-40 mL IntraVENous PRN    acetaminophen (TYLENOL) tablet 650 mg  650 mg Oral Q6H PRN    Or    acetaminophen (TYLENOL) suppository 650 mg  650 mg Rectal Q6H PRN    ondansetron (ZOFRAN ODT) tablet 4 mg  4 mg Oral Q8H PRN    Or    ondansetron (ZOFRAN) injection 4 mg  4 mg IntraVENous Q6H PRN    glucose chewable tablet 16 g  4 Tablet Oral PRN    dextrose (D50W) injection syrg 12.5-25 g  25-50 mL IntraVENous PRN    glucagon (GLUCAGEN) injection 1 mg  1 mg IntraMUSCular PRN    insulin lispro (HUMALOG) injection   SubCUTAneous AC&HS    albuterol-ipratropium (DUO-NEB) 2.5 MG-0.5 MG/3 ML  3 mL Nebulization Q6H PRN     ______________________________________________________________________  EXPECTED LENGTH OF STAY: 3d 19h  ACTUAL LENGTH OF STAY:          3                 Lisa Horne MD

## 2022-01-08 NOTE — PROGRESS NOTES
[address] [place] [address]               Division of Cardiology  [phone]                       Progress note              Stefani Yu M.D., F.A.CShardaC. NAME:  Deborah Salvador   :   1955   MRN:   [id number]         ICD-10-CM ICD-9-CM    1. Acute pulmonary edema (HCC)  J81.0 518.4    2. Elevated d-dimer  R79.89 790.92                  HPI  78years old female with a past medical history remarkable for nonischemic cardiomyopathy, chronic kidney disease, hypothyroidism, gastroesophageal reflux disease, diabetes who presented with worsening shortness of breath. To be noted she is also on home O2. She did run out of her home oxygen before her arrival.    Patient is closely followed by Dr. Cristi Cortes. Her shortness of breath is improved she tells me. Assessment/Plan: 1. Cardiomyopathy: Decompensated somewhat improved at this time. Continue with milrinone. Continue hydralazine Coreg Isordil. Ivabradine started yesterday no additional changes for now. Not a good candidate for Aldactone given renal dysfunction. We may have to consider home milrinone and is unfortunate lady. To be noted that cardiac catheterization  revealed no evidence of coronary artery disease. Status post BiV AICD in 2015. Renal dysfunction closely followed by nephrology. Continue p.o. diuretics for now. VQ scan low probability for pulmonary embolism. Chest x-ray noted upon admission with pulmonary edema. CARDIAC STUDIES      21    ECHO ADULT COMPLETE 2021    Interpretation Summary  · LV: Estimated LVEF is 15 - 20%. Normal cavity size. Upper normal wall thickness. Severely reduced systolic function. Left ventricular diastolic dysfunction. · LA: Moderately dilated left atrium. · RV: Borderline low systolic function. Pacer/ICD present. · RA: Mildly dilated right atrium.   · MV: Mitral valve non-specific thickening. Mild to moderate mitral valve regurgitation is present. · TV: Mild tricuspid valve regurgitation is present. · PA: Mild to moderate pulmonary hypertension. Pulmonary arterial systolic pressure is 47 mmHg. Signed by: Rupa Ramos MD on 12/8/2021  1:01 PM      01/04/22    NUCLEAR CARDIAC AMYLOID SPECT 01/07/2022 1/7/2022    Narrative  Clinical history: Amyloid    Plantar imaging of the chest was performed followed by SPECT imaging in the transverse, sagittal, and coronal planes utilizing  15  mCi Tc-99M PYP. Quantitative: The heart to contralateral lung ratio was 1.05  . Semiquantitative: Visual comparison of the cardiac uptake with the bone and uptake was performed. The visual score is grade 1. Impression  : PYP scan is equivocal for ATTR cardiac amyloidosis based on the H:CL ratio of  1.05 and semiquantitative score of 1. Signed by: Monalisa Aguilar MD on 1/7/2022 11:07 AM      12/06/21    CARDIAC PROCEDURE 12/14/2021 12/14/2021    Conclusion  Pulmonary pressure 76/48 with a pulmonary artery mean pressure of 57 mmHg. Right ventricular pressure 80/29  Right atrial pressure mean 20 is a mercury  Wedge pressure mean 35 mmHg. Aye cardiac output 4.98 L/min Aye cardiac index 2.26 L/min      Impression: High wedge pressure indicating volume overload that is  precapillary along with severe pulmonary hypertension    Signed by: Keith Julian MD on 12/14/2021  4:32 PM       @[email or web address]      IMAGING      CT Results (most recent):  Results from Hospital Encounter encounter on 01/16/18    CT ABD PELV WO CONT    Narrative  INDICATION: diarrhea/ vaginal bleeding    COMPARISON: None    TECHNIQUE:  Noncontrast thin axial images were obtained through the abdomen and pelvis. Coronal and sagittal reconstructions were generated.  CT dose reduction was  achieved through use of a standardized protocol tailored for this examination  and automatic exposure control for dose modulation. FINDINGS:  LUNG BASES: No abnormality. LIVER: No mass or biliary dilatation. GALLBLADDER: Unremarkable. SPLEEN: No enlargement or lesion. PANCREAS: No mass or ductal dilatation. ADRENALS: No mass. KIDNEYS: 9 mm calculus in the left renal pelvis without significant  hydronephrosis. Right extrarenal pelvis. Probable cyst lower pole right kidney. No right-sided hydronephrosis. Mild bilateral perinephric stranding. GI TRACT:  No bowel obstruction. Colonic diverticulosis without diverticulitis. Difficult to assess bowel wall thickening given lack of oral contrast material.  PERITONEUM: No free air or free fluid. APPENDIX: Unremarkable. RETROPERITONEUM: No lymphadenopathy or aortic aneurysm. ADDITIONAL COMMENTS: N/A.    URINARY BLADDER: Unremarkable. REPRODUCTIVE ORGANS: Uterus is present and contains calcified fibroids. LYMPH NODES:  None enlarged. FREE FLUID:  None. BONES: No destructive bone lesion. ADDITIONAL COMMENTS: N/A. Impression  IMPRESSION:    1. Colonic diverticulosis without diverticulitis. No bowel obstruction. 2. Uterine fibroids. 3. 9 mm calculus in the left renal pelvis without hydronephrosis. XR Results (most recent):  Results from Hospital Encounter encounter on 01/04/22    XR CHEST PORT    Narrative  INDICATION: respiratory distress    EXAM:  AP CHEST RADIOGRAPH    COMPARISON: December 15, 2021    FINDINGS:    AP portable view of the chest demonstrates left subclavian pacemaker. Heart size  is enlarged. Lung volumes are diminished with worsening interstitial airspace  opacities. No pneumothorax. The osseous structures are unremarkable. Impression  Interval worsening of interstitial/airspace opacities which again could be due  to pulmonary edema and/or viral/atypical pneumonia. MRI Results (most recent):  No results found for this or any previous visit.           Past Medical History:   Diagnosis Date    Acquired hypothyroidism 8/15/2016    Anemia RED-HF study    Asthma     Cardiomyopathy, nonischemic ([facility] [address]     initial dx 2001, bivHF 2008 with EF 15%, s/p biV-ICD 9/08, significant improvment in EF to 45-50%    CKD (chronic kidney disease)     Dr Ca Parents    CKD (chronic kidney disease) 8/15/2012    Depression     Diabetes ([facility] [address]     Diabetic neuropathy ([facility] [address]     DM (diabetes mellitus) ([facility] 8/15/2012    GERD (gastroesophageal reflux disease)     Gout     Hypothyroidism     ICD (implantable cardioverter-defibrillator), biventricular, in situ 6/5/2014    Psoriasis            Past Surgical History:   Procedure Laterality Date    CARDIAC CATHETERIZATION  2007; 01/06/15    normal cors    ECHO 2D ADULT  4/2010    EF 45%, improved from 1/09 (25%)    ECHO 2D ADULT  11/2011    LVH, EF 55-60%    HX ORTHOPAEDIC      knee    HX PACEMAKER PLACEMENT      AICD    STRESS TEST LEXISCAN/CARDIOLITE  3/21/12    normal perfusion, global HK 40%               Visit Vitals  /61 (BP 1 Location: Left upper arm, BP Patient Position: At rest)   Pulse 96   Temp 97.7 °F (36.5 °C)   Resp 20   Ht 5' 7\" (1.702 m)   Wt 266 lb 5.1 oz (120.8 kg)   SpO2 99%   BMI 41.71 kg/m²         Wt Readings from Last 3 Encounters:   01/06/22 266 lb 5.1 oz (120.8 kg)   12/22/21 255 lb 6.4 oz (115.8 kg)   12/15/21 256 lb 6.4 oz (116.3 kg)         Review of Systems:   Pertinent items are noted in the History of Present Illness. 01/08 0701 - 01/08 1900  In: -   Out: 300 [Urine:300]    No intake/output data recorded. Telemetry: normal sinus rhythm    Physical Exam:    Neck: no JVD  Heart: regular rate and rhythm  Lungs: diminished breath sounds R anterior, L anterior  Abdomen: soft, non-tender.  Bowel sounds normal. No masses,  no organomegaly  Extremities: edema 1-2+ b/l    Current Facility-Administered Medications   Medication Dose Route Frequency    insulin NPH (NOVOLIN N, HUMULIN N) injection 60 Units  60 Units SubCUTAneous Q12H    docusate sodium (COLACE) capsule 100 mg 100 mg Oral BID    insulin lispro (HUMALOG) injection 10 Units  10 Units SubCUTAneous TIDAC    carvediloL (COREG) tablet 6.25 mg  6.25 mg Oral BID WITH MEALS    hydrALAZINE (APRESOLINE) tablet 10 mg  10 mg Oral TID    isosorbide dinitrate (ISORDIL) tablet 10 mg  10 mg Oral TID    bumetanide (BUMEX) tablet 1 mg  1 mg Oral BID    montelukast (SINGULAIR) tablet 10 mg  10 mg Oral DAILY    levothyroxine (SYNTHROID) tablet 100 mcg  100 mcg Oral Once per day on Mon Tue Wed Thu Fri Sat    cetirizine (ZYRTEC) tablet 10 mg  10 mg Oral DAILY    hydroxypropyl methylcellulose (ISOPTO TEARS) 0.5 % ophthalmic solution 1 Drop  1 Drop Both Eyes PRN    venlafaxine-SR (EFFEXOR-XR) capsule 75 mg  75 mg Oral DAILY WITH BREAKFAST    gabapentin (NEURONTIN) capsule 100 mg  100 mg Oral QHS    arformoteroL (BROVANA) neb solution 15 mcg  15 mcg Nebulization BID RT    And    budesonide (PULMICORT) 500 mcg/2 ml nebulizer suspension  500 mcg Nebulization BID RT    hydrOXYzine HCL (ATARAX) tablet 20 mg  20 mg Oral TID PRN    milrinone (PRIMACOR) 20 MG/100 ML D5W infusion  0.2 mcg/kg/min IntraVENous CONTINUOUS    heparin (porcine) injection 5,000 Units  5,000 Units SubCUTAneous Q8H    ivabradine (CORLANOR) tablet 5 mg  5 mg Oral BID WITH MEALS    sodium chloride (NS) flush 5-40 mL  5-40 mL IntraVENous Q8H    sodium chloride (NS) flush 5-40 mL  5-40 mL IntraVENous PRN    acetaminophen (TYLENOL) tablet 650 mg  650 mg Oral Q6H PRN    Or    acetaminophen (TYLENOL) suppository 650 mg  650 mg Rectal Q6H PRN    ondansetron (ZOFRAN ODT) tablet 4 mg  4 mg Oral Q8H PRN    Or    ondansetron (ZOFRAN) injection 4 mg  4 mg IntraVENous Q6H PRN    glucose chewable tablet 16 g  4 Tablet Oral PRN    dextrose (D50W) injection syrg 12.5-25 g  25-50 mL IntraVENous PRN    glucagon (GLUCAGEN) injection 1 mg  1 mg IntraMUSCular PRN    insulin lispro (HUMALOG) injection   SubCUTAneous AC&HS    albuterol-ipratropium (DUO-NEB) 2.5 MG-0.5 MG/3 ML 3 mL Nebulization Q6H PRN         All Cardiac Markers in the last 24 hours:  No results found for: CPK, CK, CKMMB, CKMB, RCK3, CKMBT, CKMBPOC, CKNDX, CKND1, FARHEEN, TROPT, TROIQ, MADI, TROPT, TNIPOC, BNP, BNPP, BNPNT     Lab Results   Component Value Date/Time    Creatinine (POC) 2.1 (H) 01/23/2013 04:12 PM    Creatinine 3.50 (H) 01/08/2022 01:56 AM          Lab Results   Component Value Date/Time     (H) 01/16/2018 08:05 PM    CK - MB <1.0 01/16/2018 08:05 PM    CK-MB Index Cannot be calculated 01/16/2018 08:05 PM    Troponin-I, Qt. <0.04 01/16/2018 08:05 PM         Lab Results   Component Value Date/Time    WBC 10.3 01/08/2022 01:56 AM    Hemoglobin (POC) 10.9 (L) 01/23/2013 04:12 PM    HGB 8.1 (L) 01/08/2022 01:56 AM    Hematocrit (POC) 32 (L) 01/23/2013 04:12 PM    HCT 30.1 (L) 01/08/2022 01:56 AM    PLATELET 427 79/01/3687 01:56 AM    MCV 90.1 01/08/2022 01:56 AM         Lab Results   Component Value Date/Time    Sodium 133 (L) 01/08/2022 01:56 AM    Potassium 4.3 01/08/2022 01:56 AM    Chloride 97 01/08/2022 01:56 AM    CO2 29 01/08/2022 01:56 AM    Anion gap 7 01/08/2022 01:56 AM    Glucose 293 (H) 01/08/2022 01:56 AM    BUN 86 (H) 01/08/2022 01:56 AM    Creatinine 3.50 (H) 01/08/2022 01:56 AM    BUN/Creatinine ratio 25 (H) 01/08/2022 01:56 AM    GFR est AA 16 (L) 01/08/2022 01:56 AM    GFR est non-AA 13 (L) 01/08/2022 01:56 AM    Calcium 8.3 (L) 01/08/2022 01:56 AM         Lab Results   Component Value Date/Time    NT pro-BNP 18,242 (H) 01/06/2022 01:32 AM    NT pro-BNP 15,826 (H) 01/04/2022 03:17 AM    NT pro-BNP 11,177 (H) 12/15/2021 01:06 PM    NT pro-BNP 7,908 (H) 12/06/2021 02:44 PM    NT pro-BNP 21 01/23/2013 04:00 PM         Lab Results   Component Value Date/Time    Cholesterol, total 282 (H) 07/13/2021 04:00 PM    HDL Cholesterol 65 07/13/2021 04:00 PM    LDL, calculated 178.2 (H) 07/13/2021 04:00 PM    VLDL, calculated 38.8 07/13/2021 04:00 PM    Triglyceride 194 (H) 07/13/2021 04:00 PM CHOL/HDL Ratio 4.3 07/13/2021 04:00 PM         Lab Results   Component Value Date/Time    ALT (SGPT) 163 (H) 01/04/2022 03:17 AM    Alk.  phosphatase 143 (H) 01/04/2022 03:17 AM    Bilirubin, total 1.2 (H) 01/04/2022 03:17 AM

## 2022-01-08 NOTE — PROGRESS NOTES
Problem: Falls - Risk of  Goal: *Absence of Falls  Description: Document Louis [place] Fall Risk and appropriate interventions in the flowsheet.   Outcome: Progressing Towards Goal  Note: Fall Risk Interventions:  Mobility Interventions: Assess mobility with egress test,Bed/chair exit alarm,Communicate number of staff needed for ambulation/transfer         Medication Interventions: Assess postural VS orthostatic hypotension,Bed/chair exit alarm,Evaluate medications/consider consulting pharmacy    Elimination Interventions: Bed/chair exit alarm,Elevated toilet seat,Call light in reach              Problem: Patient Education: Go to Patient Education Activity  Goal: Patient/Family Education  Outcome: Progressing Towards Goal     Problem: Patient Education: Go to Patient Education Activity  Goal: Patient/Family Education  Outcome: Progressing Towards Goal     Problem: Patient Education: Go to Patient Education Activity  Goal: Patient/Family Education  Outcome: Progressing Towards Goal

## 2022-01-08 NOTE — PROGRESS NOTES
[facility]   [address] [facility], [id number] [place] [address]  Phone: [phone]   [phone]    [email or web address]     Nephrology Progress Note    Patient Name : Meera Thayer      : 1955     MRN : [id number]  Date of Admission : 2022  Date of Servive : 22    CC:  Follow up for RODNYE       Assessment and Plan   rodney - LIKELY due to cardiorenal syndrome  - continue w/ Bumex 1 mg BID   - DR. Neto Quiroz discussed that she is a poor candidate for hemodialysis. PD can be an option but I have not discussed this with her about this     - Daily labs, strict I.O and daily weights   - daily bmp    CKD stage IV:  - Etiology: DM, HTN, CRS  - renal US: suggestive of CKD, B/L benign renal cysts     ANEMIA  - start epogen     Acute on chronic HFrEF  NI CMP, LVEF 15 to 20%  NYHA class III-IV  S/p BiV pacer/ICD-s/p CRT  C 12/10/2021: PCWP 34, PA P 80   - per cards     Morbid obesity  Type II DM  HTN  Hypothyroidism  Pulmonary hypertension  Gout  Psoriasis        Interval History:  Seen and examined. Cr high but stable  Na low - stable 133      Review of Systems: Review of systems not obtained due to patient factors.     Current Medications:   Current Facility-Administered Medications   Medication Dose Route Frequency    insulin NPH (NOVOLIN N, HUMULIN N) injection 60 Units  60 Units SubCUTAneous Q12H    docusate sodium (COLACE) capsule 100 mg  100 mg Oral BID    insulin lispro (HUMALOG) injection 10 Units  10 Units SubCUTAneous TIDAC    carvediloL (COREG) tablet 6.25 mg  6.25 mg Oral BID WITH MEALS    hydrALAZINE (APRESOLINE) tablet 10 mg  10 mg Oral TID    isosorbide dinitrate (ISORDIL) tablet 10 mg  10 mg Oral TID    bumetanide (BUMEX) tablet 1 mg  1 mg Oral BID    montelukast (SINGULAIR) tablet 10 mg  10 mg Oral DAILY    levothyroxine (SYNTHROID) tablet 100 mcg  100 mcg Oral Once per day on Mon Tue Wed Thu Fri Sat    cetirizine (ZYRTEC) tablet 10 mg 10 mg Oral DAILY    hydroxypropyl methylcellulose (ISOPTO TEARS) 0.5 % ophthalmic solution 1 Drop  1 Drop Both Eyes PRN    venlafaxine-SR (EFFEXOR-XR) capsule 75 mg  75 mg Oral DAILY WITH BREAKFAST    gabapentin (NEURONTIN) capsule 100 mg  100 mg Oral QHS    arformoteroL (BROVANA) neb solution 15 mcg  15 mcg Nebulization BID RT    And    budesonide (PULMICORT) 500 mcg/2 ml nebulizer suspension  500 mcg Nebulization BID RT    hydrOXYzine HCL (ATARAX) tablet 20 mg  20 mg Oral TID PRN    milrinone (PRIMACOR) 20 MG/100 ML D5W infusion  0.2 mcg/kg/min IntraVENous CONTINUOUS    heparin (porcine) injection 5,000 Units  5,000 Units SubCUTAneous Q8H    ivabradine (CORLANOR) tablet 5 mg  5 mg Oral BID WITH MEALS    sodium chloride (NS) flush 5-40 mL  5-40 mL IntraVENous Q8H    sodium chloride (NS) flush 5-40 mL  5-40 mL IntraVENous PRN    acetaminophen (TYLENOL) tablet 650 mg  650 mg Oral Q6H PRN    Or    acetaminophen (TYLENOL) suppository 650 mg  650 mg Rectal Q6H PRN    ondansetron (ZOFRAN ODT) tablet 4 mg  4 mg Oral Q8H PRN    Or    ondansetron (ZOFRAN) injection 4 mg  4 mg IntraVENous Q6H PRN    glucose chewable tablet 16 g  4 Tablet Oral PRN    dextrose (D50W) injection syrg 12.5-25 g  25-50 mL IntraVENous PRN    glucagon (GLUCAGEN) injection 1 mg  1 mg IntraMUSCular PRN    insulin lispro (HUMALOG) injection   SubCUTAneous AC&HS    albuterol-ipratropium (DUO-NEB) 2.5 MG-0.5 MG/3 ML  3 mL Nebulization Q6H PRN      Allergies   Allergen Reactions    Ciprofloxacin Anaphylaxis    Shellfish Derived Anaphylaxis    Ace Inhibitors Unknown (comments)    Biaxin [Clarithromycin] Other (comments)     Metal taste    Candesartan Cough    Pcn [Penicillins] Hives       Objective:  Vitals:    Vitals:    01/08/22 0848 01/08/22 1033 01/08/22 1127 01/08/22 1229   BP: 137/84  111/61    Pulse: 96 (!) 106 90 96   Resp: 20  20    Temp: 97.6 °F (36.4 °C)  97.7 °F (36.5 °C)    SpO2: 100%  99%    Weight:       Height: Intake and Output:  01/08 0701 - 01/08 1900  In: -   Out: 300 [Urine:300]  No intake/output data recorded. Physical Examination:    GEN:  NAD  NECK:  Supple, no thyromegaly  RESP: Clear  b/l, no  wheezing,   CVS: RRR,S1,S2   NEURO: non focal, normal speech  EXT: Edema +nt            []    High complexity decision making was performed  []    Patient is at high-risk of decompensation with multiple organ involvement    Lab Data Personally Reviewed: I have reviewed all the pertinent labs, microbiology data and radiology studies during assessment.     Recent Labs     01/08/22 0156 01/06/22 2325 01/06/22 0132   * 133* 133*   K 4.3 4.2 4.5   CL 97 97 96*   CO2 29 28 27   * 283* 264*   BUN 86* 85* 83*   CREA 3.50* 3.49* 3.36*   CA 8.3* 8.1* 8.7   MG 2.4 2.1 2.4     Recent Labs     01/08/22 0156 01/06/22 2325 01/06/22 0132   WBC 10.3 11.1* 11.3*   HGB 8.1* 7.8* 8.4*   HCT 30.1* 28.5* 31.6*    281 298     Lab Results   Component Value Date/Time    Specimen Description: URINE 10/22/2013 01:32 PM    Specimen Description: URINE 01/23/2013 04:40 PM    Specimen Description: LEG TISSUE 02/12/2009 12:00 AM    Specimen Description: LEG (LEFT) 01/29/2009 03:06 AM     Lab Results   Component Value Date/Time    Culture result: MIXED SKIN ROSANNA ISOLATED 10/22/2013 01:32 PM    Culture result:  01/23/2013 04:40 PM     ENTEROCOCCUS FAECALIS GROUP D  MIXED SKIN ROSANNA ISOLATED    Culture result:  02/12/2009 12:00 AM     LIGHT STAPHYLOCOCCUS EPIDERMIDIS (OXACILLIN RESISTANT)  LIGHT 2ND STRAIN OF STAPHYLOCOCCUS EPIDERMIDIS (OXACILLIN RESISTANT)    Culture result: NO GROWTH 2 DAYS 01/29/2009 03:06 AM     Recent Results (from the past 24 hour(s))   GLUCOSE, POC    Collection Time: 01/07/22  5:53 PM   Result Value Ref Range    Glucose (POC) 273 (H) 65 - 117 mg/dL    Performed by [address] [place] [address] [place], POC    Collection Time: 01/07/22  9:41 PM   Result Value Ref Range    Glucose (POC) 353 (H) 65 - 117 mg/dL Performed by Yuriy Allen    MAGNESIUM    Collection Time: 01/08/22  1:56 AM   Result Value Ref Range    Magnesium 2.4 1.6 - 2.4 mg/dL   METABOLIC PANEL, BASIC    Collection Time: 01/08/22  1:56 AM   Result Value Ref Range    Sodium 133 (L) 136 - 145 mmol/L    Potassium 4.3 3.5 - 5.1 mmol/L    Chloride 97 97 - 108 mmol/L    CO2 29 21 - 32 mmol/L    Anion gap 7 5 - 15 mmol/L    Glucose 293 (H) 65 - 100 mg/dL    BUN 86 (H) 6 - 20 MG/DL    Creatinine 3.50 (H) 0.55 - 1.02 MG/DL    BUN/Creatinine ratio 25 (H) 12 - 20      GFR est AA 16 (L) >60 ml/min/1.73m2    GFR est non-AA 13 (L) >60 ml/min/1.73m2    Calcium 8.3 (L) 8.5 - 10.1 MG/DL   CBC W/O DIFF    Collection Time: 01/08/22  1:56 AM   Result Value Ref Range    WBC 10.3 3.6 - 11.0 K/uL    RBC 3.34 (L) 3.80 - 5.20 M/uL    HGB 8.1 (L) 11.5 - 16.0 g/dL    HCT 30.1 (L) 35.0 - 47.0 %    MCV 90.1 80.0 - 99.0 FL    MCH 24.3 (L) 26.0 - 34.0 PG    MCHC 26.9 (L) 30.0 - 36.5 g/dL    RDW 18.8 (H) 11.5 - 14.5 %    PLATELET 115 750 - 394 K/uL    MPV 9.6 8.9 - 12.9 FL    NRBC 0.9 (H) 0  WBC    ABSOLUTE NRBC 0.09 (H) 0.00 - 0.01 K/uL   GLUCOSE, POC    Collection Time: 01/08/22  8:54 AM   Result Value Ref Range    Glucose (POC) 181 (H) 65 - 117 mg/dL    Performed by Roxanne CADENA    GLUCOSE, POC    Collection Time: 01/08/22 11:42 AM   Result Value Ref Range    Glucose (POC) 183 (H) 65 - 117 mg/dL    Performed by Roxanne CADENA            I have reviewed the flowsheets. Chart and Pertinent Notes have been reviewed. No change in PMH ,family and social history from Consult note.       Emelyn Mejia [facility]

## 2022-01-08 NOTE — PROGRESS NOTES
Spiritual Care Assessment/Progress Note  [facility]      NAME: Traci Muhammad      MRN: [id number]  AGE: 78 y.o. SEX: female  Zoroastrian Affiliation: Non Religion   Language: English     1/8/2022     Total Time (in minutes): 15     Spiritual Assessment begun in [facility] [place] [facility] IMCU through conversation with:         []Patient        [] Family    [] Friend(s)        Reason for Consult: Initial visit     Spiritual beliefs: (Please include comment if needed)     [] Identifies with a ariel tradition:         [] Supported by a ariel community:            [] Claims no spiritual orientation:           [] Seeking spiritual identity:                [] Adheres to an individual form of spirituality:           [x] Not able to assess:                           Identified resources for coping:      [] Prayer                               [] Music                  [] Guided Imagery     [] Family/friends                 [] Pet visits     [] Devotional reading                         [x] Unknown     [] Other:                                               Interventions offered during this visit: (See comments for more details)    Patient Interventions: Initial visit           Plan of Care:     [x] Support spiritual and/or cultural needs    [] Support AMD and/or advance care planning process      [] Support grieving process   [] Coordinate Rites and/or Rituals    [] Coordination with community clergy   [] No spiritual needs identified at this time   [] Detailed Plan of Care below (See Comments)  [] Make referral to Music Therapy  [] Make referral to Pet Therapy     [] Make referral to Addiction services  [] Make referral to [facility]  [] Make referral to Spiritual Care Partner  [] No future visits requested        [] Contact Spiritual Care for further referrals     Comments: Visit for spiritual assessment in Room 417. Patient appeared to be sleeping. Provided ministry of presence and silent prayer.  Please contact Spiritual Care for any further referrals. Visited by: Poornima Castillo, [address] [place] [address] paging Service [phone])

## 2022-01-08 NOTE — PROGRESS NOTES
[address] [facility]  Hospitalist Group                                                                                          Hospitalist Progress Note  Diony Valle MD  Answering service: [phone] from in house phone        Date of Service:  2022  NAME:  Jovan Shetty  :  1955  MRN:  [id number]      Admission Summary:     Jovan Shetty is a 78 y.o. female with hx NICM, chronic hypoxic respiratory failure 2/2 pulmonary htn, ckd, hypothyroidism, GERD, and  DM II who presents as a transfer from [facility] for acute on chronic hypoxic respiratory failure. She wears 3L o2 at baseline, and reportedly ran out of her home oxygen.     In the ED, she was hypoxic to th 70's, with improvement ot 94% on 4Lnc. Labs showed stable anemia with Hgb 10, K+ 5.2, lactic 3.1, trop 55>66, d-dimer >35, creatinine 2.54, and probnp 15,826. CXR showed diffuse interstitial airway disease. Rapid covid was negative.     In the ED, she was started on a heparin gtt, and sent to [facility] for VQ scan. She received bumex and nitropaste. Interval history / Subjective:      No acute complaints, breathing better but still dyspneic, no n/v/d. Assessment & Plan:     Acute on chronic respiratory failure with oxygen  Likely due to acute pulmonary edema in the setting of acute systolic heart failure exacerbation. COVID-19 negative. Mild leukocytosis. Afebrile. VQ scan low probability for PE.    CXR positive for interstitial airspace disease likely pulmonary edema or atypical pneumonia. Improving, SPO2 WNL on 4 L nasal cannula. Continue Bumex, supplemental, cardiac diet, as needed DuoNeb's. Continue treating underlying acute systolic heart failure. Acute on chronic systolic heart failure  NYHA class IV. Status post ICD placement. LVEF 15 to 20%. proBNP [id number].   Continue Bumex, Coreg and milrinone, ivabridine per cardiology  Not a candidate for ARB/ACE due to renal insufficiency. Cardiac diet, strict I/O, daily weight. Low-sodium diet. Might need home milrinone    RODNEY on CKD stage IV felt to be due to cardiorenal syndrome  At baseline, electrolytes WNL  Monitor volume status electrolytes, replace electrolytes as needed. 5/2022. Renal ultrasound negative for hydronephrosis. Small bilateral renal cysts. Avoid nephrotoxic meds, renally dose medications. Nephrology on board, recommendations noted. Hyperkalemia  Resolved, hyperkalemia protocol. Daily BMP. Low potassium diet. Hyponatremia   Improving, likely diuretic induced. Daily BMP, monitor volume status and electrolytes. Leukocytosis  CXR positive for interstitial airspace disease likely pulmonary edema or atypical pneumonia. Afebrile. Respiratory panel negative for COVID-19. Monitor closely, start on empiric antibiotics if any sign of infection. Type 2 diabetes. Hemoglobin A1c 7.3%. Continue basal, prandial and correctional insulin. Adjust meds as needed. Hypoglycemic protocol, Accu-Chek. Resume home meds upon discharge. Outpatient PCP and endocrinology follow-up    Hypothyroidism   Resume Synthroid. Depression  Denies any suicidal or homicidal ideation. Continue current meds. Morbid obesity   BMI 41.7. Diet and lifestyle modification recommended. Psoriasis   Multiple plaque psoriasis in the extensor surface of upper extremities. Outpatient PCP and rheumatology follow-up. Code status: Full Code  DVT prophylaxis: heparin     Care Plan discussed with: Patient/Family, Nurse and   Anticipated Disposition: TBD  Anticipated Discharge: Greater than 48 hours     Hospital Problems  Date Reviewed: 12/22/2021          Codes Class Noted POA    Acute respiratory failure with hypoxia [facility]) ICD-10-CM: J96.01  ICD-9-CM: 518.81  1/4/2022 Unknown              Vital Signs:    Last 24hrs VS reviewed since prior progress note.  Most recent are:  Visit Vitals  /72 (BP 1 Location: Right upper arm, BP Patient Position: At rest)   Pulse (!) 108   Temp 98 °F (36.7 °C)   Resp 22   Ht 5' 7\" (1.702 m)   Wt 116.6 kg (257 lb)   SpO2 100%   BMI 40.25 kg/m²         Intake/Output Summary (Last 24 hours) at 1/8/2022 1528  Last data filed at 1/8/2022 1149  Gross per 24 hour   Intake    Output 300 ml   Net -300 ml        Physical Examination:     I had a face to face encounter with this patient and independently examined them on 1/8/2022 as outlined below:          Constitutional:  No acute distress, cooperative, pleasant    ENT:  Oral mucosa moist, oropharynx benign. Resp:  Bibasilar crackles. No wheezing/rhonchi/rales. No accessory muscle use   CV:  Regular rhythm, normal rate, no murmurs, gallops, rubs    GI:  Soft, non distended, non tender. normoactive bowel sounds     Musculoskeletal:  Bilateral pretibial and pedal edema    Neurologic:  Moves all extremities. AAOx3, CN II-XII reviewed            Data Review:    Review and/or order of clinical lab test  Review and/or order of tests in the radiology section of CPT  Review and/or order of tests in the medicine section of CPT      Labs:     Recent Labs     01/08/22 0156 01/06/22 2325   WBC 10.3 11.1*   HGB 8.1* 7.8*   HCT 30.1* 28.5*    281     Recent Labs     01/08/22 0156 01/06/22 2325 01/06/22 0132   * 133* 133*   K 4.3 4.2 4.5   CL 97 97 96*   CO2 29 28 27   BUN 86* 85* 83*   CREA 3.50* 3.49* 3.36*   * 283* 264*   CA 8.3* 8.1* 8.7   MG 2.4 2.1 2.4     No results for input(s): ALT, AP, TBIL, TBILI, TP, ALB, GLOB, GGT, AML, LPSE in the last 72 hours. No lab exists for component: SGOT, GPT, AMYP, HLPSE  Recent Labs     01/06/22  0132   APTT 26.7      No results for input(s): FE, TIBC, PSAT, FERR in the last 72 hours. No results found for: FOL, RBCF   No results for input(s): PH, PCO2, PO2 in the last 72 hours. No results for input(s): CPK, CKNDX, TROIQ in the last 72 hours.     No lab exists for component: CPKMB  Lab Results Component Value Date/Time    Cholesterol, total 282 (H) 07/13/2021 04:00 PM    HDL Cholesterol 65 07/13/2021 04:00 PM    LDL, calculated 178.2 (H) 07/13/2021 04:00 PM    Triglyceride 194 (H) 07/13/2021 04:00 PM    CHOL/HDL Ratio 4.3 07/13/2021 04:00 PM     Lab Results   Component Value Date/Time    Glucose (POC) 183 (H) 01/08/2022 11:42 AM    Glucose (POC) 181 (H) 01/08/2022 08:54 AM    Glucose (POC) 353 (H) 01/07/2022 09:41 PM    Glucose (POC) 273 (H) 01/07/2022 05:53 PM    Glucose (POC) 284 (H) 01/07/2022 01:23 PM     Lab Results   Component Value Date/Time    Color YELLOW/STRAW 11/05/2020 09:11 AM    Appearance CLEAR 11/05/2020 09:11 AM    Specific gravity 1.012 11/05/2020 09:11 AM    pH (UA) 5.0 11/05/2020 09:11 AM    Protein Negative 11/05/2020 09:11 AM    Glucose Negative 11/05/2020 09:11 AM    Ketone Negative 11/05/2020 09:11 AM    Bilirubin Negative 11/05/2020 09:11 AM    Urobilinogen 0.2 11/05/2020 09:11 AM    Nitrites Negative 11/05/2020 09:11 AM    Leukocyte Esterase SMALL (A) 11/05/2020 09:11 AM    Epithelial cells MANY (A) 11/05/2020 09:11 AM    Bacteria Negative 11/05/2020 09:11 AM    WBC 5-10 11/05/2020 09:11 AM    RBC 0-5 11/05/2020 09:11 AM         Medications Reviewed:     Current Facility-Administered Medications   Medication Dose Route Frequency    insulin NPH (NOVOLIN N, HUMULIN N) injection 60 Units  60 Units SubCUTAneous Q12H    epoetin isabel-epbx (RETACRIT) injection 20,000 Units  20,000 Units SubCUTAneous Q TUE, THU & SAT    docusate sodium (COLACE) capsule 100 mg  100 mg Oral BID    insulin lispro (HUMALOG) injection 10 Units  10 Units SubCUTAneous TIDAC    carvediloL (COREG) tablet 6.25 mg  6.25 mg Oral BID WITH MEALS    hydrALAZINE (APRESOLINE) tablet 10 mg  10 mg Oral TID    isosorbide dinitrate (ISORDIL) tablet 10 mg  10 mg Oral TID    bumetanide (BUMEX) tablet 1 mg  1 mg Oral BID    montelukast (SINGULAIR) tablet 10 mg  10 mg Oral DAILY    levothyroxine (SYNTHROID) tablet 100 mcg  100 mcg Oral Once per day on Mon Tue Wed Thu Fri Sat    cetirizine (ZYRTEC) tablet 10 mg  10 mg Oral DAILY    hydroxypropyl methylcellulose (ISOPTO TEARS) 0.5 % ophthalmic solution 1 Drop  1 Drop Both Eyes PRN    venlafaxine-SR (EFFEXOR-XR) capsule 75 mg  75 mg Oral DAILY WITH BREAKFAST    gabapentin (NEURONTIN) capsule 100 mg  100 mg Oral QHS    arformoteroL (BROVANA) neb solution 15 mcg  15 mcg Nebulization BID RT    And    budesonide (PULMICORT) 500 mcg/2 ml nebulizer suspension  500 mcg Nebulization BID RT    hydrOXYzine HCL (ATARAX) tablet 20 mg  20 mg Oral TID PRN    milrinone (PRIMACOR) 20 MG/100 ML D5W infusion  0.2 mcg/kg/min IntraVENous CONTINUOUS    heparin (porcine) injection 5,000 Units  5,000 Units SubCUTAneous Q8H    ivabradine (CORLANOR) tablet 5 mg  5 mg Oral BID WITH MEALS    sodium chloride (NS) flush 5-40 mL  5-40 mL IntraVENous Q8H    sodium chloride (NS) flush 5-40 mL  5-40 mL IntraVENous PRN    acetaminophen (TYLENOL) tablet 650 mg  650 mg Oral Q6H PRN    Or    acetaminophen (TYLENOL) suppository 650 mg  650 mg Rectal Q6H PRN    ondansetron (ZOFRAN ODT) tablet 4 mg  4 mg Oral Q8H PRN    Or    ondansetron (ZOFRAN) injection 4 mg  4 mg IntraVENous Q6H PRN    glucose chewable tablet 16 g  4 Tablet Oral PRN    dextrose (D50W) injection syrg 12.5-25 g  25-50 mL IntraVENous PRN    glucagon (GLUCAGEN) injection 1 mg  1 mg IntraMUSCular PRN    insulin lispro (HUMALOG) injection   SubCUTAneous AC&HS    albuterol-ipratropium (DUO-NEB) 2.5 MG-0.5 MG/3 ML  3 mL Nebulization Q6H PRN     ______________________________________________________________________  EXPECTED LENGTH OF STAY: 3d 19h  ACTUAL LENGTH OF STAY:          4                 Lita Sevilla MD

## 2022-01-09 NOTE — PROGRESS NOTES
Bedside and Verbal shift change report given to Poornima Camargo RN (oncoming nurse) by Vonnie Tamayo RN (offgoing nurse). Report included the following information SBAR, Kardex, Procedure Summary, Intake/Output, MAR, Accordion, Recent Results and Cardiac Rhythm Letha TOBAR.

## 2022-01-09 NOTE — PROGRESS NOTES
[address] [place] [address]               Division of Cardiology  [phone]                       Progress note              Rojelio Antunez M.D., ERASMO NAME:  Alex Schroeder   :   1955   MRN:   [id number]         ICD-10-CM ICD-9-CM    1. Acute pulmonary edema (HCC)  J81.0 518.4    2. Elevated d-dimer  R79.89 790.92    3. Cardiomyopathy, nonischemic (HCC)  I42.8 425.4                  HPI  78years old female with a past medical history remarkable for nonischemic cardiomyopathy, chronic kidney disease, hypothyroidism, gastroesophageal reflux disease, diabetes who presented with worsening shortness of breath. To be noted she is also on home O2. She did run out of her home oxygen before her arrival.     Patient is closely followed by Dr. Rashi Valdivia.     Her shortness of breath is improved she tells me. Assessment/Plan: 1. Cardiomyopathy: She is improving. Continue hydralazine Coreg and Isordil for now. Ivabradine started no additional changes for now. Not a good candidate for Aldactone given renal dysfunction. We have discussed the use of milrinone. She does not want to stop milrinone and she is willing to proceed with home milrinone therapy. She will discuss that further tomorrow with Dr. Rashi Valdivia. Renal dysfunction closely followed by nephrology. She has had a VQ scan with low probability for pulmonary embolism. She is not a candidate for heart transplant or LVAD on account of severe renal dysfunction. Apparently not a candidate for hemodialysis either at this time. BiV ICD generator change a consideration and still been evaluated by Dr. Rashi Valdivia. No previous history of coronary disease by cardiac catheterization x2. Dr. Rashi Valdivia to see in a.m. CARDIAC STUDIES      21    ECHO ADULT COMPLETE 2021    Interpretation Summary  · LV: Estimated LVEF is 15 - 20%.  Normal cavity size. Upper normal wall thickness. Severely reduced systolic function. Left ventricular diastolic dysfunction. · LA: Moderately dilated left atrium. · RV: Borderline low systolic function. Pacer/ICD present. · RA: Mildly dilated right atrium. · MV: Mitral valve non-specific thickening. Mild to moderate mitral valve regurgitation is present. · TV: Mild tricuspid valve regurgitation is present. · PA: Mild to moderate pulmonary hypertension. Pulmonary arterial systolic pressure is 47 mmHg. Signed by: Haleigh Nevarez MD on 12/8/2021  1:01 PM      01/04/22    NUCLEAR CARDIAC AMYLOID SPECT 01/07/2022 1/7/2022    Narrative  Clinical history: Amyloid    Plantar imaging of the chest was performed followed by SPECT imaging in the transverse, sagittal, and coronal planes utilizing  15  mCi Tc-99M PYP. Quantitative: The heart to contralateral lung ratio was 1.05  . Semiquantitative: Visual comparison of the cardiac uptake with the bone and uptake was performed. The visual score is grade 1. Impression  : PYP scan is equivocal for ATTR cardiac amyloidosis based on the H:CL ratio of  1.05 and semiquantitative score of 1. Signed by: Tiffany Dsouza MD on 1/7/2022 11:07 AM      12/06/21    CARDIAC PROCEDURE 12/14/2021 12/14/2021    Conclusion  Pulmonary pressure 76/48 with a pulmonary artery mean pressure of 57 mmHg. Right ventricular pressure 80/29  Right atrial pressure mean 20 is a mercury  Wedge pressure mean 35 mmHg.   Aye cardiac output 4.98 L/min Aye cardiac index 2.26 L/min      Impression: High wedge pressure indicating volume overload that is  precapillary along with severe pulmonary hypertension    Signed by: Katiana Ceron MD on 12/14/2021  4:32 PM       @[email or web address]      IMAGING      CT Results (most recent):  Results from Hospital Encounter encounter on 01/16/18    CT ABD PELV WO CONT    Narrative  INDICATION: diarrhea/ vaginal bleeding    COMPARISON: None    TECHNIQUE:  Noncontrast thin axial images were obtained through the abdomen and pelvis. Coronal and sagittal reconstructions were generated. CT dose reduction was  achieved through use of a standardized protocol tailored for this examination  and automatic exposure control for dose modulation. FINDINGS:  LUNG BASES: No abnormality. LIVER: No mass or biliary dilatation. GALLBLADDER: Unremarkable. SPLEEN: No enlargement or lesion. PANCREAS: No mass or ductal dilatation. ADRENALS: No mass. KIDNEYS: 9 mm calculus in the left renal pelvis without significant  hydronephrosis. Right extrarenal pelvis. Probable cyst lower pole right kidney. No right-sided hydronephrosis. Mild bilateral perinephric stranding. GI TRACT:  No bowel obstruction. Colonic diverticulosis without diverticulitis. Difficult to assess bowel wall thickening given lack of oral contrast material.  PERITONEUM: No free air or free fluid. APPENDIX: Unremarkable. RETROPERITONEUM: No lymphadenopathy or aortic aneurysm. ADDITIONAL COMMENTS: N/A.    URINARY BLADDER: Unremarkable. REPRODUCTIVE ORGANS: Uterus is present and contains calcified fibroids. LYMPH NODES:  None enlarged. FREE FLUID:  None. BONES: No destructive bone lesion. ADDITIONAL COMMENTS: N/A. Impression  IMPRESSION:    1. Colonic diverticulosis without diverticulitis. No bowel obstruction. 2. Uterine fibroids. 3. 9 mm calculus in the left renal pelvis without hydronephrosis. XR Results (most recent):  Results from Hospital Encounter encounter on 01/04/22    XR CHEST PORT    Narrative  INDICATION: respiratory distress    EXAM:  AP CHEST RADIOGRAPH    COMPARISON: December 15, 2021    FINDINGS:    AP portable view of the chest demonstrates left subclavian pacemaker. Heart size  is enlarged. Lung volumes are diminished with worsening interstitial airspace  opacities. No pneumothorax. The osseous structures are unremarkable.     Impression  Interval worsening of interstitial/airspace opacities which again could be due  to pulmonary edema and/or viral/atypical pneumonia. MRI Results (most recent):  No results found for this or any previous visit. Past Medical History:   Diagnosis Date    Acquired hypothyroidism 8/15/2016    Anemia     RED-HF study    Asthma     Cardiomyopathy, nonischemic ([facility] [address]     initial dx 2001, bivHF 2008 with EF 15%, s/p biV-ICD 9/08, significant improvment in EF to 45-50%    CKD (chronic kidney disease)     Dr Ca Parents    CKD (chronic kidney disease) 8/15/2012    Depression     Diabetes ([facility] [address]     Diabetic neuropathy ([facility] [address]     DM (diabetes mellitus) ([facility] [address] 8/15/2012    GERD (gastroesophageal reflux disease)     Gout     Hypothyroidism     ICD (implantable cardioverter-defibrillator), biventricular, in situ 6/5/2014    Psoriasis            Past Surgical History:   Procedure Laterality Date    CARDIAC CATHETERIZATION  2007; 01/06/15    normal cors    ECHO 2D ADULT  4/2010    EF 45%, improved from 1/09 (25%)    ECHO 2D ADULT  11/2011    LVH, EF 55-60%    HX ORTHOPAEDIC      knee    HX PACEMAKER PLACEMENT      AICD    STRESS TEST LEXISCAN/CARDIOLITE  3/21/12    normal perfusion, global HK 40%               Visit Vitals  BP (!) 157/88 (BP 1 Location: Right upper arm, BP Patient Position: At rest;Supine)   Pulse 100   Temp 98.3 °F (36.8 °C)   Resp 23   Ht 5' 7\" (1.702 m)   Wt 257 lb (116.6 kg)   SpO2 100%   BMI 40.25 kg/m²         Wt Readings from Last 3 Encounters:   01/08/22 257 lb (116.6 kg)   12/22/21 255 lb 6.4 oz (115.8 kg)   12/15/21 256 lb 6.4 oz (116.3 kg)         Review of Systems:   Pertinent items are noted in the History of Present Illness. No intake/output data recorded. 01/07 1901 - 01/09 0700  In: -   Out: 300 [Urine:300]      Telemetry: normal sinus rhythm    Physical Exam:    Neck: no JVD  Heart: regular rate and rhythm  Lungs: diminished breath sounds R base, L base  Abdomen: soft, non-tender. Bowel sounds normal. No masses,  no organomegaly  Extremities: edema 1+    Current Facility-Administered Medications   Medication Dose Route Frequency    insulin NPH (NOVOLIN N, HUMULIN N) injection 60 Units  60 Units SubCUTAneous Q12H    epoetin isabel-epbx (RETACRIT) injection 20,000 Units  20,000 Units SubCUTAneous Q TUE, THU & SAT    docusate sodium (COLACE) capsule 100 mg  100 mg Oral BID    insulin lispro (HUMALOG) injection 10 Units  10 Units SubCUTAneous TIDAC    carvediloL (COREG) tablet 6.25 mg  6.25 mg Oral BID WITH MEALS    hydrALAZINE (APRESOLINE) tablet 10 mg  10 mg Oral TID    isosorbide dinitrate (ISORDIL) tablet 10 mg  10 mg Oral TID    bumetanide (BUMEX) tablet 1 mg  1 mg Oral BID    montelukast (SINGULAIR) tablet 10 mg  10 mg Oral DAILY    levothyroxine (SYNTHROID) tablet 100 mcg  100 mcg Oral Once per day on Mon Tue Wed Thu Fri Sat    cetirizine (ZYRTEC) tablet 10 mg  10 mg Oral DAILY    hydroxypropyl methylcellulose (ISOPTO TEARS) 0.5 % ophthalmic solution 1 Drop  1 Drop Both Eyes PRN    venlafaxine-SR (EFFEXOR-XR) capsule 75 mg  75 mg Oral DAILY WITH BREAKFAST    gabapentin (NEURONTIN) capsule 100 mg  100 mg Oral QHS    arformoteroL (BROVANA) neb solution 15 mcg  15 mcg Nebulization BID RT    And    budesonide (PULMICORT) 500 mcg/2 ml nebulizer suspension  500 mcg Nebulization BID RT    hydrOXYzine HCL (ATARAX) tablet 20 mg  20 mg Oral TID PRN    milrinone (PRIMACOR) 20 MG/100 ML D5W infusion  0.2 mcg/kg/min IntraVENous CONTINUOUS    heparin (porcine) injection 5,000 Units  5,000 Units SubCUTAneous Q8H    ivabradine (CORLANOR) tablet 5 mg  5 mg Oral BID WITH MEALS    sodium chloride (NS) flush 5-40 mL  5-40 mL IntraVENous Q8H    sodium chloride (NS) flush 5-40 mL  5-40 mL IntraVENous PRN    acetaminophen (TYLENOL) tablet 650 mg  650 mg Oral Q6H PRN    Or    acetaminophen (TYLENOL) suppository 650 mg  650 mg Rectal Q6H PRN    ondansetron (ZOFRAN ODT) tablet 4 mg  4 mg Oral Q8H PRN    Or    ondansetron (ZOFRAN) injection 4 mg  4 mg IntraVENous Q6H PRN    glucose chewable tablet 16 g  4 Tablet Oral PRN    dextrose (D50W) injection syrg 12.5-25 g  25-50 mL IntraVENous PRN    glucagon (GLUCAGEN) injection 1 mg  1 mg IntraMUSCular PRN    insulin lispro (HUMALOG) injection   SubCUTAneous AC&HS    albuterol-ipratropium (DUO-NEB) 2.5 MG-0.5 MG/3 ML  3 mL Nebulization Q6H PRN         All Cardiac Markers in the last 24 hours:  No results found for: CPK, CK, CKMMB, CKMB, RCK3, CKMBT, CKMBPOC, CKNDX, CKND1, FARHEEN, TROPT, TROIQ, MADI, TROPT, TNIPOC, BNP, BNPP, BNPNT     Lab Results   Component Value Date/Time    Creatinine (POC) 2.1 (H) 01/23/2013 04:12 PM    Creatinine 3.04 (H) 01/09/2022 05:10 AM          Lab Results   Component Value Date/Time     (H) 01/16/2018 08:05 PM    CK - MB <1.0 01/16/2018 08:05 PM    CK-MB Index Cannot be calculated 01/16/2018 08:05 PM    Troponin-I, Qt. <0.04 01/16/2018 08:05 PM         Lab Results   Component Value Date/Time    WBC 10.3 01/08/2022 01:56 AM    Hemoglobin (POC) 10.9 (L) 01/23/2013 04:12 PM    HGB 8.1 (L) 01/08/2022 01:56 AM    Hematocrit (POC) 32 (L) 01/23/2013 04:12 PM    HCT 30.1 (L) 01/08/2022 01:56 AM    PLATELET 050 10/07/6250 01:56 AM    MCV 90.1 01/08/2022 01:56 AM         Lab Results   Component Value Date/Time    Sodium 136 01/09/2022 05:10 AM    Potassium 4.0 01/09/2022 05:10 AM    Chloride 101 01/09/2022 05:10 AM    CO2 30 01/09/2022 05:10 AM    Anion gap 5 01/09/2022 05:10 AM    Glucose 93 01/09/2022 05:10 AM    BUN 78 (H) 01/09/2022 05:10 AM    Creatinine 3.04 (H) 01/09/2022 05:10 AM    BUN/Creatinine ratio 26 (H) 01/09/2022 05:10 AM    GFR est AA 19 (L) 01/09/2022 05:10 AM    GFR est non-AA 15 (L) 01/09/2022 05:10 AM    Calcium 9.3 01/09/2022 05:10 AM         Lab Results   Component Value Date/Time    NT pro-BNP 18,242 (H) 01/06/2022 01:32 AM    NT pro-BNP 15,826 (H) 01/04/2022 03:17 AM    NT pro-BNP 11,177 (H) 12/15/2021 01:06 PM    NT pro-BNP 7,908 (H) 12/06/2021 02:44 PM    NT pro-BNP 21 01/23/2013 04:00 PM         Lab Results   Component Value Date/Time    Cholesterol, total 282 (H) 07/13/2021 04:00 PM    HDL Cholesterol 65 07/13/2021 04:00 PM    LDL, calculated 178.2 (H) 07/13/2021 04:00 PM    VLDL, calculated 38.8 07/13/2021 04:00 PM    Triglyceride 194 (H) 07/13/2021 04:00 PM    CHOL/HDL Ratio 4.3 07/13/2021 04:00 PM         Lab Results   Component Value Date/Time    ALT (SGPT) 163 (H) 01/04/2022 03:17 AM    Alk.  phosphatase 143 (H) 01/04/2022 03:17 AM    Bilirubin, total 1.2 (H) 01/04/2022 03:17 AM

## 2022-01-09 NOTE — PROGRESS NOTES
PT Note:    New orders received and acknowledged. Chart reviewed. Noted patient is already on caseload, evaluated on 1/6/2022. PT is following 3 x week. Discharge recommendation is HHPT with supervision for safety with mobility. Will follow up next week. Thank you.

## 2022-01-09 NOTE — PROGRESS NOTES
Assumed Car at AM change of shift. Pt ambulates to bedside commode. Pt continues on 4L NC. Pt self care.  Report to [place]

## 2022-01-09 NOTE — PROGRESS NOTES
[address] [facility] Adult  Hospitalist Group                                                                                          Hospitalist Progress Note  Adrianne Dunlap MD  Answering service: [phone] OR [phone] from in house phone        Date of Service:  2022  NAME:  Alex Schroeder  :  1955  MRN:  [id number]      Admission Summary:     Alex Schroeder is a 78 y.o. female with hx NICM, chronic hypoxic respiratory failure 2/2 pulmonary htn, ckd, hypothyroidism, GERD, and  DM II who presents as a transfer from [facility] for acute on chronic hypoxic respiratory failure. She wears 3L o2 at baseline, and reportedly ran out of her home oxygen.     In the ED, she was hypoxic to th 70's, with improvement ot 94% on 4Lnc. Labs showed stable anemia with Hgb 10, K+ 5.2, lactic 3.1, trop 55>66, d-dimer >35, creatinine 2.54, and probnp 15,826. CXR showed diffuse interstitial airway disease. Rapid covid was negative.     In the ED, she was started on a heparin gtt, and sent to [facility] for VQ scan. She received bumex and nitropaste. Interval history / Subjective:      Feels better today, breathing improved. No chest pain, n/v/d. Assessment & Plan:     Acute on chronic respiratory failure with oxygen  Likely due to acute pulmonary edema in the setting of acute systolic heart failure exacerbation. COVID-19 negative. Mild leukocytosis. Afebrile. VQ scan low probability for PE.    CXR positive for interstitial airspace disease likely pulmonary edema or atypical pneumonia. Improving, SPO2 WNL on 4 L nasal cannula. Continue Bumex, supplemental, cardiac diet, as needed DuoNeb's. Continue treating underlying acute systolic heart failure. Acute on chronic systolic heart failure  NYHA class IV. Status post ICD placement. LVEF 15 to 20%. proBNP [id number].   Continue Bumex, Coreg and milrinone, ivabridine per cardiology  Not a candidate for ARB/ACE due to renal insufficiency. Cardiac diet, strict I/O, daily weight. Low-sodium diet. Might need home milrinone    RODNEY on CKD stage IV felt to be due to cardiorenal syndrome  At baseline, electrolytes WNL  Monitor volume status electrolytes, replace electrolytes as needed. 5/2022. Renal ultrasound negative for hydronephrosis. Small bilateral renal cysts. Avoid nephrotoxic meds, renally dose medications. Nephrology on board, recommendations noted. Hyperkalemia  Resolved, hyperkalemia protocol. Daily BMP. Low potassium diet. Hyponatremia   Improving, likely diuretic induced. Daily BMP, monitor volume status and electrolytes. Leukocytosis  CXR positive for interstitial airspace disease likely pulmonary edema or atypical pneumonia. Afebrile. Respiratory panel negative for COVID-19. Monitor closely, start on empiric antibiotics if any sign of infection. Type 2 diabetes. Hemoglobin A1c 7.3%. Continue basal, prandial and correctional insulin. Adjust meds as needed. Hypoglycemic protocol, Accu-Chek. Resume home meds upon discharge. Outpatient PCP and endocrinology follow-up    Hypothyroidism   Resume Synthroid. Depression  Denies any suicidal or homicidal ideation. Continue current meds. Morbid obesity   BMI 41.7. Diet and lifestyle modification recommended. Psoriasis   Multiple plaque psoriasis in the extensor surface of upper extremities. Outpatient PCP and rheumatology follow-up. Code status: Full Code  DVT prophylaxis: heparin     Care Plan discussed with: Patient/Family, Nurse and   Anticipated Disposition: TBD  Anticipated Discharge: Greater than 48 hours     Hospital Problems  Date Reviewed: 12/22/2021          Codes Class Noted POA    Acute respiratory failure with hypoxia [facility]) ICD-10-CM: J96.01  ICD-9-CM: 518.81  1/4/2022 Unknown              Vital Signs:    Last 24hrs VS reviewed since prior progress note.  Most recent are:  Visit Vitals  BP (!) 134/101   Pulse 96   Temp 97.9 °F (36.6 °C)   Resp 23   Ht 5' 7\" (1.702 m)   Wt 116.6 kg (257 lb)   SpO2 100%   BMI 40.25 kg/m²         Intake/Output Summary (Last 24 hours) at 1/9/2022 0816  Last data filed at 1/8/2022 1149  Gross per 24 hour   Intake    Output 300 ml   Net -300 ml        Physical Examination:     I had a face to face encounter with this patient and independently examined them on 1/9/2022 as outlined below:          Constitutional:  No acute distress, cooperative, pleasant    ENT:  Oral mucosa moist, oropharynx benign. Resp:  CTA b/l. No wheezing/rhonchi/rales. No accessory muscle use   CV:  Regular rhythm, normal rate, no murmurs, gallops, rubs    GI:  Soft, non distended, non tender. normoactive bowel sounds     Musculoskeletal:  Bilateral pretibial and pedal edema    Neurologic:  grossly non-focal            Data Review:    Review and/or order of clinical lab test  Review and/or order of tests in the radiology section of CPT  Review and/or order of tests in the medicine section of CPT      Labs:     Recent Labs     01/08/22  0156 01/06/22  2325   WBC 10.3 11.1*   HGB 8.1* 7.8*   HCT 30.1* 28.5*    281     Recent Labs     01/09/22  0510 01/08/22  0156 01/06/22  2325    133* 133*   K 4.0 4.3 4.2    97 97   CO2 30 29 28   BUN 78* 86* 85*   CREA 3.04* 3.50* 3.49*   GLU 93 293* 283*   CA 9.3 8.3* 8.1*   MG 2.4 2.4 2.1     No results for input(s): ALT, AP, TBIL, TBILI, TP, ALB, GLOB, GGT, AML, LPSE in the last 72 hours. No lab exists for component: SGOT, GPT, AMYP, HLPSE  No results for input(s): INR, PTP, APTT, INREXT, INREXT in the last 72 hours. No results for input(s): FE, TIBC, PSAT, FERR in the last 72 hours. No results found for: FOL, RBCF   No results for input(s): PH, PCO2, PO2 in the last 72 hours. No results for input(s): CPK, CKNDX, TROIQ in the last 72 hours.     No lab exists for component: CPKMB  Lab Results   Component Value Date/Time    Cholesterol, total 282 (H) 07/13/2021 04:00 PM    HDL Cholesterol 65 07/13/2021 04:00 PM    LDL, calculated 178.2 (H) 07/13/2021 04:00 PM    Triglyceride 194 (H) 07/13/2021 04:00 PM    CHOL/HDL Ratio 4.3 07/13/2021 04:00 PM     Lab Results   Component Value Date/Time    Glucose (POC) 110 01/08/2022 09:27 PM    Glucose (POC) 113 01/08/2022 04:51 PM    Glucose (POC) 183 (H) 01/08/2022 11:42 AM    Glucose (POC) 181 (H) 01/08/2022 08:54 AM    Glucose (POC) 353 (H) 01/07/2022 09:41 PM     Lab Results   Component Value Date/Time    Color YELLOW/STRAW 11/05/2020 09:11 AM    Appearance CLEAR 11/05/2020 09:11 AM    Specific gravity 1.012 11/05/2020 09:11 AM    pH (UA) 5.0 11/05/2020 09:11 AM    Protein Negative 11/05/2020 09:11 AM    Glucose Negative 11/05/2020 09:11 AM    Ketone Negative 11/05/2020 09:11 AM    Bilirubin Negative 11/05/2020 09:11 AM    Urobilinogen 0.2 11/05/2020 09:11 AM    Nitrites Negative 11/05/2020 09:11 AM    Leukocyte Esterase SMALL (A) 11/05/2020 09:11 AM    Epithelial cells MANY (A) 11/05/2020 09:11 AM    Bacteria Negative 11/05/2020 09:11 AM    WBC 5-10 11/05/2020 09:11 AM    RBC 0-5 11/05/2020 09:11 AM         Medications Reviewed:     Current Facility-Administered Medications   Medication Dose Route Frequency    insulin NPH (NOVOLIN N, HUMULIN N) injection 60 Units  60 Units SubCUTAneous Q12H    epoetin isabel-epbx (RETACRIT) injection 20,000 Units  20,000 Units SubCUTAneous Q TUE, THU & SAT    docusate sodium (COLACE) capsule 100 mg  100 mg Oral BID    insulin lispro (HUMALOG) injection 10 Units  10 Units SubCUTAneous TIDAC    carvediloL (COREG) tablet 6.25 mg  6.25 mg Oral BID WITH MEALS    hydrALAZINE (APRESOLINE) tablet 10 mg  10 mg Oral TID    isosorbide dinitrate (ISORDIL) tablet 10 mg  10 mg Oral TID    bumetanide (BUMEX) tablet 1 mg  1 mg Oral BID    montelukast (SINGULAIR) tablet 10 mg  10 mg Oral DAILY    levothyroxine (SYNTHROID) tablet 100 mcg  100 mcg Oral Once per day on Mon Tue Wed Thu Fri Sat    cetirizine (ZYRTEC) tablet 10 mg  10 mg Oral DAILY    hydroxypropyl methylcellulose (ISOPTO TEARS) 0.5 % ophthalmic solution 1 Drop  1 Drop Both Eyes PRN    venlafaxine-SR (EFFEXOR-XR) capsule 75 mg  75 mg Oral DAILY WITH BREAKFAST    gabapentin (NEURONTIN) capsule 100 mg  100 mg Oral QHS    arformoteroL (BROVANA) neb solution 15 mcg  15 mcg Nebulization BID RT    And    budesonide (PULMICORT) 500 mcg/2 ml nebulizer suspension  500 mcg Nebulization BID RT    hydrOXYzine HCL (ATARAX) tablet 20 mg  20 mg Oral TID PRN    milrinone (PRIMACOR) 20 MG/100 ML D5W infusion  0.2 mcg/kg/min IntraVENous CONTINUOUS    heparin (porcine) injection 5,000 Units  5,000 Units SubCUTAneous Q8H    ivabradine (CORLANOR) tablet 5 mg  5 mg Oral BID WITH MEALS    sodium chloride (NS) flush 5-40 mL  5-40 mL IntraVENous Q8H    sodium chloride (NS) flush 5-40 mL  5-40 mL IntraVENous PRN    acetaminophen (TYLENOL) tablet 650 mg  650 mg Oral Q6H PRN    Or    acetaminophen (TYLENOL) suppository 650 mg  650 mg Rectal Q6H PRN    ondansetron (ZOFRAN ODT) tablet 4 mg  4 mg Oral Q8H PRN    Or    ondansetron (ZOFRAN) injection 4 mg  4 mg IntraVENous Q6H PRN    glucose chewable tablet 16 g  4 Tablet Oral PRN    dextrose (D50W) injection syrg 12.5-25 g  25-50 mL IntraVENous PRN    glucagon (GLUCAGEN) injection 1 mg  1 mg IntraMUSCular PRN    insulin lispro (HUMALOG) injection   SubCUTAneous AC&HS    albuterol-ipratropium (DUO-NEB) 2.5 MG-0.5 MG/3 ML  3 mL Nebulization Q6H PRN     ______________________________________________________________________  EXPECTED LENGTH OF STAY: 3d 19h  ACTUAL LENGTH OF STAY:          5                 Evelyn Roy MD

## 2022-01-10 NOTE — PROGRESS NOTES
[facility]  Hospitalist Group                                                                                          Hospitalist Progress Note  Camelia Nunez MD  Answering service: [phone] OR [phone] from in house phone        Date of Service:  1/10/2022  NAME:  Linda Jones  :  1955  MRN:  [id number]      Admission Summary:     Linda Jones is a 78 y.o. female with hx NICM, chronic hypoxic respiratory failure 2/2 pulmonary htn, ckd, hypothyroidism, GERD, and  DM II who presents as a transfer from [facility] for acute on chronic hypoxic respiratory failure. She wears 3L o2 at baseline, and reportedly ran out of her home oxygen.     In the ED, she was hypoxic to th 70's, with improvement ot 94% on 4Lnc. Labs showed stable anemia with Hgb 10, K+ 5.2, lactic 3.1, trop 55>66, d-dimer >35, creatinine 2.54, and probnp 15,826. CXR showed diffuse interstitial airway disease. Rapid covid was negative.     In the ED, she was started on a heparin gtt, and sent to [facility] for VQ scan. She received bumex and nitropaste. Interval history / Subjective:      Breathing continues improving. No pain, n/v/d    Assessment & Plan:     Acute on chronic respiratory failure with oxygen  Due to CHF  COVID-19 negative. Mild leukocytosis. Afebrile. VQ scan low probability for PE.  CXR positive for interstitial airspace disease likely pulmonary edema or atypical pneumonia. Improving, SPO2 WNL on 4 L nasal cannula. Continue diuretics    Acute on chronic systolic heart failure  NYHA class IV. Status post ICD placement. LVEF 15 to 20%. proBNP [id number]. Continue Bumex, Coreg and milrinone, ivabridine per cardiology  Not a candidate for ARB/ACE due to renal insufficiency. Cardiac diet, strict I/O, daily weight. Low-sodium diet. Might need home milrinone. Advanced HF consulted.     RODNEY on CKD stage IV felt to be due to cardiorenal syndrome  At baseline, electrolytes WNL  Monitor volume status electrolytes, replace electrolytes as needed. 5/2022. Renal ultrasound negative for hydronephrosis. Small bilateral renal cysts. Avoid nephrotoxic meds, renally dose medications. Nephrology on board, recommendations noted. Hyperkalemia  Resolved, hyperkalemia protocol. Daily BMP. Low potassium diet. Hyponatremia   Improving, likely diuretic induced. Daily BMP, monitor volume status and electrolytes. Leukocytosis  CXR positive for interstitial airspace disease likely pulmonary edema or atypical pneumonia. Afebrile. Respiratory panel negative for COVID-19. Monitor closely, start on empiric antibiotics if any sign of infection. Type 2 diabetes. Hemoglobin A1c 7.3%. Continue basal, prandial and correctional insulin. Adjust meds as needed. Hypoglycemic protocol, Accu-Chek. Resume home meds upon discharge. Outpatient PCP and endocrinology follow-up    Hypothyroidism   Resume Synthroid. Depression  Denies any suicidal or homicidal ideation. Continue current meds. Morbid obesity   BMI 41.7. Diet and lifestyle modification recommended. Psoriasis   Multiple plaque psoriasis in the extensor surface of upper extremities. Outpatient PCP and rheumatology follow-up. Code status: Full Code  DVT prophylaxis: heparin     Care Plan discussed with: Patient/Family, Nurse and   Anticipated Disposition: TBD  Anticipated Discharge: once home milrinone plan delineated can likely go home     Hospital Problems  Date Reviewed: 12/22/2021          Codes Class Noted POA    Acute respiratory failure with hypoxia [facility]) ICD-10-CM: J96.01  ICD-9-CM: 518.81  1/4/2022 Unknown              Vital Signs:    Last 24hrs VS reviewed since prior progress note.  Most recent are:  Visit Vitals  /81   Pulse (!) 110   Temp 97.7 °F (36.5 °C)   Resp 20   Ht 5' 7\" (1.702 m)   Wt 115.8 kg (255 lb 6.4 oz)   SpO2 96%   BMI 40.00 kg/m²         Intake/Output Summary (Last 24 hours) at 1/10/2022 1347  Last data filed at 1/10/2022 1302  Gross per 24 hour   Intake 483.68 ml   Output 601 ml   Net -117.32 ml        Physical Examination:     I had a face to face encounter with this patient and independently examined them on 1/10/2022 as outlined below:          Constitutional:  No acute distress, cooperative, pleasant    ENT:  Oral mucosa moist, oropharynx benign. Resp:  CTA b/l. No wheezing/rhonchi/rales. No accessory muscle use   CV:  Regular rhythm, normal rate, no murmurs, gallops, rubs    GI:  Soft, non distended, non tender. normoactive bowel sounds     Musculoskeletal:  Bilateral pretibial and pedal edema    Neurologic:  grossly non-focal            Data Review:    Review and/or order of clinical lab test  Review and/or order of tests in the radiology section of CPT  Review and/or order of tests in the medicine section of CPT      Labs:     Recent Labs     01/08/22  0156   WBC 10.3   HGB 8.1*   HCT 30.1*        Recent Labs     01/10/22  0331 01/09/22  0510 01/08/22  0156    136 133*   K 4.2 4.0 4.3    101 97   CO2 30 30 29   BUN 71* 78* 86*   CREA 2.62* 3.04* 3.50*   * 93 293*   CA 9.0 9.3 8.3*   MG 2.3 2.4 2.4     No results for input(s): ALT, AP, TBIL, TBILI, TP, ALB, GLOB, GGT, AML, LPSE in the last 72 hours. No lab exists for component: SGOT, GPT, AMYP, HLPSE  No results for input(s): INR, PTP, APTT, INREXT, INREXT in the last 72 hours. No results for input(s): FE, TIBC, PSAT, FERR in the last 72 hours. No results found for: FOL, RBCF   No results for input(s): PH, PCO2, PO2 in the last 72 hours. No results for input(s): CPK, CKNDX, TROIQ in the last 72 hours.     No lab exists for component: CPKMB  Lab Results   Component Value Date/Time    Cholesterol, total 282 (H) 07/13/2021 04:00 PM    HDL Cholesterol 65 07/13/2021 04:00 PM    LDL, calculated 178.2 (H) 07/13/2021 04:00 PM    Triglyceride 194 (H) 07/13/2021 04:00 PM    CHOL/HDL Ratio 4.3 07/13/2021 04:00 PM Lab Results   Component Value Date/Time    Glucose (POC) 145 (H) 01/10/2022 12:57 PM    Glucose (POC) 75 01/10/2022 08:47 AM    Glucose (POC) 123 (H) 01/09/2022 09:25 PM    Glucose (POC) 116 01/09/2022 04:37 PM    Glucose (POC) 117 01/09/2022 12:55 PM     Lab Results   Component Value Date/Time    Color YELLOW/STRAW 11/05/2020 09:11 AM    Appearance CLEAR 11/05/2020 09:11 AM    Specific gravity 1.012 11/05/2020 09:11 AM    pH (UA) 5.0 11/05/2020 09:11 AM    Protein Negative 11/05/2020 09:11 AM    Glucose Negative 11/05/2020 09:11 AM    Ketone Negative 11/05/2020 09:11 AM    Bilirubin Negative 11/05/2020 09:11 AM    Urobilinogen 0.2 11/05/2020 09:11 AM    Nitrites Negative 11/05/2020 09:11 AM    Leukocyte Esterase SMALL (A) 11/05/2020 09:11 AM    Epithelial cells MANY (A) 11/05/2020 09:11 AM    Bacteria Negative 11/05/2020 09:11 AM    WBC 5-10 11/05/2020 09:11 AM    RBC 0-5 11/05/2020 09:11 AM         Medications Reviewed:     Current Facility-Administered Medications   Medication Dose Route Frequency    bumetanide (BUMEX) injection 2 mg  2 mg IntraVENous TID    insulin NPH (NOVOLIN N, HUMULIN N) injection 60 Units  60 Units SubCUTAneous Q12H    epoetin isabel-epbx (RETACRIT) injection 20,000 Units  20,000 Units SubCUTAneous Q TUE, THU & SAT    docusate sodium (COLACE) capsule 100 mg  100 mg Oral BID    insulin lispro (HUMALOG) injection 10 Units  10 Units SubCUTAneous TIDAC    carvediloL (COREG) tablet 6.25 mg  6.25 mg Oral BID WITH MEALS    hydrALAZINE (APRESOLINE) tablet 10 mg  10 mg Oral TID    isosorbide dinitrate (ISORDIL) tablet 10 mg  10 mg Oral TID    montelukast (SINGULAIR) tablet 10 mg  10 mg Oral DAILY    levothyroxine (SYNTHROID) tablet 100 mcg  100 mcg Oral Once per day on Mon Tue Wed Thu Fri Sat    cetirizine (ZYRTEC) tablet 10 mg  10 mg Oral DAILY    hydroxypropyl methylcellulose (ISOPTO TEARS) 0.5 % ophthalmic solution 1 Drop  1 Drop Both Eyes PRN    venlafaxine-SR (EFFEXOR-XR) capsule 75 mg  75 mg Oral DAILY WITH BREAKFAST    gabapentin (NEURONTIN) capsule 100 mg  100 mg Oral QHS    arformoteroL (BROVANA) neb solution 15 mcg  15 mcg Nebulization BID RT    And    budesonide (PULMICORT) 500 mcg/2 ml nebulizer suspension  500 mcg Nebulization BID RT    hydrOXYzine HCL (ATARAX) tablet 20 mg  20 mg Oral TID PRN    milrinone (PRIMACOR) 20 MG/100 ML D5W infusion  0.375 mcg/kg/min IntraVENous CONTINUOUS    heparin (porcine) injection 5,000 Units  5,000 Units SubCUTAneous Q8H    ivabradine (CORLANOR) tablet 5 mg  5 mg Oral BID WITH MEALS    sodium chloride (NS) flush 5-40 mL  5-40 mL IntraVENous Q8H    sodium chloride (NS) flush 5-40 mL  5-40 mL IntraVENous PRN    acetaminophen (TYLENOL) tablet 650 mg  650 mg Oral Q6H PRN    Or    acetaminophen (TYLENOL) suppository 650 mg  650 mg Rectal Q6H PRN    ondansetron (ZOFRAN ODT) tablet 4 mg  4 mg Oral Q8H PRN    Or    ondansetron (ZOFRAN) injection 4 mg  4 mg IntraVENous Q6H PRN    glucose chewable tablet 16 g  4 Tablet Oral PRN    dextrose (D50W) injection syrg 12.5-25 g  25-50 mL IntraVENous PRN    glucagon (GLUCAGEN) injection 1 mg  1 mg IntraMUSCular PRN    insulin lispro (HUMALOG) injection   SubCUTAneous AC&HS    albuterol-ipratropium (DUO-NEB) 2.5 MG-0.5 MG/3 ML  3 mL Nebulization Q6H PRN     ______________________________________________________________________  EXPECTED LENGTH OF STAY: 3d 19h  ACTUAL LENGTH OF STAY:          6                 Adrianne Dunlap MD

## 2022-01-10 NOTE — CONSULTS
Palliative Medicine Consult  Scott: [phone] ([phone])    Patient Name: Haylie Partida  YOB: 1955    Date of Initial Consult: 1/10/2022  Reason for Consult: care decisions  Requesting Provider: Edwin Aparicio  Primary Care Physician: Rubi Ashford MD     SUMMARY:   Haylie Partida is a 78 y.o. with a past history of DM, morbid obesity (BMI 40), hypothyroid, nonischemic cardiomyopathy (LV EF 15-20%), s/p BiVICD 9/2008,  severe pulmonary HTN, CKD 4, gout, psoriasis, who was admitted on 1/4/2022 from home with a diagnosis of hypoxia, volume overload. Current medical issues leading to Palliative Medicine involvement include: Advanced nonischemic cardiomyopathy possibly going home on milrinone drip, not a candidate for LVAD or heart transplant. Has CKD 4 from DM/ HTN/ CRS, not a good candidate for HD. VQ negative for PE. Started on epo for anemia of chronic renal disease. Patient is  to spouse Marcellus Hines, they live in [facility] [place] [place]. Retired from teaching. AMD on chart   1mpoa, spouse Michael Silvestre (patient's sister)  or Delores Prakash (her son, lives in [place])   On a good day, patient enjoys being around her house, watching TV, shopping with her . PALLIATIVE DIAGNOSES:   1. Non ischemic cardiomyopathy EF 15-20%  2. Severe pulmonary HTN, chronic respiratory failure, oxygen dependent  3. CKD4  4. Anemia of chronic disease  5. Palliative medicine encounter        PLAN:   1. Per chart review, patient has been clear about care goals, desire for Full Code,   AMD on chart. 2. Plans ongoing for home milrinone (PICC line being placed today)   3. Introduced palliative medicine services  1. Hear of recent events, patient admits to feeling a bit overwhelmed with all the news of her health issues. 2. She understands from Dr. Angie Marshall that she may have about 6 months to live. She is hopeful that the milrinone can help her.   Understands she may need her pacemaker interrogated. 3. She is aware of how dialysis often is not possible when the heart function is very low. She notes that the kidney doctor has been very positive about her improving kidney numbers. 4. Her  has been doing the cooking lately, with her feeling so tired, and he's attentive to salt and how much water she can have, measures portions etc.  5. Acknowledge her expressed desire so far for full code, we explore this a bit. Education provided about how CPR likely wouldn't work for her (because of weak heart and kidneys) . Samantha Marcelo We are doing all we can to prolong her life and help her feel best possible, but that if heart stops, CPR wouldn't reverse her heart or kidney problems. Explain poor outcomes of code blue. Encourage ongoing discussions, and also with her . We talk about how it's important for him to understand her mind on where she draws the line/ what is good living to her and what she would/ wouldn't find acceptable QOL. She understands need for these conversations, and how that can help guide him in the future. 6. Patient has good support from family, but is hesitant to have them exposed to hospital germs for much time. (especially her sister, who has lupus)    7. Made plan to check in again tomorrow, patient is worn out form ordeal of getting PICC line today.       GOALS OF CARE / TREATMENT PREFERENCES:     GOALS OF CARE:  Patient/Health Care Proxy Stated Goals: Prolong life    TREATMENT PREFERENCES:   Code Status: Full Code    Patient and family's personal goals include: try home milrinone, live as long as possible    Important upcoming milestones or family events: none noted    The patient identifies the following as important for living well: patient is going to give this some thought      Advance Care Planning:  [] The [facility] Interdisciplinary Team has updated the ACP Navigator with [address] and Patient Capacity      Primary Decision Maker: Lan Rowe - Spouse - [phone]  No flowsheet data found. Medical Interventions: Full interventions       Other:    As far as possible, the palliative care team has discussed with patient / health care proxy about goals of care / treatment preferences for patient. HISTORY:     History obtained from: chart, patient    CHIEF COMPLAINT:  Hypoxia, dyspnea    HPI/SUBJECTIVE:    The patient is:   [x] Verbal and participatory  [] Non-participatory due to:     78year old present SOB and out of oxygen at home  COVID negative      Clinical Pain Assessment (nonverbal scale for severity on nonverbal patients):   Clinical Pain Assessment  Severity: 0          Duration: for how long has pt been experiencing pain (e.g., 2 days, 1 month, years)  Frequency: how often pain is an issue (e.g., several times per day, once every few days, constant)     FUNCTIONAL ASSESSMENT:     Palliative Performance Scale (PPS):  PPS: 30       PSYCHOSOCIAL/SPIRITUAL SCREENING:     Palliative IDT has assessed this patient for cultural preferences / practices and a referral made as appropriate to needs (Cultural Services, Patient Advocacy, Ethics, etc.)    Any spiritual / Latter day concerns:  [] Yes /  [x] No   If \"Yes\" to discuss with pastoral care during IDT     Does caregiver feel burdened by caring for their loved one:   [] Yes /  [x] No /  [] No Caregiver Present    If \"Yes\" to discuss with social work during IDT    Anticipatory grief assessment:   [x] Normal  / [] Maladaptive     If \"Maladaptive\" to discuss with social work during IDT    ESAS Anxiety: Anxiety: 0    ESAS Depression: Depression: 0        REVIEW OF SYSTEMS:     Positive and pertinent negative findings in ROS are noted above in HPI. The following systems were [x] reviewed / [] unable to be reviewed as noted in HPI  Other findings are noted below.   Systems: constitutional, ears/nose/mouth/throat, respiratory, gastrointestinal, genitourinary, musculoskeletal, integumentary, neurologic, psychiatric, endocrine. Positive findings noted below. Modified ESAS Completed by: provider   Fatigue: 8 Drowsiness: 0   Depression: 0 Pain: 0   Anxiety: 0 Nausea: 0   Anorexia: 5 Dyspnea: 5     Constipation: No     Stool Occurrence(s): 0        PHYSICAL EXAM:     From RN flowsheet:  Wt Readings from Last 3 Encounters:   01/10/22 115.8 kg (255 lb 6.4 oz)   12/22/21 115.8 kg (255 lb 6.4 oz)   12/15/21 116.3 kg (256 lb 6.4 oz)     Blood pressure (!) 116/90, pulse (!) 115, temperature 97.7 °F (36.5 °C), resp. rate 21, height 5' 7\" (1.702 m), weight 115.8 kg (255 lb 6.4 oz), SpO2 95 %.     Pain Scale 1: Numeric (0 - 10)  Pain Intensity 1: 0                 Last bowel movement, if known:     Constitutional: awake, alert , appears fatigued  Increase work of breathing with conversation, needs to slow pace of talking  Insight intact  Affect flat     HISTORY:     Active Problems:    Acute respiratory failure with hypoxia ([facility] (1/4/2022)      Past Medical History:   Diagnosis Date    Acquired hypothyroidism 8/15/2016    Anemia     RED-HF study    Asthma     Cardiomyopathy, nonischemic ([address]     initial dx 2001, bivHF 2008 with EF 15%, s/p biV-ICD 9/08, significant improvment in EF to 45-50%    CKD (chronic kidney disease)     Dr Jeannie Medina CKD (chronic kidney disease) 8/15/2012    Depression     Diabetes ([address]     Diabetic neuropathy ([address]     DM (diabetes mellitus) ([facility] 8/15/2012    GERD (gastroesophageal reflux disease)     Gout     Hypothyroidism     ICD (implantable cardioverter-defibrillator), biventricular, in situ 6/5/2014    Psoriasis       Past Surgical History:   Procedure Laterality Date    CARDIAC CATHETERIZATION  2007; 01/06/15    normal cors    ECHO 2D ADULT  4/2010    EF 45%, improved from 1/09 (25%)    ECHO 2D ADULT  11/2011    LVH, EF 55-60%    HX ORTHOPAEDIC      knee    HX PACEMAKER PLACEMENT      AICD    STRESS TEST LEXISCAN/CARDIOLITE  3/21/12    normal perfusion, global HK 40%      Family History   Problem Relation Age of Onset    Heart Disease Mother     Hypertension Mother     Lupus Sister     Diabetes Brother       History reviewed, no pertinent family history.   Social History     Tobacco Use    Smoking status: Never Smoker    Smokeless tobacco: Never Used   Substance Use Topics    Alcohol use: No     Allergies   Allergen Reactions    Ciprofloxacin Anaphylaxis    Shellfish Derived Anaphylaxis    Ace Inhibitors Unknown (comments)    Biaxin [Clarithromycin] Other (comments)     Metal taste    Candesartan Cough    Pcn [Penicillins] Hives      Current Facility-Administered Medications   Medication Dose Route Frequency    bumetanide (BUMEX) injection 2 mg  2 mg IntraVENous TID    milrinone (PRIMACOR) 20 MG/100 ML D5W infusion  0.375 mcg/kg/min IntraVENous CONTINUOUS    insulin NPH (NOVOLIN N, HUMULIN N) injection 60 Units  60 Units SubCUTAneous Q12H    epoetin isabel-epbx (RETACRIT) injection 20,000 Units  20,000 Units SubCUTAneous Q TUE, THU & SAT    docusate sodium (COLACE) capsule 100 mg  100 mg Oral BID    insulin lispro (HUMALOG) injection 10 Units  10 Units SubCUTAneous TIDAC    carvediloL (COREG) tablet 6.25 mg  6.25 mg Oral BID WITH MEALS    hydrALAZINE (APRESOLINE) tablet 10 mg  10 mg Oral TID    isosorbide dinitrate (ISORDIL) tablet 10 mg  10 mg Oral TID    montelukast (SINGULAIR) tablet 10 mg  10 mg Oral DAILY    levothyroxine (SYNTHROID) tablet 100 mcg  100 mcg Oral Once per day on Mon Tue Wed Thu Fri Sat    cetirizine (ZYRTEC) tablet 10 mg  10 mg Oral DAILY    hydroxypropyl methylcellulose (ISOPTO TEARS) 0.5 % ophthalmic solution 1 Drop  1 Drop Both Eyes PRN    venlafaxine-SR (EFFEXOR-XR) capsule 75 mg  75 mg Oral DAILY WITH BREAKFAST    gabapentin (NEURONTIN) capsule 100 mg  100 mg Oral QHS    arformoteroL (BROVANA) neb solution 15 mcg  15 mcg Nebulization BID RT    And    budesonide (PULMICORT) 500 mcg/2 ml nebulizer suspension  500 mcg Nebulization BID RT    hydrOXYzine HCL (ATARAX) tablet 20 mg  20 mg Oral TID PRN    heparin (porcine) injection 5,000 Units  5,000 Units SubCUTAneous Q8H    ivabradine (CORLANOR) tablet 5 mg  5 mg Oral BID WITH MEALS    sodium chloride (NS) flush 5-40 mL  5-40 mL IntraVENous Q8H    sodium chloride (NS) flush 5-40 mL  5-40 mL IntraVENous PRN    acetaminophen (TYLENOL) tablet 650 mg  650 mg Oral Q6H PRN    Or    acetaminophen (TYLENOL) suppository 650 mg  650 mg Rectal Q6H PRN    ondansetron (ZOFRAN ODT) tablet 4 mg  4 mg Oral Q8H PRN    Or    ondansetron (ZOFRAN) injection 4 mg  4 mg IntraVENous Q6H PRN    glucose chewable tablet 16 g  4 Tablet Oral PRN    dextrose (D50W) injection syrg 12.5-25 g  25-50 mL IntraVENous PRN    glucagon (GLUCAGEN) injection 1 mg  1 mg IntraMUSCular PRN    insulin lispro (HUMALOG) injection   SubCUTAneous AC&HS    albuterol-ipratropium (DUO-NEB) 2.5 MG-0.5 MG/3 ML  3 mL Nebulization Q6H PRN          LAB AND IMAGING FINDINGS:     Lab Results   Component Value Date/Time    WBC 10.3 01/08/2022 01:56 AM    HGB 8.1 (L) 01/08/2022 01:56 AM    PLATELET 667 08/33/5492 01:56 AM     Lab Results   Component Value Date/Time    Sodium 137 01/10/2022 03:31 AM    Potassium 4.2 01/10/2022 03:31 AM    Chloride 101 01/10/2022 03:31 AM    CO2 30 01/10/2022 03:31 AM    BUN 71 (H) 01/10/2022 03:31 AM    Creatinine 2.62 (H) 01/10/2022 03:31 AM    Calcium 9.0 01/10/2022 03:31 AM    Magnesium 2.3 01/10/2022 03:31 AM      Lab Results   Component Value Date/Time    Alk.  phosphatase 143 (H) 01/04/2022 03:17 AM    Protein, total 8.1 01/04/2022 03:17 AM    Albumin 3.5 01/04/2022 03:17 AM    Globulin 4.6 (H) 01/04/2022 03:17 AM     Lab Results   Component Value Date/Time    INR 1.0 01/16/2018 08:05 PM    Prothrombin time 10.4 01/16/2018 08:05 PM    aPTT 26.7 01/06/2022 01:32 AM      Lab Results   Component Value Date/Time    Iron 56 11/05/2020 09:11 AM    TIBC 277 11/05/2020 09:11 AM Iron % saturation 20 11/05/2020 09:11 AM    Ferritin 900 (H) 11/05/2020 09:11 AM      No results found for: PH, PCO2, PO2  No components found for: [place]   Lab Results   Component Value Date/Time     (H) 01/16/2018 08:05 PM    CK - MB <1.0 01/16/2018 08:05 PM                Total time: 50 min  Counseling / coordination time, spent as noted above: 25  > 50% counseling / coordination?: yes    Prolonged service was provided for  []30 min   []75 min in face to face time in the presence of the patient, spent as noted above. Time Start:   Time End:   Note: this can only be billed with 47122 (initial) or 85853 (follow up). If multiple start / stop times, list each separately.

## 2022-01-10 NOTE — PROGRESS NOTES
[facility]   [address] [facility], [id number] [place] [address]  Phone: [phone]   [phone]    [email or web address]     Nephrology Progress Note    Patient Name : Linda Jones      : 1955     MRN : [id number]  Date of Admission : 2022  Date of Servive : 01/10/22    CC:  Follow up for ARF       Assessment and Plan   CKD stage IV:  - Etiology: DM, HTN, CRS  - renal US: suggestive of CKD, B/L benign renal cysts   - Cr trending down  - Increased Bumex to 2 mg TID and see how she does     - poor candidate for HD   - Daily labs, strict I.O and daily weights      Acute on chronic HFrEF  NI CMP, LVEF 15 to 20%  NYHA class III-IV  S/p BiV pacer/ICD-s/p CRT  RHC 12/10/2021: PCWP 34, PA P 80   - On Milrinone and Corlanor     Morbid obesity  Type II DM  HTN  Hypothyroidism  Pulmonary hypertension  Gout  Psoriasis        Interval History:  Seen and examined. Tolerating Milrinone. Tachycardia   She reports that she will be seeing AHF team   Cr down   Weight down since admission. No weight recorded today   Improving SOB     Review of Systems: A comprehensive review of systems was negative except for that written in the HPI.     Current Medications:   Current Facility-Administered Medications   Medication Dose Route Frequency    bumetanide (BUMEX) injection 2 mg  2 mg IntraVENous TID    insulin NPH (NOVOLIN N, HUMULIN N) injection 60 Units  60 Units SubCUTAneous Q12H    epoetin isabel-epbx (RETACRIT) injection 20,000 Units  20,000 Units SubCUTAneous Q TUE, THU & SAT    docusate sodium (COLACE) capsule 100 mg  100 mg Oral BID    insulin lispro (HUMALOG) injection 10 Units  10 Units SubCUTAneous TIDAC    carvediloL (COREG) tablet 6.25 mg  6.25 mg Oral BID WITH MEALS    hydrALAZINE (APRESOLINE) tablet 10 mg  10 mg Oral TID    isosorbide dinitrate (ISORDIL) tablet 10 mg  10 mg Oral TID    montelukast (SINGULAIR) tablet 10 mg  10 mg Oral DAILY    levothyroxine (SYNTHROID) tablet 100 mcg  100 mcg Oral Once per day on Mon Tue Wed Thu Fri Sat    cetirizine (ZYRTEC) tablet 10 mg  10 mg Oral DAILY    hydroxypropyl methylcellulose (ISOPTO TEARS) 0.5 % ophthalmic solution 1 Drop  1 Drop Both Eyes PRN    venlafaxine-SR (EFFEXOR-XR) capsule 75 mg  75 mg Oral DAILY WITH BREAKFAST    gabapentin (NEURONTIN) capsule 100 mg  100 mg Oral QHS    arformoteroL (BROVANA) neb solution 15 mcg  15 mcg Nebulization BID RT    And    budesonide (PULMICORT) 500 mcg/2 ml nebulizer suspension  500 mcg Nebulization BID RT    hydrOXYzine HCL (ATARAX) tablet 20 mg  20 mg Oral TID PRN    milrinone (PRIMACOR) 20 MG/100 ML D5W infusion  0.375 mcg/kg/min IntraVENous CONTINUOUS    heparin (porcine) injection 5,000 Units  5,000 Units SubCUTAneous Q8H    ivabradine (CORLANOR) tablet 5 mg  5 mg Oral BID WITH MEALS    sodium chloride (NS) flush 5-40 mL  5-40 mL IntraVENous Q8H    sodium chloride (NS) flush 5-40 mL  5-40 mL IntraVENous PRN    acetaminophen (TYLENOL) tablet 650 mg  650 mg Oral Q6H PRN    Or    acetaminophen (TYLENOL) suppository 650 mg  650 mg Rectal Q6H PRN    ondansetron (ZOFRAN ODT) tablet 4 mg  4 mg Oral Q8H PRN    Or    ondansetron (ZOFRAN) injection 4 mg  4 mg IntraVENous Q6H PRN    glucose chewable tablet 16 g  4 Tablet Oral PRN    dextrose (D50W) injection syrg 12.5-25 g  25-50 mL IntraVENous PRN    glucagon (GLUCAGEN) injection 1 mg  1 mg IntraMUSCular PRN    insulin lispro (HUMALOG) injection   SubCUTAneous AC&HS    albuterol-ipratropium (DUO-NEB) 2.5 MG-0.5 MG/3 ML  3 mL Nebulization Q6H PRN      Allergies   Allergen Reactions    Ciprofloxacin Anaphylaxis    Shellfish Derived Anaphylaxis    Ace Inhibitors Unknown (comments)    Biaxin [Clarithromycin] Other (comments)     Metal taste    Candesartan Cough    Pcn [Penicillins] Hives       Objective:  Vitals:    Vitals:    01/10/22 0740 01/10/22 1000 01/10/22 1041 01/10/22 1200   BP:       Pulse:  (!) 114 (!) 112 (!) 114   Resp:   21    Temp:       SpO2: 99%  92%    Weight:       Height:         Intake and Output:  01/10 0701 - 01/10 1900  In: 315.9 [P.O.:300; I.V.:15.9]  Out: 0   01/08 1901 - 01/10 0700  In: -   Out: 400 [Urine:400]    Physical Examination:    General: Obese   Neck:  Supple, no mass  Resp:  Lungs CTA B/L, no wheezing , normal respiratory effort  CV:  RRR,  no murmur or rub, LE edema  GI:  Soft, NT, + BS, no HS megaly  Neurologic:  Non focal    []    High complexity decision making was performed  []    Patient is at high-risk of decompensation with multiple organ involvement    Lab Data Personally Reviewed: I have reviewed all the pertinent labs, microbiology data and radiology studies during assessment.     Recent Labs     01/10/22  0331 01/09/22  0510 01/08/22  0156    136 133*   K 4.2 4.0 4.3    101 97   CO2 30 30 29   * 93 293*   BUN 71* 78* 86*   CREA 2.62* 3.04* 3.50*   CA 9.0 9.3 8.3*   MG 2.3 2.4 2.4     Recent Labs     01/08/22  0156   WBC 10.3   HGB 8.1*   HCT 30.1*        Lab Results   Component Value Date/Time    Specimen Description: URINE 10/22/2013 01:32 PM    Specimen Description: URINE 01/23/2013 04:40 PM    Specimen Description: LEG TISSUE 02/12/2009 12:00 AM    Specimen Description: LEG (LEFT) 01/29/2009 03:06 AM     Lab Results   Component Value Date/Time    Culture result: MIXED SKIN ROSANNA ISOLATED 10/22/2013 01:32 PM    Culture result:  01/23/2013 04:40 PM     ENTEROCOCCUS FAECALIS GROUP D  MIXED SKIN ROSANNA ISOLATED    Culture result:  02/12/2009 12:00 AM     LIGHT STAPHYLOCOCCUS EPIDERMIDIS (OXACILLIN RESISTANT)  LIGHT 2ND STRAIN OF STAPHYLOCOCCUS EPIDERMIDIS (OXACILLIN RESISTANT)    Culture result: NO GROWTH 2 DAYS 01/29/2009 03:06 AM     Recent Results (from the past 24 hour(s))   GLUCOSE, POC    Collection Time: 01/09/22 12:55 PM   Result Value Ref Range    Glucose (POC) 117 65 - 117 mg/dL    Performed by [address] [place] [address]    Collection Time: 01/09/22  4:37 PM   Result Value Ref Range    Glucose (POC) 116 65 - 117 mg/dL    Performed by Erlin Bowman 85, POC    Collection Time: 01/09/22  9:25 PM   Result Value Ref Range    Glucose (POC) 123 (H) 65 - 117 mg/dL    Performed by Mamta Pineda    MAGNESIUM    Collection Time: 01/10/22  3:31 AM   Result Value Ref Range    Magnesium 2.3 1.6 - 2.4 mg/dL   METABOLIC PANEL, BASIC    Collection Time: 01/10/22  3:31 AM   Result Value Ref Range    Sodium 137 136 - 145 mmol/L    Potassium 4.2 3.5 - 5.1 mmol/L    Chloride 101 97 - 108 mmol/L    CO2 30 21 - 32 mmol/L    Anion gap 6 5 - 15 mmol/L    Glucose 144 (H) 65 - 100 mg/dL    BUN 71 (H) 6 - 20 MG/DL    Creatinine 2.62 (H) 0.55 - 1.02 MG/DL    BUN/Creatinine ratio 27 (H) 12 - 20      GFR est AA 22 (L) >60 ml/min/1.73m2    GFR est non-AA 18 (L) >60 ml/min/1.73m2    Calcium 9.0 8.5 - 10.1 MG/DL   SAMPLES BEING HELD    Collection Time: 01/10/22  3:31 AM   Result Value Ref Range    SAMPLES BEING HELD 1lav     COMMENT        Add-on orders for these samples will be processed based on acceptable specimen integrity and analyte stability, which may vary by analyte. GLUCOSE, POC    Collection Time: 01/10/22  8:47 AM   Result Value Ref Range    Glucose (POC) 75 65 - 117 mg/dL    Performed by Adalberto Mcintyre            I have reviewed the flowsheets. Chart and Pertinent Notes have been reviewed. No change in PMH ,family and social history from Consult note.       Rosangela Albright, [place] [address] [facility]

## 2022-01-10 NOTE — PROGRESS NOTES
Verbal bedside report given to ARTURO Boothe oncoming nurse by WENCESLAO Hodges RN off-going nurse. Report included current pt status and condition, recent results, hx of present illness, heart rate and rhythm, and respiratory status. 1730  Removed IVs and made pt more comfortable. Jie team and left a message indicating pt has given consent and is ready for PICC insertion when they are available. 56  Pt again mentioned wanting a DNR saying, \"I want them to do everything they can to save me for my family. \"  Clarified pt's wishes. She wants to remain a full code and was confused about terminology.   Will follow up with MD.    1015  Pt stated she wants a DNR on file, will speak with MD.

## 2022-01-10 NOTE — PROGRESS NOTES
Spiritual Care Assessment/Progress Note  [facility]      NAME: James Ceron      MRN: [id number]  AGE: 78 y.o.  SEX: female  Alevism Affiliation: Non Mandaen   Language: English     1/10/2022     Total Time (in minutes): 10     Spiritual Assessment begun in [facility] IMCU through conversation with:         []Patient        [] Family    [] Friend(s)        Reason for Consult: Palliative Care, Initial/Spiritual Assessment     Spiritual beliefs: (Please include comment if needed)     [] Identifies with a ariel tradition:         [] Supported by a ariel community:            [] Claims no spiritual orientation:           [] Seeking spiritual identity:                [] Adheres to an individual form of spirituality:           [x] Not able to assess:                           Identified resources for coping:      [] Prayer                               [] Music                  [] Guided Imagery     [] Family/friends                 [] Pet visits     [] Devotional reading                         [x] Unknown     [] Other:                                               Interventions offered during this visit: (See comments for more details)    Patient Interventions: Affirmation of emotions/emotional suffering,Initial/Spiritual assessment, patient floor,Life review/legacy           Plan of Care:     [] Support spiritual and/or cultural needs    [] Support AMD and/or advance care planning process      [] Support grieving process   [] Coordinate Rites and/or Rituals    [] Coordination with community clergy   [] No spiritual needs identified at this time   [] Detailed Plan of Care below (See Comments)  [] Make referral to Music Therapy  [] Make referral to Pet Therapy     [] Make referral to Addiction services  [] Make referral to [facility]  [] Make referral to Spiritual Care Partner  [] No future visits requested        [x] Contact Spiritual Care for further referrals     Comments: Attempted Palliative Initial Spiritual Assessment for this pt in [facility]. Reviewed pt's chart prior to this visit. Pt was receiving medical intervention and therefore is unable to be assessed at this time. No family/friends present at time of visit. Contact Spiritual Care Services for any spiritual or emotional support needs. Kalina Nunn MDiv.  Staff   Request  Support/Spiritual Care Services on [phone])

## 2022-01-10 NOTE — PROGRESS NOTES
Physical Therapy:     Chart reviewed in preparation for PT tx session. Attempted to see pt, however PICC team present to place line. Will follow up as able and appropriate for PT intervention.      Thank you,   Cadence Marie, PT, DPT

## 2022-01-10 NOTE — PROGRESS NOTES
Cardiac Electrophysiology Hospital Progress Note     Subjective:       Marcy Hayden is a 78 y.o. patient who is seen for evaluation/management of acute on chronic systolic CHF. Interim:   Continues milrinone, states SOB improved when moving around, but believes she will still need home oxygen upon discharge.    Vocalizes desire for home milrinone and double organ transplant evaluation. Biventricular pacing, mildly tachycardic despite Corlanor.  BP intermittently elevated. Renal function mildly improved. HPI:   Presented to the ER 01/04/2021 with hypoxia, improved on supplemental oxygen. Labs showed stable anemia.  WBC elevated, hyponatremic, hypokalemic.  D dimer elevated.  Chronic CKD. Nephrologist spoke to me directly regarding severe renal failure, but not much worse than last admission. Rapid COVID negative.  CXR showed pulmonary edema vs atypical pneumonia.  VQ scan showed low probability for PE.       NICM, LVEF 15-20% during recent admission.  LVEF previously normalized with CRT.  NYHA III-IV.  No ACEi/ARB due to renal dysfunction.  GDMT dosing limited by low BP. [address] 12/10/2021 showed severe pulmonary HTN (wedge 34 mmHg, PAP 80 mmHg). Cardiac cath in 2015 at [facility] showed no evidence of CAD. She did require LV lead reprogramming over the summer, but good LV capture since.  Good capture/function when checked during recent admission. BP controlled. Previous:   S/p Medtronic biventricular ICD (gen change [id number]/[id number], leads 09/25/2008).     CKD stage IV.        Previously followed by Dr. Filemon Wild, states did not follow him to new practice.            Problem List Date Reviewed: 12/22/2021   Codes Class Noted   Acute respiratory failure with hypoxia [facility]) ICD-10-CM: J96.01   ICD-9-CM: 518.81 1/4/2022     CHF exacerbation ([facility] ICD-10-CM: I50.9   ICD-9-CM: 428.0 12/6/2021     Type 2 diabetes mellitus with diabetic neuropathy ([facility] ICD-10-CM: E11.40 ICD-9-CM: 250.60, 357.2 1/2/2020     Type 2 diabetes with nephropathy ([facility] ICD-10-CM: E11.21   ICD-9-CM: 250.40, 583.81 4/3/2018     Obesity, morbid ([facility] ICD-10-CM: E66.01   ICD-9-CM: 278.01 12/8/2017     Acquired hypothyroidism ICD-10-CM: E03.9   ICD-9-CM: 244.9 8/15/2016     Dysthymia ICD-10-CM: F34.1   ICD-9-CM: 300.4 8/15/2016     ICD (implantable cardioverter-defibrillator), biventricular, in situ ICD-10-CM: Z95.810   ICD-9-CM: V45.02 6/5/2014   Overview Signed 1/14/2015 11:11 AM by Taco Austin MD     Generator Medtronic change 1/14/2015         Dyslipidemia ICD-10-CM: B46.1   ICD-9-CM: 272.4 1/14/2014     CKD (chronic kidney disease) ICD-10-CM: N18.9   ICD-9-CM: 585.9 8/15/2012     Cardiomyopathy, nonischemic (HCC) ICD-10-CM: I42.8   ICD-9-CM: 425.4 Unknown   Overview Signed 10/10/2011  6:40 AM by Jose Espino MD     initial dx 2001, bivHF 2008 with EF 15%, s/p biV-ICD 9/08, significant improvment in EF to 45-50%         Anemia in chronic renal disease (Chronic) ICD-10-CM: N18.9, D63.1   ICD-9-CM: 285.21 12/10/2008     HTN (hypertension) ICD-10-CM: I10   ICD-9-CM: 401.9 12/10/2008     GERD (gastroesophageal reflux disease) (Chronic) ICD-10-CM: K21.9   ICD-9-CM: 530.81 12/10/2008     Gout (Chronic) ICD-10-CM: M10.9   ICD-9-CM: 274.9 12/10/2008     Pulmonary HTN (HCC) (Chronic) ICD-10-CM: I27.20   ICD-9-CM: 416.8 12/10/2008           Current Facility-Administered Medications   Medication Dose Route Frequency Provider Last Rate Last Admin   · insulin NPH (NOVOLIN N, HUMULIN N) injection 60 Units 60 Units SubCUTAneous Q12H Argenis Mckeon MD 60 Units at 01/09/22 2209   · epoetin isabel-epbx (RETACRIT) injection 20,000 Units 20,000 Units SubCUTAneous Q TUE, THU & SAT Leonela Washington MD 20,000 Units at 01/08/22 2349   · docusate sodium (COLACE) capsule 100 mg 100 mg Oral BID Isidro Wen  mg at 01/09/22 1726   · insulin lispro (HUMALOG) injection 10 Units 10 Units SubCUTAneous TIDAC Rodrick Malik MD 10 Units at 01/09/22 1725   · carvediloL (COREG) tablet 6.25 mg 6.25 mg Oral BID WITH MEALS Mohini Quinteros MD 6.25 mg at 01/09/22 1726   · hydrALAZINE (APRESOLINE) tablet 10 mg 10 mg Oral TID Mohini Quinteros MD 10 mg at 01/09/22 2200   · isosorbide dinitrate (ISORDIL) tablet 10 mg 10 mg Oral TID Mohini Quinteros MD 10 mg at 01/09/22 2200   · bumetanide (BUMEX) tablet 1 mg 1 mg Oral BID Mohini Quinteros MD 1 mg at 01/09/22 1726   · montelukast (SINGULAIR) tablet 10 mg 10 mg Oral DAILY Jg Nation MD 10 mg at 01/09/22 1004   · levothyroxine (SYNTHROID) tablet 100 mcg 100 mcg Oral Once per day on Mon Tue Wed Thu Fri Sat Rodrick Malik  mcg at 01/10/22 0520   · cetirizine (ZYRTEC) tablet 10 mg 10 mg Oral DAILY Rodrick Malik MD 10 mg at 01/09/22 1003   · hydroxypropyl methylcellulose (ISOPTO TEARS) 0.5 % ophthalmic solution 1 Drop 1 Drop Both Eyes PRN Rodrick Malik MD 1 Drop at 01/06/22 1727   · venlafaxine-SR (EFFEXOR-XR) capsule 75 mg 75 mg Oral DAILY WITH Nahum Cook MD 75 mg at 01/09/22 1003   · gabapentin (NEURONTIN) capsule 100 mg 100 mg Oral QHS Lisa Horne  mg at 01/09/22 2200   · arformoteroL (BROVANA) neb solution 15 mcg 15 mcg Nebulization BID RT Jg Nation MD 15 mcg at 01/10/22 0739   And   · budesonide (PULMICORT) 500 mcg/2 ml nebulizer suspension 500 mcg Nebulization BID RT Jg Nation  mcg at 01/10/22 0739   · hydrOXYzine HCL (ATARAX) tablet 20 mg 20 mg Oral TID PRN Mohini Quinteros MD   · milrinone (PRIMACOR) 20 MG/100 ML D5W infusion 0.2 mcg/kg/min IntraVENous CONTINUOUS Mohini Quinteros MD 7.2 mL/hr at 01/10/22 0524 0.2 mcg/kg/min at 01/10/22 0524   · heparin (porcine) injection 5,000 Units 5,000 Units SubCUTAneous Q8H John Lester MD 5,000 Units at 01/10/22 0520   · ivabradine (CORLANOR) tablet 5 mg 5 mg Oral BID WITH MEALS Mohini Quinteros MD 5 mg at 01/09/22 1727   · sodium chloride (NS) flush 5-40 mL 5-40 mL IntraVENous Q8H Mohini Quinteros MD 10 mL at 01/10/22 0521   · sodium chloride (NS) flush 5-40 mL 5-40 mL IntraVENous PRN Maryam Yanes MD   · acetaminophen (TYLENOL) tablet 650 mg 650 mg Oral Q6H PRN Maryam Yanes MD   Or   · acetaminophen (TYLENOL) suppository 650 mg 650 mg Rectal Q6H PRN Maryam Yanes MD   · ondansetron (ZOFRAN ODT) tablet 4 mg 4 mg Oral Q8H PRN Maryam Yanes MD   Or   · ondansetron [facility]) injection 4 mg 4 mg IntraVENous Q6H PRN Maryam Yanes MD 4 mg at 01/05/22 0915   · glucose chewable tablet 16 g 4 Tablet Oral PRN Maryam Yanes MD   · dextrose (D50W) injection syrg 12.5-25 g 25-50 mL IntraVENous PRN Maryam Yanes MD   · glucagon (GLUCAGEN) injection 1 mg 1 mg IntraMUSCular PRN Maryam Yanes MD   · insulin lispro (HUMALOG) injection SubCUTAneous AC&HS Maryam Yanes MD 3 Units at 01/07/22 1825   · albuterol-ipratropium (DUO-NEB) 2.5 MG-0.5 MG/3 ML 3 mL Nebulization Q6H PRN Maryam Yanes MD     Allergies   Allergen Reactions   · Ciprofloxacin Anaphylaxis   · Shellfish Derived Anaphylaxis   · Ace Inhibitors Unknown (comments)   · Biaxin [Clarithromycin] Other (comments)   Metal taste   · Candesartan Cough   · Pcn [Penicillins] Hives     Past Medical History:   Diagnosis Date   · Acquired hypothyroidism 8/15/2016   · Anemia   RED-HF study   · Asthma   · Cardiomyopathy, nonischemic ([facility]   initial dx 2001, bivHF 2008 with EF 15%, s/p biV-ICD 9/08, significant improvment in EF to 45-50%   · CKD (chronic kidney disease)   Dr Jaqueline Marie   · CKD (chronic kidney disease) 8/15/2012   · Depression   · Diabetes ([address]   · Diabetic neuropathy ([facility]   · DM (diabetes mellitus) ([facility] 8/15/2012   · GERD (gastroesophageal reflux disease)   · Gout   · Hypothyroidism   · ICD (implantable cardioverter-defibrillator), biventricular, in situ 6/5/2014   · Psoriasis     Past Surgical History:   Procedure Laterality Date   · CARDIAC CATHETERIZATION 2007; 01/06/15   normal cors   · ECHO 2D ADULT 4/2010   EF 45%, improved from 1/09 (25%)   · ECHO 2D ADULT 11/2011   LVH, EF 55-60%   · HX ORTHOPAEDIC   knee   · HX PACEMAKER PLACEMENT   AICD   · STRESS TEST LEXISCAN/CARDIOLITE 3/21/12   normal perfusion, global HK 40%     Family History   Problem Relation Age of Onset   · Heart Disease Mother   · Hypertension Mother   · Lupus Sister   · Diabetes Brother     Social History     Tobacco Use   · Smoking status: Never Smoker   · Smokeless tobacco: Never Used   Substance Use Topics   · Alcohol use: No         Review of Systems: Review of all other systems otherwise negative. Constitutional: Negative for fever, chills, weight loss, + malaise/fatigue. HEENT: Negative for nosebleeds, vision changes. Respiratory: Negative for cough, hemoptysis   Cardiovascular: Negative for chest pain, palpitations, orthopnea, claudication, + leg swelling, no syncope, and PND. + SOB   Gastrointestinal: Negative for nausea, vomiting, diarrhea, blood in stool and melena. Genitourinary: Negative for dysuria, and hematuria. Musculoskeletal: Negative for myalgias, arthralgia. Skin: Negative for rash. Heme: Does not bleed or bruise easily. Neurological: Negative for speech change and focal weakness       Objective:     Visit Vitals   /88 (BP 1 Location: Right arm, BP Patient Position: At rest)   Pulse (!) 106   Temp 97.8 °F (36.6 °C)   Resp 20   Ht 5' 7\" (1.702 m)   Wt 257 lb (116.6 kg)   SpO2 99%   BMI 40.25 kg/m²       Physical Exam:   Constitutional: Well-developed and well-nourished. No respiratory distress. Head: Normocephalic and atraumatic. Eyes: Pupils are equal, round. ENT: Hearing grossly normal.   Neck: Supple. No JVD present. Cardiovascular: Normal rate, regular rhythm. Exam reveals no gallop and no friction rub. 2/6 systolic LSB murmur. .   Pulmonary/Chest: Effort normal and breath sounds normal. No wheezes. Abdominal: Soft, no tenderness. Moderate obesity. Musculoskeletal: Moves extremities independently. Vasc/lymphatic: 2+ bilat lower extremity edema. Neurological: Alert,oriented. Skin: Skin is warm and dry   Psychiatric: Normal mood and affect. Behavior is normal. Judgment and thought content normal.         Assessment/Plan:         Imaging/Studies:   Echo (12/08/2021): LVEF 15-20%, upper normal wall thickness, LV diastolic dysfunction.  Borderline low RVEF.  Mod dilated LA, mildly dilated RA.  Mild to mod MR. Charo Calvo TR.  Mild to mod PH.       LHC/RHC (01/2015): No significant CAD. NICM:  no new LHC due to severe renal failure.  Not a candidate for hemodialysis per nephrology. Rashawn De Jesus C in 2015 showed no significant CAD, same with the previous    Recent admission for acute on chronic CHF.  Previously had LVEF normalize with CRT.  Still with proper biventricular pacing per device check during recent admission, not cause for decline.  LVEF now 15-20%. [place] pulmonary HTN noted on RHC.      Continue GDMT, but no ACEi/ARB/ARNi due to CKD.  Intermittently low BP limits ability to titrate meds.  Continue milrinone, will consult Advanced Heart Failure, as patient would like home milrinone.  Will also consult Palliative Care as well for her  and son to be involved. I do not think she is candidate for heart and renal transplant from weight stand point       Nuclear amyloid scan equivocal.  MRI is not possible with 4194 LV lead (2008).  Unless she has a form of amyloid that can be treated, I do not think MRI will add more information for treatment.       Medtronic biventricular ICD (gen change 01/14/2015, leads 09/25/2008): Device check 12/08/2021 showed proper lead & generator function.  Adequate CRT.  Generator longevity estimated 4 mos.  Will consider biventricular ICD generator change sooner if there is availability in EP lab this week.  Generator still with 3 mos remaining.  Will test LV lead again & if not optimal, will replace.         HTN: BP intermittently trends low normal on current regimen, ok to continue.      CKD: Nephrologist follows, BUN & Cr elevated.  Bumex IV has been changed to po in anticipation of discharge. Thank you for involving me in this patient's care and please call with further concerns or questions. Angelique Jara M.D.    Electrophysiology/Cardiology   [facility] [place]   [address], [address] [place] [address]   [phone]

## 2022-01-10 NOTE — PROCEDURES
Placement    PRE-PROCEDURE VERIFICATION  Correct Procedure: yes  Correct Site:  yes  Temperature: Temp: 97.7 °F (36.5 °C), Temperature Source: Temp Source: Oral  Recent Labs     01/10/22  0331 01/09/22  0510 01/08/22  0156   BUN 71*   < > 86*   CREA 2.62*   < > 3.50*   PLT  --   --  306   WBC  --   --  10.3    < > = values in this interval not displayed. Allergies: Ciprofloxacin, Shellfish derived, Ace inhibitors, Biaxin [clarithromycin], Candesartan, and Pcn [penicillins]  Education materials, including PICC Booklet, for PICC Care given to patient: yes. See Patient Education activity for further details. PROCEDURE DETAIL  A double lumen PICC line was started for Home IV Therapy. The following documentation is in addition to the PICC properties in the lines/airways flowsheet :  Lot #: [id number]  Was xylocaine 1% used intradermally:  yes  Catheter Length: 41 (cm)  Vein Selection for PICC:right basilic  Central Line Bundle followed yes  Complication Related to Insertion: PICC exchange done. The placement was verified by CXR technology: The  tip location is on the right side and the tip is in the  superior vena cava but needs to be advance 3cm more per CXR. PICC exchange done from 38 cm to 41 cm. See ECG results for PICC tip placement. Report given to nurse Arthur Cabral is okay to use.     Sarah Wiley RN

## 2022-01-10 NOTE — PROGRESS NOTES
Occupational Therapy:     Chart reviewed in preparation for OT tx session. Attempted to see pt, however PICC team present to place line. Will follow up as able and appropriate for OT intervention.      Thank you,   MK Chan, OTR/L

## 2022-01-10 NOTE — PROGRESS NOTES
Inpatient Progress Note  Student note      Patient name: Swati Crowder  Patient : 1955  Patient MRN: [id number]  Consulting MD: Barbara Gillis MD  Date of service: 01/10/22    REASON FOR REFERRAL:  Management of chronic systolic heart failure    HPI  Swati Crowder is a 78 y.o.  female with a history of NICM, chronic hypoxic respiratory failure secondary to pulmonary HTN, hypothyroidism, CKD, GERD, and DM II who presented to [facility] as a transfer from another facility for acute on chronic hypoxic respiratory failure. Reports running out of O2 at home resulting in SOB and dizziness. Upon arrival to the ED she was found to have O2 sats in the 70s, requiring 4L NC O2. Rapid covid test was negative. ProNT-BNP was 63214, K+5.2, BUN/CR x/2.54 and elevated d-dimer. Chest xray showing pulmonary edema vs. Atypical pneumonia. VQ scan showed low probability for PE. Per Dr. Beverley Gonzalez, NICM, LVEF 15-20% during recent admission.  LVEF previously normalized with CRT.  NYHA III-IV.  No ACEi/ARB due to renal dysfunction.  GDMT dosing limited by low BP. Patient's PCP is Dr. Ana Mclaughlin, and she sees Dr. Beverley Gonzalez primarily for cardiac care due to Dr. Janett Martins transfering practice. Patient historically seen by nephrology but has not had follow up care in the past three years.       PLAN OF CARE:   Optomize GDMT   Inotrope assisted diuresis with Milrinone 0.375 mcg/kg/min IV and Bumex 2mg IV TID  o Begin heart failure workup   RHC done 12-10-21 showing severe pulmonary HTN with wedge pressures 34mmHg and PAP 80mmHg)  o No LHC due to renal function   o  cath showed no CAD   Recent pacer interrogation 2021 with 100% capture post LV lead reprogramming  o BiV ICD generator change procedure has been scheduled for 2022   Consult with renal regarding PICC line (patient had information on it) for possible long term inotrope therapy and potential site use for need for HD eventually   Potential palliative consult regarding goals of care discussion         RECOMMENDATIONS:  · Continue current medical therapy for heart failure   Start Ivabradine 5mg PO BID   Continue carvedilol, isosorbide dinitrate and hydralazine as HR and BP within acceptable range  · Continue current dose of ACE/ARB/ARNi- not a candidate due to renal insufficiency   Continue Milrinone 0.375 mcg/kg/min   Start Bumex 2mg PO TID for net negative goal of 1-2LIf not sufficient urine output in 24 hours- 1-2L net negative will start Bumex drip  o Strict I/Os  o Daily weight- standing, not bedscale  · Order patient's home dose of allopurinol- 100mg PO daily , check uric acid level  · Not on anticoagulation other than DVT prophylaxis heparin 5000 units SQ  · No ASA/Plavix at this time   · PFT/DLCO annually   · ICD interrogation- completed 10-  · Echocardiogram  · PYP scan (completed)  · Repeat EKG  · Chest xray in AM  · Labs: CBC, BMP, LFT, pro-NT-BNP, iron profile with ferritin, thyroid profile, vitamin D level, lipid profile, CPK, gammopathy profile, and HgA1c  · Not on outpatient statin  · On Synthroid 100 mcg PO daily  · Pro-NT-BNP  66630  · Nutritionist consult- patient states she doesn't restrict her diet  · Heart failure education  · Advanced care plan present on file  · Support oxygen demands with supplemental O2- currently 4-5L NC  · Outpatient sleep studies: on home 02 and sleeps sitting up  · Evaluate for home CPAP  ·  Plan at discharge: recommend flu and pneumonia vaccinations    IMPRESSION:  Fatigue  Shortness of breath at rest  Volume overload  Chronic systolic heart failure  Stage C, NYHA class III-IV symptoms  Non-ischemic cardiomyopathy, LVEF 15%  HTN  HL    LIFE GOALS:  Patient's personal goals include: being home with family, still ambulating around without getting too tired.    Important upcoming milestones or family events: none  The patient identifies the following as important for living well: remaining idependent and mobile; being able to get out of the house with . Patient verbalized willingness to be on home milrinone and evaluation for both heart and kidney transplants. Verbalizes she would have family supporting her decision on this. INTERVAL HISTORY:  Dyspnea, orthopnea  Hemodynamics stable  Labs reviewed    CARDIAC IMAGIN2021 Echo  · LV: Estimated LVEF is 15 - 20%. Normal cavity size. Upper normal wall thickness. Severely reduced systolic function. Left ventricular diastolic dysfunction. · LA: Moderately dilated left atrium. · RV: Borderline low systolic function. Pacer/ICD present. · RA: Mildly dilated right atrium. · MV: Mitral valve non-specific thickening. Mild to moderate mitral valve regurgitation is present. · TV: Mild tricuspid valve regurgitation is present. · PA: Mild to moderate pulmonary hypertension. Pulmonary arterial systolic pressure is 47 mmHg. EKG 2022  Suspect unspecified pacemaker failure   Atrial-sensed ventricular-paced rhythm   Abnormal ECG   When compared with ECG of 07-DEC-2021 07:05,   Vent. rate has increased BY  13 BPM   Confirmed by Dennis Gould M.D., Su Bradley ([id number]) on 2022 3:37:09   PM    LHC- not performed due to renal function    NST  Kalyani scan: moderate LAD ischemia    ICD interrogation 10- with 100% capture   Last EKG suggestive of pacer not capturing    2022 NUCLEAR CARDIAC AMYLOID SPECT   PYP scan  Clinical history: Amyloid     Plantar imaging of the chest was performed followed by SPECT imaging in the transverse, sagittal, and coronal planes utilizing  15  mCi Tc-99M PYP.      Quantitative: The heart to contralateral lung ratio was 1.05  .     Semiquantitative: Visual comparison of the cardiac uptake with the bone and uptake was performed.  The visual score is grade 1.     IMPRESSION: PYP scan is equivocal for ATTR cardiac amyloidosis based on the H:CL ratio of  1.05 and semiquantitative score of 1.           HEMODYNAMICS:  [facility] 12/10/2021  Pulmonary pressure 76/48 with a pulmonary artery mean pressure of 57 mmHg. Right ventricular pressure 80/29  Right atrial pressure mean 20 is a mercury  Wedge pressure mean 35 mmHg. Aye cardiac output 4.98 L/min Aye cardiac index 2.26 L/min       Impression: High wedge pressure indicating volume overload that is  precapillary along with severe pulmonary hypertension  Electronically signed by Jerel Balderas MD on 12/14/21 at 1632 EST    CPEST not done      6MW not done at this time; patient cannot tolerate    OTHER IMAGING:  CXR 01/04/2022  FINDINGS:     AP portable view of the chest demonstrates left subclavian pacemaker. Heart size  is enlarged. Lung volumes are diminished with worsening interstitial airspace  opacities. No pneumothorax. The osseous structures are unremarkable.     IMPRESSION  Interval worsening of interstitial/airspace opacities which again could be due  to pulmonary edema and/or viral/atypical pneumonia. NM Lung scan perf 01/04/2022  IMPRESSION  1. There is mild inhomogeneous distribution of tracer with a subsegmental defect  left base and overall examination is considered low probability for pulmonary emboli. .  Signed Date/Time  Phone Pager   Judy Wilson 1/04/2022  3:46 PM [phone]  3:46 [phone]          US RETROPERITONEUM COMP 01/05/2022     IMPRESSION  Small bilateral renal cysts. No evidence for hydronephrosis. Signed Date/Time  Phone Pager   AMIE Melendez 1/05/2022  1:42 PM [phone]  1:42 [phone]                  PHYSICAL EXAM:  Visit Vitals  /81   Pulse (!) 110   Temp 97.7 °F (36.5 °C)   Resp 20   Ht 5' 7\" (1.702 m)   Wt 255 lb 6.4 oz (115.8 kg)   SpO2 96%   BMI 40.00 kg/m²          General: Patient is well developed, obese, with some noted increase of work of breathing   HEENT: Normocephalic and atraumatic. No scleral icterus. Pupils are equal, round and reactive to light and accomodation.  No conjunctival injection. Oropharynx is clear. Neck: Supple. No evidence of thyroid enlargements or lymphadenopathy. JVD: Cannot be appreciated   Lungs: +HAILE and orthopnea. Breath sounds are clear at the upper lobes with course at the bases. On 4-5L NC with noted shortness of breath requiring patient to take pauses in speaking  Heart: Regular rate and rhythm with normal S1 and S2. No murmurs, gallops or rubs. Paced rhythm  Abdomen: Soft, no mass or tenderness. No organomegaly or hernia. Bowel sounds present. Genitourinary and rectal: deferred  Extremities: No cyanosis, clubbing, or edema. Neurologic: No focal sensory or motor deficits are noted. Grossly intact. Psychiatric: Awake, alert an doriented x 4. Appropriate mood and affect. Skin: Warm, dry and well perfused. Psoratic patches noted to extremities with scabbed areas. REVIEW OF SYSTEMS:  General: Denies fever, night sweats. Ear, nose and throat: +sinus congestion, runny nose, post-nasal drip. + parasthesias to feet occasionally. Denies difficulty hearing,ringing in ears, mouth sores, loose teeth, ear pain, nosebleeds, sore throat, facial pain or numbess  Cardiovascular: see above in the interval history  Respiratory: + shortness of breath with exertion and when laying flat. Denies cough, wheezing, sputum production, hemoptysis.   Gastrointestinal: Denies heartburn, constipation, intolerance to certain foods, diarrhea, abdominal pain, nausea, vomiting, difficulty swallowing, blood in stool  Kidney and bladder: Denies painful urination, frequent urination, urgency  Musculoskeletal: Denies joint pain, muscle weakness  Skin and hair: Denies change in existing skin lesions, hair loss or increase, breast changes    PAST MEDICAL HISTORY:  Past Medical History:   Diagnosis Date    Acquired hypothyroidism 8/15/2016    Anemia     RED-HF study    Asthma     Cardiomyopathy, nonischemic ([facility] [address]     initial dx 2001, bivHF 2008 with EF 15%, s/p biV-ICD 9/08, significant improvment in EF to 45-50%    CKD (chronic kidney disease)     Dr Evette Munguia    CKD (chronic kidney disease) 8/15/2012    Depression     Diabetes ([facility] [address]     Diabetic neuropathy ([facility] [address]     [facility] 8[facility]15[facility]2012    GERD (gastroesophageal reflux disease)     Gout     Hypothyroidism     ICD (implantable cardioverter-defibrillator), biventricular, in situ 6/5/2014    Psoriasis        PAST SURGICAL HISTORY:  Past Surgical History:   Procedure Laterality Date    CARDIAC CATHETERIZATION  2007; 01/06/15    normal cors    ECHO 2D ADULT  4/2010    EF 45%, improved from 1/09 (25%)    ECHO 2D ADULT  11/2011    LVH, EF 55-60%    HX ORTHOPAEDIC      knee    HX PACEMAKER PLACEMENT      AICD    STRESS TEST LEXISCAN/CARDIOLITE  3/21/12    normal perfusion, global HK 40%       FAMILY HISTORY:  Family History   Problem Relation Age of Onset    Heart Disease Mother     Hypertension Mother     Lupus Sister     Diabetes Brother        SOCIAL HISTORY:  Social History     Socioeconomic History    Marital status:    Tobacco Use    Smoking status: Never Smoker    Smokeless tobacco: Never Used   Substance and Sexual Activity    Alcohol use: No    Drug use: No    Sexual activity: Never   Social History Narrative    . Nonsmoker. Disability       LABORATORY RESULTS:     Labs Latest Ref Rng & Units 1/10/2022 1/9/2022 1/8/2022 1/6/2022 1/6/2022 1/5/2022 1/4/2022   WBC 3.6 - 11.0 K/uL - - 10.3 11. 1(H) 11. 3(H) 14. 2(H) -   RBC 3.80 - 5.20 M/uL - - 3.34(L) 3.20(L) 3.52(L) 3.97 -   Hemoglobin 11.5 - 16.0 g/dL - - 8. 1(L) 7. 8(L) 8.4(L) 9.7(L) -   Hematocrit 35.0 - 47.0 % - - 30. 1(L) 28. 5(L) 31. 6(L) 35.0 -   MCV 80.0 - 99.0 FL - - 90.1 89.1 89.8 88.2 -   Platelets 496 - 533 K/uL - - 306 281 298 409(H) -   Lymphocytes 12 - 49 % - - - 7(L) 8(L) 7(L) -   Monocytes 5 - 13 % - - - 7 7 6 -   Eosinophils 0 - 7 % - - - 2 1 0 -   Basophils 0 - 1 % - - - 0 0 0 -   Albumin 3.5 - 5.0 g/dL - - - - - - - Calcium 8.5 - 10.1 MG/DL 9.0 9.3 8.3(L) 8. 1(L) 8.7 9.3 -   Glucose 65 - 100 mg/dL 144(H) 93 293(H) 283(H) 264(H) 283(H) -   BUN 6 - 20 MG/DL 71(H) 78(H) 86(H) 85(H) 83(H) 63(H) -   Creatinine 0.55 - 1.02 MG/DL 2.62(H) 3.04(H) 3.50(H) 3.49(H) 3.36(H) 2.74(H) -   Sodium 136 - 145 mmol/L 137 136 133(L) 133(L) 133(L) 133(L) -   Potassium 3.5 - 5.1 mmol/L 4.2 4.0 4.3 4.2 4.5 5.5(H) 5. 2(H)   TSH 0.36 - 3.74 uIU/mL - - - - - - -   Some recent data might be hidden     Lab Results   Component Value Date/Time    TSH 1.85 12/15/2021 01:06 PM    TSH 1.01 11/05/2020 09:11 AM    TSH 2.740 04/30/2019 11:21 AM    TSH 0.519 02/21/2012 10:53 AM    TSH 8.29 (H) 01/20/2010 01:59 PM       ALLERGY:  Allergies   Allergen Reactions    Ciprofloxacin Anaphylaxis    Shellfish Derived Anaphylaxis    Ace Inhibitors Unknown (comments)    Biaxin [Clarithromycin] Other (comments)     Metal taste    Candesartan Cough    Pcn [Penicillins] Hives        CURRENT MEDICATIONS:    Current Facility-Administered Medications:     insulin NPH (NOVOLIN N, HUMULIN N) injection 60 Units, 60 Units, SubCUTAneous, Q12H, Yesenia Casillas MD, 60 Units at 01/10/22 1040    epoetin isabel-epbx (RETACRIT) injection 20,000 Units, 20,000 Units, SubCUTAneous, Q TUE, THU & SAT, Venice SHERMAN MD, 20,000 Units at 01/08/22 2349    docusate sodium (COLACE) capsule 100 mg, 100 mg, Oral, BID, Sid Chsae MD, 100 mg at 01/10/22 0900    insulin lispro (HUMALOG) injection 10 Units, 10 Units, SubCUTAneous, TIDAC, Lisa Horne MD, 10 Units at 01/09/22 1725    carvediloL (COREG) tablet 6.25 mg, 6.25 mg, Oral, BID WITH MEALS, Ary Barrera MD, 6.25 mg at 01/10/22 1036    hydrALAZINE (APRESOLINE) tablet 10 mg, 10 mg, Oral, TID, Ary Barrera MD, 10 mg at 01/10/22 1036    isosorbide dinitrate (ISORDIL) tablet 10 mg, 10 mg, Oral, TID, Ary Barrera MD, 10 mg at 01/10/22 1036    bumetanide (BUMEX) tablet 1 mg, 1 mg, Oral, BID, Ary Barrera MD, 1 mg at 01/10/22 1036    montelukast (SINGULAIR) tablet 10 mg, 10 mg, Oral, DAILY, Kailee Madrigal MD, 10 mg at 01/10/22 1036    levothyroxine (SYNTHROID) tablet 100 mcg, 100 mcg, Oral, Once per day on Mon Tue Wed Thu Fri Sat, Rena Mistry MD, 100 mcg at 01/10/22 0520    cetirizine (ZYRTEC) tablet 10 mg, 10 mg, Oral, DAILY, Lisa Horne MD, 10 mg at 01/10/22 1036    hydroxypropyl methylcellulose (ISOPTO TEARS) 0.5 % ophthalmic solution 1 Drop, 1 Drop, Both Eyes, PRN, Lisa Horne MD, 1 Drop at 01/06/22 1727    venlafaxine-SR (EFFEXOR-XR) capsule 75 mg, 75 mg, Oral, DAILY WITH BREAKFAST, Lisa Horne MD, 75 mg at 01/10/22 1036    gabapentin (NEURONTIN) capsule 100 mg, 100 mg, Oral, QHS, Lisa Horne MD, 100 mg at 01/09/22 2200    arformoteroL (BROVANA) neb solution 15 mcg, 15 mcg, Nebulization, BID RT, 15 mcg at 01/10/22 0739 **AND** budesonide (PULMICORT) 500 mcg/2 ml nebulizer suspension, 500 mcg, Nebulization, BID RT, Kailee Madrigal MD, 500 mcg at 01/10/22 0739    hydrOXYzine HCL (ATARAX) tablet 20 mg, 20 mg, Oral, TID PRN, Aliya Braxton MD    milrinone (PRIMACOR) 20 MG/100 ML D5W infusion, 0.375 mcg/kg/min, IntraVENous, CONTINUOUS, Aliya Braxton MD, Last Rate: 13.5 mL/hr at 01/10/22 0930, 0.375 mcg/kg/min at 01/10/22 0930    heparin (porcine) injection 5,000 Units, 5,000 Units, SubCUTAneous, Q8H, Darrell Valenzuela MD, 5,000 Units at 01/10/22 0520    ivabradine (CORLANOR) tablet 5 mg, 5 mg, Oral, BID WITH MEALS, Aliya Braxton MD, 5 mg at 01/09/22 1727    sodium chloride (NS) flush 5-40 mL, 5-40 mL, IntraVENous, Q8H, Aliya Braxton MD, 10 mL at 01/10/22 0521    sodium chloride (NS) flush 5-40 mL, 5-40 mL, IntraVENous, PRN, Aliya Braxton MD    acetaminophen (TYLENOL) tablet 650 mg, 650 mg, Oral, Q6H PRN **OR** acetaminophen (TYLENOL) suppository 650 mg, 650 mg, Rectal, Q6H PRN, Aliya Braxton MD    ondansetron (ZOFRAN ODT) tablet 4 mg, 4 mg, Oral, Q8H PRN **OR** ondansetron (ZOFRAN) injection 4 mg, 4 mg, IntraVENous, Q6H PRN, Guille Patel MD, 4 mg at 01/05/22 0915    glucose chewable tablet 16 g, 4 Tablet, Oral, PRN, Guille Patel MD    dextrose (D50W) injection syrg 12.5-25 g, 25-50 mL, IntraVENous, PRN, Guille Patel MD    glucagon [facility]) injection 1 mg, 1 mg, IntraMUSCular, PRN, Guille Patel MD    insulin lispro (HUMALOG) injection, , SubCUTAneous, AC&HS, Guille Patel MD, 3 Units at 01/07/22 1825    albuterol-ipratropium (DUO-NEB) 2.5 MG-0.5 MG/3 ML, 3 mL, Nebulization, Q6H PRN, Guille Patel MD    PATIENT CARE TEAM:  Patient Care Team:  Teri Quinteros MD as PCP - General (Internal Medicine)  Teri Quinteros MD as PCP - [address] [place] [address] [facility]  Castro Posada MD as Consulting Provider (Internal Medicine)  Sri Zarco MD (Dermatology)  Duke Borja MD (Nephrology)  Guille Patel MD as Consulting Provider (Cardiology)  Melvina Montenegro MD (Cardiology)  Margot Anderson MD as Consulting Provider (Pulmonary Disease)     Thank you for allowing me to participate in this patient's care.     Martin MATIAS-ACNP student at [employer]  working with Ms. Cici Flynn NP from     [address] [place] [address], [address]  Phone: [phone]  Fax: [phone]

## 2022-01-10 NOTE — PROGRESS NOTES
[facility] in [place], [place]  Inpatient Progress Note      Patient name: Aletha Smith  Patient : 1955  Patient MRN: [id number]  Consulting MD: Luis A Callahan MD  Date of service: 01/10/22    REASON FOR REFERRAL:  Management of chronic systolic heart failure    PLAN OF CARE:   77 y/o female with new onset severe cardiomyopathy, LVEF 15% (diagnosed 21) admitted for acute decompensation c/b acute hepatic and acute on chronic renal failure in the setting of massive volume overload of likely > 25 lbs.    Plant to continue inotrope-assisted diuresis, goal net negative 2-3lbs per day; w/d patient anticipate 7-10 days of hospitalization   Patient expressed wishes to be full code and would like to be evaluated for dual organ transplant if her heart and renal function does not improve    RECOMMENDATIONS:  Continue current medical therapy for heart failure  Continue Milrinone 0.375mcg/kg/min- adjusted to current weight   Continue coreg 6.25mg BID  No ACE/ARB/ARNI due to CKD  No MRA due to CKD- will discuss with nephrology   Cont Hydralazine 10mg TID  Cont Isordil 10mg TID  Corlanor 5mg BID- may need to increase, will d/w Dr. Jimenez Sprain to 2mg TID, consider bumex gtt if inadequate diuresis  goal net negative 1-2 liters per day Goal weight approx 224lb based on recent RHC  Not on allopurinol or uloric, check uric acid level  ICD interrogation per    Repeat TTE and EKG when euvolemic   Will need OP PSG  Send HF labs: CBC, CMP, pro-NT-BNP, iron profile with ferritin, thyroid profile, vitamin D level, lipid profile, CPK, gammopathy profile  Nephrology following- ok to place PICC line   Palliative consult   Nutritionist consult  Heart failure education  Advanced care plan present on file    All other care per primary team    IMPRESSION:  Fatigue  Shortness of breath, on 3L NC  Volume overload  Acute on Chronic systolic heart failure  · Stage D, NYHA class IV symptoms  · Non-ischemic cardiomyopathy, LVEF 15%  · Normal coronaries  · PYP equivocal for amyloid   Pulmonary hypertension  S/p BiV-ICD  Cardiac risk factors:  · HL  · DM2  · Morbid obesity, BMI 40  Acute on chronic renal failure, stage IV  GERD  Anemia of chronic disease  Gout    LIFE GOALS:  Patient's personal goals include: being home with family, still ambulating around without getting too tired. Important upcoming milestones or family events: none  The patient identifies the following as important for living well: remaining idependent and mobile; being able to get out of the house with . Patient verbalized willingness to be on home milrinone and evaluation for both heart and kidney transplants. Verbalizes she would have family supporting her decision on this. SHMAIKA Zamora is a 78 y.o.  female with a history of NICM, chronic hypoxic respiratory failure secondary to pulmonary HTN, hypothyroidism, CKD, GERD, and DM II who presented to [facility] as a transfer from another facility for acute on chronic hypoxic respiratory failure. Reports running out of O2 at home resulting in SOB and dizziness.     Upon arrival to the ED she was found to have O2 sats in the 70s, requiring 4L NC O2. Rapid covid test was negative. ProNT-BNP was 12529, K+5.2, BUN/CR x/2.54 and elevated d-dimer. Chest xray showing pulmonary edema vs. Atypical pneumonia. VQ scan showed low probability for PE.     Per Dr. Priscilla Lilly, NICM, LVEF 15-20% during recent admission.  LVEF previously normalized with CRT.  NYHA III-IV.  No ACEi/ARB due to renal dysfunction.  GDMT dosing limited by low BP.     Patient's PCP is Dr. Cinthia Stovall, and she sees Dr. Priscilla Lilly primarily for cardiac care due to Dr. Chava Dominguez transfering practice. Patient historically seen by nephrology but has not had follow up care in the past three years.     CARDIAC IMAGING:  Echo 12/8/21- LVEF 15-20%, mild to Mod MR, LVIDd 5.09cm, TAPSE 1.94cm  Echo 4/22/19- LVEF 60%, trace MR,  LVIDd 4.24cm, TAPSE 1.65cm   Echo 4/24/18- LVEF 60%, trivial MR, LVIDd 4.79cm, TAPSE 2.25cm     EKG- 1/4/22 ST with A sense and V paced rhythm    [facility] 2015- No significant CAD  NST 2014- reversible LAD involvement     ICD interrogation    HEMODYNAMICS:  [facility] 12/10/21: PAP 76/48/57, RAP 20, PCWP 35, CI 2.26      CPEST not done  6MW not done    OTHER IMAGING:  CXR Results  (Last 48 hours)    None            PHYSICAL EXAM:  Visit Vitals  /81   Pulse (!) 110   Temp 97.7 °F (36.5 °C)   Resp 20   Ht 5' 7\" (1.702 m)   Wt 255 lb 6.4 oz (115.8 kg)   SpO2 96%   BMI 40.00 kg/m²     Physical Exam  Vitals and nursing note reviewed. Constitutional:       General: She is not in acute distress. Appearance: Normal appearance. She is obese. Cardiovascular:      Rate and Rhythm: Regular rhythm. Tachycardia present. Pulses: Normal pulses. Heart sounds: Normal heart sounds. No murmur heard. Pulmonary:      Effort: Pulmonary effort is normal. No respiratory distress. Abdominal:      General: There is distension. Musculoskeletal:         General: Swelling present. Skin:     General: Skin is warm and dry. Neurological:      General: No focal deficit present. Mental Status: She is alert and oriented to person, place, and time. Psychiatric:         Mood and Affect: Mood normal.         REVIEW OF SYSTEMS:  Review of Systems   Constitutional: Positive for malaise/fatigue. Negative for chills and fever. Respiratory: Positive for shortness of breath. Cardiovascular: Positive for leg swelling. Negative for chest pain, palpitations and orthopnea. Gastrointestinal: Negative for heartburn and nausea. Genitourinary: Negative for dysuria. Musculoskeletal: Negative for falls, joint pain and myalgias. Neurological: Negative for dizziness. Psychiatric/Behavioral: Positive for depression. The patient is nervous/anxious.           PAST MEDICAL HISTORY:  Past Medical History:   Diagnosis Date    Acquired hypothyroidism 8/15/2016    Anemia     RED-HF study    Asthma     Cardiomyopathy, nonischemic ([facility]     initial dx 2001, bivHF 2008 with EF 15%, s/p biV-ICD 9/08, significant improvment in EF to 45-50%    CKD (chronic kidney disease)     Dr Bard Harden    CKD (chronic kidney disease) 8/15/2012    Depression     Diabetes ([facility]     [facility]     DM [facility]diabetes mellitus) [facility] 8/15/2012    GERD (gastroesophageal reflux disease)     Gout     Hypothyroidism     ICD (implantable cardioverter-defibrillator), biventricular, in situ 6/5/2014    Psoriasis        PAST SURGICAL HISTORY:  Past Surgical History:   Procedure Laterality Date    CARDIAC CATHETERIZATION  2007; 01/06/15    normal cors    ECHO 2D ADULT  4/2010    EF 45%, improved from 1/09 (25%)    ECHO 2D ADULT  11/2011    LVH, EF 55-60%    HX ORTHOPAEDIC      knee    HX PACEMAKER PLACEMENT      AICD    STRESS TEST LEXISCAN/CARDIOLITE  3/21/12    normal perfusion, global HK 40%       FAMILY HISTORY:  Family History   Problem Relation Age of Onset    Heart Disease Mother     Hypertension Mother     Lupus Sister     Diabetes Brother        SOCIAL HISTORY:  Social History     Socioeconomic History    Marital status:    Tobacco Use    Smoking status: Never Smoker    Smokeless tobacco: Never Used   Substance and Sexual Activity    Alcohol use: No    Drug use: No    Sexual activity: Never   Social History Narrative    . Nonsmoker. Disability       LABORATORY RESULTS:     Labs Latest Ref Rng & Units 1/10/2022 1/9/2022 1/8/2022 1/6/2022 1/6/2022 1/5/2022 1/4/2022   WBC 3.6 - 11.0 K/uL - - 10.3 11. 1(H) 11. 3(H) 14. 2(H) -   RBC 3.80 - 5.20 M/uL - - 3.34(L) 3.20(L) 3.52(L) 3.97 -   Hemoglobin 11.5 - 16.0 g/dL - - 8. 1(L) 7. 8(L) 8.4(L) 9.7(L) -   Hematocrit 35.0 - 47.0 % - - 30. 1(L) 28. 5(L) 31. 6(L) 35.0 -   MCV 80.0 - 99.0 FL - - 90.1 89.1 89.8 88.2 - Platelets 233 - 730 K/uL - - 306 281 298 409(H) -   Lymphocytes 12 - 49 % - - - 7(L) 8(L) 7(L) -   Monocytes 5 - 13 % - - - 7 7 6 -   Eosinophils 0 - 7 % - - - 2 1 0 -   Basophils 0 - 1 % - - - 0 0 0 -   Albumin 3.5 - 5.0 g/dL - - - - - - -   Calcium 8.5 - 10.1 MG/DL 9.0 9.3 8.3(L) 8. 1(L) 8.7 9.3 -   Glucose 65 - 100 mg/dL 144(H) 93 293(H) 283(H) 264(H) 283(H) -   BUN 6 - 20 MG/DL 71(H) 78(H) 86(H) 85(H) 83(H) 63(H) -   Creatinine 0.55 - 1.02 MG/DL 2.62(H) 3.04(H) 3.50(H) 3.49(H) 3.36(H) 2.74(H) -   Sodium 136 - 145 mmol/L 137 136 133(L) 133(L) 133(L) 133(L) -   Potassium 3.5 - 5.1 mmol/L 4.2 4.0 4.3 4.2 4.5 5.5(H) 5. 2(H)   TSH 0.36 - 3.74 uIU/mL - - - - - - -   Some recent data might be hidden     Lab Results   Component Value Date/Time    TSH 1.85 12/15/2021 01:06 PM    TSH 1.01 11/05/2020 09:11 AM    TSH 2.740 04/30/2019 11:21 AM    TSH 0.519 02/21/2012 10:53 AM    TSH 8.29 (H) 01/20/2010 01:59 PM       ALLERGY:  Allergies   Allergen Reactions    Ciprofloxacin Anaphylaxis    Shellfish Derived Anaphylaxis    Ace Inhibitors Unknown (comments)    Biaxin [Clarithromycin] Other (comments)     Metal taste    Candesartan Cough    Pcn [Penicillins] Hives        CURRENT MEDICATIONS:    Current Facility-Administered Medications:     bumetanide (BUMEX) injection 2 mg, 2 mg, IntraVENous, TID, Sid Chase MD    insulin NPH (NOVOLIN N, HUMULIN N) injection 60 Units, 60 Units, SubCUTAneous, Q12H, Romulo Casillas MD, 60 Units at 01/10/22 1040    epoetin isabel-epbx (RETACRIT) injection 20,000 Units, 20,000 Units, SubCUTAneous, Q TUE, THU & SAT, Marylu SHERMAN MD, 20,000 Units at 01/08/22 2349    docusate sodium (COLACE) capsule 100 mg, 100 mg, Oral, BID, Sid Chase MD, 100 mg at 01/10/22 0900    insulin lispro (HUMALOG) injection 10 Units, 10 Units, SubCUTAneous, TIDAC, Lisa Horne MD, 10 Units at 01/09/22 1725    carvediloL (COREG) tablet 6.25 mg, 6.25 mg, Oral, BID WITH MEALS, Tenorio, Gina Mera MD, 6.25 mg at 01/10/22 1036    hydrALAZINE (APRESOLINE) tablet 10 mg, 10 mg, Oral, TID, Mulugeta Reed MD, 10 mg at 01/10/22 1036    isosorbide dinitrate (ISORDIL) tablet 10 mg, 10 mg, Oral, TID, Mulugeta Reed MD, 10 mg at 01/10/22 1036    montelukast (SINGULAIR) tablet 10 mg, 10 mg, Oral, DAILY, Patricia Walker MD, 10 mg at 01/10/22 1036    levothyroxine (SYNTHROID) tablet 100 mcg, 100 mcg, Oral, Once per day on Mon Tue Wed Thu Fri Sat, Yi Reinoso MD, 100 mcg at 01/10/22 0520    cetirizine (ZYRTEC) tablet 10 mg, 10 mg, Oral, DAILY, Lisa Horne MD, 10 mg at 01/10/22 1036    hydroxypropyl methylcellulose (ISOPTO TEARS) 0.5 % ophthalmic solution 1 Drop, 1 Drop, Both Eyes, PRN, Lisa Horne MD, 1 Drop at 01/06/22 1727    venlafaxine-SR (EFFEXOR-XR) capsule 75 mg, 75 mg, Oral, DAILY WITH BREAKFAST, Lisa Horne MD, 75 mg at 01/10/22 1036    gabapentin (NEURONTIN) capsule 100 mg, 100 mg, Oral, QHS, Lisa Horne MD, 100 mg at 01/09/22 2200    arformoteroL (BROVANA) neb solution 15 mcg, 15 mcg, Nebulization, BID RT, 15 mcg at 01/10/22 0739 **AND** budesonide (PULMICORT) 500 mcg/2 ml nebulizer suspension, 500 mcg, Nebulization, BID RT, Patricia Walker MD, 500 mcg at 01/10/22 0739    hydrOXYzine HCL (ATARAX) tablet 20 mg, 20 mg, Oral, TID PRN, Mulugeta Reed MD    milrinone (PRIMACOR) 20 MG/100 ML D5W infusion, 0.375 mcg/kg/min, IntraVENous, CONTINUOUS, Mulugeta Reed MD, Last Rate: 13.5 mL/hr at 01/10/22 0930, 0.375 mcg/kg/min at 01/10/22 0930    heparin (porcine) injection 5,000 Units, 5,000 Units, SubCUTAneous, Q8H, Ralph Samaniego MD, 5,000 Units at 01/10/22 1316    ivabradine (CORLANOR) tablet 5 mg, 5 mg, Oral, BID WITH MEALS, Mulugeta Reed MD, 5 mg at 01/10/22 0800    sodium chloride (NS) flush 5-40 mL, 5-40 mL, IntraVENous, Q8H, Mulugeta Reed MD, 10 mL at 01/10/22 0521    sodium chloride (NS) flush 5-40 mL, 5-40 mL, IntraVENous, PRN, Mulugeta Reed MD    acetaminophen (TYLENOL) tablet 650 mg, 650 mg, Oral, Q6H PRN **OR** acetaminophen (TYLENOL) suppository 650 mg, 650 mg, Rectal, Q6H PRN, Newton Cole MD    ondansetron (ZOFRAN ODT) tablet 4 mg, 4 mg, Oral, Q8H PRN **OR** ondansetron (ZOFRAN) injection 4 mg, 4 mg, IntraVENous, Q6H PRN, Newton Cole MD, 4 mg at 22 0915    glucose chewable tablet 16 g, 4 Tablet, Oral, PRN, Newton Cole MD    dextrose (D50W) injection syrg 12.5-25 g, 25-50 mL, IntraVENous, PRN, Newton Cole MD    glucagon [facility]) injection 1 mg, 1 mg, IntraMUSCular, PRN, Newton Cole MD    insulin lispro (HUMALOG) injection, , SubCUTAneous, AC&HS, Newton Cole MD, 3 Units at 22 1825    albuterol-ipratropium (DUO-NEB) 2.5 MG-0.5 MG/3 ML, 3 mL, Nebulization, Q6H PRN, Newton Cole MD    PATIENT CARE TEAM:  Patient Care Team:  Marion Edwards MD as PCP - General (Internal Medicine)  Marion Edwards MD as PCP - [facility]  Yoan Alanis MD as Consulting Provider (Internal Medicine)  Madison Pino MD (Dermatology)  Lucy Ramos MD (Nephrology)  Newton Cole MD as Consulting Provider (Cardiology)  Bar Barboza MD (Cardiology)  Mayur James MD as Consulting Provider (Pulmonary Disease)     Thank you for allowing me to participate in this patient's care. Janna Hammonds NP  [facility] [address], [address]  Phone: [phone]    [facility] ATTENDING ADDENDUM    Patient was seen and examined in person. Data and notes were reviewed. I have discussed and agree with the plan as noted in the NP note above without further additions.     Criss Dale MD PhD  Jacquelyn Rodriguez

## 2022-01-11 NOTE — PROGRESS NOTES
[facility]  Hospitalist Group                                                                                          Hospitalist Progress Note  Star Paris MD  Answering service: [phone] from in house phone        Date of Service:  2022  NAME:  Parker Lucero  :  1955  MRN:  [id number]      Admission Summary:     Parker Lucero is a 78 y.o. female with hx NICM, chronic hypoxic respiratory failure 2/2 pulmonary htn, ckd, hypothyroidism, GERD, and  DM II who presents as a transfer from [facility] for acute on chronic hypoxic respiratory failure. She wears 3L o2 at baseline, and reportedly ran out of her home oxygen.     In the ED, she was hypoxic to th 70's, with improvement ot 94% on 4Lnc. Labs showed stable anemia with Hgb 10, K+ 5.2, lactic 3.1, trop 55>66, d-dimer >35, creatinine 2.54, and probnp 15,826. CXR showed diffuse interstitial airway disease. Rapid covid was negative.     In the ED, she was started on a heparin gtt, and sent to [facility] for VQ scan. She received bumex and nitropaste. Interval history / Subjective:      No complaints, breathing unchanged, but on more O2 this AM.     Assessment & Plan:     Acute on chronic respiratory failure with oxygen  Due to CHF  COVID-19 negative. Mild leukocytosis. Afebrile. VQ scan low probability for PE.  CXR positive for interstitial airspace disease likely pulmonary edema or atypical pneumonia. Improving, SPO2 WNL on 4 L nasal cannula, increased to 6 today  Continue diuretics, noted dose increase by cardiology    Acute on chronic systolic heart failure  NYHA class IV. Status post ICD placement. LVEF 15 to 20%. proBNP [id number]. Continue Bumex, Coreg and milrinone, ivabridine per cardiology  Not a candidate for ARB/ACE due to renal insufficiency. Cardiac diet, strict I/O, daily weight. Low-sodium diet.   PICC placed in anticipation for home milrinone    RODNEY on CKD stage IV felt to be due to cardiorenal syndrome  At baseline, electrolytes WNL  Monitor volume status electrolytes, replace electrolytes as needed. 5/2022. Renal ultrasound negative for hydronephrosis. Small bilateral renal cysts. Avoid nephrotoxic meds, renally dose medications. Nephrology on board, recommendations noted. Hyperkalemia  Resolved, hyperkalemia protocol. Daily BMP. Low potassium diet. Hyponatremia   Improving, likely diuretic induced. Daily BMP, monitor volume status and electrolytes. Leukocytosis  CXR positive for interstitial airspace disease likely pulmonary edema or atypical pneumonia. Afebrile. Respiratory panel negative for COVID-19. Monitor closely, start on empiric antibiotics if any sign of infection. Type 2 diabetes. Adjusted insulin regimen due to hypoglycemia    Hypothyroidism   Resume Synthroid. Depression  Denies any suicidal or homicidal ideation. Continue current meds. Morbid obesity   BMI 41.7. Diet and lifestyle modification recommended. Psoriasis   Multiple plaque psoriasis in the extensor surface of upper extremities. Outpatient PCP and rheumatology follow-up. Code status: Full Code  DVT prophylaxis: heparin     Care Plan discussed with: Patient/Family, Nurse and   Anticipated Disposition: TBD     Hospital Problems  Date Reviewed: 12/22/2021          Codes Class Noted POA    Acute respiratory failure with hypoxia [facility]) ICD-10-CM: J96.01  ICD-9-CM: 518.81  1/4/2022 Unknown              Vital Signs:    Last 24hrs VS reviewed since prior progress note.  Most recent are:  Visit Vitals  /86 (BP 1 Location: Left upper arm, BP Patient Position: At rest)   Pulse (!) 108   Temp 97.5 °F (36.4 °C)   Resp 25   Ht 5' 7\" (1.702 m)   Wt 117.3 kg (258 lb 9.6 oz)   SpO2 96%   BMI 40.50 kg/m²         Intake/Output Summary (Last 24 hours) at 1/11/2022 1412  Last data filed at 1/11/2022 1200  Gross per 24 hour   Intake 845.32 ml   Output 1551 ml   Net -705.68 ml Physical Examination:     I had a face to face encounter with this patient and independently examined them on 1/11/2022 as outlined below:          Constitutional:  No acute distress, cooperative, pleasant    ENT:  Oral mucosa moist, oropharynx benign. Resp:  CTA b/l. No wheezing/rhonchi/rales. No accessory muscle use   CV:  Regular rhythm, normal rate, no murmurs, gallops, rubs    GI:  Soft, non distended, non tender. normoactive bowel sounds     Musculoskeletal:  Bilateral pretibial and pedal edema    Neurologic:  grossly non-focal            Data Review:    Review and/or order of clinical lab test  Review and/or order of tests in the radiology section of CPT  Review and/or order of tests in the medicine section of CPT      Labs:     Recent Labs     01/11/22 0513   WBC 9.0   HGB 8.5*   HCT 32.6*        Recent Labs     01/11/22  0513 01/10/22  0331 01/09/22  0510    137 136   K 3.9 4.2 4.0    101 101   CO2 32 30 30   BUN 59* 71* 78*   CREA 2.36* 2.62* 3.04*   GLU 84 144* 93   CA 9.5 9.0 9.3   MG 2.3 2.3 2.4   URICA 7.1*  --   --      Recent Labs     01/11/22 0513   *   AP 75   TBILI 0.6   TP 7.2   ALB 2.6*   GLOB 4.6*     No results for input(s): INR, PTP, APTT, INREXT, INREXT in the last 72 hours. Recent Labs     01/11/22 0513   TIBC 262   PSAT 8*   FERR 764*      No results found for: FOL, RBCF   No results for input(s): PH, PCO2, PO2 in the last 72 hours.   Recent Labs     01/11/22 0513   CPK 62     Lab Results   Component Value Date/Time    Cholesterol, total 151 01/11/2022 05:13 AM    HDL Cholesterol 60 01/11/2022 05:13 AM    LDL, calculated 75.8 01/11/2022 05:13 AM    Triglyceride 76 01/11/2022 05:13 AM    CHOL/HDL Ratio 2.5 01/11/2022 05:13 AM     Lab Results   Component Value Date/Time    Glucose (POC) 96 01/11/2022 12:30 PM    Glucose (POC) 117 01/11/2022 08:40 AM    Glucose (POC) 60 (L) 01/11/2022 08:20 AM    Glucose (POC) 207 (H) 01/10/2022 09:02 PM    Glucose (POC) 192 (H) 01/10/2022 04:33 PM     Lab Results   Component Value Date/Time    Color YELLOW/STRAW 11/05/2020 09:11 AM    Appearance CLEAR 11/05/2020 09:11 AM    Specific gravity 1.012 11/05/2020 09:11 AM    pH (UA) 5.0 11/05/2020 09:11 AM    Protein Negative 11/05/2020 09:11 AM    Glucose Negative 11/05/2020 09:11 AM    Ketone Negative 11/05/2020 09:11 AM    Bilirubin Negative 11/05/2020 09:11 AM    Urobilinogen 0.2 11/05/2020 09:11 AM    Nitrites Negative 11/05/2020 09:11 AM    Leukocyte Esterase SMALL (A) 11/05/2020 09:11 AM    Epithelial cells MANY (A) 11/05/2020 09:11 AM    Bacteria Negative 11/05/2020 09:11 AM    WBC 5-10 11/05/2020 09:11 AM    RBC 0-5 11/05/2020 09:11 AM         Medications Reviewed:     Current Facility-Administered Medications   Medication Dose Route Frequency    insulin NPH (NOVOLIN N, HUMULIN N) injection 50 Units  50 Units SubCUTAneous DAILY    insulin NPH (NOVOLIN N, HUMULIN N) injection 30 Units  30 Units SubCUTAneous QHS    carvediloL (COREG) tablet 12.5 mg  12.5 mg Oral BID WITH MEALS    allopurinoL (ZYLOPRIM) tablet 50 mg  50 mg Oral DAILY    bumetanide (BUMEX) injection 2 mg  2 mg IntraVENous TID    milrinone (PRIMACOR) 20 MG/100 ML D5W infusion  0.2 mcg/kg/min IntraVENous CONTINUOUS    epoetin isabel-epbx (RETACRIT) injection 20,000 Units  20,000 Units SubCUTAneous Q TUE, THU & SAT    docusate sodium (COLACE) capsule 100 mg  100 mg Oral BID    insulin lispro (HUMALOG) injection 10 Units  10 Units SubCUTAneous TIDAC    montelukast (SINGULAIR) tablet 10 mg  10 mg Oral DAILY    levothyroxine (SYNTHROID) tablet 100 mcg  100 mcg Oral Once per day on Mon Tue Wed Thu Fri Sat    cetirizine (ZYRTEC) tablet 10 mg  10 mg Oral DAILY    hydroxypropyl methylcellulose (ISOPTO TEARS) 0.5 % ophthalmic solution 1 Drop  1 Drop Both Eyes PRN    venlafaxine-SR (EFFEXOR-XR) capsule 75 mg  75 mg Oral DAILY WITH BREAKFAST    gabapentin (NEURONTIN) capsule 100 mg  100 mg Oral QHS    arformoteroL (BROVANA) neb solution 15 mcg  15 mcg Nebulization BID RT    And    budesonide (PULMICORT) 500 mcg/2 ml nebulizer suspension  500 mcg Nebulization BID RT    hydrOXYzine HCL (ATARAX) tablet 20 mg  20 mg Oral TID PRN    heparin (porcine) injection 5,000 Units  5,000 Units SubCUTAneous Q8H    ivabradine (CORLANOR) tablet 5 mg  5 mg Oral BID WITH MEALS    sodium chloride (NS) flush 5-40 mL  5-40 mL IntraVENous Q8H    sodium chloride (NS) flush 5-40 mL  5-40 mL IntraVENous PRN    acetaminophen (TYLENOL) tablet 650 mg  650 mg Oral Q6H PRN    Or    acetaminophen (TYLENOL) suppository 650 mg  650 mg Rectal Q6H PRN    ondansetron (ZOFRAN ODT) tablet 4 mg  4 mg Oral Q8H PRN    Or    ondansetron (ZOFRAN) injection 4 mg  4 mg IntraVENous Q6H PRN    glucose chewable tablet 16 g  4 Tablet Oral PRN    dextrose (D50W) injection syrg 12.5-25 g  25-50 mL IntraVENous PRN    glucagon (GLUCAGEN) injection 1 mg  1 mg IntraMUSCular PRN    insulin lispro (HUMALOG) injection   SubCUTAneous AC&HS    albuterol-ipratropium (DUO-NEB) 2.5 MG-0.5 MG/3 ML  3 mL Nebulization Q6H PRN     ______________________________________________________________________  EXPECTED LENGTH OF STAY: 3d 19h  ACTUAL LENGTH OF STAY:          7                 Radha Virgen MD

## 2022-01-11 NOTE — CONSULTS
Palliative Medicine Consult  Scott: [phone] ([phone])    Patient Name: Neil Guzman  YOB: 1955    Date of Initial Consult: 1/10/2022  Reason for Consult: care decisions  Requesting Provider: Gaurav Miranda  Primary Care Physician: Jairon Cottrell MD     SUMMARY:   Neil Guzman is a 78 y.o. with a past history of DM, morbid obesity (BMI 40), hypothyroid, nonischemic cardiomyopathy (LV EF 15-20%), s/p BiVICD 9/2008,  severe pulmonary HTN, CKD 4, gout, psoriasis, who was admitted on 1/4/2022 from home with a diagnosis of hypoxia, volume overload. Current medical issues leading to Palliative Medicine involvement include: Advanced nonischemic cardiomyopathy possibly going home on milrinone drip, not a candidate for LVAD or heart transplant. Has CKD 4 from DM/ HTN/ CRS, not a good candidate for HD. VQ negative for PE. Started on epo for anemia of chronic renal disease. Patient is  to spouse Melva Miller, they live in [facility]. Retired from teaching. AMD on chart   1mpoa, spouse Jj Julian (patient's sister)  or Violetta Burton (her son, lives in [place])   On a good day, patient enjoys being around her house, watching TV, shopping with her . PALLIATIVE DIAGNOSES:   1. Non ischemic cardiomyopathy EF 15-20%  2. Severe pulmonary HTN, chronic respiratory failure, oxygen dependent  3. CKD4  4. Anemia of chronic disease  5. Palliative medicine encounter        PLAN:   1. Symptoms slowly improving (able to lay more flat, move around in bed a little better)  2. Adv Heart Failure team evaluation ongoing. And milrinone assisted diuresis. 3. Will check back in again on Monday, please call our team as needed [phone]        4. Introduced palliative medicine services 1/10/2022   1. Hear of recent events, patient admits to feeling a bit overwhelmed with all the news of her health issues.    2. She understands from Dr. Rudy Beaver that she may have about 6 months to live. She is hopeful that the milrinone can help her. Understands she may need her pacemaker interrogated. 3. She is aware of how dialysis often is not possible when the heart function is very low. She notes that the kidney doctor has been very positive about her improving kidney numbers. 4. Her  has been doing the cooking lately, with her feeling so tired, and he's attentive to salt and how much water she can have, measures portions etc.  5. Acknowledge her expressed desire so far for full code, we explore this a bit. Education provided about how CPR likely wouldn't work for her (because of weak heart and kidneys) . Giles Norris We are doing all we can to prolong her life and help her feel best possible, but that if heart stops, CPR wouldn't reverse her heart or kidney problems. Explain poor outcomes of code blue. Encourage ongoing discussions, and also with her . We talk about how it's important for him to understand her mind on where she draws the line/ what is good living to her and what she would/ wouldn't find acceptable QOL. She understands need for these conversations, and how that can help guide him in the future. 6. Patient has good support from family, but is hesitant to have them exposed to hospital germs for much time. (especially her sister, who has lupus)    7. Made plan to check in again tomorrow, patient is worn out form ordeal of getting PICC line today.       GOALS OF CARE / TREATMENT PREFERENCES:     GOALS OF CARE:  Patient/Health Care Proxy Stated Goals: Prolong life    TREATMENT PREFERENCES:   Code Status: Full Code    Patient and family's personal goals include: try home milrinone, live as long as possible    Important upcoming milestones or family events: none noted    The patient identifies the following as important for living well: patient is going to give this some thought      Advance Care Planning:  [] The [facility] Interdisciplinary Team has updated the ACP Navigator with Health Care Decision Maker and Patient Capacity      Primary Decision Maker: Syed Lowery [facility] - [phone]  No flowsheet data found. Medical Interventions: Full interventions       Other:    As far as possible, the palliative care team has discussed with patient / health care proxy about goals of care / treatment preferences for patient. HISTORY:     History obtained from: chart, patient    CHIEF COMPLAINT:  Hypoxia, dyspnea    HPI/SUBJECTIVE:    The patient is:   [x] Verbal and participatory  [] Non-participatory due to:     78year old present SOB and out of oxygen at home  COVID negative      Clinical Pain Assessment (nonverbal scale for severity on nonverbal patients):   Clinical Pain Assessment  Severity: 0          Duration: for how long has pt been experiencing pain (e.g., 2 days, 1 month, years)  Frequency: how often pain is an issue (e.g., several times per day, once every few days, constant)     FUNCTIONAL ASSESSMENT:     Palliative Performance Scale (PPS):  PPS: 30       PSYCHOSOCIAL/SPIRITUAL SCREENING:     Palliative IDT has assessed this patient for cultural preferences / practices and a referral made as appropriate to needs (Cultural Services, Patient Advocacy, Ethics, etc.)    Any spiritual / Restoration concerns:  [] Yes /  [x] No   If \"Yes\" to discuss with pastoral care during IDT     Does caregiver feel burdened by caring for their loved one:   [] Yes /  [x] No /  [] No Caregiver Present    If \"Yes\" to discuss with social work during IDT    Anticipatory grief assessment:   [x] Normal  / [] Maladaptive     If \"Maladaptive\" to discuss with social work during IDT    ESAS Anxiety: Anxiety: 0    ESAS Depression: Depression: 0        REVIEW OF SYSTEMS:     Positive and pertinent negative findings in ROS are noted above in HPI. The following systems were [x] reviewed / [] unable to be reviewed as noted in HPI  Other findings are noted below.   Systems: constitutional, ears/nose/mouth/throat, respiratory, gastrointestinal, genitourinary, musculoskeletal, integumentary, neurologic, psychiatric, endocrine. Positive findings noted below. Modified ESAS Completed by: provider   Fatigue: 8 Drowsiness: 0   Depression: 0 Pain: 0   Anxiety: 0 Nausea: 0   Anorexia: 5 Dyspnea: 5     Constipation: No     Stool Occurrence(s): 0        PHYSICAL EXAM:     From RN flowsheet:  Wt Readings from Last 3 Encounters:   01/11/22 117.3 kg (258 lb 9.6 oz)   12/22/21 115.8 kg (255 lb 6.4 oz)   12/15/21 116.3 kg (256 lb 6.4 oz)     Blood pressure 115/86, pulse (!) 113, temperature 97.5 °F (36.4 °C), resp. rate 23, height 5' 7\" (1.702 m), weight 117.3 kg (258 lb 9.6 oz), SpO2 99 %.     Pain Scale 1: Numeric (0 - 10)  Pain Intensity 1: 0                 Last bowel movement, if known:     Constitutional: awake, alert , appears fatigued  Increase work of breathing with conversation, needs to slow pace of talking  Insight intact  Affect flat     HISTORY:     Active Problems:    Acute respiratory failure with hypoxia ([address] (1/4/2022)      Past Medical History:   Diagnosis Date    Acquired hypothyroidism 8/15/2016    Anemia     RED-HF study    Asthma     Cardiomyopathy, nonischemic ([address]     initial dx 2001, bivHF 2008 with EF 15%, s/p biV-ICD 9/08, significant improvment in EF to 45-50%    CKD (chronic kidney disease)     Dr Nicholas Mariano CKD (chronic kidney disease) 8/15/2012    Depression     Diabetes ([address]     Diabetic neuropathy ([address]     DM (diabetes mellitus) ([address] 8/15/2012    GERD (gastroesophageal reflux disease)     Gout     Hypothyroidism     ICD (implantable cardioverter-defibrillator), biventricular, in situ 6/5/2014    Psoriasis       Past Surgical History:   Procedure Laterality Date    CARDIAC CATHETERIZATION  2007; 01/06/15    normal cors    ECHO 2D ADULT  4/2010    EF 45%, improved from 1/09 (25%)    ECHO 2D ADULT  11/2011    LVH, EF 55-60%    HX ORTHOPAEDIC knee    HX PACEMAKER PLACEMENT      AICD    STRESS TEST LEXISCAN/CARDIOLITE  3/21/12    normal perfusion, global HK 40%      Family History   Problem Relation Age of Onset    Heart Disease Mother     Hypertension Mother     Lupus Sister     Diabetes Brother       History reviewed, no pertinent family history.   Social History     Tobacco Use    Smoking status: Never Smoker    Smokeless tobacco: Never Used   Substance Use Topics    Alcohol use: No     Allergies   Allergen Reactions    Ciprofloxacin Anaphylaxis    Shellfish Derived Anaphylaxis    Ace Inhibitors Unknown (comments)    Biaxin [Clarithromycin] Other (comments)     Metal taste    Candesartan Cough    Pcn [Penicillins] Hives      Current Facility-Administered Medications   Medication Dose Route Frequency    insulin NPH (NOVOLIN N, HUMULIN N) injection 50 Units  50 Units SubCUTAneous DAILY    insulin NPH (NOVOLIN N, HUMULIN N) injection 30 Units  30 Units SubCUTAneous QHS    carvediloL (COREG) tablet 12.5 mg  12.5 mg Oral BID WITH MEALS    allopurinoL (ZYLOPRIM) tablet 50 mg  50 mg Oral DAILY    bumetanide (BUMEX) injection 2 mg  2 mg IntraVENous TID    milrinone (PRIMACOR) 20 MG/100 ML D5W infusion  0.2 mcg/kg/min IntraVENous CONTINUOUS    epoetin isabel-epbx (RETACRIT) injection 20,000 Units  20,000 Units SubCUTAneous Q TUE, THU & SAT    docusate sodium (COLACE) capsule 100 mg  100 mg Oral BID    insulin lispro (HUMALOG) injection 10 Units  10 Units SubCUTAneous TIDAC    montelukast (SINGULAIR) tablet 10 mg  10 mg Oral DAILY    levothyroxine (SYNTHROID) tablet 100 mcg  100 mcg Oral Once per day on Mon Tue Wed Thu Fri Sat    cetirizine (ZYRTEC) tablet 10 mg  10 mg Oral DAILY    hydroxypropyl methylcellulose (ISOPTO TEARS) 0.5 % ophthalmic solution 1 Drop  1 Drop Both Eyes PRN    venlafaxine-SR (EFFEXOR-XR) capsule 75 mg  75 mg Oral DAILY WITH BREAKFAST    gabapentin (NEURONTIN) capsule 100 mg  100 mg Oral QHS    arformoteroL (BROVANA) neb solution 15 mcg  15 mcg Nebulization BID RT    And    budesonide (PULMICORT) 500 mcg/2 ml nebulizer suspension  500 mcg Nebulization BID RT    hydrOXYzine HCL (ATARAX) tablet 20 mg  20 mg Oral TID PRN    heparin (porcine) injection 5,000 Units  5,000 Units SubCUTAneous Q8H    ivabradine (CORLANOR) tablet 5 mg  5 mg Oral BID WITH MEALS    sodium chloride (NS) flush 5-40 mL  5-40 mL IntraVENous Q8H    sodium chloride (NS) flush 5-40 mL  5-40 mL IntraVENous PRN    acetaminophen (TYLENOL) tablet 650 mg  650 mg Oral Q6H PRN    Or    acetaminophen (TYLENOL) suppository 650 mg  650 mg Rectal Q6H PRN    ondansetron (ZOFRAN ODT) tablet 4 mg  4 mg Oral Q8H PRN    Or    ondansetron (ZOFRAN) injection 4 mg  4 mg IntraVENous Q6H PRN    glucose chewable tablet 16 g  4 Tablet Oral PRN    dextrose (D50W) injection syrg 12.5-25 g  25-50 mL IntraVENous PRN    glucagon (GLUCAGEN) injection 1 mg  1 mg IntraMUSCular PRN    insulin lispro (HUMALOG) injection   SubCUTAneous AC&HS    albuterol-ipratropium (DUO-NEB) 2.5 MG-0.5 MG/3 ML  3 mL Nebulization Q6H PRN          LAB AND IMAGING FINDINGS:     Lab Results   Component Value Date/Time    WBC 9.0 01/11/2022 05:13 AM    HGB 8.5 (L) 01/11/2022 05:13 AM    PLATELET 446 33/72/1528 05:13 AM     Lab Results   Component Value Date/Time    Sodium 138 01/11/2022 05:13 AM    Potassium 3.9 01/11/2022 05:13 AM    Chloride 102 01/11/2022 05:13 AM    CO2 32 01/11/2022 05:13 AM    BUN 59 (H) 01/11/2022 05:13 AM    Creatinine 2.36 (H) 01/11/2022 05:13 AM    Calcium 9.5 01/11/2022 05:13 AM    Magnesium 2.3 01/11/2022 05:13 AM      Lab Results   Component Value Date/Time    Alk.  phosphatase 75 01/11/2022 05:13 AM    Protein, total 7.2 01/11/2022 05:13 AM    Albumin 2.6 (L) 01/11/2022 05:13 AM    Globulin 4.6 (H) 01/11/2022 05:13 AM     Lab Results   Component Value Date/Time    INR 1.0 01/16/2018 08:05 PM    Prothrombin time 10.4 01/16/2018 08:05 PM    aPTT 26.7 01/06/2022 01:32 AM      Lab Results   Component Value Date/Time    Iron 20 (L) 01/11/2022 05:13 AM    TIBC 262 01/11/2022 05:13 AM    Iron % saturation 8 (L) 01/11/2022 05:13 AM    Ferritin 764 (H) 01/11/2022 05:13 AM      No results found for: PH, PCO2, PO2  No components found for: [place]   Lab Results   Component Value Date/Time    CK 62 01/11/2022 05:13 AM    CK - MB <1.0 01/16/2018 08:05 PM                Total time: 15 min  Counseling / coordination time, spent as noted above:   > 50% counseling / coordination?:     Prolonged service was provided for  []30 min   []75 min in face to face time in the presence of the patient, spent as noted above. Time Start:   Time End:   Note: this can only be billed with 13903 (initial) or 35833 (follow up). If multiple start / stop times, list each separately.

## 2022-01-11 NOTE — CONSULTS
Nutrition Education    · Verbally reviewed information with Patient  · Educated on CHF MNT, low Na+ diet  · Written educational materials provided. · Contact name and number provided. · Refer to Patient Education activity for more details. Pt without much interest in conversation, was more focused on getting more CHOs with meals d/t low BG this AM.  Explained she is currently on 60 gm CHO/meal which is sufficient and her insulin is being adjusted. Pt agreeable to snacks between meals.     Electronically signed by Yasmin Nevarez RD on 1/11/2022 at 12:40 PM    Contact Number: [email or web address]

## 2022-01-11 NOTE — PROGRESS NOTES
Bedside and Verbal shift change report given to [address] [place] [address]   (oncoming nurse) by Rashawn Sterling RN (offgoing nurse). Report included the following information SBAR, Kardex, MAR and Cardiac Rhythm Bi paced .

## 2022-01-11 NOTE — PROGRESS NOTES
[facility]   [address] [facility], [id number] [place] [address]  Phone: [phone]   [phone]    [email or web address]     Nephrology Progress Note    Patient Name : Marlon Harris      : 1955     MRN : [id number]  Date of Admission : 2022  Date of Servive : 22    CC:  Follow up for ARF       Assessment and Plan   CKD stage IV:  - Etiology: DM, HTN, CRS  - renal US: suggestive of CKD, B/L benign renal cysts   - Cr trending down  - Continue w/ Bumex 2 mg TID- d/w nursing staff about need for standing weight and strict I. O   - daily labs      Acute on chronic HFrEF  NI CMP, LVEF 15 to 20%  NYHA class III-IV  S/p BiV pacer/ICD-s/p CRT  RHC 12/10/2021: PCWP 34, PA P 80   - On Milrinone and Corlanor     Morbid obesity  Type II DM  HTN  Hypothyroidism  Pulmonary hypertension  Gout  Psoriasis        Interval History:  Seen and examined. Voided several times. UOP not charted again. Not getting standing scale weight despite requesting every day. SOB about the same, Cr down     Review of Systems: A comprehensive review of systems was negative except for that written in the HPI.     Current Medications:   Current Facility-Administered Medications   Medication Dose Route Frequency    insulin NPH (NOVOLIN N, HUMULIN N) injection 50 Units  50 Units SubCUTAneous DAILY    insulin NPH (NOVOLIN N, HUMULIN N) injection 30 Units  30 Units SubCUTAneous QHS    carvediloL (COREG) tablet 12.5 mg  12.5 mg Oral BID WITH MEALS    allopurinoL (ZYLOPRIM) tablet 50 mg  50 mg Oral DAILY    bumetanide (BUMEX) injection 2 mg  2 mg IntraVENous TID    milrinone (PRIMACOR) 20 MG/100 ML D5W infusion  0.2 mcg/kg/min IntraVENous CONTINUOUS    epoetin isabel-epbx (RETACRIT) injection 20,000 Units  20,000 Units SubCUTAneous Q TUE, THU & SAT    docusate sodium (COLACE) capsule 100 mg  100 mg Oral BID    insulin lispro (HUMALOG) injection 10 Units  10 Units SubCUTAneous TIDAC    montelukast (SINGULAIR) tablet 10 mg  10 mg Oral DAILY    levothyroxine (SYNTHROID) tablet 100 mcg  100 mcg Oral Once per day on Mon Tue Wed Thu Fri Sat    cetirizine (ZYRTEC) tablet 10 mg  10 mg Oral DAILY    hydroxypropyl methylcellulose (ISOPTO TEARS) 0.5 % ophthalmic solution 1 Drop  1 Drop Both Eyes PRN    venlafaxine-SR (EFFEXOR-XR) capsule 75 mg  75 mg Oral DAILY WITH BREAKFAST    gabapentin (NEURONTIN) capsule 100 mg  100 mg Oral QHS    arformoteroL (BROVANA) neb solution 15 mcg  15 mcg Nebulization BID RT    And    budesonide (PULMICORT) 500 mcg/2 ml nebulizer suspension  500 mcg Nebulization BID RT    hydrOXYzine HCL (ATARAX) tablet 20 mg  20 mg Oral TID PRN    heparin (porcine) injection 5,000 Units  5,000 Units SubCUTAneous Q8H    ivabradine (CORLANOR) tablet 5 mg  5 mg Oral BID WITH MEALS    sodium chloride (NS) flush 5-40 mL  5-40 mL IntraVENous Q8H    sodium chloride (NS) flush 5-40 mL  5-40 mL IntraVENous PRN    acetaminophen (TYLENOL) tablet 650 mg  650 mg Oral Q6H PRN    Or    acetaminophen (TYLENOL) suppository 650 mg  650 mg Rectal Q6H PRN    ondansetron (ZOFRAN ODT) tablet 4 mg  4 mg Oral Q8H PRN    Or    ondansetron (ZOFRAN) injection 4 mg  4 mg IntraVENous Q6H PRN    glucose chewable tablet 16 g  4 Tablet Oral PRN    dextrose (D50W) injection syrg 12.5-25 g  25-50 mL IntraVENous PRN    glucagon (GLUCAGEN) injection 1 mg  1 mg IntraMUSCular PRN    insulin lispro (HUMALOG) injection   SubCUTAneous AC&HS    albuterol-ipratropium (DUO-NEB) 2.5 MG-0.5 MG/3 ML  3 mL Nebulization Q6H PRN      Allergies   Allergen Reactions    Ciprofloxacin Anaphylaxis    Shellfish Derived Anaphylaxis    Ace Inhibitors Unknown (comments)    Biaxin [Clarithromycin] Other (comments)     Metal taste    Candesartan Cough    Pcn [Penicillins] Hives       Objective:  Vitals:    Vitals:    01/11/22 1000 01/11/22 1140 01/11/22 1200 01/11/22 1224   BP:  115/86     Pulse: (!) 110 (!) 106 (!) 108 Resp:  25     Temp:  97.5 °F (36.4 °C)     SpO2:  96%     Weight:    117.3 kg (258 lb 9.6 oz)   Height:    5' 7\" (1.702 m)     Intake and Output:  01/11 0701 - 01/11 1900  In: 370.8 [P.O.:150; I.V.:220.8]  Out: 300 [Urine:300]  01/09 1901 - 01/11 0700  In: 737.4 [P.O.:650; I.V.:87.4]  Out: 1202 [Urine:1200]    Physical Examination:    General: Obese   Neck:  Supple, no mass  Resp:  Rales +  CV:  tachy  GI:  Soft, NT, + BS, no HS megaly  Neurologic:  Non focal    []    High complexity decision making was performed  []    Patient is at high-risk of decompensation with multiple organ involvement    Lab Data Personally Reviewed: I have reviewed all the pertinent labs, microbiology data and radiology studies during assessment.     Recent Labs     01/11/22  0513 01/10/22  0331 01/09/22  0510    137 136   K 3.9 4.2 4.0    101 101   CO2 32 30 30   GLU 84 144* 93   BUN 59* 71* 78*   CREA 2.36* 2.62* 3.04*   CA 9.5 9.0 9.3   MG 2.3 2.3 2.4   ALB 2.6*  --   --    *  --   --      Recent Labs     01/11/22  0513   WBC 9.0   HGB 8.5*   HCT 32.6*        Lab Results   Component Value Date/Time    Specimen Description: URINE 10/22/2013 01:32 PM    Specimen Description: URINE 01/23/2013 04:40 PM    Specimen Description: LEG TISSUE 02/12/2009 12:00 AM    Specimen Description: LEG (LEFT) 01/29/2009 03:06 AM     Lab Results   Component Value Date/Time    Culture result: MIXED SKIN ROSANNA ISOLATED 10/22/2013 01:32 PM    Culture result:  01/23/2013 04:40 PM     ENTEROCOCCUS FAECALIS GROUP D  MIXED SKIN ROSANNA ISOLATED    Culture result:  02/12/2009 12:00 AM     LIGHT STAPHYLOCOCCUS EPIDERMIDIS (OXACILLIN RESISTANT)  LIGHT 2ND STRAIN OF STAPHYLOCOCCUS EPIDERMIDIS (OXACILLIN RESISTANT)    Culture result: NO GROWTH 2 DAYS 01/29/2009 03:06 AM     Recent Results (from the past 24 hour(s))   GLUCOSE, POC    Collection Time: 01/10/22 12:57 PM   Result Value Ref Range    Glucose (POC) 145 (H) 65 - 117 mg/dL    Performed by Corinne Witt    GLUCOSE, POC    Collection Time: 01/10/22  4:33 PM   Result Value Ref Range    Glucose (POC) 192 (H) 65 - 117 mg/dL    Performed by Erlin Navas, POC    Collection Time: 01/10/22  9:02 PM   Result Value Ref Range    Glucose (POC) 207 (H) 65 - 117 mg/dL    Performed by Dinorah Clemens    MAGNESIUM    Collection Time: 01/11/22  5:13 AM   Result Value Ref Range    Magnesium 2.3 1.6 - 2.4 mg/dL   NT-PRO BNP    Collection Time: 01/11/22  5:13 AM   Result Value Ref Range    NT pro-BNP 11,016 (H) <494 PG/ML   METABOLIC PANEL, COMPREHENSIVE    Collection Time: 01/11/22  5:13 AM   Result Value Ref Range    Sodium 138 136 - 145 mmol/L    Potassium 3.9 3.5 - 5.1 mmol/L    Chloride 102 97 - 108 mmol/L    CO2 32 21 - 32 mmol/L    Anion gap 4 (L) 5 - 15 mmol/L    Glucose 84 65 - 100 mg/dL    BUN 59 (H) 6 - 20 MG/DL    Creatinine 2.36 (H) 0.55 - 1.02 MG/DL    BUN/Creatinine ratio 25 (H) 12 - 20      GFR est AA 25 (L) >60 ml/min/1.73m2    GFR est non-AA 21 (L) >60 ml/min/1.73m2    Calcium 9.5 8.5 - 10.1 MG/DL    Bilirubin, total 0.6 0.2 - 1.0 MG/DL    ALT (SGPT) 107 (H) 12 - 78 U/L    AST (SGOT) 17 15 - 37 U/L    Alk.  phosphatase 75 45 - 117 U/L    Protein, total 7.2 6.4 - 8.2 g/dL    Albumin 2.6 (L) 3.5 - 5.0 g/dL    Globulin 4.6 (H) 2.0 - 4.0 g/dL    A-G Ratio 0.6 (L) 1.1 - 2.2     HEMOGLOBIN A1C WITH EAG    Collection Time: 01/11/22  5:13 AM   Result Value Ref Range    Hemoglobin A1c 7.7 (H) 4.0 - 5.6 %    Est. average glucose 174 mg/dL   TSH 3RD GENERATION    Collection Time: 01/11/22  5:13 AM   Result Value Ref Range    TSH 3.08 0.36 - 3.74 uIU/mL   T4, FREE    Collection Time: 01/11/22  5:13 AM   Result Value Ref Range    T4, Free 1.0 0.8 - 1.5 NG/DL   IRON PROFILE    Collection Time: 01/11/22  5:13 AM   Result Value Ref Range    Iron 20 (L) 35 - 150 ug/dL    TIBC 262 250 - 450 ug/dL    Iron % saturation 8 (L) 20 - 50 %   FERRITIN    Collection Time: 01/11/22  5:13 AM   Result Value Ref Range    Ferritin 764 (H) 8 - 252 NG/ML   VITAMIN D, 25 HYDROXY    Collection Time: 01/11/22  5:13 AM   Result Value Ref Range    Vitamin D 25-Hydroxy 39.7 30 - 100 ng/mL   LIPID PANEL    Collection Time: 01/11/22  5:13 AM   Result Value Ref Range    Cholesterol, total 151 <200 MG/DL    Triglyceride 76 <150 MG/DL    HDL Cholesterol 60 MG/DL    LDL, calculated 75.8 0 - 100 MG/DL    VLDL, calculated 15.2 MG/DL    CHOL/HDL Ratio 2.5 0.0 - 5.0     CK    Collection Time: 01/11/22  5:13 AM   Result Value Ref Range    CK 62 26 - 192 U/L   HEPATITIS PANEL, ACUTE    Collection Time: 01/11/22  5:13 AM   Result Value Ref Range    Hepatitis A, IgM NONREACTIVE NR      __          Hepatitis B surface Ag <0.10 Index    Hep B surface Ag Interp. Negative NEG      __          Hepatitis B core, IgM NONREACTIVE NR      __          Hepatitis C virus Ab PENDING Index    Hep C virus Ab Interp. PENDING    URIC ACID    Collection Time: 01/11/22  5:13 AM   Result Value Ref Range    Uric acid 7.1 (H) 2.6 - 6.0 MG/DL   SED RATE (ESR)    Collection Time: 01/11/22  5:13 AM   Result Value Ref Range    Sed rate, automated 81 (H) 0 - 30 mm/hr   CBC W/O DIFF    Collection Time: 01/11/22  5:13 AM   Result Value Ref Range    WBC 9.0 3.6 - 11.0 K/uL    RBC 3.52 (L) 3.80 - 5.20 M/uL    HGB 8.5 (L) 11.5 - 16.0 g/dL    HCT 32.6 (L) 35.0 - 47.0 %    MCV 92.6 80.0 - 99.0 FL    MCH 24.1 (L) 26.0 - 34.0 PG    MCHC 26.1 (L) 30.0 - 36.5 g/dL    RDW 19.8 (H) 11.5 - 14.5 %    PLATELET 568 867 - 920 K/uL    MPV 9.2 8.9 - 12.9 FL    NRBC 0.0 0  WBC    ABSOLUTE NRBC 0.00 0.00 - 0.01 K/uL   SAMPLES BEING HELD    Collection Time: 01/11/22  5:13 AM   Result Value Ref Range    SAMPLES BEING HELD 1PST     COMMENT        Add-on orders for these samples will be processed based on acceptable specimen integrity and analyte stability, which may vary by analyte.    GLUCOSE, POC    Collection Time: 01/11/22  8:20 AM   Result Value Ref Range    Glucose (POC) 60 (L) 65 - 117 mg/dL    Performed by [address] [place] [address], POC    Collection Time: 01/11/22  8:40 AM   Result Value Ref Range    Glucose (POC) 117 65 - 117 mg/dL    Performed by Kendal Francisco            I have reviewed the flowsheets. Chart and Pertinent Notes have been reviewed. No change in PMH ,family and social history from Consult note.       Bert Kwon [facility] [place] [facility]

## 2022-01-11 NOTE — PROGRESS NOTES
[facility] in [place], [place]  Inpatient Progress Note      Patient name: Viola Demarco  Patient : 1955  Patient MRN: [id number]  Consulting MD: Teo Villareal MD  Date of service: 22    REASON FOR REFERRAL:  Management of chronic systolic heart failure    PLAN OF CARE:  77 y/o female with new onset severe cardiomyopathy, LVEF 15% (diagnosed 21) admitted for acute decompensation c/b acute hepatic and acute on chronic renal failure in the setting of massive volume overload of likely > 25 lbs.   Plant to continue inotrope-assisted diuresis, goal net negative 2-3lbs per day; w/d patient anticipate 7-10 days of hospitalization  Patient expressed wishes to be full code and would like to be evaluated for dual organ transplant if her heart and renal function does not improve  Will monitor for recovery over the next 3 months, recommend ischemic eval    RECOMMENDATIONS:  Would recommend ischemic eval per cardiology for new drop in EF, last [facility] in   Continue current medical therapy for heart failure  Decrease Milrinone to 0.2mcg/kg/min for tachycardia- adjusted to current weight   Increase coreg to 12.5mg BID- watch for psoriasis flair  No ACE/ARB/ARNI due to CKD  No MRA due to CKD- will discuss with nephrology   Stop hydralazine and isordil to allow more bp for BBrx  Corlanor 5mg BID- may need to increase, will d/w Dr. Edil Ferrera to 2mg TID, consider bumex gtt if inadequate diuresis  goal net negative 1-2 liters per day Goal weight approx 224lb based on recent C  Allopurinol 50mg daily for elevated uric acid   ICD interrogation per    Repeat TTE and EKG when euvolemic   Will need OP PSG  Send [employer] today   Nephrology following  Palliative consult   Nutritionist consult  Heart failure education  Advanced care plan present on file    All other care per primary team    IMPRESSION:  Fatigue  Shortness of breath, on 3L NC  Volume overload  Acute on Chronic systolic heart failure  Stage D, NYHA class IV symptoms  Non-ischemic cardiomyopathy, LVEF 15%  Normal coronaries  PYP equivocal for amyloid   Pulmonary hypertension  S/p BiV-ICD  Cardiac risk factors:  HL  DM2  Morbid obesity, BMI 40  Acute on chronic renal failure, stage IV  GERD  Anemia of chronic disease  Gout      Interval Events:    LIFE GOALS:  Patient's personal goals include: being home with family, still ambulating around without getting too tired. Important upcoming milestones or family events: none  The patient identifies the following as important for living well: remaining idependent and mobile; being able to get out of the house with . Patient verbalized willingness to be on home milrinone and evaluation for both heart and kidney transplants. Verbalizes she would have family supporting her decision on this. SHAMIKA Jones is a 78 y.o.  female with a history of NICM, chronic hypoxic respiratory failure secondary to pulmonary HTN, hypothyroidism, CKD, GERD, and DM II who presented to [facility] as a transfer from another facility for acute on chronic hypoxic respiratory failure. Reports running out of O2 at home resulting in SOB and dizziness. Upon arrival to the ED she was found to have O2 sats in the 70s, requiring 4L NC O2. Rapid covid test was negative. ProNT-BNP was 63630, K+5.2, BUN/CR x/2.54 and elevated d-dimer. Chest xray showing pulmonary edema vs. Atypical pneumonia. VQ scan showed low probability for PE. Per Dr. Abdulaziz Richards, NIC, LVEF 15-20% during recent admission. LVEF previously normalized with CRT. NYHA III-IV. No ACEi/ARB due to renal dysfunction. GDMT dosing limited by low BP. Patient's PCP is Dr. Jamir Duran, and she sees Dr. Abdulaziz Richards primarily for cardiac care due to Dr. Shakila Rodriguez transfering practice. Patient historically seen by nephrology but has not had follow up care in the past three years.     CARDIAC IMAGING:  Echo 12/8/21- LVEF 15-20%, mild to Mod MR, LVIDd 5.09cm, TAPSE 1.94cm  Echo 4/22/19- LVEF 60%, trace MR,  LVIDd 4.24cm, TAPSE 1.65cm   Echo 4/24/18- LVEF 60%, trivial MR, LVIDd 4.79cm, TAPSE 2.25cm     EKG- 1/4/22 ST with A sense and V paced rhythm    C 2015- No significant CAD  NST 2014- reversible LAD involvement     ICD interrogation    HEMODYNAMICS:  C 12/10/21: PAP 76/48/57, RAP 20, PCWP 35, CI 2.26      CPEST not done  6MW not done    OTHER IMAGING:  CXR Results  (Last 48 hours)                 01/10/22 1517  XR CHEST PORT Final result    Impression:  Recommend advancing PICC line 3 cm for optimal positioning. Narrative: Indication: PICC line placement. Comparison to 1/4/2022. Portable exam obtained at 8479 demonstrates placement of   a right  subclavian PICC line with the tip projecting over the SVC. For optimal   positioning this could be advanced 3 cm. There is no pneumothorax. PHYSICAL EXAM:  Visit Vitals  BP (!) 127/92 (BP 1 Location: Left upper arm, BP Patient Position: At rest)   Pulse (!) 110   Temp 98.2 °F (36.8 °C)   Resp 21   Ht 5' 7\" (1.702 m)   Wt 255 lb 8 oz (115.9 kg)   SpO2 94%   BMI 40.02 kg/m²     Physical Exam  Vitals and nursing note reviewed. Constitutional:       General: She is not in acute distress. Appearance: Normal appearance. She is obese. Cardiovascular:      Rate and Rhythm: Regular rhythm. Tachycardia present. Pulses: Normal pulses. Heart sounds: Normal heart sounds. No murmur heard. Pulmonary:      Effort: Pulmonary effort is normal. No respiratory distress. Abdominal:      General: There is distension. Musculoskeletal:         General: Swelling present. Skin:     General: Skin is warm and dry. Findings: Lesion present. Comments: psorasis   Neurological:      General: No focal deficit present. Mental Status: She is alert and oriented to person, place, and time.    Psychiatric: Mood and Affect: Mood normal.         REVIEW OF SYSTEMS:  Review of Systems   Constitutional: Positive for malaise/fatigue. Negative for chills and fever. Respiratory: Positive for shortness of breath. Cardiovascular: Positive for leg swelling. Negative for chest pain, palpitations and orthopnea. Gastrointestinal: Negative for heartburn and nausea. Genitourinary: Negative for dysuria. Musculoskeletal: Negative for falls, joint pain and myalgias. Neurological: Negative for dizziness. Psychiatric/Behavioral: Positive for depression. The patient is nervous/anxious.           PAST MEDICAL HISTORY:  Past Medical History:   Diagnosis Date    Acquired hypothyroidism 8/15/2016    Anemia     RED-HF study    Asthma     Cardiomyopathy, nonischemic ([facility] [address]     initial dx 2001, bivHF 2008 with EF 15%, s/p biV-ICD 9/08, significant improvment in EF to 45-50%    CKD (chronic kidney disease)     Dr Xuan Patel    CKD (chronic kidney disease) 8/15/2012    Depression     Diabetes ([facility] [address]     Diabetic neuropathy (HCC)     DM (diabetes mellitus) ([facility] [address] 8/15/2012    GERD (gastroesophageal reflux disease)     Gout     Hypothyroidism     ICD (implantable cardioverter-defibrillator), biventricular, in situ 6/5/2014    Psoriasis        PAST SURGICAL HISTORY:  Past Surgical History:   Procedure Laterality Date    CARDIAC CATHETERIZATION  2007; 01/06/15    normal cors    ECHO 2D ADULT  4/2010    EF 45%, improved from 1/09 (25%)    ECHO 2D ADULT  11/2011    LVH, EF 55-60%    HX ORTHOPAEDIC      knee    HX PACEMAKER PLACEMENT      AICD    STRESS TEST LEXISCAN/CARDIOLITE  3/21/12    normal perfusion, global HK 40%       FAMILY HISTORY:  Family History   Problem Relation Age of Onset    Heart Disease Mother     Hypertension Mother     Lupus Sister     Diabetes Brother        SOCIAL HISTORY:  Social History     Socioeconomic History    Marital status:    Tobacco Use    Smoking status: Never Smoker    Smokeless tobacco: Never Used   Substance and Sexual Activity    Alcohol use: No    Drug use: No    Sexual activity: Never   Social History Narrative    . Nonsmoker. Disability       LABORATORY RESULTS:     Labs Latest Ref Rng & Units 1/11/2022 1/10/2022 1/9/2022 1/8/2022 1/6/2022 1/6/2022 1/5/2022   WBC 3.6 - 11.0 K/uL 9.0 - - 10.3 11. 1(H) 11. 3(H) 14. 2(H)   RBC 3.80 - 5.20 M/uL 3.52(L) - - 3.34(L) 3.20(L) 3.52(L) 3.97   Hemoglobin 11.5 - 16.0 g/dL 8.5(L) - - 8. 1(L) 7. 8(L) 8.4(L) 9.7(L)   Hematocrit 35.0 - 47.0 % 32. 6(L) - - 30. 1(L) 28. 5(L) 31. 6(L) 35.0   MCV 80.0 - 99.0 FL 92.6 - - 90.1 89.1 89.8 88.2   Platelets 620 - 896 K/uL 331 - - 306 281 298 409(H)   Lymphocytes 12 - 49 % - - - - 7(L) 8(L) 7(L)   Monocytes 5 - 13 % - - - - 7 7 6   Eosinophils 0 - 7 % - - - - 2 1 0   Basophils 0 - 1 % - - - - 0 0 0   Albumin 3.5 - 5.0 g/dL 2. 6(L) - - - - - -   Calcium 8.5 - 10.1 MG/DL 9.5 9.0 9.3 8.3(L) 8. 1(L) 8.7 9.3   Glucose 65 - 100 mg/dL 84 144(H) 93 293(H) 283(H) 264(H) 283(H)   BUN 6 - 20 MG/DL 59(H) 71(H) 78(H) 86(H) 85(H) 83(H) 63(H)   Creatinine 0.55 - 1.02 MG/DL 2.36(H) 2.62(H) 3.04(H) 3.50(H) 3.49(H) 3.36(H) 2.74(H)   Sodium 136 - 145 mmol/L 138 137 136 133(L) 133(L) 133(L) 133(L)   Potassium 3.5 - 5.1 mmol/L 3.9 4.2 4.0 4.3 4.2 4.5 5.5(H)   TSH 0.36 - 3.74 uIU/mL 3.08 - - - - - -   Some recent data might be hidden     Lab Results   Component Value Date/Time    TSH 3.08 01/11/2022 05:13 AM    TSH 1.85 12/15/2021 01:06 PM    TSH 1.01 11/05/2020 09:11 AM    TSH 2.740 04/30/2019 11:21 AM    TSH 0.519 02/21/2012 10:53 AM    TSH 8.29 (H) 01/20/2010 01:59 PM       ALLERGY:  Allergies   Allergen Reactions    Ciprofloxacin Anaphylaxis    Shellfish Derived Anaphylaxis    Ace Inhibitors Unknown (comments)    Biaxin [Clarithromycin] Other (comments)     Metal taste    Candesartan Cough    Pcn [Penicillins] Hives        CURRENT MEDICATIONS:    Current Facility-Administered Medications:     insulin NPH (NOVOLIN N, HUMULIN N) injection 50 Units, 50 Units, SubCUTAneous, DAILY, Hermelinda Casillas MD    insulin NPH (NOVOLIN N, HUMULIN N) injection 30 Units, 30 Units, SubCUTAneous, QHS, Hermelinda Casillas MD    carvediloL (COREG) tablet 12.5 mg, 12.5 mg, Oral, BID WITH MEALS, Owen, Amber B, NP    bumetanide (BUMEX) injection 2 mg, 2 mg, IntraVENous, TID, Gonzalo Sprague MD, 2 mg at 01/11/22 0857    milrinone (PRIMACOR) 20 MG/100 ML D5W infusion, 0.2 mcg/kg/min, IntraVENous, CONTINUOUS, Penny Calix MD, Last Rate: 6.9 mL/hr at 01/11/22 1059, 0.2 mcg/kg/min at 01/11/22 1059    epoetin isabel-epbx (RETACRIT) injection 20,000 Units, 20,000 Units, SubCUTAneous, Q TUE, THU & SAT, Willem Aezvedo MD, 20,000 Units at 01/08/22 2349    docusate sodium (COLACE) capsule 100 mg, 100 mg, Oral, BID, Gonzalo Sprague MD, 100 mg at 01/11/22 0857    insulin lispro (HUMALOG) injection 10 Units, 10 Units, SubCUTAneous, TIDAC, Lisa Horne MD, 10 Units at 01/09/22 1725    montelukast (SINGULAIR) tablet 10 mg, 10 mg, Oral, DAILY, Tracey Caraballo MD, 10 mg at 01/11/22 0857    levothyroxine (SYNTHROID) tablet 100 mcg, 100 mcg, Oral, Once per day on Mon Tue Wed Thu Fri Sat, Lisa Horne MD, 100 mcg at 01/11/22 0519    cetirizine (ZYRTEC) tablet 10 mg, 10 mg, Oral, DAILY, Lisa Horne MD, 10 mg at 01/11/22 0857    hydroxypropyl methylcellulose (ISOPTO TEARS) 0.5 % ophthalmic solution 1 Drop, 1 Drop, Both Eyes, PRN, Lisa Horne MD, 1 Drop at 01/06/22 1727    venlafaxine-SR (EFFEXOR-XR) capsule 75 mg, 75 mg, Oral, DAILY WITH BREAKFAST, Lisa Horne MD, 75 mg at 01/11/22 0857    gabapentin (NEURONTIN) capsule 100 mg, 100 mg, Oral, QHS, Lisa Horne MD, 100 mg at 01/10/22 2228    arformoteroL (BROVANA) neb solution 15 mcg, 15 mcg, Nebulization, BID RT, 15 mcg at 01/11/22 0747 **AND** budesonide (PULMICORT) 500 mcg/2 ml nebulizer suspension, 500 mcg, Nebulization, BID RT, Tracey Caraballo MD, 500 mcg at 01/11/22 0747    hydrOXYzine HCL (ATARAX) tablet 20 mg, 20 mg, Oral, TID PRN, Asa Ortega MD    heparin (porcine) injection 5,000 Units, 5,000 Units, SubCUTAneous, Q8H, Frances King MD, 5,000 Units at 01/11/22 0519    ivabradine (CORLANOR) tablet 5 mg, 5 mg, Oral, BID WITH MEALS, Asa Ortega MD, 5 mg at 01/11/22 0858    sodium chloride (NS) flush 5-40 mL, 5-40 mL, IntraVENous, Q8H, Asa Ortega MD, 10 mL at 01/11/22 0858    sodium chloride (NS) flush 5-40 mL, 5-40 mL, IntraVENous, PRN, Aas Ortega MD    acetaminophen (TYLENOL) tablet 650 mg, 650 mg, Oral, Q6H PRN **OR** acetaminophen (TYLENOL) suppository 650 mg, 650 mg, Rectal, Q6H PRN, Asa Ortega MD    ondansetron (ZOFRAN ODT) tablet 4 mg, 4 mg, Oral, Q8H PRN **OR** ondansetron (ZOFRAN) injection 4 mg, 4 mg, IntraVENous, Q6H PRN, Aas Ortega MD, 4 mg at 01/05/22 0915    glucose chewable tablet 16 g, 4 Tablet, Oral, PRN, Asa Ortega MD    dextrose (D50W) injection syrg 12.5-25 g, 25-50 mL, IntraVENous, PRN, Asa Ortega MD    glucagon (GLUCAGEN) injection 1 mg, 1 mg, IntraMUSCular, PRN, Asa Ortega MD    insulin lispro (HUMALOG) injection, , SubCUTAneous, AC&HS, Asa Ortega MD, 1 Units at 01/10/22 2224    albuterol-ipratropium (DUO-NEB) 2.5 MG-0.5 MG/3 ML, 3 mL, Nebulization, Q6H PRN, Asa Ortega MD    PATIENT CARE TEAM:  Patient Care Team:  Swetha Winslow MD as PCP - General (Internal Medicine)  Swetha Winslow MD as PCP - [facility] Provider  Lee Ormond, MD as Consulting Provider (Internal Medicine)  Uriel Logan MD (Dermatology)  Ricky Salguero MD (Nephrology)  Asa Ortega MD as Consulting Provider (Cardiology)  Enedina Senior MD (Cardiology)  Fer Bishop MD as Consulting Provider (Pulmonary Disease)     Thank you for allowing me to participate in this patient's care.     Fortino Tejeda NP  [facility] [address]  [address], [address]  Phone: [phone]      [facility] ATTENDING ADDENDUM    Patient was seen and examined in person. Data and notes were reviewed. I have discussed and agree with the plan as noted in the NP note above without further additions.     Ray Hernandez MD PhD  Ana Bending

## 2022-01-11 NOTE — PROGRESS NOTES
Cardiac Electrophysiology Hospital Progress Note     Subjective:       Parker Lucero is a 78 y.o. patient who is seen for evaluation/management of acute on chronic systolic CHF. Interim:   Milrinone dose increased since yesterday, now more tachycardic, rates now 110s-120s. [address] line placed yesterday with the intention of home milrinone. Advanced Heart Failure team now on board, planning to diurese patient further; they are discussing possible double organ transplant.  Palliative care also spoke with patient. Appears to be SOB with speaking, but denies increased SOB.       Renal function mildly improved.  NT pro-BNP still significantly elevated. HPI:   Presented to the ER 01/04/2021 with hypoxia, improved on supplemental oxygen. Labs showed stable anemia.  WBC elevated, hyponatremic, hypokalemic.  D dimer elevated.  Chronic CKD. Nephrologist spoke to me directly regarding severe renal failure, but not much worse than last admission. Rapid COVID negative.  CXR showed pulmonary edema vs atypical pneumonia.  VQ scan showed low probability for PE.       NICM, LVEF 15-20% during recent admission.  LVEF previously normalized with CRT.  NYHA III-IV.  No ACEi/ARB due to renal dysfunction.  GDMT dosing limited by low BP. [address] 12/10/2021 showed severe pulmonary HTN (wedge 34 mmHg, PAP 80 mmHg). [facility] in 2015 at [facility] showed no evidence of CAD. She did require LV lead reprogramming over the summer, but good LV capture since.  Good capture/function when checked during recent admission. BP controlled. Previous:   S/p Medtronic biventricular ICD (gen change 03/112601, leads 09/25/2008).     CKD stage IV.        Previously followed by Dr. Claudene Bolster, states did not follow him to new practice.            Problem List   Acute respiratory failure with hypoxia [facility]) ICD-10-CM: J96.01   ICD-9-CM: 518.81 1/4/2022     CHF exacerbation (HCC) ICD-10-CM: I50.9   ICD-9-CM: 428.0 12/6/2021     Type 2 diabetes mellitus with diabetic neuropathy (HCC) ICD-10-CM: E11.40   ICD-9-CM: 250.60, 357.2 1/2/2020     Type 2 diabetes with nephropathy ([facility] ICD-10-CM: E11.21   ICD-9-CM: 250.40, 583.81 4/3/2018     Obesity, morbid ([facility] ICD-10-CM: E66.01   ICD-9-CM: 278.01 12/8/2017     Acquired hypothyroidism ICD-10-CM: E03.9   ICD-9-CM: 244.9 8/15/2016     Dysthymia ICD-10-CM: F34.1   ICD-9-CM: 300.4 8/15/2016     ICD (implantable cardioverter-defibrillator), biventricular, in situ ICD-10-CM: Z95.810   ICD-9-CM: V45.02 6/5/2014   Overview Signed 1/14/2015 11:11 AM by Jessy Mojica MD     Generator Medtronic change 1/14/2015         Dyslipidemia ICD-10-CM: M45.7   ICD-9-CM: 272.4 1/14/2014     CKD (chronic kidney disease) ICD-10-CM: N18.9   ICD-9-CM: 585.9 8/15/2012     Cardiomyopathy, nonischemic (HCC) ICD-10-CM: I42.8   ICD-9-CM: 425.4 Unknown   Overview Signed 10/10/2011  6:40 AM by Harmeet Terrell MD     initial dx 2001, bivHF 2008 with EF 15%, s/p biV-ICD 9/08, significant improvment in EF to 45-50%         Anemia in chronic renal disease (Chronic) ICD-10-CM: N18.9, D63.1   ICD-9-CM: 285.21 12/10/2008     HTN (hypertension) ICD-10-CM: I10   ICD-9-CM: 401.9 12/10/2008     GERD (gastroesophageal reflux disease) (Chronic) ICD-10-CM: K21.9   ICD-9-CM: 530.81 12/10/2008     Gout (Chronic) ICD-10-CM: M10.9   ICD-9-CM: 274.9 12/10/2008     Pulmonary HTN (HCC) (Chronic) ICD-10-CM: I27.20   ICD-9-CM: 416.8 12/10/2008           Current Facility-Administered Medications   Medication Dose Route Frequency Provider Last Rate Last Admin   · insulin NPH (NOVOLIN N, HUMULIN N) injection 60 Units 60 Units SubCUTAneous Q12H Raghav Gutierrez MD 60 Units at 01/09/22 2209   · epoetin isabel-epbx (RETACRIT) injection 20,000 Units 20,000 Units SubCUTAneous Q TUE, THU & SAT Janiya Patel MD 20,000 Units at 01/08/22 2349   · docusate sodium (COLACE) capsule 100 mg 100 mg Oral BID Janet Yin  mg at 01/09/22 1726   · insulin lispro (HUMALOG) injection 10 Units 10 Units SubCUTAneous TIDAC Vivien Hay MD 10 Units at 01/09/22 1725   · carvediloL (COREG) tablet 6.25 mg 6.25 mg Oral BID WITH MEALS Christine Lugo MD 6.25 mg at 01/09/22 1726   · hydrALAZINE (APRESOLINE) tablet 10 mg 10 mg Oral TID Christine Lugo MD 10 mg at 01/09/22 2200   · isosorbide dinitrate (ISORDIL) tablet 10 mg 10 mg Oral TID Christine Lugo MD 10 mg at 01/09/22 2200   · bumetanide (BUMEX) tablet 1 mg 1 mg Oral BID Christine Lugo MD 1 mg at 01/09/22 1726   · montelukast (SINGULAIR) tablet 10 mg 10 mg Oral DAILY Bekah Chavez MD 10 mg at 01/09/22 1004   · levothyroxine (SYNTHROID) tablet 100 mcg 100 mcg Oral Once per day on Mon Tue Wed Thu Fri Sat Vivien Hay  mcg at 01/10/22 0520   · cetirizine (ZYRTEC) tablet 10 mg 10 mg Oral DAILY Vivien Hay MD 10 mg at 01/09/22 1003   · hydroxypropyl methylcellulose (ISOPTO TEARS) 0.5 % ophthalmic solution 1 Drop 1 Drop Both Eyes PRN Vivien Hay MD 1 Drop at 01/06/22 1727   · venlafaxine-SR (EFFEXOR-XR) capsule 75 mg 75 mg Oral DAILY WITH Jonas Sim MD 75 mg at 01/09/22 1003   · gabapentin (NEURONTIN) capsule 100 mg 100 mg Oral QHS Lisa Horne  mg at 01/09/22 2200   · arformoteroL (BROVANA) neb solution 15 mcg 15 mcg Nebulization BID RT Bekah Chavez MD 15 mcg at 01/10/22 0739   And   · budesonide (PULMICORT) 500 mcg/2 ml nebulizer suspension 500 mcg Nebulization BID RT Bekah Chavez  mcg at 01/10/22 0739   · hydrOXYzine HCL (ATARAX) tablet 20 mg 20 mg Oral TID PRN Christine Lugo MD   · milrinone (PRIMACOR) 20 MG/100 ML D5W infusion 0.2 mcg/kg/min IntraVENous CONTINUOUS Christine Lugo MD 7.2 mL/hr at 01/10/22 0524 0.2 mcg/kg/min at 01/10/22 0524   · heparin (porcine) injection 5,000 Units 5,000 Units SubCUTAneous Q8H Flaco Scherer MD 5,000 Units at 01/10/22 0520   · ivabradine (CORLANOR) tablet 5 mg 5 mg Oral BID WITH MEALS Christine Lugo MD 5 mg at 01/09/22 1727   · sodium chloride (NS) flush 5-40 mL 5-40 mL IntraVENous Q8H Renan Cedeño MD 10 mL at 01/10/22 0521   · sodium chloride (NS) flush 5-40 mL 5-40 mL IntraVENous PRN Renan Cedeño MD   · acetaminophen (TYLENOL) tablet 650 mg 650 mg Oral Q6H PRN Renan Cedeño MD   Or   · acetaminophen (TYLENOL) suppository 650 mg 650 mg Rectal Q6H PRN Renan Cedeño MD   · ondansetron (ZOFRAN ODT) tablet 4 mg 4 mg Oral Q8H PRN Renan Cedeño MD   Or   · ondansetron [facility]) injection 4 mg 4 mg IntraVENous Q6H PRN Renan Cedeño MD 4 mg at 01/05/22 0915   · glucose chewable tablet 16 g 4 Tablet Oral PRN Renan Cedeño MD   · dextrose (D50W) injection syrg 12.5-25 g 25-50 mL IntraVENous PRN Renan Cedeño MD   · glucagon (GLUCAGEN) injection 1 mg 1 mg IntraMUSCular PRN Renan Cedeño MD   · insulin lispro (HUMALOG) injection SubCUTAneous AC&HS Renan Cedeño MD 3 Units at 01/07/22 1825   · albuterol-ipratropium (DUO-NEB) 2.5 MG-0.5 MG/3 ML 3 mL Nebulization Q6H PRN Renan Cedeño MD     Allergies   Allergen Reactions   · Ciprofloxacin Anaphylaxis   · Shellfish Derived Anaphylaxis   · Ace Inhibitors Unknown (comments)   · Biaxin [Clarithromycin] Other (comments)   Metal taste   · Candesartan Cough   · Pcn [Penicillins] Hives     Past Medical History:   Diagnosis Date   · Acquired hypothyroidism 8/15/2016   · Anemia   RED-HF study   · Asthma   · Cardiomyopathy, nonischemic ([address]   initial dx 2001, bivHF 2008 with EF 15%, s/p biV-ICD 9/08, significant improvment in EF to 45-50%   · CKD (chronic kidney disease)   Dr Torres Oliver   · CKD (chronic kidney disease) 8/15/2012   · Depression   · Diabetes ([address]   · Diabetic neuropathy ([address]   · DM (diabetes mellitus) ([address] 8/15/2012   · GERD (gastroesophageal reflux disease)   · Gout   · Hypothyroidism   · ICD (implantable cardioverter-defibrillator), biventricular, in situ 6/5/2014   · Psoriasis     Past Surgical History:   Procedure Laterality Date   · CARDIAC CATHETERIZATION 2007; 01/06/15   normal cors   · ECHO 2D ADULT 4/2010   EF 45%, improved from 1/09 (25%)   · ECHO 2D ADULT 11/2011   LVH, EF 55-60%   · HX ORTHOPAEDIC   knee   · HX PACEMAKER PLACEMENT   AICD   · STRESS TEST LEXISCAN/CARDIOLITE 3/21/12   normal perfusion, global HK 40%     Family History   Problem Relation Age of Onset   · Heart Disease Mother   · Hypertension Mother   · Lupus Sister   · Diabetes Brother     Social History     Tobacco Use   · Smoking status: Never Smoker   · Smokeless tobacco: Never Used   Substance Use Topics   · Alcohol use: No         Review of Systems: Review of all other systems otherwise negative. Constitutional: Negative for fever, chills, weight loss, + malaise/fatigue. HEENT: Negative for nosebleeds, vision changes. Respiratory: Negative for cough, hemoptysis   Cardiovascular: Negative for chest pain, palpitations, orthopnea, claudication, + leg swelling, no syncope, and PND. + SOB   Gastrointestinal: Negative for nausea, vomiting, diarrhea, blood in stool and melena. Genitourinary: Negative for dysuria, and hematuria. Musculoskeletal: Negative for myalgias, arthralgia. Skin: Negative for rash. Heme: Does not bleed or bruise easily. Neurological: Negative for speech change and focal weakness       Objective:     Visit Vitals   /88 (BP 1 Location: Right arm, BP Patient Position: At rest)   Pulse (!) 106   Temp 97.8 °F (36.6 °C)   Resp 20   Ht 5' 7\" (1.702 m)   Wt 257 lb (116.6 kg)   SpO2 99%   BMI 40.25 kg/m²       Physical Exam:   Constitutional: Well-developed and well-nourished. No respiratory distress. Head: Normocephalic and atraumatic. Eyes: Pupils are equal, round. ENT: Hearing grossly normal.   Neck: Supple. No JVD present. Cardiovascular: fast rate, regular rhythm. Exam reveals no gallop and no friction rub. 2/6 systolic LSB murmur. Pulmonary/Chest: Appears mildly SOB.  Breath sounds normal. No wheezes. Abdominal: Soft, no tenderness. Moderate obesity.    Musculoskeletal: Moves extremities independently. Vasc/lymphatic: trace bilat lower extremity edema. Neurological: Alert,oriented. Skin: Skin is warm and dry   Psychiatric: Normal mood and affect. Behavior is normal. Judgment and thought content normal.         Assessment/Plan:         Imaging/Studies:   Echo (12/08/2021): LVEF 15-20%, upper normal wall thickness, LV diastolic dysfunction.  Borderline low RVEF.  Mod dilated LA, mildly dilated RA.  Mild to mod MR. Asim Velazquez TR.  Mild to mod PH.       LHC/RHC (01/2015): No significant CAD. NICM:  No new LHC due to severe renal failure.  Not a candidate for hemodialysis per nephrology. [facility] LHC in 2015 showed no significant CAD, same with the previous     Recent admission for acute on chronic CHF.  Previously had LVEF normalize with CRT.  Still with proper biventricular pacing per device check during recent admission, not cause for decline.  LVEF now 15-20%. Brand Lowing pulmonary HTN noted on RHC.      Continue GDMT, but no ACEi/ARB/ARNi due to CKD.  Intermittently low BP limits ability to titrate meds.  Continue milrinone, now with Advanced Heart Failure team on board; there is discussion of possible double organ transplant.       Nuclear amyloid scan equivocal.  MRI is not possible with 4194 LV lead (2008).  Unless she has a form of amyloid that can be treated, I do not think MRI will add more information for treatment.       Medtronic biventricular ICD (gen change 01/14/2015, leads 09/25/2008): Device check today shows proper lead & generator function.  Generator longevity still estimated 4 months.  RA 0.1%, CRT 97.6%.       HTN: BP intermittently trends low normal on current regimen, ok to continue. CKD: Nephrologist follows, BUN & Cr elevated.  Advanced Heart Failure team diuresing with Bumex. Addendum from EP attending: This patient was seen and examined by me in a face-to-face visit today.  I reviewed the medical record including lab results, imaging and other provider notes. I confirmed the history as described above. I spoke to the patient, obtained history and examined the patient. I discussed assessments and plans in details with nurse practitioner. Below are my treatment plans and recommendations. She is visibly dyspneic when talking but she still denies she was  Vital signs with sinus tachycardia and CRT pacing  Exam shows     Nursing note and vitals reviewed. Constitutional: well-developed and well-nourished. No distress. Head: Normocephalic and atraumatic. Eyes: Pupils are equal, round   Neck: Neck supple. No JVD present. Cardiovascular: fast rate, regular rhythm and normal heart sounds. Exam reveals no gallop and no friction rub   Pulmonary/Chest: Effort normal and breath sounds normal. No wheezes. Abdominal: Soft, no rebound. Musculoskeletal: trace edema    Neurological: alert, oriented. Skin: Skin is warm and dry, not diaphoretic. Psychiatric: normal mood and affect. Behavior is normal. Judgment and thought content normal.      Assessment and Plan:  Milrinone dose is higher than 0.375 mcg/kg/min  She has higher sinus rate now  Will increase corlanor   She wants to be listed for double heart and kidney transplant  BIV ICD is checked again by Medtronic rep at my request and leads are fine  Generator has 4 months before replacement  I will replace before she goes home since she will need Biv pacing no matter what    Thank you for involving me in this patient's care and please call with further concerns or questions. Darcy Ballesteros M.D.    Electrophysiology/Cardiology   [facility] [place]   [address], [facility] [phone] [facility], [address] [place] [address]   [phone]                                        [phone]

## 2022-01-11 NOTE — NURSE NAVIGATOR
Heart Failure Nurse Navigator rounds completed. HF NN provided introduction to self and nurse navigator role. Living With Heart Failure booklet given to patient, along with weight calendar, magnet, and HF Support Group flyer. Introduced patient to Preparing for Successful Discharge - Heart Failure check list and explained that knowledge of these topics will help facilitate successful self management upon discharge. Reviewed signs and symptoms magnet, low sodium diet and daily weights. Advised patient that follow up appointment will be scheduled and placed on Discharge Instructions prior to discharge. Patient given [facility] flyer and is agreeable to services as needed. Patient provided with contact information for HF NN and encouraged to call with questions. Will continue to follow until discharge.

## 2022-01-11 NOTE — PROGRESS NOTES
Problem: Self Care Deficits Care Plan (Adult)  Goal: *Acute Goals and Plan of Care (Insert Text)  Description: FUNCTIONAL STATUS PRIOR TO ADMISSION: Patient was modified independent using a single point cane for functional mobility. Patient reports she was on 4L NC O2 at baseline. HOME SUPPORT: The patient lived with spouse who assisted her with bathing/dressing tasks as needed. Occupational Therapy Goals  Initiated 1/5/2022  1. Patient will perform lower body dressing with supervision/set-up within 7 day(s). 2.  Patient will perform upper body dressing with supervision/set-up within 7 day(s). 3.  Patient will perform grooming with supervision/set-up within 7 day(s). 4.  Patient will perform toilet transfers with supervision/set-up within 7 day(s). 5.  Patient will perform all aspects of toileting with supervision/set-up within 7 day(s). 6.  Patient will participate in upper extremity therapeutic exercise/activities with supervision/set-up for 5 minutes within 7 day(s). 7.  Patient will utilize energy conservation techniques during functional activities with verbal cues within 7 day(s). Outcome: Progressing Towards Goal   OCCUPATIONAL THERAPY TREATMENT  Patient: Viola Demarco (68 y.o. female)  Date: 1/11/2022  Diagnosis: Acute respiratory failure with hypoxia ([facility] [J96.01] <principal problem not specified>       Precautions: Fall  Chart, occupational therapy assessment, plan of care, and goals were reviewed. ASSESSMENT  Patient continues with skilled OT services and is progressing towards goals. Pt received semi-supine and agreeable to participation in therapy session. Pt progressed to EOB and completed seated grooming tasks with set-up assist. VSS throughout session on 5L NC. Pt's functional independence and performance of ADL/IADL tasks continues to be limited at this time by generalized weakness and impaired activity tolerance/endurance.  Continue to recommend d/[facility] with [facility] and assistance/supervision from family for OOB mobility/ADL/IADL tasks for safety. Current Level of Function Impacting Discharge (ADLs): up to min A inferred, set up seated grooming     Other factors to consider for discharge: advanced CHF         PLAN :  Patient continues to benefit from skilled intervention to address the above impairments. Continue treatment per established plan of care to address goals. Recommendation for discharge: (in order for the patient to meet his/her long term goals)  Occupational therapy at least 2 days/week in the home AND ensure assist and/or supervision for safety with all OOB mobility/ADL/IADL tasks    This discharge recommendation:  Has been made in collaboration with the attending provider and/or case management    IF patient discharges home will need the following DME: TBD       SUBJECTIVE:   Patient stated I just keep trying.     OBJECTIVE DATA SUMMARY:   Cognitive/Behavioral Status:  Neurologic State: Alert  Orientation Level: Oriented to time  Cognition: Follows commands             Functional Mobility and Transfers for ADLs:  Bed Mobility:  Supine to Sit: Supervision;Bed Modified  Sit to Supine: Supervision    Transfers:  Sit to Stand: Stand-by assistance          Balance:  Sitting: Intact  Standing: Impaired  Standing - Static: Good  Standing - Dynamic : Fair    ADL Intervention:       Grooming  Position Performed: Seated edge of bed  Washing Face: Set-up  Brushing Teeth: Set-up  Brushing/Combing Hair: Set-up              Upper Body [address]: Minimum  assistance (line management )                     Pain:  None reported    Activity Tolerance:   Good    After treatment patient left in no apparent distress:   Supine in bed, Call bell within reach and Side rails x 3, RN notified     COMMUNICATION/COLLABORATION:   The patients plan of care was discussed with: Physical therapist and Registered nurse.      Nikky Cook OT  Time Calculation: 24 mins

## 2022-01-11 NOTE — PROGRESS NOTES
Transition Plan  of Care  RUR 23%-High  Disposition. Heart Failure is seeing patient. She will likely go home with a Milrinone drip. Spoke with NP Amber from Advanced Failure team and she has written orders. This CM sent referral to [employer] to get cost and patient's out of pocket. Plan to discharge Monday next week. Update-patient will have zero out of packet form home milrinone.

## 2022-01-11 NOTE — PROGRESS NOTES
NUTRITION  Reason for Assessment: LOS      Recommendations/Interventions/Plan:     Consistent CHO 60 gm/meal, 2 gm Na+ diet. Preferences updated. Allow AM snack of 15-20 gm CHO    Will rescreen per policy         Past Medical History:   Diagnosis Date    Acquired hypothyroidism 8/15/2016    Anemia     RED-HF study    Asthma     Cardiomy[facility], nonischemic ([facility]     initial dx 2001, bivHF 2008 with EF 15%, s/p biV-ICD 9/08, significant improvment in EF to 45-50%    CKD (chronic kidney disease)     Dr Le Jara    CKD (chronic kidney disease) 8/15/2012    Depression     Diabetes [facility]     Diabetic neuropathy ([facility]     DM (diabetes mellitus) ([facility] 8/15/2012    GERD (gastroesophageal reflux disease)     Gout     Hypothyroidism     ICD (implantable cardioverter-defibrillator), biventricular, in situ 6/5/2014    Psoriasis        Pt screened for LOS, also consulted for CHF diet education. Chart/labs/meds reviewed. Admitted with Acute respiratory failure with hypoxia ([facility] [J96.01]  Met with pt at bedside. Attempted diet education, but pt more focused on needing more CHO with meals d/t low BG this AM.  RD explained insulin is being adjusting and she is on sufficient CHOs/ meals. Pt requests snacks between meals. Initially pt tried to decline printed education materials, but when I explained this was for her diet at home, she was receptive to leaving education materials at bedside for her to review further. Pt states she cannot eat broccoli or any fruit, except banana. She likes greek yogurt or 1/2 sandwich for snacks.       Nutrition Related Findings:   Edema: generalized non-pitting  Last BM: 1/10  Skin: intact      Current Nutrition Therapies:  Diet: 2 gm Na+, consistent CHO 60 gm/meal  Supplements: none  Meal intake:   Patient Vitals for the past 168 hrs:   % Diet Eaten   01/11/22 1200 26 - 50%   01/11/22 0859 0%   01/10/22 1600 51 - 75%   01/10/22 1200 0%   01/10/22 1041 1 - 25%   01/05/22 1300 51 - 75%   01/05/22 0931 26 - 50%   01/04/22 1800 26 - 50%       Weight Hx: stable  Wt Readings from Last 10 Encounters:   01/11/22 117.3 kg (258 lb 9.6 oz)   12/22/21 115.8 kg (255 lb 6.4 oz)   12/15/21 116.3 kg (256 lb 6.4 oz)   12/13/21 116.1 kg (256 lb)   11/01/21 115.8 kg (255 lb 6.4 oz)   07/14/21 120.1 kg (264 lb 12.8 oz)   07/13/21 118.4 kg (261 lb)   05/26/21 119.7 kg (263 lb 12.8 oz)   11/04/20 119.7 kg (264 lb)   02/20/20 116.6 kg (257 lb)         Recent Labs     01/11/22  0513 01/10/22  0331 01/09/22  0510   GLU 84 144* 93   BUN 59* 71* 78*   CREA 2.36* 2.62* 3.04*    137 136   K 3.9 4.2 4.0    101 101   CO2 32 30 30   CA 9.5 9.0 9.3   MG 2.3 2.3 2.4       Recent Labs     01/11/22  1230 01/11/22  0840 01/11/22  0820 01/10/22  2102 01/10/22  1633 01/10/22  1257 01/10/22  0847 01/09/22  2125 01/09/22  1637 01/09/22  1255 01/08/22  2127 01/08/22  1651   GLUCPOC 96 117 60* 207* 192* 145* 75 123* 116 117 110 113       Lab Results   Component Value Date/Time    Hemoglobin A1c 7.7 (H) 01/11/2022 05:13 AM    Hemoglobin A1c 7.3 (H) 07/13/2021 04:00 PM    Hemoglobin A1c 7.8 (H) 11/05/2020 09:11 AM    Hemoglobin A1c, External 9.0 12/18/2015 12:00 AM    Hemoglobin A1c, External 8.7 12/19/2014 12:00 AM         Arlene Mir RD  Available via [employer]

## 2022-01-12 NOTE — PROGRESS NOTES
[facility] in [place], [place]  Inpatient Progress Note      Patient name: Marlon Harris  Patient : 1955  Patient MRN: [id number]  Consulting MD: Katie Roe MD  Date of service: 22    REASON FOR REFERRAL:  Management of chronic systolic heart failure    PLAN OF CARE:  79 y/o female with new onset severe cardiomyopathy, LVEF 15% (diagnosed 21) admitted for acute decompensation c/b acute hepatic and acute on chronic renal failure in the setting of massive volume overload of likely > 25 lbs.   Plant to continue inotrope-assisted diuresis, goal net negative 2-3lbs per day; w/d patient anticipate 7-10 days of hospitalization  Patient expressed wishes to be full code and would like to be evaluated for dual organ transplant if her heart and renal function does not improve  Will monitor for recovery over the next 3 months, recommend ischemic eval    RECOMMENDATIONS:  Would recommend ischemic eval per cardiology for new drop in EF, last [facility] in   Continue current medical therapy for heart failure  Cont Milrinone 0.2mcg/kg/min for tachycardia- adjusted to current weight   Cont coreg 12.5mg BID- watch for psoriasis flair  No ACE/ARB/ARNI due to CKD  No MRA due to CKD- will discuss with nephrology   Stop hydralazine and isordil to allow more bp for BBrx  Start low dose Digoxin for HR- d/w Dr. Jacki Marquez but will have to monitor closely   Corlanor 5mg BID- may need to increase   Transition to bumex gtt 1mg/hr- goal net negative 1-2 liters per day Goal weight approx 224lb based on recent RHC  Allopurinol 50mg daily for elevated uric acid   ICD interrogation per    Repeat TTE and EKG when euvolemic   Will need OP PSG  Wean O2 as able   Send [employer] today   Nephrology following  Palliative consult   Nutritionist consult  Heart failure education  Advanced care plan present on file    All other care per primary team    IMPRESSION:  Fatigue  Shortness of breath, on 3L NC  Volume overload  Acute on Chronic systolic heart failure  Stage D, NYHA class IV symptoms  Non-ischemic cardiomyopathy, LVEF 15%  Normal coronaries  PYP equivocal for amyloid   Pulmonary hypertension  S/p BiV-ICD  Cardiac risk factors:  HL  DM2  Morbid obesity, BMI 40  Acute on chronic renal failure, stage IV  GERD  Anemia of chronic disease  Gout    Interval Events:  Inadequate diuresis  Weight down 2#? Creatinine stable at 2.32  PBNP trending down to 9880  O2 requirements up    LIFE GOALS:  Patient's personal goals include: being home with family, still ambulating around without getting too tired. Important upcoming milestones or family events: none  The patient identifies the following as important for living well: remaining idependent and mobile; being able to get out of the house with . Patient verbalized willingness to be on home milrinone and evaluation for both heart and kidney transplants. Verbalizes she would have family supporting her decision on this. SHAMIKA Muhammad is a 78 y.o.  female with a history of NICM, chronic hypoxic respiratory failure secondary to pulmonary HTN, hypothyroidism, CKD, GERD, and DM II who presented to [facility] as a transfer from another facility for acute on chronic hypoxic respiratory failure. Reports running out of O2 at home resulting in SOB and dizziness. Upon arrival to the ED she was found to have O2 sats in the 70s, requiring 4L NC O2. Rapid covid test was negative. ProNT-BNP was 63506, K+5.2, BUN/CR x/2.54 and elevated d-dimer. Chest xray showing pulmonary edema vs. Atypical pneumonia. VQ scan showed low probability for PE. Per Dr. Whitney Krishnamurthy, NIC, LVEF 15-20% during recent admission. LVEF previously normalized with CRT. NYHA III-IV. No ACEi/ARB due to renal dysfunction. GDMT dosing limited by low BP.      Patient's PCP is Dr. Claude Bury, and she sees  Beverly Buckner primarily for cardiac care due to Dr. Brent Walters transfering practice. Patient historically seen by nephrology but has not had follow up care in the past three years. CARDIAC IMAGING:  Echo 12/8/21- LVEF 15-20%, mild to Mod MR, LVIDd 5.09cm, TAPSE 1.94cm  Echo 4/22/19- LVEF 60%, trace MR,  LVIDd 4.24cm, TAPSE 1.65cm   Echo 4/24/18- LVEF 60%, trivial MR, LVIDd 4.79cm, TAPSE 2.25cm     EKG- 1/4/22 ST with A sense and V paced rhythm    C 2015- No significant CAD  NST 2014- reversible LAD involvement     ICD interrogation    HEMODYNAMICS:  [facility] 12/10/21: PAP 76/48/57, RAP 20, PCWP 35, CI 2.26      CPEST not done  6MW not done    OTHER IMAGING:  CXR Results  (Last 48 hours)                 01/10/22 1517  XR CHEST PORT Final result    Impression:  Recommend advancing PICC line 3 cm for optimal positioning. Narrative: Indication: PICC line placement. Comparison to 1/4/2022. Portable exam obtained at 5058 demonstrates placement of   a right  subclavian PICC line with the tip projecting over the SVC. For optimal   positioning this could be advanced 3 cm. There is no pneumothorax. PHYSICAL EXAM:  Visit Vitals  BP (!) 118/92   Pulse (!) 107   Temp 97.4 °F (36.3 °C)   Resp 21   Ht 5' 7\" (1.702 m)   Wt 256 lb 13.4 oz (116.5 kg)   SpO2 100%   BMI 40.23 kg/m²     Physical Exam  Vitals and nursing note reviewed. Constitutional:       General: She is not in acute distress. Appearance: Normal appearance. She is obese. Cardiovascular:      Rate and Rhythm: Regular rhythm. Tachycardia present. Pulses: Normal pulses. Heart sounds: Normal heart sounds. No murmur heard. Pulmonary:      Effort: Pulmonary effort is normal. No respiratory distress. Abdominal:      General: There is distension. Musculoskeletal:         General: Swelling present. Skin:     General: Skin is warm and dry. Findings: Lesion present.       Comments: psorasis   Neurological: General: No focal deficit present. Mental Status: She is alert and oriented to person, place, and time. Psychiatric:         Mood and Affect: Mood normal.         REVIEW OF SYSTEMS:  Review of Systems   Constitutional: Positive for malaise/fatigue. Negative for chills and fever. Respiratory: Positive for shortness of breath. Cardiovascular: Positive for leg swelling. Negative for chest pain, palpitations and orthopnea. Gastrointestinal: Negative for heartburn and nausea. Genitourinary: Negative for dysuria. Musculoskeletal: Negative for falls, joint pain and myalgias. Neurological: Negative for dizziness. Psychiatric/Behavioral: Positive for depression. The patient is nervous/anxious.           PAST MEDICAL HISTORY:  Past Medical History:   Diagnosis Date    Acquired hypothyroidism 8/15/2016    Anemia     RED-HF study    Asthma     Cardiomyopathy, nonischemic ([facility]     initial dx 2001, bivHF 2008 with EF 15%, s/p biV-ICD 9/08, significant improvment in EF to 45-50%    CKD (chronic kidney disease)     Dr Xuan Patel    CKD (chronic kidney disease) 8/15/2012    Depression     Diabetes ([facility]     Diabetic neuropathy (HCC)     DM (diabetes mellitus) ([facility] 8/15/2012    GERD (gastroesophageal reflux disease)     Gout     Hypothyroidism     ICD (implantable cardioverter-defibrillator), biventricular, in situ 6/5/2014    Psoriasis        PAST SURGICAL HISTORY:  Past Surgical History:   Procedure Laterality Date    CARDIAC CATHETERIZATION  2007; 01/06/15    normal cors    ECHO 2D ADULT  4/2010    EF 45%, improved from 1/09 (25%)    ECHO 2D ADULT  11/2011    LVH, EF 55-60%    HX ORTHOPAEDIC      knee    HX PACEMAKER PLACEMENT      AICD    STRESS TEST LEXISCAN/CARDIOLITE  3/21/12    normal perfusion, global HK 40%       FAMILY HISTORY:  Family History   Problem Relation Age of Onset    Heart Disease Mother     Hypertension Mother     Lupus Sister     Diabetes Brother        SOCIAL HISTORY:  Social History Socioeconomic History    Marital status:    Tobacco Use    Smoking status: Never Smoker    Smokeless tobacco: Never Used   Substance and Sexual Activity    Alcohol use: No    Drug use: No    Sexual activity: Never   Social History Narrative    . Nonsmoker. Disability       LABORATORY RESULTS:     Labs Latest Ref Rng & Units 1/12/2022 1/11/2022 1/10/2022 1/9/2022 1/8/2022 1/6/2022 1/6/2022   WBC 3.6 - 11.0 K/uL 8.8 9.0 - - 10.3 11. 1(H) 11. 3(H)   RBC 3.80 - 5.20 M/uL 3.46(L) 3.52(L) - - 3.34(L) 3.20(L) 3.52(L)   Hemoglobin 11.5 - 16.0 g/dL 8. 3(L) 8.5(L) - - 8. 1(L) 7. 8(L) 8.4(L)   Hematocrit 35.0 - 47.0 % 31. 8(L) 32. 6(L) - - 30. 1(L) 28. 5(L) 31. 6(L)   MCV 80.0 - 99.0 FL 91.9 92.6 - - 90.1 89.1 89.8   Platelets 868 - 404 K/uL 269 331 - - 306 281 298   Lymphocytes 12 - 49 % - - - - - 7(L) 8(L)   Monocytes 5 - 13 % - - - - - 7 7   Eosinophils 0 - 7 % - - - - - 2 1   Basophils 0 - 1 % - - - - - 0 0   Albumin 3.5 - 5.0 g/dL 2. 5(L) 2. 6(L) - - - - -   Calcium 8.5 - 10.1 MG/DL 9.2 9.5 9.0 9.3 8.3(L) 8. 1(L) 8.7   Glucose 65 - 100 mg/dL 204(H) 84 144(H) 93 293(H) 283(H) 264(H)   BUN 6 - 20 MG/DL 54(H) 59(H) 71(H) 78(H) 86(H) 85(H) 83(H)   Creatinine 0.55 - 1.02 MG/DL 2.32(H) 2.36(H) 2.62(H) 3.04(H) 3.50(H) 3.49(H) 3.36(H)   Sodium 136 - 145 mmol/L 137 138 137 136 133(L) 133(L) 133(L)   Potassium 3.5 - 5.1 mmol/L 4.1 3.9 4.2 4.0 4.3 4.2 4.5   TSH 0.36 - 3.74 uIU/mL - 3.08 - - - - -   Some recent data might be hidden     Lab Results   Component Value Date/Time    TSH 3.08 01/11/2022 05:13 AM    TSH 1.85 12/15/2021 01:06 PM    TSH 1.01 11/05/2020 09:11 AM    TSH 2.740 04/30/2019 11:21 AM    TSH 0.519 02/21/2012 10:53 AM    TSH 8.29 (H) 01/20/2010 01:59 PM       ALLERGY:  Allergies   Allergen Reactions    Ciprofloxacin Anaphylaxis    Shellfish Derived Anaphylaxis    Ace Inhibitors Unknown (comments)    Biaxin [Clarithromycin] Other (comments)     Metal taste    Candesartan Cough    Pcn [Penicillins] Hives CURRENT MEDICATIONS:    Current Facility-Administered Medications:     albumin human 25% (BUMINATE) solution 12.5 g, 12.5 g, IntraVENous, BID, Owen, Amber B, NP, 12.5 g at 01/12/22 1220    insulin NPH (NOVOLIN N, HUMULIN N) injection 30 Units, 30 Units, SubCUTAneous, DAILY, Nando Casillas MD, 30 Units at 01/12/22 0955    bumetanide (BUMEX) 0.25 mg/mL infusion, 1 mg/hr, IntraVENous, CONTINUOUS, Owen, Amber B, NP    digoxin (LANOXIN) tablet 0.0625 mg, 0.0625 mg, Oral, DAILY, Owen, Amber B, NP, 0.0625 mg at 01/12/22 1219    insulin NPH (NOVOLIN N, HUMULIN N) injection 30 Units, 30 Units, SubCUTAneous, QHS, Nando Casillas MD, 30 Units at 01/12/22 0019    carvediloL (COREG) tablet 12.5 mg, 12.5 mg, Oral, BID WITH MEALS, Owen, Amber B, NP, 12.5 mg at 01/12/22 0931    allopurinoL (ZYLOPRIM) tablet 50 mg, 50 mg, Oral, DAILY, Owen, Amber B, NP, 50 mg at 01/12/22 0931    milrinone (PRIMACOR) 20 MG/100 ML D5W infusion, 0.2 mcg/kg/min, IntraVENous, CONTINUOUS, Zee Valiente MD, Last Rate: 6.9 mL/hr at 01/12/22 0139, 0.2 mcg/kg/min at 01/12/22 0139    epoetin isabel-epbx (RETACRIT) injection 20,000 Units, 20,000 Units, SubCUTAneous, Q TUE, THU & SAT, Tania Mccrary MD, 20,000 Units at 01/12/22 0020    docusate sodium (COLACE) capsule 100 mg, 100 mg, Oral, BID, Glenny Espinal MD, 100 mg at 01/12/22 0930    insulin lispro (HUMALOG) injection 10 Units, 10 Units, SubCUTAneous, TIDAIONA, Lisa Horne MD, 10 Units at 01/09/22 1725    montelukast (SINGULAIR) tablet 10 mg, 10 mg, Oral, DAILY, Santiago Mills MD, 10 mg at 01/12/22 0930    levothyroxine (SYNTHROID) tablet 100 mcg, 100 mcg, Oral, Once per day on Mon Tue Wed Thu Fri Sat, Lisa Horne MD, 100 mcg at 01/12/22 0708    cetirizine (ZYRTEC) tablet 10 mg, 10 mg, Oral, DAILY, Lisa Horne MD, 10 mg at 01/12/22 0931    hydroxypropyl methylcellulose (ISOPTO TEARS) 0.5 % ophthalmic solution 1 Drop, 1 Drop, Both Eyes, PRN, Ozzie, Sheree Nunn MD, 1 Drop at 01/06/22 1727    venlafaxine-SR (EFFEXOR-XR) capsule 75 mg, 75 mg, Oral, DAILY WITH BREAKFAST, Lisa Horne MD, 75 mg at 01/12/22 0930    gabapentin (NEURONTIN) capsule 100 mg, 100 mg, Oral, QHS, Lisa Horne MD, 100 mg at 01/12/22 0018    arformoteroL (BROVANA) neb solution 15 mcg, 15 mcg, Nebulization, BID RT, 15 mcg at 01/11/22 2042 **AND** budesonide (PULMICORT) 500 mcg/2 ml nebulizer suspension, 500 mcg, Nebulization, BID RT, Kamari Rivas MD, 500 mcg at 01/11/22 2042    hydrOXYzine HCL (ATARAX) tablet 20 mg, 20 mg, Oral, TID PRN, Fozia Clement MD    heparin (porcine) injection 5,000 Units, 5,000 Units, SubCUTAneous, Q8H, Stephanie Parra MD, 5,000 Units at 01/12/22 1221    ivabradine (CORLANOR) tablet 5 mg, 5 mg, Oral, BID WITH MEALS, Fozia Clement MD, 5 mg at 01/12/22 0953    sodium chloride (NS) flush 5-40 mL, 5-40 mL, IntraVENous, Q8H, Fozia Clement MD, 10 mL at 01/12/22 0711    sodium chloride (NS) flush 5-40 mL, 5-40 mL, IntraVENous, PRN, Fozia Clement MD    acetaminophen (TYLENOL) tablet 650 mg, 650 mg, Oral, Q6H PRN **OR** acetaminophen (TYLENOL) suppository 650 mg, 650 mg, Rectal, Q6H PRN, Fozia Clement MD    ondansetron (ZOFRAN ODT) tablet 4 mg, 4 mg, Oral, Q8H PRN **OR** ondansetron (ZOFRAN) injection 4 mg, 4 mg, IntraVENous, Q6H PRN, Fozia Clement MD, 4 mg at 01/05/22 0915    glucose chewable tablet 16 g, 4 Tablet, Oral, PRN, Fozia Clement MD    dextrose (D50W) injection syrg 12.5-25 g, 25-50 mL, IntraVENous, PRN, Fozia Clement MD    glucagon (GLUCAGEN) injection 1 mg, 1 mg, IntraMUSCular, PRN, Fozia Clement MD    insulin lispro (HUMALOG) injection, , SubCUTAneous, AC&HS, Fozia Clement MD, 3 Units at 01/11/22 1809    albuterol-ipratropium (DUO-NEB) 2.5 MG-0.5 MG/3 ML, 3 mL, Nebulization, Q6H PRN, Fozia Clement MD    PATIENT CARE TEAM:  Patient Care Team:  Yonatan May MD as PCP - General (Internal Medicine)  Yonatan May MD as PCP - [facility] Provider  Sharran Schilder, MD as Consulting Provider (Internal Medicine)  Roberto Fox MD (Dermatology)  Al Meade MD (Nephrology)  Paddy Brink MD as Consulting Provider (Cardiology)  Krista Bazan MD (Cardiology)  Heydi Raines MD as Consulting Provider (Pulmonary Disease)     Thank you for allowing me to participate in this patient's care. Myesha Hendrix NP  [facility] [address], [address]  Phone: [phone]      [facility] ATTENDING ADDENDUM    Patient was seen and examined in person. Data and notes were reviewed. I have discussed and agree with the plan as noted in the NP note above without further additions.     Kimberlee Thomason MD PhD  [facility]

## 2022-01-12 NOTE — PROGRESS NOTES
Bedside and Verbal shift change report given to Mariana RN (oncoming nurse) by Shyann RN (offgoing nurse). Report included the following information SBAR, Kardex, MAR and Cardiac Rhythm Bi paced/V paced.

## 2022-01-12 NOTE — PROGRESS NOTES
Verbal bedside report given to ARTURO Boothe oncoming nurse by WENCESLAO Thorpe RN off-going nurse. Report included current pt status and condition, recent results, hx of present illness, heart rate and rhythm, and respiratory status.

## 2022-01-12 NOTE — PROGRESS NOTES
Cardiac Electrophysiology Hospital Progress Note     Subjective:       Oralia Lamas is a 78 y.o. patient who is seen for evaluation/management of acute on chronic systolic CHF and biventricular pacing. Interim:   Milrinone dose decreased due to sinus tachycardia.       Advanced Heart Failure team following, diuresing patient further; net output 1.5L yesterday. [place] are discussing possible double organ transplant.  Palliative care also spoke with patient. Reports she was up walking with PT yesterday, felt fatigued & SOB afterward.  States orthopnea has improved. Renal function mildly improved.  NT pro-BNP improved but still significantly elevated. HPI:   Presented to the ER 01/04/2021 with hypoxia, improved on supplemental oxygen. Labs showed stable anemia.  WBC elevated, hyponatremic, hypokalemic.  D dimer elevated.  Chronic CKD. Nephrologist spoke to me directly regarding severe renal failure, but not much worse than last admission. Rapid COVID negative.  CXR showed pulmonary edema vs atypical pneumonia.  VQ scan showed low probability for PE.       NICM, LVEF 15-20% during recent admission.  LVEF previously normalized with CRT.  NYHA III-IV.  No ACEi/ARB due to renal dysfunction.  GDMT dosing limited by low BP. [address] 12/10/2021 showed severe pulmonary HTN (wedge 34 mmHg, PAP 80 mmHg). Cardiac cath in 2015 at [facility] showed no evidence of CAD. She did require LV lead reprogramming over the summer, but good LV capture since.  Good capture/function when checked during recent admission. BP controlled. PICC line placed 01/10/2022 in anticipation of home milrinone. Previous:   S/p Medtronic biventricular ICD (gen change [id number], leads 09/25/2008).     CKD stage IV.        Previously followed by Dr. Asia Jamison, states did not follow him to new practice.            Problem List   Acute respiratory failure with hypoxia (HCC) ICD-10-CM: J96.01   ICD-9-CM: 518.81 1/4/2022     CHF exacerbation ([place] [address] ICD-10-CM: I50.9   ICD-9-CM: 428.0 12/6/2021     Type 2 diabetes mellitus with diabetic neuropathy ([facility] ICD-10-CM: E11.40   ICD-9-CM: 250.60, 357.2 1/2/2020     Type 2 diabetes with nephropathy ([facility] ICD-10-CM: E11.21   ICD-9-CM: 250.40, 583.81 4/3/2018     Obesity, morbid ([place] [address].) ICD-10-CM: E66.01   ICD-9-CM: 278.01 12/8/2017     Acquired hypothyroidism ICD-10-CM: E03.9   ICD-9-CM: 244.9 8/15/2016     Dysthymia ICD-10-CM: F34.1   ICD-9-CM: 300.4 8/15/2016     ICD (implantable cardioverter-defibrillator), biventricular, in situ ICD-10-CM: Z95.810   ICD-9-CM: V45.02 6/5/2014   Overview Signed 1/14/2015 11:11 AM by Alex Gooden MD     Generator Medtronic change 1/14/2015         Dyslipidemia ICD-10-CM: A66.5   ICD-9-CM: 272.4 1/14/2014     CKD (chronic kidney disease) ICD-10-CM: N18.9   ICD-9-CM: 585.9 8/15/2012     Cardiomyopathy, nonischemic ([place] [address] ICD-10-CM: I42.8   ICD-9-CM: 425.4 Unknown   Overview Signed 10/10/2011  6:40 AM by Cheral Simmonds, MD     initial dx 2001, bivHF 2008 with EF 15%, s/p biV-ICD 9/08, significant improvment in EF to 45-50%         Anemia in chronic renal disease (Chronic) ICD-10-CM: N18.9, D63.1   ICD-9-CM: 285.21 12/10/2008     HTN (hypertension) ICD-10-CM: I10   ICD-9-CM: 401.9 12/10/2008     GERD (gastroesophageal reflux disease) (Chronic) ICD-10-CM: K21.9   ICD-9-CM: 530.81 12/10/2008     Gout (Chronic) ICD-10-CM: M10.9   ICD-9-CM: 274.9 12/10/2008     Pulmonary HTN (HCC) (Chronic) ICD-10-CM: I27.20   ICD-9-CM: 416.8 12/10/2008           Current Facility-Administered Medications   Medication Dose Route Frequency Provider Last Rate Last Admin   · insulin NPH (NOVOLIN N, HUMULIN N) injection 60 Units 60 Units SubCUTAneous Q12H Martha Lomas MD 60 Units at 01/09/22 2209   · epoetin isabel-epbx (RETACRIT) injection 20,000 Units 20,000 Units SubCUTAneous Q TUE, THU & SAT Alden Carmichael MD 20,000 Units at 01/08/22 2349   · docusate sodium (COLACE) capsule 100 mg 100 mg Oral BID Sid Chase  mg at 01/09/22 1726   · insulin lispro (HUMALOG) injection 10 Units 10 Units SubCUTAneous TIDAC Isamar Briseno MD 10 Units at 01/09/22 1725   · carvediloL (COREG) tablet 6.25 mg 6.25 mg Oral BID WITH MEALS Christopher Garcia MD 6.25 mg at 01/09/22 1726   · hydrALAZINE (APRESOLINE) tablet 10 mg 10 mg Oral TID Christopher Garcia MD 10 mg at 01/09/22 2200   · isosorbide dinitrate (ISORDIL) tablet 10 mg 10 mg Oral TID Christopher Garcia MD 10 mg at 01/09/22 2200   · bumetanide (BUMEX) tablet 1 mg 1 mg Oral BID Christopher Garcia MD 1 mg at 01/09/22 1726   · montelukast (SINGULAIR) tablet 10 mg 10 mg Oral DAILY Elvis Bautista MD 10 mg at 01/09/22 1004   · levothyroxine (SYNTHROID) tablet 100 mcg 100 mcg Oral Once per day on Mon Tue Wed Thu Fri Sat Isamar Briseno  mcg at 01/10/22 0520   · cetirizine (ZYRTEC) tablet 10 mg 10 mg Oral DAILY Isamar Briseno MD 10 mg at 01/09/22 1003   · hydroxypropyl methylcellulose (ISOPTO TEARS) 0.5 % ophthalmic solution 1 Drop 1 Drop Both Eyes PRN Isamar Briseno MD 1 Drop at 01/06/22 1727   · venlafaxine-SR (EFFEXOR-XR) capsule 75 mg 75 mg Oral DAILY WITH Loraine Liao MD 75 mg at 01/09/22 1003   · gabapentin (NEURONTIN) capsule 100 mg 100 mg Oral QHS Lisa Horne  mg at 01/09/22 2200   · arformoteroL (BROVANA) neb solution 15 mcg 15 mcg Nebulization BID RT Elvis Bautista MD 15 mcg at 01/10/22 0739   And   · budesonide (PULMICORT) 500 mcg/2 ml nebulizer suspension 500 mcg Nebulization BID RT Elvis Bautista  mcg at 01/10/22 0739   · hydrOXYzine HCL (ATARAX) tablet 20 mg 20 mg Oral TID PRN Christopher Garcia MD   · milrinone (PRIMACOR) 20 MG/100 ML D5W infusion 0.2 mcg/kg/min IntraVENous CONTINUOUS Christopher Garcia MD 7.2 mL/hr at 01/10/22 0524 0.2 mcg/kg/min at 01/10/22 0524   · heparin (porcine) injection 5,000 Units 5,000 Units SubCUTAneous Q8H Jason Kirkpatrick MD 5,000 Units at 01/10/22 0520   · ivabradine (CORLANOR) tablet 5 mg 5 mg Oral BID WITH MEALS Barrie Lau MD 5 mg at 01/09/22 1727   · sodium chloride (NS) flush 5-40 mL 5-40 mL IntraVENous Q8H Barrie Lau MD 10 mL at 01/10/22 0521   · sodium chloride (NS) flush 5-40 mL 5-40 mL IntraVENous PRN Barrie Lau MD   · acetaminophen (TYLENOL) tablet 650 mg 650 mg Oral Q6H PRN Barrie Lau MD   Or   · acetaminophen (TYLENOL) suppository 650 mg 650 mg Rectal Q6H PRN Barrie Lau MD   · ondansetron (ZOFRAN ODT) tablet 4 mg 4 mg Oral Q8H PRN Barrie Lau MD   Or   · ondansetron [facility]) injection 4 mg 4 mg IntraVENous Q6H PRN Barrie Lau MD 4 mg at 01/05/22 0915   · glucose chewable tablet 16 g 4 Tablet Oral PRN Barrie Lau MD   · dextrose (D50W) injection syrg 12.5-25 g 25-50 mL IntraVENous PRN Barrie Lau MD   · glucagon (GLUCAGEN) injection 1 mg 1 mg IntraMUSCular PRN Barrie Lau MD   · insulin lispro (HUMALOG) injection SubCUTAneous AC&HS Barrie Lau MD 3 Units at 01/07/22 1825   · albuterol-ipratropium (DUO-NEB) 2.5 MG-0.5 MG/3 ML 3 mL Nebulization Q6H PRN Barrie Lau MD     Allergies   Allergen Reactions   · Ciprofloxacin Anaphylaxis   · Shellfish Derived Anaphylaxis   · Ace Inhibitors Unknown (comments)   · Biaxin [Clarithromycin] Other (comments)   Metal taste   · Candesartan Cough   · Pcn [Penicillins] Hives     Past Medical History:   Diagnosis Date   · Acquired hypothyroidism 8/15/2016   · Anemia   RED-HF study   · Asthma   · Cardiomyopathy, nonischemic ([facility]   initial dx 2001, bivHF 2008 with EF 15%, s/p biV-ICD 9/08, significant improvment in EF to 45-50%   · CKD (chronic kidney disease)   Dr Le Jara   · CKD (chronic kidney disease) 8/15/2012   · Depression   · Diabetes ([facility]   · Diabetic neuropathy ([facility]   · DM (diabetes mellitus) ([facility] 8/15/2012   · GERD (gastroesophageal reflux disease)   · Gout   · Hypothyroidism   · ICD (implantable cardioverter-defibrillator), biventricular, in situ 6/5/2014   · Psoriasis     Past Surgical History: Procedure Laterality Date   · CARDIAC CATHETERIZATION 2007; 01/06/15   normal cors   · ECHO 2D ADULT 4/2010   EF 45%, improved from 1/09 (25%)   · ECHO 2D ADULT 11/2011   LVH, EF 55-60%   · HX ORTHOPAEDIC   knee   · HX PACEMAKER PLACEMENT   AICD   · STRESS TEST LEXISCAN/CARDIOLITE 3/21/12   normal perfusion, global HK 40%     Family History   Problem Relation Age of Onset   · Heart Disease Mother   · Hypertension Mother   · Lupus Sister   · Diabetes Brother     Social History     Tobacco Use   · Smoking status: Never Smoker   · Smokeless tobacco: Never Used   Substance Use Topics   · Alcohol use: No         Review of Systems: Review of all other systems otherwise negative. Constitutional: Negative for fever, chills, weight loss, + malaise/fatigue. HEENT: Negative for nosebleeds, vision changes. Respiratory: Negative for cough, hemoptysis   Cardiovascular: Negative for chest pain, palpitations, orthopnea improved, no claudication, + leg swelling, no syncope, and PND. + SOB   Gastrointestinal: Negative for nausea, vomiting, diarrhea, blood in stool and melena. Genitourinary: Negative for dysuria, and hematuria. Musculoskeletal: Negative for myalgias, arthralgia. Skin: Negative for rash. Heme: Does not bleed or bruise easily. Neurological: Negative for speech change and focal weakness       Objective:     Visit Vitals   /88 (BP 1 Location: Right arm, BP Patient Position: At rest)   Pulse (!) 106   Temp 97.8 °F (36.6 °C)   Resp 20   Ht 5' 7\" (1.702 m)   Wt 257 lb (116.6 kg)   SpO2 99%   BMI 40.25 kg/m²       Physical Exam:   Constitutional: Well-developed and well-nourished. No respiratory distress. Head: Normocephalic and atraumatic. Eyes: Pupils are equal, round. ENT: Hearing grossly normal.   Neck: Supple. No JVD present. Cardiovascular: Fast rate, regular rhythm. Exam reveals no gallop and no friction rub. 2/6 systolic LSB murmur. Pulmonary/Chest: Appears mildly SOB.   Breath sounds normal. No wheezes. Abdominal: Soft, no tenderness. Moderate obesity. Musculoskeletal: Moves extremities independently. Vasc/lymphatic: Trace bilat lower extremity edema. Neurological: Alert,oriented. Skin: Skin is warm and dry   Psychiatric: Normal mood and affect. Behavior is normal. Judgment and thought content normal.         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 RHC (12/10/2021): High wedge pressure (35 mmHg) indicating volume overload, precapillary.  Severe pulmonary HTN. Echo (12/08/2021): LVEF 15-20%, upper normal wall thickness, LV diastolic dysfunction.  Borderline low RVEF.  Mod dilated LA, mildly dilated RA.  Mild to mod MR. Louie 1765 TR.  Mild to mod PH.       LHC/RHC (01/2015): No significant CAD. NICM:  No new LHC due to severe renal failure.  Not a candidate for hemodialysis per nephrology. Bennett Lorie LHC in 2015 showed no significant CAD, same with the previous   I am still concerned about doing a new LHC with her current kidney failure  Recent admission for acute on chronic CHF.  Previously had LVEF normalize with CRT.  Still with proper biventricular pacing per device check during recent admission, not cause for decline.  LVEF now 15-20%. Kp [place] pulmonary HTN noted on RHC.      Continue GDMT, but no ACEi/ARB/ARNi due to CKD.  Intermittently low BP limits ability to titrate meds.  Continue milrinone, but 0.2 mcg/kg/min now with Advanced Heart Failure team on board; there is discussion of possible double organ transplant.        Nuclear amyloid scan equivocal.  MRI is not possible with 4194 LV lead (2008).  Unless she has a form of amyloid that can be treated, I do not think MRI will add more information for treatment.       Medtronic biventricular ICD (gen change 01/14/2015, leads 09/25/2008):  Device check today shows proper lead & generator function.  Generator longevity still estimated 4 months.  RA 0.1%, CRT 97.6%.       HTN: BP intermittently trends low normal on current regimen, ok to continue. CKD: Nephrologist follows, BUN & Cr elevated. Addendum from EP attending: This patient was seen and examined by me in a face-to-face visit today. I reviewed the medical record including lab results, imaging and other provider notes. I confirmed the history as described above. I spoke to the patient, obtained history and examined the patient. I discussed assessments and plans in details with nurse practitioner. Below are my treatment plans and recommendations.       She is less dyspneic when talking   Vital signs with sinus tachycardia and CRT pacing   Exam shows   Visit Vitals  /84 (BP 1 Location: Left upper arm, BP Patient Position: At rest)   Pulse (!) 109   Temp 98.2 °F (36.8 °C)   Resp 20   Ht 5' 7\" (1.702 m)   Wt 256 lb 13.4 oz (116.5 kg)   SpO2 97%   BMI 40.23 kg/m²       Nursing note and vitals reviewed. Constitutional: well-developed and well-nourished. No distress. Head: Normocephalic and atraumatic. Eyes: Pupils are equal, round   Neck: Neck supple. No JVD present. Cardiovascular: fast rate, regular rhythm and normal heart sounds.  Exam reveals no gallop and no friction rub   Pulmonary/Chest: Effort normal and breath sounds normal. No wheezes. Abdominal: Soft, no rebound. Musculoskeletal: trace edema     Neurological: alert, oriented. Skin: Skin is warm and dry, not diaphoretic.     Psychiatric: normal mood and affect.  Behavior is normal. Judgment and thought content normal.       Assessment and Plan:   Milrinone dose is lowered to 0.2 mcg/kg/min   She has sinus tachycardia  Will increase corlanor to 7.5 mg bid  She wants to be listed for double heart and kidney transplant   BIV ICD is checked again by Medtronic rep at my request and leads are fine   Generator has 4 months before replacement   I will replace before she goes home since she will need Biv pacing no matter what   When she gets closer to going home I will consider cardiac cath vs stress test.  Her renal failure is a concern with contrast nephropathy even if small amount is used    Thank you for involving me in this patient's care and please call with further concerns or questions. Leslie Garzon M.D.    Electrophysiology/Cardiology   [facility] [place]   [address]arfiSelect [facility], [place] [address], [facility], [address] [place] [address]   [phone]

## 2022-01-12 NOTE — PROGRESS NOTES
[facility] [facility] Adult  Hospitalist Group                                                                                          Hospitalist Progress Note  Armand Machado MD  Answering service: [phone] from in house phone        Date of Service:  2022  NAME:  Juan Taylor  :  1955  MRN:  [id number]      Admission Summary:     Juan Taylor is a 78 y.o. female with hx NICM, chronic hypoxic respiratory failure 2/2 pulmonary htn, ckd, hypothyroidism, GERD, and  DM II who presents as a transfer from [facility] for acute on chronic hypoxic respiratory failure. She wears 3L o2 at baseline, and reportedly ran out of her home oxygen.     In the ED, she was hypoxic to th 70's, with improvement ot 94% on 4Lnc. Labs showed stable anemia with Hgb 10, K+ 5.2, lactic 3.1, trop 55>66, d-dimer >35, creatinine 2.54, and probnp 15,826. CXR showed diffuse interstitial airway disease. Rapid covid was negative.     In the ED, she was started on a heparin gtt, and sent to [facility] for VQ scan. She received bumex and nitropaste. Interval history / Subjective:      No complaints, breathing better but is on 5L O2     Assessment & Plan:     Acute on chronic respiratory failure with oxygen  Due to CHF  COVID-19 negative. Mild leukocytosis. Afebrile. VQ scan low probability for PE. Acute on chronic systolic heart failure  NYHA class IV. Status post ICD placement. LVEF 15 to 20%. proBNP [id number]. Continue Bumex - now on a drip, Coreg and milrinone, ivabridine per cardiology  Not a candidate for ARB/ACE due to renal insufficiency. Cardiac diet, strict I/O, daily weight. Low-sodium diet. PICC placed in anticipation for home milrinone    RODNEY on CKD stage IV felt to be due to cardiorenal syndrome  At baseline, electrolytes WNL  Monitor volume status electrolytes, replace electrolytes as needed. 2022. Renal ultrasound negative for hydronephrosis.  Small bilateral renal cysts. Avoid nephrotoxic meds, renally dose medications. Nephrology on board, recommendations noted. Hyperkalemia  Resolved, hyperkalemia protocol. Daily BMP. Low potassium diet. Hyponatremia   Improving, likely diuretic induced. Daily BMP, monitor volume status and electrolytes. Leukocytosis  CXR positive for interstitial airspace disease likely pulmonary edema or atypical pneumonia. Afebrile. Respiratory panel negative for COVID-19. Monitor closely, start on empiric antibiotics if any sign of infection. Type 2 diabetes. Adjusted insulin regimen due to hypoglycemia (reduced NPH today)    Hypothyroidism   Resume Synthroid. Depression  Denies any suicidal or homicidal ideation. Continue current meds. Morbid obesity   BMI 41.7. Diet and lifestyle modification recommended. Psoriasis   Multiple plaque psoriasis in the extensor surface of upper extremities. Outpatient PCP and rheumatology follow-up. Code status: Full Code  DVT prophylaxis: heparin     Care Plan discussed with: Patient/Family, Nurse and   Anticipated Disposition: TBD     Hospital Problems  Date Reviewed: 12/22/2021          Codes Class Noted POA    Acute respiratory failure with hypoxia [facility]) ICD-10-CM: J96.01  ICD-9-CM: 518.81  1/4/2022 Unknown              Vital Signs:    Last 24hrs VS reviewed since prior progress note.  Most recent are:  Visit Vitals  /84 (BP 1 Location: Left upper arm, BP Patient Position: At rest)   Pulse (!) 109   Temp 98.2 °F (36.8 °C)   Resp 20   Ht 5' 7\" (1.702 m)   Wt 116.5 kg (256 lb 13.4 oz)   SpO2 97%   BMI 40.23 kg/m²         Intake/Output Summary (Last 24 hours) at 1/12/2022 1557  Last data filed at 1/12/2022 0346  Gross per 24 hour   Intake 402.6 ml   Output 1450 ml   Net -1047.4 ml        Physical Examination:     I had a face to face encounter with this patient and independently examined them on 1/12/2022 as outlined below:          Constitutional:  No acute distress, cooperative, pleasant    ENT:  Oral mucosa moist, oropharynx benign. Resp:  CTA b/l diminished globally. No wheezing/rhonchi/rales. No accessory muscle use   CV:  Regular rhythm, normal rate, no murmurs, gallops, rubs    GI:  Soft, non distended, non tender. normoactive bowel sounds     Musculoskeletal:  Bilateral pretibial and pedal edema    Neurologic:  grossly non-focal            Data Review:    Review and/or order of clinical lab test  Review and/or order of tests in the radiology section of CPT  Review and/or order of tests in the medicine section of [facility]      Labs:     Recent Labs     01/12/22 0312 01/11/22 0513   WBC 8.8 9.0   HGB 8.3* 8.5*   HCT 31.8* 32.6*    331     Recent Labs     01/12/22  1312 01/12/22  0312 01/11/22  0513 01/10/22  0331 01/10/22  0331   NA  --  137 138  --  137   K  --  4.1 3.9  --  4.2   CL  --  99 102  --  101   CO2  --  32 32  --  30   BUN  --  54* 59*  --  71*   CREA  --  2.32* 2.36*  --  2.62*   GLU  --  204* 84  --  144*   CA  --  9.2 9.5  --  9.0   MG 2.2 2.1 2.3   < > 2.3   URICA  --   --  7.1*  --   --     < > = values in this interval not displayed. Recent Labs     01/12/22 0312 01/11/22 0513   ALT 80* 107*   AP 72 75   TBILI 0.6 0.6   TP 6.9 7.2   ALB 2.5* 2.6*   GLOB 4.4* 4.6*     No results for input(s): INR, PTP, APTT, INREXT, INREXT in the last 72 hours. Recent Labs     01/11/22 0513   TIBC 262   PSAT 8*   FERR 764*      No results found for: FOL, RBCF   No results for input(s): PH, PCO2, PO2 in the last 72 hours.   Recent Labs     01/11/22 0513   CPK 62     Lab Results   Component Value Date/Time    Cholesterol, total 151 01/11/2022 05:13 AM    HDL Cholesterol 60 01/11/2022 05:13 AM    LDL, calculated 75.8 01/11/2022 05:13 AM    Triglyceride 76 01/11/2022 05:13 AM    CHOL/HDL Ratio 2.5 01/11/2022 05:13 AM     Lab Results   Component Value Date/Time    Glucose (POC) 156 (H) 01/12/2022 11:16 AM    Glucose (POC) 112 01/12/2022 08:35 AM Glucose (POC) 117 01/11/2022 11:58 PM    Glucose (POC) 202 (H) 01/11/2022 04:47 PM    Glucose (POC) 96 01/11/2022 12:30 PM     Lab Results   Component Value Date/Time    Color YELLOW/STRAW 01/11/2022 02:53 PM    Appearance CLEAR 01/11/2022 02:53 PM    Specific gravity 1.010 01/11/2022 02:53 PM    pH (UA) 6.0 01/11/2022 02:53 PM    Protein 30 (A) 01/11/2022 02:53 PM    Glucose Negative 01/11/2022 02:53 PM    Ketone Negative 01/11/2022 02:53 PM    Bilirubin Negative 01/11/2022 02:53 PM    Urobilinogen 0.2 01/11/2022 02:53 PM    Nitrites Negative 01/11/2022 02:53 PM    Leukocyte Esterase TRACE (A) 01/11/2022 02:53 PM    Epithelial cells FEW 01/11/2022 02:53 PM    Bacteria Negative 01/11/2022 02:53 PM    WBC 0-4 01/11/2022 02:53 PM    RBC 0-5 01/11/2022 02:53 PM         Medications Reviewed:     Current Facility-Administered Medications   Medication Dose Route Frequency    albumin human 25% (BUMINATE) solution 12.5 g  12.5 g IntraVENous BID    insulin NPH (NOVOLIN N, HUMULIN N) injection 30 Units  30 Units SubCUTAneous DAILY    bumetanide (BUMEX) 0.25 mg/mL infusion  1 mg/hr IntraVENous CONTINUOUS    digoxin (LANOXIN) tablet 0.0625 mg  0.0625 mg Oral DAILY    iron sucrose (VENOFER) 300 mg in 0.9% sodium chloride 250 mL IVPB  300 mg IntraVENous Q24H    insulin NPH (NOVOLIN N, HUMULIN N) injection 30 Units  30 Units SubCUTAneous QHS    carvediloL (COREG) tablet 12.5 mg  12.5 mg Oral BID WITH MEALS    allopurinoL (ZYLOPRIM) tablet 50 mg  50 mg Oral DAILY    milrinone (PRIMACOR) 20 MG/100 ML D5W infusion  0.2 mcg/kg/min IntraVENous CONTINUOUS    epoetin isabel-epbx (RETACRIT) injection 20,000 Units  20,000 Units SubCUTAneous Q TUE, THU & SAT    docusate sodium (COLACE) capsule 100 mg  100 mg Oral BID    insulin lispro (HUMALOG) injection 10 Units  10 Units SubCUTAneous TIDAC    montelukast (SINGULAIR) tablet 10 mg  10 mg Oral DAILY    levothyroxine (SYNTHROID) tablet 100 mcg  100 mcg Oral Once per day on Mon Tue Wed Thu Fri Sat    cetirizine (ZYRTEC) tablet 10 mg  10 mg Oral DAILY    hydroxypropyl methylcellulose (ISOPTO TEARS) 0.5 % ophthalmic solution 1 Drop  1 Drop Both Eyes PRN    venlafaxine-SR (EFFEXOR-XR) capsule 75 mg  75 mg Oral DAILY WITH BREAKFAST    gabapentin (NEURONTIN) capsule 100 mg  100 mg Oral QHS    arformoteroL (BROVANA) neb solution 15 mcg  15 mcg Nebulization BID RT    And    budesonide (PULMICORT) 500 mcg/2 ml nebulizer suspension  500 mcg Nebulization BID RT    hydrOXYzine HCL (ATARAX) tablet 20 mg  20 mg Oral TID PRN    heparin (porcine) injection 5,000 Units  5,000 Units SubCUTAneous Q8H    ivabradine (CORLANOR) tablet 5 mg  5 mg Oral BID WITH MEALS    sodium chloride (NS) flush 5-40 mL  5-40 mL IntraVENous Q8H    sodium chloride (NS) flush 5-40 mL  5-40 mL IntraVENous PRN    acetaminophen (TYLENOL) tablet 650 mg  650 mg Oral Q6H PRN    Or    acetaminophen (TYLENOL) suppository 650 mg  650 mg Rectal Q6H PRN    ondansetron (ZOFRAN ODT) tablet 4 mg  4 mg Oral Q8H PRN    Or    ondansetron (ZOFRAN) injection 4 mg  4 mg IntraVENous Q6H PRN    glucose chewable tablet 16 g  4 Tablet Oral PRN    dextrose (D50W) injection syrg 12.5-25 g  25-50 mL IntraVENous PRN    glucagon (GLUCAGEN) injection 1 mg  1 mg IntraMUSCular PRN    insulin lispro (HUMALOG) injection   SubCUTAneous AC&HS    albuterol-ipratropium (DUO-NEB) 2.5 MG-0.5 MG/3 ML  3 mL Nebulization Q6H PRN     ______________________________________________________________________  EXPECTED LENGTH OF STAY: 3d 19h  ACTUAL LENGTH OF STAY:          8                 Diony Valle MD

## 2022-01-12 NOTE — PROGRESS NOTES
Problem: Self Care Deficits Care Plan (Adult)  Goal: *Acute Goals and Plan of Care (Insert Text)  Description: FUNCTIONAL STATUS PRIOR TO ADMISSION: Patient was modified independent using a single point cane for functional mobility. Patient reports she was on 4L NC O2 at baseline. HOME SUPPORT: The patient lived with spouse who assisted her with bathing/dressing tasks as needed. Occupational Therapy Goals  Goals reviewed and continued/upgraded as appropriate 1/12/2022  1. Patient will perform lower body dressing with supervision/set-up within 7 day(s). 2.  Patient will perform upper body dressing with supervision/set-up within 7 day(s). 3.  Patient will perform standing grooming tasks with supervision/set-up within 7 day(s). 4.  Patient will perform toilet transfers with supervision/set-up within 7 day(s). 5.  Patient will perform all aspects of toileting with supervision/set-up within 7 day(s). 6.  Patient will participate in upper extremity therapeutic exercise/activities with supervision/set-up for 5 minutes within 7 day(s). 7.  Patient will utilize energy conservation techniques during functional activities with verbal cues within 7 day(s). Initiated 1/5/2022  1. Patient will perform lower body dressing with supervision/set-up within 7 day(s). 2.  Patient will perform upper body dressing with supervision/set-up within 7 day(s). 3.  Patient will perform grooming with supervision/set-up within 7 day(s). - Met 1/12/22 (seated)  4. Patient will perform toilet transfers with supervision/set-up within 7 day(s). 5.  Patient will perform all aspects of toileting with supervision/set-up within 7 day(s). 6.  Patient will participate in upper extremity therapeutic exercise/activities with supervision/set-up for 5 minutes within 7 day(s). 7.  Patient will utilize energy conservation techniques during functional activities with verbal cues within 7 day(s).    Outcome: Progressing Towards Goal    OCCUPATIONAL THERAPY TREATMENT/WEEKLY RE-ASSESSMENT  Patient: Aletha Smith (62 y.o. female)  Date: 1/12/2022  Diagnosis: Acute respiratory failure with [facility] ([facility] [J96.01] <principal problem not specified>       Precautions: Fall  Chart, occupational therapy assessment, plan of care, and goals were reviewed. ASSESSMENT  Patient continues with skilled OT services and is progressing towards goals. Pt remains highly motivated to participate in therapy sessions and return to PLOF, however today's tx session and weekly re-assessment limited d/t orthostatic hypotension. Pt received semi-supine, on 5L NC, and progressed to sitting EOB with supervision and using bed rails for support. Once seated, BP dropped and pt reporting dizziness (please see vitals below). Pt returned to supine with BP improving. Pt provided with warm washcloth to complete bed level grooming and positioned for comfort once BP improved. RN notified of session. At this time, continue to recommend d/c home with [address] [place] [address]'[place] and assist/supervision from family with OOB mobility/ADL/IADL tasks for safety. Vitals:   Sitting EOB: 85/53  Semi-supine: 93/76  Supine: 97/72    Current Level of Function Impacting Discharge (ADLs): up to min A for LB reach and for balance with functional transfers    Other factors to consider for discharge: advanced CHF, good caregiver support         PLAN :  Goals have been updated based on progression since last assessment. Patient continues to benefit from skilled intervention to address the above impairments. Continue to follow patient 3 times a week to address goals.     Recommendation for discharge: (in order for the patient to meet his/her long term goals)  Occupational therapy at least 2 days/week in the home AND ensure assist and/or supervision for safety with OOB mobility/ADL/IADL tasks    This discharge recommendation:  Has been made in collaboration with the attending provider and/or case management    IF patient discharges home will need the following DME: rolling walker, shower chair       SUBJECTIVE:   Patient stated I'm okay to try to get up.     OBJECTIVE DATA SUMMARY:   Cognitive/Behavioral Status:  Neurologic State: Alert  Orientation Level: Oriented X4  Cognition: Follows commands             Functional Mobility and Transfers for ADLs:  Bed Mobility:  Supine to Sit: Supervision;Bed Modified  Sit to Supine: Supervision  Scooting: Supervision    Transfers:             Balance:  Sitting: Intact  Standing:  (not tested)    ADL Intervention:       Grooming  Position Performed: Long sitting on bed  Washing Face: Set-up                                    Pain:  None reported     Activity Tolerance:   Fair    After treatment patient left in no apparent distress:   Supine in bed, Call bell within reach and Side rails x 3, RN notified of session     COMMUNICATION/COLLABORATION:   The patients plan of care was discussed with: Registered nurse.      Aroldo Jackson, OT  Time Calculation: 25 mins

## 2022-01-12 NOTE — PROGRESS NOTES
[facility]   [address] [facility], [id number] [place] [address]  Phone: [phone]   [phone]    [email or web address]     Nephrology Progress Note    Patient Name : Swati Crowder      : 1955     MRN : [id number]  Date of Admission : 2022  Date of Servive : 22    CC:  Follow up for ARF       Assessment and Plan   CKD stage IV:  - Etiology: DM, HTN, CRS  - renal US: suggestive of CKD, B/L benign renal cysts   -Creatinine trending down  -Agree with IV Bumex drip today.  -Check labs again at 4 PM and tomorrow morning. Replete lytes as needed       Acute on chronic HFrEF  NI CMP, LVEF 15 to 20%  NYHA class III-IV  S/p BiV pacer/ICD-s/p CRT  RHC 12/10/2021: PCWP 34, PA P 80  - On Milrinone and Corlanor   -Off low-dose digoxin today    Morbid obesity  Type II DM  HTN  Hypothyroidism  Pulmonary hypertension  Gout  Psoriasis        Interval History:  Seen and examined. Reports 2.4 L urine output. Creatinine trending down. Electrolytes are stable. Heart rate remains elevated. Discussed with advanced heart failure team and noted plans for low-dose oral digoxin. Review of Systems: A comprehensive review of systems was negative except for that written in the HPI.     Current Medications:   Current Facility-Administered Medications   Medication Dose Route Frequency    albumin human 25% (BUMINATE) solution 12.5 g  12.5 g IntraVENous BID    insulin NPH (NOVOLIN N, HUMULIN N) injection 30 Units  30 Units SubCUTAneous DAILY    bumetanide (BUMEX) 0.25 mg/mL infusion  1 mg/hr IntraVENous CONTINUOUS    digoxin (LANOXIN) tablet 0.0625 mg  0.0625 mg Oral DAILY    iron sucrose (VENOFER) 300 mg in 0.9% sodium chloride 250 mL IVPB  300 mg IntraVENous Q24H    insulin NPH (NOVOLIN N, HUMULIN N) injection 30 Units  30 Units SubCUTAneous QHS    carvediloL (COREG) tablet 12.5 mg  12.5 mg Oral BID WITH MEALS    allopurinoL (ZYLOPRIM) tablet 50 mg  50 mg Oral DAILY  milrinone (PRIMACOR) 20 MG/100 ML D5W infusion  0.2 mcg/kg/min IntraVENous CONTINUOUS    epoetin isabel-epbx (RETACRIT) injection 20,000 Units  20,000 Units SubCUTAneous Q TUE, THU & SAT    docusate sodium (COLACE) capsule 100 mg  100 mg Oral BID    insulin lispro (HUMALOG) injection 10 Units  10 Units SubCUTAneous TIDAC    montelukast (SINGULAIR) tablet 10 mg  10 mg Oral DAILY    levothyroxine (SYNTHROID) tablet 100 mcg  100 mcg Oral Once per day on Mon Tue Wed Thu Fri Sat    cetirizine (ZYRTEC) tablet 10 mg  10 mg Oral DAILY    hydroxypropyl methylcellulose (ISOPTO TEARS) 0.5 % ophthalmic solution 1 Drop  1 Drop Both Eyes PRN    venlafaxine-SR (EFFEXOR-XR) capsule 75 mg  75 mg Oral DAILY WITH BREAKFAST    gabapentin (NEURONTIN) capsule 100 mg  100 mg Oral QHS    arformoteroL (BROVANA) neb solution 15 mcg  15 mcg Nebulization BID RT    And    budesonide (PULMICORT) 500 mcg/2 ml nebulizer suspension  500 mcg Nebulization BID RT    hydrOXYzine HCL (ATARAX) tablet 20 mg  20 mg Oral TID PRN    heparin (porcine) injection 5,000 Units  5,000 Units SubCUTAneous Q8H    ivabradine (CORLANOR) tablet 5 mg  5 mg Oral BID WITH MEALS    sodium chloride (NS) flush 5-40 mL  5-40 mL IntraVENous Q8H    sodium chloride (NS) flush 5-40 mL  5-40 mL IntraVENous PRN    acetaminophen (TYLENOL) tablet 650 mg  650 mg Oral Q6H PRN    Or    acetaminophen (TYLENOL) suppository 650 mg  650 mg Rectal Q6H PRN    ondansetron (ZOFRAN ODT) tablet 4 mg  4 mg Oral Q8H PRN    Or    ondansetron (ZOFRAN) injection 4 mg  4 mg IntraVENous Q6H PRN    glucose chewable tablet 16 g  4 Tablet Oral PRN    dextrose (D50W) injection syrg 12.5-25 g  25-50 mL IntraVENous PRN    glucagon (GLUCAGEN) injection 1 mg  1 mg IntraMUSCular PRN    insulin lispro (HUMALOG) injection   SubCUTAneous AC&HS    albuterol-ipratropium (DUO-NEB) 2.5 MG-0.5 MG/3 ML  3 mL Nebulization Q6H PRN      Allergies   Allergen Reactions    Ciprofloxacin Anaphylaxis  Shellfish Derived Anaphylaxis    Ace Inhibitors Unknown (comments)    Biaxin [Clarithromycin] Other (comments)     Metal taste    Candesartan Cough    Pcn [Penicillins] Hives       Objective:  Vitals:    Vitals:    01/12/22 1032 01/12/22 1039 01/12/22 1200 01/12/22 1219   BP: 93/76 97/72  (!) 118/92   Pulse:   (!) 107    Resp:       Temp:       SpO2:       Weight:       Height:         Intake and Output:  No intake/output data recorded. 01/10 1901 - 01/12 0700  In: 994.2 [P.O.:725; I.V.:269.2]  Out: 3001 [Urine:3000]    Physical Examination:    General: Obese   Neck:  Supple, no mass  Resp:  Rales +  CV:  tachy  GI:  Soft, NT, + BS, no HS megaly  Neurologic:  Non focal    []    High complexity decision making was performed  []    Patient is at high-risk of decompensation with multiple organ involvement    Lab Data Personally Reviewed: I have reviewed all the pertinent labs, microbiology data and radiology studies during assessment.     Recent Labs     01/12/22  0312 01/11/22  0513 01/10/22  0331    138 137   K 4.1 3.9 4.2   CL 99 102 101   CO2 32 32 30   * 84 144*   BUN 54* 59* 71*   CREA 2.32* 2.36* 2.62*   CA 9.2 9.5 9.0   MG 2.1 2.3 2.3   ALB 2.5* 2.6*  --    ALT 80* 107*  --      Recent Labs     01/12/22 0312 01/11/22 0513   WBC 8.8 9.0   HGB 8.3* 8.5*   HCT 31.8* 32.6*    331     Lab Results   Component Value Date/Time    Specimen Description: URINE 10/22/2013 01:32 PM    Specimen Description: URINE 01/23/2013 04:40 PM    Specimen Description: LEG TISSUE 02/12/2009 12:00 AM    Specimen Description: LEG (LEFT) 01/29/2009 03:06 AM     Lab Results   Component Value Date/Time    Culture result: MIXED SKIN ROSANNA ISOLATED 10/22/2013 01:32 PM    Culture result:  01/23/2013 04:40 PM     ENTEROCOCCUS FAECALIS GROUP D  MIXED SKIN ROSANNA ISOLATED    Culture result:  02/12/2009 12:00 AM     LIGHT STAPHYLOCOCCUS EPIDERMIDIS (OXACILLIN RESISTANT)  LIGHT 2ND STRAIN OF STAPHYLOCOCCUS EPIDERMIDIS (OXACILLIN RESISTANT)    Culture result: NO GROWTH 2 DAYS 01/29/2009 03:06 AM     Recent Results (from the past 24 hour(s))   PROCALCITONIN    Collection Time: 01/11/22  1:50 PM   Result Value Ref Range    Procalcitonin 0.42 ng/mL   URINALYSIS W/ REFLEX CULTURE    Collection Time: 01/11/22  2:53 PM    Specimen: Urine   Result Value Ref Range    Color YELLOW/STRAW      Appearance CLEAR CLEAR      Specific gravity 1.010 1.003 - 1.030      pH (UA) 6.0 5.0 - 8.0      Protein 30 (A) NEG mg/dL    Glucose Negative NEG mg/dL    Ketone Negative NEG mg/dL    Bilirubin Negative NEG      Blood TRACE (A) NEG      Urobilinogen 0.2 0.2 - 1.0 EU/dL    Nitrites Negative NEG      Leukocyte Esterase TRACE (A) NEG      UA:UC IF INDICATED CULTURE NOT INDICATED BY UA RESULT CNI      WBC 0-4 0 - 4 /hpf    RBC 0-5 0 - 5 /hpf    Epithelial cells FEW FEW /lpf    Bacteria Negative NEG /hpf    Hyaline cast 0-2 0 - 5 /lpf   GLUCOSE, POC    Collection Time: 01/11/22  4:47 PM   Result Value Ref Range    Glucose (POC) 202 (H) 65 - 117 mg/dL    Performed by Trudy Buckner    GLUCOSE, POC    Collection Time: 01/11/22 11:58 PM   Result Value Ref Range    Glucose (POC) 117 65 - 117 mg/dL    Performed by Omahastephan Buckner    MAGNESIUM    Collection Time: 01/12/22  3:12 AM   Result Value Ref Range    Magnesium 2.1 1.6 - 2.4 mg/dL   NT-PRO BNP    Collection Time: 01/12/22  3:12 AM   Result Value Ref Range    NT pro-BNP 9,884 (H) <914 PG/ML   METABOLIC PANEL, COMPREHENSIVE    Collection Time: 01/12/22  3:12 AM   Result Value Ref Range    Sodium 137 136 - 145 mmol/L    Potassium 4.1 3.5 - 5.1 mmol/L    Chloride 99 97 - 108 mmol/L    CO2 32 21 - 32 mmol/L    Anion gap 6 5 - 15 mmol/L    Glucose 204 (H) 65 - 100 mg/dL    BUN 54 (H) 6 - 20 MG/DL    Creatinine 2.32 (H) 0.55 - 1.02 MG/DL    BUN/Creatinine ratio 23 (H) 12 - 20      GFR est AA 25 (L) >60 ml/min/1.73m2    GFR est non-AA 21 (L) >60 ml/min/1.73m2    Calcium 9.2 8.5 - 10.1 MG/DL    Bilirubin, total 0.6 0.2 - 1.0 MG/DL    ALT (SGPT) 80 (H) 12 - 78 U/L    AST (SGOT) 11 (L) 15 - 37 U/L    Alk. phosphatase 72 45 - 117 U/L    Protein, total 6.9 6.4 - 8.2 g/dL    Albumin 2.5 (L) 3.5 - 5.0 g/dL    Globulin 4.4 (H) 2.0 - 4.0 g/dL    A-G Ratio 0.6 (L) 1.1 - 2.2     CBC W/O DIFF    Collection Time: 01/12/22  3:12 AM   Result Value Ref Range    WBC 8.8 3.6 - 11.0 K/uL    RBC 3.46 (L) 3.80 - 5.20 M/uL    HGB 8.3 (L) 11.5 - 16.0 g/dL    HCT 31.8 (L) 35.0 - 47.0 %    MCV 91.9 80.0 - 99.0 FL    MCH 24.0 (L) 26.0 - 34.0 PG    MCHC 26.1 (L) 30.0 - 36.5 g/dL    RDW 19.8 (H) 11.5 - 14.5 %    PLATELET 349 828 - 996 K/uL    MPV 9.1 8.9 - 12.9 FL    NRBC 0.2 (H) 0  WBC    ABSOLUTE NRBC 0.02 (H) 0.00 - 0.01 K/uL   GLUCOSE, POC    Collection Time: 01/12/22  8:35 AM   Result Value Ref Range    Glucose (POC) 112 65 - 117 mg/dL    Performed by Thera Cape    GLUCOSE, POC    Collection Time: 01/12/22 11:16 AM   Result Value Ref Range    Glucose (POC) 156 (H) 65 - 117 mg/dL    Performed by Thera Cape            I have reviewed the flowsheets. Chart and Pertinent Notes have been reviewed. No change in PMH ,family and social history from Consult note.       Emelyn Aldana [facility]

## 2022-01-13 NOTE — PROGRESS NOTES
Problem: Mobility Impaired (Adult and Pediatric)  Goal: *Acute Goals and Plan of Care (Insert Text)  Description: FUNCTIONAL STATUS PRIOR TO ADMISSION: Patient was modified independent using a single point cane as needed for household mobility. HOME SUPPORT PRIOR TO ADMISSION: The patient lived with spouse who provides assist as needed. Physical Therapy Goals  Goals reassessed 4/47/1125; remain applicable and appropriate. Continue additional 7 days. Initiated 1/6/2022  1. Patient will move from supine to sit and sit to supine  in bed with independence within 7 day(s). 2.  Patient will transfer from bed to chair and chair to bed with modified independence using the least restrictive device within 7 day(s). 3.  Patient will perform sit to stand with modified independence within 7 day(s). 4.  Patient will ambulate with supervision/set-up for 25 feet with the least restrictive device within 7 day(s). 5.  Patient will ascend/descend 4 stairs with bilat handrail(s) with minimal assistance/contact guard assist within 7 day(s). Outcome: Progressing Towards Goal   PHYSICAL THERAPY TREATMENT: WEEKLY REASSESSMENT  Patient: Ani Mcclellan (45 y.o. female)  Date: 1/13/2022  Primary Diagnosis: Acute respiratory failure with hypoxia [facility] [J96.01]        Precautions: Fall      ASSESSMENT  Patient continues with skilled PT services and is progressing towards goals. Patient tolerated transferring to the chair this date. Continues to demonstrate asymptomatic hypotension and concerns with continued decreased in BP once seated and resting post activity. RN informed and will continue to monitor. Use of RW for transfer/gait as patient reports feeling unsteady. Would recommend continued use of RW with nursing at this time, but hopeful to progress patient back to baseline SPC. Plans to progress ambulation next visit. Noted order for 6MWT and patient not appropriate this visit.  Will initiate as soon as patient able to tolerate. Encouraged patient to increase her time up OOB to increase activity tolerance. Recommended up in chair for all meals and ambulate in room with nursing. Patient's progression toward goals since last assessment: Progressing slowly with therapy goals    Current Level of Function Impacting Discharge (mobility/balance): Supervision-CGA for transfers, CGA for short ambulation     Other factors to consider for discharge: hypotension, decline in mobility          PLAN :  Goals have been updated based on progression since last assessment. Patient continues to benefit from skilled intervention to address the above impairments. Recommendations and Planned Interventions: bed mobility training, transfer training, gait training, therapeutic exercises, patient and family training/education, and therapeutic activities      Frequency/Duration: Patient will be followed by physical therapy:  3 times a week to address goals. Recommendation for discharge: (in order for the patient to meet his/her long term goals)  Physical therapy at least 2 days/week in the home AND ensure assist and/or supervision for safety with mobility     This discharge recommendation:  Has been made in collaboration with the attending provider and/or case management    IF patient discharges home will need the following DME: to be determined (TBD)       SUBJECTIVE:   Patient stated It feels good to stand up.     OBJECTIVE DATA SUMMARY:   HISTORY:    Past Medical History:   Diagnosis Date    Acquired hypothyroidism 8/15/2016    Anemia     RED-HF study    Asthma     Cardiomyopathy, nonischemic ([address]     initial dx 2001, bivHF 2008 with EF 15%, s/p biV-ICD 9/08, significant improvment in EF to 45-50%    CKD (chronic kidney disease)     Dr Yuliya August    CKD (chronic kidney disease) 8/15/2012    Depression     Diabetes ([address]     Diabetic neuropathy ([address]     DM (diabetes mellitus) ([address] 8/15/2012    GERD (gastroesophageal reflux disease)     Gout     Hypothyroidism     ICD (implantable cardioverter-defibrillator), biventricular, in situ 6/5/2014    Psoriasis      Past Surgical History:   Procedure Laterality Date    CARDIAC CATHETERIZATION  2007; 01/06/15    normal cors    ECHO 2D ADULT  4/2010    EF 45%, improved from 1/09 (25%)    ECHO 2D ADULT  11/2011    LVH, EF 55-60%    HX ORTHOPAEDIC      knee    HX PACEMAKER PLACEMENT      AICD    STRESS TEST LEXISCAN/CARDIOLITE  3/21/12    normal perfusion, global HK 40%       Personal factors and/or comorbidities impacting plan of care:     Home Situation  Home Environment: Private residence  # Steps to Enter: 4  Rails to Enter: Yes  Hand Rails : Bilateral  One/Two Story Residence: One story  Living Alone: No  Support Systems: Spouse/Significant Other  Patient Expects to be Discharged to[de-identified] [employer]  Current DME Used/Available at Home: Cane, straight  Tub or Shower Type: Shower    EXAMINATION/PRESENTATION/DECISION MAKING:   Critical Behavior:  Neurologic State: Alert  Orientation Level: Oriented X4  Cognition: Follows commands  Safety/Judgement: Awareness of environment,Fall prevention  Hearing: Auditory  Auditory Impairment: None  Range Of Motion:   WFL    Strength:     Generally decreased, functional      Functional Mobility:  Bed Mobility:     Supine to Sit: Supervision     Scooting: Supervision  Transfers:  Sit to Stand: Stand-by assistance  Stand to Sit: Stand-by assistance        Bed to Chair: Contact guard assistance              Balance:   Sitting: Intact  Standing: Impaired  Standing - Static: Good  Standing - Dynamic : Fair  Ambulation/Gait Training:  Distance (ft): 5 Feet (ft)  Assistive Device: Walker, rolling (few steps at end without RW to attempt no AD)  Ambulation - Level of Assistance: Contact guard assistance        Gait Abnormalities: Decreased step clearance;Trunk sway increased        Base of Support: Widened     Speed/Fanny: Pace decreased (<100 feet/min); Slow  Step Length: Right shortened;Left shortened                  Slow. Limited to gait to transfer to chair at this time. Use of RW for most of transfer, attempted few steps at end for further assessment of balance and need for AD. Recommend continued use of RW for balance and energy conservation. Stairs:   Unsafe to attempt at this time      Pain Rating:  No c/o pain     Activity Tolerance:   Fair; SpO2 remained stable on 5L NC  Continues to demonstrate asymptomatic orthostatic hypotension. Also noted continued decrease in BP when sitting post activity. Informed RN.      01/13/22 1324 01/13/22 1326 01/13/22 1331 01/13/22 1340   Vital Signs    Pulse (Heart Rate)  104  (!) 106 (!) 118 (!) 106   Heart Rate Source  Monitor  Monitor Monitor Monitor   BP (!) 119/95 107/89 101/81 99/80   MAP (Calculated) 103 95 88 86   BP 1 Location Left upper arm Left upper arm Left upper arm Left upper arm   BP 1 Method Automatic Automatic Automatic Automatic   BP Patient Position Supine; At rest Sitting Standing Sitting  (post activity, sitting in chair)     After treatment patient left in no apparent distress:   Sitting in chair and Call bell within reach    COMMUNICATION/EDUCATION:   The patients plan of care was discussed with: Occupational therapist and Registered nurse. Fall prevention education was provided and the patient/caregiver indicated understanding., Patient/family have participated as able in goal setting and plan of care. , and Patient/family agree to work toward stated goals and plan of care.     Thank you for this referral.  Sallie Mejias, PT, DPT   Time Calculation: 26 mins

## 2022-01-13 NOTE — PROGRESS NOTES
Bedside and Verbal shift change report given to Mariana RN (oncoming nurse) by Fernandez Maxwell RN (offgoing nurse). Report included the following information SBAR, MAR and Cardiac Rhythm Bi paced.

## 2022-01-13 NOTE — PROGRESS NOTES
Problem: Self Care Deficits Care Plan (Adult)  Goal: *Acute Goals and Plan of Care (Insert Text)  Description: FUNCTIONAL STATUS PRIOR TO ADMISSION: Patient was modified independent using a single point cane for functional mobility. Patient reports she was on 4L NC O2 at baseline. HOME SUPPORT: The patient lived with spouse who assisted her with bathing/dressing tasks as needed. Occupational Therapy Goals  Goals reviewed and continued/upgraded as appropriate 1/12/2022  1. Patient will perform lower body dressing with supervision/set-up within 7 day(s). 2.  Patient will perform upper body dressing with supervision/set-up within 7 day(s). 3.  Patient will perform standing grooming tasks with supervision/set-up within 7 day(s). 4.  Patient will perform toilet transfers with supervision/set-up within 7 day(s). 5.  Patient will perform all aspects of toileting with supervision/set-up within 7 day(s). 6.  Patient will participate in upper extremity therapeutic exercise/activities with supervision/set-up for 5 minutes within 7 day(s). 7.  Patient will utilize energy conservation techniques during functional activities with verbal cues within 7 day(s). Initiated 1/5/2022  1. Patient will perform lower body dressing with supervision/set-up within 7 day(s). 2.  Patient will perform upper body dressing with supervision/set-up within 7 day(s). 3.  Patient will perform grooming with supervision/set-up within 7 day(s). - Met 1/12/22 (seated)  4. Patient will perform toilet transfers with supervision/set-up within 7 day(s). 5.  Patient will perform all aspects of toileting with supervision/set-up within 7 day(s). 6.  Patient will participate in upper extremity therapeutic exercise/activities with supervision/set-up for 5 minutes within 7 day(s). 7.  Patient will utilize energy conservation techniques during functional activities with verbal cues within 7 day(s).    Outcome: Progressing Towards Goal OCCUPATIONAL THERAPY TREATMENT  Patient: Alex Schroeder (80 y.o. female)  Date: 1/13/2022  Diagnosis: Acute respiratory failure with hypoxia ([facility] [J96.01] <principal problem not specified>       Precautions: Fall  Chart, occupational therapy assessment, plan of care, and goals were reviewed. ASSESSMENT  Patient continues with skilled OT services and is progressing towards goals. Pt received semi-supine and agreeable to participation in therapy session. Pt progressed to bedside chair with up to CGA for dynamic standing balance. Once seated, pt performed UB ADLs/grooming tasks with set-up. Pt on 5L NC and SpO2 remaining stable and in 90's throughout session. However, pt continues to demonstrate asymptomatic hypotension (however improved from yesterday's session), RN notified and aware. Pt continues to be limited at this time by decreased dynamic standing balance, impaired activity tolerance/endurance, and generalized weakness. Current Level of Function Impacting Discharge (ADLs): set up seated ADLs, CGA bed to chair transfer    Other factors to consider for discharge: PLOF, good home support          PLAN :  Patient continues to benefit from skilled intervention to address the above impairments. Continue treatment per established plan of care to address goals. Recommendation for discharge: (in order for the patient to meet his/her long term goals)  Occupational therapy at least 2 days/week in the home AND ensure assist and/or supervision for safety with OOB mobility/ADL/IADL tasks     This discharge recommendation:  Has been made in collaboration with the attending provider and/or case management    IF patient discharges home will need the following DME: shower chair and walker: rolling       SUBJECTIVE:   Patient stated Areatha Cutting you for your help.     OBJECTIVE DATA SUMMARY:   Cognitive/Behavioral Status:  Neurologic State: Alert  Orientation Level: Oriented X4  Cognition: Follows commands Functional Mobility and Transfers for ADLs:  Bed Mobility:  Supine to Sit: Supervision  Sit to Supine:  (pt remained in bedside chair)  Scooting: Supervision    Transfers:  Sit to Stand: Stand-by assistance     Bed to Chair: Contact guard assistance    Balance:  Sitting: Intact  Standing: Impaired  Standing - Static: Good  Standing - Dynamic : Fair    ADL Intervention:  Feeding  Feeding Assistance: Set-up    Grooming  Position Performed: Seated in chair  Washing Face: Set-up  Washing Hands: Set-up  Brushing Teeth: Set-up  Brushing/Combing Hair: Set-up                                Patient instructed and indicated understanding energy conservation techniques to increase independence and safety during all ADLs. Use energy conservation techniques during ADLs so can increase participation in life activities patient prefers, to ensure more frequent good days. If having a bad day, evaluate tasks completed day before and re-plan how to save energy to complete same tasks, for example if going grocery shopping do not complete full bathing/dressing/grooming. Patient indicated understanding by stating tasks already completing to save energy ie sitting to don all clothing. Pain:  None     Activity Tolerance:   Good and Fair    After treatment patient left in no apparent distress:   Sitting in chair and Call bell within reach, RN notified     COMMUNICATION/COLLABORATION:   The patients plan of care was discussed with: Physical therapist and Registered nurse.      Nikky Cook OT  Time Calculation: 24 mins

## 2022-01-13 NOTE — PROGRESS NOTES
[facility]   [id number] [facility], [id number] [place] [address]  Phone: [phone]   [phone]    [email or web address]     Nephrology Progress Note    Patient Name : Clementina Siegel      : 1955     MRN : [id number]  Date of Admission : 2022  Date of Servive : 22    CC:  Follow up for ARF       Assessment and Plan   CKD stage IV:  - Etiology: DM, HTN, CRS  - renal US: suggestive of CKD, B/L benign renal cysts   - Cr stable   - suspect she is getting IV dry and hypotensive from bumex gtt--> recommend stopping it   - labs daily        Acute on chronic HFrEF  NI CMP, LVEF 15 to 20%  NYHA class III-IV  S/p BiV pacer/ICD-s/p CRT  [facility] 12/10/2021: PCWP 34, PA P 80  - On Milrinone and Corlanor   -Off low-dose digoxin today    Morbid obesity  Type II DM  HTN  Hypothyroidism  Pulmonary hypertension  Gout  Psoriasis        Interval History:  Seen and examined. Diuresed about the same w/ bumex gtt   She was orthostatic when she moved from chair to bed just now   BP low - 13J systolic   Cr stable   weight down to 253 lbs       Review of Systems: A comprehensive review of systems was negative except for that written in the HPI.     Current Medications:   Current Facility-Administered Medications   Medication Dose Route Frequency    milrinone (PRIMACOR) 20 MG/100 ML D5W infusion  0.2 mcg/kg/min IntraVENous CONTINUOUS    simethicone (MYLICON) tablet 80 mg  80 mg Oral QID PRN    [START ON 2022] polyethylene glycol (MIRALAX) packet 17 g  17 g Oral DAILY    senna-docusate (PERICOLACE) 8.6-50 mg per tablet 2 Tablet  2 Tablet Oral BID    albumin human 25% (BUMINATE) solution 12.5 g  12.5 g IntraVENous BID    insulin NPH (NOVOLIN N, HUMULIN N) injection 30 Units  30 Units SubCUTAneous DAILY    bumetanide (BUMEX) 0.25 mg/mL infusion  1 mg/hr IntraVENous CONTINUOUS    digoxin (LANOXIN) tablet 0.0625 mg  0.0625 mg Oral DAILY    iron sucrose (VENOFER) 300 mg in 0.9% sodium chloride 250 mL IVPB  300 mg IntraVENous Q24H    ivabradine (CORLANOR) tablet 7.5 mg  7.5 mg Oral BID WITH MEALS    insulin NPH (NOVOLIN N, HUMULIN N) injection 30 Units  30 Units SubCUTAneous QHS    carvediloL (COREG) tablet 12.5 mg  12.5 mg Oral BID WITH MEALS    allopurinoL (ZYLOPRIM) tablet 50 mg  50 mg Oral DAILY    epoetin isabel-epbx (RETACRIT) injection 20,000 Units  20,000 Units SubCUTAneous Q TUE, THU & SAT    insulin lispro (HUMALOG) injection 10 Units  10 Units SubCUTAneous TIDAC    montelukast (SINGULAIR) tablet 10 mg  10 mg Oral DAILY    levothyroxine (SYNTHROID) tablet 100 mcg  100 mcg Oral Once per day on Mon Tue Wed Thu Fri Sat    cetirizine (ZYRTEC) tablet 10 mg  10 mg Oral DAILY    hydroxypropyl methylcellulose (ISOPTO TEARS) 0.5 % ophthalmic solution 1 Drop  1 Drop Both Eyes PRN    venlafaxine-SR (EFFEXOR-XR) capsule 75 mg  75 mg Oral DAILY WITH BREAKFAST    gabapentin (NEURONTIN) capsule 100 mg  100 mg Oral QHS    arformoteroL (BROVANA) neb solution 15 mcg  15 mcg Nebulization BID RT    And    budesonide (PULMICORT) 500 mcg/2 ml nebulizer suspension  500 mcg Nebulization BID RT    hydrOXYzine HCL (ATARAX) tablet 20 mg  20 mg Oral TID PRN    heparin (porcine) injection 5,000 Units  5,000 Units SubCUTAneous Q8H    sodium chloride (NS) flush 5-40 mL  5-40 mL IntraVENous Q8H    sodium chloride (NS) flush 5-40 mL  5-40 mL IntraVENous PRN    acetaminophen (TYLENOL) tablet 650 mg  650 mg Oral Q6H PRN    Or    acetaminophen (TYLENOL) suppository 650 mg  650 mg Rectal Q6H PRN    ondansetron (ZOFRAN ODT) tablet 4 mg  4 mg Oral Q8H PRN    Or    ondansetron (ZOFRAN) injection 4 mg  4 mg IntraVENous Q6H PRN    glucose chewable tablet 16 g  4 Tablet Oral PRN    dextrose (D50W) injection syrg 12.5-25 g  25-50 mL IntraVENous PRN    glucagon (GLUCAGEN) injection 1 mg  1 mg IntraMUSCular PRN    insulin lispro (HUMALOG) injection   SubCUTAneous AC&HS    albuterol-ipratropium (DUO-NEB) 2.5 MG-0.5 MG/3 ML  3 mL Nebulization Q6H PRN      Allergies   Allergen Reactions    Ciprofloxacin Anaphylaxis    Shellfish Derived Anaphylaxis    Ace Inhibitors Unknown (comments)    Biaxin [Clarithromycin] Other (comments)     Metal taste    Candesartan Cough    Pcn [Penicillins] Hives       Objective:  Vitals:    Vitals:    22 1340 22 1400 22 1430 22 1600   BP: 99/80  105/73    Pulse: (!) 106 100 (!) 101 98   Resp:       Temp:       SpO2:   98%    Weight:       Height:         Intake and Output:   07 -  190  In: -   Out: 6717 [JAZZ]  1901 -  0700  In: -   Out: 3900 [Urine:3900]    Physical Examination:    General: Obese   Neck:  Supple, no mass  Resp:  Rales +  CV:  tachy  GI:  Soft, NT, + BS, no HS megaly  Neurologic:  Non focal    []    High complexity decision making was performed  []    Patient is at high-risk of decompensation with multiple organ involvement    Lab Data Personally Reviewed: I have reviewed all the pertinent labs, microbiology data and radiology studies during assessment.     Recent Labs     22  0513    136  --  137 138   K 3.6 4.5  --  4.1 3.9   CL 99 99  --  99 102   CO2 34* 34*  --  32 32   * 242*  --  204* 84   BUN 51* 53*  --  54* 59*   CREA 2.27* 2.33*  --  2.32* 2.36*   CA 9.3 9.4  --  9.2 9.5   MG 2.0  --  2.2 2.1 2.3   PHOS  --  3.6  --   --   --    ALB 2.7* 2.9*  --  2.5* 2.6*   ALT 58  --   --  80* 107*     Recent Labs     22  0513   WBC 7.2 8.8 9.0   HGB 8.0* 8.3* 8.5*   HCT 30.7* 31.8* 32.6*    269 331     Lab Results   Component Value Date/Time    Specimen Description: URINE 10/22/2013 01:32 PM    Specimen Description: URINE 2013 04:40 PM    Specimen Description: LEG TISSUE 2009 12:00 AM    Specimen Description: LEG (LEFT) 2009 03:06 AM     Lab Results   Component Value Date/Time    Culture result: MIXED SKIN ROSANNA ISOLATED 10/22/2013 01:32 PM    Culture result:  01/23/2013 04:40 PM     ENTEROCOCCUS FAECALIS GROUP D  MIXED SKIN ROSANNA ISOLATED    Culture result:  02/12/2009 12:00 AM     LIGHT STAPHYLOCOCCUS EPIDERMIDIS (OXACILLIN RESISTANT)  LIGHT 2ND STRAIN OF STAPHYLOCOCCUS EPIDERMIDIS (OXACILLIN RESISTANT)    Culture result: NO GROWTH 2 DAYS 01/29/2009 03:06 AM     Recent Results (from the past 24 hour(s))   RENAL FUNCTION PANEL    Collection Time: 01/12/22  7:56 PM   Result Value Ref Range    Sodium 136 136 - 145 mmol/L    Potassium 4.5 3.5 - 5.1 mmol/L    Chloride 99 97 - 108 mmol/L    CO2 34 (H) 21 - 32 mmol/L    Anion gap 3 (L) 5 - 15 mmol/L    Glucose 242 (H) 65 - 100 mg/dL    BUN 53 (H) 6 - 20 MG/DL    Creatinine 2.33 (H) 0.55 - 1.02 MG/DL    BUN/Creatinine ratio 23 (H) 12 - 20      GFR est AA 25 (L) >60 ml/min/1.73m2    GFR est non-AA 21 (L) >60 ml/min/1.73m2    Calcium 9.4 8.5 - 10.1 MG/DL    Phosphorus 3.6 2.6 - 4.7 MG/DL    Albumin 2.9 (L) 3.5 - 5.0 g/dL   GLUCOSE, POC    Collection Time: 01/12/22  9:48 PM   Result Value Ref Range    Glucose (POC) 142 (H) 65 - 117 mg/dL    Performed by Ethel Lane    MAGNESIUM    Collection Time: 01/13/22  4:26 AM   Result Value Ref Range    Magnesium 2.0 1.6 - 2.4 mg/dL   NT-PRO BNP    Collection Time: 01/13/22  4:26 AM   Result Value Ref Range    NT pro-BNP 9,219 (H) <492 PG/ML   METABOLIC PANEL, COMPREHENSIVE    Collection Time: 01/13/22  4:26 AM   Result Value Ref Range    Sodium 136 136 - 145 mmol/L    Potassium 3.6 3.5 - 5.1 mmol/L    Chloride 99 97 - 108 mmol/L    CO2 34 (H) 21 - 32 mmol/L    Anion gap 3 (L) 5 - 15 mmol/L    Glucose 118 (H) 65 - 100 mg/dL    BUN 51 (H) 6 - 20 MG/DL    Creatinine 2.27 (H) 0.55 - 1.02 MG/DL    BUN/Creatinine ratio 22 (H) 12 - 20      GFR est AA 26 (L) >60 ml/min/1.73m2    GFR est non-AA 21 (L) >60 ml/min/1.73m2    Calcium 9.3 8.5 - 10.1 MG/DL    Bilirubin, total 0.5 0.2 - 1.0 MG/DL    ALT (SGPT) 58 12 - 78 U/L    AST (SGOT) 10 (L) 15 - 37 U/L    Alk. phosphatase 67 45 - 117 U/L    Protein, total 7.0 6.4 - 8.2 g/dL    Albumin 2.7 (L) 3.5 - 5.0 g/dL    Globulin 4.3 (H) 2.0 - 4.0 g/dL    A-G Ratio 0.6 (L) 1.1 - 2.2     CK    Collection Time: 01/13/22  4:26 AM   Result Value Ref Range    CK 49 26 - 192 U/L   TROPONIN-HIGH SENSITIVITY    Collection Time: 01/13/22  4:26 AM   Result Value Ref Range    Troponin-High Sensitivity 49 0 - 51 ng/L   MYOGLOBIN    Collection Time: 01/13/22  4:26 AM   Result Value Ref Range    Myoglobin 109 (H) 13 - 71 ng/mL   CBC W/O DIFF    Collection Time: 01/13/22  4:26 AM   Result Value Ref Range    WBC 7.2 3.6 - 11.0 K/uL    RBC 3.32 (L) 3.80 - 5.20 M/uL    HGB 8.0 (L) 11.5 - 16.0 g/dL    HCT 30.7 (L) 35.0 - 47.0 %    MCV 92.5 80.0 - 99.0 FL    MCH 24.1 (L) 26.0 - 34.0 PG    MCHC 26.1 (L) 30.0 - 36.5 g/dL    RDW 19.9 (H) 11.5 - 14.5 %    PLATELET 935 735 - 703 K/uL    MPV 8.9 8.9 - 12.9 FL    NRBC 0.3 (H) 0  WBC    ABSOLUTE NRBC 0.02 (H) 0.00 - 0.01 K/uL   DIGOXIN    Collection Time: 01/13/22  4:26 AM   Result Value Ref Range    Digoxin level 0.3 (L) 0.90 - 2.00 NG/ML   GLUCOSE, POC    Collection Time: 01/13/22  8:27 AM   Result Value Ref Range    Glucose (POC) 103 65 - 117 mg/dL    Performed by Janeth Vallejo    GLUCOSE, POC    Collection Time: 01/13/22 12:39 PM   Result Value Ref Range    Glucose (POC) 151 (H) 65 - 117 mg/dL    Performed by Janeth Vallejo    GLUCOSE, POC    Collection Time: 01/13/22  5:08 PM   Result Value Ref Range    Glucose (POC) 192 (H) 65 - 117 mg/dL    Performed by Agatha Aldana I have reviewed the flowsheets. Chart and Pertinent Notes have been reviewed. No change in PMH ,family and social history from Consult note.       Emelyn Hurst [phone]

## 2022-01-13 NOTE — PROGRESS NOTES
Bedside shift change report given to ARTURO Boothe (oncoming nurse) by aKtey Sweeney RN (offgoing nurse). Report included the following information SBAR, Intake/Output, MAR, Accordion, Recent Results, Med Rec Status and Cardiac Rhythm Biventricular paced.

## 2022-01-13 NOTE — PROGRESS NOTES
[facility] Electrophysiology [facility] Progress Note     Subjective:       Betty Dooley is a 78 y.o. patient with acute on chronic systolic CHF despite biventricular pacing. Interim:   Milrinone dose decreased to 0.2 mcg/kg/min due to sinus tachycardia.       Advanced Heart Failure team following, diuresing patient further with bumex drip   bp is lower and she is off hydralazine and isordil  She was dizzy today when transitioning from chair to bed.    She wants possible double organ transplant. She is full code and has BIV ICD with battery 4 months left      HPI:   Presented to the ER 01/04/2021 with hypoxia, improved on supplemental oxygen. Labs showed stable anemia.  WBC elevated, hyponatremic, hypokalemic.  D dimer elevated.  Chronic CKD. Nephrologist spoke to me directly regarding severe renal failure, but not much worse than last admission. Rapid COVID negative.  CXR showed pulmonary edema vs atypical pneumonia.  VQ scan showed low probability for PE.       NICM, LVEF 15-20% during recent admission.  LVEF previously normalized with CRT.  NYHA III-IV.  No ACEi/ARB due to renal dysfunction.  GDMT dosing limited by low BP. [address] 12/10/2021 showed severe pulmonary HTN (wedge 34 mmHg, PAP 80 mmHg). [facility]iac cath in 2015 at [facility] showed no evidence of CAD. She did require LV lead reprogramming over the summer, but good LV capture since.  Good capture/function when checked during recent admission. BP controlled. PICC line placed 01/10/2022 in anticipation of home milrinone. Previous:   S/p Medtronic biventricular ICD (gen change [id number], leads 09/25/2008).     CKD stage IV.        Previously followed by Dr. Yesenia Andres, states did not follow him to new practice.            Problem List   Acute respiratory failure with hypoxia (HCC) ICD-10-CM: J96.01   ICD-9-CM: 518.81 1/4/2022     CHF exacerbation (HCC) ICD-10-CM: I50.9   ICD-9-CM: 428.0 12/6/2021     Type 2 diabetes mellitus with diabetic neuropathy ([facility] ICD-10-CM: E11.40   ICD-9-CM: 250.60, 357.2 1/2/2020     Type 2 diabetes with nephropathy ([facility] ICD-10-CM: E11.21   ICD-9-CM: 250.40, 583.81 4/3/2018     Obesity, morbid ([facility] ICD-10-CM: E66.01   ICD-9-CM: 278.01 12/8/2017     Acquired hypothyroidism ICD-10-CM: E03.9   ICD-9-CM: 244.9 8/15/2016     Dysthymia ICD-10-CM: F34.1   ICD-9-CM: 300.4 8/15/2016     ICD (implantable cardioverter-defibrillator), biventricular, in situ ICD-10-CM: Z95.810   ICD-9-CM: V45.02 6/5/2014   Overview Signed 1/14/2015 11:11 AM by Marlena Goodpasture, MD     Generator Medtronic change 1/14/2015         Dyslipidemia ICD-10-CM: B99.9   ICD-9-CM: 272.4 1/14/2014     CKD (chronic kidney disease) ICD-10-CM: N18.9   ICD-9-CM: 585.9 8/15/2012     Cardiomyopathy, nonischemic (HCC) ICD-10-CM: I42.8   ICD-9-CM: 425.4 Unknown   Overview Signed 10/10/2011  6:40 AM by Yu Black MD     initial dx 2001, bivHF 2008 with EF 15%, s/p biV-ICD 9/08, significant improvment in EF to 45-50%         Anemia in chronic renal disease (Chronic) ICD-10-CM: N18.9, D63.1   ICD-9-CM: 285.21 12/10/2008     HTN (hypertension) ICD-10-CM: I10   ICD-9-CM: 401.9 12/10/2008     GERD (gastroesophageal reflux disease) (Chronic) ICD-10-CM: K21.9   ICD-9-CM: 530.81 12/10/2008     Gout (Chronic) ICD-10-CM: M10.9   ICD-9-CM: 274.9 12/10/2008     Pulmonary HTN (HCC) (Chronic) ICD-10-CM: I27.20   ICD-9-CM: 416.8 12/10/2008           Current Facility-Administered Medications   Medication Dose Route Frequency Provider Last Rate Last Admin   · insulin NPH (NOVOLIN N, HUMULIN N) injection 60 Units 60 Units SubCUTAneous Q12H Mariah Samuels MD 60 Units at 01/09/22 2209   · epoetin isabel-epbx (RETACRIT) injection 20,000 Units 20,000 Units SubCUTAneous Q TUE, THU & SAT Jena Koo MD 20,000 Units at 01/08/22 2349   · docusate sodium (COLACE) capsule 100 mg 100 mg Oral BID Rena Bledsoe  mg at 01/09/22 1726   · insulin lispro (HUMALOG) injection 10 Units 10 Units SubCUTAneous TIDAC Grecia Hilario MD 10 Units at 01/09/22 1725   · carvediloL (COREG) tablet 6.25 mg 6.25 mg Oral BID WITH MEALS Antonio Urena MD 6.25 mg at 01/09/22 1726   · hydrALAZINE (APRESOLINE) tablet 10 mg 10 mg Oral TID Antonio Urena MD 10 mg at 01/09/22 2200   · isosorbide dinitrate (ISORDIL) tablet 10 mg 10 mg Oral TID Antonio Urena MD 10 mg at 01/09/22 2200   · bumetanide (BUMEX) tablet 1 mg 1 mg Oral BID Antonio Urena MD 1 mg at 01/09/22 1726   · montelukast (SINGULAIR) tablet 10 mg 10 mg Oral DAILY Santiago Mills MD 10 mg at 01/09/22 1004   · levothyroxine (SYNTHROID) tablet 100 mcg 100 mcg Oral Once per day on Mon Tue Wed Thu Fri Sat Grecia Hilario  mcg at 01/10/22 0520   · cetirizine (ZYRTEC) tablet 10 mg 10 mg Oral DAILY Grecia Hilario MD 10 mg at 01/09/22 1003   · hydroxypropyl methylcellulose (ISOPTO TEARS) 0.5 % ophthalmic solution 1 Drop 1 Drop Both Eyes PRN Grecia Hilario MD 1 Drop at 01/06/22 1727   · venlafaxine-SR (EFFEXOR-XR) capsule 75 mg 75 mg Oral DAILY WITH Vesta Houston MD 75 mg at 01/09/22 1003   · gabapentin (NEURONTIN) capsule 100 mg 100 mg Oral QHS Lisa Horne  mg at 01/09/22 2200   · arformoteroL (BROVANA) neb solution 15 mcg 15 mcg Nebulization BID RT Santiago Mills MD 15 mcg at 01/10/22 0739   And   · budesonide (PULMICORT) 500 mcg/2 ml nebulizer suspension 500 mcg Nebulization BID RT Santiago Mills  mcg at 01/10/22 0739   · hydrOXYzine HCL (ATARAX) tablet 20 mg 20 mg Oral TID PRN Antonio Urena MD   · milrinone (PRIMACOR) 20 MG/100 ML D5W infusion 0.2 mcg/kg/min IntraVENous CONTINUOUS Antonio Urena MD 7.2 mL/hr at 01/10/22 0524 0.2 mcg/kg/min at 01/10/22 0524   · heparin (porcine) injection 5,000 Units 5,000 Units SubCUTAneous Q8H Kathy Lu MD 5,000 Units at 01/10/22 0520   · ivabradine (CORLANOR) tablet 5 mg 5 mg Oral BID WITH MEALS Antonio Urena MD 5 mg at 01/09/22 1727   · sodium chloride (NS) flush 5-40 mL 5-40 mL IntraVENous Q8H Paddy Brink MD 10 mL at 01/10/22 0521   · sodium chloride (NS) flush 5-40 mL 5-40 mL IntraVENous PRN Paddy Brink MD   · acetaminophen (TYLENOL) tablet 650 mg 650 mg Oral Q6H PRN Paddy Brink MD   Or   · acetaminophen (TYLENOL) suppository 650 mg 650 mg Rectal Q6H PRN Paddy Brink MD   · ondansetron (ZOFRAN ODT) tablet 4 mg 4 mg Oral Q8H PRN Paddy Brink MD   Or   · ondansetron [facility]) injection 4 mg 4 mg IntraVENous Q6H PRN Paddy Brink MD 4 mg at 01/05/22 0915   · glucose chewable tablet 16 g 4 Tablet Oral PRN Paddy Brink MD   · dextrose (D50W) injection syrg 12.5-25 g 25-50 mL IntraVENous PRN Paddy Brink MD   · glucagon (GLUCAGEN) injection 1 mg 1 mg IntraMUSCular PRN Paddy Brink MD   · insulin lispro (HUMALOG) injection SubCUTAneous AC&HS Paddy Brink MD 3 Units at 01/07/22 1825   · albuterol-ipratropium (DUO-NEB) 2.5 MG-0.5 MG/3 ML 3 mL Nebulization Q6H PRN Paddy Brink MD     Allergies   Allergen Reactions   · Ciprofloxacin Anaphylaxis   · Shellfish Derived Anaphylaxis   · Ace Inhibitors Unknown (comments)   · Biaxin [Clarithromycin] Other (comments)   Metal taste   · Candesartan Cough   · Pcn [Penicillins] Hives     Past Medical History:   Diagnosis Date   · Acquired hypothyroidism 8/15/2016   · Anemia   RED-HF study   · Asthma   · Cardiomyopathy, nonischemic ([address]   initial dx 2001, bivHF 2008 with EF 15%, s/p biV-ICD 9/08, significant improvment in EF to 45-50%   · CKD (chronic kidney disease)   Dr Nixon Raya   · CKD (chronic kidney disease) 8/15/2012   · Depression   · Diabetes ([address]   · Diabetic neuropathy ([address]   · DM (diabetes mellitus) ([facility] 8/15/2012   · GERD (gastroesophageal reflux disease)   · Gout   · Hypothyroidism   · ICD (implantable cardioverter-defibrillator), biventricular, in situ 6/5/2014   · Psoriasis     Past Surgical History:   Procedure Laterality Date   · CARDIAC CATHETERIZATION 2007; 01/06/15   normal cors   · ECHO 2D ADULT 4/2010 EF 45%, improved from 1/09 (25%)   · ECHO 2D ADULT 11/2011   LVH, EF 55-60%   · HX ORTHOPAEDIC   knee   · HX PACEMAKER PLACEMENT   AICD   · STRESS TEST LEXISCAN/CARDIOLITE 3/21/12   normal perfusion, global HK 40%     Family History   Problem Relation Age of Onset   · Heart Disease Mother   · Hypertension Mother   · Lupus Sister   · Diabetes Brother     Social History     Tobacco Use   · Smoking status: Never Smoker   · Smokeless tobacco: Never Used   Substance Use Topics   · Alcohol use: No         Review of Systems: Review of all other systems otherwise negative. Constitutional: Negative for fever, chills, weight loss, + malaise/fatigue. HEENT: Negative for nosebleeds, vision changes. Respiratory: Negative for cough, hemoptysis   Cardiovascular: Negative for chest pain, palpitations, orthopnea improved, no claudication, + leg swelling, no syncope, and PND. + SOB   Gastrointestinal: Negative for nausea, vomiting, diarrhea, blood in stool and melena. Genitourinary: Negative for dysuria, and hematuria. Musculoskeletal: Negative for myalgias, arthralgia. Skin: Negative for rash. Heme: Does not bleed or bruise easily. Neurological: Negative for speech change and focal weakness       Objective:   Visit Vitals  /73   Pulse 98   Temp 98 °F (36.7 °C)   Resp 24   Ht 5' 7\" (1.702 m)   Wt 253 lb 12 oz (115.1 kg)   SpO2 98%   BMI 39.74 kg/m²         Physical Exam:   Constitutional: Well-developed and well-nourished. No respiratory distress. Head: Normocephalic and atraumatic. Eyes: Pupils are equal, round. ENT: Hearing grossly normal.   Neck: Supple. No JVD present. Cardiovascular: normal rate, regular rhythm. Exam reveals no gallop and no friction rub. 2/6 systolic LSB murmur. Pulmonary/Chest: Appears mildly SOB.  Breath sounds normal. No wheezes. Abdominal: Soft, no tenderness. Moderate obesity. Musculoskeletal: Moves extremities independently.    Vasc/lymphatic: no bilat lower extremity edema. Neurological: Alert,oriented. Skin: left sided BIV ICD pocket unremarkable  Psychiatric: Normal mood and affect. Behavior is normal. Judgment and thought content normal.         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 RHC (12/10/2021): High wedge pressure (35 mmHg) indicating volume overload, precapillary.  Severe pulmonary HTN. Echo (12/08/2021): LVEF 15-20%, upper normal wall thickness, LV diastolic dysfunction.  Borderline low RVEF.  Mod dilated LA, mildly dilated RA.  Mild to mod MR. [place] TR.  Mild to mod PH.       LHC/RHC (01/2015): No significant CAD. NICM:  No new LHC due to severe renal failure.  Not a candidate for hemodialysis per nephrology. Kayla Michael LHC in 2015 showed no significant CAD, same with the previous   Advanced CHF team needs ischemic work up  She is on milrinone drip so left heart cath when she gets closer to going home  She still needs 7 more days or so of inpatient diuresis  bp is lower on bumex drip and she is dizzy today  Hydralazine and isordil on hold/stopped  LVEF now 15-20%. [place] pulmonary HTN noted on most recent RHC.        Continue milrinone, but 0.2 mcg/kg/min now with Advanced Heart Failure team  possible double organ transplant evaluation later.        Nuclear amyloid scan equivocal.  MRI is not possible with 4194 LV lead (2008). Medtronic biventricular ICD (gen change 01/14/2015, leads 09/25/2008): Device check showed proper lead & generator function.  Generator longevity still estimated 4 months.  RA 0.1%, CRT 97.6%. I will replace before she goes home since she will need Biv pacing       CKD: Nephrologist follows, BUN & Cr elevated. Advanced CHF team wants to continue with bumex drip. Nephrologist thinks tid bumex may be ok.   She is not candidate for hemodialysis so low bp may cause her worsening renal failure    She has sinus tachycardia  HR is better with less milrinone and corlanor to 7.5 mg bid      Thank you for involving me in this patient's care and please call with further concerns or questions. Darrell Anne M.D.    Electrophysiology/Cardiology   [facility]   [address]Select [facility] [address], hospitals nichole Smyth, [address] [place] [address]   [phone]

## 2022-01-13 NOTE — PROGRESS NOTES
[facility] in [place], [place]  Inpatient Progress Note      Patient name: Marcela Murray  Patient : 1955  Patient MRN: [id number]  Consulting MD: Juan Jose Cope MD  Date of service: 22    REASON FOR REFERRAL:  Management of chronic systolic heart failure    PLAN OF CARE:   77 y/o female with new onset severe cardiomyopathy, LVEF 15% (diagnosed 21) admitted for acute decompensation c/b acute hepatic and acute on chronic renal failure in the setting of massive volume overload of likely > 25 lbs.  Plant to continue inotrope-assisted diuresis, goal net negative 2-3lbs per day; w/d patient anticipate 7-10 days of hospitalization   Patient expressed wishes to be full code and would like to be evaluated for dual organ transplant if her heart and renal function does not improve   Would consider repeat RHC/LHC and check 6 min walk to evaluate need for home inotropic support    Will monitor for recovery over the next 3 months (2022), will continue to follow up in [facility] clinic and establish relationship with patient in consideration for OP evaluation for more advanced therapies.      RECOMMENDATIONS:  Would recommend ischemic eval per cardiology for new drop in EF, last LHC in   Consider repeat RHC and do LHC before discharge   Continue current medical therapy for heart failure  Cont Milrinone 0.2mcg/kg/min for tachycardia- adjusted to current weight   Cont coreg 12.5mg BID- watch for psoriasis flair  No ACE/ARB/ARNI due to CKD  No MRA due to CKD- will discuss with nephrology   Stop hydralazine and isordil to allow more bp for BBrx  Cont low dose Digoxin for HR- dig level daily   Corlanor 7.5mg BID  Transition to bumex gtt 1mg/hr- goal net negative 1-2 liters per day Goal weight approx 224lb based on recent RHC  Allopurinol 50mg daily for elevated uric acid   ICD interrogation per - functioning well, will need generator change before discharge per Dr. Micky Lane   Repeat TTE and EKG when euvolemic   Will need OP PSG  Wean O2 as able   Invitae pending   6 Min walk today- ordered placed   Nephrology following  Palliative consult appreciated   Nutritionist consult  Heart failure education  Advanced care plan present on file    All other care per primary team    IMPRESSION:  Fatigue  Shortness of breath, on 3L NC  Volume overload  Acute on Chronic systolic heart failure  · Stage D, NYHA class IV symptoms  · Non-ischemic cardiomyopathy, LVEF 15%  · Normal coronaries  · PYP equivocal for amyloid   Pulmonary hypertension  S/p BiV-ICD  Cardiac risk factors:  · HL  · DM2  · Morbid obesity, BMI 40  Acute on chronic renal failure, stage IV  GERD  Anemia of chronic disease  Gout    Interval Events:  Adequate diuresis  Weight down 3#  Creatinine stable at 2.27  PBNP trending down to 9200  States she feels better     LIFE GOALS:  Patient's personal goals include: being home with family, still ambulating around without getting too tired. Important upcoming milestones or family events: none  The patient identifies the following as important for living well: remaining idependent and mobile; being able to get out of the house with . Patient verbalized willingness to be on home milrinone and evaluation for both heart and kidney transplants. Verbalizes she would have family supporting her decision on this. SHAMIKA Wang is a 78 y.o.  female with a history of NICM, chronic hypoxic respiratory failure secondary to pulmonary HTN, hypothyroidism, CKD, GERD, and DM II who presented to [facility] as a transfer from another facility for acute on chronic hypoxic respiratory failure. Reports running out of O2 at home resulting in SOB and dizziness. Upon arrival to the ED she was found to have O2 sats in the 70s, requiring 4L NC O2. Rapid covid test was negative.   ProNT-BNP was 61624, K+5.2, BUN/CR x/2.54 and elevated d-dimer. Chest xray showing pulmonary edema vs. Atypical pneumonia. VQ scan showed low probability for PE. Per Dr. Abdulaziz Richards, NICM, LVEF 15-20% during recent admission. LVEF previously normalized with CRT. NYHA III-IV. No ACEi/ARB due to renal dysfunction. GDMT dosing limited by low BP. Patient's PCP is Dr. Jamir Duran, and she sees Dr. Abdulaziz Richards primarily for cardiac care due to Dr. Shakila Rodriguez transfering practice. Patient historically seen by nephrology but has not had follow up care in the past three years. CARDIAC IMAGING:  Echo 12/8/21- LVEF 15-20%, mild to Mod MR, LVIDd 5.09cm, TAPSE 1.94cm  Echo 4/22/19- LVEF 60%, trace MR,  LVIDd 4.24cm, TAPSE 1.65cm   Echo 4/24/18- LVEF 60%, trivial MR, LVIDd 4.79cm, TAPSE 2.25cm     EKG- 1/4/22 ST with A sense and V paced rhythm    [facility] 2015- No significant CAD  NST 2014- reversible LAD involvement     ICD interrogation    HEMODYNAMICS:  [facility] 12/10/21: PAP 76/48/57, RAP 20, PCWP 35, CI 2.26      CPEST not done  6MW not done    OTHER IMAGING:  CXR Results  (Last 48 hours)               01/13/22 1035  XR CHEST PORT Final result    Impression:  No significant change in congestion and interstitial and alveolar   opacities which may reflect edema or infectious infiltrate. Narrative:  EXAM: XR CHEST PORT       INDICATION: pul edema       COMPARISON: Chest x-ray 1/10/2022. FINDINGS: A portable AP radiograph of the chest was obtained at 10:19 hours. The   patient is on a cardiac monitor. The lungs appear grossly stable with congestion   and interstitial/airspace opacities with no pneumothorax or pleural effusion. Right PICC line traverses expected course of tip in the region of the atriocaval   junction. Pacemaker-ICD generator body projects over the left chest wall with   intact appearing leads traversing in expected course. . The cardiac and   mediastinal contours and pulmonary vascularity are normal.  Atherosclerotic   calcifications affect the aortic arch.  The chest wall structures and visualized   upper abdomen show no acute findings with incidental note of degenerative spine   and shoulder changes. PHYSICAL EXAM:  Visit Vitals  BP (!) 122/98   Pulse (!) 115   Temp 98.3 °F (36.8 °C)   Resp 21   Ht 5' 7\" (1.702 m)   Wt 253 lb 12 oz (115.1 kg)   SpO2 98%   BMI 39.74 kg/m²     Physical Exam  Vitals and nursing note reviewed. Constitutional:       General: She is not in acute distress. Appearance: Normal appearance. She is obese. Cardiovascular:      Rate and Rhythm: Regular rhythm. Tachycardia present. Pulses: Normal pulses. Heart sounds: Normal heart sounds. No murmur heard. Pulmonary:      Effort: Pulmonary effort is normal. No respiratory distress. Abdominal:      General: There is no distension. Musculoskeletal:         General: Swelling present. Skin:     General: Skin is warm and dry. Findings: Lesion present. Comments: psorasis   Neurological:      General: No focal deficit present. Mental Status: She is alert and oriented to person, place, and time. Psychiatric:         Mood and Affect: Mood normal.         REVIEW OF SYSTEMS:  Review of Systems   Constitutional: Positive for malaise/fatigue. Negative for chills and fever. Respiratory: Positive for shortness of breath. Cardiovascular: Positive for leg swelling. Negative for chest pain, palpitations and orthopnea. Gastrointestinal: Negative for heartburn and nausea. Genitourinary: Negative for dysuria. Musculoskeletal: Negative for falls, joint pain and myalgias. Neurological: Negative for dizziness. Psychiatric/Behavioral: Positive for depression. The patient is nervous/anxious.           PAST MEDICAL HISTORY:  Past Medical History:   Diagnosis Date    Acquired hypothyroidism 8/15/2016    Anemia     RED-HF study    Asthma     Cardiomyopathy, nonischemic ([facility] [address]     initial dx 2001, bivHF 2008 with EF 15%, s/p biV-ICD 9/08, significant improvment in EF to 45-50%    CKD (chronic kidney disease)     Dr Franca Solis    CKD (chronic kidney disease) 8/15/2012    Depression     Diabetes ([facility] [address]     Diabetic neuropathy ([facility]     DM (diabetes mellitus) ([facility] 8/15/2012    GERD (gastroesophageal reflux disease)     Gout     Hypothyroidism     ICD (implantable cardioverter-defibrillator), biventricular, in situ 6/5/2014    Psoriasis        PAST SURGICAL HISTORY:  Past Surgical History:   Procedure Laterality Date    CARDIAC CATHETERIZATION  2007; 01/06/15    normal cors    ECHO 2D ADULT  4/2010    EF 45%, improved from 1/09 (25%)    ECHO 2D ADULT  11/2011    LVH, EF 55-60%    HX ORTHOPAEDIC      knee    HX PACEMAKER PLACEMENT      AICD    STRESS TEST LEXISCAN/CARDIOLITE  3/21/12    normal perfusion, global HK 40%       FAMILY HISTORY:  Family History   Problem Relation Age of Onset    Heart Disease Mother     Hypertension Mother     Lupus Sister     Diabetes Brother        SOCIAL HISTORY:  Social History     Socioeconomic History    Marital status:    Tobacco Use    Smoking status: Never Smoker    Smokeless tobacco: Never Used   Substance and Sexual Activity    Alcohol use: No    Drug use: No    Sexual activity: Never   Social History Narrative    . Nonsmoker. Disability       LABORATORY RESULTS:     Labs Latest Ref Rng & Units 1/13/2022 1/12/2022 1/12/2022 1/11/2022 1/10/2022 1/9/2022 1/8/2022   WBC 3.6 - 11.0 K/uL 7.2 - 8.8 9.0 - - 10.3   RBC 3.80 - 5.20 M/uL 3.32(L) - 3.46(L) 3.52(L) - - 3.34(L)   Hemoglobin 11.5 - 16.0 g/dL 8. 0(L) - 8. 3(L) 8.5(L) - - 8. 1(L)   Hematocrit 35.0 - 47.0 % 30. 7(L) - 31. 8(L) 32. 6(L) - - 30. 1(L)   MCV 80.0 - 99.0 FL 92.5 - 91.9 92.6 - - 90.1   Platelets 327 - 441 K/uL 259 - 269 331 - - 306   Lymphocytes 12 - 49 % - - - - - - -   Monocytes 5 - 13 % - - - - - - -   Eosinophils 0 - 7 % - - - - - - -   Basophils 0 - 1 % - - - - - - -   Albumin 3.5 - 5.0 g/dL 2. 7(L) 2. 9(L) 2. 5(L) 2. 6(L) - - - Calcium 8.5 - 10.1 MG/DL 9.3 9.4 9.2 9.5 9.0 9.3 8.3(L)   Glucose 65 - 100 mg/dL 118(H) 242(H) 204(H) 84 144(H) 93 293(H)   BUN 6 - 20 MG/DL 51(H) 53(H) 54(H) 59(H) 71(H) 78(H) 86(H)   Creatinine 0.55 - 1.02 MG/DL 2.27(H) 2.33(H) 2.32(H) 2.36(H) 2.62(H) 3.04(H) 3.50(H)   Sodium 136 - 145 mmol/L 136 136 137 138 137 136 133(L)   Potassium 3.5 - 5.1 mmol/L 3.6 4.5 4.1 3.9 4.2 4.0 4.3   TSH 0.36 - 3.74 uIU/mL - - - 3.08 - - -   Some recent data might be hidden     Lab Results   Component Value Date/Time    TSH 3.08 01/11/2022 05:13 AM    TSH 1.85 12/15/2021 01:06 PM    TSH 1.01 11/05/2020 09:11 AM    TSH 2.740 04/30/2019 11:21 AM    TSH 0.519 02/21/2012 10:53 AM    TSH 8.29 (H) 01/20/2010 01:59 PM       ALLERGY:  Allergies   Allergen Reactions    Ciprofloxacin Anaphylaxis    Shellfish Derived Anaphylaxis    Ace Inhibitors Unknown (comments)    Biaxin [Clarithromycin] Other (comments)     Metal taste    Candesartan Cough    Pcn [Penicillins] Hives        CURRENT MEDICATIONS:    Current Facility-Administered Medications:     albumin human 25% (BUMINATE) solution 12.5 g, 12.5 g, IntraVENous, BID, Owen Amber B, NP, 12.5 g at 01/13/22 0951    insulin NPH (NOVOLIN N, HUMULIN N) injection 30 Units, 30 Units, SubCUTAneous, DAILY, Ekaterina Casillas MD, 30 Units at 01/13/22 0951    bumetanide (BUMEX) 0.25 mg/mL infusion, 1 mg/hr, IntraVENous, CONTINUOUS, Nicholas Weaverin SUNITA NP, Last Rate: 4 mL/hr at 01/13/22 0029, 1 mg/hr at 01/13/22 0029    digoxin (LANOXIN) tablet 0.0625 mg, 0.0625 mg, Oral, DAILY, Amber Weaver, NP, 0.0625 mg at 01/13/22 5931    iron sucrose (VENOFER) 300 mg in 0.9% sodium chloride 250 mL IVPB, 300 mg, IntraVENous, Q24H, Sid Chase MD, Last Rate: 176.7 mL/hr at 01/12/22 1452, 300 mg at 01/12/22 1452    ivabradine (CORLANOR) tablet 7.5 mg, 7.5 mg, Oral, BID WITH MEALS, Lolita Coombs MD, 7.5 mg at 01/13/22 0951    insulin NPH (NOVOLIN N, HUMULIN N) injection 30 Units, 30 Units, SubCUTAneous, QHS, Sukhjinder Casillas MD, 30 Units at 01/12/22 2154    carvediloL (COREG) tablet 12.5 mg, 12.5 mg, Oral, BID WITH MEALS, Owen, Amber B, NP, 12.5 mg at 01/13/22 0953    allopurinoL (ZYLOPRIM) tablet 50 mg, 50 mg, Oral, DAILY, Owen, Amber B, NP, 50 mg at 01/13/22 0953    milrinone (PRIMACOR) 20 MG/100 ML D5W infusion, 0.2 mcg/kg/min, IntraVENous, CONTINUOUS, Eve Thomason MD, Last Rate: 6.9 mL/hr at 01/13/22 0732, 0.2 mcg/kg/min at 01/13/22 0732    epoetin isabel-epbx (RETACRIT) injection 20,000 Units, 20,000 Units, SubCUTAneous, Q TUE, THU & SAT, Faviola Chacon MD, 20,000 Units at 01/12/22 0020    docusate sodium (COLACE) capsule 100 mg, 100 mg, Oral, BID, Eliza Saunders MD, 100 mg at 01/13/22 0952    insulin lispro (HUMALOG) injection 10 Units, 10 Units, SubCUTAneous, TIDAC, Lisa Horne MD, 10 Units at 01/12/22 1906    montelukast (SINGULAIR) tablet 10 mg, 10 mg, Oral, DAILY, Marcelo Wong MD, 10 mg at 01/13/22 5946    levothyroxine (SYNTHROID) tablet 100 mcg, 100 mcg, Oral, Once per day on Mon Tue Wed Thu Fri Sat, Aguila Pat MD, 100 mcg at 01/13/22 2611    cetirizine (ZYRTEC) tablet 10 mg, 10 mg, Oral, DAILY, Lisa Horne MD, 10 mg at 01/13/22 0952    hydroxypropyl methylcellulose (ISOPTO TEARS) 0.5 % ophthalmic solution 1 Drop, 1 Drop, Both Eyes, PRN, Lisa Horne MD, 1 Drop at 01/06/22 1727    venlafaxine-SR (EFFEXOR-XR) capsule 75 mg, 75 mg, Oral, DAILY WITH BREAKFAST, Lisa Horne MD, 75 mg at 01/13/22 6332    gabapentin (NEURONTIN) capsule 100 mg, 100 mg, Oral, QHS, Lisa Horne MD, 100 mg at 01/12/22 2142    arformoteroL (BROVANA) neb solution 15 mcg, 15 mcg, Nebulization, BID RT, 15 mcg at 01/11/22 2042 **AND** budesonide (PULMICORT) 500 mcg/2 ml nebulizer suspension, 500 mcg, Nebulization, BID RT, Marcelo Wong MD, 500 mcg at 01/11/22 2042    hydrOXYzine HCL (ATARAX) tablet 20 mg, 20 mg, Oral, TID PRN, Fina Jhaveri MD    heparin (porcine) injection 5,000 Units, 5,000 Units, SubCUTAneous, Q8H, Molly Stewart MD, 5,000 Units at 01/13/22 0431    sodium chloride (NS) flush 5-40 mL, 5-40 mL, IntraVENous, Q8H, Parris Woody MD, 10 mL at 01/13/22 0658    sodium chloride (NS) flush 5-40 mL, 5-40 mL, IntraVENous, PRN, Parris Woody MD    acetaminophen (TYLENOL) tablet 650 mg, 650 mg, Oral, Q6H PRN **OR** acetaminophen (TYLENOL) suppository 650 mg, 650 mg, Rectal, Q6H PRN, Parris Woody MD    ondansetron (ZOFRAN ODT) tablet 4 mg, 4 mg, Oral, Q8H PRN **OR** ondansetron (ZOFRAN) injection 4 mg, 4 mg, IntraVENous, Q6H PRN, Parris Woody MD, 4 mg at 01/05/22 0915    glucose chewable tablet 16 g, 4 Tablet, Oral, PRN, Parris Woody MD    dextrose (D50W) injection syrg 12.5-25 g, 25-50 mL, IntraVENous, PRN, Parris Woody MD    glucagon [facility]) injection 1 mg, 1 mg, IntraMUSCular, PRN, Parris Woody MD    insulin lispro (HUMALOG) injection, , SubCUTAneous, AC&HS, Parris Woody MD, 3 Units at 01/12/22 1907    albuterol-ipratropium (DUO-NEB) 2.5 MG-0.5 MG/3 ML, 3 mL, Nebulization, Q6H PRN, Parris Woody MD    PATIENT CARE TEAM:  Patient Care Team:  Sean Mcintyre MD as PCP - General (Internal Medicine)  Sean Mcintyre MD as PCP - [facility]  Jose Antonio Love MD as Consulting Provider (Internal Medicine)  Basilio Maier MD (Dermatology)  Jennifer Johnson MD (Nephrology)  Parris Woody MD as Consulting Provider (Cardiology)  Seymour Colorado MD (Cardiology)  Akua Rolling, MD as Consulting Provider (Pulmonary Disease)     Thank you for allowing me to participate in this patient's care. Arnulfo Barajas, NP  [facility] [address], [address]  Phone: [phone]      F ATTENDING ADDENDUM    Patient was seen and examined in person. Data and notes were reviewed.  I have discussed and agree with the plan as noted in the NP note above without further additions.     David Villarreal MD PhD  Estee Perez

## 2022-01-14 NOTE — PROGRESS NOTES
[facility]                                                                                          Hospitalist Progress Note  Sukumar Ye MD  Answering service: [phone] OR 36 from in house phone        Date of Service:  2022  NAME:  Iqra Whiting  :  1955  MRN:  [id number]      Admission Summary:     Iqra Whiting is a 78 y.o. female with hx NICM, chronic hypoxic respiratory failure 2/2 pulmonary htn, ckd, hypothyroidism, GERD, and  DM II who presents as a transfer from [address] [place] [address] for acute on chronic hypoxic respiratory failure. She wears 3L o2 at baseline, and reportedly ran out of her home oxygen.     In the ED, she was hypoxic to th 70's, with improvement ot 94% on 4Lnc. Labs showed stable anemia with Hgb 10, K+ 5.2, lactic 3.1, trop 55>66, d-dimer >35, creatinine 2.54, and probnp 15,826. CXR showed diffuse interstitial airway disease. Rapid covid was negative.     In the ED, she was started on a heparin gtt, and sent to [facility] for VQ scan. She received bumex and nitropaste. Interval history / Subjective:      No complaints, breathing better but is on 5L O2     Assessment & Plan:     Acute on chronic respiratory failure with oxygen- Due to CHF  COVID-19 negative. Mild leukocytosis. Afebrile. VQ scan low probability for PE. Acute on chronic systolic heart failure  NYHA class IV. Status post ICD placement. LVEF 15 to 20%. proBNP [id number]. Continue Bumex - now on a drip, Coreg and milrinone, ivabridine per cardiology  Not a candidate for ARB/ACE due to renal insufficiency. Cardiac diet, strict I/O, daily weight. Low-sodium diet. PICC placed in anticipation for home milrinone    RODNEY on CKD stage IV felt to be due to cardiorenal syndrome  At baseline, electrolytes WNL  Monitor volume status electrolytes, replace electrolytes as needed. 2022. Renal ultrasound negative for hydronephrosis. Small bilateral renal cysts.   Avoid nephrotoxic meds, renally dose medications. Nephrology on board, recommendations noted. Hyperkalemia  Resolved, hyperkalemia protocol. Daily BMP. Low potassium diet. Hyponatremia   Improving, likely diuretic induced. Daily BMP, monitor volume status and electrolytes. Leukocytosis  CXR positive for interstitial airspace disease likely pulmonary edema or atypical pneumonia. Afebrile. Respiratory panel negative for COVID-19. Monitor closely, start on empiric antibiotics if any sign of infection. Type 2 diabetes. Adjusted insulin regimen due to hypoglycemia (reduced NPH )    Hypothyroidism -cont Synthroid. Depression- stable on current meds. Overweight- BMI=39.7   plaque psoriasis- management per PCP and rheumatology outpatient    Code status: Full Code  DVT prophylaxis: heparin     Care Plan discussed with: Patient/Family, Nurse and   Anticipated Disposition: TBD     Hospital Problems  Date Reviewed: 12/22/2021          Codes Class Noted POA    Acute respiratory failure with hypoxia [facility]) ICD-10-CM: J96.01  ICD-9-CM: 518.81  1/4/2022 Unknown              Vital Signs:    Last 24hrs VS reviewed since prior progress note.  Most recent are:  Visit Vitals  BP (!) 112/91 (BP 1 Location: Left upper arm, BP Patient Position: At rest)   Pulse (!) 112   Temp 98 °F (36.7 °C)   Resp 19   Ht 5' 7\" (1.702 m)   Wt 115.1 kg (253 lb 12 oz)   SpO2 92%   BMI 39.74 kg/m²     Patient Vitals for the past 24 hrs:   Temp Pulse Resp BP SpO2   01/13/22 2200  (!) 112      01/13/22 2112     92 %   01/13/22 2033  (!) 107 19  95 %   01/13/22 2000  (!) 113      01/13/22 1900  (!) 112 21 (!) 112/91 96 %   01/13/22 1800  (!) 112      01/13/22 1600  98 24 113/88 97 %   01/13/22 1430  (!) 101  105/73 98 %   01/13/22 1400  100      01/13/22 1340  (!) 106  99/80    01/13/22 1331  (!) 118  101/81    01/13/22 1326  (!) 106  107/89    01/13/22 1324    (!) 119/95    01/13/22 1200  (!) 104      01/13/22 1148 98 °F (36.7 °C) (!) 102 24 102/80 95 %   01/13/22 1000  (!) 115      01/13/22 0800  90  (!) 122/98 98 %   01/13/22 0732  94  (!) 119/94    01/13/22 0623  (!) 107      01/13/22 0601  (!) 107 21 121/85 97 %   01/13/22 0434 98.3 °F (36.8 °C) (!) 107  115/88 96 %   01/13/22 0411  (!) 109      01/13/22 0207  (!) 101      01/12/22 2315 98 °F (36.7 °C) (!) 103 21 108/74 96 %       Intake/Output Summary (Last 24 hours) at 1/13/2022 2253  Last data filed at 1/13/2022 1900  Gross per 24 hour   Intake    Output 3200 ml   Net -3200 ml        Physical Examination:     I had a face to face encounter with this patient and independently examined them on 1/13/2022 as outlined below:          Constitutional:  No acute distress, cooperative, pleasant    ENT:  Oral mucosa moist, O2 via NC    Resp:  CTA b/l diminished globally. No wheezing/rhonchi/rales. No accessory muscle use   CV:  tachycardia, no murmurs, gallops, rubs    GI:  Soft, non distended, non tender. normoactive bowel sounds     Musculoskeletal:  Bilateral pretibial and pedal edema    Neurologic:  no focal neuro deficits            Data Review:    Review and/or order of clinical lab test  Review and/or order of tests in the radiology section of CPT  Review and/or order of tests in the medicine section of CPT  12/8/21 Echo Findings    Left Ventricle Normal cavity size. Upper normal wall thickness. The estimated EF is 15 - 20%. Severely reduced systolic function. There is left ventricular diastolic dysfunction. Left Atrium Moderately dilated left atrium. Interatrial Septum Interatrial septum not well visualized   Right Ventricle Normal cavity size. Borderline low systolic function. Pacer/ICD present. Right Atrium Mildly dilated right atrium. Aortic Valve No stenosis and no regurgitation. Aortic valve sclerosis. Mitral Valve No stenosis. Mitral valve non-specific thickening. Mild to moderate regurgitation.    Tricuspid Valve Normal valve structure and no stenosis. Mild regurgitation. Pulmonic Valve Pulmonic valve not well visualized, but normal doppler findings. Pulmonary Artery Pulmonary arterial systolic pressure (PASP) is 47 mmHg. Pulmonary hypertension found to be mild to moderate. Aorta Normal aortic root. Pericardium No evidence of pericardial effusion. Labs:     Recent Labs     01/13/22 0426 01/12/22 0312   WBC 7.2 8.8   HGB 8.0* 8.3*   HCT 30.7* 31.8*    269     Recent Labs     01/13/22 0426 01/12/22 1956 01/12/22 1312 01/12/22 0312 01/11/22 0513 01/11/22 0513    136  --  137   < > 138   K 3.6 4.5  --  4.1   < > 3.9   CL 99 99  --  99   < > 102   CO2 34* 34*  --  32   < > 32   BUN 51* 53*  --  54*   < > 59*   CREA 2.27* 2.33*  --  2.32*   < > 2.36*   * 242*  --  204*   < > 84   CA 9.3 9.4  --  9.2   < > 9.5   MG 2.0  --  2.2 2.1   < > 2.3   PHOS  --  3.6  --   --   --   --    URICA  --   --   --   --   --  7.1*    < > = values in this interval not displayed. Recent Labs     01/13/22 0426 01/12/22 1956 01/12/22 0312 01/11/22 0513 01/11/22 0513   ALT 58  --  80*  --  107*   AP 67  --  72  --  75   TBILI 0.5  --  0.6  --  0.6   TP 7.0  --  6.9  --  6.2  7.2   ALB 2.7* 2.9* 2.5*   < > 2.6*   GLOB 4.3*  --  4.4*  --  4.6*    < > = values in this interval not displayed. No results for input(s): INR, PTP, APTT, INREXT, INREXT in the last 72 hours. Recent Labs     01/11/22 0513   TIBC 262   PSAT 8*   FERR 764*      No results found for: FOL, RBCF   No results for input(s): PH, PCO2, PO2 in the last 72 hours.   Recent Labs     01/13/22  0426 01/11/22  0513   CPK 49 62     Lab Results   Component Value Date/Time    Cholesterol, total 151 01/11/2022 05:13 AM    HDL Cholesterol 60 01/11/2022 05:13 AM    LDL, calculated 75.8 01/11/2022 05:13 AM    Triglyceride 76 01/11/2022 05:13 AM    CHOL/HDL Ratio 2.5 01/11/2022 05:13 AM     Lab Results   Component Value Date/Time Glucose (POC) 216 (H) 01/13/2022 09:32 PM    Glucose (POC) 192 (H) 01/13/2022 05:08 PM    Glucose (POC) 151 (H) 01/13/2022 12:39 PM    Glucose (POC) 103 01/13/2022 08:27 AM    Glucose (POC) 142 (H) 01/12/2022 09:48 PM     Lab Results   Component Value Date/Time    Color YELLOW/STRAW 01/11/2022 02:53 PM    Appearance CLEAR 01/11/2022 02:53 PM    Specific gravity 1.010 01/11/2022 02:53 PM    pH (UA) 6.0 01/11/2022 02:53 PM    Protein 30 (A) 01/11/2022 02:53 PM    Glucose Negative 01/11/2022 02:53 PM    Ketone Negative 01/11/2022 02:53 PM    Bilirubin Negative 01/11/2022 02:53 PM    Urobilinogen 0.2 01/11/2022 02:53 PM    Nitrites Negative 01/11/2022 02:53 PM    Leukocyte Esterase TRACE (A) 01/11/2022 02:53 PM    Epithelial cells FEW 01/11/2022 02:53 PM    Bacteria Negative 01/11/2022 02:53 PM    WBC 0-4 01/11/2022 02:53 PM    RBC 0-5 01/11/2022 02:53 PM         Medications Reviewed:     Current Facility-Administered Medications   Medication Dose Route Frequency    milrinone (PRIMACOR) 20 MG/100 ML D5W infusion  0.2 mcg/kg/min IntraVENous CONTINUOUS    simethicone (MYLICON) tablet 80 mg  80 mg Oral QID PRN    [START ON 1/14/2022] polyethylene glycol (MIRALAX) packet 17 g  17 g Oral DAILY    senna-docusate (PERICOLACE) 8.6-50 mg per tablet 2 Tablet  2 Tablet Oral BID    albumin human 25% (BUMINATE) solution 12.5 g  12.5 g IntraVENous BID    insulin NPH (NOVOLIN N, HUMULIN N) injection 30 Units  30 Units SubCUTAneous DAILY    bumetanide (BUMEX) 0.25 mg/mL infusion  1 mg/hr IntraVENous CONTINUOUS    digoxin (LANOXIN) tablet 0.0625 mg  0.0625 mg Oral DAILY    iron sucrose (VENOFER) 300 mg in 0.9% sodium chloride 250 mL IVPB  300 mg IntraVENous Q24H    ivabradine (CORLANOR) tablet 7.5 mg  7.5 mg Oral BID WITH MEALS    insulin NPH (NOVOLIN N, HUMULIN N) injection 30 Units  30 Units SubCUTAneous QHS    carvediloL (COREG) tablet 12.5 mg  12.5 mg Oral BID WITH MEALS    allopurinoL (ZYLOPRIM) tablet 50 mg  50 mg Oral DAILY    epoetin isabel-epbx (RETACRIT) injection 20,000 Units  20,000 Units SubCUTAneous Q TUE, THU & SAT    insulin lispro (HUMALOG) injection 10 Units  10 Units SubCUTAneous TIDAC    montelukast (SINGULAIR) tablet 10 mg  10 mg Oral DAILY    levothyroxine (SYNTHROID) tablet 100 mcg  100 mcg Oral Once per day on Mon Tue Wed Thu Fri Sat    cetirizine (ZYRTEC) tablet 10 mg  10 mg Oral DAILY    hydroxypropyl methylcellulose (ISOPTO TEARS) 0.5 % ophthalmic solution 1 Drop  1 Drop Both Eyes PRN    venlafaxine-SR (EFFEXOR-XR) capsule 75 mg  75 mg Oral DAILY WITH BREAKFAST    gabapentin (NEURONTIN) capsule 100 mg  100 mg Oral QHS    arformoteroL (BROVANA) neb solution 15 mcg  15 mcg Nebulization BID RT    And    budesonide (PULMICORT) 500 mcg/2 ml nebulizer suspension  500 mcg Nebulization BID RT    hydrOXYzine HCL (ATARAX) tablet 20 mg  20 mg Oral TID PRN    heparin (porcine) injection 5,000 Units  5,000 Units SubCUTAneous Q8H    sodium chloride (NS) flush 5-40 mL  5-40 mL IntraVENous Q8H    sodium chloride (NS) flush 5-40 mL  5-40 mL IntraVENous PRN    acetaminophen (TYLENOL) tablet 650 mg  650 mg Oral Q6H PRN    Or    acetaminophen (TYLENOL) suppository 650 mg  650 mg Rectal Q6H PRN    ondansetron (ZOFRAN ODT) tablet 4 mg  4 mg Oral Q8H PRN    Or    ondansetron (ZOFRAN) injection 4 mg  4 mg IntraVENous Q6H PRN    glucose chewable tablet 16 g  4 Tablet Oral PRN    dextrose (D50W) injection syrg 12.5-25 g  25-50 mL IntraVENous PRN    glucagon (GLUCAGEN) injection 1 mg  1 mg IntraMUSCular PRN    insulin lispro (HUMALOG) injection   SubCUTAneous AC&HS    albuterol-ipratropium (DUO-NEB) 2.5 MG-0.5 MG/3 ML  3 mL Nebulization Q6H PRN     ______________________________________________________________________  EXPECTED LENGTH OF STAY: 3d 19h  ACTUAL LENGTH OF STAY:          9                 Erin Schrader MD

## 2022-01-14 NOTE — PROGRESS NOTES
[facility] in [place], [place]  Inpatient Progress Note      Patient name: Pamela Stinson  Patient : 1955  Patient MRN: [id number]  Consulting MD: Nawaf Estrella MD  Date of service: 22    REASON FOR REFERRAL:  Management of chronic systolic heart failure    PLAN OF CARE:  79 y/o female with new onset severe cardiomyopathy, LVEF 15% (diagnosed 21) admitted for acute decompensation c/b acute hepatic and acute on chronic renal failure in the setting of massive volume overload of likely > 25 lbs. Plant to continue inotrope-assisted diuresis, goal net negative 2-3lbs per day; w/d patient anticipate 7-10 days of hospitalization  Patient expressed wishes to be full code and would like to be evaluated for dual organ transplant if her heart and renal function does not improve  Would consider repeat RHC/LHC and check 6 min walk to evaluate need for home inotropic support   Will monitor for recovery over the next 3 months (2022), will continue to follow up in [facility] clinic and establish relationship with patient in consideration for OP evaluation for more advanced therapies.      RECOMMENDATIONS:  Would recommend ischemic eval per cardiology for new drop in EF, last LHC in  when patient is more euvolemic  Repeat RHC- d/w Dr. Ihsan Solorio current medical therapy for heart failure  Cont Milrinone 0.2mcg/kg/min to help with diuresis   Cont coreg 12.5mg BID- watch for psoriasis flair  No ACE/ARB/ARNI due to CKD  No MRA due to CKD- will discuss with nephrology   Stop hydralazine and isordil to allow more bp for BBrx  Cont low dose Digoxin for HR- dig level daily   Corlanor 7.5mg BID  Bumex on hold today- will adjust based on RHC results   Allopurinol 50mg daily for elevated uric acid   ICD interrogation per - functioning well, will need generator change before discharge per Dr. Lakhwinder Kapoor   Repeat TTE and EKG when euvolemic   Will need OP PSG  Wean O2 as able   Invitae pending   6 Min walk today- ordered placed   Nephrology following- patient would be a high risk for HD due to cardiomyopathy if EF does not recover and PD due to morbid obesity. Palliative consult appreciated   Nutritionist consult  Heart failure education  Advanced care plan present on file    All other care per primary team    IMPRESSION:  Fatigue  Shortness of breath, on 3L NC  Volume overload  Acute on Chronic systolic heart failure  Stage D, NYHA class IV symptoms  Non-ischemic cardiomyopathy, LVEF 15%  Normal coronaries  PYP equivocal for amyloid   Pulmonary hypertension  S/p BiV-ICD  Cardiac risk factors:  HL  DM2  Morbid obesity, BMI 40  Acute on chronic renal failure, stage IV  GERD  Anemia of chronic disease  Gout    Interval Events:  Adequate diuresis  Weight down 2#  Creatinine stable   PBNP trending down to 9100  States she feels better but remains SOB     LIFE GOALS:  Patient's personal goals include: being home with family, still ambulating around without getting too tired. Important upcoming milestones or family events: none  The patient identifies the following as important for living well: remaining idependent and mobile; being able to get out of the house with . Patient verbalized willingness to be on home milrinone and evaluation for both heart and kidney transplants. Verbalizes she would have family supporting her decision on this. SHAMIKA Juares is a 78 y.o.  female with a history of NICM, chronic hypoxic respiratory failure secondary to pulmonary HTN, hypothyroidism, CKD, GERD, and DM II who presented to [facility] as a transfer from another facility for acute on chronic hypoxic respiratory failure. Reports running out of O2 at home resulting in SOB and dizziness. Upon arrival to the ED she was found to have O2 sats in the 70s, requiring 4L NC O2. Rapid covid test was negative.   ProNT-BNP was 47986, K+5.2, BUN/CR x/2.54 and elevated d-dimer. Chest xray showing pulmonary edema vs. Atypical pneumonia. VQ scan showed low probability for PE. Per Dr. Francisca Rain, NICM, LVEF 15-20% during recent admission. LVEF previously normalized with CRT. NYHA III-IV. No ACEi/ARB due to renal dysfunction. GDMT dosing limited by low BP. Patient's PCP is Dr. Yudi Holt, and she sees Dr. Francisca Rain primarily for cardiac care due to Dr. Una Baker transfering practice. Patient historically seen by nephrology but has not had follow up care in the past three years. CARDIAC IMAGING:  Echo 12/8/21- LVEF 15-20%, mild to Mod MR, LVIDd 5.09cm, TAPSE 1.94cm  Echo 4/22/19- LVEF 60%, trace MR,  LVIDd 4.24cm, TAPSE 1.65cm   Echo 4/24/18- LVEF 60%, trivial MR, LVIDd 4.79cm, TAPSE 2.25cm     EKG- 1/4/22 ST with A sense and V paced rhythm    [facility] 2015- No significant CAD  NST 2014- reversible LAD involvement     ICD interrogation    HEMODYNAMICS:  RHC 12/10/21: PAP 76/48/57, RAP 20, PCWP 35, CI 2.26      CPEST not done  6MW not done    OTHER IMAGING:  CXR Results  (Last 48 hours)                 01/13/22 1035  XR CHEST PORT Final result    Impression:  No significant change in congestion and interstitial and alveolar   opacities which may reflect edema or infectious infiltrate. Narrative:  EXAM: XR CHEST PORT       INDICATION: pul edema       COMPARISON: Chest x-ray 1/10/2022. FINDINGS: A portable AP radiograph of the chest was obtained at 10:19 hours. The   patient is on a cardiac monitor. The lungs appear grossly stable with congestion   and interstitial/airspace opacities with no pneumothorax or pleural effusion. Right PICC line traverses expected course of tip in the region of the atriocaval   junction. Pacemaker-ICD generator body projects over the left chest wall with   intact appearing leads traversing in expected course.  . The cardiac and   mediastinal contours and pulmonary vascularity are normal.  Atherosclerotic   calcifications affect the aortic arch. The chest wall structures and visualized   upper abdomen show no acute findings with incidental note of degenerative spine   and shoulder changes. PHYSICAL EXAM:  Visit Vitals  BP (!) 111/90 (BP 1 Location: Left upper arm, BP Patient Position: At rest)   Pulse 97   Temp 97.8 °F (36.6 °C)   Resp 20   Ht 5' 7\" (1.702 m)   Wt 251 lb 8.7 oz (114.1 kg)   SpO2 98%   BMI 39.40 kg/m²     Physical Exam  Vitals and nursing note reviewed. Constitutional:       General: She is not in acute distress. Appearance: Normal appearance. She is obese. Cardiovascular:      Rate and Rhythm: Regular rhythm. Tachycardia present. Pulses: Normal pulses. Heart sounds: Normal heart sounds. No murmur heard. Pulmonary:      Effort: Pulmonary effort is normal. No respiratory distress. Abdominal:      General: There is no distension. Musculoskeletal:         General: No swelling. Skin:     General: Skin is warm and dry. Findings: Lesion present. Comments: psorasis   Neurological:      General: No focal deficit present. Mental Status: She is alert and oriented to person, place, and time. Psychiatric:         Mood and Affect: Mood normal.         REVIEW OF SYSTEMS:  Review of Systems   Constitutional: Positive for malaise/fatigue. Negative for chills and fever. Respiratory: Positive for shortness of breath. Cardiovascular: Negative for chest pain, palpitations, orthopnea and leg swelling. Gastrointestinal: Negative for heartburn and nausea. Genitourinary: Negative for dysuria. Musculoskeletal: Negative for falls, joint pain and myalgias. Neurological: Negative for dizziness. Psychiatric/Behavioral: Positive for depression. The patient is nervous/anxious.           PAST MEDICAL HISTORY:  Past Medical History:   Diagnosis Date    Acquired hypothyroidism 8/15/2016    Anemia     RED-HF study    Asthma     Cardiomyopathy, nonischemic ([facility] [address]     initial dx 2001, bivHF 2008 with EF 15%, s/p biV-ICD 9/08, significant improvment in EF to 45-50%    CKD (chronic kidney disease)     Dr Torres Oliver    CKD (chronic kidney disease) 8/15/2012    Depression     Diabetes ([facility] [address]     Diabetic neuropathy ([facility])     DM (diabetes mellitus) ([facility] [address] 8/15/2012    GERD (gastroesophageal reflux disease)     Gout     Hypothyroidism     ICD (implantable cardioverter-defibrillator), biventricular, in situ 6/5/2014    Psoriasis        PAST SURGICAL HISTORY:  Past Surgical History:   Procedure Laterality Date    CARDIAC CATHETERIZATION  2007; 01/06/15    normal cors    ECHO 2D ADULT  4/2010    EF 45%, improved from 1/09 (25%)    ECHO 2D ADULT  11/2011    LVH, EF 55-60%    HX ORTHOPAEDIC      knee    HX PACEMAKER PLACEMENT      AICD    STRESS TEST LEXISCAN/CARDIOLITE  3/21/12    normal perfusion, global HK 40%       FAMILY HISTORY:  Family History   Problem Relation Age of Onset    Heart Disease Mother     Hypertension Mother     Lupus Sister     Diabetes Brother        SOCIAL HISTORY:  Social History     Socioeconomic History    Marital status:    Tobacco Use    Smoking status: Never Smoker    Smokeless tobacco: Never Used   Substance and Sexual Activity    Alcohol use: No    Drug use: No    Sexual activity: Never   Social History Narrative    . Nonsmoker. Disability       LABORATORY RESULTS:     Labs Latest Ref Rng & Units 1/14/2022 1/13/2022 1/12/2022 1/12/2022 1/11/2022 1/10/2022 1/9/2022   WBC 3.6 - 11.0 K/uL 8.0 7.2 - 8.8 9.0 - -   RBC 3.80 - 5.20 M/uL 3.34(L) 3.32(L) - 3.46(L) 3.52(L) - -   Hemoglobin 11.5 - 16.0 g/dL 8. 0(L) 8.0(L) - 8. 3(L) 8.5(L) - -   Hematocrit 35.0 - 47.0 % 30. 7(L) 30. 7(L) - 31. 8(L) 32. 6(L) - -   MCV 80.0 - 99.0 FL 91.9 92.5 - 91.9 92.6 - -   Platelets 899 - 424 K/uL 283 259 - 269 331 - -   Lymphocytes 12 - 49 % - - - - - - -   Monocytes 5 - 13 % - - - - - - -   Eosinophils 0 - 7 % - - - - - - -   Basophils 0 - 1 % - - - - - - -   Albumin 3.5 - 5.0 g/dL 3.0(L) 2. 7(L) 2. 9(L) 2. 5(L) 2. 6(L) - -   Calcium 8.5 - 10.1 MG/DL 9.6 9.3 9.4 9.2 9.5 9.0 9.3   Glucose 65 - 100 mg/dL 165(H) 118(H) 242(H) 204(H) 84 144(H) 93   BUN 6 - 20 MG/DL 54(H) 51(H) 53(H) 54(H) 59(H) 71(H) 78(H)   Creatinine 0.55 - 1.02 MG/DL 2.38(H) 2.27(H) 2.33(H) 2.32(H) 2.36(H) 2.62(H) 3.04(H)   Sodium 136 - 145 mmol/L 135(L) 136 136 137 138 137 136   Potassium 3.5 - 5.1 mmol/L 3.7 3.6 4.5 4.1 3.9 4.2 4.0   TSH 0.36 - 3.74 uIU/mL - - - - 3.08 - -   Some recent data might be hidden     Lab Results   Component Value Date/Time    TSH 3.08 01/11/2022 05:13 AM    TSH 1.85 12/15/2021 01:06 PM    TSH 1.01 11/05/2020 09:11 AM    TSH 2.740 04/30/2019 11:21 AM    TSH 0.519 02/21/2012 10:53 AM    TSH 8.29 (H) 01/20/2010 01:59 PM       ALLERGY:  Allergies   Allergen Reactions    Ciprofloxacin Anaphylaxis    Shellfish Derived Anaphylaxis    Ace Inhibitors Unknown (comments)    Biaxin [Clarithromycin] Other (comments)     Metal taste    Candesartan Cough    Pcn [Penicillins] Hives        CURRENT MEDICATIONS:    Current Facility-Administered Medications:     milrinone (PRIMACOR) 20 MG/100 ML D5W infusion, 0.2 mcg/kg/min, IntraVENous, CONTINUOUS, Amber Weaver NP, Last Rate: 6.8 mL/hr at 01/14/22 1023, 0.2 mcg/kg/min at 01/14/22 1023    simethicone (MYLICON) tablet 80 mg, 80 mg, Oral, QID PRN, Ann Browning MD, 80 mg at 01/13/22 2233    polyethylene glycol (MIRALAX) packet 17 g, 17 g, Oral, DAILY, Breea MD Nallely, 17 g at 01/14/22 0916    senna-docusate (PERICOLACE) 8.6-50 mg per tablet 2 Tablet, 2 Tablet, Oral, BID, Breea MD Nallely, 2 Tablet at 01/14/22 0915    albumin human 25% (BUMINATE) solution 12.5 g, 12.5 g, IntraVENous, BID, Amber Weaver NP, 12.5 g at 01/14/22 0912    insulin NPH (NOVOLIN N, HUMULIN N) injection 30 Units, 30 Units, SubCUTAneous, DAILY, Mariela Casillas MD, 30 Units at 01/13/22 0951    [Held by provider] bumetanide (BUMEX) 0.25 mg/mL infusion, 1 mg/hr, IntraVENous, CONTINUOUS, Owen, Amber B, NP, Last Rate: 4 mL/hr at 01/13/22 2033, 1 mg/hr at 01/13/22 2033    digoxin (LANOXIN) tablet 0.0625 mg, 0.0625 mg, Oral, DAILY, Owen, Amber B, NP, 0.0625 mg at 01/14/22 0916    iron sucrose (VENOFER) 300 mg in 0.9% sodium chloride 250 mL IVPB, 300 mg, IntraVENous, Q24H, Sid Chase MD, Last Rate: 176.7 mL/hr at 01/13/22 1443, 300 mg at 01/13/22 1443    ivabradine (CORLANOR) tablet 7.5 mg, 7.5 mg, Oral, BID WITH MEALS, Renan Cedeño MD, 7.5 mg at 01/14/22 0915    insulin NPH (NOVOLIN N, HUMULIN N) injection 30 Units, 30 Units, SubCUTAneous, QHS, Hermelinda Casillas MD, 30 Units at 01/13/22 2158    carvediloL (COREG) tablet 12.5 mg, 12.5 mg, Oral, BID WITH MEALS, Owen, Amber B, NP, 12.5 mg at 01/14/22 0915    allopurinoL (ZYLOPRIM) tablet 50 mg, 50 mg, Oral, DAILY, Owen, Amber B, NP, 50 mg at 01/14/22 0915    epoetin isabel-epbx (RETACRIT) injection 20,000 Units, 20,000 Units, SubCUTAneous, Q TUE, THU & SAT, Willem Azevedo MD, 20,000 Units at 01/13/22 2157    insulin lispro (HUMALOG) injection 10 Units, 10 Units, SubCUTAneous, TIDAC, Lisa Horne MD, 10 Units at 01/12/22 1906    montelukast (SINGULAIR) tablet 10 mg, 10 mg, Oral, DAILY, Tracey Caraballo MD, 10 mg at 01/14/22 0916    levothyroxine (SYNTHROID) tablet 100 mcg, 100 mcg, Oral, Once per day on Mon Tue Wed Thu Fri Sat, Lyubov Herrera MD, 100 mcg at 01/14/22 0600    cetirizine (ZYRTEC) tablet 10 mg, 10 mg, Oral, DAILY, Lisa Horne MD, 10 mg at 01/14/22 0916    hydroxypropyl methylcellulose (ISOPTO TEARS) 0.5 % ophthalmic solution 1 Drop, 1 Drop, Both Eyes, PRN, Lisa Horne MD, 1 Drop at 01/06/22 1727    venlafaxine-SR (EFFEXOR-XR) capsule 75 mg, 75 mg, Oral, DAILY WITH BREAKFAST, Lisa Horne MD, 75 mg at 01/14/22 0915    gabapentin (NEURONTIN) capsule 100 mg, 100 mg, Oral, QHS, Lisa Horne MD, 100 mg at 01/13/22 2157    arformoteroL (BROVANA) neb solution 15 mcg, 15 mcg, Nebulization, BID RT, 15 mcg at 01/14/22 0807 **AND** budesonide (PULMICORT) 500 mcg/2 ml nebulizer suspension, 500 mcg, Nebulization, BID RT, Shasta Irwin MD, 500 mcg at 01/14/22 8853    hydrOXYzine HCL (ATARAX) tablet 20 mg, 20 mg, Oral, TID PRN, Blanca Amezcua MD    heparin (porcine) injection 5,000 Units, 5,000 Units, SubCUTAneous, Q8H, Ollie Vance MD, 5,000 Units at 01/14/22 5524    sodium chloride (NS) flush 5-40 mL, 5-40 mL, IntraVENous, Q8H, Blanca Amezcua MD, 10 mL at 01/14/22 0640    sodium chloride (NS) flush 5-40 mL, 5-40 mL, IntraVENous, PRN, Blanca Amezcua MD    acetaminophen (TYLENOL) tablet 650 mg, 650 mg, Oral, Q6H PRN **OR** acetaminophen (TYLENOL) suppository 650 mg, 650 mg, Rectal, Q6H PRN, Blanca Amezcua MD    ondansetron (ZOFRAN ODT) tablet 4 mg, 4 mg, Oral, Q8H PRN **OR** ondansetron (ZOFRAN) injection 4 mg, 4 mg, IntraVENous, Q6H PRN, Blanca Amezcua MD, 4 mg at 01/05/22 0915    glucose chewable tablet 16 g, 4 Tablet, Oral, PRN, Blanca Amezcua MD    dextrose (D50W) injection syrg 12.5-25 g, 25-50 mL, IntraVENous, PRN, Blanca Amezcua MD    glucagon (GLUCAGEN) injection 1 mg, 1 mg, IntraMUSCular, PRN, Blanca Amezcua MD    insulin lispro (HUMALOG) injection, , SubCUTAneous, AC&HS, Blanca Amezcua MD, 2 Units at 01/13/22 2157    albuterol-ipratropium (DUO-NEB) 2.5 MG-0.5 MG/3 ML, 3 mL, Nebulization, Q6H PRN, Blanca Amezcua MD    PATIENT CARE TEAM:  Patient Care Team:  Padmini Jameson MD as PCP - General (Internal Medicine)  Padmini Jameson MD as PCP - [address] [place] [address] [facility]  Niraj Hernandez MD as Consulting Provider (Internal Medicine)  Dalton Calero MD (Dermatology)  Giuliana Polo MD (Nephrology)  Blanca Amezcua MD as Consulting Provider (Cardiology)  Chandler Cole MD (Cardiology)  Ozzie Arguelles MD as Consulting Provider (Pulmonary Disease)     Thank you for allowing me to participate in this patient's care[place]  [address], [address]  Phone: [phone]      F ATTENDING ADDENDUM    Patient was seen and examined in person. Data and notes were reviewed. I have discussed and agree with the plan as noted in the NP note above without further additions.     Peri Morrison MD PhD  Dim[address]ila [id number]

## 2022-01-14 NOTE — PROGRESS NOTES
Bedside shift change report given to ARTURO Voss (oncoming nurse) by Steve Crenshaw RN (offgoing nurse). Report included the following information SBAR, Kardex, ED Summary, Intake/Output, MAR, Accordion, Recent Results, Med Rec Status and Cardiac Rhythm Biventricular paced.

## 2022-01-14 NOTE — PROGRESS NOTES
[facility]   [address] [facility], [address] [place] [address]  Phone: [phone]   [phone]    [email or web address]     Nephrology Progress Note    Patient Name : Si Hammans      : 1955     MRN : [id number]  Date of Admission : 2022  Date of Servive : 22    CC:  Follow up for ARF       Assessment and Plan   RODNEY on CKD :  - 2/2 CRS  - Cr back to baseline now   - adjust diuretics post RHC   - Poor candidate for dialysis. D/w pt multiple times     CKD stage IV:  - Etiology: DM, HTN, CRS  - Renal US: suggestive of CKD, B/L benign renal cysts   - baseline Cr 2.4-2.6 mg/dl      Acute on chronic HFrEF  NI CMP, LVEF 15 to 20%  NYHA class III-IV  S/p BiV pacer/ICD-s/p CRT  RHC 12/10/2021: PCWP 34, PA P 80  - On Milrinone and Corlanor     Morbid obesity  Type II DM  HTN  Hypothyroidism  Pulmonary hypertension  Gout  Psoriasis        Interval History:  Seen and examined. Weight down to 251 lbs. No further dizziness  Cr stable   UOP 3L   BP low but stable     Review of Systems: A comprehensive review of systems was negative except for that written in the HPI.     Current Medications:   Current Facility-Administered Medications   Medication Dose Route Frequency    milrinone (PRIMACOR) 20 MG/100 ML D5W infusion  0.2 mcg/kg/min IntraVENous CONTINUOUS    simethicone (MYLICON) tablet 80 mg  80 mg Oral QID PRN    polyethylene glycol (MIRALAX) packet 17 g  17 g Oral DAILY    senna-docusate (PERICOLACE) 8.6-50 mg per tablet 2 Tablet  2 Tablet Oral BID    albumin human 25% (BUMINATE) solution 12.5 g  12.5 g IntraVENous BID    insulin NPH (NOVOLIN N, HUMULIN N) injection 30 Units  30 Units SubCUTAneous DAILY    [Held by provider] bumetanide (BUMEX) 0.25 mg/mL infusion  1 mg/hr IntraVENous CONTINUOUS    digoxin (LANOXIN) tablet 0.0625 mg  0.0625 mg Oral DAILY    iron sucrose (VENOFER) 300 mg in 0.9% sodium chloride 250 mL IVPB  300 mg IntraVENous Q24H    ivabradine (CORLANOR) tablet 7.5 mg  7.5 mg Oral BID WITH MEALS    insulin NPH (NOVOLIN N, HUMULIN N) injection 30 Units  30 Units SubCUTAneous QHS    carvediloL (COREG) tablet 12.5 mg  12.5 mg Oral BID WITH MEALS    allopurinoL (ZYLOPRIM) tablet 50 mg  50 mg Oral DAILY    epoetin isabel-epbx (RETACRIT) injection 20,000 Units  20,000 Units SubCUTAneous Q TUE, THU & SAT    insulin lispro (HUMALOG) injection 10 Units  10 Units SubCUTAneous TIDAC    montelukast (SINGULAIR) tablet 10 mg  10 mg Oral DAILY    levothyroxine (SYNTHROID) tablet 100 mcg  100 mcg Oral Once per day on Mon Tue Wed Thu Fri Sat    cetirizine (ZYRTEC) tablet 10 mg  10 mg Oral DAILY    hydroxypropyl methylcellulose (ISOPTO TEARS) 0.5 % ophthalmic solution 1 Drop  1 Drop Both Eyes PRN    venlafaxine-SR (EFFEXOR-XR) capsule 75 mg  75 mg Oral DAILY WITH BREAKFAST    gabapentin (NEURONTIN) capsule 100 mg  100 mg Oral QHS    arformoteroL (BROVANA) neb solution 15 mcg  15 mcg Nebulization BID RT    And    budesonide (PULMICORT) 500 mcg/2 ml nebulizer suspension  500 mcg Nebulization BID RT    hydrOXYzine HCL (ATARAX) tablet 20 mg  20 mg Oral TID PRN    heparin (porcine) injection 5,000 Units  5,000 Units SubCUTAneous Q8H    sodium chloride (NS) flush 5-40 mL  5-40 mL IntraVENous Q8H    sodium chloride (NS) flush 5-40 mL  5-40 mL IntraVENous PRN    acetaminophen (TYLENOL) tablet 650 mg  650 mg Oral Q6H PRN    Or    acetaminophen (TYLENOL) suppository 650 mg  650 mg Rectal Q6H PRN    ondansetron (ZOFRAN ODT) tablet 4 mg  4 mg Oral Q8H PRN    Or    ondansetron (ZOFRAN) injection 4 mg  4 mg IntraVENous Q6H PRN    glucose chewable tablet 16 g  4 Tablet Oral PRN    dextrose (D50W) injection syrg 12.5-25 g  25-50 mL IntraVENous PRN    glucagon (GLUCAGEN) injection 1 mg  1 mg IntraMUSCular PRN    insulin lispro (HUMALOG) injection   SubCUTAneous AC&HS    albuterol-ipratropium (DUO-NEB) 2.5 MG-0.5 MG/3 ML  3 mL Nebulization Q6H PRN Allergies   Allergen Reactions    Ciprofloxacin Anaphylaxis    Shellfish Derived Anaphylaxis    Ace Inhibitors Unknown (comments)    Biaxin [Clarithromycin] Other (comments)     Metal taste    Candesartan Cough    Pcn [Penicillins] Hives       Objective:  Vitals:    Vitals:    01/14/22 0800 01/14/22 0808 01/14/22 0912 01/14/22 1000   BP:   112/87    Pulse: (!) 101  99 (!) 101   Resp:       Temp:       SpO2:  97% 94%    Weight:       Height:         Intake and Output:  No intake/output data recorded. 01/12 1901 - 01/14 0700  In: 1781.7 [P.O.:480; I.V.:1301.7]  Out: 1 [Urine:4100]    Physical Examination:    General: Obese   Neck:  Supple, no mass  Resp:  Rales +  CV:  tachy  GI:  Soft, NT, + BS, no HS megaly  Neurologic:  Non focal    []    High complexity decision making was performed  []    Patient is at high-risk of decompensation with multiple organ involvement    Lab Data Personally Reviewed: I have reviewed all the pertinent labs, microbiology data and radiology studies during assessment.     Recent Labs     01/14/22 0023 01/13/22 0426 01/12/22 1956 01/12/22 1312 01/12/22 0312   * 136 136  --  137   K 3.7 3.6 4.5  --  4.1   CL 98 99 99  --  99   CO2 33* 34* 34*  --  32   * 118* 242*  --  204*   BUN 54* 51* 53*  --  54*   CREA 2.38* 2.27* 2.33*  --  2.32*   CA 9.6 9.3 9.4  --  9.2   MG 1.9 2.0  --  2.2 2.1   PHOS  --   --  3.6  --   --    ALB 3.0* 2.7* 2.9*  --  2.5*   ALT 48 58  --   --  80*     Recent Labs     01/14/22 0023 01/13/22 0426 01/12/22 0312   WBC 8.0 7.2 8.8   HGB 8.0* 8.0* 8.3*   HCT 30.7* 30.7* 31.8*    259 269     Lab Results   Component Value Date/Time    Specimen Description: URINE 10/22/2013 01:32 PM    Specimen Description: URINE 01/23/2013 04:40 PM    Specimen Description: LEG TISSUE 02/12/2009 12:00 AM    Specimen Description: LEG (LEFT) 01/29/2009 03:06 AM     Lab Results   Component Value Date/Time    Culture result: MIXED SKIN ROSANNA ISOLATED 10/22/2013 01:32 PM    Culture result:  01/23/2013 04:40 PM     ENTEROCOCCUS FAECALIS GROUP D  MIXED SKIN ROSANNA ISOLATED    Culture result:  02/12/2009 12:00 AM     LIGHT STAPHYLOCOCCUS EPIDERMIDIS (OXACILLIN RESISTANT)  LIGHT 2ND STRAIN OF STAPHYLOCOCCUS EPIDERMIDIS (OXACILLIN RESISTANT)    Culture result: NO GROWTH 2 DAYS 01/29/2009 03:06 AM     Recent Results (from the past 24 hour(s))   GLUCOSE, POC    Collection Time: 01/13/22 12:39 PM   Result Value Ref Range    Glucose (POC) 151 (H) 65 - 117 mg/dL    Performed by Kike Groves    GLUCOSE, POC    Collection Time: 01/13/22  5:08 PM   Result Value Ref Range    Glucose (POC) 192 (H) 65 - 117 mg/dL    Performed by Erlin Bowman 85, POC    Collection Time: 01/13/22  9:32 PM   Result Value Ref Range    Glucose (POC) 216 (H) 65 - 117 mg/dL    Performed by Natalia Slater    MAGNESIUM    Collection Time: 01/14/22 12:23 AM   Result Value Ref Range    Magnesium 1.9 1.6 - 2.4 mg/dL   NT-PRO BNP    Collection Time: 01/14/22 12:23 AM   Result Value Ref Range    NT pro-BNP 9,144 (H) <606 PG/ML   METABOLIC PANEL, COMPREHENSIVE    Collection Time: 01/14/22 12:23 AM   Result Value Ref Range    Sodium 135 (L) 136 - 145 mmol/L    Potassium 3.7 3.5 - 5.1 mmol/L    Chloride 98 97 - 108 mmol/L    CO2 33 (H) 21 - 32 mmol/L    Anion gap 4 (L) 5 - 15 mmol/L    Glucose 165 (H) 65 - 100 mg/dL    BUN 54 (H) 6 - 20 MG/DL    Creatinine 2.38 (H) 0.55 - 1.02 MG/DL    BUN/Creatinine ratio 23 (H) 12 - 20      GFR est AA 25 (L) >60 ml/min/1.73m2    GFR est non-AA 20 (L) >60 ml/min/1.73m2    Calcium 9.6 8.5 - 10.1 MG/DL    Bilirubin, total 0.6 0.2 - 1.0 MG/DL    ALT (SGPT) 48 12 - 78 U/L    AST (SGOT) 8 (L) 15 - 37 U/L    Alk.  phosphatase 65 45 - 117 U/L    Protein, total 7.2 6.4 - 8.2 g/dL    Albumin 3.0 (L) 3.5 - 5.0 g/dL    Globulin 4.2 (H) 2.0 - 4.0 g/dL    A-G Ratio 0.7 (L) 1.1 - 2.2     CBC W/O DIFF    Collection Time: 01/14/22 12:23 AM   Result Value Ref Range    WBC 8.0 3.6 - 11.0 K/uL    RBC 3.34 (L) 3.80 - 5.20 M/uL    HGB 8.0 (L) 11.5 - 16.0 g/dL    HCT 30.7 (L) 35.0 - 47.0 %    MCV 91.9 80.0 - 99.0 FL    MCH 24.0 (L) 26.0 - 34.0 PG    MCHC 26.1 (L) 30.0 - 36.5 g/dL    RDW 19.9 (H) 11.5 - 14.5 %    PLATELET 639 976 - 834 K/uL    MPV 9.2 8.9 - 12.9 FL    NRBC 0.0 0  WBC    ABSOLUTE NRBC 0.00 0.00 - 0.01 K/uL   DIGOXIN    Collection Time: 01/14/22 12:23 AM   Result Value Ref Range    Digoxin level 0.4 (L) 0.90 - 2.00 NG/ML   GLUCOSE, POC    Collection Time: 01/14/22  4:12 AM   Result Value Ref Range    Glucose (POC) 45 (LL) 65 - 117 mg/dL    Performed by Jimmy Coleman    GLUCOSE, POC    Collection Time: 01/14/22  4:30 AM   Result Value Ref Range    Glucose (POC) 61 (L) 65 - 117 mg/dL    Performed by Jimmy Coleman    GLUCOSE, POC    Collection Time: 01/14/22  8:38 AM   Result Value Ref Range    Glucose (POC) 141 (H) 65 - 117 mg/dL    Performed by Sukhjinder Fuentes            I have reviewed the flowsheets. Chart and Pertinent Notes have been reviewed. No change in PMH ,family and social history from Consult note.       Emelyn Mars [facility]

## 2022-01-14 NOTE — PROGRESS NOTES
Cardiac Electrophysiology Hospital Progress Note     Subjective:       James Ceron is a 78 y.o. patient with acute on chronic systolic CHF despite biventricular pacing. She is staying until next week, heart failure team needs to diurese more      Interim:   Milrinone dose decreased to 0.2 mcg/kg/min due to sinus tachycardia.       Advanced Heart Failure team following, diuresing patient further with bumex drip   bp is lower and she is off hydralazine and isordil     She wants possible double organ transplant. She is full code and has BIV ICD with battery 4 months left      HPI:   Presented to the ER 01/04/2021 with hypoxia, improved on supplemental oxygen. Labs showed stable anemia.  WBC elevated, hyponatremic, hypokalemic.  D dimer elevated.  Chronic CKD. Nephrologist spoke to me directly regarding severe renal failure, but not much worse than last admission. Rapid COVID negative.  CXR showed pulmonary edema vs atypical pneumonia.  VQ scan showed low probability for PE.       NICM, LVEF 15-20% during recent admission.  LVEF previously normalized with CRT.  NYHA III-IV.  No ACEi/ARB due to renal dysfunction.  GDMT dosing limited by low BP. [address] 12/10/2021 showed severe pulmonary HTN (wedge 34 mmHg, PAP 80 mmHg). Cardiac cath in 2015 at [facility] showed no evidence of CAD. She did require LV lead reprogramming over the summer, but good LV capture since.  Good capture/function when checked during recent admission. BP controlled. PICC line placed 01/10/2022 in anticipation of home milrinone. Previous:   S/p Medtronic biventricular ICD (gen change [id number]/290434, leads 09/25/2008).     CKD stage IV.        Previously followed by Dr. Jaylin Bennett, states did not follow him to new practice.            Problem List   Acute respiratory failure with hypoxia (HCC) ICD-10-CM: J96.01   ICD-9-CM: 518.81 1/4/2022     CHF exacerbation ([facility] ICD-10-CM: I50.9   ICD-9-CM: 428.0 12/6/2021 Type 2 diabetes mellitus with diabetic neuropathy (HCC) ICD-10-CM: E11.40   ICD-9-CM: 250.60, 357.2 1/2/2020     Type 2 diabetes with nephropathy ([facility] ICD-10-CM: E11.21   ICD-9-CM: 250.40, 583.81 4/3/2018     Obesity, morbid ([facility] ICD-10-CM: E66.01   ICD-9-CM: 278.01 12/8/2017     Acquired hypothyroidism ICD-10-CM: E03.9   ICD-9-CM: 244.9 8/15/2016     Dysthymia ICD-10-CM: F34.1   ICD-9-CM: 300.4 8/15/2016     ICD (implantable cardioverter-defibrillator), biventricular, in situ ICD-10-CM: Z95.810   ICD-9-CM: V45.02 6/5/2014   Overview Signed 1/14/2015 11:11 AM by Cristiana Cool MD     Generator Medtronic change 1/14/2015         Dyslipidemia ICD-10-CM: D62.8   ICD-9-CM: 272.4 1/14/2014     CKD (chronic kidney disease) ICD-10-CM: N18.9   ICD-9-CM: 585.9 8/15/2012     Cardiomyopathy, nonischemic (HCC) ICD-10-CM: I42.8   ICD-9-CM: 425.4 Unknown   Overview Signed 10/10/2011  6:40 AM by Trish Pederson MD     initial dx 2001, bivHF 2008 with EF 15%, s/p biV-ICD 9/08, significant improvment in EF to 45-50%         Anemia in chronic renal disease (Chronic) ICD-10-CM: N18.9, D63.1   ICD-9-CM: 285.21 12/10/2008     HTN (hypertension) ICD-10-CM: I10   ICD-9-CM: 401.9 12/10/2008     GERD (gastroesophageal reflux disease) (Chronic) ICD-10-CM: K21.9   ICD-9-CM: 530.81 12/10/2008     Gout (Chronic) ICD-10-CM: M10.9   ICD-9-CM: 274.9 12/10/2008     Pulmonary HTN (HCC) (Chronic) ICD-10-CM: I27.20   ICD-9-CM: 416.8 12/10/2008      Current Facility-Administered Medications   Medication Dose Route Frequency    milrinone (PRIMACOR) 20 MG/100 ML D5W infusion  0.2 mcg/kg/min IntraVENous CONTINUOUS    simethicone (MYLICON) tablet 80 mg  80 mg Oral QID PRN    polyethylene glycol (MIRALAX) packet 17 g  17 g Oral DAILY    senna-docusate (PERICOLACE) 8.6-50 mg per tablet 2 Tablet  2 Tablet Oral BID    albumin human 25% (BUMINATE) solution 12.5 g  12.5 g IntraVENous BID    insulin NPH (NOVOLIN N, HUMULIN N) injection 30 Units  30 Units SubCUTAneous DAILY    [Held by provider] bumetanide (BUMEX) 0.25 mg/mL infusion  1 mg/hr IntraVENous CONTINUOUS    digoxin (LANOXIN) tablet 0.0625 mg  0.0625 mg Oral DAILY    iron sucrose (VENOFER) 300 mg in 0.9% sodium chloride 250 mL IVPB  300 mg IntraVENous Q24H    ivabradine (CORLANOR) tablet 7.5 mg  7.5 mg Oral BID WITH MEALS    insulin NPH (NOVOLIN N, HUMULIN N) injection 30 Units  30 Units SubCUTAneous QHS    carvediloL (COREG) tablet 12.5 mg  12.5 mg Oral BID WITH MEALS    allopurinoL (ZYLOPRIM) tablet 50 mg  50 mg Oral DAILY    epoetin isabel-epbx (RETACRIT) injection 20,000 Units  20,000 Units SubCUTAneous Q TUE, THU & SAT    insulin lispro (HUMALOG) injection 10 Units  10 Units SubCUTAneous TIDAC    montelukast (SINGULAIR) tablet 10 mg  10 mg Oral DAILY    levothyroxine (SYNTHROID) tablet 100 mcg  100 mcg Oral Once per day on Mon Tue Wed Thu Fri Sat    cetirizine (ZYRTEC) tablet 10 mg  10 mg Oral DAILY    hydroxypropyl methylcellulose (ISOPTO TEARS) 0.5 % ophthalmic solution 1 Drop  1 Drop Both Eyes PRN    venlafaxine-SR (EFFEXOR-XR) capsule 75 mg  75 mg Oral DAILY WITH BREAKFAST    gabapentin (NEURONTIN) capsule 100 mg  100 mg Oral QHS    arformoteroL (BROVANA) neb solution 15 mcg  15 mcg Nebulization BID RT    And    budesonide (PULMICORT) 500 mcg/2 ml nebulizer suspension  500 mcg Nebulization BID RT    hydrOXYzine HCL (ATARAX) tablet 20 mg  20 mg Oral TID PRN    heparin (porcine) injection 5,000 Units  5,000 Units SubCUTAneous Q8H    sodium chloride (NS) flush 5-40 mL  5-40 mL IntraVENous Q8H    sodium chloride (NS) flush 5-40 mL  5-40 mL IntraVENous PRN    acetaminophen (TYLENOL) tablet 650 mg  650 mg Oral Q6H PRN    Or    acetaminophen (TYLENOL) suppository 650 mg  650 mg Rectal Q6H PRN    ondansetron (ZOFRAN ODT) tablet 4 mg  4 mg Oral Q8H PRN    Or    ondansetron (ZOFRAN) injection 4 mg  4 mg IntraVENous Q6H PRN    glucose chewable tablet 16 g  4 Tablet Oral PRN    dextrose (D50W) injection syrg 12.5-25 g  25-50 mL IntraVENous PRN    glucagon (GLUCAGEN) injection 1 mg  1 mg IntraMUSCular PRN    insulin lispro (HUMALOG) injection   SubCUTAneous AC&HS    albuterol-ipratropium (DUO-NEB) 2.5 MG-0.5 MG/3 ML  3 mL Nebulization Q6H PRN         Allergies   Allergen Reactions   · Ciprofloxacin Anaphylaxis   · Shellfish Derived Anaphylaxis   · Ace Inhibitors Unknown (comments)   · Biaxin [Clarithromycin] Other (comments)   Metal taste   · Candesartan Cough   · Pcn [Penicillins] Hives     Past Medical History:   Diagnosis Date   · Acquired hypothyroidism 8/15/2016   · Anemia   RED-HF study   · Asthma   · Cardiomyopathy, nonischemic ([facility]   initial dx 2001, bivHF 2008 with EF 15%, s/p biV-ICD 9/08, significant improvment in EF to 45-50%   · CKD (chronic kidney disease)   Dr Bruna Birmingham   · CKD (chronic kidney disease) 8/15/2012   · Depression   · Diabetes ([facility]   · Diabetic neuropathy ([facility]   · DM (diabetes mellitus) ([facility] 8/15/2012   · GERD (gastroesophageal reflux disease)   · Gout   · Hypothyroidism   · ICD (implantable cardioverter-defibrillator), biventricular, in situ 6/5/2014   · Psoriasis     Past Surgical History:   Procedure Laterality Date   · CARDIAC CATHETERIZATION 2007; 01/06/15   normal cors   · ECHO 2D ADULT 4/2010   EF 45%, improved from 1/09 (25%)   · ECHO 2D ADULT 11/2011   LVH, EF 55-60%   · HX ORTHOPAEDIC   knee   · HX PACEMAKER PLACEMENT   AICD   · STRESS TEST LEXISCAN/CARDIOLITE 3/21/12   normal perfusion, global HK 40%     Family History   Problem Relation Age of Onset   · Heart Disease Mother   · Hypertension Mother   · Lupus Sister   · Diabetes Brother     Social History     Tobacco Use   · Smoking status: Never Smoker   · Smokeless tobacco: Never Used   Substance Use Topics   · Alcohol use: No         Review of Systems: Review of all other systems otherwise negative. Constitutional: Negative for fever, chills, weight loss, + malaise/fatigue.    HEENT: Negative for nosebleeds, vision changes. Respiratory: Negative for cough, hemoptysis   Cardiovascular: Negative for chest pain, palpitations, orthopnea improved, no claudication, leg swelling, no syncope, and PND. + SOB   Gastrointestinal: Negative for nausea, vomiting, diarrhea, blood in stool and melena. Genitourinary: Negative for dysuria, and hematuria. Musculoskeletal: Negative for myalgias, arthralgia. Skin: Negative for rash. Heme: Does not bleed or bruise easily. Neurological: Negative for speech change and focal weakness       Objective:   Visit Vitals  /87   Pulse 99   Temp 98.6 °F (37 °C)   Resp 18   Ht 5' 7\" (1.702 m)   Wt 251 lb 8.7 oz (114.1 kg)   SpO2 94%   BMI 39.40 kg/m²         Physical Exam:   Constitutional: Well-developed and well-nourished. No respiratory distress. Head: Normocephalic and atraumatic. Eyes: Pupils are equal, round. ENT: Hearing grossly normal.   Neck: Supple. No JVD present. Cardiovascular: normal rate, regular rhythm. Exam reveals no gallop and no friction rub. 2/6 systolic LSB murmur. Pulmonary/Chest: Appears mildly SOB.  Breath sounds normal. No wheezes. Abdominal: Soft, no tenderness. Moderate obesity. Musculoskeletal: Moves extremities independently. Vasc/lymphatic: no bilat lower extremity edema. Neurological: Alert,oriented. Skin: left sided BIV ICD pocket unremarkable  Psychiatric: Normal mood and affect. Behavior is normal. Judgment and thought content normal.         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 RHC (12/10/2021): High wedge pressure (35 mmHg) indicating volume overload, precapillary.  Severe pulmonary HTN. Echo (12/08/2021): LVEF 15-20%, upper normal wall thickness, LV diastolic dysfunction.  Borderline low RVEF.  Mod dilated LA, mildly dilated RA.  Mild to mod MR. Rachel Congregation TR.  Mild to mod PH.       LHC/RHC (01/2015): No significant CAD. NICM:  No new LHC due to severe renal failure.  Not a candidate for hemodialysis per nephrology. Joleen Sanchez LHC in 2015 showed no significant CAD, same with the previous cath  Advanced CHF team needs ischemic work up. I have discussed with her several times and spoke to nephrologist   She is not candidate for hemodialysis but to be able to set her up for possible dual organ transplant I have asked Dr Wallie Goodpasture to consider to do left heart cath next week  She is on milrinone drip and heart failure team also asked for new right heart cath while on milrinone  She still needs to be here into next week for inpatient diuresis  bp is lower on bumex drip, Hydralazine and isordil on hold/stopped  LVEF now 15-20%. Zapien Jacob pulmonary HTN noted on last admission RHC.        Continue milrinone, but 0.2 mcg/kg/min now with Advanced Heart Failure team  possible double organ transplant referral      Nuclear amyloid scan equivocal.  cMRI is not possible with 4194 LV lead (2008). Medtronic biventricular ICD (gen change 01/14/2015, leads 09/25/2008): Device check showed proper lead & generator function.  Generator longevity still estimated 4 months.  RA 0.1%, CRT 97.6%. I will replace in the future as she will need Biv pacing       CKD: Nephrologist follows  Advanced CHF team wants to continue with bumex drip. Nephrologist thinks tid bumex may be ok. She is not candidate for hemodialysis     She has sinus tachycardia  HR is better with less milrinone and corlanor to 7.5 mg bid      Thank you for involving me in this patient's care and please call with further concerns or questions. Shen Pearson M.D.    Electrophysiology/Cardiology   [facility]   [address], [address], [place], [address] [place] [address]   [phone]

## 2022-01-14 NOTE — PROGRESS NOTES
2000 Report received from Khanh more [facility]. Patient alert and oriented, resting in bed. No needs expressed. Call bell within reach. Problem: Diabetes Self-Management  Goal: *Disease process and treatment process  Description: Define diabetes and identify own type of diabetes; list 3 options for treating diabetes. Outcome: Progressing Towards Goal     Problem: Heart Failure: Day 3  Goal: Medications  Outcome: Progressing Towards Goal     0735 Bedside shift change report given to Khanh more RN by Kwan Ruiz RN. Report included the following information SBAR, Kardex, MAR, Accordion, Recent Results and Cardiac Rhythm BiV Paced.

## 2022-01-15 NOTE — PROGRESS NOTES
Bedside shift change report given to ARTURO Rice (oncoming nurse) by Kevin Mauricio RN (offgoing nurse). Report included the following information SBAR, Kardex, ED Summary, Intake/Output, MAR, Accordion, Recent Results, Med Rec Status, Cardiac Rhythm Biventricular paced and Alarm Parameters .

## 2022-01-15 NOTE — PROGRESS NOTES
[facility] in [place], [place]  Inpatient Progress Note      Patient name: Real Fatima  Patient : 1955  Patient MRN: [id number]  Consulting MD: Alfonso Moore MD  Date of service: 01/15/22    REASON FOR REFERRAL:  Management of chronic systolic heart failure    PLAN OF CARE:   Etiology of new onset severe cardiomyopathy  · 79 y/o female with new onset severe cardiomyopathy, LVEF 15% (diagnosed 21), stage D, NYHA class IV; admitted for acute decompensation c/b pulmonary edema with hypoxia, acute hepatic and acute on chronic renal failure  · Remains in referactory pulmonary edema with hypoxia and making little progress with diuresis (only 3lbs weight loss x 7 days); goal weight is probably 230 lbs (she was severely volume overloaded at 244lbs by [address] 12/10/21, today 254lbs)  · Most likely etiology and factors that contributed to acute deterioration of LVEF is chronic volume overload/congestion from worsening renal function +/- tachycardia-mediated +/- possibly untreated SETH +/- other etiologies are also possible (needs ischemic workup, labs and genetics pending)    Use of inotropic therapy with milrinone  · Despite massive volume overload by [facility] 12/10/21, her cardiac performance was only marginally affected (CI 2.26), indicating primary hemodynamic issue being congestion and that inotropes would likely not be helpful  · Indeed, milrinone drip we started in hospital with intention to augment diuresis demonstrated no help with diuresis or symptoms  · We would not plan on discharging patient on inotropes home (a) index was near normal, (b) does not improve symptoms, potential toxicity now with PVCs, (c) it is contraindicated in volume overload due to progression to ESRD, (d) it is contraindicated with dialysis, unless (in extremely rare instances) patient was to be listed and wait for dual organ transplant on inotropes (then the preference is dobutamine not milrinone). Determination of terminal prognosis from heart failure  · I am not able to determine terminal prognosis from HF or confirm that heart failure is not recoverable to some degree due to (a) incomplete workup for potentially reversible causes of acute heart failure, e.g. coronary artery disease and (b) uncorrected factors that likely precipitated acute pump failure, volume overload and tachycardia (likely compensatory from hypervolemia and pump failure)    Patient request aggressive measures  · Patient understands she has severe heart and kidney failure, expressed wishes to be full code, proceed with aggressive measures and would like to be evaluated for dual organ transplant if she qualifies  · In general, regarding organ replacement therapies, we can perform initial evaluation here while she is inpatient; however from cardiac standpoint she would not be able to be considered until (a) she undergoes complete workup for potentially recoverable causes of heart failure, (b) completes at least 3 months of GDMT, HR < 100 and euvolemia to prove non-reversibility of HF, (c) improves muscular conditioning and BMI (< 40 for HT at [employer]) (d) there may be some psychosocial barriers to be addressed and compliance documented (we asked SW to see patient on Monday)  · Patient would not be a candidate for LVAD-DT due to renal failure  · Hemodynamically she does not meet criteria for Impella/IABP or inotropes; and there would be no exit strategy right now from this.      Recommendation from heart failure   · RHC/LHC on Monday  · If patient has clean cors but still volume overloaded and not able to tolerate decongestion hemodynamically, not candidate for dialysis or ultrafiltration as palliation or bridge to recovery, not transplant candidate then would vote for hospice care; will plan on family and palliative care meeting on Tuesday  · D/w Dr. Susana Malone     RECOMMENDATIONS:  Would recommend ischemic eval per cardiology for new drop in EF, last [facility] in 2015 when patient is more euvolemic  Repeat RHC- d/w Dr. Hamlet Collier current medical therapy for heart failure  Cont Milrinone 0.2mcg/kg/min to help with diuresis   Cont coreg 12.5mg BID- watch for psoriasis flair  No ACE/ARB/ARNI due to CKD  No MRA due to CKD- will discuss with nephrology   Stop hydralazine and isordil to allow more bp for BBrx  Cont low dose Digoxin for HR- dig level daily   Corlanor 7.5mg BID  Bumex on hold today- will adjust based on RHC results   Allopurinol 50mg daily for elevated uric acid   ICD interrogation per - functioning well, will need generator change before discharge per Dr. Mark Parker   Repeat TTE and EKG when euvolemic   Will need OP PSG  Wean O2 as able   Invitae pending   6 Min walk today- ordered placed   Palliative consult appreciated   Nutritionist consult  Heart failure education  Advanced care plan present on file     All other care per primary team     IMPRESSION:  Fatigue  Shortness of breath, on 3L NC  Volume overload  Acute on Chronic systolic heart failure  · Stage D, NYHA class IV symptoms  · Non-ischemic cardiomyopathy, LVEF 15%  · Normal coronaries  · PYP equivocal for amyloid   Pulmonary hypertension  S/p BiV-ICD  Cardiac risk factors:  · HL  · DM2  · Morbid obesity, BMI 40  Acute on chronic renal failure, stage IV  GERD  Anemia of chronic disease  Gout     Interval Events:  Adequate diuresis  Weight down 2#  Creatinine stable   PBNP trending down to 9100  States she feels better but remains SOB      LIFE GOALS:  Patient's personal goals include: being home with family, still ambulating around without getting too tired.   Important upcoming milestones or family events: none  The patient identifies the following as important for living well: remaining idependent and mobile; being able to get out of the house with . Sharron Andrews verbalized willingness to be on home milrinone and evaluation for both heart and kidney transplants.  Verbalizes she would have family supporting her decision on this.      HPI  Iqra Whiting is a 78 y.o.  female with a history of NICM, chronic hypoxic respiratory failure secondary to pulmonary HTN, hypothyroidism, CKD, GERD, and DM II who presented to [facility] as a transfer from another facility for acute on chronic hypoxic respiratory failure.  Reports running out of O2 at home resulting in SOB and dizziness.     Upon arrival to the ED she was found to have O2 sats in the 70s, requiring 4L NC O2.  Rapid covid test was negative.  ProNT-BNP was 42712, K+5.2, BUN/CR x/2.54 and elevated d-dimer. Chest xray showing pulmonary edema vs. Atypical pneumonia. VQ scan showed low probability for PE.     Per Dr. Terry Rosado, LVEF 15-20% during recent admission.  LVEF previously normalized with CRT.  NYHA III-IV.  No ACEi/ARB due to renal dysfunction.   GDMT dosing limited by low BP.     Patient's PCP is Dr. Rosaura Garces primarily for cardiac care due to Dr. Pepe Tapia historically seen by nephrology but has not had follow up care in the past three years.     CARDIAC IMAGING:  Echo 12/8/21- LVEF 15-20%, mild to Mod MR, LVIDd 5.09cm, TAPSE 1.94cm  Echo 4/22/19- LVEF 60%, trace MR,  LVIDd 4.24cm, TAPSE 1.65cm   Echo 4/24/18- LVEF 60%, trivial MR, LVIDd 4.79cm, TAPSE 2.25cm      EKG- 1/4/22 ST with A sense and V paced rhythm     [facility] 2015- No significant CAD  NST 2014- reversible LAD involvement      ICD interrogation     HEMODYNAMICS:  [facility] 12/10/21: PAP 76/48/57, RAP 20, PCWP 35, CI 2.26        CPEST not done  6MW not done     OTHER IMAGING:  CXR Results  (Last 48 hours)                    01/13/22 1035   XR CHEST PORT Final result     Impression:   No significant change in congestion and interstitial and alveolar   opacities which may reflect edema or infectious infiltrate.        Narrative:   EXAM: XR CHEST PORT       INDICATION: pul edema COMPARISON: Chest x-ray 1/10/2022. FINDINGS: A portable AP radiograph of the chest was obtained at 10:19 hours. The   patient is on a cardiac monitor. The lungs appear grossly stable with congestion   and interstitial/airspace opacities with no pneumothorax or pleural effusion. Right PICC line traverses expected course of tip in the region of the atriocaval   junction. Pacemaker-ICD generator body projects over the left chest wall with   intact appearing leads traversing in expected course. . The cardiac and   mediastinal contours and pulmonary vascularity are normal.  Atherosclerotic   calcifications affect the aortic arch. The chest wall structures and visualized   upper abdomen show no acute findings with incidental note of degenerative spine   and shoulder changes.                           PHYSICAL EXAM:  Visit Vitals  BP (!) 111/90 (BP 1 Location: Left upper arm, BP Patient Position: At rest)   Pulse 97   Temp 97.8 °F (36.6 °C)   Resp 20   Ht 5' 7\" (1.702 m)   Wt 251 lb 8.7 oz (114.1 kg)   SpO2 98%   BMI 39.40 kg/m²      Physical Exam  Vitals and nursing note reviewed. Constitutional:       General: She is not in acute distress. Appearance: Normal appearance. She is obese. Cardiovascular:      Rate and Rhythm: Regular rhythm. Tachycardia present. Pulses: Normal pulses. Heart sounds: Normal heart sounds. No murmur heard.       Pulmonary:      Effort: Pulmonary effort is normal. No respiratory distress. Abdominal:      General: There is no distension. Musculoskeletal:         General: No swelling. Skin:     General: Skin is warm and dry. Findings: Lesion present. Comments: psorasis   Neurological:      General: No focal deficit present. Mental Status: She is alert and oriented to person, place, and time. Psychiatric:         Mood and Affect: Mood normal.            REVIEW OF SYSTEMS:  Review of Systems   Constitutional: Positive for malaise/fatigue.  Negative for chills and fever. Respiratory: Positive for shortness of breath. Cardiovascular: Negative for chest pain, palpitations, orthopnea and leg swelling. Gastrointestinal: Negative for heartburn and nausea. Genitourinary: Negative for dysuria. Musculoskeletal: Negative for falls, joint pain and myalgias. Neurological: Negative for dizziness. Psychiatric/Behavioral: Positive for depression. The patient is nervous/anxious. PHYSICAL EXAM:  Visit Vitals  /82 (BP 1 Location: Left upper arm, BP Patient Position: At rest)   Pulse (!) 119   Temp 97.9 °F (36.6 °C)   Resp 20   Ht 5' 7\" (1.702 m)   Wt 254 lb 3.1 oz (115.3 kg)   SpO2 96%   BMI 39.81 kg/m²     General: Patient is well developed, well-nourished in no acute distress  HEENT: Normocephalic and atraumatic. No scleral icterus. Pupils are equal, round and reactive to light and accomodation. No conjunctival injection. Oropharynx is clear. Neck: Supple. No evidence of thyroid enlargements or lymphadenopathy. JVD: Cannot be appreciated   Lungs: Breath sounds are equal and clear bilaterally. No wheezes, rhonchi, or rales. Heart: Regular rate and rhythm with normal S1 and S2. No murmurs, gallops or rubs. Abdomen: Soft, no mass or tenderness. No organomegaly or hernia. Bowel sounds present. Genitourinary and rectal: deferred  Extremities: No cyanosis, clubbing, or edema. Neurologic: No focal sensory or motor deficits are noted. Grossly intact. Psychiatric: Awake, alert an doriented x 3. Appropriate mood and affect. Skin: Warm, dry and well perfused. No lesions, nodules or rashes are noted. REVIEW OF SYSTEMS:  General: Denies fever, night sweats.   Ear, nose and throat: Denies difficulty hearing, sinus problems, runny nose, post-nasal drip, ringing in ears, mouth sores, loose teeth, ear pain, nosebleeds, sore throate, facial pain or numbess  Cardiovascular: see above in the interval history  Respiratory: Denies cough, wheezing, sputum production, hemoptysis.   Gastrointestinal: Denies heartburn, constipation, intolerance to certain foods, diarrhea, abdominal pain, nausea, vomiting, difficulty swallowing, blood in stool  Kidney and bladder: Denies painful urination, frequent urination, urgency, prostate problems and impotence  Musculoskeletal: Denies joint pain, muscle weakness  Skin and hair: Denies change in existing skin lesions, hair loss or increase, breast changes    PAST MEDICAL HISTORY:  Past Medical History:   Diagnosis Date    Acquired hypothyroidism 8/15/2016    Anemia     RED-HF study    Asthma     Cardiomyopathy, nonischemic ([facility] [address]     initial dx 2001, bivHF 2008 with EF 15%, s/p biV-ICD 9/08, significant improvment in EF to 45-50%    CKD (chronic kidney disease)     Dr Yuliya August    CKD (chronic kidney disease) 8/15/2012    Depression     Diabetes ([facility] [facility]     Diabetic neuropathy ([facility] [address]     [facility] 8/15/2012    GERD (gastroesophageal reflux disease)     Gout     Hypothyroidism     ICD (implantable cardioverter-defibrillator), biventricular, in situ 6/5/2014    Psoriasis        PAST SURGICAL HISTORY:  Past Surgical History:   Procedure Laterality Date    CARDIAC CATHETERIZATION  2007; 01/06/15    normal cors    ECHO 2D ADULT  4/2010    EF 45%, improved from 1/09 (25%)    ECHO 2D ADULT  11/2011    LVH, EF 55-60%    HX ORTHOPAEDIC      knee    HX PACEMAKER PLACEMENT      AICD    STRESS TEST LEXISCAN/CARDIOLITE  3/21/12    normal perfusion, global HK 40%       FAMILY HISTORY:  Family History   Problem Relation Age of Onset    Heart Disease Mother     Hypertension Mother     Lupus Sister     Diabetes Brother        SOCIAL HISTORY:  Social History     Socioeconomic History    Marital status:    Tobacco Use    Smoking status: Never Smoker    Smokeless tobacco: Never Used   Substance and Sexual Activity    Alcohol use: No    Drug use: No    Sexual activity: Never   Social History Narrative . Nonsmoker. Disability       LABORATORY RESULTS:     Labs Latest Ref Rng & Units 1/15/2022 1/14/2022 1/13/2022 1/12/2022 1/12/2022 1/11/2022 1/10/2022   WBC 3.6 - 11.0 K/uL 8.5 8.0 7.2 - 8.8 9.0 -   RBC 3.80 - 5.20 M/uL 3.39(L) 3.34(L) 3.32(L) - 3.46(L) 3.52(L) -   Hemoglobin 11.5 - 16.0 g/dL 8. 3(L) 8.0(L) 8.0(L) - 8. 3(L) 8.5(L) -   Hematocrit 35.0 - 47.0 % 31. 0(L) 30. 7(L) 30. 7(L) - 31. 8(L) 32. 6(L) -   MCV 80.0 - 99.0 FL 91.4 91.9 92.5 - 91.9 92.6 -   Platelets 068 - 394 K/uL 328 283 259 - 269 331 -   Lymphocytes 12 - 49 % - - - - - - -   Monocytes 5 - 13 % - - - - - - -   Eosinophils 0 - 7 % - - - - - - -   Basophils 0 - 1 % - - - - - - -   Albumin 3.5 - 5.0 g/dL 3.2(L) 3.0(L) 2. 7(L) 2. 9(L) 2. 5(L) 2. 6(L) -   Calcium 8.5 - 10.1 MG/DL 9.9 9.6 9.3 9.4 9.2 9.5 9.0   Glucose 65 - 100 mg/dL 168(H) 165(H) 118(H) 242(H) 204(H) 84 144(H)   BUN 6 - 20 MG/DL 55(H) 54(H) 51(H) 53(H) 54(H) 59(H) 71(H)   Creatinine 0.55 - 1.02 MG/DL 2.69(H) 2.38(H) 2.27(H) 2.33(H) 2.32(H) 2.36(H) 2.62(H)   Sodium 136 - 145 mmol/L 134(L) 135(L) 136 136 137 138 137   Potassium 3.5 - 5.1 mmol/L 4.1 3.7 3.6 4.5 4.1 3.9 4.2   TSH 0.36 - 3.74 uIU/mL - - - - - 3.08 -   Some recent data might be hidden     Lab Results   Component Value Date/Time    TSH 3.08 01/11/2022 05:13 AM    TSH 1.85 12/15/2021 01:06 PM    TSH 1.01 11/05/2020 09:11 AM    TSH 2.740 04/30/2019 11:21 AM    TSH 0.519 02/21/2012 10:53 AM    TSH 8.29 (H) 01/20/2010 01:59 PM       ALLERGY:  Allergies   Allergen Reactions    Ciprofloxacin Anaphylaxis    Shellfish Derived Anaphylaxis    Ace Inhibitors Unknown (comments)    Biaxin [Clarithromycin] Other (comments)     Metal taste    Candesartan Cough    Pcn [Penicillins] Hives        CURRENT MEDICATIONS:    Current Facility-Administered Medications:     milrinone (PRIMACOR) 20 MG/100 ML D5W infusion, 0.2 mcg/kg/min, IntraVENous, CONTINUOUS, Amber Weaver, NP, Last Rate: 6.8 mL/hr at 01/15/22 0935, 0.2 mcg/kg/min at 01/15/22 0935    insulin NPH (NOVOLIN N, HUMULIN N) injection 35 Units, 35 Units, SubCUTAneous, DAILY, Ramone Barker MD, 35 Units at 01/15/22 0925    insulin NPH (NOVOLIN N, HUMULIN N) injection 20 Units, 20 Units, SubCUTAneous, QHS, Ramone Barker MD, 20 Units at 01/14/22 2108    simethicone (MYLICON) tablet 80 mg, 80 mg, Oral, QID PRN, aRmone Barker MD, 80 mg at 01/14/22 2107    polyethylene glycol (MIRALAX) packet 17 g, 17 g, Oral, DAILY, Ramone Barker MD, 17 g at 01/15/22 4237    senna-docusate (PERICOLACE) 8.6-50 mg per tablet 2 Tablet, 2 Tablet, Oral, BID, Ramone Barker MD, 2 Tablet at 01/15/22 0924    albumin human 25% (BUMINATE) solution 12.5 g, 12.5 g, IntraVENous, BID, Owen, Amber B, NP, 12.5 g at 01/15/22 0923    [Held by provider] bumetanide (BUMEX) 0.25 mg/mL infusion, 1 mg/hr, IntraVENous, CONTINUOUS, Owen, Amber B, NP, Last Rate: 4 mL/hr at 01/13/22 2033, 1 mg/hr at 01/13/22 2033    digoxin (LANOXIN) tablet 0.0625 mg, 0.0625 mg, Oral, DAILY, Owen, Amber B, NP, 0.0625 mg at 01/15/22 0924    ivabradine (CORLANOR) tablet 7.5 mg, 7.5 mg, Oral, BID WITH MEALS, Jay Daniels MD, 7.5 mg at 01/15/22 0934    carvediloL (COREG) tablet 12.5 mg, 12.5 mg, Oral, BID WITH MEALS, Owen, Amber B, NP, 12.5 mg at 01/15/22 0924    allopurinoL (ZYLOPRIM) tablet 50 mg, 50 mg, Oral, DAILY, Owen, Amber B, NP, 50 mg at 01/15/22 0924    epoetin isabel-epbx (RETACRIT) injection 20,000 Units, 20,000 Units, SubCUTAneous, Q TUE, THU & SAT, Evgeny Lopez MD, 20,000 Units at 01/13/22 2157    montelukast (SINGULAIR) tablet 10 mg, 10 mg, Oral, DAILY, Blanca Hutchinson MD, 10 mg at 01/15/22 2178    levothyroxine (SYNTHROID) tablet 100 mcg, 100 mcg, Oral, Once per day on Mon Tue Wed Thu Fri Sat, Lisa Horne MD, 100 mcg at 01/15/22 0702    cetirizine (ZYRTEC) tablet 10 mg, 10 mg, Oral, DAILY, Lisa Horne MD, 10 mg at 01/15/22 0925    hydroxypropyl methylcellulose (ISOPTO TEARS) 0.5 % ophthalmic solution 1 Drop, 1 Drop, Both Eyes, PRN, Lisa Horne MD, 1 Drop at 01/06/22 1727    venlafaxine-SR (EFFEXOR-XR) capsule 75 mg, 75 mg, Oral, DAILY WITH BREAKFAST, Lisa Horne MD, 75 mg at 01/15/22 0949    gabapentin (NEURONTIN) capsule 100 mg, 100 mg, Oral, QHS, Lisa Horne MD, 100 mg at 01/14/22 2107    arformoteroL (BROVANA) neb solution 15 mcg, 15 mcg, Nebulization, BID RT, 15 mcg at 01/14/22 1934 **AND** budesonide (PULMICORT) 500 mcg/2 ml nebulizer suspension, 500 mcg, Nebulization, BID RT, Halle Graves MD, 500 mcg at 01/14/22 1934    hydrOXYzine HCL (ATARAX) tablet 20 mg, 20 mg, Oral, TID PRN, Cristiana Cool MD    heparin (porcine) injection 5,000 Units, 5,000 Units, SubCUTAneous, Q8H, Janak Galvez MD, 5,000 Units at 01/15/22 0500    sodium chloride (NS) flush 5-40 mL, 5-40 mL, IntraVENous, Q8H, Cristiana Cool MD, 10 mL at 01/15/22 0600    sodium chloride (NS) flush 5-40 mL, 5-40 mL, IntraVENous, PRN, Cristiana Cool MD    acetaminophen (TYLENOL) tablet 650 mg, 650 mg, Oral, Q6H PRN **OR** acetaminophen (TYLENOL) suppository 650 mg, 650 mg, Rectal, Q6H PRN, Cristiana Cool MD    ondansetron (ZOFRAN ODT) tablet 4 mg, 4 mg, Oral, Q8H PRN **OR** ondansetron (ZOFRAN) injection 4 mg, 4 mg, IntraVENous, Q6H PRN, Cristiana Cool MD, 4 mg at 01/05/22 0915    glucose chewable tablet 16 g, 4 Tablet, Oral, PRN, Cristiana Cool MD    dextrose (D50W) injection syrg 12.5-25 g, 25-50 mL, IntraVENous, PRN, Cristiana Cool MD    glucagon (GLUCAGEN) injection 1 mg, 1 mg, IntraMUSCular, PRN, Cristiana Cool MD    insulin lispro (HUMALOG) injection, , SubCUTAneous, AC&HS, Rosemarie Weldon MD, 2 Units at 01/15/22 0925    albuterol-ipratropium (DUO-NEB) 2.5 MG-0.5 MG/3 ML, 3 mL, Nebulization, Q6H PRN, Cristiana Cool MD    PATIENT CARE TEAM:  Patient Care Team:  Kenya Oliveira MD as PCP - General (Internal Medicine)  Kenya Oliveira MD as PCP - [facility]  Mayelin Gotti MD as Consulting Provider (Internal Medicine)  Pernell Bennett MD (Dermatology)  Aniket Pinon MD (Nephrology)  Antonio Urena MD as Consulting Provider (Cardiology)  Vadim Hernández MD (Cardiology)  Alex Araujo MD as Consulting Provider (Pulmonary Disease)     Thank you for allowing me to participate in this patient's care.     Ray Hernandez MD PhD  [address] [place] [address], [address]  Phone: [phone]  Fax: [phone]

## 2022-01-15 NOTE — PROGRESS NOTES
[facility]   [address] [facility], [id number] [place] [address]  Phone: [phone]   [phone]    [email or web address]     Nephrology Progress Note    Patient Name : Jasmine Robbins      : 1955     MRN : [id number]  Date of Admission : 2022  Date of Servive : 01/15/22    CC:  Follow up for ARF       Assessment and Plan   RODNEY on CKD :  - 2/2 CRS  - Cr back to baseline now   - adjust diuretics post RHC   - Poor candidate for dialysis. D/w pt multiple times     CKD stage IV:  - Etiology: DM, HTN, CRS  - Renal US: suggestive of CKD, B/L benign renal cysts   - baseline Cr 2.4-2.6 mg/dl      Acute on chronic HFrEF  NI CMP, LVEF 15 to 20%  NYHA class III-IV  S/p BiV pacer/ICD-s/p CRT  RHC 12/10/2021: PCWP 34, PA P 80  - On Milrinone and Corlanor     Morbid obesity  Type II DM  HTN  Hypothyroidism  Pulmonary hypertension  Gout  Psoriasis        Interval History:  Seen and examined. Cr mildy up   UOP not recorded   BP low but stable     Review of Systems: A comprehensive review of systems was negative except for that written in the HPI.     Current Medications:   Current Facility-Administered Medications   Medication Dose Route Frequency    triamcinolone acetonide (KENALOG) 0.1 % cream   Topical BID    milrinone (PRIMACOR) 20 MG/100 ML D5W infusion  0.2 mcg/kg/min IntraVENous CONTINUOUS    insulin NPH (NOVOLIN N, HUMULIN N) injection 35 Units  35 Units SubCUTAneous DAILY    insulin NPH (NOVOLIN N, HUMULIN N) injection 20 Units  20 Units SubCUTAneous QHS    simethicone (MYLICON) tablet 80 mg  80 mg Oral QID PRN    polyethylene glycol (MIRALAX) packet 17 g  17 g Oral DAILY    senna-docusate (PERICOLACE) 8.6-50 mg per tablet 2 Tablet  2 Tablet Oral BID    albumin human 25% (BUMINATE) solution 12.5 g  12.5 g IntraVENous BID    [Held by provider] bumetanide (BUMEX) 0.25 mg/mL infusion  1 mg/hr IntraVENous CONTINUOUS    digoxin (LANOXIN) tablet 0.0625 mg  0.0625 mg Oral DAILY    ivabradine (CORLANOR) tablet 7.5 mg  7.5 mg Oral BID WITH MEALS    carvediloL (COREG) tablet 12.5 mg  12.5 mg Oral BID WITH MEALS    allopurinoL (ZYLOPRIM) tablet 50 mg  50 mg Oral DAILY    epoetin isabel-epbx (RETACRIT) injection 20,000 Units  20,000 Units SubCUTAneous Q TUE, THU & SAT    montelukast (SINGULAIR) tablet 10 mg  10 mg Oral DAILY    levothyroxine (SYNTHROID) tablet 100 mcg  100 mcg Oral Once per day on Mon Tue Wed Thu Fri Sat    cetirizine (ZYRTEC) tablet 10 mg  10 mg Oral DAILY    hydroxypropyl methylcellulose (ISOPTO TEARS) 0.5 % ophthalmic solution 1 Drop  1 Drop Both Eyes PRN    venlafaxine-SR (EFFEXOR-XR) capsule 75 mg  75 mg Oral DAILY WITH BREAKFAST    gabapentin (NEURONTIN) capsule 100 mg  100 mg Oral QHS    arformoteroL (BROVANA) neb solution 15 mcg  15 mcg Nebulization BID RT    And    budesonide (PULMICORT) 500 mcg/2 ml nebulizer suspension  500 mcg Nebulization BID RT    hydrOXYzine HCL (ATARAX) tablet 20 mg  20 mg Oral TID PRN    heparin (porcine) injection 5,000 Units  5,000 Units SubCUTAneous Q8H    sodium chloride (NS) flush 5-40 mL  5-40 mL IntraVENous Q8H    sodium chloride (NS) flush 5-40 mL  5-40 mL IntraVENous PRN    acetaminophen (TYLENOL) tablet 650 mg  650 mg Oral Q6H PRN    Or    acetaminophen (TYLENOL) suppository 650 mg  650 mg Rectal Q6H PRN    ondansetron (ZOFRAN ODT) tablet 4 mg  4 mg Oral Q8H PRN    Or    ondansetron (ZOFRAN) injection 4 mg  4 mg IntraVENous Q6H PRN    glucose chewable tablet 16 g  4 Tablet Oral PRN    dextrose (D50W) injection syrg 12.5-25 g  25-50 mL IntraVENous PRN    glucagon (GLUCAGEN) injection 1 mg  1 mg IntraMUSCular PRN    insulin lispro (HUMALOG) injection   SubCUTAneous AC&HS    albuterol-ipratropium (DUO-NEB) 2.5 MG-0.5 MG/3 ML  3 mL Nebulization Q6H PRN      Allergies   Allergen Reactions    Ciprofloxacin Anaphylaxis    Shellfish Derived Anaphylaxis    Ace Inhibitors Unknown (comments)    Biaxin [Clarithromycin] Other (comments)     Metal taste    Candesartan Cough    Pcn [Penicillins] Hives       Objective:  Vitals:    Vitals:    01/15/22 0711 01/15/22 0935 01/15/22 1000 01/15/22 1200   BP: 102/82   114/80   Pulse: 98 (!) 119 (!) 113 99   Resp: 20   18   Temp: 97.9 °F (36.6 °C)   97.9 °F (36.6 °C)   SpO2: 97% 96%  96%   Weight:       Height:         Intake and Output:  01/15 0701 - 01/15 1900  In: 607.8 [P.O.:300; I.V.:307.8]  Out: 400 [Urine:400]  01/13 1901 - 01/15 0700  In: 2021.7 [P.O.:720; I.V.:1301.7]  Out: 900 [Urine:900]    Physical Examination:    General: Obese   Neck:  Supple, no mass  Resp:  Rales +  CV:  tachy  GI:  Soft, NT, + BS, no HS megaly  Neurologic:  Non focal    []    High complexity decision making was performed  []    Patient is at high-risk of decompensation with multiple organ involvement    Lab Data Personally Reviewed: I have reviewed all the pertinent labs, microbiology data and radiology studies during assessment.     Recent Labs     01/15/22  0514 01/14/22  0023 01/13/22  0426 01/12/22  1956   * 135* 136 136   K 4.1 3.7 3.6 4.5   CL 96* 98 99 99   CO2 35* 33* 34* 34*   * 165* 118* 242*   BUN 55* 54* 51* 53*   CREA 2.69* 2.38* 2.27* 2.33*   CA 9.9 9.6 9.3 9.4   MG  --  1.9 2.0  --    PHOS  --   --   --  3.6   ALB 3.2* 3.0* 2.7* 2.9*   ALT 37 48 58  --      Recent Labs     01/15/22  0514 01/14/22  0023 01/13/22  0426   WBC 8.5 8.0 7.2   HGB 8.3* 8.0* 8.0*   HCT 31.0* 30.7* 30.7*    283 259     Lab Results   Component Value Date/Time    Specimen Description: URINE 10/22/2013 01:32 PM    Specimen Description: URINE 01/23/2013 04:40 PM    Specimen Description: LEG TISSUE 02/12/2009 12:00 AM    Specimen Description: LEG (LEFT) 01/29/2009 03:06 AM     Lab Results   Component Value Date/Time    Culture result: MIXED SKIN ROSANNA ISOLATED 10/22/2013 01:32 PM    Culture result:  01/23/2013 04:40 PM     ENTEROCOCCUS FAECALIS GROUP D  MIXED SKIN ROSANNA ISOLATED Culture result:  02/12/2009 12:00 AM     LIGHT STAPHYLOCOCCUS EPIDERMIDIS (OXACILLIN RESISTANT)  LIGHT 2ND STRAIN OF STAPHYLOCOCCUS EPIDERMIDIS (OXACILLIN RESISTANT)    Culture result: NO GROWTH 2 DAYS 01/29/2009 03:06 AM     Recent Results (from the past 24 hour(s))   GLUCOSE, POC    Collection Time: 01/14/22  4:51 PM   Result Value Ref Range    Glucose (POC) 264 (H) 65 - 117 mg/dL    Performed by Cristiana Costello    GLUCOSE, POC    Collection Time: 01/14/22  9:12 PM   Result Value Ref Range    Glucose (POC) 285 (H) 65 - 117 mg/dL    Performed by Jordin Bar    METABOLIC PANEL, COMPREHENSIVE    Collection Time: 01/15/22  5:14 AM   Result Value Ref Range    Sodium 134 (L) 136 - 145 mmol/L    Potassium 4.1 3.5 - 5.1 mmol/L    Chloride 96 (L) 97 - 108 mmol/L    CO2 35 (H) 21 - 32 mmol/L    Anion gap 3 (L) 5 - 15 mmol/L    Glucose 168 (H) 65 - 100 mg/dL    BUN 55 (H) 6 - 20 MG/DL    Creatinine 2.69 (H) 0.55 - 1.02 MG/DL    BUN/Creatinine ratio 20 12 - 20      GFR est AA 21 (L) >60 ml/min/1.73m2    GFR est non-AA 18 (L) >60 ml/min/1.73m2    Calcium 9.9 8.5 - 10.1 MG/DL    Bilirubin, total 0.6 0.2 - 1.0 MG/DL    ALT (SGPT) 37 12 - 78 U/L    AST (SGOT) 11 (L) 15 - 37 U/L    Alk.  phosphatase 69 45 - 117 U/L    Protein, total 7.5 6.4 - 8.2 g/dL    Albumin 3.2 (L) 3.5 - 5.0 g/dL    Globulin 4.3 (H) 2.0 - 4.0 g/dL    A-G Ratio 0.7 (L) 1.1 - 2.2     CBC W/O DIFF    Collection Time: 01/15/22  5:14 AM   Result Value Ref Range    WBC 8.5 3.6 - 11.0 K/uL    RBC 3.39 (L) 3.80 - 5.20 M/uL    HGB 8.3 (L) 11.5 - 16.0 g/dL    HCT 31.0 (L) 35.0 - 47.0 %    MCV 91.4 80.0 - 99.0 FL    MCH 24.5 (L) 26.0 - 34.0 PG    MCHC 26.8 (L) 30.0 - 36.5 g/dL    RDW 20.0 (H) 11.5 - 14.5 %    PLATELET 555 841 - 010 K/uL    MPV 9.2 8.9 - 12.9 FL    NRBC 0.0 0  WBC    ABSOLUTE NRBC 0.00 0.00 - 0.01 K/uL   DIGOXIN    Collection Time: 01/15/22  5:14 AM   Result Value Ref Range    Digoxin level 0.5 (L) 0.90 - 2.00 NG/ML   GLUCOSE, POC    Collection Time: 01/15/22  8:39 AM   Result Value Ref Range    Glucose (POC) 194 (H) 65 - 117 mg/dL    Performed by Roxanne CADENA    GLUCOSE, POC    Collection Time: 01/15/22 11:41 AM   Result Value Ref Range    Glucose (POC) 231 (H) 65 - 117 mg/dL    Performed by Roxanne CADENA            I have reviewed the flowsheets. Chart and Pertinent Notes have been reviewed. No change in PMH ,family and social history from Consult note.       Emelyn Zaman [facility]

## 2022-01-15 NOTE — PROGRESS NOTES
[address] [facility]  Hospitalist Group                                                                                          Hospitalist Progress Note  Karen Villareal MD  Answering service: [phone] or 36 from in house phone        Date of Service:  1/15/2022  NAME:  Marcela Murray  :  1955  MRN:  [id number]      Admission Summary:     Marcela Murray is a 78 y.o. female with hx NICM, chronic hypoxic respiratory failure 2/2 pulmonary htn, ckd, hypothyroidism, GERD, and  DM II who presents as a transfer from [facility] for acute on chronic hypoxic respiratory failure. She wears 3L o2 at baseline, and reportedly ran out of her home oxygen.     In the ED, she was hypoxic to th 70's, with improvement ot 94% on 4Lnc. Labs showed stable anemia with Hgb 10, K+ 5.2, lactic 3.1, trop 55>66, d-dimer >35, creatinine 2.54, and probnp 15,826. CXR showed diffuse interstitial airway disease. Rapid covid was negative.     In the ED, she was started on a heparin gtt, and sent to [facility] for VQ scan. She received bumex and nitropaste. Interval history / Subjective:      No complaints, breathing better but is on 5L O2   Discussed psoriasis and restarting home meds for this  Cardiology planning for possible cath or generator change next week    Assessment & Plan:     Acute on chronic respiratory failure with oxygen- Due to CHF  COVID-19 negative. Mild leukocytosis. Afebrile. VQ scan low probability for PE. Acute on chronic systolic heart failure  NYHA class IV. Status post ICD placement. LVEF 15 to 20%. proBNP [id number]. Cont IV milrinone , Right arm PICC  Not a candidate for ARB/ACE due to renal insufficiency. Cardiac diet, strict I/O, daily weight. Low-sodium diet. Cardiology considering generator change or heart cath early next week    RODNEY on CKD stage IV felt to be due to cardiorenal syndrome  Creatinine slightly above baseline,  Monitor volume status electrolytes  2022.  Renal ultrasound negative for hydronephrosis. Small bilateral renal cysts. Avoid nephrotoxic meds, renally dose medications. Nephrology recommendations noted. Hyperkalemia  Resolved, hyperkalemia protocol. Daily BMP. Low potassium diet. Hyponatremia- mild- monitor likely diuretic induced. Daily BMP, monitor volume status and electrolytes. Leukocytosis-resolved  CXR positive for interstitial airspace disease likely pulmonary edema or atypical pneumonia. Afebrile. Respiratory panel negative for COVID-19. Monitor closely, start on empiric antibiotics if any sign of infection. Type 2 diabetes. Adjusted insulin regimen due to hypoglycemia (reduced NPH )    Hypothyroidism -cont Synthroid. Depression- stable on current meds. Overweight- BMI=39.7    Code status: Full Code  DVT prophylaxis: heparin     Care Plan discussed with: Patient/Family, Nurse and   Anticipated Disposition: TBD     Hospital Problems  Date Reviewed: 12/22/2021          Codes Class Noted POA    Acute respiratory failure with hypoxia [facility]) ICD-10-CM: J96.01  ICD-9-CM: 518.81  1/4/2022 Unknown              Vital Signs:    Last 24hrs VS reviewed since prior progress note.  Most recent are:  Visit Vitals  /82 (BP 1 Location: Left upper arm, BP Patient Position: At rest)   Pulse (!) 113   Temp 97.9 °F (36.6 °C)   Resp 20   Ht 5' 7\" (1.702 m)   Wt 115.3 kg (254 lb 3.1 oz)   SpO2 96%   BMI 39.81 kg/m²     Patient Vitals for the past 24 hrs:   Temp Pulse Resp BP SpO2   01/15/22 1000  (!) 113      01/15/22 0935  (!) 119   96 %   01/15/22 0711 97.9 °F (36.6 °C) 98 20 102/82 97 %   01/15/22 0600  (!) 105      01/15/22 0400 98 °F (36.7 °C) 99 18 (!) 129/111 100 %   01/15/22 0359  (!) 101      01/15/22 0200  100      01/15/22 0019 98 °F (36.7 °C) 100 18 122/82 95 %   01/14/22 2155  99      01/14/22 2000  98      01/14/22 1934     93 %   01/14/22 1900 98.2 °F (36.8 °C) (!) 104 22 109/80 95 %   01/14/22 1800  (!) 110      01/14/22 1749  (!) 105  (!) 120/94    01/14/22 1604 97.5 °F (36.4 °C) 100 18 104/81 99 %   01/14/22 1400  99      01/14/22 1200  100      01/14/22 1132 97.8 °F (36.6 °C) 97 20 (!) 111/90 98 %       Intake/Output Summary (Last 24 hours) at 1/15/2022 1103  Last data filed at 1/15/2022 0935  Gross per 24 hour   Intake 847.76 ml   Output 400 ml   Net 447.76 ml        Physical Examination:     I had a face to face encounter with this patient and independently examined them on 1/15/2022 as outlined below:          Constitutional:  No acute distress, cooperative, pleasant    ENT:  Oral mucosa moist, O2 via NC    Resp:  CTA b/l diminished globally. No wheezing/rhonchi/rales. No accessory muscle use   CV:  tachycardia, no murmurs, gallops, rubs    GI:  Soft, non distended, non tender. normoactive bowel sounds     Musculoskeletal:  Bilateral pretibial and pedal edema    Neurologic:  no focal neuro deficits            Data Review:    Review and/or order of clinical lab test  Review and/or order of tests in the radiology section of CPT  Review and/or order of tests in the medicine section of CPT  12/8/21 Echo Findings    Left Ventricle Normal cavity size. Upper normal wall thickness. The estimated EF is 15 - 20%. Severely reduced systolic function. There is left ventricular diastolic dysfunction. Left Atrium Moderately dilated left atrium. Interatrial Septum Interatrial septum not well visualized   Right Ventricle Normal cavity size. Borderline low systolic function. Pacer/ICD present. Right Atrium Mildly dilated right atrium. Aortic Valve No stenosis and no regurgitation. Aortic valve sclerosis. Mitral Valve No stenosis. Mitral valve non-specific thickening. Mild to moderate regurgitation. Tricuspid Valve Normal valve structure and no stenosis. Mild regurgitation. Pulmonic Valve Pulmonic valve not well visualized, but normal doppler findings.    Pulmonary Artery Pulmonary arterial systolic pressure (PASP) is 47 mmHg. Pulmonary hypertension found to be mild to moderate. Aorta Normal aortic root. Pericardium No evidence of pericardial effusion. Labs:     Recent Labs     01/15/22  0514 01/14/22  0023   WBC 8.5 8.0   HGB 8.3* 8.0*   HCT 31.0* 30.7*    283     Recent Labs     01/15/22  0514 01/14/22  0023 01/13/22  0426 01/12/22  1956 01/12/22  1956 01/12/22  1312   * 135* 136   < > 136  --    K 4.1 3.7 3.6   < > 4.5  --    CL 96* 98 99   < > 99  --    CO2 35* 33* 34*   < > 34*  --    BUN 55* 54* 51*   < > 53*  --    CREA 2.69* 2.38* 2.27*   < > 2.33*  --    * 165* 118*   < > 242*  --    CA 9.9 9.6 9.3   < > 9.4  --    MG  --  1.9 2.0  --   --  2.2   PHOS  --   --   --   --  3.6  --     < > = values in this interval not displayed. Recent Labs     01/15/22  0514 01/14/22  0023 01/13/22  0426   ALT 37 48 58   AP 69 65 67   TBILI 0.6 0.6 0.5   TP 7.5 7.2 7.0   ALB 3.2* 3.0* 2.7*   GLOB 4.3* 4.2* 4.3*     No results for input(s): INR, PTP, APTT, INREXT, INREXT in the last 72 hours. No results for input(s): FE, TIBC, PSAT, FERR in the last 72 hours. No results found for: FOL, RBCF   No results for input(s): PH, PCO2, PO2 in the last 72 hours.   Recent Labs     01/13/22 0426   CPK 49     Lab Results   Component Value Date/Time    Cholesterol, total 151 01/11/2022 05:13 AM    HDL Cholesterol 60 01/11/2022 05:13 AM    LDL, calculated 75.8 01/11/2022 05:13 AM    Triglyceride 76 01/11/2022 05:13 AM    CHOL/HDL Ratio 2.5 01/11/2022 05:13 AM     Lab Results   Component Value Date/Time    Glucose (POC) 194 (H) 01/15/2022 08:39 AM    Glucose (POC) 285 (H) 01/14/2022 09:12 PM    Glucose (POC) 264 (H) 01/14/2022 04:51 PM    Glucose (POC) 204 (H) 01/14/2022 01:08 PM    Glucose (POC) 202 (H) 01/14/2022 12:49 PM     Lab Results   Component Value Date/Time    Color YELLOW/STRAW 01/11/2022 02:53 PM    Appearance CLEAR 01/11/2022 02:53 PM    Specific gravity 1.010 01/11/2022 02:53 PM    pH (UA) 6.0 01/11/2022 02:53 PM    Protein 30 (A) 01/11/2022 02:53 PM    Glucose Negative 01/11/2022 02:53 PM    Ketone Negative 01/11/2022 02:53 PM    Bilirubin Negative 01/11/2022 02:53 PM    Urobilinogen 0.2 01/11/2022 02:53 PM    Nitrites Negative 01/11/2022 02:53 PM    Leukocyte Esterase TRACE (A) 01/11/2022 02:53 PM    Epithelial cells FEW 01/11/2022 02:53 PM    Bacteria Negative 01/11/2022 02:53 PM    WBC 0-4 01/11/2022 02:53 PM    RBC 0-5 01/11/2022 02:53 PM         Medications Reviewed:     Current Facility-Administered Medications   Medication Dose Route Frequency    triamcinolone acetonide (KENALOG) 0.1 % cream   Topical BID    milrinone (PRIMACOR) 20 MG/100 ML D5W infusion  0.2 mcg/kg/min IntraVENous CONTINUOUS    insulin NPH (NOVOLIN N, HUMULIN N) injection 35 Units  35 Units SubCUTAneous DAILY    insulin NPH (NOVOLIN N, HUMULIN N) injection 20 Units  20 Units SubCUTAneous QHS    simethicone (MYLICON) tablet 80 mg  80 mg Oral QID PRN    polyethylene glycol (MIRALAX) packet 17 g  17 g Oral DAILY    senna-docusate (PERICOLACE) 8.6-50 mg per tablet 2 Tablet  2 Tablet Oral BID    albumin human 25% (BUMINATE) solution 12.5 g  12.5 g IntraVENous BID    [Held by provider] bumetanide (BUMEX) 0.25 mg/mL infusion  1 mg/hr IntraVENous CONTINUOUS    digoxin (LANOXIN) tablet 0.0625 mg  0.0625 mg Oral DAILY    ivabradine (CORLANOR) tablet 7.5 mg  7.5 mg Oral BID WITH MEALS    carvediloL (COREG) tablet 12.5 mg  12.5 mg Oral BID WITH MEALS    allopurinoL (ZYLOPRIM) tablet 50 mg  50 mg Oral DAILY    epoetin isabel-epbx (RETACRIT) injection 20,000 Units  20,000 Units SubCUTAneous Q TUE, THU & SAT    montelukast (SINGULAIR) tablet 10 mg  10 mg Oral DAILY    levothyroxine (SYNTHROID) tablet 100 mcg  100 mcg Oral Once per day on Mon Tue Wed Thu Fri Sat    cetirizine (ZYRTEC) tablet 10 mg  10 mg Oral DAILY    hydroxypropyl methylcellulose (ISOPTO TEARS) 0.5 % ophthalmic solution 1 Drop  1 Drop Both Eyes PRN    venlafaxine-SR (EFFEXOR-XR) capsule 75 mg  75 mg Oral DAILY WITH BREAKFAST    gabapentin (NEURONTIN) capsule 100 mg  100 mg Oral QHS    arformoteroL (BROVANA) neb solution 15 mcg  15 mcg Nebulization BID RT    And    budesonide (PULMICORT) 500 mcg/2 ml nebulizer suspension  500 mcg Nebulization BID RT    hydrOXYzine HCL (ATARAX) tablet 20 mg  20 mg Oral TID PRN    heparin (porcine) injection 5,000 Units  5,000 Units SubCUTAneous Q8H    sodium chloride (NS) flush 5-40 mL  5-40 mL IntraVENous Q8H    sodium chloride (NS) flush 5-40 mL  5-40 mL IntraVENous PRN    acetaminophen (TYLENOL) tablet 650 mg  650 mg Oral Q6H PRN    Or    acetaminophen (TYLENOL) suppository 650 mg  650 mg Rectal Q6H PRN    ondansetron (ZOFRAN ODT) tablet 4 mg  4 mg Oral Q8H PRN    Or    ondansetron (ZOFRAN) injection 4 mg  4 mg IntraVENous Q6H PRN    glucose chewable tablet 16 g  4 Tablet Oral PRN    dextrose (D50W) injection syrg 12.5-25 g  25-50 mL IntraVENous PRN    glucagon (GLUCAGEN) injection 1 mg  1 mg IntraMUSCular PRN    insulin lispro (HUMALOG) injection   SubCUTAneous AC&HS    albuterol-ipratropium (DUO-NEB) 2.5 MG-0.5 MG/3 ML  3 mL Nebulization Q6H PRN     ______________________________________________________________________  EXPECTED LENGTH OF STAY: 3d 19h  ACTUAL LENGTH OF STAY:          11                 Lali Vasquez MD

## 2022-01-15 NOTE — PROGRESS NOTES
Cardiac Electrophysiology Hospital Progress Note     Subjective:       Ani Mcclellan is a 78 y.o. patient with acute on chronic systolic CHF despite biventricular pacing. She is staying until next week, heart failure team needs to diurese   bumex drip on hold  She gets iv albumin    Milrinone dose decreased to 0.2 mcg/kg/min due to sinus tachycardia.       bp is lower and she is off hydralazine and isordil     She wants possible double organ transplant. She is full code and has BIV ICD with battery 4 months left      HPI:   Presented to the ER 01/04/2021 with hypoxia, improved on supplemental oxygen. Labs showed stable anemia.  WBC elevated, hyponatremic, hypokalemic.  D dimer elevated.  Chronic CKD. Nephrologist spoke to me directly regarding severe renal failure, but not much worse than last admission. Rapid COVID negative.  CXR showed pulmonary edema vs atypical pneumonia.  VQ scan showed low probability for PE.       NICM, LVEF 15-20% during recent admission.  LVEF previously normalized with CRT.  NYHA III-IV.  No ACEi/ARB due to renal dysfunction.  GDMT dosing limited by low BP. [address] 12/10/2021 showed severe pulmonary HTN (wedge 34 mmHg, PAP 80 mmHg). Cardiac cath in 2015 at [facility] showed no evidence of CAD. She did require LV lead reprogramming over the summer, but good LV capture since.  Good capture/function when checked during recent admission. BP controlled. PICC line placed 01/10/2022 in anticipation of home milrinone. Previous:   S/p Medtronic biventricular ICD (gen change 03/[id number], leads 09/25/2008).     CKD stage IV.        Previously followed by Dr. Marylee Fendt, states did not follow him to new practice.            Problem List   Acute respiratory failure with hypoxia [facility]) ICD-10-CM: J96.01   ICD-9-CM: 518.81 1/4/2022     CHF exacerbation (HCC) ICD-10-CM: I50.9   ICD-9-CM: 428.0 12/6/2021     Type 2 diabetes mellitus with diabetic neuropathy ([facility] ICD-10-CM: E11.40   ICD-9-CM: 250.60, 357.2 1/2/2020     Type 2 diabetes with nephropathy ([facility] ICD-10-CM: E11.21   ICD-9-CM: 250.40, 583.81 4/3/2018     Obesity, morbid ([facility] ICD-10-CM: E66.01   ICD-9-CM: 278.01 12/8/2017     Acquired hypothyroidism ICD-10-CM: E03.9   ICD-9-CM: 244.9 8/15/2016     Dysthymia ICD-10-CM: F34.1   ICD-9-CM: 300.4 8/15/2016     ICD (implantable cardioverter-defibrillator), biventricular, in situ ICD-10-CM: Z95.810   ICD-9-CM: V45.02 6/5/2014   Overview Signed 1/14/2015 11:11 AM by Alex Gooden MD     Generator Medtronic change 1/14/2015         Dyslipidemia ICD-10-CM: C67.1   ICD-9-CM: 272.4 1/14/2014     CKD (chronic kidney disease) ICD-10-CM: N18.9   ICD-9-CM: 585.9 8/15/2012     Cardiomyopathy, nonischemic (HCC) ICD-10-CM: I42.8   ICD-9-CM: 425.4 Unknown   Overview Signed 10/10/2011  6:40 AM by Cheral Simmonds, MD     initial dx 2001, bivHF 2008 with EF 15%, s/p biV-ICD 9/08, significant improvment in EF to 45-50%         Anemia in chronic renal disease (Chronic) ICD-10-CM: N18.9, D63.1   ICD-9-CM: 285.21 12/10/2008     HTN (hypertension) ICD-10-CM: I10   ICD-9-CM: 401.9 12/10/2008     GERD (gastroesophageal reflux disease) (Chronic) ICD-10-CM: K21.9   ICD-9-CM: 530.81 12/10/2008     Gout (Chronic) ICD-10-CM: M10.9   ICD-9-CM: 274.9 12/10/2008     Pulmonary HTN (HCC) (Chronic) ICD-10-CM: I27.20   ICD-9-CM: 416.8 12/10/2008      Current Facility-Administered Medications   Medication Dose Route Frequency    milrinone (PRIMACOR) 20 MG/100 ML D5W infusion  0.2 mcg/kg/min IntraVENous CONTINUOUS    insulin NPH (NOVOLIN N, HUMULIN N) injection 35 Units  35 Units SubCUTAneous DAILY    insulin NPH (NOVOLIN N, HUMULIN N) injection 20 Units  20 Units SubCUTAneous QHS    simethicone (MYLICON) tablet 80 mg  80 mg Oral QID PRN    polyethylene glycol (MIRALAX) packet 17 g  17 g Oral DAILY    senna-docusate (PERICOLACE) 8.6-50 mg per tablet 2 Tablet  2 Tablet Oral BID    albumin human 25% (BUMINATE) solution 12.5 g  12.5 g IntraVENous BID    [Held by provider] bumetanide (BUMEX) 0.25 mg/mL infusion  1 mg/hr IntraVENous CONTINUOUS    digoxin (LANOXIN) tablet 0.0625 mg  0.0625 mg Oral DAILY    ivabradine (CORLANOR) tablet 7.5 mg  7.5 mg Oral BID WITH MEALS    carvediloL (COREG) tablet 12.5 mg  12.5 mg Oral BID WITH MEALS    allopurinoL (ZYLOPRIM) tablet 50 mg  50 mg Oral DAILY    epoetin isabel-epbx (RETACRIT) injection 20,000 Units  20,000 Units SubCUTAneous Q TUE, THU & SAT    montelukast (SINGULAIR) tablet 10 mg  10 mg Oral DAILY    levothyroxine (SYNTHROID) tablet 100 mcg  100 mcg Oral Once per day on Mon Tue Wed Thu Fri Sat    cetirizine (ZYRTEC) tablet 10 mg  10 mg Oral DAILY    hydroxypropyl methylcellulose (ISOPTO TEARS) 0.5 % ophthalmic solution 1 Drop  1 Drop Both Eyes PRN    venlafaxine-SR (EFFEXOR-XR) capsule 75 mg  75 mg Oral DAILY WITH BREAKFAST    gabapentin (NEURONTIN) capsule 100 mg  100 mg Oral QHS    arformoteroL (BROVANA) neb solution 15 mcg  15 mcg Nebulization BID RT    And    budesonide (PULMICORT) 500 mcg/2 ml nebulizer suspension  500 mcg Nebulization BID RT    hydrOXYzine HCL (ATARAX) tablet 20 mg  20 mg Oral TID PRN    heparin (porcine) injection 5,000 Units  5,000 Units SubCUTAneous Q8H    sodium chloride (NS) flush 5-40 mL  5-40 mL IntraVENous Q8H    sodium chloride (NS) flush 5-40 mL  5-40 mL IntraVENous PRN    acetaminophen (TYLENOL) tablet 650 mg  650 mg Oral Q6H PRN    Or    acetaminophen (TYLENOL) suppository 650 mg  650 mg Rectal Q6H PRN    ondansetron (ZOFRAN ODT) tablet 4 mg  4 mg Oral Q8H PRN    Or    ondansetron (ZOFRAN) injection 4 mg  4 mg IntraVENous Q6H PRN    glucose chewable tablet 16 g  4 Tablet Oral PRN    dextrose (D50W) injection syrg 12.5-25 g  25-50 mL IntraVENous PRN    glucagon (GLUCAGEN) injection 1 mg  1 mg IntraMUSCular PRN    insulin lispro (HUMALOG) injection   SubCUTAneous AC&HS    albuterol-ipratropium (DUO-NEB) 2.5 MG-0.5 MG/3 ML  3 mL Nebulization Q6H PRN         Allergies   Allergen Reactions   · Ciprofloxacin Anaphylaxis   · Shellfish Derived Anaphylaxis   · Ace Inhibitors Unknown (comments)   · Biaxin [Clarithromycin] Other (comments)   Metal taste   · Candesartan Cough   · Pcn [Penicillins] Hives     Past Medical History:   Diagnosis Date   · Acquired hypothyroidism 8/15/2016   · Anemia   RED-HF study   · Asthma   · Cardiomyopathy, nonischemic ([facility]   initial dx 2001, bivHF 2008 with EF 15%, s/p biV-ICD 9/08, significant improvment in EF to 45-50%   · CKD (chronic kidney disease)   Dr Noemy Garces   · CKD (chronic kidney disease) 8/15/2012   · Depression   · Diabetes ([facility]   · Diabetic neuropathy ([facility]   · DM (diabetes mellitus) ([facility] 8/15/2012   · GERD (gastroesophageal reflux disease)   · Gout   · Hypothyroidism   · ICD (implantable cardioverter-defibrillator), biventricular, in situ 6/5/2014   · Psoriasis     Past Surgical History:   Procedure Laterality Date   · CARDIAC CATHETERIZATION 2007; 01/06/15   normal cors   · ECHO 2D ADULT 4/2010   EF 45%, improved from 1/09 (25%)   · ECHO 2D ADULT 11/2011   LVH, EF 55-60%   · HX ORTHOPAEDIC   knee   · HX PACEMAKER PLACEMENT   AICD   · STRESS TEST LEXISCAN/CARDIOLITE 3/21/12   normal perfusion, global HK 40%     Family History   Problem Relation Age of Onset   · Heart Disease Mother   · Hypertension Mother   · Lupus Sister   · Diabetes Brother     Social History     Tobacco Use   · Smoking status: Never Smoker   · Smokeless tobacco: Never Used   Substance Use Topics   · Alcohol use: No         Review of Systems: Review of all other systems otherwise negative. Constitutional: Negative for fever, chills, weight loss, + malaise/fatigue. HEENT: Negative for nosebleeds, vision changes.    Respiratory: Negative for cough, hemoptysis   Cardiovascular: Negative for chest pain, palpitations, orthopnea improved, no claudication, leg swelling, no syncope, and PND. + SOB Gastrointestinal: Negative for nausea, vomiting, diarrhea, blood in stool and melena. Genitourinary: Negative for dysuria, and hematuria. Musculoskeletal: Negative for myalgias, arthralgia. Skin: Negative for rash. Heme: Does not bleed or bruise easily. Neurological: Negative for speech change and focal weakness       Objective:   Visit Vitals  /82 (BP 1 Location: Left upper arm, BP Patient Position: At rest)   Pulse (!) 113   Temp 97.9 °F (36.6 °C)   Resp 20   Ht 5' 7\" (1.702 m)   Wt 254 lb 3.1 oz (115.3 kg)   SpO2 96%   BMI 39.81 kg/m²         Physical Exam:   Constitutional: Well-developed and well-nourished. No respiratory distress. Head: Normocephalic and atraumatic. Eyes: Pupils are equal, round. ENT: Hearing grossly normal.   Neck: Supple. No JVD present. Cardiovascular: normal rate, regular rhythm. Exam reveals no gallop and no friction rub. 2/6 systolic LSB murmur. Pulmonary/Chest: Appears mildly SOB.  Breath sounds normal. No wheezes. Abdominal: Soft, no tenderness. Moderate obesity. Musculoskeletal: Moves extremities independently. Vasc/lymphatic: no bilat lower extremity edema. Neurological: Alert, oriented. Skin: left sided BIV ICD pocket unremarkable  Psychiatric: Normal mood and affect. Behavior is normal. Judgment and thought content normal.         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 RHC without milrinone (12/10/2021): High wedge pressure (35 mmHg) indicating volume overload, precapillary.  Severe pulmonary HTN. Echo (12/08/2021): LVEF 15-20%, upper normal wall thickness, LV diastolic dysfunction.  Borderline low RVEF.  Mod dilated LA, mildly dilated RA.  Mild to mod MR. Corrine Estrada TR.  Mild to mod PH.       LHC/RHC (01/2015): No significant CAD.        NICM:  No new LHC yet due to severe renal failure.  Not a candidate for hemodialysis per nephrology. Julián Chairez C in 2015 showed no significant CAD, same with the previous cath  Advanced CHF team needs ischemic work up. I have discussed with her several times and spoke to nephrologist   She is not candidate for hemodialysis but to be able to set her up for possible dual organ transplant I have asked Dr Sridevi Gill to consider to do left heart cath next week  She is on milrinone drip and heart failure team also asked for new right heart cath   She still needs to be here into next week for inpatient diuresis  bp is lower on bumex drip, Hydralazine and isordil on hold/stopped  Given iv albumin  LVEF now 15-20%. Cynthia Chun pulmonary HTN noted on last admission RHC.        Continue milrinone 0.2 mcg/kg/min now with Advanced Heart Failure team  possible double organ transplant referral, will need to do quickly after discharge  I am concerned that the weight will be issue  If she is not a candidate for [facility] transplant, I think she should consider hospice care  Her  is supportive and I appreciate palliative medicine consult      Nuclear amyloid scan equivocal.  cMRI is not possible with 4194 LV lead (2008). However cMRI would not change her management    Medtronic biventricular ICD (gen change 01/14/2015, leads 09/25/2008): Device check showed proper lead & generator function.  Generator longevity still estimated 4 months.  RA 0.1%, CRT 97.6%. I will replace in the future as she will need Biv pacing if she can be accepted for double organ transplant.  I would be willing to wait until she is evaluated by transplant and I recommend we refer her for evaluation as soon as possible     CKD: Nephrologist follows, she is stable with lab data  She is not candidate for hemodialysis     She has sinus tachycardia  HR is better with less milrinone and corlanor to 7.5 mg bid but sinus rate is up with any movement    PVCs due to severe CHF and milrinone  On coreg and digoxin           Thank you for involving me in this patient's care and please call with further concerns or questions. Marya Dowd M.D.    Electrophysiology/Cardiology   [facility] [place]   [place] [address], [facility], Mark Gisella Solano, [address] [place] [address]   [phone]

## 2022-01-15 NOTE — PROGRESS NOTES
[facility]  Hospitalist Group                                                                                          Hospitalist Progress Note  Xenia Figueroa MD  Answering service: [phone] from in house phone        Date of Service:  2022  NAME:  Edith Crawford  :  1955  MRN:  [id number]      Admission Summary:     Edith Crawford is a 78 y.o. female with hx NICM, chronic hypoxic respiratory failure 2/2 pulmonary htn, ckd, hypothyroidism, GERD, and  DM II who presents as a transfer from [facility] for acute on chronic hypoxic respiratory failure. She wears 3L o2 at baseline, and reportedly ran out of her home oxygen.     In the ED, she was hypoxic to th 70's, with improvement ot 94% on 4Lnc. Labs showed stable anemia with Hgb 10, K+ 5.2, lactic 3.1, trop 55>66, d-dimer >35, creatinine 2.54, and probnp 15,826. CXR showed diffuse interstitial airway disease. Rapid covid was negative.     In the ED, she was started on a heparin gtt, and sent to [facility] for VQ scan. She received bumex and nitropaste. Interval history / Subjective:      No complaints, breathing better but is on 5L O2     Assessment & Plan:     Acute on chronic respiratory failure with oxygen- Due to CHF  COVID-19 negative. Mild leukocytosis. Afebrile. VQ scan low probability for PE. Acute on chronic systolic heart failure  NYHA class IV. Status post ICD placement. LVEF 15 to 20%. proBNP [id number]. Cont IV milrinone , Right arm PICC  Not a candidate for ARB/ACE due to renal insufficiency. Cardiac diet, strict I/O, daily weight. Low-sodium diet. RODNEY on CKD stage IV felt to be due to cardiorenal syndrome  At baseline, electrolytes WNL  Monitor volume status electrolytes, replace electrolytes as needed. 2022. Renal ultrasound negative for hydronephrosis. Small bilateral renal cysts. Avoid nephrotoxic meds, renally dose medications.   Nephrology on board, recommendations noted.    Hyperkalemia  Resolved, hyperkalemia protocol. Daily BMP. Low potassium diet. Hyponatremia   Improving, likely diuretic induced. Daily BMP, monitor volume status and electrolytes. Leukocytosis  CXR positive for interstitial airspace disease likely pulmonary edema or atypical pneumonia. Afebrile. Respiratory panel negative for COVID-19. Monitor closely, start on empiric antibiotics if any sign of infection. Type 2 diabetes. Adjusted insulin regimen due to hypoglycemia (reduced NPH )    Hypothyroidism -cont Synthroid. Depression- stable on current meds. Overweight- BMI=39.7   plaque psoriasis- management per PCP and rheumatology outpatient    Code status: Full Code  DVT prophylaxis: heparin     Care Plan discussed with: Patient/Family, Nurse and   Anticipated Disposition: TBD     Hospital Problems  Date Reviewed: 12/22/2021          Codes Class Noted POA    Acute respiratory failure with hypoxia [facility]) ICD-10-CM: J96.01  ICD-9-CM: 518.81  1/4/2022 Unknown              Vital Signs:    Last 24hrs VS reviewed since prior progress note.  Most recent are:  Visit Vitals  /80 (BP 1 Location: Left upper arm, BP Patient Position: At rest)   Pulse 99   Temp 98.2 °F (36.8 °C)   Resp 22   Ht 5' 7\" (1.702 m)   Wt 114.1 kg (251 lb 8.7 oz)   SpO2 93%   BMI 39.40 kg/m²     Patient Vitals for the past 24 hrs:   Temp Pulse Resp BP SpO2   01/14/22 2155  99      01/14/22 2000  98      01/14/22 1934     93 %   01/14/22 1900 98.2 °F (36.8 °C) (!) 104 22 109/80 95 %   01/14/22 1800  (!) 110      01/14/22 1749  (!) 105  (!) 120/94    01/14/22 1604 97.5 °F (36.4 °C) 100 18 104/81 99 %   01/14/22 1400  99      01/14/22 1200  100      01/14/22 1132 97.8 °F (36.6 °C) 97 20 (!) 111/90 98 %   01/14/22 1000  (!) 101      01/14/22 0912  99  112/87 94 %   01/14/22 0808     97 %   01/14/22 0800  (!) 101      01/14/22 0640 98.6 °F (37 °C) 99 18 91/72 95 %   01/14/22 0600  99      01/14/22 0416 98.2 °F (36.8 °C) (!) 109 20 112/89 93 %   01/14/22 0300  97      01/14/22 0200  97      01/14/22 0030 98.2 °F (36.8 °C) 95 18 116/83 94 %       Intake/Output Summary (Last 24 hours) at 1/14/2022 2306  Last data filed at 1/14/2022 0640  Gross per 24 hour   Intake 564.12 ml   Output 600 ml   Net -35.88 ml        Physical Examination:     I had a face to face encounter with this patient and independently examined them on 1/14/2022 as outlined below:          Constitutional:  No acute distress, cooperative, pleasant    ENT:  Oral mucosa moist, O2 via NC    Resp:  CTA b/l diminished globally. No wheezing/rhonchi/rales. No accessory muscle use   CV:  tachycardia, no murmurs, gallops, rubs    GI:  Soft, non distended, non tender. normoactive bowel sounds     Musculoskeletal:  Bilateral pretibial and pedal edema    Neurologic:  no focal neuro deficits            Data Review:    Review and/or order of clinical lab test  Review and/or order of tests in the radiology section of CPT  Review and/or order of tests in the medicine section of CPT  12/8/21 Echo Findings    Left Ventricle Normal cavity size. Upper normal wall thickness. The estimated EF is 15 - 20%. Severely reduced systolic function. There is left ventricular diastolic dysfunction. Left Atrium Moderately dilated left atrium. Interatrial Septum Interatrial septum not well visualized   Right Ventricle Normal cavity size. Borderline low systolic function. Pacer/ICD present. Right Atrium Mildly dilated right atrium. Aortic Valve No stenosis and no regurgitation. Aortic valve sclerosis. Mitral Valve No stenosis. Mitral valve non-specific thickening. Mild to moderate regurgitation. Tricuspid Valve Normal valve structure and no stenosis. Mild regurgitation. Pulmonic Valve Pulmonic valve not well visualized, but normal doppler findings. Pulmonary Artery Pulmonary arterial systolic pressure (PASP) is 47 mmHg.  Pulmonary hypertension found to be mild to moderate. Aorta Normal aortic root. Pericardium No evidence of pericardial effusion. Labs:     Recent Labs     01/14/22 0023 01/13/22 0426   WBC 8.0 7.2   HGB 8.0* 8.0*   HCT 30.7* 30.7*    259     Recent Labs     01/14/22 0023 01/13/22 0426 01/12/22 1956 01/12/22 1312 01/12/22 0312   * 136 136  --    < >   K 3.7 3.6 4.5  --    < >   CL 98 99 99  --    < >   CO2 33* 34* 34*  --    < >   BUN 54* 51* 53*  --    < >   CREA 2.38* 2.27* 2.33*  --    < >   * 118* 242*  --    < >   CA 9.6 9.3 9.4  --    < >   MG 1.9 2.0  --  2.2  --    PHOS  --   --  3.6  --   --     < > = values in this interval not displayed. Recent Labs     01/14/22 0023 01/13/22 0426 01/12/22 1956 01/12/22 0312 01/12/22 0312   ALT 48 58  --   --  80*   AP 65 67  --   --  72   TBILI 0.6 0.5  --   --  0.6   TP 7.2 7.0  --   --  6.9   ALB 3.0* 2.7* 2.9*   < > 2.5*   GLOB 4.2* 4.3*  --   --  4.4*    < > = values in this interval not displayed. No results for input(s): INR, PTP, APTT, INREXT, INREXT in the last 72 hours. No results for input(s): FE, TIBC, PSAT, FERR in the last 72 hours. No results found for: FOL, RBCF   No results for input(s): PH, PCO2, PO2 in the last 72 hours.   Recent Labs     01/13/22 0426   CPK 49     Lab Results   Component Value Date/Time    Cholesterol, total 151 01/11/2022 05:13 AM    HDL Cholesterol 60 01/11/2022 05:13 AM    LDL, calculated 75.8 01/11/2022 05:13 AM    Triglyceride 76 01/11/2022 05:13 AM    CHOL/HDL Ratio 2.5 01/11/2022 05:13 AM     Lab Results   Component Value Date/Time    Glucose (POC) 285 (H) 01/14/2022 09:12 PM    Glucose (POC) 264 (H) 01/14/2022 04:51 PM    Glucose (POC) 204 (H) 01/14/2022 01:08 PM    Glucose (POC) 202 (H) 01/14/2022 12:49 PM    Glucose (POC) 141 (H) 01/14/2022 08:38 AM     Lab Results   Component Value Date/Time    Color YELLOW/STRAW 01/11/2022 02:53 PM    Appearance CLEAR 01/11/2022 02:53 PM Specific gravity 1.010 01/11/2022 02:53 PM    pH (UA) 6.0 01/11/2022 02:53 PM    Protein 30 (A) 01/11/2022 02:53 PM    Glucose Negative 01/11/2022 02:53 PM    Ketone Negative 01/11/2022 02:53 PM    Bilirubin Negative 01/11/2022 02:53 PM    Urobilinogen 0.2 01/11/2022 02:53 PM    Nitrites Negative 01/11/2022 02:53 PM    Leukocyte Esterase TRACE (A) 01/11/2022 02:53 PM    Epithelial cells FEW 01/11/2022 02:53 PM    Bacteria Negative 01/11/2022 02:53 PM    WBC 0-4 01/11/2022 02:53 PM    RBC 0-5 01/11/2022 02:53 PM         Medications Reviewed:     Current Facility-Administered Medications   Medication Dose Route Frequency    milrinone (PRIMACOR) 20 MG/100 ML D5W infusion  0.2 mcg/kg/min IntraVENous CONTINUOUS    [START ON 1/15/2022] insulin NPH (NOVOLIN N, HUMULIN N) injection 35 Units  35 Units SubCUTAneous DAILY    insulin NPH (NOVOLIN N, HUMULIN N) injection 20 Units  20 Units SubCUTAneous QHS    simethicone (MYLICON) tablet 80 mg  80 mg Oral QID PRN    polyethylene glycol (MIRALAX) packet 17 g  17 g Oral DAILY    senna-docusate (PERICOLACE) 8.6-50 mg per tablet 2 Tablet  2 Tablet Oral BID    albumin human 25% (BUMINATE) solution 12.5 g  12.5 g IntraVENous BID    [Held by provider] bumetanide (BUMEX) 0.25 mg/mL infusion  1 mg/hr IntraVENous CONTINUOUS    digoxin (LANOXIN) tablet 0.0625 mg  0.0625 mg Oral DAILY    ivabradine (CORLANOR) tablet 7.5 mg  7.5 mg Oral BID WITH MEALS    carvediloL (COREG) tablet 12.5 mg  12.5 mg Oral BID WITH MEALS    allopurinoL (ZYLOPRIM) tablet 50 mg  50 mg Oral DAILY    epoetin isabel-epbx (RETACRIT) injection 20,000 Units  20,000 Units SubCUTAneous Q TUE, THU & SAT    montelukast (SINGULAIR) tablet 10 mg  10 mg Oral DAILY    levothyroxine (SYNTHROID) tablet 100 mcg  100 mcg Oral Once per day on Mon Tue Wed Thu Fri Sat    cetirizine (ZYRTEC) tablet 10 mg  10 mg Oral DAILY    hydroxypropyl methylcellulose (ISOPTO TEARS) 0.5 % ophthalmic solution 1 Drop  1 Drop Both Eyes PRN    venlafaxine-SR (EFFEXOR-XR) capsule 75 mg  75 mg Oral DAILY WITH BREAKFAST    gabapentin (NEURONTIN) capsule 100 mg  100 mg Oral QHS    arformoteroL (BROVANA) neb solution 15 mcg  15 mcg Nebulization BID RT    And    budesonide (PULMICORT) 500 mcg/2 ml nebulizer suspension  500 mcg Nebulization BID RT    hydrOXYzine HCL (ATARAX) tablet 20 mg  20 mg Oral TID PRN    heparin (porcine) injection 5,000 Units  5,000 Units SubCUTAneous Q8H    sodium chloride (NS) flush 5-40 mL  5-40 mL IntraVENous Q8H    sodium chloride (NS) flush 5-40 mL  5-40 mL IntraVENous PRN    acetaminophen (TYLENOL) tablet 650 mg  650 mg Oral Q6H PRN    Or    acetaminophen (TYLENOL) suppository 650 mg  650 mg Rectal Q6H PRN    ondansetron (ZOFRAN ODT) tablet 4 mg  4 mg Oral Q8H PRN    Or    ondansetron (ZOFRAN) injection 4 mg  4 mg IntraVENous Q6H PRN    glucose chewable tablet 16 g  4 Tablet Oral PRN    dextrose (D50W) injection syrg 12.5-25 g  25-50 mL IntraVENous PRN    glucagon (GLUCAGEN) injection 1 mg  1 mg IntraMUSCular PRN    insulin lispro (HUMALOG) injection   SubCUTAneous AC&HS    albuterol-ipratropium (DUO-NEB) 2.5 MG-0.5 MG/3 ML  3 mL Nebulization Q6H PRN     ______________________________________________________________________  EXPECTED LENGTH OF STAY: 3d 19h  ACTUAL LENGTH OF STAY:          10                 Deidre Dunbar MD

## 2022-01-15 NOTE — PROGRESS NOTES
Verbal bedside report given to [facility], RN oncoming nurse by Demetris Reyes. Mitchel Lucio, RN off-going nurse. Report included current pt status and condition, recent results, hx of present illness, heart rate and rhythm, and respiratory status. 0930  Pt MD cas at bedside.

## 2022-01-16 NOTE — PROGRESS NOTES
[facility] Adult  Hospitalist Group                                                                                          Hospitalist Progress Note  Luis Fleming MD  Answering service: [phone] from in house phone        Date of Service:  2022  NAME:  James Ceron  :  1955  MRN:  [id number]      Admission Summary:     James Ceron is a 78 y.o. female with hx NICM, chronic hypoxic respiratory failure 2/2 pulmonary htn, ckd, hypothyroidism, GERD, and  DM II who presents as a transfer from [facility] for acute on chronic hypoxic respiratory failure. She wears 3L o2 at baseline, and reportedly ran out of her home oxygen.     In the ED, she was hypoxic to th 70's, with improvement ot 94% on 4Lnc. Labs showed stable anemia with Hgb 10, K+ 5.2, lactic 3.1, trop 55>66, d-dimer >35, creatinine 2.54, and probnp 15,826. CXR showed diffuse interstitial airway disease. Rapid covid was negative.     In the ED, she was started on a heparin gtt, and sent to [facility] for VQ scan. She received bumex and nitropaste. Interval history / Subjective:      No complaints, breathing better but is on 5L O2   Discussed psoriasis and restarted home meds for this  Cardiology planning for possible cath this week off milrinone and Bumex drips    Assessment & Plan:     Acute on chronic respiratory failure with oxygen- Due to CHF  COVID-19 negative. Mild leukocytosis. Afebrile. VQ scan low probability for PE. Acute on chronic systolic heart failure  NYHA class IV. Status post ICD placement. LVEF 15 to 20%. proBNP [id number]. IV milrinone and IV bumex- now on hold , Right arm PICC  Not a candidate for ARB/ACE due to renal insufficiency. Cardiac diet, strict I/O, daily weight. Low-sodium diet.   Cardiology considering heart cath early this week    RODNEY on CKD stage IV felt to be due to cardiorenal syndrome  Creatinine slightly above baseline,  Monitor volume status electrolytes  5/2022. Renal ultrasound negative for hydronephrosis. Small bilateral renal cysts. Avoid nephrotoxic meds, renally dose medications. Diuretics on hold  Nephrology recommendations noted and patient not a candidate for dialysis. Hyperkalemia  Resolved, hyperkalemia protocol. Daily BMP. Low potassium diet. Hyponatremia- mild- monitor likely diuretic induced. Daily BMP, monitor volume status and electrolytes. Leukocytosis-resolved  CXR positive for interstitial airspace disease likely pulmonary edema or atypical pneumonia. Afebrile. Respiratory panel negative for COVID-19. Monitor closely, start on empiric antibiotics if any sign of infection. Type 2 diabetes. Adjusted insulin regimen due to hypoglycemia (reduced NPH )  Difficult to manage due to Dextrose in milrinone drip  Will decreased NPH doses today due to pending HOLD on IV milrinone    Hypothyroidism -cont Synthroid. Depression- stable on current meds. Overweight- BMI=39.7    Code status: Full Code  DVT prophylaxis: heparin     Care Plan discussed with: Patient/Family, Nurse and   Anticipated Disposition: TBD     Hospital Problems  Date Reviewed: 12/22/2021          Codes Class Noted POA    Acute respiratory failure with hypoxia [facility]) ICD-10-CM: J96.01  ICD-9-CM: 518.81  1/4/2022 Unknown              Vital Signs:    Last 24hrs VS reviewed since prior progress note.  Most recent are:  Visit Vitals  /80 (BP 1 Location: Left upper arm, BP Patient Position: At rest)   Pulse (!) 114   Temp 98.3 °F (36.8 °C)   Resp 20   Ht 5' 7\" (1.702 m)   Wt 115.2 kg (253 lb 15.5 oz)   SpO2 92%   BMI 39.78 kg/m²     Patient Vitals for the past 24 hrs:   Temp Pulse Resp BP SpO2   01/16/22 0933  (!) 114   92 %   01/16/22 0557  99      01/16/22 0400  98      01/16/22 0353 98.3 °F (36.8 °C) 97  103/80 100 %   01/16/22 0158  96      01/16/22 0000 98.2 °F (36.8 °C) 96 20 131/87 92 %   01/15/22 2200  95      01/15/22 2019     94 %   01/15/22 2000  98      01/15/22 1927 98 °F (36.7 °C) (!) 101  117/86 96 %   01/15/22 1800  (!) 104      01/15/22 1641 98.1 °F (36.7 °C) (!) 104 24 124/84 97 %   01/15/22 1600  (!) 101   99 %   01/15/22 1400  (!) 101      01/15/22 1200 97.9 °F (36.6 °C) 99 18 114/80 96 %       Intake/Output Summary (Last 24 hours) at 1/16/2022 1013  Last data filed at 1/16/2022 7449  Gross per 24 hour   Intake 743.63 ml   Output 1050 ml   Net -306.37 ml        Physical Examination:     I had a face to face encounter with this patient and independently examined them on 1/16/2022 as outlined below:          Constitutional:  No acute distress, cooperative, pleasant    ENT:  Oral mucosa moist, O2 via NC    Resp:  CTA b/l diminished globally. No wheezing/rhonchi/rales. No accessory muscle use   CV:  tachycardia, no murmurs, gallops, rubs    GI:  Soft, non distended, non tender. normoactive bowel sounds     Musculoskeletal:  Bilateral pretibial and pedal edema    Neurologic:  no focal neuro deficits            Data Review:    Review and/or order of clinical lab test  Review and/or order of tests in the radiology section of CPT  Review and/or order of tests in the medicine section of CPT  12/8/21 Echo Findings    Left Ventricle Normal cavity size. Upper normal wall thickness. The estimated EF is 15 - 20%. Severely reduced systolic function. There is left ventricular diastolic dysfunction. Left Atrium Moderately dilated left atrium. Interatrial Septum Interatrial septum not well visualized   Right Ventricle Normal cavity size. Borderline low systolic function. Pacer/ICD present. Right Atrium Mildly dilated right atrium. Aortic Valve No stenosis and no regurgitation. Aortic valve sclerosis. Mitral Valve No stenosis. Mitral valve non-specific thickening. Mild to moderate regurgitation. Tricuspid Valve Normal valve structure and no stenosis. Mild regurgitation.    Pulmonic Valve Pulmonic valve not well visualized, but normal doppler findings. Pulmonary Artery Pulmonary arterial systolic pressure (PASP) is 47 mmHg. Pulmonary hypertension found to be mild to moderate. Aorta Normal aortic root. Pericardium No evidence of pericardial effusion. Labs:     Recent Labs     01/15/22  0514 01/14/22  0023   WBC 8.5 8.0   HGB 8.3* 8.0*   HCT 31.0* 30.7*    283     Recent Labs     01/16/22  0401 01/15/22  0514 01/14/22  0023   * 134* 135*   K 4.0 4.1 3.7   CL 97 96* 98   CO2 32 35* 33*   BUN 53* 55* 54*   CREA 2.43* 2.69* 2.38*   * 168* 165*   CA 9.5 9.9 9.6   MG 2.1  --  1.9     Recent Labs     01/16/22  0401 01/15/22  0514 01/14/22  0023   ALT 29 37 48   AP 64 69 65   TBILI 0.6 0.6 0.6   TP 7.2 7.5 7.2   ALB 3.3* 3.2* 3.0*   GLOB 3.9 4.3* 4.2*     No results for input(s): INR, PTP, APTT, INREXT, INREXT in the last 72 hours. No results for input(s): FE, TIBC, PSAT, FERR in the last 72 hours. No results found for: FOL, RBCF   No results for input(s): PH, PCO2, PO2 in the last 72 hours. No results for input(s): CPK, CKNDX, TROIQ in the last 72 hours.     No lab exists for component: CPKMB  Lab Results   Component Value Date/Time    Cholesterol, total 151 01/11/2022 05:13 AM    HDL Cholesterol 60 01/11/2022 05:13 AM    LDL, calculated 75.8 01/11/2022 05:13 AM    Triglyceride 76 01/11/2022 05:13 AM    CHOL/HDL Ratio 2.5 01/11/2022 05:13 AM     Lab Results   Component Value Date/Time    Glucose (POC) 190 (H) 01/16/2022 08:32 AM    Glucose (POC) 218 (H) 01/15/2022 09:53 PM    Glucose (POC) 241 (H) 01/15/2022 04:33 PM    Glucose (POC) 231 (H) 01/15/2022 11:41 AM    Glucose (POC) 194 (H) 01/15/2022 08:39 AM     Lab Results   Component Value Date/Time    Color YELLOW/STRAW 01/11/2022 02:53 PM    Appearance CLEAR 01/11/2022 02:53 PM    Specific gravity 1.010 01/11/2022 02:53 PM    pH (UA) 6.0 01/11/2022 02:53 PM    Protein 30 (A) 01/11/2022 02:53 PM    Glucose Negative 01/11/2022 02:53 PM Ketone Negative 01/11/2022 02:53 PM    Bilirubin Negative 01/11/2022 02:53 PM    Urobilinogen 0.2 01/11/2022 02:53 PM    Nitrites Negative 01/11/2022 02:53 PM    Leukocyte Esterase TRACE (A) 01/11/2022 02:53 PM    Epithelial cells FEW 01/11/2022 02:53 PM    Bacteria Negative 01/11/2022 02:53 PM    WBC 0-4 01/11/2022 02:53 PM    RBC 0-5 01/11/2022 02:53 PM         Medications Reviewed:     Current Facility-Administered Medications   Medication Dose Route Frequency    triamcinolone acetonide (KENALOG) 0.1 % cream   Topical BID    insulin NPH (NOVOLIN N, HUMULIN N) injection 35 Units  35 Units SubCUTAneous DAILY    insulin NPH (NOVOLIN N, HUMULIN N) injection 20 Units  20 Units SubCUTAneous QHS    simethicone (MYLICON) tablet 80 mg  80 mg Oral QID PRN    polyethylene glycol (MIRALAX) packet 17 g  17 g Oral DAILY    senna-docusate (PERICOLACE) 8.6-50 mg per tablet 2 Tablet  2 Tablet Oral BID    albumin human 25% (BUMINATE) solution 12.5 g  12.5 g IntraVENous BID    [Held by provider] bumetanide (BUMEX) 0.25 mg/mL infusion  1 mg/hr IntraVENous CONTINUOUS    digoxin (LANOXIN) tablet 0.0625 mg  0.0625 mg Oral DAILY    ivabradine (CORLANOR) tablet 7.5 mg  7.5 mg Oral BID WITH MEALS    carvediloL (COREG) tablet 12.5 mg  12.5 mg Oral BID WITH MEALS    allopurinoL (ZYLOPRIM) tablet 50 mg  50 mg Oral DAILY    epoetin isabel-epbx (RETACRIT) injection 20,000 Units  20,000 Units SubCUTAneous Q TUE, THU & SAT    montelukast (SINGULAIR) tablet 10 mg  10 mg Oral DAILY    levothyroxine (SYNTHROID) tablet 100 mcg  100 mcg Oral Once per day on Mon Tue Wed Thu Fri Sat    cetirizine (ZYRTEC) tablet 10 mg  10 mg Oral DAILY    hydroxypropyl methylcellulose (ISOPTO TEARS) 0.5 % ophthalmic solution 1 Drop  1 Drop Both Eyes PRN    venlafaxine-SR (EFFEXOR-XR) capsule 75 mg  75 mg Oral DAILY WITH BREAKFAST    gabapentin (NEURONTIN) capsule 100 mg  100 mg Oral QHS    arformoteroL (BROVANA) neb solution 15 mcg  15 mcg Nebulization BID RT    And    budesonide (PULMICORT) 500 mcg/2 ml nebulizer suspension  500 mcg Nebulization BID RT    hydrOXYzine HCL (ATARAX) tablet 20 mg  20 mg Oral TID PRN    heparin (porcine) injection 5,000 Units  5,000 Units SubCUTAneous Q8H    sodium chloride (NS) flush 5-40 mL  5-40 mL IntraVENous Q8H    sodium chloride (NS) flush 5-40 mL  5-40 mL IntraVENous PRN    acetaminophen (TYLENOL) tablet 650 mg  650 mg Oral Q6H PRN    Or    acetaminophen (TYLENOL) suppository 650 mg  650 mg Rectal Q6H PRN    ondansetron (ZOFRAN ODT) tablet 4 mg  4 mg Oral Q8H PRN    Or    ondansetron (ZOFRAN) injection 4 mg  4 mg IntraVENous Q6H PRN    glucose chewable tablet 16 g  4 Tablet Oral PRN    dextrose (D50W) injection syrg 12.5-25 g  25-50 mL IntraVENous PRN    glucagon (GLUCAGEN) injection 1 mg  1 mg IntraMUSCular PRN    insulin lispro (HUMALOG) injection   SubCUTAneous AC&HS    albuterol-ipratropium (DUO-NEB) 2.5 MG-0.5 MG/3 ML  3 mL Nebulization Q6H PRN     ______________________________________________________________________  EXPECTED LENGTH OF STAY: 3d 19h  ACTUAL LENGTH OF STAY:          12                 Susie Cifuentes MD

## 2022-01-16 NOTE — PROGRESS NOTES
Cardiac Electrophysiology Hospital Progress Note     Subjective:       Meera Thayer is a 78 y.o. patient with acute on chronic systolic CHF despite biventricular pacing. She is staying until next week, heart failure team did not think she responded to bumex and dialysis has not been offered to her unless she is transplant candidate  bumex drip on hold  She gets off milrinone as Dr Harini Dodd thought her cardiac index from last [address] did not require it any more     bp is lower and she is off hydralazine and isordil     She wants possible double organ transplant. She is full code and has BIV ICD with battery 4 months left      HPI:   Presented to the ER 01/04/2021 with hypoxia, improved on supplemental oxygen. Labs showed stable anemia.  WBC elevated, hyponatremic, hypokalemic.  D dimer elevated.  Chronic CKD. Nephrologist spoke to me directly regarding severe renal failure, but not much worse than last admission. Rapid COVID negative.  CXR showed pulmonary edema vs atypical pneumonia.  VQ scan showed low probability for PE.       NICM, LVEF 15-20% during recent admission.  LVEF previously normalized with CRT.  NYHA III-IV.  No ACEi/ARB due to renal dysfunction.  GDMT dosing limited by low BP. [address] 12/10/2021 showed severe pulmonary HTN (wedge 34 mmHg, PAP 80 mmHg). Cardiac cath in 2015 at [facility] showed no evidence of CAD. She did require LV lead reprogramming over the summer, but good LV capture since.  Good capture/function when checked during recent admission. BP controlled. PICC line placed 01/10/2022 in anticipation of home milrinone. Previous:   S/p Medtronic biventricular ICD (gen change [id number], leads 09/25/2008).     CKD stage IV.        Previously followed by Dr. Hermelinda Vora, states did not follow him to new practice.            Problem List   Acute respiratory failure with hypoxia [facility]) ICD-10-CM: J96.01   ICD-9-CM: 518.81 1/4/2022     CHF exacerbation ([facility] [address] ICD-10-CM: I50.9   ICD-9-CM: 428.0 12/6/2021     Type 2 diabetes mellitus with diabetic neuropathy ([facility] ICD-10-CM: E11.40   ICD-9-CM: 250.60, 357.2 1/2/2020     Type 2 diabetes with nephropathy ([facility] ICD-10-CM: E11.21   ICD-9-CM: 250.40, 583.81 4/3/2018     Obesity, morbid ([facility] ICD-10-CM: E66.01   ICD-9-CM: 278.01 12/8/2017     Acquired hypothyroidism ICD-10-CM: E03.9   ICD-9-CM: 244.9 8/15/2016     Dysthymia ICD-10-CM: F34.1   ICD-9-CM: 300.4 8/15/2016     ICD (implantable cardioverter-defibrillator), biventricular, in situ ICD-10-CM: Z95.810   ICD-9-CM: V45.02 6/5/2014   Overview Signed 1/14/2015 11:11 AM by Christopher Garcia MD     Generator Medtronic change 1/14/2015         Dyslipidemia ICD-10-CM: O30.0   ICD-9-CM: 272.4 1/14/2014     CKD (chronic kidney disease) ICD-10-CM: N18.9   ICD-9-CM: 585.9 8/15/2012     Cardiomyopathy, nonischemic (HCC) ICD-10-CM: I42.8   ICD-9-CM: 425.4 Unknown   Overview Signed 10/10/2011  6:40 AM by Julisa Salcedo MD     initial dx 2001, bivHF 2008 with EF 15%, s/p biV-ICD 9/08, significant improvment in EF to 45-50%         Anemia in chronic renal disease (Chronic) ICD-10-CM: N18.9, D63.1   ICD-9-CM: 285.21 12/10/2008     HTN (hypertension) ICD-10-CM: I10   ICD-9-CM: 401.9 12/10/2008     GERD (gastroesophageal reflux disease) (Chronic) ICD-10-CM: K21.9   ICD-9-CM: 530.81 12/10/2008     Gout (Chronic) ICD-10-CM: M10.9   ICD-9-CM: 274.9 12/10/2008     Pulmonary HTN (HCC) (Chronic) ICD-10-CM: I27.20   ICD-9-CM: 416.8 12/10/2008      Current Facility-Administered Medications   Medication Dose Route Frequency    insulin NPH (NOVOLIN N, HUMULIN N) injection 10 Units  10 Units SubCUTAneous QHS    [START ON 1/17/2022] insulin NPH (NOVOLIN N, HUMULIN N) injection 20 Units  20 Units SubCUTAneous DAILY    triamcinolone acetonide (KENALOG) 0.1 % cream   Topical BID    simethicone (MYLICON) tablet 80 mg  80 mg Oral QID PRN    polyethylene glycol (MIRALAX) packet 17 g  17 g Oral DAILY    senna-docusate (PERICOLACE) 8.6-50 mg per tablet 2 Tablet  2 Tablet Oral BID    albumin human 25% (BUMINATE) solution 12.5 g  12.5 g IntraVENous BID    [Held by provider] bumetanide (BUMEX) 0.25 mg/mL infusion  1 mg/hr IntraVENous CONTINUOUS    digoxin (LANOXIN) tablet 0.0625 mg  0.0625 mg Oral DAILY    ivabradine (CORLANOR) tablet 7.5 mg  7.5 mg Oral BID WITH MEALS    carvediloL (COREG) tablet 12.5 mg  12.5 mg Oral BID WITH MEALS    allopurinoL (ZYLOPRIM) tablet 50 mg  50 mg Oral DAILY    epoetin isabel-epbx (RETACRIT) injection 20,000 Units  20,000 Units SubCUTAneous Q TUE, THU & SAT    montelukast (SINGULAIR) tablet 10 mg  10 mg Oral DAILY    levothyroxine (SYNTHROID) tablet 100 mcg  100 mcg Oral Once per day on Mon Tue Wed Thu Fri Sat    cetirizine (ZYRTEC) tablet 10 mg  10 mg Oral DAILY    hydroxypropyl methylcellulose (ISOPTO TEARS) 0.5 % ophthalmic solution 1 Drop  1 Drop Both Eyes PRN    venlafaxine-SR (EFFEXOR-XR) capsule 75 mg  75 mg Oral DAILY WITH BREAKFAST    gabapentin (NEURONTIN) capsule 100 mg  100 mg Oral QHS    arformoteroL (BROVANA) neb solution 15 mcg  15 mcg Nebulization BID RT    And    budesonide (PULMICORT) 500 mcg/2 ml nebulizer suspension  500 mcg Nebulization BID RT    hydrOXYzine HCL (ATARAX) tablet 20 mg  20 mg Oral TID PRN    heparin (porcine) injection 5,000 Units  5,000 Units SubCUTAneous Q8H    sodium chloride (NS) flush 5-40 mL  5-40 mL IntraVENous Q8H    sodium chloride (NS) flush 5-40 mL  5-40 mL IntraVENous PRN    acetaminophen (TYLENOL) tablet 650 mg  650 mg Oral Q6H PRN    Or    acetaminophen (TYLENOL) suppository 650 mg  650 mg Rectal Q6H PRN    ondansetron (ZOFRAN ODT) tablet 4 mg  4 mg Oral Q8H PRN    Or    ondansetron (ZOFRAN) injection 4 mg  4 mg IntraVENous Q6H PRN    glucose chewable tablet 16 g  4 Tablet Oral PRN    dextrose (D50W) injection syrg 12.5-25 g  25-50 mL IntraVENous PRN    glucagon (GLUCAGEN) injection 1 mg  1 mg IntraMUSCular PRN    insulin lispro (HUMALOG) injection   SubCUTAneous AC&HS    albuterol-ipratropium (DUO-NEB) 2.5 MG-0.5 MG/3 ML  3 mL Nebulization Q6H PRN         Allergies   Allergen Reactions   · Ciprofloxacin Anaphylaxis   · Shellfish Derived Anaphylaxis   · Ace Inhibitors Unknown (comments)   · Biaxin [Clarithromycin] Other (comments)   Metal taste   · Candesartan Cough   · Pcn [Penicillins] Hives     Past Medical History:   Diagnosis Date   · Acquired hypothyroidism 8/15/2016   · Anemia   RED-HF study   · Asthma   · Cardiomyopathy, nonischemic ([facility] [address]   initial dx 2001, bivHF 2008 with EF 15%, s/p biV-ICD 9/08, significant improvment in EF to 45-50%   · CKD (chronic kidney disease)   Dr Mindi Rosas   · CKD (chronic kidney disease) 8/15/2012   · Depression   · Diabetes ([facility] [address]   · Diabetic neuropathy ([facility] [address]   · DM (diabetes mellitus) ([facility] [address] 8/15/2012   · GERD (gastroesophageal reflux disease)   · Gout   · Hypothyroidism   · ICD (implantable cardioverter-defibrillator), biventricular, in situ 6/5/2014   · Psoriasis     Past Surgical History:   Procedure Laterality Date   · CARDIAC CATHETERIZATION 2007; 01/06/15   normal cors   · ECHO 2D ADULT 4/2010   EF 45%, improved from 1/09 (25%)   · ECHO 2D ADULT 11/2011   LVH, EF 55-60%   · HX ORTHOPAEDIC   knee   · HX PACEMAKER PLACEMENT   AICD   · STRESS TEST LEXISCAN/CARDIOLITE 3/21/12   normal perfusion, global HK 40%     Family History   Problem Relation Age of Onset   · Heart Disease Mother   · Hypertension Mother   · Lupus Sister   · Diabetes Brother     Social History     Tobacco Use   · Smoking status: Never Smoker   · Smokeless tobacco: Never Used   Substance Use Topics   · Alcohol use: No         Review of Systems: Review of all other systems otherwise negative. Constitutional: Negative for fever, chills, weight loss, + malaise/fatigue. HEENT: Negative for nosebleeds, vision changes.    Respiratory: Negative for cough, hemoptysis   Cardiovascular: Negative for chest pain, palpitations, orthopnea improved, no claudication, leg swelling, no syncope, and PND. + HAILE   Gastrointestinal: Negative for nausea, vomiting, diarrhea, blood in stool and melena. Genitourinary: Negative for dysuria, and hematuria. Musculoskeletal: Negative for myalgias, arthralgia. Skin: Negative for rash. Heme: Does not bleed or bruise easily. Neurological: Negative for speech change and focal weakness       Objective:   Visit Vitals  /80 (BP 1 Location: Left upper arm, BP Patient Position: At rest)   Pulse (!) 114   Temp 98.3 °F (36.8 °C)   Resp 20   Ht 5' 7\" (1.702 m)   Wt 253 lb 15.5 oz (115.2 kg)   SpO2 92%   BMI 39.78 kg/m²         Physical Exam:   Constitutional: Well-developed and well-nourished. No respiratory distress. Head: Normocephalic and atraumatic. Eyes: Pupils are equal, round. ENT: Hearing grossly normal.   Neck: Supple. No JVD present. Cardiovascular: fast rate, regular rhythm. Exam reveals no gallop and no friction rub. 2/6 systolic LSB murmur. Pulmonary/Chest: Appears mildly SOB.  Breath sounds normal. No wheezes. Abdominal: Soft, no tenderness. Moderate obesity. Musculoskeletal: Moves extremities independently. Vasc/lymphatic: no bilat lower extremity edema. Neurological: Alert, oriented. Skin: left sided BIV ICD pocket unremarkable  Psychiatric: Normal mood and affect. Behavior is normal. Judgment and thought content normal.         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 RHC without milrinone (12/10/2021): High wedge pressure (35 mmHg) indicating volume overload, precapillary.  Severe pulmonary HTN. Echo (12/08/2021): LVEF 15-20%, upper normal wall thickness, LV diastolic dysfunction.  Borderline low RVEF.  Mod dilated LA, mildly dilated RA.  Mild to mod MR. Adonis Hubbard TR.  Mild to mod PH.       LHC/RHC (01/2015): No significant CAD.        NICM:  she is greatly confused about bumex and milrinone and dialysis and I am too  I spoke to Dr Harini Dodd personally and texted nephrologist Dr Neto Quiroz  bp is lower so Hydralazine and isordil on hold/stopped  LVEF now 15-20%. Daysi Rosado pulmonary HTN noted on last admission RHC.      Possible not a candidate for double organ transplant referral due to medical and psychosocial problem and she needs SW visit  Son is in [place] and  is here and will discuss with CHF and SW team  I am concerned that the weight will be issue  If she is not a candidate for [facility] transplant, I think she should consider hospice care  Her  is supportive and I appreciate palliative medicine consult    I will ask Dr Joel Mena to do right and left heart cath, perhaps tomorrow 1 pm  Then Dr Harini Dodd will decide how to proceed further  Dr Neto Quiroz said dialysis is an option if she is considered transplant candidate otherwise it will not go any where  Dr Harini Dodd needs her be euvolumic and treated 3 months. Transplant cannot be considered unless this is proven irreversible  Therefore dialysis would happen first  Will present to nephrologist again next week after cath data are back with advanced CHF team's final recommendation      Nuclear amyloid scan equivocal.  cMRI is not possible with 4194 LV lead (2008). However cMRI would not change her management    Medtronic biventricular ICD (gen change 01/14/2015, leads 09/25/2008): Device check showed proper lead & generator function.  Generator longevity still estimated 4 months.  RA 0.1%, CRT 97.6%. I will replace in the future once we know the plan of heart and renal failure treatment  she will need Biv pacing if she can be accepted for double organ transplant. She has sinus tachycardia from systolic CHF  Corlanor 7.5 mg bid but sinus rate is up with any movement    PVCs due to severe CHF    On coreg and digoxin           Thank you for involving me in this patient's care and please call with further concerns or questions. Zollie Prader, M.D.    Electrophysiology/Cardiology   [facility] [place]   [address] [place] [facility] [address], Mark Gisella Solano, [address] [place] [address]   [phone]

## 2022-01-16 NOTE — PROGRESS NOTES
Verbal bedside report given to Sebastian Andrew RN oncoming nurse by Martha Thomas. Misael Barker, ARTURO off-going nurse. Report included current pt status and condition, recent results, hx of present illness, heart rate and rhythm, and respiratory status. 0697  Cardiologist present and speaking with pt.

## 2022-01-16 NOTE — PROGRESS NOTES
[address] [facility], [facility]  Inpatient Progress Note      Patient name: Samantha Castro  Patient : 1955  Patient MRN: [id number]  Consulting MD: Martin Owens MD  Date of service: 22    REASON FOR REFERRAL:  Management of chronic systolic heart failure     PLAN OF CARE:   · 77 y/o female with chronic renal failure and new onset severe cardiomyopathy, LVEF 15% (diagnosed 21), stage D, NYHA class IV; admitted for progressive volume overload   · Making no progress with diuresis over 8 days, remains in pulmonary edema by CXR with hypoxia 5L O2; severely volume overloaded at 244lbs by [address] 12/10/21, yesterday weight up to 254lbs  · Most likely etiology of acute deterioration of LVEF is chronic volume overload with compensatory tachycardia + awaiting ischemic workup tomorrow; cardiac index too well for inotropes by last RHC; will dc for RHC  · This is new and potentially reversible severe LV dysfunction; thus cannot determine long term prognosis   · In order to be considered for heart/kidney transplant must prove non-reversibility of LV dysfunction; by guidelines prefererence is of at least 3 months of euvolemia and HR < 100bpm  · Patient appears poor heart/kidney candidate due to medical and psychosocial factors; SW will see on Monday. · It may be that her only option is bridge with dialysis to euvolemia and LV recovery.   · If patient is a poor candidate for dialysis, however, then I would recommend comfort care and hospice  · Consider palliative care meeting Tue/Wed once we have RHC/LHC results and plan formulated by all teams  · D/w Dr. Linda Freeman and Dr. Herman Box:  RHC/LHC on Monday per Dr. Tenorio/Dr. Kaia Sneed milrinone  Continue coreg 12.5mg twice daily, goal HR < 100bpm  Continue coralnor 7.5mg twice daily  Continue digoxin, check daily levels  No ACE/ARB/ARNI due to CKD  No MRA due to CKD   No diuretic per nephrology  Allopurinol 50mg daily per nephrology  ICD interrogation per , will need generator change before discharge per Dr. Roger Calvert   Repeat TTE and EKG when euvolemic   Will need OP PSG  Wean O2 as able   Invitae pending   6 Min walk today- ordered placed   Palliative consult appreciated   Nutritionist consult  Heart failure education  Advanced care plan present on file     All other care per primary team     IMPRESSION:  Fatigue  Shortness of breath, on 3L NC  Volume overload  Acute on Chronic systolic heart failure  · Stage D, NYHA class IV symptoms  · Non-ischemic cardiomyopathy, LVEF 15%  · Normal coronaries  · PYP equivocal for amyloid   Pulmonary hypertension  S/p BiV-ICD  Cardiac risk factors:  · HL  · DM2  · Morbid obesity, BMI 40  Acute on chronic renal failure, stage IV  GERD  Anemia of chronic disease  Gout     Interval Events:  Feels unchanged  Afebrile  -130s/80s, HR 90s  I/O net negative 348cc, urine 1.4 liters  Cr 2.43, proNTBNP 1100     LIFE GOALS:  Patient's personal goals include: being home with family, still ambulating around without getting too tired.   Important upcoming milestones or family events: none  The patient identifies the following as important for living well: remaining idependent and mobile; being able to get out of the house with . Matty Scout verbalized willingness to be on home milrinone and evaluation for both heart and kidney transplants.  Verbalizes she would have family supporting her decision on this.      HPI  Ani Mcclellan is U 75 y.o.  female with a history of NICM, chronic hypoxic respiratory failure secondary to pulmonary HTN, hypothyroidism, CKD, GERD, and DM II who presented to [facility] as a transfer from another facility for acute on chronic hypoxic respiratory failure.  Reports running out of O2 at home resulting in SOB and dizziness.     Upon arrival to the ED she was found to have O2 sats in the 70s, requiring 4L NC O2.  Rapid covid test was negative.  ProNT-BNP was 08510, K+5.2, BUN/CR x/2.54 and elevated d-dimer. Chest xray showing pulmonary edema vs. Atypical pneumonia. VQ scan showed low probability for PE.     Per Dr. Misti Solomon, LVEF 15-20% during recent admission.  LVEF previously normalized with CRT.  NYHA III-IV.  No ACEi/ARB due to renal dysfunction.  GDMT dosing limited by low BP.     Patient's PCP is Dr. Brandie Richards primarily for cardiac care due to Dr. Dorina Navarro historically seen by nephrology but has not had follow up care in the past three years.     CARDIAC IMAGING:  Echo 12/8/21- LVEF 15-20%, mild to Mod MR, LVIDd 5.09cm, TAPSE 1.94cm  Echo 4/22/19- LVEF 60%, trace MR,  LVIDd 4.24cm, TAPSE 1.65cm   Echo 4/24/18- LVEF 60%, trivial MR, LVIDd 4.79cm, TAPSE 2.25cm      EKG- 1/4/22 ST with A sense and V paced rhythm     [facility] 2015- No significant CAD  NST 2014- reversible LAD involvement      ICD interrogation     HEMODYNAMICS:  RHC 12/10/21: PAP 76/48/57, RAP 20, PCWP 35, CI 2.26        CPEST not done  6MW not done     OTHER IMAGING:  CXR Results  (Last 48 hours)                             01/13/22 1035   XR CHEST PORT Final result      Impression:   No significant change in congestion and interstitial and alveolar   opacities which may reflect edema or infectious infiltrate.        Narrative:   EXAM: XR CHEST PORT       INDICATION: pul edema       COMPARISON: Chest x-ray 1/10/2022.       FINDINGS: A portable AP radiograph of the chest was obtained at 10:19 hours. The   patient is on a cardiac monitor. The lungs appear grossly stable with congestion   and interstitial/airspace opacities with no pneumothorax or pleural effusion. Right PICC line traverses expected course of tip in the region of the atriocaval   junction. Pacemaker-ICD generator body projects over the left chest wall with   intact appearing leads traversing in expected course.  . The cardiac and mediastinal contours and pulmonary vascularity are normal.  Atherosclerotic   calcifications affect the aortic arch. The chest wall structures and visualized   upper abdomen show no acute findings with incidental note of degenerative spine   and shoulder changes.                           PHYSICAL EXAM:  Visit Vitals  BP (!) 111/90 (BP 1 Location: Left upper arm, BP Patient Position: At rest)   Pulse 97   Temp 97.8 °F (36.6 °C)   Resp 20   Ht 5' 7\" (1.702 m)   Wt 251 lb 8.7 oz (114.1 kg)   SpO2 98%   BMI 39.40 kg/m²      Physical Exam  Vitals and nursing note reviewed. Constitutional:       General: She is not in acute distress.     Appearance: Normal appearance. She is obese. Cardiovascular:      Rate and Rhythm: Regular rhythm. Tachycardia present.      Pulses: Normal pulses.      Heart sounds: Normal heart sounds. No murmur heard.       Pulmonary:      Effort: Pulmonary effort is normal. No respiratory distress. Abdominal:      General: There is no distension. Musculoskeletal:         General: No swelling. Skin:     General: Skin is warm and dry.      Findings: Lesion present.      Comments: psorasis   Neurological:      General: No focal deficit present.      Mental Status: She is alert and oriented to person, place, and time. Psychiatric:         Mood and Affect: Mood normal.            REVIEW OF SYSTEMS:  Review of Systems   Constitutional: Positive for malaise/fatigue. Negative for chills and fever. Respiratory: Positive for shortness of breath.    Cardiovascular: Negative for chest pain, palpitations, orthopnea and leg swelling. Gastrointestinal: Negative for heartburn and nausea. Genitourinary: Negative for dysuria. Musculoskeletal: Negative for falls, joint pain and myalgias. Neurological: Negative for dizziness. Psychiatric/Behavioral: Positive for depression.  The patient is nervous/anxious.       PHYSICAL EXAM:  Visit Vitals  /80 (BP 1 Location: Left upper arm, BP Patient Position: At rest)   Pulse 99   Temp 98.3 °F (36.8 °C)   Resp 20   Ht 5' 7\" (1.702 m)   Wt 254 lb 3.1 oz (115.3 kg)   SpO2 100%   BMI 39.81 kg/m²     General: Patient is well developed, well-nourished in no acute distress  HEENT: Normocephalic and atraumatic. No scleral icterus. Pupils are equal, round and reactive to light and accomodation. No conjunctival injection. Oropharynx is clear. Neck: Supple. No evidence of thyroid enlargements or lymphadenopathy. JVD: Cannot be appreciated   Lungs: Breath sounds are equal and clear bilaterally. No wheezes, rhonchi, or rales. Heart: Regular rate and rhythm with normal S1 and S2. No murmurs, gallops or rubs. Abdomen: Soft, no mass or tenderness. No organomegaly or hernia. Bowel sounds present. Genitourinary and rectal: deferred  Extremities: No cyanosis, clubbing, or edema. Neurologic: No focal sensory or motor deficits are noted. Grossly intact. Psychiatric: Awake, alert an doriented x 3. Appropriate mood and affect. Skin: Warm, dry and well perfused. No lesions, nodules or rashes are noted. REVIEW OF SYSTEMS:  General: Denies fever, night sweats. Ear, nose and throat: Denies difficulty hearing, sinus problems, runny nose, post-nasal drip, ringing in ears, mouth sores, loose teeth, ear pain, nosebleeds, sore throate, facial pain or numbess  Cardiovascular: see above in the interval history  Respiratory: Denies cough, wheezing, sputum production, hemoptysis.   Gastrointestinal: Denies heartburn, constipation, intolerance to certain foods, diarrhea, abdominal pain, nausea, vomiting, difficulty swallowing, blood in stool  Kidney and bladder: Denies painful urination, frequent urination, urgency, prostate problems and impotence  Musculoskeletal: Denies joint pain, muscle weakness  Skin and hair: Denies change in existing skin lesions, hair loss or increase, breast changes    PAST MEDICAL HISTORY:  Past Medical History:   Diagnosis Date    Acquired hypothyroidism 8/15/2016    Anemia     RED-HF study    Asthma     Cardiomyopathy, nonischemic ([facility] [address]     initial dx 2001, bivHF 2008 with EF 15%, s/p biV-ICD 9/08, significant improvment in EF to 45-50%    CKD (chronic kidney disease)     Dr Evette Munguia    CKD (chronic kidney disease) 8/15/2012    Depression     Diabetes ([facility] [address]     Diabetic neuropathy ([facility] [address]     DM (diabetes mellitus) ([facility] [address] 8/15/2012    GERD (gastroesophageal reflux disease)     Gout     Hypothyroidism     ICD (implantable cardioverter-defibrillator), biventricular, in situ 6/5/2014    Psoriasis        PAST SURGICAL HISTORY:  Past Surgical History:   Procedure Laterality Date    CARDIAC CATHETERIZATION  2007; 01/06/15    normal cors    ECHO 2D ADULT  4/2010    EF 45%, improved from 1/09 (25%)    ECHO 2D ADULT  11/2011    LVH, EF 55-60%    HX ORTHOPAEDIC      knee    HX PACEMAKER PLACEMENT      AICD    STRESS TEST LEXISCAN/CARDIOLITE  3/21/12    normal perfusion, global HK 40%       FAMILY HISTORY:  Family History   Problem Relation Age of Onset    Heart Disease Mother     Hypertension Mother     Lupus Sister     Diabetes Brother        SOCIAL HISTORY:  Social History     Socioeconomic History    Marital status:    Tobacco Use    Smoking status: Never Smoker    Smokeless tobacco: Never Used   Substance and Sexual Activity    Alcohol use: No    Drug use: No    Sexual activity: Never   Social History Narrative    . Nonsmoker. Disability       LABORATORY RESULTS:     Labs Latest Ref Rng & Units 1/16/2022 1/15/2022 1/14/2022 1/13/2022 1/12/2022 1/12/2022 1/11/2022   WBC 3.6 - 11.0 K/uL - 8.5 8.0 7.2 - 8.8 9.0   RBC 3.80 - 5.20 M/uL - 3.39(L) 3.34(L) 3.32(L) - 3.46(L) 3.52(L)   Hemoglobin 11.5 - 16.0 g/dL - 8. 3(L) 8.0(L) 8.0(L) - 8. 3(L) 8.5(L)   Hematocrit 35.0 - 47.0 % - 31. 0(L) 30. 7(L) 30. 7(L) - 31. 8(L) 32. 6(L)   MCV 80.0 - 99.0 FL - 91.4 91.9 92.5 - 91.9 92.6   Platelets 938 - 033 K/uL - 328 283 259 - 269 331   Lymphocytes 12 - 49 % - - - - - - -   Monocytes 5 - 13 % - - - - - - -   Eosinophils 0 - 7 % - - - - - - -   Basophils 0 - 1 % - - - - - - -   Albumin 3.5 - 5.0 g/dL 3. 3(L) 3. 2(L) 3.0(L) 2. 7(L) 2. 9(L) 2. 5(L) 2. 6(L)   Calcium 8.5 - 10.1 MG/DL 9.5 9.9 9.6 9.3 9.4 9.2 9.5   Glucose 65 - 100 mg/dL 192(H) 168(H) 165(H) 118(H) 242(H) 204(H) 84   BUN 6 - 20 MG/DL 53(H) 55(H) 54(H) 51(H) 53(H) 54(H) 59(H)   Creatinine 0.55 - 1.02 MG/DL 2.43(H) 2.69(H) 2.38(H) 2.27(H) 2.33(H) 2.32(H) 2.36(H)   Sodium 136 - 145 mmol/L 135(L) 134(L) 135(L) 136 136 137 138   Potassium 3.5 - 5.1 mmol/L 4.0 4.1 3.7 3.6 4.5 4.1 3.9   TSH 0.36 - 3.74 uIU/mL - - - - - - 3.08   Some recent data might be hidden     Lab Results   Component Value Date/Time    TSH 3.08 01/11/2022 05:13 AM    TSH 1.85 12/15/2021 01:06 PM    TSH 1.01 11/05/2020 09:11 AM    TSH 2.740 04/30/2019 11:21 AM    TSH 0.519 02/21/2012 10:53 AM    TSH 8.29 (H) 01/20/2010 01:59 PM       ALLERGY:  Allergies   Allergen Reactions    Ciprofloxacin Anaphylaxis    Shellfish Derived Anaphylaxis    Ace Inhibitors Unknown (comments)    Biaxin [Clarithromycin] Other (comments)     Metal taste    Candesartan Cough    Pcn [Penicillins] Hives        CURRENT MEDICATIONS:    Current Facility-Administered Medications:     triamcinolone acetonide (KENALOG) 0.1 % cream, , Topical, BID, Martin Owens MD, Given at 01/15/22 1550    milrinone (PRIMACOR) 20 MG/100 ML D5W infusion, 0.2 mcg/kg/min, IntraVENous, CONTINUOUS, Amber Weaver, NP, Last Rate: 6.8 mL/hr at 01/15/22 1546, 0.2 mcg/kg/min at 01/15/22 1546    insulin NPH (NOVOLIN N, HUMULIN N) injection 35 Units, 35 Units, SubCUTAneous, DAILY, Martin Owens MD, 35 Units at 01/15/22 0925    insulin NPH (NOVOLIN N, HUMULIN N) injection 20 Units, 20 Units, SubCUTAneous, QHS, Martin Owens MD, 20 Units at 01/15/22 2235    simethicone (MYLICON) tablet 80 mg, 80 mg, Oral, QID PRN, Martin Owens MD, 80 mg at 01/15/22 2362    polyethylene glycol (MIRALAX) packet 17 g, 17 g, Oral, DAILY, Suzanne Murillo MD, 17 g at 01/15/22 6103    senna-docusate (PERICOLACE) 8.6-50 mg per tablet 2 Tablet, 2 Tablet, Oral, BID, Suzanne Murillo MD, 2 Tablet at 01/15/22 1739    albumin human 25% (BUMINATE) solution 12.5 g, 12.5 g, IntraVENous, BID, Owen, Amber B, NP, 12.5 g at 01/15/22 1743    [Held by provider] bumetanide (BUMEX) 0.25 mg/mL infusion, 1 mg/hr, IntraVENous, CONTINUOUS, Owen, Amber B, NP, Last Rate: 4 mL/hr at 01/13/22 2033, 1 mg/hr at 01/13/22 2033    digoxin (LANOXIN) tablet 0.0625 mg, 0.0625 mg, Oral, DAILY, Owen, Amber B, NP, 0.0625 mg at 01/15/22 0924    ivabradine (CORLANOR) tablet 7.5 mg, 7.5 mg, Oral, BID WITH MEALS, Michelle Blanco MD, 7.5 mg at 01/15/22 1739    carvediloL (COREG) tablet 12.5 mg, 12.5 mg, Oral, BID WITH MEALS, Owen, Amber B, NP, 12.5 mg at 01/15/22 1739    allopurinoL (ZYLOPRIM) tablet 50 mg, 50 mg, Oral, DAILY, Owen, Amber B, NP, 50 mg at 01/15/22 0924    epoetin isabel-epbx (RETACRIT) injection 20,000 Units, 20,000 Units, SubCUTAneous, Q TUE, THU & SAT, Marty Mock MD, 20,000 Units at 01/15/22 2206    montelukast (SINGULAIR) tablet 10 mg, 10 mg, Oral, DAILY, Jojo Church MD, 10 mg at 01/15/22 2841    levothyroxine (SYNTHROID) tablet 100 mcg, 100 mcg, Oral, Once per day on Mon Tue Wed Thu Fri Sat, Yahir Tate MD, 100 mcg at 01/15/22 0702    cetirizine (ZYRTEC) tablet 10 mg, 10 mg, Oral, DAILY, Lisa Horne MD, 10 mg at 01/15/22 0925    hydroxypropyl methylcellulose (ISOPTO TEARS) 0.5 % ophthalmic solution 1 Drop, 1 Drop, Both Eyes, PRN, Lisa Horne MD, 1 Drop at 01/06/22 1727    venlafaxine-SR (EFFEXOR-XR) capsule 75 mg, 75 mg, Oral, DAILY WITH BREAKFAST, Lisa Horne MD, 75 mg at 01/15/22 3843    gabapentin (NEURONTIN) capsule 100 mg, 100 mg, Oral, QHS, Lisa Horne MD, 100 mg at 01/15/22 2235    arformoteroL (BROVANA) neb solution 15 mcg, 15 mcg, Nebulization, BID RT, 15 mcg at 01/15/22 2019 **AND** budesonide (PULMICORT) 500 mcg/2 ml nebulizer suspension, 500 mcg, Nebulization, BID RT, Robel Chacon MD, 500 mcg at 01/15/22 2019    hydrOXYzine HCL (ATARAX) tablet 20 mg, 20 mg, Oral, TID PRN, Guille Patel MD    heparin (porcine) injection 5,000 Units, 5,000 Units, SubCUTAneous, Q8H, Sussy Pugh MD, 5,000 Units at 01/16/22 0402    sodium chloride (NS) flush 5-40 mL, 5-40 mL, IntraVENous, Q8H, Guille Patel MD, 10 mL at 01/16/22 0600    sodium chloride (NS) flush 5-40 mL, 5-40 mL, IntraVENous, PRN, Guille Patel MD    acetaminophen (TYLENOL) tablet 650 mg, 650 mg, Oral, Q6H PRN **OR** acetaminophen (TYLENOL) suppository 650 mg, 650 mg, Rectal, Q6H PRN, Guille Patel MD    ondansetron (ZOFRAN ODT) tablet 4 mg, 4 mg, Oral, Q8H PRN **OR** ondansetron (ZOFRAN) injection 4 mg, 4 mg, IntraVENous, Q6H PRN, Guille Patel MD, 4 mg at 01/05/22 0915    glucose chewable tablet 16 g, 4 Tablet, Oral, PRN, Guille Patel MD    dextrose (D50W) injection syrg 12.5-25 g, 25-50 mL, IntraVENous, PRN, Guille Patel MD    glucagon [facility]) injection 1 mg, 1 mg, IntraMUSCular, PRN, Guille Patel MD    insulin lispro (HUMALOG) injection, , SubCUTAneous, AC&HS, Debi Varma MD, 2 Units at 01/15/22 2337    albuterol-ipratropium (DUO-NEB) 2.5 MG-0.5 MG/3 ML, 3 mL, Nebulization, Q6H PRN, Guille Patel MD    PATIENT CARE TEAM:  Patient Care Team:  Teri uQinteros MD as PCP - General (Internal Medicine)  Teri Quinteros MD as PCP - [facility] Provider  Castro Posada MD as Consulting Provider (Internal Medicine)  Sri Zarco MD (Dermatology)  Duke Borja MD (Nephrology)  Guille Patel MD as Consulting Provider (Cardiology)  Melvina Montenegro MD (Cardiology)  Margot Anderson MD as Consulting Provider (Pulmonary Disease)     Thank you for allowing me to participate in this patient's care.     Jj Chacon MD PhD  [facility] [address], [address]  Phone: [phone]  Fax: [phone]

## 2022-01-17 NOTE — PROGRESS NOTES
2000:  Verbal bedside report given to [facility], RN oncoming nurse by Nba Hamilton RN off-going nurse. Report included current pt status and condition, recent results, hx of present illness, heart rate and rhythm, and respiratory status.

## 2022-01-17 NOTE — PROGRESS NOTES
Patient went down for a procedure and returned with low SPO2. Placed patient back on Mid flow nasal cannula at 15L. Patient is still sleepy from the procedure. 1735: Patient SPO2 remains low on mid flow. Placed patient on OxyMask of 10L.  Patient SPO2 increase to 100%

## 2022-01-17 NOTE — PROCEDURES
Findings  1. Severely elevated left and right-sided filling pressures  2. Severe pulmonary hypertension: Mixed pattern with elevated wedge as well as PVR of about 6 Woods units. 3.  Severe one-vessel, moderate one-vessel coronary artery disease. Mid LAD lesion is the worst which is about 80% involving diagonal branch ostium. Uncertain if CAD alone as a cause of her cardiomyopathy(probably less likely). Likely prior stent in mid LAD which is patent  4.  Reduced cardiac index of 1.65 L/min/m² by thermodilution and 1.6 L/min/m² by presumed O2 consumption by Aye    Access right radial, right internal jugular ultrasound-guided no issues  Contrast 15 cc. Limited pictures were taken due to underlying CKD    Recommendations  1. Guideline directed medical therapy for heart failure and coronary artery disease  2. Will defer to discussion between Dr. Whitney Krishnamurthy, nephrology and heart failure if they like to consider revascularization. Uncertain if LAD revascularization will lead to improvement in functional status or EF at this time.   Nonetheless can be achieved with less than 20 to 30 cc of contrast.

## 2022-01-17 NOTE — PROGRESS NOTES
Transition Plan of Care  RUR 24%-High  Disposition-plan was likely to discharge home with Milrinone drip. Milrinone is on hold for now. Possible heart cath today. Will need home parker at discharge. Awaiting order after cath today. Pending results and progress, CM will set up any discharge needs that arise. She is open to [facility] for home oxygen. Will need BLS transport at [address] [facility]

## 2022-01-17 NOTE — PROGRESS NOTES
TRANSFER - OUT REPORT:    Verbal report given to [address] RN on Fernandez Zamora  being transferred to [facility] for routine progression of care       Report consisted of patients Situation, Background, Assessment and   Recommendations(SBAR). Information from the following report(s) Procedure Summary was reviewed with the receiving nurse. Lines:   PICC Double Lumen 68/56/48 Right;Basilic (Active)   Central Line Being Utilized Yes 01/17/22 0800   Criteria for Appropriate Use Long term IV/antibiotic administration 01/17/22 0800   Site Assessment Clean, dry, & intact 01/17/22 0800   Phlebitis Assessment 0 01/17/22 0800   Infiltration Assessment 0 01/17/22 0800   Arm Circumference (cm) 31 cm 01/10/22 1725   Date of Last Dressing Change 01/10/22 01/16/22 2041   Dressing Status Clean, dry, & intact 01/17/22 0800   Action Taken Open ports on tubing capped 01/17/22 0800   External Catheter Length (cm) 0 centimeters 01/16/22 2041   Dressing Type Transparent;Disk with Chlorhexadine gluconate (CHG) 01/17/22 0800   Hub Color/Line Status Capped 01/17/22 0800   Positive Blood Return (Site #1) Yes 01/17/22 0800   Hub Color/Line Status Red;Capped 01/17/22 0800   Positive Blood Return (Site #2) Yes 01/17/22 0800   Alcohol Cap Used Yes 01/17/22 0800        Opportunity for questions and clarification was provided.       Patient transported with:   Monitor  O2 @ 6 liters  Registered Nurse

## 2022-01-17 NOTE — PROGRESS NOTES
TRANSFER - IN REPORT:    Verbal report received from [address] on [address] [place] [address]  being received from cardiac catheterization for routine progression of care. Report consisted of patients Situation, Background, Assessment and Recommendations(SBAR). Information from the following report(s) Procedure Summary was reviewed with the receiving clinician. Opportunity for questions and clarification was provided. Assessment completed upon patients arrival to [address] [place] [address]. Cardiac Cath Lab Recovery Arrival Note:    [address] [place] [address] arrived to [facility] recovery area. Patient procedure= cardiaccatheterization. Patient on cardiac monitor, non-invasive blood pressure, SPO2 monitor. On O2 @ 6 lpm via nasal cannula. IV  Capped. Patient status doing well without problems. . Patient is A&Ox 4. Patient reports no pain. PROCEDURE SITE CHECK:    Procedure site:without any bleeding and no hematoma, no pain/discomfort reported at procedure site. No change in patient status. Continue to monitor patient and status. Note:Prior to patient going in the procedure room for the cardiac catheterization procedure, patient up to the bedside commode x 2 to urinate and have one bowel movement. 1530 Patient took diet pepsi without nausea and tolerated it well.

## 2022-01-17 NOTE — PROGRESS NOTES
Nurse Practitioner Student Note  Inpatient Progress Note      Patient name: Ana María Soliz  Patient : 1955  Patient MRN: [id number]  Consulting MD: Ronald Almonte MD  Date of service: 22    REASON FOR REFERRAL:  Management of chronic systolic heart failure     PLAN OF CARE:   79 y/o female with chronic renal failure and new onset severe cardiomyopathy, LVEF 15% (diagnosed 21), stage D, NYHA class IV; admitted for progressive volume overload   Making no progress with diuresis over 8 days, remains in pulmonary edema by CXR with hypoxia 6L O2; severely volume overloaded at 244lbs by [address] 12/10/21, 2lb weight gain since yesterday at 255lbs  Most likely etiology of acute deterioration of LVEF is chronic volume overload with compensatory tachycardia + awaiting ischemic workup today; cardiac index too well for inotropes by last RHC (2020). Milrinone weaned off yesterday for RHC today  This is new and potentially reversible severe LV dysfunction; thus cannot determine long term prognosis   In order to be considered for heart/kidney transplant must prove non-reversibility of LV dysfunction; by guidelines prefererence is of at least 3 months of euvolemia and HR < 100bpm  Patient appears poor heart/kidney candidate due to medical and psychosocial factors; SW will see today   It may be that her only option is bridge with dialysis to euvolemia and LV recovery.   If patient is a poor candidate for dialysis, however, then I would recommend comfort care and hospice  Consider palliative care meeting Tue/Wed once we have RHC/LHC results and plan formulated by all teams  Previously d/w Dr. Tish Tejada and Dr. Jerry Johnson:  RHC/LHC on 2022) per Dr. Tenorio/Dr. Davies Neigh  CBC and ABO prior to cath  Milrinone off since 2022  Continue coreg 12.5mg twice daily, goal HR < 100bpm  Continue coralnor 7.5mg twice daily  Continue digoxin, check daily levels  No ACE/ARB/ARNI due to CKD  No MRA due to CKD   No diuretic per nephrology- will re-evaluate post cath   -if dialysis indicated, patient will need Dialysis line; note patient does have PICC currently  Allopurinol 50mg daily per nephrology  ICD interrogation per , will need generator change (4mo battery life remaining)  Repeat TTE and EKG when euvolemic   Will need OP PSG  Wean O2 as able- currently on 6L NC (increased from before)  Invitae pending   6 Min walk today- ordered placed   Palliative consult appreciated   Nutritionist consult  Heart failure education  Advanced care plan present on file     All other care per primary team     IMPRESSION:  Shortness of breath, on 6L NC (3L NC home dose)  Volume overload- 2lb weight increase since yesterday  Acute on Chronic systolic heart failure  Stage D, NYHA class IV symptoms  Non-ischemic cardiomyopathy, LVEF 15%  Normal coronaries  PYP equivocal for amyloid   Pulmonary hypertension  S/p BiV-ICD- (4 month battery life remaining per Dr Beverley Gonzalez)  Cardiac risk factors:  HL  DM2  Morbid obesity, BMI 40  Acute on chronic renal failure, stage IV  GERD  Anemia of chronic disease  Gout     Interval Events:  Feels unchanged   Afebrile  -130s/80s, HR 90s-low 100s  I/O inaccurate due to incomplete collection  Cr 2.37, proNTBNP 50948  Pending heart cath today     LIFE GOALS:  Patient's personal goals include: being home with family, still ambulating around without getting too tired. Important upcoming milestones or family events: none  The patient identifies the following as important for living well: remaining idependent and mobile; being able to get out of the house with . Patient verbalized willingness to be on home milrinone and evaluation for both heart and kidney transplants. Verbalizes she would have family supporting her decision on this. SHAMIKA Crowder is a 78 y.o.   female with a history of NICM, chronic hypoxic respiratory failure secondary to pulmonary HTN, hypothyroidism, CKD, GERD, and DM II who presented to [facility] as a transfer from another facility for acute on chronic hypoxic respiratory failure. Reports running out of O2 at home resulting in SOB and dizziness. Upon arrival to the ED she was found to have O2 sats in the 70s, requiring 4L NC O2. Rapid covid test was negative. ProNT-BNP was 23122, K+5.2, BUN/CR x/2.54 and elevated d-dimer. Chest xray showing pulmonary edema vs. Atypical pneumonia. VQ scan showed low probability for PE. Per Dr. Roger Calvert, NIC, LVEF 15-20% during recent admission. LVEF previously normalized with CRT. NYHA III-IV. No ACEi/ARB due to renal dysfunction. GDMT dosing limited by low BP. Patient's PCP is Dr. Howard Baptiste, and she sees Dr. Roger Calvert primarily for cardiac care due to Dr. Marylee Fendt transfering practice. Patient historically seen by nephrology but has not had follow up care in the past three years. CARDIAC IMAGING:  Echo 12/8/21- LVEF 15-20%, mild to Mod MR, LVIDd 5.09cm, TAPSE 1.94cm  Echo 4/22/19- LVEF 60%, trace MR,  LVIDd 4.24cm, TAPSE 1.65cm   Echo 4/24/18- LVEF 60%, trivial MR, LVIDd 4.79cm, TAPSE 2.25cm      EKG- 1/4/22 ST with A sense and V paced rhythm     [facility] 2015- No significant CAD  NST 2014- reversible LAD involvement      ICD interrogation     HEMODYNAMICS:  [facility] 12/10/21: PAP 76/48/57, RAP 20, PCWP 35, CI 2.26        CPEST not done  6MW not done     OTHER IMAGING:  CXR Results  (Last 48 hours)                             01/13/22 1035   XR CHEST PORT Final result      Impression:   No significant change in congestion and interstitial and alveolar   opacities which may reflect edema or infectious infiltrate. Narrative:   EXAM: XR CHEST PORT       INDICATION: pul edema       COMPARISON: Chest x-ray 1/10/2022. FINDINGS: A portable AP radiograph of the chest was obtained at 10:19 hours. The   patient is on a cardiac monitor.  The lungs appear grossly stable with congestion   and interstitial/airspace opacities with no pneumothorax or pleural effusion. Right PICC line traverses expected course of tip in the region of the atriocaval   junction. Pacemaker-ICD generator body projects over the left chest wall with   intact appearing leads traversing in expected course. . The cardiac and   mediastinal contours and pulmonary vascularity are normal.  Atherosclerotic   calcifications affect the aortic arch. The chest wall structures and visualized   upper abdomen show no acute findings with incidental note of degenerative spine   and shoulder changes. PHYSICAL EXAM:    Physical Exam  Vitals and nursing note reviewed. Constitutional:       General: She is dyspneic at rest.     Appearance: Normal appearance. She is obese. HEENT:   Normocephalic and atraumatic. No scleral icterus. PERRL  Cardiovascular:      Rate and Rhythm: Regular rhythm. SR/Tachycardia present. Pulses: Normal pulses. Heart sounds: Normal heart sounds. No murmur heard. Pulmonary:      Effort: Pulmonary effort is slightly increased; taking pauses between sentences to catch breath. Abdominal:      General: There is no distension. Bowel sounds present. Musculoskeletal:         General: Trace edema to BLE. Skin:     General: Skin is warm and dry. Findings: Lesion present. Comments: psorasis   Neurological:      General: No focal deficit present. Mental Status: She is alert and oriented to person, place, and time. Psychiatric:         Mood and Affect: Mood normal.            REVIEW OF SYSTEMS:  Review of Systems   Constitutional: Positive for malaise/fatigue. Negative for chills and fever. Respiratory: Positive for shortness of breath. Negative wheezing, coughing or sputum production. Cardiovascular: Negative for chest pain, palpitations, orthopnea and leg swelling. Gastrointestinal: Positive for constipation. Negative for heartburn and nausea. Genitourinary: Negative for dysuria. Musculoskeletal: Negative for falls, joint pain and myalgias. Neurological: Negative for dizziness. Psychiatric/Behavioral: Positive for depression. The patient is nervous/anxious. PHYSICAL EXAM:  Visit Vitals  BP (!) 123/92   Pulse 97   Temp 98.4 °F (36.9 °C)   Resp 21   Ht 5' 7\" (1.702 m)   Wt 255 lb 4.7 oz (115.8 kg)   SpO2 100%   BMI 39.98 kg/m²         PAST MEDICAL HISTORY:  Past Medical History:   Diagnosis Date    Acquired hypothyroidism 8/15/2016    Anemia     RED-HF study    Asthma     Cardiomyopathy, nonischemic ([facility] [address]     initial dx 2001, bivHF 2008 with EF 15%, s/p biV-ICD 9/08, significant improvment in EF to 45-50%    CKD (chronic kidney disease)     Dr Nupur Ramos    CKD (chronic kidney disease) 8/15/2012    Depression     Diabetes ([facility] [address]     Diabetic neuropathy (HCC)     DM (diabetes mellitus) ([facility] [address] 8/15/2012    GERD (gastroesophageal reflux disease)     Gout     Hypothyroidism     ICD (implantable cardioverter-defibrillator), biventricular, in situ 6/5/2014    Psoriasis        PAST SURGICAL HISTORY:  Past Surgical History:   Procedure Laterality Date    CARDIAC CATHETERIZATION  2007; 01/06/15    normal cors    ECHO 2D ADULT  4/2010    EF 45%, improved from 1/09 (25%)    ECHO 2D ADULT  11/2011    LVH, EF 55-60%    HX ORTHOPAEDIC      knee    HX PACEMAKER PLACEMENT      AICD    STRESS TEST LEXISCAN/CARDIOLITE  3/21/12    normal perfusion, global HK 40%       FAMILY HISTORY:  Family History   Problem Relation Age of Onset    Heart Disease Mother     Hypertension Mother     Lupus Sister     Diabetes Brother        SOCIAL HISTORY:  Social History     Socioeconomic History    Marital status:    Tobacco Use    Smoking status: Never Smoker    Smokeless tobacco: Never Used   Substance and Sexual Activity    Alcohol use: No    Drug use: No    Sexual activity: Never   Social History Narrative    . Nonsmoker.  Disability       LABORATORY RESULTS: Labs Latest Ref Rng & Units 1/17/2022 1/16/2022 1/15/2022 1/14/2022 1/13/2022 1/12/2022 1/12/2022   WBC 3.6 - 11.0 K/uL - - 8.5 8.0 7.2 - 8.8   RBC 3.80 - 5.20 M/uL - - 3.39(L) 3.34(L) 3.32(L) - 3.46(L)   Hemoglobin 11.5 - 16.0 g/dL - - 8. 3(L) 8.0(L) 8.0(L) - 8. 3(L)   Hematocrit 35.0 - 47.0 % - - 31. 0(L) 30. 7(L) 30. 7(L) - 31. 8(L)   MCV 80.0 - 99.0 FL - - 91.4 91.9 92.5 - 91.9   Platelets 659 - 227 K/uL - - 328 283 259 - 269   Lymphocytes 12 - 49 % - - - - - - -   Monocytes 5 - 13 % - - - - - - -   Eosinophils 0 - 7 % - - - - - - -   Basophils 0 - 1 % - - - - - - -   Albumin 3.5 - 5.0 g/dL 3.6 3.3(L) 3. 2(L) 3.0(L) 2. 7(L) 2. 9(L) 2. 5(L)   Calcium 8.5 - 10.1 MG/DL 9.6 9.5 9.9 9.6 9.3 9.4 9.2   Glucose 65 - 100 mg/dL 116(H) 192(H) 168(H) 165(H) 118(H) 242(H) 204(H)   BUN 6 - 20 MG/DL 51(H) 53(H) 55(H) 54(H) 51(H) 53(H) 54(H)   Creatinine 0.55 - 1.02 MG/DL 2.37(H) 2.43(H) 2.69(H) 2.38(H) 2.27(H) 2.33(H) 2.32(H)   Sodium 136 - 145 mmol/L 136 135(L) 134(L) 135(L) 136 136 137   Potassium 3.5 - 5.1 mmol/L 4.2 4.0 4.1 3.7 3.6 4.5 4.1   TSH 0.36 - 3.74 uIU/mL - - - - - - -   Some recent data might be hidden     Lab Results   Component Value Date/Time    TSH 3.08 01/11/2022 05:13 AM    TSH 1.85 12/15/2021 01:06 PM    TSH 1.01 11/05/2020 09:11 AM    TSH 2.740 04/30/2019 11:21 AM    TSH 0.519 02/21/2012 10:53 AM    TSH 8.29 (H) 01/20/2010 01:59 PM       ALLERGY:  Allergies   Allergen Reactions    Ciprofloxacin Anaphylaxis    Shellfish Derived Anaphylaxis    Ace Inhibitors Unknown (comments)    Biaxin [Clarithromycin] Other (comments)     Metal taste    Candesartan Cough    Pcn [Penicillins] Hives        CURRENT MEDICATIONS:    Current Facility-Administered Medications:     insulin NPH (NOVOLIN N, HUMULIN N) injection 10 Units, 10 Units, SubCUTAneous, QHS, Mario Anderson MD    insulin NPH (NOVOLIN N, HUMULIN N) injection 20 Units, 20 Units, SubCUTAneous, DAILY, Eric Barrios MD    triamcinolone acetonide (KENALOG) 0.1 % cream, , Topical, BID, Puja Mcdonough MD, Given at 01/16/22 1759    simethicone (MYLICON) tablet 80 mg, 80 mg, Oral, QID PRN, Puja Mcdonough MD, 80 mg at 01/15/22 2345    polyethylene glycol ([facility]) packet 17 g, 17 g, Oral, DAILY, Puja Mcdonough MD, 17 g at 01/16/22 9377    senna-docusate (PERICOLACE) 8.6-50 mg per tablet 2 Tablet, 2 Tablet, Oral, BID, Puja Mcdonough MD, 2 Tablet at 01/16/22 1757    albumin human 25% (BUMINATE) solution 12.5 g, 12.5 g, IntraVENous, BID, Owen, Amber B, NP, 12.5 g at 01/16/22 1757    [Held by provider] bumetanide (BUMEX) 0.25 mg/mL infusion, 1 mg/hr, IntraVENous, CONTINUOUS, Owen, Amber B, NP, Last Rate: 4 mL/hr at 01/13/22 2033, 1 mg/hr at 01/13/22 2033    digoxin (LANOXIN) tablet 0.0625 mg, 0.0625 mg, Oral, DAILY, Owen, Amber B, NP, 0.0625 mg at 01/16/22 5416    ivabradine (CORLANOR) tablet 7.5 mg, 7.5 mg, Oral, BID WITH MEALS, Joie Dhaliwal MD, 7.5 mg at 01/16/22 1757    carvediloL (COREG) tablet 12.5 mg, 12.5 mg, Oral, BID WITH MEALS, Owen, Amber B, NP, 12.5 mg at 01/16/22 1757    allopurinoL (ZYLOPRIM) tablet 50 mg, 50 mg, Oral, DAILY, Owen, Amber B, NP, 50 mg at 01/16/22 0924    epoetin isabel-epbx (RETACRIT) injection 20,000 Units, 20,000 Units, SubCUTAneous, Q TUE, THU & SAT, Kun Barone MD, 20,000 Units at 01/15/22 2206    montelukast (SINGULAIR) tablet 10 mg, 10 mg, Oral, DAILY, Jason Rosenberg MD, 10 mg at 01/16/22 8319    levothyroxine (SYNTHROID) tablet 100 mcg, 100 mcg, Oral, Once per day on Mon Tue Wed Thu Fri Sat, Tiffanie Paez MD, 100 mcg at 01/17/22 0548    cetirizine (ZYRTEC) tablet 10 mg, 10 mg, Oral, DAILY, Lisa Horne MD, 10 mg at 01/16/22 9035    hydroxypropyl methylcellulose (ISOPTO TEARS) 0.5 % ophthalmic solution 1 Drop, 1 Drop, Both Eyes, PRN, Lisa Horne MD, 1 Drop at 01/06/22 1727    venlafaxine-SR (EFFEXOR-XR) capsule 75 mg, 75 mg, Oral, DAILY WITH BREAKFAST, Lisa Horne MD, 75 mg at 01/16/22 0993    gabapentin (NEURONTIN) capsule 100 mg, 100 mg, Oral, QHS, Sroush, MD Lisa, 100 mg at 01/16/22 2208    arformoteroL (BROVANA) neb solution 15 mcg, 15 mcg, Nebulization, BID RT, 15 mcg at 01/17/22 0802 **AND** budesonide (PULMICORT) 500 mcg/2 ml nebulizer suspension, 500 mcg, Nebulization, BID RT, Jason Rosenberg MD, 500 mcg at 01/17/22 0802    hydrOXYzine HCL (ATARAX) tablet 20 mg, 20 mg, Oral, TID PRN, Joie Dhaliwal MD    heparin (porcine) injection 5,000 Units, 5,000 Units, SubCUTAneous, Q8H, Tali Ann MD, 5,000 Units at 01/17/22 0548    sodium chloride (NS) flush 5-40 mL, 5-40 mL, IntraVENous, Q8H, Joie Dhaliwal MD, 10 mL at 01/17/22 0548    sodium chloride (NS) flush 5-40 mL, 5-40 mL, IntraVENous, PRN, Joie Dhaliwal MD    acetaminophen (TYLENOL) tablet 650 mg, 650 mg, Oral, Q6H PRN **OR** acetaminophen (TYLENOL) suppository 650 mg, 650 mg, Rectal, Q6H PRN, Joie Dhaliwal MD    ondansetron (ZOFRAN ODT) tablet 4 mg, 4 mg, Oral, Q8H PRN **OR** ondansetron (ZOFRAN) injection 4 mg, 4 mg, IntraVENous, Q6H PRN, Joie Dhaliwal MD, 4 mg at 01/05/22 0915    glucose chewable tablet 16 g, 4 Tablet, Oral, PRN, Joie Dhaliwal MD    dextrose (D50W) injection syrg 12.5-25 g, 25-50 mL, IntraVENous, PRN, Joie Dhaliwal MD    glucagon (GLUCAGEN) injection 1 mg, 1 mg, IntraMUSCular, PRN, Joie Dhaliwal MD    insulin lispro (HUMALOG) injection, , SubCUTAneous, AC&HS, Puja Mcdonough MD, 4 Units at 01/16/22 1758    albuterol-ipratropium (DUO-NEB) 2.5 MG-0.5 MG/3 ML, 3 mL, Nebulization, Q6H PRN, Joie Dhaliwal MD, 3 mL at 01/17/22 0117    PATIENT CARE TEAM:  Patient Care Team:  Nereida Reed MD as PCP - General (Internal Medicine)  Nereida Reed MD as PCP - [facility]  Hiwot Dahl MD as Consulting Provider (Internal Medicine)  Macel Claude, MD (Dermatology)  Nickie Laura MD (Nephrology)  Joie Dhaliwal MD as Consulting Provider (Cardiology)  Alexey Starr MD (Cardiology)  Charu Love MD as Consulting Provider (Pulmonary Disease)     Thank you for allowing me to participate in this patient's care. Lora Bazzi RN AG-ACNP student at [employer] working with Terry Harmon at  [address] [place] [address], [address]  Phone: [phone]    I have reviewed and agree with Lora Bazzi, JAIDA student's documentation and assessment.

## 2022-01-17 NOTE — PROGRESS NOTES
TRANSFER - IN REPORT:    Verbal report received from [facility] RN(name) on Aletha Smith  being received from procedure area(unit) for routine progression of care      Report consisted of patients Situation, Background, Assessment and   Recommendations(SBAR). Information from the following report(s) SBAR, Procedure Summary, MAR, Recent Results and Cardiac Rhythm Paced was reviewed with the receiving nurse. Opportunity for questions and clarification was provided. Assessment completed upon patients arrival to unit and care assumed.

## 2022-01-17 NOTE — PROGRESS NOTES
Pt is off the unit in the CCL. PT will defer, follow and see as able and appropriate.  Danita Teran, PT

## 2022-01-17 NOTE — PROGRESS NOTES
Cardiac Electrophysiology [facility] Progress Note     Subjective:       Rivera Ervin is a 78 y.o. patient with acute on chronic systolic CHF despite biventricular pacing. She is staying until next week, heart failure team did not think she responded to bumex and dialysis has not been offered to her unless she is transplant candidate  bumex drip on hold  She gets off milrinone as Dr Susy Francisco thought her cardiac index from last [address] did not require it any more     bp is lower and she is off hydralazine and isordil     She wants possible double organ transplant. She is full code and has BIV ICD with battery 4 months left      HPI:   Presented to the ER 01/04/2021 with hypoxia, improved on supplemental oxygen. Labs showed stable anemia.  WBC elevated, hyponatremic, hypokalemic.  D dimer elevated.  Chronic CKD. Nephrologist spoke to me directly regarding severe renal failure, but not much worse than last admission. Rapid COVID negative.  CXR showed pulmonary edema vs atypical pneumonia.  VQ scan showed low probability for PE.       NICM, LVEF 15-20% during recent admission.  LVEF previously normalized with CRT.  NYHA III-IV.  No ACEi/ARB due to renal dysfunction.  GDMT dosing limited by low BP. [address] 12/10/2021 showed severe pulmonary HTN (wedge 34 mmHg, PAP 80 mmHg). [facility]iac cath in 2015 at [facility] showed no evidence of CAD. She did require LV lead reprogramming over the summer, but good LV capture since.  Good capture/function when checked during recent admission. BP controlled. PICC line placed 01/10/2022 in anticipation of home milrinone. Previous:   S/p Medtronic biventricular ICD (gen change 79/082500, leads 09/25/2008).     CKD stage IV.        Previously followed by Dr. Javier Mendoza, states did not follow him to new practice.            Problem List   Acute respiratory failure with hypoxia [facility]) ICD-10-CM: J96.01   ICD-9-CM: 518.81 1/4/2022     CHF exacerbation ([facility] [address] ICD-10-CM: I50.9   ICD-9-CM: 428.0 12/6/2021     Type 2 diabetes mellitus with diabetic neuropathy ([facility] ICD-10-CM: E11.40   ICD-9-CM: 250.60, 357.2 1/2/2020     Type 2 diabetes with nephropathy ([facility] ICD-10-CM: E11.21   ICD-9-CM: 250.40, 583.81 4/3/2018     Obesity, morbid ([facility] ICD-10-CM: E66.01   ICD-9-CM: 278.01 12/8/2017     Acquired hypothyroidism ICD-10-CM: E03.9   ICD-9-CM: 244.9 8/15/2016     Dysthymia ICD-10-CM: F34.1   ICD-9-CM: 300.4 8/15/2016     ICD (implantable cardioverter-defibrillator), biventricular, in situ ICD-10-CM: Z95.810   ICD-9-CM: V45.02 6/5/2014   Overview Signed 1/14/2015 11:11 AM by Cristiana Cool MD     Generator Medtronic change 1/14/2015         Dyslipidemia ICD-10-CM: O17.0   ICD-9-CM: 272.4 1/14/2014     CKD (chronic kidney disease) ICD-10-CM: N18.9   ICD-9-CM: 585.9 8/15/2012     Cardiomyopathy, nonischemic (HCC) ICD-10-CM: I42.8   ICD-9-CM: 425.4 Unknown   Overview Signed 10/10/2011  6:40 AM by Trish Pederson MD     initial dx 2001, bivHF 2008 with EF 15%, s/p biV-ICD 9/08, significant improvment in EF to 45-50%         Anemia in chronic renal disease (Chronic) ICD-10-CM: N18.9, D63.1   ICD-9-CM: 285.21 12/10/2008     HTN (hypertension) ICD-10-CM: I10   ICD-9-CM: 401.9 12/10/2008     GERD (gastroesophageal reflux disease) (Chronic) ICD-10-CM: K21.9   ICD-9-CM: 530.81 12/10/2008     Gout (Chronic) ICD-10-CM: M10.9   ICD-9-CM: 274.9 12/10/2008     Pulmonary HTN (HCC) (Chronic) ICD-10-CM: I27.20   ICD-9-CM: 416.8 12/10/2008      Current Facility-Administered Medications   Medication Dose Route Frequency    0.9% sodium chloride infusion    CONTINUOUS    hydrocortisone Sod Succ (PF) (SOLU-CORTEF) injection    PRN    diphenhydrAMINE (BENADRYL) injection    PRN    insulin NPH (NOVOLIN N, HUMULIN N) injection 10 Units  10 Units SubCUTAneous QHS    insulin NPH (NOVOLIN N, HUMULIN N) injection 20 Units  20 Units SubCUTAneous DAILY    triamcinolone acetonide (KENALOG) 0.1 % cream Topical BID    simethicone (MYLICON) tablet 80 mg  80 mg Oral QID PRN    polyethylene glycol (MIRALAX) packet 17 g  17 g Oral DAILY    senna-docusate (PERICOLACE) 8.6-50 mg per tablet 2 Tablet  2 Tablet Oral BID    albumin human 25% (BUMINATE) solution 12.5 g  12.5 g IntraVENous BID    [Held by provider] bumetanide (BUMEX) 0.25 mg/mL infusion  1 mg/hr IntraVENous CONTINUOUS    digoxin (LANOXIN) tablet 0.0625 mg  0.0625 mg Oral DAILY    ivabradine (CORLANOR) tablet 7.5 mg  7.5 mg Oral BID WITH MEALS    carvediloL (COREG) tablet 12.5 mg  12.5 mg Oral BID WITH MEALS    allopurinoL (ZYLOPRIM) tablet 50 mg  50 mg Oral DAILY    epoetin isabel-epbx (RETACRIT) injection 20,000 Units  20,000 Units SubCUTAneous Q TUE, THU & SAT    montelukast (SINGULAIR) tablet 10 mg  10 mg Oral DAILY    levothyroxine (SYNTHROID) tablet 100 mcg  100 mcg Oral Once per day on Mon Tue Wed Thu Fri Sat    cetirizine (ZYRTEC) tablet 10 mg  10 mg Oral DAILY    hydroxypropyl methylcellulose (ISOPTO TEARS) 0.5 % ophthalmic solution 1 Drop  1 Drop Both Eyes PRN    venlafaxine-SR (EFFEXOR-XR) capsule 75 mg  75 mg Oral DAILY WITH BREAKFAST    gabapentin (NEURONTIN) capsule 100 mg  100 mg Oral QHS    arformoteroL (BROVANA) neb solution 15 mcg  15 mcg Nebulization BID RT    And    budesonide (PULMICORT) 500 mcg/2 ml nebulizer suspension  500 mcg Nebulization BID RT    hydrOXYzine HCL (ATARAX) tablet 20 mg  20 mg Oral TID PRN    heparin (porcine) injection 5,000 Units  5,000 Units SubCUTAneous Q8H    sodium chloride (NS) flush 5-40 mL  5-40 mL IntraVENous Q8H    sodium chloride (NS) flush 5-40 mL  5-40 mL IntraVENous PRN    acetaminophen (TYLENOL) tablet 650 mg  650 mg Oral Q6H PRN    Or    acetaminophen (TYLENOL) suppository 650 mg  650 mg Rectal Q6H PRN    ondansetron (ZOFRAN ODT) tablet 4 mg  4 mg Oral Q8H PRN    Or    ondansetron (ZOFRAN) injection 4 mg  4 mg IntraVENous Q6H PRN    glucose chewable tablet 16 g  4 Tablet Oral PRN  dextrose (D50W) injection syrg 12.5-25 g  25-50 mL IntraVENous PRN    glucagon (GLUCAGEN) injection 1 mg  1 mg IntraMUSCular PRN    insulin lispro (HUMALOG) injection   SubCUTAneous AC&HS    albuterol-ipratropium (DUO-NEB) 2.5 MG-0.5 MG/3 ML  3 mL Nebulization Q6H PRN         Allergies   Allergen Reactions   · Ciprofloxacin Anaphylaxis   · Shellfish Derived Anaphylaxis   · Ace Inhibitors Unknown (comments)   · Biaxin [Clarithromycin] Other (comments)   Metal taste   · Candesartan Cough   · Pcn [Penicillins] Hives     Past Medical History:   Diagnosis Date   · Acquired hypothyroidism 8/15/2016   · Anemia   RED-HF study   · Asthma   · Cardiomyopathy, nonischemic ([facility] [address]   initial dx 2001, bivHF 2008 with EF 15%, s/p biV-ICD 9/08, significant improvment in EF to 45-50%   · CKD (chronic kidney disease)   Dr Bard Harden   · CKD (chronic kidney disease) 8/15/2012   · Depression   · Diabetes ([address]   · Diabetic neuropathy ([address]   · DM (diabetes mellitus) ([facility] [address] 8/15/2012   · GERD (gastroesophageal reflux disease)   · Gout   · Hypothyroidism   · ICD (implantable cardioverter-defibrillator), biventricular, in situ 6/5/2014   · Psoriasis     Past Surgical History:   Procedure Laterality Date   · CARDIAC CATHETERIZATION 2007; 01/06/15   normal cors   · ECHO 2D ADULT 4/2010   EF 45%, improved from 1/09 (25%)   · ECHO 2D ADULT 11/2011   LVH, EF 55-60%   · HX ORTHOPAEDIC   knee   · HX PACEMAKER PLACEMENT   AICD   · STRESS TEST LEXISCAN/CARDIOLITE 3/21/12   normal perfusion, global HK 40%     Family History   Problem Relation Age of Onset   · Heart Disease Mother   · Hypertension Mother   · Lupus Sister   · Diabetes Brother     Social History     Tobacco Use   · Smoking status: Never Smoker   · Smokeless tobacco: Never Used   Substance Use Topics   · Alcohol use: No         Review of Systems: Review of all other systems otherwise negative. Constitutional: Negative for fever, chills, weight loss, + malaise/fatigue.    HEENT: Negative for nosebleeds, vision changes. Respiratory: Negative for cough, hemoptysis   Cardiovascular: Negative for chest pain, palpitations, orthopnea improved, no claudication, leg swelling, no syncope, and PND. + HAILE   Gastrointestinal: Negative for nausea, vomiting, diarrhea, blood in stool and melena. Genitourinary: Negative for dysuria, and hematuria. Musculoskeletal: Negative for myalgias, arthralgia. Skin: Negative for rash. Heme: Does not bleed or bruise easily. Neurological: Negative for speech change and focal weakness       Objective:   Visit Vitals  BP (!) 138/103 (BP 1 Location: Left arm, BP Patient Position: At rest)   Pulse 95   Temp 98.4 °F (36.9 °C)   Resp 26   Ht 5' 7\" (1.702 m)   Wt 255 lb 4.7 oz (115.8 kg)   SpO2 94%   BMI 39.98 kg/m²         Physical Exam:   Constitutional: Well-developed and well-nourished. No respiratory distress. Head: Normocephalic and atraumatic. Eyes: Pupils are equal, round. ENT: Hearing grossly normal.   Neck: Supple. No JVD present. Cardiovascular: fast rate, regular rhythm. Exam reveals no gallop and no friction rub. 2/6 systolic LSB murmur. Pulmonary/Chest: Appears mildly SOB.  Breath sounds normal. No wheezes. Abdominal: Soft, no tenderness. Moderate obesity. Musculoskeletal: Moves extremities independently. Vasc/lymphatic: no bilat lower extremity edema. Neurological: Alert, oriented. Skin: left sided BIV ICD pocket unremarkable  Psychiatric: Normal mood and affect. Behavior is normal. Judgment and thought content normal.         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 RHC without milrinone (12/10/2021): High wedge pressure (35 mmHg) indicating volume overload, precapillary.  Severe pulmonary HTN.      Echo (12/08/2021): LVEF 15-20%, upper normal wall thickness, LV diastolic dysfunction.  Borderline low RVEF.  Mod dilated LA, mildly dilated RA. Lawanda Olvera to mod MR. Kacie Bermeo TR.  Mild to [facility].       LHC/RHC (01/2015): No significant CAD. NICM:    I spoke to Dr Elliott Mendoza personally and texted nephrologist Dr Fausto Harrison yesterday  bp is lower so Hydralazine and isordil on hold/stopped  LVEF now 15-20%. Elis Junior pulmonary HTN noted on last admission RHC.      Possible not a candidate for double organ transplant referral due to medical and psychosocial problem and she needs SW visit  Son is in [facility] and  is here and will discuss with CHF and SW team  I am concerned that the weight will be issue for transplant candidacy  If she is not a candidate for [facility] transplant, I think she should consider hospice care  Her  is supportive and I appreciate palliative medicine consult    I will ask Dr Ferrell Boeck to do right and left heart cath   Then Dr Elliott Mendoza will decide how to proceed further  Dr Fausto Harrison said dialysis is an option if she is considered transplant candidate otherwise it will not go any where  Dr Elliott Mendoza needs her be euvolumic and treated 3 months. Transplant cannot be considered unless this is proven irreversible  Therefore dialysis would happen first  Will present to nephrologist again after cath data are back with advanced CHF team's final recommendation      Nuclear amyloid scan equivocal.  cMRI is not possible with 4194 LV lead (2008). However cMRI would not change her management    Medtronic biventricular ICD (gen change 01/14/2015, leads 09/25/2008): Device check showed proper lead & generator function.  Generator longevity still estimated 4 months.  RA 0.1%, CRT 97.6%. I will replace in the future once we know the plan of heart and renal failure treatment  she will need Biv pacing if she can be accepted for double organ transplant.         She has sinus tachycardia from systolic CHF  Corlanor 7.5 mg bid but sinus rate is up with any movement    PVCs due to severe CHF    On coreg and digoxin           Thank you for involving me in this patient's care and please call with further concerns or questions. Savannah Ramirez M.D.    Electrophysiology/Cardiology   [facility] [place]   [address] [facility] [address], [facility] Gisella Solano, [address] [place] [address]   [phone]

## 2022-01-17 NOTE — PROGRESS NOTES
[facility] in [place], [place]  Inpatient Progress Note      Patient name: Fernandez Zamora  Patient : 1955  Patient MRN: [id number]  Consulting MD: Ann Browning MD  Date of service: 22    REASON FOR REFERRAL:  Management of chronic systolic heart failure     PLAN OF CARE:   · 79 y/o female with chronic renal failure and new onset severe cardiomyopathy, LVEF 15% (diagnosed 21), stage D, NYHA class IV; admitted for progressive volume overload   · Remains hypoxic, on 6L O2; severely volume overloaded at 244lbs by [address] 12/10/21, today's weight up to 255lbs  · Most likely etiology of acute deterioration of LVEF is chronic volume overload with compensatory tachycardia + awaiting ischemic workup; cardiac index too well for inotropes by last RHC  · This is new and potentially reversible severe LV dysfunction; thus cannot determine long term prognosis   · In order to be considered for heart/kidney transplant must prove non-reversibility of LV dysfunction; by guidelines prefererence is of at least 3 months of euvolemia and HR < 100bpm  · Patient appears poor heart/kidney candidate due to medical and psychosocial factors; ABO and SW evaluation pending. · It may be that her only option is bridge with dialysis to euvolemia and LV recovery.   · If patient is a poor candidate for dialysis, however, then I would recommend comfort care and hospice  · Consider palliative care meeting Tue/Wed once we have RHC/LHC results and plan formulated by all teams    RECOMMENDATIONS:  RHC/LHC on Monday today   Continue coreg 12.5mg twice daily, goal HR < 100bpm  Continue coralnor 7.5mg twice daily  Continue digoxin, check daily levels (0.7 today)   No ACE/ARB/ARNI due to CKD  No MRA due to CKD   Adjust diuretics pending RHC results   Allopurinol 50mg daily per nephrology  ICD interrogation per , scheduled for generator change with Dr. Priscilla Lilly in February  Repeat TTE and EKG when euvolemic   Will need OP PSG  Wean O2 as able   Invitae pending   Palliative consult appreciated   Nutritionist consult  Heart failure education  Advanced care plan present on file     All other care per primary team     IMPRESSION:  Fatigue  Shortness of breath, on 3L NC PTA  Volume overload  Acute on Chronic systolic heart failure  · Stage D, NYHA class IV symptoms  · Non-ischemic cardiomyopathy, LVEF 15%  · Normal coronaries  · PYP equivocal for amyloid   Pulmonary hypertension  S/p BiV-ICD  Cardiac risk factors:  · HL  · DM2  · Morbid obesity, BMI 40  Acute on chronic renal failure, stage IV  GERD  Anemia of chronic disease  Gout     Interval Events:  Feels better today   Afebrile  HR  bpm   O2 requirement 6lpm   I/O incomplete, weight up 2 lbs   Cr 2.37 from 2.43, proNTBNP 42529 from 43482     LIFE GOALS:  Patient's personal goals include: being home with family, still ambulating around without getting too tired.   Important upcoming milestones or family events: none  The patient identifies the following as important for living well: remaining idependent and mobile; being able to get out of the house with . Sincere Thurman verbalized willingness to be on home milrinone and evaluation for both heart and kidney transplants.  Verbalizes she would have family supporting her decision on this.      HPI  Traci Muhammad is C 65 y.o.  female with a history of NICM, chronic hypoxic respiratory failure secondary to pulmonary HTN, hypothyroidism, CKD, GERD, and DM II who presented to [facility] as a transfer from another facility for acute on chronic hypoxic respiratory failure.  Reports running out of O2 at home resulting in SOB and dizziness.     Upon arrival to the ED she was found to have O2 sats in the 70s, requiring 4L NC O2.  Rapid covid test was negative.  ProNT-BNP was 61145, K+5.2, BUN/CR x/2.54 and elevated d-dimer.   Chest xray showing pulmonary edema vs. Atypical pneumonia. VQ scan showed low probability for PE.     Per Dr. Samaria Ardon, LVEF 15-20% during recent admission.  LVEF previously normalized with CRT.  NYHA III-IV.  No ACEi/ARB due to renal dysfunction.  GDMT dosing limited by low BP.     Patient's PCP is Dr. Andrew Asher primarily for cardiac care due to Dr. Ismael Lebron historically seen by nephrology but has not had follow up care in the past three years.     CARDIAC IMAGING:  Echo 12/8/21- LVEF 15-20%, mild to Mod MR, LVIDd 5.09cm, TAPSE 1.94cm  Echo 4/22/19- LVEF 60%, trace MR,  LVIDd 4.24cm, TAPSE 1.65cm   Echo 4/24/18- LVEF 60%, trivial MR, LVIDd 4.79cm, TAPSE 2.25cm      EKG- 1/4/22 ST with A sense and V paced rhythm     [facility] 2015- No significant CAD  NST 2014- reversible LAD involvement      ICD interrogation     HEMODYNAMICS:  [facility] 12/10/21: PAP 76/48/57, RAP 20, PCWP 35, CI 2.26        CPEST not done  6MW not done     OTHER IMAGING:  CXR Results  (Last 48 hours)                             01/13/22 1035   XR CHEST PORT Final result      Impression:   No significant change in congestion and interstitial and alveolar   opacities which may reflect edema or infectious infiltrate.        Narrative:   EXAM: XR CHEST PORT       INDICATION: pul edema       COMPARISON: Chest x-ray 1/10/2022.       FINDINGS: A portable AP radiograph of the chest was obtained at 10:19 hours. The   patient is on a cardiac monitor. The lungs appear grossly stable with congestion   and interstitial/airspace opacities with no pneumothorax or pleural effusion. Right PICC line traverses expected course of tip in the region of the atriocaval   junction. Pacemaker-ICD generator body projects over the left chest wall with   intact appearing leads traversing in expected course. . The cardiac and   mediastinal contours and pulmonary vascularity are normal.  Atherosclerotic   calcifications affect the aortic arch.  The chest wall structures and visualized   upper abdomen show no acute findings with incidental note of degenerative spine   and shoulder changes.                           PHYSICAL EXAM:  Visit Vitals  BP (!) 111/90 (BP 1 Location: Left upper arm, BP Patient Position: At rest)   Pulse 97   Temp 97.8 °F (36.6 °C)   Resp 20   Ht 5' 7\" (1.702 m)   Wt 251 lb 8.7 oz (114.1 kg)   SpO2 98%   BMI 39.40 kg/m²      Physical Exam  Vitals and nursing note reviewed. Constitutional:       General: She is not in acute distress.     Appearance: Normal appearance. She is obese. Cardiovascular:      Rate and Rhythm: Regular rhythm. Tachycardia present.      Pulses: Normal pulses.      Heart sounds: Normal heart sounds. No murmur heard. Pulmonary:      Effort: Tachypneic with conversation   Abdominal:      General: There is no distension. Musculoskeletal:         General: trace LE edema   Skin:     General: Skin is warm and dry.      Findings: Lesion present.      Comments: psorasis   Neurological:      General: No focal deficit present.      Mental Status: She is alert and oriented to person, place, and time. Psychiatric:         Mood and Affect: Mood normal.            REVIEW OF SYSTEMS:  Review of Systems   Constitutional: Positive for malaise/fatigue. Negative for chills and fever. Respiratory: Positive for shortness of breath.    Cardiovascular: Negative for chest pain, palpitations, orthopnea and leg swelling. Gastrointestinal: constipation. Negative for heartburn and nausea. Genitourinary: Negative for dysuria. Musculoskeletal: Negative for falls, joint pain and myalgias. Neurological: Negative for dizziness. Psychiatric/Behavioral: Positive for depression.  The patient is nervous/anxious.       PHYSICAL EXAM:  Visit Vitals  BP (!) 123/92   Pulse 97   Temp 98.4 °F (36.9 °C)   Resp 21   Ht 5' 7\" (1.702 m)   Wt 255 lb 4.7 oz (115.8 kg)   SpO2 100%   BMI 39.98 kg/m²     PAST MEDICAL HISTORY:  Past Medical History:   Diagnosis Date    Acquired hypothyroidism 8/15/2016    Anemia     RED-HF study    Asthma     Cardiomyopathy, nonischemic [facility]     initial dx 2001, bivHF 2008 with EF 15%, s/p biV-ICD 9/08, significant improvment in EF to 45-50%    CKD (chronic kidney disease)     Dr Tiffany Stovall    CKD (chronic kidney disease) 8/15/2012    Depression     Diabetes ([facility]     [facility]     DM [facility]diabetes mellitus) [facility] 8/15/2012    GERD (gastroesophageal reflux disease)     Gout     Hypothyroidism     ICD (implantable cardioverter-defibrillator), biventricular, in situ 6/5/2014    Psoriasis        PAST SURGICAL HISTORY:  Past Surgical History:   Procedure Laterality Date    CARDIAC CATHETERIZATION  2007; 01/06/15    normal cors    ECHO 2D ADULT  4/2010    EF 45%, improved from 1/09 (25%)    ECHO 2D ADULT  11/2011    LVH, EF 55-60%    HX ORTHOPAEDIC      knee    HX PACEMAKER PLACEMENT      AICD    STRESS TEST LEXISCAN/CARDIOLITE  3/21/12    normal perfusion, global HK 40%       FAMILY HISTORY:  Family History   Problem Relation Age of Onset    Heart Disease Mother     Hypertension Mother     Lupus Sister     Diabetes Brother        SOCIAL HISTORY:  Social History     Socioeconomic History    Marital status:    Tobacco Use    Smoking status: Never Smoker    Smokeless tobacco: Never Used   Substance and Sexual Activity    Alcohol use: No    Drug use: No    Sexual activity: Never   Social History Narrative    . Nonsmoker. Disability       LABORATORY RESULTS:     Labs Latest Ref Rng & Units 1/17/2022 1/16/2022 1/15/2022 1/14/2022 1/13/2022 1/12/2022 1/12/2022   WBC 3.6 - 11.0 K/uL 9.5 - 8.5 8.0 7.2 - 8.8   RBC 3.80 - 5.20 M/uL 3.35(L) - 3.39(L) 3.34(L) 3.32(L) - 3.46(L)   Hemoglobin 11.5 - 16.0 g/dL 8. 1(L) - 8. 3(L) 8.0(L) 8.0(L) - 8. 3(L)   Hematocrit 35.0 - 47.0 % 31. 4(L) - 31. 0(L) 30. 7(L) 30. 7(L) - 31. 8(L)   MCV 80.0 - 99.0 FL 93.7 - 91.4 91.9 92.5 - 91.9   Platelets 674 - 921 K/uL 344 - 328 283 259 - 269   Lymphocytes 12 - 49 % - - - - - - -   Monocytes 5 - 13 % - - - - - - -   Eosinophils 0 - 7 % - - - - - - -   Basophils 0 - 1 % - - - - - - -   Albumin 3.5 - 5.0 g/dL 3.6 3.3(L) 3. 2(L) 3.0(L) 2. 7(L) 2. 9(L) 2. 5(L)   Calcium 8.5 - 10.1 MG/DL 9.6 9.5 9.9 9.6 9.3 9.4 9.2   Glucose 65 - 100 mg/dL 116(H) 192(H) 168(H) 165(H) 118(H) 242(H) 204(H)   BUN 6 - 20 MG/DL 51(H) 53(H) 55(H) 54(H) 51(H) 53(H) 54(H)   Creatinine 0.55 - 1.02 MG/DL 2.37(H) 2.43(H) 2.69(H) 2.38(H) 2.27(H) 2.33(H) 2.32(H)   Sodium 136 - 145 mmol/L 136 135(L) 134(L) 135(L) 136 136 137   Potassium 3.5 - 5.1 mmol/L 4.2 4.0 4.1 3.7 3.6 4.5 4.1   TSH 0.36 - 3.74 uIU/mL - - - - - - -   Some recent data might be hidden     Lab Results   Component Value Date/Time    TSH 3.08 01/11/2022 05:13 AM    TSH 1.85 12/15/2021 01:06 PM    TSH 1.01 11/05/2020 09:11 AM    TSH 2.740 04/30/2019 11:21 AM    TSH 0.519 02/21/2012 10:53 AM    TSH 8.29 (H) 01/20/2010 01:59 PM       ALLERGY:  Allergies   Allergen Reactions    Ciprofloxacin Anaphylaxis    Shellfish Derived Anaphylaxis    Ace Inhibitors Unknown (comments)    Biaxin [Clarithromycin] Other (comments)     Metal taste    Candesartan Cough    Pcn [Penicillins] Hives        CURRENT MEDICATIONS:    Current Facility-Administered Medications:     insulin NPH (NOVOLIN N, HUMULIN N) injection 10 Units, 10 Units, SubCUTAneous, QHS, Suzanne Murillo MD    insulin NPH (NOVOLIN N, HUMULIN N) injection 20 Units, 20 Units, SubCUTAneous, DAILY, Suzanne Murillo MD    triamcinolone acetonide (KENALOG) 0.1 % cream, , Topical, BID, Suzanne Murillo MD, Given at 01/17/22 1030    simethicone (MYLICON) tablet 80 mg, 80 mg, Oral, QID PRN, Suzanne Murillo MD, 80 mg at 01/15/22 4775    polyethylene glycol (MIRALAX) packet 17 g, 17 g, Oral, DAILY, Suzanne Murillo MD, 17 g at 01/16/22 0924    senna-docusate (PERICOLACE) 8.6-50 mg per tablet 2 Tablet, 2 Tablet, Oral, BID, Suzanne Murillo MD, 2 Tablet at 01/17/22 1026   albumin human 25% (BUMINATE) solution 12.5 g, 12.5 g, IntraVENous, BID, Owen, Amber B, NP, 12.5 g at 01/17/22 1020    [Held by provider] bumetanide (BUMEX) 0.25 mg/mL infusion, 1 mg/hr, IntraVENous, CONTINUOUS, Owen, Amber B, NP, Last Rate: 4 mL/hr at 01/13/22 2033, 1 mg/hr at 01/13/22 2033    digoxin (LANOXIN) tablet 0.0625 mg, 0.0625 mg, Oral, DAILY, Owen, Amber B, NP, 0.0625 mg at 01/17/22 1027    ivabradine (CORLANOR) tablet 7.5 mg, 7.5 mg, Oral, BID WITH MEALS, Damaris Hernandez MD, 7.5 mg at 01/17/22 1027    carvediloL (COREG) tablet 12.5 mg, 12.5 mg, Oral, BID WITH MEALS, Owen, Amber B, NP, 12.5 mg at 01/17/22 1025    allopurinoL (ZYLOPRIM) tablet 50 mg, 50 mg, Oral, DAILY, Owen, Amber B, NP, 50 mg at 01/17/22 1025    epoetin isabel-epbx (RETACRIT) injection 20,000 Units, 20,000 Units, SubCUTAneous, Q TUE, THU & SAT, Wes Gonzalez MD, 20,000 Units at 01/15/22 2206    montelukast (SINGULAIR) tablet 10 mg, 10 mg, Oral, DAILY, Pretty Robbins MD, 10 mg at 01/17/22 1026    levothyroxine (SYNTHROID) tablet 100 mcg, 100 mcg, Oral, Once per day on Mon Tue Wed Thu Fri Sat, Angelic Huerta MD, 100 mcg at 01/17/22 0548    cetirizine (ZYRTEC) tablet 10 mg, 10 mg, Oral, DAILY, Lisa Horne MD, 10 mg at 01/17/22 1027    hydroxypropyl methylcellulose (ISOPTO TEARS) 0.5 % ophthalmic solution 1 Drop, 1 Drop, Both Eyes, PRN, Lisa Horne MD, 1 Drop at 01/06/22 1727    venlafaxine-SR (EFFEXOR-XR) capsule 75 mg, 75 mg, Oral, DAILY WITH BREAKFAST, Lisa Horne MD, 75 mg at 01/17/22 1025    gabapentin (NEURONTIN) capsule 100 mg, 100 mg, Oral, QHS, Lisa Horne MD, 100 mg at 01/16/22 2208    arformoteroL (BROVANA) neb solution 15 mcg, 15 mcg, Nebulization, BID RT, 15 mcg at 01/17/22 0802 **AND** budesonide (PULMICORT) 500 mcg/2 ml nebulizer suspension, 500 mcg, Nebulization, BID RT, Pretty Robbins MD, 500 mcg at 01/17/22 0802    hydrOXYzine HCL (ATARAX) tablet 20 mg, 20 mg, Oral, TID PRN, Asa Ortega MD    heparin (porcine) injection 5,000 Units, 5,000 Units, SubCUTAneous, Q8H, Frances King MD, 5,000 Units at 01/17/22 0548    sodium chloride (NS) flush 5-40 mL, 5-40 mL, IntraVENous, Q8H, Asa Ortega MD, 10 mL at 01/17/22 0548    sodium chloride (NS) flush 5-40 mL, 5-40 mL, IntraVENous, PRN, Asa Ortega MD    acetaminophen (TYLENOL) tablet 650 mg, 650 mg, Oral, Q6H PRN **OR** acetaminophen (TYLENOL) suppository 650 mg, 650 mg, Rectal, Q6H PRN, Asa Ortega MD    ondansetron (ZOFRAN ODT) tablet 4 mg, 4 mg, Oral, Q8H PRN **OR** ondansetron (ZOFRAN) injection 4 mg, 4 mg, IntraVENous, Q6H PRN, Asa Ortega MD, 4 mg at 01/05/22 0915    glucose chewable tablet 16 g, 4 Tablet, Oral, PRN, Asa Ortega MD    dextrose (D50W) injection syrg 12.5-25 g, 25-50 mL, IntraVENous, PRN, Asa Ortega MD    glucagon [facility]) injection 1 mg, 1 mg, IntraMUSCular, PRN, Asa Ortega MD    insulin lispro (HUMALOG) injection, , SubCUTAneous, AC&HS, Colette Pastor MD, 4 Units at 01/16/22 1758    albuterol-ipratropium (DUO-NEB) 2.5 MG-0.5 MG/3 ML, 3 mL, Nebulization, Q6H PRN, Asa Ortega MD, 3 mL at 01/17/22 0117    PATIENT CARE TEAM:  Patient Care Team:  Swetha Winslow MD as PCP - General (Internal Medicine)  Swetha Winslow MD as PCP - [facility] Provider  Lee Ormond, MD as Consulting Provider (Internal Medicine)  Uriel Logan MD (Dermatology)  Ricky Salguero MD (Nephrology)  Asa Ortega MD as Consulting Provider (Cardiology)  Enedina Senior MD (Cardiology)  Fer Bishop MD as Consulting Provider (Pulmonary Disease)     Thank you for allowing me to participate in this patient's care.     Kristel Jimenez NP  [address] [place] [address], [address]  Phone: [phone]

## 2022-01-17 NOTE — PROGRESS NOTES
SW met with patient at request of [facility] team for an evaluation for heart/kidney transplant. Patient has Medicare Part A and B primary and [place] [id number] with prescription coverage. 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 Patient shared she has been  for approximately 10 years to [facility] Romero (). Huma Baker is retired, but continues to works part time. Patient stated Huma Baker is and would continue to be her primary caregiver. Patient shared she has 1 son, he resides and works in [employer] with his spouse and 3 children. Patient stated she and her son have a good relationship, but he is busy with his family, he would not be a potential caregiver pre/post transplantation. Nadir Kate identified her sister, Eusebio Downing ([place] and her 2 sisters in law, Tutu Page ([place]) and Patel ([facility]) will come help patient whenever they are needed pre/post transplantation. Patient stated she has no trouble affording her medications and Huma Baker helps her daily with medication compliance and meal making. Patient use [facility] pharmacy on [address] [id number]. Patient lives in a single story home with 3 JOLIE. SW contacted patient's secondary insurance on this date at 9:44 am- spoke with Michelle Thomas they follow Medicare guidelines, therefore they will pay the remaining 20% for transplant after Medicare has paid. Patient stated Huma Baker smokes, but does not smoke around patient. SW discussed transplant guidelines of cotinine and THC abstinence, patient stated she is not a smoker and drinks only occasionally on a holiday. Yola Manriquez, Dyana and Baton rouashley all have valid 's licenses and reliable automobiles. They are willing to drive Nadir Kate to/from multiple medical appointments, pre/post transplantation. Although potential caregivers were not present to confirm their participation, patient appears to be an acceptable candidate for dual organ transplant.     TOSHIA attempted to complete an AMD with patient, however she stated she already completed one during this hospital admission. SW reviewed patient's chart the last AMD was completed in 2017. SW will meet with patient to attempt to update this document.

## 2022-01-17 NOTE — PROGRESS NOTES
[facility]   [address] [facility], [address] [place] [address]  Phone: [phone]   [phone]    [email or web address]     Nephrology Progress Note    Patient Name : Claire James      : 1955     MRN : [id number]  Date of Admission : 2022  Date of Servive : 22    CC:  Follow up for ARF       Assessment and Plan   RODNEY on CKD :  - 2/2 CRS  - Cr stable today  - adjust diuretics post RHC   - agree that pt is a poor candidate for RRT    CKD stage IV:  - Etiology: DM, HTN, CRS  - Renal US: suggestive of CKD, B/L benign renal cysts   - baseline Cr 2.4-2.6 mg/dl      Acute on chronic HFrEF  NI CMP, LVEF 15 to 20%  NYHA class III-IV  S/p BiV pacer/ICD-s/p CRT  RHC 12/10/2021: PCWP 34, PA P 80  - repeat RHC planned today    Morbid obesity  Type II DM  HTN  Hypothyroidism  Pulmonary hypertension  Gout  Psoriasis        Interval History:  Seen and examined. Cr stable. Off bumex drip and milrinone drip. Plans for RHC today. Denies cp, sob, n/v/d    Review of Systems: A comprehensive review of systems was negative except for that written in the HPI.     Current Medications:   Current Facility-Administered Medications   Medication Dose Route Frequency    insulin NPH (NOVOLIN N, HUMULIN N) injection 10 Units  10 Units SubCUTAneous QHS    insulin NPH (NOVOLIN N, HUMULIN N) injection 20 Units  20 Units SubCUTAneous DAILY    triamcinolone acetonide (KENALOG) 0.1 % cream   Topical BID    simethicone (MYLICON) tablet 80 mg  80 mg Oral QID PRN    polyethylene glycol (MIRALAX) packet 17 g  17 g Oral DAILY    senna-docusate (PERICOLACE) 8.6-50 mg per tablet 2 Tablet  2 Tablet Oral BID    albumin human 25% (BUMINATE) solution 12.5 g  12.5 g IntraVENous BID    [Held by provider] bumetanide (BUMEX) 0.25 mg/mL infusion  1 mg/hr IntraVENous CONTINUOUS    digoxin (LANOXIN) tablet 0.0625 mg  0.0625 mg Oral DAILY    ivabradine (CORLANOR) tablet 7.5 mg  7.5 mg Oral BID WITH MEALS    carvediloL (COREG) tablet 12.5 mg  12.5 mg Oral BID WITH MEALS    allopurinoL (ZYLOPRIM) tablet 50 mg  50 mg Oral DAILY    epoetin isabel-epbx (RETACRIT) injection 20,000 Units  20,000 Units SubCUTAneous Q TUE, THU & SAT    montelukast (SINGULAIR) tablet 10 mg  10 mg Oral DAILY    levothyroxine (SYNTHROID) tablet 100 mcg  100 mcg Oral Once per day on Mon Tue Wed Thu Fri Sat    cetirizine (ZYRTEC) tablet 10 mg  10 mg Oral DAILY    hydroxypropyl methylcellulose (ISOPTO TEARS) 0.5 % ophthalmic solution 1 Drop  1 Drop Both Eyes PRN    venlafaxine-SR (EFFEXOR-XR) capsule 75 mg  75 mg Oral DAILY WITH BREAKFAST    gabapentin (NEURONTIN) capsule 100 mg  100 mg Oral QHS    arformoteroL (BROVANA) neb solution 15 mcg  15 mcg Nebulization BID RT    And    budesonide (PULMICORT) 500 mcg/2 ml nebulizer suspension  500 mcg Nebulization BID RT    hydrOXYzine HCL (ATARAX) tablet 20 mg  20 mg Oral TID PRN    heparin (porcine) injection 5,000 Units  5,000 Units SubCUTAneous Q8H    sodium chloride (NS) flush 5-40 mL  5-40 mL IntraVENous Q8H    sodium chloride (NS) flush 5-40 mL  5-40 mL IntraVENous PRN    acetaminophen (TYLENOL) tablet 650 mg  650 mg Oral Q6H PRN    Or    acetaminophen (TYLENOL) suppository 650 mg  650 mg Rectal Q6H PRN    ondansetron (ZOFRAN ODT) tablet 4 mg  4 mg Oral Q8H PRN    Or    ondansetron (ZOFRAN) injection 4 mg  4 mg IntraVENous Q6H PRN    glucose chewable tablet 16 g  4 Tablet Oral PRN    dextrose (D50W) injection syrg 12.5-25 g  25-50 mL IntraVENous PRN    glucagon (GLUCAGEN) injection 1 mg  1 mg IntraMUSCular PRN    insulin lispro (HUMALOG) injection   SubCUTAneous AC&HS    albuterol-ipratropium (DUO-NEB) 2.5 MG-0.5 MG/3 ML  3 mL Nebulization Q6H PRN      Allergies   Allergen Reactions    Ciprofloxacin Anaphylaxis    Shellfish Derived Anaphylaxis    Ace Inhibitors Unknown (comments)    Biaxin [Clarithromycin] Other (comments)     Metal taste    Candesartan Cough  Pcn [Penicillins] Hives       Objective:  Vitals:    Vitals:    01/17/22 0452 01/17/22 0600 01/17/22 0718 01/17/22 0802   BP:   (!) 123/92    Pulse:  95 97    Resp:   21    Temp:   98.4 °F (36.9 °C)    SpO2:   100% 100%   Weight: 115.8 kg (255 lb 4.7 oz)      Height:         Intake and Output:  No intake/output data recorded. 01/15 1901 - 01/17 0700  In: 619.1 [P.O.:400; I.V.:219.1]  Out: 1150 [Urine:1150]    Physical Examination:    General: Obese   Neck:  Supple, no mass  Resp:  Rales +  CV:  tachy  GI:  Soft, NT, + BS, no HS megaly  Neurologic:  Non focal    []    High complexity decision making was performed  []    Patient is at high-risk of decompensation with multiple organ involvement    Lab Data Personally Reviewed: I have reviewed all the pertinent labs, microbiology data and radiology studies during assessment.     Recent Labs     01/17/22  0051 01/16/22  0401 01/15/22  0514    135* 134*   K 4.2 4.0 4.1   CL 99 97 96*   CO2 33* 32 35*   * 192* 168*   BUN 51* 53* 55*   CREA 2.37* 2.43* 2.69*   CA 9.6 9.5 9.9   MG 2.2 2.1  --    ALB 3.6 3.3* 3.2*   ALT 32 29 37     Recent Labs     01/17/22  1033 01/15/22  0514   WBC 9.5 8.5   HGB 8.1* 8.3*   HCT 31.4* 31.0*    328     Lab Results   Component Value Date/Time    Specimen Description: URINE 10/22/2013 01:32 PM    Specimen Description: URINE 01/23/2013 04:40 PM    Specimen Description: LEG TISSUE 02/12/2009 12:00 AM    Specimen Description: LEG (LEFT) 01/29/2009 03:06 AM     Lab Results   Component Value Date/Time    Culture result: MIXED SKIN ROSANNA ISOLATED 10/22/2013 01:32 PM    Culture result:  01/23/2013 04:40 PM     ENTEROCOCCUS FAECALIS GROUP D  MIXED SKIN ROSANNA ISOLATED    Culture result:  02/12/2009 12:00 AM     LIGHT STAPHYLOCOCCUS EPIDERMIDIS (OXACILLIN RESISTANT)  LIGHT 2ND STRAIN OF STAPHYLOCOCCUS EPIDERMIDIS (OXACILLIN RESISTANT)    Culture result: NO GROWTH 2 DAYS 01/29/2009 03:06 AM     Recent Results (from the past 24 hour(s))   GLUCOSE, POC    Collection Time: 01/16/22 11:58 AM   Result Value Ref Range    Glucose (POC) 249 (H) 65 - 117 mg/dL    Performed by [address], POC    Collection Time: 01/16/22 11:59 AM   Result Value Ref Range    Glucose (POC) 247 (H) 65 - 117 mg/dL    Performed by [address], POC    Collection Time: 01/16/22  5:04 PM   Result Value Ref Range    Glucose (POC) 208 (H) 65 - 117 mg/dL    Performed by Neftaly Laughlin, POC    Collection Time: 01/16/22  9:36 PM   Result Value Ref Range    Glucose (POC) 142 (H) 65 - 117 mg/dL    Performed by Cheri Mirza    MAGNESIUM    Collection Time: 01/17/22 12:51 AM   Result Value Ref Range    Magnesium 2.2 1.6 - 2.4 mg/dL   NT-PRO BNP    Collection Time: 01/17/22 12:51 AM   Result Value Ref Range    NT pro-BNP 16,143 (H) <810 PG/ML   METABOLIC PANEL, COMPREHENSIVE    Collection Time: 01/17/22 12:51 AM   Result Value Ref Range    Sodium 136 136 - 145 mmol/L    Potassium 4.2 3.5 - 5.1 mmol/L    Chloride 99 97 - 108 mmol/L    CO2 33 (H) 21 - 32 mmol/L    Anion gap 4 (L) 5 - 15 mmol/L    Glucose 116 (H) 65 - 100 mg/dL    BUN 51 (H) 6 - 20 MG/DL    Creatinine 2.37 (H) 0.55 - 1.02 MG/DL    BUN/Creatinine ratio 22 (H) 12 - 20      GFR est AA 25 (L) >60 ml/min/1.73m2    GFR est non-AA 20 (L) >60 ml/min/1.73m2    Calcium 9.6 8.5 - 10.1 MG/DL    Bilirubin, total 0.7 0.2 - 1.0 MG/DL    ALT (SGPT) 32 12 - 78 U/L    AST (SGOT) 13 (L) 15 - 37 U/L    Alk.  phosphatase 75 45 - 117 U/L    Protein, total 7.3 6.4 - 8.2 g/dL    Albumin 3.6 3.5 - 5.0 g/dL    Globulin 3.7 2.0 - 4.0 g/dL    A-G Ratio 1.0 (L) 1.1 - 2.2     DIGOXIN    Collection Time: 01/17/22 12:51 AM   Result Value Ref Range    Digoxin level 0.7 (L) 0.90 - 2.00 NG/ML   GLUCOSE, POC    Collection Time: 01/17/22  8:28 AM   Result Value Ref Range    Glucose (POC) 121 (H) 65 - 117 mg/dL    Performed by Roxanne Johnson P    CBC W/O DIFF    Collection Time: 01/17/22 10:33 AM   Result Value Ref Range    WBC 9.5 3.6 - 11.0 K/uL    RBC 3.35 (L) 3.80 - 5.20 M/uL    HGB 8.1 (L) 11.5 - 16.0 g/dL    HCT 31.4 (L) 35.0 - 47.0 %    MCV 93.7 80.0 - 99.0 FL    MCH 24.2 (L) 26.0 - 34.0 PG    MCHC 25.8 (L) 30.0 - 36.5 g/dL    RDW 20.7 (H) 11.5 - 14.5 %    PLATELET 170 784 - 656 K/uL    MPV 9.1 8.9 - 12.9 FL    NRBC 0.0 0  WBC    ABSOLUTE NRBC 0.00 0.00 - 0.01 K/uL           I have reviewed the flowsheets. Chart and Pertinent Notes have been reviewed. No change in PMH ,family and social history from Consult note.       Silvia Roblero MD  [facility]

## 2022-01-17 NOTE — PROGRESS NOTES
Cardiac Cath Lab Procedure Area Arrival Note:    Claire James arrived to Cardiac Cath Lab, Procedure Area. Patient identifiers verified with NAME and DATE OF BIRTH. Procedure verified with patient. Consent forms verified. Allergies verified. Patient informed of procedure and plan of care. Questions answered with review. Patient voiced understanding of procedure and plan of care. Patient on cardiac monitor, non-invasive blood pressure, SPO2 monitor. On  O2 @ 5 lpm via NC.  IV of NS on pump at 15 ml/hr. Patient status doing well without problems. Patient is A&Ox 4. Patient reports no CP and no SOB. Patient medicated during procedure with orders obtained and verified by Dr. Rubi Kamara. Refer to patients Cardiac Cath Lab PROCEDURE REPORT for vital signs, assessment, status, and response during procedure, printed at end of case. Printed report on chart or scanned into chart. 1500 Transfer to [facility] RR from Procedure Area    Verbal report given to RT Kayce on Claire James being transferred to Cardiac Cath Lab RR for routine progression of care   Patient is post [facility] procedure. Patient stable upon transfer to . Report consisted of patients Situation, Background, Assessment and   Recommendations(SBAR). Information from the following report(s) SBAR, OR Summary and MAR was reviewed with the receiving nurse. Opportunity for questions and clarification was provided. Patient medicated during procedure with orders obtained and verified by Dr. Rubi Kamara. Refer to patient PROCEDURE REPORT for vital signs, assessment, status, and response during procedure.

## 2022-01-17 NOTE — PROGRESS NOTES
[facility] Interval Update    Chart reviewed, recent events noted. Hemodynamics obtained during [address] show severely elevated bilateral filling pressures with reduced Aye CI (RA 27 mmHg, PCWP 45 mmHg, Aye CI 1.6 L/min/m2, elevated SVR 2089 dsc-5). Begin milrinone 0.125 mcg/kg/min and Bumex 2 mg/hr until further plans are determined. May need to consider adjustment to Coreg dose based upon RV dysfunction, however, will defer to Dr. Anthony Cordon. Discussed with Dr. Carrington Mendiola, Anthony Cordon, and Eric.     Signed By: Smith Espinoza NP     January 17, 2022

## 2022-01-17 NOTE — PROGRESS NOTES
[address] [facility]  Hospitalist Group                                                                                          Hospitalist Progress Note  Andrew Bell MD  Answering service: [phone] from in house phone        Date of Service:  2022  NAME:  Pamela Stinson  :  1955  MRN:  [id number]      Admission Summary:     Pamela Stinson is a 78 y.o. female with hx NICM, chronic hypoxic respiratory failure 2/2 pulmonary htn, ckd, hypothyroidism, GERD, and  DM II who presents as a transfer from [facility] for acute on chronic hypoxic respiratory failure. She wears 3L o2 at baseline, and reportedly ran out of her home oxygen.     In the ED, she was hypoxic to th 70's, with improvement ot 94% on 4Lnc. Labs showed stable anemia with Hgb 10, K+ 5.2, lactic 3.1, trop 55>66, d-dimer >35, creatinine 2.54, and probnp 15,826. CXR showed diffuse interstitial airway disease. Rapid covid was negative.     In the ED, she was started on a heparin gtt, and sent to [facility] for VQ scan. She received bumex and nitropaste. Interval history / Subjective:      No complaints, breathing better but is on 5L O2   Discussed psoriasis and restarted home meds for this  Cardiology planning for possible cath today while she is off milrinone and Bumex drips  Trying to transfer her to [facility] per cardiology/nephrology recs    Assessment & Plan:     Acute on chronic respiratory failure with oxygen- Due to CHF  COVID-19 negative. Mild leukocytosis. Afebrile. VQ scan low probability for PE. Acute on chronic systolic heart failure  NYHA class IV. Status post ICD placement. LVEF 15 to 20%. proBNP [id number]. IV milrinone and IV bumex- now on hold , Right arm PICC  Not a candidate for ARB/ACE due to renal insufficiency. Cardiac diet, strict I/O, daily weight. Low-sodium diet.   Cardiology considering heart cath today    RODNEY on CKD stage IV felt to be due to cardiorenal syndrome  Creatinine slightly above baseline,  Monitor volume status electrolytes  5/2022. Renal ultrasound negative for hydronephrosis. Small bilateral renal cysts. Avoid nephrotoxic meds, renally dose medications. Diuretics on hold  Nephrology recommendations noted and patient not a candidate for dialysis. Hyperkalemia  Resolved, hyperkalemia protocol. Daily BMP. Low potassium diet. Hyponatremia- mild- monitor likely diuretic induced. Daily BMP, monitor volume status and electrolytes. Leukocytosis-resolved  CXR positive for interstitial airspace disease likely pulmonary edema or atypical pneumonia. Afebrile. Respiratory panel negative for COVID-19. Monitor closely, start on empiric antibiotics if any sign of infection. Type 2 diabetes. Adjusted insulin regimen due to hypoglycemia (reduced NPH )  Difficult to manage due to Dextrose in milrinone drip  Will decreased NPH doses today due to pending HOLD on IV milrinone    Hypothyroidism -cont Synthroid. Depression- stable on current meds. Overweight- BMI=39.7    Code status: Full Code  DVT prophylaxis: heparin     Care Plan discussed with: Patient/Family, Nurse and   Anticipated Disposition: TBD     Hospital Problems  Date Reviewed: 12/22/2021          Codes Class Noted POA    Acute respiratory failure with hypoxia [facility]) ICD-10-CM: J96.01  ICD-9-CM: 518.81  1/4/2022 Unknown              Vital Signs:    Last 24hrs VS reviewed since prior progress note.  Most recent are:  Visit Vitals  BP (!) 123/92   Pulse 97   Temp 98.4 °F (36.9 °C)   Resp 21   Ht 5' 7\" (1.702 m)   Wt 115.8 kg (255 lb 4.7 oz)   SpO2 100%   BMI 39.98 kg/m²     Patient Vitals for the past 24 hrs:   Temp Pulse Resp BP SpO2   01/17/22 0802     100 %   01/17/22 0718 98.4 °F (36.9 °C) 97 21 (!) 123/92 100 %   01/17/22 0600  95      01/17/22 0450 98.2 °F (36.8 °C) 100 23 107/72 95 %   01/17/22 0400  94      01/17/22 0159  95      01/17/22 0134     100 %   01/17/22 0122     90 %   01/16/22 2343 98.3 °F (36.8 °C) 97 21 (!) 126/99 91 %   01/16/22 2200  93      01/16/22 2000  95      01/16/22 1900 98.4 °F (36.9 °C) 91 18 113/88 98 %   01/16/22 1800  (!) 103      01/16/22 1600  (!) 101   96 %   01/16/22 1528 98 °F (36.7 °C) (!) 101 18 (!) 120/99 93 %   01/16/22 1400  (!) 101      01/16/22 1200  (!) 108   92 %   01/16/22 1153 98.1 °F (36.7 °C) (!) 108 20 114/84 92 %   01/16/22 0933  (!) 114   92 %       Intake/Output Summary (Last 24 hours) at 1/17/2022 0836  Last data filed at 1/16/2022 1600  Gross per 24 hour   Intake 469.11 ml   Output 700 ml   Net -230.89 ml        Physical Examination:     I had a face to face encounter with this patient and independently examined them on 1/17/2022 as outlined below:          Constitutional:  No acute distress, cooperative, pleasant    ENT:  Oral mucosa moist, O2 via NC    Resp:  CTA b/l diminished globally. No wheezing/rhonchi/rales. No accessory muscle use   CV:  tachycardia, no murmurs, gallops, rubs    GI:  Soft, non distended, non tender. normoactive bowel sounds     Musculoskeletal:  Bilateral pretibial and pedal edema    Neurologic:  no focal neuro deficits            Data Review:    Review and/or order of clinical lab test  Review and/or order of tests in the radiology section of CPT  Review and/or order of tests in the medicine section of CPT  12/8/21 Echo Findings    Left Ventricle Normal cavity size. Upper normal wall thickness. The estimated EF is 15 - 20%. Severely reduced systolic function. There is left ventricular diastolic dysfunction. Left Atrium Moderately dilated left atrium. Interatrial Septum Interatrial septum not well visualized   Right Ventricle Normal cavity size. Borderline low systolic function. Pacer/ICD present. Right Atrium Mildly dilated right atrium. Aortic Valve No stenosis and no regurgitation. Aortic valve sclerosis. Mitral Valve No stenosis. Mitral valve non-specific thickening. Mild to moderate regurgitation. Tricuspid Valve Normal valve structure and no stenosis. Mild regurgitation. Pulmonic Valve Pulmonic valve not well visualized, but normal doppler findings. Pulmonary Artery Pulmonary arterial systolic pressure (PASP) is 47 mmHg. Pulmonary hypertension found to be mild to moderate. Aorta Normal aortic root. Pericardium No evidence of pericardial effusion. Labs:     Recent Labs     01/15/22  0514   WBC 8.5   HGB 8.3*   HCT 31.0*        Recent Labs     01/17/22  0051 01/16/22  0401 01/15/22  0514    135* 134*   K 4.2 4.0 4.1   CL 99 97 96*   CO2 33* 32 35*   BUN 51* 53* 55*   CREA 2.37* 2.43* 2.69*   * 192* 168*   CA 9.6 9.5 9.9   MG 2.2 2.1  --      Recent Labs     01/17/22  0051 01/16/22  0401 01/15/22  0514   ALT 32 29 37   AP 75 64 69   TBILI 0.7 0.6 0.6   TP 7.3 7.2 7.5   ALB 3.6 3.3* 3.2*   GLOB 3.7 3.9 4.3*     No results for input(s): INR, PTP, APTT, INREXT, INREXT in the last 72 hours. No results for input(s): FE, TIBC, PSAT, FERR in the last 72 hours. No results found for: FOL, RBCF   No results for input(s): PH, PCO2, PO2 in the last 72 hours. No results for input(s): CPK, CKNDX, TROIQ in the last 72 hours.     No lab exists for component: CPKMB  Lab Results   Component Value Date/Time    Cholesterol, total 151 01/11/2022 05:13 AM    HDL Cholesterol 60 01/11/2022 05:13 AM    LDL, calculated 75.8 01/11/2022 05:13 AM    Triglyceride 76 01/11/2022 05:13 AM    CHOL/HDL Ratio 2.5 01/11/2022 05:13 AM     Lab Results   Component Value Date/Time    Glucose (POC) 121 (H) 01/17/2022 08:28 AM    Glucose (POC) 142 (H) 01/16/2022 09:36 PM    Glucose (POC) 208 (H) 01/16/2022 05:04 PM    Glucose (POC) 247 (H) 01/16/2022 11:59 AM    Glucose (POC) 249 (H) 01/16/2022 11:58 AM     Lab Results   Component Value Date/Time    Color YELLOW/STRAW 01/11/2022 02:53 PM    Appearance CLEAR 01/11/2022 02:53 PM    Specific gravity 1.010 01/11/2022 02:53 PM    pH (UA) 6.0 01/11/2022 02:53 PM    Protein 30 (A) 01/11/2022 02:53 PM    Glucose Negative 01/11/2022 02:53 PM    Ketone Negative 01/11/2022 02:53 PM    Bilirubin Negative 01/11/2022 02:53 PM    Urobilinogen 0.2 01/11/2022 02:53 PM    Nitrites Negative 01/11/2022 02:53 PM    Leukocyte Esterase TRACE (A) 01/11/2022 02:53 PM    Epithelial cells FEW 01/11/2022 02:53 PM    Bacteria Negative 01/11/2022 02:53 PM    WBC 0-4 01/11/2022 02:53 PM    RBC 0-5 01/11/2022 02:53 PM         Medications Reviewed:     Current Facility-Administered Medications   Medication Dose Route Frequency    insulin NPH (NOVOLIN N, HUMULIN N) injection 10 Units  10 Units SubCUTAneous QHS    insulin NPH (NOVOLIN N, HUMULIN N) injection 20 Units  20 Units SubCUTAneous DAILY    triamcinolone acetonide (KENALOG) 0.1 % cream   Topical BID    simethicone (MYLICON) tablet 80 mg  80 mg Oral QID PRN    polyethylene glycol (MIRALAX) packet 17 g  17 g Oral DAILY    senna-docusate (PERICOLACE) 8.6-50 mg per tablet 2 Tablet  2 Tablet Oral BID    albumin human 25% (BUMINATE) solution 12.5 g  12.5 g IntraVENous BID    [Held by provider] bumetanide (BUMEX) 0.25 mg/mL infusion  1 mg/hr IntraVENous CONTINUOUS    digoxin (LANOXIN) tablet 0.0625 mg  0.0625 mg Oral DAILY    ivabradine (CORLANOR) tablet 7.5 mg  7.5 mg Oral BID WITH MEALS    carvediloL (COREG) tablet 12.5 mg  12.5 mg Oral BID WITH MEALS    allopurinoL (ZYLOPRIM) tablet 50 mg  50 mg Oral DAILY    epoetin isabel-epbx (RETACRIT) injection 20,000 Units  20,000 Units SubCUTAneous Q TUE, THU & SAT    montelukast (SINGULAIR) tablet 10 mg  10 mg Oral DAILY    levothyroxine (SYNTHROID) tablet 100 mcg  100 mcg Oral Once per day on Mon Tue Wed Thu Fri Sat    cetirizine (ZYRTEC) tablet 10 mg  10 mg Oral DAILY    hydroxypropyl methylcellulose (ISOPTO TEARS) 0.5 % ophthalmic solution 1 Drop  1 Drop Both Eyes PRN    venlafaxine-SR (EFFEXOR-XR) capsule 75 mg  75 mg Oral DAILY WITH BREAKFAST    gabapentin (NEURONTIN) capsule 100 mg  100 mg Oral QHS    arformoteroL (BROVANA) neb solution 15 mcg  15 mcg Nebulization BID RT    And    budesonide (PULMICORT) 500 mcg/2 ml nebulizer suspension  500 mcg Nebulization BID RT    hydrOXYzine HCL (ATARAX) tablet 20 mg  20 mg Oral TID PRN    heparin (porcine) injection 5,000 Units  5,000 Units SubCUTAneous Q8H    sodium chloride (NS) flush 5-40 mL  5-40 mL IntraVENous Q8H    sodium chloride (NS) flush 5-40 mL  5-40 mL IntraVENous PRN    acetaminophen (TYLENOL) tablet 650 mg  650 mg Oral Q6H PRN    Or    acetaminophen (TYLENOL) suppository 650 mg  650 mg Rectal Q6H PRN    ondansetron (ZOFRAN ODT) tablet 4 mg  4 mg Oral Q8H PRN    Or    ondansetron (ZOFRAN) injection 4 mg  4 mg IntraVENous Q6H PRN    glucose chewable tablet 16 g  4 Tablet Oral PRN    dextrose (D50W) injection syrg 12.5-25 g  25-50 mL IntraVENous PRN    glucagon (GLUCAGEN) injection 1 mg  1 mg IntraMUSCular PRN    insulin lispro (HUMALOG) injection   SubCUTAneous AC&HS    albuterol-ipratropium (DUO-NEB) 2.5 MG-0.5 MG/3 ML  3 mL Nebulization Q6H PRN     ______________________________________________________________________  EXPECTED LENGTH OF STAY: 3d 19h  ACTUAL LENGTH OF STAY:          13                 Bobby Laguerre MD

## 2022-01-18 NOTE — PROGRESS NOTES
[facility] in [place], [place]  Inpatient Progress Note      Patient name: Miranda Castillo  Patient : 1955  Patient MRN: [id number]  Consulting MD: Suzanne Murillo MD  Date of service: 22    REASON FOR REFERRAL:  Management of chronic systolic heart failure     PLAN OF CARE:   · 77 y/o female with chronic renal failure and new onset severe cardiomyopathy, LVEF 15% (diagnosed 21), stage D, NYHA class IV; admitted for massive volume overload, making no progress with diuresis over 8 days, remains in pulmonary edema by CXR with hypoxia 10L O2; severely volume overloaded by RHC  · Most likely etiology of acute deterioration of LVEF is chronic volume overload with compensatory tachycardia from progressive renal failure +/- coronary artery disease (80% LAD)   · These are potentially reversible causes of severe LV dysfunction; by guidelines patient needs at least 3 months of euvolemia, HR < 100bpm and revascularization to prove she has non-reversible new, severe HF to be eligible for cardiac replacement therapies, including heart/kidney transplantation, d/w Dr. Kate Del Toro with [facility]   · Patient would like aggressive approach to allow her heart to recover, including dialysis +/- revascularization +/- Impella implant if needed as bridge to LV recovery or transplant, d/w patient and her sister at family meeting  · Patient understands the following possible outcomes:  · 1/3 chance of LV recovery and discharge home on medical therapy or chronic dialysis  · 1/3 chance of LV non-recovery on Impella, evaluation and transfer for listing for heart transplant/kidney  · 1/3 chance of LV non-recovery and evaluation that reveals patient is not candidate for LVAD/heart/kidney transplant and then wean of support to comfort care/hospice  · D/w Dr. Melyssa Andrew, Dr. Willadr Shankar, Dr. Gabriel Oakes, patient and her sister    RECOMMENDATIONS:  NPO for Impella this afternoon   Hold GDMT due to upcoming surgical procedure  Reassess hemodynamics post-op prior to resuming Corlanor, digoxin  Volume management per nephrology   Allopurinol 50mg daily per nephrology  Will need OP PSG  Invitae pending   Palliative consult appreciated   Nutritionist consult  Heart failure education   Advanced care plan present on file     All other care per primary team     IMPRESSION:  Fatigue  Shortness of breath, on 3L NC PTA  Volume overload  Acute on Chronic systolic heart failure  · Stage D, NYHA class IV symptoms  · Non-ischemic cardiomyopathy, LVEF 15%  · Normal coronaries  · PYP equivocal for amyloid   Pulmonary hypertension  S/p BiV-ICD  Cardiac risk factors:  · HL  · DM2  · Morbid obesity, BMI 40  Acute on chronic renal failure, stage IV  GERD  Anemia of chronic disease  Gout     Interval Events:  Tachycardic, marginal BP  Inadequate diuresis on bumex gtt   proBNP up to 23k   Cr up to 2.76 from 2.37  O2 requirement 6-15lpm      LIFE GOALS:  Patient's personal goals include: being home with family, still ambulating around without getting too tired.   Important upcoming milestones or family events: none  The patient identifies the following as important for living well: remaining idependent and mobile; being able to get out of the house with . Kimberlyn Chowdhury verbalized willingness to be on home milrinone and evaluation for both heart and kidney transplants.  Verbalizes she would have family supporting her decision on this.      HPI  Jasmine Robbins is H 1 y.o.  female with a history of NICM, chronic hypoxic respiratory failure secondary to pulmonary HTN, hypothyroidism, CKD, GERD, and DM II who presented to [address] [place] [address] as a transfer from another facility for acute on chronic hypoxic respiratory failure.  Reports running out of O2 at home resulting in SOB and dizziness.     Upon arrival to the ED she was found to have O2 sats in the 70s, requiring 4L NC O2.  Rapid covid test was negative.  ProNT-BNP was 57769, K+5.2, BUN/CR x/2.54 and elevated d-dimer. Chest xray showing pulmonary edema vs. Atypical pneumonia. VQ scan showed low probability for PE.     Per Dr. Akhil Lujan, LVEF 15-20% during recent admission.  LVEF previously normalized with CRT.  NYHA III-IV.  No ACEi/ARB due to renal dysfunction.  GDMT dosing limited by low BP.     Patient's PCP is Dr. Teresa Chatman primarily for cardiac care due to Dr. Mary Alice Smith historically seen by nephrology but has not had follow up care in the past three years.     CARDIAC IMAGING:  Echo 12/8/21- LVEF 15-20%, mild to Mod MR, LVIDd 5.09cm, TAPSE 1.94cm  Echo 4/22/19- LVEF 60%, trace MR,  LVIDd 4.24cm, TAPSE 1.65cm   Echo 4/24/18- LVEF 60%, trivial MR, LVIDd 4.79cm, TAPSE 2.25cm      EKG- 1/4/22 ST with A sense and V paced rhythm     [facility] 2015- No significant CAD  NST 2014- reversible LAD involvement      ICD interrogation     HEMODYNAMICS:  [facility] 1/17/21: PAP 83/52 mmHg, RAP 27 mmHg, PCWP 45 mmHg, CI 1.6  [facility] 12/10/21: PAP 76/48/57, RAP 20, PCWP 35, CI 2.26     CPEST not done  6MW not done     OTHER IMAGING:  CXR Results  (Last 48 hours)                             01/13/22 1035   XR CHEST PORT Final result      Impression:   No significant change in congestion and interstitial and alveolar   opacities which may reflect edema or infectious infiltrate.        Narrative:   EXAM: XR CHEST PORT       INDICATION: pul edema       COMPARISON: Chest x-ray 1/10/2022.       FINDINGS: A portable AP radiograph of the chest was obtained at 10:19 hours. The   patient is on a cardiac monitor. The lungs appear grossly stable with congestion   and interstitial/airspace opacities with no pneumothorax or pleural effusion. Right PICC line traverses expected course of tip in the region of the atriocaval   junction. Pacemaker-ICD generator body projects over the left chest wall with   intact appearing leads traversing in expected course. Samantha Jc The cardiac and   mediastinal contours and pulmonary vascularity are normal.  Atherosclerotic   calcifications affect the aortic arch. The chest wall structures and visualized   upper abdomen show no acute findings with incidental note of degenerative spine   and shoulder changes.                           PHYSICAL EXAM:  Visit Vitals  BP (!) 111/90 (BP 1 Location: Left upper arm, BP Patient Position: At rest)   Pulse 97   Temp 97.8 °F (36.6 °C)   Resp 20   Ht 5' 7\" (1.702 m)   Wt 251 lb 8.7 oz (114.1 kg)   SpO2 98%   BMI 39.40 kg/m²      Physical Exam  Vitals and nursing note reviewed. Constitutional:       General: She is not in acute distress.     Appearance: Normal appearance. She is obese. Cardiovascular:      Rate and Rhythm: Regular rhythm. Tachycardia present.      Pulses: Normal pulses.      Heart sounds: Normal heart sounds. No murmur heard. Pulmonary:      Effort: Normal   Abdominal:      General: There is no distension. Musculoskeletal:         General: trace LE edema   Skin:     General: Skin is warm and dry.      Findings: Lesion present.      Comments: psorasis   Neurological:      General: No focal deficit present.      Mental Status: She is alert and oriented to person, place, and time. Psychiatric:         Mood and Affect: Mood normal.            REVIEW OF SYSTEMS:  Review of Systems   Constitutional: Positive for malaise/fatigue. Negative for chills and fever. Respiratory: Positive for shortness of breath.    Cardiovascular: Negative for chest pain, palpitations, orthopnea and leg swelling. Gastrointestinal: constipation. Negative for heartburn and nausea. Genitourinary: Negative for dysuria. Musculoskeletal: Negative for falls, joint pain and myalgias. Neurological: Negative for dizziness. Psychiatric/Behavioral: Positive for depression.  The patient is nervous/anxious.       PHYSICAL EXAM:  Visit Vitals  /88 (BP 1 Location: Left lower arm)   Pulse 99   Temp 97.6 °F (36.4 °C)   Resp 16   Ht 5' 7\" (1.702 m)   Wt 250 lb 1.6 oz (113.4 kg)   SpO2 99%   BMI 39.17 kg/m²     PAST MEDICAL HISTORY:  Past Medical History:   Diagnosis Date    Acquired hypothyroidism 8/15/2016    Anemia     RED-HF study    Asthma     Cardiomyopathy, nonischemic ([facility]     initial dx 2001, bivHF 2008 with EF 15%, s/p biV-ICD 9/08, significant improvment in EF to 45-50%    CKD (chronic kidney disease)     Dr Nixon Raya    CKD (chronic kidney disease) 8/15/2012    Depression     Diabetes ([facility]     Diabetic neuropathy ([facility]     DM (diabetes mellitus) ([facility] 8/15/2012    GERD (gastroesophageal reflux disease)     Gout     Hypothyroidism     ICD (implantable cardioverter-defibrillator), biventricular, in situ 6/5/2014    Psoriasis        PAST SURGICAL HISTORY:  Past Surgical History:   Procedure Laterality Date    CARDIAC CATHETERIZATION  2007; 01/06/15    normal cors    ECHO 2D ADULT  4/2010    EF 45%, improved from 1/09 (25%)    ECHO 2D ADULT  11/2011    LVH, EF 55-60%    HX ORTHOPAEDIC      knee    HX PACEMAKER PLACEMENT      AICD    STRESS TEST LEXISCAN/CARDIOLITE  3/21/12    normal perfusion, global HK 40%       FAMILY HISTORY:  Family History   Problem Relation Age of Onset    Heart Disease Mother     Hypertension Mother     Lupus Sister     Diabetes Brother        SOCIAL HISTORY:  Social History     Socioeconomic History    Marital status:    Tobacco Use    Smoking status: Never Smoker    Smokeless tobacco: Never Used   Substance and Sexual Activity    Alcohol use: No    Drug use: No    Sexual activity: Never   Social History Narrative    . Nonsmoker. Disability       LABORATORY RESULTS:     Labs Latest Ref Rng & Units 1/18/2022 1/17/2022 1/16/2022 1/15/2022 1/14/2022 1/13/2022 1/12/2022   WBC 3.6 - 11.0 K/uL - 9.5 - 8.5 8.0 7.2 -   RBC 3.80 - 5.20 M/uL - 3.35(L) - 3.39(L) 3.34(L) 3.32(L) -   Hemoglobin 11.5 - 16.0 g/dL - 8. 1(L) - 8. 3(L) 8.0(L) 8.0(L) - Hematocrit 35.0 - 47.0 % - 31. 4(L) - 31. 0(L) 30. 7(L) 30. 7(L) -   MCV 80.0 - 99.0 FL - 93.7 - 91.4 91.9 92.5 -   Platelets 981 - 823 K/uL - 344 - 328 283 259 -   Lymphocytes 12 - 49 % - - - - - - -   Monocytes 5 - 13 % - - - - - - -   Eosinophils 0 - 7 % - - - - - - -   Basophils 0 - 1 % - - - - - - -   Albumin 3.5 - 5.0 g/dL 3.5 3.6 3.3(L) 3. 2(L) 3.0(L) 2. 7(L) 2. 9(L)   Calcium 8.5 - 10.1 MG/DL 9.8 9.6 9.5 9.9 9.6 9.3 9.4   Glucose 65 - 100 mg/dL 206(H) 116(H) 192(H) 168(H) 165(H) 118(H) 242(H)   BUN 6 - 20 MG/DL 64(H) 51(H) 53(H) 55(H) 54(H) 51(H) 53(H)   Creatinine 0.55 - 1.02 MG/DL 2.76(H) 2.37(H) 2.43(H) 2.69(H) 2.38(H) 2.27(H) 2.33(H)   Sodium 136 - 145 mmol/L 136 136 135(L) 134(L) 135(L) 136 136   Potassium 3.5 - 5.1 mmol/L 4.4 4.2 4.0 4.1 3.7 3.6 4.5   TSH 0.36 - 3.74 uIU/mL - - - - - - -   Some recent data might be hidden     Lab Results   Component Value Date/Time    TSH 3.08 01/11/2022 05:13 AM    TSH 1.85 12/15/2021 01:06 PM    TSH 1.01 11/05/2020 09:11 AM    TSH 2.740 04/30/2019 11:21 AM    TSH 0.519 02/21/2012 10:53 AM    TSH 8.29 (H) 01/20/2010 01:59 PM       ALLERGY:  Allergies   Allergen Reactions    Ciprofloxacin Anaphylaxis    Shellfish Derived Anaphylaxis    Ace Inhibitors Unknown (comments)    Biaxin [Clarithromycin] Other (comments)     Metal taste    Candesartan Cough    Pcn [Penicillins] Hives        CURRENT MEDICATIONS:    Current Facility-Administered Medications:     DOBUTamine (DOBUTREX) 500 mg/250 mL (2,000 mcg/mL) infusion, 0-10 mcg/kg/min, IntraVENous, TITRATE, José Sheriff MD    magnesium sulfate 2 g/50 ml IVPB (premix or compounded), 2 g, IntraVENous, ONCE, José Sheriff MD    nitroglycerin (Tridil) 200 mcg/ml infusion, 16.5 mcg/min, IntraVENous, CONTINUOUS, José Sheriff MD    potassium chloride 20 mEq in 50 ml IVPB, 20 mEq, IntraVENous, ONCE, José Sheriff MD    heparin (porcine) in 0.9% NaCl 30,000 unit/1,000 mL perfusion irrigation 50-1,000 mL, 50-1,000 mL, Other, PRN, Luis Alberto Sheriff MD    albumin human 5% (BUMINATE) solution 25 g, 25 g, IntraVENous, ONCE, Luis Alberto Sheriff MD    insulin regular (NOVOLIN R, HUMULIN R) 100 Units in 0.9% sodium chloride 100 mL infusion, 0-50 Units/hr, IntraVENous, TITRATE, Luis Alberto Sheriff MD    niCARdipine (CARDENE) 25 mg in 0.9% sodium chloride 250 mL infusion, 0-15 mg/hr, IntraVENous, TITRATE, Luis Alberto Sheriff MD    0.9% sodium chloride infusion 250 mL, 250 mL, IntraVENous, PRN, Luis Alberto Ponce MD    chlorhexidine (PERIDEX) 0.12 % mouthwash 15 mL, 15 mL, Oral, Q12H, Mindi Vargas MD    mupirocin (BACTROBAN) 2 % ointment, , Both Nostrils, DAILY, Mindi Vargas MD    lactated Ringers infusion, 100 mL/hr, IntraVENous, CONTINUOUS, Giovanna Najjar, MD    0.9% sodium chloride infusion, 25 mL/hr, IntraVENous, CONTINUOUS, Giovanna Najjar, MD    sodium chloride (NS) flush 5-40 mL, 5-40 mL, IntraVENous, Q8H, Giovanna Najjar, MD    sodium chloride (NS) flush 5-40 mL, 5-40 mL, IntraVENous, PRN, Giovanna Najjar, MD    lidocaine (PF) (XYLOCAINE) 10 mg/mL (1 %) injection 0.1 mL, 0.1 mL, SubCUTAneous, PRN, Giovanna Najjar, MD    fentaNYL citrate (PF) injection 50 mcg, 50 mcg, IntraVENous, PRN, Giovanna Najjar, MD    midazolam (VERSED) injection 1 mg, 1 mg, IntraVENous, PRN, Giovanna Najjar, MD    midazolam (VERSED) injection 1 mg, 1 mg, IntraVENous, PRN, Giovanna Najjar, MD    acetaminophen (TYLENOL) tablet 650 mg, 650 mg, Oral, ONCE, Giovanna Najjar, MD    ropivacaine (PF) (NAROPIN) 5 mg/mL (0.5 %) injection 30 mL, 30 mL, Intra artICUlar, PRN, Giovanna Najjar, MD    bumetanide (BUMEX) 0.25 mg/mL infusion, 2 mg/hr, IntraVENous, CONTINUOUS, Sandy Albarado T, NP, Last Rate: 4 mL/hr at 01/18/22 1030, 1 mg/hr at 01/18/22 1030    [Held by provider] milrinone (PRIMACOR) 20 MG/100 ML D5W infusion, 0.125 mcg/kg/min, IntraVENous, CONTINUOUS, Sandy Albarado T, NP, Last Rate: 4.3 mL/hr at 01/18/22 1031, 0.125 mcg/kg/min at 01/18/22 1031    insulin NPH (NOVOLIN N, HUMULIN N) injection 10 Units, 10 Units, SubCUTAneous, QHS, Calixto Felipe MD, 10 Units at 01/17/22 2224    insulin NPH (NOVOLIN N, HUMULIN N) injection 20 Units, 20 Units, SubCUTAneous, DAILY, Calixto Felipe MD    triamcinolone acetonide (KENALOG) 0.1 % cream, , Topical, BID, Calixto Felipe MD, Given at 01/17/22 1733    simethicone (MYLICON) tablet 80 mg, 80 mg, Oral, QID PRN, Calixto Felipe MD, 80 mg at 01/15/22 2345    polyethylene glycol (MIRALAX) packet 17 g, 17 g, Oral, DAILY, Calixto Felipe MD, 17 g at 01/16/22 0958    senna-docusate (PERICOLACE) 8.6-50 mg per tablet 2 Tablet, 2 Tablet, Oral, BID, Calixto Felipe MD, 2 Tablet at 01/17/22 1722    albumin human 25% (BUMINATE) solution 12.5 g, 12.5 g, IntraVENous, BID, Owen, Amber B, NP, 12.5 g at 01/17/22 1709    digoxin (LANOXIN) tablet 0.0625 mg, 0.0625 mg, Oral, DAILY, Owen, Amber B, NP, 0.0625 mg at 01/17/22 1027    ivabradine (CORLANOR) tablet 7.5 mg, 7.5 mg, Oral, BID WITH MEALS, Kai Gonzalez MD, 7.5 mg at 01/17/22 1723    carvediloL (COREG) tablet 12.5 mg, 12.5 mg, Oral, BID WITH MEALS, Owen, Amber B, NP, 12.5 mg at 01/17/22 1721    allopurinoL (ZYLOPRIM) tablet 50 mg, 50 mg, Oral, DAILY, Owen, Amber B, NP, 50 mg at 01/17/22 1025    epoetin isabel-epbx (RETACRIT) injection 20,000 Units, 20,000 Units, SubCUTAneous, Q TUE, THU & SAT, Taylor Hope MD, 20,000 Units at 01/15/22 2206    montelukast (SINGULAIR) tablet 10 mg, 10 mg, Oral, DAILY, Tomas Laboy MD, 10 mg at 01/17/22 1026    levothyroxine (SYNTHROID) tablet 100 mcg, 100 mcg, Oral, Once per day on Mon Tue Wed Thu Fri Sat, Santhosh Sharp MD, 100 mcg at 01/18/22 0701    cetirizine (ZYRTEC) tablet 10 mg, 10 mg, Oral, DAILY, Lisa Horne MD, 10 mg at 01/17/22 1027    hydroxypropyl methylcellulose (ISOPTO TEARS) 0.5 % ophthalmic solution 1 Drop, 1 Drop, Both Eyes, PRN, Lisa Horne MD, 1 Drop at 01/06/22 5154   venlafaxine-SR (EFFEXOR-XR) capsule 75 mg, 75 mg, Oral, DAILY WITH BREAKFAST, Lisa Horne MD, 75 mg at 01/17/22 1025    gabapentin (NEURONTIN) capsule 100 mg, 100 mg, Oral, QHS, Lisa Horne MD, 100 mg at 01/17/22 2139    arformoteroL (BROVANA) neb solution 15 mcg, 15 mcg, Nebulization, BID RT, 15 mcg at 01/18/22 0729 **AND** budesonide (PULMICORT) 500 mcg/2 ml nebulizer suspension, 500 mcg, Nebulization, BID RT, Blanca Hutchinson MD, 500 mcg at 01/18/22 1799    hydrOXYzine HCL (ATARAX) tablet 20 mg, 20 mg, Oral, TID PRN, Jay Daniels MD    [Held by provider] heparin (porcine) injection 5,000 Units, 5,000 Units, SubCUTAneous, Q8H, Galen Reyes MD, 5,000 Units at 01/18/22 0100    sodium chloride (NS) flush 5-40 mL, 5-40 mL, IntraVENous, Q8H, Jay Daniels MD, 10 mL at 01/18/22 0600    sodium chloride (NS) flush 5-40 mL, 5-40 mL, IntraVENous, PRN, Jay Daniels MD    acetaminophen (TYLENOL) tablet 650 mg, 650 mg, Oral, Q6H PRN **OR** acetaminophen (TYLENOL) suppository 650 mg, 650 mg, Rectal, Q6H PRN, Jay Daniels MD    ondansetron (ZOFRAN ODT) tablet 4 mg, 4 mg, Oral, Q8H PRN **OR** ondansetron (ZOFRAN) injection 4 mg, 4 mg, IntraVENous, Q6H PRN, Jay Daniels MD, 4 mg at 01/05/22 0915    glucose chewable tablet 16 g, 4 Tablet, Oral, PRN, Jay Daniels MD    dextrose (D50W) injection syrg 12.5-25 g, 25-50 mL, IntraVENous, PRN, Jay Daniels MD    glucagon [facility]) injection 1 mg, 1 mg, IntraMUSCular, PRN, Jay Daniels MD    insulin lispro (HUMALOG) injection, , SubCUTAneous, AC&HS, Tojacobo Barker MD, 2 Units at 01/17/22 2223    albuterol-ipratropium (DUO-NEB) 2.5 MG-0.5 MG/3 ML, 3 mL, Nebulization, Q6H PRN, Jay Daniels MD, 3 mL at 01/17/22 0117    PATIENT CARE TEAM:  Patient Care Team:  Jason Floyd MD as PCP - General (Internal Medicine)  Jason Floyd MD as PCP - [facility]  Susu Miller MD as Consulting Provider (Internal Medicine)  Sury Freeman MD (Dermatology)  Pop Toro MD (Nephrology)  Aliya Braxton MD as Consulting Provider (Cardiology)  Charleston Callow, MD (Cardiology)  Linda Roque MD as Consulting Provider (Pulmonary Disease)     Thank you for allowing me to participate in this patient's care. Krystal Saavedra, JAIDA  [address] [place] [address], [address]  Phone: [phone]    [facility] ATTENDING ADDENDUM    Patient was seen and examined in person. Data and notes were reviewed. I have discussed and agree with the plan as noted in the NP note above without further additions.     Harjeet Vilchis MD PhD  Alia Jefferson

## 2022-01-18 NOTE — PROGRESS NOTES
Cardiac Electrophysiology Hospital Progress Note     Subjective:       Abdirahman Jonas is a 78 y.o. patient who is seen for acute on chronic systolic CHF despite biventricular pacing. Interim:   LHC/RHC yesterday showed severely elevated right & left side filling pressures with severe pulmonary HTN, mixed pattern with elevated wedge as well as PVR approx 6 Woods units.  She had severe LAD lesion (80%) involving diagonal branch ostium with likely prior stent in mid LAD patent.  CI noted 1.65 L/min/m2 by thermodilution, 1.6 L/min/m2 by Aye. Back on milrinone.  Biventricular pacing 100s (but telemetry monitor double counting, reporting high rate).  Cr only mildly higher than yesterday.  NT pro-BNP 39070. HPI:   Presented to the ER 01/04/2021 with hypoxia, improved on supplemental oxygen. Labs showed stable anemia.  WBC elevated, hyponatremic, hypokalemic.  D dimer elevated.  Chronic CKD. Nephrologist spoke to me directly regarding severe renal failure, but not much worse than last admission. Rapid COVID negative.  CXR showed pulmonary edema vs atypical pneumonia.  VQ scan showed low probability for PE.       NICM, LVEF 15-20% during recent admission.  LVEF previously normalized with CRT.  NYHA III-IV.  No ACEi/ARB due to renal dysfunction.  GDMT dosing limited by low BP. [address] 12/10/2021 showed severe pulmonary HTN (wedge 34 mmHg, PAP 80 mmHg). Cardiac cath in 2015 at [facility] showed no evidence of CAD. She did require LV lead reprogramming over the summer, but good LV capture since.  Good capture/function when checked during recent admission. BP controlled.      PICC line placed 01/10/2022 in anticipation of home milrinone.         Problem List Date Reviewed: 12/22/2021   Codes Class Noted   Acute respiratory failure with hypoxia [facility]) ICD-10-CM: J96.01   ICD-9-CM: 518.81 1/4/2022     CHF exacerbation ([facility] [address] ICD-10-CM: I50.9   ICD-9-CM: 428.0 12/6/2021     Type 2 diabetes mellitus with diabetic neuropathy ([facility] ICD-10-CM: E11.40   ICD-9-CM: 250.60, 357.2 1/2/2020     Type 2 diabetes with nephropathy ([facility] ICD-10-CM: E11.21   ICD-9-CM: 250.40, 583.81 4/3/2018     Obesity, morbid ([facility] ICD-10-CM: E66.01   ICD-9-CM: 278.01 12/8/2017     Acquired hypothyroidism ICD-10-CM: E03.9   ICD-9-CM: 244.9 8/15/2016     Dysthymia ICD-10-CM: F34.1   ICD-9-CM: 300.4 8/15/2016     ICD (implantable cardioverter-defibrillator), biventricular, in situ ICD-10-CM: Z95.810   ICD-9-CM: V45.02 6/5/2014   Overview Signed 1/14/2015 11:11 AM by Taco Austin MD     Generator Medtronic change 1/14/2015         Dyslipidemia ICD-10-CM: V48.3   ICD-9-CM: 272.4 1/14/2014     CKD (chronic kidney disease) ICD-10-CM: N18.9   ICD-9-CM: 585.9 8/15/2012     Cardiomyopathy, nonischemic (HCC) ICD-10-CM: I42.8   ICD-9-CM: 425.4 Unknown   Overview Signed 10/10/2011  6:40 AM by Jose Espino MD     initial dx 2001, bivHF 2008 with EF 15%, s/p biV-ICD 9/08, significant improvment in EF to 45-50%         Anemia in chronic renal disease (Chronic) ICD-10-CM: N18.9, D63.1   ICD-9-CM: 285.21 12/10/2008     HTN (hypertension) ICD-10-CM: I10   ICD-9-CM: 401.9 12/10/2008     GERD (gastroesophageal reflux disease) (Chronic) ICD-10-CM: K21.9   ICD-9-CM: 530.81 12/10/2008     Gout (Chronic) ICD-10-CM: M10.9   ICD-9-CM: 274.9 12/10/2008     Pulmonary HTN (HCC) (Chronic) ICD-10-CM: I27.20   ICD-9-CM: 416.8 12/10/2008           Current Facility-Administered Medications   Medication Dose Route Frequency Provider Last Rate Last Admin   · bumetanide (BUMEX) 0.25 mg/mL infusion 1 mg/hr IntraVENous CONTINUOUS Saúl Albarado NP 4 mL/hr at 01/18/22 0343 1 mg/hr at 01/18/22 0343   · milrinone (PRIMACOR) 20 MG/100 ML D5W infusion 0.125 mcg/kg/min IntraVENous CONTINUOUS Saúl Albarado NP 4.3 mL/hr at 01/18/22 0343 0.125 mcg/kg/min at 01/18/22 0343   · insulin NPH (NOVOLIN N, HUMULIN N) injection 10 Units 10 Units SubCUTAneous QHS Pollo Herring, Yemi Luke MD 10 Units at 01/17/22 2224   · insulin NPH (NOVOLIN N, HUMULIN N) injection 20 Units 20 Units SubCUTAneous DAILY Tarsha Jolly MD   · triamcinolone acetonide (KENALOG) 0.1 % cream Topical BID Tarsha Jolly MD Given at 01/17/22 1733   · simethicone (MYLICON) tablet 80 mg 80 mg Oral QID PRN Tarsha Jolly MD 80 mg at 01/15/22 2345   · polyethylene glycol (MIRALAX) packet 17 g 17 g Oral DAILY Tarsha Jolly MD 17 g at 01/16/22 0949   · senna-docusate (PERICOLACE) 8.6-50 mg per tablet 2 Tablet 2 Tablet Oral BID Tarsha Jolly MD 2 Tablet at 01/17/22 1722   · albumin human 25% (BUMINATE) solution 12.5 g 12.5 g IntraVENous BID Owen, Amber B, NP 12.5 g at 01/17/22 1709   · [Held by provider] bumetanide (BUMEX) 0.25 mg/mL infusion 1 mg/hr IntraVENous CONTINUOUS Owen, Amber B, NP 4 mL/hr at 01/13/22 2033 1 mg/hr at 01/13/22 2033   · digoxin (LANOXIN) tablet 0.0625 mg 0.0625 mg Oral DAILY Owen, Amber B, NP 0.0625 mg at 01/17/22 1027   · ivabradine (CORLANOR) tablet 7.5 mg 7.5 mg Oral BID WITH MEALS Blanca Amezcua MD 7.5 mg at 01/17/22 1723   · carvediloL (COREG) tablet 12.5 mg 12.5 mg Oral BID WITH MEALS Owen, Amber B, NP 12.5 mg at 01/17/22 1721   · allopurinoL (ZYLOPRIM) tablet 50 mg 50 mg Oral DAILY Owen, Amber B, NP 50 mg at 01/17/22 1025   · epoetin isabel-epbx (RETACRIT) injection 20,000 Units 20,000 Units SubCUTAneous Q TUE, THU & SAT Sandeep Yanez MD 20,000 Units at 01/15/22 2206   · montelukast (SINGULAIR) tablet 10 mg 10 mg Oral DAILY Shasta Irwin MD 10 mg at 01/17/22 1026   · levothyroxine (SYNTHROID) tablet 100 mcg 100 mcg Oral Once per day on Mon Tue Wed Thu Fri Sat Kailee Villa  mcg at 01/18/22 0701   · cetirizine (ZYRTEC) tablet 10 mg 10 mg Oral DAILY Kailee Villa MD 10 mg at 01/17/22 1027   · hydroxypropyl methylcellulose (ISOPTO TEARS) 0.5 % ophthalmic solution 1 Drop 1 Drop Both Eyes PRN Kailee Villa MD 1 Drop at 01/06/22 1727   · venlafaxine-SR (EFFEXOR-XR) capsule 75 mg 75 mg Oral DAILY WITH Loraine Liao MD 75 mg at 01/17/22 1025   · gabapentin (NEURONTIN) capsule 100 mg 100 mg Oral QHS Isamar Briseno  mg at 01/17/22 2139   · arformoteroL (BROVANA) neb solution 15 mcg 15 mcg Nebulization BID RT Elvis Bautista MD 15 mcg at 01/18/22 0729   And   · budesonide (PULMICORT) 500 mcg/2 ml nebulizer suspension 500 mcg Nebulization BID RT Elvis Bautista  mcg at 01/18/22 0729   · hydrOXYzine HCL (ATARAX) tablet 20 mg 20 mg Oral TID PRN Christopher Garcia MD   · heparin (porcine) injection 5,000 Units 5,000 Units SubCUTAneous Q8H Jason Kirkpatrick MD 5,000 Units at 01/18/22 0100   · sodium chloride (NS) flush 5-40 mL 5-40 mL IntraVENous Q8H Christopher Garcia MD 10 mL at 01/18/22 0600   · sodium chloride (NS) flush 5-40 mL 5-40 mL IntraVENous PRN Christopher Garcia MD   · acetaminophen (TYLENOL) tablet 650 mg 650 mg Oral Q6H PRMARCOS Garcia MD   Or   · acetaminophen (TYLENOL) suppository 650 mg 650 mg Rectal Q6H PRMARCOS Garcia MD   · ondansetron (ZOFRAN ODT) tablet 4 mg 4 mg Oral Q8H PRN Christopher Garcia MD   Or   · [facility] injection 4 mg 4 mg IntraVENous Q6H PRMARCOS Garcia MD 4 mg at 01/05/22 0915   · glucose chewable tablet 16 g 4 Tablet Oral PRMARCOS Garcia MD   · dextrose (D50W) injection syrg 12.5-25 g 25-50 mL IntraVENous PRMARCOS Garcia MD   · glucagon (GLUCAGEN) injection 1 mg 1 mg IntraMUSCular PRN Christopher Garcia MD   · insulin lispro (HUMALOG) injection SubCUTAneous AC&HS Nawaf Estrella MD 2 Units at 01/17/22 2223   · albuterol-ipratropium (DUO-NEB) 2.5 MG-0.5 MG/3 ML 3 mL Nebulization Q6H PRN Christopher Garcia MD 3 mL at 01/17/22 0117     Allergies   Allergen Reactions   · Ciprofloxacin Anaphylaxis   · Shellfish Derived Anaphylaxis   · Ace Inhibitors Unknown (comments)   · Biaxin [Clarithromycin] Other (comments)   Metal taste   · Candesartan Cough   · Pcn [Penicillins] Hives     Past Medical History:   Diagnosis Date   · Acquired hypothyroidism 8/15/2016   · Anemia   RED-HF study   · Asthma   · Cardiomyopathy, nonischemic ([address]   initial dx 2001, bivHF 2008 with EF 15%, s/p biV-ICD 9/08, significant improvment in EF to 45-50%   · CKD (chronic kidney disease)   Dr Jaqueline Marie   · CKD (chronic kidney disease) 8/15/2012   · Depression   · Diabetes ([facility]   · Diabetic neuropathy ([facility]   · DM (diabetes mellitus) ([facility] 8/15/2012   · GERD (gastroesophageal reflux disease)   · Gout   · Hypothyroidism   · ICD (implantable cardioverter-defibrillator), biventricular, in situ 6/5/2014   · Psoriasis     Past Surgical History:   Procedure Laterality Date   · CARDIAC CATHETERIZATION 2007; 01/06/15   normal cors   · ECHO 2D ADULT 4/2010   EF 45%, improved from 1/09 (25%)   · ECHO 2D ADULT 11/2011   LVH, EF 55-60%   · HX ORTHOPAEDIC   knee   · HX PACEMAKER PLACEMENT   AICD   · STRESS TEST LEXISCAN/CARDIOLITE 3/21/12   normal perfusion, global HK 40%     Family History   Problem Relation Age of Onset   · Heart Disease Mother   · Hypertension Mother   · Lupus Sister   · Diabetes Brother     Social History     Tobacco Use   · Smoking status: Never Smoker   · Smokeless tobacco: Never Used   Substance Use Topics   · Alcohol use: No         Review of Systems: Review of all other systems otherwise negative. Constitutional: Negative for fever, chills, weight loss, + malaise/fatigue. HEENT: Negative for nosebleeds, vision changes. Respiratory: Negative for cough, hemoptysis   Cardiovascular: Negative for chest pain, palpitations, orthopnea improved, no claudication, leg swelling, no syncope, and PND. + HAILE   Gastrointestinal: Negative for nausea, vomiting, diarrhea, blood in stool and melena. Genitourinary: Negative for dysuria, and hematuria. Musculoskeletal: Negative for myalgias, arthralgia. Skin: Negative for rash. Heme: Does not bleed or bruise easily.    Neurological: Negative for speech change and focal weakness     Objective:     Visit Vitals   /71 (BP 1 Location: Left lower arm)   Pulse (!) 156   Temp 97.5 °F (36.4 °C)   Resp 18   Ht 5' 7\" (1.702 m)   Wt 250 lb 1.6 oz (113.4 kg)   SpO2 98%   BMI 39.17 kg/m²       Physical Exam:   Constitutional: Well-developed and well-nourished. No respiratory distress. Head: Normocephalic and atraumatic. Eyes: Pupils are equal, round   ENT: Hearing normal   Neck: Supple. No JVD present. Cardiovascular: Borderline fast rate, regular rhythm. Exam reveals no gallop and no friction rub. 2/6 systolic LSB murmur. Pulmonary/Chest: Effort normal and breath sounds normal. No wheezes. Abdominal: Soft, no tenderness. Musculoskeletal: Moves extremities independently.     Vasc/lymphatic: 1+ bilat lower extremity edema. Neurological: Alert,oriented. Skin: Skin is warm and dry.  Left side biventricular ICD pocket unremarkable. Psychiatric: Normal mood and affect. Behavior is normal. Judgment and thought content normal.         Assessment/Plan:     Imaging/Studies:   LHC/RHC (01/17/2022): Severely elevated left & right side filling pressures.  Severe PH, mixed pattern with elevated wedge as well as PVR of about 6 Woods units.  Mid LAD lesion 80% involving diagonal branch ostium.  Likely prior stent in mid LAD, patent.  Reduced CI of 1.65 L/min/m2 by thermodilution & 1.6 L/min/m2 by presumed oxygen consumption by Aye. 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 RHC without milrinone (12/10/2021): High wedge pressure (35 mmHg) indicating volume overload, precapillary.  Severe pulmonary HTN. Echo (12/08/2021): LVEF 15-20%, upper normal wall thickness, LV diastolic dysfunction.  Borderline low RVEF.  Mod dilated LA, mildly dilated RA.  Mild to mod MR. Wander Lopez TR.  Mild to mod PH.       LHC/RHC (01/2015): No significant CAD. Mixed cardiomyopathy: I do not think CAD causes this decline alone:   Hydralazine & Isordil on hold/stopped due to low bp before. LVEF now 15-20%.  NYHA III.  LAD disease noted, but Dr. Anabel Rahman notes that he doesn't believe it is responsible for patient's worsening CHF.  Severe pulmonary HTN noted on last admission RHC, now with reduced CI, elevated filling pressures bilaterally, & severe pulmonary HTN. Concur with milrinone, which has been restarted.  Continue Corlanor.  PVCs due to severe cardiomyopathy. Coreg dose is ok, do not think RV failure from coreg    Cardiomyopathy is worse than before     Nuclear amyloid scan equivocal.  cMRI is not possible with 4194 LV lead (2008).  However cMRI would not change her management     At time of last discussion, Dr. Ingrid Springer planned to wait 3 months of euvolumia and cardiomyopathy is irreversible  prior to referring to [facility] for transplant eval.  Patient wants heart/kidney transplant.    I do not think she can wait 3 months, she would consider referring sooner by herself. If she is ultimately not a candidate for [facility] transplant, would recommend that she considers hospice care. [facility] is supportive and they want to make decision as soon as possible    CKD: Dr. Jose Mohan said dialysis is an option if she is considered for transplant, but otherwise will not go anywhere.  Dr Thomasine Nyhan sees this week. Medtronic biventricular ICD (gen change 01/14/2015, leads 09/25/2008): Device check showed proper lead & generator function.  Generator longevity still estimated 4 months.  RA 0.1%, CRT 97.6%. I will replace in the future once we know the plan of heart and renal failure transplant  she will need Biv pacing if she can be accepted for double organ transplant.     If she is in hospice BIV pacing may not do much except for keeping her CHF as stable as it can be         Thank you for involving me in this patient's care and please call with further concerns or questions. Rebecca Carvajal M.D.    Electrophysiology/Cardiology   [facility] [place]   [address], [facility] [address]                     [address] [facility] Gisella Solano, [address] [place] [id number]   [phone]

## 2022-01-18 NOTE — PROGRESS NOTES
[address] [facility] Adult  Hospitalist Group                                                                                          Hospitalist Progress Note  King Ramiro MD  Answering service: [phone] from in house phone        Date of Service:  2022  NAME:  Meera Thayer  :  1955  MRN:  [id number]      Admission Summary:     Meera Thayer is a 78 y.o. female with hx NICM, chronic hypoxic respiratory failure 2/2 pulmonary htn, ckd, hypothyroidism, GERD, and  DM II who presents as a transfer from [facility] for acute on chronic hypoxic respiratory failure. She wears 3L o2 at baseline, and reportedly ran out of her home oxygen.     In the ED, she was hypoxic to th 70's, with improvement ot 94% on 4Lnc. Labs showed stable anemia with Hgb 10, K+ 5.2, lactic 3.1, trop 55>66, d-dimer >35, creatinine 2.54, and probnp 15,826. CXR showed diffuse interstitial airway disease. Rapid covid was negative.     In the ED, she was started on a heparin gtt, and sent to [facility] for VQ scan. She received bumex and nitropaste. Interval history / Subjective:      No complaints, breathing better but is on 6L O2   Discussed heart cath results with patient this AM  She was restarted back on milrinone and Bumex drips  Trying to transfer her to [facility] per cardiology/nephrology recs    Assessment & Plan:     Acute on chronic respiratory failure with oxygen- Due to CHF  COVID-19 negative. Mild leukocytosis. Afebrile. VQ scan low probability for PE. Acute on chronic systolic heart failure  NYHA class IV. Status post ICD placement. LVEF 15 to 20%. proBNP [id number]. IV milrinone and IV bumex- restarted , Right arm PICC  Not a candidate for ARB/ACE due to renal insufficiency. Cardiac diet, strict I/O, daily weight. Low-sodium diet.   heart cath done 21- showing mild non obstructive CAD with one area of 80% blockage    RODNEY on CKD stage IV felt to be due to cardiorenal syndrome  Creatinine slightlyhigher today,  Monitor volume status electrolytes  5/2022. Renal ultrasound negative for hydronephrosis. Small bilateral renal cysts. Avoid nephrotoxic meds, renally dose medications. Diuretics resumed post cath  Nephrology recommendations noted and patient not a candidate for dialysis. Hyperkalemia  Resolved, hyperkalemia protocol. Daily BMP. Low potassium diet. Hyponatremia- mild- monitor likely diuretic induced. Daily BMP, monitor volume status and electrolytes. Leukocytosis-resolved  CXR positive for interstitial airspace disease likely pulmonary edema or atypical pneumonia. Afebrile. Respiratory panel negative for COVID-19. Monitor closely, start on empiric antibiotics if any sign of infection. Type 2 diabetes. Adjusted insulin regimen due to hypoglycemia (reduced NPH )  Difficult to manage due to Dextrose in milrinone drip  Will increase NPH doses today due to restarting of IV milrinone    Hypothyroidism -cont Synthroid. Depression- stable on current meds. Overweight- BMI=39.7    Code status: Full Code  DVT prophylaxis: heparin     Care Plan discussed with: Patient/Family, Nurse and   Anticipated Disposition: TBD     Hospital Problems  Date Reviewed: 12/22/2021          Codes Class Noted POA    Acute respiratory failure with hypoxia [facility]) ICD-10-CM: J96.01  ICD-9-CM: 518.81  1/4/2022 Unknown              Vital Signs:    Last 24hrs VS reviewed since prior progress note.  Most recent are:  Visit Vitals  /71 (BP 1 Location: Left lower arm)   Pulse (!) 156   Temp 97.5 °F (36.4 °C)   Resp 18   Ht 5' 7\" (1.702 m)   Wt 113.4 kg (250 lb 1.6 oz)   SpO2 98%   BMI 39.17 kg/m²     Patient Vitals for the past 24 hrs:   Temp Pulse Resp BP SpO2   01/18/22 0740     98 %   01/18/22 0731     100 %   01/18/22 0730 97.5 °F (36.4 °C) (!) 156 18 104/71 100 %   01/18/22 0600  (!) 122      01/18/22 0400  98      01/18/22 0300 97.5 °F (36.4 °C) 93 14 114/70 99 %   01/18/22 0230  91 15 106/73 99 %   01/18/22 0200  (!) 101 15 (!) 117/98 100 %   01/18/22 0106  89 14 100/61 100 %   01/18/22 0036  88 13 91/63 100 %   01/18/22 0006  98 14 103/62 100 %   01/17/22 2336 97.4 °F (36.3 °C) 93 16 104/73 100 %   01/17/22 2200  97      01/17/22 2003 97.7 °F (36.5 °C) (!) 101 22 101/74 100 %   01/17/22 2000  90      01/17/22 1738     100 %   01/17/22 1721  93  110/76    01/17/22 1700     90 %   01/17/22 1629 98.7 °F (37.1 °C) 94 18 102/73    01/17/22 1600  89 16 104/83 92 %   01/17/22 1545  90 20 106/77 92 %   01/17/22 1530  89 12 115/82 91 %   01/17/22 1515  92 23 105/76 91 %   01/17/22 1500  90 23 125/72 91 %   01/17/22 1345  95 26  94 %   01/17/22 1330  92 22 (!) 138/103 98 %   01/17/22 1315  93 21 (!) 129/102 98 %   01/17/22 1300  91 24 (!) 142/103 97 %   01/17/22 1245  91 24 (!) 132/102 100 %   01/17/22 1230  95 20 (!) 116/90 95 %   01/17/22 1215  92 17 130/88 100 %   01/17/22 1200  92 24 121/80 98 %       Intake/Output Summary (Last 24 hours) at 1/18/2022 1559  Last data filed at 1/18/2022 0110  Gross per 24 hour   Intake 185 ml   Output 450 ml   Net -265 ml        Physical Examination:     I had a face to face encounter with this patient and independently examined them on 1/18/2022 as outlined below:          Constitutional:  No acute distress, cooperative, pleasant    ENT:  Oral mucosa moist, O2 via NC    Resp:  CTA b/l diminished globally. No wheezing/rhonchi/rales. No accessory muscle use   CV:  tachycardia, no murmurs, gallops, rubs    GI:  Soft, non distended, non tender.  normoactive bowel sounds     Musculoskeletal:  Bilateral pretibial and pedal edema    Neurologic:  no focal neuro deficits            Data Review:    Review and/or order of clinical lab test  Review and/or order of tests in the radiology section of CPT  Review and/or order of tests in the medicine section of CPT  12/8/21 Echo Findings    Left Ventricle Normal cavity size. Upper normal wall thickness. The estimated EF is 15 - 20%. Severely reduced systolic function. There is left ventricular diastolic dysfunction. Left Atrium Moderately dilated left atrium. Interatrial Septum Interatrial septum not well visualized   Right Ventricle Normal cavity size. Borderline low systolic function. Pacer/ICD present. Right Atrium Mildly dilated right atrium. Aortic Valve No stenosis and no regurgitation. Aortic valve sclerosis. Mitral Valve No stenosis. Mitral valve non-specific thickening. Mild to moderate regurgitation. Tricuspid Valve Normal valve structure and no stenosis. Mild regurgitation. Pulmonic Valve Pulmonic valve not well visualized, but normal doppler findings. Pulmonary Artery Pulmonary arterial systolic pressure (PASP) is 47 mmHg. Pulmonary hypertension found to be mild to moderate. Aorta Normal aortic root. Pericardium No evidence of pericardial effusion. Labs:     Recent Labs     01/17/22  1033   WBC 9.5   HGB 8.1*   HCT 31.4*        Recent Labs     01/18/22 0339 01/17/22  0051 01/16/22  0401    136 135*   K 4.4 4.2 4.0   CL 97 99 97   CO2 33* 33* 32   BUN 64* 51* 53*   CREA 2.76* 2.37* 2.43*   * 116* 192*   CA 9.8 9.6 9.5   MG 2.4 2.2 2.1     Recent Labs     01/18/22 0339 01/17/22  0051 01/16/22  0401   ALT 32 32 29   AP 80 75 64   TBILI 0.7 0.7 0.6   TP 6.7 7.3 7.2   ALB 3.5 3.6 3.3*   GLOB 3.2 3.7 3.9     No results for input(s): INR, PTP, APTT, INREXT, INREXT in the last 72 hours. No results for input(s): FE, TIBC, PSAT, FERR in the last 72 hours. No results found for: FOL, RBCF   No results for input(s): PH, PCO2, PO2 in the last 72 hours. No results for input(s): CPK, CKNDX, TROIQ in the last 72 hours.     No lab exists for component: CPKMB  Lab Results   Component Value Date/Time    Cholesterol, total 151 01/11/2022 05:13 AM    HDL Cholesterol 60 01/11/2022 05:13 AM    LDL, calculated 75.8 01/11/2022 05:13 AM    Triglyceride 76 01/11/2022 05:13 AM    CHOL/HDL Ratio 2.5 01/11/2022 05:13 AM     Lab Results   Component Value Date/Time    Glucose (POC) 190 (H) 01/18/2022 08:24 AM    Glucose (POC) 221 (H) 01/17/2022 09:39 PM    Glucose (POC) 155 (H) 01/17/2022 04:38 PM    Glucose (POC) 136 (H) 01/17/2022 01:46 PM    Glucose (POC) 131 (H) 01/17/2022 11:30 AM     Lab Results   Component Value Date/Time    Color YELLOW/STRAW 01/11/2022 02:53 PM    Appearance CLEAR 01/11/2022 02:53 PM    Specific gravity 1.010 01/11/2022 02:53 PM    pH (UA) 6.0 01/11/2022 02:53 PM    Protein 30 (A) 01/11/2022 02:53 PM    Glucose Negative 01/11/2022 02:53 PM    Ketone Negative 01/11/2022 02:53 PM    Bilirubin Negative 01/11/2022 02:53 PM    Urobilinogen 0.2 01/11/2022 02:53 PM    Nitrites Negative 01/11/2022 02:53 PM    Leukocyte Esterase TRACE (A) 01/11/2022 02:53 PM    Epithelial cells FEW 01/11/2022 02:53 PM    Bacteria Negative 01/11/2022 02:53 PM    WBC 0-4 01/11/2022 02:53 PM    RBC 0-5 01/11/2022 02:53 PM         Medications Reviewed:     Current Facility-Administered Medications   Medication Dose Route Frequency    bumetanide (BUMEX) 0.25 mg/mL infusion  2 mg/hr IntraVENous CONTINUOUS    [Held by provider] milrinone (PRIMACOR) 20 MG/100 ML D5W infusion  0.125 mcg/kg/min IntraVENous CONTINUOUS    insulin NPH (NOVOLIN N, HUMULIN N) injection 10 Units  10 Units SubCUTAneous QHS    insulin NPH (NOVOLIN N, HUMULIN N) injection 20 Units  20 Units SubCUTAneous DAILY    triamcinolone acetonide (KENALOG) 0.1 % cream   Topical BID    simethicone (MYLICON) tablet 80 mg  80 mg Oral QID PRN    polyethylene glycol (MIRALAX) packet 17 g  17 g Oral DAILY    senna-docusate (PERICOLACE) 8.6-50 mg per tablet 2 Tablet  2 Tablet Oral BID    albumin human 25% (BUMINATE) solution 12.5 g  12.5 g IntraVENous BID    [Held by provider] bumetanide (BUMEX) 0.25 mg/mL infusion  1 mg/hr IntraVENous CONTINUOUS    digoxin (LANOXIN) tablet 0.0625 mg  0.0625 mg Oral DAILY    ivabradine (CORLANOR) tablet 7.5 mg  7.5 mg Oral BID WITH MEALS    carvediloL (COREG) tablet 12.5 mg  12.5 mg Oral BID WITH MEALS    allopurinoL (ZYLOPRIM) tablet 50 mg  50 mg Oral DAILY    epoetin isabel-epbx (RETACRIT) injection 20,000 Units  20,000 Units SubCUTAneous Q TUE, THU & SAT    montelukast (SINGULAIR) tablet 10 mg  10 mg Oral DAILY    levothyroxine (SYNTHROID) tablet 100 mcg  100 mcg Oral Once per day on Mon Tue Wed Thu Fri Sat    cetirizine (ZYRTEC) tablet 10 mg  10 mg Oral DAILY    hydroxypropyl methylcellulose (ISOPTO TEARS) 0.5 % ophthalmic solution 1 Drop  1 Drop Both Eyes PRN    venlafaxine-SR (EFFEXOR-XR) capsule 75 mg  75 mg Oral DAILY WITH BREAKFAST    gabapentin (NEURONTIN) capsule 100 mg  100 mg Oral QHS    arformoteroL (BROVANA) neb solution 15 mcg  15 mcg Nebulization BID RT    And    budesonide (PULMICORT) 500 mcg/2 ml nebulizer suspension  500 mcg Nebulization BID RT    hydrOXYzine HCL (ATARAX) tablet 20 mg  20 mg Oral TID PRN    heparin (porcine) injection 5,000 Units  5,000 Units SubCUTAneous Q8H    sodium chloride (NS) flush 5-40 mL  5-40 mL IntraVENous Q8H    sodium chloride (NS) flush 5-40 mL  5-40 mL IntraVENous PRN    acetaminophen (TYLENOL) tablet 650 mg  650 mg Oral Q6H PRN    Or    acetaminophen (TYLENOL) suppository 650 mg  650 mg Rectal Q6H PRN    ondansetron (ZOFRAN ODT) tablet 4 mg  4 mg Oral Q8H PRN    Or    ondansetron (ZOFRAN) injection 4 mg  4 mg IntraVENous Q6H PRN    glucose chewable tablet 16 g  4 Tablet Oral PRN    dextrose (D50W) injection syrg 12.5-25 g  25-50 mL IntraVENous PRN    glucagon (GLUCAGEN) injection 1 mg  1 mg IntraMUSCular PRN    insulin lispro (HUMALOG) injection   SubCUTAneous AC&HS    albuterol-ipratropium (DUO-NEB) 2.5 MG-0.5 MG/3 ML  3 mL Nebulization Q6H PRN     ______________________________________________________________________  EXPECTED LENGTH OF STAY: 3d 19h  ACTUAL LENGTH OF STAY: [place] [id number] Cindy Mena MD

## 2022-01-18 NOTE — PROGRESS NOTES
Physical Therapy Note:  Chart reviewed in preparation for intervention. Patient currently PENELOPE to the OR for a procedure. Will defer and follow up as able once she returns.   Thank you,  Bret Grover, PT, DPT

## 2022-01-18 NOTE — PROGRESS NOTES
Cardiac Surgery Care Coordinator- Received contact lens case from pre-op holding, delivered them to CCU room 22.      1400- Placed update call to Mr Ernestine Orozco, reviewed plan of care and day of procedure expectation. Will continue to update via phone. [address] call to Mr Ernestine Orozco, provided update from the OR. Exchanged contact information. Will continue to follow. [phone]- Provided family contact number to Dr No Shaver, he will update Mr Ernestine Orozco, Mr Ernestine Orozco is expecting his call. Mr Ernestine Orozco to call CCU after 6:00 pm for an update. Bedside nurse aware.  José Luis Mejia RN

## 2022-01-18 NOTE — CONSULTS
CSS   Consult     Subjective:      Rey Wang is a 78 y.o. female who was referred for cardiac evaluation from [facility] and Dr. Micky Lane. Patient presented to hospital 1-4-22 with shortness of breath ans hypoxia. Known heart failure patient, CKD stage IV and chronic respiratory failure patient on home O2. Patient in RODNEY, acute on chronic CHF, and acute on chronic respiratory insuffiencey. We are asked to insert an axillary impella as patient is being worked up for possible double organ transplant at [facility] (kidney/heart). Past medical history significant for Chronic systolic CHF class III/IV s/p bivi PPM AICD, CKD stage IV, hypothyroidism, gout, DM, anemia, and asthma. Cardiac Testing    Cardiac catheterization:  Findings  1. Severely elevated left and right-sided filling pressures  2. Severe pulmonary hypertension: Mixed pattern with elevated wedge as well as PVR of about 6 Woods units. 3.  Severe one-vessel, moderate one-vessel coronary artery disease. Mid LAD lesion is the worst which is about 80% involving diagonal branch ostium. Uncertain if CAD alone as a cause of her cardiomyopathy(probably less likely). Likely prior stent in mid LAD which is patent  4.  Reduced cardiac index of 1.65 L/min/m² by thermodilution and 1.6 L/min/m² by presumed O2 consumption by Aye     Access right radial, right internal jugular ultrasound-guided no issues  Contrast 15 cc. Limited pictures were taken due to underlying CKD     Recommendations  1. Guideline directed medical therapy for heart failure and coronary artery disease  2. Will defer to discussion between Dr. Micky Lane, nephrology and heart failure if they like to consider revascularization. Uncertain if LAD revascularization will lead to improvement in functional status or EF at this time. Nonetheless can be achieved with less than 20 to 30 cc of contrast.    ECHO:  Left Ventricle Normal cavity size. Upper normal wall thickness.  The estimated EF is 15 - 20%. Severely reduced systolic function. There is left ventricular diastolic dysfunction. Left Atrium Moderately dilated left atrium. Interatrial Septum Interatrial septum not well visualized   Right Ventricle Normal cavity size. Borderline low systolic function. Pacer/ICD present. Right Atrium Mildly dilated right atrium. Aortic Valve No stenosis and no regurgitation. Aortic valve sclerosis. Mitral Valve No stenosis. Mitral valve non-specific thickening. Mild to moderate regurgitation. Tricuspid Valve Normal valve structure and no stenosis. Mild regurgitation. Pulmonic Valve Pulmonic valve not well visualized, but normal doppler findings. Pulmonary Artery Pulmonary arterial systolic pressure (PASP) is 47 mmHg. Pulmonary hypertension found to be mild to moderate. Aorta Normal aortic root. Pericardium No evidence of pericardial effusion.      Past Medical History:   Diagnosis Date    Acquired hypothyroidism 8/15/2016    Anemia     RED-HF study    Asthma     Cardiomyopathy, nonischemic ([address]     initial dx 2001, bivHF 2008 with EF 15%, s/p biV-ICD 9/08, significant improvment in EF to 45-50%    CKD (chronic kidney disease)     Dr Torres Oliver    CKD (chronic kidney disease) 8/15/2012    Depression     Diabetes ([address]     Diabetic neuropathy ([address]     DM (diabetes mellitus) ([address] 8/15/2012    GERD (gastroesophageal reflux disease)     Gout     Hypothyroidism     ICD (implantable cardioverter-defibrillator), biventricular, in situ 6/5/2014    Psoriasis      Past Surgical History:   Procedure Laterality Date    CARDIAC CATHETERIZATION  2007; 01/06/15    normal cors    ECHO 2D ADULT  4/2010    EF 45%, improved from 1/09 (25%)    ECHO 2D ADULT  11/2011    LVH, EF 55-60%    HX ORTHOPAEDIC      knee    HX PACEMAKER PLACEMENT      AICD    STRESS TEST LEXISCAN/CARDIOLITE  3/21/12    normal perfusion, global HK 40%      Social History     Tobacco Use    Smoking status: Never Smoker    Smokeless tobacco: Never Used   Substance Use Topics    Alcohol use: No      Family History   Problem Relation Age of Onset    Heart Disease Mother     Hypertension Mother     Lupus Sister     Diabetes Brother      Prior to Admission medications    Medication Sig Start Date End Date Taking? Authorizing Provider   levocetirizine (XYZAL) 5 mg tablet TAKE 1 TABLET BY MOUTH EVERY DAY 1/6/22  Yes Rafael Harmon MD   apremilast Milagro Mohs) 30 mg tab Take 30 mg by mouth two (2) times daily as needed. Yes Provider, Historical   azelastine (ASTEPRO) 205.5 mcg (0.15 %) 1 [place] by Both Nostrils route two (2) times daily as needed. Yes Provider, Historical   docusate sodium (COLACE) 100 mg capsule Take 100 mg by mouth daily as needed for Constipation. Yes Provider, Historical   levothyroxine (SYNTHROID) 100 mcg tablet Take 100 mcg by mouth six (6) days a week. Everyday except Sunday   Yes Provider, Historical   allopurinoL (ZYLOPRIM) 100 mg tablet TAKE 1 TABLET BY MOUTH EVERY DAY 1/5/22  Yes Rafael Harmon MD   calcitRIOL (ROCALTROL) 0.5 mcg capsule TAKE 1 CAPSULE BY MOUTH EVERY DAY 1/5/22  Yes Rafael Harmon MD   carvediloL (COREG) 6.25 mg tablet Take 1 Tablet by mouth two (2) times daily (with meals). 12/22/21  Yes Lazarus DORSEY NP   isosorbide dinitrate (ISORDIL) 5 mg tablet Take 1 Tablet by mouth three (3) times daily. 12/22/21  Yes Lazarus DORSEY NP   hydrALAZINE (APRESOLINE) 10 mg tablet Take 1 Tablet by mouth three (3) times daily. 12/22/21  Yes Evelia Alanis NP   benzonatate (Tessalon Perles) 100 mg capsule Take 100 mg by mouth three (3) times daily as needed for Cough.    Yes Other, MD Bee   montelukast (SINGULAIR) 10 mg tablet TAKE 1 TABLET BY MOUTH EVERY DAY 11/5/21  Yes Rafael Harmon MD   bumetanide (BUMEX) 2 mg tablet Take 1 tab in the morning and 0.5 tab in the evening 11/5/21  Yes Antonio Urena MD   fluticasone propionate (FLONASE) 50 mcg/actuation nasal spray One spray each nostril daily 11/1/21  Yes Gilberto Rodriguez MD   gabapentin (NEURONTIN) 100 mg capsule Take 1 Capsule by mouth nightly. Max Daily Amount: 100 mg. 10/29/21  Yes Gilberto Rodriguez MD   venlafaxine-SR (EFFEXOR-XR) 75 mg capsule TAKE 1 CAPSULE BY MOUTH EVERY DAY 10/21/21  Yes Gilberto Rodriguez MD   budesonide (PULMICORT) 180 mcg/actuation aepb inhaler Take 2 Puffs by inhalation two (2) times a day. 5/27/21  Yes Gilberto Rodriguez MD   ammonium lactate (AMLACTIN) 12 % topical cream Apply  to affected area two (2) times a day. rub in to affected area well 11/4/20  Yes Gilberto Rodriguez MD   insulin NPH/insulin regular (NOVOLIN 70/30) 100 unit/mL (70-30) injection 70 units two times a day 8/15/16  Yes Hailee Holland MD   calcipotriene (DOVONEX) 0.005 % topical cream Apply  to affected area three (3) times daily. Yes Provider, Historical   triamcinolone acetonide (KENALOG) 0.5 % ointment Apply  to affected area two (2) times daily as needed. use thin layer    Yes Provider, Historical   multivitamin (ONE A DAY) tablet Take 1 Tab by mouth daily. Yes Provider, Historical   ferrous sulfate (IRON) 325 mg (65 mg elemental iron) tablet Take 325 mg by mouth daily. Yes Provider, Historical   aspirin 81 mg tablet Take 81 mg by mouth daily. Yes Provider, Historical   cholecalciferol (VITAMIN D3) (2,000 UNITS /50 MCG) cap capsule TAKE 1 CAPSULE BY MOUTH TWO (2) TIMES A DAY. 1/11/22   Gilberto Rodriguez MD       Allergies   Allergen Reactions    Ciprofloxacin Anaphylaxis    Shellfish Derived Anaphylaxis    Ace Inhibitors Unknown (comments)    Biaxin [Clarithromycin] Other (comments)     Metal taste    Candesartan Cough    Pcn [Penicillins] Hives     Review of Systems:   Consititutional: Denies fever or chills. Eyes:  Denies use of glasses or vision problems(cataracts). ENT:  Denies hearing or swallowing difficulty. CV: Denies CP, claudication, HTN. Resp: + dyspnea, productive cough.   : Denies dialysis, + kidney problems. GI: Denies ulcers, esophageal strictures, liver problems. M/S: Denies joint or bone problems, or implanted artificial hardware. Skin: Denies varicose veins, + edema. Neuro: Denies strokes, or TIAs. Psych: Denies anxiety or depression. Endocrine: + thyroid problems and diabetes. Heme/Lymphatic: Denies easy bruising or lymphedema. Objective:     VS:   Visit Vitals  BP (!) 136/97   Pulse 100   Temp 97.6 °F (36.4 °C)   Resp 18   Ht 5' 7\" (1.702 m)   Wt 250 lb 1.6 oz (113.4 kg)   SpO2 99%   BMI 39.17 kg/m²     Physical Exam:    General appearance: alert, cooperative, no distress  Head: normocephalic, without obvious abnormality; atraumatic  Eyes: conjunctivae/corneas clear; EOM's intact. Nose: nares normal; no drainage. Neck: no carotid bruit and no JVD  Lungs: clear to auscultation bilaterally  Heart: regular rate and rhythm; no murmur  Abdomen: soft, non-tender; bowel sounds normal  Extremities: moves all extremities; no weakness. Skin: Skin color normal; No varicose veins or edema. Neurologic: Grossly normal      Labs:   Recent Labs     01/18/22  1216 01/18/22  0824 01/18/22  0339 01/17/22  1130 01/17/22  1033   WBC  --   --   --   --  9.5   HGB  --   --   --   --  8.1*   HCT  --   --   --   --  31.4*   PLT  --   --   --   --  344   NA  --   --  136  --   --    K  --   --  4.4  --   --    BUN  --   --  64*  --   --    CREA  --   --  2.76*  --   --    GLU  --   --  206*  --   --    GLUCPOC 197*   < >  --    < >  --     < > = values in this interval not displayed. Diagnostics:   PA and lateral:    Carotid doppler:     PFTS-FEV1:     EKG:   Assessment:     Active Problems:    Acute respiratory failure with hypoxia ([address] (1/4/2022)        Plan:     1. Acute on Chronic Systolic CHF class III/IV: Plan for axillary impella insertion. AHFS following. 2. CAD: Card following  3. Acute on Chronic Respiratory insufficiency: On home O2, high risk for prolonged intubation post impella  4.  RODNEY on Chronic CKD Stage IV: Renal following  5. Gout: allopurinol  6. DM: Per primary service  7. GERD: Home meds  8. Hypothyroidism: Replacement  9. Depression: Home meds  10. Anemia: Stable    I have discussed with the patient the rationale for blood component transfusion; its benefits in treating or preventing fatigue, organ damage, or death; and its risk which includes mild transfusion reactions, rare risk of blood borne infection, or more serious but rare reactions. I have discussed the alternatives to transfusion, including the risk and consequences of not receiving transfusion. The patient had an opportunity to ask questions and had agreed to proceed with transfusion of blood components. Signed By: TYE Alonzo     January 18, 2022      Saw patient. History and films reviewed. Risks and benefits explained. Agrees with surgery. Patient seen by PA/NP and agree with above.    Findings/Conditions: CHF  Plan of Care: Impella     Risk of morbidity and mortality - high  Medical decision making - high complexity

## 2022-01-18 NOTE — PROGRESS NOTES
[facility]   [address] [facility], [address] [place] [address]  Phone: [phone]   [phone]    [email or web address]     Nephrology Progress Note    Patient Name : Jasmine Robbins      : 1955     MRN : [id number]  Date of Admission : 2022  Date of Servive : 22    CC:  Follow up for ARF       Assessment and Plan   RODNEY on CKD :  - suspect 2/2 CRS  - RHC showing severe pulm HTN, wedge > 40  - poor response to bumex drip and milrinone overnight  - discussed with HF team, they feel pt is a transplant candidate   - plan today for Impella  - high likelyhood of needing RRT, possibly CRRT post procedure  - discussed risks and benefits with pt and  and they agree to proceed if needed  - place Pedro in OR in case needed  - not a good long-term dialysis candidate, but if needed for a bridge to transplant, will proceed with this    CKD stage IV:  - Etiology: DM, HTN, CRS  - Renal US: suggestive of CKD, B/L benign renal cysts   - baseline Cr 2.4-2.6 mg/dl      Acute on chronic HFrEF  NI CMP, LVEF 15 to 20%  NYHA class III-IV  S/p BiV pacer/ICD-s/p CRT  RHC 12/10/2021: PCWP 34, PA P 80  - repeat RHC planned today    Morbid obesity  Type II DM  HTN  Hypothyroidism  Pulmonary hypertension  Gout  Psoriasis     Discussed plan with Dr. Cory Harry, Dr. Wally Turner, Dr. Dipika Jones along with patient and her      Interval History:  Seen and examined. Cr up from diuretics. Not doing well overall. On bumex infusion and milrinone. No cp or sob. Poor UOP over night. Review of Systems: A comprehensive review of systems was negative except for that written in the HPI.     Current Medications:   Current Facility-Administered Medications   Medication Dose Route Frequency    DOBUTamine (DOBUTREX) 500 mg/250 mL (2,000 mcg/mL) infusion  0-10 mcg/kg/min IntraVENous TITRATE    magnesium sulfate 2 g/50 ml IVPB (premix or compounded)  2 g IntraVENous ONCE    nitroglycerin (Tridil) 200 mcg/ml infusion  16.5 mcg/min IntraVENous CONTINUOUS    potassium chloride 20 mEq in 50 ml IVPB  20 mEq IntraVENous ONCE    heparin (porcine) in 0.9% NaCl 30,000 unit/1,000 mL perfusion irrigation 50-1,000 mL  50-1,000 mL Other PRN    albumin human 5% (BUMINATE) solution 25 g  25 g IntraVENous ONCE    insulin regular (NOVOLIN R, HUMULIN R) 100 Units in 0.9% sodium chloride 100 mL infusion  0-50 Units/hr IntraVENous TITRATE    niCARdipine (CARDENE) 25 mg in 0.9% sodium chloride 250 mL infusion  0-15 mg/hr IntraVENous TITRATE    0.9% sodium chloride infusion 250 mL  250 mL IntraVENous PRN    chlorhexidine (PERIDEX) 0.12 % mouthwash 15 mL  15 mL Oral Q12H    mupirocin (BACTROBAN) 2 % ointment   Both Nostrils DAILY    bumetanide (BUMEX) 0.25 mg/mL infusion  2 mg/hr IntraVENous CONTINUOUS    [Held by provider] milrinone (PRIMACOR) 20 MG/100 ML D5W infusion  0.125 mcg/kg/min IntraVENous CONTINUOUS    insulin NPH (NOVOLIN N, HUMULIN N) injection 10 Units  10 Units SubCUTAneous QHS    insulin NPH (NOVOLIN N, HUMULIN N) injection 20 Units  20 Units SubCUTAneous DAILY    triamcinolone acetonide (KENALOG) 0.1 % cream   Topical BID    simethicone (MYLICON) tablet 80 mg  80 mg Oral QID PRN    polyethylene glycol (MIRALAX) packet 17 g  17 g Oral DAILY    senna-docusate (PERICOLACE) 8.6-50 mg per tablet 2 Tablet  2 Tablet Oral BID    albumin human 25% (BUMINATE) solution 12.5 g  12.5 g IntraVENous BID    digoxin (LANOXIN) tablet 0.0625 mg  0.0625 mg Oral DAILY    ivabradine (CORLANOR) tablet 7.5 mg  7.5 mg Oral BID WITH MEALS    carvediloL (COREG) tablet 12.5 mg  12.5 mg Oral BID WITH MEALS    allopurinoL (ZYLOPRIM) tablet 50 mg  50 mg Oral DAILY    epoetin isabel-epbx (RETACRIT) injection 20,000 Units  20,000 Units SubCUTAneous Q TUE, THU & SAT    montelukast (SINGULAIR) tablet 10 mg  10 mg Oral DAILY    levothyroxine (SYNTHROID) tablet 100 mcg  100 mcg Oral Once per day on Mon Tue Wed Thu Fri Sat    cetirizine (ZYRTEC) tablet 10 mg  10 mg Oral DAILY    hydroxypropyl methylcellulose (ISOPTO TEARS) 0.5 % ophthalmic solution 1 Drop  1 Drop Both Eyes PRN    venlafaxine-SR (EFFEXOR-XR) capsule 75 mg  75 mg Oral DAILY WITH BREAKFAST    gabapentin (NEURONTIN) capsule 100 mg  100 mg Oral QHS    arformoteroL (BROVANA) neb solution 15 mcg  15 mcg Nebulization BID RT    And    budesonide (PULMICORT) 500 mcg/2 ml nebulizer suspension  500 mcg Nebulization BID RT    hydrOXYzine HCL (ATARAX) tablet 20 mg  20 mg Oral TID PRN    [Held by provider] heparin (porcine) injection 5,000 Units  5,000 Units SubCUTAneous Q8H    sodium chloride (NS) flush 5-40 mL  5-40 mL IntraVENous Q8H    sodium chloride (NS) flush 5-40 mL  5-40 mL IntraVENous PRN    acetaminophen (TYLENOL) tablet 650 mg  650 mg Oral Q6H PRN    Or    acetaminophen (TYLENOL) suppository 650 mg  650 mg Rectal Q6H PRN    ondansetron (ZOFRAN ODT) tablet 4 mg  4 mg Oral Q8H PRN    Or    ondansetron (ZOFRAN) injection 4 mg  4 mg IntraVENous Q6H PRN    glucose chewable tablet 16 g  4 Tablet Oral PRN    dextrose (D50W) injection syrg 12.5-25 g  25-50 mL IntraVENous PRN    glucagon (GLUCAGEN) injection 1 mg  1 mg IntraMUSCular PRN    insulin lispro (HUMALOG) injection   SubCUTAneous AC&HS    albuterol-ipratropium (DUO-NEB) 2.5 MG-0.5 MG/3 ML  3 mL Nebulization Q6H PRN      Allergies   Allergen Reactions    Ciprofloxacin Anaphylaxis    Shellfish Derived Anaphylaxis    Ace Inhibitors Unknown (comments)    Biaxin [Clarithromycin] Other (comments)     Metal taste    Candesartan Cough    Pcn [Penicillins] Hives       Objective:  Vitals:    Vitals:    01/18/22 0730 01/18/22 0731 01/18/22 0740 01/18/22 1000   BP: 104/71      Pulse: (!) 156   97   Resp: 18      Temp: 97.5 °F (36.4 °C)      SpO2: 100% 100% 98%    Weight:       Height:         Intake and Output:  No intake/output data recorded. 01/16 1901 - 01/18 0700  In: 185 [P.O.:110;  I.V.:75]  Out: 450 [Urine:450]    Physical Examination:    General: Obese   Neck:  Supple, no mass  Resp:  Rales +  CV:  tachy  GI:  Soft, NT, + BS, no HS megaly  Neurologic:  Non focal    []    High complexity decision making was performed  []    Patient is at high-risk of decompensation with multiple organ involvement    Lab Data Personally Reviewed: I have reviewed all the pertinent labs, microbiology data and radiology studies during assessment.     Recent Labs     01/18/22  0339 01/17/22  0051 01/16/22  0401    136 135*   K 4.4 4.2 4.0   CL 97 99 97   CO2 33* 33* 32   * 116* 192*   BUN 64* 51* 53*   CREA 2.76* 2.37* 2.43*   CA 9.8 9.6 9.5   MG 2.4 2.2 2.1   ALB 3.5 3.6 3.3*   ALT 32 32 29     Recent Labs     01/17/22  1033   WBC 9.5   HGB 8.1*   HCT 31.4*        Lab Results   Component Value Date/Time    Specimen Description: URINE 10/22/2013 01:32 PM    Specimen Description: URINE 01/23/2013 04:40 PM    Specimen Description: LEG TISSUE 02/12/2009 12:00 AM    Specimen Description: LEG (LEFT) 01/29/2009 03:06 AM     Lab Results   Component Value Date/Time    Culture result: MIXED SKIN ROSANNA ISOLATED 10/22/2013 01:32 PM    Culture result:  01/23/2013 04:40 PM     ENTEROCOCCUS FAECALIS GROUP D  MIXED SKIN ROSANNA ISOLATED    Culture result:  02/12/2009 12:00 AM     LIGHT STAPHYLOCOCCUS EPIDERMIDIS (OXACILLIN RESISTANT)  LIGHT 2ND STRAIN OF STAPHYLOCOCCUS EPIDERMIDIS (OXACILLIN RESISTANT)    Culture result: NO GROWTH 2 DAYS 01/29/2009 03:06 AM     Recent Results (from the past 24 hour(s))   GLUCOSE, POC    Collection Time: 01/17/22 11:30 AM   Result Value Ref Range    Glucose (POC) 131 (H) 65 - 117 mg/dL    Performed by Roxanne CADENA    GLUCOSE, POC    Collection Time: 01/17/22  1:46 PM   Result Value Ref Range    Glucose (POC) 136 (H) 65 - 117 mg/dL    Performed by [address], POC    Collection Time: 01/17/22  4:38 PM   Result Value Ref Range    Glucose (POC) 155 (H) 65 - 117 mg/dL Performed by Alvin Light POC    Collection Time: 01/17/22  9:39 PM   Result Value Ref Range    Glucose (POC) 221 (H) 65 - 117 mg/dL    Performed by [address]    Collection Time: 01/18/22  3:39 AM   Result Value Ref Range    Magnesium 2.4 1.6 - 2.4 mg/dL   NT-PRO BNP    Collection Time: 01/18/22  3:39 AM   Result Value Ref Range    NT pro-BNP 23,061 (H) <065 PG/ML   METABOLIC PANEL, COMPREHENSIVE    Collection Time: 01/18/22  3:39 AM   Result Value Ref Range    Sodium 136 136 - 145 mmol/L    Potassium 4.4 3.5 - 5.1 mmol/L    Chloride 97 97 - 108 mmol/L    CO2 33 (H) 21 - 32 mmol/L    Anion gap 6 5 - 15 mmol/L    Glucose 206 (H) 65 - 100 mg/dL    BUN 64 (H) 6 - 20 MG/DL    Creatinine 2.76 (H) 0.55 - 1.02 MG/DL    BUN/Creatinine ratio 23 (H) 12 - 20      GFR est AA 21 (L) >60 ml/min/1.73m2    GFR est non-AA 17 (L) >60 ml/min/1.73m2    Calcium 9.8 8.5 - 10.1 MG/DL    Bilirubin, total 0.7 0.2 - 1.0 MG/DL    ALT (SGPT) 32 12 - 78 U/L    AST (SGOT) 20 15 - 37 U/L    Alk. phosphatase 80 45 - 117 U/L    Protein, total 6.7 6.4 - 8.2 g/dL    Albumin 3.5 3.5 - 5.0 g/dL    Globulin 3.2 2.0 - 4.0 g/dL    A-G Ratio 1.1 1.1 - 2.2     DIGOXIN    Collection Time: 01/18/22  3:39 AM   Result Value Ref Range    Digoxin level 0.8 (L) 0.90 - 2.00 NG/ML   GLUCOSE, POC    Collection Time: 01/18/22  8:24 AM   Result Value Ref Range    Glucose (POC) 190 (H) 65 - 117 mg/dL    Performed by Roxanne LONG have reviewed the flowsheets. Chart and Pertinent Notes have been reviewed. No change in PMH ,family and social history from Consult note.       MD [facility]

## 2022-01-18 NOTE — ANESTHESIA PREPROCEDURE EVALUATION
Relevant Problems   No relevant active problems       Anesthetic History   No history of anesthetic complications            Review of Systems / Medical History  Patient summary reviewed, nursing notes reviewed and pertinent labs reviewed    Pulmonary  Within defined limits      Sleep apnea  Shortness of breath  Asthma        Neuro/Psych   Within defined limits      Psychiatric history     Cardiovascular  Within defined limits  Hypertension      CHF: NYHA Classification III    Pacemaker    Exercise tolerance: <4 METS     GI/Hepatic/Renal  Within defined limits   GERD    Renal disease: CRI       Endo/Other  Within defined limits  Diabetes  Hypothyroidism  Morbid obesity and anemia     Other Findings              Physical Exam    Airway  Mallampati: III  TM Distance: > 6 cm  Neck ROM: normal range of motion   Mouth opening: Normal     Cardiovascular  Regular rate and rhythm,  S1 and S2 normal,  no murmur, click, rub, or gallop             Dental  No notable dental hx       Pulmonary  Breath sounds clear to auscultation               Abdominal  GI exam deferred       Other Findings            Anesthetic Plan    ASA: 4  Anesthesia type: general    Monitoring Plan: Arterial line, BIS, CVP, Meridian-Guille and MAXIIMNO    Post procedure ventilation   Induction: Intravenous  Anesthetic plan and risks discussed with: Patient

## 2022-01-18 NOTE — PROGRESS NOTES
Occupational Therapy:     Chart reviewed in preparation for OT tx session. Pt currently PENELOPE for Impella placement. Will defer and follow up as able and appropriate.      Thank you,   Jorge Salgado, OTD, OTR/L

## 2022-01-18 NOTE — BRIEF OP NOTE
BRIEF POSTOPERATIVE NOTE    Patient: Tiffanie Osipna  YOB: 1955  MRN: [id number]    Pre-Op Diagnosis:  acute on chronic systolic CHF     Post-Op Diagnosis:  acute on chronic systolic CHF       Procedure:   RIGHT AXILLARY [address] INSERTION    Surgeon: Baltazar Garcia MD    Assistant(s): Eldridge Severs, MD; TYE Escalante    Anesthesia: General     Infusions/Support: Pprecedex, insulin, epi, dobut, morgan    Estimated Blood Loss (mL): 50    Cell Saver (mL): 0    Specimens: * No specimens in log *    Drains and pacing wires: None    Complications: none    Findings: CHF    Implants:   Implant Name Type Inv.  Item Serial No.  Lot No. LRB No. Used Action   GRAFT VSCLR L30CM [id number] [facility] DBLE VLR PASS [id number] TUBE - [id number]  GRAFT VSCLR L30CM [id number] [address] [facility] DBLE VLR PASS [id number] TUBE [id number] [address] [facility]_[place] [id number] Right 1 Implanted       Electronically Signed by TYE Escalante on 01/18/22 at 3:42 PM

## 2022-01-18 NOTE — PROGRESS NOTES
TRANSFER - IN REPORT:    Verbal report received from OR team(name) on Anel Polo  being received from OR(unit) for routine post - op      Report consisted of patients Situation, Background, Assessment and   Recommendations(SBAR). Information from the following report(s) SBAR, Kardex, Procedure Summary, Intake/Output, MAR, Recent Results and Cardiac Rhythm paced was reviewed with the receiving nurse. Opportunity for questions and clarification was provided. Assessment completed upon patients arrival to unit and care assumed. Sana Hood PA-C at bedside - weaned impella to P-5, orders received to stop insulin gtt    Primary Nurse Cyn Sawyer and Marisol Rosa RN performed a dual skin assessment on this patient No impairment noted - Psoriatic areas noted on L elbow, L hip scratch, skin tears in pannus, b/l breast excoriation     Current Bed:     RIC Wright    Vince score is 15    1650 Dr. Brian Watson at bedside - orders received to check lactic acid, procalcitonin, & ABG  1655 Donel Aimee NP at bedside - orders received to keep SBP < 110   1700 RT at bedside - ABG obtained - per Dr. Brian Watson vent rate changed to 14 & FiO2 50%  1800 Critical PTT >130 - Dr. Brian Watson made aware  1820 Dr. Brian Watson at bedside - updated on pts status - orders received to decrease bumex gtt to 0.5mg/h & start propofol gtt  1930 Bedside and Verbal shift change report given to Alexey Thomas RN (oncoming nurse) by Osvaldo Smith RN (offgoing nurse). Report included the following information SBAR, Kardex, Procedure Summary, Intake/Output, MAR, Recent Results, Med Rec Status and Cardiac Rhythm paced.

## 2022-01-18 NOTE — ANESTHESIA PROCEDURE NOTES
Central Line Placement    Start time: 1/18/2022 12:16 PM  End time: 1/18/2022 12:26 PM  Performed by: Paddy De MD  Authorized by: Paddy De MD     Indications: vascular access, central pressure monitoring and need for vasopressors  Preanesthetic Checklist: patient identified, risks and benefits discussed, anesthesia consent, site marked, patient being monitored and timeout performed      Pre-procedure: All elements of maximal sterile barrier technique followed? Yes    2% Chlorhexidine for cutaneous antisepsis, Hand hygiene performed prior to catheter insertion and Ultrasound guidance    Sterile Ultrasound Technique followed?: Yes            Procedure:   Prep:  Chlorhexidine    Orientation:  Right    Catheter type:  Triple lumen  Catheter size:  9 Fr  Catheter length:  16 cm  Number of attempts:  1  Successful placement: Yes      Assessment:   Post-procedure:  Catheter secured, sterile dressing applied and sterile dressing with CHG applied  Assessment:  Blood return through all ports and free fluid flow  Insertion:  Uncomplicated  Patient tolerance:  Patient tolerated the procedure well with no immediate complications  PA catheter inserted .

## 2022-01-18 NOTE — PROGRESS NOTES
1930: Bedside and Verbal shift change report given to Corinne Pierce (oncoming nurse) by Easton Sawyer (offgoing nurse). Report included the following information SBAR, Kardex, Procedure Summary, Intake/Output, MAR, Med Rec Status, Cardiac Rhythm Afib, Alarm Parameters , Procedure Verification, Quality Measures and Dual Neuro Assessment. 0730: Bedside and Verbal shift change report given to Easton Sawyer (oncoming nurse) by Clarissa(offgoing nurse).  Report included the following informationSBAR, Kardex, Procedure Summary, Intake/Output, MAR, Med Rec Status, Cardiac Rhythm Afib, Alarm Parameters , Procedure Verification, Quality Measures and Dual Neuro Assessment

## 2022-01-18 NOTE — PROGRESS NOTES
Day #1 of cefazolin  Indication:  surgical prophylaxis   Current regimen:  2 grams q6h    Recent Labs     22  0339 22  1033 22  0051 22  0401   WBC  --  9.5  --   --    CREA 2.76*  --  2.37* 2.43*   BUN 64*  --  51* 53*     Est CrCl: 26 ml/min  Temp (24hrs), Av.4 °F (36.3 °C), Min:96.8 °F (36 °C), Max:97.7 °F (36.5 °C)    Plan: Change to 1 grams q12h x 3 doses  per renal dose adjustment protocol

## 2022-01-18 NOTE — PROGRESS NOTES
[facility]    ICU TEAM Progress Note    Name: Si Hammans   : 1955   MRN: [id number]   Date: 2022           ICU Assessment     1. Acute on chronic systolic congestive heart failure  2. Cardiogenic shock  a. Status post Impella placement  3. Severe pulmonary hypertension  4. Severe multi-vessel coronary artery disease  a. Prior stent (mid-LAD  5. Acute on chronic respiratory insufficiency  6. RODNEY on chronic CKD stage IV  7. Volume overload  8. Morbid obesity         ICU Comprehensive Plan of Care:     1. Neurological System  Spontaneous Awakening Trial: Pending  Sedation: Precedex and Fentanyl  Analgesia: Fentanyl    2. Cardiovascular System  Appreciate heart failure  Appreciate EP  Appreciate CTS  Impella  SBP Goal of: > 90 mmHg  MAP Goal of: > 65 mmHg  Phenylephrine, Epinephrine and Dobutamine - For above SBP/MAP goals  Transfusion Trigger (Hgb): <7 g/dL  Keep K > 4; Mg > 2     3. Respiratory System  Chlorhexidine   Optimize PEEP/Ventilation/Oxygenation  Goal Tidal Volume 6 cc/kg based on IBW  Aim for lung protective ventilation  Head of bed > 30 degrees  Spontaneous Breathing Trial: Pending  SpO2 Goal: > 92%  DVT Prophylaxis: SCD's or Sequential Compression Device     4. Renal/GI/Endocrine System  Appreciate nephrology input   Bumex gtt  Stress ulcer Prophylaxis: Protonix (pantoprazole)   Bowel Regimen: Senna and Docusate (Colace)  Feeding:  Yes   Blood Sugar Goal 120-180 - Glycemic Control: Insulin    5. Infectious Disease  Indwelling Catheter:  Tubes: ETT and Nasogastric Tube  Lines: Peripheral IV and Central Line  Drains: [place] Catheter    6. PT/OT: PT consulted and on board and OT consulted and on board     7. Goals of Care Discussion with family Yes     8. Plan of Care/Code Status: Full Code    9. Discussed Care Plan with Bedside RN    10.  Documentation of Current Medications    Subjective:   Progress Note: 2022      Reason for ICU Admission: Status post Impella placement for cardiogenic shock    HPI:  Most of history obtained from chart review    This is a 65-year-old female with a history of nonischemic cardiomyopathy, acute on chronic systolic heart failure stage IV, chronic hypoxic respiratory failure secondary to pulm hypertension, CKD, hypothyroidism, GERD, and diabetes type 2 who was admitted to the [facility] on 1/5/2022. Initially she was hypoxic in the 70s on pulse oximetry which improved with oxygen delivered nasal cannulae. Over the ensuing 2 weeks patient was seen by heart failure team, EP team, nephrology, and cardiothoracic surgery. She had an Impella placed on 1/18/2022. She might be a candidate for heart transplant and kidney transplant. Overnight Events:   1/18/22 -- Admitted to CCU post-impella insertion     POD:  Day of Surgery    S/P:   Procedure(s):  RIGHT AXILLARY IMPELLA INSERTION    Active Problem List:     Problem List  Date Reviewed: 12/22/2021          Codes Class    Acute respiratory failure with hypoxia (HCC) ICD-10-CM: J96.01  ICD-9-CM: 518.81         CHF exacerbation (HCC) ICD-10-CM: I50.9  ICD-9-CM: 428.0         Type 2 diabetes mellitus with diabetic neuropathy (HCC) ICD-10-CM: E11.40  ICD-9-CM: 250.60, 357.2         Type 2 diabetes with nephropathy (HCC) ICD-10-CM: E11.21  ICD-9-CM: 250.40, 583.81         Obesity, morbid ([address] ICD-10-CM: E66.01  ICD-9-CM: 278.01         Acquired hypothyroidism ICD-10-CM: E03.9  ICD-9-CM: 044. 9         Dysthymia ICD-10-CM: F34.1  ICD-9-CM: 300.4         ICD (implantable cardioverter-defibrillator), biventricular, in situ ICD-10-CM: Z95.810  ICD-9-CM: V45.02     Overview Signed 1/14/2015 11:11 AM by Maryam Yanes MD     Generator [employer] change 1/14/2015             Dyslipidemia ICD-10-CM: W18.1  ICD-9-CM: 272.4         CKD (chronic kidney disease) ICD-10-CM: N18.9  ICD-9-CM: 921. 9         Cardiomyopathy, nonischemic ([address] ICD-10-CM: I42.8  ICD-9-CM: 425.4     Overview Signed 10/10/2011 6:40 AM by Shelly Delgado MD     initial dx 2001, bivHF 2008 with EF 15%, s/p biV-ICD 9/08, significant improvment in EF to 45-50%             Anemia in chronic renal disease (Chronic) ICD-10-CM: N18.9, D63.1  ICD-9-CM: 285.21         HTN (hypertension) ICD-10-CM: I10  ICD-9-CM: 401.9         GERD (gastroesophageal reflux disease) (Chronic) ICD-10-CM: K21.9  ICD-9-CM: 530.81         Gout (Chronic) ICD-10-CM: M10.9  ICD-9-CM: 274.9         Pulmonary HTN (HCC) (Chronic) ICD-10-CM: I27.20  ICD-9-CM: 416.8               Past Medical History:      has a past medical history of Acquired hypothyroidism (8/15/2016), Anemia, Asthma, Cardiomyopathy, nonischemic ([address] CKD (chronic kidney disease), CKD (chronic kidney disease) (8/15/2012), Depression, Diabetes ([address] Diabetic neuropathy ([address] DM (diabetes mellitus) ([address] (8/15/2012), GERD (gastroesophageal reflux disease), Gout, Hypothyroidism, ICD (implantable cardioverter-defibrillator), biventricular, in situ (6/5/2014), and Psoriasis. She has no past medical history of Abuse, Adult physical abuse, Arrhythmia, Arthritis, Asthma, Autoimmune disease ([address] CAD (coronary artery disease), Calculus of kidney, Cancer ([address] Chronic pain, COPD, Headache(784.0), Hypercholesteremia, Liver disease, Psychotic disorder ([address] PUD (peptic ulcer disease), Seizures ([address] Stroke ([address] Thromboembolus ([address] or Unspecified deficiency anemia. Past Surgical History:      has a past surgical history that includes echo 2d adult (4/2010); cardiac catheterization (2007; 01/06/15); echo 2d adult (11/2011); stress test lexiscan/cardiolite (3/21/12); hx pacemaker placement; and hx orthopaedic. Home Medications:     Prior to Admission medications    Medication Sig Start Date End Date Taking?  Authorizing Provider   levocetirizine (XYZAL) 5 mg tablet TAKE 1 TABLET BY MOUTH EVERY DAY 1/6/22  Yes Jairon Cottrell MD   apremilast [place]) 30 mg tab Take 30 mg by mouth two (2) times daily as needed. Yes Provider, Historical   azelastine (ASTEPRO) 205.5 mcg (0.15 %) 1 [place] by Both Nostrils route two (2) times daily as needed. Yes Provider, Historical   docusate sodium (COLACE) 100 mg capsule Take 100 mg by mouth daily as needed for Constipation. Yes Provider, Historical   levothyroxine (SYNTHROID) 100 mcg tablet Take 100 mcg by mouth six (6) days a week. Everyday except Sunday   Yes Provider, Historical   allopurinoL (ZYLOPRIM) 100 mg tablet TAKE 1 TABLET BY MOUTH EVERY DAY 1/5/22  Yes Dotty Agarwal MD   calcitRIOL (ROCALTROL) 0.5 mcg capsule TAKE 1 CAPSULE BY MOUTH EVERY DAY 1/5/22  Yes Dotty Agarwal MD   carvediloL (COREG) 6.25 mg tablet Take 1 Tablet by mouth two (2) times daily (with meals). 12/22/21  Yes Delonte DORSEY NP   isosorbide dinitrate (ISORDIL) 5 mg tablet Take 1 Tablet by mouth three (3) times daily. 12/22/21  Yes Delonte DORSEY NP   hydrALAZINE (APRESOLINE) 10 mg tablet Take 1 Tablet by mouth three (3) times daily. 12/22/21  Yes Shyla Rice NP   benzonatate (Tessalon Perles) 100 mg capsule Take 100 mg by mouth three (3) times daily as needed for Cough. Yes Bee Roldna MD   montelukast (SINGULAIR) 10 mg tablet TAKE 1 TABLET BY MOUTH EVERY DAY 11/5/21  Yes Dotty Agarwal MD   bumetanide (BUMEX) 2 mg tablet Take 1 tab in the morning and 0.5 tab in the evening 11/5/21  Yes Aliya Braxton MD   fluticasone propionate (FLONASE) 50 mcg/actuation nasal spray One spray each nostril daily 11/1/21  Yes Dotty Agarwal MD   gabapentin (NEURONTIN) 100 mg capsule Take 1 Capsule by mouth nightly. Max Daily Amount: 100 mg. 10/29/21  Yes Dotty Agarwal MD   venlafaxine-SR (EFFEXOR-XR) 75 mg capsule TAKE 1 CAPSULE BY MOUTH EVERY DAY 10/21/21  Yes Dotty Agarwal MD   budesonide (PULMICORT) 180 mcg/actuation aepb inhaler Take 2 Puffs by inhalation two (2) times a day.  5/27/21  Yes Dotty Agarwal MD   ammonium lactate (AMLACTIN) 12 % topical cream Apply  to affected area two (2) times a day. rub in to affected area well 20  Yes Ady Reyes MD   insulin NPH/insulin regular (NOVOLIN 70/30) 100 unit/mL (70-30) injection 70 units two times a day 8/15/16  Yes Mary Alice Holland MD   calcipotriene (DOVONEX) 0.005 % topical cream Apply  to affected area three (3) times daily. Yes Provider, Historical   triamcinolone acetonide (KENALOG) 0.5 % ointment Apply  to affected area two (2) times daily as needed. use thin layer    Yes Provider, Historical   multivitamin (ONE A DAY) tablet Take 1 Tab by mouth daily. Yes Provider, Historical   ferrous sulfate (IRON) 325 mg (65 mg elemental iron) tablet Take 325 mg by mouth daily. Yes Provider, Historical   aspirin 81 mg tablet Take 81 mg by mouth daily. Yes Provider, Historical   cholecalciferol (VITAMIN D3) (2,000 UNITS /50 MCG) cap capsule TAKE 1 CAPSULE BY MOUTH TWO (2) TIMES A DAY. 22   Ady Reyes MD       Allergies/Social/Family History: Allergies   Allergen Reactions    Ciprofloxacin Anaphylaxis    Shellfish Derived Anaphylaxis    Ace Inhibitors Unknown (comments)    Biaxin [Clarithromycin] Other (comments)     Metal taste    Candesartan Cough    Pcn [Penicillins] Hives      Social History     Tobacco Use    Smoking status: Never Smoker    Smokeless tobacco: Never Used   Substance Use Topics    Alcohol use: No      Family History   Problem Relation Age of Onset    Heart Disease Mother     Hypertension Mother     Lupus Sister     Diabetes Brother        Review of Systems:     A comprehensive review of systems was negative except for that written in the HPI.     Objective:   Vital Signs:  Visit Vitals  BP (!) 136/97   Pulse 100   Temp 97.6 °F (36.4 °C)   Resp 18   Ht 5' 7\" (1.702 m)   Wt 113.4 kg (250 lb 1.6 oz)   SpO2 99%   BMI 39.17 kg/m²    O2 Flow Rate (L/min): 6 l/min (weaned) O2 Device: Nasal cannula Temp (24hrs), Av.7 °F (36.5 °C), Min:97.4 °F (36.3 °C), Max:98.7 °F (37.1 °C)           Intake/Output:     Intake/Output Summary (Last 24 hours) at 1/18/2022 1422  Last data filed at 1/18/2022 0110  Gross per 24 hour   Intake 185 ml   Output 450 ml   Net -265 ml       Physical Exam:    General appearance: cooperative, no distress, appears stated age, sedated  Head: Normocephalic, without obvious abnormality, atraumatic  Eyes: negative  Throat: Lips, mucosa, and tongue normal. Teeth and gums normal  Lungs: clear to auscultation bilaterally  Heart: regular rate and rhythm, S1, S2 normal, no murmur, click, rub or gallop  Abdomen: soft, non-tender. Bowel sounds normal. No masses,  no organomegaly  Extremities: extremities normal, atraumatic, no cyanosis or edema  Pulses: 2+ and symmetric  Skin: Skin color, texture, turgor normal. No rashes or lesions  Neurologic: sedated    LABS AND  DATA: Personally reviewed  Recent Labs     01/17/22  1033   WBC 9.5   HGB 8.1*   HCT 31.4*        Recent Labs     01/18/22  0339 01/17/22  0051    136   K 4.4 4.2   CL 97 99   CO2 33* 33*   BUN 64* 51*   CREA 2.76* 2.37*   * 116*   CA 9.8 9.6   MG 2.4 2.2     Recent Labs     01/18/22  0339 01/17/22  0051   AP 80 75   TP 6.7 7.3   ALB 3.5 3.6   GLOB 3.2 3.7     No results for input(s): INR, PTP, APTT, INREXT in the last 72 hours. No results for input(s): PHI, PCO2I, PO2I, FIO2I in the last 72 hours. No results for input(s): CPK, CKMB, TROIQ, BNPP in the last 72 hours. Hemodynamics:   PAP:   CO:     Wedge:   CI:     CVP:    SVR:       PVR:       Ventilator Settings:  Mode Rate Tidal Volume Pressure FiO2 PEEP                    Peak airway pressure:      Minute ventilation:          MEDS: Reviewed    Chest X-Ray:  CXR Results  (Last 48 hours)    None            ECHO (12/8/21):  Result status: Final result  · LV: Estimated LVEF is 15 - 20%. Normal cavity size. Upper normal wall thickness. Severely reduced systolic function.  Left ventricular diastolic dysfunction. · LA: Moderately dilated left atrium. · RV: Borderline low systolic function. Pacer/ICD present. · RA: Mildly dilated right atrium. · MV: Mitral valve non-specific thickening. Mild to moderate mitral valve regurgitation is present. · TV: Mild tricuspid valve regurgitation is present. · PA: Mild to moderate pulmonary hypertension. Pulmonary arterial systolic pressure is 47 mmHg. Multidisciplinary Rounds Completed: Yes    ABCDEF Bundle/Checklist Completed:  Yes    SPECIAL EQUIPMENT  Impella    DISPOSITION  Stay in ICU    CRITICAL CARE CONSULTANT NOTE  I had a face to face encounter with the patient, reviewed and interpreted patient data including clinical events, labs, images, vital signs, I/O's, and examined patient. I have discussed the case and the plan and management of the patient's care with the consulting services, the bedside nurses and the respiratory therapist.      NOTE OF PERSONAL INVOLVEMENT IN CARE   This patient has a high probability of imminent, clinically significant deterioration, which requires the highest level of preparedness to intervene urgently. I participated in the decision-making and personally managed or directed the management of the following life and organ supporting interventions that required my frequent assessment to treat or prevent imminent deterioration. I personally spent 150 minutes of critical care time. This is time spent at this critically ill patient's bedside actively involved in patient care as well as the coordination of care. This does not include any procedural time which has been billed separately.     Micaela King DO, MS  Staff Intensivist/Anesthesiologist  [facility]  1/18/2022

## 2022-01-19 NOTE — PROGRESS NOTES
Cardiac Surgery Care Coordinator- Placed update call to Mr Syl Tee, reviewed plan of care and encouraged him to verbalize. He stated he just spoke with bedside nurse. He is without questions at this time.  Verónica Walker RN

## 2022-01-19 NOTE — PROGRESS NOTES
[address] [facility] in [place], [place]  Inpatient Progress Note      Patient name: Ana María Soliz  Patient : 1955  Patient MRN: [id number]  Consulting MD: Linda Jordan DO  Date of service: 22    REASON FOR REFERRAL:  Management of chronic systolic heart failure     PLAN OF CARE:   · 79 y/o female with chronic renal failure and new onset severe cardiomyopathy, LVEF 15% (diagnosed 21), stage D, NYHA class IV; admitted for massive volume overload, severely volume overloaded by RHC  · Most likely etiology of acute deterioration of LVEF is chronic volume overload with compensatory tachycardia from progressive renal failure +/- coronary artery disease (80% LAD)   · These are potentially reversible causes of severe LV dysfunction; by guidelines patient needs at least 3 months of euvolemia, HR < 100bpm and revascularization to prove she has non-reversible new, severe HF to be eligible for cardiac replacement therapies, including heart/kidney transplantation, d/w Dr. Ilia Stockton with [facility]   · Patient would like aggressive approach to allow her heart to recover, including dialysis +/- revascularization with goal of Impella as bridge to LV recovery or transplant, d/w patient and her sister at family meeting  · Impella 5.5 implanted 22 with Dr. Keira Puentes   · Once euvolemic and hemodynamics are at goal, will begin milrinone wean   · Consider LAD revascularization once normotensive and euvolemic  · Patient understands the following possible outcomes:  · 1/3 chance of LV recovery and discharge home on medical therapy or chronic dialysis  · 1/3 chance of LV non-recovery on Impella, evaluation and transfer for listing for heart transplant/kidney  · 1/3 chance of LV non-recovery and evaluation that reveals patient is not candidate for LVAD/heart/kidney transplant and then wean of support to comfort care/hospice  · D/w Dr. Johan Killian and Keira Puentes, bedside RN, Kasey    RECOMMENDATIONS:  POD #1 Impella 5.5 implant   Vent management per Intensivist   Continue Impella support at P-5 with bicarb as purge and systemic bival   Cefazolin q12h for Impella site ppx   Maintain PAC for hemodynamic optimization; goal CI > 2.3, CVP 8-10 mmHg, SVR 1000, SBP < 110 mmHg (via radial arterial line)   Continue milrinone 0.25 mcg/kg/min   Roro wean in progress   Bumex 0.5 mg/hr; consider transition to intermittent dosing later today   Keep K> 4 and Mg >2  Cardene PRN SBP > 110 mmHg   Intolerant of GDMT due to hemodynamic in stability   Decrease ivabradine to 2.5 mg PO BID; goal HR 80-90 mmHg for RV support  Continue digoxin 0.125 mg daily; goal 0.7-1.2   Allopurinol 50mg daily per nephrology  Continue transplant evaluation once patient extubated and can consent to remainder of eval  Check iron, FOB  Will need OP PSG  Invitae pending   Palliative consult appreciated   Nutritionist consult  Heart failure education   Advanced care plan present on file     All other care per primary team     IMPRESSION:  Fatigue  Shortness of breath, on 3L NC PTA  Volume overload  Acute on Chronic systolic heart failure, requiring Impella 5.5 implant 1/18/22   · Stage D, NYHA class IV symptoms  · Non-ischemic cardiomyopathy, LVEF 15%  · Normal coronaries  · PYP equivocal for amyloid   Pulmonary hypertension, severe  S/p BiV-ICD  Cardiac risk factors:  · HL  · DM2  · Morbid obesity, BMI 40  Acute on chronic renal failure, stage IV  GERD  Anemia of chronic disease  Gout     Interval Events:  POD #1 Impella placement   Excellent diuresis overnight  Adequate oxygenation on current vent settings  Roro wean in progress   Cr stable 2.7  proBNP decreased 12055 from 23k      LIFE GOALS:  Patient's personal goals include: being home with family, still ambulating around without getting too tired.   Important upcoming milestones or family events: none  The patient identifies the following as important for living well: remaining idependent and mobile; being able to get out of the house with . Aunariana Ko verbalized willingness to be on home milrinone and evaluation for both heart and kidney transplants.  Verbalizes she would have family supporting her decision on this.      SHAMIKA Jonas is Q 63 y.o.  female with a history of NICM, chronic hypoxic respiratory failure secondary to pulmonary HTN, hypothyroidism, CKD, GERD, and DM II who presented to [facility] as a transfer from another facility for acute on chronic hypoxic respiratory failure.  Reports running out of O2 at home resulting in SOB and dizziness.     Upon arrival to the ED she was found to have O2 sats in the 70s, requiring 4L NC O2.  Rapid covid test was negative.  ProNT-BNP was 75160, K+5.2, BUN/CR x/2.54 and elevated d-dimer. Chest xray showing pulmonary edema vs. Atypical pneumonia. VQ scan showed low probability for PE.     Per Dr. Mary Romo, LVEF 15-20% during recent admission.  LVEF previously normalized with CRT.  NYHA III-IV.  No ACEi/ARB due to renal dysfunction.   GDMT dosing limited by low BP.     Patient's PCP is Dr. Dagmar Lazaro primarily for cardiac care due to Dr. Craig Castillo historically seen by nephrology but has not had follow up care in the past three years.     CARDIAC IMAGING:  Echo 12/8/21- LVEF 15-20%, mild to Mod MR, LVIDd 5.09cm, TAPSE 1.94cm  Echo 4/22/19- LVEF 60%, trace MR,  LVIDd 4.24cm, TAPSE 1.65cm   Echo 4/24/18- LVEF 60%, trivial MR, LVIDd 4.79cm, TAPSE 2.25cm      EKG- 1/4/22 ST with A sense and V paced rhythm     [facility] 2015- No significant CAD  NST 2014- reversible LAD involvement      ICD interrogation     HEMODYNAMICS:  [facility] 1/17/21: PAP 83/52 mmHg, RAP 27 mmHg, PCWP 45 mmHg, CI 1.6  [facility] 12/10/21: PAP 76/48/57, RAP 20, PCWP 35, CI 2.26     CPEST not done  6MW not done     OTHER IMAGING:  CXR Results  (Last 48 hours)                             01/13/22 1035   XR CHEST PORT Final result      Impression:   No significant change in congestion and interstitial and alveolar   opacities which may reflect edema or infectious infiltrate.        Narrative:   EXAM: XR CHEST PORT       INDICATION: pul edema       COMPARISON: Chest x-ray 1/10/2022.       FINDINGS: A portable AP radiograph of the chest was obtained at 10:19 hours. The   patient is on a cardiac monitor. The lungs appear grossly stable with congestion   and interstitial/airspace opacities with no pneumothorax or pleural effusion. Right PICC line traverses expected course of tip in the region of the atriocaval   junction. Pacemaker-ICD generator body projects over the left chest wall with   intact appearing leads traversing in expected course. . The cardiac and   mediastinal contours and pulmonary vascularity are normal.  Atherosclerotic   calcifications affect the aortic arch. The chest wall structures and visualized   upper abdomen show no acute findings with incidental note of degenerative spine   and shoulder changes.                           PHYSICAL EXAM:  Visit Vitals  BP (!) 111/90 (BP 1 Location: Left upper arm, BP Patient Position: At rest)   Pulse 97   Temp 97.8 °F (36.6 °C)   Resp 20   Ht 5' 7\" (1.702 m)   Wt 251 lb 8.7 oz (114.1 kg)   SpO2 98%   BMI 39.40 kg/m²      Hemodynamics:   CO: CO (l/min): 4.5 l/min   CI: CI (l/min/m2): 2 l/min/m2   CVP: CVP (mmHg): 9 mmHg (01/19/22 1200)   SVR: SVR (dyne*sec)/cm5: 1072 (dyne*sec)/cm5 (01/19/22 4586)   PAP Systolic: PAP Systolic: 52 (26/62/80 5681)   PAP Diastolic: PAP Diastolic: 23 (98/93/03 3242)   PVR:     SV02: SVO2 (%): 76 % (01/19/22 1200)   SCV02:      Impella 5.5  P-5  Flow: 2.3lpm   Purge flow 7.7     Physical Exam  Vitals and nursing note reviewed. Constitutional:       General: She is not in acute distress.     Appearance: Normal appearance. She is obese, sedated.    Cardiovascular:      Rate and Rhythm: Regular rhythm.      Pulses: Normal pulses.      Heart sounds: Normal heart sounds. No murmur heard. Pulmonary:      Effort: Normal, synchronous with ventilator   Abdominal:      General: There is no distension, hypoactive BS. Musculoskeletal:         General: trace LE edema   Skin:     General: Skin is warm and dry.      Findings: Lesion present.      Comments: psorasis   Neurological:      General: Responds to noxious stimuli.      Mental Status: She is intubated and sedated. Psychiatric:         Mood and Affect: ROSA.             ROS unable to obtain due to patient condition (intubated, sedated).        PAST MEDICAL HISTORY:  Past Medical History:   Diagnosis Date    Acquired hypothyroidism 8/15/2016    Anemia     RED-HF study    Asthma     Cardiomyopathy, nonischemic ([address]     initial dx 2001, bivHF 2008 with EF 15%, s/p biV-ICD 9/08, significant improvment in EF to 45-50%    CKD (chronic kidney disease)     Dr Mohsen Orozco    CKD (chronic kidney disease) 8/15/2012    Depression     Diabetes ([address]     Diabetic neuropathy [facility]     DM (diabetes mellitus) ([facility] 8/15/2012    GERD (gastroesophageal reflux disease)     Gout     Hypothyroidism     ICD (implantable cardioverter-defibrillator), biventricular, in situ 6/5/2014    Psoriasis        PAST SURGICAL HISTORY:  Past Surgical History:   Procedure Laterality Date    CARDIAC CATHETERIZATION  2007; 01/06/15    normal cors    ECHO 2D ADULT  4/2010    EF 45%, improved from 1/09 (25%)    ECHO 2D ADULT  11/2011    LVH, EF 55-60%    HX ORTHOPAEDIC      knee    HX PACEMAKER PLACEMENT      AICD    STRESS TEST LEXISCAN/CARDIOLITE  3/21/12    normal perfusion, global HK 40%       FAMILY HISTORY:  Family History   Problem Relation Age of Onset    Heart Disease Mother     Hypertension Mother     Lupus Sister     Diabetes Brother        SOCIAL HISTORY:  Social History     Socioeconomic History    Marital status:    Tobacco Use    Smoking status: Never Smoker    Smokeless tobacco: Never Used   Substance and Sexual Activity    Alcohol use: No    Drug use: No    Sexual activity: Never   Social History Narrative    . Nonsmoker. Disability       LABORATORY RESULTS:     Labs Latest Ref Rng & Units 1/19/2022 1/18/2022 1/18/2022 1/18/2022 1/18/2022 1/17/2022 1/16/2022   WBC 3.6 - 11.0 K/uL 8.5 - - 7.9 - 9.5 -   RBC 3.80 - 5.20 M/uL 3.59(L) - - 3.05(L) - 3.35(L) -   Hemoglobin 11.5 - 16.0 g/dL 8.8(L) 8.7(L) - 7. 4(L) - 8. 1(L) -   Hematocrit 35.0 - 47.0 % 31. 3(L) 32. 1(L) - 27. 3(L) - 31. 4(L) -   MCV 80.0 - 99.0 FL 87.2 - - 89.5 - 93.7 -   Platelets 658 - 651 K/uL 360 - - 365 - 344 -   Lymphocytes 12 - 49 % - - - 10(L) - - -   Monocytes 5 - 13 % - - - 8 - - -   Eosinophils 0 - 7 % - - - 2 - - -   Basophils 0 - 1 % - - - 0 - - -   Albumin 3.5 - 5.0 g/dL 3.4(L) 3.7 3. 3(L) - 3.5 3.6 3.3(L)   Calcium 8.5 - 10.1 MG/DL 9.6 9.5 9.7 - 9.8 9.6 9.5   Glucose 65 - 100 mg/dL 148(H) 115(H) 137(H) - 206(H) 116(H) 192(H)   BUN 6 - 20 MG/DL 61(H) 63(H) 65(H) - 64(H) 51(H) 53(H)   Creatinine 0.55 - 1.02 MG/DL 2.70(H) 2.74(H) 2.89(H) - 2.76(H) 2.37(H) 2.43(H)   Sodium 136 - 145 mmol/L 138 140 138 - 136 136 135(L)   Potassium 3.5 - 5.1 mmol/L 3.5 4.1 3.5 - 4.4 4.2 4.0   TSH 0.36 - 3.74 uIU/mL - - - - - - -   Some recent data might be hidden     Lab Results   Component Value Date/Time    TSH 3.08 01/11/2022 05:13 AM    TSH 1.85 12/15/2021 01:06 PM    TSH 1.01 11/05/2020 09:11 AM    TSH 2.740 04/30/2019 11:21 AM    TSH 0.519 02/21/2012 10:53 AM    TSH 8.29 (H) 01/20/2010 01:59 PM       ALLERGY:  Allergies   Allergen Reactions    Ciprofloxacin Anaphylaxis    Shellfish Derived Anaphylaxis    Ace Inhibitors Unknown (comments)    Biaxin [Clarithromycin] Other (comments)     Metal taste    Candesartan Cough    Pcn [Penicillins] Hives        CURRENT MEDICATIONS:    Current Facility-Administered Medications:     niCARdipine (CARDENE) 25 mg in 0.9% sodium chloride 250 mL infusion, 0-15 mg/hr, IntraVENous, TITRATE, Rohan Ewing MD, Last Rate: 25 mL/hr at 01/19/22 1005, 2.5 mg/hr at 01/19/22 1005    ivabradine (CORLANOR) tablet 2.5 mg, 2.5 mg, Oral, BID WITH MEALS, Polliard, Renell Osler, NP    milrinone (PRIMACOR) 20 MG/100 ML D5W infusion, 0.25 mcg/kg/min, IntraVENous, CONTINUOUS, Polliard, Renell Osler, NP    bivalirudin (ANGIOMAX) 250 mg in 0.9% sodium chloride (MBP/ADV) 50 mL MBP, 0.02-2.5 mg/kg/hr, IntraVENous, TITRATE, Polliard, Renell Osler, NP    heparin (porcine) in 0.9% NaCl 30,000 unit/1,000 mL perfusion irrigation 50-1,000 mL, 50-1,000 mL, Other, PRN, Sam Gomez PA    sodium chloride (NS) flush 5-40 mL, 5-40 mL, IntraVENous, Q8H, Sam Gomez PA, 10 mL at 01/19/22 0605    heparinized saline 2 units/mL infusion, , , CONTINUOUS, FiserLisa MD, 1,000 Units at 01/18/22 1416    chlorhexidine (PERIDEX) 0.12 % mouthwash 15 mL, 15 mL, Oral, Q12H, Davian Vázquez DO, 15 mL at 01/19/22 0859    sodium chloride (NS) flush 5-40 mL, 5-40 mL, IntraVENous, Q8H, Sam Gomez PA    albumin human 5% (BUMINATE) solution 12.5 g, 12.5 g, IntraVENous, Q2H PRN, Sam Gomez PA    0.45% sodium chloride infusion, 10 mL/hr, IntraVENous, CONTINUOUS, Khai Gomez PA    0.9% sodium chloride infusion, 9 mL/hr, IntraVENous, CONTINUOUS, Sam Gomez PA, Last Rate: 9 mL/hr at 01/18/22 1647, 9 mL/hr at 01/18/22 1647    naloxone (NARCAN) injection 0.4 mg, 0.4 mg, IntraVENous, PRN, Sam Gomez PA    mupirocin (BACTROBAN) 2 % ointment, , Both Nostrils, BID, Khai Gomez, [address], Given at 01/19/22 0859    ondansetron (ZOFRAN) injection 4 mg, 4 mg, IntraVENous, Q4H PRN, Sam Gomez PA    albuterol (PROVENTIL VENTOLIN) nebulizer solution 2.5 mg, 2.5 mg, Nebulization, Q4H PRN, Sam Gomez PA    aspirin chewable tablet 81 mg, 81 mg, Oral, DAILY, Khai Gomez PA, 81 mg at 01/19/22 0857    midazolam (VERSED) injection 1 mg, 1 mg, IntraVENous, Q1H PRN, Long, TYE Stephens    magnesium oxide (MAG-OX) tablet 400 mg, 400 mg, Oral, BID, Johnny Gomez PA    calcium chloride 1 g in 0.9% sodium chloride 100 mL IVPB, 1 g, IntraVENous, PRN, Sam Gomez PA    bisacodyL (DULCOLAX) suppository 10 mg, 10 mg, Rectal, DAILY PRN, Sam Gomez PA    senna-docusate (PERICOLACE) 8.6-50 mg per tablet 1 Tablet, 1 Tablet, Oral, BID, Johnny Gomez PA, 1 Tablet at 01/19/22 0857    ELECTROLYTE REPLACEMENT NOTE: Nurse to review Serum Potassium and Magnesuim levels and Initiate Electrolyte Replacement Protocol as needed, 1 Each, Other, PRN, Johnny Gomez PA    magnesium sulfate 1 g/100 ml IVPB (premix or compounded), 1 g, IntraVENous, PRN, Sam Gomez PA    alteplase (CATHFLO) 1 mg in sterile water (preservative free) 1 mL injection, 1 mg, InterCATHeter, PRN, Sam Gomez PA    bacitracin 500 unit/gram packet 1 Packet, 1 Packet, Topical, PRN, Sam Gomez PA    pantoprazole (PROTONIX) injection 40 mg, 40 mg, IntraVENous, DAILY, 40 mg at 01/19/22 0858 **AND** sodium chloride (NS) flush 10 mL, 10 mL, IntraVENous, DAILY, Davian Vázquez DO, 10 mL at 01/19/22 0900    ceFAZolin (ANCEF) 1 g in sterile water (preservative free) 10 mL IV syringe, 1 g, IntraVENous, Q12H, Sam Gomez PA, 1 g at 01/19/22 0154    dexmedeTOMidine in 0.9 % NaCl (PRECEDEX) 400 mcg/100 mL (4 mcg/mL) infusion soln, 0.1-1.5 mcg/kg/hr, IntraVENous, TITRATE, Davian Vázquez DO, Last Rate: 42.5 mL/hr at 01/19/22 1159, 1.5 mcg/kg/hr at 01/19/22 1159    midazolam (VERSED) injection 1 mg, 1 mg, IntraVENous, Q4H PRN, Davian Vázquez DO, 1 mg at 01/18/22 1719    insulin lispro (HUMALOG) injection, , SubCUTAneous, Q6H, Davian Vázquez DO, 2 Units at 01/19/22 1216    propofol (DIPRIVAN) 10 mg/mL infusion, 0-50 mcg/kg/min, IntraVENous, TITRATE, Davian Vázquez DO, Last Rate: 13.6 mL/hr at 01/19/22 1041, 20 mcg/kg/min at 01/19/22 1041    sodium bicarbonate (8.4%) 25 mEq in dextrose 5% 1,000 mL - impella purge fluid, , IntraVENous, TITRATE, Albertina Gomez PA, Last Rate: 7.9 mL/hr at 01/18/22 2036, New Bag at 01/18/22 2036    bumetanide (BUMEX) 0.25 mg/mL infusion, 0.5 mg/hr, IntraVENous, CONTINUOUS, Sandy Albarado T, NP, Last Rate: 2 mL/hr at 01/18/22 1948, 0.5 mg/hr at 01/18/22 1948    insulin NPH (NOVOLIN N, HUMULIN N) injection 10 Units, 10 Units, SubCUTAneous, QHS, Albertina Gomez PA, 10 Units at 01/18/22 2126    insulin NPH (NOVOLIN N, HUMULIN N) injection 20 Units, 20 Units, SubCUTAneous, DAILY, Sam Gomez PA    triamcinolone acetonide (KENALOG) 0.1 % cream, , Topical, BID, Albertina Gomez PA, Given at 01/17/22 1733    polyethylene glycol (MIRALAX) packet 17 g, 17 g, Oral, DAILY, Sam Gomez PA, 17 g at 01/19/22 0859    digoxin (LANOXIN) tablet 0.0625 mg, 0.0625 mg, Oral, DAILY, Sam Gomez PA, 0.0625 mg at 01/19/22 0857    allopurinoL (ZYLOPRIM) tablet 50 mg, 50 mg, Oral, DAILY, Sam Gomez PA, 50 mg at 01/19/22 0857    epoetin isabel-epbx (RETACRIT) injection 20,000 Units, 20,000 Units, SubCUTAneous, Q TUE, THU & SAT, Albertina Gomez PA, 20,000 Units at 01/18/22 2039    montelukast (SINGULAIR) tablet 10 mg, 10 mg, Oral, DAILY, Sam Gomez PA, 10 mg at 01/19/22 0857    levothyroxine (SYNTHROID) tablet 100 mcg, 100 mcg, Oral, Once per day on Mon Tue Wed Thu Fri Sat, [facility], 100 mcg at 01/19/22 0605    hydroxypropyl methylcellulose (ISOPTO TEARS) 0.5 % ophthalmic solution 1 Drop, 1 Drop, Both Eyes, PRN, Albertina Gomez PA, 1 Drop at 01/06/22 1727    venlafaxine-SR (EFFEXOR-XR) capsule 75 mg, 75 mg, Oral, DAILY WITH BREAKFAST, Albertina Gomez PA, 75 mg at 01/17/22 1025    gabapentin (NEURONTIN) capsule 100 mg, 100 mg, Oral, QHS, Sam Gomez, PA, 100 mg at 01/18/22 2126    arformoteroL (BROVANA) neb solution 15 mcg, 15 mcg, Nebulization, BID RT, 15 mcg at 01/19/22 0809 **AND** budesonide (PULMICORT) 500 mcg/2 ml nebulizer suspension, 500 mcg, Nebulization, BID RT, Cesar Gomez PA, 500 mcg at 22 0809    sodium chloride (NS) flush 5-40 mL, 5-40 mL, IntraVENous, Q8H, Sam Gomez PA, 10 mL at 22 0600    acetaminophen (TYLENOL) tablet 650 mg, 650 mg, Oral, Q6H PRN **OR** acetaminophen (TYLENOL) suppository 650 mg, 650 mg, Rectal, Q6H PRN, Sam Gomez PA    glucose chewable tablet 16 g, 4 Tablet, Oral, PRN, Cesar Gomez PA    dextrose (D50W) injection syrg 12.5-25 g, 25-50 mL, IntraVENous, PRN, Cesar Gomez PA    glucagon (GLUCAGEN) injection 1 mg, 1 mg, IntraMUSCular, PRN, Cesar Gomez PA    PATIENT CARE TEAM:  Patient Care Team:  Marion Edwards MD as PCP - General (Internal Medicine)  Marion Edwards MD as PCP - [facility]ATION [facility]  Yoan Alanis MD as Consulting Provider (Internal Medicine)  Madison Pino MD (Dermatology)  Lucy Ramos MD (Nephrology)  Newton Cole MD as Consulting Provider (Cardiology)  Bar Barboza MD (Cardiology)  Mayur James MD as Consulting Provider (Pulmonary Disease)     Thank you for allowing me to participate in this patient's care. Chiara Duarte NP  [address] [place] [address], [address]  Phone: [phone]    [facility] ATTENDING ADDENDUM    Patient was seen and examined in person. Data and notes were reviewed. I have discussed and agree with the plan as noted in the NP note above without further additions.     Criss aDle MD PhD  Jacquelyn Rodriguez

## 2022-01-19 NOTE — PROGRESS NOTES
10 [address] [facility] Progress Note     Subjective:       Traci Muhammad is a 78 y.o. patient who is s/p Impella  She was partly awake on ventilator this morning  She signaled by nodding her head she could hear me  I spoke to her CCU RN too       Interim:   S/p Impella placement on 01/18/2022, now in CCU. LHC/RHC on 01/17/2022 had shown severely elevated bilateral filling pressures with severe pulmonary HTN, LAD with 80% lesion. Still with bilateral pulmonary infiltrates on CXR.  Currently intubated on vent. Dobutamine had been used during Impella procedure, but no longer using.  Milrinone increased overnight to 0.25 mcg/kg/min. Continues Bumex IV gtt, now 0.5 mg/hr. HR 70s, continues Corlanor 7.5 mg po daily. HPI:   Presented to the ER on 01/04/2021 with hypoxia, improved on supplemental oxygen. Labs showed stable anemia.  WBC elevated, hyponatremic, hypokalemic.  D dimer elevated.  Chronic CKD. Nephrologist spoke to me directly regarding severe renal failure, but not much worse than last admission. Rapid COVID negative.  CXR showed pulmonary edema vs atypical pneumonia.  VQ scan showed low probability for PE.       NICM, LVEF 15-20% during recent admission.  LVEF previously normalized with CRT.  NYHA III-IV.  No ACEi/ARB due to renal dysfunction.  GDMT dosing limited by low BP. [address] 12/10/2021 showed severe pulmonary HTN (wedge 34 mmHg, PAP 80 mmHg). Cardiac cath in 2015 at [facility] showed no evidence of CAD. She did require LV lead reprogramming over the summer, but good LV capture since.  Good capture/function when checked during recent admission. BP controlled. PICC line placed 01/10/2022 in anticipation of home milrinone. Previous:   LVEF noted 15-20% in 12/2021. S/p Medtronic biventricular ICD (gen change 01/142015, leads 09/25/2008).        CKD stage IV.        Previously followed by Dr. Den Dominguez, states did not follow him to new practice.          Problem List Date Reviewed: 12/22/2021   Codes Class Noted   Acute respiratory failure with hypoxia [facility]) ICD-10-CM: J96.01   ICD-9-CM: 518.81 1/4/2022     CHF exacerbation ([facility] [address] [facility] ICD-10-CM: I50.9   ICD-9-CM: 428.0 12/6/2021     Type 2 diabetes mellitus with diabetic neuropathy ([facility] ICD-10-CM: E11.40   ICD-9-CM: 250.60, 357.2 1/2/2020     Type 2 diabetes with nephropathy ([facility] ICD-10-CM: E11.21   ICD-9-CM: 250.40, 583.81 4/3/2018     Obesity, morbid ([facility] ICD-10-CM: E66.01   ICD-9-CM: 278.01 12/8/2017     Acquired hypothyroidism ICD-10-CM: E03.9   ICD-9-CM: 244.9 8/15/2016     Dysthymia ICD-10-CM: F34.1   ICD-9-CM: 300.4 8/15/2016     ICD (implantable cardioverter-defibrillator), biventricular, in situ ICD-10-CM: Z95.810   ICD-9-CM: V45.02 6/5/2014   Overview Signed 1/14/2015 11:11 AM by Christine Lugo MD     Generator Medtronic change 1/14/2015         Dyslipidemia ICD-10-CM: J97.7   ICD-9-CM: 272.4 1/14/2014     CKD (chronic kidney disease) ICD-10-CM: N18.9   ICD-9-CM: 585.9 8/15/2012     Cardiomyopathy, nonischemic (HCC) ICD-10-CM: I42.8   ICD-9-CM: 425.4 Unknown   Overview Signed 10/10/2011  6:40 AM by Jimmy Ibrahim MD     initial dx 2001, bivHF 2008 with EF 15%, s/p biV-ICD 9/08, significant improvment in EF to 45-50%         Anemia in chronic renal disease (Chronic) ICD-10-CM: N18.9, D63.1   ICD-9-CM: 285.21 12/10/2008     HTN (hypertension) ICD-10-CM: I10   ICD-9-CM: 401.9 12/10/2008     GERD (gastroesophageal reflux disease) (Chronic) ICD-10-CM: K21.9   ICD-9-CM: 530.81 12/10/2008     Gout (Chronic) ICD-10-CM: M10.9   ICD-9-CM: 274.9 12/10/2008     Pulmonary HTN (HCC) (Chronic) ICD-10-CM: I27.20   ICD-9-CM: 416.8 12/10/2008           Current Facility-Administered Medications   Medication Dose Route Frequency Provider Last Rate Last Admin   · potassium chloride 20 mEq in 50 ml IVPB 20 mEq IntraVENous Q1H nAuel ODELL MD 50 mL/hr at 01/19/22 0717 20 mEq at 01/19/22 0717   · niCARdipine (CARDENE) 25 mg in 0.9% sodium chloride 250 mL infusion 0-15 mg/hr IntraVENous TITRATE Laurence ODELL MD 50 mL/hr at 01/19/22 0647 5 mg/hr at 01/19/22 0647   · magnesium sulfate 2 g/50 ml IVPB (premix or compounded) 2 g IntraVENous ONCE Davian Vázquez DO   · DOBUTamine (DOBUTREX) 500 mg/250 mL (2,000 mcg/mL) infusion 0-10 mcg/kg/min IntraVENous TITRATE TYE Borja Stopped at 01/18/22 1900   · heparin (porcine) in 0.9% NaCl 30,000 unit/1,000 mL perfusion irrigation 50-1,000 mL 50-1,000 mL Other PRN Nick Gomez PA   · sodium chloride (NS) flush 5-40 mL 5-40 mL IntraVENous Q8H Nick Gomez PA 10 mL at 01/19/22 0605   · heparinized saline 2 units/mL infusion CONTINUOUS Janina Pedraza MD 1,000 Units at 01/18/22 1416   · chlorhexidine (PERIDEX) 0.12 % mouthwash 15 mL 15 mL Oral Q12H Davian Vázquez DO 15 mL at 01/18/22 2100   · sodium chloride (NS) flush 5-40 mL 5-40 mL IntraVENous Q8H Sam Gomez PA   · albumin human 5% (BUMINATE) solution 12.5 g 12.5 g IntraVENous Q2H PRN Sam Gomez PA   · 0.45% sodium chloride infusion 10 mL/hr IntraVENous CONTINUOUS Nick Gomez PA   · 0.9% sodium chloride infusion 9 mL/hr IntraVENous CONTINUOUS TYE Borja 9 mL/hr at 01/18/22 1647 9 mL/hr at 01/18/22 1647   · naloxone [facility]) injection 0.4 mg 0.4 mg IntraVENous PRN Nick Gomez PA   · mupirocin (BACTROBAN) 2 % ointment Both Nostrils BID Ashley Borja Given at 01/18/22 1835   · ondansetron (ZOFRAN) injection 4 mg 4 mg IntraVENous Q4H PRN Sam Gomez PA   · albuterol (PROVENTIL VENTOLIN) nebulizer solution 2.5 mg 2.5 mg Nebulization Q4H PRN Nick Gomez PA   · aspirin chewable tablet 81 mg 81 mg Oral DAILY Nick Gomez PA   · midazolam (VERSED) injection 1 mg 1 mg IntraVENous Q1H PRN Nick Gomez PA   · magnesium oxide (MAG-OX) tablet 400 mg 400 mg Oral BID Sam Gomez PA   · calcium chloride 1 g in 0.9% sodium chloride 100 mL IVPB 1 g IntraVENous PRN Sam Gomez PA   · bisacodyL (DULCOLAX) suppository 10 mg 10 mg Rectal DAILY PRN An Gomez PA   · senna-docusate (PERICOLACE) 8.6-50 mg per tablet 1 Tablet 1 Tablet Oral BID An Gomez PA   · ELECTROLYTE REPLACEMENT NOTE: Nurse to review Serum Potassium and Magnesuim levels and Initiate Electrolyte Replacement Protocol as needed 1 Each Other PRN Sam Gomez PA   · magnesium sulfate 1 g/100 ml IVPB (premix or compounded) 1 g IntraVENous PRN Sam Gomez PA   · alteplase (CATHFLO) 1 mg in sterile water (preservative free) 1 mL injection 1 mg InterCATHeter PRN An Gomez PA   · bacitracin 500 unit/gram packet 1 Packet 1 Packet Topical PRN An Gomez PA   · heparin 25,000 units in dextrose 500 mL infusion *IMPELLA* 4-20 mL/hr Other TITRATE TYE Hagen 7.9 mL/hr at 01/18/22 1630 7.9 mL/hr at 01/18/22 1630   · pantoprazole (PROTONIX) injection 40 mg 40 mg IntraVENous DAILY Davian Vázquez DO   And   · sodium chloride (NS) flush 10 mL 10 mL IntraVENous DAILY Davian Vázquez DO   · ceFAZolin (ANCEF) 1 g in sterile water (preservative free) 10 mL IV syringe 1 g IntraVENous Q12H Sam Gomez PA 1 g at 01/19/22 0154   · dexmedeTOMidine in 0.9 % NaCl (PRECEDEX) 400 mcg/100 mL (4 mcg/mL) infusion soln 0.1-1.5 mcg/kg/hr IntraVENous TITRATE Davian Vázquez DO 42.5 mL/hr at 01/19/22 0717 1.5 mcg/kg/hr at 01/19/22 0717   · midazolam (VERSED) injection 1 mg 1 mg IntraVENous Q4H PRN Davian Vázquez DO 1 mg at 01/18/22 1719   · insulin lispro (HUMALOG) injection SubCUTAneous Q6H Davian Vázquez DO 2 Units at 01/19/22 0605   · propofol (DIPRIVAN) 10 mg/mL infusion 0-50 mcg/kg/min IntraVENous TITRATE Davian Vázquez DO 17 mL/hr at 01/19/22 0425 25 mcg/kg/min at 01/19/22 0425   · sodium bicarbonate (8.4%) 25 mEq in dextrose 5% 1,000 mL - impella purge fluid IntraVENous TITRATE An Gomez, PA 7.9 mL/hr at 01/18/22 2036 New Bag at 01/18/22 2036   · milrinone (PRIMACOR) 20 MG/100 ML D5W infusion 0.25 mcg/kg/min IntraVENous CONTINUOUS Rody ODELL MD 8.5 mL/hr at 01/19/22 0312 0.25 mcg/kg/min at 01/19/22 0312   · bumetanide (BUMEX) 0.25 mg/mL infusion 2 mg/hr IntraVENous CONTINUOUS Rozanne Bamberger, PA 2 mL/hr at 01/18/22 1948 0.5 mg/hr at 01/18/22 1948   · [Held by provider] milrinone (PRIMACOR) 20 MG/100 ML D5W infusion 0.125 mcg/kg/min IntraVENous CONTINUOUS Alva Albarado NP 4.3 mL/hr at 01/18/22 0343 0.125 mcg/kg/min at 01/18/22 0343   · insulin NPH (NOVOLIN N, HUMULIN N) injection 10 Units 10 Units SubCUTAneous QHS Rozanne Bamberger, PA 10 Units at 01/18/22 2126   · insulin NPH (NOVOLIN N, HUMULIN N) injection 20 Units 20 Units SubCUTAneous DAILY Francie Gomez PA   · triamcinolone acetonide (KENALOG) 0.1 % cream Topical BID Rozanne Bamberger, PA Given at 01/17/22 1733   · polyethylene glycol (MIRALAX) packet 17 g 17 g Oral DAILY Rozanne Bamberger, PA 17 g at 01/16/22 7364   · digoxin (LANOXIN) tablet 0.0625 mg 0.0625 mg Oral DAILY Francie Gomez PA 0.0625 mg at 01/17/22 1027   · ivabradine (CORLANOR) tablet 7.5 mg 7.5 mg Oral BID WITH MEALS Francie Gomez PA 7.5 mg at 01/18/22 1700   · [Held by provider] carvediloL (COREG) tablet 12.5 mg 12.5 mg Oral BID WITH MEALS Francie Gomez PA 12.5 mg at 01/17/22 1721   · allopurinoL (ZYLOPRIM) tablet 50 mg 50 mg Oral DAILY Francie Gomez PA 50 mg at 01/17/22 1025   · epoetin isabel-epbx (RETACRIT) injection 20,000 Units 20,000 Units SubCUTAneous Q TUE, THU & SAT Rozanne Bamberger, PA 20,000 Units at 01/18/22 2039   · montelukast (SINGULAIR) tablet 10 mg 10 mg Oral DAILY Francie Gomez PA 10 mg at 01/17/22 1026   · levothyroxine (SYNTHROID) tablet 100 mcg 100 mcg Oral Once per day on Mon Tue Wed Thu Fri Sat Rozanne Bamberger,  mcg at 01/19/22 5012   · hydroxypropyl methylcellulose (ISOPTO TEARS) 0.5 % ophthalmic solution 1 Drop 1 Drop Both Eyes PRN TYE Plascencia 1 Drop at 01/06/22 1727   · venlafaxine-SR (EFFEXOR-XR) capsule 75 mg 75 mg Oral DAILY WITH BREAKFAST TYE Plascencia 75 mg at 01/17/22 1025   · gabapentin (NEURONTIN) capsule 100 mg 100 mg Oral QHS TYE Plascencia 100 mg at 01/18/22 2126   · arformoteroL (BROVANA) neb solution 15 mcg 15 mcg Nebulization BID RT TYE Plascencia 15 mcg at 01/18/22 2023   And   · budesonide (PULMICORT) 500 mcg/2 ml nebulizer suspension 500 mcg Nebulization BID RT TYE Plascencia 500 mcg at 01/18/22 2023   · [Held by provider] heparin (porcine) injection 5,000 Units 5,000 Units SubCUTAneous Q8H Casey Penn MD 5,000 Units at 01/18/22 0100   · sodium chloride (NS) flush 5-40 mL 5-40 mL IntraVENous Q8H Shalom Gomez PA 10 mL at 01/18/22 0600   · acetaminophen (TYLENOL) tablet 650 mg 650 mg Oral Q6H PRN Shalom Gomez PA   Or   · acetaminophen (TYLENOL) suppository 650 mg 650 mg Rectal Q6H PRN Shalom Gomez PA   · glucose chewable tablet 16 g 4 Tablet Oral PRN Shalom Gomez PA   · dextrose (D50W) injection syrg 12.5-25 g 25-50 mL IntraVENous PRN Shalom Gomez PA   · glucagon (GLUCAGEN) injection 1 mg 1 mg IntraMUSCular PRN Shalom Gomez PA     Allergies   Allergen Reactions   · Ciprofloxacin Anaphylaxis   · Shellfish Derived Anaphylaxis   · Ace Inhibitors Unknown (comments)   · Biaxin [Clarithromycin] Other (comments)   Metal taste   · Candesartan Cough   · Pcn [Penicillins] Hives     Past Medical History:   Diagnosis Date   · Acquired hypothyroidism 8/15/2016   · Anemia   RED-HF study   · Asthma   · Cardiomyopathy, nonischemic ([facility] [address] [facility]   initial dx 2001, bivHF 2008 with EF 15%, s/p biV-ICD 9/08, significant improvment in EF to 45-50%   · CKD (chronic kidney disease)   Dr Maci Mark   · CKD (chronic kidney disease) 8/15/2012   · Depression   · Diabetes ([facility]   · Diabetic neuropathy ([facility]   · DM (diabetes mellitus) ([facility] 8/15/2012   · GERD (gastroesophageal reflux disease)   · Gout   · Hypothyroidism   · ICD (implantable cardioverter-defibrillator), biventricular, in situ 6/5/2014   · Psoriasis     Past Surgical History:   Procedure Laterality Date   · CARDIAC CATHETERIZATION 2007; 01/06/15   normal cors   · ECHO 2D ADULT 4/2010   EF 45%, improved from 1/09 (25%)   · ECHO 2D ADULT 11/2011   LVH, EF 55-60%   · HX ORTHOPAEDIC   knee   · HX PACEMAKER PLACEMENT   AICD   · STRESS TEST LEXISCAN/CARDIOLITE 3/21/12   normal perfusion, global HK 40%     Family History   Problem Relation Age of Onset   · Heart Disease Mother   · Hypertension Mother   · Lupus Sister   · Diabetes Brother     Social History     Tobacco Use   · Smoking status: Never Smoker   · Smokeless tobacco: Never Used   Substance Use Topics   · Alcohol use: No         Review of Systems: Unable to perform due to intubated, sedated status.       Objective:     Visit Vitals when seen this am   /75   Pulse 78   Temp 97.6 °F (36.4 °C)   Resp 13   Ht 5' 7\" (1.702 m)   Wt 262 lb 9.1 oz (119.1 kg)   SpO2 100%   BMI 41.12 kg/m²       Physical Exam:   Constitutional: Well-developed and well-nourished. Head: Normocephalic and atraumatic. Eyes: Closed. ENT: Hearing grossly normal.  ET tube in place. Neck: Supple. No JVD present. Cardiovascular: Normal rate, regular rhythm. Exam reveals no gallop and no friction rub. 2/6 systolic LSB murmur.     Pulmonary/Chest: On vent. Impella in  Abdominal: Soft, no tenderness. Obese. Musculoskeletal: Symmetrical.   Vasc/lymphatic: 1+ bilat lower extremity edema. + right axillary Impella. Neurological: Sedated. Skin: Skin is warm and dry.  Left side ICD unremarkable. Psychiatric: Sedated. Assessment/Plan:     Imaging/Studies:   LHC/RHC (01/17/2022):  Severely elevated left & right side filling pressures.  Severe PH, mixed pattern with elevated wedge as well as PVR of about 6 Woods units.  Mid LAD lesion 80% involving diagonal branch ostium.  Likely prior stent in mid LAD, patent.  Reduced CI of 1.65 L/min/m2 by thermodilution & 1.6 L/min/m2 by presumed oxygen consumption by Aye. 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 RHC without milrinone (12/10/2021): High wedge pressure (35 mmHg) indicating volume overload, precapillary.  Severe pulmonary HTN. Echo (12/08/2021): LVEF 15-20%, upper normal wall thickness, LV diastolic dysfunction.  Borderline low RVEF.  Mod dilated LA, mildly dilated RA.  Mild to mod MR. Asim Velazquez TR.  Mild to mod PH.       LHC/RHC (01/2015): No significant CAD. Mixed CM: I do not believe CAD alone caused this decline.  Advanced Heart Failure team believes etiology is multifactorial  However when she is most stable may consider PCI the LAD  LVEF now 15-20%.  NYHA III.    Severe pulmonary HTN noted on last admission RHC, now with reduced CI, elevated filling pressures bilaterally, & severe pulmonary HTN. Need diuresis and likely will need dialysis  Nuclear amyloid scan equivocal.  cMRI not possible with 4194 LV lead (2008).  However, cMRI wouldn't . s/p Impella 1/18/22 as bridge to possible transplant or improvement and does not need it. If not responding and not candidate for heart renal transplant then she will be referred to hospice.  Per AHF team, needs 3 months euvolemia, HR <100 bpm, & revascularization to prove that new severe CHF is nonreversible.     CKD: Dr. Jacki Marquez said dialysis is an option if she is considered for transplant, but otherwise will not go anywhere.  Dr. Ailin Mojica seeing patient this week and will dialyze as needed. Medtronic biventricular ICD (gen change 01/14/2015, leads 09/25/2008):  Device check showed proper lead & generator function.  Generator longevity still estimated 4 months.  RA 0.1%, CRT 97.6%.      I will replace in the future once we know the plan of heart & kidney transplant.  She will need CRT if she is accepted for double organ transplant.  If she is in hospice, biventricular pacing may not do much. Dr Ingrid Springer and her team will guide her through the rest of this admission and transplant referral      Thank you for involving me in this patient's care and please call with further concerns or questions. Allison Gleason M.D.    Electrophysiology/Cardiology   [facility]   [place] [id number]                     [id number] [facility], [address] [place] [address]   [phone]

## 2022-01-19 NOTE — PROGRESS NOTES
Problem: Falls - Risk of  Goal: *Absence of Falls  Description: Document Coby Suresh Fall Risk and appropriate interventions in the flowsheet. Outcome: Progressing Towards Goal     Problem: Patient Education: Go to Patient Education Activity  Goal: Patient/Family Education  Outcome: Progressing Towards Goal     Problem: Patient Education: Go to Patient Education Activity  Goal: Patient/Family Education  Outcome: Progressing Towards Goal     Problem: Patient Education: Go to Patient Education Activity  Goal: Patient/Family Education  Outcome: Progressing Towards Goal     Problem: Diabetes Self-Management  Goal: *Disease process and treatment process  Description: Define diabetes and identify own type of diabetes; list 3 options for treating diabetes. Outcome: Progressing Towards Goal  Goal: *Incorporating nutritional management into lifestyle  Description: Describe effect of type, amount and timing of food on blood glucose; list 3 methods for planning meals. Outcome: Progressing Towards Goal  Goal: *Incorporating physical activity into lifestyle  Description: State effect of exercise on blood glucose levels. Outcome: Progressing Towards Goal  Goal: *Developing strategies to promote health/change behavior  Description: Define the ABC's of diabetes; identify appropriate screenings, schedule and personal plan for screenings. Outcome: Progressing Towards Goal  Goal: *Using medications safely  Description: State effect of diabetes medications on diabetes; name diabetes medication taking, action and side effects. Outcome: Progressing Towards Goal  Goal: *Monitoring blood glucose, interpreting and using results  Description: Identify recommended blood glucose targets  and personal targets.   Outcome: Progressing Towards Goal  Goal: *Prevention, detection, treatment of acute complications  Description: List symptoms of hyper- and hypoglycemia; describe how to treat low blood sugar and actions for lowering  high blood glucose level. Outcome: Progressing Towards Goal  Goal: *Prevention, detection and treatment of chronic complications  Description: Define the natural course of diabetes and describe the relationship of blood glucose levels to long term complications of diabetes.   Outcome: Progressing Towards Goal  Goal: *Developing strategies to address psychosocial issues  Description: Describe feelings about living with diabetes; identify support needed and support network  Outcome: Progressing Towards Goal  Goal: *Insulin pump training  Outcome: Progressing Towards Goal  Goal: *Sick day guidelines  Outcome: Progressing Towards Goal  Goal: *Patient Specific Goal (EDIT GOAL, INSERT TEXT)  Outcome: Progressing Towards Goal     Problem: Patient Education: Go to Patient Education Activity  Goal: Patient/Family Education  Outcome: Progressing Towards Goal

## 2022-01-19 NOTE — PROGRESS NOTES
[facility]   [id number] [facility], [id number] [place] [address]  Phone: [phone]   [phone]    [email or web address]     Nephrology Progress Note    Patient Name : Viola Demarco      : 1955     MRN : [id number]  Date of Admission : 2022  Date of Servive : 22    CC:  Follow up for ARF       Assessment and Plan   RODNEY on CKD :  - suspect 2/2 CRS  - Cr leveling off, excellent UOP  - cont bumex infusion, milrinone per HF service  - Q12 labs for now  - no urgent need for RRT now, remains at high risk for requiring this    CKD stage IV:  - Etiology: DM, HTN, CRS  - Renal US: suggestive of CKD, B/L benign renal cysts   - baseline Cr 2.4-2.6 mg/dl      Acute on chronic HFrEF  NI CMP, LVEF 15 to 20%  NYHA class III-IV  S/p BiV pacer/ICD-s/p CRT  R axillary Impella implanted 22    Hypervolemia:  - RHC showing severely elevated filling pressures, pulm HTN  - diuretics and milrinone as above    Morbid obesity  Type II DM  HTN  Hypothyroidism  Pulmonary hypertension  Gout  Psoriasis       Interval History:  Seen and examined. Cr stable. On cardene, milrinone, bumex infusion and Roro. PA pressures better, UOP > 5 L on bumex infusion. Impella in place, no issues overnight per RN. Review of Systems: A comprehensive review of systems was negative except for that written in the HPI.     Current Medications:   Current Facility-Administered Medications   Medication Dose Route Frequency    niCARdipine (CARDENE) 25 mg in 0.9% sodium chloride 250 mL infusion  0-15 mg/hr IntraVENous TITRATE    magnesium sulfate 2 g/50 ml IVPB (premix or compounded)  2 g IntraVENous ONCE    DOBUTamine (DOBUTREX) 500 mg/250 mL (2,000 mcg/mL) infusion  0-10 mcg/kg/min IntraVENous TITRATE    heparin (porcine) in 0.9% NaCl 30,000 unit/1,000 mL perfusion irrigation 50-1,000 mL  50-1,000 mL Other PRN    sodium chloride (NS) flush 5-40 mL  5-40 mL IntraVENous Q8H    heparinized saline 2 units/mL infusion    CONTINUOUS    chlorhexidine (PERIDEX) 0.12 % mouthwash 15 mL  15 mL Oral Q12H    sodium chloride (NS) flush 5-40 mL  5-40 mL IntraVENous Q8H    albumin human 5% (BUMINATE) solution 12.5 g  12.5 g IntraVENous Q2H PRN    0.45% sodium chloride infusion  10 mL/hr IntraVENous CONTINUOUS    0.9% sodium chloride infusion  9 mL/hr IntraVENous CONTINUOUS    naloxone (NARCAN) injection 0.4 mg  0.4 mg IntraVENous PRN    mupirocin (BACTROBAN) 2 % ointment   Both Nostrils BID    ondansetron (ZOFRAN) injection 4 mg  4 mg IntraVENous Q4H PRN    albuterol (PROVENTIL VENTOLIN) nebulizer solution 2.5 mg  2.5 mg Nebulization Q4H PRN    aspirin chewable tablet 81 mg  81 mg Oral DAILY    midazolam (VERSED) injection 1 mg  1 mg IntraVENous Q1H PRN    magnesium oxide (MAG-OX) tablet 400 mg  400 mg Oral BID    calcium chloride 1 g in 0.9% sodium chloride 100 mL IVPB  1 g IntraVENous PRN    bisacodyL (DULCOLAX) suppository 10 mg  10 mg Rectal DAILY PRN    senna-docusate (PERICOLACE) 8.6-50 mg per tablet 1 Tablet  1 Tablet Oral BID    ELECTROLYTE REPLACEMENT NOTE: Nurse to review Serum Potassium and Magnesuim levels and Initiate Electrolyte Replacement Protocol as needed  1 Each Other PRN    magnesium sulfate 1 g/100 ml IVPB (premix or compounded)  1 g IntraVENous PRN    alteplase (CATHFLO) 1 mg in sterile water (preservative free) 1 mL injection  1 mg InterCATHeter PRN    bacitracin 500 unit/gram packet 1 Packet  1 Packet Topical PRN    heparin 25,000 units in dextrose 500 mL infusion *IMPELLA*  4-20 mL/hr Other TITRATE    pantoprazole (PROTONIX) injection 40 mg  40 mg IntraVENous DAILY    And    sodium chloride (NS) flush 10 mL  10 mL IntraVENous DAILY    ceFAZolin (ANCEF) 1 g in sterile water (preservative free) 10 mL IV syringe  1 g IntraVENous Q12H    dexmedeTOMidine in 0.9 % NaCl (PRECEDEX) 400 mcg/100 mL (4 mcg/mL) infusion soln  0.1-1.5 mcg/kg/hr IntraVENous TITRATE    midazolam (VERSED) injection 1 mg  1 mg IntraVENous Q4H PRN    insulin lispro (HUMALOG) injection   SubCUTAneous Q6H    propofol (DIPRIVAN) 10 mg/mL infusion  0-50 mcg/kg/min IntraVENous TITRATE    sodium bicarbonate (8.4%) 25 mEq in dextrose 5% 1,000 mL - impella purge fluid   IntraVENous TITRATE    milrinone (PRIMACOR) 20 MG/100 ML D5W infusion  0.25 mcg/kg/min IntraVENous CONTINUOUS    bumetanide (BUMEX) 0.25 mg/mL infusion  2 mg/hr IntraVENous CONTINUOUS    [Held by provider] milrinone (PRIMACOR) 20 MG/100 ML D5W infusion  0.125 mcg/kg/min IntraVENous CONTINUOUS    insulin NPH (NOVOLIN N, HUMULIN N) injection 10 Units  10 Units SubCUTAneous QHS    insulin NPH (NOVOLIN N, HUMULIN N) injection 20 Units  20 Units SubCUTAneous DAILY    triamcinolone acetonide (KENALOG) 0.1 % cream   Topical BID    polyethylene glycol (MIRALAX) packet 17 g  17 g Oral DAILY    digoxin (LANOXIN) tablet 0.0625 mg  0.0625 mg Oral DAILY    ivabradine (CORLANOR) tablet 7.5 mg  7.5 mg Oral BID WITH MEALS    [Held by provider] carvediloL (COREG) tablet 12.5 mg  12.5 mg Oral BID WITH MEALS    allopurinoL (ZYLOPRIM) tablet 50 mg  50 mg Oral DAILY    epoetin isabel-epbx (RETACRIT) injection 20,000 Units  20,000 Units SubCUTAneous Q TUE, THU & SAT    montelukast (SINGULAIR) tablet 10 mg  10 mg Oral DAILY    levothyroxine (SYNTHROID) tablet 100 mcg  100 mcg Oral Once per day on Mon Tue Wed Thu Fri Sat    hydroxypropyl methylcellulose (ISOPTO TEARS) 0.5 % ophthalmic solution 1 Drop  1 Drop Both Eyes PRN    venlafaxine-SR (EFFEXOR-XR) capsule 75 mg  75 mg Oral DAILY WITH BREAKFAST    gabapentin (NEURONTIN) capsule 100 mg  100 mg Oral QHS    arformoteroL (BROVANA) neb solution 15 mcg  15 mcg Nebulization BID RT    And    budesonide (PULMICORT) 500 mcg/2 ml nebulizer suspension  500 mcg Nebulization BID RT    [Held by provider] heparin (porcine) injection 5,000 Units  5,000 Units SubCUTAneous Q8H    sodium chloride (NS) flush 5-40 mL 5-40 mL IntraVENous Q8H    acetaminophen (TYLENOL) tablet 650 mg  650 mg Oral Q6H PRN    Or    acetaminophen (TYLENOL) suppository 650 mg  650 mg Rectal Q6H PRN    glucose chewable tablet 16 g  4 Tablet Oral PRN    dextrose (D50W) injection syrg 12.5-25 g  25-50 mL IntraVENous PRN    glucagon (GLUCAGEN) injection 1 mg  1 mg IntraMUSCular PRN      Allergies   Allergen Reactions    Ciprofloxacin Anaphylaxis    Shellfish Derived Anaphylaxis    Ace Inhibitors Unknown (comments)    Biaxin [Clarithromycin] Other (comments)     Metal taste    Candesartan Cough    Pcn [Penicillins] Hives       Objective:  Vitals:    Vitals:    01/19/22 0600 01/19/22 0700 01/19/22 0800 01/19/22 0805   BP: 119/88 122/75     Pulse: 78 78 76    Resp: 13 13 14    Temp:   99.9 °F (37.7 °C)    SpO2: 100% 100% 100% 100%   Weight:       Height:         Intake and Output:  No intake/output data recorded. 01/17 1901 - 01/19 0700  In: 1576.6 [I.V.:1476.6]  Out: 5725 [Urine:5725]    Physical Examination:    General: Sedated on the vent  HEENT:           ETT in place   Neck:  Supple, no mass  Resp:  Rales +  CV:  tachy  GI:  Soft, NT, + BS, obese  Neurologic:  Sedated  :                  Henley in place    [x]    High complexity decision making was performed  []    Patient is at high-risk of decompensation with multiple organ involvement    Lab Data Personally Reviewed: I have reviewed all the pertinent labs, microbiology data and radiology studies during assessment.     Recent Labs     01/19/22  0409 01/18/22  2136 01/18/22  1645 01/18/22  1634 01/18/22  0339 01/17/22  0051    140 138  --  136 136   K 3.5 4.1 3.5  --  4.4 4.2    99 100  --  97 99   CO2 31 31 31  --  33* 33*   * 115* 137*  --  206* 116*   BUN 61* 63* 65*  --  64* 51*   CREA 2.70* 2.74* 2.89*  --  2.76* 2.37*   CA 9.6 9.5 9.7  --  9.8 9.6   MG 1.9 2.1  --  2.3 2.4 2.2   PHOS 2.4*  --   --   --   --   --    ALB 3.4* 3.7 3.3*  --  3.5 3.6   ALT 26 29 29  -- 32 32   INR  --   --   --  1.4*  --   --      Recent Labs     01/19/22  0409 01/18/22  2136 01/18/22  1634 01/17/22  1033   WBC 8.5  --  7.9 9.5   HGB 8.8* 8.7* 7.4* 8.1*   HCT 31.3* 32.1* 27.3* 31.4*     --  365 344     Lab Results   Component Value Date/Time    Specimen Description: URINE 10/22/2013 01:32 PM    Specimen Description: URINE 01/23/2013 04:40 PM    Specimen Description: LEG TISSUE 02/12/2009 12:00 AM    Specimen Description: LEG (LEFT) 01/29/2009 03:06 AM     Lab Results   Component Value Date/Time    Culture result: MIXED SKIN ROSANNA ISOLATED 10/22/2013 01:32 PM    Culture result:  01/23/2013 04:40 PM     ENTEROCOCCUS FAECALIS GROUP D  MIXED SKIN ROSANNA ISOLATED    Culture result:  02/12/2009 12:00 AM     LIGHT STAPHYLOCOCCUS EPIDERMIDIS (OXACILLIN RESISTANT)  LIGHT 2ND STRAIN OF STAPHYLOCOCCUS EPIDERMIDIS (OXACILLIN RESISTANT)    Culture result: NO GROWTH 2 DAYS 01/29/2009 03:06 AM     Recent Results (from the past 24 hour(s))   RBC, ALLOCATE    Collection Time: 01/18/22 11:00 AM   Result Value Ref Range    HISTORY CHECKED?  Historical check performed    COVID-19 RAPID TEST    Collection Time: 01/18/22 11:14 AM   Result Value Ref Range    Specimen source Nasopharyngeal      COVID-19 rapid test Indeterminate (A) NOTD     COVID-19 RAPID TEST    Collection Time: 01/18/22 12:04 PM   Result Value Ref Range    Specimen source Nasopharyngeal      COVID-19 rapid test Not detected NOTD     GLUCOSE, POC    Collection Time: 01/18/22 12:16 PM   Result Value Ref Range    Glucose (POC) 197 (H) 65 - 117 mg/dL    Performed by Syble Horse    Collection Time: 01/18/22  2:25 PM   Result Value Ref Range    Glucose 231 mg/dL    Insulin order 5.1 units/hour    Insulin adminstered 5.1 units/hour    Multiplier 0.030     Low target 100 mg/dL    High target 140 mg/dL    D50 order 0.0 ml    D50 administered 0.00 ml    Minutes until next BG 60 min    Order initials KJ     Administered initials KJ GLSCOM Comments     GLUCOSTABILIZER    Collection Time: 01/18/22  2:51 PM   Result Value Ref Range    Glucose 205 mg/dL    Insulin order 5.8 units/hour    Insulin adminstered 5.8 units/hour    Multiplier 0.040     Low target 100 mg/dL    High target 140 mg/dL    D50 order 0.0 ml    D50 administered 0.00 ml    Minutes until next BG 60 min    Order initials KJ     Administered initials KJ     GLSCOM Comments     GLUCOSTABILIZER    Collection Time: 01/18/22  3:55 PM   Result Value Ref Range    Glucose 170 mg/dL    Insulin order 5.5 units/hour    Insulin adminstered 5.5 units/hour    Multiplier 0.050     Low target 100 mg/dL    High target 140 mg/dL    D50 order 0.0 ml    D50 administered 0.00 ml    Minutes until next BG 60 min    Order initials KJ     Administered initials KJ     GLSCOM Comments     MAGNESIUM    Collection Time: 01/18/22  4:34 PM   Result Value Ref Range    Magnesium 2.3 1.6 - 2.4 mg/dL   CBC WITH AUTOMATED DIFF    Collection Time: 01/18/22  4:34 PM   Result Value Ref Range    WBC 7.9 3.6 - 11.0 K/uL    RBC 3.05 (L) 3.80 - 5.20 M/uL    HGB 7.4 (L) 11.5 - 16.0 g/dL    HCT 27.3 (L) 35.0 - 47.0 %    MCV 89.5 80.0 - 99.0 FL    MCH 24.3 (L) 26.0 - 34.0 PG    MCHC 27.1 (L) 30.0 - 36.5 g/dL    RDW 20.6 (H) 11.5 - 14.5 %    PLATELET 336 599 - 222 K/uL    MPV 9.4 8.9 - 12.9 FL    NRBC 0.3 (H) 0  WBC    ABSOLUTE NRBC 0.02 (H) 0.00 - 0.01 K/uL    NEUTROPHILS 79 (H) 32 - 75 %    LYMPHOCYTES 10 (L) 12 - 49 %    MONOCYTES 8 5 - 13 %    EOSINOPHILS 2 0 - 7 %    BASOPHILS 0 0 - 1 %    IMMATURE GRANULOCYTES 1 (H) 0.0 - 0.5 %    ABS. NEUTROPHILS 6.2 1.8 - 8.0 K/UL    ABS. LYMPHOCYTES 0.8 0.8 - 3.5 K/UL    ABS. MONOCYTES 0.6 0.0 - 1.0 K/UL    ABS. EOSINOPHILS 0.2 0.0 - 0.4 K/UL    ABS. BASOPHILS 0.0 0.0 - 0.1 K/UL    ABS. IMM.  GRANS. 0.1 (H) 0.00 - 0.04 K/UL    DF SMEAR SCANNED      RBC COMMENTS POLYCHROMASIA  1+        RBC COMMENTS ANISOCYTOSIS  1+       PTT    Collection Time: 01/18/22  4:34 PM   Result Value Ref Range    aPTT >130.0 (HH) 22.1 - 31.0 sec    aPTT, therapeutic range     58.0 - 77.0 SECS   PROTHROMBIN TIME + INR    Collection Time: 01/18/22  4:34 PM   Result Value Ref Range    INR 1.4 (H) 0.9 - 1.1      Prothrombin time 14.0 (H) 9.0 - 17.1 sec   METABOLIC PANEL, COMPREHENSIVE    Collection Time: 01/18/22  4:45 PM   Result Value Ref Range    Sodium 138 136 - 145 mmol/L    Potassium 3.5 3.5 - 5.1 mmol/L    Chloride 100 97 - 108 mmol/L    CO2 31 21 - 32 mmol/L    Anion gap 7 5 - 15 mmol/L    Glucose 137 (H) 65 - 100 mg/dL    BUN 65 (H) 6 - 20 MG/DL    Creatinine 2.89 (H) 0.55 - 1.02 MG/DL    BUN/Creatinine ratio 22 (H) 12 - 20      GFR est AA 20 (L) >60 ml/min/1.73m2    GFR est non-AA 16 (L) >60 ml/min/1.73m2    Calcium 9.7 8.5 - 10.1 MG/DL    Bilirubin, total 0.5 0.2 - 1.0 MG/DL    ALT (SGPT) 29 12 - 78 U/L    AST (SGOT) 16 15 - 37 U/L    Alk.  phosphatase 69 45 - 117 U/L    Protein, total 6.9 6.4 - 8.2 g/dL    Albumin 3.3 (L) 3.5 - 5.0 g/dL    Globulin 3.6 2.0 - 4.0 g/dL    A-G Ratio 0.9 (L) 1.1 - 2.2     PROCALCITONIN    Collection Time: 01/18/22  4:57 PM   Result Value Ref Range    Procalcitonin 0.75 ng/mL   LACTIC ACID    Collection Time: 01/18/22  4:57 PM   Result Value Ref Range    Lactic acid 1.2 0.4 - 2.0 MMOL/L   POC EG7    Collection Time: 01/18/22  5:01 PM   Result Value Ref Range    Calcium, ionized (POC) 1.17 1.12 - 1.32 mmol/L    FIO2 (POC) 100 %    pH (POC) 7.50 (H) 7.35 - 7.45      pCO2 (POC) 40.8 35.0 - 45.0 MMHG    pO2 (POC) 393 (H) 80 - 100 MMHG    HCO3 (POC) 31.5 (H) 22 - 26 MMOL/L    Base excess (POC) 7.5 mmol/L    sO2 (POC) 100.0 (H) 92 - 97 %    Site DRAWN FROM ARTERIAL LINE      Device: ADULT VENT      Mode ASSIST CONTROL      Tidal volume 460 ml    Set Rate 16 bpm    PEEP/CPAP (POC) 8 cmH2O    Allens test (POC) NOT APPLICABLE      Specimen type (POC) ARTERIAL     GLUCOSE, POC    Collection Time: 01/18/22  6:32 PM   Result Value Ref Range    Glucose (POC) 125 (H) 65 - 117 mg/dL Performed by Peng Uribe    CARBOXYHEMOGLOBIN    Collection Time: 01/18/22  8:39 PM   Result Value Ref Range    Carboxy-Hgb 0.8 (L) 1 - 2 %    Methemoglobin 0.2 0 - 1.4 %    tHb 10.9 (L) 14 - 17 g/dL    Oxyhemoglobin 98.3 (H) 94 - 97 %    O2 SATURATION 99 95 - 99 %    SITE SG      Sample source MIXED VENOUS     GLUCOSE, POC    Collection Time: 01/18/22  9:27 PM   Result Value Ref Range    Glucose (POC) 143 (H) 65 - 117 mg/dL    Performed by Paulo Sharp    HGB & HCT    Collection Time: 01/18/22  9:36 PM   Result Value Ref Range    HGB 8.7 (L) 11.5 - 16.0 g/dL    HCT 32.1 (L) 35.0 - 26.3 %   METABOLIC PANEL, COMPREHENSIVE    Collection Time: 01/18/22  9:36 PM   Result Value Ref Range    Sodium 140 136 - 145 mmol/L    Potassium 4.1 3.5 - 5.1 mmol/L    Chloride 99 97 - 108 mmol/L    CO2 31 21 - 32 mmol/L    Anion gap 10 5 - 15 mmol/L    Glucose 115 (H) 65 - 100 mg/dL    BUN 63 (H) 6 - 20 MG/DL    Creatinine 2.74 (H) 0.55 - 1.02 MG/DL    BUN/Creatinine ratio 23 (H) 12 - 20      GFR est AA 21 (L) >60 ml/min/1.73m2    GFR est non-AA 17 (L) >60 ml/min/1.73m2    Calcium 9.5 8.5 - 10.1 MG/DL    Bilirubin, total 0.6 0.2 - 1.0 MG/DL    ALT (SGPT) 29 12 - 78 U/L    AST (SGOT) 17 15 - 37 U/L    Alk.  phosphatase 84 45 - 117 U/L    Protein, total 7.3 6.4 - 8.2 g/dL    Albumin 3.7 3.5 - 5.0 g/dL    Globulin 3.6 2.0 - 4.0 g/dL    A-G Ratio 1.0 (L) 1.1 - 2.2     MAGNESIUM    Collection Time: 01/18/22  9:36 PM   Result Value Ref Range    Magnesium 2.1 1.6 - 2.4 mg/dL   GLUCOSE, POC    Collection Time: 01/18/22 11:48 PM   Result Value Ref Range    Glucose (POC) 146 (H) 65 - 117 mg/dL    Performed by Paulo Memory    NT-PRO BNP    Collection Time: 01/19/22  4:09 AM   Result Value Ref Range    NT pro-BNP 13,848 (H) <125 PG/ML   DIGOXIN    Collection Time: 01/19/22  4:09 AM   Result Value Ref Range    Digoxin level 0.7 (L) 0.90 - 2.00 NG/ML   LACTIC ACID    Collection Time: 01/19/22  4:09 AM   Result Value Ref Range Lactic acid 1.1 0.4 - 2.0 MMOL/L   PROCALCITONIN    Collection Time: 01/19/22  4:09 AM   Result Value Ref Range    Procalcitonin 4.04 ng/mL   METABOLIC PANEL, COMPREHENSIVE    Collection Time: 01/19/22  4:09 AM   Result Value Ref Range    Sodium 138 136 - 145 mmol/L    Potassium 3.5 3.5 - 5.1 mmol/L    Chloride 100 97 - 108 mmol/L    CO2 31 21 - 32 mmol/L    Anion gap 7 5 - 15 mmol/L    Glucose 148 (H) 65 - 100 mg/dL    BUN 61 (H) 6 - 20 MG/DL    Creatinine 2.70 (H) 0.55 - 1.02 MG/DL    BUN/Creatinine ratio 23 (H) 12 - 20      GFR est AA 21 (L) >60 ml/min/1.73m2    GFR est non-AA 18 (L) >60 ml/min/1.73m2    Calcium 9.6 8.5 - 10.1 MG/DL    Bilirubin, total 0.6 0.2 - 1.0 MG/DL    ALT (SGPT) 26 12 - 78 U/L    AST (SGOT) 16 15 - 37 U/L    Alk. phosphatase 77 45 - 117 U/L    Protein, total 6.9 6.4 - 8.2 g/dL    Albumin 3.4 (L) 3.5 - 5.0 g/dL    Globulin 3.5 2.0 - 4.0 g/dL    A-G Ratio 1.0 (L) 1.1 - 2.2     MAGNESIUM    Collection Time: 01/19/22  4:09 AM   Result Value Ref Range    Magnesium 1.9 1.6 - 2.4 mg/dL   CBC W/O DIFF    Collection Time: 01/19/22  4:09 AM   Result Value Ref Range    WBC 8.5 3.6 - 11.0 K/uL    RBC 3.59 (L) 3.80 - 5.20 M/uL    HGB 8.8 (L) 11.5 - 16.0 g/dL    HCT 31.3 (L) 35.0 - 47.0 %    MCV 87.2 80.0 - 99.0 FL    MCH 24.5 (L) 26.0 - 34.0 PG    MCHC 28.1 (L) 30.0 - 36.5 g/dL    RDW 20.9 (H) 11.5 - 14.5 %    PLATELET 038 110 - 851 K/uL    MPV 9.1 8.9 - 12.9 FL    NRBC 0.0 0  WBC    ABSOLUTE NRBC 0.00 0.00 - 0.01 K/uL   PHOSPHORUS    Collection Time: 01/19/22  4:09 AM   Result Value Ref Range    Phosphorus 2.4 (L) 2.6 - 4.7 MG/DL   GLUCOSE, POC    Collection Time: 01/19/22  5:52 AM   Result Value Ref Range    Glucose (POC) 161 (H) 65 - 117 mg/dL    Performed by Smith Partida            I have reviewed the flowsheets. Chart and Pertinent Notes have been reviewed. No change in PMH ,family and social history from Consult note.       Rachana Ness MD  [facility]

## 2022-01-19 NOTE — PROGRESS NOTES
1930: Bedside shift report received from [id number] [facility]    2100: Dede Madden calibrated CO-2.4, CI-1.1, SVR- [id number], [id number]   Heart failure team paged updated on pt status. Orders received for milrinone @ 0.2mcg/kg/min.    0300: CI decreasing 1.6-1.7 SVR increasing to 1776, . Dr. Shon Arevalo notified. Milrinone increased to 0.25mcg/kg/min      0600: Pt's  and . Dr. Shon Arevalo made aware and Cardene ordered. K of 3.5 noted and replaced per protocol. 0730:  Bedside report given to [facility]

## 2022-01-19 NOTE — PROGRESS NOTES
AHF Interval Update     Hemodynamics reviewed, patient reassessed. Remains on Impella support at P-5. Intermittent hypotension on cardene gtt with associated diminished UOP, will hold for now. OK to use 5 mg IV hydralazine q4h PRN SBP > 110 mmHg. Decrease milrinone to 0.2 mcg/kg/min and transition from Bumex infusion to intermittent dosing, 1 mg IV q4h PRN CVP > 10 mmHg due to normalization of hemodynamics. Hypotensive with Roro wean; will hold at 5 ppm overnight and reassess in AM; may require sildenafil to aid in further weaning. D/w Gurpreet Khan, and bedside RN, [facility].      Hemodynamics:   CO: CO (l/min): 5.2 l/min   CI: CI (l/min/m2): 2.3 l/min/m2   CVP: CVP (mmHg): 8 mmHg (01/19/22 1600)   SVR: SVR (dyne*sec)/cm5: 1313 (dyne*sec)/cm5 (01/19/22 6465)   PAP Systolic: PAP Systolic: 48 (78/79/36 3059)   PAP Diastolic: PAP Diastolic: 21 (04/41/41 5362)   PVR:     SV02: SVO2 (%): 75 % (01/19/22 1600)   SCV02:      Signed By: Jm Thomas NP     January 19, 2022

## 2022-01-19 NOTE — PROGRESS NOTES
Occupational Therapy  01/19/22     Patient currently on OT caseload, since last seen by OT patient had Impella implanted 1/18/2022 and remains on vent following procedure. OT reassessment attempted at 10:57 AM however per nursing patient remains intubated/sedated and is not appropriate to participate with therapy at this time. Will continue to follow patient and attempt OT at a later time. Per ABCDEF protocol, will work with patient when PEEP is 10.0 or less, FIO2 60% or less (currently FIO2 40% and PEEP 8), and patient is following basic commands. Will follow patient peripherally.      Recommend nursing to complete with patient, as able and per protocol, in order to promote cardiopulmonary systems, maintain strength, endurance and independence:   -bed in chair position or maximize full reverse Trendelenburg position to facilitate upright activity with foot board and non-skid footwear on 3x/day ~30-60 mins each   -ROM during bathing B UEs and LEs  -positioning to prevent contractures and edema  RASS -1/0/+1 (during SAT)  Active ROM   Sitting (bed in chair position)   Standing (reverse Trendelenburg)   ADLs   RASS -3/2  Passive ROM   Sit (bed in chair position)   RASS -5/-4  Passive ROM       Thank you,  Janett Alaniz OTR/L

## 2022-01-19 NOTE — PROGRESS NOTES
[facility]  ICU Progress Note    Admit Date: 2022  POD:  1 Day Post-Op    Procedure:  Procedure(s):  RIGHT AXILLARY IMPELLA INSERTION        Subjective/24 Hour Summary:   Pt seen with Dr. Andrew Wagner. Remains intubated, milrinone started overnight for high SVR low CI. Impella at P5, bicarb via purge. Functioning well. Objective:   Vitals:  Blood pressure 122/75, pulse 76, temperature 99.9 °F (37.7 °C), resp. rate 14, height 5' 7\" (1.702 m), weight 262 lb 9.1 oz (119.1 kg), SpO2 100 %. Temp (24hrs), Av.8 °F (36.6 °C), Min:96.8 °F (36 °C), Max:99.9 °F (37.7 °C)    Hemodynamics:   CO: CO (l/min): 4.8 l/min   CI: CI (l/min/m2): 2.2 l/min/m2   CVP: CVP (mmHg): 12 mmHg (22)   SVR: SVR (dyne*sec)/cm5: 928 (dyne*sec)/cm5 (22 9840)   PAP Systolic: PAP Systolic: 46 (41/38/15 5681)   PAP Diastolic: PAP Diastolic: 23 (62 0437)   PVR:     SV02: SVO2 (%): 69 % (22)   SCV02:      EKG/Rhythm:  SR    Ventilator Settings:  Mode Rate Tidal Volume Pressure FiO2 PEEP   Assist control,Volume control   460 ml    50 % 8 cm H20     Peak airway pressure: 25 cm H2O    Minute ventilation: 6.97 l/min        Oxygen Therapy:  Oxygen Therapy  O2 Sat (%): 100 % (22)  Pulse via Oximetry: 76 beats per minute (22)  O2 Device: Endotracheal tube;Ventilator (22)  Skin Assessment: Clean, dry, & intact (22)  Skin Protection for O2 Device: Yes (22)  Orientation: Bilateral (22)  Location: Cheek (22)  Interventions: Hydrocolloid Dressing (22 0400)  O2 Flow Rate (L/min): 6 l/min (weaned) (22 0740)  FIO2 (%): 50 % (22 0805)    CXR:  CXR Results  (Last 48 hours)               22 0447  XR CHEST PORT Final result    Impression:  Slight worsening in the bilateral pulmonary infiltrates. Narrative: Indication: Follow-up abnormal chest x-ray, recent heart surgery       Comparison 2022.  Portable exam obtained at 432 demonstrates life-support   lines and tubes unchanged in position, including Impella device. There has been   a slight worsening in the bilateral pulmonary infiltrates compared to prior   exam.           01/18/22 1707  XR CHEST PORT Final result    Impression:  Lines and tubes as described above. Unchanged pulmonary edema   pattern. No pneumothorax. Narrative:  INDICATION: Postop heart surgery       COMPARISON: 1/13/2022       FINDINGS: AP portable imaging of the chest performed at 4:59 PM demonstrates a   stable cardiomediastinal silhouette. Right axillary Impella device has its tip   overlying the left ventricle. Right internal jugular [place]-Guille catheter tip   overlies the main pulmonary artery. Right arm PICC line has its tip at the   cavoatrial junction. Left-sided AICD is stable in position. Mild to moderate   pulmonary edema pattern persists. No pneumothorax or pleural effusion is   evident. . No significant osseous abnormalities are seen. Admission Weight: Last Weight   Weight: 264 lb 1.8 oz (119.8 kg) Weight: 262 lb 9.1 oz (119.1 kg)     Intake / Output / Drain:  Current Shift: No intake/output data recorded. Last 24 hrs.:     Intake/Output Summary (Last 24 hours) at 1/19/2022 0904  Last data filed at 1/19/2022 0700  Gross per 24 hour   Intake 1576.6 ml   Output 5275 ml   Net -3698.4 ml       EXAM:  General:    NAD                                                                                          Lungs:   Clear to auscultation bilaterally. Incision:  No erythema, drainage or swelling. Heart:  Regular rate and rhythm, S1, S2 normal, no murmur, click, rub or gallop. Abdomen:   Soft, non-tender. Bowel sounds normal. No masses,  No organomegaly. Extremities:  No edema. PPP.     Neurologic: Intubated, sedated     Labs:   Recent Labs     01/19/22  0409 01/18/22  2136 01/18/22  1634 01/18/22  1634   WBC 8.5  --   --  7.9   HGB 8.8* 8.7*   < > 7.4*   HCT 31.3* 32.1*   < > 27.3*     --   --  365    < > = values in this interval not displayed. Recent Labs     22  0409 22    140   K 3.5 4.1    99   CO2 31 31   BUN 61* 63*   CREA 2.70* 2.74*   * 115*   CA 9.6 9.5   MG 1.9 2.1   PHOS 2.4*  --      Recent Labs     22  0409 22   AP 77 84   TP 6.9 7.3   ALB 3.4* 3.7   GLOB 3.5 3.6     Recent Labs     22  1634   INR 1.4*   PTP 14.0*   APTT >130.0*      Recent Labs     22  1701   PHI 7.50*   PCO2I 40.8   PO2I 393*   FIO2I 100     No results for input(s): CPK, CKMB, TROIQ, BNPP in the last 72 hours. Assessment:     Active Problems:    Acute respiratory failure with hypoxia ([facility] [address] (2022)         Plan/Recommendations/Medical Decision Makin. Acute on Chronic Systolic CHF class III/IV: S/P right axillary impella 5.5 . Cont bicarb via purge. Milrinone 0.25, nitric. Start systemic bival. Per AHF. 2. Acute on Chronic Respiratory insufficiency: On home O2, high risk for prolonged intubation post impella. Vent wean per intensivist.  3. CAD: Multivessel CAD on [facility] 22. ASA 81. BB on hold due to cardiogenic shock. Recommend starting high intensity statin when feasible, per primary/HF  4. RODNEY on Chronic CKD Stage IV: Renal following. 5 L UOP in 24 hrs on bumex drip. Cr. Stable. 5. Gout: allopurinol  6. DM: Per primary service  7. GERD: Home meds  8. Hypothyroidism: on synthroid  9. Depression: Home meds  10. Anemia: on EPO, stable post op. Dispo: impella in place and functioning well. Cont heparin/bicarb via purge. Further HF management per AHF.     Signed By: TYE Rothman

## 2022-01-19 NOTE — PROGRESS NOTES
[facility]    ICU TEAM Progress Note    Name: James Ceron   : 1955   MRN: [id number]   Date: 2022           ICU Assessment     1. Acute on chronic systolic congestive heart failure  2. Cardiogenic shock  a. Status post Impella placement  3. Severe pulmonary hypertension  4. Severe multi-vessel coronary artery disease  a. Prior stent (mid-LAD  5. Acute on chronic respiratory insufficiency  6. RODNEY on chronic CKD stage IV  7. Volume overload  8. Morbid obesity         ICU Comprehensive Plan of Care:     1. Neurological System  Spontaneous Awakening Trial: Pending  Sedation: Precedex and Fentanyl  Analgesia: Fentanyl    2. Cardiovascular System  Appreciate heart failure  Appreciate EP  Appreciate CTS  Impella at P5  SBP Goal of: Less than 110  MAP Goal of: > 65 mmHg  Milrinone and Nicardipine (Cardene) - For above SBP/MAP goals  Transfusion Trigger (Hgb): <7 g/dL  Keep K > 4; Mg > 2     3. Respiratory System  Chlorhexidine   Optimize PEEP/Ventilation/Oxygenation  Goal Tidal Volume 6 cc/kg based on IBW  Aim for lung protective ventilation  Head of bed > 30 degrees   Slow weaning of inhaled nitric oxide  Option could be to add sildenafil for PA pressures if need be  Spontaneous Breathing Trial: Pending  SpO2 Goal: > 92%  DVT Prophylaxis: SCD's or Sequential Compression Device     4. Renal/GI/Endocrine System  Appreciate nephrology input   Bumex gtt  We will work with nephrology on appropriate diuresis  Stress ulcer Prophylaxis: Protonix (pantoprazole)   Bowel Regimen: Senna and Docusate (Colace)  Feeding:  Yes   Blood Sugar Goal 120-180 - Glycemic Control: Insulin    5. Infectious Disease  Indwelling Catheter:  Tubes: ETT and Nasogastric Tube  Lines: Peripheral IV and Central Line  Drains: [place] Catheter    6. PT/OT: PT consulted and on board and OT consulted and on board     7. Goals of Care Discussion with family Yes     8. Plan of Care/Code Status: Full Code    9.  Discussed Care Plan with Bedside RN    10. Documentation of Current Medications    Subjective:   Progress Note: 1/19/2022      Reason for ICU Admission: Status post Impella placement for cardiogenic shock    HPI:  Most of history obtained from chart review    This is a 43-year-old female with a history of nonischemic cardiomyopathy, acute on chronic systolic heart failure stage IV, chronic hypoxic respiratory failure secondary to pulm hypertension, CKD, hypothyroidism, GERD, and diabetes type 2 who was admitted to the [facility] on 1/5/2022. Initially she was hypoxic in the 70s on pulse oximetry which improved with oxygen delivered nasal cannulae. Over the ensuing 2 weeks patient was seen by heart failure team, EP team, nephrology, and cardiothoracic surgery. She had an Impella placed on 1/18/2022. She might be a candidate for heart transplant and kidney transplant. Overnight Events:   1/18/22 -- Admitted to CCU post-impella insertion   1/19/21 --transitioned from dobutamine to milrinone, added Cardene, Bumex drip    POD:  Day of Surgery    S/P:   Procedure(s):  RIGHT AXILLARY IMPELLA INSERTION    Active Problem List:     Problem List  Date Reviewed: 12/22/2021          Codes Class    Acute respiratory failure with hypoxia (HCC) ICD-10-CM: J96.01  ICD-9-CM: 518.81         CHF exacerbation (HCC) ICD-10-CM: I50.9  ICD-9-CM: 428.0         Type 2 diabetes mellitus with diabetic neuropathy (HCC) ICD-10-CM: E11.40  ICD-9-CM: 250.60, 357.2         Type 2 diabetes with nephropathy (HCC) ICD-10-CM: E11.21  ICD-9-CM: 250.40, 583.81         Obesity, morbid ([facility] [address] ICD-10-CM: E66.01  ICD-9-CM: 278.01         Acquired hypothyroidism ICD-10-CM: E03.9  ICD-9-CM: 227. 9         Dysthymia ICD-10-CM: F34.1  ICD-9-CM: 300.4         ICD (implantable cardioverter-defibrillator), biventricular, in situ ICD-10-CM: Z95.810  ICD-9-CM: V45.02     Overview Signed 1/14/2015 11:11 AM by Parris Woody MD     Generator [employer] change 1/14/2015             Dyslipidemia ICD-10-CM: E78.5  ICD-9-CM: 272.4         CKD (chronic kidney disease) ICD-10-CM: N18.9  ICD-9-CM: 414. 9         Cardiomyopathy, nonischemic ([facility] [address] ICD-10-CM: I42.8  ICD-9-CM: 425.4     Overview Signed 10/10/2011  6:40 AM by Tanya Shane MD     initial dx 2001, bivHF 2008 with EF 15%, s/p biV-ICD 9/08, significant improvment in EF to 45-50%             Anemia in chronic renal disease (Chronic) ICD-10-CM: N18.9, D63.1  ICD-9-CM: 285.21         HTN (hypertension) ICD-10-CM: I10  ICD-9-CM: 401.9         GERD (gastroesophageal reflux disease) (Chronic) ICD-10-CM: K21.9  ICD-9-CM: 530.81         Gout (Chronic) ICD-10-CM: M10.9  ICD-9-CM: 274.9         Pulmonary HTN (HCC) (Chronic) ICD-10-CM: I27.20  ICD-9-CM: 416.8               Past Medical History:      has a past medical history of Acquired hypothyroidism (8/15/2016), Anemia, Asthma, Cardiomyopathy, nonischemic ([address] CKD (chronic kidney disease), CKD (chronic kidney disease) (8/15/2012), Depression, Diabetes ([address] Diabetic neuropathy ([address] DM (diabetes mellitus) ([address] (8/15/2012), GERD (gastroesophageal reflux disease), Gout, Hypothyroidism, ICD (implantable cardioverter-defibrillator), biventricular, in situ (6/5/2014), and Psoriasis. She has no past medical history of Abuse, Adult physical abuse, Arrhythmia, Arthritis, Asthma, Autoimmune disease ([address] CAD (coronary artery disease), Calculus of kidney, Cancer ([address] Chronic pain, COPD, Headache(784.0), Hypercholesteremia, Liver disease, Psychotic disorder ([address] PUD (peptic ulcer disease), Seizures ([address] Stroke ([address] Thromboembolus ([facility] [address] or Unspecified deficiency anemia. Past Surgical History:      has a past surgical history that includes echo 2d adult (4/2010); cardiac catheterization (2007; 01/06/15); echo 2d adult (11/2011); stress test lexiscan/cardiolite (3/21/12); hx pacemaker placement; and hx orthopaedic.     Home Medications:     Prior to Admission medications    Medication Sig Start Date End Date Taking? Authorizing Provider   levocetirizine (XYZAL) 5 mg tablet TAKE 1 TABLET BY MOUTH EVERY DAY 1/6/22  Yes Lyudmila Aguilera MD   apremilast Jose Schwab) 30 mg tab Take 30 mg by mouth two (2) times daily as needed. Yes Provider, Historical   azelastine (ASTEPRO) 205.5 mcg (0.15 %) 1 [place] by Both Nostrils route two (2) times daily as needed. Yes Provider, Historical   docusate sodium (COLACE) 100 mg capsule Take 100 mg by mouth daily as needed for Constipation. Yes Provider, Historical   levothyroxine (SYNTHROID) 100 mcg tablet Take 100 mcg by mouth six (6) days a week. Everyday except Sunday   Yes Provider, Historical   allopurinoL (ZYLOPRIM) 100 mg tablet TAKE 1 TABLET BY MOUTH EVERY DAY 1/5/22  Yes Lyudmila Aguilera MD   calcitRIOL (ROCALTROL) 0.5 mcg capsule TAKE 1 CAPSULE BY MOUTH EVERY DAY 1/5/22  Yes Lyudmila Aguilera MD   carvediloL (COREG) 6.25 mg tablet Take 1 Tablet by mouth two (2) times daily (with meals). 12/22/21  Yes Woo DORSEY NP   isosorbide dinitrate (ISORDIL) 5 mg tablet Take 1 Tablet by mouth three (3) times daily. 12/22/21  Yes Woo DORSEY NP   hydrALAZINE (APRESOLINE) 10 mg tablet Take 1 Tablet by mouth three (3) times daily. 12/22/21  Yes Kacie Hickey NP   benzonatate (Tessalon Perles) 100 mg capsule Take 100 mg by mouth three (3) times daily as needed for Cough. Yes Jamar, MD Bee   montelukast (SINGULAIR) 10 mg tablet TAKE 1 TABLET BY MOUTH EVERY DAY 11/5/21  Yes Lyudmila Aguilera MD   bumetanide (BUMEX) 2 mg tablet Take 1 tab in the morning and 0.5 tab in the evening 11/5/21  Yes Amanda Garcia MD   fluticasone propionate (FLONASE) 50 mcg/actuation nasal spray One spray each nostril daily 11/1/21  Yes Lyudmila Aguilera MD   gabapentin (NEURONTIN) 100 mg capsule Take 1 Capsule by mouth nightly.  Max Daily Amount: 100 mg. 10/29/21  Yes Lyudmila Aguilera MD   venlafaxine-SR [facility] 75 mg capsule TAKE 1 CAPSULE BY MOUTH EVERY DAY 10/21/21  Yes Lj Galvez MD   budesonide (PULMICORT) 180 mcg/actuation aepb inhaler Take 2 Puffs by inhalation two (2) times a day. 5/27/21  Yes Lj Galvez MD   ammonium lactate (AMLACTIN) 12 % topical cream Apply  to affected area two (2) times a day. rub in to affected area well 11/4/20  Yes Lj Galvez MD   insulin NPH/insulin regular (NOVOLIN 70/30) 100 unit/mL (70-30) injection 70 units two times a day 8/15/16  Yes Melanie Holland MD   calcipotriene (DOVONEX) 0.005 % topical cream Apply  to affected area three (3) times daily. Yes Provider, Historical   triamcinolone acetonide (KENALOG) 0.5 % ointment Apply  to affected area two (2) times daily as needed. use thin layer    Yes Provider, Historical   multivitamin (ONE A DAY) tablet Take 1 Tab by mouth daily. Yes Provider, Historical   ferrous sulfate (IRON) 325 mg (65 mg elemental iron) tablet Take 325 mg by mouth daily. Yes Provider, Historical   aspirin 81 mg tablet Take 81 mg by mouth daily. Yes Provider, Historical   cholecalciferol (VITAMIN D3) (2,000 UNITS /50 MCG) cap capsule TAKE 1 CAPSULE BY MOUTH TWO (2) TIMES A DAY. 1/11/22   Lj Galvez MD       Allergies/Social/Family History: Allergies   Allergen Reactions    Ciprofloxacin Anaphylaxis    Shellfish Derived Anaphylaxis    Ace Inhibitors Unknown (comments)    Biaxin [Clarithromycin] Other (comments)     Metal taste    Candesartan Cough    Pcn [Penicillins] Hives      Social History     Tobacco Use    Smoking status: Never Smoker    Smokeless tobacco: Never Used   Substance Use Topics    Alcohol use: No      Family History   Problem Relation Age of Onset    Heart Disease Mother     Hypertension Mother     Lupus Sister     Diabetes Brother        Review of Systems:     A comprehensive review of systems was negative except for that written in the HPI.     Objective:   Vital Signs:  Visit Vitals  BP (!) 140/84 (BP Patient Position: At rest)   Pulse 78   Temp 97.6 °F (36.4 °C)   Resp 15   Ht 5' 7\" (1.702 m)   Wt 119.1 kg (262 lb 9.1 oz)   SpO2 100%   BMI 41.12 kg/m²    O2 Flow Rate (L/min): 6 l/min (weaned) O2 Device: Endotracheal tube Temp (24hrs), Av.4 °F (36.3 °C), Min:96.8 °F (36 °C), Max:98 °F (36.7 °C)    CVP (mmHg): 11 mmHg (22 0500)      Intake/Output:     Intake/Output Summary (Last 24 hours) at 2022 0600  Last data filed at 2022 0500  Gross per 24 hour   Intake 1837.51 ml   Output 3650 ml   Net -1812.49 ml       Physical Exam:    General appearance: cooperative, no distress, appears stated age, sedated  Head: Normocephalic, without obvious abnormality, atraumatic  Eyes: negative  Throat: Lips, mucosa, and tongue normal. Teeth and gums normal  Lungs: clear to auscultation bilaterally  Heart: regular rate and rhythm, S1, S2 normal, no murmur, click, rub or gallop  Abdomen: soft, non-tender. Bowel sounds normal. No masses,  no organomegaly  Extremities: extremities normal, atraumatic, no cyanosis or edema  Pulses: 2+ and symmetric  Skin: Skin color, texture, turgor normal. No rashes or lesions  Neurologic: sedated    LABS AND  DATA: Personally reviewed  Recent Labs     22  040226 22  1634 22  1634   WBC 8.5  --   --  7.9   HGB 8.8* 8.7*   < > 7.4*   HCT 31.3* 32.1*   < > 27.3*     --   --  365    < > = values in this interval not displayed.      Recent Labs     22  04022    140   K 3.5 4.1    99   CO2 31 31   BUN 61* 63*   CREA 2.70* 2.74*   * 115*   CA 9.6 9.5   MG 1.9 2.1   PHOS 2.4*  --      Recent Labs     22  0409 22  2136   AP 77 84   TP 6.9 7.3   ALB 3.4* 3.7   GLOB 3.5 3.6     Recent Labs     22  1634   INR 1.4*   PTP 14.0*   APTT >130.0*      Recent Labs     22  1701   PHI 7.50*   PCO2I 40.8   PO2I 393*   FIO2I 100     No results for input(s): CPK, CKMB, TROIQ, BNPP in the last 72 hours. Hemodynamics:   PAP: PAP Systolic: 53 (65/60/41 5483) CO: CO (l/min): 4.5 l/min (01/19/22 0500)   [place]:   CI: CI (l/min/m2): 2 l/min/m2 (01/19/22 0500)   CVP:  CVP (mmHg): 11 mmHg (01/19/22 0500) SVR:       PVR:       Ventilator Settings:  Mode Rate Tidal Volume Pressure FiO2 PEEP   Assist control   460 ml    50 % 8 cm H20     Peak airway pressure: 30 cm H2O    Minute ventilation: 9.95 l/min        MEDS: Reviewed    Chest X-Ray:  CXR Results  (Last 48 hours)               01/18/22 1707  XR CHEST PORT Final result    Impression:  Lines and tubes as described above. Unchanged pulmonary edema   pattern. No pneumothorax. Narrative:  INDICATION: Postop heart surgery       COMPARISON: 1/13/2022       FINDINGS: AP portable imaging of the chest performed at 4:59 PM demonstrates a   stable cardiomediastinal silhouette. Right axillary Impella device has its tip   overlying the left ventricle. Right internal jugular Ramer-Guille catheter tip   overlies the main pulmonary artery. Right arm PICC line has its tip at the   cavoatrial junction. Left-sided AICD is stable in position. Mild to moderate   pulmonary edema pattern persists. No pneumothorax or pleural effusion is   evident. . No significant osseous abnormalities are seen. ECHO (12/8/21):  Result status: Final result  · LV: Estimated LVEF is 15 - 20%. Normal cavity size. Upper normal wall thickness. Severely reduced systolic function. Left ventricular diastolic dysfunction. · LA: Moderately dilated left atrium. · RV: Borderline low systolic function. Pacer/ICD present. · RA: Mildly dilated right atrium. · MV: Mitral valve non-specific thickening. Mild to moderate mitral valve regurgitation is present. · TV: Mild tricuspid valve regurgitation is present. · PA: Mild to moderate pulmonary hypertension. Pulmonary arterial systolic pressure is 47 mmHg. Multidisciplinary Rounds Completed:   Yes    ABCDEF Bundle/Checklist Completed:  Yes    SPECIAL EQUIPMENT  Impella    DISPOSITION  Stay in ICU    CRITICAL CARE CONSULTANT NOTE  I had a face to face encounter with the patient, reviewed and interpreted patient data including clinical events, labs, images, vital signs, I/O's, and examined patient. I have discussed the case and the plan and management of the patient's care with the consulting services, the bedside nurses and the respiratory therapist.      NOTE OF PERSONAL INVOLVEMENT IN CARE   This patient has a high probability of imminent, clinically significant deterioration, which requires the highest level of preparedness to intervene urgently. I participated in the decision-making and personally managed or directed the management of the following life and organ supporting interventions that required my frequent assessment to treat or prevent imminent deterioration. I personally spent 150 minutes of critical care time. This is time spent at this critically ill patient's bedside actively involved in patient care as well as the coordination of care. This does not include any procedural time which has been billed separately.     Clarke Lanes, DO, MS  Staff Intensivist/Anesthesiologist  [facility] Critical Care  1/19/2022

## 2022-01-19 NOTE — PROGRESS NOTES
Physical Therapy  01/19/22     Patient currently on PT caseload, since last seen by PT patient had Impella implanted 1/18/2022 and remains on vent following procedure. PT reassessment attempted at 10:57 AM however per nursing patient remains intubated/sedated and is not appropriate to participate with therapy at this time. Will continue to follow patient and attempt PT at a later time. Per ABCDEF protocol, will work with patient when PEEP is 10.0 or less, FIO2 60% or less (currently FIO2 40% and PEEP 8), and patient is following basic commands. Will follow patient peripherally.      Recommend nursing to complete with patient, as able and per protocol, in order to promote cardiopulmonary systems, maintain strength, endurance and independence:   -bed in chair position or maximize full reverse Trendelenburg position to facilitate upright activity with foot board and non-skid footwear on 3x/day ~30-60 mins each   -ROM during bathing B UEs and LEs  -positioning to prevent contractures and edema  RASS -1/0/+1 (during SAT)  Active ROM   Sitting (bed in chair position)   Standing (reverse Trendelenburg)   ADLs   RASS -3/2  Passive ROM   Sit (bed in chair position)   RASS -5/-4  Passive ROM       Thank you,  Kalebire Press, PT, DPT

## 2022-01-19 NOTE — PROGRESS NOTES
[facility] in [place], [place]        LVAD/Heart Transplant workup initiated per Dr. Dakota Green, NP request. Appropriate labs orders placed per Dr. Belen Antonio (verbal order read back). Per Dr. Belen Antonio and Amberly Will, NP will wait to order further testing/consults at this time. Cecy Marquez RN.

## 2022-01-20 NOTE — PROGRESS NOTES
Nurse Practitioner Student Note  Inpatient Progress Note      Patient name: Traci Muhammad  Patient : 1955  Patient MRN: [id number]  Consulting MD: Avery Lazcano DO  Date of service: 22    REASON FOR REFERRAL:  Management of chronic systolic heart failure     PLAN OF CARE:   · 79 y/o female with chronic renal failure and new onset severe cardiomyopathy, LVEF 15% (diagnosed 21), stage D, NYHA class IV; admitted for massive volume overload, severely volume overloaded by RHC  · Most likely etiology of acute deterioration of LVEF is chronic volume overload with compensatory tachycardia from progressive renal failure +/- coronary artery disease (80% LAD)   · These are potentially reversible causes of severe LV dysfunction; by guidelines patient needs at least 3 months of euvolemia, HR < 100bpm and revascularization to prove she has non-reversible new, severe HF to be eligible for cardiac replacement therapies, including heart/kidney transplantation, d/w Dr. Aminata Velásquez with [facility]   · Patient would like aggressive approach to allow her heart to recover, including dialysis +/- revascularization with goal of Impella as bridge to LV recovery or transplant, d/w patient and her sister at family meeting  · Impella 5.5 implanted 22 with Dr. Helena Montilla   · Once euvolemic and hemodynamics are at goal, will begin milrinone wean   · Consider LAD revascularization once normotensive and euvolemic  · Patient understands the following possible outcomes:  · 1/3 chance of LV recovery and discharge home on medical therapy or chronic dialysis  · 1/3 chance of LV non-recovery on Impella, evaluation and transfer for listing for heart transplant/kidney  · 1/3 chance of LV non-recovery and evaluation that reveals patient is not candidate for LVAD/heart/kidney transplant and then wean of support to comfort care/hospice  · D/w Dr. Stephen Scales and Helena Montilla, bedside RN, Brian Hogan  ·  2022- overnight 2 events of VF with ICD shocks- both successful  · K+ was 3.1, likely related to electrolyte abnormalities rather than underlying arrhythmia. · Remains on Impella support at P-5. · Intubated and on Roro at 3ppm      RECOMMENDATIONS:  POD #2 Impella 5.5 implant   Vent management per Intensivist   Obtain echo due to aggressive diuresis and for Impella placement check  Obtain manual CO/hemodynamic check with [place] to compare numbers for adjustment of plan  Increase  Impella support at P-6 with bicarb as purge and systemic bival   - will contact Dr. Abdulaziz Richards regarding potentially increasing pacer setting to [de-identified]  Discussed with Dr. Abdulaziz Richards- no indication for amiodarone at this time.    Cefazolin q12h for Impella site ppx   Maintain PAC for hemodynamic optimization; goal CI > 2.3, CVP 8-10 mmHg, SVR 1000, SBP < 110 mmHg (via radial arterial line)   Decrease milrinone to 0.125 mcg/kg/min to patient's current weight  Roro wean in progress -currently at 3ppm; consider adding sildenafil for added support to wean  Bumex 1mg  IV q4h PRN for CVP>10  Electrolyte checks q4h  Keep K> 4 and Mg >2  CBC at 2pm today  Hydralazine 5mg q4 PRN SBP > 110 mmHg   Intolerant of GDMT due to hemodynamic in stability   Hold ivabradine for now- goal HR between 80-90 to support RV and this was decreasing HR too much  Continue digoxin 0.125 mg daily; goal 0.7-1.2   Allopurinol 50mg daily per nephrology  Continue transplant evaluation once patient extubated and can consent to remainder of eval  Iron studies: Iron <5, TIBC <8, Ferritin 126- Iron Sucrose 200mg IV given yesterday  Will need OP PSG  [employer] pending   Palliative consult appreciated   Nutritionist consult  Heart failure education   Advanced care plan present on file     All other care per primary team     IMPRESSION:  Fatigue  Shortness of breath, on 3L NC PTA  Volume overload  Acute on Chronic systolic heart failure, requiring Impella 5.5 implant 1/18/22   · Stage D, NYHA class IV symptoms  · Non-ischemic cardiomyopathy, LVEF 15%  · Normal coronaries  · PYP equivocal for amyloid   Pulmonary hypertension, severe  S/p BiV-ICD  Cardiac risk factors:  · HL  · DM2  · Morbid obesity, BMI 40  Acute on chronic renal failure, stage IV  GERD  Anemia of chronic disease  Gout     Interval Events:  POD #2 Impella placement   Excellent diuresis overnight  Adequate oxygenation on current vent settings  Roro wean in progress   Cr stable 242  proBNP decreased 7240 from 65228     LIFE GOALS:  Patient's personal goals include: being home with family, still ambulating around without getting too tired.   Important upcoming milestones or family events: none  The patient identifies the following as important for living well: remaining idependent and mobile; being able to get out of the house with . Matty Butterfield verbalized willingness to be on home milrinone and evaluation for both heart and kidney transplants.  Verbalizes she would have family supporting her decision on this.      SHAMIKA Mcclellan is A 11 y.o.  female with a history of NICM, chronic hypoxic respiratory failure secondary to pulmonary HTN, hypothyroidism, CKD, GERD, and DM II who presented to [facility] as a transfer from another facility for acute on chronic hypoxic respiratory failure.  Reports running out of O2 at home resulting in SOB and dizziness.     Upon arrival to the ED she was found to have O2 sats in the 70s, requiring 4L NC O2.  Rapid covid test was negative.  ProNT-BNP was 40198, K+5.2, BUN/CR x/2.54 and elevated d-dimer. Chest xray showing pulmonary edema vs. Atypical pneumonia. VQ scan showed low probability for PE.     Per Dr. Yola Brewster, LVEF 15-20% during recent admission.  LVEF previously normalized with CRT.  NYHA III-IV.  No ACEi/ARB due to renal dysfunction.   GDMT dosing limited by low BP.     Patient's PCP is Dr. Marleny Calvert primarily for cardiac care due to Dr. Lj Junior practice. Matty Butterfield historically seen by nephrology but has not had follow up care in the past three years.     CARDIAC IMAGING:  Echo 12/8/21- LVEF 15-20%, mild to Mod MR, LVIDd 5.09cm, TAPSE 1.94cm  Echo 4/22/19- LVEF 60%, trace MR,  LVIDd 4.24cm, TAPSE 1.65cm   Echo 4/24/18- LVEF 60%, trivial MR, LVIDd 4.79cm, TAPSE 2.25cm      EKG- 1/4/22 ST with A sense and V paced rhythm     LHC 2015- No significant CAD  NST 2014- reversible LAD involvement      ICD interrogation     HEMODYNAMICS:  RHC 1/17/21: PAP 83/52 mmHg, RAP 27 mmHg, PCWP 45 mmHg, CI 1.6  RHC 12/10/21: PAP 76/48/57, RAP 20, PCWP 35, CI 2.26     CPEST not done  6MW not done     OTHER IMAGING:   Chest xray 01/20/2022  TECHNIQUE: Portable AP semiupright chest view at 0436 hours     FINDINGS: The left chest ICD and wires are stable. The endotracheal tube,  enteric tube, Impella catheter, and right PICC are stable. The cardiomediastinal  contours are stable.     Mild bilateral interstitial and airspace opacities are unchanged. There is no  pleural effusion or pneumothorax. The bones and upper abdomen are stable.     IMPRESSION     Stable mild bilateral interstitial and airspace opacities.     Signed Date/Time  Phone Pager   Herman Pan 1/20/2022  7:45 AM [phone]  7:45 [phone]                    PHYSICAL EXAM:    Visit Vitals  BP 91/83   Pulse 68   Temp 98.2 °F (36.8 °C)   Resp 13   Ht 5' 7\" (1.702 m)   Wt 248 lb 0.3 oz (112.5 kg)   SpO2 100%   BMI 38.85 kg/m²        Hemodynamics:   CO: CO (l/min): 4.5 l/min   CI: CI (l/min/m2): 1.9 l/min/m2   CVP: CVP (mmHg): 8 mmHg (01/20/22 0800)   SVR: SVR (dyne*sec)/cm5: 1387 (dyne*sec)/cm5 (01/20/22 0788)   PAP Systolic: PAP Systolic: 48 (98/57/94 0127)   PAP Diastolic: PAP Diastolic: 21 (53/52/28 7632)   PVR:     SV02: SVO2 (%): 70 % (01/20/22 0800)   SCV02:      Impella 5.5  P-5  Flow: 2.3lpm   Purge flow 7.7     Physical Exam  Vitals and nursing note reviewed.    Constitutional:       General: She is not in acute distress.     Appearance: Normal appearance. She is obese, sedated. Cardiovascular:      Rate and Rhythm: Regular rhythm. Paced     Pulses: Normal pulses.      Heart sounds: Normal heart sounds. No murmur heard. Pulmonary:      Effort: Normal, synchronous with ventilator   Abdominal:      General: There is no distension, hypoactive BS. Musculoskeletal:         General: trace LE edema   Skin:     General: Skin is warm and dry.      Findings: Lesion present.      Comments: psorasis   Neurological:      General: Responds to touch, able to nod or shake head.      Mental Status: She is intubated and lightly sedated. Psychiatric:         Mood and Affect: ROSA.             ROS unable to obtain due to patient condition (intubated, sedated).        PAST MEDICAL HISTORY:  Past Medical History:   Diagnosis Date    Acquired hypothyroidism 8/15/2016    Anemia     RED-HF study    Asthma     Cardiomyopathy, nonischemic ([address]     initial dx 2001, bivHF 2008 with EF 15%, s/p biV-ICD 9/08, significant improvment in EF to 45-50%    CKD (chronic kidney disease)     Dr Nixon Raya    CKD (chronic kidney disease) 8/15/2012    Depression     Diabetes ([address]     Diabetic neuropathy ([address]     DM (diabetes mellitus) ([address] 8/15/2012    GERD (gastroesophageal reflux disease)     Gout     Hypothyroidism     ICD (implantable cardioverter-defibrillator), biventricular, in situ 6/5/2014    Psoriasis        PAST SURGICAL HISTORY:  Past Surgical History:   Procedure Laterality Date    CARDIAC CATHETERIZATION  2007; 01/06/15    normal cors    ECHO 2D ADULT  4/2010    EF 45%, improved from 1/09 (25%)    ECHO 2D ADULT  11/2011    LVH, EF 55-60%    HX ORTHOPAEDIC      knee    HX PACEMAKER PLACEMENT      AICD    STRESS TEST LEXISCAN/CARDIOLITE  3/21/12    normal perfusion, global HK 40%       FAMILY HISTORY:  Family History   Problem Relation Age of Onset    Heart Disease Mother     Hypertension Mother    [facility] Lupus Sister    [facility] Diabetes Brother        SOCIAL HISTORY:  Social History     Socioeconomic History    Marital status:    Tobacco Use    Smoking status: Never Smoker    Smokeless tobacco: Never Used   Substance and Sexual Activity    Alcohol use: No    Drug use: No    Sexual activity: Never   Social History Narrative    . Nonsmoker. Disability       LABORATORY RESULTS:     Labs Latest Ref Rng & Units 1/20/2022 1/19/2022 1/19/2022 1/19/2022 1/19/2022 1/18/2022 1/18/2022   WBC 3.6 - 11.0 K/uL 8.3 - - - 8.5 - -   RBC 3.80 - 5.20 M/uL 3.20(L) - - - 3.59(L) - -   Hemoglobin 11.5 - 16.0 g/dL 7. 8(L) - - - 8. 8(L) 8.7(L) -   Hematocrit 35.0 - 47.0 % 28. 8(L) - - - 31. 3(L) 32. 1(L) -   MCV 80.0 - 99.0 FL 90.0 - - - 87.2 - -   Platelets 002 - 269 K/uL 277 - - - 360 - -   Lymphocytes 12 - 49 % - - - - - - -   Monocytes 5 - 13 % - - - - - - -   Eosinophils 0 - 7 % - - - - - - -   Basophils 0 - 1 % - - - - - - -   Albumin 3.5 - 5.0 g/dL 2. 8(L) - 3. 1(L) - 3. 4(L) 3.7 3. 3(L)   Calcium 8.5 - 10.1 MG/DL 9.1 - 8.9 9.0 9.6 9.5 9.7   Glucose 65 - 100 mg/dL 193(H) - 179(H) - 148(H) 115(H) 137(H)   BUN 6 - 20 MG/DL 53(H) - 57(H) - 61(H) 63(H) 65(H)   Creatinine 0.55 - 1.02 MG/DL 2.42(H) - 2.68(H) - 2.70(H) 2.74(H) 2.89(H)   Sodium 136 - 145 mmol/L 140 - 139 - 138 140 138   Potassium 3.5 - 5.1 mmol/L 3. 1(L) 4.0 4.0 - 3.5 4.1 3.5   TSH 0.36 - 3.74 uIU/mL - - - - - - -   LDH 81 - 246 U/L 256(H) - 320(H) - - - -   Some recent data might be hidden     Lab Results   Component Value Date/Time    TSH 3.08 01/11/2022 05:13 AM    TSH 1.85 12/15/2021 01:06 PM    TSH 1.01 11/05/2020 09:11 AM    TSH 2.740 04/30/2019 11:21 AM    TSH 0.519 02/21/2012 10:53 AM    TSH 8.29 (H) 01/20/2010 01:59 PM       ALLERGY:  Allergies   Allergen Reactions    Ciprofloxacin Anaphylaxis    Shellfish Derived Anaphylaxis    Ace Inhibitors Unknown (comments)    Biaxin [Clarithromycin] Other (comments)     Metal taste    Candesartan Cough    Pcn [Penicillins] Hives CURRENT MEDICATIONS:    Current Facility-Administered Medications:     potassium chloride 20 mEq in 50 ml IVPB, 20 mEq, IntraVENous, Q1H, Isela Sheriff MD, Last Rate: 50 mL/hr at 01/20/22 0818, 20 mEq at 01/20/22 0818    niCARdipine (CARDENE) 25 mg in 0.9% sodium chloride 250 mL infusion, 0-15 mg/hr, IntraVENous, TITRATE, Sridevi ODELL MD, Stopped at 01/19/22 2109    [Held by provider] ivabradine (CORLANOR) tablet 2.5 mg, 2.5 mg, Oral, BID WITH MEALS, Lizett Albarado, NP    milrinone (PRIMACOR) 20 MG/100 ML D5W infusion, 0.2 mcg/kg/min, IntraVENous, CONTINUOUS, Verena Brain Octaviano ODELL NP, Last Rate: 7.1 mL/hr at 01/19/22 2058, 0.2 mcg/kg/min at 01/19/22 2058    bivalirudin (ANGIOMAX) 250 mg in 0.9% sodium chloride (MBP/ADV) 50 mL MBP, 0.02-2.5 mg/kg/hr, IntraVENous, TITRATE, Verena Brain Octaviano ODELL NP, Last Rate: 2.2 mL/hr at 01/20/22 0752, 0.0916 mg/kg/hr at 01/20/22 0752    iron sucrose (VENOFER) 200 mg in 0.9% sodium chloride 100 mL IVPB, 200 mg, IntraVENous, Q24H, Verena Brain Octaviano ODELL NP, Last Rate: 440 mL/hr at 01/19/22 1645, 200 mg at 01/19/22 1645    hydrALAZINE (APRESOLINE) 20 mg/mL injection 5 mg, 5 mg, IntraVENous, Q4H PRN, Karely Albarado NP, 5 mg at 01/19/22 2209    albumin human 5% (BUMINATE) solution 12.5 g, 12.5 g, IntraVENous, BID, Polliard, Brain Lofty T, NP, 12.5 g at 01/19/22 1634    bumetanide (BUMEX) injection 1 mg, 1 mg, IntraVENous, Q4H PRN, Polliarjacobo, Brain Lofty T, NP, 1 mg at 01/19/22 2208    ceFAZolin (ANCEF) 1 g in sterile water (preservative free) 10 mL IV syringe, 1 g, IntraVENous, Q12H, Bill Albarado NP, 1 g at 01/20/22 0245    fentaNYL (PF) 1,500 mcg/30 mL (50 mcg/mL) infusion, 0-200 mcg/hr, IntraVENous, TITRATE, Sridevi ODELL MD, Last Rate: 1 mL/hr at 01/19/22 2122, 50 mcg/hr at 01/19/22 2122    heparin (porcine) in 0.9% NaCl 30,000 unit/1,000 mL perfusion irrigation 50-1,000 mL, 50-1,000 mL, Other, PRN, Sam Gomez, PA    sodium chloride (NS) flush 5-40 mL, 5-40 mL, IntraVENous, Q8H, Sam Gomez PA, 10 mL at 01/20/22 0537    heparinized saline 2 units/mL infusion, , , CONTINUOUS, Nato Sheriff MD, 1,000 Units at 01/18/22 1416    chlorhexidine (PERIDEX) 0.12 % mouthwash 15 mL, 15 mL, Oral, Q12H, Davian Vázquez DO, 15 mL at 01/19/22 2030    sodium chloride (NS) flush 5-40 mL, 5-40 mL, IntraVENous, Q8H, Sam Gomez PA, 10 mL at 01/20/22 0536    0.45% sodium chloride infusion, 10 mL/hr, IntraVENous, CONTINUOUS, Bren Gomez PA    0.9% sodium chloride infusion, 9 mL/hr, IntraVENous, CONTINUOUS, Bren Gomez PA, Last Rate: 9 mL/hr at 01/18/22 1647, 9 mL/hr at 01/18/22 1647    naloxone (NARCAN) injection 0.4 mg, 0.4 mg, IntraVENous, PRN, Sam Gomez PA    mupirocin (BACTROBAN) 2 % ointment, , Both Nostrils, BID, Bren Gomez [place], Given at 01/19/22 1824    ondansetron (ZOFRAN) injection 4 mg, 4 mg, IntraVENous, Q4H PRN, Sam Gomez PA    albuterol (PROVENTIL VENTOLIN) nebulizer solution 2.5 mg, 2.5 mg, Nebulization, Q4H PRN, Sam Gomez PA    aspirin chewable tablet 81 mg, 81 mg, Oral, DAILY, Bren Gomez PA, 81 mg at 01/19/22 0857    midazolam (VERSED) injection 1 mg, 1 mg, IntraVENous, Q1H PRN, Sam Gomez PA    magnesium oxide (MAG-OX) tablet 400 mg, 400 mg, Oral, BID, Sam Gomez PA    calcium chloride 1 g in 0.9% sodium chloride 100 mL IVPB, 1 g, IntraVENous, PRN, Sam Gomez PA    bisacodyL (DULCOLAX) suppository 10 mg, 10 mg, Rectal, DAILY PRN, Sam Gomez PA    senna-docusate (PERICOLACE) 8.6-50 mg per tablet 1 Tablet, 1 Tablet, Oral, BID, Bren Gomez PA, 1 Tablet at 01/19/22 0857    ELECTROLYTE REPLACEMENT NOTE: Nurse to review Serum Potassium and Magnesuim levels and Initiate Electrolyte Replacement Protocol as needed, 1 Each, Other, PRN, Sam Gomez PA    magnesium sulfate 1 g/100 ml IVPB (premix or compounded), 1 g, IntraVENous, PRN, Shalom Gomez PA    alteplase (CATHFLO) 1 mg in sterile water (preservative free) 1 mL injection, 1 mg, InterCATHeter, PRN, Sam Gomez PA    bacitracin 500 unit/gram packet 1 Packet, 1 Packet, Topical, PRN, Shalom Gomez PA    pantoprazole (PROTONIX) injection 40 mg, 40 mg, IntraVENous, DAILY, 40 mg at 01/19/22 0858 **AND** sodium chloride (NS) flush 10 mL, 10 mL, IntraVENous, DAILY, Davian Vázquez DO, 10 mL at 01/19/22 0900    dexmedeTOMidine in 0.9 % NaCl (PRECEDEX) 400 mcg/100 mL (4 mcg/mL) infusion soln, 0.1-1.5 mcg/kg/hr, IntraVENous, TITRATE, Davian Vázquez DO, Last Rate: 42.5 mL/hr at 01/20/22 0729, 1.5 mcg/kg/hr at 01/20/22 0729    midazolam (VERSED) injection 1 mg, 1 mg, IntraVENous, Q4H PRN, Davian Vázquez DO, 1 mg at 01/18/22 1719    insulin lispro (HUMALOG) injection, , SubCUTAneous, Q6H, Davian Vázquez DO, 2 Units at 01/20/22 0543    propofol (DIPRIVAN) 10 mg/mL infusion, 0-50 mcg/kg/min, IntraVENous, TITRATE, Davian Vázquez DO, Last Rate: 20.4 mL/hr at 01/20/22 0616, 30 mcg/kg/min at 01/20/22 0616    sodium bicarbonate (8.4%) 25 mEq in dextrose 5% 1,000 mL - impella purge fluid, , IntraVENous, TITRATE, Sam Gomez PA, Last Rate: 7.8 mL/hr at 01/20/22 0603, New Bag at 01/20/22 0603    insulin NPH (NOVOLIN N, HUMULIN N) injection 10 Units, 10 Units, SubCUTAneous, QHS, Sam Gomez PA, 10 Units at 01/19/22 2340    insulin NPH (NOVOLIN N, HUMULIN N) injection 20 Units, 20 Units, SubCUTAneous, DAILY, Sam Gomez PA    triamcinolone acetonide (KENALOG) 0.1 % cream, , Topical, BID, Shalom Gomez PA, Given at 01/19/22 2030    polyethylene glycol (MIRALAX) packet 17 g, 17 g, Oral, DAILY, Sam Gomez PA, 17 g at 01/19/22 0859    digoxin (LANOXIN) tablet 0.0625 mg, 0.0625 mg, Oral, DAILY, Sam Gomez PA, 0.0625 mg at 01/19/22 0857    allopurinoL (ZYLOPRIM) tablet 50 mg, 50 mg, Oral, DAILY, Sam Gomez PA, 50 mg at 01/19/22 0857    epoetin isabel-epbx (RETACRIT) injection 20,000 Units, 20,000 Units, SubCUTAneous, Q TUE, THU & SAT, Long, Fran Cooks, PA, 20,000 Units at 01/18/22 2039    montelukast (SINGULAIR) tablet 10 mg, 10 mg, Oral, DAILY, Long, Fran Cooks, PA, 10 mg at 01/19/22 9028    levothyroxine (SYNTHROID) tablet 100 mcg, 100 mcg, Oral, Once per day on Mon Tue Wed Thu Fri Sat, Sam Gomez [place], 100 mcg at 01/20/22 3276    hydroxypropyl methylcellulose (ISOPTO TEARS) 0.5 % ophthalmic solution 1 Drop, 1 Drop, Both Eyes, PRN, Long, Fran Cooks, PA, 1 Drop at 01/06/22 1727    venlafaxine-SR (EFFEXOR-XR) capsule 75 mg, 75 mg, Oral, DAILY WITH BREAKFAST, Long, Fran Cooks, PA, 75 mg at 01/17/22 1025    gabapentin (NEURONTIN) capsule 100 mg, 100 mg, Oral, QHS, Long, Fran Cooks, PA, 100 mg at 01/18/22 2126    arformoteroL (BROVANA) neb solution 15 mcg, 15 mcg, Nebulization, BID RT, 15 mcg at 01/20/22 0850 **AND** budesonide (PULMICORT) 500 mcg/2 ml nebulizer suspension, 500 mcg, Nebulization, BID RT, Long, Fran Cooks, PA, 500 mcg at 01/20/22 0850    sodium chloride (NS) flush 5-40 mL, 5-40 mL, IntraVENous, Q8H, Sam Gomez PA, 10 mL at 01/20/22 0537    acetaminophen (TYLENOL) tablet 650 mg, 650 mg, Oral, Q6H PRN **OR** acetaminophen (TYLENOL) suppository 650 mg, 650 mg, Rectal, Q6H PRN, Sam Gomez PA    glucose chewable tablet 16 g, 4 Tablet, Oral, PRN, Long, Fran Cooks, PA    dextrose (D50W) injection syrg 12.5-25 g, 25-50 mL, IntraVENous, PRN, Long, Fran Cooks, PA    glucagon (GLUCAGEN) injection 1 mg, 1 mg, IntraMUSCular, PRN, Long, Fran Cooks, PA    PATIENT CARE TEAM:  Patient Care Team:  Nereida Reed MD as PCP - General (Internal Medicine)  Nereida Reed MD as PCP - [facility]  Hiwot Dahl MD as Consulting Provider (Internal Medicine)  Macel Claude, MD (Dermatology)  Nickie Laura MD (Nephrology)  Joie Dhaliwal MD as Consulting Provider (Cardiology)  Tanya Shane MD (Cardiology)  Lucrecia Edward MD as Consulting Provider (Pulmonary Disease)     Thank you for allowing me to participate in this patient's care.       Amalia Khan -ACNP student at [employer] working with Bennett Franks from    [address] [place] [address], [address]  Phone: [phone]

## 2022-01-20 NOTE — PROGRESS NOTES
Physical Therapy  Chart reviewed for updates and consulted with nursing. RN reports patient remains medically inappropriate for therapy at this time and requesting therapy hold. Will continue to follow.    Donel Forward, PT, DPT

## 2022-01-20 NOTE — PROGRESS NOTES
0730 Report received from [address] [place] [address]. [facility] x2 @5, Propofol @30, Milrinone @0.2, Precedex @1.5, Fent @50, Bival @0.0916, Nitric @4.     0900 CTS team at bedside. 1000 HF team at bedside. Orders for Echo to assess possible increase in P level on Impella, doppler noninvasive blood pressures, manual CI, electrolytes q4h with replacement protocol. 1100 Left doppler , arterial reading , automatic , Impella . PRN Hydralazine given with goal SBP <110 achieved. 1200 Manual Hemodynamic Calculations are as follows: CO 5.16, PAPs 51, PAPd 22, PAPm 32, SV 69.7, SVR 1100, SI 31.6, SVRI 2431, CI 2.33.    1230 Echo at bedside. 1330 PPM/ICD representative at bedside increasing PPM settings to 80bpm.    1400 Milrinone gtt decreased to 0.125 mcg/kg/min. Bival protocol indicates therapeutic PTT (first therapeutic), no rate change. 1500 Roro @3. Pt tolerating adjustment. [address] [facility] [address], NP at bedside. Pt changed to P6, pt tolerating adjustment. Repeat PTT/electrolyte labs sent. 1700 Bival protocol indicates therapeutic PTT (second therapeutic), no rate change. Next PTT due in 12 hours @1/21/22 0500.    1800 Pt's spouse at bedside. Electrolytes replaced per protocol. 1930 Bedside and Verbal shift change report given to [address] [place] [address] MARCOS, RN (oncoming nurse) by Leo Calderón RN (offgoing nurse). Report included the following information SBAR. [facility] x2 @5, Propofol @30, Fent @50, Milrinone @0.125, Precedex @1.5, Bival @0.1008, Nitric @3. No cardiac arhythmias for shift.

## 2022-01-20 NOTE — PROGRESS NOTES
[facility]  ICU Progress Note    Admit Date: 2022  POD:  2 Day Post-Op    Procedure:  Procedure(s):  RIGHT AXILLARY IMPELLA INSERTION        Subjective/24 Hour Summary:   Pt seen with Dr. Kuldip Bennett. Remains intubated, on Roro at 5 ppm. Milrinone 0.2. Impella at P5 and functioning well. Systemic bival & bicarb purge. Objective:   Vitals:  Blood pressure 114/68, pulse 69, temperature 98.2 °F (36.8 °C), resp. rate 19, height 5' 7\" (1.702 m), weight 248 lb 0.3 oz (112.5 kg), SpO2 99 %. Temp (24hrs), Av.9 °F (37.2 °C), Min:98.2 °F (36.8 °C), Max:99.4 °F (37.4 °C)    Hemodynamics:   CO: CO (l/min): 4.3 l/min   CI: CI (l/min/m2): 1.9 l/min/m2   CVP: CVP (mmHg): 9 mmHg (22 0900)   SVR: SVR (dyne*sec)/cm5: 1387 (dyne*sec)/cm5 (22 8153)   PAP Systolic: PAP Systolic: 46 (86/33/03 7827)   PAP Diastolic: PAP Diastolic: 21 (60/27/19 6811)   PVR:     SV02: SVO2 (%): 68 % (22 0900)   SCV02:      EKG/Rhythm:  SR    Ventilator Settings:  Mode Rate Tidal Volume Pressure FiO2 PEEP   Assist control,Volume control   460 ml    40 % 8 cm H20     Peak airway pressure: 27 cm H2O    Minute ventilation: 5.69 l/min        Oxygen Therapy:  Oxygen Therapy  O2 Sat (%): 99 % (22 09)  Pulse via Oximetry: 60 beats per minute (22)  O2 Device: Endotracheal tube;Ventilator (22)  Skin Assessment: Clean, dry, & intact (22)  Skin Protection for O2 Device: Yes (22)  Orientation: Bilateral (22)  Location: Cheek (22)  Interventions: Hydrocolloid Dressing (22 0000)  O2 Flow Rate (L/min): 6 l/min (weaned) (22 0740)  FIO2 (%): 40 % (22 0851)    CXR:  CXR Results  (Last 48 hours)               22 0425  XR CHEST PORT Final result    Impression:      Stable mild bilateral interstitial and airspace opacities.            Narrative:  EXAM:  XR CHEST PORT       INDICATION: Bilateral lung opacities       COMPARISON: 2022 at 0432 hours       TECHNIQUE: Portable AP semiupright chest view at 0436 hours       FINDINGS: The left chest ICD and wires are stable. The endotracheal tube,   enteric tube, Impella catheter, and right PICC are stable. The cardiomediastinal   contours are stable. Mild bilateral interstitial and airspace opacities are unchanged. There is no   pleural effusion or pneumothorax. The bones and upper abdomen are stable. 01/19/22 0447  XR CHEST PORT Final result    Impression:  Slight worsening in the bilateral pulmonary infiltrates. Narrative: Indication: Follow-up abnormal chest x-ray, recent heart surgery       Comparison 1/18/2022. Portable exam obtained at 432 demonstrates life-support   lines and tubes unchanged in position, including Impella device. There has been   a slight worsening in the bilateral pulmonary infiltrates compared to prior   exam.           01/18/22 1707  XR CHEST PORT Final result    Impression:  Lines and tubes as described above. Unchanged pulmonary edema   pattern. No pneumothorax. Narrative:  INDICATION: Postop heart surgery       COMPARISON: 1/13/2022       FINDINGS: AP portable imaging of the chest performed at 4:59 PM demonstrates a   stable cardiomediastinal silhouette. Right axillary Impella device has its tip   overlying the left ventricle. Right internal jugular [place]-Gu[place] catheter tip   overlies the main pulmonary artery. Right arm PICC line has its tip at the   cavoatrial junction. Left-sided AICD is stable in position. Mild to moderate   pulmonary edema pattern persists. No pneumothorax or pleural effusion is   evident. . No significant osseous abnormalities are seen. Admission Weight: Last Weight   Weight: 264 lb 1.8 oz (119.8 kg) Weight: 248 lb 0.3 oz (112.5 kg)     Intake / Output / Drain:  Current Shift: No intake/output data recorded.   Last 24 hrs.:     Intake/Output Summary (Last 24 hours) at 1/20/2022 4657  Last data filed at 2022 0700  Gross per 24 hour   Intake 1818.46 ml   Output 2600 ml   Net -781.54 ml       EXAM:  General:    NAD                                                                                          Lungs:   Clear to auscultation bilaterally. Incision:  No erythema, drainage or swelling. Heart:  Regular rate and rhythm, S1, S2 normal, no murmur, click, rub or gallop. Abdomen:   Soft, non-tender. Bowel sounds normal. No masses,  No organomegaly. Extremities:  No edema. PPP. Neurologic: Intubated, sedated     Labs:   Recent Labs     220 22  0409   WBC 8.3 8.5   HGB 7.8* 8.8*   HCT 28.8* 31.3*    360     Recent Labs     22  0240 22  1253 22  1244 22  0409     --  139  --    K 3.1* 4.0 4.0   < >     --  101  --    CO2 30  --  31  --    BUN 53*  --  57*  --    CREA 2.42*  --  2.68*  --    *  --  179*  --    CA 9.1  --  8.9  --    MG 2.3 2.6*  --    < >   PHOS 3.5  --  3.1  --     < > = values in this interval not displayed. Recent Labs     22  0240 22  1244 22  0409 22  0409   AP 60  --   --  77   TP 6.7  --   --  6.9   ALB 2.8* 3.1*   < > 3.4*   GLOB 3.9  --   --  3.5    < > = values in this interval not displayed. Recent Labs     22  0559 22  0240 22  1635 22  1236 22  1634 22  1634   INR  --   --   --  1.2*  --  1.4*   PTP  --   --   --  12.4*  --  14.0*   APTT 55.5* 55.1*   < > 25.9   < > >130.0*    < > = values in this interval not displayed. Recent Labs     22  1027 22  1701   PHI 7.60* 7.50*   PCO2I 31.8* 40.8   PO2I 173* 393*   FIO2I 50 100     No results for input(s): CPK, CKMB, TROIQ, BNPP in the last 72 hours. Assessment:     Active Problems:    Acute respiratory failure with hypoxia ([facility] [address] (2022)         Plan/Recommendations/Medical Decision Makin. Acute on Chronic Systolic CHF class III/IV: S/P right axillary impella 5.5 . Cont bicarb via purge. Milrinone 0.25, nitric. Cont systemic bival. Per AHF. 2. Acute on Chronic Respiratory insufficiency: On home O2. . Vent wean per intensivist.  3. CAD: Multivessel CAD on [facility] 1/17/22. ASA 81. BB on hold due to cardiogenic shock. Recommend starting high intensity statin when feasible, per primary/HF  4. RODNEY on Chronic CKD Stage IV: Renal following. UOP excellent, cr. Coming down. Diuretics per their recommendation. 5. Gout: allopurinol  6. DM: Per primary service  7. GERD: Home meds  8. Hypothyroidism: on synthroid  9. Depression: Home meds  10. Anemia: on EPO, stable post op. Dispo: impella in place and functioning well. Cont systemic bival & bicarb via purge. Further HF management per AHF.     Signed By: TYE Krause

## 2022-01-20 NOTE — PROGRESS NOTES
Occupational Therapy  01/20/22     Patient currently on OT caseload. OT reassessment attempted at 10:12 however nursing requesting therapy to hold at this time, patient not medically appropriate at this time. Will continue to follow patient and attempt OT at a later time.      Thank you,  Steven Salmeron, OTR/L

## 2022-01-20 NOTE — CARDIO/PULMONARY
Cardiac Rehab: Consult received and chart reviewed. Consult automatically generated due to Impella insertion. Swati Crowder does meet criteria for enrollment with an EF 15-20% (12/2021) and significant CAD on [facility] (1/17/2022). Due to multiple complications and an undetermined plan of care, patient is not an appropriate candidate for enrollment, at this time. Please reconsult for enrollment closer to discharge, if aapropriate.   Rufus Martin RN

## 2022-01-20 NOTE — PROGRESS NOTES
Comprehensive Nutrition Assessment    Type and Reason for Visit: Reassess,Consult    Nutrition Recommendations/Plan:      Start EN support: Two Preet HN @ 20 ml/hr with 2 packets Prosource bid and 50 ml water flush q 6 hr    Nutrition Assessment:    Pt admitted d/t Acute respiratory failure with hypoxia. PMHx: NICM, pHTN, CKD, Hypothyroidism, GERD, DM 2. Acute decompensation of heart failure and acute renal failure on admission. Noted massive volume overload>25#. RODNEY on CKD IV-no need for RRT. Undergoing workup for possible heart/kidney transplant. Impella placed 1/18. No plans for extubation today. Weight loss of ~16# since admission d/t diuresis; unable to determine if any of that was d/t lack of adequate PO intake. BG>200 at times. Poor BG control PTA per A1c. Plan to start EN today-may need to modify NPH dosing. Potassium replaced today. BUN and Cr improving. Iron deficiency-Venofer completed today. Magnesium WNL on daily supplementation. Propofol @ current rate provides ~540 lipid calories per day. Recommended tube feeding: Two Preet HN @ 20 ml/hr with 2 packets Prosource bid and 50 ml water flush q 6 hr. This will provide 420 ml, 1070 calories, 95 gm protein, 91 gm CHO and 730 ml free water (tube feeding/flush) per day to meet estimated protein needs. Tube feeding and propofol provide a total of 1610 calories per day. Will adjust tube feeding once off propofol.     Malnutrition Assessment:  Malnutrition Status:  No malnutrition    Context:  Acute illness     Findings of the 6 clinical characteristics of malnutrition:   Energy Intake:  1 - 75% or less of est energy req for 7 or more days  Weight Loss:  Unable to assess (unable to assess d/t diuresis)     Body Fat Loss:  No significant body fat loss,     Muscle Mass Loss:  No significant muscle mass loss,    Fluid Accumulation:  1 - Mild, Extremities   Strength:  Not performed     Nutritionally Significant Medications:   Propofol, Albumin bid, Retacrit, Humalog, NPH bid, Venofer, Synthroid, Magnesium oxide, Protonix, Miralax daily, KCL, Pericolace bid, Bumex x1, Fentanyl drip    Estimated Daily Nutrient Needs:  Energy (kcal): 1600 (14 kcal/kg); Weight Used for Energy Requirements: Current  Protein (g):  (1.5-1.8g/kg IBW); Weight Used for Protein Requirements: Ideal  Fluid (ml/day):  Method Used for Fluid Requirements: 1 ml/kcal    Nutrition Related Findings:       BM: 1/16  Edema: 1+ pitting BLE, Trace BUE  Wounds:  None       Current Nutrition Therapies:   Diet: NPO  Additional Caloric Sources:   Propofol @ 20.4 ml/hr  Meal intake:   Patient Vitals for the past 168 hrs:   % Diet Eaten   01/16/22 1600 0%   01/16/22 1200 0%   01/16/22 0933 0%   01/15/22 1600 0%   01/15/22 1200 51 - 75%   01/15/22 0935 26 - 50%   01/15/22 0000 1 - 25%     Anthropometric Measures:  · Height:  5' 7\" (170.2 cm)  · Current Body Wt:  112.5 kg (248 lb 0.3 oz)   · Admission Body Wt:  264 lb 1.8 oz     · Ideal Body Wt:  135:  183.7 %   · BMI Categories:  Obese class 2 (BMI 35.0-39. 9)     Wt Readings from Last 10 Encounters:   01/20/22 112.5 kg (248 lb)   12/22/21 115.8 kg (255 lb 6.4 oz)   12/15/21 116.3 kg (256 lb 6.4 oz)   12/13/21 116.1 kg (256 lb)   11/01/21 115.8 kg (255 lb 6.4 oz)   07/14/21 120.1 kg (264 lb 12.8 oz)   07/13/21 118.4 kg (261 lb)   05/26/21 119.7 kg (263 lb 12.8 oz)   11/04/20 119.7 kg (264 lb)   02/20/20 116.6 kg (257 lb)       Nutrition Diagnosis:   · Inadequate oral intake related to impaired respiratory function,cardiac dysfunction as evidenced by NPO or clear liquid status due to medical condition,intubation. Nutrition Interventions:   Food and/or Nutrient Delivery: Start tube feeding  Nutrition Education and Counseling: No recommendations at this time  Coordination of Nutrition Care: Continue to monitor while inpatient,Interdisciplinary rounds    Goals: Tolerate EN at goal in next 1-2 days.        Nutrition Monitoring and Evaluation: Behavioral-Environmental Outcomes: None identified  Food/Nutrient Intake Outcomes: Enteral nutrition intake/tolerance  Physical Signs/Symptoms Outcomes: Biochemical data,Fluid status or edema,Hemodynamic status,Weight,GI status    Discharge Planning:     Too soon to determine     Alexis Castañeda RD CNSC  Contact: [employer]

## 2022-01-20 NOTE — PROGRESS NOTES
Transitions of Care Plan   RUR: 25% - high   Clinical Update: Impella; milrinone; intubated Roro   Consults: CT surgery; [facility]; Nephrology; EP Cardiology   Baseline:    Barriers to Discharge: medical   Disposition: pending medical progress   Estimated Discharge Date: 1/21/22    Clinical update per chart review and/or patient discussed during Interdisciplinary Rounds:    Patient is not medically stable for discharge due to ongoing medical needs:    · Impella support  · Intubated w/ Roro - attempt possible extubation tomorrow  · Milrinone gtt    CM continues to follow treatment plan for medical progress and disposition needs.     Disposition:  Pending medical progress    Luis Miguel Gardner, MPH  Care Manager [facility]  Available via [employer] or

## 2022-01-20 NOTE — PROGRESS NOTES
Overnight summary 1/19- 1/20    2200- patient's CVP >10 and SBP >110 - administered Bumex and hydralazine per PRN order    Patient had two brief episodes of VF- first was approx 2a followed by another around 4:30a. Both times patient was promptly shocked out of the rhythm by her ICD. Serum potassium was found to be 3.1 - replacement ordered per protocol. No further episodes of VF since starting potassium repletion. Just prior to 7a patient's radial arterial line waveform became dampened. Unable to resume adequate waveform after multiple attempts at repositioning wrist and flushing line. Applied BP cuff to upper arm. 0800- Bedside shift change report given to Yesenia Thomas (oncoming nurse) by Mckinley Huynh (offgoing nurse). Report included the following information SBAR, Kardex, Intake/Output, MAR, Accordion, Recent Results, Med Rec Status and Cardiac Rhythm Paced.

## 2022-01-20 NOTE — PROGRESS NOTES
[facility]    ICU TEAM Progress Note    Name: Neil Guzman   : 1955   MRN: [id number]   Date: 2022           ICU Assessment     1. Acute on chronic systolic congestive heart failure  2. Cardiogenic shock  a. Status post Impella placement  3. Severe pulmonary hypertension  4. Severe multi-vessel coronary artery disease  a. Prior stent (mid-LAD  5. Acute on chronic respiratory insufficiency  6. RODNEY on chronic CKD stage IV  7. Volume overload  8. Morbid obesity         ICU Comprehensive Plan of Care:     1. Neurological System  Spontaneous Awakening Trial: Pending  Sedation: Precedex and Fentanyl  Analgesia: Fentanyl    2. Cardiovascular System  Appreciate heart failure  Appreciate EP  Appreciate CTS  Impella at P5  SBP Goal of: Less than 110  MAP Goal of: > 65 mmHg  Milrinone and Nicardipine (Cardene) - For above SBP/MAP goals  Transfusion Trigger (Hgb): <7 g/dL  Keep K > 4; Mg > 2     3. Respiratory System  Chlorhexidine   Optimize PEEP/Ventilation/Oxygenation  Goal Tidal Volume 6 cc/kg based on IBW  Aim for lung protective ventilation  Head of bed > 30 degrees   Slow weaning of inhaled nitric oxide  Option could be to add sildenafil for PA pressures if need be  Spontaneous Breathing Trial: Pending  SpO2 Goal: > 92%  DVT Prophylaxis: SCD's or Sequential Compression Device     4. Renal/GI/Endocrine System  Appreciate nephrology input   Bumex gtt  We will work with nephrology on appropriate diuresis  Stress ulcer Prophylaxis: Protonix (pantoprazole)   Bowel Regimen: Senna and Docusate (Colace)  Feeding:  Yes   Blood Sugar Goal 120-180 - Glycemic Control: Insulin    5. Infectious Disease  Indwelling Catheter:  Tubes: ETT and Nasogastric Tube  Lines: Peripheral IV and Central Line  Drains: Henley Catheter    6. PT/OT: PT consulted and on board and OT consulted and on board     7. Goals of Care Discussion with family Yes     8. Plan of Care/Code Status: Full Code    9.  Discussed Care Plan with Bedside RN    10. Documentation of Current Medications    Subjective:   Progress Note: 1/20/2022      Reason for ICU Admission: Status post Impella placement for cardiogenic shock    HPI:  Most of history obtained from chart review    This is a 59-year-old female with a history of nonischemic cardiomyopathy, acute on chronic systolic heart failure stage IV, chronic hypoxic respiratory failure secondary to pulm hypertension, CKD, hypothyroidism, GERD, and diabetes type 2 who was admitted to the [facility] on 1/5/2022. Initially she was hypoxic in the 70s on pulse oximetry which improved with oxygen delivered nasal cannulae. Over the ensuing 2 weeks patient was seen by heart failure team, EP team, nephrology, and cardiothoracic surgery. She had an Impella placed on 1/18/2022. She might be a candidate for heart transplant and kidney transplant. Overnight Events:   1/18/22 -- Admitted to CCU post-impella insertion   1/19/22 --transitioned from dobutamine to milrinone, added Cardene, Bumex drip  1/20/22 --Bumex drip held, started on fentanyl drip    POD:  Day of Surgery    S/P:   Procedure(s):  RIGHT AXILLARY IMPELLA INSERTION    Active Problem List:     Problem List  Date Reviewed: 12/22/2021          Codes Class    Acute respiratory failure with hypoxia (HCC) ICD-10-CM: J96.01  ICD-9-CM: 518.81         CHF exacerbation (HCC) ICD-10-CM: I50.9  ICD-9-CM: 428.0         Type 2 diabetes mellitus with diabetic neuropathy (HCC) ICD-10-CM: E11.40  ICD-9-CM: 250.60, 357.2         Type 2 diabetes with nephropathy (HCC) ICD-10-CM: E11.21  ICD-9-CM: 250.40, 583.81         Obesity, morbid ([facility] [address] ICD-10-CM: E66.01  ICD-9-CM: 278.01         Acquired hypothyroidism ICD-10-CM: E03.9  ICD-9-CM: 257. 9         Dysthymia ICD-10-CM: F34.1  ICD-9-CM: 300.4         ICD (implantable cardioverter-defibrillator), biventricular, in situ ICD-10-CM: Z95.810  ICD-9-CM: V45.02     Overview Signed 1/14/2015 11:11 AM by Lianne Lamar Linh Marie MD     Generator [employer] change 1/14/2015             Dyslipidemia ICD-10-CM: E78.5  ICD-9-CM: 272.4         CKD (chronic kidney disease) ICD-10-CM: N18.9  ICD-9-CM: 116. 9         Cardiomyopathy, nonischemic ([facility] [address] ICD-10-CM: I42.8  ICD-9-CM: 425.4     Overview Signed 10/10/2011  6:40 AM by Kenya Rodríguez MD     initial dx 2001, bivHF 2008 with EF 15%, s/p biV-ICD 9/08, significant improvment in EF to 45-50%             Anemia in chronic renal disease (Chronic) ICD-10-CM: N18.9, D63.1  ICD-9-CM: 285.21         HTN (hypertension) ICD-10-CM: I10  ICD-9-CM: 401.9         GERD (gastroesophageal reflux disease) (Chronic) ICD-10-CM: K21.9  ICD-9-CM: 530.81         Gout (Chronic) ICD-10-CM: M10.9  ICD-9-CM: 274.9         Pulmonary HTN (HCC) (Chronic) ICD-10-CM: I27.20  ICD-9-CM: 416.8               Past Medical History:      has a past medical history of Acquired hypothyroidism (8/15/2016), Anemia, Asthma, Cardiomyopathy, nonischemic ([address] CKD (chronic kidney disease), CKD (chronic kidney disease) (8/15/2012), Depression, Diabetes ([address] Diabetic neuropathy ([address] DM (diabetes mellitus) ([address] (8/15/2012), GERD (gastroesophageal reflux disease), Gout, Hypothyroidism, ICD (implantable cardioverter-defibrillator), biventricular, in situ (6/5/2014), and Psoriasis. She has no past medical history of Abuse, Adult physical abuse, Arrhythmia, Arthritis, Asthma, Autoimmune disease ([address] CAD (coronary artery disease), Calculus of kidney, Cancer ([address] Chronic pain, COPD, Headache(784.0), Hypercholesteremia, Liver disease, Psychotic disorder ([address] PUD (peptic ulcer disease), Seizures ([facility] [address] Stroke ([facility] [address] Thromboembolus ([facility] [address] or Unspecified deficiency anemia. Past Surgical History:      has a past surgical history that includes echo 2d adult (4/2010); cardiac catheterization (2007; 01/06/15); echo 2d adult (11/2011); stress test lexiscan/cardiolite (3/21/12); hx pacemaker placement; and hx orthopaedic.     Home Medications:     Prior to Admission medications    Medication Sig Start Date End Date Taking? Authorizing Provider   levocetirizine (XYZAL) 5 mg tablet TAKE 1 TABLET BY MOUTH EVERY DAY 1/6/22  Yes Sean Mcintyre MD   apremilast Jefececilia Melchor) 30 mg tab Take 30 mg by mouth two (2) times daily as needed. Yes Provider, Historical   azelastine (ASTEPRO) 205.5 mcg (0.15 %) 1 [employer] by Both Nostrils route two (2) times daily as needed. Yes Provider, Historical   docusate sodium (COLACE) 100 mg capsule Take 100 mg by mouth daily as needed for Constipation. Yes Provider, Historical   levothyroxine (SYNTHROID) 100 mcg tablet Take 100 mcg by mouth six (6) days a week. Everyday except Sunday   Yes Provider, Historical   allopurinoL (ZYLOPRIM) 100 mg tablet TAKE 1 TABLET BY MOUTH EVERY DAY 1/5/22  Yes Sean Mcintyre MD   calcitRIOL (ROCALTROL) 0.5 mcg capsule TAKE 1 CAPSULE BY MOUTH EVERY DAY 1/5/22  Yes Sean Mcintyre MD   carvediloL (COREG) 6.25 mg tablet Take 1 Tablet by mouth two (2) times daily (with meals). 12/22/21  Yes Elissa DORSEY NP   isosorbide dinitrate (ISORDIL) 5 mg tablet Take 1 Tablet by mouth three (3) times daily. 12/22/21  Yes Elissa DORSEY NP   hydrALAZINE (APRESOLINE) 10 mg tablet Take 1 Tablet by mouth three (3) times daily. 12/22/21  Yes Amarilis Delgado NP   benzonatate (Tessalon Perles) 100 mg capsule Take 100 mg by mouth three (3) times daily as needed for Cough. Yes Jamar, MD Bee   montelukast (SINGULAIR) 10 mg tablet TAKE 1 TABLET BY MOUTH EVERY DAY 11/5/21  Yes Sean Mcintyre MD   bumetanide (BUMEX) 2 mg tablet Take 1 tab in the morning and 0.5 tab in the evening 11/5/21  Yes Parris Woody MD   fluticasone propionate (FLONASE) 50 mcg/actuation nasal spray One spray each nostril daily 11/1/21  Yes Sean Mcintyre MD   gabapentin (NEURONTIN) 100 mg capsule Take 1 Capsule by mouth nightly.  Max Daily Amount: 100 mg. 10/29/21  Yes Sean Mcintyre MD venlafaxine-SR (EFFEXOR-XR) 75 mg capsule TAKE 1 CAPSULE BY MOUTH EVERY DAY 10/21/21  Yes German Antoine MD   budesonide (PULMICORT) 180 mcg/actuation aepb inhaler Take 2 Puffs by inhalation two (2) times a day. 5/27/21  Yes German Antoine MD   ammonium lactate (AMLACTIN) 12 % topical cream Apply  to affected area two (2) times a day. rub in to affected area well 11/4/20  Yes German Antoine MD   insulin NPH/insulin regular (NOVOLIN 70/30) 100 unit/mL (70-30) injection 70 units two times a day 8/15/16  Yes Bran Holland MD   calcipotriene (DOVONEX) 0.005 % topical cream Apply  to affected area three (3) times daily. Yes Provider, Historical   triamcinolone acetonide (KENALOG) 0.5 % ointment Apply  to affected area two (2) times daily as needed. use thin layer    Yes Provider, Historical   multivitamin (ONE A DAY) tablet Take 1 Tab by mouth daily. Yes Provider, Historical   ferrous sulfate (IRON) 325 mg (65 mg elemental iron) tablet Take 325 mg by mouth daily. Yes Provider, Historical   aspirin 81 mg tablet Take 81 mg by mouth daily. Yes Provider, Historical   cholecalciferol (VITAMIN D3) (2,000 UNITS /50 MCG) cap capsule TAKE 1 CAPSULE BY MOUTH TWO (2) TIMES A DAY. 1/11/22   German Antoine MD       Allergies/Social/Family History:      Allergies   Allergen Reactions    Ciprofloxacin Anaphylaxis    Shellfish Derived Anaphylaxis    Ace Inhibitors Unknown (comments)    Biaxin [Clarithromycin] Other (comments)     Metal taste    Candesartan Cough    Pcn [Penicillins] Hives      Social History     Tobacco Use    Smoking status: Never Smoker    Smokeless tobacco: Never Used   Substance Use Topics    Alcohol use: No      Family History   Problem Relation Age of Onset    Heart Disease Mother     Hypertension Mother     Lupus Sister     Diabetes Brother        Review of Systems:     A comprehensive review of systems was negative except for that written in the HPI.    Objective:   Vital Signs:  Visit Vitals  BP (!) 147/128 (BP 1 Location: Left upper arm, BP Patient Position: At rest)   Pulse 72   Temp 98.2 °F (36.8 °C)   Resp 12   Ht 5' 7\" (1.702 m)   Wt 112.5 kg (248 lb 0.3 oz)   SpO2 99%   BMI 38.85 kg/m²    O2 Flow Rate (L/min): 6 l/min (weaned) O2 Device: Endotracheal tube Temp (24hrs), Av.9 °F (37.2 °C), Min:98.2 °F (36.8 °C), Max:99.4 °F (37.4 °C)    CVP (mmHg): 9 mmHg (22 0700)      Intake/Output:     Intake/Output Summary (Last 24 hours) at 2022 0801  Last data filed at 2022 0700  Gross per 24 hour   Intake 1969.96 ml   Output 2700 ml   Net -730.04 ml       Physical Exam:    General appearance: cooperative, no distress, appears stated age, sedated  Head: Normocephalic, without obvious abnormality, atraumatic  Eyes: negative  Throat: Lips, mucosa, and tongue normal. Teeth and gums normal  Lungs: clear to auscultation bilaterally  Heart: regular rate and rhythm, S1, S2 normal, no murmur, click, rub or gallop  Abdomen: soft, non-tender. Bowel sounds normal. No masses,  no organomegaly  Extremities: extremities normal, atraumatic, no cyanosis or edema  Pulses: 2+ and symmetric  Skin: Skin color, texture, turgor normal. No rashes or lesions  Neurologic: sedated    LABS AND  DATA: Personally reviewed  Recent Labs     22  0240 22  0409   WBC 8.3 8.5   HGB 7.8* 8.8*   HCT 28.8* 31.3*    360     Recent Labs     22  0240 22  1253 22  1244 22  0409     --  139  --    K 3.1* 4.0 4.0   < >     --  101  --    CO2 30  --  31  --    BUN 53*  --  57*  --    CREA 2.42*  --  2.68*  --    *  --  179*  --    CA 9.1  --  8.9  --    MG 2.3 2.6*  --    < >   PHOS 3.5  --  3.1  --     < > = values in this interval not displayed.      Recent Labs     22  0240 22  1244 22  0409 22  0409   AP 60  --   --  77   TP 6.7  --   --  6.9   ALB 2.8* 3.1*   < > 3.4*   GLOB 3.9  --   --  3.5    < > = values in this interval not displayed. Recent Labs     01/20/22  0559 01/20/22  0240 01/19/22  1635 01/19/22  1236 01/18/22  1634 01/18/22  1634   INR  --   --   --  1.2*  --  1.4*   PTP  --   --   --  12.4*  --  14.0*   APTT 55.5* 55.1*   < > 25.9   < > >130.0*    < > = values in this interval not displayed. Recent Labs     01/19/22  1027 01/18/22  1701   PHI 7.60* 7.50*   PCO2I 31.8* 40.8   PO2I 173* 393*   FIO2I 50 100     No results for input(s): CPK, CKMB, TROIQ, BNPP in the last 72 hours. Hemodynamics:   PAP: PAP Systolic: 48 (48/89/74 0237) CO: CO (l/min): 4.3 l/min (01/20/22 0700)   Wedge:   CI: CI (l/min/m2): 1.9 l/min/m2 (01/20/22 0700)   CVP:  CVP (mmHg): 9 mmHg (01/20/22 0700) SVR:       PVR:       Ventilator Settings:  Mode Rate Tidal Volume Pressure FiO2 PEEP   Assist control,Volume control   460 ml    40 % 8 cm H20     Peak airway pressure: 27 cm H2O    Minute ventilation: 5.74 l/min        MEDS: Reviewed    Chest X-Ray:  CXR Results  (Last 48 hours)               01/20/22 0425  XR CHEST PORT Final result    Impression:      Stable mild bilateral interstitial and airspace opacities. Narrative:  EXAM:  XR CHEST PORT       INDICATION: Bilateral lung opacities       COMPARISON: 1/19/2022 at 0432 hours       TECHNIQUE: Portable AP semiupright chest view at 0436 hours       FINDINGS: The left chest ICD and wires are stable. The endotracheal tube,   enteric tube, Impella catheter, and right PICC are stable. The cardiomediastinal   contours are stable. Mild bilateral interstitial and airspace opacities are unchanged. There is no   pleural effusion or pneumothorax. The bones and upper abdomen are stable. 01/19/22 0447  XR CHEST PORT Final result    Impression:  Slight worsening in the bilateral pulmonary infiltrates. Narrative: Indication: Follow-up abnormal chest x-ray, recent heart surgery       Comparison 1/18/2022.  Portable exam obtained at 432 demonstrates life-support   lines and tubes unchanged in position, including Impella device. There has been   a slight worsening in the bilateral pulmonary infiltrates compared to prior   exam.           01/18/22 1707  XR CHEST PORT Final result    Impression:  Lines and tubes as described above. Unchanged pulmonary edema   pattern. No pneumothorax. Narrative:  INDICATION: Postop heart surgery       COMPARISON: 1/13/2022       FINDINGS: AP portable imaging of the chest performed at 4:59 PM demonstrates a   stable cardiomediastinal silhouette. Right axillary Impella device has its tip   overlying the left ventricle. Right internal jugular [place] catheter tip   overlies the main pulmonary artery. Right arm PICC line has its tip at the   cavoatrial junction. Left-sided AICD is stable in position. Mild to moderate   pulmonary edema pattern persists. No pneumothorax or pleural effusion is   evident. . No significant osseous abnormalities are seen. ECHO (12/8/21):  Result status: Final result  · LV: Estimated LVEF is 15 - 20%. Normal cavity size. Upper normal wall thickness. Severely reduced systolic function. Left ventricular diastolic dysfunction. · LA: Moderately dilated left atrium. · RV: Borderline low systolic function. Pacer/ICD present. · RA: Mildly dilated right atrium. · MV: Mitral valve non-specific thickening. Mild to moderate mitral valve regurgitation is present. · TV: Mild tricuspid valve regurgitation is present. · PA: Mild to moderate pulmonary hypertension. Pulmonary arterial systolic pressure is 47 mmHg. Multidisciplinary Rounds Completed: Yes    ABCDEF Bundle/Checklist Completed:  Yes    SPECIAL EQUIPMENT  Impella    DISPOSITION  Stay in ICU    CRITICAL CARE CONSULTANT NOTE  I had a face to face encounter with the patient, reviewed and interpreted patient data including clinical events, labs, images, vital signs, I/O's, and examined patient.   I have discussed the case and the plan and management of the patient's care with the consulting services, the bedside nurses and the respiratory therapist.      NOTE OF PERSONAL INVOLVEMENT IN CARE   This patient has a high probability of imminent, clinically significant deterioration, which requires the highest level of preparedness to intervene urgently. I participated in the decision-making and personally managed or directed the management of the following life and organ supporting interventions that required my frequent assessment to treat or prevent imminent deterioration. I personally spent 150 minutes of critical care time. This is time spent at this critically ill patient's bedside actively involved in patient care as well as the coordination of care. This does not include any procedural time which has been billed separately.     Edelmira Leung DO, MS  Staff Intensivist/Anesthesiologist  [facility] Critical Care  1/20/2022

## 2022-01-20 NOTE — PROGRESS NOTES
[facility] in [place], [place]  Inpatient Progress Note      Patient name: Rohith Gibbons  Patient : 1955  Patient MRN: [id number]  Consulting MD: Alejo Emerson DO  Date of service: 22    REASON FOR REFERRAL:  Management of chronic systolic heart failure     PLAN OF CARE:   79 y/o female with chronic renal failure and new onset severe cardiomyopathy, LVEF 15% (diagnosed 21), stage D, NYHA class IV; admitted for massive volume overload, severely volume overloaded by RHC  Most likely etiology of acute deterioration of LVEF is chronic volume overload with compensatory tachycardia from progressive renal failure +/- coronary artery disease (80% LAD)   These are potentially reversible causes of severe LV dysfunction; by guidelines patient needs at least 3 months of euvolemia, HR < 100bpm and revascularization to prove she has non-reversible new, severe HF to be eligible for cardiac replacement therapies, including heart/kidney transplantation, d/w Dr. Kari Nogueira with [facility]   Patient would like aggressive approach to allow her heart to recover, including dialysis +/- revascularization with goal of Impella as bridge to LV recovery or transplant, d/w patient and her sister at prior family meeting  Impella 5.5 implanted 22 with Dr. Gonzales Dry with normalization of filling pressures, improved renal function and undergoing Rosalie and milrinone wean to extubation  Consider LAD revascularization once normotensive and euvolemic or direct LVAD-DT/dual organ transplant; decision once reassessed after extubation  Patient understands the following possible outcomes:  1/3 chance of LV recovery and discharge home on medical therapy or chronic dialysis  1/3 chance of LV non-recovery on Impella, evaluation and transfer for listing for heart transplant/kidney  1/3 chance of LV non-recovery and evaluation that reveals patient is not candidate for LVAD/heart/kidney transplant and then wean of support to comfort care/hospice    RECOMMENDATIONS:  POD #2 Impella 5.5 implant   Vent management per Intensivist   Continue Impella support at P-5 with bicarb as purge and systemic bival   TTE today to assess LVIDd and Impella position; consider speed increase to P-6 if appropriate   Cefazolin q12h for Impella site ppx   Maintain PAC for hemodynamic optimization; goal CI > 2.3, CVP 8-10 mmHg, SVR 1000, SBP < 110 mmHg (via radial arterial line)   Obtain manual hemodynamics to compare   Decrease milrinone to 0.125 mcg/kg/min   Roro wean in progress   Bumex 1 mg q4h PRN CVP > 10 mmHg   Keep K> 4 and Mg >2; check q6h due to recent arrhythmia  No indication for amiodarone at this time   Hydralazine 5 mg IV q4h PRN SBP > 110 mmHg   Intolerant of GDMT due to hemodynamic in stability   Intolerant of ivabradine due to marginal HR; Dr. Linda Freeman to increase PPM rate to 80 bpm for RV support   Continue digoxin 0.125 mg daily; goal 0.7-1.2   Allopurinol 50mg daily per nephrology  Continue transplant evaluation once patient extubated and can consent to remainder of eval  Venofer 200 mg IV x 2   H/H trending down; repeat at 1400   Will need OP PSG  Invitae pending   Palliative consult appreciated   Nutritionist consult  Heart failure education   Advanced care plan present on file     All other care per primary team     IMPRESSION:  Fatigue  Shortness of breath, on 3L NC PTA  Volume overload  Acute on Chronic systolic heart failure, requiring Impella 5.5 implant 1/18/22   Stage D, NYHA class IV symptoms  Non-ischemic cardiomyopathy, LVEF 15%  Normal coronaries  PYP equivocal for amyloid   Pulmonary hypertension, severe  S/p BiV-ICD  Cardiac risk factors:  HL  DM2  Morbid obesity, BMI 40  Acute on chronic renal failure, stage IV  GERD  Anemia of chronic disease  Gout  VF s/p ICD shock x 2      Interval Events:  POD #2 Impella placement   VF x ICD shock x 2 in setting of hypokalemia   Adequate diuresis Adequate oxygenation on current vent settings  Roro wean in progress   Cr stable 2.37  proBNP decreased to 7240 from 52778 from 23k      LIFE GOALS:  Patient's personal goals include: being home with family, still ambulating around without getting too tired. Important upcoming milestones or family events: none  The patient identifies the following as important for living well: remaining idependent and mobile; being able to get out of the house with . Patient verbalized willingness to be on home milrinone and evaluation for both heart and kidney transplants. Verbalizes she would have family supporting her decision on this. SHAMIKA Thayer is a 78 y.o.  female with a history of NICM, chronic hypoxic respiratory failure secondary to pulmonary HTN, hypothyroidism, CKD, GERD, and DM II who presented to [facility] as a transfer from another facility for acute on chronic hypoxic respiratory failure. Reports running out of O2 at home resulting in SOB and dizziness. Upon arrival to the ED she was found to have O2 sats in the 70s, requiring 4L NC O2. Rapid covid test was negative. ProNT-BNP was 63149, K+5.2, BUN/CR x/2.54 and elevated d-dimer. Chest xray showing pulmonary edema vs. Atypical pneumonia. VQ scan showed low probability for PE. Per Dr. Latia Ponce, NICM, LVEF 15-20% during recent admission. LVEF previously normalized with CRT. NYHA III-IV. No ACEi/ARB due to renal dysfunction. GDMT dosing limited by low BP. Patient's PCP is Dr. Piedad Castaneda, and she sees Dr. Latia Ponce primarily for cardiac care due to Dr. Hermelinda Vora transfering practice. Patient historically seen by nephrology but has not had follow up care in the past three years.      CARDIAC IMAGING:  Echo 12/8/21- LVEF 15-20%, mild to Mod MR, LVIDd 5.09cm, TAPSE 1.94cm  Echo 4/22/19- LVEF 60%, trace MR,  LVIDd 4.24cm, TAPSE 1.65cm   Echo 4/24/18- LVEF 60%, trivial MR, LVIDd 4.79cm, TAPSE 2.25cm      EKG- 1/4/22 ST with A sense and V paced rhythm     [facility] 2015- No significant CAD  NST 2014- reversible LAD involvement      ICD interrogation     HEMODYNAMICS:  [facility] 1/17/21: PAP 83/52 mmHg, RAP 27 mmHg, PCWP 45 mmHg, CI 1.6  [facility] 12/10/21: PAP 76/48/57, RAP 20, PCWP 35, CI 2.26     CPEST not done  6MW not done     OTHER IMAGING:  CXR Results  (Last 48 hours)                             01/13/22 1035   XR CHEST PORT Final result      Impression:   No significant change in congestion and interstitial and alveolar   opacities which may reflect edema or infectious infiltrate. Narrative:   EXAM: XR CHEST PORT       INDICATION: pul edema       COMPARISON: Chest x-ray 1/10/2022. FINDINGS: A portable AP radiograph of the chest was obtained at 10:19 hours. The   patient is on a cardiac monitor. The lungs appear grossly stable with congestion   and interstitial/airspace opacities with no pneumothorax or pleural effusion. Right PICC line traverses expected course of tip in the region of the atriocaval   junction. Pacemaker-ICD generator body projects over the left chest wall with   intact appearing leads traversing in expected course. . The cardiac and   mediastinal contours and pulmonary vascularity are normal.  Atherosclerotic   calcifications affect the aortic arch. The chest wall structures and visualized   upper abdomen show no acute findings with incidental note of degenerative spine   and shoulder changes.                            PHYSICAL EXAM:  Visit Vitals  BP (!) 111/90 (BP 1 Location: Left upper arm, BP Patient Position: At rest)   Pulse 97   Temp 97.8 °F (36.6 °C)   Resp 20   Ht 5' 7\" (1.702 m)   Wt 251 lb 8.7 oz (114.1 kg)   SpO2 98%   BMI 39.40 kg/m²      Hemodynamics:   CO: CO (l/min): 5.5 l/min   CI: CI (l/min/m2): 2.5 l/min/m2   CVP: CVP (mmHg): 7 mmHg (01/20/22 1100)   SVR: SVR (dyne*sec)/cm5: 1004 (dyne*sec)/cm5 (01/20/22 1770)   PAP Systolic: PAP Systolic: 49 (09/13/02 1634)   PAP Diastolic: PAP Diastolic: 21 (01/20/22 1200)   PVR:     SV02: SVO2 (%): 76 % (01/20/22 1200)   SCV02:      Impella 5.5  P-5  Flow: 2.3lpm   Purge flow 7.7     Physical Exam  Vitals and nursing note reviewed. Constitutional:       General: She is not in acute distress. Appearance: Normal appearance. She is obese, sedated. Cardiovascular:      Rate and Rhythm: Regular rhythm. Pulses: Normal pulses. Heart sounds: Normal heart sounds. No murmur heard. Pulmonary:      Effort: Normal, synchronous with ventilator   Abdominal:      General: There is no distension, hypoactive BS. Musculoskeletal:         General: trace LE edema   Skin:     General: Skin is warm and dry. Findings: Lesion present. Comments: psorasis   Neurological:      General: Responds to noxious stimuli. Mental Status: She is intubated and sedated. Psychiatric:         Mood and Affect: ROSA.            ROS unable to obtain due to patient condition (intubated, sedated).         PAST MEDICAL HISTORY:  Past Medical History:   Diagnosis Date    Acquired hypothyroidism 8/15/2016    Anemia     RED-HF study    Asthma     Cardiomyopathy, nonischemic ([facility]     initial dx 2001, bivHF 2008 with EF 15%, s/p biV-ICD 9/08, significant improvment in EF to 45-50%    CKD (chronic kidney disease)     Dr Cydney Chow    CKD (chronic kidney disease) 8/15/2012    Depression     Diabetes ([facility] [address]     Diabetic neuropathy ([facility] [address]     DM (diabetes mellitus) ([facility] [address] 8/15/2012    GERD (gastroesophageal reflux disease)     Gout     Hypothyroidism     ICD (implantable cardioverter-defibrillator), biventricular, in situ 6/5/2014    Psoriasis        PAST SURGICAL HISTORY:  Past Surgical History:   Procedure Laterality Date    CARDIAC CATHETERIZATION  2007; 01/06/15    normal cors    ECHO 2D ADULT  4/2010    EF 45%, improved from 1/09 (25%)    ECHO 2D ADULT  11/2011    LVH, EF 55-60%    HX ORTHOPAEDIC      knee    HX PACEMAKER PLACEMENT      AICD    STRESS TEST LEXISCAN/CARDIOLITE  3/21/12 normal perfusion, global HK 40%       FAMILY HISTORY:  Family History   Problem Relation Age of Onset    Heart Disease Mother     Hypertension Mother     Lupus Sister     Diabetes Brother        SOCIAL HISTORY:  Social History     Socioeconomic History    Marital status:    Tobacco Use    Smoking status: Never Smoker    Smokeless tobacco: Never Used   Substance and Sexual Activity    Alcohol use: No    Drug use: No    Sexual activity: Never   Social History Narrative    . Nonsmoker. Disability       LABORATORY RESULTS:     Labs Latest Ref Rng & Units 1/20/2022 1/20/2022 1/19/2022 1/19/2022 1/19/2022 1/19/2022 1/18/2022   WBC 3.6 - 11.0 K/uL - 8.3 - - - 8.5 -   RBC 3.80 - 5.20 M/uL - 3.20(L) - - - 3.59(L) -   Hemoglobin 11.5 - 16.0 g/dL - 7. 8(L) - - - 8. 8(L) 8.7(L)   Hematocrit 35.0 - 47.0 % - 28. 8(L) - - - 31. 3(L) 32. 1(L)   MCV 80.0 - 99.0 FL - 90.0 - - - 87.2 -   Platelets 538 - 944 K/uL - 277 - - - 360 -   Lymphocytes 12 - 49 % - - - - - - -   Monocytes 5 - 13 % - - - - - - -   Eosinophils 0 - 7 % - - - - - - -   Basophils 0 - 1 % - - - - - - -   Albumin 3.5 - 5.0 g/dL 2. 8(L) 2. 8(L) - 3. 1(L) - 3. 4(L) 3.7   Calcium 8.5 - 10.1 MG/DL 9.0 9.1 - 8.9 9.0 9.6 9.5   Glucose 65 - 100 mg/dL 204(H) 193(H) - 179(H) - 148(H) 115(H)   BUN 6 - 20 MG/DL 46(H) 53(H) - 57(H) - 61(H) 63(H)   Creatinine 0.55 - 1.02 MG/DL 2.37(H) 2.42(H) - 2.68(H) - 2.70(H) 2.74(H)   Sodium 136 - 145 mmol/L 140 140 - 139 - 138 140   Potassium 3.5 - 5.1 mmol/L 4.0 3.1(L) 4.0 4.0 - 3.5 4.1   TSH 0.36 - 3.74 uIU/mL - - - - - - -   LDH 81 - 246 U/L - 256(H) - 320(H) - - -   Some recent data might be hidden     Lab Results   Component Value Date/Time    TSH 3.08 01/11/2022 05:13 AM    TSH 1.85 12/15/2021 01:06 PM    TSH 1.01 11/05/2020 09:11 AM    TSH 2.740 04/30/2019 11:21 AM    TSH 0.519 02/21/2012 10:53 AM    TSH 8.29 (H) 01/20/2010 01:59 PM       ALLERGY:  Allergies   Allergen Reactions    Ciprofloxacin Anaphylaxis    Shellfish Derived Anaphylaxis    Ace Inhibitors Unknown (comments)    Biaxin [Clarithromycin] Other (comments)     Metal taste    Candesartan Cough    Pcn [Penicillins] Hives        CURRENT MEDICATIONS:    Current Facility-Administered Medications:     milrinone (PRIMACOR) 20 MG/100 ML D5W infusion, 0.2 mcg/kg/min, IntraVENous, CONTINUOUS, Pollchon, Yifan Adas T, NP, Last Rate: 6.8 mL/hr at 01/20/22 1000, 0.2 mcg/kg/min at 01/20/22 1000    niCARdipine (CARDENE) 25 mg in 0.9% sodium chloride 250 mL infusion, 0-15 mg/hr, IntraVENous, TITRATE, Boby ODELL MD, Stopped at 01/19/22 2109    [Held by provider] ivabradine (CORLANOR) tablet 2.5 mg, 2.5 mg, Oral, BID WITH MEALS, Polliarjacobo, Lefty Sahnis, NP    bivalirudin (ANGIOMAX) 250 mg in 0.9% sodium chloride (MBP/ADV) 50 mL MBP, 0.02-2.5 mg/kg/hr, IntraVENous, TITRATE, Verena, Yifan Adas T, NP, Last Rate: 2.4 mL/hr at 01/20/22 1145, 0.1008 mg/kg/hr at 01/20/22 1145    iron sucrose (VENOFER) 200 mg in 0.9% sodium chloride 100 mL IVPB, 200 mg, IntraVENous, Q24H, Verena, Tita Adas T, NP, Last Rate: 440 mL/hr at 01/19/22 1645, 200 mg at 01/19/22 1645    hydrALAZINE (APRESOLINE) 20 mg/mL injection 5 mg, 5 mg, IntraVENous, Q4H PRN, Polliarjacobo, Leeta Adas T, NP, 5 mg at 01/20/22 1046    albumin human 5% (BUMINATE) solution 12.5 g, 12.5 g, IntraVENous, BID, Polliard, Leeta Adas T, NP, 12.5 g at 01/20/22 0924    bumetanide (BUMEX) injection 1 mg, 1 mg, IntraVENous, Q4H PRN, Polliard, Leeta Adas T, NP, 1 mg at 01/19/22 2208    ceFAZolin (ANCEF) 1 g in sterile water (preservative free) 10 mL IV syringe, 1 g, IntraVENous, Q12H, Yifna Albarado, NP, 1 g at 01/20/22 0245    fentaNYL (PF) 1,500 mcg/30 mL (50 mcg/mL) infusion, 0-200 mcg/hr, IntraVENous, TITRATE, Boby ODELL MD, Last Rate: 1 mL/hr at 01/19/22 2122, 50 mcg/hr at 01/19/22 2122    heparin (porcine) in 0.9% NaCl 30,000 unit/1,000 mL perfusion irrigation 50-1,000 mL, 50-1,000 mL, Other, PRN, Sam Gomez, PA    sodium chloride (NS) flush 5-40 mL, 5-40 mL, IntraVENous, Q8H, Sam Gomez PA, 10 mL at 01/20/22 0537    heparinized saline 2 units/mL infusion, , , CONTINUOUS, General Noelle Sheriff MD, 1,000 Units at 01/18/22 1416    chlorhexidine (PERIDEX) 0.12 % mouthwash 15 mL, 15 mL, Oral, Q12H, Davian Vázquez DO, 15 mL at 01/20/22 0924    sodium chloride (NS) flush 5-40 mL, 5-40 mL, IntraVENous, Q8H, Sam Gomez PA, 10 mL at 01/20/22 0536    0.45% sodium chloride infusion, 10 mL/hr, IntraVENous, CONTINUOUS, Sam Gomez PA    0.9% sodium chloride infusion, 9 mL/hr, IntraVENous, CONTINUOUS, Christopher Gomez PA, Last Rate: 9 mL/hr at 01/18/22 1647, 9 mL/hr at 01/18/22 1647    naloxone (NARCAN) injection 0.4 mg, 0.4 mg, IntraVENous, PRN, Sam Gomez PA    mupirocin (BACTROBAN) 2 % ointment, , Both Nostrils, BID, Christopher Gomez [place], Given at 01/20/22 0925    ondansetron (ZOFRAN) injection 4 mg, 4 mg, IntraVENous, Q4H PRN, Sam Gomez PA    albuterol (PROVENTIL VENTOLIN) nebulizer solution 2.5 mg, 2.5 mg, Nebulization, Q4H PRN, Sam Gomez PA    aspirin chewable tablet 81 mg, 81 mg, Oral, DAILY, TYE Sims, 81 mg at 01/20/22 0580    midazolam (VERSED) injection 1 mg, 1 mg, IntraVENous, Q1H PRN, Sam Gomez PA    magnesium oxide (MAG-OX) tablet 400 mg, 400 mg, Oral, BID, Christopher Gomez PA, 400 mg at 01/20/22 2984    calcium chloride 1 g in 0.9% sodium chloride 100 mL IVPB, 1 g, IntraVENous, PRN, Sam Gomez PA    bisacodyL (DULCOLAX) suppository 10 mg, 10 mg, Rectal, DAILY PRN, Sam Gomez PA    senna-docusate (PERICOLACE) 8.6-50 mg per tablet 1 Tablet, 1 Tablet, Oral, BID, Christopher Gomez PA, 1 Tablet at 01/20/22 7394    ELECTROLYTE REPLACEMENT NOTE: Nurse to review Serum Potassium and Magnesuim levels and Initiate Electrolyte Replacement Protocol as needed, 1 Each, Other, PRN, Sam Gomez PA    magnesium sulfate 1 g/100 ml IVPB (premix or compounded), 1 g, IntraVENous, PRN, Sam Gomez PA    alteplase (CATHFLO) 1 mg in sterile water (preservative free) 1 mL injection, 1 mg, InterCATHeter, PRN, Sam Gomez PA    bacitracin 500 unit/gram packet 1 Packet, 1 Packet, Topical, PRN, Corby Gomez PA    pantoprazole (PROTONIX) injection 40 mg, 40 mg, IntraVENous, DAILY, 40 mg at 01/20/22 5815 **AND** sodium chloride (NS) flush 10 mL, 10 mL, IntraVENous, DAILY, Davian Vázquez DO, 10 mL at 01/20/22 0923    dexmedeTOMidine in 0.9 % NaCl (PRECEDEX) 400 mcg/100 mL (4 mcg/mL) infusion soln, 0.1-1.5 mcg/kg/hr, IntraVENous, TITRATE, Davian Vázquez DO, Last Rate: 42.5 mL/hr at 01/20/22 1232, 1.5 mcg/kg/hr at 01/20/22 1232    midazolam (VERSED) injection 1 mg, 1 mg, IntraVENous, Q4H PRN, Davian Vázquez DO, 1 mg at 01/18/22 1719    insulin lispro (HUMALOG) injection, , SubCUTAneous, Q6H, Davian Vázquez DO, 2 Units at 01/20/22 1234    propofol (DIPRIVAN) 10 mg/mL infusion, 0-50 mcg/kg/min, IntraVENous, TITRATE, Davian Vázquez DO, Last Rate: 20.4 mL/hr at 01/20/22 1145, 30 mcg/kg/min at 01/20/22 1145    sodium bicarbonate (8.4%) 25 mEq in dextrose 5% 1,000 mL - impella purge fluid, , IntraVENous, TITRATE, Sam Gomez PA, Last Rate: 7.8 mL/hr at 01/20/22 0603, New Bag at 01/20/22 0603    insulin NPH (NOVOLIN N, HUMULIN N) injection 10 Units, 10 Units, SubCUTAneous, QHS, Sam Gomez PA, 10 Units at 01/19/22 2340    insulin NPH (NOVOLIN N, HUMULIN N) injection 20 Units, 20 Units, SubCUTAneous, DAILY, Corby Gomez PA, 20 Units at 01/20/22 4695    triamcinolone acetonide (KENALOG) 0.1 % cream, , Topical, BID, Corby Gomez PA, Given at 01/20/22 0925    polyethylene glycol (MIRALAX) packet 17 g, 17 g, Oral, DAILY, Corby Gomez PA, 17 g at 01/20/22 6254    digoxin (LANOXIN) tablet 0.0625 mg, 0.0625 mg, Oral, DAILY, Corby Gomez PA, 0.0625 mg at 01/20/22 1159    allopurinoL (ZYLOPRIM) tablet 50 mg, 50 mg, Oral, DAILY, Adrian Gomez TYE LEDESMA, 50 mg at 01/20/22 1153    epoetin isabel-epbx (RETACRIT) injection 20,000 Units, 20,000 Units, SubCUTAneous, Q TUE, THU & SAT, Crystal Gomez PA, 20,000 Units at 01/18/22 2039    montelukast (SINGULAIR) tablet 10 mg, 10 mg, Oral, DAILY, Crystal Gomez PA, 10 mg at 01/20/22 4117    levothyroxine (SYNTHROID) tablet 100 mcg, 100 mcg, Oral, Once per day on Mon Tue Wed Thu Fri Sat, Sam Gomez [place], 100 mcg at 01/20/22 5346    hydroxypropyl methylcellulose (ISOPTO TEARS) 0.5 % ophthalmic solution 1 Drop, 1 Drop, Both Eyes, PRN, Crystal Gomez PA, 1 Drop at 01/06/22 1727    venlafaxine-SR (EFFEXOR-XR) capsule 75 mg, 75 mg, Oral, DAILY WITH BREAKFAST, Crystal Gomez PA, 75 mg at 01/17/22 1025    gabapentin (NEURONTIN) capsule 100 mg, 100 mg, Oral, QHS, Crystal Gomez PA, 100 mg at 01/18/22 2126    arformoteroL (BROVANA) neb solution 15 mcg, 15 mcg, Nebulization, BID RT, 15 mcg at 01/20/22 0850 **AND** budesonide (PULMICORT) 500 mcg/2 ml nebulizer suspension, 500 mcg, Nebulization, BID RT, Crystal Gomez PA, 500 mcg at 01/20/22 0850    sodium chloride (NS) flush 5-40 mL, 5-40 mL, IntraVENous, Q8H, Sam Gomez PA, 10 mL at 01/20/22 1112    acetaminophen (TYLENOL) tablet 650 mg, 650 mg, Oral, Q6H PRN **OR** acetaminophen (TYLENOL) suppository 650 mg, 650 mg, Rectal, Q6H PRN, Sam Gomez PA    glucose chewable tablet 16 g, 4 Tablet, Oral, PRN, Sam Gomez PA    dextrose (D50W) injection syrg 12.5-25 g, 25-50 mL, IntraVENous, PRN, Sam Gomez PA    glucagon (GLUCAGEN) injection 1 mg, 1 mg, IntraMUSCular, PRN, Crystal Gomez PA    PATIENT CARE TEAM:  Patient Care Team:  Rafael Chamorro MD as PCP - General (Internal Medicine)  Rafael Chamorro MD as PCP - REHABILITATION [facility] Empaneled Provider  Berna Daly MD as Consulting Provider (Internal Medicine)  Porsha Nguyen MD (Dermatology)  Delta Allen MD (Nephrology)  Trupti Ferreira MD as Consulting Provider (Cardiology)  Kenya Rodríguez MD (Cardiology)  Tremaine Yin MD as Consulting Provider (Pulmonary Disease)     Thank you for allowing me to participate in this patient's care. Lupe Mayorga, JAIDA Howelloctavio Donohueben[address]  [address], [address]  Phone: [phone]    [facility] ATTENDING ADDENDUM    Patient was seen and examined in person. Data and notes were reviewed. I have discussed and agree with the plan as noted in the NP note above without further additions.     Jordi Cary MD PhD  [phone]

## 2022-01-20 NOTE — PROGRESS NOTES
[facility]   [address] [facility], [address] [place] [address]  Phone: [phone]   [phone]    [email or web address]     Nephrology Progress Note    Patient Name : Si Hammans      : 1955     MRN : [id number]  Date of Admission : 2022  Date of Servive : 22    CC:  Follow up for ARF       Assessment and Plan   RODNEY on CKD :  - suspect 2/2 CRS  - Cr stable, UOP stable  - bumex 2mg IV x 1 now  - daily labs and I/Os  - no urgent need for RRT at this time    CKD stage IV:  - Etiology: DM, HTN, CRS  - Renal US: suggestive of CKD, B/L benign renal cysts   - baseline Cr 2.4-2.6 mg/dl      Acute on chronic HFrEF  NI CMP, LVEF 15 to 20%  NYHA class III-IV  S/p BiV pacer/ICD-s/p CRT  R axillary Impella implanted 22    Hypervolemia:  - RHC showing severely elevated filling pressures, pulm HTN  - diuretics and milrinone as above    Morbid obesity  Type II DM  HTN  Hypothyroidism  Pulmonary hypertension  Gout  Psoriasis       Interval History:  Seen and examined. Cr improving, nonoliguric. Bumex infusion off. Last dose of IV bumex 10pm.  On milrinone and Roro. Impella at P-5. PA pressures stable, CVP around 10. Sedated on the vent    Review of Systems: A comprehensive review of systems was negative except for that written in the HPI.     Current Medications:   Current Facility-Administered Medications   Medication Dose Route Frequency    potassium chloride 20 mEq in 50 ml IVPB  20 mEq IntraVENous Q1H    niCARdipine (CARDENE) 25 mg in 0.9% sodium chloride 250 mL infusion  0-15 mg/hr IntraVENous TITRATE    [Held by provider] ivabradine (CORLANOR) tablet 2.5 mg  2.5 mg Oral BID WITH MEALS    milrinone (PRIMACOR) 20 MG/100 ML D5W infusion  0.2 mcg/kg/min IntraVENous CONTINUOUS    bivalirudin (ANGIOMAX) 250 mg in 0.9% sodium chloride (MBP/ADV) 50 mL MBP  0.02-2.5 mg/kg/hr IntraVENous TITRATE    iron sucrose (VENOFER) 200 mg in 0.9% sodium chloride 100 mL IVPB  200 mg IntraVENous Q24H    hydrALAZINE (APRESOLINE) 20 mg/mL injection 5 mg  5 mg IntraVENous Q4H PRN    albumin human 5% (BUMINATE) solution 12.5 g  12.5 g IntraVENous BID    bumetanide (BUMEX) injection 1 mg  1 mg IntraVENous Q4H PRN    ceFAZolin (ANCEF) 1 g in sterile water (preservative free) 10 mL IV syringe  1 g IntraVENous Q12H    fentaNYL (PF) 1,500 mcg/30 mL (50 mcg/mL) infusion  0-200 mcg/hr IntraVENous TITRATE    heparin (porcine) in 0.9% NaCl 30,000 unit/1,000 mL perfusion irrigation 50-1,000 mL  50-1,000 mL Other PRN    sodium chloride (NS) flush 5-40 mL  5-40 mL IntraVENous Q8H    heparinized saline 2 units/mL infusion    CONTINUOUS    chlorhexidine (PERIDEX) 0.12 % mouthwash 15 mL  15 mL Oral Q12H    sodium chloride (NS) flush 5-40 mL  5-40 mL IntraVENous Q8H    0.45% sodium chloride infusion  10 mL/hr IntraVENous CONTINUOUS    0.9% sodium chloride infusion  9 mL/hr IntraVENous CONTINUOUS    naloxone (NARCAN) injection 0.4 mg  0.4 mg IntraVENous PRN    mupirocin (BACTROBAN) 2 % ointment   Both Nostrils BID    ondansetron (ZOFRAN) injection 4 mg  4 mg IntraVENous Q4H PRN    albuterol (PROVENTIL VENTOLIN) nebulizer solution 2.5 mg  2.5 mg Nebulization Q4H PRN    aspirin chewable tablet 81 mg  81 mg Oral DAILY    midazolam (VERSED) injection 1 mg  1 mg IntraVENous Q1H PRN    magnesium oxide (MAG-OX) tablet 400 mg  400 mg Oral BID    calcium chloride 1 g in 0.9% sodium chloride 100 mL IVPB  1 g IntraVENous PRN    bisacodyL (DULCOLAX) suppository 10 mg  10 mg Rectal DAILY PRN    senna-docusate (PERICOLACE) 8.6-50 mg per tablet 1 Tablet  1 Tablet Oral BID    ELECTROLYTE REPLACEMENT NOTE: Nurse to review Serum Potassium and Magnesuim levels and Initiate Electrolyte Replacement Protocol as needed  1 Each Other PRN    magnesium sulfate 1 g/100 ml IVPB (premix or compounded)  1 g IntraVENous PRN    alteplase (CATHFLO) 1 mg in sterile water (preservative free) 1 mL injection 1 mg InterCATHeter PRN    bacitracin 500 unit/gram packet 1 Packet  1 Packet Topical PRN    pantoprazole (PROTONIX) injection 40 mg  40 mg IntraVENous DAILY    And    sodium chloride (NS) flush 10 mL  10 mL IntraVENous DAILY    dexmedeTOMidine in 0.9 % NaCl (PRECEDEX) 400 mcg/100 mL (4 mcg/mL) infusion soln  0.1-1.5 mcg/kg/hr IntraVENous TITRATE    midazolam (VERSED) injection 1 mg  1 mg IntraVENous Q4H PRN    insulin lispro (HUMALOG) injection   SubCUTAneous Q6H    propofol (DIPRIVAN) 10 mg/mL infusion  0-50 mcg/kg/min IntraVENous TITRATE    sodium bicarbonate (8.4%) 25 mEq in dextrose 5% 1,000 mL - impella purge fluid   IntraVENous TITRATE    insulin NPH (NOVOLIN N, HUMULIN N) injection 10 Units  10 Units SubCUTAneous QHS    insulin NPH (NOVOLIN N, HUMULIN N) injection 20 Units  20 Units SubCUTAneous DAILY    triamcinolone acetonide (KENALOG) 0.1 % cream   Topical BID    polyethylene glycol (MIRALAX) packet 17 g  17 g Oral DAILY    digoxin (LANOXIN) tablet 0.0625 mg  0.0625 mg Oral DAILY    allopurinoL (ZYLOPRIM) tablet 50 mg  50 mg Oral DAILY    epoetin isabel-epbx (RETACRIT) injection 20,000 Units  20,000 Units SubCUTAneous Q TUE, THU & SAT    montelukast (SINGULAIR) tablet 10 mg  10 mg Oral DAILY    levothyroxine (SYNTHROID) tablet 100 mcg  100 mcg Oral Once per day on Mon Tue Wed Thu Fri Sat    hydroxypropyl methylcellulose (ISOPTO TEARS) 0.5 % ophthalmic solution 1 Drop  1 Drop Both Eyes PRN    venlafaxine-SR (EFFEXOR-XR) capsule 75 mg  75 mg Oral DAILY WITH BREAKFAST    gabapentin (NEURONTIN) capsule 100 mg  100 mg Oral QHS    arformoteroL (BROVANA) neb solution 15 mcg  15 mcg Nebulization BID RT    And    budesonide (PULMICORT) 500 mcg/2 ml nebulizer suspension  500 mcg Nebulization BID RT    sodium chloride (NS) flush 5-40 mL  5-40 mL IntraVENous Q8H    acetaminophen (TYLENOL) tablet 650 mg  650 mg Oral Q6H PRN    Or    acetaminophen (TYLENOL) suppository 650 mg  650 mg Rectal Q6H PRN    glucose chewable tablet 16 g  4 Tablet Oral PRN    dextrose (D50W) injection syrg 12.5-25 g  25-50 mL IntraVENous PRN    glucagon (GLUCAGEN) injection 1 mg  1 mg IntraMUSCular PRN      Allergies   Allergen Reactions    Ciprofloxacin Anaphylaxis    Shellfish Derived Anaphylaxis    Ace Inhibitors Unknown (comments)    Biaxin [Clarithromycin] Other (comments)     Metal taste    Candesartan Cough    Pcn [Penicillins] Hives       Objective:  Vitals:    Vitals:    01/20/22 0600 01/20/22 0700 01/20/22 0800 01/20/22 0851   BP:  (!) 147/128 109/76    Pulse: 71 72 71 70   Resp: 12 12 12 12   Temp:       SpO2: 99% 99% 99% 99%   Weight:       Height:         Intake and Output:  No intake/output data recorded. 01/18 1901 - 01/20 0700  In: 3397.7 [I.V.:3167.7]  Out: 6800 [Urine:6700]    Physical Examination:    General: Sedated on the vent  HEENT:           ETT in place   Neck:  Supple, no mass  Resp:  Reduced bibasilar  Breath sounds  CV:  RRR, ++ LE edema  GI:  Soft, NT, + BS, obese  Neurologic:  Sedated  :                  Henley in place    [x]    High complexity decision making was performed  []    Patient is at high-risk of decompensation with multiple organ involvement    Lab Data Personally Reviewed: I have reviewed all the pertinent labs, microbiology data and radiology studies during assessment.     Recent Labs     01/20/22  0240 01/19/22  1253 01/19/22  1244 01/19/22  1236 01/19/22  0409 01/18/22  2136 01/18/22  1645 01/18/22  1645 01/18/22  1634 01/18/22  0339 01/18/22  0339     --  139  --  138 140  --  138  --    < > 136   K 3.1* 4.0 4.0  --  3.5 4.1   < > 3.5  --    < > 4.4     --  101  --  100 99  --  100  --    < > 97   CO2 30  --  31  --  31 31  --  31  --    < > 33*   *  --  179*  --  148* 115*  --  137*  --    < > 206*   BUN 53*  --  57*  --  61* 63*  --  65*  --    < > 64*   CREA 2.42*  --  2.68*  --  2.70* 2.74*  --  2.89*  --    < > 2.76*   CA 9.1  --  8.9 9.0 9.6 9.5   < > 9.7  --    < > 9.8   MG 2.3 2.6*  --   --  1.9 2.1  --   --  2.3   < > 2.4   PHOS 3.5  --  3.1  --  2.4*  --   --   --   --   --   --    ALB 2.8*  --  3.1*  --  3.4* 3.7  --  3.3*  --    < > 3.5   ALT 12  --   --   --  26 29  --  29  --   --  32   INR  --   --   --  1.2*  --   --   --   --  1.4*  --   --     < > = values in this interval not displayed. Recent Labs     01/20/22  0240 01/19/22  0409 01/18/22  2136 01/18/22  1634 01/17/22  1033   WBC 8.3 8.5  --  7.9 9.5   HGB 7.8* 8.8* 8.7* 7.4* 8.1*   HCT 28.8* 31.3* 32.1* 27.3* 31.4*    360  --  365 344     Lab Results   Component Value Date/Time    Specimen Description: URINE 10/22/2013 01:32 PM    Specimen Description: URINE 01/23/2013 04:40 PM    Specimen Description: LEG TISSUE 02/12/2009 12:00 AM    Specimen Description: LEG (LEFT) 01/29/2009 03:06 AM     Lab Results   Component Value Date/Time    Culture result: NO GROWTH AFTER 14 HOURS 01/19/2022 12:41 PM    Culture result: MIXED SKIN ROSANNA ISOLATED 10/22/2013 01:32 PM    Culture result:  01/23/2013 04:40 PM     ENTEROCOCCUS FAECALIS GROUP D  MIXED SKIN ROSANNA ISOLATED    Culture result:  02/12/2009 12:00 AM     LIGHT STAPHYLOCOCCUS EPIDERMIDIS (OXACILLIN RESISTANT)  LIGHT 2ND STRAIN OF STAPHYLOCOCCUS EPIDERMIDIS (OXACILLIN RESISTANT)    Culture result: NO GROWTH 2 DAYS 01/29/2009 03:06 AM     Recent Results (from the past 24 hour(s))   POC G3 - PUL    Collection Time: 01/19/22 10:27 AM   Result Value Ref Range    FIO2 (POC) 50 %    pH (POC) 7.60 (HH) 7.35 - 7.45      pCO2 (POC) 31.8 (L) 35.0 - 45.0 MMHG    pO2 (POC) 173 (H) 80 - 100 MMHG    HCO3 (POC) 31.0 (H) 22 - 26 MMOL/L    sO2 (POC) 99.8 (H) 92 - 97 %    Base excess (POC) 9.1 mmol/L    Site DRAWN FROM ARTERIAL LINE      Device: ADULT VENT      Mode ASSIST CONTROL      Tidal volume 460 ml    Set Rate 14 bpm    PEEP/CPAP (POC) 8 cmH2O    Allens test (POC) NOT APPLICABLE      Specimen type (POC) ARTERIAL      Critical value read back [facility]. MD    GLUCOSE, POC    Collection Time: 01/19/22 12:12 PM   Result Value Ref Range    Glucose (POC) 192 (H) 65 - 117 mg/dL    Performed by [address] [place] [address], IGM & TOTAL    Collection Time: 01/19/22 12:27 PM   Result Value Ref Range    Hepatitis A Ab, IgM Negative Negative      Hep A Ab, total Negative Negative     SED RATE (ESR)    Collection Time: 01/19/22 12:27 PM   Result Value Ref Range    Sed rate, automated 68 (H) 0 - 30 mm/hr   DRUG SCREEN, URINE    Collection Time: 01/19/22 12:35 PM   Result Value Ref Range    AMPHETAMINES Negative NEG      BARBITURATES Negative NEG      BENZODIAZEPINES Positive (A) NEG      COCAINE Negative NEG      METHADONE Negative NEG      OPIATES Negative NEG      PCP(PHENCYCLIDINE) Negative NEG      THC (TH-CANNABINOL) Negative NEG      Drug screen comment (NOTE)    CORTISOL    Collection Time: 01/19/22 12:36 PM   Result Value Ref Range    Cortisol, random 29.3 ug/dL   HIV 1/2 AG/AB, 4TH GENERATION,W RFLX CONFIRM    Collection Time: 01/19/22 12:36 PM   Result Value Ref Range    HIV 1/2 Interpretation NONREACTIVE NR      HIV 1/2 result comment SEE NOTE     PTH INTACT    Collection Time: 01/19/22 12:36 PM   Result Value Ref Range    Calcium 9.0 8.5 - 10.1 MG/DL    PTH, Intact 133.3 (H) 18.4 - 88.0 pg/mL   HEP B SURFACE AB    Collection Time: 01/19/22 12:36 PM   Result Value Ref Range    Hepatitis B surface Ab <3.10 mIU/mL    Hep B surface Ab Interp. NONREACTIVE NR     HEP B SURFACE AG    Collection Time: 01/19/22 12:36 PM   Result Value Ref Range    Hepatitis B surface Ag <0.10 Index    Hep B surface Ag Interp.  Negative NEG     PTT    Collection Time: 01/19/22 12:36 PM   Result Value Ref Range    aPTT 25.9 22.1 - 31.0 sec    aPTT, therapeutic range     58.0 - 77.0 SECS   PROTHROMBIN TIME + INR    Collection Time: 01/19/22 12:36 PM   Result Value Ref Range    INR 1.2 (H) 0.9 - 1.1      Prothrombin time 12.4 (H) 9.0 - 11.1 sec   CULTURE, BLOOD, PAIRED    Collection Time: 01/19/22 12:41 PM    Specimen: Blood   Result Value Ref Range    Special Requests: NO SPECIAL REQUESTS      Culture result: NO GROWTH AFTER 14 HOURS     CRP, HIGH SENSITIVITY    Collection Time: 01/19/22 12:44 PM   Result Value Ref Range    CRP, High sensitivity >9.5 mg/L   LD    Collection Time: 01/19/22 12:44 PM   Result Value Ref Range     (H) 81 - 246 U/L   PREALBUMIN    Collection Time: 01/19/22 12:44 PM   Result Value Ref Range    Prealbumin 13.3 (L) 20.0 - 40.0 mg/dL   RENAL FUNCTION PANEL    Collection Time: 01/19/22 12:44 PM   Result Value Ref Range    Sodium 139 136 - 145 mmol/L    Potassium 4.0 3.5 - 5.1 mmol/L    Chloride 101 97 - 108 mmol/L    CO2 31 21 - 32 mmol/L    Anion gap 7 5 - 15 mmol/L    Glucose 179 (H) 65 - 100 mg/dL    BUN 57 (H) 6 - 20 MG/DL    Creatinine 2.68 (H) 0.55 - 1.02 MG/DL    BUN/Creatinine ratio 21 (H) 12 - 20      GFR est AA 21 (L) >60 ml/min/1.73m2    GFR est non-AA 18 (L) >60 ml/min/1.73m2    Calcium 8.9 8.5 - 10.1 MG/DL    Phosphorus 3.1 2.6 - 4.7 MG/DL    Albumin 3.1 (L) 3.5 - 5.0 g/dL   T3, FREE    Collection Time: 01/19/22 12:45 PM   Result Value Ref Range    Free Triiodothyronine (T3) 1.1 (L) 2.2 - 4.0 pg/mL   LACTIC ACID    Collection Time: 01/19/22 12:50 PM   Result Value Ref Range    Lactic acid 0.9 0.4 - 2.0 MMOL/L   HCG URINE, QL    Collection Time: 01/19/22 12:53 PM   Result Value Ref Range    HCG urine, QL Negative NEG     GLUCOSE-6-PHOSPHATE DEHYDROGENASE    Collection Time: 01/19/22 12:53 PM   Result Value Ref Range    RBC 3.54 (L) 3.77 - 5.28 x10E6/uL    G-6-PD PENDING U/10E12 RBC   HEPATITIS B CORE AB, IGM    Collection Time: 01/19/22 12:53 PM   Result Value Ref Range    Hepatitis B core, IgM NONREACTIVE NR     RUBELLA AB, IGG    Collection Time: 01/19/22 12:53 PM   Result Value Ref Range    Rubella, IgG Qt. >500.00 IU/ML    Rubella IgG, QL REACTIVE     IRON PROFILE    Collection Time: 01/19/22 12:53 PM   Result Value Ref Range    Iron <5 (L) 35 - 150 ug/dL TIBC <8 (L) 250 - 450 ug/dL    Iron % saturation Cannot be calculated 20 - 50 %   FERRITIN    Collection Time: 01/19/22 12:53 PM   Result Value Ref Range    Ferritin 126 26 - 388 NG/ML   MAGNESIUM    Collection Time: 01/19/22 12:53 PM   Result Value Ref Range    Magnesium 2.6 (H) 1.6 - 2.4 mg/dL   POTASSIUM    Collection Time: 01/19/22 12:53 PM   Result Value Ref Range    Potassium 4.0 3.5 - 5.1 mmol/L   MICROALBUMIN, UR, RAND W/ MICROALB/CREAT RATIO    Collection Time: 01/19/22 12:53 PM   Result Value Ref Range    Microalbumin,urine random 17.30 MG/DL    Creatinine, urine 65.80 mg/dL    Microalbumin/Creat ratio (mg/g creat) 263 (H) 0 - 30 mg/g   PTT    Collection Time: 01/19/22  4:35 PM   Result Value Ref Range    aPTT 42.4 (H) 22.1 - 31.0 sec    aPTT, therapeutic range     58.0 - 77.0 SECS   GLUCOSE, POC    Collection Time: 01/19/22  5:39 PM   Result Value Ref Range    Glucose (POC) 225 (H) 65 - 117 mg/dL    Performed by Janessa MARIE (CON)    PTT    Collection Time: 01/19/22  7:12 PM   Result Value Ref Range    aPTT 53.0 (H) 22.1 - 31.0 sec    aPTT, therapeutic range     58.0 - 77.0 SECS   GLUCOSE, POC    Collection Time: 01/19/22 10:24 PM   Result Value Ref Range    Glucose (POC) 209 (H) 65 - 117 mg/dL    Performed by Allyssa Henley    PTT    Collection Time: 01/19/22 10:25 PM   Result Value Ref Range    aPTT 52.2 (H) 22.1 - 31.0 sec    aPTT, therapeutic range     58.0 - 77.0 SECS   MAGNESIUM    Collection Time: 01/20/22  2:40 AM   Result Value Ref Range    Magnesium 2.3 1.6 - 2.4 mg/dL   NT-PRO BNP    Collection Time: 01/20/22  2:40 AM   Result Value Ref Range    NT pro-BNP 7,240 (H) <259 PG/ML   METABOLIC PANEL, COMPREHENSIVE    Collection Time: 01/20/22  2:40 AM   Result Value Ref Range    Sodium 140 136 - 145 mmol/L    Potassium 3.1 (L) 3.5 - 5.1 mmol/L    Chloride 104 97 - 108 mmol/L    CO2 30 21 - 32 mmol/L    Anion gap 6 5 - 15 mmol/L    Glucose 193 (H) 65 - 100 mg/dL    BUN 53 (H) 6 - 20 MG/DL    Creatinine 2.42 (H) 0.55 - 1.02 MG/DL    BUN/Creatinine ratio 22 (H) 12 - 20      GFR est AA 24 (L) >60 ml/min/1.73m2    GFR est non-AA 20 (L) >60 ml/min/1.73m2    Calcium 9.1 8.5 - 10.1 MG/DL    Bilirubin, total 0.5 0.2 - 1.0 MG/DL    ALT (SGPT) 12 12 - 78 U/L    AST (SGOT) 8 (L) 15 - 37 U/L    Alk.  phosphatase 60 45 - 117 U/L    Protein, total 6.7 6.4 - 8.2 g/dL    Albumin 2.8 (L) 3.5 - 5.0 g/dL    Globulin 3.9 2.0 - 4.0 g/dL    A-G Ratio 0.7 (L) 1.1 - 2.2     DIGOXIN    Collection Time: 01/20/22  2:40 AM   Result Value Ref Range    Digoxin level 0.7 (L) 0.90 - 2.00 NG/ML   LACTIC ACID    Collection Time: 01/20/22  2:40 AM   Result Value Ref Range    Lactic acid 0.8 0.4 - 2.0 MMOL/L   PROCALCITONIN    Collection Time: 01/20/22  2:40 AM   Result Value Ref Range    Procalcitonin 0.67 ng/mL   CBC W/O DIFF    Collection Time: 01/20/22  2:40 AM   Result Value Ref Range    WBC 8.3 3.6 - 11.0 K/uL    RBC 3.20 (L) 3.80 - 5.20 M/uL    HGB 7.8 (L) 11.5 - 16.0 g/dL    HCT 28.8 (L) 35.0 - 47.0 %    MCV 90.0 80.0 - 99.0 FL    MCH 24.4 (L) 26.0 - 34.0 PG    MCHC 27.1 (L) 30.0 - 36.5 g/dL    RDW 21.0 (H) 11.5 - 14.5 %    PLATELET 194 078 - 810 K/uL    MPV 8.6 (L) 8.9 - 12.9 FL    NRBC 0.0 0  WBC    ABSOLUTE NRBC 0.00 0.00 - 0.01 K/uL   PTT    Collection Time: 01/20/22  2:40 AM   Result Value Ref Range    aPTT 55.1 (H) 22.1 - 31.0 sec    aPTT, therapeutic range     58.0 - 77.0 SECS   LD    Collection Time: 01/20/22  2:40 AM   Result Value Ref Range     (H) 81 - 246 U/L   PHOSPHORUS    Collection Time: 01/20/22  2:40 AM   Result Value Ref Range    Phosphorus 3.5 2.6 - 4.7 MG/DL   BLOOD GAS, VENOUS    Collection Time: 01/20/22  5:18 AM   Result Value Ref Range    VENOUS PH 7.51 (HH) 7.32 - 7.42      VENOUS PCO2 37.6 (L) 41 - 51 mmHg    VENOUS PO2 36 25 - 40 mmHg    VENOUS BICARBONATE 29 (H) 23 - 28 mmol/L    VENOUS BASE EXCESS 5.9 mmol/L    O2 METHOD VENT      Sample source VENOUS BLOOD      SITE SG Critical value read back Called to [facility] [employer]  [facility] on 01/20/2022 at 05:25    GLUCOSE, POC    Collection Time: 01/20/22  5:35 AM   Result Value Ref Range    Glucose (POC) 195 (H) 65 - 117 mg/dL    Performed by Meek Mirza    PTT    Collection Time: 01/20/22  5:59 AM   Result Value Ref Range    aPTT 55.5 (H) 22.1 - 31.0 sec    aPTT, therapeutic range     58.0 - 77.0 SECS           I have reviewed the flowsheets. Chart and Pertinent Notes have been reviewed. No change in PMH ,family and social history from Consult note.       Nehemiah Ryan MD  [facility]

## 2022-01-20 NOTE — PROGRESS NOTES
Cardiac Electrophysiology [facility] Progress Note     Subjective:       Meera Thayer is a 78 y.o. patient who is s/p Impella   She was partly awake on ventilator this morning   She signaled by nodding her head she could hear me   I spoke to her CCU RN too       Interim:   S/p Impella placement on 01/18/2022, now in CCU.  She had VF overnight, has appearance of Torsades.  Required ICD shock x 2, both successful. Hypokalemic (3.1), Mg 2.3. Unable to wean off nitric oxide yesterday, still intubated on vent.  Milrinone at 0.2 mcg/kg/min. LHC/RHC on 01/17/2022 had shown severely elevated bilateral filling pressures with severe pulmonary HTN, LAD with 80% lesion. Still with bilateral pulmonary infiltrates on CXR.           HPI:   Presented to the ER on 01/04/2021 with hypoxia, improved on supplemental oxygen. Labs showed stable anemia.  WBC elevated, hyponatremic, hypokalemic.  D dimer elevated.  Chronic CKD. Nephrologist spoke to me directly regarding severe renal failure, but not much worse than last admission. Rapid COVID negative.  CXR showed pulmonary edema vs atypical pneumonia.  VQ scan showed low probability for PE.       NICM, LVEF 15-20% during recent admission.  LVEF previously normalized with CRT.  NYHA III-IV.  No ACEi/ARB due to renal dysfunction.  GDMT dosing limited by low BP. [address] 12/10/2021 showed severe pulmonary HTN (wedge 34 mmHg, PAP 80 mmHg). Cardiac cath in 2015 at [facility] showed no evidence of CAD. She did require LV lead reprogramming over the summer, but good LV capture since.  Good capture/function when checked during recent admission. BP controlled. PICC line placed 01/10/2022 in anticipation of home milrinone. Previous:   LVEF noted 15-20% in 12/2021. S/p Medtronic biventricular ICD (gen change 01/142015, leads 09/25/2008).     CKD stage IV.        Previously followed by Dr. Hermelinda Vora, states did not follow him to new practice. Problem List Date Reviewed: 12/22/2021     Acute respiratory failure with hypoxia [facility]) ICD-10-CM: J96.01   ICD-9-CM: 518.81 1/4/2022     CHF exacerbation (HCC) ICD-10-CM: I50.9   ICD-9-CM: 428.0 12/6/2021     Type 2 diabetes mellitus with diabetic neuropathy ([facility] ICD-10-CM: E11.40   ICD-9-CM: 250.60, 357.2 1/2/2020     Type 2 diabetes with nephropathy ([facility] ICD-10-CM: E11.21   ICD-9-CM: 250.40, 583.81 4/3/2018     Obesity, morbid ([facility] ICD-10-CM: E66.01   ICD-9-CM: 278.01 12/8/2017     Acquired hypothyroidism ICD-10-CM: E03.9   ICD-9-CM: 244.9 8/15/2016     Dysthymia ICD-10-CM: F34.1   ICD-9-CM: 300.4 8/15/2016     ICD (implantable cardioverter-defibrillator), biventricular, in situ ICD-10-CM: Z95.810   ICD-9-CM: V45.02 6/5/2014   Overview Signed 1/14/2015 11:11 AM by Bhargav Villegas MD     Generator Medtronic change 1/14/2015         Dyslipidemia ICD-10-CM: D11.3   ICD-9-CM: 272.4 1/14/2014     CKD (chronic kidney disease) ICD-10-CM: N18.9   ICD-9-CM: 585.9 8/15/2012     Cardiomyopathy, nonischemic (HCC) ICD-10-CM: I42.8   ICD-9-CM: 425.4 Unknown   Overview Signed 10/10/2011  6:40 AM by Yuly Harvey MD     initial dx 2001, bivHF 2008 with EF 15%, s/p biV-ICD 9/08, significant improvment in EF to 45-50%         Anemia in chronic renal disease (Chronic) ICD-10-CM: N18.9, D63.1   ICD-9-CM: 285.21 12/10/2008     HTN (hypertension) ICD-10-CM: I10   ICD-9-CM: 401.9 12/10/2008     GERD (gastroesophageal reflux disease) (Chronic) ICD-10-CM: K21.9   ICD-9-CM: 530.81 12/10/2008     Gout (Chronic) ICD-10-CM: M10.9   ICD-9-CM: 274.9 12/10/2008     Pulmonary HTN (HCC) (Chronic) ICD-10-CM: I27.20   ICD-9-CM: 416.8 12/10/2008           Current Facility-Administered Medications   Medication Dose Route Frequency Provider Last Rate Last Admin   · potassium chloride 20 mEq in 50 ml IVPB 20 mEq IntraVENous Q1H Shawn ODELL MD 50 mL/hr at 01/19/22 0717 20 mEq at 01/19/22 0717   · niCARdipine (CARDENE) 25 mg in 0.9% sodium chloride 250 mL infusion 0-15 mg/hr IntraVENous TITRATE Stan ODELL MD 50 mL/hr at 01/19/22 0647 5 mg/hr at 01/19/22 0647   · magnesium sulfate 2 g/50 ml IVPB (premix or compounded) 2 g IntraVENous ONCE Davian Vázquez DO   · DOBUTamine (DOBUTREX) 500 mg/250 mL (2,000 mcg/mL) infusion 0-10 mcg/kg/min IntraVENous TITRATE TYE Thompson Stopped at 01/18/22 1900   · heparin (porcine) in 0.9% NaCl 30,000 unit/1,000 mL perfusion irrigation 50-1,000 mL 50-1,000 mL Other PRN Johnny Gomez PA   · sodium chloride (NS) flush 5-40 mL 5-40 mL IntraVENous Q8H TYE Thompson 10 mL at 01/19/22 0605   · heparinized saline 2 units/mL infusion CONTINUOUS Dominguez Rodriguez MD 1,000 Units at 01/18/22 1416   · chlorhexidine (PERIDEX) 0.12 % mouthwash 15 mL 15 mL Oral Q12H Davian Vázquez DO 15 mL at 01/18/22 2100   · sodium chloride (NS) flush 5-40 mL 5-40 mL IntraVENous Q8H Sam Gomez PA   · albumin human 5% (BUMINATE) solution 12.5 g 12.5 g IntraVENous Q2H PRN Sam Gomez PA   · 0.45% sodium chloride infusion 10 mL/hr IntraVENous CONTINUOUS Johnny Gomez PA   · 0.9% sodium chloride infusion 9 mL/hr IntraVENous CONTINUOUS TYE Thompson 9 mL/hr at 01/18/22 1647 9 mL/hr at 01/18/22 1647   · naloxone [facility]) injection 0.4 mg 0.4 mg IntraVENous PRN Johnny Gomez PA   · mupirocin (BACTROBAN) 2 % ointment Both Nostrils BID Ashley Thompson Given at 01/18/22 1835   · ondansetron (ZOFRAN) injection 4 mg 4 mg IntraVENous Q4H PRN Sam Gomez PA   · albuterol (PROVENTIL VENTOLIN) nebulizer solution 2.5 mg 2.5 mg Nebulization Q4H PRN Johnny Gomez PA   · aspirin chewable tablet 81 mg 81 mg Oral DAILY Johnny Gomez PA   · midazolam (VERSED) injection 1 mg 1 mg IntraVENous Q1H PRN Johnny Gomez PA   · magnesium oxide (MAG-OX) tablet 400 mg 400 mg Oral BID Sam Gomez PA   · calcium chloride 1 g in 0.9% sodium chloride 100 mL IVPB 1 g IntraVENous PRN Francie Gomez PA   · bisacodyL (DULCOLAX) suppository 10 mg 10 mg Rectal DAILY PRN Francie Gomez PA   · senna-docusate (PERICOLACE) 8.6-50 mg per tablet 1 Tablet 1 Tablet Oral BID Francie Gomez PA   · ELECTROLYTE REPLACEMENT NOTE: Nurse to review Serum Potassium and Magnesuim levels and Initiate Electrolyte Replacement Protocol as needed 1 Each Other PRN Sam Gomez PA   · magnesium sulfate 1 g/100 ml IVPB (premix or compounded) 1 g IntraVENous PRN Sam Gomez PA   · alteplase (CATHFLO) 1 mg in sterile water (preservative free) 1 mL injection 1 mg InterCATHeter PRN Francie Gomez PA   · bacitracin 500 unit/gram packet 1 Packet 1 Packet Topical PRN Francie Gomez PA   · heparin 25,000 units in dextrose 500 mL infusion *IMPELLA* 4-20 mL/hr Other TITRATE Francie Gomez PA 7.9 mL/hr at 01/18/22 1630 7.9 mL/hr at 01/18/22 1630   · pantoprazole (PROTONIX) injection 40 mg 40 mg IntraVENous DAILY Davian Vázquez DO   And   · sodium chloride (NS) flush 10 mL 10 mL IntraVENous DAILY Davian Vázquez DO   · ceFAZolin (ANCEF) 1 g in sterile water (preservative free) 10 mL IV syringe 1 g IntraVENous Q12H Sam Gomez PA 1 g at 01/19/22 0154   · dexmedeTOMidine in 0.9 % NaCl (PRECEDEX) 400 mcg/100 mL (4 mcg/mL) infusion soln 0.1-1.5 mcg/kg/hr IntraVENous TITRATE Davian Vázquez DO 42.5 mL/hr at 01/19/22 0717 1.5 mcg/kg/hr at 01/19/22 0717   · midazolam (VERSED) injection 1 mg 1 mg IntraVENous Q4H PRN Davian Vázquez DO 1 mg at 01/18/22 1719   · insulin lispro (HUMALOG) injection SubCUTAneous Q6H Davian Vázquez DO 2 Units at 01/19/22 0605   · propofol (DIPRIVAN) 10 mg/mL infusion 0-50 mcg/kg/min IntraVENous TITRATE Davian Vázquez DO 17 mL/hr at 01/19/22 0425 25 mcg/kg/min at 01/19/22 0425   · sodium bicarbonate (8.4%) 25 mEq in dextrose 5% 1,000 mL - impella purge fluid IntraVENous TITRATE Rozanne Bamberger, PA 7.9 mL/hr at 01/18/22 2036 New Bag at 01/18/22 2036   · milrinone (PRIMACOR) 20 MG/100 ML D5W infusion 0.25 mcg/kg/min IntraVENous CONTINUOUS Erika ODELL MD 8.5 mL/hr at 01/19/22 0312 0.25 mcg/kg/min at 01/19/22 0312   · bumetanide (BUMEX) 0.25 mg/mL infusion 2 mg/hr IntraVENous CONTINUOUS TYE Vaughan 2 mL/hr at 01/18/22 1948 0.5 mg/hr at 01/18/22 1948   · [Held by provider] milrinone (PRIMACOR) 20 MG/100 ML D5W infusion 0.125 mcg/kg/min IntraVENous CONTINUOUS Keanu Albarado Mai, NP 4.3 mL/hr at 01/18/22 0343 0.125 mcg/kg/min at 01/18/22 0343   · insulin NPH (NOVOLIN N, HUMULIN N) injection 10 Units 10 Units SubCUTAneous QHS TYE Vaughan 10 Units at 01/18/22 2126   · insulin NPH (NOVOLIN N, HUMULIN N) injection 20 Units 20 Units SubCUTAneous DAILY Dayan Gomez PA   · triamcinolone acetonide (KENALOG) 0.1 % cream Topical BID TYE Vaughan Given at 01/17/22 1733   · polyethylene glycol (MIRALAX) packet 17 g 17 g Oral DAILY TYE Vaughan 17 g at 01/16/22 2820   · digoxin (LANOXIN) tablet 0.0625 mg 0.0625 mg Oral DAILY Dayan Gomez PA 0.0625 mg at 01/17/22 1027   · ivabradine (CORLANOR) tablet 7.5 mg 7.5 mg Oral BID WITH MEALS Dayan Gomez PA 7.5 mg at 01/18/22 1700   · [Held by provider] carvediloL (COREG) tablet 12.5 mg 12.5 mg Oral BID WITH MEALS Dayan Gomez PA 12.5 mg at 01/17/22 1721   · allopurinoL (ZYLOPRIM) tablet 50 mg 50 mg Oral DAILY Dayan Gomez PA 50 mg at 01/17/22 1025   · epoetin isabel-epbx (RETACRIT) injection 20,000 Units 20,000 Units SubCUTAneous Q TUE, THU & SAT TYE Vaughan 20,000 Units at 01/18/22 2039   · montelukast (SINGULAIR) tablet 10 mg 10 mg Oral DAILY Dayan Gomez PA 10 mg at 01/17/22 1026   · levothyroxine (SYNTHROID) tablet 100 mcg 100 mcg Oral Once per day on Mon Tue Wed Thu Fri Sat TYE Vaughan 100 mcg at 01/19/22 6283   · hydroxypropyl methylcellulose (ISOPTO TEARS) 0.5 % ophthalmic solution 1 Drop 1 Drop Both Eyes PRN Gricelda Ceballos, [address] 1 Drop at 01/06/22 1727   · venlafaxine-SR (EFFEXOR-XR) capsule 75 mg 75 mg Oral DAILY WITH TYE Haines 75 mg at 01/17/22 1025   · gabapentin (NEURONTIN) capsule 100 mg 100 mg Oral QHS TYE Jeffery 100 mg at 01/18/22 2126   · arformoteroL (BROVANA) neb solution 15 mcg 15 mcg Nebulization BID RT TYE Jeffery 15 mcg at 01/18/22 2023   And   · budesonide (PULMICORT) 500 mcg/2 ml nebulizer suspension 500 mcg Nebulization BID RT TYE Jeffery 500 mcg at 01/18/22 2023   · [Held by provider] heparin (porcine) injection 5,000 Units 5,000 Units SubCUTAneous Q8H Lowell Walker MD 5,000 Units at 01/18/22 0100   · sodium chloride (NS) flush 5-40 mL 5-40 mL IntraVENous Q8H Socrates Gmoez PA 10 mL at 01/18/22 0600   · acetaminophen (TYLENOL) tablet 650 mg 650 mg Oral Q6H PRN Socrates Gomez PA   Or   · acetaminophen (TYLENOL) suppository 650 mg 650 mg Rectal Q6H PRN Socrates Gomez PA   · glucose chewable tablet 16 g 4 Tablet Oral PRN Socrates Gomez PA   · dextrose (D50W) injection syrg 12.5-25 g 25-50 mL IntraVENous PRN Socrates Gomez PA   · glucagon (GLUCAGEN) injection 1 mg 1 mg IntraMUSCular PRN Socrates Gomez PA     Allergies   Allergen Reactions   · Ciprofloxacin Anaphylaxis   · Shellfish Derived Anaphylaxis   · Ace Inhibitors Unknown (comments)   · Biaxin [Clarithromycin] Other (comments)   Metal taste   · Candesartan Cough   · Pcn [Penicillins] Hives     Past Medical History:   Diagnosis Date   · Acquired hypothyroidism 8/15/2016   · Anemia   RED-HF study   · Asthma   · Cardiomyopathy, nonischemic ([facility]   initial dx 2001, bivHF 2008 with EF 15%, s/p biV-ICD 9/08, significant improvment in EF to 45-50%   · CKD (chronic kidney disease)   Dr Elma Ling   · CKD (chronic kidney disease) 8/15/2012   · Depression   · Diabetes ([facility]   · Diabetic [facility]   · DM (diabetes mellitus) ([facility] 8/15/2012   · GERD (gastroesophageal reflux disease)   · Gout   · Hypothyroidism   · ICD (implantable cardioverter-defibrillator), biventricular, in situ 6/5/2014   · Psoriasis     Past Surgical History:   Procedure Laterality Date   · CARDIAC CATHETERIZATION 2007; 01/06/15   normal cors   · ECHO 2D ADULT 4/2010   EF 45%, improved from 1/09 (25%)   · ECHO 2D ADULT 11/2011   LVH, EF 55-60%   · HX ORTHOPAEDIC   knee   · HX PACEMAKER PLACEMENT   AICD   · STRESS TEST LEXISCAN/CARDIOLITE 3/21/12   normal perfusion, global HK 40%     Family History   Problem Relation Age of Onset   · Heart Disease Mother   · Hypertension Mother   · Lupus Sister   · Diabetes Brother     Social History     Tobacco Use   · Smoking status: Never Smoker   · Smokeless tobacco: Never Used   Substance Use Topics   · Alcohol use: No         Review of Systems: Unable to perform due to intubated, sedated status.       Objective:     Visit Vitals when seen this am   /75   Pulse 78   Temp 97.6 °F (36.4 °C)   Resp 13   Ht 5' 7\" (1.702 m)   Wt 262 lb 9.1 oz (119.1 kg)   SpO2 100%   BMI 41.12 kg/m²       Physical Exam:   Constitutional: Well-developed and well-nourished. Head: Normocephalic and atraumatic. Eyes: Closed. ENT: sedated.  ET tube in place. Neck: Supple. No JVD present. Cardiovascular: Normal rate, regular rhythm. Exam reveals no gallop and no friction rub. 2/6 systolic LSB murmur.     Pulmonary/Chest: On vent. Impella in   Abdominal: Soft, no tenderness. Obese. GI/: Henley catheter in place. Musculoskeletal: Symmetrical.   Vasc/lymphatic: 1+ bilat lower extremity edema. + right axillary Impella. Neurological: Sedated. Skin: Skin is warm and dry.  Left side ICD unremarkable. Psychiatric: Sedated. Assessment/Plan:     Imaging/Studies:   LHC/RHC (01/17/2022):  Severely elevated left & right side filling pressures.  Severe PH, mixed pattern with elevated wedge as well as PVR of about 6 Woods units.  Mid LAD lesion 80% involving diagonal branch ostium.  Likely prior stent in mid LAD, patent.  Reduced CI of 1.65 L/min/m2 by thermodilution & 1.6 L/min/m2 by presumed oxygen consumption by Aye. 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 RHC without milrinone (12/10/2021): High wedge pressure (35 mmHg) indicating volume overload, precapillary.  Severe pulmonary HTN. Echo (12/08/2021): LVEF 15-20%, upper normal wall thickness, LV diastolic dysfunction.  Borderline low RVEF. Mod dilated LA, mildly dilated RA.  Mild to mod MR. Noelle Elm TR.  Mild to mod PH.       LHC/RHC (01/2015): No significant CAD. Mixed CM: I do not believe CAD alone caused this decline.  Advanced Heart Failure team believes etiology is multifactorial.  However, when/if she is more stable may consider PCI of LAD. LVEF now 15-20%.  NYHA IVDonia Kelp pulmonary HTN noted on last admission RHC, now with reduced CI, elevated filling pressures bilaterally, & severe pulmonary HTN.  Continuing diuresis, will likely need dialysis. Nuclear amyloid scan equivocal.  cMRI not possible with 4194 LV lead (2008).  However, cMRI wouldn't . S/p Impella 1/18/22 as bridge to possible transplant or to improvement and does not need it.  If not responding and not candidate for heart renal transplant, then she will be referred to hospice.  Per AHF team, needs 3 months euvolemia, HR <100 bpm, & revascularization to prove that new severe CHF is nonreversible.       VF and TdP: Noted x 2 overnight, had 2 successful ICD shocks.  Suspect due to hypokalemia or Impella, as this hadn't been noted earlier in admission.  Supplement K, target K>4.  Continue to monitor Mg, target >2. I spoke with Quoc Barrios, nurse practitioner for advanced heart failure team and she will get a 2D echocardiogram for the position of the Impella    CKD: Dr. Girard Duty said dialysis is an option if she is considered for transplant, but otherwise will not go anywhere.  Dr. Isidro Ravi seeing patient this week and will dialyze as needed. Medtronic biventricular ICD (gen change 01/14/2015, leads 09/25/2008): Device check showed proper lead & generator function.  Generator longevity still estimated 4 months.  RA 0.1%, CRT 97.6%.       I will replace before she leaves  Shocks from last night will drain more of battery      Dr Elissa Fothergill and her team will guide her through the rest of this admission and transplant referral       Thank you for involving me in this patient's care and please call with further concerns or questions. Nghia Bazan M.D.    Electrophysiology/Cardiology   [facility]   [address], Irene RibeiroCleveland Clinic [facility] L[facility] HospitalqarfiSelect [facility] [address], Mark Gisella Solano, [address] [place] [address]   [phone]

## 2022-01-21 NOTE — PROGRESS NOTES
[facility]   [address] [facility], [id number] [place] [address]  Phone: [phone]   [phone]    [email or web address]     Nephrology Progress Note    Patient Name : Parker Lucero      : 1955     MRN : [id number]  Date of Admission : 2022  Date of Servive : 22    CC:  Follow up for ARF       Assessment and Plan   RODNEY on CKD :  - suspect 2/2 CRS  - Cr stable, UOP stable  - bumex 2mg IV x 1 now  - daily labs and I/Os  - no urgent need for RRT at this time    CKD stage IV:  - Etiology: DM, HTN, CRS  - Renal US: suggestive of CKD, B/L benign renal cysts   - baseline Cr 2.4-2.6 mg/dl      Acute on chronic HFrEF  NI CMP, LVEF 15 to 20%  NYHA class III-IV  S/p BiV pacer/ICD-s/p CRT  R axillary Impella implanted 22    Hypervolemia:  - RHC showing severely elevated filling pressures, pulm HTN  - diuretics and milrinone as above    Morbid obesity  Type II DM  HTN  Hypothyroidism  Pulmonary hypertension  Gout  Psoriasis       Interval History:  Seen and examined. Cr improving, nonoliguric. Remains on milrinone. UOP about 75cc/hr. Off Roro, impella at p-6. CVP 13 this AM, PA pressures in the 40s. Sedated on the vent    Review of Systems: A comprehensive review of systems was negative except for that written in the HPI.     Current Medications:   Current Facility-Administered Medications   Medication Dose Route Frequency    potassium chloride 20 mEq in 50 ml IVPB  20 mEq IntraVENous Q1H    sildenafil (REVATIO) 2 mg/mL oral suspension 5 mg  5 mg Oral Q6H    bumetanide (BUMEX) injection 2 mg  2 mg IntraVENous ONCE    milrinone (PRIMACOR) 20 MG/100 ML D5W infusion  0.125 mcg/kg/min IntraVENous CONTINUOUS    niCARdipine (CARDENE) 25 mg in 0.9% sodium chloride 250 mL infusion  0-15 mg/hr IntraVENous TITRATE    [Held by provider] ivabradine (CORLANOR) tablet 2.5 mg  2.5 mg Oral BID WITH MEALS    bivalirudin (ANGIOMAX) 250 mg in 0.9% sodium chloride (MBP/ADV) 50 mL MBP  0.02-2.5 mg/kg/hr IntraVENous TITRATE    hydrALAZINE (APRESOLINE) 20 mg/mL injection 5 mg  5 mg IntraVENous Q4H PRN    albumin human 5% (BUMINATE) solution 12.5 g  12.5 g IntraVENous BID    bumetanide (BUMEX) injection 1 mg  1 mg IntraVENous Q4H PRN    ceFAZolin (ANCEF) 1 g in sterile water (preservative free) 10 mL IV syringe  1 g IntraVENous Q12H    fentaNYL (PF) 1,500 mcg/30 mL (50 mcg/mL) infusion  0-200 mcg/hr IntraVENous TITRATE    heparin (porcine) in 0.9% NaCl 30,000 unit/1,000 mL perfusion irrigation 50-1,000 mL  50-1,000 mL Other PRN    sodium chloride (NS) flush 5-40 mL  5-40 mL IntraVENous Q8H    heparinized saline 2 units/mL infusion    CONTINUOUS    chlorhexidine (PERIDEX) 0.12 % mouthwash 15 mL  15 mL Oral Q12H    sodium chloride (NS) flush 5-40 mL  5-40 mL IntraVENous Q8H    0.45% sodium chloride infusion  10 mL/hr IntraVENous CONTINUOUS    0.9% sodium chloride infusion  9 mL/hr IntraVENous CONTINUOUS    naloxone (NARCAN) injection 0.4 mg  0.4 mg IntraVENous PRN    mupirocin (BACTROBAN) 2 % ointment   Both Nostrils BID    ondansetron (ZOFRAN) injection 4 mg  4 mg IntraVENous Q4H PRN    albuterol (PROVENTIL VENTOLIN) nebulizer solution 2.5 mg  2.5 mg Nebulization Q4H PRN    aspirin chewable tablet 81 mg  81 mg Oral DAILY    midazolam (VERSED) injection 1 mg  1 mg IntraVENous Q1H PRN    magnesium oxide (MAG-OX) tablet 400 mg  400 mg Oral BID    calcium chloride 1 g in 0.9% sodium chloride 100 mL IVPB  1 g IntraVENous PRN    bisacodyL (DULCOLAX) suppository 10 mg  10 mg Rectal DAILY PRN    senna-docusate (PERICOLACE) 8.6-50 mg per tablet 1 Tablet  1 Tablet Oral BID    ELECTROLYTE REPLACEMENT NOTE: Nurse to review Serum Potassium and Magnesuim levels and Initiate Electrolyte Replacement Protocol as needed  1 Each Other PRN    magnesium sulfate 1 g/100 ml IVPB (premix or compounded)  1 g IntraVENous PRN    alteplase (CATHFLO) 1 mg in sterile water (preservative free) 1 mL injection  1 mg InterCATHeter PRN    bacitracin 500 unit/gram packet 1 Packet  1 Packet Topical PRN    pantoprazole (PROTONIX) injection 40 mg  40 mg IntraVENous DAILY    And    sodium chloride (NS) flush 10 mL  10 mL IntraVENous DAILY    dexmedeTOMidine in 0.9 % NaCl (PRECEDEX) 400 mcg/100 mL (4 mcg/mL) infusion soln  0.1-1.5 mcg/kg/hr IntraVENous TITRATE    midazolam (VERSED) injection 1 mg  1 mg IntraVENous Q4H PRN    insulin lispro (HUMALOG) injection   SubCUTAneous Q6H    propofol (DIPRIVAN) 10 mg/mL infusion  0-50 mcg/kg/min IntraVENous TITRATE    sodium bicarbonate (8.4%) 25 mEq in dextrose 5% 1,000 mL - impella purge fluid   IntraVENous TITRATE    insulin NPH (NOVOLIN N, HUMULIN N) injection 10 Units  10 Units SubCUTAneous QHS    insulin NPH (NOVOLIN N, HUMULIN N) injection 20 Units  20 Units SubCUTAneous DAILY    triamcinolone acetonide (KENALOG) 0.1 % cream   Topical BID    polyethylene glycol (MIRALAX) packet 17 g  17 g Oral DAILY    digoxin (LANOXIN) tablet 0.0625 mg  0.0625 mg Oral DAILY    allopurinoL (ZYLOPRIM) tablet 50 mg  50 mg Oral DAILY    epoetin isabel-epbx (RETACRIT) injection 20,000 Units  20,000 Units SubCUTAneous Q TUE, THU & SAT    montelukast (SINGULAIR) tablet 10 mg  10 mg Oral DAILY    levothyroxine (SYNTHROID) tablet 100 mcg  100 mcg Oral Once per day on Mon Tue Wed Thu Fri Sat    hydroxypropyl methylcellulose (ISOPTO TEARS) 0.5 % ophthalmic solution 1 Drop  1 Drop Both Eyes PRN    venlafaxine-SR (EFFEXOR-XR) capsule 75 mg  75 mg Oral DAILY WITH BREAKFAST    gabapentin (NEURONTIN) capsule 100 mg  100 mg Oral QHS    arformoteroL (BROVANA) neb solution 15 mcg  15 mcg Nebulization BID RT    And    budesonide (PULMICORT) 500 mcg/2 ml nebulizer suspension  500 mcg Nebulization BID RT    sodium chloride (NS) flush 5-40 mL  5-40 mL IntraVENous Q8H    acetaminophen (TYLENOL) tablet 650 mg  650 mg Oral Q6H PRN    Or    acetaminophen (TYLENOL) suppository 650 mg  650 mg Rectal Q6H PRN    glucose chewable tablet 16 g  4 Tablet Oral PRN    dextrose (D50W) injection syrg 12.5-25 g  25-50 mL IntraVENous PRN    glucagon (GLUCAGEN) injection 1 mg  1 mg IntraMUSCular PRN      Allergies   Allergen Reactions    Ciprofloxacin Anaphylaxis    Shellfish Derived Anaphylaxis    Ace Inhibitors Unknown (comments)    Biaxin [Clarithromycin] Other (comments)     Metal taste    Candesartan Cough    Pcn [Penicillins] Hives       Objective:  Vitals:    Vitals:    01/21/22 0800 01/21/22 0823 01/21/22 0824 01/21/22 0900   BP: 115/71      Pulse: 80  80 80   Resp: 12  12 18   Temp: 99.2 °F (37.3 °C)      SpO2: 100% 100% 100% 100%   Weight:       Height:         Intake and Output:  01/21 0701 - 01/21 1900  In: 342.1 [I.V.:342.1]  Out: 175 [Urine:175]  01/19 1901 - 01/21 0700  In: 5823.8 [I.V.:5213.8]  Out: 7535 [Urine:3090]    Physical Examination:    General: Sedated on the vent  HEENT:           ETT in place   Neck:  Supple, no mass  Resp:  Reduced bibasilar  Breath sounds  CV:  RRR, ++ LE edema  GI:  Soft, NT, + BS, obese  Neurologic:  Sedated  :                  Henley in place    [x]    High complexity decision making was performed  []    Patient is at high-risk of decompensation with multiple organ involvement    Lab Data Personally Reviewed: I have reviewed all the pertinent labs, microbiology data and radiology studies during assessment.     Recent Labs     01/21/22  0436 01/21/22  0433 01/20/22  2311 01/20/22  1609 01/20/22  1027 01/20/22  0240 01/19/22  1253 01/19/22  1244 01/19/22  1236 01/19/22  0409 01/19/22  0409 01/18/22  2136 01/18/22  2136 01/18/22  1645 01/18/22  1634     --   --  142 140 140  --  139  --    < > 138   < > 140   < >  --    K 3.7  --  4.1 3.4* 4.0 3.1*   < > 4.0  --    < > 3.5   < > 4.1   < >  --    *  --   --  107 106 104  --  101  --    < > 100   < > 99   < >  --    CO2 27  --   --  29 30 30  --  31  --    < > 31   < > 31   < >  --    *  --   --  158* 204* 193*  --  179*  --    < > 148*   < > 115*   < >  --    BUN 39*  --   --  44* 46* 53*  --  57*  --    < > 61*   < > 63*   < >  --    CREA 2.18*  --   --  2.33* 2.37* 2.42*  --  2.68*  --    < > 2.70*   < > 2.74*   < >  --    CA 8.8  --   --  8.9 9.0 9.1  --  8.9 9.0   < > 9.6   < > 9.5   < >  --    MG 2.5*  --  2.5* 1.5* 2.3 2.3   < >  --   --   --  1.9   < > 2.1   < > 2.3   PHOS  --  2.8  --  2.7  --  3.5  --  3.1  --   --  2.4*  --   --   --   --    ALB 3.0*  --   --  2.8* 2.8* 2.8*  --  3.1*  --    < > 3.4*   < > 3.7   < >  --    ALT 9*  --   --   --  12 12  --   --   --   --  26  --  29   < >  --    INR  --   --   --   --   --   --   --   --  1.2*  --   --   --   --   --  1.4*    < > = values in this interval not displayed. Recent Labs     01/21/22  0436 01/20/22  1404 01/20/22  0240 01/19/22  0409 01/18/22  2136 01/18/22  1634 01/18/22  1634   WBC 7.4  --  8.3 8.5  --   --  7.9   HGB 7.2* 7.3* 7.8* 8.8* 8.7*   < > 7.4*   HCT 27.9* 27.3* 28.8* 31.3* 32.1*   < > 27.3*     --  277 360  --   --  365    < > = values in this interval not displayed. Lab Results   Component Value Date/Time    Specimen Description: URINE 10/22/2013 01:32 PM    Specimen Description: URINE 01/23/2013 04:40 PM    Specimen Description: LEG TISSUE 02/12/2009 12:00 AM    Specimen Description: LEG (LEFT) 01/29/2009 03:06 AM     Lab Results   Component Value Date/Time    Culture result: MRSA NOT PRESENT 01/19/2022 12:41 PM    Culture result:  01/19/2022 12:41 PM     Screening of patient nares for MRSA is for surveillance purposes and, if positive, to facilitate isolation considerations in high risk settings. It is not intended for automatic decolonization interventions per se as regimens are not sufficiently effective to warrant routine use.     Culture result: NO GROWTH 2 DAYS 01/19/2022 12:41 PM    Culture result: MIXED SKIN ROSANNA ISOLATED 10/22/2013 01:32 PM    Culture result:  01/23/2013 04:40 PM     ENTEROCOCCUS FAECALIS GROUP D  MIXED SKIN ROSANNA ISOLATED     Recent Results (from the past 24 hour(s))   METABOLIC PANEL, COMPREHENSIVE    Collection Time: 01/20/22 10:27 AM   Result Value Ref Range    Sodium 140 136 - 145 mmol/L    Potassium 4.0 3.5 - 5.1 mmol/L    Chloride 106 97 - 108 mmol/L    CO2 30 21 - 32 mmol/L    Anion gap 4 (L) 5 - 15 mmol/L    Glucose 204 (H) 65 - 100 mg/dL    BUN 46 (H) 6 - 20 MG/DL    Creatinine 2.37 (H) 0.55 - 1.02 MG/DL    BUN/Creatinine ratio 19 12 - 20      GFR est AA 25 (L) >60 ml/min/1.73m2    GFR est non-AA 20 (L) >60 ml/min/1.73m2    Calcium 9.0 8.5 - 10.1 MG/DL    Bilirubin, total 0.5 0.2 - 1.0 MG/DL    ALT (SGPT) 12 12 - 78 U/L    AST (SGOT) 5 (L) 15 - 37 U/L    Alk.  phosphatase 58 45 - 117 U/L    Protein, total 6.7 6.4 - 8.2 g/dL    Albumin 2.8 (L) 3.5 - 5.0 g/dL    Globulin 3.9 2.0 - 4.0 g/dL    A-G Ratio 0.7 (L) 1.1 - 2.2     MAGNESIUM    Collection Time: 01/20/22 10:27 AM   Result Value Ref Range    Magnesium 2.3 1.6 - 2.4 mg/dL   PTT    Collection Time: 01/20/22 10:27 AM   Result Value Ref Range    aPTT 55.0 (H) 22.1 - 31.0 sec    aPTT, therapeutic range     58.0 - 77.0 SECS   ECHO ADULT FOLLOW-UP OR LIMITED    Collection Time: 01/20/22 12:20 PM   Result Value Ref Range    IVSd 1.1 (A) 0.6 - 0.9 cm    LVIDd 5.9 (A) 3.9 - 5.3 cm    LVIDs 5.0 cm    LVPWd 0.9 0.6 - 0.9 cm    RVIDd 4.9 cm    TAPSE 1.9 1.5 - 2.0 cm    TR Peak Gradient 19 mmHg    TR Max Velocity 2.17 m/s    Fractional Shortening 2D 15 28 - 44 %    LVIDd Index 2.66 cm/m2    LVIDs Index 2.25 cm/m2    LV RWT Ratio 0.31     LV Mass 2D 239.9 (A) 67 - 162 g    LV Mass 2D Index 108.1 (A) 43 - 95 g/m2   GLUCOSE, POC    Collection Time: 01/20/22 12:31 PM   Result Value Ref Range    Glucose (POC) 180 (H) 65 - 117 mg/dL    Performed by Coleen Means    PTT    Collection Time: 01/20/22  2:04 PM   Result Value Ref Range    aPTT 59.8 (H) 22.1 - 31.0 sec    aPTT, therapeutic range     58.0 - 77.0 SECS   HGB & HCT Collection Time: 01/20/22  2:04 PM   Result Value Ref Range    HGB 7.3 (L) 11.5 - 16.0 g/dL    HCT 27.3 (L) 35.0 - 47.0 %   RENAL FUNCTION PANEL    Collection Time: 01/20/22  4:09 PM   Result Value Ref Range    Sodium 142 136 - 145 mmol/L    Potassium 3.4 (L) 3.5 - 5.1 mmol/L    Chloride 107 97 - 108 mmol/L    CO2 29 21 - 32 mmol/L    Anion gap 6 5 - 15 mmol/L    Glucose 158 (H) 65 - 100 mg/dL    BUN 44 (H) 6 - 20 MG/DL    Creatinine 2.33 (H) 0.55 - 1.02 MG/DL    BUN/Creatinine ratio 19 12 - 20      GFR est AA 25 (L) >60 ml/min/1.73m2    GFR est non-AA 21 (L) >60 ml/min/1.73m2    Calcium 8.9 8.5 - 10.1 MG/DL    Phosphorus 2.7 2.6 - 4.7 MG/DL    Albumin 2.8 (L) 3.5 - 5.0 g/dL   MAGNESIUM    Collection Time: 01/20/22  4:09 PM   Result Value Ref Range    Magnesium 1.5 (L) 1.6 - 2.4 mg/dL   PTT    Collection Time: 01/20/22  5:02 PM   Result Value Ref Range    aPTT 63.5 (H) 22.1 - 31.0 sec    aPTT, therapeutic range     58.0 - 77.0 SECS   GLUCOSE, POC    Collection Time: 01/20/22  5:30 PM   Result Value Ref Range    Glucose (POC) 166 (H) 65 - 117 mg/dL    Performed by Chandler Villaseñor    GLUCOSE, POC    Collection Time: 01/20/22 11:10 PM   Result Value Ref Range    Glucose (POC) 164 (H) 65 - 117 mg/dL    Performed by Alex Mccoy    MAGNESIUM    Collection Time: 01/20/22 11:11 PM   Result Value Ref Range    Magnesium 2.5 (H) 1.6 - 2.4 mg/dL   POTASSIUM    Collection Time: 01/20/22 11:11 PM   Result Value Ref Range    Potassium 4.1 3.5 - 5.1 mmol/L   DIGOXIN    Collection Time: 01/21/22  4:33 AM   Result Value Ref Range    Digoxin level 0.8 (L) 0.90 - 2.00 NG/ML   LACTIC ACID    Collection Time: 01/21/22  4:33 AM   Result Value Ref Range    Lactic acid 0.6 0.4 - 2.0 MMOL/L   PROCALCITONIN    Collection Time: 01/21/22  4:33 AM   Result Value Ref Range    Procalcitonin 0.62 ng/mL   PHOSPHORUS    Collection Time: 01/21/22  4:33 AM   Result Value Ref Range    Phosphorus 2.8 2.6 - 4.7 MG/DL   MAGNESIUM Collection Time: 01/21/22  4:36 AM   Result Value Ref Range    Magnesium 2.5 (H) 1.6 - 2.4 mg/dL   NT-PRO BNP    Collection Time: 01/21/22  4:36 AM   Result Value Ref Range    NT pro-BNP 5,397 (H) <412 PG/ML   METABOLIC PANEL, COMPREHENSIVE    Collection Time: 01/21/22  4:36 AM   Result Value Ref Range    Sodium 142 136 - 145 mmol/L    Potassium 3.7 3.5 - 5.1 mmol/L    Chloride 109 (H) 97 - 108 mmol/L    CO2 27 21 - 32 mmol/L    Anion gap 6 5 - 15 mmol/L    Glucose 141 (H) 65 - 100 mg/dL    BUN 39 (H) 6 - 20 MG/DL    Creatinine 2.18 (H) 0.55 - 1.02 MG/DL    BUN/Creatinine ratio 18 12 - 20      GFR est AA 27 (L) >60 ml/min/1.73m2    GFR est non-AA 23 (L) >60 ml/min/1.73m2    Calcium 8.8 8.5 - 10.1 MG/DL    Bilirubin, total 0.5 0.2 - 1.0 MG/DL    ALT (SGPT) 9 (L) 12 - 78 U/L    AST (SGOT) 4 (L) 15 - 37 U/L    Alk. phosphatase 65 45 - 117 U/L    Protein, total 6.8 6.4 - 8.2 g/dL    Albumin 3.0 (L) 3.5 - 5.0 g/dL    Globulin 3.8 2.0 - 4.0 g/dL    A-G Ratio 0.8 (L) 1.1 - 2.2     CBC W/O DIFF    Collection Time: 01/21/22  4:36 AM   Result Value Ref Range    WBC 7.4 3.6 - 11.0 K/uL    RBC 3.02 (L) 3.80 - 5.20 M/uL    HGB 7.2 (L) 11.5 - 16.0 g/dL    HCT 27.9 (L) 35.0 - 47.0 %    MCV 92.4 80.0 - 99.0 FL    MCH 23.8 (L) 26.0 - 34.0 PG    MCHC 25.8 (L) 30.0 - 36.5 g/dL    RDW 21.1 (H) 11.5 - 14.5 %    PLATELET 780 117 - 607 K/uL    MPV 9.4 8.9 - 12.9 FL    NRBC 0.0 0  WBC    ABSOLUTE NRBC 0.00 0.00 - 0.01 K/uL   PTT    Collection Time: 01/21/22  4:36 AM   Result Value Ref Range    aPTT 60.6 (H) 22.1 - 31.0 sec    aPTT, therapeutic range     58.0 - 77.0 SECS   LD    Collection Time: 01/21/22  4:36 AM   Result Value Ref Range     81 - 246 U/L   GLUCOSE, POC    Collection Time: 01/21/22  6:01 AM   Result Value Ref Range    Glucose (POC) 153 (H) 65 - 117 mg/dL    Performed by Tamiko Pham I have reviewed the flowsheets. Chart and Pertinent Notes have been reviewed.    No change in PMH ,family and social history from Consult note.       Koffi Arnold MD  [facility]

## 2022-01-21 NOTE — PROGRESS NOTES
Physical Therapy  01/21/22     Patient currently on PT caseload. PT reassessment attempted at 8:49 AM however per nursing patient is not currently appropriate to participate with therapy. Reports plans to attempt extubation today, requesting therapy hold until after patient is extubated. Will continue to follow patient and attempt PT at a later time. Thank you,  Roel Morales, PT, DPT    Patient remains critically ill, Intubated, and sedated. Will defer for the weekend and follow up Monday 1/24.

## 2022-01-21 NOTE — PROGRESS NOTES
[facility] Progress Note     Subjective:       Jovan Shetty is a 78 y.o. patient who is s/p Impella   She was sedated on ventilator this morning   CCU RN said she is stable and responded when sedation was off  She is planned for extubation today      Interim:   S/p Impella placement on 01/18/2022, now in CCU.  She had VF 1/19, has appearance of Torsades.  Required ICD shock x 2, both successful. Hypokalemic (3.1), Mg 2.3. This was treated/replaced     wean off nitric oxide    Milrinone at 0.2 mcg/kg/min. LHC/RHC on 01/17/2022 had shown severely elevated bilateral filling pressures with severe pulmonary HTN, LAD with 80% lesion. pulmonary infiltrates on CXR.           HPI:   Presented to the ER on 01/04/2021 with hypoxia, improved on supplemental oxygen. Labs showed stable anemia.  WBC elevated, hyponatremic, hypokalemic.  D dimer elevated.  Chronic CKD. Nephrologist spoke to me directly regarding severe renal failure, but not much worse than last admission. Rapid COVID negative.  CXR showed pulmonary edema vs atypical pneumonia.  VQ scan showed low probability for PE.       NICM, LVEF 15-20% during recent admission.  LVEF previously normalized with CRT.  NYHA III-IV.  No ACEi/ARB due to renal dysfunction.  GDMT dosing limited by low BP. [address] 12/10/2021 showed severe pulmonary HTN (wedge 34 mmHg, PAP 80 mmHg). Cardiac cath in 2015 at [facility] showed no evidence of CAD. She did require LV lead reprogramming over the summer, but good LV capture since.  Good capture/function when checked during recent admission. BP controlled. PICC line placed 01/10/2022 in anticipation of home milrinone. Previous:   LVEF noted 15-20% in 12/2021. S/p Medtronic biventricular ICD (gen change 01/142015, leads 09/25/2008).     CKD stage IV.        Previously followed by Dr. Naif Sparks, states did not follow him to new practice.          Problem List Date Reviewed: 12/22/2021     Acute respiratory failure with hypoxia [facility]) ICD-10-CM: J96.01   ICD-9-CM: 518.81 1/4/2022     CHF exacerbation (HCC) ICD-10-CM: I50.9   ICD-9-CM: 428.0 12/6/2021     Type 2 diabetes mellitus with diabetic neuropathy (HCC) ICD-10-CM: E11.40   ICD-9-CM: 250.60, 357.2 1/2/2020     Type 2 diabetes with nephropathy ([facility] ICD-10-CM: E11.21   ICD-9-CM: 250.40, 583.81 4/3/2018     Obesity, morbid [facility] ICD-10-CM: E66.01   ICD-9-CM: 278.01 12/8/2017     Acquired hypothyroidism ICD-10-CM: E03.9   ICD-9-CM: 244.9 8/15/2016     Dysthymia ICD-10-CM: F34.1   ICD-9-CM: 300.4 8/15/2016     ICD (implantable cardioverter-defibrillator), biventricular, in situ ICD-10-CM: Z95.810   ICD-9-CM: V45.02 6/5/2014   Overview Signed 1/14/2015 11:11 AM by Dank Monzon MD     Generator Medtronic change 1/14/2015         Dyslipidemia ICD-10-CM: C72.4   ICD-9-CM: 272.4 1/14/2014     CKD (chronic kidney disease) ICD-10-CM: N18.9   ICD-9-CM: 585.9 8/15/2012     Cardiomyopathy, nonischemic (HCC) ICD-10-CM: I42.8   ICD-9-CM: 425.4 Unknown   Overview Signed 10/10/2011  6:40 AM by Levon Hoyt MD     initial dx 2001, bivHF 2008 with EF 15%, s/p biV-ICD 9/08, significant improvment in EF to 45-50%         Anemia in chronic renal disease (Chronic) ICD-10-CM: N18.9, D63.1   ICD-9-CM: 285.21 12/10/2008     HTN (hypertension) ICD-10-CM: I10   ICD-9-CM: 401.9 12/10/2008     GERD (gastroesophageal reflux disease) (Chronic) ICD-10-CM: K21.9   ICD-9-CM: 530.81 12/10/2008     Gout (Chronic) ICD-10-CM: M10.9   ICD-9-CM: 274.9 12/10/2008     Pulmonary HTN (HCC) (Chronic) ICD-10-CM: I27.20   ICD-9-CM: 416.8 12/10/2008           Current Facility-Administered Medications   Medication Dose Route Frequency Provider Last Rate Last Admin   · potassium chloride 20 mEq in 50 ml IVPB 20 mEq IntraVENous Q1H Yaneth ODELL MD 50 mL/hr at 01/19/22 0717 20 mEq at 01/19/22 0717   · niCARdipine (CARDENE) 25 mg in 0.9% sodium chloride 250 mL infusion 0-15 mg/hr IntraVENous TITRATE Edouard ODELL MD 50 mL/hr at 01/19/22 0647 5 mg/hr at 01/19/22 0647   · magnesium sulfate 2 g/50 ml IVPB (premix or compounded) 2 g IntraVENous ONCE Davian Vázquez DO   · DOBUTamine (DOBUTREX) 500 mg/250 mL (2,000 mcg/mL) infusion 0-10 mcg/kg/min IntraVENous TITRATE TYE Flores Stopped at 01/18/22 1900   · heparin (porcine) in 0.9% NaCl 30,000 unit/1,000 mL perfusion irrigation 50-1,000 mL 50-1,000 mL Other PRN Thais Gomez PA   · sodium chloride (NS) flush 5-40 mL 5-40 mL IntraVENous Q8H TYE Flores 10 mL at 01/19/22 5905   · heparinized saline 2 units/mL infusion CONTINUOUS Galen Mccain MD 1,000 Units at 01/18/22 1416   · chlorhexidine (PERIDEX) 0.12 % mouthwash 15 mL 15 mL Oral Q12H Davian Vázquez DO 15 mL at 01/18/22 2100   · sodium chloride (NS) flush 5-40 mL 5-40 mL IntraVENous Q8H Sam Gomez PA   · albumin human 5% (BUMINATE) solution 12.5 g 12.5 g IntraVENous Q2H PRN Sam Gomez PA   · 0.45% sodium chloride infusion 10 mL/hr IntraVENous CONTINUOUS Thais Gomez PA   · 0.9% sodium chloride infusion 9 mL/hr IntraVENous CONTINUOUS TYE Flores 9 mL/hr at 01/18/22 1647 9 mL/hr at 01/18/22 1647   · naloxone [facility]) injection 0.4 mg 0.4 mg IntraVENous PRN Thais Gomez PA   · mupirocin (BACTROBAN) 2 % ointment Both Nostrils BID Ashley Flores Given at 01/18/22 1835   · ondansetron (ZOFRAN) injection 4 mg 4 mg IntraVENous Q4H PRN Sam Gomez PA   · albuterol (PROVENTIL VENTOLIN) nebulizer solution 2.5 mg 2.5 mg Nebulization Q4H PRN Thais Gomez PA   · aspirin chewable tablet 81 mg 81 mg Oral DAILY Thais Gomez PA   · midazolam (VERSED) injection 1 mg 1 mg IntraVENous Q1H PRN Thais Gomez PA   · magnesium oxide (MAG-OX) tablet 400 mg 400 mg Oral BID Sam Gomez PA   · calcium chloride 1 g in 0.9% sodium chloride 100 mL IVPB 1 g IntraVENous PRN Long, TYE Singh   · bisacodyL (DULCOLAX) suppository 10 mg 10 mg Rectal DAILY PRN Crystal Gomez PA   · senna-docusate (PERICOLACE) 8.6-50 mg per tablet 1 Tablet 1 Tablet Oral BID Crystal Gomez PA   · ELECTROLYTE REPLACEMENT NOTE: Nurse to review Serum Potassium and Magnesuim levels and Initiate Electrolyte Replacement Protocol as needed 1 Each Other PRN Sam Gomez PA   · magnesium sulfate 1 g/100 ml IVPB (premix or compounded) 1 g IntraVENous PRN Sam Gomez PA   · alteplase (CATHFLO) 1 mg in sterile water (preservative free) 1 mL injection 1 mg InterCATHeter PRN Crystal Gomez PA   · bacitracin 500 unit/gram packet 1 Packet 1 Packet Topical PRN Crystal Gomez PA   · heparin 25,000 units in dextrose 500 mL infusion *IMPELLA* 4-20 mL/hr Other TITRATE TYE Pascual 7.9 mL/hr at 01/18/22 1630 7.9 mL/hr at 01/18/22 1630   · pantoprazole (PROTONIX) injection 40 mg 40 mg IntraVENous DAILY Davian Vázquez DO   And   · sodium chloride (NS) flush 10 mL 10 mL IntraVENous DAILY Davian Vázquez DO   · ceFAZolin (ANCEF) 1 g in sterile water (preservative free) 10 mL IV syringe 1 g IntraVENous Q12H Sam Gomez PA 1 g at 01/19/22 0154   · dexmedeTOMidine in 0.9 % NaCl (PRECEDEX) 400 mcg/100 mL (4 mcg/mL) infusion soln 0.1-1.5 mcg/kg/hr IntraVENous TITRATE Davian Vázquez DO 42.5 mL/hr at 01/19/22 0717 1.5 mcg/kg/hr at 01/19/22 0717   · midazolam (VERSED) injection 1 mg 1 mg IntraVENous Q4H PRN Davian Vázquez DO 1 mg at 01/18/22 1719   · insulin lispro (HUMALOG) injection SubCUTAneous Q6H Davian Vázquez DO 2 Units at 01/19/22 0605   · propofol (DIPRIVAN) 10 mg/mL infusion 0-50 mcg/kg/min IntraVENous TITRATE Davian Vázquez DO 17 mL/hr at 01/19/22 0425 25 mcg/kg/min at 01/19/22 0425   · sodium bicarbonate (8.4%) 25 mEq in dextrose 5% 1,000 mL - impella purge fluid IntraVENous TITRATE TYE Pascual 7.9 mL/hr at 01/18/22 2036 New Bag at 01/18/22 2036   · milrinone (PRIMACOR) 20 MG/100 ML D5W infusion 0.25 mcg/kg/min IntraVENous CONTINUOUS Toy ODELL MD 8.5 mL/hr at 01/19/22 0312 0.25 mcg/kg/min at 01/19/22 0312   · bumetanide (BUMEX) 0.25 mg/mL infusion 2 mg/hr IntraVENous CONTINUOUS TYE Rashid 2 mL/hr at 01/18/22 1948 0.5 mg/hr at 01/18/22 1948   · [Held by provider] milrinone (PRIMACOR) 20 MG/100 ML D5W infusion 0.125 mcg/kg/min IntraVENous CONTINUOUS Michelle Albarado NP 4.3 mL/hr at 01/18/22 0343 0.125 mcg/kg/min at 01/18/22 0343   · insulin NPH (NOVOLIN N, HUMULIN N) injection 10 Units 10 Units SubCUTAneous QHS TYE Rashid 10 Units at 01/18/22 2126   · insulin NPH (NOVOLIN N, HUMULIN N) injection 20 Units 20 Units SubCUTAneous DAILY Jose A Gomez PA   · triamcinolone acetonide (KENALOG) 0.1 % cream Topical BID TYE Rashid Given at 01/17/22 1733   · polyethylene glycol (MIRALAX) packet 17 g 17 g Oral DAILY TYE Rashid 17 g at 01/16/22 5838   · digoxin (LANOXIN) tablet 0.0625 mg 0.0625 mg Oral DAILY Jose A Gomez PA 0.0625 mg at 01/17/22 1027   · ivabradine (CORLANOR) tablet 7.5 mg 7.5 mg Oral BID WITH MEALS Jose A Gomez PA 7.5 mg at 01/18/22 1700   · [Held by provider] carvediloL (COREG) tablet 12.5 mg 12.5 mg Oral BID WITH MEALS Jose A Gomez PA 12.5 mg at 01/17/22 1721   · allopurinoL (ZYLOPRIM) tablet 50 mg 50 mg Oral DAILY Jose A Gomez PA 50 mg at 01/17/22 1025   · epoetin isabel-epbx (RETACRIT) injection 20,000 Units 20,000 Units SubCUTAneous Q TUE, THU & SAT TYE Rashid 20,000 Units at 01/18/22 2039   · montelukast (SINGULAIR) tablet 10 mg 10 mg Oral DAILY Jose A Gomez PA 10 mg at 01/17/22 1026   · levothyroxine (SYNTHROID) tablet 100 mcg 100 mcg Oral Once per day on Mon Tue Wed Thu Fri Sat TYE Rashid 100 mcg at 01/19/22 2269   · hydroxypropyl methylcellulose (ISOPTO TEARS) 0.5 % ophthalmic solution 1 Drop 1 Drop Both Eyes PRN Jose A Gomez PA 1 Drop at 01/06/22 1727   · venlafaxine-SR (EFFEXOR-XR) capsule 75 mg 75 mg Oral DAILY WITH BREAKFAST TYE Lund 75 mg at 01/17/22 1025   · gabapentin (NEURONTIN) capsule 100 mg 100 mg Oral QHS TYE Lund 100 mg at 01/18/22 2126   · arformoteroL (BROVANA) neb solution 15 mcg 15 mcg Nebulization BID RT TYE Lund 15 mcg at 01/18/22 2023   And   · budesonide (PULMICORT) 500 mcg/2 ml nebulizer suspension 500 mcg Nebulization BID RT TYE Lund 500 mcg at 01/18/22 2023   · [Held by provider] heparin (porcine) injection 5,000 Units 5,000 Units SubCUTAneous Q8H Conchis Guzman MD 5,000 Units at 01/18/22 0100   · sodium chloride (NS) flush 5-40 mL 5-40 mL IntraVENous Q8H Long, Miguelito Hoots, PA 10 mL at 01/18/22 0600   · acetaminophen (TYLENOL) tablet 650 mg 650 mg Oral Q6H PRN Long, Miguelito Hoots, PA   Or   · acetaminophen (TYLENOL) suppository 650 mg 650 mg Rectal Q6H PRN Long, Miguelito Hoots, PA   · glucose chewable tablet 16 g 4 Tablet Oral PRN Long, Miguelito Hoots, PA   · dextrose (D50W) injection syrg 12.5-25 g 25-50 mL IntraVENous PRN Long, Miguelito Hoots, PA   · glucagon (GLUCAGEN) injection 1 mg 1 mg IntraMUSCular PRN Long, Miguelito Hoots, PA     Allergies   Allergen Reactions   · Ciprofloxacin Anaphylaxis   · Shellfish Derived Anaphylaxis   · Ace Inhibitors Unknown (comments)   · Biaxin [Clarithromycin] Other (comments)   Metal taste   · Candesartan Cough   · Pcn [Penicillins] Hives     Past Medical History:   Diagnosis Date   · Acquired hypothyroidism 8/15/2016   · Anemia   RED-HF study   · Asthma   · Cardiomyopathy, nonischemic ([facility]   initial dx 2001, bivHF 2008 with EF 15%, s/p biV-ICD 9/08, significant improvment in EF to 45-50%   · CKD (chronic kidney disease)   Dr Torres Oliver   · CKD (chronic kidney disease) 8/15/2012   · Depression   · [facility]   · Diabetic neuropathy ([facility]   · DM (diabetes mellitus) ([facility] 8/15/2012   · GERD (gastroesophageal reflux disease)   · Gout   · Hypothyroidism   · ICD (implantable cardioverter-defibrillator), biventricular, in situ 6/5/2014   · Psoriasis     Past Surgical History:   Procedure Laterality Date   · CARDIAC CATHETERIZATION 2007; 01/06/15   normal cors   · ECHO 2D ADULT 4/2010   EF 45%, improved from 1/09 (25%)   · ECHO 2D ADULT 11/2011   LVH, EF 55-60%   · HX ORTHOPAEDIC   knee   · HX PACEMAKER PLACEMENT   AICD   · STRESS TEST LEXISCAN/CARDIOLITE 3/21/12   normal perfusion, global HK 40%     Family History   Problem Relation Age of Onset   · Heart Disease Mother   · Hypertension Mother   · Lupus Sister   · Diabetes Brother     Social History     Tobacco Use   · Smoking status: Never Smoker   · Smokeless tobacco: Never Used   Substance Use Topics   · Alcohol use: No         Review of Systems: Unable to perform due to intubated, sedated status.       Objective:   Visit Vitals  /71 (BP 1 Location: Left lower arm, BP Patient Position: At rest)   Pulse 80   Temp 99.2 °F (37.3 °C)   Resp 18   Ht 5' 7\" (1.702 m)   Wt 250 lb (113.4 kg)   SpO2 100%   BMI 39.16 kg/m²         Physical Exam:   Constitutional: Well-developed and well-nourished. Head: Normocephalic and atraumatic. Eyes: Closed. ENT: sedated.  ET tube in place. Neck: Supple. No JVD present. Cardiovascular: Normal rate, regular rhythm. Exam reveals no gallop and no friction rub. 2/6 systolic LSB murmur.     Pulmonary/Chest: On vent. Impella in   Abdominal: Soft, no tenderness. Obese. GI/: Henley catheter in place. Musculoskeletal: Symmetrical.   Vasc/lymphatic: 1+ bilat lower extremity edema. + right axillary Impella. Neurological: Sedated. Skin: Skin is warm and dry.  Left side ICD unremarkable. Psychiatric: Sedated. Assessment/Plan:     Imaging/Studies:   LHC/RHC (01/17/2022):  Severely elevated left & right side filling pressures.  Severe PH, mixed pattern with elevated wedge as well as PVR of about 6 Woods units.  Mid LAD lesion 80% involving diagonal branch ostium.  Likely prior stent in mid LAD, patent.  Reduced CI of 1.65 L/min/m2 by thermodilution & 1.6 L/min/m2 by presumed oxygen consumption by Aye. 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 RHC without milrinone (12/10/2021): High wedge pressure (35 mmHg) indicating volume overload, precapillary.  Severe pulmonary HTN. Echo (12/08/2021): LVEF 15-20%, upper normal wall thickness, LV diastolic dysfunction.  Borderline low RVEF. Mod dilated LA, mildly dilated RA.  Mild to mod MR. Rachel Lutheran TR.  Mild to mod PH.       LHC/RHC (01/2015): No significant CAD. Mixed CM:  CAD alone did not cause this decline.    Advanced Heart Failure team believes etiology is multifactorial.  However, when/if she is more stable may consider PCI of LAD. LVEF now 15-20%.  NYHA IVWaddell Farner pulmonary HTN noted on last admission RHC, now with reduced CI, elevated filling pressures bilaterally, & severe pulmonary HTN.      Nuclear amyloid scan equivocal.  cMRI not possible with 4194 LV lead (2008).  However, cMRI wouldn't . S/p Impella 1/18/22 as bridge to possible transplant or to improvement and does not need it.  If not responding and not candidate for heart renal transplant, then she will be referred to hospice.  Per AHF team, needs 3 months euvolemia, HR <100 bpm, & revascularization to prove that new severe CHF is nonreversible.       VF and TdP: Noted x 2, 2 nights ago, had 2 successful ICD shocks.    Replace K    CKD: Dr. Jackie Reinoso said dialysis is an option if she is considered for transplant, but otherwise will not go anywhere.  Dr. Castro Diaz seeing patient this week and will dialyze as needed. Her renal labs are better with better cardiac output    Medtronic biventricular ICD (gen change 01/14/2015, leads 09/25/2008):  Device check showed proper lead & generator function.  Generator longevity still estimated 4 months.  RA 0.1%, CRT 97.6%.       I will replace before she leaves  Shocks for VF will drain more of battery so may have to replace sooner      Dr Corey Armstrong and her team will guide her through the rest of this admission and transplant referral       Thank you for involving me in this patient's care and please call with further concerns or questions. Darrell Anne M.D.    Electrophysiology/Cardiology   [facility]   [address], [address], [facility], [address] [place] [address]   [phone]

## 2022-01-21 NOTE — PROGRESS NOTES
[facility]  ICU Progress Note    Admit Date: 2022  POD:  2 Day Post-Op    Procedure:  Procedure(s):  RIGHT AXILLARY IMPELLA INSERTION        Subjective/24 Hour Summary:   Pt seen with Dr. No Shaver. impella at P6, bicarb purge & bival systemically. Milrinone to 0.125 overnight. No other surgical issues. Remains intubated. Objective:   Vitals:  Blood pressure 115/71, pulse 80, temperature 99.2 °F (37.3 °C), resp. rate 18, height 5' 7\" (1.702 m), weight 250 lb (113.4 kg), SpO2 100 %. Temp (24hrs), Av.2 °F (37.3 °C), Min:97.7 °F (36.5 °C), Max:100.7 °F (38.2 °C)    Hemodynamics:   CO: CO (l/min): 4.7 l/min   CI: CI (l/min/m2): 2.1 l/min/m2   CVP: CVP (mmHg): 16 mmHg (22)   SVR: SVR (dyne*sec)/cm5: 1328 (dyne*sec)/cm5 (22 1567)   PAP Systolic: PAP Systolic: 62 (33/51/09 4623)   PAP Diastolic: PAP Diastolic: 30 (74/00/25 3682)   PVR:     SV02: SVO2 (%): 67 % (22 08)   SCV02:      EKG/Rhythm:  SR    Ventilator Settings:  Mode Rate Tidal Volume Pressure FiO2 PEEP   Spontaneous   460 ml  8 cm H2O 40 % 5 cm H20     Peak airway pressure: 27 cm H2O    Minute ventilation: 5.6 l/min        Oxygen Therapy:  Oxygen Therapy  O2 Sat (%): 100 % (22)  Pulse via Oximetry: 80 beats per minute (22)  O2 Device: Endotracheal tube;Ventilator (22)  Skin Assessment: Clean, dry, & intact (22)  Skin Protection for O2 Device: Yes (22)  Orientation: Bilateral;Anterior (22)  Location: Cheek;Lip (01/21/22 0400)  Interventions: Mouth Care (22 0600)  O2 Flow Rate (L/min): 6 l/min (weaned) (22 0740)  FIO2 (%): 40 % (22 0824)    CXR:  CXR Results  (Last 48 hours)               22 0450  XR CHEST PORT Final result    Impression:  ET tube is 1 cm above the rohit.  This could be retracted 2 cm       Narrative:  EXAM: XR CHEST PORT       INDICATION: post-Impella       COMPARISON: 2022       FINDINGS: A portable AP radiograph of the chest was obtained at 0434 hours. The   patient is on a cardiac monitor. ET tube is 1 cm above the rohit. This could be retracted 2 cm. NG tube courses   into the stomach. The distal tip is not seen. Wiseman-Guille catheter has its tip in   the distal main pulmonary artery. PICC line overlies the SVC. Cardiac assist   device is in satisfactory position. ICD is noted. The lungs demonstrate low lung volumes with mild atelectasis at the left lung   base. There is mild pulmonary edema. No pneumothorax. 01/20/22 0425  XR CHEST PORT Final result    Impression:      Stable mild bilateral interstitial and airspace opacities. Narrative:  EXAM:  XR CHEST PORT       INDICATION: Bilateral lung opacities       COMPARISON: 1/19/2022 at 0432 hours       TECHNIQUE: Portable AP semiupright chest view at 0436 hours       FINDINGS: The left chest ICD and wires are stable. The endotracheal tube,   enteric tube, Impella catheter, and right PICC are stable. The cardiomediastinal   contours are stable. Mild bilateral interstitial and airspace opacities are unchanged. There is no   pleural effusion or pneumothorax. The bones and upper abdomen are stable. Admission Weight: Last Weight   Weight: 264 lb 1.8 oz (119.8 kg) Weight: 250 lb (113.4 kg)     Intake / Output / Drain:  Current Shift: 01/21 0701 - 01/21 1900  In: 342.1 [I.V.:342.1]  Out: 175 [Urine:175]  Last 24 hrs.:     Intake/Output Summary (Last 24 hours) at 1/21/2022 0941  Last data filed at 1/21/2022 0900  Gross per 24 hour   Intake 3858.17 ml   Output 2055 ml   Net 1803.17 ml       EXAM:  General:    NAD                                                                                          Lungs:   Clear to auscultation bilaterally. Incision:  No erythema, drainage or swelling. Heart:  Regular rate and rhythm, S1, S2 normal, no murmur, click, rub or gallop. Abdomen:   Soft, non-tender.  Bowel sounds normal. No masses,  No organomegaly. Extremities:  No edema. PPP. Neurologic: Intubated, sedated     Labs:   Recent Labs     22  0436 22  1404 22  0240 22  0240   WBC 7.4  --   --  8.3   HGB 7.2* 7.3*   < > 7.8*   HCT 27.9* 27.3*   < > 28.8*     --   --  277    < > = values in this interval not displayed. Recent Labs     22  0436 22  0433 22  2311 22  1609 22  1609     --   --   --  142   K 3.7  --  4.1   < > 3.4*   *  --   --   --  107   CO2 27  --   --   --  29   BUN 39*  --   --   --  44*   CREA 2.18*  --   --   --  2.33*   *  --   --   --  158*   CA 8.8  --   --   --  8.9   MG 2.5*  --  2.5*   < > 1.5*   PHOS  --  2.8  --   --  2.7    < > = values in this interval not displayed. Recent Labs     22  16022  10222  1027   AP 65  --   --  58   TP 6.8  --   --  6.7   ALB 3.0* 2.8*   < > 2.8*   GLOB 3.8  --   --  3.9    < > = values in this interval not displayed. Recent Labs     22  0436 22  1702 22  1635 22  1236 22  1634 22  1634   INR  --   --   --  1.2*  --  1.4*   PTP  --   --   --  12.4*  --  14.0*   APTT 60.6* 63.5*   < > 25.9   < > >130.0*    < > = values in this interval not displayed. Recent Labs     22  10222  1701   PHI 7.60* 7.50*   PCO2I 31.8* 40.8   PO2I 173* 393*   FIO2I 50 100     No results for input(s): CPK, CKMB, TROIQ, BNPP in the last 72 hours. Assessment:     Active Problems:    Acute respiratory failure with hypoxia ([facility] (2022)         Plan/Recommendations/Medical Decision Makin. Acute on Chronic Systolic CHF class III/IV: S/P right axillary impella 5.5 . Cont bicarb via purge. Milrinone 0.125, nitric. Cont systemic bival. Per AHF. 2. Acute on Chronic Respiratory insufficiency: On home O2. . Vent wean per intensivist.  3. CAD: Multivessel CAD on [facility] 22.  ASA 81. BB on hold due to cardiogenic shock. Recommend starting high intensity statin when feasible, per primary/HF  4. RODNEY on Chronic CKD Stage IV: Renal following. UOP excellent, cr. Coming down. Diuretics per their recommendation. 5. Gout: allopurinol  6. DM: Per primary service  7. GERD: Home meds  8. Hypothyroidism: on synthroid  9. Depression: Home meds  10. Anemia: on EPO, stable post op. Dispo: impella in place and functioning well. Cont systemic bival & bicarb via purge. No cardiac surgery issues. Further HF management per AHF. Remainder of medical management per primary. .    Signed By: TYE Arceo

## 2022-01-21 NOTE — PROGRESS NOTES
[facility] in [place], [place]  Inpatient Progress Note      Patient name: Haylie Partida  Patient : 1955  Patient MRN: [id number]  Consulting MD: Jessica Carlton MD  Date of service: 22    REASON FOR REFERRAL:  Management of chronic systolic heart failure     PLAN OF CARE:   79 y/o female with chronic renal failure and new onset severe cardiomyopathy, LVEF 15% (diagnosed 21), stage D, NYHA class IV; admitted for massive volume overload, severely volume overloaded by RHC  Most likely etiology of acute deterioration of LVEF is chronic volume overload with compensatory tachycardia from progressive renal failure +/- coronary artery disease (80% LAD)   These are potentially reversible causes of severe LV dysfunction; by guidelines patient needs at least 3 months of euvolemia, HR < 100bpm and revascularization to prove she has non-reversible new, severe HF to be eligible for cardiac replacement therapies, including heart/kidney transplantation, d/w Dr. Marlena Logan with [facility]   Patient would like aggressive approach to allow her heart to recover, including dialysis +/- revascularization with goal of Impella as bridge to LV recovery or transplant, d/w patient and her sister at prior family meeting  Impella 5.5 implanted 22 with Dr. Mike Dixon with normalization of filling pressures, improved renal function and undergoing Rosalie and milrinone wean to extubation  Consider LAD revascularization once normotensive and euvolemic or direct LVAD-DT/dual organ transplant; decision once reassessed after extubation  Patient understands the following possible outcomes:  1/3 chance of LV recovery and discharge home on medical therapy or chronic dialysis  1/3 chance of LV non-recovery on Impella, evaluation and transfer for listing for heart transplant/kidney  1/3 chance of LV non-recovery and evaluation that reveals patient is not candidate for LVAD/heart/kidney transplant and then wean of support to comfort care/hospice  Plan to complete LVAD/HT evaluation over the weekend, family meeting on Tuesday to review data and discuss plans, with possible completion of eval (scopes) Wednesday and MRB presentation on Friday  D/w Cecilia Monteiro, Sharita, Ty, and bedside RN Criss Pierre     RECOMMENDATIONS:  POD #3 Impella 5.5 implant   Vent management per Intensivist   1/20 TTE reviewed by Dr. Martha Frank who reports that Impella appears to be in appropriate position   Impella support subsequently increased to P-7 1/21   Repeat limited TTE tomorrow to reassess LVIDd following speed change   Cefazolin q12h for Impella site ppx   Maintain PAC for hemodynamic optimization; goal CI > 2.3, CVP 8-10 mmHg, SVR 1000, SBP < 110 mmHg (via radial arterial line)   Vasopressin PRN to maintain adequate MAP   Continue decreased dose milrinone 0.125 mcg/kg/min  Intolerant to Roro wean; resumed at 10 ppm - hold further weaning at this time   Intolerant to sildenafil due to marked hypotension   Bumex 2 mg IV BID scheduled with additional doses PRN CVP > 10 mmHg   Keep K> 4 and Mg >2; repeat at 1400   Hydralazine 5 mg IV q4h PRN SBP > 110 mmHg   Intolerant of GDMT due to hemodynamic in stability   PPM programmed at 80 bpm  Continue digoxin 0.125 mg daily; goal 0.7-1.2   Allopurinol 50mg daily per nephrology  Continue transplant evaluation once patient extubated and can consent to remainder of eval  Venofer 200 mg IV x 2   Check epo level   FOB when able   Will need OP PSG  Invitae pending   Palliative consult appreciated   Nutritionist consult  Heart failure education   Advanced care plan present on file     All other care per primary team     IMPRESSION:  Fatigue  Shortness of breath, on 3L NC PTA  Volume overload  Acute on Chronic systolic heart failure, requiring Impella 5.5 implant 1/18/22   Stage D, NYHA class IV symptoms  Non-ischemic cardiomyopathy, LVEF 15%  Normal coronaries  PYP equivocal for amyloid   Pulmonary hypertension, severe  RV failure  S/p BiV-ICD  Cardiac risk factors:  HL  DM2  Morbid obesity, BMI 40  Acute on chronic renal failure, stage IV  GERD  Anemia of chronic disease  Gout  VF s/p ICD shock x 2      Interval Events:  POD #3 Impella placement   Roro weaned off with associated elevation in PAPs  Marked hypotension with small dose of sildenafil  Pressors briefly used to maintain adequate MAP   Impella increased to P7  Cr improving, 2.18   proBNP improved 5397 from 7240        LIFE GOALS:  Patient's personal goals include: being home with family, still ambulating around without getting too tired. Important upcoming milestones or family events: none  The patient identifies the following as important for living well: remaining idependent and mobile; being able to get out of the house with . Patient verbalized willingness to be on home milrinone and evaluation for both heart and kidney transplants. Verbalizes she would have family supporting her decision on this. SHAMIKA Crawford is a 78 y.o.  female with a history of NICM, chronic hypoxic respiratory failure secondary to pulmonary HTN, hypothyroidism, CKD, GERD, and DM II who presented to [facility] as a transfer from another facility for acute on chronic hypoxic respiratory failure. Reports running out of O2 at home resulting in SOB and dizziness. Upon arrival to the ED she was found to have O2 sats in the 70s, requiring 4L NC O2. Rapid covid test was negative. ProNT-BNP was 80020, K+5.2, BUN/CR x/2.54 and elevated d-dimer. Chest xray showing pulmonary edema vs. Atypical pneumonia. VQ scan showed low probability for PE. Per Dr. Beverly Buckner, [facility], LVEF 15-20% during recent admission. LVEF previously normalized with CRT. NYHA III-IV. No ACEi/ARB due to renal dysfunction. GDMT dosing limited by low BP.      Patient's PCP is Dr. Yenifer Blevins, and she sees Dr. Beverly Buckner primarily for cardiac care due to Dr. Dedra Nielson transfering practice. Patient historically seen by nephrology but has not had follow up care in the past three years. CARDIAC IMAGING:  Echo 1/20/22 - LVEF 20-25%, RV moderately dilated, Impella catheter 5.9 cm from AV   Echo 12/8/21- LVEF 15-20%, mild to Mod MR, LVIDd 5.09cm, TAPSE 1.94cm  Echo 4/22/19- LVEF 60%, trace MR,  LVIDd 4.24cm, TAPSE 1.65cm   Echo 4/24/18- LVEF 60%, trivial MR, LVIDd 4.79cm, TAPSE 2.25cm      EKG- 1/4/22 ST with A sense and V paced rhythm     C 2015- No significant CAD  NST 2014- reversible LAD involvement      ICD interrogation     HEMODYNAMICS:  [facility] 1/17/21: PAP 83/52 mmHg, RAP 27 mmHg, PCWP 45 mmHg, CI 1.6  RH 12/10/21: PAP 76/48/57, RAP 20, PCWP 35, CI 2.26     CPEST not done  6MW not done     OTHER IMAGING:  CXR Results  (Last 48 hours)                             01/13/22 1035   XR CHEST PORT Final result      Impression:   No significant change in congestion and interstitial and alveolar   opacities which may reflect edema or infectious infiltrate. Narrative:   EXAM: XR CHEST PORT       INDICATION: pul edema       COMPARISON: Chest x-ray 1/10/2022. FINDINGS: A portable AP radiograph of the chest was obtained at 10:19 hours. The   patient is on a cardiac monitor. The lungs appear grossly stable with congestion   and interstitial/airspace opacities with no pneumothorax or pleural effusion. Right PICC line traverses expected course of tip in the region of the atriocaval   junction. Pacemaker-ICD generator body projects over the left chest wall with   intact appearing leads traversing in expected course. . The cardiac and   mediastinal contours and pulmonary vascularity are normal.  Atherosclerotic   calcifications affect the aortic arch. The chest wall structures and visualized   upper abdomen show no acute findings with incidental note of degenerative spine   and shoulder changes.                            PHYSICAL EXAM:  Visit Vitals  BP (!) 111/90 (BP 1 Location: Left upper arm, BP Patient Position: At rest)   Pulse 97   Temp 97.8 °F (36.6 °C)   Resp 20   Ht 5' 7\" (1.702 m)   Wt 251 lb 8.7 oz (114.1 kg)   SpO2 98%   BMI 39.40 kg/m²      Hemodynamics:   CO: CO (l/min): 4.7 l/min   CI: CI (l/min/m2): 2.1 l/min/m2   CVP: CVP (mmHg): 11 mmHg (01/21/22 1300)   SVR: SVR (dyne*sec)/cm5: 1328 (dyne*sec)/cm5 (01/21/22 6734)   PAP Systolic: PAP Systolic: 52 (67/64/17 4730)   PAP Diastolic: PAP Diastolic: 25 (61/63/70 0449)   PVR:     SV02: SVO2 (%): 62 % (01/21/22 1000)   SCV02:      Impella 5.5  P-7  Flow: 4lpm   Purge flow 7.7     Physical Exam  Vitals and nursing note reviewed. Constitutional:       General: She is not in acute distress. Appearance: Normal appearance. She is obese, sedated. Cardiovascular:      Rate and Rhythm: Regular rhythm. Pulses: Normal pulses. Heart sounds: Normal heart sounds. No murmur heard. Pulmonary:      Effort: Normal, synchronous with ventilator   Abdominal:      General: There is no distension, hypoactive BS. Musculoskeletal:         General: trace LE edema   Skin:     General: Skin is warm and dry. Findings: Lesion present. Comments: psorasis   Neurological:      General: Responds to noxious stimuli. Mental Status: She is intubated and sedated. Psychiatric:         Mood and Affect: ROSA.      ROS unable to obtain due to patient condition (intubated, sedated).       PAST MEDICAL HISTORY:  Past Medical History:   Diagnosis Date    Acquired hypothyroidism 8/15/2016    Anemia     RED-HF study    Asthma     [facility]     initial dx 2001, bivHF 2008 with EF 15%, s/p biV-ICD 9/08, significant improvment in EF to 45-50%    CKD (chronic kidney disease)     Dr Bruna Birmingham    CKD (chronic kidney disease) 8/15/2012    Depression     Diabetes ([facility]     Diabetic neuropathy (HCC)     DM (diabetes mellitus) ([facility] 8/15/2012    GERD (gastroesophageal reflux disease)     Gout Hypothyroidism     ICD (implantable cardioverter-defibrillator), biventricular, in situ 6/5/2014    Psoriasis        PAST SURGICAL HISTORY:  Past Surgical History:   Procedure Laterality Date    CARDIAC CATHETERIZATION  2007; 01/06/15    normal cors    ECHO 2D ADULT  4/2010    EF 45%, improved from 1/09 (25%)    ECHO 2D ADULT  11/2011    LVH, EF 55-60%    HX ORTHOPAEDIC      knee    HX PACEMAKER PLACEMENT      AICD    STRESS TEST LEXISCAN/CARDIOLITE  3/21/12    normal perfusion, global HK 40%       FAMILY HISTORY:  Family History   Problem Relation Age of Onset    Heart Disease Mother     Hypertension Mother     Lupus Sister     Diabetes Brother        SOCIAL HISTORY:  Social History     Socioeconomic History    Marital status:    Tobacco Use    Smoking status: Never Smoker    Smokeless tobacco: Never Used   Substance and Sexual Activity    Alcohol use: No    Drug use: No    Sexual activity: Never   Social History Narrative    . Nonsmoker. Disability       LABORATORY RESULTS:     Labs Latest Ref Rng & Units 1/21/2022 1/20/2022 1/20/2022 1/20/2022 1/20/2022 1/20/2022 1/19/2022   WBC 3.6 - 11.0 K/uL 7.4 - - - - 8.3 -   RBC 3.80 - 5.20 M/uL 3.02(L) - - - - 3.20(L) -   Hemoglobin 11.5 - 16.0 g/dL 7. 2(L) - - 7. 3(L) - 7. 8(L) -   Hematocrit 35.0 - 47.0 % 27. 9(L) - - 27. 3(L) - 28. 8(L) -   MCV 80.0 - 99.0 FL 92.4 - - - - 90.0 -   Platelets 735 - 975 K/uL 258 - - - - 277 -   Lymphocytes 12 - 49 % - - - - - - -   Monocytes 5 - 13 % - - - - - - -   Eosinophils 0 - 7 % - - - - - - -   Basophils 0 - 1 % - - - - - - -   Albumin 3.5 - 5.0 g/dL 3. 0(L) - 2. 8(L) - 2. 8(L) 2. 8(L) -   Calcium 8.5 - 10.1 MG/DL 8.8 - 8.9 - 9.0 9.1 -   Glucose 65 - 100 mg/dL 141(H) - 158(H) - 204(H) 193(H) -   BUN 6 - 20 MG/DL 39(H) - 44(H) - 46(H) 53(H) -   Creatinine 0.55 - 1.02 MG/DL 2.18(H) - 2.33(H) - 2.37(H) 2.42(H) -   Sodium 136 - 145 mmol/L 142 - 142 - 140 140 -   Potassium 3.5 - 5.1 mmol/L 3.7 4.1 3.4(L) - 4.0 3.1(L) 4.0   TSH 0.36 - 3. 74 uIU/mL - - - - - - -   LDH 81 - 246 U/L 217 - - - - 256(H) -   Some recent data might be hidden     Lab Results   Component Value Date/Time    TSH 3.08 01/11/2022 05:13 AM    TSH 1.85 12/15/2021 01:06 PM    TSH 1.01 11/05/2020 09:11 AM    TSH 2.740 04/30/2019 11:21 AM    TSH 0.519 02/21/2012 10:53 AM    TSH 8.29 (H) 01/20/2010 01:59 PM       ALLERGY:  Allergies   Allergen Reactions    Ciprofloxacin Anaphylaxis    Shellfish Derived Anaphylaxis    Ace Inhibitors Unknown (comments)    Biaxin [Clarithromycin] Other (comments)     Metal taste    Candesartan Cough    Pcn [Penicillins] Hives        CURRENT MEDICATIONS:    Current Facility-Administered Medications:     bumetanide (BUMEX) injection 2 mg, 2 mg, IntraVENous, ONCE, Shasha Reyes MD    vasopressin (VASOSTRICT) 20 Units in 0.9% sodium chloride 100 mL infusion, 0-0.1 Units/min, IntraVENous, TITRATE, Verena, Jermaine ODELL NP, Last Rate: 9 mL/hr at 01/21/22 1300, 0.03 Units/min at 01/21/22 1300    PHENYLephrine (NORMA-SYNEPHRINE) 30 mg in 0.9% sodium chloride 250 mL infusion,  mcg/min, IntraVENous, TITRATE, Jackie Fernández MD, Held at 01/21/22 1200    milrinone (PRIMACOR) 20 MG/100 ML D5W infusion, 0.125 mcg/kg/min, IntraVENous, CONTINUOUS, Sol Albarado, NP, Last Rate: 4.2 mL/hr at 01/21/22 0819, 0.125 mcg/kg/min at 01/21/22 0819    niCARdipine (CARDENE) 25 mg in 0.9% sodium chloride 250 mL infusion, 0-15 mg/hr, IntraVENous, TITRATE, Edgardo ODELL MD, Stopped at 01/19/22 2109    [Held by provider] ivabradine (CORLANOR) tablet 2.5 mg, 2.5 mg, Oral, BID WITH MEALS, Verena, Sol Ramirez, NP    bivalirudin (ANGIOMAX) 250 mg in 0.9% sodium chloride (MBP/ADV) 50 mL MBP, 0.02-2.5 mg/kg/hr, IntraVENous, TITRATE, Jermaine Albarado, JAIDA, Last Rate: 2.4 mL/hr at 01/21/22 1115, 0.1008 mg/kg/hr at 01/21/22 1115    hydrALAZINE (APRESOLINE) 20 mg/mL injection 5 mg, 5 mg, IntraVENous, Q4H PRN, Jermaine Albarado, NP, 5 mg at 01/20/22 1449    albumin human 5% (BUMINATE) solution 12.5 g, 12.5 g, IntraVENous, BID, Jermaine Albarado T, NP, 12.5 g at 01/21/22 0806    bumetanide (BUMEX) injection 1 mg, 1 mg, IntraVENous, Q4H PRN, Sol Albarado, NP, 1 mg at 01/21/22 0857    ceFAZolin (ANCEF) 1 g in sterile water (preservative free) 10 mL IV syringe, 1 g, IntraVENous, Q12H, Jermaine Albarado T, NP, 1 g at 01/21/22 0205    fentaNYL (PF) 1,500 mcg/30 mL (50 mcg/mL) infusion, 0-200 mcg/hr, IntraVENous, TITRATE, Toy Timmons MD, Last Rate: 2 mL/hr at 01/21/22 0906, 100 mcg/hr at 01/21/22 0906    heparin (porcine) in 0.9% NaCl 30,000 unit/1,000 mL perfusion irrigation 50-1,000 mL, 50-1,000 mL, Other, PRN, Sam Gomez PA    sodium chloride (NS) flush 5-40 mL, 5-40 mL, IntraVENous, Q8H, Sam Gomez PA, 10 mL at 01/21/22 1302    heparinized saline 2 units/mL infusion, , , CONTINUOUS, FisEmil bhandari MD, 1,000 Units at 01/18/22 1416    chlorhexidine (PERIDEX) 0.12 % mouthwash 15 mL, 15 mL, Oral, Q12H, Davian Vázquez DO, 15 mL at 01/21/22 0841    sodium chloride (NS) flush 5-40 mL, 5-40 mL, IntraVENous, Q8H, Sam Gomez PA, 10 mL at 01/21/22 1302    0.45% sodium chloride infusion, 10 mL/hr, IntraVENous, CONTINUOUS, Sam Gomez PA    0.9% sodium chloride infusion, 9 mL/hr, IntraVENous, CONTINUOUS, Sam Gomez PA, Last Rate: 9 mL/hr at 01/18/22 1647, 9 mL/hr at 01/18/22 1647    naloxone (NARCAN) injection 0.4 mg, 0.4 mg, IntraVENous, PRN, Sam Gomez PA    mupirocin (BACTROBAN) 2 % ointment, , Both Nostrils, BID, Heath Gomez, [address], Given at 01/21/22 0847    ondansetron (ZOFRAN) injection 4 mg, 4 mg, IntraVENous, Q4H PRN, Sam Gomez PA    albuterol (PROVENTIL VENTOLIN) nebulizer solution 2.5 mg, 2.5 mg, Nebulization, Q4H PRN, Sam Gomez PA    aspirin chewable tablet 81 mg, 81 mg, Oral, DAILY, Heath Gomez, PA, 81 mg at 01/21/22 0838    midazolam (VERSED) injection 1 mg, 1 mg, IntraVENous, Q1H PRN, Long, TYE Greer    magnesium oxide (MAG-OX) tablet 400 mg, 400 mg, Oral, BID, Bren Gomez PA, 400 mg at 01/21/22 6250    calcium chloride 1 g in 0.9% sodium chloride 100 mL IVPB, 1 g, IntraVENous, PRN, Sam Gomez PA    bisacodyL (DULCOLAX) suppository 10 mg, 10 mg, Rectal, DAILY PRN, Sam Gomez PA    senna-docusate (PERICOLACE) 8.6-50 mg per tablet 1 Tablet, 1 Tablet, Oral, BID, Bren Gomez PA, 1 Tablet at 01/21/22 2811    ELECTROLYTE REPLACEMENT NOTE: Nurse to review Serum Potassium and Magnesuim levels and Initiate Electrolyte Replacement Protocol as needed, 1 Each, Other, PRN, Sam Gomez PA    magnesium sulfate 1 g/100 ml IVPB (premix or compounded), 1 g, IntraVENous, PRN, Sam Gomez PA    alteplase (CATHFLO) 1 mg in sterile water (preservative free) 1 mL injection, 1 mg, InterCATHeter, PRN, Sam Gomez PA    bacitracin 500 unit/gram packet 1 Packet, 1 Packet, Topical, PRN, Bren Gomez PA    pantoprazole (PROTONIX) injection 40 mg, 40 mg, IntraVENous, DAILY, 40 mg at 01/21/22 0838 **AND** sodium chloride (NS) flush 10 mL, 10 mL, IntraVENous, DAILY, Davian Vázquez DO, 10 mL at 01/21/22 0839    dexmedeTOMidine in 0.9 % NaCl (PRECEDEX) 400 mcg/100 mL (4 mcg/mL) infusion soln, 0.1-1.5 mcg/kg/hr, IntraVENous, TITRATE, Davian Vázquez DO, Stopped at 01/21/22 1100    midazolam (VERSED) injection 1 mg, 1 mg, IntraVENous, Q4H PRN, Davian Vázquez DO, 1 mg at 01/18/22 1719    insulin lispro (HUMALOG) injection, , SubCUTAneous, Q6H, Davian Vázquez DO, 4 Units at 01/21/22 1234    propofol (DIPRIVAN) 10 mg/mL infusion, 0-50 mcg/kg/min, IntraVENous, TITRATE, Davian Vázquez DO, Last Rate: 17 mL/hr at 01/21/22 1228, 25 mcg/kg/min at 01/21/22 1228    sodium bicarbonate (8.4%) 25 mEq in dextrose 5% 1,000 mL - impella purge fluid, , IntraVENous, TITRATE, Sam Gomez PA, Last Rate: 7.8 mL/hr at 01/21/22 0855, New Bag at 01/21/22 0855    insulin NPH (NOVOLIN N, HUMULIN N) injection 10 Units, 10 Units, SubCUTAneous, QHS, Randy Gomez, [address], 10 Units at 01/20/22 2325    insulin NPH (NOVOLIN N, HUMULIN N) injection 20 Units, 20 Units, SubCUTAneous, DAILY, Randy Gomez PA, 20 Units at 01/21/22 0431    triamcinolone acetonide (KENALOG) 0.1 % cream, , Topical, BID, Randy Gomez PA, Given at 01/21/22 0522    polyethylene glycol (MIRALAX) packet 17 g, 17 g, Oral, DAILY, Randy Gomez PA, 17 g at 01/21/22 1019    digoxin (LANOXIN) tablet 0.0625 mg, 0.0625 mg, Oral, DAILY, Sam Gomez PA, 0.0625 mg at 01/21/22 9201    allopurinoL (ZYLOPRIM) tablet 50 mg, 50 mg, Oral, DAILY, Randy Gomez PA, 50 mg at 01/21/22 0673    epoetin isabel-epbx (RETACRIT) injection 20,000 Units, 20,000 Units, SubCUTAneous, Q TUE, THU & SAT, Randy Gomez PA, 20,000 Units at 01/20/22 2042    montelukast (SINGULAIR) tablet 10 mg, 10 mg, Oral, DAILY, Randy Gomez PA, 10 mg at 01/21/22 3364    levothyroxine (SYNTHROID) tablet 100 mcg, 100 mcg, Oral, Once per day on Mon Tue Wed Thu Fri Sat, Sam Gomez PA, 100 mcg at 01/21/22 0874    hydroxypropyl methylcellulose (ISOPTO TEARS) 0.5 % ophthalmic solution 1 Drop, 1 Drop, Both Eyes, PRN, Randy Gomez PA, 1 Drop at 01/06/22 1727    venlafaxine-SR (EFFEXOR-XR) capsule 75 mg, 75 mg, Oral, DAILY WITH BREAKFAST, Randy Gomez PA, 75 mg at 01/17/22 1025    gabapentin (NEURONTIN) capsule 100 mg, 100 mg, Oral, QHS, Sam Gomez PA, 100 mg at 01/18/22 2126    arformoteroL (BROVANA) neb solution 15 mcg, 15 mcg, Nebulization, BID RT, 15 mcg at 01/21/22 1469 **AND** budesonide (PULMICORT) 500 mcg/2 ml nebulizer suspension, 500 mcg, Nebulization, BID RT, Sam Gomez PA, 500 mcg at 01/21/22 5844    sodium chloride (NS) flush 5-40 mL, 5-40 mL, IntraVENous, Q8H, Sam Gomez PA, 10 mL at 01/21/22 1302    acetaminophen (TYLENOL) tablet 650 mg, 650 mg, Oral, Q6H PRN **OR** acetaminophen (TYLENOL) suppository 650 mg, 650 mg, Rectal, Q6H PRN, Sam Gomez PA    glucose chewable tablet 16 g, 4 Tablet, Oral, PRN, Sam Gomez PA    dextrose (D50W) injection syrg 12.5-25 g, 25-50 mL, IntraVENous, PRN, Sam Gomez PA    glucagon (GLUCAGEN) injection 1 mg, 1 mg, IntraMUSCular, PRN, Paty Gomez PA    PATIENT CARE TEAM:  Patient Care Team:  Ruddy Crews MD as PCP - General (Internal Medicine)  Ruddy Crews MD as PCP - [address] [place] [address] Provider  Heather Eisenberg MD as Consulting Provider (Internal Medicine)  Panfilo Grewal MD (Dermatology)  Neva Luna MD (Nephrology)  Maryam Yanes MD as Consulting Provider (Cardiology)  Tanya Shane MD (Cardiology)  Lucrecia Edward MD as Consulting Provider (Pulmonary Disease)     Thank you for allowing me to participate in this patient's care. Bennett Franks NP  [address] [place] [address], [address]  Phone: [phone]    [facility] ATTENDING ADDENDUM    Patient was seen and examined in person. Data and notes were reviewed. I have discussed and agree with the plan as noted in the NP note above without further additions.     Fahad De La Cruz MD PhD  [phone] electronic

## 2022-01-21 NOTE — PROGRESS NOTES
[facility]    ICU TEAM Progress Note    Name: Oralia Lamas   : 1955   MRN: [id number]   Date: 2022           ICU Assessment     1. Cardiogenic Shock  2. Pulmonary hypertension  3. Acute hypoxemic respiratory failure  4. Status post Impella placement  5. RODNEY on CKD stage IV           ICU Comprehensive Plan of Care: This is a 70-year-old female with past medical history significant for moderate pulmonary hypertension on home oxygen therapy, CKD, nonischemic cardiomyopathy who was admitted to the hospital on  due to acute on chronic hypoxemic respiratory failure in light of fluid overload. Had a left heart cath on  which demonstrated single one-vessel moderate disease (mid LAD lesion) which was thought to be not a cause of cardiomyopathy. Subsequently had a right axillary Impella placed by CT surgery for potential double transplant. Patient was seen and assessed at bedside. Impella running at P6 providing around 3.5 L support. Since the nitric was weaned off last night, PA pressures have been running high (60's) with associated drop in systemic blood pressure. This was likely exacerbated by sildenafil which was administered this morning. Therefore low-dose nitric oxide was resumed and vasopressin was started to help the right heart. General/neuro: Sedation with low-dose propofol and Precedex infusion. Respiratory: Acute on chronic hypoxic respiratory failure, exacerbated by severe pulmonary hypertension and pulmonary edema. Not ready for extubation given bilateral edema and ongoing cardiogenic shock. Resumed inhaled nitric oxide at 10 ppm.  Continue diuresis with standing Bumex. Cardiac: Cardiogenic shock secondary to biventricular failure. Continue Impella support at P6, low-dose vasopressin, inhaled nitric for now. Further plans on durable Vad versus transplant per heart failure and CT surgery. GI: Continue tube feeds. Pantoprazole. ID: No white count. Prophylactic cefazolin while Impella is in place. Renal: Acute on chronic kidney injury, creatinine improving 2.18 and 2.33. Likely schedule Bumex 2 mg twice daily. Nephrology recommendations appreciated. Prophylaxis: Bivalirudin gtt given Impella. Subjective:   Progress Note: 1/21/2022      Reason for ICU Admission: Cardiogenic shock    HPI: As above    Overnight Events:   1/21/2022      POD:  3 Days Post-Op    S/P:   Procedure(s):  RIGHT AXILLARY IMPELLA INSERTION    Active Problem List:     Problem List  Date Reviewed: 12/22/2021          Codes Class    Acute respiratory failure with hypoxia (HCC) ICD-10-CM: J96.01  ICD-9-CM: 518.81         CHF exacerbation (HCC) ICD-10-CM: I50.9  ICD-9-CM: 428.0         Type 2 diabetes mellitus with diabetic neuropathy ([facility]) ICD-10-CM: E11.40  ICD-9-CM: 250.60, 357.2         Type 2 diabetes with nephropathy ([facility] ICD-10-CM: E11.21  ICD-9-CM: 250.40, 583.81         Obesity, morbid ([facility] ICD-10-CM: E66.01  ICD-9-CM: 278.01         Acquired hypothyroidism ICD-10-CM: E03.9  ICD-9-CM: 108. 9         Dysthymia ICD-10-CM: F34.1  ICD-9-CM: 300.4         ICD (implantable cardioverter-defibrillator), biventricular, in situ ICD-10-CM: Z95.810  ICD-9-CM: V45.02     Overview Signed 1/14/2015 11:11 AM by Paddy Brink MD     Generator [employer] change 1/14/2015             Dyslipidemia ICD-10-CM: O68.9  ICD-9-CM: 272.4         CKD (chronic kidney disease) ICD-10-CM: N18.9  ICD-9-CM: 948. 9         Cardiomyopathy, nonischemic ([facility] ICD-10-CM: I42.8  ICD-9-CM: 425.4     Overview Signed 10/10/2011  6:40 AM by Krista Bazan MD     initial dx 2001, bivHF 2008 with EF 15%, s/p biV-ICD 9/08, significant improvment in EF to 45-50%             Anemia in chronic renal disease (Chronic) ICD-10-CM: N18.9, D63.1  ICD-9-CM: 285.21         HTN (hypertension) ICD-10-CM: I10  ICD-9-CM: 401.9         GERD (gastroesophageal reflux disease) (Chronic) ICD-10-CM: K21.9  ICD-9-CM: 530.81         Gout (Chronic) ICD-10-CM: M10.9  ICD-9-CM: 274.9         Pulmonary HTN (HCC) (Chronic) ICD-10-CM: I27.20  ICD-9-CM: 416.8               Past Medical History:      has a past medical history of Acquired hypothyroidism (8/15/2016), Anemia, Asthma, Cardiomyopathy, nonischemic ([facility] CKD (chronic kidney disease), CKD (chronic kidney disease) (8/15/2012), Depression, Diabetes ([address] Diabetic neuropathy ([address] DM (diabetes mellitus) ([facility] (8/15/2012), GERD (gastroesophageal reflux disease), Gout, Hypothyroidism, ICD (implantable cardioverter-defibrillator), biventricular, in situ (6/5/2014), and Psoriasis. She has no past medical history of Abuse, Adult physical abuse, Arrhythmia, Arthritis, Asthma, Autoimmune disease ([address] CAD (coronary artery disease), Calculus of kidney, Cancer ([address] Chronic pain, COPD, Headache(784.0), Hypercholesteremia, Liver disease, Psychotic disorder ([address] PUD (peptic ulcer disease), Seizures ([address] Stroke ([address] Thromboembolus ([address] or Unspecified deficiency anemia. Past Surgical History:      has a past surgical history that includes echo 2d adult (4/2010); cardiac catheterization (2007; 01/06/15); echo 2d adult (11/2011); stress test lexiscan/cardiolite (3/21/12); hx pacemaker placement; and hx orthopaedic. Home Medications:     Prior to Admission medications    Medication Sig Start Date End Date Taking? Authorizing Provider   levocetirizine (XYZAL) 5 mg tablet TAKE 1 TABLET BY MOUTH EVERY DAY 1/6/22  Yes Sean Mcintyre MD   apremilast Jefececilia Melchor) 30 mg tab Take 30 mg by mouth two (2) times daily as needed. Yes Provider, Historical   azelastine (ASTEPRO) 205.5 mcg (0.15 %) 1 [place] by Both Nostrils route two (2) times daily as needed. Yes Provider, Historical   docusate sodium (COLACE) 100 mg capsule Take 100 mg by mouth daily as needed for Constipation.    Yes Provider, Historical   levothyroxine (SYNTHROID) 100 mcg tablet Take 100 mcg by mouth six (6) days a week. Everyday except Sunday   Yes Provider, Historical   allopurinoL (ZYLOPRIM) 100 mg tablet TAKE 1 TABLET BY MOUTH EVERY DAY 1/5/22  Yes Matthew Tripathi MD   calcitRIOL (ROCALTROL) 0.5 mcg capsule TAKE 1 CAPSULE BY MOUTH EVERY DAY 1/5/22  Yes Matthew Tripathi MD   carvediloL (COREG) 6.25 mg tablet Take 1 Tablet by mouth two (2) times daily (with meals). 12/22/21  Yes Michelle DORSEY NP   isosorbide dinitrate (ISORDIL) 5 mg tablet Take 1 Tablet by mouth three (3) times daily. 12/22/21  Yes Michelle DORSEY NP   hydrALAZINE (APRESOLINE) 10 mg tablet Take 1 Tablet by mouth three (3) times daily. 12/22/21  Yes Ronel Del Toro NP   benzonatate (Tessalon Perles) 100 mg capsule Take 100 mg by mouth three (3) times daily as needed for Cough. Yes Jamar, MD Bee   montelukast (SINGULAIR) 10 mg tablet TAKE 1 TABLET BY MOUTH EVERY DAY 11/5/21  Yes Matthew Tripathi MD   bumetanide (BUMEX) 2 mg tablet Take 1 tab in the morning and 0.5 tab in the evening 11/5/21  Yes Claudia Lawler MD   fluticasone propionate (FLONASE) 50 mcg/actuation nasal spray One spray each nostril daily 11/1/21  Yes Matthew Tripathi MD   gabapentin (NEURONTIN) 100 mg capsule Take 1 Capsule by mouth nightly. Max Daily Amount: 100 mg. 10/29/21  Yes Matthew Tripathi MD   budesonide (PULMICORT) 180 mcg/actuation aepb inhaler Take 2 Puffs by inhalation two (2) times a day. 5/27/21  Yes Matthew Tripathi MD   ammonium lactate (AMLACTIN) 12 % topical cream Apply  to affected area two (2) times a day. rub in to affected area well 11/4/20  Yes Matthew Tripathi MD   insulin NPH/insulin regular (NOVOLIN 70/30) 100 unit/mL (70-30) injection 70 units two times a day 8/15/16  Yes Esthela Holland MD   calcipotriene (DOVONEX) 0.005 % topical cream Apply  to affected area three (3) times daily.    Yes Provider, Historical   triamcinolone acetonide (KENALOG) 0.5 % ointment Apply  to affected area two (2) times daily as needed. use thin layer    Yes Provider, Historical   multivitamin (ONE A DAY) tablet Take 1 Tab by mouth daily. Yes Provider, Historical   ferrous sulfate (IRON) 325 mg (65 mg elemental iron) tablet Take 325 mg by mouth daily. Yes Provider, Historical   aspirin 81 mg tablet Take 81 mg by mouth daily. Yes Provider, Historical   venlafaxine-SR (EFFEXOR-XR) 75 mg capsule TAKE 1 CAPSULE BY MOUTH EVERY DAY 22   Su Bergeron MD   cholecalciferol (VITAMIN D3) (2,000 UNITS /50 MCG) cap capsule TAKE 1 CAPSULE BY MOUTH TWO (2) TIMES A DAY. 22   Su Bergeron MD       Allergies/Social/Family History: Allergies   Allergen Reactions    Ciprofloxacin Anaphylaxis    Shellfish Derived Anaphylaxis    Ace Inhibitors Unknown (comments)    Biaxin [Clarithromycin] Other (comments)     Metal taste    Candesartan Cough    Pcn [Penicillins] Hives      Social History     Tobacco Use    Smoking status: Never Smoker    Smokeless tobacco: Never Used   Substance Use Topics    Alcohol use: No      Family History   Problem Relation Age of Onset    Heart Disease Mother     Hypertension Mother     Lupus Sister     Diabetes Brother        Review of Systems:     A comprehensive review of systems was negative except for that written in the HPI.     Objective:   Vital Signs:  Visit Vitals  BP 95/64 (BP Patient Position: At rest)   Pulse 85   Temp 99.9 °F (37.7 °C)   Resp 13   Ht 5' 7\" (1.702 m)   Wt 113.4 kg (250 lb)   SpO2 97%   BMI 39.16 kg/m²    O2 Flow Rate (L/min): 6 l/min (weaned) O2 Device: Endotracheal tube,Ventilator Temp (24hrs), Av.6 °F (37.6 °C), Min:98 °F (36.7 °C), Max:100.7 °F (38.2 °C)    CVP (mmHg): 12 mmHg (22 1200)      Intake/Output:     Intake/Output Summary (Last 24 hours) at 2022 1240  Last data filed at 2022 1130  Gross per 24 hour   Intake 3338.76 ml   Output 1805 ml   Net 1533.76 ml       Physical Exam:    General appearance: alert, cooperative, no distress, appears older than stated age  Lungs: rales bilaterally  Heart: regular rate and rhythm, S1, S2 normal, no murmur, click, rub or gallop  Abdomen: soft, non-tender. Bowel sounds normal. No masses,  no organomegaly  Extremities: Mild edema      LABS AND  DATA: Personally reviewed  Recent Labs     01/21/22  0436 01/20/22  1404 01/20/22  0240 01/20/22  0240   WBC 7.4  --   --  8.3   HGB 7.2* 7.3*   < > 7.8*   HCT 27.9* 27.3*   < > 28.8*     --   --  277    < > = values in this interval not displayed. Recent Labs     01/21/22 0436 01/21/22  0433 01/20/22  2311 01/20/22  1609 01/20/22  1609     --   --   --  142   K 3.7  --  4.1   < > 3.4*   *  --   --   --  107   CO2 27  --   --   --  29   BUN 39*  --   --   --  44*   CREA 2.18*  --   --   --  2.33*   *  --   --   --  158*   CA 8.8  --   --   --  8.9   MG 2.5*  --  2.5*   < > 1.5*   PHOS  --  2.8  --   --  2.7    < > = values in this interval not displayed. Recent Labs     01/21/22 0436 01/20/22  1609 01/20/22  1027 01/20/22  1027   AP 65  --   --  58   TP 6.8  --   --  6.7   ALB 3.0* 2.8*   < > 2.8*   GLOB 3.8  --   --  3.9    < > = values in this interval not displayed. Recent Labs     01/21/22  0436 01/20/22  1702 01/19/22  1635 01/19/22  1236 01/18/22  1634 01/18/22  1634   INR  --   --   --  1.2*  --  1.4*   PTP  --   --   --  12.4*  --  14.0*   APTT 60.6* 63.5*   < > 25.9   < > >130.0*    < > = values in this interval not displayed. Recent Labs     01/21/22  1143 01/19/22  1027   PHI 7.42 7.60*   PCO2I 42.2 31.8*   PO2I 87 173*   FIO2I 40 50     No results for input(s): CPK, CKMB, TROIQ, BNPP in the last 72 hours.     Hemodynamics:   PAP: PAP Systolic: 50 (84/41/53 2845) CO: CO (l/min): 4.7 l/min (01/21/22 0800)   Wedge:   CI: CI (l/min/m2): 2.1 l/min/m2 (01/21/22 0800)   CVP:  CVP (mmHg): 12 mmHg (01/21/22 1200) SVR:       PVR:       Ventilator Settings:  Mode Rate Tidal Volume Pressure FiO2 PEEP   Assist control,Volume control   460 ml  8 cm H2O 40 % 5 cm H20     Peak airway pressure: 27 cm H2O    Minute ventilation: 5.6 l/min        MEDS: Reviewed    Chest X-Ray:  CXR Results  (Last 48 hours)               01/21/22 0450  XR CHEST PORT Final result    Impression:  ET tube is 1 cm above the rohit. This could be retracted 2 cm       Narrative:  EXAM: XR CHEST PORT       INDICATION: post-Impella       COMPARISON: 1/20/2022       FINDINGS: A portable AP radiograph of the chest was obtained at 0434 hours. The   patient is on a cardiac monitor. ET tube is 1 cm above the rohit. This could be retracted 2 cm. NG tube courses   into the stomach. The distal tip is not seen. [place]-Gu[place] catheter has its tip in   the distal main pulmonary artery. PICC line overlies the SVC. Cardiac assist   device is in satisfactory position. ICD is noted. The lungs demonstrate low lung volumes with mild atelectasis at the left lung   base. There is mild pulmonary edema. No pneumothorax. 01/20/22 0425  XR CHEST PORT Final result    Impression:      Stable mild bilateral interstitial and airspace opacities. Narrative:  EXAM:  XR CHEST PORT       INDICATION: Bilateral lung opacities       COMPARISON: 1/19/2022 at 0432 hours       TECHNIQUE: Portable AP semiupright chest view at 0436 hours       FINDINGS: The left chest ICD and wires are stable. The endotracheal tube,   enteric tube, Impella catheter, and right PICC are stable. The cardiomediastinal   contours are stable. Mild bilateral interstitial and airspace opacities are unchanged. There is no   pleural effusion or pneumothorax. The bones and upper abdomen are stable. Multidisciplinary Rounds Completed:   Yes    ABCDEF Bundle/Checklist Completed:  Yes    SPECIAL EQUIPMENT  None    DISPOSITION  Stay in ICU    CRITICAL CARE CONSULTANT NOTE  I had a face to face encounter with the patient, reviewed and interpreted patient data including clinical events, labs, images, vital signs, I/O's, and examined patient. I have discussed the case and the plan and management of the patient's care with the consulting services, the bedside nurses and the respiratory therapist.      NOTE OF PERSONAL INVOLVEMENT IN CARE   This patient has a high probability of imminent, clinically significant deterioration, which requires the highest level of preparedness to intervene urgently. I participated in the decision-making and personally managed or directed the management of the following life and organ supporting interventions that required my frequent assessment to treat or prevent imminent deterioration. I personally spent 60 minutes of critical care time. This is time spent at this critically ill patient's bedside actively involved in patient care as well as the coordination of care. This does not include any procedural time which has been billed separately.     Annamaria Balbuena DO  Staff Intensivist/Anesthesiologist  [facility]  1/21/2022

## 2022-01-21 NOTE — PROGRESS NOTES
Occupational Therapy  01/21/22     Patient currently on OT caseload. OT reassessment attempted at 8:49 AM however per nursing patient is not currently appropriate to participate with therapy. Reports plans to attempt extubation today, requesting therapy hold until after patient is extubated. Will continue to follow patient and attempt OT at a later time.      Thank you,  Светлана Mera OTR/L

## 2022-01-21 NOTE — PROGRESS NOTES
0730 Bedside report received from [id number] [facility], drip rates verified    0830 Per Dr. Mela Whittaker, give 2 mg bumex for overload,    0855 Propofol cut in half for SBT, patient noncompliant with vent, increased fentanyl    0905 Pt on SBT, tolerating well    1030 Sildenafil given, will assess Nitric needs according to patient response per [facility]    1100 ABP real, sbp 60s, pulse barely palpable unlike AM assessment, MD made aware, patient back on rate on vent, MD pushed Morgan stick, nitric restarted at 10ppm and ETT retracted 2 cm by RT.    1110 Starting vasopressin, will alert HF team  1120 Still requiring pressor support, MD made aware, morgan orders on standby    1300 Impella up to P-7 by [facility]    1400 Giving PRN dose bumex

## 2022-01-22 NOTE — PROGRESS NOTES
0730 Shift report received by Deni Mueller RN    0830 NAILA performed by tech. ADEBAYO heard ICD alarm, will investigate    0900 Tech at bedside for ABD US but has not been NPO. TF turned off, plan for US at 1300    0950 [facility] team at bedside. Impella up to P-8 with Echo at bedside, plan for milrinone up to 0.2 mcg/kg/min    1000 PRBC transfusion started. 1045 [employer] page for BIV ICD. PTT goals changed to 40-60 seconds per HF     1100 Spoke with [employer] rep, interrogating pacer with unit device. PA pressures rising 61/26 (40), Vasopressin off (map 71)    1130 SBP 70s without vasopressin, MAP low 60s, back on vasopressin    1215 PRBC transfusion complete. No reaction suspected    1300 Labs drawn + sent, US ABD in process    1400 TF restarted, ETT retracted 2 cm, now [email or web address] Pt coughing and uncomfortable, increased sedation    1500 First pacer interrogation did not upload to [employer]. Interrogation repeated, confirmed receipt with [employer] rep over phone. BiV pacer will require generator change, Dr. Roger Calvert made aware. Consent for generator change obtained from .     1700 Nitric up to 30 PPM per MD    1930 Shift report given to Deni Mueller RN

## 2022-01-22 NOTE — PROGRESS NOTES
1930 Bedside and Verbal shift change report given to ARTURO Benton (oncoming nurse) by Perla Mcdaniel (offgoing nurse). Report included the following information SBAR, Kardex, Procedure Summary, Intake/Output, MAR and Cardiac Rhythm Paced. 2200 Labs sent. K+ 3.9, replaced per protocol     0000 PRN bumex given for CVP 18    0400 Labs drawn and sent. Carboxyhemoglobin drawn    0500 Hgb 6.7, Dr. Governor Reed notified. Orders received for 1 unit PRBC. Type and cross sent. 0545 PRN bumex given for CVP 16    0600 PAP 68-70/30, Roro increased to 20 per Dr. Yao Pall    0730 Bedside and Verbal shift change report given to Perla Mcdaniel (oncoming nurse) by Maty Joe (offgoing nurse). Report included the following information SBAR, Kardex, Intake/Output, MAR and Cardiac Rhythm BiV paced.

## 2022-01-22 NOTE — PROGRESS NOTES
[address] [facility] in [place], [place]  Inpatient Progress Note      Patient name: Penny Hatchet  Patient : 1955  Patient MRN: [id number]  Consulting MD: Jackie Fernández MD  Date of service: 22    REASON FOR REFERRAL:  Management of chronic systolic heart failure     PLAN OF CARE:   · 79 y/o female with chronic renal failure and new onset severe cardiomyopathy, LVEF 15% (diagnosed 21), stage D, NYHA class IV; admitted for massive volume overload, severely volume overloaded by RHC  · Most likely etiology of acute deterioration of LVEF is chronic volume overload with compensatory tachycardia from progressive renal failure +/- coronary artery disease (80% LAD)   · These are potentially reversible causes of severe LV dysfunction; by guidelines patient needs at least 3 months of euvolemia, HR < 100bpm and revascularization to prove she has non-reversible new, severe HF to be eligible for cardiac replacement therapies, including heart/kidney transplantation, d/w Dr. Brigette Pablo with [facility]   · Patient would like aggressive approach to allow her heart to recover, including dialysis +/- revascularization with goal of Impella as bridge to LV recovery or transplant, d/w patient and her sister at prior family meeting  · Impella 5.5 implanted 22 with Dr. Kuldip Bennett with normalization of filling pressures, undergoing inotrope assisted diuresis and hemodynamic optimization with goal of Roro wean and extubation over the weekend   · Consider LAD revascularization once normotensive and euvolemic or direct LVAD-DT/dual organ transplant; decision once reassessed after extubation  · Patient understands the following possible outcomes:  · 1/3 chance of LV recovery and discharge home on medical therapy or chronic dialysis  · 1/3 chance of LV non-recovery on Impella, evaluation and transfer for listing for heart transplant/kidney  · 1/3 chance of LV non-recovery and evaluation that reveals patient is not candidate for LVAD/heart/kidney transplant and then wean of support to comfort care/hospice  · Plan to complete LVAD/HT evaluation over the weekend, family meeting on Tuesday to review data and discuss plans, with possible completion of eval (scopes) Wednesday and MRB presentation on Friday  · D/w Cecilia Muir, Corrina Langston, and bedside RN Jordan Ware      RECOMMENDATIONS:  POD #4 Impella 5.5 implant   Vent management per Intensivist   TTE this AM shows large LVIDd with Impella adjacent to septum, RV function acceptable; reviewed by Dr. Lewis Tripathi - no plans to reposition at this time due to adequate cardiac index and Impella flow   Cefazolin q12h for Impella site ppx   Maintain PAC for hemodynamic optimization; goal CI > 2.3, CVP 8-10 mmHg, SVR 1000, SBP < 110 mmHg (via radial arterial line)   Increase milrinone to 0.2 mcg/kg/min   Continue Roro at 20 ppm   Intolerant to sildenafil due to marked hypotension   Bumex increased to 2 mg IV TID + Diuril this AM; will continue intermittent dosing due to risk of HD with higher doses per Dr. Darrell Small   Keep K> 4 and Mg >2  Hydralazine 5 mg IV q4h PRN SBP > 110 mmHg   Intolerant of GDMT due to hemodynamic in stability   PPM programmed at 80 bpm; interrogate device today due to alarms   Continue digoxin 0.125 mg daily; goal 0.7-1.2   Allopurinol 50mg daily per nephrology  Continue VAD/transplant evaluation once patient extubated and can consent to remainder of eval  Venofer 200 mg IV x 2   1 unit PRBCs today  Check epo level   FOB when able   Decrease pTT goal from 58-77 to 40-60 due to persistent anemia on bivalirudin   Will need OP PSG  Invitae pending   Palliative consult appreciated   Nutritionist consult  Heart failure education   Advanced care plan present on file     All other care per primary team     IMPRESSION:  Fatigue  Shortness of breath, on 3L NC PTA  Volume overload  Acute on Chronic systolic heart failure, requiring Impella 5.5 implant 1/18/22   · Stage D, NYHA class IV symptoms  · Non-ischemic cardiomyopathy, LVEF 15%  · Normal coronaries  · PYP equivocal for amyloid   Pulmonary hypertension, severe  RV failure  S/p BiV-ICD  Cardiac risk factors:  · HL  · DM2  · Morbid obesity, BMI 40  Acute on chronic renal failure, stage IV  GERD  Anemia of chronic disease  Gout  VF s/p ICD shock x 2      Interval Events:  POD #4 Impella placement   Worsening hemodynamics, filling pressures overnight  Inadequate diuresis   Impella increased to P7  Cr 2.3  proBNP increased, 7472 from [place]:  Patient's personal goals include: being home with family, still ambulating around without getting too tired. Important upcoming milestones or family events: none  The patient identifies the following as important for living well: remaining idependent and mobile; being able to get out of the house with . Patient verbalized willingness to be on home milrinone and evaluation for both heart and kidney transplants. Verbalizes she would have family supporting her decision on this. SHAMIKA Britton is a 78 y.o.  female with a history of NICM, chronic hypoxic respiratory failure secondary to pulmonary HTN, hypothyroidism, CKD, GERD, and DM II who presented to [facility] as a transfer from another facility for acute on chronic hypoxic respiratory failure. Reports running out of O2 at home resulting in SOB and dizziness. Upon arrival to the ED she was found to have O2 sats in the 70s, requiring 4L NC O2. Rapid covid test was negative. ProNT-BNP was 31810, K+5.2, BUN/CR x/2.54 and elevated d-dimer. Chest xray showing pulmonary edema vs. Atypical pneumonia. VQ scan showed low probability for PE. Per Dr. Juan A Thao, NIC, LVEF 15-20% during recent admission. LVEF previously normalized with CRT. NYHA III-IV. No ACEi/ARB due to renal dysfunction. GDMT dosing limited by low BP.      Patient's PCP is  [facility], and she sees Dr. Rudy Beaver primarily for cardiac care due to Dr. Lydia Fritz transfering practice. Patient historically seen by nephrology but has not had follow up care in the past three years. CARDIAC IMAGING:  Echo 1/20/22 - LVEF 20-25%, RV moderately dilated, Impella catheter 5.9 cm from AV   Echo 12/8/21- LVEF 15-20%, mild to Mod MR, LVIDd 5.09cm, TAPSE 1.94cm  Echo 4/22/19- LVEF 60%, trace MR,  LVIDd 4.24cm, TAPSE 1.65cm   Echo 4/24/18- LVEF 60%, trivial MR, LVIDd 4.79cm, TAPSE 2.25cm      EKG- 1/4/22 ST with A sense and V paced rhythm     [facility] 2015- No significant CAD  NST 2014- reversible LAD involvement      ICD interrogation     HEMODYNAMICS:  [facility] 1/17/21: PAP 83/52 mmHg, RAP 27 mmHg, PCWP 45 mmHg, CI 1.6  [facility] 12/10/21: PAP 76/48/57, RAP 20, PCWP 35, CI 2.26     CPEST not done  6MW not done     OTHER IMAGING:  CXR Results  (Last 48 hours)                             01/13/22 1035   XR CHEST PORT Final result      Impression:   No significant change in congestion and interstitial and alveolar   opacities which may reflect edema or infectious infiltrate. Narrative:   EXAM: XR CHEST PORT       INDICATION: pul edema       COMPARISON: Chest x-ray 1/10/2022. FINDINGS: A portable AP radiograph of the chest was obtained at 10:19 hours. The   patient is on a cardiac monitor. The lungs appear grossly stable with congestion   and interstitial/airspace opacities with no pneumothorax or pleural effusion. Right PICC line traverses expected course of tip in the region of the atriocaval   junction. Pacemaker-ICD generator body projects over the left chest wall with   intact appearing leads traversing in expected course. . The cardiac and   mediastinal contours and pulmonary vascularity are normal.  Atherosclerotic   calcifications affect the aortic arch. The chest wall structures and visualized   upper abdomen show no acute findings with incidental note of degenerative spine   and shoulder changes. PHYSICAL EXAM:  Visit Vitals  BP (!) 111/90 (BP 1 Location: Left upper arm, BP Patient Position: At rest)   Pulse 97   Temp 97.8 °F (36.6 °C)   Resp 20   Ht 5' 7\" (1.702 m)   Wt 251 lb 8.7 oz (114.1 kg)   SpO2 98%   BMI 39.40 kg/m²      Hemodynamics:   CO: CO (l/min): 6.3 l/min   CI: CI (l/min/m2): 2.8 l/min/m2   CVP: CVP (mmHg): 11 mmHg (01/22/22 0800)   SVR: SVR (dyne*sec)/cm5: 775 (dyne*sec)/cm5 (01/22/22 4634)   PAP Systolic: PAP Systolic: 57 (76/67/85 0471)   PAP Diastolic: PAP Diastolic: 29 (71/92/06 4426)   PVR:     SV02: SVO2 (%): 67 % (01/22/22 0800)   SCV02:      Impella 5.5  P-7  Flow: 4.3lpm   Purge flow 7.7     Physical Exam  Vitals and nursing note reviewed. Constitutional:       General: She is not in acute distress. Appearance: Normal appearance. She is obese, sedated. Cardiovascular:      Rate and Rhythm: Regular rhythm. Pulses: Normal pulses. Heart sounds: Normal heart sounds. No murmur heard. Pulmonary:      Effort: Normal, synchronous with ventilator   Abdominal:      General: There is no distension, hypoactive BS. Musculoskeletal:         General: trace LE edema   Skin:     General: Skin is warm and dry. Findings: Lesion present. Comments: psorasis   Neurological:      General: Responds to noxious stimuli. Mental Status: She is intubated and sedated. Psychiatric:         Mood and Affect: ROSA.      ROS unable to obtain due to patient condition (intubated, sedated).       PAST MEDICAL HISTORY:  Past Medical History:   Diagnosis Date    Acquired hypothyroidism 8/15/2016    Anemia     RED-HF study    Asthma     Cardiomyopathy, nonischemic ([facility] [address]     initial dx 2001, bivHF 2008 with EF 15%, s/p biV-ICD 9/08, significant improvment in EF to 45-50%    CKD (chronic kidney disease)     Dr Mckay Seek    CKD (chronic kidney disease) 8/15/2012    Depression     Diabetes ([address]     [facility]     DM [facility]diabetes mellitus[facility] 8/15/2012    GERD (gastroesophageal reflux disease)     Gout     Hypothyroidism     ICD (implantable cardioverter-defibrillator), biventricular, in situ 6/5/2014    Psoriasis        PAST SURGICAL HISTORY:  Past Surgical History:   Procedure Laterality Date    CARDIAC CATHETERIZATION  2007; 01/06/15    normal cors    ECHO 2D ADULT  4/2010    EF 45%, improved from 1/09 (25%)    ECHO 2D ADULT  11/2011    LVH, EF 55-60%    HX ORTHOPAEDIC      knee    HX PACEMAKER PLACEMENT      AICD    STRESS TEST LEXISCAN/CARDIOLITE  3/21/12    normal perfusion, global HK 40%       FAMILY HISTORY:  Family History   Problem Relation Age of Onset    Heart Disease Mother     Hypertension Mother     Lupus Sister     Diabetes Brother        SOCIAL HISTORY:  Social History     Socioeconomic History    Marital status:    Tobacco Use    Smoking status: Never Smoker    Smokeless tobacco: Never Used   Substance and Sexual Activity    Alcohol use: No    Drug use: No    Sexual activity: Never   Social History Narrative    . Nonsmoker. Disability       LABORATORY RESULTS:     Labs Latest Ref Rng & Units 1/22/2022 1/22/2022 1/22/2022 1/21/2022 1/21/2022 1/21/2022 1/21/2022   WBC 3.6 - 11.0 K/uL - 8.3 - - - - 7.4   RBC 3.80 - 5.20 M/uL - 2.80(L) - - - - 3.02(L)   Hemoglobin 11.5 - 16.0 g/dL - 6. 7(L) - - - - 7. 2(L)   Hematocrit 35.0 - 47.0 % - 26. 4(L) - - - - 27. 9(L)   MCV 80.0 - 99.0 FL - 94.3 - - - - 92.4   Platelets 832 - 666 K/uL - 212 - - - - 258   Lymphocytes 12 - 49 % - 6(L) - - - - -   Monocytes 5 - 13 % - 7 - - - - -   Eosinophils 0 - 7 % - 5 - - - - -   Basophils 0 - 1 % - 0 - - - - -   Albumin 3.5 - 5.0 g/dL - - 3. 2(L) - - - 3. 0(L)   Calcium 8.5 - 10.1 MG/DL - - 9.1 - - - 8.8   Glucose 65 - 100 mg/dL - - 217(H) - - - 141(H)   BUN 6 - 20 MG/DL - - 42(H) - - - 39(H)   Creatinine 0.55 - 1.02 MG/DL - - 2.29(H) - - - 2.18(H)   Sodium 136 - 145 mmol/L - - 142 - - - 142   Potassium 3.5 - 5.1 mmol/L 4.2 - 4.2 3.9 4.1 4.4 3.7   TSH 0.36 - 3.74 uIU/mL - - - - - - -   LDH 81 - 246 U/L - - 244 - - - 217   Some recent data might be hidden     Lab Results   Component Value Date/Time    TSH 3.08 01/11/2022 05:13 AM    TSH 1.85 12/15/2021 01:06 PM    TSH 1.01 11/05/2020 09:11 AM    TSH 2.740 04/30/2019 11:21 AM    TSH 0.519 02/21/2012 10:53 AM    TSH 8.29 (H) 01/20/2010 01:59 PM       ALLERGY:  Allergies   Allergen Reactions    Ciprofloxacin Anaphylaxis    Shellfish Derived Anaphylaxis    Sildenafil Other (comments)    Ace Inhibitors Unknown (comments)    Biaxin [Clarithromycin] Other (comments)     Metal taste    Candesartan Cough    Pcn [Penicillins] Hives        CURRENT MEDICATIONS:    Current Facility-Administered Medications:     0.9% sodium chloride infusion 250 mL, 250 mL, IntraVENous, PRN, Governor Armando Reed MD    bumetanide (BUMEX) injection 2 mg, 2 mg, IntraVENous, Q8H, Sid Chase MD    [START ON 1/23/2022] albumin human 25% (BUMINATE) solution 12.5 g, 12.5 g, IntraVENous, DAILY, Romulo Albarado NP    vasopressin (VASOSTRICT) 20 Units in 0.9% sodium chloride 100 mL infusion, 0-0.1 Units/min, IntraVENous, TITRATE, Daria Albarado NP, Last Rate: 9 mL/hr at 01/22/22 0315, 0.03 Units/min at 01/22/22 0315    PHENYLephrine (NORMA-SYNEPHRINE) 30 mg in 0.9% sodium chloride 250 mL infusion,  mcg/min, IntraVENous, TITRATE, Augie Christiansen MD, Held at 01/21/22 1200    milrinone (PRIMACOR) 20 MG/100 ML D5W infusion, 0.125 mcg/kg/min, IntraVENous, CONTINUOUS, Daria Albarado NP, Last Rate: 4.2 mL/hr at 01/21/22 0819, 0.125 mcg/kg/min at 01/21/22 0819    niCARdipine (CARDENE) 25 mg in 0.9% sodium chloride 250 mL infusion, 0-15 mg/hr, IntraVENous, TITRATE, Steven ODELL MD, Stopped at 01/19/22 2109    [Held by provider] ivabradine (CORLANOR) tablet 2.5 mg, 2.5 mg, Oral, BID WITH MEALS, Romulo Albarado, NP    bivalirudin (ANGIOMAX) 250 mg in 0.9% sodium chloride (MBP/ADV) 50 mL MBP, 0. 02-2.5 mg/kg/hr, IntraVENous, TITRATE, Lefty Albarados, NP, Last Rate: 2.4 mL/hr at 01/21/22 1115, 0.1008 mg/kg/hr at 01/21/22 1115    hydrALAZINE (APRESOLINE) 20 mg/mL injection 5 mg, 5 mg, IntraVENous, Q4H PRN, Polliarjacobo, Yifan Adas T, NP, 5 mg at 01/20/22 1449    bumetanide (BUMEX) injection 1 mg, 1 mg, IntraVENous, Q4H PRN, Pollchon, Lefty Sahnis, NP, 1 mg at 01/22/22 0544    ceFAZolin (ANCEF) 1 g in sterile water (preservative free) 10 mL IV syringe, 1 g, IntraVENous, Q12H, Verena, Yifan Ramirezs T, NP, 1 g at 01/22/22 0103    fentaNYL (PF) 1,500 mcg/30 mL (50 mcg/mL) infusion, 0-200 mcg/hr, IntraVENous, TITRATE, Karo Melton MD, Last Rate: 2 mL/hr at 01/21/22 2003, 100 mcg/hr at 01/21/22 2003    heparin (porcine) in 0.9% NaCl 30,000 unit/1,000 mL perfusion irrigation 50-1,000 mL, 50-1,000 mL, Other, PRN, Sam Gomez PA    sodium chloride (NS) flush 5-40 mL, 5-40 mL, IntraVENous, Q8H, Sam Gomez PA, 10 mL at 01/22/22 0504    heparinized saline 2 units/mL infusion, , , CONTINUOUS, Nicholas Sheriff MD, 1,000 Units at 01/18/22 1416    chlorhexidine (PERIDEX) 0.12 % mouthwash 15 mL, 15 mL, Oral, Q12H, Davian Vázquez DO, 15 mL at 01/22/22 0817    sodium chloride (NS) flush 5-40 mL, 5-40 mL, IntraVENous, Q8H, Sam Gomez PA, 10 mL at 01/22/22 0504    0.45% sodium chloride infusion, 10 mL/hr, IntraVENous, CONTINUOUS, Sam Gomez PA    0.9% sodium chloride infusion, 9 mL/hr, IntraVENous, CONTINUOUS, Sam Gomez PA, Last Rate: 9 mL/hr at 01/18/22 1647, 9 mL/hr at 01/18/22 1647    naloxone (NARCAN) injection 0.4 mg, 0.4 mg, IntraVENous, PRN, Sam Gomez PA    mupirocin (BACTROBAN) 2 % ointment, , Both Nostrils, BID, [facility], Given at 01/22/22 0815    ondansetron (ZOFRAN) injection 4 mg, 4 mg, IntraVENous, Q4H PRN, Sam Gomez PA    albuterol (PROVENTIL VENTOLIN) nebulizer solution 2.5 mg, 2.5 mg, Nebulization, Q4H PRN, Jessica Gomez, PA    aspirin chewable tablet 81 mg, 81 mg, Oral, DAILY, Kun Gomez PA, 81 mg at 01/22/22 0813    midazolam (VERSED) injection 1 mg, 1 mg, IntraVENous, Q1H PRN, Sam Gomez PA    magnesium oxide (MAG-OX) tablet 400 mg, 400 mg, Oral, BID, Kun Gomez PA, 400 mg at 01/22/22 9187    calcium chloride 1 g in 0.9% sodium chloride 100 mL IVPB, 1 g, IntraVENous, PRN, Sam Gomez PA    bisacodyL (DULCOLAX) suppository 10 mg, 10 mg, Rectal, DAILY PRN, Sam Gomez PA    senna-docusate (PERICOLACE) 8.6-50 mg per tablet 1 Tablet, 1 Tablet, Oral, BID, Kun Gomez PA, 1 Tablet at 01/22/22 0815    ELECTROLYTE REPLACEMENT NOTE: Nurse to review Serum Potassium and Magnesuim levels and Initiate Electrolyte Replacement Protocol as needed, 1 Each, Other, PRN, Kun Gomez PA    magnesium sulfate 1 g/100 ml IVPB (premix or compounded), 1 g, IntraVENous, PRN, Sam Gomez PA    alteplase (CATHFLO) 1 mg in sterile water (preservative free) 1 mL injection, 1 mg, InterCATHeter, PRN, Sam Gomez PA    bacitracin 500 unit/gram packet 1 Packet, 1 Packet, Topical, PRN, Sam Gomez PA    pantoprazole (PROTONIX) injection 40 mg, 40 mg, IntraVENous, DAILY, 40 mg at 01/22/22 0813 **AND** sodium chloride (NS) flush 10 mL, 10 mL, IntraVENous, DAILY, Davian Vázquez DO, 10 mL at 01/22/22 0817    dexmedeTOMidine in 0.9 % NaCl (PRECEDEX) 400 mcg/100 mL (4 mcg/mL) infusion soln, 0.1-1.5 mcg/kg/hr, IntraVENous, TITRATE, Davian Vázquez DO, Last Rate: 11.3 mL/hr at 01/22/22 0513, 0.4 mcg/kg/hr at 01/22/22 0513    midazolam (VERSED) injection 1 mg, 1 mg, IntraVENous, Q4H PRN, Davian Vázquez DO, 1 mg at 01/18/22 1719    insulin lispro (HUMALOG) injection, , SubCUTAneous, Q6H, Davian Vázquez DO, 4 Units at 01/22/22 0522    propofol (DIPRIVAN) 10 mg/mL infusion, 0-50 mcg/kg/min, IntraVENous, TITRATE, Davian Vázquez DO, Last Rate: 20.4 mL/hr at 01/22/22 0813, 30 mcg/kg/min at 01/22/22 0813    sodium bicarbonate (8.4%) 25 mEq in dextrose 5% 1,000 mL - impella purge fluid, , IntraVENous, TITRATE, Heath Gomez PA, Last Rate: 7.8 mL/hr at 01/21/22 0855, New Bag at 01/21/22 0855    insulin NPH (NOVOLIN N, HUMULIN N) injection 10 Units, 10 Units, SubCUTAneous, QHS, Heath Gomez [place], 10 Units at 01/21/22 2117    insulin NPH (NOVOLIN N, HUMULIN N) injection 20 Units, 20 Units, SubCUTAneous, DAILY, Ashley Lopez, 20 Units at 01/22/22 0814    triamcinolone acetonide (KENALOG) 0.1 % cream, , Topical, BID, Heath Gomez PA, Given at 01/22/22 0815    polyethylene glycol (MIRALAX) packet 17 g, 17 g, Oral, DAILY, Heath Gomez PA, 17 g at 01/22/22 5930    digoxin (LANOXIN) tablet 0.0625 mg, 0.0625 mg, Oral, DAILY, Sam Gomez PA, 0.0625 mg at 01/22/22 7809    allopurinoL (ZYLOPRIM) tablet 50 mg, 50 mg, Oral, DAILY, Heath Gomez PA, 50 mg at 01/22/22 1856    epoetin isabel-epbx (RETACRIT) injection 20,000 Units, 20,000 Units, SubCUTAneous, Q TUE, THU & SAT, Heath Gomez PA, 20,000 Units at 01/20/22 2042    montelukast (SINGULAIR) tablet 10 mg, 10 mg, Oral, DAILY, Heath Gomez PA, 10 mg at 01/22/22 2090    levothyroxine (SYNTHROID) tablet 100 mcg, 100 mcg, Oral, Once per day on Mon Tue Wed Thu Fri Sat, Sam Gomez [place], 100 mcg at 01/22/22 0503    hydroxypropyl methylcellulose (ISOPTO TEARS) 0.5 % ophthalmic solution 1 Drop, 1 Drop, Both Eyes, PRN, Heath Gomez PA, 1 Drop at 01/06/22 1727    venlafaxine-SR (EFFEXOR-XR) capsule 75 mg, 75 mg, Oral, DAILY WITH BREAKFAST, Heath Gomez PA, 75 mg at 01/17/22 1025    gabapentin (NEURONTIN) capsule 100 mg, 100 mg, Oral, QHS, Sam Gomez PA, 100 mg at 01/21/22 2117    arformoteroL (BROVANA) neb solution 15 mcg, 15 mcg, Nebulization, BID RT, 15 mcg at 01/22/22 0808 **AND** budesonide (PULMICORT) 500 mcg/2 ml nebulizer suspension, 500 mcg, Nebulization, BID RT, Cory Tran, [address], 500 mcg at 01/22/22 5103    sodium chloride (NS) flush 5-40 mL, 5-40 mL, IntraVENous, Q8H, Sam Gomez PA, 10 mL at 01/22/22 0504    acetaminophen (TYLENOL) tablet 650 mg, 650 mg, Oral, Q6H PRN **OR** acetaminophen (TYLENOL) suppository 650 mg, 650 mg, Rectal, Q6H PRN, Saul Gomez PA    glucose chewable tablet 16 g, 4 Tablet, Oral, PRN, Saul Gomez PA    dextrose (D50W) injection syrg 12.5-25 g, 25-50 mL, IntraVENous, PRN, Saul Gomez PA    glucagon (GLUCAGEN) injection 1 mg, 1 mg, IntraMUSCular, PRN, Saul Gomez PA    PATIENT CARE TEAM:  Patient Care Team:  Teri Quinteros MD as PCP - General (Internal Medicine)  Teri Quinteros MD as PCP - [address] [place] [address] Provider  Castro Posada MD as Consulting Provider (Internal Medicine)  Sri Zarco MD (Dermatology)  Duke Borja MD (Nephrology)  Guille Patel MD as Consulting Provider (Cardiology)  Melvina Montenegro MD (Cardiology)  Margot Anderson MD as Consulting Provider (Pulmonary Disease)     Thank you for allowing me to participate in this patient's care.     Devendra Cesar NP  [address] [place] [address], [address]  Phone: [phone]

## 2022-01-22 NOTE — PROGRESS NOTES
[employer] has checked the BIV ICD (alert was going on today) and confirmed that battery is very low now (due to 2 shocks she had the other day)  I will plan for BIV ICD generator change  I spoke to her    He will sign consent for her  The Impella is still in so moving her to EP lab may be difficult.

## 2022-01-22 NOTE — PROGRESS NOTES
Problem: Falls - Risk of  Goal: *Absence of Falls  Description: Document Kaylan Clemens Fall Risk and appropriate interventions in the flowsheet. Outcome: Progressing Towards Goal  Note: Fall Risk Interventions:  Mobility Interventions: Communicate number of staff needed for ambulation/transfer,Patient to call before getting OOB    Mentation Interventions: Adequate sleep, hydration, pain control,Evaluate medications/consider consulting pharmacy    Medication Interventions: Evaluate medications/consider consulting pharmacy,Patient to call before getting OOB    Elimination Interventions: Call light in reach,Patient to call for help with toileting needs              Problem: Patient Education: Go to Patient Education Activity  Goal: Patient/Family Education  Outcome: Progressing Towards Goal     Problem: Patient Education: Go to Patient Education Activity  Goal: Patient/Family Education  Outcome: Progressing Towards Goal     Problem: Diabetes Self-Management  Goal: *Disease process and treatment process  Description: Define diabetes and identify own type of diabetes; list 3 options for treating diabetes. Outcome: Progressing Towards Goal  Goal: *Incorporating nutritional management into lifestyle  Description: Describe effect of type, amount and timing of food on blood glucose; list 3 methods for planning meals. Outcome: Progressing Towards Goal  Goal: *Incorporating physical activity into lifestyle  Description: State effect of exercise on blood glucose levels.   Outcome: Progressing Towards Goal     Problem: Heart Failure: Day 3  Goal: Off Pathway (Use only if patient is Off Pathway)  Outcome: Progressing Towards Goal  Goal: Activity/Safety  Outcome: Progressing Towards Goal  Goal: Diagnostic Test/Procedures  Outcome: Progressing Towards Goal  Goal: Nutrition/Diet  Outcome: Progressing Towards Goal  Goal: Discharge Planning  Outcome: Progressing Towards Goal  Goal: Medications  Outcome: Progressing Towards Goal  Goal: Respiratory  Outcome: Progressing Towards Goal  Goal: Treatments/Interventions/Procedures  Outcome: Progressing Towards Goal  Goal: Psychosocial  Outcome: Progressing Towards Goal  Goal: *Oxygen saturation within defined limits  Outcome: Progressing Towards Goal  Goal: *Hemodynamically stable  Outcome: Progressing Towards Goal  Goal: *Optimal pain control at patient's stated goal  Outcome: Progressing Towards Goal  Goal: *Anxiety reduced or absent  Outcome: Progressing Towards Goal  Goal: *Demonstrates progressive activity  Outcome: Progressing Towards Goal     Problem: Pressure Injury - Risk of  Goal: *Prevention of pressure injury  Description: Document Vince Scale and appropriate interventions in the flowsheet. Outcome: Progressing Towards Goal  Note: Pressure Injury Interventions:  Sensory Interventions: Assess changes in LOC,Float heels,Keep linens dry and wrinkle-free    Moisture Interventions: Apply protective barrier, creams and emollients,Check for incontinence Q2 hours and as needed    Activity Interventions: Pressure redistribution bed/mattress(bed type)    Mobility Interventions: Pressure redistribution bed/mattress (bed type),Turn and reposition approx.  every two hours(pillow and wedges)    Nutrition Interventions: Document food/fluid/supplement intake    Friction and Shear Interventions: Lift sheet,Minimize layers                Problem: Non-Violent Restraints  Goal: Removal from restraints as soon as assessed to be safe  Outcome: Progressing Towards Goal  Goal: No harm/injury to patient while restraints in use  Outcome: Progressing Towards Goal  Goal: Patient's dignity will be maintained  Outcome: Progressing Towards Goal  Goal: Patient Interventions  Outcome: Progressing Towards Goal     Problem: Ventilator Management  Goal: *Adequate oxygenation and ventilation  Outcome: Progressing Towards Goal  Goal: *Patient maintains clear airway/free of aspiration  Outcome: Progressing Towards Goal Problem: Infection - Risk of, Central Venous Catheter-Associated Bloodstream Infection  Goal: *Absence of infection signs and symptoms  Outcome: Progressing Towards Goal     Problem: Infection - Risk of, Urinary Catheter-Associated Urinary Tract Infection  Goal: *Absence of infection signs and symptoms  Outcome: Progressing Towards Goal     Problem: Infection - Risk of, Ventilator-Associated Pneumonia  Goal: *Absence of infection signs and symptoms  Outcome: Progressing Towards Goal     Problem: Hypertension  Goal: *Blood pressure within specified parameters  Outcome: Progressing Towards Goal  Goal: *Fluid volume balance  Outcome: Progressing Towards Goal  Goal: *Labs within defined limits  Outcome: Progressing Towards Goal     Problem: Diabetes Self-Management  Goal: *Disease process and treatment process  Description: Define diabetes and identify own type of diabetes; list 3 options for treating diabetes. Outcome: Progressing Towards Goal  Goal: *Incorporating nutritional management into lifestyle  Description: Describe effect of type, amount and timing of food on blood glucose; list 3 methods for planning meals. Outcome: Progressing Towards Goal  Goal: *Incorporating physical activity into lifestyle  Description: State effect of exercise on blood glucose levels.   Outcome: Progressing Towards Goal     Problem: Pain  Goal: *Control of Pain  Outcome: Progressing Towards Goal     Problem: Nutrition Deficit  Goal: *Optimize nutritional status  Outcome: Progressing Towards Goal

## 2022-01-22 NOTE — PROGRESS NOTES
[facility]    ICU TEAM Progress Note    Name: Penny Hatchet   : 1955   MRN: [id number]   Date: 2022           ICU Assessment     1. Cardiogenic Shock  2. Pulmonary hypertension  3. Acute hypoxemic respiratory failure  4. Status post Impella placement  5. RODNEY on CKD stage IV           ICU Comprehensive Plan of Care: This is a 58-year-old female with past medical history significant for moderate pulmonary hypertension on home oxygen therapy, CKD, nonischemic cardiomyopathy who was admitted to the hospital on  due to acute on chronic hypoxemic respiratory failure in light of fluid overload. Had a left heart cath on  which demonstrated single one-vessel moderate disease (mid LAD lesion) which was thought to be not a cause of cardiomyopathy. Subsequently had a right axillary Impella placed by CT surgery for potential double transplant. Impella was increased to P8 and inhaled nitric oxide was restarted yesterday morning due to elevated PA pressures. General/neuro: Sedation with low-dose propofol and Precedex infusion. Respiratory: Acute on chronic hypoxic respiratory failure, exacerbated by severe pulmonary hypertension and pulmonary edema. Continue nitric oxide at 20 ppm to help the right heart given increased blood return (Impella P8); low-dose milrinone and vasopressin for cardiogenic support. Diuresis for net negative fluid balance. Given biventricular failure and continued moderate pulmonary edema, the patient is not ready for extubation trial.     Cardiac: Cardiogenic shock secondary to biventricular failure. Impella supported P8 providing around 4.5 liters, low-dose vasopressin and milrinone, inhaled nitric oxide. Further plans on durable Vad versus transplant per heart failure and CT surgery. GI: Continue tube feeds. Pantoprazole. ID: No white count. Prophylactic cefazolin while Impella is in place.     Renal: Acute on chronic kidney injury, creatinine at 2.29 from 2.18. Diuresis with Diuril and Bumex. Nephrology recommendations appreciated. Prophylaxis: Bivalirudin gtt given Impella. Subjective:   Progress Note: 1/22/2022      Reason for ICU Admission: Cardiogenic shock    HPI: As above    Overnight Events:   1/22/2022      POD:  3 Days Post-Op    S/P:   Procedure(s):  RIGHT AXILLARY IMPELLA INSERTION    Active Problem List:     Problem List  Date Reviewed: 12/22/2021          Codes Class    Acute respiratory failure with hypoxia (HCC) ICD-10-CM: J96.01  ICD-9-CM: 518.81         CHF exacerbation (HCC) ICD-10-CM: I50.9  ICD-9-CM: 428.0         Type 2 diabetes mellitus with diabetic neuropathy ([facility]) ICD-10-CM: E11.40  ICD-9-CM: 250.60, 357.2         Type 2 diabetes with nephropathy ([facility] ICD-10-CM: E11.21  ICD-9-CM: 250.40, 583.81         Obesity, morbid ([facility] ICD-10-CM: E66.01  ICD-9-CM: 278.01         Acquired hypothyroidism ICD-10-CM: E03.9  ICD-9-CM: 220. 9         Dysthymia ICD-10-CM: F34.1  ICD-9-CM: 300.4         ICD (implantable cardioverter-defibrillator), biventricular, in situ ICD-10-CM: Z95.810  ICD-9-CM: V45.02     Overview Signed 1/14/2015 11:11 AM by Aliya Braxton MD     Generator [employer] change 1/14/2015             Dyslipidemia ICD-10-CM: Q90.9  ICD-9-CM: 272.4         CKD (chronic kidney disease) ICD-10-CM: N18.9  ICD-9-CM: 138. 9         Cardiomyopathy, nonischemic ([facility] ICD-10-CM: I42.8  ICD-9-CM: 425.4     Overview Signed 10/10/2011  6:40 AM by Charleston Callow, MD     initial dx 2001, bivHF 2008 with EF 15%, s/p biV-ICD 9/08, significant improvment in EF to 45-50%             Anemia in chronic renal disease (Chronic) ICD-10-CM: N18.9, D63.1  ICD-9-CM: 285.21         HTN (hypertension) ICD-10-CM: I10  ICD-9-CM: 401.9         GERD (gastroesophageal reflux disease) (Chronic) ICD-10-CM: K21.9  ICD-9-CM: 530.81         Gout (Chronic) ICD-10-CM: M10.9  ICD-9-CM: 274.9         Pulmonary HTN ([address] (Chronic) ICD-10-CM: I27.20  ICD-9-CM: 416.8               Past Medical History:      has a past medical history of Acquired hypothyroidism (8/15/2016), Anemia, Asthma, Cardiomyopathy, nonischemic ([address] CKD (chronic kidney disease), CKD (chronic kidney disease) (8/15/2012), Depression, Diabetes ([address] Diabetic neuropathy ([address] DM (diabetes mellitus) ([address] (8/15/2012), GERD (gastroesophageal reflux disease), Gout, Hypothyroidism, ICD (implantable cardioverter-defibrillator), biventricular, in situ (6/5/2014), and Psoriasis. She has no past medical history of Abuse, Adult physical abuse, Arrhythmia, Arthritis, Asthma, Autoimmune disease ([address] CAD (coronary artery disease), Calculus of kidney, Cancer ([address] Chronic pain, COPD, Headache(784.0), Hypercholesteremia, Liver disease, Psychotic disorder ([address] PUD (peptic ulcer disease), Seizures ([address] Stroke ([address] Thromboembolus ([address] or Unspecified deficiency anemia. Past Surgical History:      has a past surgical history that includes echo 2d adult (4/2010); cardiac catheterization (2007; 01/06/15); echo 2d adult (11/2011); stress test lexiscan/cardiolite (3/21/12); hx pacemaker placement; and hx orthopaedic. Home Medications:     Prior to Admission medications    Medication Sig Start Date End Date Taking? Authorizing Provider   levocetirizine (XYZAL) 5 mg tablet TAKE 1 TABLET BY MOUTH EVERY DAY 1/6/22  Yes Eladia Griffith MD   apremilast [facility]) 30 mg tab Take 30 mg by mouth two (2) times daily as needed. Yes Provider, Historical   azelastine (ASTEPRO) 205.5 mcg (0.15 %) 1 [place] by Both Nostrils route two (2) times daily as needed. Yes Provider, Historical   docusate sodium (COLACE) 100 mg capsule Take 100 mg by mouth daily as needed for Constipation. Yes Provider, Historical   levothyroxine (SYNTHROID) 100 mcg tablet Take 100 mcg by mouth six (6) days a week.  Everyday except Sunday   Yes Provider, Historical   allopurinoL (ZYLOPRIM) 100 mg tablet TAKE 1 TABLET BY MOUTH EVERY DAY 1/5/22  Yes eJffy Crockett MD   calcitRIOL (ROCALTROL) 0.5 mcg capsule TAKE 1 CAPSULE BY MOUTH EVERY DAY 1/5/22  Yes Jeffy Crcokett MD   carvediloL (COREG) 6.25 mg tablet Take 1 Tablet by mouth two (2) times daily (with meals). 12/22/21  Yes Dortha Andino B, NP   isosorbide dinitrate (ISORDIL) 5 mg tablet Take 1 Tablet by mouth three (3) times daily. 12/22/21  Yes Dortha Andino B, NP   hydrALAZINE (APRESOLINE) 10 mg tablet Take 1 Tablet by mouth three (3) times daily. 12/22/21  Yes Alan Mcleod NP   benzonatate (Tessalon Perles) 100 mg capsule Take 100 mg by mouth three (3) times daily as needed for Cough. Yes Jamar, MD Bee   montelukast (SINGULAIR) 10 mg tablet TAKE 1 TABLET BY MOUTH EVERY DAY 11/5/21  Yes Jeffy Crockett MD   bumetanide (BUMEX) 2 mg tablet Take 1 tab in the morning and 0.5 tab in the evening 11/5/21  Yes Mulugeta Reed MD   fluticasone propionate (FLONASE) 50 mcg/actuation nasal spray One spray each nostril daily 11/1/21  Yes Jeffy Crockett MD   gabapentin (NEURONTIN) 100 mg capsule Take 1 Capsule by mouth nightly. Max Daily Amount: 100 mg. 10/29/21  Yes Jeffy Crockett MD   budesonide (PULMICORT) 180 mcg/actuation aepb inhaler Take 2 Puffs by inhalation two (2) times a day. 5/27/21  Yes Jeffy Crockett MD   ammonium lactate (AMLACTIN) 12 % topical cream Apply  to affected area two (2) times a day. rub in to affected area well 11/4/20  Yes Jeffy Crockett MD   insulin NPH/insulin regular (NOVOLIN 70/30) 100 unit/mL (70-30) injection 70 units two times a day 8/15/16  Yes Krista Holland MD   calcipotriene (DOVONEX) 0.005 % topical cream Apply  to affected area three (3) times daily. Yes Provider, Historical   triamcinolone acetonide (KENALOG) 0.5 % ointment Apply  to affected area two (2) times daily as needed. use thin layer    Yes Provider, Historical   multivitamin (ONE A DAY) tablet Take 1 Tab by mouth daily.    Yes Provider, Historical   ferrous sulfate (IRON) 325 mg (65 mg elemental iron) tablet Take 325 mg by mouth daily. Yes Provider, Historical   aspirin 81 mg tablet Take 81 mg by mouth daily. Yes Provider, Historical   venlafaxine-SR (EFFEXOR-XR) 75 mg capsule TAKE 1 CAPSULE BY MOUTH EVERY DAY 22   Gracie Ng MD   cholecalciferol (VITAMIN D3) (2,000 UNITS /50 MCG) cap capsule TAKE 1 CAPSULE BY MOUTH TWO (2) TIMES A DAY. 22   Gracie Ng MD       Allergies/Social/Family History: Allergies   Allergen Reactions    Ciprofloxacin Anaphylaxis    Shellfish Derived Anaphylaxis    Sildenafil Other (comments)    Ace Inhibitors Unknown (comments)    Biaxin [Clarithromycin] Other (comments)     Metal taste    Candesartan Cough    Pcn [Penicillins] Hives      Social History     Tobacco Use    Smoking status: Never Smoker    Smokeless tobacco: Never Used   Substance Use Topics    Alcohol use: No      Family History   Problem Relation Age of Onset    Heart Disease Mother     Hypertension Mother     Lupus Sister     Diabetes Brother        Review of Systems:     A comprehensive review of systems was negative except for that written in the HPI.     Objective:   Vital Signs:  Visit Vitals  BP 98/65   Pulse 96   Temp 99 °F (37.2 °C)   Resp 12   Ht 5' 7\" (1.702 m)   Wt 112.1 kg (247 lb 2.2 oz)   SpO2 94%   BMI 38.71 kg/m²    O2 Flow Rate (L/min): 6 l/min (weaned) O2 Device: Endotracheal tube,Ventilator Temp (24hrs), Av.9 °F (37.2 °C), Min:98.2 °F (36.8 °C), Max:99.9 °F (37.7 °C)    CVP (mmHg): 16 mmHg (22 0700)      Intake/Output:     Intake/Output Summary (Last 24 hours) at 2022 0805  Last data filed at 2022 0700  Gross per 24 hour   Intake 2374.61 ml   Output 2420 ml   Net -45.39 ml       Physical Exam:    General appearance: alert, cooperative, no distress, appears older than stated age  Lungs: rales bilaterally  Heart: regular rate and rhythm, S1, S2 normal, no murmur, click, rub or gallop  Abdomen: soft, non-tender. Bowel sounds normal. No masses,  no organomegaly  Extremities: Mild edema      LABS AND  DATA: Personally reviewed  Recent Labs     01/22/22  0347 01/21/22  0436   WBC 8.3 7.4   HGB 6.7* 7.2*   HCT 26.4* 27.9*    258     Recent Labs     01/22/22  0343 01/21/22  2212 01/21/22  1228 01/21/22  0436 01/21/22  0433 01/20/22  1609     --   --  142  --    < >   K 4.2 3.9   < > 3.7  --    < >   *  --   --  109*  --    < >   CO2 26  --   --  27  --    < >   BUN 42*  --   --  39*  --    < >   CREA 2.29*  --   --  2.18*  --    < >   *  --   --  141*  --    < >   CA 9.1  --   --  8.8  --    < >   MG 2.6* 2.4   < > 2.5*  --    < >   PHOS 3.1  --   --   --  2.8  --     < > = values in this interval not displayed. Recent Labs     01/22/22  0343 01/21/22  0436    65   TP 7.0 6.8   ALB 3.2* 3.0*   GLOB 3.8 3.8     Recent Labs     01/22/22  0343 01/21/22  1602 01/19/22  1635 01/19/22  1236   INR  --   --   --  1.2*   PTP  --   --   --  12.4*   APTT 60.4* 59.5*   < > 25.9    < > = values in this interval not displayed. Recent Labs     01/21/22  1143 01/19/22  1027   PHI 7.42 7.60*   PCO2I 42.2 31.8*   PO2I 87 173*   FIO2I 40 50     No results for input(s): CPK, CKMB, TROIQ, BNPP in the last 72 hours. Hemodynamics:   PAP: PAP Systolic: 63 (20/92/90 5960) CO: CO (l/min): 7.3 l/min (01/22/22 0700)   Wedge:   CI: CI (l/min/m2): 3.3 l/min/m2 (01/22/22 0700)   CVP:  CVP (mmHg): 16 mmHg (01/22/22 0700) SVR:       PVR:       Ventilator Settings:  Mode Rate Tidal Volume Pressure FiO2 PEEP   Assist control   460 ml  8 cm H2O 40 % 5 cm H20     Peak airway pressure: 24 cm H2O    Minute ventilation: 5.92 l/min        MEDS: Reviewed    Chest X-Ray:  CXR Results  (Last 48 hours)               01/22/22 0423  XR CHEST PORT Final result    Impression:  Stable support lines and tubes.    Pulmonary vascular congestion and interstitial edema without pleural effusions. Narrative:  EXAM:  XR CHEST PORT       INDICATION:  Status post impella       COMPARISON: 1/21/2022       TECHNIQUE: AP portable upright chest view       FINDINGS: Endotracheal tube, enteric tube, right subclavian and patella, and   right PICC line are unchanged. Cardiac pacing wires are unchanged. Heart size   and mediastinal contours are stable. Low lung volumes with pulmonary vascular   congestion and interstitial edema bilaterally. No pleural effusion or   pneumothorax.           01/21/22 0450  XR CHEST PORT Final result    Impression:  ET tube is 1 cm above the rohit. This could be retracted 2 cm       Narrative:  EXAM: XR CHEST PORT       INDICATION: post-Impella       COMPARISON: 1/20/2022       FINDINGS: A portable AP radiograph of the chest was obtained at 0434 hours. The   patient is on a cardiac monitor. ET tube is 1 cm above the rohit. This could be retracted 2 cm. NG tube courses   into the stomach. The distal tip is not seen. T[place]-Guille catheter has its tip in   the distal main pulmonary artery. PICC line overlies the SVC. Cardiac assist   device is in satisfactory position. ICD is noted. The lungs demonstrate low lung volumes with mild atelectasis at the left lung   base. There is mild pulmonary edema. No pneumothorax. Multidisciplinary Rounds Completed: Yes    ABCDEF Bundle/Checklist Completed:  Yes    SPECIAL EQUIPMENT  None    DISPOSITION  Stay in ICU    CRITICAL CARE CONSULTANT NOTE  I had a face to face encounter with the patient, reviewed and interpreted patient data including clinical events, labs, images, vital signs, I/O's, and examined patient.   I have discussed the case and the plan and management of the patient's care with the consulting services, the bedside nurses and the respiratory therapist.      NOTE OF PERSONAL INVOLVEMENT IN CARE   This patient has a high probability of imminent, clinically significant deterioration, which requires the highest level of preparedness to intervene urgently. I participated in the decision-making and personally managed or directed the management of the following life and organ supporting interventions that required my frequent assessment to treat or prevent imminent deterioration. I personally spent 60 minutes of critical care time. This is time spent at this critically ill patient's bedside actively involved in patient care as well as the coordination of care. This does not include any procedural time which has been billed separately.     Deepthi Israel DO  Staff Intensivist/Anesthesiologist  [facility]  1/22/2022

## 2022-01-22 NOTE — PROGRESS NOTES
[facility]  ICU Progress Note    Admit Date: 2022    S/P R Axillary Impella 5.5     Subjective/24 Hour Summary:   Pt seen with Dr. Nimisha fischer at P7, bicarb purge & bival systemically. Milrinone 0.125, vaso 0.03. Remains intubated. Objective:   Vitals:  Blood pressure 92/61, pulse 87, temperature 98.8 °F (37.1 °C), resp. rate 12, height 5' 7\" (1.702 m), weight 247 lb 2.2 oz (112.1 kg), SpO2 97 %. Temp (24hrs), Av.9 °F (37.2 °C), Min:98.2 °F (36.8 °C), Max:99.9 °F (37.7 °C)    Hemodynamics:   CO: CO (l/min): 6.3 l/min   CI: CI (l/min/m2): 2.8 l/min/m2   CVP: CVP (mmHg): 11 mmHg (22)   SVR: SVR (dyne*sec)/cm5: 775 (dyne*sec)/cm5 (22 3043)   PAP Systolic: PAP Systolic: 57 (50/98/ 3239)   PAP Diastolic: PAP Diastolic: 29 (64// 5545)   PVR:     SV02: SVO2 (%): 67 % (22)   SCV02:      EKG/Rhythm:  SR    Ventilator Settings:  Mode Rate Tidal Volume Pressure FiO2 PEEP   Assist control   460 ml  8 cm H2O 40 % 5 cm H20     Peak airway pressure: 24 cm H2O    Minute ventilation: 5.92 l/min        Oxygen Therapy:  Oxygen Therapy  O2 Sat (%): 97 % (22)  Pulse via Oximetry: 87 beats per minute (22)  O2 Device: Endotracheal tube (22)  Skin Assessment: Clean, dry, & intact (22)  Skin Protection for O2 Device: Yes (22)  Orientation: Bilateral (22)  Location: Cheek;Lip (22)  Interventions: Mouth Care (01/22/22 0400)  O2 Flow Rate (L/min): 6 l/min (weaned) (22 0740)  FIO2 (%): 40 % (22 0800)    CXR:  CXR Results  (Last 48 hours)               22 0423  XR CHEST PORT Final result    Impression:  Stable support lines and tubes. Pulmonary vascular congestion and interstitial edema without pleural effusions.        Narrative:  EXAM:  XR CHEST PORT       INDICATION:  Status post impella       COMPARISON: 2022       TECHNIQUE: AP portable upright chest view       FINDINGS: Endotracheal tube, enteric tube, right subclavian and patella, and   right PICC line are unchanged. Cardiac pacing wires are unchanged. Heart size   and mediastinal contours are stable. Low lung volumes with pulmonary vascular   congestion and interstitial edema bilaterally. No pleural effusion or   pneumothorax.           01/21/22 0450  XR CHEST PORT Final result    Impression:  ET tube is 1 cm above the rohit. This could be retracted 2 cm       Narrative:  EXAM: XR CHEST PORT       INDICATION: post-Impella       COMPARISON: 1/20/2022       FINDINGS: A portable AP radiograph of the chest was obtained at 0434 hours. The   patient is on a cardiac monitor. ET tube is 1 cm above the rohit. This could be retracted 2 cm. NG tube courses   into the stomach. The distal tip is not seen. [place] catheter has its tip in   the distal main pulmonary artery. PICC line overlies the SVC. Cardiac assist   device is in satisfactory position. ICD is noted. The lungs demonstrate low lung volumes with mild atelectasis at the left lung   base. There is mild pulmonary edema. No pneumothorax. Admission Weight: Last Weight   Weight: 264 lb 1.8 oz (119.8 kg) Weight: 247 lb 2.2 oz (112.1 kg)     Intake / Output / Drain:  Current Shift: 01/22 0701 - 01/22 1900  In: -   Out: 75 [Urine:75]  Last 24 hrs.:     Intake/Output Summary (Last 24 hours) at 1/22/2022 0846  Last data filed at 1/22/2022 0800  Gross per 24 hour   Intake 2124.61 ml   Output 2495 ml   Net -370.39 ml       EXAM:  General:    NAD                                                                                          Lungs:   Clear to auscultation bilaterally. Incision:  No erythema, drainage or swelling. Heart:  Regular rate and rhythm, S1, S2 normal, no murmur, click, rub or gallop. Abdomen:   Soft, non-tender. Bowel sounds normal. No masses,  No organomegaly. Extremities:  No edema. PPP.     Neurologic: Intubated, sedated     Labs: Recent Labs     22  0347 22  0436   WBC 8.3 7.4   HGB 6.7* 7.2*   HCT 26.4* 27.9*    258     Recent Labs     22  0739 22  0343 22  1228 22  0436 22  0433 22  1609   NA  --  142  --  142  --    < >   K 4.2 4.2   < > 3.7  --    < >   CL  --  110*  --  109*  --    < >   CO2  --  26  --  27  --    < >   BUN  --  42*  --  39*  --    < >   CREA  --  2.29*  --  2.18*  --    < >   GLU  --  217*  --  141*  --    < >   CA  --  9.1  --  8.8  --    < >   MG 1.8 2.6*   < > 2.5*  --    < >   PHOS  --  3.1  --   --  2.8  --     < > = values in this interval not displayed. Recent Labs     22  0343 22  0436    65   TP 7.0 6.8   ALB 3.2* 3.0*   GLOB 3.8 3.8     Recent Labs     22  0343 22  1602 22  1635 22  1236   INR  --   --   --  1.2*   PTP  --   --   --  12.4*   APTT 60.4* 59.5*   < > 25.9    < > = values in this interval not displayed. Recent Labs     22  1143 22  1027   PHI 7.42 7.60*   PCO2I 42.2 31.8*   PO2I 87 173*   FIO2I 40 50     No results for input(s): CPK, CKMB, TROIQ, BNPP in the last 72 hours. Assessment:     Active Problems:    Acute respiratory failure with hypoxia ([address] (2022)         Plan/Recommendations/Medical Decision Makin. Acute on Chronic Systolic CHF class III/IV: S/P right axillary impella 5.5 . Cont bicarb via purge. Milrinone 0.125, nitric. Cont systemic bival. Per AHF. Notably, PAP were 168E systolic prior to impella placement so nitric wean will be difficult. Discussed with AHF. 2. Acute on Chronic Respiratory insufficiency: On home O2. . Vent wean per intensivist.  3. CAD: Multivessel CAD on [facility] 22. ASA 81. BB on hold due to cardiogenic shock. Recommend starting high intensity statin when feasible, per primary/HF  4. RODNEY on Chronic CKD Stage IV: Renal following. UOP excellent, cr. Coming down. Diuretics per their recommendation. 5. Gout: allopurinol  6.  DM: Per primary service  7. GERD: Home meds  8. Hypothyroidism: on synthroid  9. Depression: Home meds  10. Anemia: on EPO, stable post op. Dispo: rechecking echo this morning to ensure adequate position. Cont systemic bival & bicarb via purge. No cardiac surgery issues otherwise. Further HF management per AHF. Remainder of medical management per primary. .    Signed By: TYE Thompson

## 2022-01-22 NOTE — PROGRESS NOTES
[facility]   [address] [facility], [id number] [place] [address]  Phone: [phone]   [phone]    [email or web address]     Nephrology Progress Note    Patient Name : Jovan Shetty      : 1955     MRN : [id number]  Date of Admission : 2022  Date of Servive : 22    CC:  Follow up for ARF       Assessment and Plan   RODNEY on CKD :  - Suspect 2/2 CRS  - Cr stable   - Increase Bumex to 2 mg TID. One dose of Diuril 250 mg   - daily labs and I/Os  - no urgent need for RRT at this time    CKD stage IV:  - Etiology: DM, HTN, CRS  - Renal US: suggestive of CKD, B/L benign renal cysts   - baseline Cr 2.4-2.6 mg/dl      Acute on chronic HFrEF  NI CMP, LVEF 15 to 20%  NYHA class III-IV  S/p BiV pacer/ICD-s/p CRT  R axillary Impella implanted 22    Hypervolemia:  - RHC showing severely elevated filling pressures, pulm HTN  - diuretics and milrinone as above    Morbid obesity  Type II DM  HTN  Hypothyroidism  Pulmonary hypertension  Gout  Psoriasis       Interval History:  Seen and examined. CI/CO good. Impella at p-7. LDH ok. Mean PAP 35-38. Hb down. On Bival, PTT at goal. Adequate UOP but net even. Remains on vent, on sedation, Roro. On milrinone     Review of Systems: A comprehensive review of systems was negative except for that written in the HPI.     Current Medications:   Current Facility-Administered Medications   Medication Dose Route Frequency    0.9% sodium chloride infusion 250 mL  250 mL IntraVENous PRN    vasopressin (VASOSTRICT) 20 Units in 0.9% sodium chloride 100 mL infusion  0-0.1 Units/min IntraVENous TITRATE    PHENYLephrine (NORMA-SYNEPHRINE) 30 mg in 0.9% sodium chloride 250 mL infusion   mcg/min IntraVENous TITRATE    bumetanide (BUMEX) injection 2 mg  2 mg IntraVENous BID    milrinone (PRIMACOR) 20 MG/100 ML D5W infusion  0.125 mcg/kg/min IntraVENous CONTINUOUS    niCARdipine (CARDENE) 25 mg in 0.9% sodium chloride 250 mL infusion 0-15 mg/hr IntraVENous TITRATE    [Held by provider] ivabradine (CORLANOR) tablet 2.5 mg  2.5 mg Oral BID WITH MEALS    bivalirudin (ANGIOMAX) 250 mg in 0.9% sodium chloride (MBP/ADV) 50 mL MBP  0.02-2.5 mg/kg/hr IntraVENous TITRATE    hydrALAZINE (APRESOLINE) 20 mg/mL injection 5 mg  5 mg IntraVENous Q4H PRN    albumin human 5% (BUMINATE) solution 12.5 g  12.5 g IntraVENous BID    bumetanide (BUMEX) injection 1 mg  1 mg IntraVENous Q4H PRN    ceFAZolin (ANCEF) 1 g in sterile water (preservative free) 10 mL IV syringe  1 g IntraVENous Q12H    fentaNYL (PF) 1,500 mcg/30 mL (50 mcg/mL) infusion  0-200 mcg/hr IntraVENous TITRATE    heparin (porcine) in 0.9% NaCl 30,000 unit/1,000 mL perfusion irrigation 50-1,000 mL  50-1,000 mL Other PRN    sodium chloride (NS) flush 5-40 mL  5-40 mL IntraVENous Q8H    heparinized saline 2 units/mL infusion    CONTINUOUS    chlorhexidine (PERIDEX) 0.12 % mouthwash 15 mL  15 mL Oral Q12H    sodium chloride (NS) flush 5-40 mL  5-40 mL IntraVENous Q8H    0.45% sodium chloride infusion  10 mL/hr IntraVENous CONTINUOUS    0.9% sodium chloride infusion  9 mL/hr IntraVENous CONTINUOUS    naloxone (NARCAN) injection 0.4 mg  0.4 mg IntraVENous PRN    mupirocin (BACTROBAN) 2 % ointment   Both Nostrils BID    ondansetron (ZOFRAN) injection 4 mg  4 mg IntraVENous Q4H PRN    albuterol (PROVENTIL VENTOLIN) nebulizer solution 2.5 mg  2.5 mg Nebulization Q4H PRN    aspirin chewable tablet 81 mg  81 mg Oral DAILY    midazolam (VERSED) injection 1 mg  1 mg IntraVENous Q1H PRN    magnesium oxide (MAG-OX) tablet 400 mg  400 mg Oral BID    calcium chloride 1 g in 0.9% sodium chloride 100 mL IVPB  1 g IntraVENous PRN    bisacodyL (DULCOLAX) suppository 10 mg  10 mg Rectal DAILY PRN    senna-docusate (PERICOLACE) 8.6-50 mg per tablet 1 Tablet  1 Tablet Oral BID    ELECTROLYTE REPLACEMENT NOTE: Nurse to review Serum Potassium and Magnesuim levels and Initiate Electrolyte Replacement Protocol as needed  1 Each Other PRN    magnesium sulfate 1 g/100 ml IVPB (premix or compounded)  1 g IntraVENous PRN    alteplase (CATHFLO) 1 mg in sterile water (preservative free) 1 mL injection  1 mg InterCATHeter PRN    bacitracin 500 unit/gram packet 1 Packet  1 Packet Topical PRN    pantoprazole (PROTONIX) injection 40 mg  40 mg IntraVENous DAILY    And    sodium chloride (NS) flush 10 mL  10 mL IntraVENous DAILY    dexmedeTOMidine in 0.9 % NaCl (PRECEDEX) 400 mcg/100 mL (4 mcg/mL) infusion soln  0.1-1.5 mcg/kg/hr IntraVENous TITRATE    midazolam (VERSED) injection 1 mg  1 mg IntraVENous Q4H PRN    insulin lispro (HUMALOG) injection   SubCUTAneous Q6H    propofol (DIPRIVAN) 10 mg/mL infusion  0-50 mcg/kg/min IntraVENous TITRATE    sodium bicarbonate (8.4%) 25 mEq in dextrose 5% 1,000 mL - impella purge fluid   IntraVENous TITRATE    insulin NPH (NOVOLIN N, HUMULIN N) injection 10 Units  10 Units SubCUTAneous QHS    insulin NPH (NOVOLIN N, HUMULIN N) injection 20 Units  20 Units SubCUTAneous DAILY    triamcinolone acetonide (KENALOG) 0.1 % cream   Topical BID    polyethylene glycol (MIRALAX) packet 17 g  17 g Oral DAILY    digoxin (LANOXIN) tablet 0.0625 mg  0.0625 mg Oral DAILY    allopurinoL (ZYLOPRIM) tablet 50 mg  50 mg Oral DAILY    epoetin isabel-epbx (RETACRIT) injection 20,000 Units  20,000 Units SubCUTAneous Q TUE, THU & SAT    montelukast (SINGULAIR) tablet 10 mg  10 mg Oral DAILY    levothyroxine (SYNTHROID) tablet 100 mcg  100 mcg Oral Once per day on Mon Tue Wed Thu Fri Sat    hydroxypropyl methylcellulose (ISOPTO TEARS) 0.5 % ophthalmic solution 1 Drop  1 Drop Both Eyes PRN    venlafaxine-SR (EFFEXOR-XR) capsule 75 mg  75 mg Oral DAILY WITH BREAKFAST    gabapentin (NEURONTIN) capsule 100 mg  100 mg Oral QHS    arformoteroL (BROVANA) neb solution 15 mcg  15 mcg Nebulization BID RT    And    budesonide (PULMICORT) 500 mcg/2 ml nebulizer suspension  500 mcg Nebulization BID RT  sodium chloride (NS) flush 5-40 mL  5-40 mL IntraVENous Q8H    acetaminophen (TYLENOL) tablet 650 mg  650 mg Oral Q6H PRN    Or    acetaminophen (TYLENOL) suppository 650 mg  650 mg Rectal Q6H PRN    glucose chewable tablet 16 g  4 Tablet Oral PRN    dextrose (D50W) injection syrg 12.5-25 g  25-50 mL IntraVENous PRN    glucagon (GLUCAGEN) injection 1 mg  1 mg IntraMUSCular PRN      Allergies   Allergen Reactions    Ciprofloxacin Anaphylaxis    Shellfish Derived Anaphylaxis    Sildenafil Other (comments)    Ace Inhibitors Unknown (comments)    Biaxin [Clarithromycin] Other (comments)     Metal taste    Candesartan Cough    Pcn [Penicillins] Hives       Objective:  Vitals:    Vitals:    01/22/22 0400 01/22/22 0500 01/22/22 0600 01/22/22 0700   BP: 99/66 97/63 94/63 98/65   Pulse: 81 86 93 96   Resp: 12 13 13 12   Temp: 99 °F (37.2 °C)      SpO2: 98% 97% 93% 94%   Weight: 112.1 kg (247 lb 2.2 oz)      Height:         Intake and Output:  No intake/output data recorded. 01/20 1901 - 01/22 0700  In: 4503.6 [I.V.:3853.6]  Out: 3050 [Urine:3050]    Physical Examination:    General: Sedated on the vent  HEENT:           ETT in place   Neck:  Supple, no mass  Resp:  Reduced bibasilar  Breath sounds  CV:  RRR, ++ LE edema  GI:  Soft, NT, + BS, obese  Neurologic:  Sedated  :                  Henley in place    [x]    High complexity decision making was performed  []    Patient is at high-risk of decompensation with multiple organ involvement    Lab Data Personally Reviewed: I have reviewed all the pertinent labs, microbiology data and radiology studies during assessment.     Recent Labs     01/22/22  0343 01/21/22  2212 01/21/22  1602 01/21/22  1228 01/21/22  0436 01/21/22  0433 01/20/22  2311 01/20/22  1609 01/20/22  1027 01/20/22  1027 01/20/22  0240 01/20/22  0240 01/19/22  1253 01/19/22  1244 01/19/22  1236 01/19/22  1236    142  --   --   --  142  --   --  142  --  140  --  140   < > 139  --   --    K 4.2 3.9 4.1 4.4 3.7  --    < > 3.4*   < > 4.0   < > 3.1*   < > 4.0  --   --    *  --   --   --  109*  --   --  107  --  106  --  104   < > 101  --   --    CO2 26  --   --   --  27  --   --  29  --  30  --  30   < > 31  --   --    *  --   --   --  141*  --   --  158*  --  204*  --  193*   < > 179*  --   --    BUN 42*  --   --   --  39*  --   --  44*  --  46*  --  53*   < > 57*  --   --    CREA 2.29*  --   --   --  2.18*  --   --  2.33*  --  2.37*  --  2.42*   < > 2.68*  --   --    CA 9.1  --   --   --  8.8  --   --  8.9  --  9.0  --  9.1   < > 8.9   < > 9.0   MG 2.6* 2.4 2.4 2.6* 2.5*  --    < > 1.5*   < > 2.3   < > 2.3   < >  --   --   --    PHOS 3.1  --   --   --   --  2.8  --  2.7  --   --   --  3.5  --  3.1  --   --    ALB 3.2*  --   --   --  3.0*  --   --  2.8*  --  2.8*  --  2.8*   < > 3.1*  --   --    ALT 10*  --   --   --  9*  --   --   --   --  12  --  12  --   --   --   --    INR  --   --   --   --   --   --   --   --   --   --   --   --   --   --   --  1.2*    < > = values in this interval not displayed. Recent Labs     01/22/22  0347 01/21/22  0436 01/20/22  1404 01/20/22  0240   WBC 8.3 7.4  --  8.3   HGB 6.7* 7.2* 7.3* 7.8*   HCT 26.4* 27.9* 27.3* 28.8*    258  --  277     Lab Results   Component Value Date/Time    Specimen Description: URINE 10/22/2013 01:32 PM    Specimen Description: URINE 01/23/2013 04:40 PM    Specimen Description: LEG TISSUE 02/12/2009 12:00 AM    Specimen Description: LEG (LEFT) 01/29/2009 03:06 AM     Lab Results   Component Value Date/Time    Culture result: MRSA NOT PRESENT 01/19/2022 12:41 PM    Culture result:  01/19/2022 12:41 PM     Screening of patient nares for MRSA is for surveillance purposes and, if positive, to facilitate isolation considerations in high risk settings. It is not intended for automatic decolonization interventions per se as regimens are not sufficiently effective to warrant routine use.     Culture result: NO GROWTH 3 DAYS 01/19/2022 12:41 PM    Culture result: MIXED SKIN ROSANNA ISOLATED 10/22/2013 01:32 PM    Culture result:  01/23/2013 04:40 PM     ENTEROCOCCUS FAECALIS GROUP D  MIXED SKIN ROSANNA ISOLATED     Recent Results (from the past 24 hour(s))   POC G3 - PUL    Collection Time: 01/21/22 11:43 AM   Result Value Ref Range    FIO2 (POC) 40 %    pH (POC) 7.42 7.35 - 7.45      pCO2 (POC) 42.2 35.0 - 45.0 MMHG    pO2 (POC) 87 80 - 100 MMHG    HCO3 (POC) 27.1 (H) 22 - 26 MMOL/L    sO2 (POC) 96.7 92 - 97 %    Base excess (POC) 2.3 mmol/L    Site DRAWN FROM ARTERIAL LINE      Device: ADULT VENT      Mode ASSIST CONTROL      Tidal volume 460 ml    PEEP/CPAP (POC) 5 cmH2O    Allens test (POC) NOT APPLICABLE      Specimen type (POC) ARTERIAL     GLUCOSE, POC    Collection Time: 01/21/22 12:26 PM   Result Value Ref Range    Glucose (POC) 225 (H) 65 - 117 mg/dL    Performed by John Hendrix    MAGNESIUM    Collection Time: 01/21/22 12:28 PM   Result Value Ref Range    Magnesium 2.6 (H) 1.6 - 2.4 mg/dL   POTASSIUM    Collection Time: 01/21/22 12:28 PM   Result Value Ref Range    Potassium 4.4 3.5 - 5.1 mmol/L   PTT    Collection Time: 01/21/22  4:02 PM   Result Value Ref Range    aPTT 59.5 (H) 22.1 - 31.0 sec    aPTT, therapeutic range     58.0 - 77.0 SECS   MAGNESIUM    Collection Time: 01/21/22  4:02 PM   Result Value Ref Range    Magnesium 2.4 1.6 - 2.4 mg/dL   POTASSIUM    Collection Time: 01/21/22  4:02 PM   Result Value Ref Range    Potassium 4.1 3.5 - 5.1 mmol/L   DUPLEX CAROTID BILATERAL    Collection Time: 01/21/22  5:20 PM   Result Value Ref Range    Right eca sys 108.7 cm/s    RIGHT EXTERNAL CAROTID ARTERY D 12.40 cm/s    Right ICA prox sys 65.5 cm/s    Right ICA prox richey 21.6 cm/s    Right ICA mid sys 70.6 cm/s    Right ICA mid richey 23.3 cm/s    Right ICA dist sys 68.9 cm/s    Right ICA dist richey 20.8 cm/s    Right vertebral sys 72.3 cm/s    RIGHT VERTEBRAL ARTERY D 20.20 cm/s    Left ECA sys 71.3 cm/s    LEFT EXTERNAL CAROTID ARTERY D 7.50 cm/s    Left ICA prox sys 67.4 cm/s    Left ICA prox richey 16.3 cm/s    Left ICA mid sys 69.4 cm/s    Left ICA mid richey 22.2 cm/s    Left ICA dist sys 84.1 cm/s    Left ICA dist richey 26.1 cm/s    Left vertebral sys 67.4 cm/s    LEFT VERTEBRAL ARTERY D 15.30 cm/s    Left CCA prox sys 89.2 cm/s    Left CCA prox richey 15.7 cm/s    Left CCA dist sys 76.2 cm/s    Left CCA dist richey 11.4 cm/s    Left ICA/CCA sys 1.10    GLUCOSE, POC    Collection Time: 01/21/22  6:28 PM   Result Value Ref Range    Glucose (POC) 247 (H) 65 - 117 mg/dL    Performed by Paulette Contreras    MAGNESIUM    Collection Time: 01/21/22 10:12 PM   Result Value Ref Range    Magnesium 2.4 1.6 - 2.4 mg/dL   POTASSIUM    Collection Time: 01/21/22 10:12 PM   Result Value Ref Range    Potassium 3.9 3.5 - 5.1 mmol/L   GLUCOSE, POC    Collection Time: 01/21/22 11:19 PM   Result Value Ref Range    Glucose (POC) 207 (H) 65 - 117 mg/dL    Performed by Mir Yost    LACTIC ACID    Collection Time: 01/22/22  3:41 AM   Result Value Ref Range    Lactic acid 0.9 0.4 - 2.0 MMOL/L   MAGNESIUM    Collection Time: 01/22/22  3:43 AM   Result Value Ref Range    Magnesium 2.6 (H) 1.6 - 2.4 mg/dL   NT-PRO BNP    Collection Time: 01/22/22  3:43 AM   Result Value Ref Range    NT pro-BNP 7,472 (H) <183 PG/ML   METABOLIC PANEL, COMPREHENSIVE    Collection Time: 01/22/22  3:43 AM   Result Value Ref Range    Sodium 142 136 - 145 mmol/L    Potassium 4.2 3.5 - 5.1 mmol/L    Chloride 110 (H) 97 - 108 mmol/L    CO2 26 21 - 32 mmol/L    Anion gap 6 5 - 15 mmol/L    Glucose 217 (H) 65 - 100 mg/dL    BUN 42 (H) 6 - 20 MG/DL    Creatinine 2.29 (H) 0.55 - 1.02 MG/DL    BUN/Creatinine ratio 18 12 - 20      GFR est AA 26 (L) >60 ml/min/1.73m2    GFR est non-AA 21 (L) >60 ml/min/1.73m2    Calcium 9.1 8.5 - 10.1 MG/DL    Bilirubin, total 0.5 0.2 - 1.0 MG/DL    ALT (SGPT) 10 (L) 12 - 78 U/L    AST (SGOT) 7 (L) 15 - 37 U/L    Alk.  phosphatase 116 45 - 117 U/L    Protein, total 7.0 6.4 - 8.2 g/dL    Albumin 3.2 (L) 3.5 - 5.0 g/dL    Globulin 3.8 2.0 - 4.0 g/dL    A-G Ratio 0.8 (L) 1.1 - 2.2     DIGOXIN    Collection Time: 01/22/22  3:43 AM   Result Value Ref Range    Digoxin level 0.9 0.90 - 2.00 NG/ML   PROCALCITONIN    Collection Time: 01/22/22  3:43 AM   Result Value Ref Range    Procalcitonin 0.90 ng/mL   PTT    Collection Time: 01/22/22  3:43 AM   Result Value Ref Range    aPTT 60.4 (H) 22.1 - 31.0 sec    aPTT, therapeutic range     58.0 - 77.0 SECS   LD    Collection Time: 01/22/22  3:43 AM   Result Value Ref Range     81 - 246 U/L   PHOSPHORUS    Collection Time: 01/22/22  3:43 AM   Result Value Ref Range    Phosphorus 3.1 2.6 - 4.7 MG/DL   CBC WITH AUTOMATED DIFF    Collection Time: 01/22/22  3:47 AM   Result Value Ref Range    WBC 8.3 3.6 - 11.0 K/uL    RBC 2.80 (L) 3.80 - 5.20 M/uL    HGB 6.7 (L) 11.5 - 16.0 g/dL    HCT 26.4 (L) 35.0 - 47.0 %    MCV 94.3 80.0 - 99.0 FL    MCH 23.9 (L) 26.0 - 34.0 PG    MCHC 25.4 (L) 30.0 - 36.5 g/dL    RDW 20.3 (H) 11.5 - 14.5 %    PLATELET 100 584 - 212 K/uL    MPV 9.2 8.9 - 12.9 FL    NRBC 0.0 0  WBC    ABSOLUTE NRBC 0.00 0.00 - 0.01 K/uL    NEUTROPHILS 81 (H) 32 - 75 %    LYMPHOCYTES 6 (L) 12 - 49 %    MONOCYTES 7 5 - 13 %    EOSINOPHILS 5 0 - 7 %    BASOPHILS 0 0 - 1 %    IMMATURE GRANULOCYTES 1 (H) 0.0 - 0.5 %    ABS. NEUTROPHILS 6.7 1.8 - 8.0 K/UL    ABS. LYMPHOCYTES 0.5 (L) 0.8 - 3.5 K/UL    ABS. MONOCYTES 0.6 0.0 - 1.0 K/UL    ABS. EOSINOPHILS 0.4 0.0 - 0.4 K/UL    ABS. BASOPHILS 0.0 0.0 - 0.1 K/UL    ABS. IMM.  GRANS. 0.1 (H) 0.00 - 0.04 K/UL    DF SMEAR SCANNED      PLATELET COMMENTS Large Platelets      RBC COMMENTS ANISOCYTOSIS  2+        RBC COMMENTS HYPOCHROMIA  1+       CARBOXYHEMOGLOBIN    Collection Time: 01/22/22  3:59 AM   Result Value Ref Range    Carboxy-Hgb 1.8 1 - 2 %    Methemoglobin 0.4 0 - 1.4 %    tHb 7.7 (LL) 14 - 17 g/dL    Oxyhemoglobin 69.9 (LL) 94 - 97 %    O2 SATURATION 72 (L) 95 - 99 %    SITE SG Sample source VENOUS BLOOD      Critical value read back Called to couch,RN on 01/22/2022 at 04:08    GLUCOSE, POC    Collection Time: 01/22/22  5:19 AM   Result Value Ref Range    Glucose (POC) 243 (H) 65 - 117 mg/dL    Performed by Christal Brower            I have reviewed the flowsheets. Chart and Pertinent Notes have been reviewed. No change in PMH ,family and social history from Consult note.       Emelyn Brooks [facility]

## 2022-01-22 NOTE — PROGRESS NOTES
[facility]iology [facility] Progress Note     Subjective:       Jasmine Robbins is a 78 y.o. patient who is s/p Impella   She was sedated on ventilator this morning   CCU RN said she did not tolerate viagra  She is planned for extubation today again      Interim:   S/p Impella placement on 01/18/2022, now in CCU.  She had VF 1/19, has appearance of Torsades.  Required ICD shock x 2, both successful. Hypokalemic (3.1), Mg 2.3. This was treated/replaced     wean off nitric oxide    Milrinone at 0.2 mcg/kg/min. LHC/RHC on 01/17/2022 had shown severely elevated bilateral filling pressures with severe pulmonary HTN, LAD with 80% lesion. pulmonary infiltrates on CXR.           HPI:   Presented to the ER on 01/04/2021 with hypoxia, improved on supplemental oxygen. Labs showed stable anemia.  WBC elevated, hyponatremic, hypokalemic.  D dimer elevated.  Chronic CKD. Nephrologist spoke to me directly regarding severe renal failure, but not much worse than last admission. Rapid COVID negative.  CXR showed pulmonary edema vs atypical pneumonia.  VQ scan showed low probability for PE.       NICM, LVEF 15-20% during recent admission.  LVEF previously normalized with CRT.  NYHA III-IV.  No ACEi/ARB due to renal dysfunction.  GDMT dosing limited by low BP. [address] 12/10/2021 showed severe pulmonary HTN (wedge 34 mmHg, PAP 80 mmHg). Cardiac cath in 2015 at [facility] showed no evidence of CAD. She did require LV lead reprogramming over the summer, but good LV capture since.  Good capture/function when checked during recent admission. BP controlled. PICC line placed 01/10/2022 in anticipation of home milrinone. Previous:   LVEF noted 15-20% in 12/2021. S/p Medtronic biventricular ICD (gen change 01/142015, leads 09/25/2008).     CKD stage IV.        Previously followed by Dr. Jarad Guevara, states did not follow him to new practice.          Problem List Date Reviewed: 12/22/2021     Acute respiratory failure with hypoxia [facility]) ICD-10-CM: J96.01   ICD-9-CM: 518.81 1/4/2022     CHF exacerbation (HCC) ICD-10-CM: I50.9   ICD-9-CM: 428.0 12/6/2021     Type 2 diabetes mellitus with diabetic neuropathy (HCC) ICD-10-CM: E11.40   ICD-9-CM: 250.60, 357.2 1/2/2020     Type 2 diabetes with nephropathy ([email or web address] ICD-10-CM: E11.21   ICD-9-CM: 250.40, 583.81 4/3/2018     Obesity, morbid ([email or web address] 75.) ICD-10-CM: E66.01   ICD-9-CM: 278.01 12/8/2017     Acquired hypothyroidism ICD-10-CM: E03.9   ICD-9-CM: 244.9 8/15/2016     Dysthymia ICD-10-CM: F34.1   ICD-9-CM: 300.4 8/15/2016     ICD (implantable cardioverter-defibrillator), biventricular, in situ ICD-10-CM: Z95.810   ICD-9-CM: V45.02 6/5/2014   Overview Signed 1/14/2015 11:11 AM by Silviano Parisi MD     Generator Medtronic change 1/14/2015         Dyslipidemia ICD-10-CM: A99.9   ICD-9-CM: 272.4 1/14/2014     CKD (chronic kidney disease) ICD-10-CM: N18.9   ICD-9-CM: 585.9 8/15/2012     Cardiomyopathy, nonischemic (HCC) ICD-10-CM: I42.8   ICD-9-CM: 425.4 Unknown   Overview Signed 10/10/2011  6:40 AM by Shelly Delgado MD     initial dx 2001, bivHF 2008 with EF 15%, s/p biV-ICD 9/08, significant improvment in EF to 45-50%         Anemia in chronic renal disease (Chronic) ICD-10-CM: N18.9, D63.1   ICD-9-CM: 285.21 12/10/2008     HTN (hypertension) ICD-10-CM: I10   ICD-9-CM: 401.9 12/10/2008     GERD (gastroesophageal reflux disease) (Chronic) ICD-10-CM: K21.9   ICD-9-CM: 530.81 12/10/2008     Gout (Chronic) ICD-10-CM: M10.9   ICD-9-CM: 274.9 12/10/2008     Pulmonary HTN (HCC) (Chronic) ICD-10-CM: I27.20   ICD-9-CM: 416.8 12/10/2008           Current Facility-Administered Medications   Medication Dose Route Frequency Provider Last Rate Last Admin   · potassium chloride 20 mEq in 50 ml IVPB 20 mEq IntraVENous Q1H Vernon ODELL MD 50 mL/hr at 01/19/22 0717 20 mEq at 01/19/22 0717   · niCARdipine (CARDENE) 25 mg in 0.9% sodium chloride 250 mL infusion 0-15 mg/hr IntraVENous TITRATE Malena ODELL MD 50 mL/hr at 01/19/22 0647 5 mg/hr at 01/19/22 0647   · magnesium sulfate 2 g/50 ml IVPB (premix or compounded) 2 g IntraVENous ONCE Davian Vázquez DO   · DOBUTamine (DOBUTREX) 500 mg/250 mL (2,000 mcg/mL) infusion 0-10 mcg/kg/min IntraVENous TITRATE TYE Fleming Stopped at 01/18/22 1900   · heparin (porcine) in 0.9% NaCl 30,000 unit/1,000 mL perfusion irrigation 50-1,000 mL 50-1,000 mL Other PRN Edelmira Gomez PA   · sodium chloride (NS) flush 5-40 mL 5-40 mL IntraVENous Q8H TYE Fleming 10 mL at 01/19/22 7157   · heparinized saline 2 units/mL infusion CONTINUOUS Wilder Magana MD 1,000 Units at 01/18/22 1416   · chlorhexidine (PERIDEX) 0.12 % mouthwash 15 mL 15 mL Oral Q12H Davian Vázquez DO 15 mL at 01/18/22 2100   · sodium chloride (NS) flush 5-40 mL 5-40 mL IntraVENous Q8H Sam Gomez PA   · albumin human 5% (BUMINATE) solution 12.5 g 12.5 g IntraVENous Q2H PRN Sam Gomez PA   · 0.45% sodium chloride infusion 10 mL/hr IntraVENous CONTINUOUS Edelmira Gomez PA   · 0.9% sodium chloride infusion 9 mL/hr IntraVENous CONTINUOUS TYE Fleming 9 mL/hr at 01/18/22 1647 9 mL/hr at 01/18/22 1647   · naloxone [facility]) injection 0.4 mg 0.4 mg IntraVENous PRN Edelmira Gomez PA   · mupirocin (BACTROBAN) 2 % ointment Both Nostrils BID Ashley Fleming Given at 01/18/22 1835   · ondansetron (ZOFRAN) injection 4 mg 4 mg IntraVENous Q4H PRN Sam Gomez PA   · albuterol (PROVENTIL VENTOLIN) nebulizer solution 2.5 mg 2.5 mg Nebulization Q4H PRN Edelmira Gomez PA   · aspirin chewable tablet 81 mg 81 mg Oral DAILY Edelmira Gomez PA   · midazolam (VERSED) injection 1 mg 1 mg IntraVENous Q1H PRN Edelmira Gomez PA   · magnesium oxide (MAG-OX) tablet 400 mg 400 mg Oral BID Sam Gomez PA   · calcium chloride 1 g in 0.9% sodium chloride 100 mL IVPB 1 g IntraVENous PRN Edelmira Gomez PA · bisacodyL (DULCOLAX) suppository 10 mg 10 mg Rectal DAILY PRN Heath Gomez PA   · senna-docusate (PERICOLACE) 8.6-50 mg per tablet 1 Tablet 1 Tablet Oral BID Heath Gomez PA   · ELECTROLYTE REPLACEMENT NOTE: Nurse to review Serum Potassium and Magnesuim levels and Initiate Electrolyte Replacement Protocol as needed 1 Each Other PRN Sam Gomez PA   · magnesium sulfate 1 g/100 ml IVPB (premix or compounded) 1 g IntraVENous PRN Sam Gomez PA   · alteplase (CATHFLO) 1 mg in sterile water (preservative free) 1 mL injection 1 mg InterCATHeter PRN Heath Gomez PA   · bacitracin 500 unit/gram packet 1 Packet 1 Packet Topical PRN Heath Gomez PA   · heparin 25,000 units in dextrose 500 mL infusion *IMPELLA* 4-20 mL/hr Other TITRATE Heath Gomez PA 7.9 mL/hr at 01/18/22 1630 7.9 mL/hr at 01/18/22 1630   · pantoprazole (PROTONIX) injection 40 mg 40 mg IntraVENous DAILY Davian Vázquez DO   And   · sodium chloride (NS) flush 10 mL 10 mL IntraVENous DAILY Davian Vázquez DO   · ceFAZolin (ANCEF) 1 g in sterile water (preservative free) 10 mL IV syringe 1 g IntraVENous Q12H Sam Gomez PA 1 g at 01/19/22 0154   · dexmedeTOMidine in 0.9 % NaCl (PRECEDEX) 400 mcg/100 mL (4 mcg/mL) infusion soln 0.1-1.5 mcg/kg/hr IntraVENous TITRATE Davian Vázquez DO 42.5 mL/hr at 01/19/22 0717 1.5 mcg/kg/hr at 01/19/22 0717   · midazolam (VERSED) injection 1 mg 1 mg IntraVENous Q4H PRN Davian Vázquez DO 1 mg at 01/18/22 1719   · insulin lispro (HUMALOG) injection SubCUTAneous Q6H Davian Vázquez DO 2 Units at 01/19/22 0605   · propofol (DIPRIVAN) 10 mg/mL infusion 0-50 mcg/kg/min IntraVENous TITRATE Davian Vázquez DO 17 mL/hr at 01/19/22 0425 25 mcg/kg/min at 01/19/22 0425   · sodium bicarbonate (8.4%) 25 mEq in dextrose 5% 1,000 mL - impella purge fluid IntraVENous TITRATE TYE Lopez 7.9 mL/hr at 01/18/22 2036 New Bag at 01/18/22 2036   · milrinone (PRIMACOR) 20 MG/100 ML D5W infusion 0.25 mcg/kg/min IntraVENous CONTINUOUS Carolyn ODELL MD 8.5 mL/hr at 01/19/22 0312 0.25 mcg/kg/min at 01/19/22 0312   · bumetanide (BUMEX) 0.25 mg/mL infusion 2 mg/hr IntraVENous CONTINUOUS TYE Carrasquillo 2 mL/hr at 01/18/22 1948 0.5 mg/hr at 01/18/22 1948   · [Held by provider] milrinone (PRIMACOR) 20 MG/100 ML D5W infusion 0.125 mcg/kg/min IntraVENous CONTINUOUS Mallika Albarado NP 4.3 mL/hr at 01/18/22 0343 0.125 mcg/kg/min at 01/18/22 0343   · insulin NPH (NOVOLIN N, HUMULIN N) injection 10 Units 10 Units SubCUTAneous QHS TYE Carrasquillo 10 Units at 01/18/22 2126   · insulin NPH (NOVOLIN N, HUMULIN N) injection 20 Units 20 Units SubCUTAneous DAILY Rosemarie Gomez PA   · triamcinolone acetonide (KENALOG) 0.1 % cream Topical BID TYE Carrasquillo Given at 01/17/22 1733   · polyethylene glycol (MIRALAX) packet 17 g 17 g Oral DAILY TYE Carrasquillo 17 g at 01/16/22 0377   · digoxin (LANOXIN) tablet 0.0625 mg 0.0625 mg Oral DAILY LongRosemarie PA 0.0625 mg at 01/17/22 1027   · ivabradine (CORLANOR) tablet 7.5 mg 7.5 mg Oral BID WITH MEALS LongRosemarie PA 7.5 mg at 01/18/22 1700   · [Held by provider] carvediloL (COREG) tablet 12.5 mg 12.5 mg Oral BID WITH MEALS LongRosemarie PA 12.5 mg at 01/17/22 1721   · allopurinoL (ZYLOPRIM) tablet 50 mg 50 mg Oral DAILY Rosemarie Gomez PA 50 mg at 01/17/22 1025   · epoetin isabel-epbx (RETACRIT) injection 20,000 Units 20,000 Units SubCUTAneous Q TUE, THU & SAT TEY Carrasquillo 20,000 Units at 01/18/22 2039   · montelukast (SINGULAIR) tablet 10 mg 10 mg Oral DAILY Rosemarie Gomez PA 10 mg at 01/17/22 1026   · levothyroxine (SYNTHROID) tablet 100 mcg 100 mcg Oral Once per day on Mon Tue Wed Thu Fri Sat TYE Carrasquillo 100 mcg at 01/19/22 2962   · hydroxypropyl methylcellulose (ISOPTO TEARS) 0.5 % ophthalmic solution 1 Drop 1 Drop Both Eyes PRN Rosemarie Gomez PA 1 Drop at 01/06/22 1727   · venlafaxine-SR (EFFEXOR-XR) capsule 75 mg 75 mg Oral DAILY WITH BREAKFAST TYE Maynard 75 mg at 01/17/22 1025   · gabapentin (NEURONTIN) capsule 100 mg 100 mg Oral QHS TYE Maynard 100 mg at 01/18/22 2126   · arformoteroL (BROVANA) neb solution 15 mcg 15 mcg Nebulization BID RT TYE Maynard 15 mcg at 01/18/22 2023   And   · budesonide (PULMICORT) 500 mcg/2 ml nebulizer suspension 500 mcg Nebulization BID RT TYE Maynard 500 mcg at 01/18/22 2023   · [Held by provider] heparin (porcine) injection 5,000 Units 5,000 Units SubCUTAneous Q8H Bonilla Worthy MD 5,000 Units at 01/18/22 0100   · sodium chloride (NS) flush 5-40 mL 5-40 mL IntraVENous Q8H Michelle Gomez PA 10 mL at 01/18/22 0600   · acetaminophen (TYLENOL) tablet 650 mg 650 mg Oral Q6H PRN Michelle Gomez PA   Or   · acetaminophen (TYLENOL) suppository 650 mg 650 mg Rectal Q6H PRN Michelle Gomez PA   · glucose chewable tablet 16 g 4 Tablet Oral PRN Michelle Gomez PA   · dextrose (D50W) injection syrg 12.5-25 g 25-50 mL IntraVENous PRN Michelle Gomez, PA   · glucagon (GLUCAGEN) injection 1 mg 1 mg IntraMUSCular PRN Michelle Gomez PA     Allergies   Allergen Reactions   · Ciprofloxacin Anaphylaxis   · Shellfish Derived Anaphylaxis   · Ace Inhibitors Unknown (comments)   · Biaxin [Clarithromycin] Other (comments)   Metal taste   · Candesartan Cough   · Pcn [Penicillins] Hives     Past Medical History:   Diagnosis Date   · Acquired hypothyroidism 8/15/2016   · Anemia   RED-HF study   · Asthma   · [facility]omy[facility]ischemic [facility]   initial dx 2001, bivHF 2008 with EF 15%, s/p biV-ICD 9/08, significant improvment in EF to 45-50%   · CKD (chronic kidney disease)   Dr Arellano Listen   · CKD (chronic kidney disease) 8/15/2012   · Depression   · Diabetes ([address]   · Diabetic neuropathy ([facility]   · DM (diabetes mellitus) ([facility] 8/15/2012   · GERD (gastroesophageal reflux disease)   · Gout   · Hypothyroidism   · ICD (implantable cardioverter-defibrillator), biventricular, in situ 6/5/2014   · Psoriasis     Past Surgical History:   Procedure Laterality Date   · CARDIAC CATHETERIZATION 2007; 01/06/15   normal cors   · ECHO 2D ADULT 4/2010   EF 45%, improved from 1/09 (25%)   · ECHO 2D ADULT 11/2011   LVH, EF 55-60%   · HX ORTHOPAEDIC   knee   · HX PACEMAKER PLACEMENT   AICD   · STRESS TEST LEXISCAN/CARDIOLITE 3/21/12   normal perfusion, global HK 40%     Family History   Problem Relation Age of Onset   · Heart Disease Mother   · Hypertension Mother   · Lupus Sister   · Diabetes Brother     Social History     Tobacco Use   · Smoking status: Never Smoker   · Smokeless tobacco: Never Used   Substance Use Topics   · Alcohol use: No         Review of Systems: Unable to perform due to intubated, sedated status.       Objective:   Visit Vitals  BP 92/61   Pulse 90   Temp 98.4 °F (36.9 °C)   Resp 12   Ht 5' 7\" (1.702 m)   Wt 247 lb 2.2 oz (112.1 kg)   SpO2 96%   BMI 38.71 kg/m²         Physical Exam:   Constitutional: Well-developed and well-nourished. Head: Normocephalic and atraumatic. Eyes: Closed. ENT: sedated.  ET tube in place. Neck: Supple. No JVD present. Cardiovascular: Normal rate, regular rhythm. Exam reveals no gallop and no friction rub. 2/6 systolic LSB murmur.     Pulmonary/Chest: On vent. Impella in   Abdominal: Soft, no tenderness. Obese. GI/: Henley catheter in place. Musculoskeletal: Symmetrical.   Vasc/lymphatic: 1+ bilat lower extremity edema. + right axillary Impella. Neurological: Sedated. Skin: Skin is warm and dry.  Left side ICD unremarkable. Psychiatric: Sedated. Assessment/Plan:     Imaging/Studies:   LHC/RHC (01/17/2022):  Severely elevated left & right side filling pressures.  Severe PH, mixed pattern with elevated wedge as well as PVR of about 6 Woods units.  Mid LAD lesion 80% involving diagonal branch ostium.  Likely prior stent in mid LAD, patent.  Reduced CI of 1.65 L/min/m2 by thermodilution & 1.6 L/min/m2 by presumed oxygen consumption by Aye. 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 RHC without milrinone (12/10/2021): High wedge pressure (35 mmHg) indicating volume overload, precapillary.  Severe pulmonary HTN. Echo (12/08/2021): LVEF 15-20%, upper normal wall thickness, LV diastolic dysfunction.  Borderline low RVEF. Mod dilated LA, mildly dilated RA.  Mild to mod MR. Raquel Belt TR.  Mild to mod PH.       LHC/RHC (01/2015): No significant CAD. Mixed CM:  CAD alone did not cause this decline.    Advanced Heart Failure team believes etiology is multifactorial.  However, when/if she is more stable may consider PCI of LAD. LVEF now 15-20%.  NYHA IVEthel Layer pulmonary HTN noted on last admission RHC, now with reduced CI, elevated filling pressures bilaterally, & severe pulmonary HTN.         ECHO ADULT FOLLOW-UP OR LIMITED 01/20/2022 1/20/2022    Interpretation Summary    Left Ventricle: Limited study for impella position. Left ventricle is severely dilated. Severe global hypokinesis present. The EF by visual approximation is 20 - 25%. Impella catheter is present. 5.9 cm from AV.   Right Ventricle: Right ventricle is moderately dilated. Signed by: Jose Alejandro Lovelace MD on 1/20/2022  2:33 PM        Nuclear amyloid scan equivocal.  cMRI not possible with 4194 LV lead (2008).  However, cMRI wouldn't .      S/p Impella 1/18/22 as bridge to possible transplant or to improvement and does not need it.  If not responding and not candidate for heart renal transplant, then she will be referred to hospice.  Per AHF team, needs 3 months euvolemia, HR <100 bpm, & revascularization to prove that new severe CHF is nonreversible.       VF and TdP: Noted x 2, 3 nights ago, had 2 successful ICD shocks.    Replace K    CKD: Dr. Flanagan Never said dialysis is an option if she is considered for transplant, but otherwise will not go anywhere.  Dr. Mela Whittaker seeing patient this week and will dialyze as needed. Her renal labs are better with better cardiac output    Medtronic biventricular ICD (gen change 01/14/2015, leads 09/25/2008): Device check showed proper lead & generator function.  Generator longevity still estimated 4 months.  RA 0.1%, CRT 97.6%.       I will replace before she leaves  Shocks for VF will drain more of battery so may have to replace sooner      Dr Connor Vides and her team will guide her through the rest of this admission and transplant referral   Family meeting next week      Thank you for involving me in this patient's care and please call with further concerns or questions. Eugene Murdock M.D.    Electrophysiology/Cardiology   [facility] [place]   [facility] [address] [facility] [address], [facility] Gisella Solano, [address] [place] [id number] [phone]

## 2022-01-23 NOTE — PROGRESS NOTES
Problem: Falls - Risk of  Goal: *Absence of Falls  Description: Document Marcella Eaton Fall Risk and appropriate interventions in the flowsheet. Outcome: Progressing Towards Goal  Note: Fall Risk Interventions:  Mobility Interventions: Communicate number of staff needed for ambulation/transfer    Mentation Interventions: Adequate sleep, hydration, pain control    Medication Interventions: Evaluate medications/consider consulting pharmacy    Elimination Interventions: Call light in reach              Problem: Patient Education: Go to Patient Education Activity  Goal: Patient/Family Education  Outcome: Progressing Towards Goal     Problem: Patient Education: Go to Patient Education Activity  Goal: Patient/Family Education  Outcome: Progressing Towards Goal     Problem: Patient Education: Go to Patient Education Activity  Goal: Patient/Family Education  Outcome: Progressing Towards Goal     Problem: Diabetes Self-Management  Goal: *Disease process and treatment process  Description: Define diabetes and identify own type of diabetes; list 3 options for treating diabetes. Outcome: Progressing Towards Goal  Goal: *Incorporating nutritional management into lifestyle  Description: Describe effect of type, amount and timing of food on blood glucose; list 3 methods for planning meals. Outcome: Progressing Towards Goal  Goal: *Incorporating physical activity into lifestyle  Description: State effect of exercise on blood glucose levels. Outcome: Progressing Towards Goal  Goal: *Developing strategies to promote health/change behavior  Description: Define the ABC's of diabetes; identify appropriate screenings, schedule and personal plan for screenings. Outcome: Progressing Towards Goal  Goal: *Using medications safely  Description: State effect of diabetes medications on diabetes; name diabetes medication taking, action and side effects.   Outcome: Progressing Towards Goal  Goal: *Monitoring blood glucose, interpreting and using results  Description: Identify recommended blood glucose targets  and personal targets. Outcome: Progressing Towards Goal  Goal: *Prevention, detection, treatment of acute complications  Description: List symptoms of hyper- and hypoglycemia; describe how to treat low blood sugar and actions for lowering  high blood glucose level. Outcome: Progressing Towards Goal  Goal: *Prevention, detection and treatment of chronic complications  Description: Define the natural course of diabetes and describe the relationship of blood glucose levels to long term complications of diabetes.   Outcome: Progressing Towards Goal  Goal: *Developing strategies to address psychosocial issues  Description: Describe feelings about living with diabetes; identify support needed and support network  Outcome: Progressing Towards Goal  Goal: *Insulin pump training  Outcome: Progressing Towards Goal  Goal: *Sick day guidelines  Outcome: Progressing Towards Goal  Goal: *Patient Specific Goal (EDIT GOAL, INSERT TEXT)  Outcome: Progressing Towards Goal     Problem: Patient Education: Go to Patient Education Activity  Goal: Patient/Family Education  Outcome: Progressing Towards Goal     Problem: Heart Failure: Day 3  Goal: Off Pathway (Use only if patient is Off Pathway)  Outcome: Progressing Towards Goal  Goal: Activity/Safety  Outcome: Progressing Towards Goal  Goal: Diagnostic Test/Procedures  Outcome: Progressing Towards Goal  Goal: Nutrition/Diet  Outcome: Progressing Towards Goal  Goal: Discharge Planning  Outcome: Progressing Towards Goal  Goal: Medications  Outcome: Progressing Towards Goal  Goal: Respiratory  Outcome: Progressing Towards Goal  Goal: Treatments/Interventions/Procedures  Outcome: Progressing Towards Goal  Goal: Psychosocial  Outcome: Progressing Towards Goal  Goal: *Oxygen saturation within defined limits  Outcome: Progressing Towards Goal  Goal: *Hemodynamically stable  Outcome: Progressing Towards Goal  Goal: *Optimal pain control at patient's stated goal  Outcome: Progressing Towards Goal  Goal: *Anxiety reduced or absent  Outcome: Progressing Towards Goal  Goal: *Demonstrates progressive activity  Outcome: Progressing Towards Goal     Problem: Pressure Injury - Risk of  Goal: *Prevention of pressure injury  Description: Document Vince Scale and appropriate interventions in the flowsheet.   Outcome: Progressing Towards Goal     Problem: Patient Education: Go to Patient Education Activity  Goal: Patient/Family Education  Outcome: Progressing Towards Goal     Problem: Non-Violent Restraints  Goal: Removal from restraints as soon as assessed to be safe  Outcome: Progressing Towards Goal  Goal: No harm/injury to patient while restraints in use  Outcome: Progressing Towards Goal  Goal: Patient's dignity will be maintained  Outcome: Progressing Towards Goal  Goal: Patient Interventions  Outcome: Progressing Towards Goal     Problem: Ventilator Management  Goal: *Adequate oxygenation and ventilation  Outcome: Progressing Towards Goal  Goal: *Patient maintains clear airway/free of aspiration  Outcome: Progressing Towards Goal  Goal: *Absence of infection signs and symptoms  Outcome: Progressing Towards Goal  Goal: *Normal spontaneous ventilation  Outcome: Progressing Towards Goal     Problem: Patient Education: Go to Patient Education Activity  Goal: Patient/Family Education  Outcome: Progressing Towards Goal     Problem: Infection - Risk of, Central Venous Catheter-Associated Bloodstream Infection  Goal: *Absence of infection signs and symptoms  Outcome: Progressing Towards Goal     Problem: Patient Education: Go to Patient Education Activity  Goal: Patient/Family Education  Outcome: Progressing Towards Goal     Problem: Infection - Risk of, Urinary Catheter-Associated Urinary Tract Infection  Goal: *Absence of infection signs and symptoms  Outcome: Progressing Towards Goal     Problem: Patient Education: Go to Patient Education Activity  Goal: Patient/Family Education  Outcome: Progressing Towards Goal     Problem: Diabetes Self-Management  Goal: *Disease process and treatment process  Description: Define diabetes and identify own type of diabetes; list 3 options for treating diabetes. Outcome: Progressing Towards Goal  Goal: *Incorporating nutritional management into lifestyle  Description: Describe effect of type, amount and timing of food on blood glucose; list 3 methods for planning meals. Outcome: Progressing Towards Goal  Goal: *Incorporating physical activity into lifestyle  Description: State effect of exercise on blood glucose levels. Outcome: Progressing Towards Goal  Goal: *Developing strategies to promote health/change behavior  Description: Define the ABC's of diabetes; identify appropriate screenings, schedule and personal plan for screenings. Outcome: Progressing Towards Goal  Goal: *Using medications safely  Description: State effect of diabetes medications on diabetes; name diabetes medication taking, action and side effects. Outcome: Progressing Towards Goal  Goal: *Monitoring blood glucose, interpreting and using results  Description: Identify recommended blood glucose targets  and personal targets. Outcome: Progressing Towards Goal  Goal: *Prevention, detection, treatment of acute complications  Description: List symptoms of hyper- and hypoglycemia; describe how to treat low blood sugar and actions for lowering  high blood glucose level. Outcome: Progressing Towards Goal  Goal: *Prevention, detection and treatment of chronic complications  Description: Define the natural course of diabetes and describe the relationship of blood glucose levels to long term complications of diabetes.   Outcome: Progressing Towards Goal  Goal: *Developing strategies to address psychosocial issues  Description: Describe feelings about living with diabetes; identify support needed and support network  Outcome: Progressing Towards Goal  Goal: *Insulin pump training  Outcome: Progressing Towards Goal  Goal: *Sick day guidelines  Outcome: Progressing Towards Goal  Goal: *Patient Specific Goal (EDIT GOAL, INSERT TEXT)  Outcome: Progressing Towards Goal     Problem: Patient Education: Go to Patient Education Activity  Goal: Patient/Family Education  Outcome: Progressing Towards Goal

## 2022-01-23 NOTE — PROGRESS NOTES
0730 Shift report received from [address] [facility], drips verified, Nitric set to 30ppm    0830 Zero of transduced pressures complete with patient supine + flat    PA 64/31 (44)  CVP 17    0915 Bumex drip started    1230 When supine + flat, PAP and CVP unchanged from 8am despite bumex gtt. Bumex order increased. MD made aware, doppler SBP obtained, which correlate to radial SBP. Imella aortic placement signal 60mmHG greater than SBPs. 1300 [employer] support called for aortic placement signal and radial art mismatch. Per representative, aortic placement signal reset to match map (currently 90mmHG). Systolics continue to differ by 25mmHg. Per rep, do not reset placement signal again in case sensor is malfunctioning.

## 2022-01-23 NOTE — PROGRESS NOTES
ADVANCED HEART FAILURE NOTE    Hemodynamics d/w bedisde staff. -120s/70s, HR 80s, CVP 10   Milrinone 0.125,  0.02, Roro 30ppm  CO/CI 7.4/3.3, 57/28.38    Discontinue milrinone. D/w bedside staff. Updated Dr. Maricarmen Deleon at [facility].     Jj Chacon MD  F Cardiology

## 2022-01-23 NOTE — PROGRESS NOTES
[facility] in [place], [place]  Inpatient Progress Note      Patient name: Marcela Murray  Patient : 1955  Patient MRN: [id number]  Consulting MD: Isadora Nettles MD  Date of service: 22    REASON FOR REFERRAL:  Management of chronic systolic heart failure     PLAN OF CARE:   · 77 y/o female with chronic renal failure and new onset severe cardiomyopathy, LVEF 15% (diagnosed 21), stage D, NYHA class IV; admitted for pulmonary edema and severe volume overloadd by RHC  · Most likely etiology of acute deterioration of LVEF is chronic volume overload with compensatory tachycardia in the setting of progressive renal failure +/- coronary artery disease (80% LAD)   · These are potentially reversible causes of severe LV dysfunction; by guidelines patient needs at least 3 months of euvolemia, HR < 100bpm and revascularization to prove she has non-reversible new, severe HF to be eligible for cardiac replacement therapies, including heart/kidney transplantation, unless cannot be weaned off support after revascularization, d/w Dr. Roxana Muñoz and Dr. Jonas Check with [facility]   · Patient would like aggressive approach to allow her heart to recover, including dialysis +/- revascularization with goal of Impella as bridge to LV recovery or transplant, discussed at length with patient and her sister at prior family meeting  · D/w patient, her family, CT surgery, nephrology and primary cardiologist plan to support her with Impella as bridge to LV recovery or transplant.  Patient, family and all services above were in agreement with the following anticipated outcomes:  · 1/3 chance of LV recovery and discharge home on medical therapy or chronic dialysis  · 1/3 chance of LV non-recovery on Impella, evaluation and transfer for listing for heart transplant/kidney  · 1/3 chance of LV non-recovery and evaluation that reveals patient is not candidate for LVAD/heart/kidney transplant and then wean of support to comfort care/hospice  · Impella 5.5 implanted by Dr. Kerri Schmidt 1/18, partial unloading of LV at P8 (Tip adjacent to septum),, inotropes weaned but patient would required more aggressive diuresis/volume removal to drop LVEDP and allow Roro wean and extubation  · Once extubated the plan is to scope +/- revascularize  · Patient dose not have apparent contraindications to dual organ transplantation; [facility] will accept patient on integrillin drip if LV does not recover after revascularization and on dialysis if needed for volume management; anticipated waiting time at BMI 38 and blood type AB for hear/kidney transplant is currently approximately 10 days; d/w Dr. Johnson Rodrigues from [facility]. · For now the goal is to extubate to be able to scope her +/- revascularize  · D/w Cecilia Gloria, Adeel Olivarez, and bedside RN Rg Lai      RECOMMENDATIONS:  POD #5 Impella 5.5 implant   Vent management per Intensivist   TTE 1/22 showed large LVIDd with Impella adjacent to septum, RV function acceptable, Impella catheter 5.1 cm from AV; reviewed by Dr. Adeel Olivarez - no plans to reposition at this time due to adequate cardiac index and Impella flow   Cefazolin q12h for Impella site ppx   Maintain PAC for hemodynamic optimization; goal CI > 2.3, CVP 8-10 mmHg, SVR 1000, SBP < 110 mmHg (via radial arterial line)   Milrinone weaned off  Vaso wean in progress  Continue Roro at 30 ppm   Intolerant to sildenafil due to marked hypotension   Improved diuresis with Bumex 2 mg IV TID + Diuril; goal net (-)1-2L daily   Keep K> 4 and Mg >2  Hydralazine 5 mg IV q4h PRN SBP > 110 mmHg   Intolerant of GDMT due to hemodynamic in stability   BiV-ICD programmed at 80 bpm; device is at EOL, recommend to delay ICD generator change until final plans for transplant are established    Continue digoxin 0.0625 mg daily; goal 0.7-1.2   Allopurinol 50mg daily per nephrology  Venofer 200 mg IV x 2   Hgb stable p 1 unit PRBCs  Check epo level   FOB when able   pTT goal decreased from 58-77 to 40-60 due to persistent anemia on bivalirudin   Will need OP PSG  Invitae pending   Palliative consult appreciated   Nutritionist consult  Heart failure education   Advanced care plan present on file     All other care per primary team     IMPRESSION:  Fatigue  Shortness of breath, on 3L NC PTA  Volume overload  Acute on Chronic systolic heart failure, requiring Impella 5.5 implant 1/18/22   · Stage D, NYHA class IV symptoms  · Non-ischemic cardiomyopathy, LVEF 15%  · Normal coronaries  · PYP equivocal for amyloid   Pulmonary hypertension, severe  RV failure  S/p BiV-ICD  Cardiac risk factors:  · HL  · DM2  · Morbid obesity, BMI 40  Acute on chronic renal failure, stage IV  GERD  Anemia of chronic disease  Gout  VF s/p ICD shock x 2      Interval Events:  POD #5 Impella placement   Impella increased to P8  Improved diuresis, net (-) 890 mL   Cr 2.3   proBNP increased, 94797 from 7472       LIFE GOALS:  Patient's personal goals include: being home with family, still ambulating around without getting too tired. Important upcoming milestones or family events: none  The patient identifies the following as important for living well: remaining idependent and mobile; being able to get out of the house with . Patient verbalized willingness to be on home milrinone and evaluation for both heart and kidney transplants. Verbalizes she would have family supporting her decision on this. SHAMIKA Juares is a 78 y.o.  female with a history of NICM, chronic hypoxic respiratory failure secondary to pulmonary HTN, hypothyroidism, CKD, GERD, and DM II who presented to [facility] as a transfer from another facility for acute on chronic hypoxic respiratory failure. Reports running out of O2 at home resulting in SOB and dizziness. Upon arrival to the ED she was found to have O2 sats in the 70s, requiring 4L NC O2. Rapid covid test was negative. ProNT-BNP was 95838, K+5.2, BUN/CR x/2.54 and elevated d-dimer. Chest xray showing pulmonary edema vs. Atypical pneumonia. VQ scan showed low probability for PE. Per Dr. Levar Cordoba, NICM, LVEF 15-20% during recent admission. LVEF previously normalized with CRT. NYHA III-IV. No ACEi/ARB due to renal dysfunction. GDMT dosing limited by low BP. Patient's PCP is Dr. Giorgio Delgado, and she sees Dr. Levar Cordoba primarily for cardiac care due to Dr. Asia Jamison transfering practice. Patient historically seen by nephrology but has not had follow up care in the past three years. CARDIAC IMAGING:  Echo 1/20/22 - LVEF 20-25%, RV moderately dilated, Impella catheter 5.9 cm from AV   Echo 12/8/21- LVEF 15-20%, mild to Mod MR, LVIDd 5.09cm, TAPSE 1.94cm  Echo 4/22/19- LVEF 60%, trace MR,  LVIDd 4.24cm, TAPSE 1.65cm   Echo 4/24/18- LVEF 60%, trivial MR, LVIDd 4.79cm, TAPSE 2.25cm      EKG- 1/4/22 ST with A sense and V paced rhythm     [facility] 2015- No significant CAD  NST 2014- reversible LAD involvement      ICD interrogation     HEMODYNAMICS:  [facility] 1/17/21: PAP 83/52 mmHg, RAP 27 mmHg, PCWP 45 mmHg, CI 1.6  [facility] 12/10/21: PAP 76/48/57, RAP 20, PCWP 35, CI 2.26     CPEST not done  6MW not done     OTHER IMAGING:  CXR Results  (Last 48 hours)                             01/13/22 1035   XR CHEST PORT Final result      Impression:   No significant change in congestion and interstitial and alveolar   opacities which may reflect edema or infectious infiltrate. Narrative:   EXAM: XR CHEST PORT       INDICATION: pul edema       COMPARISON: Chest x-ray 1/10/2022. FINDINGS: A portable AP radiograph of the chest was obtained at 10:19 hours. The   patient is on a cardiac monitor. The lungs appear grossly stable with congestion   and interstitial/airspace opacities with no pneumothorax or pleural effusion. Right PICC line traverses expected course of tip in the region of the atriocaval   junction.  Pacemaker-ICD generator body projects over the left chest wall with   intact appearing leads traversing in expected course. . The cardiac and   mediastinal contours and pulmonary vascularity are normal.  Atherosclerotic   calcifications affect the aortic arch. The chest wall structures and visualized   upper abdomen show no acute findings with incidental note of degenerative spine   and shoulder changes. PHYSICAL EXAM:  Visit Vitals  BP (!) 111/90 (BP 1 Location: Left upper arm, BP Patient Position: At rest)   Pulse 97   Temp 97.8 °F (36.6 °C)   Resp 20   Ht 5' 7\" (1.702 m)   Wt 251 lb 8.7 oz (114.1 kg)   SpO2 98%   BMI 39.40 kg/m²      Hemodynamics:   CO: CO (l/min): 6.3 l/min   CI: CI (l/min/m2): 2.8 l/min/m2   CVP: CVP (mmHg): 13 mmHg (01/23/22 0600)   SVR: SVR (dyne*sec)/cm5: 887 (dyne*sec)/cm5 (01/23/22 6810)   PAP Systolic: PAP Systolic: 59 (33/83/37 1290)   PAP Diastolic: PAP Diastolic: 26 (18/04/97 8704)   PVR:     SV02: SVO2 (%): 64 % (01/23/22 0600)   SCV02:      Impella 5.5  P-8  Flow: 4.4lpm   Purge flow 7.7     Physical Exam  Vitals and nursing note reviewed. Constitutional:       General: She is not in acute distress. Appearance: Normal appearance. She is obese, sedated. Cardiovascular:      Rate and Rhythm: Regular rhythm. Pulses: Normal pulses. Heart sounds: Normal heart sounds. No murmur heard. Pulmonary:      Effort: Normal, synchronous with ventilator   Abdominal:      General: There is no distension, hypoactive BS. Musculoskeletal:         General: trace LE edema   Skin:     General: Skin is warm and dry. Findings: Lesion present. Comments: psorasis   Neurological:      General: Responds to noxious stimuli. Mental Status: She is intubated and sedated. Psychiatric:         Mood and Affect: ROSA.      ROS unable to obtain due to patient condition (intubated, sedated).       PAST MEDICAL HISTORY:  Past Medical History:   Diagnosis Date    Acquired hypothyroidism 8/15/2016  Anemia     RED-HF study    Asthma     Cardiomyopathy, nonischemic ([facility]     initial dx 2001, bivHF 2008 with EF 15%, s/p biV-ICD 9/08, significant improvment in EF to 45-50%    CKD (chronic kidney disease)     Dr Noemy Garces    CKD (chronic kidney disease) 8/15/2012    Depression     Diabetes ([facility]     Diabetic neuropathy ([facility]     DM [facility] 8/15/2012    GERD (gastroesophageal reflux disease)     Gout     Hypothyroidism     ICD (implantable cardioverter-defibrillator), biventricular, in situ 6/5/2014    Psoriasis        PAST SURGICAL HISTORY:  Past Surgical History:   Procedure Laterality Date    CARDIAC CATHETERIZATION  2007; 01/06/15    normal cors    ECHO 2D ADULT  4/2010    EF 45%, improved from 1/09 (25%)    ECHO 2D ADULT  11/2011    LVH, EF 55-60%    HX ORTHOPAEDIC      knee    HX PACEMAKER PLACEMENT      AICD    STRESS TEST LEXISCAN/CARDIOLITE  3/21/12    normal perfusion, global HK 40%       FAMILY HISTORY:  Family History   Problem Relation Age of Onset    Heart Disease Mother     Hypertension Mother     Lupus Sister     Diabetes Brother        SOCIAL HISTORY:  Social History     Socioeconomic History    Marital status:    Tobacco Use    Smoking status: Never Smoker    Smokeless tobacco: Never Used   Substance and Sexual Activity    Alcohol use: No    Drug use: No    Sexual activity: Never   Social History Narrative    . Nonsmoker. Disability       LABORATORY RESULTS:     Labs Latest Ref Rng & Units 1/23/2022 1/22/2022 1/22/2022 1/22/2022 1/22/2022 1/22/2022 1/22/2022   WBC 3.6 - 11.0 K/uL 9.7 - - 9.6 - 8.3 -   RBC 3.80 - 5.20 M/uL 3.16(L) - - 3.02(L) - 2.80(L) -   Hemoglobin 11.5 - 16.0 g/dL 7. 7(L) - - 7. 4(L) - 6. 7(L) -   Hematocrit 35.0 - 47.0 % 29. 1(L) - - 28. 1(L) - 26. 4(L) -   MCV 80.0 - 99.0 FL 92.1 - - 93.0 - 94.3 -   Platelets 458 - 134 K/uL 188 - - 188 - 212 -   Lymphocytes 12 - 49 % - - - - - 6(L) -   Monocytes 5 - 13 % - - - - - 7 - Eosinophils 0 - 7 % - - - - - 5 -   Basophils 0 - 1 % - - - - - 0 -   Albumin 3.5 - 5.0 g/dL 3.4(L) - 3.5 3.5 - - 3. 2(L)   Calcium 8.5 - 10.1 MG/DL 9.7 - 8.8 9.0 - - 9.1   Glucose 65 - 100 mg/dL 233(H) - 244(H) 265(H) - - 217(H)   BUN 6 - 20 MG/DL 52(H) - 50(H) 46(H) - - 42(H)   Creatinine 0.55 - 1.02 MG/DL 2.28(H) - 2.29(H) 2.22(H) - - 2.29(H)   Sodium 136 - 145 mmol/L 140 - 142 141 - - 142   Potassium 3.5 - 5.1 mmol/L 4.3 3.7 4.0 4.2 4.2 - 4.2   TSH 0.36 - 3.74 uIU/mL - - - - - - -   LDH 81 - 246 U/L 275(H) - - - - - 244   Some recent data might be hidden     Lab Results   Component Value Date/Time    TSH 3.08 01/11/2022 05:13 AM    TSH 1.85 12/15/2021 01:06 PM    TSH 1.01 11/05/2020 09:11 AM    TSH 2.740 04/30/2019 11:21 AM    TSH 0.519 02/21/2012 10:53 AM    TSH 8.29 (H) 01/20/2010 01:59 PM       ALLERGY:  Allergies   Allergen Reactions    Ciprofloxacin Anaphylaxis    Shellfish Derived Anaphylaxis    Sildenafil Other (comments)    Ace Inhibitors Unknown (comments)    Biaxin [Clarithromycin] Other (comments)     Metal taste    Candesartan Cough    Pcn [Penicillins] Hives        CURRENT MEDICATIONS:    Current Facility-Administered Medications:     0.9% sodium chloride infusion 250 mL, 250 mL, IntraVENous, PRN, Armando Gillespie MD    bumetanide (BUMEX) injection 2 mg, 2 mg, IntraVENous, Q8H, Sid Chase MD, 2 mg at 01/23/22 0503    albumin human 25% (BUMINATE) solution 12.5 g, 12.5 g, IntraVENous, DAILY, Polliard, Enoch Chaparro T, NP    milrinone (PRIMACOR) 20 MG/100 ML D5W infusion, 0.125 mcg/kg/min, IntraVENous, CONTINUOUS, Rebeca Major MD, Stopped at 01/22/22 2242    vasopressin (VASOSTRICT) 20 Units in 0.9% sodium chloride 100 mL infusion, 0-0.1 Units/min, IntraVENous, TITRATE, Enoch Albarado NP, Last Rate: 3 mL/hr at 01/23/22 0358, 0.01 Units/min at 01/23/22 0358    PHENYLephrine (NORMA-SYNEPHRINE) 30 mg in 0.9% sodium chloride 250 mL infusion,  mcg/min, IntraVENous, TITRATE, Anat Alicea MD, Held at 01/21/22 1200    niCARdipine (CARDENE) 25 mg in 0.9% sodium chloride 250 mL infusion, 0-15 mg/hr, IntraVENous, TITRATE, Antoinette ODELL MD, Stopped at 01/19/22 2109    bivalirudin (ANGIOMAX) 250 mg in 0.9% sodium chloride (MBP/ADV) 50 mL MBP, 0.02-2.5 mg/kg/hr, IntraVENous, TITRATE, Polliard, Renell Osler, NP, Last Rate: 2.4 mL/hr at 01/23/22 0008, 0.1008 mg/kg/hr at 01/23/22 0008    hydrALAZINE (APRESOLINE) 20 mg/mL injection 5 mg, 5 mg, IntraVENous, Q4H PRN, Polliard, Renell Osler, NP, 5 mg at 01/20/22 1449    bumetanide (BUMEX) injection 1 mg, 1 mg, IntraVENous, Q4H PRN, Polliard, Renell Osler, NP, 1 mg at 01/22/22 1721    ceFAZolin (ANCEF) 1 g in sterile water (preservative free) 10 mL IV syringe, 1 g, IntraVENous, Q12H, Kyleigh Albarado NP, 1 g at 01/23/22 0100    fentaNYL (PF) 1,500 mcg/30 mL (50 mcg/mL) infusion, 0-200 mcg/hr, IntraVENous, TITRATE, Antoinette ODELL MD, Last Rate: 2 mL/hr at 01/23/22 0359, 100 mcg/hr at 01/23/22 0359    heparin (porcine) in 0.9% NaCl 30,000 unit/1,000 mL perfusion irrigation 50-1,000 mL, 50-1,000 mL, Other, PRN, Sam Gomez PA    sodium chloride (NS) flush 5-40 mL, 5-40 mL, IntraVENous, Q8H, Sam Gomez PA, 10 mL at 01/23/22 0503    heparinized saline 2 units/mL infusion, , , CONTINUOUS, FiserLisa MD, 1,000 Units at 01/18/22 1416    chlorhexidine (PERIDEX) 0.12 % mouthwash 15 mL, 15 mL, Oral, Q12H, Davian Vázquez DO, 15 mL at 01/22/22 2101    sodium chloride (NS) flush 5-40 mL, 5-40 mL, IntraVENous, Q8H, Sam Gomez PA, 10 mL at 01/23/22 0503    0.45% sodium chloride infusion, 10 mL/hr, IntraVENous, CONTINUOUS, Sam Gomez PA    0.9% sodium chloride infusion, 9 mL/hr, IntraVENous, CONTINUOUS, Sam Gomez PA, Last Rate: 9 mL/hr at 01/18/22 1647, 9 mL/hr at 01/18/22 1647    naloxone (NARCAN) injection 0.4 mg, 0.4 mg, IntraVENous, PRN, Khai Gomez PA    mupirocin [place]) 2 % ointment, , Both Nostrils, BID, Christopher Gomez [place], Given at 01/22/22 1747    ondansetron [facility]) injection 4 mg, 4 mg, IntraVENous, Q4H PRN, Sam Gomez PA    albuterol (PROVENTIL VENTOLIN) nebulizer solution 2.5 mg, 2.5 mg, Nebulization, Q4H PRN, aSm Gomez PA    aspirin chewable tablet 81 mg, 81 mg, Oral, DAILY, Christopher Gomez PA, 81 mg at 01/22/22 0813    midazolam (VERSED) injection 1 mg, 1 mg, IntraVENous, Q1H PRN, Sam Gomez PA    magnesium oxide (MAG-OX) tablet 400 mg, 400 mg, Oral, BID, Christopher Gomez PA, 400 mg at 01/22/22 1747    calcium chloride 1 g in 0.9% sodium chloride 100 mL IVPB, 1 g, IntraVENous, PRN, Sam Gomez PA    bisacodyL (DULCOLAX) suppository 10 mg, 10 mg, Rectal, DAILY PRN, Sam Gomez PA    senna-docusate (PERICOLACE) 8.6-50 mg per tablet 1 Tablet, 1 Tablet, Oral, BID, Christopher Gomez PA, 1 Tablet at 01/22/22 1747    ELECTROLYTE REPLACEMENT NOTE: Nurse to review Serum Potassium and Magnesuim levels and Initiate Electrolyte Replacement Protocol as needed, 1 Each, Other, PRN, Christopher Gomez PA    magnesium sulfate 1 g/100 ml IVPB (premix or compounded), 1 g, IntraVENous, PRN, Sam Gomez PA    alteplase (CATHFLO) 1 mg in sterile water (preservative free) 1 mL injection, 1 mg, InterCATHeter, PRN, Sam Gomez PA    bacitracin 500 unit/gram packet 1 Packet, 1 Packet, Topical, PRN, Christopher Gomez PA    pantoprazole (PROTONIX) injection 40 mg, 40 mg, IntraVENous, DAILY, 40 mg at 01/22/22 0813 **AND** sodium chloride (NS) flush 10 mL, 10 mL, IntraVENous, DAILY, Davian Vázquez DO, 10 mL at 01/22/22 0817    dexmedeTOMidine in 0.9 % NaCl (PRECEDEX) 400 mcg/100 mL (4 mcg/mL) infusion soln, 0.1-1.5 mcg/kg/hr, IntraVENous, TITRATE, Davian Vázquez DO, Last Rate: 14.2 mL/hr at 01/23/22 0200, 0.5 mcg/kg/hr at 01/23/22 0200    midazolam (VERSED) injection 1 mg, 1 mg, IntraVENous, Q4H PRN, Gurpreet, Davian GUEVARA DO, 1 mg at 01/18/22 1719    insulin lispro (HUMALOG) injection, , SubCUTAneous, Q6H, Davian Vázquez DO, 6 Units at 01/23/22 0523    propofol (DIPRIVAN) 10 mg/mL infusion, 0-50 mcg/kg/min, IntraVENous, TITRATE, Davian Vázquez DO, Last Rate: 27.2 mL/hr at 01/23/22 0618, 40 mcg/kg/min at 01/23/22 0618    sodium bicarbonate (8.4%) 25 mEq in dextrose 5% 1,000 mL - impella purge fluid, , IntraVENous, TITRATE, Sam Gomez PA, Last Rate: 8 mL/hr at 01/23/22 0000, New Bag at 01/23/22 0000    insulin NPH (NOVOLIN N, HUMULIN N) injection 10 Units, 10 Units, SubCUTAneous, QHS, Nicholas Gomez PA, 10 Units at 01/22/22 2100    insulin NPH (NOVOLIN N, HUMULIN N) injection 20 Units, 20 Units, SubCUTAneous, DAILY, Nicholas Gomez PA, 20 Units at 01/22/22 0814    triamcinolone acetonide (KENALOG) 0.1 % cream, , Topical, BID, Nicholas Gomez PA, Given at 01/22/22 1747    polyethylene glycol (MIRALAX) packet 17 g, 17 g, Oral, DAILY, Nicholas Gomez PA, 17 g at 01/22/22 4545    digoxin (LANOXIN) tablet 0.0625 mg, 0.0625 mg, Oral, DAILY, Sam Gomez PA, 0.0625 mg at 01/22/22 8974    allopurinoL (ZYLOPRIM) tablet 50 mg, 50 mg, Oral, DAILY, Sam Gomez PA, 50 mg at 01/22/22 7312    epoetin isabel-epbx (RETACRIT) injection 20,000 Units, 20,000 Units, SubCUTAneous, Q TUE, THU & SAT, Nicholas Gomez PA, 20,000 Units at 01/22/22 2100    montelukast (SINGULAIR) tablet 10 mg, 10 mg, Oral, DAILY, Sam Gomez PA, 10 mg at 01/22/22 3720    levothyroxine (SYNTHROID) tablet 100 mcg, 100 mcg, Oral, Once per day on Mon Tue Wed Thu Fri Sat, Sam Gomez [place], 100 mcg at 01/22/22 0503    hydroxypropyl methylcellulose (ISOPTO TEARS) 0.5 % ophthalmic solution 1 Drop, 1 Drop, Both Eyes, PRN, Nicholas Gomez PA, 1 Drop at 01/06/22 1727    venlafaxine-SR (EFFEXOR-XR) capsule 75 mg, 75 mg, Oral, DAILY WITH BREAKFAST, Nicholas Gomez PA, 75 mg at 01/17/22 1025    gabapentin (NEURONTIN) capsule 100 mg, 100 mg, Oral, QHS, Heath Gomez PA, 100 mg at 01/22/22 2100    arformoteroL (BROVANA) neb solution 15 mcg, 15 mcg, Nebulization, BID RT, 15 mcg at 01/22/22 1939 **AND** budesonide (PULMICORT) 500 mcg/2 ml nebulizer suspension, 500 mcg, Nebulization, BID RT, Sam Gomez PA, 500 mcg at 01/22/22 1939    sodium chloride (NS) flush 5-40 mL, 5-40 mL, IntraVENous, Q8H, Sam Gomez PA, 10 mL at 01/23/22 0503    acetaminophen (TYLENOL) tablet 650 mg, 650 mg, Oral, Q6H PRN **OR** acetaminophen (TYLENOL) suppository 650 mg, 650 mg, Rectal, Q6H PRN, Sam Gomez PA    glucose chewable tablet 16 g, 4 Tablet, Oral, PRN, Sam Gomez PA    dextrose (D50W) injection syrg 12.5-25 g, 25-50 mL, IntraVENous, PRN, Heath Gomez PA    glucagon (GLUCAGEN) injection 1 mg, 1 mg, IntraMUSCular, PRN, Heath Gomez PA    PATIENT CARE TEAM:  Patient Care Team:  Jason Floyd MD as PCP - General (Internal Medicine)  Jason Floyd MD as PCP - [address] [place] [address] Provider  Susu Miller MD as Consulting Provider (Internal Medicine)  Sury Freeman MD (Dermatology)  Anya Hernandez MD (Nephrology)  Jay Daniels MD as Consulting Provider (Cardiology)  Porsha Whittaker MD (Cardiology)  Phyllis Noel MD as Consulting Provider (Pulmonary Disease)     Thank you for allowing me to participate in this patient's care. Neelam Garzon NP  [address] [place] [address], [address]  Phone: [phone]      [facility] ATTENDING ADDENDUM    Patient case was reviewed, discussed and agree with the plan as noted in the NP note above without further additions.     Kaila Salinas MD PhD  Cyn Nguyen

## 2022-01-23 NOTE — PROGRESS NOTES
[facility]  ICU Progress Note    Admit Date: 2022    S/P R Axillary Impella 5.5     Subjective/24 Hour Summary:   Pt seen with Dr. Christina Lares. impella at P8, 4.4 lpm flow. bicarb purge & bival systemically. Milrinone stopped, vaso 0.01, now on bumex drip. Nitric at 30 ppm. Communications ongoing regarding heart/kidney transplant candidacy. Objective:   Vitals:  Blood pressure 111/77, pulse 80, temperature 99.3 °F (37.4 °C), resp. rate 12, height 5' 7\" (1.702 m), weight 247 lb 5.7 oz (112.2 kg), SpO2 96 %. Temp (24hrs), Av.7 °F (37.1 °C), Min:98.1 °F (36.7 °C), Max:99.4 °F (37.4 °C)    Hemodynamics:   CO: CO (l/min): 6.1 l/min   CI: CI (l/min/m2): 2.8 l/min/m2   CVP: CVP (mmHg): 13 mmHg (22)   SVR: SVR (dyne*sec)/cm5: 887 (dyne*sec)/cm5 (22)   PAP Systolic: PAP Systolic: 58 (34/46 3139)   PAP Diastolic: PAP Diastolic: 27 (/98 0624)   PVR:     SV02: SVO2 (%): 67 % (22)   SCV02:      EKG/Rhythm:  SR    Ventilator Settings:  Mode Rate Tidal Volume Pressure FiO2 PEEP   Assist control   460 ml  8 cm H2O 40 % 5 cm H20     Peak airway pressure: 30 cm H2O    Minute ventilation: 8.63 l/min        Oxygen Therapy:  Oxygen Therapy  O2 Sat (%): 96 % (22)  Pulse via Oximetry: 88 beats per minute (22)  O2 Device: Endotracheal tube (22)  Skin Assessment: Clean, dry, & intact (22)  Skin Protection for O2 Device: Yes (22)  Orientation: Bilateral (22)  Location: Cheek (22)  Interventions: Mouth Care (22)  O2 Flow Rate (L/min): 6 l/min (weaned) (22 0740)  O2 Temperature: 98.4 °F (36.9 °C) (22)  FIO2 (%): 40 % (22 0846)    CXR:  CXR Results  (Last 48 hours)               22 0423  XR CHEST PORT Final result    Impression:  Stable bilateral lung infiltrates. Stable lines, tubes and devices. .  .            Narrative:  INDICATION:  post-Impella        EXAM: Chest single view. COMPARISON: 1/22/2022. FINDINGS: A single frontal view of the chest at 0415 hours shows relatively   stable bilateral interstitial infiltrates with a mid inspiratory effort. ET   tube, AICD from the left, PA catheter from the right all stable. Impella device   is stable. The heart, mediastinum and pulmonary vasculature are stable . The   bony thorax is unremarkable for age. Alayna Remedies 01/22/22 1647  XR CHEST PORT Final result    Impression:  Appropriate position of support lines and tubes. Pulmonary edema. Narrative:  EXAM:  XR CHEST PORT       INDICATION:  Endotracheal tube repositioning       COMPARISON: 1/22/2022       TECHNIQUE: AP portable upright chest view       FINDINGS: Tip of the endotracheal tube terminates 3.8 cm above the rohit. Impella device, Altha-Guille catheter, cardiac pacing wires, all in appropriate   position. Mild to moderate interstitial edema without pleural effusion or   pneumothorax. Right PICC line terminates in the SVC.           01/22/22 0423  XR CHEST PORT Final result    Impression:  Stable support lines and tubes. Pulmonary vascular congestion and interstitial edema without pleural effusions. Narrative:  EXAM:  XR CHEST PORT       INDICATION:  Status post impella       COMPARISON: 1/21/2022       TECHNIQUE: AP portable upright chest view       FINDINGS: Endotracheal tube, enteric tube, right subclavian and patella, and   right PICC line are unchanged. Cardiac pacing wires are unchanged. Heart size   and mediastinal contours are stable. Low lung volumes with pulmonary vascular   congestion and interstitial edema bilaterally. No pleural effusion or   pneumothorax. Admission Weight: Last Weight   Weight: 264 lb 1.8 oz (119.8 kg) Weight: 247 lb 5.7 oz (112.2 kg)     Intake / Output / Drain:  Current Shift: No intake/output data recorded.   Last 24 hrs.:     Intake/Output Summary (Last 24 hours) at 1/23/2022 0940  Last data filed at 2022 0700  Gross per 24 hour   Intake 3007.85 ml   Output 3930 ml   Net -922.15 ml       EXAM:  General:    NAD                                                                                          Lungs:   Clear to auscultation bilaterally. Incision:  No erythema, drainage or swelling. Heart:  Regular rate and rhythm, S1, S2 normal, no murmur, click, rub or gallop. Abdomen:   Soft, non-tender. Bowel sounds normal. No masses,  No organomegaly. Extremities:  No edema. PPP. Neurologic: Intubated, sedated     Labs:   Recent Labs     22  0405 22  1246   WBC 9.7 9.6   HGB 7.7* 7.4*   HCT 29.1* 28.1*    188     Recent Labs     22  0405 22  2300 22  17422     --   --  142   K 4.3 3.7   < > 4.0     --   --  108   CO2 28  --   --  28   BUN 52*  --   --  50*   CREA 2.28*  --   --  2.29*   *  --   --  244*   CA 9.7  --   --  8.8   MG 3.2* 1.4*   < > 1.5*   PHOS 3.6  --   --  3.6    < > = values in this interval not displayed. Recent Labs     22  0405 22  17422  1246 22  1246     --   --  112   TP 7.6  --   --  6.6   ALB 3.4* 3.5   < > 3.5   GLOB 4.2*  --   --  3.1    < > = values in this interval not displayed. Recent Labs     22  0405 22  1246   APTT 56.4* 59.5*      Recent Labs     22  1143   PHI 7.42   PCO2I 42.2   PO2I 87   FIO2I 40     No results for input(s): CPK, CKMB, TROIQ, BNPP in the last 72 hours. Assessment:     Active Problems:    Acute respiratory failure with hypoxia ([address] (2022)         Plan/Recommendations/Medical Decision Makin. Acute on Chronic Systolic CHF class III/IV: S/P right axillary impella 5.5 . Cont bicarb via purge. Milrinone 0.125, nitric. Cont systemic bival. Per AHF. Notably, PAP were 843Y systolic prior to impella placement so nitric wean will be difficult. Discussed with AHF.   2. Acute on Chronic Respiratory insufficiency: On home O2. . Vent wean per intensivist.  3. CAD: Multivessel CAD on [facility] 1/17/22. ASA 81. BB on hold due to cardiogenic shock. Recommend starting high intensity statin when feasible, per primary/HF  4. RODNEY on Chronic CKD Stage IV: Renal following. Bumex drip started. 5. Gout: allopurinol  6. DM: Per primary service  7. GERD: Home meds  8. Hypothyroidism: on synthroid  9. Depression: Home meds  10. Anemia: on EPO, stable post op. Dispo: impella functioning well. Cont systemic bival & bicarb via purge. No cardiac surgery issues otherwise. Further HF management per AHF. Remainder of medical management per primary. .    Signed By: TYE Dejesus

## 2022-01-23 NOTE — PROGRESS NOTES
1930 Bedside and Verbal shift change report given to ARTURO Benton (oncoming nurse) by Perla Mcdaniel (offgoing nurse). Report included the following information SBAR, Kardex, Intake/Output, MAR and Cardiac Rhythm Paced. 2107 , MAP 99 Vaso turned off.     2145 SBP 78, MAP 60, vaso restarted, see MAR    2200 CHG bath given, tolerated well. 0 Updated Dr. Ingrid Springer on patient status. Per verbal orders will stop milrinone gtt and continue to monitor. 2300 Labs sent    1210 Mg 1.4, K 3.7, replacement ordered per protocol    0400 Labs sent, carboxyhemoglobin drawn. Rush re-calibrated. Tim Mae, NP at bedside, patient status update provided. 0730 Bedside and Verbal shift change report given to Perla Mcdaniel (oncoming nurse) by Maty Joe (offgoing nurse). Report included the following information SBAR, Kardex, Intake/Output, MAR and Cardiac Rhythm Paced.

## 2022-01-23 NOTE — ANESTHESIA POSTPROCEDURE EVALUATION
Post-Anesthesia Evaluation and Assessment    Patient: Rohith Gibbons MRN: [id number]  SSN: [id number]    YOB: 1955  Age: 78 y.o. Sex: female      I have evaluated the patient and they are stable in the ICU. Cardiovascular Function/Vital Signs  HR: 83  BP: 121/72  PAP: 54/26 (36)  CVP: 16  SpO2: 100% on mechanical ventilation  RR: 16  Temp: 36C    Patient is status post General anesthesia for Procedure(s):  RIGHT AXILLARY IMPELLA INSERTION. Nausea/Vomiting: None    Postoperative hydration reviewed and adequate. Pain:  Managed    Neurological Status:   Intubated and sedated     Pulmonary Status:   Intubated with adequate ventilation and oxygenation     Complications related to anesthesia: None    Post-anesthesia assessment completed.  No concerns      Signed By: Karla Cruz DO     January 18, 2022

## 2022-01-23 NOTE — ANESTHESIA PROCEDURE NOTES
Arterial Line Placement    Start time: 1/18/2022 12:25 PM  End time: 1/18/2022 12:27 PM  Performed by: Anson Lazaro DO  Authorized by: Anson Lazaro DO     Pre-Procedure  Indications:  Arterial pressure monitoring and blood sampling  Preanesthetic Checklist: patient identified, risks and benefits discussed, anesthesia consent, site marked, patient being monitored, timeout performed and patient being monitored      Procedure:   Prep:  ChloraPrep  Seldinger Technique?: Yes    Orientation:  Left  Location:  Radial artery  Catheter size:  20 G  Number of attempts:  1    Assessment:   Post-procedure:  Line secured and sterile dressing applied  Patient Tolerance:  Patient tolerated the procedure well with no immediate complications

## 2022-01-23 NOTE — ANESTHESIA PROCEDURE NOTES
MAXIMINO  Date/Time: 1/23/2022 1:50 PM      Procedure Details: probe placement, image aquisition & interpretation    Risks and benefits discussed with the patient and plans are to proceed    Procedure Note    Performed by: Megha Fritz DO  Authorized by: Megha Fritz DO       Indications: assessment of ascending aorta and assessment of surgical repair  Modalities: 2D, CF, CWD, PWD  Probe Type: biplane and multiplane  Insertion: atraumatic  Patient Status: intubated and sedated    Echocardiographic and Doppler Measurements   Aorta  Size  Diam(cm)  Dissection PlaqueThick(mm)  Plaque Mobile    Ascending normal  No  No    Arch normal  No  No    Descending normal  No 0-3 No          Valves  Annulus  Stenosis  Area/Grad  Regurg  Leaflet   Morph  Leaflet   Motion    Aortic normal none  0 thickened normal    Mitral dilated none  3+ normal restricted    Tricuspid normal mild  1+ normal normal          Atria  Size  SEC (smoke)  Thrombus  Tumor  Device    Rt Atrium normal No No  Yes    Lt Atrium dilated No No  No     Interatrial Septum Morphology: normal    Interventricular Septum Morphology: normal    Ventricle  Cavity Size  Cavity Dimension Hypertrophy  Thrombus  Gloal FXN  EF    RV dilated 5 No no mildly impaired     LV dilated 6  No severely impaired 20%       Regional Function  (1 = normal, 2 = mildly hypokinetic, 3 = severely hypokinetic, 4 = akinetic, 5 = dyskinetic) LAV - [place]   ME LAV = 0  ME LAV = 90  ME LAV = 130   Basal Sept:3 Basal Ant:2 Basal Post:3   Mid Sept:3 Mid Ant:2 Mid Post:2   Apical Sept:3 Apical Ant:2 Basal Ant Sept:2   Basal Lat:2 Basal Inf:3 Mid Ant Sept:2   Mid Lat:2 Mid Inf:3    Apical Lat:2 Apical Inf:3        Pericardium: normal  Effusion: normal    Post Intervention Follow-up Study  Ventricular Global Function: improved  Ventricular Regional Function: unchanged     Valve  Function  Regurgitation  Area    Aortic no change 1+     Mitral no change 2+     Tricuspid no change 1+     Prosthetic Complications: None  Comments: Pre:  LV: Severely reduced systolic function with a visually estimated EF of 20-25% and dilation. LV free of mass or thrombus. 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 No regurgitation or stenosis. TV: Trivial regurgitation likely secondary to device and catheter traversing valve. PV: No regurgitation. Aorta: Intact, no evidence of dilation or dissection. SANJUANITA: Exhibits low velocity flow without thrombus. Post:   LV: Improved systolic function to 38-59% post impella placement. RV: Unchanged, continued mildly reduced systolic function. MV: Central mitral regurgitation improved to mild-moderate 2-3+. AV: Aortic regurgitation secondary to device placement now trivial to 1+. Device placement: Impella 5.5 original position noted deep and retracted by surgeon. Final position acceptable at 4.77cm depth without involvement of the mitral valve apparatus. Aorta: Remains intact without evidence of dissection or dilation. Mild atherosclerotic disease most prominent in the descending thoracic aorta. All findings communicated with surgeon.

## 2022-01-23 NOTE — PROGRESS NOTES
[facility] Progress Note     Subjective:       Juan Taylor is a 78 y.o. patient who is s/p Impella   She was sedated on ventilator this morning   CCU RN said she did not tolerate viagra      Interim:   S/p Impella placement on 01/18/2022, now in CCU.  She had VF 1/19, has appearance of Torsades.  Required ICD shock x 2, both successful. Hypokalemic (3.1), Mg 2.3. This was treated/replaced      LHC/RHC on 01/17/2022 had shown severely elevated bilateral filling pressures with severe pulmonary HTN, LAD with 80% lesion. HPI:   Presented to the ER on 01/04/2021 with hypoxia, improved on supplemental oxygen. Labs showed stable anemia.  WBC elevated, hyponatremic, hypokalemic.  D dimer elevated.  Chronic CKD. Nephrologist spoke to me directly regarding severe renal failure, but not much worse than last admission. Rapid COVID negative.  CXR showed pulmonary edema vs atypical pneumonia.  VQ scan showed low probability for PE.       NICM, LVEF 15-20% during recent admission.  LVEF previously normalized with CRT.  NYHA III-IV.  No ACEi/ARB due to renal dysfunction.  GDMT dosing limited by low BP. [address] 12/10/2021 showed severe pulmonary HTN (wedge 34 mmHg, PAP 80 mmHg). [facility] in 2015 [facility] showed no evidence of CAD. She did require LV lead reprogramming over the summer, but good LV capture since.  Good capture/function when checked during recent admission. BP controlled. PICC line placed 01/10/2022 in anticipation of home milrinone. Previous:   LVEF noted 15-20% in 12/2021. S/p Medtronic biventricular ICD (gen change 01/142015, leads 09/25/2008).     CKD stage IV.        Previously followed by Dr. Julai Luke, states did not follow him to new practice.          Problem List      Acute respiratory failure with hypoxia [facility]) ICD-10-CM: J96.01   ICD-9-CM: 518.81 1/4/2022     CHF exacerbation (HCC) ICD-10-CM: I50.9   ICD-9-CM: 428.0 12/6/2021     Type 2 diabetes mellitus with diabetic neuropathy (HCC) ICD-10-CM: E11.40   ICD-9-CM: 250.60, 357.2 1/2/2020     Type 2 diabetes with nephropathy ([facility] ICD-10-CM: E11.21   ICD-9-CM: 250.40, 583.81 4/3/2018     Obesity, morbid ([facility] ICD-10-CM: E66.01   ICD-9-CM: 278.01 12/8/2017     Acquired hypothyroidism ICD-10-CM: E03.9   ICD-9-CM: 244.9 8/15/2016     Dysthymia ICD-10-CM: F34.1   ICD-9-CM: 300.4 8/15/2016     ICD (implantable cardioverter-defibrillator), biventricular, in situ ICD-10-CM: Z95.810   ICD-9-CM: V45.02 6/5/2014   Overview Signed 1/14/2015 11:11 AM by Guille Patel MD     Generator Medtronic change 1/14/2015         Dyslipidemia ICD-10-CM: Q40.8   ICD-9-CM: 272.4 1/14/2014     CKD (chronic kidney disease) ICD-10-CM: N18.9   ICD-9-CM: 585.9 8/15/2012     Cardiomyopathy, nonischemic (HCC) ICD-10-CM: I42.8   ICD-9-CM: 425.4 Unknown   Overview Signed 10/10/2011  6:40 AM by Melvina Montenegro MD     initial dx 2001, bivHF 2008 with EF 15%, s/p biV-ICD 9/08, significant improvment in EF to 45-50%         Anemia in chronic renal disease (Chronic) ICD-10-CM: N18.9, D63.1   ICD-9-CM: 285.21 12/10/2008     HTN (hypertension) ICD-10-CM: I10   ICD-9-CM: 401.9 12/10/2008     GERD (gastroesophageal reflux disease) (Chronic) ICD-10-CM: K21.9   ICD-9-CM: 530.81 12/10/2008     Gout (Chronic) ICD-10-CM: M10.9   ICD-9-CM: 274.9 12/10/2008     Pulmonary HTN (HCC) (Chronic) ICD-10-CM: I27.20   ICD-9-CM: 416.8 12/10/2008       Current Facility-Administered Medications   Medication Dose Route Frequency    insulin NPH (NOVOLIN N, HUMULIN N) injection 25 Units  25 Units SubCUTAneous DAILY    insulin NPH (NOVOLIN N, HUMULIN N) injection 15 Units  15 Units SubCUTAneous QHS    bumetanide (BUMEX) 0.25 mg/mL infusion  1 mg/hr IntraVENous CONTINUOUS    0.9% sodium chloride infusion 250 mL  250 mL IntraVENous PRN    albumin human 25% (BUMINATE) solution 12.5 g  12.5 g IntraVENous DAILY    milrinone (PRIMACOR) 20 MG/100 ML D5W infusion  0.125 mcg/kg/min IntraVENous CONTINUOUS    vasopressin (VASOSTRICT) 20 Units in 0.9% sodium chloride 100 mL infusion  0-0.1 Units/min IntraVENous TITRATE    PHENYLephrine (NORMA-SYNEPHRINE) 30 mg in 0.9% sodium chloride 250 mL infusion   mcg/min IntraVENous TITRATE    niCARdipine (CARDENE) 25 mg in 0.9% sodium chloride 250 mL infusion  0-15 mg/hr IntraVENous TITRATE    bivalirudin (ANGIOMAX) 250 mg in 0.9% sodium chloride (MBP/ADV) 50 mL MBP  0.02-2.5 mg/kg/hr IntraVENous TITRATE    hydrALAZINE (APRESOLINE) 20 mg/mL injection 5 mg  5 mg IntraVENous Q4H PRN    bumetanide (BUMEX) injection 1 mg  1 mg IntraVENous Q4H PRN    ceFAZolin (ANCEF) 1 g in sterile water (preservative free) 10 mL IV syringe  1 g IntraVENous Q12H    fentaNYL (PF) 1,500 mcg/30 mL (50 mcg/mL) infusion  0-200 mcg/hr IntraVENous TITRATE    heparin (porcine) in 0.9% NaCl 30,000 unit/1,000 mL perfusion irrigation 50-1,000 mL  50-1,000 mL Other PRN    sodium chloride (NS) flush 5-40 mL  5-40 mL IntraVENous Q8H    heparinized saline 2 units/mL infusion    CONTINUOUS    chlorhexidine (PERIDEX) 0.12 % mouthwash 15 mL  15 mL Oral Q12H    sodium chloride (NS) flush 5-40 mL  5-40 mL IntraVENous Q8H    0.45% sodium chloride infusion  10 mL/hr IntraVENous CONTINUOUS    0.9% sodium chloride infusion  9 mL/hr IntraVENous CONTINUOUS    naloxone (NARCAN) injection 0.4 mg  0.4 mg IntraVENous PRN    ondansetron (ZOFRAN) injection 4 mg  4 mg IntraVENous Q4H PRN    albuterol (PROVENTIL VENTOLIN) nebulizer solution 2.5 mg  2.5 mg Nebulization Q4H PRN    aspirin chewable tablet 81 mg  81 mg Oral DAILY    midazolam (VERSED) injection 1 mg  1 mg IntraVENous Q1H PRN    magnesium oxide (MAG-OX) tablet 400 mg  400 mg Oral BID    calcium chloride 1 g in 0.9% sodium chloride 100 mL IVPB  1 g IntraVENous PRN    bisacodyL (DULCOLAX) suppository 10 mg  10 mg Rectal DAILY PRN    senna-docusate (PERICOLACE) 8.6-50 mg per tablet 1 Tablet  1 Tablet Oral BID    ELECTROLYTE REPLACEMENT NOTE: Nurse to review Serum Potassium and Magnesuim levels and Initiate Electrolyte Replacement Protocol as needed  1 Each Other PRN    magnesium sulfate 1 g/100 ml IVPB (premix or compounded)  1 g IntraVENous PRN    alteplase (CATHFLO) 1 mg in sterile water (preservative free) 1 mL injection  1 mg InterCATHeter PRN    bacitracin 500 unit/gram packet 1 Packet  1 Packet Topical PRN    pantoprazole (PROTONIX) injection 40 mg  40 mg IntraVENous DAILY    And    sodium chloride (NS) flush 10 mL  10 mL IntraVENous DAILY    dexmedeTOMidine in 0.9 % NaCl (PRECEDEX) 400 mcg/100 mL (4 mcg/mL) infusion soln  0.1-1.5 mcg/kg/hr IntraVENous TITRATE    midazolam (VERSED) injection 1 mg  1 mg IntraVENous Q4H PRN    insulin lispro (HUMALOG) injection   SubCUTAneous Q6H    propofol (DIPRIVAN) 10 mg/mL infusion  0-50 mcg/kg/min IntraVENous TITRATE    sodium bicarbonate (8.4%) 25 mEq in dextrose 5% 1,000 mL - impella purge fluid   IntraVENous TITRATE    triamcinolone acetonide (KENALOG) 0.1 % cream   Topical BID    polyethylene glycol (MIRALAX) packet 17 g  17 g Oral DAILY    digoxin (LANOXIN) tablet 0.0625 mg  0.0625 mg Oral DAILY    allopurinoL (ZYLOPRIM) tablet 50 mg  50 mg Oral DAILY    epoetin isabel-epbx (RETACRIT) injection 20,000 Units  20,000 Units SubCUTAneous Q TUE, THU & SAT    montelukast (SINGULAIR) tablet 10 mg  10 mg Oral DAILY    levothyroxine (SYNTHROID) tablet 100 mcg  100 mcg Oral Once per day on Mon Tue Wed Thu Fri Sat    hydroxypropyl methylcellulose (ISOPTO TEARS) 0.5 % ophthalmic solution 1 Drop  1 Drop Both Eyes PRN    venlafaxine-SR (EFFEXOR-XR) capsule 75 mg  75 mg Oral DAILY WITH BREAKFAST    gabapentin (NEURONTIN) capsule 100 mg  100 mg Oral QHS    arformoteroL (BROVANA) neb solution 15 mcg  15 mcg Nebulization BID RT    And    budesonide (PULMICORT) 500 mcg/2 ml nebulizer suspension  500 mcg Nebulization BID RT    sodium chloride (NS) flush 5-40 mL  5-40 mL IntraVENous Q8H    acetaminophen (TYLENOL) tablet 650 mg  650 mg Oral Q6H PRN    Or    acetaminophen (TYLENOL) suppository 650 mg  650 mg Rectal Q6H PRN    glucose chewable tablet 16 g  4 Tablet Oral PRN    dextrose (D50W) injection syrg 12.5-25 g  25-50 mL IntraVENous PRN    glucagon (GLUCAGEN) injection 1 mg  1 mg IntraMUSCular PRN         Allergies   Allergen Reactions   · Ciprofloxacin Anaphylaxis   · Shellfish Derived Anaphylaxis   · Ace Inhibitors Unknown (comments)   · Biaxin [Clarithromycin] Other (comments)   Metal taste   · Candesartan Cough   · Pcn [Penicillins] Hives     Past Medical History:   Diagnosis Date   · Acquired hypothyroidism 8/15/2016   · Anemia   RED-HF study   · Asthma   · Cardiomyopathy, nonischemic ([address]   initial dx 2001, bivHF 2008 with EF 15%, s/p biV-ICD 9/08, significant improvment in EF to 45-50%   · CKD (chronic kidney disease)   Dr Ca Parents   · CKD (chronic kidney disease) 8/15/2012   · Depression   · Diabetes ([address]   · Diabetic neuropathy ([address]   · DM (diabetes mellitus) ([address] 8/15/2012   · GERD (gastroesophageal reflux disease)   · Gout   · Hypothyroidism   · ICD (implantable cardioverter-defibrillator), biventricular, in situ 6/5/2014   · Psoriasis     Past Surgical History:   Procedure Laterality Date   · CARDIAC CATHETERIZATION 2007; 01/06/15   normal cors   · ECHO 2D ADULT 4/2010   EF 45%, improved from 1/09 (25%)   · ECHO 2D ADULT 11/2011   LVH, EF 55-60%   · HX ORTHOPAEDIC   knee   · HX PACEMAKER PLACEMENT   AICD   · STRESS TEST LEXISCAN/CARDIOLITE 3/21/12   normal perfusion, global HK 40%     Family History   Problem Relation Age of Onset   · Heart Disease Mother   · Hypertension Mother   · Lupus Sister   · Diabetes Brother     Social History     Tobacco Use   · Smoking status: Never Smoker   · Smokeless tobacco: Never Used   Substance Use Topics   · Alcohol use:  No         Review of Systems: Unable to perform due to intubated, sedated status.       Objective:   Visit Vitals  /77 (BP 1 Location: Left arm, BP Patient Position: At rest)   Pulse 80   Temp 99.3 °F (37.4 °C)   Resp 12   Ht 5' 7\" (1.702 m)   Wt 247 lb 5.7 oz (112.2 kg)   SpO2 96%   BMI 38.74 kg/m²         Physical Exam:   Constitutional: Well-developed and well-nourished. Head: Normocephalic and atraumatic. Eyes: Closed. ENT: sedated.  ET tube in place. Neck: right cordis with swan chelle and propofol  Cardiovascular: Normal rate, regular rhythm. Exam reveals no gallop and no friction rub. 2/6 systolic LSB murmur.     Pulmonary/Chest: On vent. Impella in   Abdominal: Soft, Obese. GI/: Henley catheter in place. Musculoskeletal: Symmetrical.   Vasc/lymphatic: + right axillary Impella. Neurological: Sedated. Skin Left side BIV  ICD unremarkable. Psychiatric: Sedated. Assessment/Plan:     Imaging/Studies:   LHC/RHC (01/17/2022): Severely elevated left & right side filling pressures.  Severe PH, mixed pattern with elevated wedge as well as PVR of about 6 Woods units.  Mid LAD lesion 80% involving diagonal branch ostium.  Likely prior stent in mid LAD, patent.  Reduced CI of 1.65 L/min/m2 by thermodilution & 1.6 L/min/m2 by presumed oxygen consumption by Aye. 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 RHC without milrinone (12/10/2021): High wedge pressure (35 mmHg) indicating volume overload, precapillary.  Severe pulmonary HTN. Echo (12/08/2021): LVEF 15-20%, upper normal wall thickness, LV diastolic dysfunction.  Borderline low RVEF. Mod dilated LA, mildly dilated RA.  Mild to mod . Asim Velazquez TR.  Mild to mod PH.       LHC/RHC (01/2015): No significant CAD. Mixed CM:  CAD alone did not cause this decline.  plan to revascularize when stable and can go down to cath lab for PCI of LAD. LVEF now 15-20%.   NYHA IVArmen Junk pulmonary HTN noted on last admission RHC, now with reduced CI, elevated filling pressures bilaterally, & severe pulmonary HTN.    Nuclear amyloid scan equivocal.  cMRI not possible with 4194 LV lead (2008).  However, cMRI wouldn't . S/p Impella 1/18/22 as bridge to possible transplant or to improvement and does not need it.  If not responding and not candidate for heart renal transplant, then she will be referred to hospice.    [facility] transplant has been contacted by Dr Jona Coronel      VF and TdP: Noted x 2, 4 nights ago, had 2 successful ICD shocks.    Replace K and so far NSVT short 6-7 beats  Medtronic biventricular ICD (gen change 01/14/2015, leads 09/25/2008): Device check showed proper lead & generator function.    Device is BETTY 1/22/2022     Replacement is on hold while she is still on Impella and is waiting for transplant eligibility  I have talked to her  and he gave consent   It appears that she will not likely get the generator replacement unless her LV function recovers and she does not enter hospice or transplant  Shocks for VF drained more of battery      CKD: Dr. Janel Muniz said dialysis is an option if she is considered for transplant, but otherwise will not go anywhere. Dr Jona Coronel and her team will guide her through the rest of this admission and transplant referral          Thank you for involving me in this patient's care and please call with further concerns or questions. Bernabe Guzman M.D.    Electrophysiology/Cardiology   [facility] [place]   [address], [address], [place] [address] [address], [address] [place] [address]   [phone]

## 2022-01-23 NOTE — PROGRESS NOTES
[facility]    ICU TEAM Progress Note    Name: Betty Dooley   : 1955   MRN: [id number]   Date: 2022           ICU Assessment     1. Cardiogenic Shock  2. Pulmonary hypertension  3. Acute hypoxemic respiratory failure  4. Status post Impella placement  5. RODNEY on CKD stage IV           ICU Comprehensive Plan of Care: This is a 65-year-old female with past medical history significant for moderate pulmonary hypertension on home oxygen therapy, CKD, nonischemic cardiomyopathy who was admitted to the hospital on  due to acute on chronic hypoxemic respiratory failure in light of fluid overload. Had a left heart cath on  which demonstrated single one-vessel moderate disease (mid LAD lesion) which was thought to be not a cause of cardiomyopathy. Subsequently had a right axillary Impella placed by CT surgery for potential double transplant. Awaiting assessment for heart transplant. Overnight milrinone was weaned to off by heart failure team.  The patient remains on inhaled nitric at 30 ppm, low-dose vasopressin. Chest x-ray still appears congested though improved: The patient will be started on Bumex infusion per the heart failure and nephrology teams. General/neuro: Sedation with low-dose propofol and Precedex infusion. Respiratory: Acute on chronic hypoxic respiratory failure, exacerbated by severe pulmonary hypertension and pulmonary edema. Nitric oxide at 30 ppm to help the right heart given increased blood return (Impella P8). Bumex infusion: We will work towards extubation and the patient may need inhaled nitric oxide delivered noninvasively once she gets extubated. Cardiac: Cardiogenic shock secondary to biventricular failure. Impella supported P8 providing around 4.5 liters, low-dose vasopressin, inhaled nitric oxide. Further plans on durable Vad versus transplant per heart failure and CT surgery. GI: Continue tube feeds. Pantoprazole. ID: No white count. Prophylactic cefazolin while Impella is in place. Renal: Acute on chronic kidney injury, creatinine lateral at 2.29 from 2.28. Diuresis with Diuril and Bumex infusions. Nephrology follow-up appreciated. Prophylaxis: Bivalirudin gtt given Impella. Subjective:   Progress Note: 1/23/2022      Reason for ICU Admission: Cardiogenic shock    HPI: As above    Overnight Events:   1/23/2022      POD:  3 Days Post-Op    S/P:   Procedure(s):  RIGHT AXILLARY IMPELLA INSERTION    Active Problem List:     Problem List  Date Reviewed: 12/22/2021          Codes Class    Acute respiratory failure with hypoxia (HCC) ICD-10-CM: J96.01  ICD-9-CM: 518.81         CHF exacerbation (HCC) ICD-10-CM: I50.9  ICD-9-CM: 428.0         Type 2 diabetes mellitus with diabetic neuropathy (formerly [facility]) ICD-10-CM: E11.40  ICD-9-CM: 250.60, 357.2         Type 2 diabetes with nephropathy ([facility] ICD-10-CM: E11.21  ICD-9-CM: 250.40, 583.81         Obesity, morbid ([facility] ICD-10-CM: E66.01  ICD-9-CM: 278.01         Acquired hypothyroidism ICD-10-CM: E03.9  ICD-9-CM: 990. 9         Dysthymia ICD-10-CM: F34.1  ICD-9-CM: 300.4         ICD (implantable cardioverter-defibrillator), biventricular, in situ ICD-10-CM: Z95.810  ICD-9-CM: V45.02     Overview Signed 1/14/2015 11:11 AM by Christine Lugo MD     Generator [employer] change 1/14/2015             Dyslipidemia ICD-10-CM: U25.7  ICD-9-CM: 272.4         CKD (chronic kidney disease) ICD-10-CM: N18.9  ICD-9-CM: 070. 9         Cardiomyopathy, nonischemic ([facility] ICD-10-CM: I42.8  ICD-9-CM: 425.4     Overview Signed 10/10/2011  6:40 AM by Jimmy Ibrahim MD     initial dx 2001, bivHF 2008 with EF 15%, s/p biV-ICD 9/08, significant improvment in EF to 45-50%             Anemia in chronic renal disease (Chronic) ICD-10-CM: N18.9, D63.1  ICD-9-CM: 285.21         HTN (hypertension) ICD-10-CM: I10  ICD-9-CM: 401.9         GERD (gastroesophageal reflux disease) (Chronic) ICD-10-CM: K21.9  ICD-9-CM: 530.81         Gout (Chronic) ICD-10-CM: M10.9  ICD-9-CM: 274.9         Pulmonary HTN (HCC) (Chronic) ICD-10-CM: I27.20  ICD-9-CM: 416.8               Past Medical History:      has a past medical history of Acquired hypothyroidism (8/15/2016), Anemia, Asthma, Cardiomyopathy, nonischemic ([facility] CKD (chronic kidney disease), CKD (chronic kidney disease) (8/15/2012), Depression, Diabetes ([address] Diabetic neuropathy ([address] DM (diabetes mellitus) ([facility] (8/15/2012), GERD (gastroesophageal reflux disease), Gout, Hypothyroidism, ICD (implantable cardioverter-defibrillator), biventricular, in situ (6/5/2014), and Psoriasis. She has no past medical history of Abuse, Adult physical abuse, Arrhythmia, Arthritis, Asthma, Autoimmune disease ([address] CAD (coronary artery disease), Calculus of kidney, Cancer ([address] Chronic pain, COPD, Headache(784.0), Hypercholesteremia, Liver disease, Psychotic disorder ([address] PUD (peptic ulcer disease), Seizures ([address] Stroke ([address] Thromboembolus ([address] or Unspecified deficiency anemia. Past Surgical History:      has a past surgical history that includes echo 2d adult (4/2010); cardiac catheterization (2007; 01/06/15); echo 2d adult (11/2011); stress test lexiscan/cardiolite (3/21/12); hx pacemaker placement; and hx orthopaedic. Home Medications:     Prior to Admission medications    Medication Sig Start Date End Date Taking? Authorizing Provider   levocetirizine (XYZAL) 5 mg tablet TAKE 1 TABLET BY MOUTH EVERY DAY 1/6/22  Yes Al Mcdonough MD   apremilast Han Marie) 30 mg tab Take 30 mg by mouth two (2) times daily as needed. Yes Provider, Historical   azelastine (ASTEPRO) 205.5 mcg (0.15 %) 1 [place] by Both Nostrils route two (2) times daily as needed. Yes Provider, Historical   docusate sodium (COLACE) 100 mg capsule Take 100 mg by mouth daily as needed for Constipation.    Yes Provider, Historical   levothyroxine (SYNTHROID) 100 mcg tablet Take 100 mcg by mouth six (6) days a week. Everyday except Sunday   Yes Provider, Historical   allopurinoL (ZYLOPRIM) 100 mg tablet TAKE 1 TABLET BY MOUTH EVERY DAY 1/5/22  Yes Teri Quinteros MD   calcitRIOL (ROCALTROL) 0.5 mcg capsule TAKE 1 CAPSULE BY MOUTH EVERY DAY 1/5/22  Yes Teri Quinteros MD   carvediloL (COREG) 6.25 mg tablet Take 1 Tablet by mouth two (2) times daily (with meals). 12/22/21  Yes Florence Wang B NP   isosorbide dinitrate (ISORDIL) 5 mg tablet Take 1 Tablet by mouth three (3) times daily. 12/22/21  Yes Florence Drain B, NP   hydrALAZINE (APRESOLINE) 10 mg tablet Take 1 Tablet by mouth three (3) times daily. 12/22/21  Yes Nury Florez NP   benzonatate (Tessalon Perles) 100 mg capsule Take 100 mg by mouth three (3) times daily as needed for Cough. Yes Jamar, MD Bee   montelukast (SINGULAIR) 10 mg tablet TAKE 1 TABLET BY MOUTH EVERY DAY 11/5/21  Yes Teri Quinteros MD   bumetanide (BUMEX) 2 mg tablet Take 1 tab in the morning and 0.5 tab in the evening 11/5/21  Yes Guille Patel MD   fluticasone propionate (FLONASE) 50 mcg/actuation nasal spray One spray each nostril daily 11/1/21  Yes Teri Quinteros MD   gabapentin (NEURONTIN) 100 mg capsule Take 1 Capsule by mouth nightly. Max Daily Amount: 100 mg. 10/29/21  Yes Teri Quinteros MD   budesonide (PULMICORT) 180 mcg/actuation aepb inhaler Take 2 Puffs by inhalation two (2) times a day. 5/27/21  Yes Teri Quinteros MD   ammonium lactate (AMLACTIN) 12 % topical cream Apply  to affected area two (2) times a day. rub in to affected area well 11/4/20  Yes Teri Quinteros MD   insulin NPH/insulin regular (NOVOLIN 70/30) 100 unit/mL (70-30) injection 70 units two times a day 8/15/16  Yes Micheal Holland MD   calcipotriene (DOVONEX) 0.005 % topical cream Apply  to affected area three (3) times daily.    Yes Provider, Historical   triamcinolone acetonide (KENALOG) 0.5 % ointment Apply  to affected area two (2) times daily as needed. use thin layer    Yes Provider, Historical   multivitamin (ONE A DAY) tablet Take 1 Tab by mouth daily. Yes Provider, Historical   ferrous sulfate (IRON) 325 mg (65 mg elemental iron) tablet Take 325 mg by mouth daily. Yes Provider, Historical   aspirin 81 mg tablet Take 81 mg by mouth daily. Yes Provider, Historical   venlafaxine-SR (EFFEXOR-XR) 75 mg capsule TAKE 1 CAPSULE BY MOUTH EVERY DAY 22   German Antoine MD   cholecalciferol (VITAMIN D3) (2,000 UNITS /50 MCG) cap capsule TAKE 1 CAPSULE BY MOUTH TWO (2) TIMES A DAY. 22   German Antoine MD       Allergies/Social/Family History: Allergies   Allergen Reactions    Ciprofloxacin Anaphylaxis    Shellfish Derived Anaphylaxis    Sildenafil Other (comments)    Ace Inhibitors Unknown (comments)    Biaxin [Clarithromycin] Other (comments)     Metal taste    Candesartan Cough    Pcn [Penicillins] Hives      Social History     Tobacco Use    Smoking status: Never Smoker    Smokeless tobacco: Never Used   Substance Use Topics    Alcohol use: No      Family History   Problem Relation Age of Onset    Heart Disease Mother     Hypertension Mother     Lupus Sister     Diabetes Brother        Review of Systems:     A comprehensive review of systems was negative except for that written in the HPI.     Objective:   Vital Signs:  Visit Vitals  /77 (BP 1 Location: Left arm, BP Patient Position: At rest)   Pulse 80   Temp 99.3 °F (37.4 °C)   Resp 12   Ht 5' 7\" (1.702 m)   Wt 112.2 kg (247 lb 5.7 oz)   SpO2 96%   BMI 38.74 kg/m²    O2 Flow Rate (L/min): 6 l/min (weaned) O2 Device: Endotracheal tube Temp (24hrs), Av.7 °F (37.1 °C), Min:98.1 °F (36.7 °C), Max:99.4 °F (37.4 °C)    CVP (mmHg): 13 mmHg (22 0700)      Intake/Output:     Intake/Output Summary (Last 24 hours) at 2022 0938  Last data filed at 2022 0700  Gross per 24 hour   Intake 3007.85 ml   Output 3930 ml   Net -922.15 ml       Physical Exam:    General appearance: alert, cooperative, no distress, appears older than stated age  Lungs: rales bilaterally  Heart: regular rate and rhythm, S1, S2 normal, no murmur, click, rub or gallop  Abdomen: soft, non-tender. Bowel sounds normal. No masses,  no organomegaly  Extremities: Mild edema      LABS AND  DATA: Personally reviewed  Recent Labs     01/23/22  0405 01/22/22  1246   WBC 9.7 9.6   HGB 7.7* 7.4*   HCT 29.1* 28.1*    188     Recent Labs     01/23/22  0405 01/22/22  2300 01/22/22  1741 01/22/22  1741     --   --  142   K 4.3 3.7   < > 4.0     --   --  108   CO2 28  --   --  28   BUN 52*  --   --  50*   CREA 2.28*  --   --  2.29*   *  --   --  244*   CA 9.7  --   --  8.8   MG 3.2* 1.4*   < > 1.5*   PHOS 3.6  --   --  3.6    < > = values in this interval not displayed. Recent Labs     01/23/22  0405 01/22/22  1741 01/22/22  1246 01/22/22  1246     --   --  112   TP 7.6  --   --  6.6   ALB 3.4* 3.5   < > 3.5   GLOB 4.2*  --   --  3.1    < > = values in this interval not displayed. Recent Labs     01/23/22  0405 01/22/22  1246   APTT 56.4* 59.5*      Recent Labs     01/21/22  1143   PHI 7.42   PCO2I 42.2   PO2I 87   FIO2I 40     No results for input(s): CPK, CKMB, TROIQ, BNPP in the last 72 hours. Hemodynamics:   PAP: PAP Systolic: 58 (80/42/88 8340) CO: CO (l/min): 6.1 l/min (01/23/22 0700)   Wedge:   CI: CI (l/min/m2): 2.8 l/min/m2 (01/23/22 0700)   CVP:  CVP (mmHg): 13 mmHg (01/23/22 0700) SVR:       PVR:       Ventilator Settings:  Mode Rate Tidal Volume Pressure FiO2 PEEP   Assist control   460 ml  8 cm H2O 40 % 5 cm H20     Peak airway pressure: 30 cm H2O    Minute ventilation: 8.63 l/min        MEDS: Reviewed    Chest X-Ray:  CXR Results  (Last 48 hours)               01/23/22 0423  XR CHEST PORT Final result    Impression:  Stable bilateral lung infiltrates. Stable lines, tubes and devices. .  .            Narrative:  INDICATION:  post-Impella EXAM: Chest single view. COMPARISON: 1/22/2022. FINDINGS: A single frontal view of the chest at 0415 hours shows relatively   stable bilateral interstitial infiltrates with a mid inspiratory effort. ET   tube, AICD from the left, PA catheter from the right all stable. Impella device   is stable. The heart, mediastinum and pulmonary vasculature are stable . The   bony thorax is unremarkable for age. Samira Fuentes 01/22/22 1647  XR CHEST PORT Final result    Impression:  Appropriate position of support lines and tubes. Pulmonary edema. Narrative:  EXAM:  XR CHEST PORT       INDICATION:  Endotracheal tube repositioning       COMPARISON: 1/22/2022       TECHNIQUE: AP portable upright chest view       FINDINGS: Tip of the endotracheal tube terminates 3.8 cm above the rohit. Impella device, Brusly-Guille catheter, cardiac pacing wires, all in appropriate   position. Mild to moderate interstitial edema without pleural effusion or   pneumothorax. Right PICC line terminates in the SVC.           01/22/22 0423  XR CHEST PORT Final result    Impression:  Stable support lines and tubes. Pulmonary vascular congestion and interstitial edema without pleural effusions. Narrative:  EXAM:  XR CHEST PORT       INDICATION:  Status post impella       COMPARISON: 1/21/2022       TECHNIQUE: AP portable upright chest view       FINDINGS: Endotracheal tube, enteric tube, right subclavian and patella, and   right PICC line are unchanged. Cardiac pacing wires are unchanged. Heart size   and mediastinal contours are stable. Low lung volumes with pulmonary vascular   congestion and interstitial edema bilaterally. No pleural effusion or   pneumothorax. Multidisciplinary Rounds Completed:   Yes    ABCDEF Bundle/Checklist Completed:  Yes    SPECIAL EQUIPMENT  None    DISPOSITION  Stay in ICU    CRITICAL CARE CONSULTANT NOTE  I had a face to face encounter with the patient, reviewed and interpreted patient data including clinical events, labs, images, vital signs, I/O's, and examined patient. I have discussed the case and the plan and management of the patient's care with the consulting services, the bedside nurses and the respiratory therapist.      NOTE OF PERSONAL INVOLVEMENT IN CARE   This patient has a high probability of imminent, clinically significant deterioration, which requires the highest level of preparedness to intervene urgently. I participated in the decision-making and personally managed or directed the management of the following life and organ supporting interventions that required my frequent assessment to treat or prevent imminent deterioration. I personally spent 60 minutes of critical care time. This is time spent at this critically ill patient's bedside actively involved in patient care as well as the coordination of care. This does not include any procedural time which has been billed separately.     Eric Beth DO  Staff Intensivist/Anesthesiologist  [facility] Critical Care  1/23/2022

## 2022-01-23 NOTE — PROGRESS NOTES
[facility]   [address] [facility], [address] [place] [address]  Phone: [phone]   [phone]    [email or web address]     Nephrology Progress Note    Patient Name : Ana María Soliz      : 1955     MRN : [id number]  Date of Admission : 2022  Date of Servive : 22    CC:  Follow up for ARF       Assessment and Plan   RODNEY on CKD :  - Suspect 2/2 CRS  - Cr stable   - Was started on Bumex drip by Heart failure team   - daily labs and I/Os  - no urgent need for RRT at this time    CKD stage IV:  - Etiology: DM, HTN, CRS  - Renal US: suggestive of CKD, B/L benign renal cysts   - baseline Cr 2.4-2.6 mg/dl      Acute on chronic HFrEF  NI CMP, LVEF 15 to 20%  NYHA class III-IV  S/p BiV pacer/ICD-s/p CRT  R axillary Impella implanted 22  Possible Tx to [facility] for transplant    Hypervolemia:  - RHC showing severely elevated filling pressures, pulm HTN  - diuretics and milrinone as above    Morbid obesity  Type II DM  HTN  Hypothyroidism  Pulmonary hypertension  Gout  Psoriasis       Interval History:  Seen and examined. DW Nurse. Now on Bumex drip. May got to [facility] for transplant. Vented. K and CO2 are ok. Review of Systems: A comprehensive review of systems was negative except for that written in the HPI.     Current Medications:   Current Facility-Administered Medications   Medication Dose Route Frequency    insulin NPH (NOVOLIN N, HUMULIN N) injection 25 Units  25 Units SubCUTAneous DAILY    insulin NPH (NOVOLIN N, HUMULIN N) injection 15 Units  15 Units SubCUTAneous QHS    bumetanide (BUMEX) 0.25 mg/mL infusion  1 mg/hr IntraVENous CONTINUOUS    DOBUTamine (DOBUTREX) 500 mg/250 mL (2,000 mcg/mL) infusion  0-10 mcg/kg/min IntraVENous TITRATE    0.9% sodium chloride infusion 250 mL  250 mL IntraVENous PRN    albumin human 25% (BUMINATE) solution 12.5 g  12.5 g IntraVENous DAILY    milrinone (PRIMACOR) 20 MG/100 ML D5W infusion  0.125 mcg/kg/min IntraVENous CONTINUOUS    vasopressin (VASOSTRICT) 20 Units in 0.9% sodium chloride 100 mL infusion  0-0.1 Units/min IntraVENous TITRATE    PHENYLephrine (NORMA-SYNEPHRINE) 30 mg in 0.9% sodium chloride 250 mL infusion   mcg/min IntraVENous TITRATE    niCARdipine (CARDENE) 25 mg in 0.9% sodium chloride 250 mL infusion  0-15 mg/hr IntraVENous TITRATE    bivalirudin (ANGIOMAX) 250 mg in 0.9% sodium chloride (MBP/ADV) 50 mL MBP  0.02-2.5 mg/kg/hr IntraVENous TITRATE    hydrALAZINE (APRESOLINE) 20 mg/mL injection 5 mg  5 mg IntraVENous Q4H PRN    bumetanide (BUMEX) injection 1 mg  1 mg IntraVENous Q4H PRN    ceFAZolin (ANCEF) 1 g in sterile water (preservative free) 10 mL IV syringe  1 g IntraVENous Q12H    fentaNYL (PF) 1,500 mcg/30 mL (50 mcg/mL) infusion  0-200 mcg/hr IntraVENous TITRATE    heparin (porcine) in 0.9% NaCl 30,000 unit/1,000 mL perfusion irrigation 50-1,000 mL  50-1,000 mL Other PRN    sodium chloride (NS) flush 5-40 mL  5-40 mL IntraVENous Q8H    heparinized saline 2 units/mL infusion    CONTINUOUS    chlorhexidine (PERIDEX) 0.12 % mouthwash 15 mL  15 mL Oral Q12H    sodium chloride (NS) flush 5-40 mL  5-40 mL IntraVENous Q8H    0.45% sodium chloride infusion  10 mL/hr IntraVENous CONTINUOUS    0.9% sodium chloride infusion  9 mL/hr IntraVENous CONTINUOUS    naloxone (NARCAN) injection 0.4 mg  0.4 mg IntraVENous PRN    ondansetron (ZOFRAN) injection 4 mg  4 mg IntraVENous Q4H PRN    albuterol (PROVENTIL VENTOLIN) nebulizer solution 2.5 mg  2.5 mg Nebulization Q4H PRN    aspirin chewable tablet 81 mg  81 mg Oral DAILY    midazolam (VERSED) injection 1 mg  1 mg IntraVENous Q1H PRN    magnesium oxide (MAG-OX) tablet 400 mg  400 mg Oral BID    calcium chloride 1 g in 0.9% sodium chloride 100 mL IVPB  1 g IntraVENous PRN    bisacodyL (DULCOLAX) suppository 10 mg  10 mg Rectal DAILY PRN    senna-docusate (PERICOLACE) 8.6-50 mg per tablet 1 Tablet  1 Tablet Oral BID    ELECTROLYTE REPLACEMENT NOTE: Nurse to review Serum Potassium and Magnesuim levels and Initiate Electrolyte Replacement Protocol as needed  1 Each Other PRN    magnesium sulfate 1 g/100 ml IVPB (premix or compounded)  1 g IntraVENous PRN    alteplase (CATHFLO) 1 mg in sterile water (preservative free) 1 mL injection  1 mg InterCATHeter PRN    bacitracin 500 unit/gram packet 1 Packet  1 Packet Topical PRN    pantoprazole (PROTONIX) injection 40 mg  40 mg IntraVENous DAILY    And    sodium chloride (NS) flush 10 mL  10 mL IntraVENous DAILY    dexmedeTOMidine in 0.9 % NaCl (PRECEDEX) 400 mcg/100 mL (4 mcg/mL) infusion soln  0.1-1.5 mcg/kg/hr IntraVENous TITRATE    midazolam (VERSED) injection 1 mg  1 mg IntraVENous Q4H PRN    insulin lispro (HUMALOG) injection   SubCUTAneous Q6H    propofol (DIPRIVAN) 10 mg/mL infusion  0-50 mcg/kg/min IntraVENous TITRATE    sodium bicarbonate (8.4%) 25 mEq in dextrose 5% 1,000 mL - impella purge fluid   IntraVENous TITRATE    triamcinolone acetonide (KENALOG) 0.1 % cream   Topical BID    polyethylene glycol (MIRALAX) packet 17 g  17 g Oral DAILY    digoxin (LANOXIN) tablet 0.0625 mg  0.0625 mg Oral DAILY    allopurinoL (ZYLOPRIM) tablet 50 mg  50 mg Oral DAILY    epoetin isaebl-epbx (RETACRIT) injection 20,000 Units  20,000 Units SubCUTAneous Q TUE, THU & SAT    montelukast (SINGULAIR) tablet 10 mg  10 mg Oral DAILY    levothyroxine (SYNTHROID) tablet 100 mcg  100 mcg Oral Once per day on Mon Tue Wed Thu Fri Sat    hydroxypropyl methylcellulose (ISOPTO TEARS) 0.5 % ophthalmic solution 1 Drop  1 Drop Both Eyes PRN    venlafaxine-SR (EFFEXOR-XR) capsule 75 mg  75 mg Oral DAILY WITH BREAKFAST    gabapentin (NEURONTIN) capsule 100 mg  100 mg Oral QHS    arformoteroL (BROVANA) neb solution 15 mcg  15 mcg Nebulization BID RT    And    budesonide (PULMICORT) 500 mcg/2 ml nebulizer suspension  500 mcg Nebulization BID RT    sodium chloride (NS) flush 5-40 mL  5-40 mL IntraVENous Q8H  acetaminophen (TYLENOL) tablet 650 mg  650 mg Oral Q6H PRN    Or    acetaminophen (TYLENOL) suppository 650 mg  650 mg Rectal Q6H PRN    glucose chewable tablet 16 g  4 Tablet Oral PRN    dextrose (D50W) injection syrg 12.5-25 g  25-50 mL IntraVENous PRN    glucagon (GLUCAGEN) injection 1 mg  1 mg IntraMUSCular PRN      Allergies   Allergen Reactions    Ciprofloxacin Anaphylaxis    Shellfish Derived Anaphylaxis    Sildenafil Other (comments)    Ace Inhibitors Unknown (comments)    Biaxin [Clarithromycin] Other (comments)     Metal taste    Candesartan Cough    Pcn [Penicillins] Hives       Objective:  Vitals:    Vitals:    01/23/22 0700 01/23/22 0800 01/23/22 0821 01/23/22 1100   BP:  106/71     Pulse: 80 80  80   Resp: 12 12  12   Temp:  99.2 °F (37.3 °C)  98.7 °F (37.1 °C)   SpO2: 96% 96% 96% 99%   Weight:       Height:         Intake and Output:  01/23 0701 - 01/23 1900  In: -   Out: 810 [Urine:810]  01/21 1901 - 01/23 0700  In: 4614.9 [I.V.:3296.1]  Out: 3847 [Urine:4955]    Physical Examination:    General: Sedated on the vent  HEENT:           ETT in place   Neck:  Supple, no mass  Resp:  Reduced bibasilar  Breath sounds  CV:  RRR, ++ LE edema  GI:  Soft, NT, + BS, obese  Neurologic:  Sedated  :                  Henley in place    [x]    High complexity decision making was performed  []    Patient is at high-risk of decompensation with multiple organ involvement    Lab Data Personally Reviewed: I have reviewed all the pertinent labs, microbiology data and radiology studies during assessment.     Recent Labs     01/23/22  0405 01/22/22  2300 01/22/22  1741 01/22/22  1246 01/22/22  0739 01/22/22  0343 01/22/22  0343 01/21/22  1228 01/21/22  0436 01/21/22  0433 01/20/22  2311 01/20/22  1609 01/20/22  1609     --  142 141  --   --  142  --  142  --   --    < > 142   K 4.3 3.7 4.0 4.2 4.2   < > 4.2   < > 3.7  --    < >   < > 3.4*     --  108 109*  --   --  110*  --  109*  --   --    < > 107   CO2 28  --  28 26  --   --  26  --  27  --   --    < > 29   *  --  244* 265*  --   --  217*  --  141*  --   --    < > 158*   BUN 52*  --  50* 46*  --   --  42*  --  39*  --   --    < > 44*   CREA 2.28*  --  2.29* 2.22*  --   --  2.29*  --  2.18*  --   --    < > 2.33*   CA 9.7  --  8.8 9.0  --   --  9.1  --  8.8  --   --    < > 8.9   MG 3.2* 1.4* 1.5* 2.5* 1.8   < > 2.6*   < > 2.5*  --    < >   < > 1.5*   PHOS 3.6  --  3.6  --   --   --  3.1  --   --  2.8  --   --  2.7   ALB 3.4*  --  3.5 3.5  --   --  3.2*  --  3.0*  --   --    < > 2.8*   ALT 7*  --   --  10*  --   --  10*  --  9*  --   --   --   --     < > = values in this interval not displayed. Recent Labs     01/23/22  0405 01/22/22  1246 01/22/22  0347 01/21/22  0436 01/20/22  1404   WBC 9.7 9.6 8.3 7.4  --    HGB 7.7* 7.4* 6.7* 7.2* 7.3*   HCT 29.1* 28.1* 26.4* 27.9* 27.3*    188 212 258  --      Lab Results   Component Value Date/Time    Specimen Description: URINE 10/22/2013 01:32 PM    Specimen Description: URINE 01/23/2013 04:40 PM    Specimen Description: LEG TISSUE 02/12/2009 12:00 AM    Specimen Description: LEG (LEFT) 01/29/2009 03:06 AM     Lab Results   Component Value Date/Time    Culture result: MRSA NOT PRESENT 01/19/2022 12:41 PM    Culture result:  01/19/2022 12:41 PM     Screening of patient nares for MRSA is for surveillance purposes and, if positive, to facilitate isolation considerations in high risk settings. It is not intended for automatic decolonization interventions per se as regimens are not sufficiently effective to warrant routine use.     Culture result: NO GROWTH 4 DAYS 01/19/2022 12:41 PM    Culture result: MIXED SKIN ROSANNA ISOLATED 10/22/2013 01:32 PM    Culture result:  01/23/2013 04:40 PM     ENTEROCOCCUS FAECALIS GROUP D  MIXED SKIN ROSANNA ISOLATED     Recent Results (from the past 24 hour(s))   PTT    Collection Time: 01/22/22 12:46 PM   Result Value Ref Range    aPTT 59.5 (H) 22.1 - 31.0 sec    aPTT, therapeutic range     58.0 - 77.0 SECS   CBC W/O DIFF    Collection Time: 01/22/22 12:46 PM   Result Value Ref Range    WBC 9.6 3.6 - 11.0 K/uL    RBC 3.02 (L) 3.80 - 5.20 M/uL    HGB 7.4 (L) 11.5 - 16.0 g/dL    HCT 28.1 (L) 35.0 - 47.0 %    MCV 93.0 80.0 - 99.0 FL    MCH 24.5 (L) 26.0 - 34.0 PG    MCHC 26.3 (L) 30.0 - 36.5 g/dL    RDW 20.6 (H) 11.5 - 14.5 %    PLATELET 829 989 - 444 K/uL    MPV 8.8 (L) 8.9 - 12.9 FL    NRBC 0.0 0  WBC    ABSOLUTE NRBC 0.00 0.00 - 8.96 K/uL   METABOLIC PANEL, COMPREHENSIVE    Collection Time: 01/22/22 12:46 PM   Result Value Ref Range    Sodium 141 136 - 145 mmol/L    Potassium 4.2 3.5 - 5.1 mmol/L    Chloride 109 (H) 97 - 108 mmol/L    CO2 26 21 - 32 mmol/L    Anion gap 6 5 - 15 mmol/L    Glucose 265 (H) 65 - 100 mg/dL    BUN 46 (H) 6 - 20 MG/DL    Creatinine 2.22 (H) 0.55 - 1.02 MG/DL    BUN/Creatinine ratio 21 (H) 12 - 20      GFR est AA 27 (L) >60 ml/min/1.73m2    GFR est non-AA 22 (L) >60 ml/min/1.73m2    Calcium 9.0 8.5 - 10.1 MG/DL    Bilirubin, total 0.9 0.2 - 1.0 MG/DL    ALT (SGPT) 10 (L) 12 - 78 U/L    AST (SGOT) 8 (L) 15 - 37 U/L    Alk.  phosphatase 112 45 - 117 U/L    Protein, total 6.6 6.4 - 8.2 g/dL    Albumin 3.5 3.5 - 5.0 g/dL    Globulin 3.1 2.0 - 4.0 g/dL    A-G Ratio 1.1 1.1 - 2.2     MAGNESIUM    Collection Time: 01/22/22 12:46 PM   Result Value Ref Range    Magnesium 2.5 (H) 1.6 - 2.4 mg/dL   LACTIC ACID    Collection Time: 01/22/22 12:46 PM   Result Value Ref Range    Lactic acid 0.6 0.4 - 2.0 MMOL/L   GLUCOSE, POC    Collection Time: 01/22/22 12:50 PM   Result Value Ref Range    Glucose (POC) 265 (H) 65 - 117 mg/dL    Performed by Pearley Grain    RENAL FUNCTION PANEL    Collection Time: 01/22/22  5:41 PM   Result Value Ref Range    Sodium 142 136 - 145 mmol/L    Potassium 4.0 3.5 - 5.1 mmol/L    Chloride 108 97 - 108 mmol/L    CO2 28 21 - 32 mmol/L    Anion gap 6 5 - 15 mmol/L    Glucose 244 (H) 65 - 100 mg/dL    BUN 50 (H) 6 - 20 MG/DL Creatinine 2.29 (H) 0.55 - 1.02 MG/DL    BUN/Creatinine ratio 22 (H) 12 - 20      GFR est AA 26 (L) >60 ml/min/1.73m2    GFR est non-AA 21 (L) >60 ml/min/1.73m2    Calcium 8.8 8.5 - 10.1 MG/DL    Phosphorus 3.6 2.6 - 4.7 MG/DL    Albumin 3.5 3.5 - 5.0 g/dL   MAGNESIUM    Collection Time: 01/22/22  5:41 PM   Result Value Ref Range    Magnesium 1.5 (L) 1.6 - 2.4 mg/dL   GLUCOSE, POC    Collection Time: 01/22/22  5:42 PM   Result Value Ref Range    Glucose (POC) 223 (H) 65 - 117 mg/dL    Performed by Carolann Plasencia    MAGNESIUM    Collection Time: 01/22/22 11:00 PM   Result Value Ref Range    Magnesium 1.4 (L) 1.6 - 2.4 mg/dL   POTASSIUM    Collection Time: 01/22/22 11:00 PM   Result Value Ref Range    Potassium 3.7 3.5 - 5.1 mmol/L   GLUCOSE, POC    Collection Time: 01/22/22 11:35 PM   Result Value Ref Range    Glucose (POC) 273 (H) 65 - 117 mg/dL    Performed by Kelly Villa    MAGNESIUM    Collection Time: 01/23/22  4:05 AM   Result Value Ref Range    Magnesium 3.2 (H) 1.6 - 2.4 mg/dL   NT-PRO BNP    Collection Time: 01/23/22  4:05 AM   Result Value Ref Range    NT pro-BNP 10,417 (H) <072 PG/ML   METABOLIC PANEL, COMPREHENSIVE    Collection Time: 01/23/22  4:05 AM   Result Value Ref Range    Sodium 140 136 - 145 mmol/L    Potassium 4.3 3.5 - 5.1 mmol/L    Chloride 107 97 - 108 mmol/L    CO2 28 21 - 32 mmol/L    Anion gap 5 5 - 15 mmol/L    Glucose 233 (H) 65 - 100 mg/dL    BUN 52 (H) 6 - 20 MG/DL    Creatinine 2.28 (H) 0.55 - 1.02 MG/DL    BUN/Creatinine ratio 23 (H) 12 - 20      GFR est AA 26 (L) >60 ml/min/1.73m2    GFR est non-AA 21 (L) >60 ml/min/1.73m2    Calcium 9.7 8.5 - 10.1 MG/DL    Bilirubin, total 0.5 0.2 - 1.0 MG/DL    ALT (SGPT) 7 (L) 12 - 78 U/L    AST (SGOT) 8 (L) 15 - 37 U/L    Alk.  phosphatase 112 45 - 117 U/L    Protein, total 7.6 6.4 - 8.2 g/dL    Albumin 3.4 (L) 3.5 - 5.0 g/dL    Globulin 4.2 (H) 2.0 - 4.0 g/dL    A-G Ratio 0.8 (L) 1.1 - 2.2     DIGOXIN    Collection Time: 01/23/22  4:05 AM Result Value Ref Range    Digoxin level 1.0 0.90 - 2.00 NG/ML   LACTIC ACID    Collection Time: 01/23/22  4:05 AM   Result Value Ref Range    Lactic acid 0.7 0.4 - 2.0 MMOL/L   PROCALCITONIN    Collection Time: 01/23/22  4:05 AM   Result Value Ref Range    Procalcitonin 0.77 ng/mL   PHOSPHORUS    Collection Time: 01/23/22  4:05 AM   Result Value Ref Range    Phosphorus 3.6 2.6 - 4.7 MG/DL   PTT    Collection Time: 01/23/22  4:05 AM   Result Value Ref Range    aPTT 56.4 (H) 22.1 - 31.0 sec    aPTT, therapeutic range     58.0 - 77.0 SECS   CBC W/O DIFF    Collection Time: 01/23/22  4:05 AM   Result Value Ref Range    WBC 9.7 3.6 - 11.0 K/uL    RBC 3.16 (L) 3.80 - 5.20 M/uL    HGB 7.7 (L) 11.5 - 16.0 g/dL    HCT 29.1 (L) 35.0 - 47.0 %    MCV 92.1 80.0 - 99.0 FL    MCH 24.4 (L) 26.0 - 34.0 PG    MCHC 26.5 (L) 30.0 - 36.5 g/dL    RDW 20.8 (H) 11.5 - 14.5 %    PLATELET 026 946 - 603 K/uL    MPV 9.0 8.9 - 12.9 FL    NRBC 0.0 0  WBC    ABSOLUTE NRBC 0.00 0.00 - 0.01 K/uL   LD    Collection Time: 01/23/22  4:05 AM   Result Value Ref Range     (H) 81 - 246 U/L   CARBOXYHEMOGLOBIN    Collection Time: 01/23/22  4:23 AM   Result Value Ref Range    Carboxy-Hgb 1.9 1 - 2 %    Methemoglobin 0.3 0 - 1.4 %    tHb 7.5 (LL) 14 - 17 g/dL    Oxyhemoglobin 64.4 (LL) 94 - 97 %    O2 SATURATION 66 (L) 95 - 99 %    SITE Pulmonary Artery      Sample source VENOUS BLOOD      Critical value read back       Called to [facility] on 01/23/2022 at 04:27   GLUCOSE, POC    Collection Time: 01/23/22  5:21 AM   Result Value Ref Range    Glucose (POC) 275 (H) 65 - 117 mg/dL    Performed by Jayden Shield    GLUCOSE, POC    Collection Time: 01/23/22 11:19 AM   Result Value Ref Range    Glucose (POC) 239 (H) 65 - 117 mg/dL    Performed by Tess Richards            I have reviewed the flowsheets. Chart and Pertinent Notes have been reviewed. No change in PMH ,family and social history from Consult note.       Niharika Lazaro, MD [facility]

## 2022-01-23 NOTE — PROGRESS NOTES
ADVANCED HEART FAILURE NOTE    Hemodynamics reviewed remotely.     SBP 97/64, HR 85, CVP 11  Impella P8  CO/CI 7.1/3.2, 57/24/38  Milrinone 0.25,  0.04    Decrease milrinone to 0.125 and wean to CI > 2.3  Wean vasopressin to MAP > 65mmHg  Keep CVP 8-10 with prn diuretics    David Villarreal MD  [facility]

## 2022-01-24 NOTE — PROGRESS NOTES
[facility] Interval Update    Chart reviewed, d/w bedside RN, Cecilia Gonsalez, and Christina Lares. Inadequate diuresis on intermittent diuretics with persistently elevated filling pressures. Begin Bumex gtt with close lab monitoring. Please see previous note for additional details regarding transplant candidacy/risk of HD.      Signed By: Indira Shaffer NP     January 24, 2022

## 2022-01-24 NOTE — PROGRESS NOTES
Occupational Therapy  01/24/22     OT reassessment attempted at 9:23 AM however per nursing patient remains sedated on vent and not appropriate to participate with therapy at this time. Per ABCDEF protocol, will work with patient when PEEP is 10.0 or less, FIO2 60% or less,(currently FIO2 40% and PEEP 5) and patient is following basic commands. Will follow patient peripherally. Recommend nursing to complete with patient, as able and per protocol, in order to promote cardiopulmonary systems, maintain strength, endurance and independence:   -bed in chair position or maximize full reverse Trendelenburg position to facilitate upright activity with foot board and non-skid footwear on 3x/day ~30-60 mins each   -ROM during bathing B UEs and LEs  -positioning to prevent contractures and edema  RASS -1/0/+1 (during SAT)  Active ROM   Sitting (bed in chair position)   Standing (reverse Trendelenburg)   ADLs   RASS -3/2  Passive ROM   Sit (bed in chair position)   RASS -5/-4  Passive ROM       Thank you for your assistance.    Gianfranco Gonzalez, OTR/L

## 2022-01-24 NOTE — PROGRESS NOTES
[address] [facility] in [facility],  [facility]  Inpatient Progress Note      Patient name: Swati Crowder  Patient : 1955  Patient MRN: [id number]  Consulting MD: Irvin Bernal MD  Date of service: 22    REASON FOR REFERRAL:  Management of chronic systolic heart failure     PLAN OF CARE:   · 77 y/o female with chronic renal failure and new onset severe cardiomyopathy, LVEF 15% (diagnosed 21), stage D, NYHA class IV; admitted for pulmonary edema and severe volume overloadd by RHC  · Most likely etiology of acute deterioration of LVEF is chronic volume overload with compensatory tachycardia in the setting of progressive renal failure +/- coronary artery disease (80% LAD)   · These are potentially reversible causes of severe LV dysfunction; by guidelines patient needs at least 3 months of euvolemia, HR < 100bpm and revascularization to prove she has non-reversible new, severe HF to be eligible for cardiac replacement therapies, including heart/kidney transplantation, unless cannot be weaned off support after revascularization, d/w Dr. Helen Malik and Dr. Shalini Bingham with [facility]   · Patient would like aggressive approach to allow her heart to recover, including dialysis +/- revascularization with goal of Impella as bridge to LV recovery or transplant, discussed at length with patient and her sister at prior family meeting  · D/w patient, her family, CT surgery, nephrology and primary cardiologist plan to support her with Impella as bridge to LV recovery or transplant.  Patient, family and all services above were in agreement with the following anticipated outcomes:  · 1/3 chance of LV recovery and discharge home on medical therapy or chronic dialysis  · 1/3 chance of LV non-recovery on Impella, evaluation and transfer for listing for heart transplant/kidney  · 1/3 chance of LV non-recovery and evaluation that reveals patient is not candidate for LVAD/heart/kidney transplant and then wean of support to comfort care/hospice  · Impella 5.5 implanted by Dr. Karen Sanchez 1/18, partial unloading of LV at P8 (Tip adjacent to septum),, inotropes weaned but patient would required more aggressive diuresis/volume removal to drop LVEDP and allow Roro wean and extubation  ·  plan is to scope +/- revascularize  · Patient dose not have apparent contraindications to dual organ transplantation; [facility] will accept patient on integrillin drip if LV does not recover after revascularization and on dialysis if needed for volume management; anticipated waiting time at BMI 38 and blood type AB for hear/kidney transplant is currently approximately 10 days; d/w Dr. Andre Whitmore from [address] [place] [address]. · D/w Cecilia Gtuierrez, and bedside RN Ilia Ward      RECOMMENDATIONS:  POD #6 Impella 5.5 implant   Vent management per Intensivist   TTE 1/22 showed large LVIDd with Impella adjacent to septum, RV function acceptable, Impella catheter 5.1 cm from AV; reviewed by Dr. Geovanna Singh - no plans to reposition at this time due to adequate cardiac index and Impella flow   Cefazolin q12h for Impella site ppx   Maintain PAC for hemodynamic optimization; goal CI > 2.3, CVP 8-10 mmHg SVR 1000, SBP < 110 mmHg (via radial arterial line)   Continue Roro at 30 ppm   Intolerant to sildenafil due to marked hypotension   Continue Bumex gtt at 1.5mg/hr; goal net (-)1-2L daily   Keep K> 4 and Mg >2  Hydralazine 5 mg IV q4h PRN SBP > 110 mmHg   Intolerant of GDMT due to hemodynamic in stability   BiV-ICD programmed at 80 bpm; device is at EOL, recommend to delay ICD generator change until final plans for transplant are established - Per Dr. Mistry Lobe patient is not pacer dependent  Place defib patches on patient for ppx  Continue digoxin 0.0625 mg daily; goal 0.7-1.2   Allopurinol 50mg daily per nephrology  S/p Venofer 200 mg IV x 2   Hgb stable p 1 unit PRBCs  CT head/chest/abd/pelvis w/o contrast for txp eval  GI consult for EGD and Colonoscopy for txp eval  Consult to cardiology for possible LAD stent for revascularization   Check epo level    FOB when able   pTT goal  40-60 due to persistent anemia on bivalirudin   Will need OP PSG  Invitae pending   Palliative consult appreciated   Nutritionist consult  Heart failure education   Advanced care plan present on file     All other care per primary team     IMPRESSION:  Volume overload  Acute on Chronic systolic heart failure, requiring Impella 5.5 implant 1/18/22   · Stage D, NYHA class IV symptoms  · Non-ischemic cardiomyopathy, LVEF 15%  · Normal coronaries  · PYP equivocal for amyloid   Pulmonary hypertension, severe  RV failure  S/p BiV-ICD  Cardiac risk factors:  · HL  · DM2  · Morbid obesity, BMI 40  Acute on chronic renal failure, stage IV  GERD  Anemia of chronic disease  Gout  VF s/p ICD shock x 2      Interval Events:  POD #6 Impella placement   Impella  P8  Improved diuresis, net (-) 2.4L   Creatinine stable at 2.27  proBNP increased up to 20,000  Remains on Roro and intubated  Off pressors and inotropes        LIFE GOALS:  Patient's personal goals include: being home with family, still ambulating around without getting too tired. Important upcoming milestones or family events: none  The patient identifies the following as important for living well: remaining idependent and mobile; being able to get out of the house with . Patient verbalized willingness to be on home milrinone and evaluation for both heart and kidney transplants. Verbalizes she would have family supporting her decision on this. SHAMIKA Lucero is a 78 y.o.  female with a history of NICM, chronic hypoxic respiratory failure secondary to pulmonary HTN, hypothyroidism, CKD, GERD, and DM II who presented to [facility] as a transfer from another facility for acute on chronic hypoxic respiratory failure. Reports running out of O2 at home resulting in SOB and dizziness.      Upon arrival to the ED she was found to have O2 sats in the 70s, requiring 4L NC O2. Rapid covid test was negative. ProNT-BNP was 08035, K+5.2, BUN/CR x/2.54 and elevated d-dimer. Chest xray showing pulmonary edema vs. Atypical pneumonia. VQ scan showed low probability for PE. Per Dr. Tish Tejada, NICM, LVEF 15-20% during recent admission. LVEF previously normalized with CRT. NYHA III-IV. No ACEi/ARB due to renal dysfunction. GDMT dosing limited by low BP. Patient's PCP is Dr. Mary Kinney, and she sees Dr. Tish Tejada primarily for cardiac care due to Dr. Dedra Nielson transfering practice. Patient historically seen by nephrology but has not had follow up care in the past three years. CARDIAC IMAGING:  Echo 1/20/22 - LVEF 20-25%, RV moderately dilated, Impella catheter 5.9 cm from AV   Echo 12/8/21- LVEF 15-20%, mild to Mod MR, LVIDd 5.09cm, TAPSE 1.94cm  Echo 4/22/19- LVEF 60%, trace MR,  LVIDd 4.24cm, TAPSE 1.65cm   Echo 4/24/18- LVEF 60%, trivial MR, LVIDd 4.79cm, TAPSE 2.25cm      EKG- 1/4/22 ST with A sense and V paced rhythm     [facility] 2015- No significant CAD  NST 2014- reversible LAD involvement      ICD interrogation     HEMODYNAMICS:  [facility] 1/17/21: PAP 83/52 mmHg, RAP 27 mmHg, PCWP 45 mmHg, CI 1.6  [facility] 12/10/21: PAP 76/48/57, RAP 20, PCWP 35, CI 2.26     CPEST not done  6MW not done     OTHER IMAGING:  CXR Results  (Last 48 hours)                             01/13/22 1035   XR CHEST PORT Final result      Impression:   No significant change in congestion and interstitial and alveolar   opacities which may reflect edema or infectious infiltrate. Narrative:   EXAM: XR CHEST PORT       INDICATION: pul edema       COMPARISON: Chest x-ray 1/10/2022. FINDINGS: A portable AP radiograph of the chest was obtained at 10:19 hours. The   patient is on a cardiac monitor. The lungs appear grossly stable with congestion   and interstitial/airspace opacities with no pneumothorax or pleural effusion.    Right PICC line traverses expected course of tip in the region of the atriocaval   junction. Pacemaker-ICD generator body projects over the left chest wall with   intact appearing leads traversing in expected course. . The cardiac and   mediastinal contours and pulmonary vascularity are normal.  Atherosclerotic   calcifications affect the aortic arch. The chest wall structures and visualized   upper abdomen show no acute findings with incidental note of degenerative spine   and shoulder changes. PHYSICAL EXAM:  Visit Vitals  Visit Vitals  BP (!) 92/59   Pulse 80   Temp 98.9 °F (37.2 °C)   Resp 16   Ht 5' 7\" (1.702 m)   Wt 244 lb 4.3 oz (110.8 kg)   SpO2 99%   BMI 38.26 kg/m²          Hemodynamics:   CO: CO (l/min): 6.7 l/min   CI: CI (l/min/m2): 3 l/min/m2   CVP: CVP (mmHg): 9 mmHg (01/24/22 1400)   SVR: SVR (dyne*sec)/cm5: 863 (dyne*sec)/cm5 (01/24/22 0953)   PAP Systolic: PAP Systolic: 59 (87/73/98 7142)   PAP Diastolic: PAP Diastolic: 25 (37/50/80 6415)   PVR:     SV02: SVO2 (%): 76 % (01/24/22 1400)   SCV02:      Impella 5.5  P-8  Flow: 4.5lpm   Purge flow 8    Physical Exam  Physical Exam  Vitals and nursing note reviewed. Constitutional:       Comments: Intubated and sedated    Cardiovascular:      Rate and Rhythm: Normal rate and regular rhythm. Pulses: Normal pulses. Heart sounds: Normal heart sounds. Pulmonary:      Breath sounds: Decreased air movement present. Comments: Intubated   Abdominal:      General: There is no distension. Palpations: Abdomen is soft. Musculoskeletal:         General: Swelling present. Skin:     General: Skin is warm and dry. ROS unable to obtain due to patient condition (intubated, sedated).       PAST MEDICAL HISTORY:  Past Medical History:   Diagnosis Date    Acquired hypothyroidism 8/15/2016    Anemia     RED-HF study    Asthma     Cardiomyopathy, nonischemic ([facility]     initial dx 2001, bivHF 2008 with EF 15%, s/p biV-ICD 9/08, significant improvment in EF to 45-50%    CKD (chronic kidney disease)     Dr Ninfa Sampson    CKD (chronic kidney disease) 8/15/2012    Depression     Diabetes ([facility]     Diabetic neuropathy ([facility]     DM (diabetes mellitus) ([facility] 8/15/2012    GERD (gastroesophageal reflux disease)     Gout     Hypothyroidism     ICD (implantable cardioverter-defibrillator), biventricular, in situ 6/5/2014    Psoriasis        PAST SURGICAL HISTORY:  Past Surgical History:   Procedure Laterality Date    CARDIAC CATHETERIZATION  2007; 01/06/15    normal cors    ECHO 2D ADULT  4/2010    EF 45%, improved from 1/09 (25%)    ECHO 2D ADULT  11/2011    LVH, EF 55-60%    HX ORTHOPAEDIC      knee    HX PACEMAKER PLACEMENT      AICD    STRESS TEST LEXISCAN/CARDIOLITE  3/21/12    normal perfusion, global HK 40%       FAMILY HISTORY:  Family History   Problem Relation Age of Onset    Heart Disease Mother     Hypertension Mother     Lupus Sister     Diabetes Brother        SOCIAL HISTORY:  Social History     Socioeconomic History    Marital status:    Tobacco Use    Smoking status: Never Smoker    Smokeless tobacco: Never Used   Substance and Sexual Activity    Alcohol use: No    Drug use: No    Sexual activity: Never   Social History Narrative    . Nonsmoker. Disability       LABORATORY RESULTS:     Labs Latest Ref Rng & Units 1/24/2022 1/24/2022 1/24/2022 1/23/2022 1/23/2022 1/23/2022 1/22/2022   WBC 3.6 - 11.0 K/uL - - 9.1 - - 9.7 -   RBC 3.80 - 5.20 M/uL - - 3.29(L) - - 3.16(L) -   Hemoglobin 11.5 - 16.0 g/dL - - 7. 9(L) - - 7. 7(L) -   Hematocrit 35.0 - 47.0 % - - 30. 3(L) - - 29. 1(L) -   MCV 80.0 - 99.0 FL - - 92.1 - - 92.1 -   Platelets 703 - 017 K/uL - - 194 - - 188 -   Lymphocytes 12 - 49 % - - - - - - -   Monocytes 5 - 13 % - - - - - - -   Eosinophils 0 - 7 % - - - - - - -   Basophils 0 - 1 % - - - - - - -   Albumin 3.5 - 5.0 g/dL 3. 3(L) - 3. 3(L) 3.5 3.5 3. 4(L) -   Calcium 8.5 - 10.1 MG/DL 9.8 9.6 9.8 9.2 9.2 9.7 - Glucose 65 - 100 mg/dL 215(H) 207(H) 176(H) 262(H) 295(H) 233(H) -   BUN 6 - 20 MG/DL 67(H) 69(H) 69(H) 63(H) 56(H) 52(H) -   Creatinine 0.55 - 1.02 MG/DL 2.34(H) 2.27(H) 2.27(H) 2.39(H) 2.29(H) 2.28(H) -   Sodium 136 - 145 mmol/L 143 144 143 141 141 140 -   Potassium 3.5 - 5.1 mmol/L 3.6 3.6 3. 1(L) 3.6 3.9 4.3 3.7   TSH 0.36 - 3.74 uIU/mL - - - - - - -   LDH 81 - 246 U/L - - 315(H) - - 275(H) -   Some recent data might be hidden     Lab Results   Component Value Date/Time    TSH 3.08 01/11/2022 05:13 AM    TSH 1.85 12/15/2021 01:06 PM    TSH 1.01 11/05/2020 09:11 AM    TSH 2.740 04/30/2019 11:21 AM    TSH 0.519 02/21/2012 10:53 AM    TSH 8.29 (H) 01/20/2010 01:59 PM       ALLERGY:  Allergies   Allergen Reactions    Ciprofloxacin Anaphylaxis    Shellfish Derived Anaphylaxis    Sildenafil Other (comments)    Ace Inhibitors Unknown (comments)    Biaxin [Clarithromycin] Other (comments)     Metal taste    Candesartan Cough    Pcn [Penicillins] Hives        CURRENT MEDICATIONS:    Current Facility-Administered Medications:     potassium chloride 20 mEq in 50 ml IVPB, 20 mEq, IntraVENous, Q1H, Karely Albarado NP, Last Rate: 50 mL/hr at 01/24/22 1342, 20 mEq at 01/24/22 1342    perflutren lipid microspheres (DEFINITY) in NS bolus IV, 1.5 mL, IntraVENous, RAD ONCE, Sean Henriquez MD    insulin NPH (NOVOLIN N, HUMULIN N) injection 25 Units, 25 Units, SubCUTAneous, DAILY, Sean Henriquez MD, 25 Units at 01/24/22 0828    insulin NPH (NOVOLIN N, HUMULIN N) injection 15 Units, 15 Units, SubCUTAneous, QHS, Sean Henriquez MD, 15 Units at 01/23/22 2327    bumetanide (BUMEX) 0.25 mg/mL infusion, 1.5 mg/hr, IntraVENous, CONTINUOUS, Penny Calix MD, Last Rate: 6 mL/hr at 01/24/22 0740, 1.5 mg/hr at 01/24/22 0740    DOBUTamine (DOBUTREX) 500 mg/250 mL (2,000 mcg/mL) infusion, 0-10 mcg/kg/min, IntraVENous, TITRATE, Arjun Albarado NP, Held at 01/23/22 1200    0.9% sodium chloride infusion 250 mL, 250 mL, IntraVENous, PRN, Sridevi Chavez MD    albumin human 25% (BUMINATE) solution 12.5 g, 12.5 g, IntraVENous, DAILY, Bill Albarado NP, 12.5 g at 01/24/22 0826    milrinone (PRIMACOR) 20 MG/100 ML D5W infusion, 0.125 mcg/kg/min, IntraVENous, CONTINUOUS, Kaela Cantu MD, Stopped at 01/22/22 2242    vasopressin (VASOSTRICT) 20 Units in 0.9% sodium chloride 100 mL infusion, 0-0.1 Units/min, IntraVENous, TITRATE, Lizett Albarado NP, Stopped at 01/23/22 1048    PHENYLephrine (NORMA-SYNEPHRINE) 30 mg in 0.9% sodium chloride 250 mL infusion,  mcg/min, IntraVENous, TITRATE, Amara Sutton MD, Held at 01/21/22 1200    niCARdipine (CARDENE) 25 mg in 0.9% sodium chloride 250 mL infusion, 0-15 mg/hr, IntraVENous, TITRATE, Sridevi ODELL MD, Stopped at 01/19/22 2109    bivalirudin (ANGIOMAX) 250 mg in 0.9% sodium chloride (MBP/ADV) 50 mL MBP, 0.02-2.5 mg/kg/hr, IntraVENous, TITRATE, Lizett Albarado NP, Last Rate: 2.4 mL/hr at 01/24/22 0422, 0.1008 mg/kg/hr at 01/24/22 0422    hydrALAZINE (APRESOLINE) 20 mg/mL injection 5 mg, 5 mg, IntraVENous, Q4H PRN, Bill Albarado NP, 5 mg at 01/24/22 1336    bumetanide (BUMEX) injection 1 mg, 1 mg, IntraVENous, Q4H PRN, Lizett Albarado NP, 1 mg at 01/22/22 1721    ceFAZolin (ANCEF) 1 g in sterile water (preservative free) 10 mL IV syringe, 1 g, IntraVENous, Q12H, Bill Albarado NP, 1 g at 01/24/22 1336    fentaNYL (PF) 1,500 mcg/30 mL (50 mcg/mL) infusion, 0-200 mcg/hr, IntraVENous, TITRATE, Sridevi ODELL MD, Last Rate: 2 mL/hr at 01/24/22 1119, 100 mcg/hr at 01/24/22 1119    heparin (porcine) in 0.9% NaCl 30,000 unit/1,000 mL perfusion irrigation 50-1,000 mL, 50-1,000 mL, Other, PRN, Sam Gomez PA    sodium chloride (NS) flush 5-40 mL, 5-40 mL, IntraVENous, Q8H, Sam Gomez PA, 10 mL at 01/24/22 1334    heparinized saline 2 units/mL infusion, , , CONTINUOUS, FiserIsela MD, 1,000 Units at 01/18/22 1416   chlorhexidine (PERIDEX) 0.12 % mouthwash 15 mL, 15 mL, Oral, Q12H, Davian Vázquez DO, 15 mL at 01/24/22 0826    sodium chloride (NS) flush 5-40 mL, 5-40 mL, IntraVENous, Q8H, Sam Gomez PA, 10 mL at 01/24/22 1334    0.45% sodium chloride infusion, 10 mL/hr, IntraVENous, CONTINUOUS, Thomas Gomez PA    0.9% sodium chloride infusion, 9 mL/hr, IntraVENous, CONTINUOUS, Thomas Gomez PA, Last Rate: 9 mL/hr at 01/24/22 1200, 9 mL/hr at 01/24/22 1200    naloxone (NARCAN) injection 0.4 mg, 0.4 mg, IntraVENous, PRN, Sam Gomez PA    ondansetron [facility]) injection 4 mg, 4 mg, IntraVENous, Q4H PRN, Sam Gomez PA    albuterol (PROVENTIL VENTOLIN) nebulizer solution 2.5 mg, 2.5 mg, Nebulization, Q4H PRN, Sam Gomez PA    aspirin chewable tablet 81 mg, 81 mg, Oral, DAILY, TYE Tomas, 81 mg at 01/24/22 9654    midazolam (VERSED) injection 1 mg, 1 mg, IntraVENous, Q1H PRN, Sam Gomez PA    magnesium oxide (MAG-OX) tablet 400 mg, 400 mg, Oral, BID, Thomas Gomez PA, 400 mg at 01/24/22 5770    calcium chloride 1 g in 0.9% sodium chloride 100 mL IVPB, 1 g, IntraVENous, PRN, Sam Gomez PA    bisacodyL (DULCOLAX) suppository 10 mg, 10 mg, Rectal, DAILY PRN, Sam Gomez PA    senna-docusate (PERICOLACE) 8.6-50 mg per tablet 1 Tablet, 1 Tablet, Oral, BID, TYE Tomas, 1 Tablet at 01/24/22 0494    ELECTROLYTE REPLACEMENT NOTE: Nurse to review Serum Potassium and Magnesuim levels and Initiate Electrolyte Replacement Protocol as needed, 1 Each, Other, PRN, Thomas Gomez PA    magnesium sulfate 1 g/100 ml IVPB (premix or compounded), 1 g, IntraVENous, PRN, Thomas Gomez, PA    alteplase (CATHFLO) 1 mg in sterile water (preservative free) 1 mL injection, 1 mg, InterCATHeter, PRN, Thomas Gomez, PA    bacitracin 500 unit/gram packet 1 Packet, 1 Packet, Topical, PRN, Thomas Gomez, PA    pantoprazole (PROTONIX) injection 40 mg, 40 mg, IntraVENous, DAILY, 40 mg at 01/24/22 0826 **AND** sodium chloride (NS) flush 10 mL, 10 mL, IntraVENous, DAILY, Davian Vázquez DO, 10 mL at 01/24/22 0826    dexmedeTOMidine in 0.9 % NaCl (PRECEDEX) 400 mcg/100 mL (4 mcg/mL) infusion soln, 0.1-1.5 mcg/kg/hr, IntraVENous, TITRATE, Davian Vázquez DO, Last Rate: 14.2 mL/hr at 01/24/22 1333, 0.5 mcg/kg/hr at 01/24/22 1333    midazolam (VERSED) injection 1 mg, 1 mg, IntraVENous, Q4H PRN, Davian Vázquez DO, 1 mg at 01/18/22 1719    insulin lispro (HUMALOG) injection, , SubCUTAneous, Q6H, Davian Vázquez DO, 4 Units at 01/24/22 1120    propofol (DIPRIVAN) 10 mg/mL infusion, 0-50 mcg/kg/min, IntraVENous, TITRATE, Davian Vázquez DO, Last Rate: 17 mL/hr at 01/24/22 1303, 25 mcg/kg/min at 01/24/22 1303    sodium bicarbonate (8.4%) 25 mEq in dextrose 5% 1,000 mL - impella purge fluid, , IntraVENous, TITRATE, Sam Gomez PA, Last Rate: 8 mL/hr at 01/24/22 1409, New Bag at 01/24/22 1409    triamcinolone acetonide (KENALOG) 0.1 % cream, , Topical, BID, Thomas Gomez PA, Given at 01/24/22 0827    polyethylene glycol (MIRALAX) packet 17 g, 17 g, Oral, DAILY, Thomas Gomez PA, 17 g at 01/24/22 0827    digoxin (LANOXIN) tablet 0.0625 mg, 0.0625 mg, Oral, DAILY, Sam Gomez PA, 0.0625 mg at 01/24/22 0827    allopurinoL (ZYLOPRIM) tablet 50 mg, 50 mg, Oral, DAILY, Sam Gomez PA, 50 mg at 01/24/22 0699    epoetin isabel-epbx (RETACRIT) injection 20,000 Units, 20,000 Units, SubCUTAneous, Q TUE, THU & SAT, Thomas Gmoez PA, 20,000 Units at 01/22/22 2100    montelukast (SINGULAIR) tablet 10 mg, 10 mg, Oral, DAILY, Sam Gomez PA, 10 mg at 01/24/22 4496    levothyroxine (SYNTHROID) tablet 100 mcg, 100 mcg, Oral, Once per day on Mon Tue Wed Thu Fri Sat, Sam Gomez PA, 100 mcg at 01/24/22 8914    hydroxypropyl methylcellulose (ISOPTO TEARS) 0.5 % ophthalmic solution 1 Drop, 1 Drop, Both Eyes, PRN, Jason Edelmira [place], PA, 1 Drop at 01/06/22 1727    venlafaxine-SR (EFFEXOR-XR) capsule 75 mg, 75 mg, Oral, DAILY WITH BREAKFAST, LongEdelmira [place], PA, 75 mg at 01/17/22 1025    gabapentin (NEURONTIN) capsule 100 mg, 100 mg, Oral, QHS, Jason, Edelmira [place], PA, 100 mg at 01/23/22 2323    arformoteroL (BROVANA) neb solution 15 mcg, 15 mcg, Nebulization, BID RT, 15 mcg at 01/24/22 0831 **AND** budesonide (PULMICORT) 500 mcg/2 ml nebulizer suspension, 500 mcg, Nebulization, BID RT, Sam Gomez PA, 500 mcg at 01/24/22 0831    sodium chloride (NS) flush 5-40 mL, 5-40 mL, IntraVENous, Q8H, Sam Gomez PA, 10 mL at 01/24/22 1334    acetaminophen (TYLENOL) tablet 650 mg, 650 mg, Oral, Q6H PRN, 650 mg at 01/24/22 0423 **OR** acetaminophen (TYLENOL) suppository 650 mg, 650 mg, Rectal, Q6H PRN, Sam Gomez PA    glucose chewable tablet 16 g, 4 Tablet, Oral, PRN, Sam Gomez PA    dextrose (D50W) injection syrg 12.5-25 g, 25-50 mL, IntraVENous, PRN, Edelmira Gomez PA    glucagon (GLUCAGEN) injection 1 mg, 1 mg, IntraMUSCular, PRN, Edelmira Gomez PA    PATIENT CARE TEAM:  Patient Care Team:  Margie Lowery MD as PCP - General (Internal Medicine)  Margie Lowery MD as PCP - [address] [place] [address] [facility]  Divine Pelletier MD as Consulting Provider (Internal Medicine)  Wang Zuñiga MD (Dermatology)  Anali Sr MD (Nephrology)  Marlena Goodpasture, MD as Consulting Provider (Cardiology)  Yu Black MD (Cardiology)  Terry Wilson MD as Consulting Provider (Pulmonary Disease)     Thank you for allowing me to participate in this patient's care. Lisa Saravia NP  [facility] [address]  [address] [place] [address], [address]  Phone: [phone]      [facility] ATTENDING ADDENDUM    Patient was seen and examined in person. Data and notes were reviewed.  I have discussed and agree with the plan as noted in the NP note above without further additions.     Cinthia Castillo MD PhD  [facility]

## 2022-01-24 NOTE — PROGRESS NOTES
[facility]  ICU Progress Note    Admit Date: 2022    S/P R Axillary Impella 5.5     Subjective/24 Hour Summary:   Pt seen with Dr. Helena Montilla. impella at P8, 4.4 lpm flow. bicarb purge & bival systemically. On vaso 0.01 and bumex drip. Nitric at 30 ppm.      Objective:   Vitals:  Blood pressure (!) 94/58, pulse 80, temperature 100.4 °F (38 °C), resp. rate 16, height 5' 7\" (1.702 m), weight 244 lb 4.3 oz (110.8 kg), SpO2 97 %. Temp (24hrs), Av.4 °F (37.4 °C), Min:98.7 °F (37.1 °C), Max:100.4 °F (38 °C)    Hemodynamics:   CO: CO (l/min): 6.8 l/min   CI: CI (l/min/m2): 3.1 l/min/m2   CVP: CVP (mmHg): 12 mmHg (22 07)   SVR: SVR (dyne*sec)/cm5: 909 (dyne*sec)/cm5 (22 0104)   PAP Systolic: PAP Systolic: 59 ( 0409)   PAP Diastolic: PAP Diastolic: 27 ( 1010)   PVR:     SV02: SVO2 (%): 72 % (22 0000)   SCV02:      EKG/Rhythm:  SR    Ventilator Settings:  Mode Rate Tidal Volume Pressure FiO2 PEEP   Assist control   460 ml  8 cm H2O 40 % 5 cm H20     Peak airway pressure: 30 cm H2O    Minute ventilation: 8.41 l/min        Oxygen Therapy:  Oxygen Therapy  O2 Sat (%): 97 % (22)  Pulse via Oximetry: 80 beats per minute (22)  O2 Device: Endotracheal tube (22)  Skin Assessment: Clean, dry, & intact (22)  Skin Protection for O2 Device: Yes (22)  Orientation: Bilateral (22)  Location: Cheek (22)  Interventions: Mouth Care (01/23/22 0800)  O2 Flow Rate (L/min): 6 l/min (weaned) (22 0740)  O2 Temperature: 98.4 °F (36.9 °C) (22 1940)  FIO2 (%): 40 % (22 0000)    CXR:  CXR Results  (Last 48 hours)               22 0423  XR CHEST PORT Final result    Impression:  Stable bilateral lung infiltrates. Stable lines, tubes and devices. .  . Narrative:  INDICATION:  post-Impella        EXAM: Chest single view. COMPARISON: 2022.        FINDINGS: A single frontal view of the chest at 0415 hours shows relatively   stable bilateral interstitial infiltrates with a mid inspiratory effort. ET   tube, AICD from the left, PA catheter from the right all stable. Impella device   is stable. The heart, mediastinum and pulmonary vasculature are stable . The   bony thorax is unremarkable for age. Charlie Gutter 01/22/22 1647  XR CHEST PORT Final result    Impression:  Appropriate position of support lines and tubes. Pulmonary edema. Narrative:  EXAM:  XR CHEST PORT       INDICATION:  Endotracheal tube repositioning       COMPARISON: 1/22/2022       TECHNIQUE: AP portable upright chest view       FINDINGS: Tip of the endotracheal tube terminates 3.8 cm above the rohit. Impella device, [place]-Guille catheter, cardiac pacing wires, all in appropriate   position. Mild to moderate interstitial edema without pleural effusion or   pneumothorax. Right PICC line terminates in the SVC. Admission Weight: Last Weight   Weight: 264 lb 1.8 oz (119.8 kg) Weight: 244 lb 4.3 oz (110.8 kg)     Intake / Output / Drain:  Current Shift: No intake/output data recorded. Last 24 hrs.:     Intake/Output Summary (Last 24 hours) at 1/24/2022 0744  Last data filed at 1/24/2022 0700  Gross per 24 hour   Intake 2285.66 ml   Output 4735 ml   Net -2449.34 ml       EXAM:  General:    NAD                                                                                          Lungs:   Clear to auscultation bilaterally. Incision:  No erythema, drainage or swelling. Heart:  Regular rate and rhythm, S1, S2 normal, no murmur, click, rub or gallop. Abdomen:   Soft, non-tender. Bowel sounds normal. No masses,  No organomegaly. Extremities:  No edema. PPP.     Neurologic: Intubated, sedated     Labs:   Recent Labs     01/24/22  0418 01/23/22  0405   WBC 9.1 9.7   HGB 7.9* 7.7*   HCT 30.3* 29.1*    188     Recent Labs     01/24/22  0418 01/23/22  1627    141   K 3.1* 3.6    107   CO2 31 29   BUN 69* 63*   CREA 2.27* 2.39*   * 262*   CA 9.8 9.2   MG 2.6* 2.9*   PHOS 3.1 3.1     Recent Labs     22  0418 22  1627 22  1117 22  0405     --   --  112   TP 7.6  --   --  7.6   ALB 3.3* 3.5   < > 3.4*   GLOB 4.3*  --   --  4.2*    < > = values in this interval not displayed. Recent Labs     22  0418 22  1627   APTT 52.6* 55.7*      Recent Labs     22  1143   PHI 7.42   PCO2I 42.2   PO2I 87   FIO2I 40     No results for input(s): CPK, CKMB, TROIQ, BNPP in the last 72 hours. Assessment:     Active Problems:    Acute respiratory failure with hypoxia ([facility] [address] (2022)         Plan/Recommendations/Medical Decision Makin. Acute on Chronic Systolic CHF class III/IV: S/P right axillary impella 5.5 . Cont bicarb via purge. Milrinone 0.125, nitric. Cont systemic bival. Per F. Notably, PAP were 329I systolic prior to impella placement so nitric wean will be difficult. Discussed with F. 2. Acute on Chronic Respiratory insufficiency: On home O2. . Vent wean per intensivist.  3. CAD: Multivessel CAD on [facility] 22. ASA 81. BB on hold due to cardiogenic shock. Recommend starting high intensity statin when feasible, per primary/HF  4. RODNEY on Chronic CKD Stage IV: Renal following. Bumex drip started. 5. Gout: allopurinol  6. DM: Per primary service  7. GERD: Home meds  8. Hypothyroidism: on synthroid  9. Depression: Home meds  10. Anemia: on EPO, stable post op. Dispo: impella functioning well. Cont systemic bival & bicarb via purge. No cardiac surgery issues otherwise. Further HF management per F. Remainder of medical management per primary.     Signed By: TYE Jimenez

## 2022-01-24 NOTE — PROGRESS NOTES
[facility]    ICU TEAM Progress Note    Name: Penny Hatchet   : 1955   MRN: [id number]   Date: 2022           ICU Assessment   70-year-old female with past medical history significant for moderate pulmonary hypertension on home oxygen therapy, CKD, nonischemic cardiomyopathy who was admitted to the hospital on  due to acute on chronic hypoxemic respiratory failure in light of fluid overload. Had a left heart cath on  which demonstrated single one-vessel moderate disease (mid LAD lesion) which was thought to be not a cause of cardiomyopathy. Subsequently had a right axillary Impella placed by CT surgery for potential double transplant. 1. Cardiogenic Shock  2. Pulmonary hypertension  3. Acute hypoxemic respiratory failure  4. Status post Impella placement  5.  RODNEY on CKD stage IV    Interval events    -remains intubated, Impella in situ, on inhaled nitric oxide         ICU Comprehensive Plan of Care:     Neuro-analgesia/sedation with opioids, propofol and Precedex as needed, continue gabapentin and venlafaxine, other delirium prevention strategies    Cardiac-continue hemodynamic monitoring-cardiac index in the high twos, CVP at about 12, PAP's in the high 50s, EF of 25% on most recent echo, Impella at P8, on aspirin, digoxin, wean inhaled nitric oxide as tolerated, follow-up cardiac surgery/advised heart failure recommendations    Pulmonary-bilateral infiltrates, continue lung protective mechanical ventilation, continue montelukast    GI-Ensure tube feeds, PPI for GI prophylaxis    Renal-CKD-currently diuresing-on Bumex drip, follow-up nephrology recommendations, monitor urine output closely, dose meds renally, correct electrolyte derangements as needed, continue allopurinol    Hematology-on systemic Angiomax-monitor for bleeding, continue EPO    ID-on Ancef while Impella in situ    Endocrinology-keep glucose less than 180, continue Synthroid    Objective: Vital Signs:  Visit Vitals  /64   Pulse 80   Temp 98.9 °F (37.2 °C)   Resp 16   Ht 5' 7\" (1.702 m)   Wt 110.8 kg (244 lb 4.3 oz)   SpO2 100%   BMI 38.26 kg/m²    O2 Flow Rate (L/min): 6 l/min (weaned) O2 Device: Endotracheal tube,Ventilator Temp (24hrs), Av.6 °F (37.6 °C), Min:98.7 °F (37.1 °C), Max:100.4 °F (38 °C)    CVP (mmHg): 8 mmHg (22 1500)      Intake/Output:     Intake/Output Summary (Last 24 hours) at 2022 1531  Last data filed at 2022 1400  Gross per 24 hour   Intake 2656.83 ml   Output 4135 ml   Net -1478.17 ml       Physical Exam:    General-intubated, sedated  Neuro-pupils reactive, opens eyes to voice, withdraws in all 4  Cardiac-RRR  Lungs-clear anteriorly  Abdomen-soft, nontender, nondistended  Extremities-warm, 1+ edema    LABS AND  DATA: Personally reviewed  Recent Labs     228 22  0405   WBC 9.1 9.7   HGB 7.9* 7.7*   HCT 30.3* 29.1*    188     Recent Labs     22  11122  0418     144 143   K 3.6  3.6 3.1*     108 105   CO2 30  30 31   BUN 67*  69* 69*   CREA 2.34*  2.27* 2.27*   *  207* 176*   CA 9.8  9.6 9.8   MG 2.5* 2.6*   PHOS 2.7 3.1     Recent Labs     22  1116 22  0418 22  11122  0405   AP  --  101  --  112   TP  --  7.6  --  7.6   ALB 3.3* 3.3*   < > 3.4*   GLOB  --  4.3*  --  4.2*    < > = values in this interval not displayed. Recent Labs     22  1627   APTT 52.6* 55.7*      No results for input(s): PHI, PCO2I, PO2I, FIO2I in the last 72 hours. No results for input(s): CPK, CKMB, TROIQ, BNPP in the last 72 hours.     Hemodynamics:   PAP: PAP Systolic: 57 (59 5930) CO: CO (l/min): 6.7 l/min (22 1500)   Wedge:   CI: CI (l/min/m2): 3 l/min/m2 (22 1500)   CVP:  CVP (mmHg): 8 mmHg (22 1500) SVR:       PVR:       Ventilator Settings:  Mode Rate Tidal Volume Pressure FiO2 PEEP   Assist control,Volume control   460 ml  8 cm H2O 40 % 5 cm H20     Peak airway pressure: 33 cm H2O    Minute ventilation: 6.98 l/min        MEDS: Reviewed    Chest X-Ray:  CXR Results  (Last 48 hours)               01/24/22 0411  XR CHEST PORT Final result    Impression:  Slight worsening pulmonary edema. Stable life support lines and   tubes. Narrative: Indication: Follow-up Impella, abnormal chest x-ray       Comparison 1/23/2022. Portable exam obtained at 404 demonstrates life-support   lines and tubes unchanged in position, including Impella device. There has been   slight worsening of the pulmonary edema compared to the prior exam.           01/23/22 0423  XR CHEST PORT Final result    Impression:  Stable bilateral lung infiltrates. Stable lines, tubes and devices. .  . Narrative:  INDICATION:  post-Impella        EXAM: Chest single view. COMPARISON: 1/22/2022. FINDINGS: A single frontal view of the chest at 0415 hours shows relatively   stable bilateral interstitial infiltrates with a mid inspiratory effort. ET   tube, AICD from the left, PA catheter from the right all stable. Impella device   is stable. The heart, mediastinum and pulmonary vasculature are stable . The   bony thorax is unremarkable for age. Emil Sylvester 01/22/22 1647  XR CHEST PORT Final result    Impression:  Appropriate position of support lines and tubes. Pulmonary edema. Narrative:  EXAM:  XR CHEST PORT       INDICATION:  Endotracheal tube repositioning       COMPARISON: 1/22/2022       TECHNIQUE: AP portable upright chest view       FINDINGS: Tip of the endotracheal tube terminates 3.8 cm above the rohit. Impella device, [place]-Guille catheter, cardiac pacing wires, all in appropriate   position. Mild to moderate interstitial edema without pleural effusion or   pneumothorax. Right PICC line terminates in the SVC.                  NOTE OF PERSONAL INVOLVEMENT IN CARE   This patient has a high probability of imminent, clinically significant deterioration, which requires the highest level of preparedness to intervene urgently. I participated in the decision-making and personally managed or directed the management of the following life and organ supporting interventions that required my frequent assessment to treat or prevent imminent deterioration. I personally spent 80 minutes of critical care time.       Signed By: Hadley Lozano MD     January 24, 2022    '

## 2022-01-24 NOTE — CONSULTS
[address] [place] [address] NOTE  [facility]  [phone] NP in-hospital cell phone M-F until 4:30  After 5pm or on weekends, please call  for physician on call        NAME:  Haylie Partida   :   1955   MRN:   [id number]       Referring Physician: Arlette Cesar    Consult Date: 2022 1:30 PM    Chief Complaint: heart transplant/VAD workup     History of Present Illness:  Patient is a 78 y.o. who is seen in consultation at the request of Dr. Arlette Cesar for heart transplant/VAD workup. Medical history as listed below. She's here with heart failure requiring Impella, pulmonary hypertension on Roro, critically ill requiring mechanical ventilation. Discussed with  on phone who reports history of ulcers 2-3 years ago, but denies history of EGD or colonoscopy. I have reviewed the emergency room note, hospital admission note, notes by all other clinicians who have seen the patient during this hospitalization to date. I have reviewed the problem list and the reason for this hospitalization. I have reviewed the allergies and the medications the patient was taking at home prior to this hospitalization.     PMH:  Past Medical History:   Diagnosis Date    Acquired hypothyroidism 8/15/2016    Anemia     RED-HF study    Asthma     Cardiomyopathy, nonischemic ([address]     initial dx , bivHF  with EF 15%, s/p biV-ICD , significant improvment in EF to 45-50%    CKD (chronic kidney disease)     Dr Sami Peters    CKD (chronic kidney disease) 8/15/2012    Depression     Diabetes ([address]     Diabetic neuropathy ([address]     DM (diabetes mellitus) ([address] 8/15/2012    GERD (gastroesophageal reflux disease)     Gout     Hypothyroidism     ICD (implantable cardioverter-defibrillator), biventricular, in situ 2014    Psoriasis        PSH:  Past Surgical History:   Procedure Laterality Date  CARDIAC CATHETERIZATION  ; 01/06/15    normal cors    ECHO 2D ADULT  2010    EF 45%, improved from  (25%)    ECHO 2D ADULT  2011    LVH, EF 55-60%    HX ORTHOPAEDIC      knee    HX PACEMAKER PLACEMENT      AICD    STRESS TEST LEXISCAN/CARDIOLITE  3/21/12    normal perfusion, global HK 40%       Allergies: Allergies   Allergen Reactions    Ciprofloxacin Anaphylaxis    Shellfish Derived Anaphylaxis    Sildenafil Other (comments)    Ace Inhibitors Unknown (comments)    Biaxin [Clarithromycin] Other (comments)     Metal taste    Candesartan Cough    Pcn [Penicillins] Hives       Home Medications:  Prior to Admission Medications   Prescriptions Last Dose Informant Patient Reported? Taking?   allopurinoL (ZYLOPRIM) 100 mg tablet   No Yes   Sig: TAKE 1 TABLET BY MOUTH EVERY DAY   ammonium lactate (AMLACTIN) 12 % topical cream   No Yes   Sig: Apply  to affected area two (2) times a day. rub in to affected area well   apremilast (Otezla) 30 mg tab   Yes Yes   Sig: Take 30 mg by mouth two (2) times daily as needed. aspirin 81 mg tablet 1/3/2022 at Unknown time  Yes Yes   Sig: Take 81 mg by mouth daily. azelastine (ASTEPRO) 205.5 mcg (0.15 %)   Yes Yes   [place] by Both Nostrils route two (2) times daily as needed. benzonatate (Tessalon Perles) 100 mg capsule   Yes Yes   Sig: Take 100 mg by mouth three (3) times daily as needed for Cough. budesonide (PULMICORT) 180 mcg/actuation aepb inhaler 1/3/2022 at Unknown time  No Yes   Sig: Take 2 Puffs by inhalation two (2) times a day. bumetanide (BUMEX) 2 mg tablet 1/3/2022 at Unknown time  No Yes   Sig: Take 1 tab in the morning and 0.5 tab in the evening   calcipotriene (DOVONEX) 0.005 % topical cream 1/3/2022 at Unknown time  Yes Yes   Sig: Apply  to affected area three (3) times daily.    calcitRIOL (ROCALTROL) 0.5 mcg capsule   No Yes   Sig: TAKE 1 CAPSULE BY MOUTH EVERY DAY   carvediloL (COREG) 6.25 mg tablet 1/3/2022 at Unknown time No Yes   Sig: Take 1 Tablet by mouth two (2) times daily (with meals). cholecalciferol (VITAMIN D3) (2,000 UNITS /50 MCG) cap capsule   No No   Sig: TAKE 1 CAPSULE BY MOUTH TWO (2) TIMES A DAY. docusate sodium (COLACE) 100 mg capsule   Yes Yes   Sig: Take 100 mg by mouth daily as needed for Constipation. ferrous sulfate (IRON) 325 mg (65 mg elemental iron) tablet 1/3/2022 at Unknown time  Yes Yes   Sig: Take 325 mg by mouth daily. fluticasone propionate (FLONASE) 50 mcg/actuation nasal spray   No No   Sig: USE 1 SPRAY IN EACH NOSTRIL DAILY   gabapentin (NEURONTIN) 100 mg capsule 1/3/2022 at Unknown time  No Yes   Sig: Take 1 Capsule by mouth nightly. Max Daily Amount: 100 mg.   hydrALAZINE (APRESOLINE) 10 mg tablet 1/3/2022 at Unknown time  No Yes   Sig: Take 1 Tablet by mouth three (3) times daily. insulin NPH/insulin regular (NOVOLIN 70/30) 100 unit/mL (70-30) injection 1/3/2022 at Unknown time  No Yes   Si units two times a day   isosorbide dinitrate (ISORDIL) 5 mg tablet 1/3/2022 at Unknown time  No Yes   Sig: Take 1 Tablet by mouth three (3) times daily. levocetirizine (XYZAL) 5 mg tablet   No Yes   Sig: TAKE 1 TABLET BY MOUTH EVERY DAY   levothyroxine (SYNTHROID) 100 mcg tablet   Yes Yes   Sig: Take 100 mcg by mouth six (6) days a week. Everyday except    montelukast (SINGULAIR) 10 mg tablet 1/3/2022 at Unknown time  No Yes   Sig: TAKE 1 TABLET BY MOUTH EVERY DAY   multivitamin (ONE A DAY) tablet 1/3/2022 at Unknown time  Yes Yes   Sig: Take 1 Tab by mouth daily. triamcinolone acetonide (KENALOG) 0.5 % ointment 1/3/2022 at Unknown time  Yes Yes   Sig: Apply  to affected area two (2) times daily as needed.  use thin layer    venlafaxine-SR (EFFEXOR-XR) 75 mg capsule   No No   Sig: TAKE 1 CAPSULE BY MOUTH EVERY DAY      Facility-Administered Medications: None       Hospital Medications:  Current Facility-Administered Medications   Medication Dose Route Frequency    potassium chloride 20 mEq in 50 ml IVPB  20 mEq IntraVENous Q1H    insulin NPH (NOVOLIN N, HUMULIN N) injection 25 Units  25 Units SubCUTAneous DAILY    insulin NPH (NOVOLIN N, HUMULIN N) injection 15 Units  15 Units SubCUTAneous QHS    bumetanide (BUMEX) 0.25 mg/mL infusion  1.5 mg/hr IntraVENous CONTINUOUS    DOBUTamine (DOBUTREX) 500 mg/250 mL (2,000 mcg/mL) infusion  0-10 mcg/kg/min IntraVENous TITRATE    0.9% sodium chloride infusion 250 mL  250 mL IntraVENous PRN    albumin human 25% (BUMINATE) solution 12.5 g  12.5 g IntraVENous DAILY    milrinone (PRIMACOR) 20 MG/100 ML D5W infusion  0.125 mcg/kg/min IntraVENous CONTINUOUS    vasopressin (VASOSTRICT) 20 Units in 0.9% sodium chloride 100 mL infusion  0-0.1 Units/min IntraVENous TITRATE    PHENYLephrine (NORMA-SYNEPHRINE) 30 mg in 0.9% sodium chloride 250 mL infusion   mcg/min IntraVENous TITRATE    niCARdipine (CARDENE) 25 mg in 0.9% sodium chloride 250 mL infusion  0-15 mg/hr IntraVENous TITRATE    bivalirudin (ANGIOMAX) 250 mg in 0.9% sodium chloride (MBP/ADV) 50 mL MBP  0.02-2.5 mg/kg/hr IntraVENous TITRATE    hydrALAZINE (APRESOLINE) 20 mg/mL injection 5 mg  5 mg IntraVENous Q4H PRN    bumetanide (BUMEX) injection 1 mg  1 mg IntraVENous Q4H PRN    ceFAZolin (ANCEF) 1 g in sterile water (preservative free) 10 mL IV syringe  1 g IntraVENous Q12H    fentaNYL (PF) 1,500 mcg/30 mL (50 mcg/mL) infusion  0-200 mcg/hr IntraVENous TITRATE    heparin (porcine) in 0.9% NaCl 30,000 unit/1,000 mL perfusion irrigation 50-1,000 mL  50-1,000 mL Other PRN    sodium chloride (NS) flush 5-40 mL  5-40 mL IntraVENous Q8H    heparinized saline 2 units/mL infusion    CONTINUOUS    chlorhexidine (PERIDEX) 0.12 % mouthwash 15 mL  15 mL Oral Q12H    sodium chloride (NS) flush 5-40 mL  5-40 mL IntraVENous Q8H    0.45% sodium chloride infusion  10 mL/hr IntraVENous CONTINUOUS    0.9% sodium chloride infusion  9 mL/hr IntraVENous CONTINUOUS    naloxone (NARCAN) injection 0.4 mg 0.4 mg IntraVENous PRN    ondansetron (ZOFRAN) injection 4 mg  4 mg IntraVENous Q4H PRN    albuterol (PROVENTIL VENTOLIN) nebulizer solution 2.5 mg  2.5 mg Nebulization Q4H PRN    aspirin chewable tablet 81 mg  81 mg Oral DAILY    midazolam (VERSED) injection 1 mg  1 mg IntraVENous Q1H PRN    magnesium oxide (MAG-OX) tablet 400 mg  400 mg Oral BID    calcium chloride 1 g in 0.9% sodium chloride 100 mL IVPB  1 g IntraVENous PRN    bisacodyL (DULCOLAX) suppository 10 mg  10 mg Rectal DAILY PRN    senna-docusate (PERICOLACE) 8.6-50 mg per tablet 1 Tablet  1 Tablet Oral BID    ELECTROLYTE REPLACEMENT NOTE: Nurse to review Serum Potassium and Magnesuim levels and Initiate Electrolyte Replacement Protocol as needed  1 Each Other PRN    magnesium sulfate 1 g/100 ml IVPB (premix or compounded)  1 g IntraVENous PRN    alteplase (CATHFLO) 1 mg in sterile water (preservative free) 1 mL injection  1 mg InterCATHeter PRN    bacitracin 500 unit/gram packet 1 Packet  1 Packet Topical PRN    pantoprazole (PROTONIX) injection 40 mg  40 mg IntraVENous DAILY    And    sodium chloride (NS) flush 10 mL  10 mL IntraVENous DAILY    dexmedeTOMidine in 0.9 % NaCl (PRECEDEX) 400 mcg/100 mL (4 mcg/mL) infusion soln  0.1-1.5 mcg/kg/hr IntraVENous TITRATE    midazolam (VERSED) injection 1 mg  1 mg IntraVENous Q4H PRN    insulin lispro (HUMALOG) injection   SubCUTAneous Q6H    propofol (DIPRIVAN) 10 mg/mL infusion  0-50 mcg/kg/min IntraVENous TITRATE    sodium bicarbonate (8.4%) 25 mEq in dextrose 5% 1,000 mL - impella purge fluid   IntraVENous TITRATE    triamcinolone acetonide (KENALOG) 0.1 % cream   Topical BID    polyethylene glycol (MIRALAX) packet 17 g  17 g Oral DAILY    digoxin (LANOXIN) tablet 0.0625 mg  0.0625 mg Oral DAILY    allopurinoL (ZYLOPRIM) tablet 50 mg  50 mg Oral DAILY    epoetin isabel-epbx (RETACRIT) injection 20,000 Units  20,000 Units SubCUTAneous Q TUE, THU & SAT    montelukast (SINGULAIR) tablet 10 mg  10 mg Oral DAILY    levothyroxine (SYNTHROID) tablet 100 mcg  100 mcg Oral Once per day on Mon Tue Wed Thu Fri Sat    hydroxypropyl methylcellulose (ISOPTO TEARS) 0.5 % ophthalmic solution 1 Drop  1 Drop Both Eyes PRN    venlafaxine-SR (EFFEXOR-XR) capsule 75 mg  75 mg Oral DAILY WITH BREAKFAST    gabapentin (NEURONTIN) capsule 100 mg  100 mg Oral QHS    arformoteroL (BROVANA) neb solution 15 mcg  15 mcg Nebulization BID RT    And    budesonide (PULMICORT) 500 mcg/2 ml nebulizer suspension  500 mcg Nebulization BID RT    sodium chloride (NS) flush 5-40 mL  5-40 mL IntraVENous Q8H    acetaminophen (TYLENOL) tablet 650 mg  650 mg Oral Q6H PRN    Or    acetaminophen (TYLENOL) suppository 650 mg  650 mg Rectal Q6H PRN    glucose chewable tablet 16 g  4 Tablet Oral PRN    dextrose (D50W) injection syrg 12.5-25 g  25-50 mL IntraVENous PRN    glucagon (GLUCAGEN) injection 1 mg  1 mg IntraMUSCular PRN       Social History:  Social History     Tobacco Use    Smoking status: Never Smoker    Smokeless tobacco: Never Used   Substance Use Topics    Alcohol use: No       Family History:  Family History   Problem Relation Age of Onset    Heart Disease Mother     Hypertension Mother     Lupus Sister     Diabetes Brother        Review of Systems:  Unable to obtain due to patient condition     Objective:     Patient Vitals for the past 8 hrs:   BP Temp Pulse Resp SpO2   01/24/22 1300 110/68  80 17 100 %   01/24/22 1214   80 16 98 %   01/24/22 1200 (!) 82/55 98.9 °F (37.2 °C) 80 16 98 %   01/24/22 1100 100/63  80 14 98 %   01/24/22 1000 106/66  80 15 98 %   01/24/22 0900 95/62  80 21 99 %   01/24/22 0834     98 %   01/24/22 0831   80 12 98 %   01/24/22 0800 105/68 99.6 °F (37.6 °C) 80 16 98 %   01/24/22 0700   80 16 97 %   01/24/22 0600   79 24 98 %     01/24 0701 - 01/24 1900  In: 923.7 [I.V.:603.7]  Out: 875 [Urine:875]  01/22 1901 - 01/24 0700  In: 3743 [I.V.:2773]  Out: 0389 [ZMNVZ:3451]    EXAM:     CONST:  No acute distress   NEURO:  Sedated    HEENT: intubated   LUNGS: No increased WOB   CARD:   Regular rate   ABD:  soft, obese, no apparent tenderness, no rebound, no masses, non distended   EXT:  warm   PSYCH: ROSA     Data Review     Recent Labs     01/24/22  0418 01/23/22  0405   WBC 9.1 9.7   HGB 7.9* 7.7*   HCT 30.3* 29.1*    188     Recent Labs     01/24/22  1116 01/24/22  0418     144 143   K 3.6  3.6 3.1*     108 105   CO2 30  30 31   BUN 67*  69* 69*   CREA 2.34*  2.27* 2.27*   *  207* 176*   PHOS 2.7 3.1   CA 9.8  9.6 9.8     Recent Labs     01/24/22  1116 01/24/22  0418 01/23/22  1117 01/23/22  0405   AP  --  101  --  112   TP  --  7.6  --  7.6   ALB 3.3* 3.3*   < > 3.4*   GLOB  --  4.3*  --  4.2*    < > = values in this interval not displayed.      Recent Labs     01/24/22 0418 01/23/22  1627   APTT 52.6* 55.7*          Assessment:     · Acute on chronic iron deficiency anemia: hgb 7.9, likely multifactorial   · Heart failure: Impella with bival purge and systemic bival, status post VF  · Pulmonary hypertension Roro  · CKD on bumex  · Negative COVID testing     Patient Active Problem List   Diagnosis Code    Anemia in chronic renal disease N18.9, D63.1    HTN (hypertension) I10    GERD (gastroesophageal reflux disease) K21.9    Gout M10.9    Pulmonary HTN ([facility] I27.20    Cardiomyopathy, nonischemic (HCC) I42.8    CKD (chronic kidney disease) N18.9    Dyslipidemia E78.5    ICD (implantable cardioverter-defibrillator), biventricular, in situ Z95.810    Acquired hypothyroidism E03.9    Dysthymia F34.1    Obesity, morbid ([facility] E66.01    Type 2 diabetes with nephropathy ([facility] E11.21    Type 2 diabetes mellitus with diabetic neuropathy ([facility] E11.40    CHF exacerbation ([facility] I50.9    Acute respiratory failure with hypoxia (HCC) J96.01     Plan:       · Consider iron supplementation   · On PPI  · Discussed with HF NP, appreciate clearance for procedures, will hold bival drip 2 hours prior  · Tentative plan for bedside EGD/colonoscoy Wednesday at 1:30, discussed risks with  on phone, please obtain consent  · CLD Tuesday, NPO Wednesday  · Prep tomorrow evening  · Patient discussed with Dr. Cheryl Kang  · Thank you for allowing me to participate in care of Marcy Hayden     Signed By: Lawanda Wen NP     1/24/2022  1:30 PM     GI attending note:    Chart reviewed. Agree with note of CORAL. Tentatively plan for endoscopies on Wednesday. Increased risk for complications given significant cardiopulmonary co-morbidities.     Dr. Cheryl Kang

## 2022-01-24 NOTE — PROGRESS NOTES
[facility]iology [facility] Progress Note     Subjective:       Deborah Salvador is a 78 y.o. patient who is s/p Impella   She was sedated on ventilator this morning      Interim:   S/p Impella placement on 01/18/2022, now in CCU.  She had VF 1/19, has appearance of Torsades.  Required ICD shock x 2, both successful. Hypokalemic (3.1), Mg 2.3. This was treated/replaced      LHC/RHC on 01/17/2022 had shown severely elevated bilateral filling pressures with severe pulmonary HTN, LAD with 80% lesion. HPI:   Presented to the ER on 01/04/2021 with hypoxia, improved on supplemental oxygen. Labs showed stable anemia.  WBC elevated, hyponatremic, hypokalemic.  D dimer elevated.  Chronic CKD. Nephrologist spoke to me directly regarding severe renal failure, but not much worse than last admission. Rapid COVID negative.  CXR showed pulmonary edema vs atypical pneumonia.  VQ scan showed low probability for PE.       NICM, LVEF 15-20% during recent admission.  LVEF previously normalized with CRT.  NYHA III-IV.  No ACEi/ARB due to renal dysfunction.  GDMT dosing limited by low BP. [address] 12/10/2021 showed severe pulmonary HTN (wedge 34 mmHg, PAP 80 mmHg). Cardiac cath in 2015 at [facility] showed no evidence of CAD. She did require LV lead reprogramming over the summer, but good LV capture since.  Good capture/function when checked during recent admission. BP controlled. PICC line placed 01/10/2022 in anticipation of home milrinone. Previous:   LVEF noted 15-20% in 12/2021. S/p Medtronic biventricular ICD (gen change 01/579227, leads 09/25/2008).     CKD stage IV.        Previously followed by Dr. Mendiola Members, states did not follow him to new practice.          Problem List      Acute respiratory failure with hypoxia (HCC) ICD-10-CM: J96.01   ICD-9-CM: 518.81 1/4/2022     CHF exacerbation (HCC) ICD-10-CM: I50.9   ICD-9-CM: 428.0 12/6/2021     Type 2 diabetes mellitus with diabetic neuropathy ([facility] ICD-10-CM: E11.40   ICD-9-CM: 250.60, 357.2 1/2/2020     Type 2 diabetes with nephropathy ([facility] ICD-10-CM: E11.21   ICD-9-CM: 250.40, 583.81 4/3/2018     Obesity, morbid ([facility] ICD-10-CM: E66.01   ICD-9-CM: 278.01 12/8/2017     Acquired hypothyroidism ICD-10-CM: E03.9   ICD-9-CM: 244.9 8/15/2016     Dysthymia ICD-10-CM: F34.1   ICD-9-CM: 300.4 8/15/2016     ICD (implantable cardioverter-defibrillator), biventricular, in situ ICD-10-CM: Z95.810   ICD-9-CM: V45.02 6/5/2014   Overview Signed 1/14/2015 11:11 AM by Fina Jhaveri MD     Generator Medtronic change 1/14/2015         Dyslipidemia ICD-10-CM: N34.0   ICD-9-CM: 272.4 1/14/2014     CKD (chronic kidney disease) ICD-10-CM: N18.9   ICD-9-CM: 585.9 8/15/2012     Cardiomyopathy, nonischemic (HCC) ICD-10-CM: I42.8   ICD-9-CM: 425.4 Unknown   Overview Signed 10/10/2011  6:40 AM by Lj Vasquez MD     initial dx 2001, bivHF 2008 with EF 15%, s/p biV-ICD 9/08, significant improvment in EF to 45-50%         Anemia in chronic renal disease (Chronic) ICD-10-CM: N18.9, D63.1   ICD-9-CM: 285.21 12/10/2008     HTN (hypertension) ICD-10-CM: I10   ICD-9-CM: 401.9 12/10/2008     GERD (gastroesophageal reflux disease) (Chronic) ICD-10-CM: K21.9   ICD-9-CM: 530.81 12/10/2008     Gout (Chronic) ICD-10-CM: M10.9   ICD-9-CM: 274.9 12/10/2008     Pulmonary HTN (HCC) (Chronic) ICD-10-CM: I27.20   ICD-9-CM: 416.8 12/10/2008       Current Facility-Administered Medications   Medication Dose Route Frequency    potassium chloride 20 mEq in 50 ml IVPB  20 mEq IntraVENous Q1H    insulin NPH (NOVOLIN N, HUMULIN N) injection 25 Units  25 Units SubCUTAneous DAILY    insulin NPH (NOVOLIN N, HUMULIN N) injection 15 Units  15 Units SubCUTAneous QHS    bumetanide (BUMEX) 0.25 mg/mL infusion  1.5 mg/hr IntraVENous CONTINUOUS    DOBUTamine (DOBUTREX) 500 mg/250 mL (2,000 mcg/mL) infusion  0-10 mcg/kg/min IntraVENous TITRATE    0.9% sodium chloride infusion 250 mL 250 mL IntraVENous PRN    albumin human 25% (BUMINATE) solution 12.5 g  12.5 g IntraVENous DAILY    milrinone (PRIMACOR) 20 MG/100 ML D5W infusion  0.125 mcg/kg/min IntraVENous CONTINUOUS    vasopressin (VASOSTRICT) 20 Units in 0.9% sodium chloride 100 mL infusion  0-0.1 Units/min IntraVENous TITRATE    PHENYLephrine (NORMA-SYNEPHRINE) 30 mg in 0.9% sodium chloride 250 mL infusion   mcg/min IntraVENous TITRATE    niCARdipine (CARDENE) 25 mg in 0.9% sodium chloride 250 mL infusion  0-15 mg/hr IntraVENous TITRATE    bivalirudin (ANGIOMAX) 250 mg in 0.9% sodium chloride (MBP/ADV) 50 mL MBP  0.02-2.5 mg/kg/hr IntraVENous TITRATE    hydrALAZINE (APRESOLINE) 20 mg/mL injection 5 mg  5 mg IntraVENous Q4H PRN    bumetanide (BUMEX) injection 1 mg  1 mg IntraVENous Q4H PRN    ceFAZolin (ANCEF) 1 g in sterile water (preservative free) 10 mL IV syringe  1 g IntraVENous Q12H    fentaNYL (PF) 1,500 mcg/30 mL (50 mcg/mL) infusion  0-200 mcg/hr IntraVENous TITRATE    heparin (porcine) in 0.9% NaCl 30,000 unit/1,000 mL perfusion irrigation 50-1,000 mL  50-1,000 mL Other PRN    sodium chloride (NS) flush 5-40 mL  5-40 mL IntraVENous Q8H    heparinized saline 2 units/mL infusion    CONTINUOUS    chlorhexidine (PERIDEX) 0.12 % mouthwash 15 mL  15 mL Oral Q12H    sodium chloride (NS) flush 5-40 mL  5-40 mL IntraVENous Q8H    0.45% sodium chloride infusion  10 mL/hr IntraVENous CONTINUOUS    0.9% sodium chloride infusion  9 mL/hr IntraVENous CONTINUOUS    naloxone (NARCAN) injection 0.4 mg  0.4 mg IntraVENous PRN    ondansetron (ZOFRAN) injection 4 mg  4 mg IntraVENous Q4H PRN    albuterol (PROVENTIL VENTOLIN) nebulizer solution 2.5 mg  2.5 mg Nebulization Q4H PRN    aspirin chewable tablet 81 mg  81 mg Oral DAILY    midazolam (VERSED) injection 1 mg  1 mg IntraVENous Q1H PRN    magnesium oxide (MAG-OX) tablet 400 mg  400 mg Oral BID    calcium chloride 1 g in 0.9% sodium chloride 100 mL IVPB  1 g IntraVENous PRN    bisacodyL (DULCOLAX) suppository 10 mg  10 mg Rectal DAILY PRN    senna-docusate (PERICOLACE) 8.6-50 mg per tablet 1 Tablet  1 Tablet Oral BID    ELECTROLYTE REPLACEMENT NOTE: Nurse to review Serum Potassium and Magnesuim levels and Initiate Electrolyte Replacement Protocol as needed  1 Each Other PRN    magnesium sulfate 1 g/100 ml IVPB (premix or compounded)  1 g IntraVENous PRN    alteplase (CATHFLO) 1 mg in sterile water (preservative free) 1 mL injection  1 mg InterCATHeter PRN    bacitracin 500 unit/gram packet 1 Packet  1 Packet Topical PRN    pantoprazole (PROTONIX) injection 40 mg  40 mg IntraVENous DAILY    And    sodium chloride (NS) flush 10 mL  10 mL IntraVENous DAILY    dexmedeTOMidine in 0.9 % NaCl (PRECEDEX) 400 mcg/100 mL (4 mcg/mL) infusion soln  0.1-1.5 mcg/kg/hr IntraVENous TITRATE    midazolam (VERSED) injection 1 mg  1 mg IntraVENous Q4H PRN    insulin lispro (HUMALOG) injection   SubCUTAneous Q6H    propofol (DIPRIVAN) 10 mg/mL infusion  0-50 mcg/kg/min IntraVENous TITRATE    sodium bicarbonate (8.4%) 25 mEq in dextrose 5% 1,000 mL - impella purge fluid   IntraVENous TITRATE    triamcinolone acetonide (KENALOG) 0.1 % cream   Topical BID    polyethylene glycol (MIRALAX) packet 17 g  17 g Oral DAILY    digoxin (LANOXIN) tablet 0.0625 mg  0.0625 mg Oral DAILY    allopurinoL (ZYLOPRIM) tablet 50 mg  50 mg Oral DAILY    epoetin isabel-epbx (RETACRIT) injection 20,000 Units  20,000 Units SubCUTAneous Q TUE, THU & SAT    montelukast (SINGULAIR) tablet 10 mg  10 mg Oral DAILY    levothyroxine (SYNTHROID) tablet 100 mcg  100 mcg Oral Once per day on Mon Tue Wed Thu Fri Sat    hydroxypropyl methylcellulose (ISOPTO TEARS) 0.5 % ophthalmic solution 1 Drop  1 Drop Both Eyes PRN    venlafaxine-SR (EFFEXOR-XR) capsule 75 mg  75 mg Oral DAILY WITH BREAKFAST    gabapentin (NEURONTIN) capsule 100 mg  100 mg Oral QHS    arformoteroL (BROVANA) neb solution 15 mcg  15 mcg Nebulization BID RT    And    budesonide (PULMICORT) 500 mcg/2 ml nebulizer suspension  500 mcg Nebulization BID RT    sodium chloride (NS) flush 5-40 mL  5-40 mL IntraVENous Q8H    acetaminophen (TYLENOL) tablet 650 mg  650 mg Oral Q6H PRN    Or    acetaminophen (TYLENOL) suppository 650 mg  650 mg Rectal Q6H PRN    glucose chewable tablet 16 g  4 Tablet Oral PRN    dextrose (D50W) injection syrg 12.5-25 g  25-50 mL IntraVENous PRN    glucagon (GLUCAGEN) injection 1 mg  1 mg IntraMUSCular PRN         Allergies   Allergen Reactions   · Ciprofloxacin Anaphylaxis   · Shellfish Derived Anaphylaxis   · Ace Inhibitors Unknown (comments)   · Biaxin [Clarithromycin] Other (comments)   Metal taste   · Candesartan Cough   · Pcn [Penicillins] Hives     Past Medical History:   Diagnosis Date   · Acquired hypothyroidism 8/15/2016   · Anemia   RED-HF study   · Asthma   · Cardiomyopathy, nonischemic ([facility]   initial dx 2001, bivHF 2008 with EF 15%, s/p biV-ICD 9/08, significant improvment in EF to 45-50%   · CKD (chronic kidney disease)   Dr Kin Alicia   · CKD (chronic kidney disease) 8/15/2012   · Depression   · Diabetes ([address]   · Diabetic neuropathy ([address]   · DM (diabetes mellitus) ([facility] 8/15/2012   · GERD (gastroesophageal reflux disease)   · Gout   · Hypothyroidism   · ICD (implantable cardioverter-defibrillator), biventricular, in situ 6/5/2014   · Psoriasis     Past Surgical History:   Procedure Laterality Date   · CARDIAC CATHETERIZATION 2007; 01/06/15   normal cors   · ECHO 2D ADULT 4/2010   EF 45%, improved from 1/09 (25%)   · ECHO 2D ADULT 11/2011   LVH, EF 55-60%   · HX ORTHOPAEDIC   knee   · HX PACEMAKER PLACEMENT   AICD   · STRESS TEST LEXISCAN/CARDIOLITE 3/21/12   normal perfusion, global HK 40%     Family History   Problem Relation Age of Onset   · Heart Disease Mother   · Hypertension Mother   · Lupus Sister   · Diabetes Brother     Social History     Tobacco Use   · Smoking status: Never Smoker   · Smokeless tobacco: Never Used   Substance Use Topics   · Alcohol use: No         Review of Systems: Unable to perform due to intubated, sedated status.       Objective:   Visit Vitals  /68 (BP 1 Location: Left arm, BP Patient Position: At rest)   Pulse 80   Temp 99.6 °F (37.6 °C)   Resp 12   Ht 5' 7\" (1.702 m)   Wt 244 lb 4.3 oz (110.8 kg)   SpO2 98%   BMI 38.26 kg/m²         Physical Exam:   Constitutional: Well-developed and well-nourished. Head: Normocephalic and atraumatic. Eyes: Closed. ENT: sedated.  ET tube in place. Neck: right cordis with swan chelle and propofol  Cardiovascular: Normal rate, regular rhythm. Exam reveals no gallop and no friction rub. 2/6 systolic LSB murmur.     Pulmonary/Chest: On vent. Impella in   Abdominal: Soft, Obese. GI/: Henley catheter in place. Musculoskeletal: Symmetrical.   Vasc/lymphatic: + right axillary Impella. Neurological: Sedated. Skin Left side BIV  ICD unremarkable. Psychiatric: Sedated. Assessment/Plan:     Imaging/Studies:   LHC/RHC (01/17/2022): Severely elevated left & right side filling pressures.  Severe PH, mixed pattern with elevated wedge as well as PVR of about 6 Woods units.  Mid LAD lesion 80% involving diagonal branch ostium.  Likely prior stent in mid LAD, patent.  Reduced CI of 1.65 L/min/m2 by thermodilution & 1.6 L/min/m2 by presumed oxygen consumption by Aye. 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 RHC without milrinone (12/10/2021): High wedge pressure (35 mmHg) indicating volume overload, precapillary.  Severe pulmonary HTN. Echo (12/08/2021): LVEF 15-20%, upper normal wall thickness, LV diastolic dysfunction.  Borderline low RVEF. Mod dilated LA, mildly dilated RA.  Mild to mod MR. Yandy Moulding TR.  Mild to mod PH.       LHC/RHC (01/2015): No significant CAD. Mixed CM:  CAD alone did not cause this decline.   CHF team wants to revascularize when stable and can go down to cath lab for PCI of LAD.     LVEF now 15-20%.  NYHA IVStevens Prieto pulmonary HTN noted on last admission RHC, now with reduced CI, elevated filling pressures bilaterally, & severe pulmonary HTN.    Nuclear amyloid scan equivocal.  cMRI not possible with 4194 LV lead (2008).  However, cMRI wouldn't . S/p Impella 1/18/22 as bridge to possible transplant or to improvement and does not need it.  If not responding and not candidate for heart renal transplant, then she will be referred to hospice.    [facility] transplant has been contacted by Dr Dylan Beck    VF and TdP: Noted x 2, 5 nights ago, had 2 successful ICD shocks.    Replace K and so far NSVT short 6-7 beats  Medtronic biventricular ICD (gen change 01/14/2015, leads 09/25/2008): Device check showed proper lead & generator function.    Device is BETTY 1/22/2022     Replacement is on hold while she is still on Impella and is waiting for transplant eligibility  I have talked to her  and he gave consent   Shocks for VF drained more of battery      CKD: dialyze if needed        Dr Dylan Beck and her team will guide her through the rest of this admission and transplant referral          Thank you for involving me in this patient's care and please call with further concerns or questions. Rose Morel M.D.    Electrophysiology/Cardiology   [facility] [place]   [address], [facility], [address] [place] [id number]                                        [phone]

## 2022-01-24 NOTE — PROGRESS NOTES
0021 PTT goals for Bival administration 40-60 seconds. Provider to change titration and administration instructions for pharmacy.

## 2022-01-24 NOTE — PROGRESS NOTES
[facility]   [id number] [facility], [address] [place] [address]  Phone: [phone]   [phone]    [email or web address]     Nephrology Progress Note    Patient Name : Jovan Shetty      : 1955     MRN : [id number]  Date of Admission : 2022  Date of Servive : 22    CC:  Follow up for ARF       Assessment and Plan   RODNEY on CKD :  - Suspect 2/2 CRS  - Cr stable, UOP good on bumex drip  - cont current care for now  - daily labs  - no urgent need for RRT at this time    CKD stage IV:  - Etiology: DM, HTN, CRS  - Renal US: suggestive of CKD, B/L benign renal cysts   - baseline Cr 2.4-2.6 mg/dl      Acute on chronic HFrEF  NI CMP, LVEF 15 to 20%  NYHA class III-IV  S/p BiV pacer/ICD-s/p CRT  R axillary Impella implanted 22  Possible Tx to [facility] for [facility]    Hypervolemia:  - RHC showing severely elevated filling pressures, pulm HTN  - diuretics and milrinone as above    Morbid obesity  Type II DM  HTN  Hypothyroidism  Pulmonary hypertension  Gout  Psoriasis       Interval History:  Seen and examined. Rough weekend. Back on Roro, on milrinone. Started on bumex drip. Cr holding stable,  UOP > 4 L over the past 24 hrs. Review of Systems: A comprehensive review of systems was negative except for that written in the HPI.     Current Medications:   Current Facility-Administered Medications   Medication Dose Route Frequency    potassium chloride 20 mEq in 50 ml IVPB  20 mEq IntraVENous Q1H    insulin NPH (NOVOLIN N, HUMULIN N) injection 25 Units  25 Units SubCUTAneous DAILY    insulin NPH (NOVOLIN N, HUMULIN N) injection 15 Units  15 Units SubCUTAneous QHS    bumetanide (BUMEX) 0.25 mg/mL infusion  1.5 mg/hr IntraVENous CONTINUOUS    DOBUTamine (DOBUTREX) 500 mg/250 mL (2,000 mcg/mL) infusion  0-10 mcg/kg/min IntraVENous TITRATE    0.9% sodium chloride infusion 250 mL  250 mL IntraVENous PRN    albumin human 25% (BUMINATE) solution 12.5 g 12.5 g IntraVENous DAILY    milrinone (PRIMACOR) 20 MG/100 ML D5W infusion  0.125 mcg/kg/min IntraVENous CONTINUOUS    vasopressin (VASOSTRICT) 20 Units in 0.9% sodium chloride 100 mL infusion  0-0.1 Units/min IntraVENous TITRATE    PHENYLephrine (NORMA-SYNEPHRINE) 30 mg in 0.9% sodium chloride 250 mL infusion   mcg/min IntraVENous TITRATE    niCARdipine (CARDENE) 25 mg in 0.9% sodium chloride 250 mL infusion  0-15 mg/hr IntraVENous TITRATE    bivalirudin (ANGIOMAX) 250 mg in 0.9% sodium chloride (MBP/ADV) 50 mL MBP  0.02-2.5 mg/kg/hr IntraVENous TITRATE    hydrALAZINE (APRESOLINE) 20 mg/mL injection 5 mg  5 mg IntraVENous Q4H PRN    bumetanide (BUMEX) injection 1 mg  1 mg IntraVENous Q4H PRN    ceFAZolin (ANCEF) 1 g in sterile water (preservative free) 10 mL IV syringe  1 g IntraVENous Q12H    fentaNYL (PF) 1,500 mcg/30 mL (50 mcg/mL) infusion  0-200 mcg/hr IntraVENous TITRATE    heparin (porcine) in 0.9% NaCl 30,000 unit/1,000 mL perfusion irrigation 50-1,000 mL  50-1,000 mL Other PRN    sodium chloride (NS) flush 5-40 mL  5-40 mL IntraVENous Q8H    heparinized saline 2 units/mL infusion    CONTINUOUS    chlorhexidine (PERIDEX) 0.12 % mouthwash 15 mL  15 mL Oral Q12H    sodium chloride (NS) flush 5-40 mL  5-40 mL IntraVENous Q8H    0.45% sodium chloride infusion  10 mL/hr IntraVENous CONTINUOUS    0.9% sodium chloride infusion  9 mL/hr IntraVENous CONTINUOUS    naloxone (NARCAN) injection 0.4 mg  0.4 mg IntraVENous PRN    ondansetron (ZOFRAN) injection 4 mg  4 mg IntraVENous Q4H PRN    albuterol (PROVENTIL VENTOLIN) nebulizer solution 2.5 mg  2.5 mg Nebulization Q4H PRN    aspirin chewable tablet 81 mg  81 mg Oral DAILY    midazolam (VERSED) injection 1 mg  1 mg IntraVENous Q1H PRN    magnesium oxide (MAG-OX) tablet 400 mg  400 mg Oral BID    calcium chloride 1 g in 0.9% sodium chloride 100 mL IVPB  1 g IntraVENous PRN    bisacodyL (DULCOLAX) suppository 10 mg  10 mg Rectal DAILY PRN  senna-docusate (PERICOLACE) 8.6-50 mg per tablet 1 Tablet  1 Tablet Oral BID    ELECTROLYTE REPLACEMENT NOTE: Nurse to review Serum Potassium and Magnesuim levels and Initiate Electrolyte Replacement Protocol as needed  1 Each Other PRN    magnesium sulfate 1 g/100 ml IVPB (premix or compounded)  1 g IntraVENous PRN    alteplase (CATHFLO) 1 mg in sterile water (preservative free) 1 mL injection  1 mg InterCATHeter PRN    bacitracin 500 unit/gram packet 1 Packet  1 Packet Topical PRN    pantoprazole (PROTONIX) injection 40 mg  40 mg IntraVENous DAILY    And    sodium chloride (NS) flush 10 mL  10 mL IntraVENous DAILY    dexmedeTOMidine in 0.9 % NaCl (PRECEDEX) 400 mcg/100 mL (4 mcg/mL) infusion soln  0.1-1.5 mcg/kg/hr IntraVENous TITRATE    midazolam (VERSED) injection 1 mg  1 mg IntraVENous Q4H PRN    insulin lispro (HUMALOG) injection   SubCUTAneous Q6H    propofol (DIPRIVAN) 10 mg/mL infusion  0-50 mcg/kg/min IntraVENous TITRATE    sodium bicarbonate (8.4%) 25 mEq in dextrose 5% 1,000 mL - impella purge fluid   IntraVENous TITRATE    triamcinolone acetonide (KENALOG) 0.1 % cream   Topical BID    polyethylene glycol (MIRALAX) packet 17 g  17 g Oral DAILY    digoxin (LANOXIN) tablet 0.0625 mg  0.0625 mg Oral DAILY    allopurinoL (ZYLOPRIM) tablet 50 mg  50 mg Oral DAILY    epoetin isabel-epbx (RETACRIT) injection 20,000 Units  20,000 Units SubCUTAneous Q TUE, THU & SAT    montelukast (SINGULAIR) tablet 10 mg  10 mg Oral DAILY    levothyroxine (SYNTHROID) tablet 100 mcg  100 mcg Oral Once per day on Mon Tue Wed Thu Fri Sat    hydroxypropyl methylcellulose (ISOPTO TEARS) 0.5 % ophthalmic solution 1 Drop  1 Drop Both Eyes PRN    venlafaxine-SR (EFFEXOR-XR) capsule 75 mg  75 mg Oral DAILY WITH BREAKFAST    gabapentin (NEURONTIN) capsule 100 mg  100 mg Oral QHS    arformoteroL (BROVANA) neb solution 15 mcg  15 mcg Nebulization BID RT    And    budesonide (PULMICORT) 500 mcg/2 ml nebulizer suspension  500 mcg Nebulization BID RT    sodium chloride (NS) flush 5-40 mL  5-40 mL IntraVENous Q8H    acetaminophen (TYLENOL) tablet 650 mg  650 mg Oral Q6H PRN    Or    acetaminophen (TYLENOL) suppository 650 mg  650 mg Rectal Q6H PRN    glucose chewable tablet 16 g  4 Tablet Oral PRN    dextrose (D50W) injection syrg 12.5-25 g  25-50 mL IntraVENous PRN    glucagon (GLUCAGEN) injection 1 mg  1 mg IntraMUSCular PRN      Allergies   Allergen Reactions    Ciprofloxacin Anaphylaxis    Shellfish Derived Anaphylaxis    Sildenafil Other (comments)    Ace Inhibitors Unknown (comments)    Biaxin [Clarithromycin] Other (comments)     Metal taste    Candesartan Cough    Pcn [Penicillins] Hives       Objective:  Vitals:    Vitals:    01/24/22 0700 01/24/22 0800 01/24/22 0831 01/24/22 0834   BP:  105/68     Pulse: 80 80 80    Resp: 16 16 12    Temp:  99.6 °F (37.6 °C)     SpO2: 97% 98% 98% 98%   Weight:       Height:         Intake and Output:  01/24 0701 - 01/24 1900  In: -   Out: 150 [Urine:150]  01/22 1901 - 01/24 0700  In: 3743 [I.V.:2773]  Out: 6740 [Urine:6740]    Physical Examination:    General: Sedated on the vent  HEENT:           ETT in place   Neck:  Supple, no mass  Resp:  Reduced bibasilar  Breath sounds  CV:  RRR, ++ LE edema  GI:  Soft, NT, + BS, obese  Neurologic:  Sedated  :                  Henley in place    [x]    High complexity decision making was performed  []    Patient is at high-risk of decompensation with multiple organ involvement    Lab Data Personally Reviewed: I have reviewed all the pertinent labs, microbiology data and radiology studies during assessment.     Recent Labs     01/24/22  0418 01/23/22  1627 01/23/22  1117 01/23/22  0405 01/22/22  2300 01/22/22  1741 01/22/22  1741 01/22/22  1246 01/22/22  0739 01/22/22  0343 01/22/22  0343    141 141 140  --   --  142 141   < >   < > 142   K 3.1* 3.6 3.9 4.3 3.7   < > 4.0 4.2   < >   < > 4.2    107 105 107  --   -- 108 109*   < >   < > 110*   CO2 31 29 29 28  --   --  28 26   < >   < > 26   * 262* 295* 233*  --   --  244* 265*   < >   < > 217*   BUN 69* 63* 56* 52*  --   --  50* 46*   < >   < > 42*   CREA 2.27* 2.39* 2.29* 2.28*  --   --  2.29* 2.22*   < >   < > 2.29*   CA 9.8 9.2 9.2 9.7  --   --  8.8 9.0   < >   < > 9.1   MG 2.6* 2.9* 2.1 3.2* 1.4*   < > 1.5* 2.5*   < >   < > 2.6*   PHOS 3.1 3.1 3.2 3.6  --   --  3.6  --   --    < > 3.1   ALB 3.3* 3.5 3.5 3.4*  --   --  3.5 3.5   < >   < > 3.2*   ALT <6*  --   --  7*  --   --   --  10*  --   --  10*    < > = values in this interval not displayed. Recent Labs     01/24/22  0418 01/23/22  0405 01/22/22  1246 01/22/22  0347   WBC 9.1 9.7 9.6 8.3   HGB 7.9* 7.7* 7.4* 6.7*   HCT 30.3* 29.1* 28.1* 26.4*    188 188 212     Lab Results   Component Value Date/Time    Specimen Description: URINE 10/22/2013 01:32 PM    Specimen Description: URINE 01/23/2013 04:40 PM    Specimen Description: LEG TISSUE 02/12/2009 12:00 AM    Specimen Description: LEG (LEFT) 01/29/2009 03:06 AM     Lab Results   Component Value Date/Time    Culture result: MRSA NOT PRESENT 01/19/2022 12:41 PM    Culture result:  01/19/2022 12:41 PM     Screening of patient nares for MRSA is for surveillance purposes and, if positive, to facilitate isolation considerations in high risk settings. It is not intended for automatic decolonization interventions per se as regimens are not sufficiently effective to warrant routine use.     Culture result: NO GROWTH 5 DAYS 01/19/2022 12:41 PM    Culture result: MIXED SKIN ROSANNA ISOLATED 10/22/2013 01:32 PM    Culture result:  01/23/2013 04:40 PM     ENTEROCOCCUS FAECALIS GROUP D  MIXED SKIN ROSANNA ISOLATED     Recent Results (from the past 24 hour(s))   RENAL FUNCTION PANEL    Collection Time: 01/23/22 11:17 AM   Result Value Ref Range    Sodium 141 136 - 145 mmol/L    Potassium 3.9 3.5 - 5.1 mmol/L    Chloride 105 97 - 108 mmol/L    CO2 29 21 - 32 mmol/L    Anion gap 7 5 - 15 mmol/L    Glucose 295 (H) 65 - 100 mg/dL    BUN 56 (H) 6 - 20 MG/DL    Creatinine 2.29 (H) 0.55 - 1.02 MG/DL    BUN/Creatinine ratio 24 (H) 12 - 20      GFR est AA 26 (L) >60 ml/min/1.73m2    GFR est non-AA 21 (L) >60 ml/min/1.73m2    Calcium 9.2 8.5 - 10.1 MG/DL    Phosphorus 3.2 2.6 - 4.7 MG/DL    Albumin 3.5 3.5 - 5.0 g/dL   MAGNESIUM    Collection Time: 01/23/22 11:17 AM   Result Value Ref Range    Magnesium 2.1 1.6 - 2.4 mg/dL   GLUCOSE, POC    Collection Time: 01/23/22 11:19 AM   Result Value Ref Range    Glucose (POC) 239 (H) 65 - 117 mg/dL    Performed by Jamaal Kiara    GLUCOSE, POC    Collection Time: 01/23/22  1:52 PM   Result Value Ref Range    Glucose (POC) 297 (H) 65 - 117 mg/dL    Performed by Jamaal Kiara    PTT    Collection Time: 01/23/22  4:27 PM   Result Value Ref Range    aPTT 55.7 (H) 22.1 - 31.0 sec    aPTT, therapeutic range     58.0 - 77.0 SECS   RENAL FUNCTION PANEL    Collection Time: 01/23/22  4:27 PM   Result Value Ref Range    Sodium 141 136 - 145 mmol/L    Potassium 3.6 3.5 - 5.1 mmol/L    Chloride 107 97 - 108 mmol/L    CO2 29 21 - 32 mmol/L    Anion gap 5 5 - 15 mmol/L    Glucose 262 (H) 65 - 100 mg/dL    BUN 63 (H) 6 - 20 MG/DL    Creatinine 2.39 (H) 0.55 - 1.02 MG/DL    BUN/Creatinine ratio 26 (H) 12 - 20      GFR est AA 25 (L) >60 ml/min/1.73m2    GFR est non-AA 20 (L) >60 ml/min/1.73m2    Calcium 9.2 8.5 - 10.1 MG/DL    Phosphorus 3.1 2.6 - 4.7 MG/DL    Albumin 3.5 3.5 - 5.0 g/dL   MAGNESIUM    Collection Time: 01/23/22  4:27 PM   Result Value Ref Range    Magnesium 2.9 (H) 1.6 - 2.4 mg/dL   GLUCOSE, POC    Collection Time: 01/23/22  5:25 PM   Result Value Ref Range    Glucose (POC) 245 (H) 65 - 117 mg/dL    Performed by Vince Hagan    GLUCOSE, POC    Collection Time: 01/23/22 11:22 PM   Result Value Ref Range    Glucose (POC) 254 (H) 65 - 117 mg/dL    Performed by Marcellus Alas    PTT    Collection Time: 01/24/22  4:18 AM   Result Value Ref Range aPTT 52.6 (H) 22.1 - 31.0 sec    aPTT, therapeutic range     58.0 - 77.0 SECS   MAGNESIUM    Collection Time: 01/24/22  4:18 AM   Result Value Ref Range    Magnesium 2.6 (H) 1.6 - 2.4 mg/dL   NT-PRO BNP    Collection Time: 01/24/22  4:18 AM   Result Value Ref Range    NT pro-BNP 20,024 (H) <643 PG/ML   METABOLIC PANEL, COMPREHENSIVE    Collection Time: 01/24/22  4:18 AM   Result Value Ref Range    Sodium 143 136 - 145 mmol/L    Potassium 3.1 (L) 3.5 - 5.1 mmol/L    Chloride 105 97 - 108 mmol/L    CO2 31 21 - 32 mmol/L    Anion gap 7 5 - 15 mmol/L    Glucose 176 (H) 65 - 100 mg/dL    BUN 69 (H) 6 - 20 MG/DL    Creatinine 2.27 (H) 0.55 - 1.02 MG/DL    BUN/Creatinine ratio 30 (H) 12 - 20      GFR est AA 26 (L) >60 ml/min/1.73m2    GFR est non-AA 21 (L) >60 ml/min/1.73m2    Calcium 9.8 8.5 - 10.1 MG/DL    Bilirubin, total 0.5 0.2 - 1.0 MG/DL    ALT (SGPT) <6 (L) 12 - 78 U/L    AST (SGOT) 8 (L) 15 - 37 U/L    Alk.  phosphatase 101 45 - 117 U/L    Protein, total 7.6 6.4 - 8.2 g/dL    Albumin 3.3 (L) 3.5 - 5.0 g/dL    Globulin 4.3 (H) 2.0 - 4.0 g/dL    A-G Ratio 0.8 (L) 1.1 - 2.2     DIGOXIN    Collection Time: 01/24/22  4:18 AM   Result Value Ref Range    Digoxin level 0.9 0.90 - 2.00 NG/ML   LACTIC ACID    Collection Time: 01/24/22  4:18 AM   Result Value Ref Range    Lactic acid 0.6 0.4 - 2.0 MMOL/L   PROCALCITONIN    Collection Time: 01/24/22  4:18 AM   Result Value Ref Range    Procalcitonin 0.71 ng/mL   PHOSPHORUS    Collection Time: 01/24/22  4:18 AM   Result Value Ref Range    Phosphorus 3.1 2.6 - 4.7 MG/DL   LD    Collection Time: 01/24/22  4:18 AM   Result Value Ref Range     (H) 81 - 246 U/L   CBC W/O DIFF    Collection Time: 01/24/22  4:18 AM   Result Value Ref Range    WBC 9.1 3.6 - 11.0 K/uL    RBC 3.29 (L) 3.80 - 5.20 M/uL    HGB 7.9 (L) 11.5 - 16.0 g/dL    HCT 30.3 (L) 35.0 - 47.0 %    MCV 92.1 80.0 - 99.0 FL    MCH 24.0 (L) 26.0 - 34.0 PG    MCHC 26.1 (L) 30.0 - 36.5 g/dL    RDW 20.6 (H) 11.5 - 14.5 % PLATELET 773 653 - 481 K/uL    MPV 9.1 8.9 - 12.9 FL    NRBC 0.0 0  WBC    ABSOLUTE NRBC 0.00 0.00 - 0.01 K/uL   CORTISOL    Collection Time: 01/24/22  4:18 AM   Result Value Ref Range    Cortisol, random 18.7 ug/dL   IRON PROFILE    Collection Time: 01/24/22  4:18 AM   Result Value Ref Range    Iron 22 (L) 35 - 150 ug/dL    TIBC 133 (L) 250 - 450 ug/dL    Iron % saturation 17 (L) 20 - 50 %   FERRITIN    Collection Time: 01/24/22  4:18 AM   Result Value Ref Range    Ferritin 1,815 (H) 26 - 388 NG/ML   TRIGLYCERIDE    Collection Time: 01/24/22  4:18 AM   Result Value Ref Range    Triglyceride 151 (H) <150 MG/DL   GLUCOSE, POC    Collection Time: 01/24/22  6:20 AM   Result Value Ref Range    Glucose (POC) 187 (H) 65 - 117 mg/dL    Performed by Anita Frias    CARBOXYHEMOGLOBIN    Collection Time: 01/24/22  6:26 AM   Result Value Ref Range    Carboxy-Hgb 1.6 1 - 2 %    Methemoglobin 0.4 0 - 1.4 %    tHb 8.1 (L) 14 - 17 g/dL    Oxyhemoglobin 71.8 (LL) 94 - 97 %    O2 SATURATION 73 (L) 95 - 99 %    SITE SG      Sample source MIXED VENOUS      Critical value read back Called to ARTURO Salinas on 01/24/2022 at 06:36            I have reviewed the flowsheets. Chart and Pertinent Notes have been reviewed. No change in PMH ,family and social history from Consult note.       Rachana Ness MD  [facility]

## 2022-01-24 NOTE — PROGRESS NOTES
I called her  and updated him about her progress and plan so far  BIV ICD gen change is on hold pending transplant referral and evaluation

## 2022-01-24 NOTE — CONSULTS
Dr. Clarence Ureña. [phone]            Cardiology Consult/Progress Note      Requesting/referring provider: Kentrell Kaur MD     Reason for Consult: possible PCI of 80% LAD lesion    Assessment/Plan:  1. CAD, LAD 80% stenosis - S/p Impella placement on 01/18/2022, now in CCU.  She had VF 1/19, has appearance of Torsades.  Required ICD shock x 2, both successful. Per F, [facility] will accept patient on integrillin drip if LV does not recover after revascularization and on dialysis if needed for volume management. Pt is high risk for decompensation with high risk cardiac intervention. Her EF is improving based on my assessment of her echo today. May be weaned off impella. In that case we will consider PCI prior to impella removal assuming renal services are OK and hopefully can use <20 cc of contrast for the procedure. 2. Cardiomyopathy: probable acute component: By my assessment, EF appears to be improving. PCI not urgent for that reason. 3. RODNEY/CKD: Creatinine high and at risk of dialysis if large dye load given. [x]    High complexity decision making was performed  [x]    Patient is at high-risk of decompensation with multiple organ involvement  [x]    Complex/difficult social determinants of health outcomes    Investigations personally reviewed and interpreted  Tele- AV pacing    Echo 1/20/22-    Left Ventricle: Limited study for impella position. Left ventricle is severely dilated. Severe global hypokinesis present. The EF by visual approximation is 20 - 25%. Impella catheter is present. 5.9 cm from AV.   Right Ventricle: Right ventricle is moderately dilated.     Left and right heart cath 1/17/22-  1. Severely elevated left and right-sided filling pressures  2. Severe pulmonary hypertension: Mixed pattern with elevated wedge as well as PVR of about 6 Woods units. 3.  Severe one-vessel, moderate one-vessel coronary artery disease. Mid LAD lesion is the worst which is about 80% involving diagonal branch ostium. Uncertain if CAD alone as a cause of her cardiomyopathy(probably less likely). Likely prior stent in mid LAD which is patent  4.  Reduced cardiac index of 1.65 L/min/m² by thermodilution and 1.6 L/min/m² by presumed O2 consumption by Aye     Access right radial, right internal jugular ultrasound-guided no issues  Contrast 15 cc. Limited pictures were taken due to underlying CKD     Recommendations  1. Guideline directed medical therapy for heart failure and coronary artery disease  2. Will defer to discussion between Dr. Lianne Lamar, nephrology and heart failure if they like to consider revascularization. Uncertain if LAD revascularization will lead to improvement in functional status or EF at this time. Nonetheless can be achieved with less than 20 to 30 cc of contrast.      Investigations reviewed    HPI: Marcela Murray, a 78y.o. year-old who is seen for evaluation of HFrEF and CAD with 80% stenosis of LAD. She is an [facility] American female with a history of NICM, chronic hypoxic respiratory failure secondary to pulmonary HTN, hypothyroidism, CKD, GERD, and DM II who presented to [facility] as a transfer from another facility for acute on chronic hypoxic respiratory failure. Upon arrival to the ED she was found to have O2 sats in the 70s, requiring 4L NC O2.  Rapid covid test was negative.  ProNT-BNP was 21700, K+5.2, BUN/CR x/2.54 and elevated d-dimer. Chest xray showing pulmonary edema vs. Atypical pneumonia. VQ scan showed low probability for PE. Per Dr. Stephanie Antonio, LVEF 15-20% during recent admission.  LVEF previously normalized with CRT.  NYHA III-IV.  No ACEi/ARB due to renal dysfunction.  GDMT dosing limited by low BP.       She  has a past medical history of Acquired hypothyroidism (8/15/2016), Anemia, Asthma, Cardiomyopathy, nonischemic ([address] CKD (chronic kidney disease), CKD (chronic kidney disease) (8/15/2012), Depression, Diabetes ([facility] Diabetic neuropathy ([facility] DM (diabetes mellitus) ([address] (8/15/2012), GERD (gastroesophageal reflux disease), Gout, Hypothyroidism, ICD (implantable cardioverter-defibrillator), biventricular, in situ (6/5/2014), and Psoriasis. She has no past medical history of Abuse, Adult physical abuse, Arrhythmia, Arthritis, Asthma, Autoimmune disease ([address] CAD (coronary artery disease), Calculus of kidney, Cancer ([address] Chronic pain, COPD, Headache(784.0), Hypercholesteremia, Liver disease, Psychotic disorder ([address] PUD (peptic ulcer disease), Seizures ([facility] Stroke ([facility] Thromboembolus ([facility] [facility] or Unspecified deficiency anemia. Review of system:Patient intubated and sedated, on Impella. Family History   Problem Relation Age of Onset    Heart Disease Mother     Hypertension Mother     Lupus Sister     Diabetes Brother       Social History     Socioeconomic History    Marital status:    Tobacco Use    Smoking status: Never Smoker    Smokeless tobacco: Never Used   Substance and Sexual Activity    Alcohol use: No    Drug use: No    Sexual activity: Never   Social History Narrative    . Nonsmoker. Disability      PE  Vitals:    01/24/22 1340 01/24/22 1400 01/24/22 1445 01/24/22 1500   BP: 106/66 (!) 92/59 (!) 82/54 102/64   Pulse:  80 80 80   Resp:  16  16   Temp:       SpO2:  99%  100%   Weight:       Height:        Body mass index is 38.26 kg/m². General:    Alert, cooperative, no distress. Psychiatric:    Normal Mood and affect    Eye/ENT:      Pupils equal, No asymmetry, Conjunctival pink. Able to hear voice at normal amplitude   Lungs:      Visibly symmetric chest expansion, No palpable tenderness. Clear to auscultation bilaterally. Heart[de-identified]    Regular rate and rhythm, S1, S2 normal, no murmur, click, rub or gallop. No JVD, Normal palpable peripheral pulses. No cyanosis   Abdomen:     Soft, non-tender.  Bowel sounds normal. No masses,  No organomegaly. Extremities:   Extremities normal, atraumatic, no edema. Neurologic:   CN II-XII grossly intact.  No gross focal deficits           Recent Labs:  Lab Results   Component Value Date/Time    Cholesterol, total 151 01/11/2022 05:13 AM    HDL Cholesterol 60 01/11/2022 05:13 AM    LDL, calculated 75.8 01/11/2022 05:13 AM    Triglyceride 151 (H) 01/24/2022 04:18 AM    CHOL/HDL Ratio 2.5 01/11/2022 05:13 AM     Lab Results   Component Value Date/Time    Creatinine (POC) 2.1 (H) 01/23/2013 04:12 PM    Creatinine 2.27 (H) 01/24/2022 11:16 AM    Creatinine 2.34 (H) 01/24/2022 11:16 AM     Lab Results   Component Value Date/Time    BUN 69 (H) 01/24/2022 11:16 AM    BUN 67 (H) 01/24/2022 11:16 AM    BUN (POC) 36 (H) 01/23/2013 04:12 PM     Lab Results   Component Value Date/Time    Potassium 3.6 01/24/2022 11:16 AM    Potassium 3.6 01/24/2022 11:16 AM     Lab Results   Component Value Date/Time    Hemoglobin A1c 7.7 (H) 01/11/2022 05:13 AM    Hemoglobin A1c, External 9.0 12/18/2015 12:00 AM     Lab Results   Component Value Date/Time    Hemoglobin (POC) 10.9 (L) 01/23/2013 04:12 PM    HGB 7.9 (L) 01/24/2022 04:18 AM     Lab Results   Component Value Date/Time    PLATELET 041 02/34/8217 04:18 AM       Reviewed:  Past Medical History:   Diagnosis Date    Acquired hypothyroidism 8/15/2016    Anemia     RED-HF study    Asthma     Cardiomyopathy, nonischemic ([facility]     initial dx 2001, bivHF 2008 with EF 15%, s/p biV-ICD 9/08, significant improvment in EF to 45-50%    CKD (chronic kidney disease)     Dr Corie Hatfield    CKD (chronic kidney disease) 8/15/2012    Depression     Diabetes ([address]     Diabetic neuropathy ([address]     DM (diabetes mellitus) ([facility] 8/15/2012    GERD (gastroesophageal reflux disease)     Gout     Hypothyroidism     ICD (implantable cardioverter-defibrillator), biventricular, in situ 6/5/2014    Psoriasis      Social History     Tobacco Use   Smoking Status Never Smoker   Smokeless Tobacco Never Used     Social History     Substance and Sexual Activity   Alcohol Use No     Allergies   Allergen Reactions    Ciprofloxacin Anaphylaxis    Shellfish Derived Anaphylaxis    Sildenafil Other (comments)    Ace Inhibitors Unknown (comments)    Biaxin [Clarithromycin] Other (comments)     Metal taste    Candesartan Cough    Pcn [Penicillins] Hives     Family History   Problem Relation Age of Onset    Heart Disease Mother     Hypertension Mother     Lupus Sister     Diabetes Brother         Current Facility-Administered Medications   Medication Dose Route Frequency    perflutren lipid microspheres (DEFINITY) in NS bolus IV  1.5 mL IntraVENous RAD ONCE    albumin human 5% (BUMINATE) 5 % solution        insulin NPH (NOVOLIN N, HUMULIN N) injection 25 Units  25 Units SubCUTAneous DAILY    insulin NPH (NOVOLIN N, HUMULIN N) injection 15 Units  15 Units SubCUTAneous QHS    bumetanide (BUMEX) 0.25 mg/mL infusion  1.5 mg/hr IntraVENous CONTINUOUS    DOBUTamine (DOBUTREX) 500 mg/250 mL (2,000 mcg/mL) infusion  0-10 mcg/kg/min IntraVENous TITRATE    0.9% sodium chloride infusion 250 mL  250 mL IntraVENous PRN    albumin human 25% (BUMINATE) solution 12.5 g  12.5 g IntraVENous DAILY    milrinone (PRIMACOR) 20 MG/100 ML D5W infusion  0.125 mcg/kg/min IntraVENous CONTINUOUS    vasopressin (VASOSTRICT) 20 Units in 0.9% sodium chloride 100 mL infusion  0-0.1 Units/min IntraVENous TITRATE    PHENYLephrine (NORMA-SYNEPHRINE) 30 mg in 0.9% sodium chloride 250 mL infusion   mcg/min IntraVENous TITRATE    niCARdipine (CARDENE) 25 mg in 0.9% sodium chloride 250 mL infusion  0-15 mg/hr IntraVENous TITRATE    bivalirudin (ANGIOMAX) 250 mg in 0.9% sodium chloride (MBP/ADV) 50 mL MBP  0.02-2.5 mg/kg/hr IntraVENous TITRATE    hydrALAZINE (APRESOLINE) 20 mg/mL injection 5 mg  5 mg IntraVENous Q4H PRN    bumetanide (BUMEX) injection 1 mg  1 mg IntraVENous Q4H PRN    ceFAZolin (ANCEF) 1 g in sterile water (preservative free) 10 mL IV syringe  1 g IntraVENous Q12H    fentaNYL (PF) 1,500 mcg/30 mL (50 mcg/mL) infusion  0-200 mcg/hr IntraVENous TITRATE    heparin (porcine) in 0.9% NaCl 30,000 unit/1,000 mL perfusion irrigation 50-1,000 mL  50-1,000 mL Other PRN    sodium chloride (NS) flush 5-40 mL  5-40 mL IntraVENous Q8H    heparinized saline 2 units/mL infusion    CONTINUOUS    chlorhexidine (PERIDEX) 0.12 % mouthwash 15 mL  15 mL Oral Q12H    sodium chloride (NS) flush 5-40 mL  5-40 mL IntraVENous Q8H    0.45% sodium chloride infusion  10 mL/hr IntraVENous CONTINUOUS    0.9% sodium chloride infusion  9 mL/hr IntraVENous CONTINUOUS    naloxone (NARCAN) injection 0.4 mg  0.4 mg IntraVENous PRN    ondansetron (ZOFRAN) injection 4 mg  4 mg IntraVENous Q4H PRN    albuterol (PROVENTIL VENTOLIN) nebulizer solution 2.5 mg  2.5 mg Nebulization Q4H PRN    aspirin chewable tablet 81 mg  81 mg Oral DAILY    midazolam (VERSED) injection 1 mg  1 mg IntraVENous Q1H PRN    magnesium oxide (MAG-OX) tablet 400 mg  400 mg Oral BID    calcium chloride 1 g in 0.9% sodium chloride 100 mL IVPB  1 g IntraVENous PRN    bisacodyL (DULCOLAX) suppository 10 mg  10 mg Rectal DAILY PRN    senna-docusate (PERICOLACE) 8.6-50 mg per tablet 1 Tablet  1 Tablet Oral BID    ELECTROLYTE REPLACEMENT NOTE: Nurse to review Serum Potassium and Magnesuim levels and Initiate Electrolyte Replacement Protocol as needed  1 Each Other PRN    magnesium sulfate 1 g/100 ml IVPB (premix or compounded)  1 g IntraVENous PRN    alteplase (CATHFLO) 1 mg in sterile water (preservative free) 1 mL injection  1 mg InterCATHeter PRN    bacitracin 500 unit/gram packet 1 Packet  1 Packet Topical PRN    pantoprazole (PROTONIX) injection 40 mg  40 mg IntraVENous DAILY    And    sodium chloride (NS) flush 10 mL  10 mL IntraVENous DAILY    dexmedeTOMidine in 0.9 % NaCl (PRECEDEX) 400 mcg/100 mL (4 mcg/mL) infusion soln  0.1-1.5 mcg/kg/hr IntraVENous TITRATE    midazolam (VERSED) injection 1 mg  1 mg IntraVENous Q4H PRN    insulin lispro (HUMALOG) injection   SubCUTAneous Q6H    propofol (DIPRIVAN) 10 mg/mL infusion  0-50 mcg/kg/min IntraVENous TITRATE    sodium bicarbonate (8.4%) 25 mEq in dextrose 5% 1,000 mL - impella purge fluid   IntraVENous TITRATE    triamcinolone acetonide (KENALOG) 0.1 % cream   Topical BID    polyethylene glycol (MIRALAX) packet 17 g  17 g Oral DAILY    digoxin (LANOXIN) tablet 0.0625 mg  0.0625 mg Oral DAILY    allopurinoL (ZYLOPRIM) tablet 50 mg  50 mg Oral DAILY    epoetin isabel-epbx (RETACRIT) injection 20,000 Units  20,000 Units SubCUTAneous Q TUE, THU & SAT    montelukast (SINGULAIR) tablet 10 mg  10 mg Oral DAILY    levothyroxine (SYNTHROID) tablet 100 mcg  100 mcg Oral Once per day on Mon Tue Wed Thu Fri Sat    hydroxypropyl methylcellulose (ISOPTO TEARS) 0.5 % ophthalmic solution 1 Drop  1 Drop Both Eyes PRN    venlafaxine-SR (EFFEXOR-XR) capsule 75 mg  75 mg Oral DAILY WITH BREAKFAST    gabapentin (NEURONTIN) capsule 100 mg  100 mg Oral QHS    arformoteroL (BROVANA) neb solution 15 mcg  15 mcg Nebulization BID RT    And    budesonide (PULMICORT) 500 mcg/2 ml nebulizer suspension  500 mcg Nebulization BID RT    sodium chloride (NS) flush 5-40 mL  5-40 mL IntraVENous Q8H    acetaminophen (TYLENOL) tablet 650 mg  650 mg Oral Q6H PRN    Or    acetaminophen (TYLENOL) suppository 650 mg  650 mg Rectal Q6H PRN    glucose chewable tablet 16 g  4 Tablet Oral PRN    dextrose (D50W) injection syrg 12.5-25 g  25-50 mL IntraVENous PRN    glucagon (GLUCAGEN) injection 1 mg  1 mg IntraMUSCular PRN       RISHI Arevalo01/24/22     ATTENTION:   This medical record was transcribed using an electronic medical records/speech recognition system. Although proofread, it may and can contain electronic, spelling and other errors. Corrections may be executed at a later time.   Please feel free to contact us for any clarifications as needed.     [address] [facility] [place]  [facility], [facility],  [place], [place]. [phone]

## 2022-01-24 NOTE — PROGRESS NOTES
Physical Therapy    Chart reviewed. Note vent settings now within PT treatment parameters (currently FIO2 40% and PEEP 5), however pt remains sedated and unable to participate. Will con't to follow.      Sandy Mae, PT, MPT

## 2022-01-25 NOTE — PROGRESS NOTES
1930: Bedside and Verbal shift change report given to Hardeep Wise RN (oncoming nurse) by Souelymane Best RN (offgoing nurse). Report included the following information SBAR, Kardex, ED Summary, OR Summary, Procedure Summary, Intake/Output, MAR, Recent Results, Med Rec Status, Cardiac Rhythm BiV paced, Alarm Parameters  and Dual Neuro Assessment. Drips: NS @ 19, Bival @ 0.1008, Bumex @ 1.5, Precedex @ 0.5, Fentanyl @ 100, Propofol @ 30, Impella Purge Bicarb in D5 @ 8    2000: Resumed pt care, VSS, no evidence of pain. Q4 labs drawn and sent     2330: Pt coughing/biting ETT, having bigeminal PVC's, precedex increased     2345: MAPs dropped to 58-63, CVP 4. MD @ bedside, orders to decrease bumex gtt to 1mg/hr, give 250cc 5% albumin x1.     0000: TF off per order. Q4 labs drawn and sent     0100: Temp 100.5, PRN tylenol given     0115: K+ 3.7, replaced per protocol     0200: BP 70-80/40's, MD @ bedside, orders to start weaning propofol and increase precedex/fentanyl if needed     0415: AM labs drawn and sent     0530:  ABG: pO2 97; pCO2 45; pH 7.39;  HCO3 27, 95.7%O2 Sat. No changes made  Carboxy: tHb 8.3; O2 70; SWAN recalibrated     0645: Temp 100.5, PRN tylenol given     0730: Bedside and Verbal shift change report given to ARTURO Scanlon (oncoming nurse) by Hardeep Wise RN (offgoing nurse). Report included the following information SBAR, Kardex, ED Summary, OR Summary, Procedure Summary, Intake/Output, MAR, Recent Results, Med Rec Status, Cardiac Rhythm BiV Paced, Alarm Parameters  and Dual Neuro Assessment.      Drips: NS @ 19, Bival @ 0.1008, Bumex @ 1, Precedex @ 0.6, Fentanyl @ 100, Propofol @ 15, Impella Purge Bicarb in D5 @ 8

## 2022-01-25 NOTE — PROGRESS NOTES
At request of F team, TOSHIA contacted patient's spouse Rony Aquino () to schedule a family meeting.  Mr. Demetris Yousif was agreeable to meet Wednesday, 1/26/22 between 1:00 and 1:30pm. TOSHIA notified F team.

## 2022-01-25 NOTE — PROGRESS NOTES
[facility] in [place], [place]  Inpatient Progress Note      Patient name: Samantha Castro  Patient : 1955  Patient MRN: [id number]  Consulting MD: Vitaliy Douglas MD  Date of service: 22    REASON FOR REFERRAL:  Management of chronic systolic heart failure     PLAN OF CARE:   · 79 y/o female with chronic renal failure and new onset severe cardiomyopathy, LVEF 15% (diagnosed 21), stage D, NYHA class IV; admitted for pulmonary edema and severe volume overloadd by RHC  · Most likely etiology of acute deterioration of LVEF is chronic volume overload with compensatory tachycardia in the setting of progressive renal failure +/- coronary artery disease (80% LAD)   · These are potentially reversible causes of severe LV dysfunction; by guidelines patient needs at least 3 months of euvolemia, HR < 100bpm and revascularization to prove she has non-reversible new, severe HF to be eligible for cardiac replacement therapies, including heart/kidney transplantation, unless cannot be weaned off support after revascularization, d/w Dr. Betty Doe and Dr. Corona Dyer with [facility]   · Patient would like aggressive approach to allow her heart to recover, including dialysis +/- revascularization with goal of Impella as bridge to LV recovery or transplant, discussed at length with patient and her sister at prior family meeting  · D/w patient, her family, CT surgery, nephrology and primary cardiologist plan to support her with Impella as bridge to LV recovery or transplant.  Patient, family and all services above were in agreement with the following anticipated outcomes:  · 1/3 chance of LV recovery and discharge home on medical therapy or chronic dialysis  · 1/3 chance of LV non-recovery on Impella, evaluation and transfer for listing for heart transplant/kidney  · 1/3 chance of LV non-recovery and evaluation that reveals patient is not candidate for LVAD/heart/kidney transplant and then wean of support to comfort care/hospice  · Impella 5.5 implanted by Dr. Arely Bey 1/18, partial unloading of LV at P8 (Tip adjacent to septum),, inotropes weaned but patient would required more aggressive diuresis/volume removal to drop LVEDP and allow Roro wean and extubation  ·  plan is to scope +/- revascularize  · Patient dose not have apparent contraindications to dual organ transplantation; [facility] will accept patient on integrillin drip if LV does not recover after revascularization and on dialysis if needed for volume management; anticipated waiting time at BMI 38 and blood type AB for hear/kidney transplant is currently approximately 10 days; d/w Dr. Miranda Church from [facility]. · D/w Cecilia Bautista, and bedside RN Criss Pierre      RECOMMENDATIONS:  POD #6 Impella 5.5 implant   Vent management per Intensivist   TTE 1/22 showed large LVIDd with Impella adjacent to septum, RV function acceptable, Impella catheter 5.1 cm from AV; reviewed by Dr. Dominic Lange - no plans to reposition at this time due to adequate cardiac index and Impella flow   Cefazolin q12h for Impella site ppx   Maintain PAC for hemodynamic optimization; goal CI > 2.3, CVP 8-10 mmHg SVR 1000, SBP < 110 mmHg (via radial arterial line)   Continue to wean Roro   Intolerant to sildenafil due to marked hypotension   Continue Bumex gtt at 1mg/hr; goal net (-)1-2L daily   Keep K> 4 and Mg >2  Hydralazine 5 mg IV q4h PRN SBP > 110 mmHg   Intolerant of GDMT due to hemodynamic in stability   BiV-ICD programmed at 80 bpm; device is at EOL, recommend to delay ICD generator change until final plans for transplant are established - Per Dr. Lakhwinder Kapoor patient is not pacer dependent  Place defib patches on patient for ppx  Decrease digoxin 0.0625 mg to every other day; goal 0.7-1.2   Allopurinol 50mg daily per nephrology  S/p Venofer 200 mg IV x 2   CT head/chest/abd/pelvis w/o contrast for txp eval  Plan for GI scopes on Wednesday 1/26/22  Plan for possible LAD stent for revascularization on Thursday 1/27/22  Epo level pending   Pan culture pending for fevers   No COVID test per intensivist   pTT goal  40-60 due to persistent anemia on bivalirudin   Will need OP PSG  Invitae pending   Palliative consult appreciated   Nutritionist consult  Heart failure education   Advanced care plan present on file  Schedule family meeting      All other care per primary team     IMPRESSION:  Volume overload  Acute on Chronic systolic heart failure, requiring Impella 5.5 implant 1/18/22   · Stage D, NYHA class IV symptoms  · Non-ischemic cardiomyopathy, LVEF 15%  · Normal coronaries  · PYP equivocal for amyloid   Pulmonary hypertension, severe  RV failure  S/p BiV-ICD  Cardiac risk factors:  · HL  · DM2  · Morbid obesity, BMI 40  Acute on chronic renal failure, stage IV  GERD  Anemia of chronic disease  Gout  VF s/p ICD shock x 2      Interval Events:  POD #7  Impella placement   Impella  P8  I/O even  Creatinine stable at 2.37  proBNP increased up to 32,000  Remains on Roro and intubated  Off pressors and inotropes        LIFE GOALS:  Patient's personal goals include: being home with family, still ambulating around without getting too tired. Important upcoming milestones or family events: none  The patient identifies the following as important for living well: remaining idependent and mobile; being able to get out of the house with . Patient verbalized willingness to be on home milrinone and evaluation for both heart and kidney transplants. Verbalizes she would have family supporting her decision on this. HPI  Real Fatima is a 78 y.o.  female with a history of NICM, chronic hypoxic respiratory failure secondary to pulmonary HTN, hypothyroidism, CKD, GERD, and DM II who presented to [facility] as a transfer from another facility for acute on chronic hypoxic respiratory failure. Reports running out of O2 at home resulting in SOB and dizziness. Upon arrival to the ED she was found to have O2 sats in the 70s, requiring 4L NC O2. Rapid covid test was negative. ProNT-BNP was 09304, K+5.2, BUN/CR x/2.54 and elevated d-dimer. Chest xray showing pulmonary edema vs. Atypical pneumonia. VQ scan showed low probability for PE. Per Dr. Lakhwinder Kapoor, NICM, LVEF 15-20% during recent admission. LVEF previously normalized with CRT. NYHA III-IV. No ACEi/ARB due to renal dysfunction. GDMT dosing limited by low BP. Patient's PCP is Dr. Alexandria Hayden, and she sees Dr. Lakhwinder Kapoor primarily for cardiac care due to Dr. Sanjana Elliott transfering practice. Patient historically seen by nephrology but has not had follow up care in the past three years. CARDIAC IMAGING:  Echo 1/20/22 - LVEF 20-25%, RV moderately dilated, Impella catheter 5.9 cm from AV   Echo 12/8/21- LVEF 15-20%, mild to Mod MR, LVIDd 5.09cm, TAPSE 1.94cm  Echo 4/22/19- LVEF 60%, trace MR,  LVIDd 4.24cm, TAPSE 1.65cm   Echo 4/24/18- LVEF 60%, trivial MR, LVIDd 4.79cm, TAPSE 2.25cm      EKG- 1/4/22 ST with A sense and V paced rhythm     [facility] 2015- No significant CAD  NST 2014- reversible LAD involvement      ICD interrogation     HEMODYNAMICS:  [facility] 1/17/21: PAP 83/52 mmHg, RAP 27 mmHg, PCWP 45 mmHg, CI 1.6  [facility] 12/10/21: PAP 76/48/57, RAP 20, PCWP 35, CI 2.26     CPEST not done  6MW not done     OTHER IMAGING:  CXR Results  (Last 48 hours)                             01/13/22 1035   XR CHEST PORT Final result      Impression:   No significant change in congestion and interstitial and alveolar   opacities which may reflect edema or infectious infiltrate. Narrative:   EXAM: XR CHEST PORT       INDICATION: pul edema       COMPARISON: Chest x-ray 1/10/2022. FINDINGS: A portable AP radiograph of the chest was obtained at 10:19 hours. The   patient is on a cardiac monitor. The lungs appear grossly stable with congestion   and interstitial/airspace opacities with no pneumothorax or pleural effusion.    Right PICC line traverses expected course of tip in the region of the atriocaval   junction. Pacemaker-ICD generator body projects over the left chest wall with   intact appearing leads traversing in expected course. . The cardiac and   mediastinal contours and pulmonary vascularity are normal.  Atherosclerotic   calcifications affect the aortic arch. The chest wall structures and visualized   upper abdomen show no acute findings with incidental note of degenerative spine   and shoulder changes. PHYSICAL EXAM:  Visit Vitals  Visit Vitals  BP 93/60   Pulse 80   Temp 100.2 °F (37.9 °C)   Resp 12   Ht 5' 7\" (1.702 m)   Wt 243 lb 2.7 oz (110.3 kg)   SpO2 100%   BMI 38.09 kg/m²          Hemodynamics:   CO: CO (l/min): 6.1 l/min   CI: CI (l/min/m2): 2.8 l/min/m2   CVP: CVP (mmHg): 3 mmHg (01/25/22 1100)   SVR: SVR (dyne*sec)/cm5: 1000 (dyne*sec)/cm5 (01/25/22 7624)   PAP Systolic: PAP Systolic: 50 (23/54/54 1334)   PAP Diastolic: PAP Diastolic: 17 (59/98/78 6455)   PVR:     SV02: SVO2 (%): 72 % (01/25/22 1100)   SCV02:      Impella 5.5  P-8  Flow: 4.4lpm   Purge flow 8    Physical Exam  Physical Exam  Vitals and nursing note reviewed. Constitutional:       Comments: Intubated and sedated    Cardiovascular:      Rate and Rhythm: Normal rate and regular rhythm. Pulses: Normal pulses. Heart sounds: Normal heart sounds. Pulmonary:      Breath sounds: Decreased air movement present. Comments: Intubated   Abdominal:      General: There is no distension. Palpations: Abdomen is soft. Musculoskeletal:         General: No swelling. Skin:     General: Skin is warm and dry. ROS unable to obtain due to patient condition (intubated, sedated).       PAST MEDICAL HISTORY:  Past Medical History:   Diagnosis Date    Acquired hypothyroidism 8/15/2016    Anemia     RED-HF study    Asthma     Cardiomyopathy, nonischemic ([facility]     initial dx 2001, bivHF 2008 with EF 15%, s/p biV-ICD 9/08, significant improvment in EF to 45-50%    CKD (chronic kidney disease)     Dr Bard Harden    CKD (chronic kidney disease) 8/15/2012    Depression     Diabetes ([facility] [address]     Diabetic neuropathy ([facility]     DM (diabetes mellitus) ([facility] 8/15/2012    GERD (gastroesophageal reflux disease)     Gout     Hypothyroidism     ICD (implantable cardioverter-defibrillator), biventricular, in situ 6/5/2014    Psoriasis        PAST SURGICAL HISTORY:  Past Surgical History:   Procedure Laterality Date    CARDIAC CATHETERIZATION  2007; 01/06/15    normal cors    ECHO 2D ADULT  4/2010    EF 45%, improved from 1/09 (25%)    ECHO 2D ADULT  11/2011    LVH, EF 55-60%    HX ORTHOPAEDIC      knee    HX PACEMAKER PLACEMENT      AICD    STRESS TEST LEXISCAN/CARDIOLITE  3/21/12    normal perfusion, global HK 40%       FAMILY HISTORY:  Family History   Problem Relation Age of Onset    Heart Disease Mother     Hypertension Mother     Lupus Sister     Diabetes Brother        SOCIAL HISTORY:  Social History     Socioeconomic History    Marital status:    Tobacco Use    Smoking status: Never Smoker    Smokeless tobacco: Never Used   Substance and Sexual Activity    Alcohol use: No    Drug use: No    Sexual activity: Never   Social History Narrative    . Nonsmoker. Disability       LABORATORY RESULTS:     Labs Latest Ref Rng & Units 1/25/2022 1/25/2022 1/25/2022 1/24/2022 1/24/2022 1/24/2022 1/24/2022   WBC 3.6 - 11.0 K/uL - 8.2 - - - - -   RBC 3.80 - 5.20 M/uL - 3.14(L) - - - - -   Hemoglobin 11.5 - 16.0 g/dL - 7. 6(L) - - - - -   Hematocrit 35.0 - 47.0 % - 29. 2(L) - - - - -   MCV 80.0 - 99.0 FL - 93.0 - - - - -   Platelets 703 - 725 K/uL - 174 - - - - -   Lymphocytes 12 - 49 % - - - - - - -   Monocytes 5 - 13 % - - - - - - -   Eosinophils 0 - 7 % - - - - - - -   Basophils 0 - 1 % - - - - - - -   Albumin 3.5 - 5.0 g/dL - 3.4(L) - 3. 4(L) - 3. 3(L) -   Calcium 8.5 - 10.1 MG/DL - 9.6 - 9.6 - 9.8 9.6   Glucose 65 - 100 mg/dL - 230(H) - 218(H) - 215(H) 207(H)   BUN 6 - 20 MG/DL - 76(H) - 73(H) - 67(H) 69(H)   Creatinine 0.55 - 1.02 MG/DL - 2.37(H) - 2.36(H) - 2.34(H) 2.27(H)   Sodium 136 - 145 mmol/L - 142 - 144 - 143 144   Potassium 3.5 - 5.1 mmol/L 3.9 4.2 3.7 4.0 3.9 3.6 3.6   TSH 0.36 - 3.74 uIU/mL - - - - - - -   LDH 81 - 246 U/L - 329(H) - - - - -   Some recent data might be hidden     Lab Results   Component Value Date/Time    TSH 3.08 01/11/2022 05:13 AM    TSH 1.85 12/15/2021 01:06 PM    TSH 1.01 11/05/2020 09:11 AM    TSH 2.740 04/30/2019 11:21 AM    TSH 0.519 02/21/2012 10:53 AM    TSH 8.29 (H) 01/20/2010 01:59 PM       ALLERGY:  Allergies   Allergen Reactions    Ciprofloxacin Anaphylaxis    Shellfish Derived Anaphylaxis    Sildenafil Other (comments)    Ace Inhibitors Unknown (comments)    Biaxin [Clarithromycin] Other (comments)     Metal taste    Candesartan Cough    Pcn [Penicillins] Hives        CURRENT MEDICATIONS:    Current Facility-Administered Medications:     potassium, sodium phosphates (NEUTRA-PHOS) packet 1 Packet, 1 Packet, Oral, QID, Alpa DORSEY MD, 1 Packet at 01/25/22 0844    insulin NPH (NOVOLIN N, HUMULIN N) injection 20 Units, 20 Units, SubCUTAneous, QHS, Christiano Deshpande MD    [START ON 1/27/2022] digoxin (LANOXIN) tablet 0.0625 mg, 0.0625 mg, Oral, EVERY OTHER DAY, Amber Weaver NP    peg 3350-electrolytes (COLYTE) 4000 mL, 2,000 mL, Oral, BID, Monica Nash NP    0.9% sodium chloride infusion, 10 mL/hr, IntraVENous, CONTINUOUS, Vianney Tejada MD, Last Rate: 10 mL/hr at 01/25/22 0000, 10 mL/hr at 01/25/22 0000    insulin NPH (NOVOLIN N, HUMULIN N) injection 25 Units, 25 Units, SubCUTAneous, DAILY, Archana Marshall MD, 25 Units at 01/25/22 0844    bumetanide (BUMEX) 0.25 mg/mL infusion, 1 mg/hr, IntraVENous, CONTINUOUS, Vianney Tejada MD, Last Rate: 4 mL/hr at 01/25/22 0647, 1 mg/hr at 01/25/22 0647    DOBUTamine (DOBUTREX) 500 mg/250 mL (2,000 mcg/mL) infusion, 0-10 mcg/kg/min, IntraVENous, TITRATE, Shari Albarado NP, Held at 01/23/22 1200    0.9% sodium chloride infusion 250 mL, 250 mL, IntraVENous, PRN, Bharat Alberto MD    albumin human 25% (BUMINATE) solution 12.5 g, 12.5 g, IntraVENous, DAILY, Polliarjacobo, Elex Standard T, NP, Last Rate: 0 mL/hr at 01/24/22 0930, 12.5 g at 01/25/22 0844    milrinone (PRIMACOR) 20 MG/100 ML D5W infusion, 0.125 mcg/kg/min, IntraVENous, CONTINUOUS, Teresa Forde MD, Stopped at 01/22/22 2242    vasopressin (VASOSTRICT) 20 Units in 0.9% sodium chloride 100 mL infusion, 0-0.1 Units/min, IntraVENous, TITRATE, Shari Albarado NP, Stopped at 01/23/22 1048    PHENYLephrine (NORMA-SYNEPHRINE) 30 mg in 0.9% sodium chloride 250 mL infusion,  mcg/min, IntraVENous, TITRATE, Salina Guerra MD, Held at 01/21/22 1200    niCARdipine (CARDENE) 25 mg in 0.9% sodium chloride 250 mL infusion, 0-15 mg/hr, IntraVENous, TITRATE, Bharat ODELL MD, Stopped at 01/19/22 2109    bivalirudin (ANGIOMAX) 250 mg in 0.9% sodium chloride (MBP/ADV) 50 mL MBP, 0.02-2.5 mg/kg/hr, IntraVENous, TITRATE, Verena, Elex Standard T, NP, Last Rate: 2.4 mL/hr at 01/24/22 2107, 0.1008 mg/kg/hr at 01/24/22 2107    hydrALAZINE (APRESOLINE) 20 mg/mL injection 5 mg, 5 mg, IntraVENous, Q4H PRN, Amirahd, Elex Standard T, NP, 5 mg at 01/24/22 1336    bumetanide (BUMEX) injection 1 mg, 1 mg, IntraVENous, Q4H PRN, Polliard, Elex Standard T, NP, 1 mg at 01/22/22 1721    ceFAZolin (ANCEF) 1 g in sterile water (preservative free) 10 mL IV syringe, 1 g, IntraVENous, Q12H, Stanford Albarado Standard T, NP, 1 g at 01/25/22 0106    fentaNYL (PF) 1,500 mcg/30 mL (50 mcg/mL) infusion, 0-200 mcg/hr, IntraVENous, TITRATE, Bharat Alberto MD, Last Rate: 2 mL/hr at 01/25/22 0204, 100 mcg/hr at 01/25/22 0204    heparin (porcine) in 0.9% NaCl 30,000 unit/1,000 mL perfusion irrigation 50-1,000 mL, 50-1,000 mL, Other, PRN, Sam Gomez, PA    sodium chloride (NS) flush 5-40 mL, 5-40 mL, IntraVENous, Q8H, Sam Gomez PA, 10 mL at 01/24/22 1334    heparinized saline 2 units/mL infusion, , , CONTINUOUS, Elizabeth Sheriff MD, Stopped at 01/24/22 1945    chlorhexidine (PERIDEX) 0.12 % mouthwash 15 mL, 15 mL, Oral, Q12H, Davian Vázquez DO, 15 mL at 01/25/22 0844    sodium chloride (NS) flush 5-40 mL, 5-40 mL, IntraVENous, Q8H, Sam Gomez PA, 10 mL at 01/25/22 0502    0.45% sodium chloride infusion, 10 mL/hr, IntraVENous, CONTINUOUS, Vishnu Gomez PA    0.9% sodium chloride infusion, 9 mL/hr, IntraVENous, CONTINUOUS, Vishnu Gomez PA, Last Rate: 9 mL/hr at 01/25/22 0000, 9 mL/hr at 01/25/22 0000    naloxone (NARCAN) injection 0.4 mg, 0.4 mg, IntraVENous, PRN, Sam Gomez PA    [facility]ron [facility]) injection 4 mg, 4 mg, IntraVENous, Q4H PRN, Sam Gomez PA    albuterol (PROVENTIL VENTOLIN) nebulizer solution 2.5 mg, 2.5 mg, Nebulization, Q4H PRN, Sam Gomez PA    aspirin chewable tablet 81 mg, 81 mg, Oral, DAILY, Vishnu Gomez PA, 81 mg at 01/25/22 0846    midazolam (VERSED) injection 1 mg, 1 mg, IntraVENous, Q1H PRN, Sam Gomez PA    magnesium oxide (MAG-OX) tablet 400 mg, 400 mg, Oral, BID, Sam Gomez PA, 400 mg at 01/25/22 0845    calcium chloride 1 g in 0.9% sodium chloride 100 mL IVPB, 1 g, IntraVENous, PRN, Sam Gomez PA    bisacodyL (DULCOLAX) suppository 10 mg, 10 mg, Rectal, DAILY PRN, Sam Gomez PA    senna-docusate (PERICOLACE) 8.6-50 mg per tablet 1 Tablet, 1 Tablet, Oral, BID, Vishnu Gomez PA, 1 Tablet at 01/25/22 0845    ELECTROLYTE REPLACEMENT NOTE: Nurse to review Serum Potassium and Magnesuim levels and Initiate Electrolyte Replacement Protocol as needed, 1 Each, Other, PRN, Vishnu Gomez PA    magnesium sulfate 1 g/100 ml IVPB (premix or compounded), 1 g, IntraVENous, PRN, Vishnu Gomez PA    alteplase (CATHFLO) 1 mg in sterile water (preservative free) 1 mL injection, 1 mg, InterCATHeter, PRN, Sam Gomez PA    bacitracin 500 unit/gram packet 1 Packet, 1 Packet, Topical, PRN, Johnny Gomez PA    pantoprazole (PROTONIX) injection 40 mg, 40 mg, IntraVENous, DAILY, 40 mg at 01/25/22 0844 **AND** sodium chloride (NS) flush 10 mL, 10 mL, IntraVENous, DAILY, Davian Vázquez DO, 10 mL at 01/25/22 0844    dexmedeTOMidine in 0.9 % NaCl (PRECEDEX) 400 mcg/100 mL (4 mcg/mL) infusion soln, 0.1-1.5 mcg/kg/hr, IntraVENous, TITRATE, Davian Vázquez DO, Last Rate: 17 mL/hr at 01/25/22 0647, 0.6 mcg/kg/hr at 01/25/22 0647    midazolam (VERSED) injection 1 mg, 1 mg, IntraVENous, Q4H PRN, Davian Vázquez DO, 1 mg at 01/18/22 1719    insulin lispro (HUMALOG) injection, , SubCUTAneous, Q6H, Davian Vázquez DO, 6 Units at 01/25/22 0514    propofol (DIPRIVAN) 10 mg/mL infusion, 0-50 mcg/kg/min, IntraVENous, TITRATE, Davian Vázquez DO, Last Rate: 10.2 mL/hr at 01/25/22 0500, 15 mcg/kg/min at 01/25/22 0500    sodium bicarbonate (8.4%) 25 mEq in dextrose 5% 1,000 mL - impella purge fluid, , IntraVENous, TITRATE, Sam Gomez PA, Last Rate: 8 mL/hr at 01/24/22 1943, Rate Verify at 01/24/22 1943    triamcinolone acetonide (KENALOG) 0.1 % cream, , Topical, BID, Johnny Gomez PA, Given at 01/25/22 0844    polyethylene glycol (MIRALAX) packet 17 g, 17 g, Oral, DAILY, Sam Gomez PA, 17 g at 01/25/22 0845    allopurinoL (ZYLOPRIM) tablet 50 mg, 50 mg, Oral, DAILY, Sam Gomez PA, 50 mg at 01/25/22 0845    epoetin isabel-epbx (RETACRIT) injection 20,000 Units, 20,000 Units, SubCUTAneous, Q TUE, THU & SAT, Johnny Gomez PA, 20,000 Units at 01/22/22 2100    montelukast (SINGULAIR) tablet 10 mg, 10 mg, Oral, DAILY, Sam Gomez PA, 10 mg at 01/25/22 0845    levothyroxine (SYNTHROID) tablet 100 mcg, 100 mcg, Oral, Once per day on Mon Tue Wed Thu Fri Sat, Sam Gomez [place], 100 mcg at 01/25/22 4155    hydroxypropyl methylcellulose (ISOPTO TEARS) 0.5 % ophthalmic solution 1 Drop, 1 Drop, Both Eyes, PRN, Michelle Gomez PA, 1 Drop at 01/06/22 1727    venlafaxine-SR (EFFEXOR-XR) capsule 75 mg, 75 mg, Oral, DAILY WITH BREAKFAST, Michelle Gomez PA, 75 mg at 01/17/22 1025    gabapentin (NEURONTIN) capsule 100 mg, 100 mg, Oral, QHS, Michelle Gomez, PA, 100 mg at 01/24/22 2101    arformoteroL (BROVANA) neb solution 15 mcg, 15 mcg, Nebulization, BID RT, 15 mcg at 01/25/22 0826 **AND** budesonide (PULMICORT) 500 mcg/2 ml nebulizer suspension, 500 mcg, Nebulization, BID RT, Michelle Gomez PA, 500 mcg at 01/25/22 8371    sodium chloride (NS) flush 5-40 mL, 5-40 mL, IntraVENous, Q8H, Sam Gomez PA, 10 mL at 01/24/22 1334    acetaminophen (TYLENOL) tablet 650 mg, 650 mg, Oral, Q6H PRN, 650 mg at 01/25/22 0648 **OR** acetaminophen (TYLENOL) suppository 650 mg, 650 mg, Rectal, Q6H PRN, Sam Gomez PA    glucose chewable tablet 16 g, 4 Tablet, Oral, PRN, Sam Gomez PA    dextrose (D50W) injection syrg 12.5-25 g, 25-50 mL, IntraVENous, PRN, Michelle Gomez PA    glucagon (GLUCAGEN) injection 1 mg, 1 mg, IntraMUSCular, PRN, Michelle Gomez PA    PATIENT CARE TEAM:  Patient Care Team:  Gracie Ng MD as PCP - General (Internal Medicine)  Gracie Ng MD as PCP - [facility] Provider  Cristhian Quijano MD as Consulting Provider (Internal Medicine)  Kike De La Vega MD (Dermatology)  Maria A Chatterjee MD (Nephrology)  Taco Austin MD as Consulting Provider (Cardiology)  Jose Espino MD (Cardiology)  Zeke Campa MD as Consulting Provider (Pulmonary Disease)     Thank you for allowing me to participate in this patient's care. Jabari Ohm, JAIDA  [facility] [address] [place], [address]  Phone: [phone]      F ATTENDING ADDENDUM    Patient was seen and examined in person. Data and notes were reviewed.  I have discussed and agree with the plan as noted in the NP note above without further additions.     Alexandrea Epperson MD PhD  [id number]

## 2022-01-25 NOTE — PROGRESS NOTES
[facility]    ICU TEAM Progress Note    Name: Linda Jones   : 1955   MRN: [id number]   Date: 2022           ICU Assessment   40-year-old female with past medical history significant for moderate pulmonary hypertension on home oxygen therapy, CKD, nonischemic cardiomyopathy who was admitted to the hospital on  due to acute on chronic hypoxemic respiratory failure in light of fluid overload. Had a left heart cath on  which demonstrated single one-vessel moderate disease (mid LAD lesion) which was thought to be not a cause of cardiomyopathy. Subsequently had a right axillary Impella placed by CT surgery for potential double transplant. 1. Cardiogenic Shock  2. Pulmonary hypertension  3. Acute hypoxemic respiratory failure  4. Status post Impella placement  5.  RODNEY on CKD stage IV    Interval events    79-60-vuuubay intubated, Impella in situ, on inhaled nitric oxide         ICU Comprehensive Plan of Care:     Neuro-analgesia/sedation with opioids, propofol and Precedex as needed, continue gabapentin and venlafaxine, other delirium prevention strategies    Cardiac-continue hemodynamic monitoring, EF has improved to about 35 % on most recent echo, Impella at P8, on aspirin, digoxin, wean inhaled nitric oxide as tolerated, follow-up cardiac surgery/advised heart failure recommendations    Pulmonary-bilateral infiltrates, continue lung protective mechanical ventilation, continue montelukast    GI- For EGD/colo today, f/u GI recs, PPI for GI prophylaxis    Renal-CKD-currently diuresing-on Bumex drip, follow-up nephrology recommendations, monitor urine output closely, dose meds renally, correct electrolyte derangements as needed, continue allopurinol    Hematology-on systemic Angiomax-monitor for bleeding, continue EPO    ID-on Ancef while Impella in situ, low grade temps ON - pancultured, will have a low threshold to broaden abx covarage    Endocrinology-keep glucose less than 180, continue Synthroid    Objective:   Vital Signs:  Visit Vitals  BP 93/60   Pulse 80   Temp 99.6 °F (37.6 °C)   Resp 12   Ht 5' 7\" (1.702 m)   Wt 110.3 kg (243 lb 2.7 oz)   SpO2 100%   BMI 38.09 kg/m²    O2 Flow Rate (L/min): 6 l/min (weaned) O2 Device: Endotracheal tube,Ventilator Temp (24hrs), Av.2 °F (37.9 °C), Min:99.6 °F (37.6 °C), Max:100.5 °F (38.1 °C)    CVP (mmHg): 9 mmHg (22 1300)      Intake/Output:     Intake/Output Summary (Last 24 hours) at 2022 1514  Last data filed at 2022 1100  Gross per 24 hour   Intake 2350.89 ml   Output 2360 ml   Net -9.11 ml       Physical Exam:    General-intubated, sedated  Neuro-pupils reactive, opens eyes to voice, withdraws in all 4  Cardiac-RRR  Lungs-clear anteriorly  Abdomen-soft, nontender, nondistended  Extremities-warm, 1+ edema    LABS AND  DATA: Personally reviewed  Recent Labs     22  0416 22  0418   WBC 8.2 9.1   HGB 7.6* 7.9*   HCT 29.2* 30.3*    194     Recent Labs     22  1236 22  0904 22  0416 22  0416 22  0025 22   NA  --   --   --  142  --  144   K 3.7 3.9   < > 4.2   < > 4.0   CL  --   --   --  110*  --  109*   CO2  --   --   --  28  --  30   BUN  --   --   --  76*  --  73*   CREA  --   --   --  2.37*  --  2.36*   GLU  --   --   --  230*  --  218*   CA  --   --   --  9.6  --  9.6   MG 2.4 2.5*   < > 2.5*   < > 2.4   PHOS  --   --   --  2.4*  --  2.4*    < > = values in this interval not displayed. Recent Labs     22  0416 22  2037 22  1116 228   AP 88  --   --  101   TP 7.6  --   --  7.6   ALB 3.4* 3.4*   < > 3.3*   GLOB 4.2*  --   --  4.3*    < > = values in this interval not displayed. Recent Labs     22  0416 22  1603   APTT 54.9* 54.7*      No results for input(s): PHI, PCO2I, PO2I, FIO2I in the last 72 hours. No results for input(s): CPK, CKMB, TROIQ, BNPP in the last 72 hours.     Hemodynamics:   PAP: PAP Systolic: 57 (15/49/66 6584) CO: CO (l/min): 6.2 l/min (01/25/22 1300)   Wedge:   CI: CI (l/min/m2): 2.8 l/min/m2 (01/25/22 1300)   CVP:  CVP (mmHg): 9 mmHg (01/25/22 1300) SVR:       PVR:       Ventilator Settings:  Mode Rate Tidal Volume Pressure FiO2 PEEP   Assist control,Volume control   460 ml  8 cm H2O 40 % 5 cm H20     Peak airway pressure: 25 cm H2O    Minute ventilation: 5.84 l/min        MEDS: Reviewed    Chest X-Ray:  CXR Results  (Last 48 hours)               01/25/22 0522  XR CHEST PORT Final result    Impression:  Shallow volumes and pulmonary edema. Narrative:  PORTABLE CHEST RADIOGRAPH/S: 1/25/2022 5:22 AM       INDICATION: Post catheter-based LVAD placement. COMPARISON: 1/24/2022, 1/23/2022, 1/22/2022, 1/21/2022. TECHNIQUE: Portable frontal semiupright radiograph/s of the chest.       FINDINGS:    The lungs are hypoinflated. Superimposed interstitial and airspace consolidation   likely represents pulmonary edema, as it has fluctuated somewhat over the last   few days. An ET tube, NG tube, right PICC, pulmonary arterial catheter via a   right IJ sheath, pacemaker/AICD, and right-sided catheter-based LVAD are in   place. No pneumothorax and no large pleural effusion. 01/24/22 0411  XR CHEST PORT Final result    Impression:  Slight worsening pulmonary edema. Stable life support lines and   tubes. Narrative: Indication: Follow-up Impella, abnormal chest x-ray       Comparison 1/23/2022. Portable exam obtained at 404 demonstrates life-support   lines and tubes unchanged in position, including Impella device. There has been   slight worsening of the pulmonary edema compared to the prior exam.                 NOTE OF PERSONAL INVOLVEMENT IN CARE   This patient has a high probability of imminent, clinically significant deterioration, which requires the highest level of preparedness to intervene urgently.  I participated in the decision-making and personally managed or directed the management of the following life and organ supporting interventions that required my frequent assessment to treat or prevent imminent deterioration. I personally spent 40 minutes of critical care time.       Signed By: Juliet Hernandez MD     January 25, 2022    '

## 2022-01-25 NOTE — PROGRESS NOTES
[facility]  ICU Progress Note    Admit Date: 2022    S/P R Axillary Impella 5.5     Subjective/24 Hour Summary:   Pt seen with Dr. Andrew Wagner. impella at P8,  bicarb purge & bival systemically. On bumex drip. Nitric at 10 ppm     Objective:   Vitals:  Blood pressure 93/60, pulse 74, temperature 100.2 °F (37.9 °C), resp. rate 13, height 5' 7\" (1.702 m), weight 243 lb 2.7 oz (110.3 kg), SpO2 100 %. Temp (24hrs), Av.1 °F (37.8 °C), Min:98.9 °F (37.2 °C), Max:100.5 °F (38.1 °C)    Hemodynamics:   CO: CO (l/min): 5.8 l/min   CI: CI (l/min/m2): 2.7 l/min/m2   CVP: CVP (mmHg): 6 mmHg (22)   SVR: SVR (dyne*sec)/cm5: 1000 (dyne*sec)/cm5 (22 9673)   PAP Systolic: PAP Systolic: 53 ( 1361)   PAP Diastolic: PAP Diastolic: 19 ( 7295)   PVR:     SV02: SVO2 (%): 70 % (22)   SCV02:      EKG/Rhythm:  SR    Ventilator Settings:  Mode Rate Tidal Volume Pressure FiO2 PEEP   Assist control,Volume control   460 ml  8 cm H2O 40 % 5 cm H20     Peak airway pressure: 25 cm H2O    Minute ventilation: 6.75 l/min        Oxygen Therapy:  Oxygen Therapy  O2 Sat (%): 100 % (22)  Pulse via Oximetry: 80 beats per minute (22)  O2 Device: Endotracheal tube;Ventilator (22)  Skin Assessment: Clean, dry, & intact (22)  Skin Protection for O2 Device: Yes (22)  Orientation: Bilateral;Anterior (22)  Location: Cheek;Lip (22)  Interventions: Mouth Care;Reposition Device (01/25/22 0600)  O2 Flow Rate (L/min): 6 l/min (weaned) (22 0740)  O2 Temperature: 98.4 °F (36.9 °C) (22 1940)  FIO2 (%): 40 % (22 0826)    CXR:  CXR Results  (Last 48 hours)               22 0522  XR CHEST PORT Final result    Impression:  Shallow volumes and pulmonary edema. Narrative:  PORTABLE CHEST RADIOGRAPH/S: 2022 5:22 AM       INDICATION: Post catheter-based LVAD placement.        COMPARISON: 2022, 1/23/2022, 1/22/2022, 1/21/2022. TECHNIQUE: Portable frontal semiupright radiograph/s of the chest.       FINDINGS:    The lungs are hypoinflated. Superimposed interstitial and airspace consolidation   likely represents pulmonary edema, as it has fluctuated somewhat over the last   few days. An ET tube, NG tube, right PICC, pulmonary arterial catheter via a   right IJ sheath, pacemaker/AICD, and right-sided catheter-based LVAD are in   place. No pneumothorax and no large pleural effusion. 01/24/22 0411  XR CHEST PORT Final result    Impression:  Slight worsening pulmonary edema. Stable life support lines and   tubes. Narrative: Indication: Follow-up Impella, abnormal chest x-ray       Comparison 1/23/2022. Portable exam obtained at 404 demonstrates life-support   lines and tubes unchanged in position, including Impella device. There has been   slight worsening of the pulmonary edema compared to the prior exam.                 Admission Weight: Last Weight   Weight: 264 lb 1.8 oz (119.8 kg) Weight: 243 lb 2.7 oz (110.3 kg)     Intake / Output / Drain:  Current Shift: No intake/output data recorded. Last 24 hrs.:     Intake/Output Summary (Last 24 hours) at 1/25/2022 2070  Last data filed at 1/25/2022 0700  Gross per 24 hour   Intake 3199.96 ml   Output 3235 ml   Net -35.04 ml       EXAM:  General:    NAD                                                                                          Lungs:   Clear to auscultation bilaterally. Incision:  No erythema, drainage or swelling. Heart:  Regular rate and rhythm, S1, S2 normal, no murmur, click, rub or gallop. Abdomen:   Soft, non-tender. Bowel sounds normal. No masses,  No organomegaly. Extremities:  No edema. PPP.     Neurologic: Intubated, sedated     Labs:   Recent Labs     01/25/22  0416 01/24/22 0418   WBC 8.2 9.1   HGB 7.6* 7.9*   HCT 29.2* 30.3*    194     Recent Labs     01/25/22  0416 01/25/22  0025 01/24/22 2037 22     --   --  144   K 4.2 3.7   < > 4.0   *  --   --  109*   CO2 28  --   --  30   BUN 76*  --   --  73*   CREA 2.37*  --   --  2.36*   *  --   --  218*   CA 9.6  --   --  9.6   MG 2.5* 2.4   < > 2.4   PHOS 2.4*  --   --  2.4*    < > = values in this interval not displayed. Recent Labs     22  0416 22  1116 22   AP 88  --   --  101   TP 7.6  --   --  7.6   ALB 3.4* 3.4*   < > 3.3*   GLOB 4.2*  --   --  4.3*    < > = values in this interval not displayed. Recent Labs     22  1603   APTT 54.9* 54.7*      No results for input(s): PHI, PCO2I, PO2I, FIO2I in the last 72 hours. No results for input(s): CPK, CKMB, TROIQ, BNPP in the last 72 hours. Assessment:     Active Problems:    Acute respiratory failure with hypoxia ([address] (2022)         Plan/Recommendations/Medical Decision Makin. Acute on Chronic Systolic CHF class III/IV (BiV-ICD): S/P right axillary impella 5.5 . Cont bicarb via purge. Weaning NO - goal down to 5 ppm today. Cont systemic bival. Per AHF. On ancef for prophylaxis. On digoxin  2. Acute on Chronic Respiratory insufficiency: On home O2. . Vent wean per intensivist.  3. CAD: Multivessel CAD on [facility] 22. ASA 81. BB on hold due to cardiogenic shock. Recommend starting high intensity statin when feasible, per primary/HF  4. RODNEY on Chronic CKD Stage IV: Renal following. Bumex drip   5. Gout: allopurinol  6. DM2: on NPH Per primary service  7. GERD: Home meds  8. Hypothyroidism: on synthroid  9. Depression: Home meds  10. Anemia: on EPO, stable post op. Dispo: impella functioning well. Cont systemic bival & bicarb via purge. No cardiac surgery issues otherwise. Further HF management per AHF. Remainder of medical management per primary.     Signed By: Champ Ceballos NP

## 2022-01-25 NOTE — PROGRESS NOTES
[facility]   [address] [facility], [id number] [place] [address]  Phone: [phone]   [phone]    [email or web address]     Nephrology Progress Note    Patient Name : Claire James      : 1955     MRN : [id number]  Date of Admission : 2022  Date of Servive : 22    CC:  Follow up for ARF       Assessment and Plan   RODNEY on CKD :  - Suspect 2/2 CRS  - Cr stable, UOP good on bumex drip  - cont bumex drip   - daily labs  - no urgent need for RRT at this time    CKD stage IV:  - Etiology: DM, HTN, CRS  - Renal US: suggestive of CKD, B/L benign renal cysts   - baseline Cr 2.4-2.6 mg/dl      Acute on chronic HFrEF  NI CMP, LVEF 15 to 20%  NYHA class III-IV  S/p BiV pacer/ICD-s/p CRT  R axillary Impella implanted 22  Transplant w/u underway    Hypervolemia:  - RHC showing severely elevated filling pressures, pulm HTN  - improving overall  - diuretics as above    Morbid obesity  Type II DM  HTN  Hypothyroidism  Pulmonary hypertension  Gout  Psoriasis       Interval History:  Seen and examined. Cr stable. UOP > 3 L in the past 24 hrs. Bumex drip reduced last night due to low CVP. On Roro. Remains sedated on the vent. Review of Systems: A comprehensive review of systems was negative except for that written in the HPI.     Current Medications:   Current Facility-Administered Medications   Medication Dose Route Frequency    potassium, sodium phosphates (NEUTRA-PHOS) packet 1 Packet  1 Packet Oral QID    insulin NPH (NOVOLIN N, HUMULIN N) injection 20 Units  20 Units SubCUTAneous QHS    0.9% sodium chloride infusion  10 mL/hr IntraVENous CONTINUOUS    insulin NPH (NOVOLIN N, HUMULIN N) injection 25 Units  25 Units SubCUTAneous DAILY    bumetanide (BUMEX) 0.25 mg/mL infusion  1 mg/hr IntraVENous CONTINUOUS    DOBUTamine (DOBUTREX) 500 mg/250 mL (2,000 mcg/mL) infusion  0-10 mcg/kg/min IntraVENous TITRATE    0.9% sodium chloride infusion 250 mL 250 mL IntraVENous PRN    albumin human 25% (BUMINATE) solution 12.5 g  12.5 g IntraVENous DAILY    milrinone (PRIMACOR) 20 MG/100 ML D5W infusion  0.125 mcg/kg/min IntraVENous CONTINUOUS    vasopressin (VASOSTRICT) 20 Units in 0.9% sodium chloride 100 mL infusion  0-0.1 Units/min IntraVENous TITRATE    PHENYLephrine (NORMA-SYNEPHRINE) 30 mg in 0.9% sodium chloride 250 mL infusion   mcg/min IntraVENous TITRATE    niCARdipine (CARDENE) 25 mg in 0.9% sodium chloride 250 mL infusion  0-15 mg/hr IntraVENous TITRATE    bivalirudin (ANGIOMAX) 250 mg in 0.9% sodium chloride (MBP/ADV) 50 mL MBP  0.02-2.5 mg/kg/hr IntraVENous TITRATE    hydrALAZINE (APRESOLINE) 20 mg/mL injection 5 mg  5 mg IntraVENous Q4H PRN    bumetanide (BUMEX) injection 1 mg  1 mg IntraVENous Q4H PRN    ceFAZolin (ANCEF) 1 g in sterile water (preservative free) 10 mL IV syringe  1 g IntraVENous Q12H    fentaNYL (PF) 1,500 mcg/30 mL (50 mcg/mL) infusion  0-200 mcg/hr IntraVENous TITRATE    heparin (porcine) in 0.9% NaCl 30,000 unit/1,000 mL perfusion irrigation 50-1,000 mL  50-1,000 mL Other PRN    sodium chloride (NS) flush 5-40 mL  5-40 mL IntraVENous Q8H    heparinized saline 2 units/mL infusion    CONTINUOUS    chlorhexidine (PERIDEX) 0.12 % mouthwash 15 mL  15 mL Oral Q12H    sodium chloride (NS) flush 5-40 mL  5-40 mL IntraVENous Q8H    0.45% sodium chloride infusion  10 mL/hr IntraVENous CONTINUOUS    0.9% sodium chloride infusion  9 mL/hr IntraVENous CONTINUOUS    naloxone (NARCAN) injection 0.4 mg  0.4 mg IntraVENous PRN    ondansetron (ZOFRAN) injection 4 mg  4 mg IntraVENous Q4H PRN    albuterol (PROVENTIL VENTOLIN) nebulizer solution 2.5 mg  2.5 mg Nebulization Q4H PRN    aspirin chewable tablet 81 mg  81 mg Oral DAILY    midazolam (VERSED) injection 1 mg  1 mg IntraVENous Q1H PRN    magnesium oxide (MAG-OX) tablet 400 mg  400 mg Oral BID    calcium chloride 1 g in 0.9% sodium chloride 100 mL IVPB  1 g IntraVENous PRN    bisacodyL (DULCOLAX) suppository 10 mg  10 mg Rectal DAILY PRN    senna-docusate (PERICOLACE) 8.6-50 mg per tablet 1 Tablet  1 Tablet Oral BID    ELECTROLYTE REPLACEMENT NOTE: Nurse to review Serum Potassium and Magnesuim levels and Initiate Electrolyte Replacement Protocol as needed  1 Each Other PRN    magnesium sulfate 1 g/100 ml IVPB (premix or compounded)  1 g IntraVENous PRN    alteplase (CATHFLO) 1 mg in sterile water (preservative free) 1 mL injection  1 mg InterCATHeter PRN    bacitracin 500 unit/gram packet 1 Packet  1 Packet Topical PRN    pantoprazole (PROTONIX) injection 40 mg  40 mg IntraVENous DAILY    And    sodium chloride (NS) flush 10 mL  10 mL IntraVENous DAILY    dexmedeTOMidine in 0.9 % NaCl (PRECEDEX) 400 mcg/100 mL (4 mcg/mL) infusion soln  0.1-1.5 mcg/kg/hr IntraVENous TITRATE    midazolam (VERSED) injection 1 mg  1 mg IntraVENous Q4H PRN    insulin lispro (HUMALOG) injection   SubCUTAneous Q6H    propofol (DIPRIVAN) 10 mg/mL infusion  0-50 mcg/kg/min IntraVENous TITRATE    sodium bicarbonate (8.4%) 25 mEq in dextrose 5% 1,000 mL - impella purge fluid   IntraVENous TITRATE    triamcinolone acetonide (KENALOG) 0.1 % cream   Topical BID    polyethylene glycol (MIRALAX) packet 17 g  17 g Oral DAILY    digoxin (LANOXIN) tablet 0.0625 mg  0.0625 mg Oral DAILY    allopurinoL (ZYLOPRIM) tablet 50 mg  50 mg Oral DAILY    epoetin isabel-epbx (RETACRIT) injection 20,000 Units  20,000 Units SubCUTAneous Q TUE, THU & SAT    montelukast (SINGULAIR) tablet 10 mg  10 mg Oral DAILY    levothyroxine (SYNTHROID) tablet 100 mcg  100 mcg Oral Once per day on Mon Tue Wed Thu Fri Sat    hydroxypropyl methylcellulose (ISOPTO TEARS) 0.5 % ophthalmic solution 1 Drop  1 Drop Both Eyes PRN    venlafaxine-SR (EFFEXOR-XR) capsule 75 mg  75 mg Oral DAILY WITH BREAKFAST    gabapentin (NEURONTIN) capsule 100 mg  100 mg Oral QHS    arformoteroL (BROVANA) neb solution 15 mcg  15 mcg Nebulization BID RT    And    budesonide (PULMICORT) 500 mcg/2 ml nebulizer suspension  500 mcg Nebulization BID RT    sodium chloride (NS) flush 5-40 mL  5-40 mL IntraVENous Q8H    acetaminophen (TYLENOL) tablet 650 mg  650 mg Oral Q6H PRN    Or    acetaminophen (TYLENOL) suppository 650 mg  650 mg Rectal Q6H PRN    glucose chewable tablet 16 g  4 Tablet Oral PRN    dextrose (D50W) injection syrg 12.5-25 g  25-50 mL IntraVENous PRN    glucagon (GLUCAGEN) injection 1 mg  1 mg IntraMUSCular PRN      Allergies   Allergen Reactions    Ciprofloxacin Anaphylaxis    Shellfish Derived Anaphylaxis    Sildenafil Other (comments)    Ace Inhibitors Unknown (comments)    Biaxin [Clarithromycin] Other (comments)     Metal taste    Candesartan Cough    Pcn [Penicillins] Hives       Objective:  Vitals:    Vitals:    01/25/22 0645 01/25/22 0700 01/25/22 0800 01/25/22 0826   BP: (!) 91/59 93/60     Pulse: 80 80 74    Resp: 12 12 13    Temp: (!) 100.5 °F (38.1 °C)  100.2 °F (37.9 °C)    SpO2: 99% 99% 100% 100%   Weight:       Height:         Intake and Output:  No intake/output data recorded. 01/23 1901 - 01/25 0700  In: 4672.7 [I.V.:3392.7]  Out: 0213 [Urine:5810]    Physical Examination:    General: Sedated on the vent  HEENT:           ETT in place   Neck:  Supple, no mass  Resp:  Reduced bibasilar  Breath sounds  CV:  RRR, trace LE edema  GI:  Soft, NT, + BS, obese  Neurologic:  Sedated  :                  Henley in place    [x]    High complexity decision making was performed  []    Patient is at high-risk of decompensation with multiple organ involvement    Lab Data Personally Reviewed: I have reviewed all the pertinent labs, microbiology data and radiology studies during assessment.     Recent Labs     01/25/22  0416 01/25/22  0025 01/24/22  2037 01/24/22  1603 01/24/22  1116 01/24/22  0418 01/24/22  0418 01/23/22  1627 01/23/22  1627 01/23/22  1117 01/23/22  0405 01/22/22  1741 01/22/22  1246     --  144  --  143 144  --  143  --  141   < > 140   < > 141   K 4.2 3.7 4.0 3.9 3.6  3.6   < > 3.1*   < > 3.6   < > 4.3   < > 4.2   *  --  109*  --  106  108  --  105  --  107   < > 107   < > 109*   CO2 28  --  30  --  30  30  --  31  --  29   < > 28   < > 26   *  --  218*  --  215*  207*  --  176*  --  262*   < > 233*   < > 265*   BUN 76*  --  73*  --  67*  69*  --  69*  --  63*   < > 52*   < > 46*   CREA 2.37*  --  2.36*  --  2.34*  2.27*  --  2.27*  --  2.39*   < > 2.28*   < > 2.22*   CA 9.6  --  9.6  --  9.8  9.6  --  9.8  --  9.2   < > 9.7   < > 9.0   MG 2.5* 2.4 2.4 2.5* 2.5*   < > 2.6*   < > 2.9*   < > 3.2*   < > 2.5*   PHOS 2.4*  --  2.4*  --  2.7  --  3.1  --  3.1   < > 3.6   < >  --    ALB 3.4*  --  3.4*  --  3.3*  --  3.3*  --  3.5   < > 3.4*   < > 3.5   ALT 6*  --   --   --   --   --  <6*  --   --   --  7*  --  10*    < > = values in this interval not displayed. Recent Labs     01/25/22  0416 01/24/22  0418 01/23/22  0405 01/22/22  1246   WBC 8.2 9.1 9.7 9.6   HGB 7.6* 7.9* 7.7* 7.4*   HCT 29.2* 30.3* 29.1* 28.1*    194 188 188     Lab Results   Component Value Date/Time    Specimen Description: URINE 10/22/2013 01:32 PM    Specimen Description: URINE 01/23/2013 04:40 PM    Specimen Description: LEG TISSUE 02/12/2009 12:00 AM    Specimen Description: LEG (LEFT) 01/29/2009 03:06 AM     Lab Results   Component Value Date/Time    Culture result: MRSA NOT PRESENT 01/19/2022 12:41 PM    Culture result:  01/19/2022 12:41 PM     Screening of patient nares for MRSA is for surveillance purposes and, if positive, to facilitate isolation considerations in high risk settings. It is not intended for automatic decolonization interventions per se as regimens are not sufficiently effective to warrant routine use.     Culture result: NO GROWTH 5 DAYS 01/19/2022 12:41 PM    Culture result: MIXED SKIN ROSANNA ISOLATED 10/22/2013 01:32 PM    Culture result:  01/23/2013 04:40 PM     ENTEROCOCCUS FAECALIS GROUP D  MIXED SKIN ROSANNA ISOLATED     Recent Results (from the past 24 hour(s))   BLOOD GAS, ARTERIAL    Collection Time: 01/24/22  9:14 AM   Result Value Ref Range    pH 7.46 (H) 7.35 - 7.45      PCO2 39 35 - 45 mmHg    PO2 139 (H) 80 - 100 mmHg    O2 SAT 99 (H) 92 - 97 %    BICARBONATE 27 (H) 22 - 26 mmol/L    BASE EXCESS 3.0 mmol/L    O2 METHOD VENT      Sample source ARTERIAL      SITE DRAWN FROM ARTERIAL LINE      BLAINE'S TEST NOT APPLICABLE     GLUCOSE, POC    Collection Time: 01/24/22 11:13 AM   Result Value Ref Range    Glucose (POC) 216 (H) 65 - 117 mg/dL    Performed by Kelly Martinez    METABOLIC PANEL, BASIC    Collection Time: 01/24/22 11:16 AM   Result Value Ref Range    Sodium 144 136 - 145 mmol/L    Potassium 3.6 3.5 - 5.1 mmol/L    Chloride 108 97 - 108 mmol/L    CO2 30 21 - 32 mmol/L    Anion gap 6 5 - 15 mmol/L    Glucose 207 (H) 65 - 100 mg/dL    BUN 69 (H) 6 - 20 MG/DL    Creatinine 2.27 (H) 0.55 - 1.02 MG/DL    BUN/Creatinine ratio 30 (H) 12 - 20      GFR est AA 26 (L) >60 ml/min/1.73m2    GFR est non-AA 21 (L) >60 ml/min/1.73m2    Calcium 9.6 8.5 - 10.1 MG/DL   MAGNESIUM    Collection Time: 01/24/22 11:16 AM   Result Value Ref Range    Magnesium 2.5 (H) 1.6 - 2.4 mg/dL   RENAL FUNCTION PANEL    Collection Time: 01/24/22 11:16 AM   Result Value Ref Range    Sodium 143 136 - 145 mmol/L    Potassium 3.6 3.5 - 5.1 mmol/L    Chloride 106 97 - 108 mmol/L    CO2 30 21 - 32 mmol/L    Anion gap 7 5 - 15 mmol/L    Glucose 215 (H) 65 - 100 mg/dL    BUN 67 (H) 6 - 20 MG/DL    Creatinine 2.34 (H) 0.55 - 1.02 MG/DL    BUN/Creatinine ratio 29 (H) 12 - 20      GFR est AA 25 (L) >60 ml/min/1.73m2    GFR est non-AA 21 (L) >60 ml/min/1.73m2    Calcium 9.8 8.5 - 10.1 MG/DL    Phosphorus 2.7 2.6 - 4.7 MG/DL    Albumin 3.3 (L) 3.5 - 5.0 g/dL   MAGNESIUM    Collection Time: 01/24/22  4:03 PM   Result Value Ref Range    Magnesium 2.5 (H) 1.6 - 2.4 mg/dL   POTASSIUM    Collection Time: 01/24/22  4:03 PM   Result Value Ref Range    Potassium 3.9 3.5 - 5.1 mmol/L   PTT    Collection Time: 01/24/22  4:03 PM   Result Value Ref Range    aPTT 54.7 (H) 22.1 - 31.0 sec    aPTT, therapeutic range     58.0 - 77.0 SECS   ECHO ADULT FOLLOW-UP OR LIMITED    Collection Time: 01/24/22  4:27 PM   Result Value Ref Range    IVSd M-mode 1.0 (A) 0.6 - 0.9 cm    IVSs M-mode 1.4 cm    LVIDd M-mode 5.6 (A) 3.9 - 5.3 cm    LVIDs M-mode 4.6 cm    LVPWd M-mode 0.9 0.6 - 0.9 cm    RVSP 88 mmHg    Est. RA Pressure 3 mmHg    AR .9 millisecond    AR Max Velocity PISA 4.4 m/s    TAPSE 1.3 (A) 1.5 - 2.0 cm    TR Peak Gradient 85 mmHg    TR Max Velocity 4.61 m/s   GLUCOSE, POC    Collection Time: 01/24/22  5:16 PM   Result Value Ref Range    Glucose (POC) 209 (H) 65 - 117 mg/dL    Performed by Elie Okeefe    RENAL FUNCTION PANEL    Collection Time: 01/24/22  8:37 PM   Result Value Ref Range    Sodium 144 136 - 145 mmol/L    Potassium 4.0 3.5 - 5.1 mmol/L    Chloride 109 (H) 97 - 108 mmol/L    CO2 30 21 - 32 mmol/L    Anion gap 5 5 - 15 mmol/L    Glucose 218 (H) 65 - 100 mg/dL    BUN 73 (H) 6 - 20 MG/DL    Creatinine 2.36 (H) 0.55 - 1.02 MG/DL    BUN/Creatinine ratio 31 (H) 12 - 20      GFR est AA 25 (L) >60 ml/min/1.73m2    GFR est non-AA 21 (L) >60 ml/min/1.73m2    Calcium 9.6 8.5 - 10.1 MG/DL    Phosphorus 2.4 (L) 2.6 - 4.7 MG/DL    Albumin 3.4 (L) 3.5 - 5.0 g/dL   MAGNESIUM    Collection Time: 01/24/22  8:37 PM   Result Value Ref Range    Magnesium 2.4 1.6 - 2.4 mg/dL   GLUCOSE, POC    Collection Time: 01/24/22 11:42 PM   Result Value Ref Range    Glucose (POC) 247 (H) 65 - 117 mg/dL    Performed by Karthikeyan Saunders    MAGNESIUM    Collection Time: 01/25/22 12:25 AM   Result Value Ref Range    Magnesium 2.4 1.6 - 2.4 mg/dL   POTASSIUM    Collection Time: 01/25/22 12:25 AM   Result Value Ref Range    Potassium 3.7 3.5 - 5.1 mmol/L   NT-PRO BNP    Collection Time: 01/25/22  4:16 AM   Result Value Ref Range    NT pro-BNP 32,548 (H) <661 PG/ML   METABOLIC PANEL, COMPREHENSIVE    Collection Time: 01/25/22  4:16 AM   Result Value Ref Range    Sodium 142 136 - 145 mmol/L    Potassium 4.2 3.5 - 5.1 mmol/L    Chloride 110 (H) 97 - 108 mmol/L    CO2 28 21 - 32 mmol/L    Anion gap 4 (L) 5 - 15 mmol/L    Glucose 230 (H) 65 - 100 mg/dL    BUN 76 (H) 6 - 20 MG/DL    Creatinine 2.37 (H) 0.55 - 1.02 MG/DL    BUN/Creatinine ratio 32 (H) 12 - 20      GFR est AA 25 (L) >60 ml/min/1.73m2    GFR est non-AA 20 (L) >60 ml/min/1.73m2    Calcium 9.6 8.5 - 10.1 MG/DL    Bilirubin, total 0.5 0.2 - 1.0 MG/DL    ALT (SGPT) 6 (L) 12 - 78 U/L    AST (SGOT) 11 (L) 15 - 37 U/L    Alk.  phosphatase 88 45 - 117 U/L    Protein, total 7.6 6.4 - 8.2 g/dL    Albumin 3.4 (L) 3.5 - 5.0 g/dL    Globulin 4.2 (H) 2.0 - 4.0 g/dL    A-G Ratio 0.8 (L) 1.1 - 2.2     DIGOXIN    Collection Time: 01/25/22  4:16 AM   Result Value Ref Range    Digoxin level 1.2 0.90 - 2.00 NG/ML   LACTIC ACID    Collection Time: 01/25/22  4:16 AM   Result Value Ref Range    Lactic acid 0.4 0.4 - 2.0 MMOL/L   PROCALCITONIN    Collection Time: 01/25/22  4:16 AM   Result Value Ref Range    Procalcitonin 0.68 ng/mL   LD    Collection Time: 01/25/22  4:16 AM   Result Value Ref Range     (H) 81 - 246 U/L   CBC W/O DIFF    Collection Time: 01/25/22  4:16 AM   Result Value Ref Range    WBC 8.2 3.6 - 11.0 K/uL    RBC 3.14 (L) 3.80 - 5.20 M/uL    HGB 7.6 (L) 11.5 - 16.0 g/dL    HCT 29.2 (L) 35.0 - 47.0 %    MCV 93.0 80.0 - 99.0 FL    MCH 24.2 (L) 26.0 - 34.0 PG    MCHC 26.0 (L) 30.0 - 36.5 g/dL    RDW 20.4 (H) 11.5 - 14.5 %    PLATELET 515 116 - 445 K/uL    MPV 9.3 8.9 - 12.9 FL    NRBC 0.0 0  WBC    ABSOLUTE NRBC 0.00 0.00 - 0.01 K/uL   PTT    Collection Time: 01/25/22  4:16 AM   Result Value Ref Range    aPTT 54.9 (H) 22.1 - 31.0 sec    aPTT, therapeutic range     58.0 - 77.0 SECS   MAGNESIUM    Collection Time: 01/25/22  4:16 AM   Result Value Ref Range    Magnesium 2.5 (H) 1.6 - 2.4 mg/dL   PHOSPHORUS    Collection Time: 01/25/22 4:16 AM   Result Value Ref Range    Phosphorus 2.4 (L) 2.6 - 4.7 MG/DL   GLUCOSE, POC    Collection Time: 01/25/22  5:12 AM   Result Value Ref Range    Glucose (POC) 255 (H) 65 - 117 mg/dL    Performed by Kacy Christianson    CARBOXYHEMOGLOBIN    Collection Time: 01/25/22  5:18 AM   Result Value Ref Range    Carboxy-Hgb 1.6 1 - 2 %    Methemoglobin 0.4 0 - 1.4 %    tHb 8.6 (L) 14 - 17 g/dL    Oxyhemoglobin 95.7 94 - 97 %    O2 SATURATION 98 95 - 99 %    SITE DRAWN FROM ARTERIAL LINE      Sample source ARTERIAL     BLOOD GAS W/ COOX    Collection Time: 01/25/22  5:20 AM   Result Value Ref Range    pH 7.39 7.35 - 7.45      PCO2 45 35 - 45 mmHg    PO2 97 80 - 100 mmHg    BICARBONATE 27 (H) 22 - 26 mmol/L    BASE EXCESS 1.5 mmol/L    Carboxy-Hgb 1.8 1 - 2 %    Methemoglobin 0.5 0 - 1.4 %    tHb 9.2 (L) 14 - 17 g/dL    Oxyhemoglobin 95.7 94 - 97 %    O2 SATURATION 98 95 - 99 %    O2 METHOD VENT      Sample source ARTERIAL      SITE DRAWN FROM ARTERIAL LINE      BLAINE'S TEST NOT APPLICABLE     CARBOXYHEMOGLOBIN    Collection Time: 01/25/22  5:42 AM   Result Value Ref Range    Carboxy-Hgb 1.2 1 - 2 %    Methemoglobin 0.3 0 - 1.4 %    tHb 8.3 (L) 14 - 17 g/dL    Oxyhemoglobin 68.6 (LL) 94 - 97 %    O2 SATURATION 70 (L) 95 - 99 %    SITE SG      Sample source VENOUS BLOOD             I have reviewed the flowsheets. Chart and Pertinent Notes have been reviewed. No change in PMH ,family and social history from Consult note.       Yonny Urrtuia MD  [facility]

## 2022-01-25 NOTE — PROGRESS NOTES
Cardiac Electrophysiology Hospital Progress Note     Subjective:       Marlon Harris is a 78 y.o. patient who is s/p Impella, has Medtronic biventricular ICD (gen change 01/142015, leads 09/25/2008) that is at [facility].         Interim:   S/p Impella placement on 01/18/2022, now in CCU.  She had VF 1/19, has appearance of Torsades.  Required ICD shock x 2, both successful. Hypokalemia resolved, Mg 2.5.  Cr stable.  NT pro-BNP still significantly elevated, [id number].       LHC/RHC on 01/17/2022 had shown severely elevated bilateral filling pressures with severe pulmonary HTN, LAD with 80% lesion.  Possibly attempting PCI. I discussed with CHF team and Dr Keegan Chatterjee.          HPI:   Presented to the ER on 01/04/2021 with hypoxia, improved on supplemental oxygen. Labs showed stable anemia.  WBC elevated, hyponatremic, hypokalemic.  D dimer elevated.  Chronic CKD. Nephrologist spoke to me directly regarding severe renal failure, but not much worse than last admission. Rapid COVID negative.  CXR showed pulmonary edema vs atypical pneumonia.  VQ scan showed low probability for PE.       NICM, LVEF 15-20% during recent admission.  LVEF previously normalized with CRT.  NYHA III-IV.  No ACEi/ARB due to renal dysfunction.  GDMT dosing limited by low BP. [address] 12/10/2021 showed severe pulmonary HTN (wedge 34 mmHg, PAP 80 mmHg). Cardiac cath in 2015 at [facility] showed no evidence of CAD. She did require LV lead reprogramming over the summer, but good LV capture since.  Good capture/function when checked during recent admission. BP controlled. PICC line placed 01/10/2022 in anticipation of home milrinone. Previous:   LVEF noted 15-20% in 12/2021. S/p Medtronic biventricular ICD (gen change 01/142015, leads 09/25/2008).     CKD stage IV.        Previously followed by Dr. [id number] [place], states did not follow him to new practice.          Problem List       Acute respiratory failure with hypoxia [facility]) ICD-10-CM: J96.01   ICD-9-CM: 518.81 1/4/2022     CHF exacerbation ([facility] ICD-10-CM: I50.9   ICD-9-CM: 428.0 12/6/2021     Type 2 diabetes mellitus with diabetic neuropathy ([facility] ICD-10-CM: E11.40   ICD-9-CM: 250.60, 357.2 1/2/2020     Type 2 diabetes with nephropathy ([facility] ICD-10-CM: E11.21   ICD-9-CM: 250.40, 583.81 4/3/2018     Obesity, morbid ([facility] ICD-10-CM: E66.01   ICD-9-CM: 278.01 12/8/2017     Acquired hypothyroidism ICD-10-CM: E03.9   ICD-9-CM: 244.9 8/15/2016     Dysthymia ICD-10-CM: F34.1   ICD-9-CM: 300.4 8/15/2016     ICD (implantable cardioverter-defibrillator), biventricular, in situ ICD-10-CM: Z95.810   ICD-9-CM: V45.02 6/5/2014   Overview Signed 1/14/2015 11:11 AM by Newton Cole MD     Generator Medtronic change 1/14/2015         Dyslipidemia ICD-10-CM: F25.7   ICD-9-CM: 272.4 1/14/2014     CKD (chronic kidney disease) ICD-10-CM: N18.9   ICD-9-CM: 585.9 8/15/2012     Cardiomyopathy, nonischemic (HCC) ICD-10-CM: I42.8   ICD-9-CM: 425.4 Unknown   Overview Signed 10/10/2011  6:40 AM by Bar Barboza MD     initial dx 2001, bivHF 2008 with EF 15%, s/p biV-ICD 9/08, significant improvment in EF to 45-50%         Anemia in chronic renal disease (Chronic) ICD-10-CM: N18.9, D63.1   ICD-9-CM: 285.21 12/10/2008     HTN (hypertension) ICD-10-CM: I10   ICD-9-CM: 401.9 12/10/2008     GERD (gastroesophageal reflux disease) (Chronic) ICD-10-CM: K21.9   ICD-9-CM: 530.81 12/10/2008     Gout (Chronic) ICD-10-CM: M10.9   ICD-9-CM: 274.9 12/10/2008     Pulmonary HTN (HCC) (Chronic) ICD-10-CM: I27.20   ICD-9-CM: 416.8 12/10/2008        Current Facility-Administered Medications   Medication Dose Route Frequency   · potassium, sodium phosphates (NEUTRA-PHOS) packet 1 Packet 1 Packet Oral QID   · insulin NPH (NOVOLIN N, HUMULIN N) injection 20 Units 20 Units SubCUTAneous QHS   · 0.9% sodium chloride infusion 10 mL/hr IntraVENous CONTINUOUS   · insulin NPH (NOVOLIN N, HUMULIN N) injection 25 Units 25 Units SubCUTAneous DAILY   · bumetanide (BUMEX) 0.25 mg/mL infusion 1 mg/hr IntraVENous CONTINUOUS   · DOBUTamine (DOBUTREX) 500 mg/250 mL (2,000 mcg/mL) infusion 0-10 mcg/kg/min IntraVENous TITRATE   · 0.9% sodium chloride infusion 250 mL 250 mL IntraVENous PRN   · albumin human 25% (BUMINATE) solution 12.5 g 12.5 g IntraVENous DAILY   · milrinone (PRIMACOR) 20 MG/100 ML D5W infusion 0.125 mcg/kg/min IntraVENous CONTINUOUS   · vasopressin (VASOSTRICT) 20 Units in 0.9% sodium chloride 100 mL infusion 0-0.1 Units/min IntraVENous TITRATE   · PHENYLephrine (NORMA-SYNEPHRINE) 30 mg in 0.9% sodium chloride 250 mL infusion  mcg/min IntraVENous TITRATE   · niCARdipine (CARDENE) 25 mg in 0.9% sodium chloride 250 mL infusion 0-15 mg/hr IntraVENous TITRATE   · bivalirudin (ANGIOMAX) 250 mg in 0.9% sodium chloride (MBP/ADV) 50 mL MBP 0.02-2.5 mg/kg/hr IntraVENous TITRATE   · hydrALAZINE (APRESOLINE) 20 mg/mL injection 5 mg 5 mg IntraVENous Q4H PRN   · bumetanide (BUMEX) injection 1 mg 1 mg IntraVENous Q4H PRN   · ceFAZolin (ANCEF) 1 g in sterile water (preservative free) 10 mL IV syringe 1 g IntraVENous Q12H   · fentaNYL (PF) 1,500 mcg/30 mL (50 mcg/mL) infusion 0-200 mcg/hr IntraVENous TITRATE   · heparin (porcine) in 0.9% NaCl 30,000 unit/1,000 mL perfusion irrigation 50-1,000 mL 50-1,000 mL Other PRN   · sodium chloride (NS) flush 5-40 mL 5-40 mL IntraVENous Q8H   · heparinized saline 2 units/mL infusion CONTINUOUS   · chlorhexidine (PERIDEX) 0.12 % mouthwash 15 mL 15 mL Oral Q12H   · sodium chloride (NS) flush 5-40 mL 5-40 mL IntraVENous Q8H   · 0.45% sodium chloride infusion 10 mL/hr IntraVENous CONTINUOUS   · 0.9% sodium chloride infusion 9 mL/hr IntraVENous CONTINUOUS   · naloxone (NARCAN) injection 0.4 mg 0.4 mg IntraVENous PRN   · ondansetron (ZOFRAN) injection 4 mg 4 mg IntraVENous Q4H PRN   · albuterol (PROVENTIL VENTOLIN) nebulizer solution 2.5 mg 2.5 mg Nebulization Q4H PRN   · aspirin chewable tablet 81 mg 81 mg Oral DAILY   · midazolam (VERSED) injection 1 mg 1 mg IntraVENous Q1H PRN   · magnesium oxide (MAG-OX) tablet 400 mg 400 mg Oral BID   · calcium chloride 1 g in 0.9% sodium chloride 100 mL IVPB 1 g IntraVENous PRN   · bisacodyL (DULCOLAX) suppository 10 mg 10 mg Rectal DAILY PRN   · senna-docusate (PERICOLACE) 8.6-50 mg per tablet 1 Tablet 1 Tablet Oral BID   · ELECTROLYTE REPLACEMENT NOTE: Nurse to review Serum Potassium and Magnesuim levels and Initiate Electrolyte Replacement Protocol as needed 1 Each Other PRN   · magnesium sulfate 1 g/100 ml IVPB (premix or compounded) 1 g IntraVENous PRN   · alteplase (CATHFLO) 1 mg in sterile water (preservative free) 1 mL injection 1 mg InterCATHeter PRN   · bacitracin 500 unit/gram packet 1 Packet 1 Packet Topical PRN   · pantoprazole (PROTONIX) injection 40 mg 40 mg IntraVENous DAILY   And   · sodium chloride (NS) flush 10 mL 10 mL IntraVENous DAILY   · dexmedeTOMidine in 0.9 % NaCl (PRECEDEX) 400 mcg/100 mL (4 mcg/mL) infusion soln 0.1-1.5 mcg/kg/hr IntraVENous TITRATE   · midazolam (VERSED) injection 1 mg 1 mg IntraVENous Q4H PRN   · insulin lispro (HUMALOG) injection SubCUTAneous Q6H   · propofol (DIPRIVAN) 10 mg/mL infusion 0-50 mcg/kg/min IntraVENous TITRATE   · sodium bicarbonate (8.4%) 25 mEq in dextrose 5% 1,000 mL - impella purge fluid IntraVENous TITRATE   · triamcinolone acetonide (KENALOG) 0.1 % cream Topical BID   · polyethylene glycol (MIRALAX) packet 17 g 17 g Oral DAILY   · digoxin (LANOXIN) tablet 0.0625 mg 0.0625 mg Oral DAILY   · allopurinoL (ZYLOPRIM) tablet 50 mg 50 mg Oral DAILY   · epoetin isabel-epbx (RETACRIT) injection 20,000 Units 20,000 Units SubCUTAneous Q TUE, THU & SAT   · montelukast (SINGULAIR) tablet 10 mg 10 mg Oral DAILY   · levothyroxine (SYNTHROID) tablet 100 mcg 100 mcg Oral Once per day on Mon Tue Wed Thu Fri Sat   · hydroxypropyl methylcellulose (ISOPTO TEARS) 0.5 % ophthalmic solution 1 Drop 1 Drop Both Eyes PRN   · venlafaxine-SR (EFFEXOR-XR) capsule 75 mg 75 mg Oral DAILY WITH BREAKFAST   · gabapentin (NEURONTIN) capsule 100 mg 100 mg Oral QHS   · arformoteroL (BROVANA) neb solution 15 mcg 15 mcg Nebulization BID RT   And   · budesonide (PULMICORT) 500 mcg/2 ml nebulizer suspension 500 mcg Nebulization BID RT   · sodium chloride (NS) flush 5-40 mL 5-40 mL IntraVENous Q8H   · acetaminophen (TYLENOL) tablet 650 mg 650 mg Oral Q6H PRN   Or   · acetaminophen (TYLENOL) suppository 650 mg 650 mg Rectal Q6H PRN   · glucose chewable tablet 16 g 4 Tablet Oral PRN   · dextrose (D50W) injection syrg 12.5-25 g 25-50 mL IntraVENous PRN   · glucagon (GLUCAGEN) injection 1 mg 1 mg IntraMUSCular PRN         Allergies   Allergen Reactions   · Ciprofloxacin Anaphylaxis   · Shellfish Derived Anaphylaxis   · Ace Inhibitors Unknown (comments)   · Biaxin [Clarithromycin] Other (comments)   Metal taste   · Candesartan Cough   · Pcn [Penicillins] Hives     Past Medical History:   Diagnosis Date   · Acquired hypothyroidism 8/15/2016   · Anemia   RED-HF study   · Asthma   · Cardiomyopathy, nonischemic ([facility]   initial dx 2001, bivHF 2008 with EF 15%, s/p biV-ICD 9/08, significant improvment in EF to 45-50%   · CKD (chronic kidney disease)   Dr Torres Oliver   · CKD (chronic kidney disease) 8/15/2012   · Depression   · Diabetes ([address]   · Diabetic neuropathy ([address]   · DM (diabetes mellitus) ([facility] 8/15/2012   · GERD (gastroesophageal reflux disease)   · Gout   · Hypothyroidism   · ICD (implantable cardioverter-defibrillator), biventricular, in situ 6/5/2014   · Psoriasis     Past Surgical History:   Procedure Laterality Date   · CARDIAC CATHETERIZATION 2007; 01/06/15   normal cors   · ECHO 2D ADULT 4/2010   EF 45%, improved from 1/09 (25%)   · ECHO 2D ADULT 11/2011   LVH, EF 55-60%   · HX ORTHOPAEDIC   knee   · HX PACEMAKER PLACEMENT   AICD   · STRESS TEST LEXISCAN/CARDIOLITE 3/21/12   normal perfusion, global HK 40%     Family History   Problem Relation Age of Onset   · Heart Disease Mother   · Hypertension Mother   · Lupus Sister   · Diabetes Brother     Social History     Tobacco Use   · Smoking status: Never Smoker   · Smokeless tobacco: Never Used   Substance Use Topics   · Alcohol use: No         Review of Systems: Unable to perform due to intubated, sedated status.       Objective:   Visit Vitals  BP 93/60   Pulse 80   Temp 100.2 °F (37.9 °C)   Resp 12   Ht 5' 7\" (1.702 m)   Wt 243 lb 2.7 oz (110.3 kg)   SpO2 100%   BMI 38.09 kg/m²           Physical Exam:   Constitutional: Well-developed and well-nourished. Head: Normocephalic and atraumatic. Eyes: Closed. ENT: Sedated.  ET tube in place. Neck: Right cordis with Jullie Apt. Cardiovascular: Normal rate, regular rhythm. Exam reveals no gallop and no friction rub. 2/6 systolic LSB murmur.     Pulmonary/Chest: On vent. Abdominal: Soft, Obese. GI/: Henley catheter in place. Musculoskeletal: Symmetrical.   Vasc/lymphatic: + right axillary Impella. Neurological: Sedated. Skin Left side BiV ICD unremarkable. Psychiatric: Sedated on propofol, Precedex. Assessment/Plan:     Imaging/Studies:   LHC/RHC (01/17/2022): Severely elevated left & right side filling pressures.  Severe PH, mixed pattern with elevated wedge as well as PVR of about 6 Woods units.  Mid LAD lesion 80% involving diagonal branch ostium.  Likely prior stent in mid LAD, patent.  Reduced CI of 1.65 L/min/m2 by thermodilution & 1.6 L/min/m2 by presumed oxygen consumption by Aye. 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 RHC without milrinone (12/10/2021): High wedge pressure (35 mmHg) indicating volume overload, precapillary.  Severe pulmonary HTN. Echo (12/08/2021): LVEF 15-20%, upper normal wall thickness, LV diastolic dysfunction.  Borderline low RVEF. Mod dilated LA, mildly dilated RA.  Mild to mod MR. Corrine Estrada TR.  Mild to mod PH.       LHC/RHC (01/2015): No significant CAD.        Mixed CM:  CAD alone did not cause this decline.  CHF team wants to revascularize when stable and can go down to cath lab for PCI of LAD; this is being considered as part of transplant evaluation and treatment    LVEF now 15-20%.  NYHA IV.    Intubated  Was on inotrope  Now on bumex drip only       Nuclear amyloid scan equivocal.  cMRI not possible with 4194 LV lead (2008).  However, cMRI wouldn't . S/p Impella 1/18/22 as bridge to possible transplant or to improvement and does not need it.  If not responding and not candidate for heart/ renal transplant, then she will be referred to hospice.  [facility] transplant has been contacted by Dr Carrington Mendiola     VF and TdP: Noted x 2, 6 nights ago, had 2 successful ICD shocks.     Replaced K and so far NSVT      Medtronic biventricular ICD (gen change 01/14/2015, leads 09/25/2008): Device check showed proper lead & generator function.     Device is BETTY 1/22/2022.  Replacement is on hold while she is still on Impella and is waiting for transplant eligibility.  Shocks for VF drained battery further.  I have talked with her  already regarding generator change, & he gave consent to proceed whenever appropriate.       CKD: Dialyze if needed.             Thank you for involving me in this patient's care and please call with further concerns or questions. Alon Elizabeth M.D.    Electrophysiology/Cardiology   [facility] [place]   [phone] [facility] Gisella Solano, [facility] [address]                             [address] [place] [address]

## 2022-01-25 NOTE — PROGRESS NOTES
0730 Bedside and Verbal shift change report given to ARTURO Scanlon (oncoming nurse) by El Mota RN (offgoing nurse). Report included the following information SBAR, Kardex, Procedure Summary, Intake/Output, MAR, Recent Results and Cardiac Rhythm paced. Georgia Forte at bedside - orders received to wean Roro- to 5ppm as tolerated  0945 HF team at bedside - updated on pts status   1000 IDRs completed at bedside - no COVID test per Dr. Alvin Brown at this time  1010 RT at bedside - Roro- weaned to 5ppm  Chriss NP at bedside - phone consent obtained for endoscopy & colonoscopy    1230 Paired blood cultures & respiratory culture sent  1430 K 3.7 - KCl 20mEq x 2 doses ordered per protocol  1545 Henley replaced & UA sent  1600 PTT sent  1700 PTT 56.5 - therapeutic - will recheck at 0400  1720 RT at bedside - Roro- weaned off  1730 BP 70/50 - RT notified & Roro  Roro- restarted at 3ppm   1830 Labs sent  [address] [place] Chai Mcconnell at bedside - plan for colonoscopy & endoscopy @ 0730, bival off at 0500, colyte done by 0300 & no PO meds after 0300  1845 Spoke w/ pharmacy - need to stop Bival 2 hrs prior to colonoscopy & endoscopy   1930 Bedside and Verbal shift change report given to [place] Mota RN (oncoming nurse) by Darvin Chairez RN (offgoing nurse). Report included the following information SBAR, Kardex, Procedure Summary, Intake/Output, MAR, Recent Results and Cardiac Rhythm paced.

## 2022-01-25 NOTE — PROGRESS NOTES
Occupational Therapy  01/25/22     Patient currently on OT caseload. OT reassessment attempted at 10:03 AM however patient remains sedated on vent. Have made multiple attempts and patient remains inappropriate to participate with therapy. Discussed with nursing and will discharge OT orders at this time, please reconsult if patient becomes appropriate to participate. Recommend nursing to complete with patient, as able and per protocol, in order to promote cardiopulmonary systems, maintain strength, endurance and independence:   -bed in chair position or maximize full reverse Trendelenburg position to facilitate upright activity with foot board and non-skid footwear on 3x/day ~30-60 mins each   -ROM during bathing B UEs and LEs  -positioning to prevent contractures and edema  RASS -1/0/+1 (during SAT)  Active ROM   Sitting (bed in chair position)   Standing (reverse Trendelenburg)   ADLs   RASS -3/2  Passive ROM   Sit (bed in chair position)   RASS -5/-4  Passive ROM       Thank you for your assistance.    Batsheva Page, OTR/L

## 2022-01-25 NOTE — PROGRESS NOTES
[address]  [address], [address]       GI PROGRESS NOTE  [facility]  [phone] NP in-hospital cell phone M-F until 4:30  After 5pm or on weekends, please call  for physician on call      NAME: Viola Demarco   :  1955   MRN:  [id number]       Subjective:   No acute events, remains on mechanical ventilation, weaning Roro. No pressors. AICD battery issues, has pacers on. Objective:     VITALS:   Last 24hrs VS reviewed since prior progress note. Most recent are:  Visit Vitals  BP 93/60   Pulse 80   Temp 100.2 °F (37.9 °C)   Resp 14   Ht 5' 7\" (1.702 m)   Wt 110.3 kg (243 lb 2.7 oz)   SpO2 100%   BMI 38.09 kg/m²       PHYSICAL EXAM:  General: no acute distress    Neurologic:  sedated  HEENT: intubated  Lungs:  No increased WOB  Heart:  Regular rate  Abdomen: Soft, non-distended, no apparent tenderness.     Extremities: warm  Psych:   ROSA    Lab Data Reviewed:     Recent Results (from the past 24 hour(s))   GLUCOSE, POC    Collection Time: 22 11:13 AM   Result Value Ref Range    Glucose (POC) 216 (H) 65 - 117 mg/dL    Performed by Alveta Right    METABOLIC PANEL, BASIC    Collection Time: 22 11:16 AM   Result Value Ref Range    Sodium 144 136 - 145 mmol/L    Potassium 3.6 3.5 - 5.1 mmol/L    Chloride 108 97 - 108 mmol/L    CO2 30 21 - 32 mmol/L    Anion gap 6 5 - 15 mmol/L    Glucose 207 (H) 65 - 100 mg/dL    BUN 69 (H) 6 - 20 MG/DL    Creatinine 2.27 (H) 0.55 - 1.02 MG/DL    BUN/Creatinine ratio 30 (H) 12 - 20      GFR est AA 26 (L) >60 ml/min/1.73m2    GFR est non-AA 21 (L) >60 ml/min/1.73m2    Calcium 9.6 8.5 - 10.1 MG/DL   MAGNESIUM    Collection Time: 22 11:16 AM   Result Value Ref Range    Magnesium 2.5 (H) 1.6 - 2.4 mg/dL   RENAL FUNCTION PANEL    Collection Time: 22 11:16 AM   Result Value Ref Range    Sodium 143 136 - 145 mmol/L    Potassium 3.6 3.5 - 5.1 mmol/L    Chloride 106 97 - 108 mmol/L    CO2 30 21 - 32 mmol/L    Anion gap 7 5 - 15 mmol/L    Glucose 215 (H) 65 - 100 mg/dL    BUN 67 (H) 6 - 20 MG/DL    Creatinine 2.34 (H) 0.55 - 1.02 MG/DL    BUN/Creatinine ratio 29 (H) 12 - 20      GFR est AA 25 (L) >60 ml/min/1.73m2    GFR est non-AA 21 (L) >60 ml/min/1.73m2    Calcium 9.8 8.5 - 10.1 MG/DL    Phosphorus 2.7 2.6 - 4.7 MG/DL    Albumin 3.3 (L) 3.5 - 5.0 g/dL   MAGNESIUM    Collection Time: 01/24/22  4:03 PM   Result Value Ref Range    Magnesium 2.5 (H) 1.6 - 2.4 mg/dL   POTASSIUM    Collection Time: 01/24/22  4:03 PM   Result Value Ref Range    Potassium 3.9 3.5 - 5.1 mmol/L   PTT    Collection Time: 01/24/22  4:03 PM   Result Value Ref Range    aPTT 54.7 (H) 22.1 - 31.0 sec    aPTT, therapeutic range     58.0 - 77.0 SECS   ECHO ADULT FOLLOW-UP OR LIMITED    Collection Time: 01/24/22  4:27 PM   Result Value Ref Range    IVSd M-mode 1.0 (A) 0.6 - 0.9 cm    IVSs M-mode 1.4 cm    LVIDd M-mode 5.6 (A) 3.9 - 5.3 cm    LVIDs M-mode 4.6 cm    LVPWd M-mode 0.9 0.6 - 0.9 cm    RVSP 88 mmHg    Est. RA Pressure 3 mmHg    AR .9 millisecond    AR Max Velocity PISA 4.4 m/s    TAPSE 1.3 (A) 1.5 - 2.0 cm    TR Peak Gradient 85 mmHg    TR Max Velocity 4.61 m/s   GLUCOSE, POC    Collection Time: 01/24/22  5:16 PM   Result Value Ref Range    Glucose (POC) 209 (H) 65 - 117 mg/dL    Performed by Kiko Gonzalez    RENAL FUNCTION PANEL    Collection Time: 01/24/22  8:37 PM   Result Value Ref Range    Sodium 144 136 - 145 mmol/L    Potassium 4.0 3.5 - 5.1 mmol/L    Chloride 109 (H) 97 - 108 mmol/L    CO2 30 21 - 32 mmol/L    Anion gap 5 5 - 15 mmol/L    Glucose 218 (H) 65 - 100 mg/dL    BUN 73 (H) 6 - 20 MG/DL    Creatinine 2.36 (H) 0.55 - 1.02 MG/DL    BUN/Creatinine ratio 31 (H) 12 - 20      GFR est AA 25 (L) >60 ml/min/1.73m2    GFR est non-AA 21 (L) >60 ml/min/1.73m2    Calcium 9.6 8.5 - 10.1 MG/DL    Phosphorus 2.4 (L) 2.6 - 4.7 MG/DL    Albumin 3.4 (L) 3.5 - 5.0 g/dL   MAGNESIUM    Collection Time: 01/24/22  8:37 PM   Result Value Ref Range    Magnesium 2.4 1.6 - 2.4 mg/dL   GLUCOSE, POC    Collection Time: 01/24/22 11:42 PM   Result Value Ref Range    Glucose (POC) 247 (H) 65 - 117 mg/dL    Performed by Marilin Washington    MAGNESIUM    Collection Time: 01/25/22 12:25 AM   Result Value Ref Range    Magnesium 2.4 1.6 - 2.4 mg/dL   POTASSIUM    Collection Time: 01/25/22 12:25 AM   Result Value Ref Range    Potassium 3.7 3.5 - 5.1 mmol/L   NT-PRO BNP    Collection Time: 01/25/22  4:16 AM   Result Value Ref Range    NT pro-BNP 32,548 (H) <101 PG/ML   METABOLIC PANEL, COMPREHENSIVE    Collection Time: 01/25/22  4:16 AM   Result Value Ref Range    Sodium 142 136 - 145 mmol/L    Potassium 4.2 3.5 - 5.1 mmol/L    Chloride 110 (H) 97 - 108 mmol/L    CO2 28 21 - 32 mmol/L    Anion gap 4 (L) 5 - 15 mmol/L    Glucose 230 (H) 65 - 100 mg/dL    BUN 76 (H) 6 - 20 MG/DL    Creatinine 2.37 (H) 0.55 - 1.02 MG/DL    BUN/Creatinine ratio 32 (H) 12 - 20      GFR est AA 25 (L) >60 ml/min/1.73m2    GFR est non-AA 20 (L) >60 ml/min/1.73m2    Calcium 9.6 8.5 - 10.1 MG/DL    Bilirubin, total 0.5 0.2 - 1.0 MG/DL    ALT (SGPT) 6 (L) 12 - 78 U/L    AST (SGOT) 11 (L) 15 - 37 U/L    Alk.  phosphatase 88 45 - 117 U/L    Protein, total 7.6 6.4 - 8.2 g/dL    Albumin 3.4 (L) 3.5 - 5.0 g/dL    Globulin 4.2 (H) 2.0 - 4.0 g/dL    A-G Ratio 0.8 (L) 1.1 - 2.2     DIGOXIN    Collection Time: 01/25/22  4:16 AM   Result Value Ref Range    Digoxin level 1.2 0.90 - 2.00 NG/ML   LACTIC ACID    Collection Time: 01/25/22  4:16 AM   Result Value Ref Range    Lactic acid 0.4 0.4 - 2.0 MMOL/L   PROCALCITONIN    Collection Time: 01/25/22  4:16 AM   Result Value Ref Range    Procalcitonin 0.68 ng/mL   LD    Collection Time: 01/25/22  4:16 AM   Result Value Ref Range     (H) 81 - 246 U/L   CBC W/O DIFF    Collection Time: 01/25/22  4:16 AM   Result Value Ref Range    WBC 8.2 3.6 - 11.0 K/uL    RBC 3.14 (L) 3.80 - 5.20 M/uL    HGB 7.6 (L) 11.5 - 16.0 g/dL    HCT 29.2 (L) 35.0 - 47.0 %    MCV 93.0 80.0 - 99.0 FL    MCH 24.2 (L) 26.0 - 34.0 PG    MCHC 26.0 (L) 30.0 - 36.5 g/dL    RDW 20.4 (H) 11.5 - 14.5 %    PLATELET 501 557 - 161 K/uL    MPV 9.3 8.9 - 12.9 FL    NRBC 0.0 0  WBC    ABSOLUTE NRBC 0.00 0.00 - 0.01 K/uL   PTT    Collection Time: 01/25/22  4:16 AM   Result Value Ref Range    aPTT 54.9 (H) 22.1 - 31.0 sec    aPTT, therapeutic range     58.0 - 77.0 SECS   MAGNESIUM    Collection Time: 01/25/22  4:16 AM   Result Value Ref Range    Magnesium 2.5 (H) 1.6 - 2.4 mg/dL   PHOSPHORUS    Collection Time: 01/25/22  4:16 AM   Result Value Ref Range    Phosphorus 2.4 (L) 2.6 - 4.7 MG/DL   GLUCOSE, POC    Collection Time: 01/25/22  5:12 AM   Result Value Ref Range    Glucose (POC) 255 (H) 65 - 117 mg/dL    Performed by Gia Gomez    CARBOXYHEMOGLOBIN    Collection Time: 01/25/22  5:18 AM   Result Value Ref Range    Carboxy-Hgb 1.6 1 - 2 %    Methemoglobin 0.4 0 - 1.4 %    tHb 8.6 (L) 14 - 17 g/dL    Oxyhemoglobin 95.7 94 - 97 %    O2 SATURATION 98 95 - 99 %    SITE DRAWN FROM ARTERIAL LINE      Sample source ARTERIAL     BLOOD GAS W/ COOX    Collection Time: 01/25/22  5:20 AM   Result Value Ref Range    pH 7.39 7.35 - 7.45      PCO2 45 35 - 45 mmHg    PO2 97 80 - 100 mmHg    BICARBONATE 27 (H) 22 - 26 mmol/L    BASE EXCESS 1.5 mmol/L    Carboxy-Hgb 1.8 1 - 2 %    Methemoglobin 0.5 0 - 1.4 %    tHb 9.2 (L) 14 - 17 g/dL    Oxyhemoglobin 95.7 94 - 97 %    O2 SATURATION 98 95 - 99 %    O2 METHOD VENT      Sample source ARTERIAL      SITE DRAWN FROM ARTERIAL LINE      BLAINE'S TEST NOT APPLICABLE     CARBOXYHEMOGLOBIN    Collection Time: 01/25/22  5:42 AM   Result Value Ref Range    Carboxy-Hgb 1.2 1 - 2 %    Methemoglobin 0.3 0 - 1.4 %    tHb 8.3 (L) 14 - 17 g/dL    Oxyhemoglobin 68.6 (LL) 94 - 97 %    O2 SATURATION 70 (L) 95 - 99 %    SITE SG      Sample source VENOUS BLOOD     MAGNESIUM    Collection Time: 01/25/22  9:04 AM   Result Value Ref Range    Magnesium 2.5 (H) 1.6 - 2.4 mg/dL   POTASSIUM    Collection Time: 01/25/22  9:04 AM   Result Value Ref Range    Potassium 3.9 3.5 - 5.1 mmol/L            Assessment:     · Acute on chronic iron deficiency anemia: hgb 7.6, likely multifactorial   · Heart failure: Impella with bival purge and systemic bival, status post VF  · Pulmonary hypertension Roro  · CKD on bumex  · Negative COVID testing     Patient Active Problem List   Diagnosis Code    Anemia in chronic renal disease N18.9, D63.1    HTN (hypertension) I10    GERD (gastroesophageal reflux disease) K21.9    Gout M10.9    Pulmonary HTN ([facility] I27.20    Cardiomyopathy, nonischemic (HCC) I42.8    CKD (chronic kidney disease) N18.9    Dyslipidemia E78.5    ICD (implantable cardioverter-defibrillator), biventricular, in situ Z95.810    Acquired hypothyroidism E03.9    Dysthymia F34.1    [facility], [facility] E66.01    Type 2 diabetes with nephropathy ([facility] E11.21    Type 2 diabetes mellitus with diabetic neuropathy ([facility]) E11.40    CHF exacerbation ([facility] I50.9    Acute respiratory failure with hypoxia ([facility]) J96.01     Plan:     · PPI  · Hold bival drip 2 hours prior to procedures  · CLD, NPO midnight  · Prep this evening  · Plan for EGD/colonoscopy tomorrow in OR per Dr. Kareen Mcmillan, risks discussed with  on phone, consent on chart     Signed By: Chandler Jackson NP     1/25/2022  10:43 AM     This patient was seen and examined by me in a face-to-face visit today. I reviewed the medical record including lab work, imaging and other provider notes. I confirmed the interval history as described above. I spoke to the patient, however the patient is unable to give a meaningful history. I discussed this case in detail with Charity SENA. I formulated an updated  assessment of this patient and guided our treatment plan. I agree with the above progress note. I agree with the history, exam and assessment and plan as outlined in the note.   I would like to add the following: Abd: obese, normoactive BS, nt, nd, no rebound/guarding. Discussed with -he is aware she is at increased risk for complications and death with EGD and colonoscopy. Bival gtt and prep directions discussed with RN.     Dr. Cardenas Apt

## 2022-01-25 NOTE — PROGRESS NOTES
Physical Therapy  1/25/2022    Chart reviewed and spoke with RN and OT. PT reassessment attempted at 10:00 AM however patient remains sedated on vent. Have made multiple attempts and patient remains inappropriate to participate with therapy. Discussed with nursing and will discharge PT orders at this time, please reconsult if patient becomes appropriate to participate.     Thank Alex Barrios, PT, DPT

## 2022-01-26 NOTE — PROGRESS NOTES
[facility]   [address] [facility], [id number] [place] [address]  Phone: [phone]   [phone]    [email or web address]     Nephrology Progress Note    Patient Name : Juan Taylor      : 1955     MRN : [id number]  Date of Admission : 2022  Date of Servive : 22    CC:  Follow up for ARF       Assessment and Plan   RODNEY on CKD :  - Suspect 2/2 CRS  - Cr stable, UOP good on bumex drip  - cont bumex drip   - daily labs  - no urgent need for RRT at this time    CKD stage IV:  - Etiology: DM, HTN, CRS  - Renal US: suggestive of CKD, B/L benign renal cysts   - baseline Cr 2.4-2.6 mg/dl      Acute on chronic HFrEF  NI CMP, LVEF 15 to 20%  NYHA class III-IV  S/p BiV pacer/ICD-s/p CRT  R axillary Impella implanted 22  Transplant w/u underway - EGD and colon   - plans for PCI to LAD tomorrow    Hypervolemia:  - RHC showing severely elevated filling pressures, pulm HTN  - improving overall  - diuretics as above    Morbid obesity  Type II DM  HTN  Hypothyroidism  Pulmonary hypertension  Gout  Psoriasis       Interval History:  Seen and examined. Cr stable. UOP stable. Had EGD/colon this AM.  Impella at P8. On bumex drip now      Review of Systems: A comprehensive review of systems was negative except for that written in the HPI.     Current Medications:   Current Facility-Administered Medications   Medication Dose Route Frequency    insulin NPH (NOVOLIN N, HUMULIN N) injection 20 Units  20 Units SubCUTAneous QHS    [START ON 2022] digoxin (LANOXIN) tablet 0.0625 mg  0.0625 mg Oral EVERY OTHER DAY    0.9% sodium chloride infusion  10 mL/hr IntraVENous CONTINUOUS    insulin NPH (NOVOLIN N, HUMULIN N) injection 25 Units  25 Units SubCUTAneous DAILY    bumetanide (BUMEX) 0.25 mg/mL infusion  1 mg/hr IntraVENous CONTINUOUS    DOBUTamine (DOBUTREX) 500 mg/250 mL (2,000 mcg/mL) infusion  0-10 mcg/kg/min IntraVENous TITRATE    0.9% sodium chloride infusion 250 mL  250 mL IntraVENous PRN    albumin human 25% (BUMINATE) solution 12.5 g  12.5 g IntraVENous DAILY    milrinone (PRIMACOR) 20 MG/100 ML D5W infusion  0.125 mcg/kg/min IntraVENous CONTINUOUS    vasopressin (VASOSTRICT) 20 Units in 0.9% sodium chloride 100 mL infusion  0-0.1 Units/min IntraVENous TITRATE    PHENYLephrine (NORMA-SYNEPHRINE) 30 mg in 0.9% sodium chloride 250 mL infusion   mcg/min IntraVENous TITRATE    niCARdipine (CARDENE) 25 mg in 0.9% sodium chloride 250 mL infusion  0-15 mg/hr IntraVENous TITRATE    bivalirudin (ANGIOMAX) 250 mg in 0.9% sodium chloride (MBP/ADV) 50 mL MBP  0.02-2.5 mg/kg/hr IntraVENous TITRATE    hydrALAZINE (APRESOLINE) 20 mg/mL injection 5 mg  5 mg IntraVENous Q4H PRN    bumetanide (BUMEX) injection 1 mg  1 mg IntraVENous Q4H PRN    ceFAZolin (ANCEF) 1 g in sterile water (preservative free) 10 mL IV syringe  1 g IntraVENous Q12H    fentaNYL (PF) 1,500 mcg/30 mL (50 mcg/mL) infusion  0-200 mcg/hr IntraVENous TITRATE    heparin (porcine) in 0.9% NaCl 30,000 unit/1,000 mL perfusion irrigation 50-1,000 mL  50-1,000 mL Other PRN    sodium chloride (NS) flush 5-40 mL  5-40 mL IntraVENous Q8H    chlorhexidine (PERIDEX) 0.12 % mouthwash 15 mL  15 mL Oral Q12H    sodium chloride (NS) flush 5-40 mL  5-40 mL IntraVENous Q8H    0.45% sodium chloride infusion  10 mL/hr IntraVENous CONTINUOUS    0.9% sodium chloride infusion  9 mL/hr IntraVENous CONTINUOUS    naloxone (NARCAN) injection 0.4 mg  0.4 mg IntraVENous PRN    ondansetron (ZOFRAN) injection 4 mg  4 mg IntraVENous Q4H PRN    albuterol (PROVENTIL VENTOLIN) nebulizer solution 2.5 mg  2.5 mg Nebulization Q4H PRN    aspirin chewable tablet 81 mg  81 mg Oral DAILY    midazolam (VERSED) injection 1 mg  1 mg IntraVENous Q1H PRN    magnesium oxide (MAG-OX) tablet 400 mg  400 mg Oral BID    calcium chloride 1 g in 0.9% sodium chloride 100 mL IVPB  1 g IntraVENous PRN    bisacodyL (DULCOLAX) suppository 10 mg  10 mg Rectal DAILY PRN    senna-docusate (PERICOLACE) 8.6-50 mg per tablet 1 Tablet  1 Tablet Oral BID    ELECTROLYTE REPLACEMENT NOTE: Nurse to review Serum Potassium and Magnesuim levels and Initiate Electrolyte Replacement Protocol as needed  1 Each Other PRN    magnesium sulfate 1 g/100 ml IVPB (premix or compounded)  1 g IntraVENous PRN    alteplase (CATHFLO) 1 mg in sterile water (preservative free) 1 mL injection  1 mg InterCATHeter PRN    bacitracin 500 unit/gram packet 1 Packet  1 Packet Topical PRN    pantoprazole (PROTONIX) injection 40 mg  40 mg IntraVENous DAILY    And    sodium chloride (NS) flush 10 mL  10 mL IntraVENous DAILY    dexmedeTOMidine in 0.9 % NaCl (PRECEDEX) 400 mcg/100 mL (4 mcg/mL) infusion soln  0.1-1.5 mcg/kg/hr IntraVENous TITRATE    midazolam (VERSED) injection 1 mg  1 mg IntraVENous Q4H PRN    insulin lispro (HUMALOG) injection   SubCUTAneous Q6H    propofol (DIPRIVAN) 10 mg/mL infusion  0-50 mcg/kg/min IntraVENous TITRATE    sodium bicarbonate (8.4%) 25 mEq in dextrose 5% 1,000 mL - impella purge fluid   IntraVENous TITRATE    triamcinolone acetonide (KENALOG) 0.1 % cream   Topical BID    polyethylene glycol (MIRALAX) packet 17 g  17 g Oral DAILY    allopurinoL (ZYLOPRIM) tablet 50 mg  50 mg Oral DAILY    [Held by provider] epoetin isabel-epbx (RETACRIT) injection 20,000 Units  20,000 Units SubCUTAneous Q TUE, THU & SAT    montelukast (SINGULAIR) tablet 10 mg  10 mg Oral DAILY    levothyroxine (SYNTHROID) tablet 100 mcg  100 mcg Oral Once per day on Mon Tue Wed Thu Fri Sat    hydroxypropyl methylcellulose (ISOPTO TEARS) 0.5 % ophthalmic solution 1 Drop  1 Drop Both Eyes PRN    venlafaxine-SR (EFFEXOR-XR) capsule 75 mg  75 mg Oral DAILY WITH BREAKFAST    gabapentin (NEURONTIN) capsule 100 mg  100 mg Oral QHS    arformoteroL (BROVANA) neb solution 15 mcg  15 mcg Nebulization BID RT    And    budesonide (PULMICORT) 500 mcg/2 ml nebulizer suspension  500 mcg Nebulization BID RT    sodium chloride (NS) flush 5-40 mL  5-40 mL IntraVENous Q8H    acetaminophen (TYLENOL) tablet 650 mg  650 mg Oral Q6H PRN    Or    acetaminophen (TYLENOL) suppository 650 mg  650 mg Rectal Q6H PRN    glucose chewable tablet 16 g  4 Tablet Oral PRN    dextrose (D50W) injection syrg 12.5-25 g  25-50 mL IntraVENous PRN    glucagon (GLUCAGEN) injection 1 mg  1 mg IntraMUSCular PRN      Allergies   Allergen Reactions    Ciprofloxacin Anaphylaxis    Shellfish Derived Anaphylaxis    Sildenafil Other (comments)    Ace Inhibitors Unknown (comments)    Biaxin [Clarithromycin] Other (comments)     Metal taste    Candesartan Cough    Pcn [Penicillins] Hives       Objective:  Vitals:    Vitals:    01/26/22 0930 01/26/22 0945 01/26/22 1000 01/26/22 1100   BP: 123/80      Pulse: 80 80 80 80   Resp: 16 (!) 7 12 12   Temp: 97.6 °F (36.4 °C)      SpO2: 100% 100% 100% 100%   Weight:       Height:         Intake and Output:  01/26 0701 - 01/26 1900  In: -   Out: 900 [Urine:900]  01/24 1901 - 01/26 0700  In: 7199.4 [I.V.:3019.4]  Out: 8126 [Urine:5635]    Physical Examination:    General: Sedated on the vent  HEENT:           ETT in place   Neck:  Supple, no mass  Resp:  Reduced bibasilar  Breath sounds  CV:  RRR, trace LE edema  GI:  Soft, NT, + BS, obese  Neurologic:  Sedated  :                  Henley in place    [x]    High complexity decision making was performed  []    Patient is at high-risk of decompensation with multiple organ involvement    Lab Data Personally Reviewed: I have reviewed all the pertinent labs, microbiology data and radiology studies during assessment.     Recent Labs     01/26/22  0418 01/26/22  0417 01/26/22  0208 01/25/22  2155 01/25/22  1833 01/25/22  1236 01/25/22  0904 01/25/22  0416 01/25/22  0025 01/24/22  2037 01/24/22  1603 01/24/22  1116 01/24/22  0418 01/24/22  0418   NA  --  143  --   --  144  --   --  142  --  144  --  143  144   < > 143   K --  4.1 3.8 4.1 4.1 3.7   < > 4.2   < > 4.0   < > 3.6  3.6   < > 3.1*   CL  --  108  --   --  109*  --   --  110*  --  109*  --  106  108   < > 105   CO2  --  30  --   --  29  --   --  28  --  30  --  30  30   < > 31   GLU  --  159*  --   --  196*  --   --  230*  --  218*  --  215*  207*   < > 176*   BUN  --  68*  --   --  78*  --   --  76*  --  73*  --  67*  69*   < > 69*   CREA  --  2.24*  --   --  2.44*  --   --  2.37*  --  2.36*  --  2.34*  2.27*   < > 2.27*   CA  --  9.6  --   --  9.8  --   --  9.6  --  9.6  --  9.8  9.6   < > 9.8   MG 2.7*  --  2.5* 1.6 2.4 2.4   < > 2.5*   < > 2.4   < > 2.5*   < > 2.6*   PHOS 2.5*  --   --   --  3.1  --   --  2.4*  --  2.4*  --  2.7   < > 3.1   ALB  --  3.2*  --   --  3.4*  --   --  3.4*  --  3.4*  --  3.3*   < > 3.3*   ALT  --  8*  --   --   --   --   --  6*  --   --   --   --   --  <6*    < > = values in this interval not displayed. Recent Labs     01/26/22 0417 01/25/22 2155 01/25/22 0416 01/24/22 0418   WBC 7.3  --  8.2 9.1   HGB 7.5* 7.7* 7.6* 7.9*   HCT 28.5* 29.4* 29.2* 30.3*     --  174 194     Lab Results   Component Value Date/Time    Specimen Description: URINE 10/22/2013 01:32 PM    Specimen Description: URINE 01/23/2013 04:40 PM    Specimen Description: LEG TISSUE 02/12/2009 12:00 AM    Specimen Description: LEG (LEFT) 01/29/2009 03:06 AM     Lab Results   Component Value Date/Time    Culture result: NO GROWTH AFTER 15 HOURS 01/25/2022 12:36 PM    Culture result: PENDING 01/25/2022 12:36 PM    Culture result: MRSA NOT PRESENT 01/19/2022 12:41 PM    Culture result:  01/19/2022 12:41 PM     Screening of patient nares for MRSA is for surveillance purposes and, if positive, to facilitate isolation considerations in high risk settings. It is not intended for automatic decolonization interventions per se as regimens are not sufficiently effective to warrant routine use.     Culture result: NO GROWTH 5 DAYS 01/19/2022 12:41 PM     Recent Results (from the past 24 hour(s))   GLUCOSE, POC    Collection Time: 01/25/22 12:26 PM   Result Value Ref Range    Glucose (POC) 212 (H) 65 - 117 mg/dL    Performed by [address] Sugar Mariely BLOOD, PAIRED    Collection Time: 01/25/22 12:36 PM    Specimen: Blood   Result Value Ref Range    Special Requests: NO SPECIAL REQUESTS      Culture result: NO GROWTH AFTER 15 HOURS     CULTURE, RESPIRATORY/SPUTUM/BRONCH W GRAM STAIN    Collection Time: 01/25/22 12:36 PM    Specimen: Tracheal Aspirate; Sputum   Result Value Ref Range    Special Requests: NO SPECIAL REQUESTS      GRAM STAIN FEW WBCS SEEN      GRAM STAIN OCCASIONAL GRAM NEGATIVE RODS      GRAM STAIN FEW GRAM POSITIVE COCCI IN CLUSTERS      Culture result: PENDING    MAGNESIUM    Collection Time: 01/25/22 12:36 PM   Result Value Ref Range    Magnesium 2.4 1.6 - 2.4 mg/dL   POTASSIUM    Collection Time: 01/25/22 12:36 PM   Result Value Ref Range    Potassium 3.7 3.5 - 5.1 mmol/L   URINALYSIS W/ REFLEX CULTURE    Collection Time: 01/25/22  3:48 PM    Specimen: Urine   Result Value Ref Range    Color YELLOW/STRAW      Appearance CLEAR CLEAR      Specific gravity 1.010 1.003 - 1.030      pH (UA) 5.0 5.0 - 8.0      Protein 30 (A) NEG mg/dL    Glucose Negative NEG mg/dL    Ketone Negative NEG mg/dL    Bilirubin Negative NEG      Blood MODERATE (A) NEG      Urobilinogen 0.2 0.2 - 1.0 EU/dL    Nitrites Negative NEG      Leukocyte Esterase Negative NEG      UA:UC IF INDICATED CULTURE NOT INDICATED BY UA RESULT CNI      WBC 0-4 0 - 4 /hpf    RBC 10-20 0 - 5 /hpf    Epithelial cells FEW FEW /lpf    Bacteria Negative NEG /hpf    Hyaline cast 2-5 0 - 5 /lpf   COVID-19 RAPID TEST    Collection Time: 01/25/22  3:48 PM   Result Value Ref Range    Specimen source Nasopharyngeal      COVID-19 rapid test Not detected NOTD     PTT    Collection Time: 01/25/22  4:21 PM   Result Value Ref Range    aPTT 56.5 (H) 22.1 - 31.0 sec    aPTT, therapeutic range     58.0 - 77.0 SECS   GLUCOSE, POC    Collection Time: 01/25/22  5:56 PM   Result Value Ref Range    Glucose (POC) 184 (H) 65 - 117 mg/dL    Performed by Viktor Riley    RENAL FUNCTION PANEL    Collection Time: 01/25/22  6:33 PM   Result Value Ref Range    Sodium 144 136 - 145 mmol/L    Potassium 4.1 3.5 - 5.1 mmol/L    Chloride 109 (H) 97 - 108 mmol/L    CO2 29 21 - 32 mmol/L    Anion gap 6 5 - 15 mmol/L    Glucose 196 (H) 65 - 100 mg/dL    BUN 78 (H) 6 - 20 MG/DL    Creatinine 2.44 (H) 0.55 - 1.02 MG/DL    BUN/Creatinine ratio 32 (H) 12 - 20      GFR est AA 24 (L) >60 ml/min/1.73m2    GFR est non-AA 20 (L) >60 ml/min/1.73m2    Calcium 9.8 8.5 - 10.1 MG/DL    Phosphorus 3.1 2.6 - 4.7 MG/DL    Albumin 3.4 (L) 3.5 - 5.0 g/dL   MAGNESIUM    Collection Time: 01/25/22  6:33 PM   Result Value Ref Range    Magnesium 2.4 1.6 - 2.4 mg/dL   POTASSIUM    Collection Time: 01/25/22  9:55 PM   Result Value Ref Range    Potassium 4.1 3.5 - 5.1 mmol/L   MAGNESIUM    Collection Time: 01/25/22  9:55 PM   Result Value Ref Range    Magnesium 1.6 1.6 - 2.4 mg/dL   TYPE & SCREEN    Collection Time: 01/25/22  9:55 PM   Result Value Ref Range    Crossmatch Expiration 01/28/2022,2359     ABO/Rh(D) AB POSITIVE     Antibody screen NEG    HGB & HCT    Collection Time: 01/25/22  9:55 PM   Result Value Ref Range    HGB 7.7 (L) 11.5 - 16.0 g/dL    HCT 29.4 (L) 35.0 - 47.0 %   GLUCOSE, POC    Collection Time: 01/25/22 11:03 PM   Result Value Ref Range    Glucose (POC) 190 (H) 65 - 117 mg/dL    Performed by Jose Lipscomb    POTASSIUM    Collection Time: 01/26/22  2:08 AM   Result Value Ref Range    Potassium 3.8 3.5 - 5.1 mmol/L   MAGNESIUM    Collection Time: 01/26/22  2:08 AM   Result Value Ref Range    Magnesium 2.5 (H) 1.6 - 2.4 mg/dL   BLOOD GAS W/ COOX    Collection Time: 01/26/22  2:27 AM   Result Value Ref Range    pH 7.42 7.35 - 7.45      PCO2 48 (H) 35 - 45 mmHg    PO2 100 80 - 100 mmHg    BICARBONATE 30 (H) 22 - 26 mmol/L    BASE EXCESS 5.3 mmol/L    Carboxy-Hgb 1.5 1 - 2 %    Methemoglobin 0.3 0 - 1.4 %    tHb 8.8 (L) 14 - 17 g/dL    Oxyhemoglobin 96.3 94 - 97 %    O2 SATURATION 98 95 - 99 %    O2 METHOD VENT      Sample source ARTERIAL      SITE DRAWN FROM ARTERIAL LINE      BLAINE'S TEST NOT APPLICABLE     CARBOXYHEMOGLOBIN    Collection Time: 01/26/22  2:27 AM   Result Value Ref Range    Carboxy-Hgb 1.3 1 - 2 %    Methemoglobin 0.5 0 - 1.4 %    tHb 8.0 (L) 14 - 17 g/dL    Oxyhemoglobin 75.9 (LL) 94 - 97 %    O2 SATURATION 77 (L) 95 - 99 %    SITE SG      Sample source VENOUS BLOOD     NT-PRO BNP    Collection Time: 01/26/22  4:17 AM   Result Value Ref Range    NT pro-BNP 25,615 (H) <247 PG/ML   METABOLIC PANEL, COMPREHENSIVE    Collection Time: 01/26/22  4:17 AM   Result Value Ref Range    Sodium 143 136 - 145 mmol/L    Potassium 4.1 3.5 - 5.1 mmol/L    Chloride 108 97 - 108 mmol/L    CO2 30 21 - 32 mmol/L    Anion gap 5 5 - 15 mmol/L    Glucose 159 (H) 65 - 100 mg/dL    BUN 68 (H) 6 - 20 MG/DL    Creatinine 2.24 (H) 0.55 - 1.02 MG/DL    BUN/Creatinine ratio 30 (H) 12 - 20      GFR est AA 26 (L) >60 ml/min/1.73m2    GFR est non-AA 22 (L) >60 ml/min/1.73m2    Calcium 9.6 8.5 - 10.1 MG/DL    Bilirubin, total 0.5 0.2 - 1.0 MG/DL    ALT (SGPT) 8 (L) 12 - 78 U/L    AST (SGOT) 24 15 - 37 U/L    Alk.  phosphatase 87 45 - 117 U/L    Protein, total 7.3 6.4 - 8.2 g/dL    Albumin 3.2 (L) 3.5 - 5.0 g/dL    Globulin 4.1 (H) 2.0 - 4.0 g/dL    A-G Ratio 0.8 (L) 1.1 - 2.2     DIGOXIN    Collection Time: 01/26/22  4:17 AM   Result Value Ref Range    Digoxin level 1.0 0.90 - 2.00 NG/ML   LACTIC ACID    Collection Time: 01/26/22  4:17 AM   Result Value Ref Range    Lactic acid 0.5 0.4 - 2.0 MMOL/L   PROCALCITONIN    Collection Time: 01/26/22  4:17 AM   Result Value Ref Range    Procalcitonin 0.55 ng/mL   LD    Collection Time: 01/26/22  4:17 AM   Result Value Ref Range     (H) 81 - 246 U/L   PTT    Collection Time: 01/26/22  4:17 AM   Result Value Ref Range    aPTT 57.5 (H) 22.1 - 31.0 sec    aPTT, therapeutic range     58.0 - 77.0 SECS   CBC W/O DIFF    Collection Time: 01/26/22  4:17 AM   Result Value Ref Range    WBC 7.3 3.6 - 11.0 K/uL    RBC 3.08 (L) 3.80 - 5.20 M/uL    HGB 7.5 (L) 11.5 - 16.0 g/dL    HCT 28.5 (L) 35.0 - 47.0 %    MCV 92.5 80.0 - 99.0 FL    MCH 24.4 (L) 26.0 - 34.0 PG    MCHC 26.3 (L) 30.0 - 36.5 g/dL    RDW 20.0 (H) 11.5 - 14.5 %    PLATELET 233 359 - 958 K/uL    MPV 9.6 8.9 - 12.9 FL    NRBC 0.0 0  WBC    ABSOLUTE NRBC 0.00 0.00 - 0.01 K/uL   MAGNESIUM    Collection Time: 01/26/22  4:18 AM   Result Value Ref Range    Magnesium 2.7 (H) 1.6 - 2.4 mg/dL   PHOSPHORUS    Collection Time: 01/26/22  4:18 AM   Result Value Ref Range    Phosphorus 2.5 (L) 2.6 - 4.7 MG/DL   EKG, 12 LEAD, INITIAL    Collection Time: 01/26/22 10:59 AM   Result Value Ref Range    Ventricular Rate 80 BPM    Atrial Rate 76 BPM    QRS Duration 140 ms    Q-T Interval 454 ms    QTC Calculation (Bezet) 523 ms    Calculated R Axis -87 degrees    Calculated T Axis 107 degrees    Diagnosis       Wide QRS rhythm  Left axis deviation  Right bundle branch block  Septal infarct , age undetermined  Possible Lateral infarct , age undetermined  Inferior infarct , age undetermined  When compared with ECG of 04-JAN-2022 03:10,  Wide QRS rhythm has replaced Electronic ventricular pacemaker     GLUCOSE, POC    Collection Time: 01/26/22 11:06 AM   Result Value Ref Range    Glucose (POC) 167 (H) 65 - 117 mg/dL    Performed by Victor Manuel Jara I have reviewed the flowsheets. Chart and Pertinent Notes have been reviewed. No change in PMH ,family and social history from Consult note.       Stephani Busch MD  [facility]

## 2022-01-26 NOTE — ANESTHESIA PREPROCEDURE EVALUATION
Relevant Problems   NEUROLOGY   (+) Dysthymia      CARDIOVASCULAR   (+) CHF exacerbation (HCC)   (+) HTN (hypertension)      GASTROINTESTINAL   (+) GERD (gastroesophageal reflux disease)      RENAL FAILURE   (+) CKD (chronic kidney disease)      ENDOCRINE   (+) Acquired hypothyroidism   (+) Obesity, morbid (HCC)   (+) Type 2 diabetes mellitus with diabetic neuropathy (HCC)   (+) Type 2 diabetes with nephropathy (HCC)      HEMATOLOGY   (+) Anemia in chronic renal disease       Anesthetic History   No history of anesthetic complications            Review of Systems / Medical History  Patient summary reviewed, nursing notes reviewed and pertinent labs reviewed    Pulmonary  Within defined limits          Asthma        Neuro/Psych   Within defined limits           Cardiovascular  Within defined limits  Hypertension  Valvular problems/murmurs  Angina  CHF: NYHA Classification IV    Pacemaker and past MI    Exercise tolerance: <4 METS  Comments:  59-year-old female with history of nonischemic cardiomyopathy, CHF with an left ventricular ejection fraction of 15 to 20%. She has CKD stage IV. Also has a history of pulmonary hypertension. She had a right heart catheterization that showed severe pulmonary hypertension.   On full impella support   GI/Hepatic/Renal  Within defined limits   GERD    Renal disease: CRI       Endo/Other  Within defined limits  Diabetes  Hypothyroidism  Anemia     Other Findings              Physical Exam    Airway  Mallampati: II  TM Distance: > 6 cm  Neck ROM: normal range of motion   Mouth opening: Normal     Cardiovascular  Regular rate and rhythm,  S1 and S2 normal,  no murmur, click, rub, or gallop             Dental  No notable dental hx       Pulmonary  Breath sounds clear to auscultation               Abdominal  GI exam deferred       Other Findings            Anesthetic Plan    ASA: 4  Anesthesia type: general    Monitoring Plan: Arterial line    Post procedure ventilation   Induction: Intravenous  Anesthetic plan and risks discussed with: Patient

## 2022-01-26 NOTE — H&P
[address], [address] [place] [address]          Pre-procedure History and Physical       NAME:  Marcy Hayden   :   1955   MRN:   [id number]     CHIEF COMPLAINT/HPI: anemia, crcs    PMH:  Past Medical History:   Diagnosis Date    Acquired hypothyroidism 8/15/2016    Anemia     RED-HF study    Asthma     Cardiomyopathy, nonischemic ([address]     initial dx , bivHF  with EF 15%, s/p biV-ICD , significant improvment in EF to 45-50%    CKD (chronic kidney disease)     Dr Valentine Tidwell CKD (chronic kidney disease) 8/15/2012    Depression     Diabetes [facility]     Diabetic neuropathy ([facility]     DM (diabetes mellitus) ([facility] 8/15/2012    GERD (gastroesophageal reflux disease)     Gout     Hypothyroidism     ICD (implantable cardioverter-defibrillator), biventricular, in situ 2014    Psoriasis        PSH:  Past Surgical History:   Procedure Laterality Date    CARDIAC CATHETERIZATION  ; 01/06/15    normal cors    ECHO 2D ADULT  2010    EF 45%, improved from  (25%)    ECHO 2D ADULT  2011    LVH, EF 55-60%    HX ORTHOPAEDIC      knee    HX PACEMAKER PLACEMENT      AICD    STRESS TEST LEXISCAN/CARDIOLITE  3/21/12    normal perfusion, global HK 40%       Allergies: Allergies   Allergen Reactions    Ciprofloxacin Anaphylaxis    Shellfish Derived Anaphylaxis    Sildenafil Other (comments)    Ace Inhibitors Unknown (comments)    Biaxin [Clarithromycin] Other (comments)     Metal taste    Candesartan Cough    Pcn [Penicillins] Hives       Home Medications:  Prior to Admission Medications   Prescriptions Last Dose Informant Patient Reported? Taking?   allopurinoL (ZYLOPRIM) 100 mg tablet   No Yes   Sig: TAKE 1 TABLET BY MOUTH EVERY DAY   ammonium lactate (AMLACTIN) 12 % topical cream   No Yes   Sig: Apply  to affected area two (2) times a day.  rub in to affected area well   apremilast (Otezla) 30 mg tab   Yes Yes   Sig: Take 30 mg by mouth two (2) times daily as needed. aspirin 81 mg tablet 1/3/2022 at Unknown time  Yes Yes   Sig: Take 81 mg by mouth daily. azelastine (ASTEPRO) 205.5 mcg (0.15 %)   Yes Yes   [place] by Both Nostrils route two (2) times daily as needed. benzonatate (Tessalon Perles) 100 mg capsule   Yes Yes   Sig: Take 100 mg by mouth three (3) times daily as needed for Cough. budesonide (PULMICORT) 180 mcg/actuation aepb inhaler 1/3/2022 at Unknown time  No Yes   Sig: Take 2 Puffs by inhalation two (2) times a day. bumetanide (BUMEX) 2 mg tablet 1/3/2022 at Unknown time  No Yes   Sig: Take 1 tab in the morning and 0.5 tab in the evening   calcipotriene (DOVONEX) 0.005 % topical cream 1/3/2022 at Unknown time  Yes Yes   Sig: Apply  to affected area three (3) times daily. calcitRIOL (ROCALTROL) 0.5 mcg capsule   No Yes   Sig: TAKE 1 CAPSULE BY MOUTH EVERY DAY   carvediloL (COREG) 6.25 mg tablet 1/3/2022 at Unknown time  No Yes   Sig: Take 1 Tablet by mouth two (2) times daily (with meals). cholecalciferol (VITAMIN D3) (2,000 UNITS /50 MCG) cap capsule   No No   Sig: TAKE 1 CAPSULE BY MOUTH TWO (2) TIMES A DAY. docusate sodium (COLACE) 100 mg capsule   Yes Yes   Sig: Take 100 mg by mouth daily as needed for Constipation. ferrous sulfate (IRON) 325 mg (65 mg elemental iron) tablet 1/3/2022 at Unknown time  Yes Yes   Sig: Take 325 mg by mouth daily. fluticasone propionate (FLONASE) 50 mcg/actuation nasal spray   No No   Sig: USE 1 SPRAY IN EACH NOSTRIL DAILY   gabapentin (NEURONTIN) 100 mg capsule 1/3/2022 at Unknown time  No Yes   Sig: Take 1 Capsule by mouth nightly. Max Daily Amount: 100 mg.   hydrALAZINE (APRESOLINE) 10 mg tablet 1/3/2022 at Unknown time  No Yes   Sig: Take 1 Tablet by mouth three (3) times daily.    insulin NPH/insulin regular (NOVOLIN 70/30) 100 unit/mL (70-30) injection 1/3/2022 at Unknown time  No Yes   Si units two times a day   isosorbide dinitrate (ISORDIL) 5 mg tablet 1/3/2022 at Unknown time  No Yes   Sig: Take 1 Tablet by mouth three (3) times daily. levocetirizine (XYZAL) 5 mg tablet   No Yes   Sig: TAKE 1 TABLET BY MOUTH EVERY DAY   levothyroxine (SYNTHROID) 100 mcg tablet   Yes Yes   Sig: Take 100 mcg by mouth six (6) days a week. Everyday except Sunday   montelukast (SINGULAIR) 10 mg tablet 1/3/2022 at Unknown time  No Yes   Sig: TAKE 1 TABLET BY MOUTH EVERY DAY   multivitamin (ONE A DAY) tablet 1/3/2022 at Unknown time  Yes Yes   Sig: Take 1 Tab by mouth daily. triamcinolone acetonide (KENALOG) 0.5 % ointment 1/3/2022 at Unknown time  Yes Yes   Sig: Apply  to affected area two (2) times daily as needed.  use thin layer    venlafaxine-SR (EFFEXOR-XR) 75 mg capsule   No No   Sig: TAKE 1 CAPSULE BY MOUTH EVERY DAY      Facility-Administered Medications: None       Hospital Medications:  Current Facility-Administered Medications   Medication Dose Route Frequency    insulin NPH (NOVOLIN N, HUMULIN N) injection 20 Units  20 Units SubCUTAneous QHS    [START ON 1/27/2022] digoxin (LANOXIN) tablet 0.0625 mg  0.0625 mg Oral EVERY OTHER DAY    0.9% sodium chloride infusion  10 mL/hr IntraVENous CONTINUOUS    insulin NPH (NOVOLIN N, HUMULIN N) injection 25 Units  25 Units SubCUTAneous DAILY    bumetanide (BUMEX) 0.25 mg/mL infusion  1 mg/hr IntraVENous CONTINUOUS    DOBUTamine (DOBUTREX) 500 mg/250 mL (2,000 mcg/mL) infusion  0-10 mcg/kg/min IntraVENous TITRATE    0.9% sodium chloride infusion 250 mL  250 mL IntraVENous PRN    albumin human 25% (BUMINATE) solution 12.5 g  12.5 g IntraVENous DAILY    milrinone (PRIMACOR) 20 MG/100 ML D5W infusion  0.125 mcg/kg/min IntraVENous CONTINUOUS    vasopressin (VASOSTRICT) 20 Units in 0.9% sodium chloride 100 mL infusion  0-0.1 Units/min IntraVENous TITRATE    PHENYLephrine (NORMA-SYNEPHRINE) 30 mg in 0.9% sodium chloride 250 mL infusion   mcg/min IntraVENous TITRATE    niCARdipine (CARDENE) 25 mg in 0.9% sodium chloride 250 mL infusion  0-15 mg/hr IntraVENous TITRATE    bivalirudin (ANGIOMAX) 250 mg in 0.9% sodium chloride (MBP/ADV) 50 mL MBP  0.02-2.5 mg/kg/hr IntraVENous TITRATE    hydrALAZINE (APRESOLINE) 20 mg/mL injection 5 mg  5 mg IntraVENous Q4H PRN    bumetanide (BUMEX) injection 1 mg  1 mg IntraVENous Q4H PRN    ceFAZolin (ANCEF) 1 g in sterile water (preservative free) 10 mL IV syringe  1 g IntraVENous Q12H    fentaNYL (PF) 1,500 mcg/30 mL (50 mcg/mL) infusion  0-200 mcg/hr IntraVENous TITRATE    heparin (porcine) in 0.9% NaCl 30,000 unit/1,000 mL perfusion irrigation 50-1,000 mL  50-1,000 mL Other PRN    sodium chloride (NS) flush 5-40 mL  5-40 mL IntraVENous Q8H    heparinized saline 2 units/mL infusion    CONTINUOUS    chlorhexidine (PERIDEX) 0.12 % mouthwash 15 mL  15 mL Oral Q12H    sodium chloride (NS) flush 5-40 mL  5-40 mL IntraVENous Q8H    0.45% sodium chloride infusion  10 mL/hr IntraVENous CONTINUOUS    0.9% sodium chloride infusion  9 mL/hr IntraVENous CONTINUOUS    naloxone (NARCAN) injection 0.4 mg  0.4 mg IntraVENous PRN    ondansetron (ZOFRAN) injection 4 mg  4 mg IntraVENous Q4H PRN    albuterol (PROVENTIL VENTOLIN) nebulizer solution 2.5 mg  2.5 mg Nebulization Q4H PRN    aspirin chewable tablet 81 mg  81 mg Oral DAILY    midazolam (VERSED) injection 1 mg  1 mg IntraVENous Q1H PRN    magnesium oxide (MAG-OX) tablet 400 mg  400 mg Oral BID    calcium chloride 1 g in 0.9% sodium chloride 100 mL IVPB  1 g IntraVENous PRN    bisacodyL (DULCOLAX) suppository 10 mg  10 mg Rectal DAILY PRN    senna-docusate (PERICOLACE) 8.6-50 mg per tablet 1 Tablet  1 Tablet Oral BID    ELECTROLYTE REPLACEMENT NOTE: Nurse to review Serum Potassium and Magnesuim levels and Initiate Electrolyte Replacement Protocol as needed  1 Each Other PRN    magnesium sulfate 1 g/100 ml IVPB (premix or compounded)  1 g IntraVENous PRN    alteplase (CATHFLO) 1 mg in sterile water (preservative free) 1 mL injection  1 mg InterCATHeter PRN    bacitracin 500 unit/gram packet 1 Packet  1 Packet Topical PRN    pantoprazole (PROTONIX) injection 40 mg  40 mg IntraVENous DAILY    And    sodium chloride (NS) flush 10 mL  10 mL IntraVENous DAILY    dexmedeTOMidine in 0.9 % NaCl (PRECEDEX) 400 mcg/100 mL (4 mcg/mL) infusion soln  0.1-1.5 mcg/kg/hr IntraVENous TITRATE    midazolam (VERSED) injection 1 mg  1 mg IntraVENous Q4H PRN    insulin lispro (HUMALOG) injection   SubCUTAneous Q6H    propofol (DIPRIVAN) 10 mg/mL infusion  0-50 mcg/kg/min IntraVENous TITRATE    sodium bicarbonate (8.4%) 25 mEq in dextrose 5% 1,000 mL - impella purge fluid   IntraVENous TITRATE    triamcinolone acetonide (KENALOG) 0.1 % cream   Topical BID    polyethylene glycol (MIRALAX) packet 17 g  17 g Oral DAILY    allopurinoL (ZYLOPRIM) tablet 50 mg  50 mg Oral DAILY    epoetin isabel-epbx (RETACRIT) injection 20,000 Units  20,000 Units SubCUTAneous Q TUE, THU & SAT    montelukast (SINGULAIR) tablet 10 mg  10 mg Oral DAILY    levothyroxine (SYNTHROID) tablet 100 mcg  100 mcg Oral Once per day on Mon Tue Wed Thu Fri Sat    hydroxypropyl methylcellulose (ISOPTO TEARS) 0.5 % ophthalmic solution 1 Drop  1 Drop Both Eyes PRN    venlafaxine-SR (EFFEXOR-XR) capsule 75 mg  75 mg Oral DAILY WITH BREAKFAST    gabapentin (NEURONTIN) capsule 100 mg  100 mg Oral QHS    arformoteroL (BROVANA) neb solution 15 mcg  15 mcg Nebulization BID RT    And    budesonide (PULMICORT) 500 mcg/2 ml nebulizer suspension  500 mcg Nebulization BID RT    sodium chloride (NS) flush 5-40 mL  5-40 mL IntraVENous Q8H    acetaminophen (TYLENOL) tablet 650 mg  650 mg Oral Q6H PRN    Or    acetaminophen (TYLENOL) suppository 650 mg  650 mg Rectal Q6H PRN    glucose chewable tablet 16 g  4 Tablet Oral PRN    dextrose (D50W) injection syrg 12.5-25 g  25-50 mL IntraVENous PRN    glucagon (GLUCAGEN) injection 1 mg  1 mg IntraMUSCular PRN       Family History:  Family History   Problem Relation Age of Onset    Heart Disease Mother     Hypertension Mother    Nassar Lupus Sister     Diabetes Brother        Social History:  Social History     Tobacco Use    Smoking status: Never Smoker    Smokeless tobacco: Never Used   Substance Use Topics    Alcohol use: No       The patient was counseled at length about the risks of yusef Covid-19 in the macho-operative and post-operative states including the recovery window of their procedure. The patient was made aware that yusef Covid-19 after a surgical procedure may worsen their prognosis for recovering from the virus and lend to a higher morbidity and or mortality risk. The patient was given the options of postponing their procedure. All of the risks, benefits, and alternatives were discussed. The patient does  wish to proceed with the procedure. PHYSICAL EXAM PRIOR TO SEDATION:  General: Alert, in no acute distress    Lungs:            CTA bilaterally  Heart:  Normal S1, S2    Abdomen: Soft, Non distended, Non tender. Normoactive bowel sounds. Assessment:   Stable for sedation administration.     Plan:     · Endoscopic procedure with sedation     Signed By: Felecia Carpenter MD     1/26/2022  8:05 AM

## 2022-01-26 NOTE — PROGRESS NOTES
1000: Bedside and Verbal shift change report given to Brenden Garcia RN (oncoming nurse) by Sandy Lui, ARTURO (offgoing nurse). Report included the following information SBAR, Kardex, MAR and Recent Results. 1115: EKG completed, ordered labs sent. Per Dr. Alina Guidry, continue to hold TF until tomorrow, PO meds okay to be given. Per Dr. Abdirahman Garcia, continue to administer sliding scale as needed and NPH. CTs to be held until pt is weaned off of nitric as discussed per intensivist during rounds. 1230: Called lab re: pending results; lab stated that analyzer was malfunctioning and that there would be a delay. 1250: Updated  Charlene Tera.    1330: Bival to be restarted. S/w pharmacist, who advised redrawing baseline PTT and restarting bival at previous rate. Renal function panel, mag, PTT sent as ordered. 1530: Lab with severe analyzer delays, unable to result non-stat labs. S/w heart failure Amber NP who stated to treat based on delayed lab results per protocol. 1615: BP 81/56; updated intensivist; orders received for Albumin 25g, 25%. 1745: RT weaned nitric to 0, pt became hypotensive, SBP ~90. Nitric resumed at 1.    1930: Bedside and Verbal shift change report given to Alisha Campa (oncoming nurse) by Brenden Garcia RN (offgoing nurse). Report included the following information SBAR, Kardex, MAR and Recent Results.

## 2022-01-26 NOTE — ANESTHESIA POSTPROCEDURE EVALUATION
Procedure(s):  ESOPHAGOGASTRODUODENOSCOPY (EGD)  COLONOSCOPY. general    Anesthesia Post Evaluation        Patient location during evaluation: PACU  Note status: Adequate. Level of consciousness: responsive to verbal stimuli and sleepy but conscious  Pain management: satisfactory to patient  Airway patency: patent  Anesthetic complications: no  Cardiovascular status: acceptable  Respiratory status: acceptable  Hydration status: acceptable  Comments: +Post-Anesthesia Evaluation and Assessment    Patient: Claire James MRN: [id number]  SSN: [id number]   YOB: 1955  Age: 78 y.o. Sex: female          Cardiovascular Function/Vital Signs    /80   Pulse 80   Temp 36.4 °C (97.6 °F)   Resp 16   Ht 5' 7\" (1.702 m)   Wt 108.9 kg (240 lb 1.3 oz)   SpO2 100%   BMI 37.60 kg/m²     Patient is status post Procedure(s):  ESOPHAGOGASTRODUODENOSCOPY (EGD)  COLONOSCOPY. Nausea/Vomiting: Controlled. Postoperative hydration reviewed and adequate. Pain:  Pain Scale 1: Adult Nonverbal Pain Scale (01/26/22 0400)  Pain Intensity 1: 0 (01/26/22 0400)   Managed. Neurological Status: At baseline. Mental Status and Level of Consciousness: Arousable. Pulmonary Status:   O2 Device: Other (comment) (ET tube) (01/26/22 0930)   Adequate oxygenation and airway patent. Complications related to anesthesia: None    Post-anesthesia assessment completed. No concerns. I have evaluated the patient and the patient is stable and ready to be discharged from PACU .     Signed By: Marieta Goodpasture, MD    1/26/2022        INITIAL Post-op Vital signs:   Vitals Value Taken Time   /80 01/26/22 0930   Temp 36.4 °C (97.6 °F) 01/26/22 0930   Pulse 80 01/26/22 0930   Resp 16 01/26/22 0930   SpO2 100 % 01/26/22 0930

## 2022-01-26 NOTE — PROGRESS NOTES
After review of chart, SW noted patient's spouse's request to move family meeting scheduled for 1/26/22 at 1-1:30 pm to 2-2:30 pm. The family meeting has been rescheduled for 1/27/22 at 12:30 pm. F team and Mr. Cecilia Goetz agreeable to new time and date.

## 2022-01-26 NOTE — PROCEDURES
[facility] 912 Alina Guidry M.D.  [facility], [address] [place] [address]  [phone]               Esophagogastroduodenoscopy (EGD) Procedure Note    NAME: Traci Muhammad  :  1955  MRN:  [id number]    Indications:  Anemia     : Toby Talamantes MD    Referring Provider:  Al Mcdonough MD    Staff: Circ-1: Jessica aPblou: Tiffanie Coates RN  Endoscopy [facility]: Jessy Alberto  Float Staff: Manisha Crowe RN    Prosthetic devices, grafts, tissues, transplant, or devices implanted: none    Medicine:  General anesthesia      Procedure Details:  After informed consent was obtained with all risks and benefits of the procedure explained and preprocedure exam completed, the patient was placed in the left lateral decubitus position. Universal protocol for patient identification was performed and documented in the nursing notes. Throughout the procedure, the patient's blood pressure was monitored at least every five minutes; pulse, and oxygen saturations were monitored continuously. All vital signs were documented in the nursing notes. The endoscope was inserted into the mouth and advanced under direct vision to second portion of the duodenum. A careful inspection was made as the gastroscope was withdrawn, including a retroflexed view of the proximal stomach; findings and interventions are described below. Findings:   Esophagus:normal  Stomach: mild linear erythema in the body and antrum s/p biopsies throughout the stomach  Duodenum: normal    Interventions:    biopsy of stomach    Specimens:   ID Type Source Tests Collected by Time Destination   1 : Gastric Fresh Gastric  Sharyle Levans, MD 2022 [id number] Pathology   2 : Cecal Polyps Fresh Cecum  Sharyle Levans, MD 2022 [id number] Pathology        EBL: None          Complications:     No immediate complications        Impression:  -See post-procedure diagnoses. Recommendations:  -Await pathology. Signed by:  Reza Bledsoe MD         1/26/2022 9:14 AM

## 2022-01-26 NOTE — PROGRESS NOTES
0730 Bedside and Verbal shift change report given to Ghislaine RN (oncoming nurse) by Russell pereira RN (offgoing nurse). Report included the following information SBAR, Kardex, Procedure Summary, Intake/Output, MAR, Recent Results and Cardiac Rhythm paced. Transported pt to OR for colonoscopy/endoscopy w/ RT x2, transport team & RN  [id number] Returned to CCU   1000 Bedside and Verbal shift change report given to Evan Smallwood RN (oncoming nurse) by [place] Solomon RN (offgoing nurse). Report included the following information SBAR, Kardex, Procedure Summary, Intake/Output, MAR, Recent Results and Cardiac Rhythm paced.

## 2022-01-26 NOTE — PROGRESS NOTES
1930: Bedside and Verbal shift change report given to Aretha Lawrence RN (oncoming nurse) by Geovani Cantrell RN (offgoing nurse). Report included the following information SBAR, Kardex, ED Summary, OR Summary, Procedure Summary, Intake/Output, MAR, Recent Results, Med Rec Status, Cardiac Rhythm BiV Paced, Alarm Parameters  and Dual Neuro Assessment. Drips: NS @ 19, Bival @ 0.1008, Bumex @ 1, Precedex @ 0.6, Fentanyl @ 100, Propofol @ 15, D5/Bicarb Purge Fluid @ 8     2000: Resumed pt care, VSS, no evidence of pain. Full CHG provided. 2100: Increased bloody secretions from mouth/ETT, bleeding noted from impella dress. MD notified. , orders to sent stat H&H     2130:  @ bedside. Updated provided/questions answered. Would like to know if meeting tomorrow can be pushed back to [phone]. HF team messaged. 2200: Labs drawn and sent     2345: Mag 1.6, replaced per protocol     0200: Q4 labs drawn and sent     0230: ABG: pO2 100; pCO2 48; pH 7.42;  HCO3 30, 96.3%O2 Sat. No changes made  Carboxy: tHb 8.0; O2 77; SWAN recalibrated    0300: Golytely complete. K+ 3.8, replaced per protocol     0400: Impella dressing saturated, new dressing applied     6690: AM labs drawn and sent     0500: Bival gtt off for EGD/colonoscopy     0700: Dr. Karen Velasco on phone, calling pt Lyudmila macdonald for anesthesia consent    0745: Pt down to OR     0800: Bedside and Verbal shift change report given to Ghislaine RN (oncoming nurse) by Aretha Lawrence, ARTURO (offgoing nurse). Report included the following information SBAR, Kardex, ED Summary, OR Summary, Procedure Summary, Intake/Output, MAR, Recent Results, Med Rec Status, Cardiac Rhythm BiV Paced, Alarm Parameters  and Dual Neuro Assessment.      Drips: NS @ 19, Bumex @ 1, Precedex @ 0.6, Fentanyl @ 100, Propofol @ 20, D5/Bicarb Impella Purge @ 8

## 2022-01-26 NOTE — PROGRESS NOTES
After chart review, TOSHIA noted johan's request to move 1/26/22 family meeting from 1-1:30 pm to 2:00 or 2:30. Family meeting has been rescheduled to 1/27/22 at 12:30, patient's spouse Foreman Form and F team agreeable with new date and time.

## 2022-01-26 NOTE — PROGRESS NOTES
Cardiac Electrophysiology Hospital Progress Note     Subjective:       Marcela Murray is a 78 y.o. patient who is s/p Impella, has Medtronic biventricular ICD (gen change 01/142015, leads 09/25/2008) that is at [facility].         Interim:   S/p Impella placement on 01/18/2022, now in CCU.  She had VF 1/19, has appearance of Torsades.  Required ICD shock x 2, both successful. Hypokalemia resolved       LHC/RHC on 01/17/2022 had shown severely elevated bilateral filling pressures with severe pulmonary HTN, LAD with 80% lesion.  Possibly attempting PCI. I discussed with CHF team and Dr Sunita Mondragon. LAD PCI before weaning off Impella    LVEF appears better on 1/25/22 with more LV lateral wall contraction    EGD today, anemia and need for transplant work up    No sustained VT        HPI:   Presented to the ER on 01/04/2021 with hypoxia, improved on supplemental oxygen. Labs showed stable anemia.  WBC elevated, hyponatremic, hypokalemic.  D dimer elevated.  Chronic CKD. Nephrologist spoke to me directly regarding severe renal failure, but not much worse than last admission. Rapid COVID negative.  CXR showed pulmonary edema vs atypical pneumonia.  VQ scan showed low probability for PE.       NICM, LVEF 15-20% during recent admission.  LVEF previously normalized with CRT.  NYHA III-IV.  No ACEi/ARB due to renal dysfunction.  GDMT dosing limited by low BP. [address] 12/10/2021 showed severe pulmonary HTN (wedge 34 mmHg, PAP 80 mmHg). Cardiac cath in 2015 at [facility] showed no evidence of CAD. She did require LV lead reprogramming over the summer, but good LV capture since.  Good capture/function when checked during recent admission. BP controlled. PICC line placed 01/10/2022 in anticipation of home milrinone. Previous:   LVEF noted 15-20% in 12/2021. S/p Medtronic biventricular ICD (gen change 01/142015, leads 09/25/2008).        CKD stage IV.        Previously followed by  Garland Muñoz,  did not follow him to new practice.          Problem List       Acute respiratory failure with hypoxia [facility]) ICD-10-CM: J96.01   ICD-9-CM: 518.81 1/4/2022     CHF exacerbation (HCC) ICD-10-CM: I50.9   ICD-9-CM: 428.0 12/6/2021     Type 2 diabetes mellitus with diabetic neuropathy (HCC) ICD-10-CM: E11.40   ICD-9-CM: 250.60, 357.2 1/2/2020     Type 2 diabetes with nephropathy ([facility] ICD-10-CM: E11.21   ICD-9-CM: 250.40, 583.81 4/3/2018     Obesity, morbid ([facility] ICD-10-CM: E66.01   ICD-9-CM: 278.01 12/8/2017     Acquired hypothyroidism ICD-10-CM: E03.9   ICD-9-CM: 244.9 8/15/2016     Dysthymia ICD-10-CM: F34.1   ICD-9-CM: 300.4 8/15/2016     ICD (implantable cardioverter-defibrillator), biventricular, in situ ICD-10-CM: Z95.810   ICD-9-CM: V45.02 6/5/2014   Overview Signed 1/14/2015 11:11 AM by Claudia Lawler MD     Generator Medtronic change 1/14/2015         Dyslipidemia ICD-10-CM: F26.1   ICD-9-CM: 272.4 1/14/2014     CKD (chronic kidney disease) ICD-10-CM: N18.9   ICD-9-CM: 585.9 8/15/2012     Cardiomyopathy, nonischemic (HCC) ICD-10-CM: I42.8   ICD-9-CM: 425.4 Unknown   Overview Signed 10/10/2011  6:40 AM by Roxy Davey MD     initial dx 2001, bivHF 2008 with EF 15%, s/p biV-ICD 9/08, significant improvment in EF to 45-50%         Anemia in chronic renal disease (Chronic) ICD-10-CM: N18.9, D63.1   ICD-9-CM: 285.21 12/10/2008     HTN (hypertension) ICD-10-CM: I10   ICD-9-CM: 401.9 12/10/2008     GERD (gastroesophageal reflux disease) (Chronic) ICD-10-CM: K21.9   ICD-9-CM: 530.81 12/10/2008     Gout (Chronic) ICD-10-CM: M10.9   ICD-9-CM: 274.9 12/10/2008     Pulmonary HTN (HCC) (Chronic) ICD-10-CM: I27.20   ICD-9-CM: 416.8 12/10/2008        Current Facility-Administered Medications   Medication Dose Route Frequency   · potassium, sodium phosphates (NEUTRA-PHOS) packet 1 Packet 1 Packet Oral QID   · insulin NPH (NOVOLIN N, HUMULIN N) injection 20 Units 20 Units SubCUTAneous QHS   · 0.9% sodium chloride infusion 10 mL/hr IntraVENous CONTINUOUS   · insulin NPH (NOVOLIN N, HUMULIN N) injection 25 Units 25 Units SubCUTAneous DAILY   · bumetanide (BUMEX) 0.25 mg/mL infusion 1 mg/hr IntraVENous CONTINUOUS   · DOBUTamine (DOBUTREX) 500 mg/250 mL (2,000 mcg/mL) infusion 0-10 mcg/kg/min IntraVENous TITRATE   · 0.9% sodium chloride infusion 250 mL 250 mL IntraVENous PRN   · albumin human 25% (BUMINATE) solution 12.5 g 12.5 g IntraVENous DAILY   · milrinone (PRIMACOR) 20 MG/100 ML D5W infusion 0.125 mcg/kg/min IntraVENous CONTINUOUS   · vasopressin (VASOSTRICT) 20 Units in 0.9% sodium chloride 100 mL infusion 0-0.1 Units/min IntraVENous TITRATE   · PHENYLephrine (NORMA-SYNEPHRINE) 30 mg in 0.9% sodium chloride 250 mL infusion  mcg/min IntraVENous TITRATE   · niCARdipine (CARDENE) 25 mg in 0.9% sodium chloride 250 mL infusion 0-15 mg/hr IntraVENous TITRATE   · bivalirudin (ANGIOMAX) 250 mg in 0.9% sodium chloride (MBP/ADV) 50 mL MBP 0.02-2.5 mg/kg/hr IntraVENous TITRATE   · hydrALAZINE (APRESOLINE) 20 mg/mL injection 5 mg 5 mg IntraVENous Q4H PRN   · bumetanide (BUMEX) injection 1 mg 1 mg IntraVENous Q4H PRN   · ceFAZolin (ANCEF) 1 g in sterile water (preservative free) 10 mL IV syringe 1 g IntraVENous Q12H   · fentaNYL (PF) 1,500 mcg/30 mL (50 mcg/mL) infusion 0-200 mcg/hr IntraVENous TITRATE   · heparin (porcine) in 0.9% NaCl 30,000 unit/1,000 mL perfusion irrigation 50-1,000 mL 50-1,000 mL Other PRN   · sodium chloride (NS) flush 5-40 mL 5-40 mL IntraVENous Q8H   · heparinized saline 2 units/mL infusion CONTINUOUS   · chlorhexidine (PERIDEX) 0.12 % mouthwash 15 mL 15 mL Oral Q12H   · sodium chloride (NS) flush 5-40 mL 5-40 mL IntraVENous Q8H   · 0.45% sodium chloride infusion 10 mL/hr IntraVENous CONTINUOUS   · 0.9% sodium chloride infusion 9 mL/hr IntraVENous CONTINUOUS   · naloxone (NARCAN) injection 0.4 mg 0.4 mg IntraVENous PRN   · ondansetron (ZOFRAN) injection 4 mg 4 mg IntraVENous Q4H PRN   · albuterol (PROVENTIL VENTOLIN) nebulizer solution 2.5 mg 2.5 mg Nebulization Q4H PRN   · aspirin chewable tablet 81 mg 81 mg Oral DAILY   · midazolam (VERSED) injection 1 mg 1 mg IntraVENous Q1H PRN   · magnesium oxide (MAG-OX) tablet 400 mg 400 mg Oral BID   · calcium chloride 1 g in 0.9% sodium chloride 100 mL IVPB 1 g IntraVENous PRN   · bisacodyL (DULCOLAX) suppository 10 mg 10 mg Rectal DAILY PRN   · senna-docusate (PERICOLACE) 8.6-50 mg per tablet 1 Tablet 1 Tablet Oral BID   · ELECTROLYTE REPLACEMENT NOTE: Nurse to review Serum Potassium and Magnesuim levels and Initiate Electrolyte Replacement Protocol as needed 1 Each Other PRN   · magnesium sulfate 1 g/100 ml IVPB (premix or compounded) 1 g IntraVENous PRN   · alteplase (CATHFLO) 1 mg in sterile water (preservative free) 1 mL injection 1 mg InterCATHeter PRN   · bacitracin 500 unit/gram packet 1 Packet 1 Packet Topical PRN   · pantoprazole (PROTONIX) injection 40 mg 40 mg IntraVENous DAILY   And   · sodium chloride (NS) flush 10 mL 10 mL IntraVENous DAILY   · dexmedeTOMidine in 0.9 % NaCl (PRECEDEX) 400 mcg/100 mL (4 mcg/mL) infusion soln 0.1-1.5 mcg/kg/hr IntraVENous TITRATE   · midazolam (VERSED) injection 1 mg 1 mg IntraVENous Q4H PRN   · insulin lispro (HUMALOG) injection SubCUTAneous Q6H   · propofol (DIPRIVAN) 10 mg/mL infusion 0-50 mcg/kg/min IntraVENous TITRATE   · sodium bicarbonate (8.4%) 25 mEq in dextrose 5% 1,000 mL - impella purge fluid IntraVENous TITRATE   · triamcinolone acetonide (KENALOG) 0.1 % cream Topical BID   · polyethylene glycol (MIRALAX) packet 17 g 17 g Oral DAILY   · digoxin (LANOXIN) tablet 0.0625 mg 0.0625 mg Oral DAILY   · allopurinoL (ZYLOPRIM) tablet 50 mg 50 mg Oral DAILY   · epoetin isabel-epbx (RETACRIT) injection 20,000 Units 20,000 Units SubCUTAneous Q TUE, THU & SAT   · montelukast (SINGULAIR) tablet 10 mg 10 mg Oral DAILY   · levothyroxine (SYNTHROID) tablet 100 mcg 100 mcg Oral Once per day on Mon Tue Wed Thu Fri Sat   · hydroxypropyl methylcellulose (ISOPTO TEARS) 0.5 % ophthalmic solution 1 Drop 1 Drop Both Eyes PRN   · venlafaxine-SR (EFFEXOR-XR) capsule 75 mg 75 mg Oral DAILY WITH BREAKFAST   · gabapentin (NEURONTIN) capsule 100 mg 100 mg Oral QHS   · arformoteroL (BROVANA) neb solution 15 mcg 15 mcg Nebulization BID RT   And   · budesonide (PULMICORT) 500 mcg/2 ml nebulizer suspension 500 mcg Nebulization BID RT   · sodium chloride (NS) flush 5-40 mL 5-40 mL IntraVENous Q8H   · acetaminophen (TYLENOL) tablet 650 mg 650 mg Oral Q6H PRN   Or   · acetaminophen (TYLENOL) suppository 650 mg 650 mg Rectal Q6H PRN   · glucose chewable tablet 16 g 4 Tablet Oral PRN   · dextrose (D50W) injection syrg 12.5-25 g 25-50 mL IntraVENous PRN   · glucagon (GLUCAGEN) injection 1 mg 1 mg IntraMUSCular PRN         Allergies   Allergen Reactions   · Ciprofloxacin Anaphylaxis   · Shellfish Derived Anaphylaxis   · Ace Inhibitors Unknown (comments)   · Biaxin [Clarithromycin] Other (comments)   Metal taste   · Candesartan Cough   · Pcn [Penicillins] Hives     Past Medical History:   Diagnosis Date   · Acquired hypothyroidism 8/15/2016   · Anemia   RED-HF study   · Asthma   · Cardiomyopathy, nonischemic ([address]   initial dx 2001, bivHF 2008 with EF 15%, s/p biV-ICD 9/08, significant improvment in EF to 45-50%   · CKD (chronic kidney disease)   Dr Izabella Singleton   · CKD (chronic kidney disease) 8/15/2012   · Depression   · Diabetes ([address]   · Diabetic neuropathy ([address]   · DM (diabetes mellitus) ([address] 8/15/2012   · GERD (gastroesophageal reflux disease)   · Gout   · Hypothyroidism   · ICD (implantable cardioverter-defibrillator), biventricular, in situ 6/5/2014   · Psoriasis     Past Surgical History:   Procedure Laterality Date   · CARDIAC CATHETERIZATION 2007; 01/06/15   normal cors   · ECHO 2D ADULT 4/2010   EF 45%, improved from 1/09 (25%)   · ECHO 2D ADULT 11/2011   LVH, EF 55-60%   · HX ORTHOPAEDIC   knee   · HX PACEMAKER PLACEMENT   AICD   · STRESS TEST LEXISCAN/CARDIOLITE 3/21/12   normal perfusion, global HK 40%     Family History   Problem Relation Age of Onset   · Heart Disease Mother   · Hypertension Mother   · Lupus Sister   · Diabetes Brother     Social History     Tobacco Use   · Smoking status: Never Smoker   · Smokeless tobacco: Never Used   Substance Use Topics   · Alcohol use: No         Review of Systems: Unable to perform due to intubated, sedated status.       Objective:   Visit Vitals  /67   Pulse 80   Temp 99.2 °F (37.3 °C)   Resp 11   Ht 5' 7\" (1.702 m)   Wt 240 lb 1.3 oz (108.9 kg)   SpO2 100%   BMI 37.60 kg/m²           Physical Exam:   Constitutional: Well-developed and well-nourished. Head: Normocephalic and atraumatic. Eyes: Closed. ENT: Sedated.  ET tube in place. Neck: Right cordis with [place]. Cardiovascular: Normal rate, regular rhythm. Exam reveals no gallop and no friction rub. 2/6 systolic LSB murmur.     Pulmonary/Chest: On vent. Abdominal: Soft, Obese. GI/: Henley catheter in place. Musculoskeletal: no leg edema  Vasc/lymphatic: + right axillary Impella. Neurological: Sedated. Skin Left side BiV ICD unremarkable. Psychiatric: Sedated on propofol, Precedex. Assessment/Plan:     Imaging/Studies:   LHC/RHC (01/17/2022): Severely elevated left & right side filling pressures.  Severe PH, mixed pattern with elevated wedge as well as PVR of about 6 Woods units.  Mid LAD lesion 80% involving diagonal branch ostium.  Likely prior stent in mid LAD, patent.  Reduced CI of 1.65 L/min/m2 by thermodilution & 1.6 L/min/m2 by presumed oxygen consumption by Aye. 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 RHC without milrinone (12/10/2021): High wedge pressure (35 mmHg) indicating volume overload, precapillary.  Severe pulmonary HTN. Echo (12/08/2021): LVEF 15-20%, upper normal wall thickness, LV diastolic dysfunction.  Borderline low RVEF.  Mod dilated LA, mildly dilated RA.  Mild to mod MR. Yury Hartman TR.  Mild to mod [facility].       LHC/RHC (01/2015): No significant CAD. Mixed CM: CHF team wants to revascularize before she can be listed for transplant;  Dr Lisa Cook will arrange  LVEF 30% lateral wall now moves better.  NYHA IV.    Intubated  Was on inotrope  Nuclear amyloid scan equivocal.  cMRI not possible with 4194 LV lead (2008).  However, cMRI wouldn't . S/p Impella 1/18/22 as bridge to possible transplant or to improvement   If not responding and not candidate for heart/ renal transplant, then she will be referred to hospice.    [facility] transplant has been contacted by Dr Ramiro Sparks biventricular ICD (gen change 01/14/2015, leads 09/25/2008): Device check showed proper lead & generator function.     Device is BETTY 1/22/2022.  Replacement is on hold while she is still on Impella and is waiting for transplant eligibility.  Shocks for VF drained battery further.  I have talked with her  already regarding generator change, & he gave consent to proceed whenever appropriate.       CKD: Dialyze if needed. Anemia; EGD and colonoscopy today            Thank you for involving me in this patient's care and please call with further concerns or questions. Jessica Jimenez M.D.    Electrophysiology/Cardiology   [facility] [place]   [address]                     [id number] [facility], [facility] [address]                             [address] [place] [address]

## 2022-01-26 NOTE — PROGRESS NOTES
[facility]    ICU TEAM Progress Note    Name: Haylie Partida   : 1955   MRN: [id number]   Date: 2022           ICU Assessment   58-year-old female with past medical history significant for moderate pulmonary hypertension on home oxygen therapy, CKD, nonischemic cardiomyopathy who was admitted to the hospital on  due to acute on chronic hypoxemic respiratory failure in light of fluid overload. Had a left heart cath on  which demonstrated single one-vessel moderate disease (mid LAD lesion) which was thought to be not a cause of cardiomyopathy. Subsequently had a right axillary Impella placed by CT surgery for potential double transplant. 1. Cardiogenic Shock  2. Pulmonary hypertension  3. Acute hypoxemic respiratory failure  4. Status post Impella placement  5.  RODNEY on CKD stage IV    Interval events     - s/p EGD/colo today for Ca workup for possible heart/kidney Tx, remains intubated, Impella in situ, on inhaled nitric oxide    35-69-[id number] intubated, Impella in situ, on inhaled nitric oxide         ICU Comprehensive Plan of Care:     Neuro-analgesia/sedation with opioids, propofol and Precedex as needed, continue gabapentin and venlafaxine, other delirium prevention strategies    Cardiac-continue hemodynamic monitoring, EF has improved to about 35 % on most recent echo, Impella at P8, on aspirin, digoxin, wean inhaled nitric oxide as tolerated, follow-up cardiac surgery/advised heart failure recommendations    Pulmonary-bilateral infiltrates, continue lung protective mechanical ventilation, continue montelukast    GI- s/p EGD/colo, resume TF when GI oks it, PPI for GI prophylaxis    Renal-CKD-undulating Cr level-currently diuresing-on Bumex drip, follow-up nephrology recommendations, monitor urine output closely, dose meds renally, correct electrolyte derangements as needed, continue allopurinol    Hematology-on systemic Angiomax-monitor for bleeding, continue EPO    ID-on Ancef while Impella in situ, low grade temps - - pancultured, will have a low threshold to broaden abx covarage    Endocrinology-keep glucose less than 180, continue Synthroid    Objective:   Vital Signs:  Visit Vitals  /80   Pulse 80   Temp 98.6 °F (37 °C)   Resp 14   Ht 5' 7\" (1.702 m)   Wt 108.9 kg (240 lb)   SpO2 98%   BMI 37.59 kg/m²    O2 Flow Rate (L/min): 6 l/min (weaned) O2 Device: Endotracheal tube,Ventilator Temp (24hrs), Av.1 °F (37.3 °C), Min:97.6 °F (36.4 °C), Max:99.9 °F (37.7 °C)    CVP (mmHg): 10 mmHg (22 0945)      Intake/Output:     Intake/Output Summary (Last 24 hours) at 2022 1512  Last data filed at 2022 1500  Gross per 24 hour   Intake 5292.99 ml   Output 5950 ml   Net -657.01 ml       Physical Exam:    General-intubated, sedated  Neuro-pupils reactive, opens eyes to voice, withdraws in all 4  Cardiac-RRR  Lungs-clear anteriorly  Abdomen-soft, nontender, nondistended  Extremities-warm, 1+ edema    LABS AND  DATA: Personally reviewed  Recent Labs     22  0417 22  2155 22  0416 22  0416   WBC 7.3  --   --  8.2   HGB 7.5* 7.7*   < > 7.6*   HCT 28.5* 29.4*   < > 29.2*     --   --  174    < > = values in this interval not displayed. Recent Labs     22  1000 22  0418 22  0417 22  0208 22  1833     --  143  --    < >   K 3.6  --  4.1 3.8   < >     --  108  --    < >   CO2 30  --  30  --    < >   BUN 69*  --  68*  --    < >   CREA 2.25*  --  2.24*  --    < >   *  --  159*  --    < >   CA 9.8  --  9.6  --    < >   MG  --  2.7*  --  2.5*  --    PHOS 2.6 2.5*  --   --    < >    < > = values in this interval not displayed. Recent Labs     22  1000 22  0417 22  1833 22  0416   AP  --  87  --  88   TP  --  7.3  --  7.6   ALB 3.3* 3.2*   < > 3.4*   GLOB  --  4.1*  --  4.2*    < > = values in this interval not displayed.      Recent Labs 01/26/22  1331 01/26/22  0417   APTT 30.7 57.5*      No results for input(s): PHI, PCO2I, PO2I, FIO2I in the last 72 hours. No results for input(s): CPK, CKMB, TROIQ, BNPP in the last 72 hours. Hemodynamics:   PAP: PAP Systolic: 54 (72/39/10 6045) CO: CO (l/min): 5.6 l/min (01/26/22 1500)   Wedge:   CI: CI (l/min/m2): 2.5 l/min/m2 (01/26/22 1500)   CVP:  CVP (mmHg): 10 mmHg (01/26/22 0945) SVR:       PVR:       Ventilator Settings:  Mode Rate Tidal Volume Pressure FiO2 PEEP   Assist control,Volume control   460 ml  8 cm H2O 40 % 5 cm H20     Peak airway pressure: 30 cm H2O    Minute ventilation: 7.12 l/min        MEDS: Reviewed    Chest X-Ray:  CXR Results  (Last 48 hours)               01/26/22 0443  XR CHEST PORT Final result    Impression:  Stable bilateral pulmonary infiltrates. Narrative: Indication: Impella, follow-up abnormal chest x-ray       Comparison 1/25/2022. Portable exam obtained at 435 demonstrates life-support   lines and tubes unchanged in position including Impella device. There has been   little change in the diffuse bilateral interstitial infiltrates compared to   prior exam.           01/25/22 0522  XR CHEST PORT Final result    Impression:  Shallow volumes and pulmonary edema. Narrative:  PORTABLE CHEST RADIOGRAPH/S: 1/25/2022 5:22 AM       INDICATION: Post catheter-based LVAD placement. COMPARISON: 1/24/2022, 1/23/2022, 1/22/2022, 1/21/2022. TECHNIQUE: Portable frontal semiupright radiograph/s of the chest.       FINDINGS:    The lungs are hypoinflated. Superimposed interstitial and airspace consolidation   likely represents pulmonary edema, as it has fluctuated somewhat over the last   few days. An ET tube, NG tube, right PICC, pulmonary arterial catheter via a   right IJ sheath, pacemaker/AICD, and right-sided catheter-based LVAD are in   place. No pneumothorax and no large pleural effusion.                   NOTE OF PERSONAL INVOLVEMENT IN CARE   This patient has a high probability of imminent, clinically significant deterioration, which requires the highest level of preparedness to intervene urgently. I participated in the decision-making and personally managed or directed the management of the following life and organ supporting interventions that required my frequent assessment to treat or prevent imminent deterioration. I personally spent 40 minutes of critical care time.       Signed By: Juliet Hernandez MD     January 26, 2022    '

## 2022-01-27 NOTE — PROGRESS NOTES
0730 Report received from [place], [address] [place] [address]. NS @ 19, Bival @ 0.1008, Bumex @ 1, Precedex @ 0.6, Fentanyl @ 100, Propofol @ 15, Bicarb impella purge @ 8.    0800 Dr. Montey Aschoff at bedside. Orders to wean Nitric today. 0900 S/w Cath Lab, Marlena Herring, who states to keep pt on Bival gtt. 0930 Dr. Susy Francisco at bedside. Orders to wean Nitric, CT scans prior to cath lab, repeat Hgb, supine CVP measurement for potential decrease in Bumex gtt, and wean P level on Impella after cardiac cath. Called RT regarding Nitric wean. 1000 Nitric @0. SBP 80-90, PAP 66/24. True zero/level CVP 6-7. Will continue to monitor. 1015 Report given to Denise Golden [employer]. 1100 [facility], NP at bedside. Inquiring about Bival gtt, NP will clarify with Dr. Gabriela Lambert. NP calling spouse to obtain consent to cardiac catheterization. Spouse requesting family meeting with Dr. Susy Francsico prior to signing consent. Spouse can arrive at 11:30. Will contact HF team to arrange meeting earlier as cath is scheduled for noon and the meeting is currently scheduled for noon. 1045 PTT results @56.1. No change in Bival rate per protocol. Hgb stable @7.0. Potassium replacement ordered per protocol. 1100 Dr. Gabriela Lambert at bedside. Orders to keep Bival gtt infusing. Reviewed current PAP and ABP off Roro. 36 Pt's spouse, Stanley Ying, arrives at hospital. Shakeel Hagan NP aware. 1200 Dr. Susy Francisco and pt's spouse discussing pt's condition and plan of care. Pt not tolerating turns- orthopneic, coughing, alarming vent with high peaks, and low blood pressures with slow recovery. Pt unable to be transported to CT at this time as pt will not tolerate lying flat. 1230 Consent obtained for LHC/PCI. CCL made aware and will be arriving shortly to transport pt.    1300 Pt transported to [facility]. 1600 Pt returned from [facility]. Report received from [email or web address], [address] [place] [address]. Pt received one ANNE-MARIE to LAD via right radial artery. TR band in place with 11cc air, no bleeding/hematoma, site CDI.  Procedure end time 3597, will begin removing air from TR @ 1715. Plavix 600mg administered via OGT. 1630 PRN Hydralazine given. 1700 Repeat labs drawn and sent. 1800 Hgb results @6.9. 1 unit of PRBCs ordered per Dr. Daniela Avitia. Clarified to continue 500cc IVF as Colin ordered per Dr. Daniela Avitia. New PIV placed. CT postponed. 1815 TR band removed successfully with no complications. Transparent dressing applied, site CDI. 1915 Transfusion started. 1930 Bedside and Verbal shift change report given to Denisse Gan RN (oncoming nurse) by Ruben Pedraza RN (offgoing nurse). Report included the following information SBAR. PRN Hydralazine given. Romayne Mom @ 19, , Bival @ 0.1008, Bumex @ 1, Precedex @ 0.6, Fentanyl @ 100, Propofol @ 30, Bicarb impella purge @ 8, PRBC @100. Pt remains off of Nitric since 1000.

## 2022-01-27 NOTE — PROGRESS NOTES
ADVANCED HEART FAILURE NOTE    Patient chart reviewed and discussed with bedside staff. S/p m-LAD stent  Hemodynamics stable  CVP 9, MPAP 39, W 11  Nitric was weaned to off  Hgb 7.0; repeat pending  Sputum positive for E. Coli  Bilateral pulmonary infiltrates with normal wedge  Echo EF 25%    Plan:  1. CT scans today or tomorrow to complete transplant workup  2. Vent wean and work towards extubation per intensivist  3. Antibiotics for E. Coli in sputum+/- pneumonia per intensivist   4.  Start impella wean after extubated    Jose Carlos Pinon MD  [facility]

## 2022-01-27 NOTE — PROGRESS NOTES
[facility] SW and Dr. Mike Nash met with patient's spouse, Leopoldo Castro () on this date to provide updates on progression and plan of care and to offer support. Dr Mike Nash shared, patient was admitted for heart failure and massive volume overload. Patient has had many liters of fluid removed and Impella placed, with some improvement/recovery of heart function. Dr Mike Nash explained that patient may have a procedure today that would unblock vessel in patient's heart, which is done in the cath lab. Mr. Johan Lincoln was agreeable to this procedure. He endorses having some anxious feelings about making these difficult decisions, but does have family support. Mr. Johan Lincoln has  contact information and will reach out if he has additional questions or would like further updates from the [facility] team. Radha Suresh will continue to follow.

## 2022-01-27 NOTE — PROGRESS NOTES
Cardiac Electrophysiology Hospital Progress Note     Subjective:       Penny Hatchet is a 78 y.o. patient who is s/p Impella, has Medtronic biventricular ICD (gen change 01/822187, leads 09/25/2008) that is at [facility].         Interim:   S/p Impella placement on 01/18/2022, now in CCU.  She had VF 1/19, has appearance of Torsades.  Required ICD shock x 2, both successful. Hypokalemia resolved.       LHC/RHC on 01/17/2022 had shown severely elevated bilateral filling pressures with severe pulmonary HTN, LAD with 80% lesion.  Possibly attempting PCI.  I discussed with CHF team and Dr Barbara Vanegas.       LAD PCI before weaning off Impella, planned for today. LVEF appears better on 1/25/22 with more LV lateral wall contraction. EGD & colonoscopy yesterday due to anemia and need for transplant eval.  Cecal polyps noted, sent for pathology. No sustained VT         HPI:   Presented to the ER on 01/04/2021 with hypoxia, improved on supplemental oxygen. Labs showed stable anemia.  WBC elevated, hyponatremic, hypokalemic.  D dimer elevated.  Chronic CKD. Nephrologist spoke to me directly regarding severe renal failure, but not much worse than last admission. Rapid COVID negative.  CXR showed pulmonary edema vs atypical pneumonia.  VQ scan showed low probability for PE.       NICM, LVEF 15-20% during recent admission.  LVEF previously normalized with CRT.  NYHA III-IV.  No ACEi/ARB due to renal dysfunction.  GDMT dosing limited by low BP. [address] 12/10/2021 showed severe pulmonary HTN (wedge 34 mmHg, PAP 80 mmHg). Cardiac cath in 2015 at [facility] showed no evidence of CAD. She did require LV lead reprogramming over the summer, but good LV capture since.  Good capture/function when checked during recent admission. BP controlled. PICC line placed 01/10/2022 in anticipation of home milrinone. Previous:   LVEF noted 15-20% in 12/2021.      S/p Medtronic biventricular ICD (gen change 32/440922, leads 09/25/2008).     CKD stage IV.        Previously followed by Dr. Shakila Rodriguez, states did not follow him to new practice.          Problem List       Acute respiratory failure with hypoxia [facility]) ICD-10-CM: J96.01   ICD-9-CM: 518.81 1/4/2022     CHF exacerbation (HCC) ICD-10-CM: I50.9   ICD-9-CM: 428.0 12/6/2021     Type 2 diabetes mellitus with diabetic neuropathy (HCC) ICD-10-CM: E11.40   ICD-9-CM: 250.60, 357.2 1/2/2020     Type 2 diabetes with nephropathy ([facility] ICD-10-CM: E11.21   ICD-9-CM: 250.40, 583.81 4/3/2018     Obesity, morbid ([facility] ICD-10-CM: E66.01   ICD-9-CM: 278.01 12/8/2017     Acquired hypothyroidism ICD-10-CM: E03.9   ICD-9-CM: 244.9 8/15/2016     Dysthymia ICD-10-CM: F34.1   ICD-9-CM: 300.4 8/15/2016     ICD (implantable cardioverter-defibrillator), biventricular, in situ ICD-10-CM: Z95.810   ICD-9-CM: V45.02 6/5/2014   Overview Signed 1/14/2015 11:11 AM by Christine Lugo MD     Generator Medtronic change 1/14/2015         Dyslipidemia ICD-10-CM: Q12.2   ICD-9-CM: 272.4 1/14/2014     CKD (chronic kidney disease) ICD-10-CM: N18.9   ICD-9-CM: 585.9 8/15/2012     Cardiomyopathy, nonischemic (HCC) ICD-10-CM: I42.8   ICD-9-CM: 425.4 Unknown   Overview Signed 10/10/2011  6:40 AM by Jimmy Ibrahim MD     initial dx 2001, bivHF 2008 with EF 15%, s/p biV-ICD 9/08, significant improvment in EF to 45-50%         Anemia in chronic renal disease (Chronic) ICD-10-CM: N18.9, D63.1   ICD-9-CM: 285.21 12/10/2008     HTN (hypertension) ICD-10-CM: I10   ICD-9-CM: 401.9 12/10/2008     GERD (gastroesophageal reflux disease) (Chronic) ICD-10-CM: K21.9   ICD-9-CM: 530.81 12/10/2008     Gout (Chronic) ICD-10-CM: M10.9   ICD-9-CM: 274.9 12/10/2008     Pulmonary HTN (HCC) (Chronic) ICD-10-CM: I27.20   ICD-9-CM: 416.8 12/10/2008        Current Facility-Administered Medications   Medication Dose Route Frequency   · potassium, sodium phosphates (NEUTRA-PHOS) packet 1 Packet 1 Packet Oral QID   · insulin NPH (NOVOLIN N, HUMULIN N) injection 20 Units 20 Units SubCUTAneous QHS   · 0.9% sodium chloride infusion 10 mL/hr IntraVENous CONTINUOUS   · insulin NPH (NOVOLIN N, HUMULIN N) injection 25 Units 25 Units SubCUTAneous DAILY   · bumetanide (BUMEX) 0.25 mg/mL infusion 1 mg/hr IntraVENous CONTINUOUS   · DOBUTamine (DOBUTREX) 500 mg/250 mL (2,000 mcg/mL) infusion 0-10 mcg/kg/min IntraVENous TITRATE   · 0.9% sodium chloride infusion 250 mL 250 mL IntraVENous PRN   · albumin human 25% (BUMINATE) solution 12.5 g 12.5 g IntraVENous DAILY   · milrinone (PRIMACOR) 20 MG/100 ML D5W infusion 0.125 mcg/kg/min IntraVENous CONTINUOUS   · vasopressin (VASOSTRICT) 20 Units in 0.9% sodium chloride 100 mL infusion 0-0.1 Units/min IntraVENous TITRATE   · PHENYLephrine (NORMA-SYNEPHRINE) 30 mg in 0.9% sodium chloride 250 mL infusion  mcg/min IntraVENous TITRATE   · niCARdipine (CARDENE) 25 mg in 0.9% sodium chloride 250 mL infusion 0-15 mg/hr IntraVENous TITRATE   · bivalirudin (ANGIOMAX) 250 mg in 0.9% sodium chloride (MBP/ADV) 50 mL MBP 0.02-2.5 mg/kg/hr IntraVENous TITRATE   · hydrALAZINE (APRESOLINE) 20 mg/mL injection 5 mg 5 mg IntraVENous Q4H PRN   · bumetanide (BUMEX) injection 1 mg 1 mg IntraVENous Q4H PRN   · ceFAZolin (ANCEF) 1 g in sterile water (preservative free) 10 mL IV syringe 1 g IntraVENous Q12H   · fentaNYL (PF) 1,500 mcg/30 mL (50 mcg/mL) infusion 0-200 mcg/hr IntraVENous TITRATE   · heparin (porcine) in 0.9% NaCl 30,000 unit/1,000 mL perfusion irrigation 50-1,000 mL 50-1,000 mL Other PRN   · sodium chloride (NS) flush 5-40 mL 5-40 mL IntraVENous Q8H   · heparinized saline 2 units/mL infusion CONTINUOUS   · chlorhexidine (PERIDEX) 0.12 % mouthwash 15 mL 15 mL Oral Q12H   · sodium chloride (NS) flush 5-40 mL 5-40 mL IntraVENous Q8H   · 0.45% sodium chloride infusion 10 mL/hr IntraVENous CONTINUOUS   · 0.9% sodium chloride infusion 9 mL/hr IntraVENous CONTINUOUS   · naloxone (NARCAN) injection 0.4 mg 0.4 mg IntraVENous PRN   · ondansetron (ZOFRAN) injection 4 mg 4 mg IntraVENous Q4H PRN   · albuterol (PROVENTIL VENTOLIN) nebulizer solution 2.5 mg 2.5 mg Nebulization Q4H PRN   · aspirin chewable tablet 81 mg 81 mg Oral DAILY   · midazolam (VERSED) injection 1 mg 1 mg IntraVENous Q1H PRN   · magnesium oxide (MAG-OX) tablet 400 mg 400 mg Oral BID   · calcium chloride 1 g in 0.9% sodium chloride 100 mL IVPB 1 g IntraVENous PRN   · bisacodyL (DULCOLAX) suppository 10 mg 10 mg Rectal DAILY PRN   · senna-docusate (PERICOLACE) 8.6-50 mg per tablet 1 Tablet 1 Tablet Oral BID   · ELECTROLYTE REPLACEMENT NOTE: Nurse to review Serum Potassium and Magnesuim levels and Initiate Electrolyte Replacement Protocol as needed 1 Each Other PRN   · magnesium sulfate 1 g/100 ml IVPB (premix or compounded) 1 g IntraVENous PRN   · alteplase (CATHFLO) 1 mg in sterile water (preservative free) 1 mL injection 1 mg InterCATHeter PRN   · bacitracin 500 unit/gram packet 1 Packet 1 Packet Topical PRN   · pantoprazole (PROTONIX) injection 40 mg 40 mg IntraVENous DAILY   And   · sodium chloride (NS) flush 10 mL 10 mL IntraVENous DAILY   · dexmedeTOMidine in 0.9 % NaCl (PRECEDEX) 400 mcg/100 mL (4 mcg/mL) infusion soln 0.1-1.5 mcg/kg/hr IntraVENous TITRATE   · midazolam (VERSED) injection 1 mg 1 mg IntraVENous Q4H PRN   · insulin lispro (HUMALOG) injection SubCUTAneous Q6H   · propofol (DIPRIVAN) 10 mg/mL infusion 0-50 mcg/kg/min IntraVENous TITRATE   · sodium bicarbonate (8.4%) 25 mEq in dextrose 5% 1,000 mL - impella purge fluid IntraVENous TITRATE   · triamcinolone acetonide (KENALOG) 0.1 % cream Topical BID   · polyethylene glycol (MIRALAX) packet 17 g 17 g Oral DAILY   · digoxin (LANOXIN) tablet 0.0625 mg 0.0625 mg Oral DAILY   · allopurinoL (ZYLOPRIM) tablet 50 mg 50 mg Oral DAILY   · epoetin isabel-epbx (RETACRIT) injection 20,000 Units 20,000 Units SubCUTAneous Q TUE, THU & SAT   · montelukast (SINGULAIR) tablet 10 mg 10 mg Oral DAILY   · levothyroxine (SYNTHROID) tablet 100 mcg 100 mcg Oral Once per day on Mon Tue Wed Thu Fri Sat   · hydroxypropyl methylcellulose (ISOPTO TEARS) 0.5 % ophthalmic solution 1 Drop 1 Drop Both Eyes PRN   · venlafaxine-SR (EFFEXOR-XR) capsule 75 mg 75 mg Oral DAILY WITH BREAKFAST   · gabapentin (NEURONTIN) capsule 100 mg 100 mg Oral QHS   · arformoteroL (BROVANA) neb solution 15 mcg 15 mcg Nebulization BID RT   And   · budesonide (PULMICORT) 500 mcg/2 ml nebulizer suspension 500 mcg Nebulization BID RT   · sodium chloride (NS) flush 5-40 mL 5-40 mL IntraVENous Q8H   · acetaminophen (TYLENOL) tablet 650 mg 650 mg Oral Q6H PRN   Or   · acetaminophen (TYLENOL) suppository 650 mg 650 mg Rectal Q6H PRN   · glucose chewable tablet 16 g 4 Tablet Oral PRN   · dextrose (D50W) injection syrg 12.5-25 g 25-50 mL IntraVENous PRN   · glucagon (GLUCAGEN) injection 1 mg 1 mg IntraMUSCular PRN         Allergies   Allergen Reactions   · Ciprofloxacin Anaphylaxis   · Shellfish Derived Anaphylaxis   · Ace Inhibitors Unknown (comments)   · Biaxin [Clarithromycin] Other (comments)   Metal taste   · Candesartan Cough   · Pcn [Penicillins] Hives     Past Medical History:   Diagnosis Date   · Acquired hypothyroidism 8/15/2016   · Anemia   RED-HF study   · Asthma   · Cardiomyopathy, nonischemic ([address]   initial dx 2001, bivHF 2008 with EF 15%, s/p biV-ICD 9/08, significant improvment in EF to 45-50%   · CKD (chronic kidney disease)   Dr Ruba العراقي   · CKD (chronic kidney disease) 8/15/2012   · Depression   · Diabetes ([address]   · Diabetic neuropathy ([address]   · DM (diabetes mellitus) ([address] 8/15/2012   · GERD (gastroesophageal reflux disease)   · Gout   · Hypothyroidism   · ICD (implantable cardioverter-defibrillator), biventricular, in situ 6/5/2014   · Psoriasis     Past Surgical History:   Procedure Laterality Date   · CARDIAC CATHETERIZATION 2007; 01/06/15   normal cors   · ECHO 2D ADULT 4/2010   EF 45%, improved from 1/09 (25%)   · ECHO 2D ADULT 11/2011   LVH, EF 55-60%   · HX ORTHOPAEDIC   knee   · HX PACEMAKER PLACEMENT   AICD   · STRESS TEST LEXISCAN/CARDIOLITE 3/21/12   normal perfusion, global HK 40%     Family History   Problem Relation Age of Onset   · Heart Disease Mother   · Hypertension Mother   · Lupus Sister   · Diabetes Brother     Social History     Tobacco Use   · Smoking status: Never Smoker   · Smokeless tobacco: Never Used   Substance Use Topics   · Alcohol use: No         Review of Systems: Unable to perform due to intubated, sedated status.       Visit Vitals  /65   Pulse 80   Temp 99.8 °F (37.7 °C)   Resp 14   Ht 5' 7\" (1.702 m)   Wt 240 lb 1.3 oz (108.9 kg)   SpO2 100%   BMI 37.60 kg/m²             Physical Exam:   Constitutional: Well-developed and well-nourished. Head: Normocephalic and atraumatic. Eyes: Closed. ENT: Sedated.  ET tube in place. Neck: Right cordis with [employer]. Cardiovascular: Normal rate, regular rhythm. Exam reveals no gallop and no friction rub. 2/6 systolic LSB murmur.     Pulmonary/Chest: On vent. Abdominal: Soft, Obese. GI/: Henley catheter in place. Musculoskeletal: no leg edema   Vasc/lymphatic: + right axillary Impella. Neurological: Sedated. Skin Left side BiV ICD unremarkable. Psychiatric: Sedated on propofol, Precedex. Assessment/Plan:     Imaging/Studies:   LHC/RHC (01/17/2022): Severely elevated left & right side filling pressures.  Severe PH, mixed pattern with elevated wedge as well as PVR of about 6 Woods units.  Mid LAD lesion 80% involving diagonal branch ostium.  Likely prior stent in mid LAD, patent.  Reduced CI of 1.65 L/min/m2 by thermodilution & 1.6 L/min/m2 by presumed oxygen consumption by Aye. 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 RHC without milrinone (12/10/2021): High wedge pressure (35 mmHg) indicating volume overload, precapillary.  Severe pulmonary HTN.      Echo (12/08/2021): LVEF 15-20%, upper normal wall thickness, LV diastolic dysfunction.  Borderline low RVEF. Mod dilated LA, mildly dilated RA.  Mild to mod MR. Corrine Estrada TR.  Mild to mod PH.       LHC/RHC (01/2015): No significant CAD. Mixed CM: CHF team wants to revascularize before she can be listed for transplant; Dr. Elton Martin today. LVEF improved to 30%, now with better lateral wall motion. Unable to assess NYHA status due to current sedation, intubation.  Was on inotrope, but not currently. Nuclear amyloid scan equivocal.  cMRI not possible with 4194 LV lead (2008).  However, cMRI wouldn't . S/p Impella 1/18/22 as bridge to possible transplant or to improvement   If not responding and not candidate for heart/ renal transplant, then she will be referred to hospice. [facility] transplant has been contacted by Dr Maranda Pierce biventricular ICD (gen change 01/14/2015, leads 09/25/2008): Device check showed proper lead & generator function.     Device is BETTY 1/22/2022.  Replacement is on hold while she is still on Impella and is waiting for transplant eligibility.  Shocks for VF drained battery further.  I have talked with her  already regarding generator change, & he gave consent to proceed whenever appropriate. She has NSVTs and is still biventricular pacing      CKD: Dialyze if needed. Anemia: EGD and colonoscopy on 01/26/2021, had cecal polyps sent to pathology.             Thank you for involving me in this patient's care and please call with further concerns or questions. Angel Callahan M.D.    Electrophysiology/Cardiology   [facility]   [place] [id number]                     [id number] [facility], [address] [place] [address]

## 2022-01-27 NOTE — PROGRESS NOTES
[facility] in [place], [place]  Inpatient Progress Note      Patient name: Aletha Smith  Patient : 1955  Patient MRN: [id number]  Consulting MD: Raina Lundy MD  Date of service: 22    REASON FOR REFERRAL:  Management of cardiogenic shock     PLAN OF CARE:   · 77 y/o female with chronic renal failure and new onset severe cardiomyopathy, LVEF 15% (diagnosed 21), stage D, NYHA class IV; admitted for pulmonary edema and severe volume overloadd by C  · Most likely etiology of acute deterioration of LVEF is chronic volume overload with compensatory tachycardia in the setting of progressive renal failure +/- coronary artery disease (80% LAD)   · Patient requested aggressive medical approach to allow her heart to recover or receive heart/kidney transplantation, before considering hospice  · D/w patient, her family, CT surgery, nephrology and primary cardiologist plan to support her with Impella as bridge to LV recovery or transplant with the following anticipated outcomes:  · 1/3 chance of LV recovery and discharge home on medical therapy or chronic dialysis  · 1/3 chance of LV non-recovery on Impella, evaluation and transfer for listing for heart transplant/kidney  · 1/3 chance of LV non-recovery and evaluation that reveals patient is not candidate for LVAD/heart/kidney transplant and then wean of support to comfort care/hospice  · Impella 5.5 implanted by Dr. Aniket Ricketts  and patient underwent aggressive diuresis with normalization of filling pressures and improvement of LV function ranging between 30-35% to 25-30%, s/p PCI to LAD today followed by Impella wean after extubation; appears to have recovered heart function and unlikely will need heart/kidney transplant.   · In case of failure of Impella wean, patient's only contraindication to transplant is active infection requiring antibiotic use; otherwise there are no apparent contraindications; [facility] will accept patient on integrillin drip if LV does not recover after revascularization and on dialysis if needed for volume management; anticipated waiting time at BMI 38 and blood type AB for hear/kidney transplant is currently approximately 10 days; d/w Dr. Kalpana Adan from [facility].        RECOMMENDATIONS:  POD # 8 Impella 5.5 implant   Cont P8 for now, will plan on weaning Impella over the weekend for improved EF 30-35% once nitric is off and post C  Vent management per Intensivist   Repeat TTE tomorrow   Cefazolin q12h for Impella site ppx   Maintain PAC for hemodynamic optimization; goal CI > 2.3, CVP 8-10 mmHg SVR 1000, SBP < 110 mmHg (via radial arterial line)   Continue to wean Roro as tolerated  Intolerant to sildenafil due to marked hypotension   Continue Bumex gtt at 1mg/hr; goal net (-)1-2L daily   Keep K> 4 and Mg >2  Hydralazine 5 mg IV q4h PRN SBP > 110 mmHg   Intolerant of GDMT due to hemodynamic in stability   BiV-ICD programmed at 80 bpm; device is at EOL, recommend to delay ICD generator change until final plans for transplant are established - Per Dr. Susana Malone patient is not pacer dependent  Place defib patches on patient for ppx  Cont digoxin 0.0625 mg to every other day; goal 0.7-1.2   Allopurinol 50mg daily per nephrology  S/p Venofer 200 mg IV x 2   CT head/chest/abd/pelvis w/o contrast for txp eval  GI scopes complete, polyp biopsy pending  Plan for possible LAD stent for revascularization today   ok to use Plavix if necessary for stent  Epo level elevated- hold epogen and follow levels  Blood cultures NGTD  Sputum with E Coli   UA negative   pTT goal  40-60 due to persistent anemia on bivalirudin   Will need OP PSG  Will offer family testing for VUS x 2 as OP  Palliative consult appreciated   Nutritionist consult  Heart failure education   Advanced care plan present on file  Schedule family meeting today at 12N     All other care per primary team     IMPRESSION:  Volume overload  Acute on Chronic systolic heart failure, requiring Impella 5.5 implant 1/18/22   · Stage D, NYHA class IV symptoms  · Non-ischemic cardiomyopathy, LVEF 15%  · Normal coronaries  · PYP equivocal for amyloid   Pulmonary hypertension, severe  RV failure  S/p BiV-ICD  Cardiac risk factors:  · HL  · DM2  · Morbid obesity, BMI 40  [employer] genetic testing VUS x 2 ( KCNH2, MYH6)  Acute on chronic renal failure, stage IV  GERD  Anemia of chronic disease  Gout  VF s/p ICD shock x 2      Interval Events:  POD #8  Impella placement   Impella  P8  Net -1L  Creatinine stable at 2.3  proBNP decreased to 15,000  Remains on Roro and intubated  Off pressors and inotropes   Undergoing Roro wean     LIFE GOALS:  Patient's personal goals include: being home with family, still ambulating around without getting too tired. Important upcoming milestones or family events: none  The patient identifies the following as important for living well: remaining idependent and mobile; being able to get out of the house with . Patient verbalized willingness to be on home milrinone and evaluation for both heart and kidney transplants. Verbalizes she would have family supporting her decision on this. SHAMIKA James is a 78 y.o.  female with a history of NICM, chronic hypoxic respiratory failure secondary to pulmonary HTN, hypothyroidism, CKD, GERD, and DM II who presented to [address] [place] [address] as a transfer from another facility for acute on chronic hypoxic respiratory failure. Reports running out of O2 at home resulting in SOB and dizziness. Upon arrival to the ED she was found to have O2 sats in the 70s, requiring 4L NC O2. Rapid covid test was negative. ProNT-BNP was 14686, K+5.2, BUN/CR x/2.54 and elevated d-dimer. Chest xray showing pulmonary edema vs. Atypical pneumonia. VQ scan showed low probability for PE. Per Dr. Nelida Sanchez, NIC, LVEF 15-20% during recent admission.   LVEF previously normalized with CRT. NYHA III-IV. No ACEi/ARB due to renal dysfunction. GDMT dosing limited by low BP. Patient's PCP is Dr. Oralia Cevallos, and she sees Dr. Lianne Lamar primarily for cardiac care due to Dr. Baldo Medina transfering practice. Patient historically seen by nephrology but has not had follow up care in the past three years. CARDIAC IMAGING:  Echo 1/20/22 - LVEF 20-25%, RV moderately dilated, Impella catheter 5.9 cm from AV   Echo 12/8/21- LVEF 15-20%, mild to Mod MR, LVIDd 5.09cm, TAPSE 1.94cm  Echo 4/22/19- LVEF 60%, trace MR,  LVIDd 4.24cm, TAPSE 1.65cm   Echo 4/24/18- LVEF 60%, trivial MR, LVIDd 4.79cm, TAPSE 2.25cm      EKG- 1/4/22 ST with A sense and V paced rhythm     [facility] 2015- No significant CAD  NST 2014- reversible LAD involvement      ICD interrogation     HEMODYNAMICS:  RHC 1/17/21: PAP 83/52 mmHg, RAP 27 mmHg, PCWP 45 mmHg, CI 1.6  RHC 12/10/21: PAP 76/48/57, RAP 20, PCWP 35, CI 2.26     CPEST not done  6MW not done     OTHER IMAGING:  CXR Results  (Last 48 hours)                             01/13/22 1035   XR CHEST PORT Final result      Impression:   No significant change in congestion and interstitial and alveolar   opacities which may reflect edema or infectious infiltrate. Narrative:   EXAM: XR CHEST PORT       INDICATION: pul edema       COMPARISON: Chest x-ray 1/10/2022. FINDINGS: A portable AP radiograph of the chest was obtained at 10:19 hours. The   patient is on a cardiac monitor. The lungs appear grossly stable with congestion   and interstitial/airspace opacities with no pneumothorax or pleural effusion. Right PICC line traverses expected course of tip in the region of the atriocaval   junction. Pacemaker-ICD generator body projects over the left chest wall with   intact appearing leads traversing in expected course. . The cardiac and   mediastinal contours and pulmonary vascularity are normal.  Atherosclerotic   calcifications affect the aortic arch.  The chest wall structures and visualized   upper abdomen show no acute findings with incidental note of degenerative spine   and shoulder changes. PHYSICAL EXAM:  Visit Vitals  Visit Vitals  BP 98/62   Pulse 80   Temp 99.3 °F (37.4 °C)   Resp 12   Ht 5' 7\" (1.702 m)   Wt 240 lb 1.3 oz (108.9 kg)   SpO2 98%   BMI 37.60 kg/m²          Hemodynamics:   CO: CO (l/min): 5.9 l/min   CI: CI (l/min/m2): 2.7 l/min/m2   CVP: CVP (mmHg): 6 mmHg (01/27/22 1200)   SVR: SVR (dyne*sec)/cm5: 963 (dyne*sec)/cm5 (01/27/22 4799)   PAP Systolic: PAP Systolic: 66 (10/99/98 0392)   PAP Diastolic: PAP Diastolic: 24 (06/24/57 3972)   PVR:     SV02: SVO2 (%): 86 % (01/27/22 1200)   SCV02:      Impella 5.5  P-8  Flow: 4.4lpm   Purge flow 7.8    Physical Exam  Physical Exam  Vitals and nursing note reviewed. Constitutional:       Comments: Intubated and sedated    Cardiovascular:      Rate and Rhythm: Normal rate and regular rhythm. Pulses: Normal pulses. Heart sounds: Normal heart sounds. Pulmonary:      Breath sounds: Decreased air movement present. Comments: Intubated   Abdominal:      General: There is no distension. Palpations: Abdomen is soft. Musculoskeletal:         General: No swelling. Skin:     General: Skin is warm and dry. ROS unable to obtain due to patient condition (intubated, sedated).       PAST MEDICAL HISTORY:  Past Medical History:   Diagnosis Date    Acquired hypothyroidism 8/15/2016    Anemia     RED-HF study    Asthma     Cardiomyopathy, nonischemic ([facility]     initial dx 2001, bivHF 2008 with EF 15%, s/p biV-ICD 9/08, significant improvment in EF to 45-50%    CKD (chronic kidney disease)     Dr Izabella Middleton    CKD (chronic kidney disease) 8/15/2012    Depression     Diabetes ([facility]     Diabetic neuropathy ([facility]     DM (diabetes mellitus) ([facility] 8/15/2012    GERD (gastroesophageal reflux disease)     Gout     Hypothyroidism     ICD (implantable cardioverter-defibrillator), biventricular, in situ 6/5/2014    Psoriasis        PAST SURGICAL HISTORY:  Past Surgical History:   Procedure Laterality Date    CARDIAC CATHETERIZATION  2007; 01/06/15    normal cors    COLONOSCOPY N/A 1/26/2022    [facility]    ECHO 2D ADULT  4/2010    EF 45%, improved from 1/09 (25%)    ECHO 2D ADULT  11/2011    LVH, EF 55-60%    HX ORTHOPAEDIC      knee    HX PACEMAKER PLACEMENT      AICD    STRESS TEST LEXISCAN/CARDIOLITE  3/21/12    normal perfusion, global HK 40%       FAMILY HISTORY:  Family History   Problem Relation Age of Onset    Heart Disease Mother     Hypertension Mother     Lupus Sister     Diabetes Brother        SOCIAL HISTORY:  Social History     Socioeconomic History    Marital status:    Tobacco Use    Smoking status: Never Smoker    Smokeless tobacco: Never Used   Substance and Sexual Activity    Alcohol use: No    Drug use: No    Sexual activity: Never   Social History Narrative    . Nonsmoker. Disability       LABORATORY RESULTS:     Labs Latest Ref Rng & Units 1/27/2022 1/27/2022 1/27/2022 1/26/2022 1/26/2022 1/26/2022 1/26/2022   WBC 3.6 - 11.0 K/uL - 7.1 - - - - 7.3   RBC 3.80 - 5.20 M/uL - 2.95(L) - - - - 3.08(L)   Hemoglobin 11.5 - 16.0 g/dL 7. 0(L) 7. 1(L) - - - - 7. 5(L)   Hematocrit 35.0 - 47.0 % 27. 1(L) 27. 2(L) - - - - 28. 5(L)   MCV 80.0 - 99.0 FL - 92.2 - - - - 92.5   Platelets 393 - 038 K/uL - 195 - - - - 184   Lymphocytes 12 - 49 % - - - - - - -   Monocytes 5 - 13 % - - - - - - -   Eosinophils 0 - 7 % - - - - - - -   Basophils 0 - 1 % - - - - - - -   Albumin 3.5 - 5.0 g/dL - 3.7 - - 3. 4(L) 3. 3(L) 3. 2(L)   Calcium 8.5 - 10.1 MG/DL - 9.7 - - 9.6 9.8 9.6   Glucose 65 - 100 mg/dL - 103(H) - - 173(H) 164(H) 159(H)   BUN 6 - 20 MG/DL - 70(H) - - 68(H) 69(H) 68(H)   Creatinine 0.55 - 1.02 MG/DL - 2.34(H) - - 2.25(H) 2.25(H) 2.24(H)   Sodium 136 - 145 mmol/L - 145 - - 145 143 143 Potassium 3.5 - 5.1 mmol/L 3.9 4.0 3.9 3.9 3.5 3.6 4.1   TSH 0.36 - 3.74 uIU/mL - - - - - - -   LDH 81 - 246 U/L - 360(H) - - - - 361(H)   Some recent data might be hidden     Lab Results   Component Value Date/Time    TSH 3.08 01/11/2022 05:13 AM    TSH 1.85 12/15/2021 01:06 PM    TSH 1.01 11/05/2020 09:11 AM    TSH 2.740 04/30/2019 11:21 AM    TSH 0.519 02/21/2012 10:53 AM    TSH 8.29 (H) 01/20/2010 01:59 PM       ALLERGY:  Allergies   Allergen Reactions    Ciprofloxacin Anaphylaxis    Shellfish Derived Anaphylaxis    Sildenafil Other (comments)    Ace Inhibitors Unknown (comments)    Biaxin [Clarithromycin] Other (comments)     Metal taste    Candesartan Cough    Pcn [Penicillins] Hives        CURRENT MEDICATIONS:    Current Facility-Administered Medications:     potassium chloride 20 mEq in 50 ml IVPB, 20 mEq, IntraVENous, ONCE, Christiano Deshpande MD, Last Rate: 50 mL/hr at 01/27/22 1214, 20 mEq at 01/27/22 1214    insulin NPH (NOVOLIN N, HUMULIN N) injection 20 Units, 20 Units, SubCUTAneous, QHS, Christiano Deshpande MD, 20 Units at 01/26/22 2100    digoxin (LANOXIN) tablet 0.0625 mg, 0.0625 mg, Oral, EVERY OTHER DAY, Amber Weaver, NP, 0.0625 mg at 01/27/22 0816    0.9% sodium chloride infusion, 10 mL/hr, IntraVENous, CONTINUOUS, Ada Dhillon MD, Last Rate: 10 mL/hr at 01/27/22 0000, 10 mL/hr at 01/27/22 0000    insulin NPH (NOVOLIN N, HUMULIN N) injection 25 Units, 25 Units, SubCUTAneous, DAILY, Amara Sutton MD, 25 Units at 01/26/22 1119    bumetanide (BUMEX) 0.25 mg/mL infusion, 1 mg/hr, IntraVENous, CONTINUOUS, Ada Dhillon MD, Last Rate: 4 mL/hr at 01/27/22 0553, 1 mg/hr at 01/27/22 0553    DOBUTamine (DOBUTREX) 500 mg/250 mL (2,000 mcg/mL) infusion, 0-10 mcg/kg/min, IntraVENous, TITRATE, Lizett Albarado NP, Held at 01/23/22 1200    0.9% sodium chloride infusion 250 mL, 250 mL, IntraVENous, PRN, Sridevi Chavez MD    albumin human 25% (BUMINATE) solution 12.5 g, 12.5 g, IntraVENous, DAILY, Ginny Albarado NP, Last Rate: 0 mL/hr at 01/26/22 1138, 12.5 g at 01/27/22 0817    milrinone (PRIMACOR) 20 MG/100 ML D5W infusion, 0.125 mcg/kg/min, IntraVENous, CONTINUOUS, Kimberly Suggs MD, Stopped at 01/22/22 2242    vasopressin (VASOSTRICT) 20 Units in 0.9% sodium chloride 100 mL infusion, 0-0.1 Units/min, IntraVENous, TITRATE, Per Albarado NP, Stopped at 01/23/22 1048    PHENYLephrine (NORMA-SYNEPHRINE) 30 mg in 0.9% sodium chloride 250 mL infusion,  mcg/min, IntraVENous, TITRATE, Jasbir Araya MD, Held at 01/21/22 1200    niCARdipine (CARDENE) 25 mg in 0.9% sodium chloride 250 mL infusion, 0-15 mg/hr, IntraVENous, TITRATE, Edouard ODELL MD, Stopped at 01/19/22 2109    bivalirudin (ANGIOMAX) 250 mg in 0.9% sodium chloride (MBP/ADV) 50 mL MBP, 0.02-2.5 mg/kg/hr, IntraVENous, TITRATE, Ginny Albarado NP, Last Rate: 2.4 mL/hr at 01/27/22 0200, 0.1008 mg/kg/hr at 01/27/22 0200    hydrALAZINE (APRESOLINE) 20 mg/mL injection 5 mg, 5 mg, IntraVENous, Q4H PRN, Ginny Albarado NP, 5 mg at 01/24/22 1336    bumetanide (BUMEX) injection 1 mg, 1 mg, IntraVENous, Q4H PRN, Per Albarado NP, 1 mg at 01/22/22 1721    ceFAZolin (ANCEF) 1 g in sterile water (preservative free) 10 mL IV syringe, 1 g, IntraVENous, Q12H, Ginny Albarado NP, 1 g at 01/27/22 0100    fentaNYL (PF) 1,500 mcg/30 mL (50 mcg/mL) infusion, 0-200 mcg/hr, IntraVENous, TITRATE, Edouard ODELL MD, Last Rate: 2 mL/hr at 01/27/22 1151, 100 mcg/hr at 01/27/22 1151    heparin (porcine) in 0.9% NaCl 30,000 unit/1,000 mL perfusion irrigation 50-1,000 mL, 50-1,000 mL, Other, PRN, Sam Gomez PA    sodium chloride (NS) flush 5-40 mL, 5-40 mL, IntraVENous, Q8H, Sam Gomez PA, 10 mL at 01/26/22 2101    chlorhexidine (PERIDEX) 0.12 % mouthwash 15 mL, 15 mL, Oral, Q12H, Davian Vázquez DO, 15 mL at 01/27/22 0821    sodium chloride (NS) flush 5-40 mL, 5-40 mL, IntraVENous, Q8H, Sam Gomez PA, 10 mL at 01/27/22 0502    0.45% sodium chloride infusion, 10 mL/hr, IntraVENous, CONTINUOUS, Beatriz Gomez PA    0.9% sodium chloride infusion, 9 mL/hr, IntraVENous, CONTINUOUS, Beatriz Gomez PA, Last Rate: 9 mL/hr at 01/27/22 0000, 9 mL/hr at 01/27/22 0000    naloxone (NARCAN) injection 0.4 mg, 0.4 mg, IntraVENous, PRN, Sam Gomez PA    ondansetron [facility]) injection 4 mg, 4 mg, IntraVENous, Q4H PRN, Sam Gomez PA    albuterol (PROVENTIL VENTOLIN) nebulizer solution 2.5 mg, 2.5 mg, Nebulization, Q4H PRN, Sam Gomez PA    aspirin chewable tablet 81 mg, 81 mg, Oral, DAILY, TYE Hamlin, 81 mg at 01/27/22 0816    midazolam (VERSED) injection 1 mg, 1 mg, IntraVENous, Q1H PRN, Sam Gomez PA    magnesium oxide (MAG-OX) tablet 400 mg, 400 mg, Oral, BID, Beatriz Gomez PA, 400 mg at 01/27/22 3465    calcium chloride 1 g in 0.9% sodium chloride 100 mL IVPB, 1 g, IntraVENous, PRN, Sam Gomez PA    bisacodyL (DULCOLAX) suppository 10 mg, 10 mg, Rectal, DAILY PRN, Sam Gomez PA    senna-docusate (PERICOLACE) 8.6-50 mg per tablet 1 Tablet, 1 Tablet, Oral, BID, Beatriz Gomez PA, 1 Tablet at 01/25/22 1758    ELECTROLYTE REPLACEMENT NOTE: Nurse to review Serum Potassium and Magnesuim levels and Initiate Electrolyte Replacement Protocol as needed, 1 Each, Other, PRN, Beatriz Gomez PA    magnesium sulfate 1 g/100 ml IVPB (premix or compounded), 1 g, IntraVENous, PRN, Beatriz Gomez PA    alteplase (CATHFLO) 1 mg in sterile water (preservative free) 1 mL injection, 1 mg, InterCATHeter, PRN, Sam Gomez PA    bacitracin 500 unit/gram packet 1 Packet, 1 Packet, Topical, PRN, Beatriz Gomez PA    pantoprazole (PROTONIX) injection 40 mg, 40 mg, IntraVENous, DAILY, 40 mg at 01/27/22 0828 **AND** sodium chloride (NS) flush 10 mL, 10 mL, IntraVENous, DAILY, Giuliana Vázquez, DO, 10 mL at 01/27/22 0828    dexmedeTOMidine in 0.9 % NaCl (PRECEDEX) 400 mcg/100 mL (4 mcg/mL) infusion soln, 0.1-1.5 mcg/kg/hr, IntraVENous, TITRATE, Davian Vázquez DO, Last Rate: 17 mL/hr at 01/27/22 1004, 0.6 mcg/kg/hr at 01/27/22 1004    midazolam (VERSED) injection 1 mg, 1 mg, IntraVENous, Q4H PRN, Davian Vázquez DO, 1 mg at 01/18/22 1719    insulin lispro (HUMALOG) injection, , SubCUTAneous, Q6H, Davian Vázquez DO, 2 Units at 01/26/22 1736    propofol (DIPRIVAN) 10 mg/mL infusion, 0-50 mcg/kg/min, IntraVENous, TITRATE, Davian Vázquez DO, Last Rate: 13.6 mL/hr at 01/27/22 1005, 20 mcg/kg/min at 01/27/22 1005    sodium bicarbonate (8.4%) 25 mEq in dextrose 5% 1,000 mL - impella purge fluid, , IntraVENous, TITRATE, Sam Gomez PA, Last Rate: 8 mL/hr at 01/27/22 0000, New Bag at 01/27/22 0000    triamcinolone acetonide (KENALOG) 0.1 % cream, , Topical, BID, Jessica Gomez PA, Given at 01/27/22 0821    polyethylene glycol (MIRALAX) packet 17 g, 17 g, Oral, DAILY, Sam Gomez PA, 17 g at 01/25/22 0845    allopurinoL (ZYLOPRIM) tablet 50 mg, 50 mg, Oral, DAILY, Sam Gomez PA, 50 mg at 01/26/22 1055    [Held by provider] epoetin isabel-epbx (RETACRIT) injection 20,000 Units, 20,000 Units, SubCUTAneous, Q TUE, THU & SAT, Jessica Gomez PA, 20,000 Units at 01/25/22 2006    montelukast (SINGULAIR) tablet 10 mg, 10 mg, Oral, DAILY, Sam Gomez PA, 10 mg at 01/26/22 1055    levothyroxine (SYNTHROID) tablet 100 mcg, 100 mcg, Oral, Once per day on Mon Tue Wed Thu Fri Sat, Sam Gomez PA, 100 mcg at 01/27/22 9586    hydroxypropyl methylcellulose (ISOPTO TEARS) 0.5 % ophthalmic solution 1 Drop, 1 Drop, Both Eyes, PRN, Sam Gomez PA, 1 Drop at 01/06/22 1727    venlafaxine-SR (EFFEXOR-XR) capsule 75 mg, 75 mg, Oral, DAILY WITH BREAKFAST, Sam Gomez PA, 75 mg at 01/17/22 1025    gabapentin (NEURONTIN) capsule 100 mg, 100 mg, Oral, QHS, TYE Conte, 100 mg at 01/26/22 2101    arformoteroL (BROVANA) neb solution 15 mcg, 15 mcg, Nebulization, BID RT, 15 mcg at 01/26/22 2000 **AND** budesonide (PULMICORT) 500 mcg/2 ml nebulizer suspension, 500 mcg, Nebulization, BID RT, Sam Gomez PA, 500 mcg at 01/26/22 2001    sodium chloride (NS) flush 5-40 mL, 5-40 mL, IntraVENous, Q8H, Sam Gomez PA, 10 mL at 01/26/22 2101    acetaminophen (TYLENOL) tablet 650 mg, 650 mg, Oral, Q6H PRN, 650 mg at 01/25/22 4475 **OR** acetaminophen (TYLENOL) suppository 650 mg, 650 mg, Rectal, Q6H PRN, Sam Gomez PA    glucose chewable tablet 16 g, 4 Tablet, Oral, PRN, Vonda Gomez PA    dextrose (D50W) injection syrg 12.5-25 g, 25-50 mL, IntraVENous, PRN, Vonda Gomez PA    glucagon (GLUCAGEN) injection 1 mg, 1 mg, IntraMUSCular, PRN, Vonda Gomez PA    PATIENT CARE TEAM:  Patient Care Team:  Janette Cornejo MD as PCP - General (Internal Medicine)  Janette Cornejo MD as PCP - [facility]  Ayaan Romero MD as Consulting Provider (Internal Medicine)  John Oakes MD (Dermatology)  Chava Smallwood MD (Nephrology)  Dank Monzon MD as Consulting Provider (Cardiology)  Levon Hoyt MD (Cardiology)  Yves Maynard MD as Consulting Provider (Pulmonary Disease)     Thank you for allowing me to participate in this patient's care. Kia Rivas NP  [facility] [address], [address]  Phone: [phone]      [facility] ATTENDING ADDENDUM    Patient was seen and examined in person. Data and notes were reviewed. I have discussed and agree with the plan as noted in the NP note above without further additions.     Yandy Jamison MD PhD  [id number]

## 2022-01-27 NOTE — PROGRESS NOTES
Dr. Gee Carrera. [phone]            Cardiology Consult/Progress Note      Requesting/referring provider: Eladia Griffith MD     Reason for Consult: possible PCI of 80% LAD lesion    Interim: Off Nitric, PA pressures higher. Opens eyes to verbal stimulus. Assessment/Plan:  1. CAD, LAD 80% stenosis - S/p Impella placement on 01/18/2022, now in CCU.  She had VF 1/19, has appearance of Torsades.  Required ICD shock x 2, both successful. Per [facility], [facility] will accept patient on integrillin drip if LV does not recover after revascularization and on dialysis if needed for volume management. Pt is high risk for decompensation with high risk cardiac intervention. Her EF is appearing worse on echo today-- EF 25%. In that case we will consider PCI prior to impella removal assuming renal services are OK and hopefully can use <20 cc of contrast for the procedure. Plan to get consent tomorrow.  to come in for tomorrow for a meeting with ADHF team. Plan to proceed with PCI tomorrow    2. Cardiomyopathy: probable acute component: By my assessment, EF appears to be improving. PCI not urgent for that reason. 3. RODNEY/CKD: Creatinine high and at risk of dialysis if large dye load given. [x]    High complexity decision making was performed  [x]    Patient is at high-risk of decompensation with multiple organ involvement  [x]    Complex/difficult social determinants of health outcomes    Investigations personally reviewed and interpreted  Tele- AV pacing    Echo 1/20/22-    Left Ventricle: Limited study for impella position. Left ventricle is severely dilated. Severe global hypokinesis present. The EF by visual approximation is 20 - 25%. Impella catheter is present. 5.9 cm from AV.   Right Ventricle: Right ventricle is moderately dilated.     Left and right heart cath 1/17/22-  1. Severely elevated left and right-sided filling pressures  2.   Severe pulmonary hypertension: Mixed pattern with elevated wedge as well as PVR of about 6 Woods units. 3.  Severe one-vessel, moderate one-vessel coronary artery disease. Mid LAD lesion is the worst which is about 80% involving diagonal branch ostium. Uncertain if CAD alone as a cause of her cardiomyopathy(probably less likely). Likely prior stent in mid LAD which is patent  4.  Reduced cardiac index of 1.65 L/min/m² by thermodilution and 1.6 L/min/m² by presumed O2 consumption by Aye     Access right radial, right internal jugular ultrasound-guided no issues  Contrast 15 cc. Limited pictures were taken due to underlying CKD     Recommendations  1. Guideline directed medical therapy for heart failure and coronary artery disease  2. Will defer to discussion between Dr. Tish Tejada, nephrology and heart failure if they like to consider revascularization. Uncertain if LAD revascularization will lead to improvement in functional status or EF at this time. Nonetheless can be achieved with less than 20 to 30 cc of contrast.      Investigations reviewed    HPI: Ana María Soliz, a 78y.o. year-old who is seen for evaluation of HFrEF and CAD with 80% stenosis of LAD. She is an On license of [facility] American female with a history of NICM, chronic hypoxic respiratory failure secondary to pulmonary HTN, hypothyroidism, CKD, GERD, and DM II who presented to [facility] as a transfer from another facility for acute on chronic hypoxic respiratory failure. Upon arrival to the ED she was found to have O2 sats in the 70s, requiring 4L NC O2.  Rapid covid test was negative.  ProNT-BNP was 11219, K+5.2, BUN/CR x/2.54 and elevated d-dimer. Chest xray showing pulmonary edema vs. Atypical pneumonia. VQ scan showed low probability for PE. Per Dr. Latoya Lopez, LVEF 15-20% during recent admission.  LVEF previously normalized with CRT.  NYHA III-IV.  No ACEi/ARB due to renal dysfunction.  GDMT dosing limited by low BP.       She  has a past medical history of Acquired hypothyroidism (8/15/2016), Anemia, Asthma, Cardiomyopathy, nonischemic ([facility] [address] CKD (chronic kidney disease), CKD (chronic kidney disease) (8/15/2012), Depression, Diabetes ([facility] [address] Diabetic neuropathy ([facility] [address] DM (diabetes mellitus) ([facility] [address] (8/15/2012), GERD (gastroesophageal reflux disease), Gout, Hypothyroidism, ICD (implantable cardioverter-defibrillator), biventricular, in situ (6/5/2014), and Psoriasis. She has no past medical history of Abuse, Adult physical abuse, Arrhythmia, Arthritis, Asthma, Autoimmune disease ([facility] [address] CAD (coronary artery disease), Calculus of kidney, Cancer ([facility] [address] Chronic pain, COPD, Headache(784.0), Hypercholesteremia, Liver disease, Psychotic disorder ([facility] [address] PUD (peptic ulcer disease), Seizures ([facility] [address] Stroke ([facility] Thromboembolus ([facility] [address] or Unspecified deficiency anemia. Review of system:Patient intubated and sedated, on Impella. Family History   Problem Relation Age of Onset    Heart Disease Mother     Hypertension Mother     Lupus Sister     Diabetes Brother       Social History     Socioeconomic History    Marital status:    Tobacco Use    Smoking status: Never Smoker    Smokeless tobacco: Never Used   Substance and Sexual Activity    Alcohol use: No    Drug use: No    Sexual activity: Never   Social History Narrative    . Nonsmoker. Disability      PE  Vitals:    01/27/22 0900 01/27/22 1000 01/27/22 1100 01/27/22 1200   BP:  107/65 98/62    Pulse: 80 80 80 80   Resp: 12 14 13 12   Temp:    99.3 °F (37.4 °C)   SpO2: 100% 100% 97% 98%   Weight:       Height:        Body mass index is 37.6 kg/m². General:    Alert, cooperative, no distress. Psychiatric:    Normal Mood and affect    Eye/ENT:      Pupils equal, No asymmetry, Conjunctival pink. Able to hear voice at normal amplitude   Lungs:      Visibly symmetric chest expansion, No palpable tenderness. Clear to auscultation bilaterally.     Heart[de-identified]    Regular rate and rhythm, S1, S2 normal, no murmur, click, rub or gallop. No JVD, Normal palpable peripheral pulses. No cyanosis   Abdomen:     Soft, non-tender. Bowel sounds normal. No masses,  No      organomegaly. Extremities:   Extremities normal, atraumatic, no edema. Neurologic:   CN II-XII grossly intact.  No gross focal deficits           Recent Labs:  Lab Results   Component Value Date/Time    Cholesterol, total 151 01/11/2022 05:13 AM    HDL Cholesterol 60 01/11/2022 05:13 AM    LDL, calculated 75.8 01/11/2022 05:13 AM    Triglyceride 151 (H) 01/24/2022 04:18 AM    CHOL/HDL Ratio 2.5 01/11/2022 05:13 AM     Lab Results   Component Value Date/Time    Creatinine (POC) 2.1 (H) 01/23/2013 04:12 PM    Creatinine 2.34 (H) 01/27/2022 04:19 AM     Lab Results   Component Value Date/Time    BUN 70 (H) 01/27/2022 04:19 AM    BUN (POC) 36 (H) 01/23/2013 04:12 PM     Lab Results   Component Value Date/Time    Potassium 3.9 01/27/2022 10:01 AM     Lab Results   Component Value Date/Time    Hemoglobin A1c 7.7 (H) 01/11/2022 05:13 AM    Hemoglobin A1c, External 9.0 12/18/2015 12:00 AM     Lab Results   Component Value Date/Time    Hemoglobin (POC) 10.9 (L) 01/23/2013 04:12 PM    HGB 7.0 (L) 01/27/2022 10:01 AM     Lab Results   Component Value Date/Time    PLATELET 148 33/86/7793 04:19 AM       Reviewed:  Past Medical History:   Diagnosis Date    Acquired hypothyroidism 8/15/2016    Anemia     RED-HF study    Asthma     Cardiomyopathy, nonischemic ([facility] [address]     initial dx 2001, bivHF 2008 with EF 15%, s/p biV-ICD 9/08, significant improvment in EF to 45-50%    CKD (chronic kidney disease)     Dr Torres Oliver    CKD (chronic kidney disease) 8/15/2012    Depression     Diabetes ([facility] [address]     Diabetic neuropathy ([facility] [address]     DM (diabetes mellitus) ([facility] [address] 8/15/2012    GERD (gastroesophageal reflux disease)     Gout     Hypothyroidism     ICD (implantable cardioverter-defibrillator), biventricular, in situ 6/5/2014    Psoriasis      Social History     Tobacco Use Smoking Status Never Smoker   Smokeless Tobacco Never Used     Social History     Substance and Sexual Activity   Alcohol Use No     Allergies   Allergen Reactions    Ciprofloxacin Anaphylaxis    Shellfish Derived Anaphylaxis    Sildenafil Other (comments)    Ace Inhibitors Unknown (comments)    Biaxin [Clarithromycin] Other (comments)     Metal taste    Candesartan Cough    Pcn [Penicillins] Hives     Family History   Problem Relation Age of Onset    Heart Disease Mother     Hypertension Mother     Lupus Sister     Diabetes Brother         Current Facility-Administered Medications   Medication Dose Route Frequency    potassium chloride 20 mEq in 50 ml IVPB  20 mEq IntraVENous ONCE    insulin NPH (NOVOLIN N, HUMULIN N) injection 20 Units  20 Units SubCUTAneous QHS    digoxin (LANOXIN) tablet 0.0625 mg  0.0625 mg Oral EVERY OTHER DAY    0.9% sodium chloride infusion  10 mL/hr IntraVENous CONTINUOUS    insulin NPH (NOVOLIN N, HUMULIN N) injection 25 Units  25 Units SubCUTAneous DAILY    bumetanide (BUMEX) 0.25 mg/mL infusion  1 mg/hr IntraVENous CONTINUOUS    DOBUTamine (DOBUTREX) 500 mg/250 mL (2,000 mcg/mL) infusion  0-10 mcg/kg/min IntraVENous TITRATE    0.9% sodium chloride infusion 250 mL  250 mL IntraVENous PRN    albumin human 25% (BUMINATE) solution 12.5 g  12.5 g IntraVENous DAILY    milrinone (PRIMACOR) 20 MG/100 ML D5W infusion  0.125 mcg/kg/min IntraVENous CONTINUOUS    vasopressin (VASOSTRICT) 20 Units in 0.9% sodium chloride 100 mL infusion  0-0.1 Units/min IntraVENous TITRATE    PHENYLephrine (NORMA-SYNEPHRINE) 30 mg in 0.9% sodium chloride 250 mL infusion   mcg/min IntraVENous TITRATE    niCARdipine (CARDENE) 25 mg in 0.9% sodium chloride 250 mL infusion  0-15 mg/hr IntraVENous TITRATE    bivalirudin (ANGIOMAX) 250 mg in 0.9% sodium chloride (MBP/ADV) 50 mL MBP  0.02-2.5 mg/kg/hr IntraVENous TITRATE    hydrALAZINE (APRESOLINE) 20 mg/mL injection 5 mg  5 mg IntraVENous Q4H PRN    bumetanide (BUMEX) injection 1 mg  1 mg IntraVENous Q4H PRN    ceFAZolin (ANCEF) 1 g in sterile water (preservative free) 10 mL IV syringe  1 g IntraVENous Q12H    fentaNYL (PF) 1,500 mcg/30 mL (50 mcg/mL) infusion  0-200 mcg/hr IntraVENous TITRATE    heparin (porcine) in 0.9% NaCl 30,000 unit/1,000 mL perfusion irrigation 50-1,000 mL  50-1,000 mL Other PRN    sodium chloride (NS) flush 5-40 mL  5-40 mL IntraVENous Q8H    chlorhexidine (PERIDEX) 0.12 % mouthwash 15 mL  15 mL Oral Q12H    sodium chloride (NS) flush 5-40 mL  5-40 mL IntraVENous Q8H    0.45% sodium chloride infusion  10 mL/hr IntraVENous CONTINUOUS    0.9% sodium chloride infusion  9 mL/hr IntraVENous CONTINUOUS    naloxone (NARCAN) injection 0.4 mg  0.4 mg IntraVENous PRN    ondansetron (ZOFRAN) injection 4 mg  4 mg IntraVENous Q4H PRN    albuterol (PROVENTIL VENTOLIN) nebulizer solution 2.5 mg  2.5 mg Nebulization Q4H PRN    aspirin chewable tablet 81 mg  81 mg Oral DAILY    midazolam (VERSED) injection 1 mg  1 mg IntraVENous Q1H PRN    magnesium oxide (MAG-OX) tablet 400 mg  400 mg Oral BID    calcium chloride 1 g in 0.9% sodium chloride 100 mL IVPB  1 g IntraVENous PRN    bisacodyL (DULCOLAX) suppository 10 mg  10 mg Rectal DAILY PRN    senna-docusate (PERICOLACE) 8.6-50 mg per tablet 1 Tablet  1 Tablet Oral BID    ELECTROLYTE REPLACEMENT NOTE: Nurse to review Serum Potassium and Magnesuim levels and Initiate Electrolyte Replacement Protocol as needed  1 Each Other PRN    magnesium sulfate 1 g/100 ml IVPB (premix or compounded)  1 g IntraVENous PRN    alteplase (CATHFLO) 1 mg in sterile water (preservative free) 1 mL injection  1 mg InterCATHeter PRN    bacitracin 500 unit/gram packet 1 Packet  1 Packet Topical PRN    pantoprazole (PROTONIX) injection 40 mg  40 mg IntraVENous DAILY    And    sodium chloride (NS) flush 10 mL  10 mL IntraVENous DAILY    dexmedeTOMidine in 0.9 % NaCl (PRECEDEX) 400 mcg/100 mL (4 mcg/mL) infusion soln  0.1-1.5 mcg/kg/hr IntraVENous TITRATE    midazolam (VERSED) injection 1 mg  1 mg IntraVENous Q4H PRN    insulin lispro (HUMALOG) injection   SubCUTAneous Q6H    propofol (DIPRIVAN) 10 mg/mL infusion  0-50 mcg/kg/min IntraVENous TITRATE    sodium bicarbonate (8.4%) 25 mEq in dextrose 5% 1,000 mL - impella purge fluid   IntraVENous TITRATE    triamcinolone acetonide (KENALOG) 0.1 % cream   Topical BID    polyethylene glycol (MIRALAX) packet 17 g  17 g Oral DAILY    allopurinoL (ZYLOPRIM) tablet 50 mg  50 mg Oral DAILY    [Held by provider] epoetin isabel-epbx (RETACRIT) injection 20,000 Units  20,000 Units SubCUTAneous Q TUE, THU & SAT    montelukast (SINGULAIR) tablet 10 mg  10 mg Oral DAILY    levothyroxine (SYNTHROID) tablet 100 mcg  100 mcg Oral Once per day on Mon Tue Wed Thu Fri Sat    hydroxypropyl methylcellulose (ISOPTO TEARS) 0.5 % ophthalmic solution 1 Drop  1 Drop Both Eyes PRN    venlafaxine-SR (EFFEXOR-XR) capsule 75 mg  75 mg Oral DAILY WITH BREAKFAST    gabapentin (NEURONTIN) capsule 100 mg  100 mg Oral QHS    arformoteroL (BROVANA) neb solution 15 mcg  15 mcg Nebulization BID RT    And    budesonide (PULMICORT) 500 mcg/2 ml nebulizer suspension  500 mcg Nebulization BID RT    sodium chloride (NS) flush 5-40 mL  5-40 mL IntraVENous Q8H    acetaminophen (TYLENOL) tablet 650 mg  650 mg Oral Q6H PRN    Or    acetaminophen (TYLENOL) suppository 650 mg  650 mg Rectal Q6H PRN    glucose chewable tablet 16 g  4 Tablet Oral PRN    dextrose (D50W) injection syrg 12.5-25 g  25-50 mL IntraVENous PRN    glucagon (GLUCAGEN) injection 1 mg  1 mg IntraMUSCular PRN       Chavez Lu MD01/27/22     ATTENTION:   This medical record was transcribed using an electronic medical records/speech recognition system. Although proofread, it may and can contain electronic, spelling and other errors. Corrections may be executed at a later time.   Please feel free to contact us for any clarifications as needed.     [facility] [place]  [facility], [facility],  [place], [place]. [phone]

## 2022-01-27 NOTE — PROGRESS NOTES
MECHANICAL CIRCULATORY SUPPORT & TRANSPLANT EVALUATION  Advanced Heart Failure [address] [facility] Iliana Damonena   1955            78 y.o.   female   Ethnicity:   Mar[address] status:   [address] [facility] [address]     Phone: [phone]  MRN: [id number]          INTERMACS: 2  Time of GDMT: Inotrope Dependent  Height:     Ht Readings from Last 1 Encounters:   01/26/22 5' 7\" (1.702 m)     Weight:   Wt Readings from Last 1 Encounters:   01/28/22 237 lb 14 oz (107.9 kg)     BMI: 37.26kg/m2  Blood type: AB Positive  Referring MD: Dr. 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, MYH6)  Acute on chronic renal failure, stage IV  GERD  Anemia of chronic disease  Gout  VF s/p ICD shock x 2      Other surgical history:  As above      Medications:  Potassium 20mEq   Digoxin 0.0625mg every other day  Milrinone 0.125mcg/kg/min  Venlafaxine 75mg daily  Hydralazine 5mg Q4H PRN  Bumex 1mg Q4H PRN  Ancef 1g Q12H  Aspirin 81mg daily  Plavix 75mg daily  Magnesium 400mg BID  Pantoprazole 40mg daily  Humalog Q6hrs  Allopurinol 50mg daily  Montelukast 10mg daily  Retacrit 20,000 units Q Tue, Thur, & Sat  Xyzal 5mg daily  Levothyroxine 100mcg 6 days a week  Gabapentin 100mg QHS  Rocephin 1g Q24H  Dobutamine  Nicardipine  Angiomax  Vasopressin Allergies:   Allergies   Allergen Reactions    Ciprofloxacin Anaphylaxis    Shellfish Derived Anaphylaxis    Sildenafil Other (comments)    Ace Inhibitors Unknown (comments)    Biaxin [Clarithromycin] Other (comments)     Metal taste    Candesartan Cough    Pcn [Penicillins] Hives             Cardiovascular imaging:  Echo Limited (1/27/22)  LVEF: 25-30%       LVIDd: 5cm  Left Ventricle: Severely reduced left ventricular systolic function with a visually estimated EF of 25 - 30%. Ef slightly worse than on last echo. Trace pericardial effusion. Impella inflow at 5 cm depth. Echo Complete (12/8/21)  LVEF: 15-20%        LVIDd: 5.09 cm  · LV: Estimated LVEF is 15 - 20%. Normal cavity size. Upper normal wall thickness. Severely reduced systolic function. Left ventricular diastolic dysfunction. · LA: Moderately dilated left atrium. · RV: Borderline low systolic function. Pacer/ICD present. · RA: Mildly dilated right atrium. · MV: Mitral valve non-specific thickening. Mild to moderate mitral valve regurgitation is present. · TV: Mild tricuspid valve regurgitation is present. · PA: Mild to moderate pulmonary hypertension. Pulmonary arterial systolic pressure is 47 mmHg. Cardiac MRI: Not Done    PYP test (1/7/22): IMPRESSION: PYP scan is equivocal for ATTR cardiac amyloidosis based on the H:CL ratio of  1.05 and semiquantitative score of 1. PCI (1/27/22): Findings  1. Normal left and right-sided filling pressures  2. Moderate pulmonary hypertension with mean PA pressure of 38 mmHg and PVR of 4-5 Woods units  3. Normal cardiac output with cardiac index of 3 L/min/m² based on estimated Aye calculation  4. Severe native one-vessel coronary artery disease involving mid LAD 80% stenosis. This was evaluated with IVUS and treated with placement of 2.75 x 12 mm Danny drug-eluting stent which was postdilated with IVUS guidance with 3 mm noncompliant balloon at up to 20 jeferson. There was a more proximal lesion in LAD which was about 50% angiographically and was assessed with IVUS.   This lesion was calcified, eccentric with MLA of 5.9 mm² and hence was deferred  Recommendations  1. Guideline directed medical therapy for heart failure with reduced ejection fraction  2. Plavix based dual antiplatelet therapy. LHC (1/17/22): Left Anterior Descending   Prox LAD lesion, 50% stenosed. Previously placed Mid LAD-1 stent (unknown type) is widely patent. Mid LAD-2 lesion, 80% stenosed. Left Circumflex   Second Obtuse Marginal Branch   2nd Mrg lesion, 60% stenosed. Right Coronary Artery   Mid RCA to Dist RCA lesion, 40% stenosed. NST: Not done    Viability: Not done EKG (1/26/22)  Component Ref Range & Units 1/27/22   Ventricular Rate BPM 80    Atrial Rate BPM 76    QRS Duration ms 140    Q-T Interval ms 454    QTC Calculation (Bezet) ms 523    Calculated R Axis degrees -87    Calculated T Axis degrees 107    Diagnosis  AV sequential or dual chamber electronic pacemaker   Septal infarct , age undetermined   Possible Lateral infarct , age undetermined   Inferior infarct , age undetermined   When compared with ECG of 04-JAN-2022 03:10,   The heart rate has decreased   Confirmed by Raoul Pop ([id number]) on 1/26/2022 5:06:27 PM      RHC (1/17/22):  PA: 83/52/66  RA Mean: 27  PCWP Mean: 45  Aye CI: 1.61  SVR: 2089.13 dsc-5  PVR: 5.84 MAN    CPEST: Not done    6MW: Unable- Impella/ventilator  6MWT    Date:    Pre/Post HR:    Pre/Post SpO2:    Distance (meters): Abdominal ultrasound (1/22/22): IMPRESSION  1. No biliary dilation. 2.  Patent main portal vein. 3.  Splenomegaly. Ankle-Brachial Index (1/22/22):  · No evidence of hemodynamically significant arterial obstruction in either lower extremity. Carotid doppler (1/21/22):  · Limited access to the right neck. The common carotid cannot be evaluated. There is mild, less than 50 percent, stenosis of the right internal carotid artery. · Mild, less than 50 percent, stenosis of the left internal carotid artery.   · Vertebral arteries are patent with antegrade flow. Non-cardiac imaging:  CT chest/abd/pelvis: Pending (re-ordered 1/27/21)    Head CT: Pending (re-ordered 1/27/21)    CXR (1/27/22): FINDINGS: Portable chest shows satisfactory support lines/devices without  significant change since yesterday. There is no apparent pneumothorax. Lungs  show unchanged relatively severe bilateral airspace disease/edema. Heart size is  stable. There is no midline shift. PFT -Unable at this time   Predicted Measured %   FVC      FEV1      FEV1/FVC      DLCO       EGD (1/26/22): Findings:   Esophagus:normal  Stomach: mild linear erythema in the body and antrum s/p biopsies throughout the stomach  Duodenum: normal  Recommendations:  -Await pathology. Pathology: Pending    Colonoscopy (1/26/22): Findings:   1. 2 sessile polyps with greatest diameter of 5 mm in the cecum-one removed with cold snare, the other removed with cold forceps  2. Moderate pan-diverticulosis  Recommendations:   -If adenoma is present, repeat colonoscopy in 5-7 years based on pathology and if benefits outweigh risks. If < 10 years, reason:  above average risk patient    Resume Angiomax gtt in 4 hours  Pathology: Pending    Mammogram (4/30/19): WILL NEED REPEAT OUTPATIENT  IMPRESSION:  BI-RADS 2: Benign. No mammographic evidence of malignancy. No mammographic sign  of malignancy on new baseline screening. Probable deodorant artifact in the  right axilla. Left axillary breast obscured. RECOMMENDATIONS:  Next screening mammogram is recommended in one year. At that time, removal of  all deodorant and skin lotions is advised. GYN/Pap smear: To be done outpatient    Dexa scan: To be done outpatient      Labs:  CBC (1/28/22):  -WBC: 10.9   -HGB: 8.2 (L)  -PLT: 210  CMP (1/28/22):  -Na: 143  -K: 4.0  -Glu: 311 (H)   -BUN: 77 (H)  -Cr: 2.44 (H)  -Ca: 9.7  -TBili: 0.8  -ALT: 8(L)  -AST: 20  -Alk phos: 79  -Albumin: 3.8  ProBNP (1/28/22): 12,338 (H)   ABG  PRINCESS (1/11/22):  Negative  Blood culture (1/19/22): Negative  CK (1/13/22): 49  Cortisol (1/24/22): 18.7  CRP (1/19/22): >9.5 (H)  COVID PCR (1/25/22): Negative  Digoxin level (1/28/22): 1.0  ESR (1/28/22): 5  Fecal Occult: Orders discontinued x2  Ferritin (1/24/22): 1,815 (H)  G6PD (1/19/22): 379  H.pylori Ab (1/19/22): Negative  Heparin Ab (1/19/22): 0.079  Hep C Ab (1/11/22): Nonreactive  HgbA1C (1/11/22): 7.7  HIV 1,2 Ab (1/19/22): Nonreactive  INR (1/19/22): 1.2  Iron sat (1/24/22): 17 (L)  Lactic acid (1/28/22): 0.7  LDH (1/28/22): 446 (H)  Lipid profile (1/11/22): WDL  Lupus anticoagulant (1/19/22): dRVVT 37.9 Magnesium (1/27/22): 2.9 (H)  MRSA Swab (1/19/22): Negative  Plasma Free Hgb (1/22/22): 3.2  Prealbumin (1/19/22): 13.3 (L)  Procalcitonin (1/28/22): 0.62  Protein C (1/19/22): 85  Protein S (1/19/22): 54 (L)  Prothrombin gene mutation (1/19/22): Negative  PSA: N/A  PTH (1/19/22): 133.3 (H)  PTT (1/28/22): 55.1 (H)  RPR (1/19/22): Nonreactive  RASHAD: N/A  Troponin High sensitivity (1/13/22): 49  TSH (1/11/22): 3.08  T3 (1/19/22): 1.1 (L)  T4 (1/11/22): 1.0  Uric acid (1/26/22): 8.2 (H)  Vit D level (1/11/22): 39.7  Gammopathy Profile (1/11/22):  -IgG level: 1,188  -M-spike: Not Observed  -Free Kappa: 87.1 (H)   -Free lambda: 68.8 (H)  -Kappa/lambda: 1.37     Urine Studies:  Cotinine (1/19/22): Negative  PEth (1/19/22): Pending  Microalbumin (1/19/22): Ratio- 263 (H)  Pregnancy Test (1/19/22): Negative  UA (1/25/22): Protein 30(H); Blood- moderate (H)  UDS (1/19/22): Benzodiazepines(!) otherwise WNL  Urine Cx: Not indicated    Transplant Labs:  CMV IgG (1/19/22): >10.00 (H)  EBV IgG (1/19/22): 414.0 (H)  Hep A Ab (1/19/22): Negative  Hep B core Ab - total (1/19/22): Nonreactive  Hep B surface Ag/Ab (1/19/22): Negative/Nonreactive  HSV 1/HSV2 IgG (1/19/22): Negative  HTLV1, 2 IgG (1/19/22): Negative  Mumps IgG (1/19/22): Immune  Quantiferon Gold (1/19/22): Negative  Rubella IgG (1/19/22): Reactive  Rubeola IgG (1/19/22):  Immune  Toxo IgG (1/19/22): Negative  Varicella IgG (1/19/22): Immune PRA   HLA type Class I (1/18/22): 0%  HLA type Class II (1/19/22): 0%              Immunizations:  COVID: 3/15/21 & 4/5/21  Hep A: N/A  Hep B: N/A  Influenza: DUE  Pneumonia: Up to date   · PCV 13: 4/30/19  · PPSV-23: 11/1/21  Td: None on file  Zoster: None on file       Consultations:  Specialty Date: Results:   Advanced Heart Failure 1/10/22                        1/27/22 Update PLAN OF CARE:  · 79 y/o female with new onset severe cardiomyopathy, LVEF 15% (diagnosed 12/8/21) admitted for acute decompensation c/b acute hepatic and acute on chronic renal failure in the setting of massive volume overload of likely > 25 lbs.   · Plant to continue inotrope-assisted diuresis, goal net negative 2-3lbs per day; w/d patient anticipate 7-10 days of hospitalization  · Patient expressed wishes to be full code and would like to be evaluated for dual organ transplant if her heart and renal function does not improve    PLAN OF CARE:   · 79 y/o female with chronic renal failure and new onset severe cardiomyopathy, LVEF 15% (diagnosed 12/8/21), stage D, NYHA class IV; admitted for pulmonary edema and severe volume overloadd by [facility]  · Most likely etiology of acute deterioration of LVEF is chronic volume overload with compensatory tachycardia in the setting of progressive renal failure +/- coronary artery disease (80% LAD)   · Patient requested aggressive medical approach to allow her heart to recover or receive heart/kidney transplantation, before considering hospice  · D/w patient, her family, CT surgery, nephrology and primary cardiologist plan to support her with Impella as bridge to LV recovery or transplant with the following anticipated outcomes:  § 1/3 chance of LV recovery and discharge home on medical therapy or chronic dialysis  § 1/3 chance of LV non-recovery on Impella, evaluation and transfer for listing for heart transplant/kidney  § 1/3 chance of LV non-recovery and evaluation that reveals patient is not candidate for LVAD/heart/kidney transplant and then wean of support to comfort care/hospice  · Impella 5.5 implanted by Dr. Maria Isabel Biswas 1/18 and patient underwent aggressive diuresis with normalization of filling pressures and improvement of LV function to 30-35%, awaiting PCI to LAD tomorrow followed by Impella wean after extubation; appears to have recovered heart function and unlikely will need heart/kidney transplant. · In case of failure of Impella wean, patient's only contraindication to transplant is active infection requiring antibiotic use; otherwise there are no apparent contraindications; [facility] patient on integrillin drip if LV does not recover after revascularization and on dialysis if needed for volume management; anticipated waiting time at BMI 38 and blood type AB for hear/kidney transplant is currently approximately 10 days; d/w Dr. Madai Diggs from [address] [place] [address] 1/18/22 1. Acute on Chronic Systolic CHF class III/IV: Plan for axillary impella insertion. AHFS following. 2. CAD: Card following  3. Acute on Chronic Respiratory insufficiency: On home O2, high risk for prolonged intubation post impella  4. RODNEY on Chronic CKD Stage IV: Renal following  5. Gout: allopurinol  6. DM: Per primary service  7. GERD: Home meds  8. Hypothyroidism: Replacement  9. Depression: Home meds  10. Anemia: Stable     I have discussed with the patient the rationale for blood component transfusion; its benefits in treating or preventing fatigue, organ damage, or death; and its risk which includes mild transfusion reactions, rare risk of blood borne infection, or more serious but rare reactions. I have discussed the alternatives to transfusion, including the risk and consequences of not receiving transfusion. The patient had an opportunity to ask questions and had agreed to proceed with transfusion of blood components.    Gastroenterology 1/24/22 Plan:         · Consider iron supplementation   · On PPI  · Discussed with HF NP, appreciate clearance for procedures, will hold bival drip 2 hours prior  · Tentative plan for bedside EGD/colonoscoy Wednesday at 1:30, discussed risks with  on phone, please obtain consent  · CLD Tuesday, NPO Wednesday  · Prep tomorrow evening      Nutrition 1/11/22 Recommendations/Interventions/Plan:      Consistent CHO 60 gm/meal, 2 gm Na+ diet. Preferences updated.     Allow AM snack of 15-20 gm CHO     Will rescreen per policy   Palliative care 1/10/22  PLAN: 1. Per chart review, patient has been clear about care goals, desire for Full Code,   AMD on chart. 2. Plans ongoing for home milrinone (PICC line being placed today)   3. Introduced palliative medicine services  1. Hear of recent events, patient admits to feeling a bit overwhelmed with all the news of her health issues. 2. She understands from Dr. Beverley Gonzalez that she may have about 6 months to live. She is hopeful that the milrinone can help her. Understands she may need her pacemaker interrogated. 3. She is aware of how dialysis often is not possible when the heart function is very low. She notes that the kidney doctor has been very positive about her improving kidney numbers. 4. Her  has been doing the cooking lately, with her feeling so tired, and he's attentive to salt and how much water she can have, measures portions etc.  5. Acknowledge her expressed desire so far for full code, we explore this a bit. Education provided about how CPR likely wouldn't work for her (because of weak heart and kidneys) . Moshe Carr We are doing all we can to prolong her life and help her feel best possible, but that if heart stops, CPR wouldn't reverse her heart or kidney problems. Explain poor outcomes of code blue. Encourage ongoing discussions, and also with her .   We talk about how it's important for him to understand her mind on where she draws the line/ what is good living to her and what she would/ wouldn't find acceptable QOL. She understands need for these conversations, and how that can help guide him in the future. 6. Patient has good support from family, but is hesitant to have them exposed to hospital germs for much time. (especially her sister, who has lupus)    7. Made plan to check in again tomorrow, patient is worn out form ordeal of getting PICC line today. Dental     As Indicated:     Nephrology 1/5/22 1/28/22 Update ASSESSMENT and PLAN :    CKD stage IV:  -Etiology: DM, HTN, CRS  -UA in the past no proteinuria or hematuria.   -Ordered UA, urine protein to creatinine ratio, renal ultrasound  -Recent decline in GFR could be 2/2 worsening CRS  -She may need inotropic support.  -She has advanced CKD and unlikely to be a candidate for LVAD  -Increase Bumex to 2 mg every 12 hours and will wait for cardiology evaluation to make further changes.  -Daily labs     Acute on chronic HFrEF  NI CMP, LVEF 15 to 20%  NYHA class III-IV  S/p BiV pacer/ICD-s/p CRT  RHC 12/10/2021: PCWP 34, PA P 80  -Cardiology consulted     Morbid obesity  Type II DM  HTN  Hypothyroidism  Pulmonary hypertension  Gout  Psoriasis     Assessment and Plan    RODNEY on CKD :  - Suspect 2/2 CRS  - Cr stable, UOP good on bumex drip  - diuretics per AHF team   - daily labs for now  - no urgent need for RRT at this time     CKD stage IV:  - Etiology: DM, HTN, CRS  - Renal US: suggestive of CKD, B/L benign renal cysts   - baseline Cr 2.4-2.6 mg/dl      Acute on chronic HFrEF  NI CMP, LVEF 15 to 20%  NYHA class III-IV  S/p BiV pacer/ICD-s/p CRT  R axillary Impella implanted 1/18/22  LAD PCI on 1/27/22  Transplant w/u underway - EGD and colon 1/26, pan CT w/o contrast today     Hypervolemia:  - RHC showing severely elevated filling pressures, pulm HTN  - improving overall  - diuretics as above        Cardiology 1/27/22 Assessment/Plan:  1. CAD, LAD 80% stenosis - S/p Impella placement on 01/18/2022, now in CCU.  She had VF 1/19, has appearance of Torsades.  Required ICD shock x 2, both successful. Per F, [facility] patient on integrillin drip if LV does not recover after revascularization and on dialysis if needed for volume management. Pt is high risk for decompensation with high risk cardiac intervention. Her EF is appearing worse on echo today-- EF 25%. In that case we will consider PCI prior to impella removal assuming renal services are OK and hopefully can use <20 cc of contrast for the procedure.      Plan to get consent tomorrow.  to come in for tomorrow for a meeting with ADHF team. Plan to proceed with PCI tomorrow     2. Cardiomyopathy: probable acute component: By my assessment, EF appears to be improving. PCI not urgent for that reason.     3. RODNEY/CKD: Creatinine high and at risk of dialysis if large dye load given. Electrophysiology 1/6/22 1/27/22 Update NICM:  no new left heart cath due to renal failure  She will not do well with dialysis  Recent admission for acute on chronic CHF.  Previously had LVEF normalize with CRT. She has proper biventricular pacing per device check during recent admission, but LVEF now 15-20%.  NYHA class IV.  Severe pulmonary HTN noted on recent RHC. I do not think LV lead reimplant is a solution to improve her LVEF      She failed carvedilol/hydralazine/isordil, but no ACEi/ARB/ARNi due to CKD.    Will start her on milrinone       MRI is not possible with 4194 LV lead (2008).  Unless she has a form of amyloid that can be treated, I do not think MRI will add more information for treatment.  May consider PYP nuc test but usually results come back equivocal.       Medtronic biventricular ICD (gen change 01/14/2015, leads 09/25/2008):  Device check 12/08/2021 showed proper lead & generator function.  Adequate CRT.  Generator longevity estimated 4 mos.  Currently planning generator change for 02/2022.       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 RHC without milrinone (12/10/2021): High wedge pressure (35 mmHg) indicating volume overload, precapillary.  Severe pulmonary HTN. Echo (12/08/2021): LVEF 15-20%, upper normal wall thickness, LV diastolic dysfunction.  Borderline low RVEF. Mod dilated LA, mildly dilated RA.  Mild to mod MR. Damari Grace TR.  Mild to mod PH.       LHC/RHC (01/2015): No significant CAD. Mixed CM: CHF team wants to revascularize before she can be listed for transplant; Dr. Braden Tran today. LVEF improved to 30%, now with better lateral wall motion. Unable to assess NYHA status due to current sedation, intubation.  Was on inotrope, but not currently. Nuclear amyloid scan equivocal.  cMRI not possible with 4194 LV lead (2008).  However, cMRI wouldn't . S/p Impella 1/18/22 as bridge to possible transplant or to improvement   If not responding and not candidate for heart/ renal transplant, then she will be referred to hospice. [facility] transplant has been contacted by Dr Nasir Heredia biventricular ICD (gen change 01/14/2015, leads 09/25/2008): Device check showed proper lead & generator function.     Device is BETTY 1/22/2022.  Replacement is on hold while she is still on Impella and is waiting for transplant eligibility.  Shocks for VF drained battery further.  I have talked with her  already regarding generator change, & he gave consent to proceed whenever appropriate. She has NSVTs and is still biventricular pacing      CKD: Dialyze if needed.      Anemia: EGD and colonoscopy on 01/26/2021, had cecal polyps sent to pathology   Hematology  N/A   Infectious diseases   N/A   Pulmonology   N/A   Endocrinology   N/A   GYN exam   To be done OP   Urology   N/A   Podiatry   To be done OP   Ophthalmology   To be done OP   Sleep medicine  To be done OP   Frailty test   Not done   [facility]   Not done   VAD education  Ongoing   Tx education   Deferred      Psychosocial and financial evaluation:  Date: Pending   : JERRI Hoover   :  Insurance coverage: Medicare  Transplant sites in network:   Recommendations:       Unknown ARTURO Oakes  VAD Coordinator  [facility] [address]  [address] [place] [address], [address]  Phone: [phone]  Fax: [phone]

## 2022-01-27 NOTE — PROGRESS NOTES
Cardiac Cath Lab Procedure Area Arrival Note:    Melba Juares arrived to Cardiac Cath Lab, Procedure Area. Patient identifiers verified with NAME and DATE OF BIRTH. Procedure verified with patient. Consent forms verified. Allergies verified. Patient informed of procedure and plan of care. Questions answered with review. Patient voiced understanding of procedure and plan of care. Patient on cardiac monitor, ventilator, A-line, SPO2 monitor. Patient sedated and  intubated with 7.5 ett, 22 mm at lip, vent settings: A/C 12, , FiO2 40%, PEEP 5.  IV of Bivaliruden  on pump at 2.4 ml/hr, Fentanyl on pump at 2 ml/hr,Precedex on pump at 17 ml/hr, Propofol on pump at 23.8 ml/hr, and Bumex on pump at 4 ml/hr. [place] measuring at 46 cm and Impella 5.5 at 43.5 cm. Patient medicated during procedure with orders obtained and verified by Dr. Ramu Lomax. Refer to patients Cardiac Cath Lab PROCEDURE REPORT for vital signs, assessment, status, and response during procedure, printed at end of case. Printed report on chart or scanned into chart. 1523 TRANSFER - OUT REPORT:    Verbal report given to Desire RN on Melba Juares  being transferred to CCU for routine progression of care       Report consisted of patients Situation, Background, Assessment and   Recommendations(SBAR). Information from the following report(s) SBAR was reviewed with the receiving nurse.     Lines:   PICC Double Lumen 06/75/54 Right;Basilic (Active)   Central Line Being Utilized Yes 01/27/22 1200   Criteria for Appropriate Use Long term IV/antibiotic administration 01/27/22 1200   Site Assessment Clean, dry, & intact 01/27/22 1200   Phlebitis Assessment 0 01/27/22 1200   Infiltration Assessment 0 01/27/22 1200   Arm Circumference (cm) 31 cm 01/26/22 1200   Date of Last Dressing Change 01/21/22 01/27/22 1200   Dressing Status Clean, dry, & intact 01/27/22 1200   Action Taken Open ports on tubing capped 01/27/22 1200 External Catheter Length (cm) 0 centimeters 01/27/22 1200   Dressing Type Disk with Chlorhexadine gluconate (CHG); Transparent;Stabilization/securement device 01/27/22 1200   Hub Color/Line Status Purple; Infusing 01/27/22 1200   Positive Blood Return (Site #1) Yes 01/27/22 1200   Hub Color/Line Status Red; Infusing 01/27/22 1200   Positive Blood Return (Site #2) Yes 01/27/22 1200   Alcohol Cap Used Yes 01/27/22 1200       Double Lumen MAC 01/18/22 Right Internal jugular (Active)   Central Line Being Utilized Yes 01/27/22 1200   Criteria for Appropriate Use Hemodynamically unstable, requiring monitoring lines, vasopressors, or volume resuscitation 01/27/22 1200   Site Assessment Clean, dry, & intact 01/27/22 1200   Infiltration Assessment 0 01/27/22 1200   Affected Extremity/Extremities Color distal to insertion site pink (or appropriate for race); Pulses palpable;Range of motion performed 01/27/22 1200   Date of Last Dressing Change 01/24/22 01/27/22 1200   Dressing Status Clean, dry, & intact 01/27/22 1200   Dressing Type Disk with Chlorhexadine gluconate (CHG); Transparent 01/27/22 1200   Action Taken Open ports on tubing capped 01/27/22 1200   Proximal Hub Color/Line Status White; Infusing 01/27/22 1200   Positive Blood Return (Medial Site) Yes 01/27/22 1200   Distal Hub Color/Line Status Brown; Infusing 01/27/22 1200   Positive Blood Return (Lateral Site) Yes 01/27/22 1200   Alcohol Cap Used Yes 01/27/22 1200       [place] Gu[place] Triple 01/18/22 Right Neck (Active)   Central Line Being Utilized Yes 01/27/22 1200   Criteria for Appropriate Use Hemodynamically unstable, requiring monitoring lines, vasopressors, or volume resuscitation 01/27/22 1200   Erroll Battles Wave Form Appropriate wave forms 01/27/22 1200   [place] Guille Length(cm) 47 centimeters 01/27/22 1200   Site Assessment Clean, dry, & intact 01/27/22 1200   Dressing Status Clean, dry, & intact 01/27/22 1200   Dressing Type Disk with Chlorhexadine gluconate (CHG) 01/27/22 1200   Date of Last Dressing Change 01/24/22 01/27/22 0400   Proximal Line Status Intact and in place 01/27/22 1200   Medial Line Status Intact and in place 01/27/22 1200   Distal Line Status Intact and in place 01/27/22 1200   Treatment Zeroed or re-zeroed 01/27/22 1200       Arterial Line 01/18/22 Left Radial artery (Active)   Site Assessment Clean, dry, & intact 01/27/22 1200   Dressing Status Clean, dry, & intact 01/27/22 1200   Dressing Type Disk with Chlorhexadine gluconate (CHG) 01/27/22 1200   Line Status Intact and in place 01/27/22 1200   Treatment Zeroed or re-zeroed 01/27/22 1200   Affected Extremity/Extremities Color distal to insertion site pink (or appropriate for race) 01/27/22 1200        Opportunity for questions and clarification was provided.       Patient transported with:   Registered Nurse   Respiratory Therapist   Monitor   Ventilator   Impella

## 2022-01-27 NOTE — PROGRESS NOTES
TRANSFER - IN REPORT:    Verbal report received from Yesenia Thomas RN(name) on Rohith Gibbons  being received from CCU(unit) for ordered procedure      Report consisted of patients Situation, Background, Assessment and   Recommendations(SBAR). Information from the following report(s) SBAR was reviewed with the receiving nurse. Opportunity for questions and clarification was provided. Assessment completed upon patients arrival to unit and care assumed.

## 2022-01-27 NOTE — PROGRESS NOTES
[facility]    ICU TEAM Progress Note    Name: Edith Crawford   : 1955   MRN: [id number]   Date: 2022           ICU Assessment   58-year-old female with past medical history significant for moderate pulmonary hypertension on home oxygen therapy, CKD, nonischemic cardiomyopathy who was admitted to the hospital on  due to acute on chronic hypoxemic respiratory failure in light of fluid overload. Had a left heart cath on  which demonstrated single one-vessel moderate disease (mid LAD lesion) which was thought to be not a cause of cardiomyopathy. Subsequently had a right axillary Impella placed by CT surgery for potential double transplant. 1. Cardiogenic Shock  2. Pulmonary hypertension  3. Acute hypoxemic respiratory failure  4. Status post Impella placement  5.  RODNEY on CKD stage IV    Interval events     - getting [facility] today, remains intubated, Impella in situ, on inhaled nitric oxide     - s/p EGD/colo today for Ca workup for possible heart/kidney Tx, remains intubated, Impella in situ, on inhaled nitric oxide    [id number] intubated, Impella in situ, on inhaled nitric oxide         ICU Comprehensive Plan of Care:     Neuro-analgesia/sedation with opioids, propofol and Precedex as needed, continue gabapentin and venlafaxine, other delirium prevention strategies    Cardiac-continue hemodynamic monitoring, EF has improved to about 35 % on most recent echo, Impella at P8, on aspirin, digoxin, wean inhaled nitric oxide as tolerated, follow-up cardiac surgery/advised heart failure recommendations, getting [facility] today    Pulmonary-bilateral infiltrates, continue lung protective mechanical ventilation, continue montelukast    GI- s/p EGD/colo, resume TF when GI oks it, PPI for GI prophylaxis    Renal-CKD-undulating Cr level-currently diuresing-on Bumex drip, follow-up nephrology recommendations, monitor urine output closely, dose meds renally, correct electrolyte derangements as needed, continue allopurinol    Hematology-on systemic Angiomax-monitor for bleeding, continue EPO    ID-on Ancef while Impella in situ, low grade temps  - pancultured, will have a low threshold to broaden abx covarage    Endocrinology-keep glucose less than 180, continue Synthroid    Objective:   Vital Signs:  Visit Vitals  /79 (BP Patient Position: At rest;Supine)   Pulse 79   Temp 99.3 °F (37.4 °C)   Resp 20   Ht 5' 7\" (1.702 m)   Wt 108.9 kg (240 lb 1.3 oz)   SpO2 98%   BMI 37.60 kg/m²    O2 Flow Rate (L/min): 6 l/min (weaned) O2 Device: Endotracheal tube Temp (24hrs), Av.9 °F (37.7 °C), Min:99.3 °F (37.4 °C), Max:100.3 °F (37.9 °C)    CVP (mmHg): 6 mmHg (22 1200)      Intake/Output:     Intake/Output Summary (Last 24 hours) at 2022 1433  Last data filed at 2022 1300  Gross per 24 hour   Intake 2512.31 ml   Output 3600 ml   Net -1087.69 ml       Physical Exam:    General-intubated, sedated  Neuro-pupils reactive, opens eyes to voice, withdraws in all 4  Cardiac-RRR  Lungs-clear anteriorly  Abdomen-soft, nontender, nondistended  Extremities-warm, 1+ edema    LABS AND  DATA: Personally reviewed  Recent Labs     22  1001 22  0419 22  0417 22  0417   WBC  --  7.1  --  7.3   HGB 7.0* 7.1*   < > 7.5*   HCT 27.1* 27.2*   < > 28.5*   PLT  --  195  --  184    < > = values in this interval not displayed. Recent Labs     22  1001 22  0419 22  0208 22  0208 22  2213 22  1331   NA  --  145  --   --   --  145   K 3.9 4.0   < > 3.9   < > 3.5   CL  --  109*  --   --   --  107   CO2  --  31  --   --   --  29   BUN  --  70*  --   --   --  68*   CREA  --  2.34*  --   --   --  2.25*   GLU  --  103*  --   --   --  173*   CA  --  9.7  --   --   --  9.6   MG 2.9*  --   --  2.4   < > 2.4   PHOS  --  2.5*  --   --   --  2.7    < > = values in this interval not displayed.      Recent Labs     22  0419 22  3006 01/26/22  1000 01/26/22  0417   AP 70  --   --  87   TP 7.6  --   --  7.3   ALB 3.7 3.4*   < > 3.2*   GLOB 3.9  --   --  4.1*    < > = values in this interval not displayed. Recent Labs     01/27/22  1007 01/27/22  0419   APTT 56.1* 58.4*      Recent Labs     01/27/22  0456   PHI 7.40   PCO2I 50.4*   PO2I 97   FIO2I 40     No results for input(s): CPK, CKMB, TROIQ, BNPP in the last 72 hours. Hemodynamics:   PAP: PAP Systolic: 66 (28/77/56 7568) CO: CO (l/min): 5.9 l/min (01/27/22 1200)   [place]:   CI: CI (l/min/m2): 2.7 l/min/m2 (01/27/22 1200)   CVP:  CVP (mmHg): 6 mmHg (01/27/22 1200) SVR:       PVR:       Ventilator Settings:  Mode Rate Tidal Volume Pressure FiO2 PEEP   Assist control   460 ml  8 cm H2O 40 % 5 cm H20     Peak airway pressure: 24 cm H2O    Minute ventilation: 5.44 l/min        MEDS: Reviewed    Chest X-Ray:  CXR Results  (Last 48 hours)               01/27/22 0508  XR CHEST PORT Final result    Impression:  No significant change. Narrative:  INDICATION: Heart failure, post-Impella       EXAM: CXR Portable. FINDINGS: Portable chest shows satisfactory support lines/devices without   significant change since yesterday. There is no apparent pneumothorax. Lungs   show unchanged relatively severe bilateral airspace disease/edema. Heart size is   stable. There is no midline shift. 01/26/22 0443  XR CHEST PORT Final result    Impression:  Stable bilateral pulmonary infiltrates. Narrative: Indication: Impella, follow-up abnormal chest x-ray       Comparison 1/25/2022. Portable exam obtained at 435 demonstrates life-support   lines and tubes unchanged in position including Impella device.  There has been   little change in the diffuse bilateral interstitial infiltrates compared to   prior exam.                 NOTE OF PERSONAL INVOLVEMENT IN CARE   This patient has a high probability of imminent, clinically significant deterioration, which requires the highest level of preparedness to intervene urgently. I participated in the decision-making and personally managed or directed the management of the following life and organ supporting interventions that required my frequent assessment to treat or prevent imminent deterioration. I personally spent 40 minutes of critical care time.       Signed By: Arthur Cyr MD     January 27, 2022    '

## 2022-01-27 NOTE — PROGRESS NOTES
Spoke with Mr Benjie King. He will be meeting with [facility] today and team. Would like to discuss [address] [facility] with PCI prior to signing consent. I will update Dr Keegan Chatterjee and we will await family arrival.   Shital, RN aware.

## 2022-01-27 NOTE — PROGRESS NOTES
[facility]   [address] [facility], [id number] [place] [address]  Phone: [phone]   [phone]    [email or web address]     Nephrology Progress Note    Patient Name : Rey Wang      : 1955     MRN : [id number]  Date of Admission : 2022  Date of Servive : 22    CC:  Follow up for ARF       Assessment and Plan   RODNEY on CKD :  - Suspect 2/2 CRS  - Cr stable, UOP good on bumex drip  - diuretics per AHF team   - daily labs  - no urgent need for RRT at this time    CKD stage IV:  - Etiology: DM, HTN, CRS  - Renal US: suggestive of CKD, B/L benign renal cysts   - baseline Cr 2.4-2.6 mg/dl      Acute on chronic HFrEF  NI CMP, LVEF 15 to 20%  NYHA class III-IV  S/p BiV pacer/ICD-s/p CRT  R axillary Impella implanted 22  Transplant w/u underway - EGD and colon   - plans for PCI to LAD tomorrow    Hypervolemia:  - RHC showing severely elevated filling pressures, pulm HTN  - improving overall  - diuretics as above    Morbid obesity  Type II DM  HTN  Hypothyroidism  Pulmonary hypertension  Gout  Psoriasis       Interval History:  Seen and examined. SVO2 80s this am ? Accuracy. Stable hemodynamics otherwise. Good UOP, Cr stable. For LAD stent today. Remains on vent and KAREN- stable oxygenation     Review of Systems: A comprehensive review of systems was negative except for that written in the HPI.     Current Medications:   Current Facility-Administered Medications   Medication Dose Route Frequency    insulin NPH (NOVOLIN N, HUMULIN N) injection 20 Units  20 Units SubCUTAneous QHS    digoxin (LANOXIN) tablet 0.0625 mg  0.0625 mg Oral EVERY OTHER DAY    0.9% sodium chloride infusion  10 mL/hr IntraVENous CONTINUOUS    insulin NPH (NOVOLIN N, HUMULIN N) injection 25 Units  25 Units SubCUTAneous DAILY    bumetanide (BUMEX) 0.25 mg/mL infusion  1 mg/hr IntraVENous CONTINUOUS    DOBUTamine (DOBUTREX) 500 mg/250 mL (2,000 mcg/mL) infusion  0-10 mcg/kg/min IntraVENous TITRATE    0.9% sodium chloride infusion 250 mL  250 mL IntraVENous PRN    albumin human 25% (BUMINATE) solution 12.5 g  12.5 g IntraVENous DAILY    milrinone (PRIMACOR) 20 MG/100 ML D5W infusion  0.125 mcg/kg/min IntraVENous CONTINUOUS    vasopressin (VASOSTRICT) 20 Units in 0.9% sodium chloride 100 mL infusion  0-0.1 Units/min IntraVENous TITRATE    PHENYLephrine (NORMA-SYNEPHRINE) 30 mg in 0.9% sodium chloride 250 mL infusion   mcg/min IntraVENous TITRATE    niCARdipine (CARDENE) 25 mg in 0.9% sodium chloride 250 mL infusion  0-15 mg/hr IntraVENous TITRATE    bivalirudin (ANGIOMAX) 250 mg in 0.9% sodium chloride (MBP/ADV) 50 mL MBP  0.02-2.5 mg/kg/hr IntraVENous TITRATE    hydrALAZINE (APRESOLINE) 20 mg/mL injection 5 mg  5 mg IntraVENous Q4H PRN    bumetanide (BUMEX) injection 1 mg  1 mg IntraVENous Q4H PRN    ceFAZolin (ANCEF) 1 g in sterile water (preservative free) 10 mL IV syringe  1 g IntraVENous Q12H    fentaNYL (PF) 1,500 mcg/30 mL (50 mcg/mL) infusion  0-200 mcg/hr IntraVENous TITRATE    heparin (porcine) in 0.9% NaCl 30,000 unit/1,000 mL perfusion irrigation 50-1,000 mL  50-1,000 mL Other PRN    sodium chloride (NS) flush 5-40 mL  5-40 mL IntraVENous Q8H    chlorhexidine (PERIDEX) 0.12 % mouthwash 15 mL  15 mL Oral Q12H    sodium chloride (NS) flush 5-40 mL  5-40 mL IntraVENous Q8H    0.45% sodium chloride infusion  10 mL/hr IntraVENous CONTINUOUS    0.9% sodium chloride infusion  9 mL/hr IntraVENous CONTINUOUS    naloxone (NARCAN) injection 0.4 mg  0.4 mg IntraVENous PRN    ondansetron (ZOFRAN) injection 4 mg  4 mg IntraVENous Q4H PRN    albuterol (PROVENTIL VENTOLIN) nebulizer solution 2.5 mg  2.5 mg Nebulization Q4H PRN    aspirin chewable tablet 81 mg  81 mg Oral DAILY    midazolam (VERSED) injection 1 mg  1 mg IntraVENous Q1H PRN    magnesium oxide (MAG-OX) tablet 400 mg  400 mg Oral BID    calcium chloride 1 g in 0.9% sodium chloride 100 mL IVPB 1 g IntraVENous PRN    bisacodyL (DULCOLAX) suppository 10 mg  10 mg Rectal DAILY PRN    senna-docusate (PERICOLACE) 8.6-50 mg per tablet 1 Tablet  1 Tablet Oral BID    ELECTROLYTE REPLACEMENT NOTE: Nurse to review Serum Potassium and Magnesuim levels and Initiate Electrolyte Replacement Protocol as needed  1 Each Other PRN    magnesium sulfate 1 g/100 ml IVPB (premix or compounded)  1 g IntraVENous PRN    alteplase (CATHFLO) 1 mg in sterile water (preservative free) 1 mL injection  1 mg InterCATHeter PRN    bacitracin 500 unit/gram packet 1 Packet  1 Packet Topical PRN    pantoprazole (PROTONIX) injection 40 mg  40 mg IntraVENous DAILY    And    sodium chloride (NS) flush 10 mL  10 mL IntraVENous DAILY    dexmedeTOMidine in 0.9 % NaCl (PRECEDEX) 400 mcg/100 mL (4 mcg/mL) infusion soln  0.1-1.5 mcg/kg/hr IntraVENous TITRATE    midazolam (VERSED) injection 1 mg  1 mg IntraVENous Q4H PRN    insulin lispro (HUMALOG) injection   SubCUTAneous Q6H    propofol (DIPRIVAN) 10 mg/mL infusion  0-50 mcg/kg/min IntraVENous TITRATE    sodium bicarbonate (8.4%) 25 mEq in dextrose 5% 1,000 mL - impella purge fluid   IntraVENous TITRATE    triamcinolone acetonide (KENALOG) 0.1 % cream   Topical BID    polyethylene glycol (MIRALAX) packet 17 g  17 g Oral DAILY    allopurinoL (ZYLOPRIM) tablet 50 mg  50 mg Oral DAILY    [Held by provider] epoetin isabel-epbx (RETACRIT) injection 20,000 Units  20,000 Units SubCUTAneous Q TUE, THU & SAT    montelukast (SINGULAIR) tablet 10 mg  10 mg Oral DAILY    levothyroxine (SYNTHROID) tablet 100 mcg  100 mcg Oral Once per day on Mon Tue Wed Thu Fri Sat    hydroxypropyl methylcellulose (ISOPTO TEARS) 0.5 % ophthalmic solution 1 Drop  1 Drop Both Eyes PRN    venlafaxine-SR (EFFEXOR-XR) capsule 75 mg  75 mg Oral DAILY WITH BREAKFAST    gabapentin (NEURONTIN) capsule 100 mg  100 mg Oral QHS    arformoteroL (BROVANA) neb solution 15 mcg  15 mcg Nebulization BID RT    And    budesonide (PULMICORT) 500 mcg/2 ml nebulizer suspension  500 mcg Nebulization BID RT    sodium chloride (NS) flush 5-40 mL  5-40 mL IntraVENous Q8H    acetaminophen (TYLENOL) tablet 650 mg  650 mg Oral Q6H PRN    Or    acetaminophen (TYLENOL) suppository 650 mg  650 mg Rectal Q6H PRN    glucose chewable tablet 16 g  4 Tablet Oral PRN    dextrose (D50W) injection syrg 12.5-25 g  25-50 mL IntraVENous PRN    glucagon (GLUCAGEN) injection 1 mg  1 mg IntraMUSCular PRN      Allergies   Allergen Reactions    Ciprofloxacin Anaphylaxis    Shellfish Derived Anaphylaxis    Sildenafil Other (comments)    Ace Inhibitors Unknown (comments)    Biaxin [Clarithromycin] Other (comments)     Metal taste    Candesartan Cough    Pcn [Penicillins] Hives       Objective:  Vitals:    Vitals:    01/27/22 0400 01/27/22 0419 01/27/22 0500 01/27/22 0600   BP: 112/68  (!) 90/57 110/69   Pulse: 80 80 80 80   Resp: 12 12 12 12   Temp: 99.7 °F (37.6 °C)      SpO2: 100% 100% 100% 100%   Weight: 108.9 kg (240 lb 1.3 oz)      Height:         Intake and Output:  01/26 1901 - 01/27 0700  In: 1171 [I.V.:1071]  Out: 1905 [Urine:1905]  01/25 0701 - 01/26 1900  In: 6467.5 [I.V.:2667.5]  Out: 7970 [Urine:5970]    Physical Examination:    General: Sedated on the vent  HEENT:           ETT in place   Neck:  Supple, no mass  Resp:  Reduced bibasilar  Breath sounds  CV:  RRR, trace LE edema  GI:  Soft, NT, + BS, obese  Neurologic:  Sedated  :                  Henley in place    [x]    High complexity decision making was performed  []    Patient is at high-risk of decompensation with multiple organ involvement    Lab Data Personally Reviewed: I have reviewed all the pertinent labs, microbiology data and radiology studies during assessment.     Recent Labs     01/27/22  0419 01/27/22  2302 01/26/22  2213 01/26/22  1331 01/26/22  1000 01/26/22  2061 01/26/22  2333 01/26/22  1445 01/26/22  7573 01/26/22  9770 01/25/22  2155 01/25/22  1833 01/25/22 0904 01/25/22 0416     --   --  145 143  --   --  143  --   --   --  144   < > 142   K 4.0 3.9 3.9 3.5 3.6  --    < > 4.1   < > 3.8   < > 4.1   < > 4.2   *  --   --  107 107  --   --  108  --   --   --  109*   < > 110*   CO2 31  --   --  29 30  --   --  30  --   --   --  29   < > 28   *  --   --  173* 164*  --   --  159*  --   --   --  196*   < > 230*   BUN 70*  --   --  68* 69*  --   --  68*  --   --   --  78*   < > 76*   CREA 2.34*  --   --  2.25* 2.25*  --   --  2.24*  --   --   --  2.44*   < > 2.37*   CA 9.7  --   --  9.6 9.8  --   --  9.6  --   --   --  9.8   < > 9.6   MG  --  2.4 2.3 2.4  --  2.7*  --   --   --  2.5*   < > 2.4   < > 2.5*   PHOS 2.5*  --   --  2.7 2.6 2.5*  --   --   --   --   --  3.1   < > 2.4*   ALB 3.7  --   --  3.4* 3.3*  --   --  3.2*  --   --   --  3.4*   < > 3.4*   ALT 8*  --   --   --   --   --   --  8*  --   --   --   --   --  6*    < > = values in this interval not displayed. Recent Labs     01/27/22 0419 01/26/22 0417 01/25/22 2155 01/25/22 0416   WBC 7.1 7.3  --  8.2   HGB 7.1* 7.5* 7.7* 7.6*   HCT 27.2* 28.5* 29.4* 29.2*    184  --  174     Lab Results   Component Value Date/Time    Specimen Description: URINE 10/22/2013 01:32 PM    Specimen Description: URINE 01/23/2013 04:40 PM    Specimen Description: LEG TISSUE 02/12/2009 12:00 AM    Specimen Description: LEG (LEFT) 01/29/2009 03:06 AM     Lab Results   Component Value Date/Time    Culture result: NO GROWTH 2 DAYS 01/25/2022 12:36 PM    Culture result: MODERATE SENSITIVITY TO FOLLOW 01/25/2022 12:36 PM    Culture result: MODERATE NORMAL RESPIRATORY ROSANNA 01/25/2022 12:36 PM    Culture result: MRSA NOT PRESENT 01/19/2022 12:41 PM    Culture result:  01/19/2022 12:41 PM     Screening of patient nares for MRSA is for surveillance purposes and, if positive, to facilitate isolation considerations in high risk settings.  It is not intended for automatic decolonization interventions per se as regimens are not sufficiently effective to warrant routine use.     Culture result: NO GROWTH 5 DAYS 01/19/2022 12:41 PM     Recent Results (from the past 24 hour(s))   RENAL FUNCTION PANEL    Collection Time: 01/26/22 10:00 AM   Result Value Ref Range    Sodium 143 136 - 145 mmol/L    Potassium 3.6 3.5 - 5.1 mmol/L    Chloride 107 97 - 108 mmol/L    CO2 30 21 - 32 mmol/L    Anion gap 6 5 - 15 mmol/L    Glucose 164 (H) 65 - 100 mg/dL    BUN 69 (H) 6 - 20 MG/DL    Creatinine 2.25 (H) 0.55 - 1.02 MG/DL    BUN/Creatinine ratio 31 (H) 12 - 20      GFR est AA 26 (L) >60 ml/min/1.73m2    GFR est non-AA 22 (L) >60 ml/min/1.73m2    Calcium 9.8 8.5 - 10.1 MG/DL    Phosphorus 2.6 2.6 - 4.7 MG/DL    Albumin 3.3 (L) 3.5 - 5.0 g/dL   URIC ACID    Collection Time: 01/26/22 10:00 AM   Result Value Ref Range    Uric acid 8.2 (H) 2.6 - 6.0 MG/DL   EKG, 12 LEAD, INITIAL    Collection Time: 01/26/22 10:59 AM   Result Value Ref Range    Ventricular Rate 80 BPM    Atrial Rate 76 BPM    QRS Duration 140 ms    Q-T Interval 454 ms    QTC Calculation (Bezet) 523 ms    Calculated R Axis -87 degrees    Calculated T Axis 107 degrees    Diagnosis       AV sequential or dual chamber electronic pacemaker  Septal infarct , age undetermined  Possible Lateral infarct , age undetermined  Inferior infarct , age undetermined  When compared with ECG of 04-JAN-2022 03:10,  The heart rate has decreased  Confirmed by Lisa Sprague [id number]) on 1/26/2022 5:06:27 PM     GLUCOSE, POC    Collection Time: 01/26/22 11:06 AM   Result Value Ref Range    Glucose (POC) 167 (H) 65 - 117 mg/dL    Performed by Rosemarie Godfrey    RENAL FUNCTION PANEL    Collection Time: 01/26/22  1:31 PM   Result Value Ref Range    Sodium 145 136 - 145 mmol/L    Potassium 3.5 3.5 - 5.1 mmol/L    Chloride 107 97 - 108 mmol/L    CO2 29 21 - 32 mmol/L    Anion gap 9 5 - 15 mmol/L    Glucose 173 (H) 65 - 100 mg/dL    BUN 68 (H) 6 - 20 MG/DL    Creatinine 2.25 (H) 0.55 - 1.02 MG/DL BUN/Creatinine ratio 30 (H) 12 - 20      GFR est AA 26 (L) >60 ml/min/1.73m2    GFR est non-AA 22 (L) >60 ml/min/1.73m2    Calcium 9.6 8.5 - 10.1 MG/DL    Phosphorus 2.7 2.6 - 4.7 MG/DL    Albumin 3.4 (L) 3.5 - 5.0 g/dL   PTT    Collection Time: 01/26/22  1:31 PM   Result Value Ref Range    aPTT 30.7 22.1 - 31.0 sec    aPTT, therapeutic range     58.0 - 77.0 SECS   MAGNESIUM    Collection Time: 01/26/22  1:31 PM   Result Value Ref Range    Magnesium 2.4 1.6 - 2.4 mg/dL   ECHO ADULT FOLLOW-UP OR LIMITED    Collection Time: 01/26/22  2:53 PM   Result Value Ref Range    IVSd 0.8 0.6 - 0.9 cm    LVIDd 5.0 3.9 - 5.3 cm    LVIDs 4.2 cm    LVPWd 0.9 0.6 - 0.9 cm    Fractional Shortening 2D 16 28 - 44 %    LVIDd Index 2.29 cm/m2    LVIDs Index 1.93 cm/m2    LV RWT Ratio 0.36     LV Mass 2D 146.8 67 - 162 g    LV Mass 2D Index 67.4 43 - 95 g/m2   PTT    Collection Time: 01/26/22  4:09 PM   Result Value Ref Range    aPTT 50.2 (H) 22.1 - 31.0 sec    aPTT, therapeutic range     58.0 - 77.0 SECS   GLUCOSE, POC    Collection Time: 01/26/22  5:24 PM   Result Value Ref Range    Glucose (POC) 188 (H) 65 - 117 mg/dL    Performed by Victor Manuel Jara    PTT    Collection Time: 01/26/22  6:09 PM   Result Value Ref Range    aPTT 54.0 (H) 22.1 - 31.0 sec    aPTT, therapeutic range     58.0 - 77.0 SECS   POTASSIUM    Collection Time: 01/26/22 10:13 PM   Result Value Ref Range    Potassium 3.9 3.5 - 5.1 mmol/L   MAGNESIUM    Collection Time: 01/26/22 10:13 PM   Result Value Ref Range    Magnesium 2.3 1.6 - 2.4 mg/dL   GLUCOSE, POC    Collection Time: 01/26/22 11:05 PM   Result Value Ref Range    Glucose (POC) 145 (H) 65 - 117 mg/dL    Performed by Mame Patel    POTASSIUM    Collection Time: 01/27/22  2:08 AM   Result Value Ref Range    Potassium 3.9 3.5 - 5.1 mmol/L   MAGNESIUM    Collection Time: 01/27/22  2:08 AM   Result Value Ref Range    Magnesium 2.4 1.6 - 2.4 mg/dL   NT-PRO BNP    Collection Time: 01/27/22  4:19 AM   Result Value Ref Range    NT pro-BNP 15,317 (H) <791 PG/ML   METABOLIC PANEL, COMPREHENSIVE    Collection Time: 01/27/22  4:19 AM   Result Value Ref Range    Sodium 145 136 - 145 mmol/L    Potassium 4.0 3.5 - 5.1 mmol/L    Chloride 109 (H) 97 - 108 mmol/L    CO2 31 21 - 32 mmol/L    Anion gap 5 5 - 15 mmol/L    Glucose 103 (H) 65 - 100 mg/dL    BUN 70 (H) 6 - 20 MG/DL    Creatinine 2.34 (H) 0.55 - 1.02 MG/DL    BUN/Creatinine ratio 30 (H) 12 - 20      GFR est AA 25 (L) >60 ml/min/1.73m2    GFR est non-AA 21 (L) >60 ml/min/1.73m2    Calcium 9.7 8.5 - 10.1 MG/DL    Bilirubin, total 0.4 0.2 - 1.0 MG/DL    ALT (SGPT) 8 (L) 12 - 78 U/L    AST (SGOT) 14 (L) 15 - 37 U/L    Alk.  phosphatase 70 45 - 117 U/L    Protein, total 7.6 6.4 - 8.2 g/dL    Albumin 3.7 3.5 - 5.0 g/dL    Globulin 3.9 2.0 - 4.0 g/dL    A-G Ratio 0.9 (L) 1.1 - 2.2     DIGOXIN    Collection Time: 01/27/22  4:19 AM   Result Value Ref Range    Digoxin level 1.0 0.90 - 2.00 NG/ML   LACTIC ACID    Collection Time: 01/27/22  4:19 AM   Result Value Ref Range    Lactic acid 0.5 0.4 - 2.0 MMOL/L   PROCALCITONIN    Collection Time: 01/27/22  4:19 AM   Result Value Ref Range    Procalcitonin 0.52 ng/mL   LD    Collection Time: 01/27/22  4:19 AM   Result Value Ref Range     (H) 81 - 246 U/L   PTT    Collection Time: 01/27/22  4:19 AM   Result Value Ref Range    aPTT 58.4 (H) 22.1 - 31.0 sec    aPTT, therapeutic range     58.0 - 77.0 SECS   CBC W/O DIFF    Collection Time: 01/27/22  4:19 AM   Result Value Ref Range    WBC 7.1 3.6 - 11.0 K/uL    RBC 2.95 (L) 3.80 - 5.20 M/uL    HGB 7.1 (L) 11.5 - 16.0 g/dL    HCT 27.2 (L) 35.0 - 47.0 %    MCV 92.2 80.0 - 99.0 FL    MCH 24.1 (L) 26.0 - 34.0 PG    MCHC 26.1 (L) 30.0 - 36.5 g/dL    RDW 20.0 (H) 11.5 - 14.5 %    PLATELET 152 839 - 102 K/uL    MPV 9.8 8.9 - 12.9 FL    NRBC 0.3 (H) 0  WBC    ABSOLUTE NRBC 0.02 (H) 0.00 - 0.01 K/uL   PHOSPHORUS    Collection Time: 01/27/22  4:19 AM   Result Value Ref Range    Phosphorus 2.5 (L) 2.6 - 4.7 MG/DL   POC G3 - PUL    Collection Time: 01/27/22  4:56 AM   Result Value Ref Range    FIO2 (POC) 40 %    pH (POC) 7.40 7.35 - 7.45      pCO2 (POC) 50.4 (H) 35.0 - 45.0 MMHG    pO2 (POC) 97 80 - 100 MMHG    HCO3 (POC) 31.0 (H) 22 - 26 MMOL/L    sO2 (POC) 97.3 (H) 92 - 97 %    Base excess (POC) 5.4 mmol/L    Site DRAWN FROM ARTERIAL LINE      Device: ADULT VENT      Mode ASSIST CONTROL      Tidal volume 460 ml    Set Rate 12 bpm    PEEP/CPAP (POC) 5 cmH2O    Allens test (POC) NOT APPLICABLE      Specimen type (POC) ARTERIAL     CARBOXYHEMOGLOBIN    Collection Time: 01/27/22  5:03 AM   Result Value Ref Range    Carboxy-Hgb 1.9 1 - 2 %    Methemoglobin 0.4 0 - 1.4 %    tHb 5.2 (LL) 14 - 17 g/dL    Oxyhemoglobin 83.5 (L) 94 - 97 %    O2 SATURATION 86 (L) 95 - 99 %    SITE SG      Sample source MIXED VENOUS      Critical value read back Called to Analy Schneider RN on 01/27/2022 at 05:12            I have reviewed the flowsheets. Chart and Pertinent Notes have been reviewed. No change in PMH ,family and social history from Consult note.       Emelyn Hernández [facility]

## 2022-01-27 NOTE — PROGRESS NOTES
Dr. Ellis Closs. [phone]            Interventional cardiology Consult/Progress Note      Requesting/referring provider: Janette Cornejo MD     Reason for Consult: possible PCI of 80% LAD lesion    Interim: Off Nitric, PA pressures higher. Opens eyes to verbal stimulus. Assessment/Plan:  1. CAD, LAD 80% stenosis - S/p Impella placement on 01/18/2022, now in CCU.  She had VF 1/19, has appearance of Torsades.  Required ICD shock x 2, both successful. Per AHF, [facility] will accept patient on integrillin drip if LV does not recover after revascularization and on dialysis if needed for volume management. Pt is high risk for decompensation with high risk cardiac intervention. Her EF is appearing worse on echo today-- EF 25%. Underwent PCI to mid LAD today with placement of 2.75 x 12 mm drug-eluting stent. Contrast was used sparingly with less than 20 cc of usage. IVUS was utilized. Patient tolerated the procedure well. Right heart catheterization performed at the time of procedure also demonstrated adequate cardiac output with index of 3.1 L/min/m². Additionally her right and left-sided filling pressures were completely normal suggesting that she is not fluid overloaded. She should be on Plavix with dual antiplatelet therapy. Discussed the results with patient's spouse. 2. Cardiomyopathy: probable acute component: By my assessment, EF appeared better on the echocardiogram on 24 January but appears slightly worse on the echocardiogram on 27th. 3. RODNEY/CKD: Creatinine high and at risk of dialysis however only small amount of dye(20 cc) used. We will watch carefully for any RODNEY. Overall his renal function has improved over the past 3 to 4 days. .      [x]    High complexity decision making was performed  [x]    Patient is at high-risk of decompensation with multiple organ involvement  [x]    Complex/difficult social determinants of health outcomes    Investigations personally reviewed and interpreted  Tele- AV pacing    Echo 1/20/22-    Left Ventricle: Limited study for impella position. Left ventricle is severely dilated. Severe global hypokinesis present. The EF by visual approximation is 20 - 25%. Impella catheter is present. 5.9 cm from AV.   Right Ventricle: Right ventricle is moderately dilated.     Left and right heart cath 1/17/22-  1. Severely elevated left and right-sided filling pressures  2. Severe pulmonary hypertension: Mixed pattern with elevated wedge as well as PVR of about 6 Woods units. 3.  Severe one-vessel, moderate one-vessel coronary artery disease. Mid LAD lesion is the worst which is about 80% involving diagonal branch ostium. Uncertain if CAD alone as a cause of her cardiomyopathy(probably less likely). Likely prior stent in mid LAD which is patent  4.  Reduced cardiac index of 1.65 L/min/m² by thermodilution and 1.6 L/min/m² by presumed O2 consumption by Aye     Access right radial, right internal jugular ultrasound-guided no issues  Contrast 15 cc. Limited pictures were taken due to underlying CKD     Recommendations  1. Guideline directed medical therapy for heart failure and coronary artery disease  2. Will defer to discussion between Dr. Nelida Sanchez, nephrology and heart failure if they like to consider revascularization. Uncertain if LAD revascularization will lead to improvement in functional status or EF at this time. Nonetheless can be achieved with less than 20 to 30 cc of contrast.      Investigations reviewed    HPI: Claire James, a 78y.o. year-old who is seen for evaluation of HFrEF and CAD with 80% stenosis of LAD. She is an [place] American female with a history of NICM, chronic hypoxic respiratory failure secondary to pulmonary HTN, hypothyroidism, CKD, GERD, and DM II who presented to [facility] as a transfer from another facility for acute on chronic hypoxic respiratory failure.  Upon arrival to the ED she was found to have O2 sats in the 70s, requiring 4L NC O2.  Rapid covid test was negative.  ProNT-BNP was 09524, K+5.2, BUN/CR x/2.54 and elevated d-dimer. Chest xray showing pulmonary edema vs. Atypical pneumonia. VQ scan showed low probability for PE. Per Dr. Maddie Feliz, LVEF 15-20% during recent admission.  LVEF previously normalized with CRT.  NYHA III-IV.  No ACEi/ARB due to renal dysfunction.  GDMT dosing limited by low BP. She  has a past medical history of Acquired hypothyroidism (8/15/2016), Anemia, Asthma, Cardiomyopathy, nonischemic ([address] CKD (chronic kidney disease), CKD (chronic kidney disease) (8/15/2012), Depression, Diabetes ([address] Diabetic neuropathy ([address] DM (diabetes mellitus) ([address] (8/15/2012), GERD (gastroesophageal reflux disease), Gout, Hypothyroidism, ICD (implantable cardioverter-defibrillator), biventricular, in situ (6/5/2014), and Psoriasis. She has no past medical history of Abuse, Adult physical abuse, Arrhythmia, Arthritis, Asthma, Autoimmune disease ([address] CAD (coronary artery disease), Calculus of kidney, Cancer ([address] Chronic pain, COPD, Headache(784.0), Hypercholesteremia, Liver disease, Psychotic disorder ([address] PUD (peptic ulcer disease), Seizures ([address] Stroke ([address] Thromboembolus ([address] or Unspecified deficiency anemia. Review of system:Patient intubated and sedated, on Impella. Family History   Problem Relation Age of Onset    Heart Disease Mother     Hypertension Mother     Lupus Sister     Diabetes Brother       Social History     Socioeconomic History    Marital status:    Tobacco Use    Smoking status: Never Smoker    Smokeless tobacco: Never Used   Substance and Sexual Activity    Alcohol use: No    Drug use: No    Sexual activity: Never   Social History Narrative    . Nonsmoker.  Disability      PE  Vitals:    01/27/22 0900 01/27/22 1000 01/27/22 1100 01/27/22 1200   BP:  107/65 98/62    Pulse: 80 80 80 80 Resp: 12 14 13 12   Temp:    99.3 °F (37.4 °C)   SpO2: 100% 100% 97% 98%   Weight:       Height:        Body mass index is 37.6 kg/m². General:    Alert, cooperative, no distress. Psychiatric:    Normal Mood and affect    Eye/ENT:      Pupils equal, No asymmetry, Conjunctival pink. Able to hear voice at normal amplitude   Lungs:      Visibly symmetric chest expansion, No palpable tenderness. Clear to auscultation bilaterally. Heart[de-identified]    Regular rate and rhythm, S1, S2 normal, no murmur, click, rub or gallop. No JVD, Normal palpable peripheral pulses. No cyanosis   Abdomen:     Soft, non-tender. Bowel sounds normal. No masses,  No      organomegaly. Extremities:   Extremities normal, atraumatic, no edema. Neurologic:   CN II-XII grossly intact.  No gross focal deficits           Recent Labs:  Lab Results   Component Value Date/Time    Cholesterol, total 151 01/11/2022 05:13 AM    HDL Cholesterol 60 01/11/2022 05:13 AM    LDL, calculated 75.8 01/11/2022 05:13 AM    Triglyceride 151 (H) 01/24/2022 04:18 AM    CHOL/HDL Ratio 2.5 01/11/2022 05:13 AM     Lab Results   Component Value Date/Time    Creatinine (POC) 2.1 (H) 01/23/2013 04:12 PM    Creatinine 2.34 (H) 01/27/2022 04:19 AM     Lab Results   Component Value Date/Time    BUN 70 (H) 01/27/2022 04:19 AM    BUN (POC) 36 (H) 01/23/2013 04:12 PM     Lab Results   Component Value Date/Time    Potassium 3.9 01/27/2022 10:01 AM     Lab Results   Component Value Date/Time    Hemoglobin A1c 7.7 (H) 01/11/2022 05:13 AM    Hemoglobin A1c, External 9.0 12/18/2015 12:00 AM     Lab Results   Component Value Date/Time    Hemoglobin (POC) 10.9 (L) 01/23/2013 04:12 PM    HGB 7.0 (L) 01/27/2022 10:01 AM     Lab Results   Component Value Date/Time    PLATELET 699 48/93/2143 04:19 AM       Reviewed:  Past Medical History:   Diagnosis Date    Acquired hypothyroidism 8/15/2016    Anemia     RED-HF study    Asthma     Cardiomyopathy, nonischemic ([address]     initial dx 2001, bivHF 2008 with EF 15%, s/p biV-ICD 9/08, significant improvment in EF to 45-50%    CKD (chronic kidney disease)     Dr Franca Solis    CKD (chronic kidney disease) 8/15/2012    Depression     [facility]     Diabetic neuropathy ([facility]     DM (diabetes mellitus) ([facility] [facility] 8/15/2012    GERD (gastroesophageal reflux disease)     Gout     Hypothyroidism     ICD (implantable cardioverter-defibrillator), biventricular, in situ 6/5/2014    Psoriasis      Social History     Tobacco Use   Smoking Status Never Smoker   Smokeless Tobacco Never Used     Social History     Substance and Sexual Activity   Alcohol Use No     Allergies   Allergen Reactions    Ciprofloxacin Anaphylaxis    Shellfish Derived Anaphylaxis    Sildenafil Other (comments)    Ace Inhibitors Unknown (comments)    Biaxin [Clarithromycin] Other (comments)     Metal taste    Candesartan Cough    Pcn [Penicillins] Hives     Family History   Problem Relation Age of Onset    Heart Disease Mother     Hypertension Mother     Lupus Sister     Diabetes Brother         Current Facility-Administered Medications   Medication Dose Route Frequency    insulin NPH (NOVOLIN N, HUMULIN N) injection 20 Units  20 Units SubCUTAneous QHS    digoxin (LANOXIN) tablet 0.0625 mg  0.0625 mg Oral EVERY OTHER DAY    0.9% sodium chloride infusion  10 mL/hr IntraVENous CONTINUOUS    insulin NPH (NOVOLIN N, HUMULIN N) injection 25 Units  25 Units SubCUTAneous DAILY    bumetanide (BUMEX) 0.25 mg/mL infusion  1 mg/hr IntraVENous CONTINUOUS    DOBUTamine (DOBUTREX) 500 mg/250 mL (2,000 mcg/mL) infusion  0-10 mcg/kg/min IntraVENous TITRATE    0.9% sodium chloride infusion 250 mL  250 mL IntraVENous PRN    albumin human 25% (BUMINATE) solution 12.5 g  12.5 g IntraVENous DAILY    milrinone (PRIMACOR) 20 MG/100 ML D5W infusion  0.125 mcg/kg/min IntraVENous CONTINUOUS    vasopressin (VASOSTRICT) 20 Units in 0.9% sodium chloride 100 mL infusion  0-0.1 Units/min IntraVENous TITRATE    PHENYLephrine (NORMA-SYNEPHRINE) 30 mg in 0.9% sodium chloride 250 mL infusion   mcg/min IntraVENous TITRATE    niCARdipine (CARDENE) 25 mg in 0.9% sodium chloride 250 mL infusion  0-15 mg/hr IntraVENous TITRATE    bivalirudin (ANGIOMAX) 250 mg in 0.9% sodium chloride (MBP/ADV) 50 mL MBP  0.02-2.5 mg/kg/hr IntraVENous TITRATE    hydrALAZINE (APRESOLINE) 20 mg/mL injection 5 mg  5 mg IntraVENous Q4H PRN    bumetanide (BUMEX) injection 1 mg  1 mg IntraVENous Q4H PRN    ceFAZolin (ANCEF) 1 g in sterile water (preservative free) 10 mL IV syringe  1 g IntraVENous Q12H    fentaNYL (PF) 1,500 mcg/30 mL (50 mcg/mL) infusion  0-200 mcg/hr IntraVENous TITRATE    heparin (porcine) in 0.9% NaCl 30,000 unit/1,000 mL perfusion irrigation 50-1,000 mL  50-1,000 mL Other PRN    sodium chloride (NS) flush 5-40 mL  5-40 mL IntraVENous Q8H    chlorhexidine (PERIDEX) 0.12 % mouthwash 15 mL  15 mL Oral Q12H    sodium chloride (NS) flush 5-40 mL  5-40 mL IntraVENous Q8H    0.45% sodium chloride infusion  10 mL/hr IntraVENous CONTINUOUS    0.9% sodium chloride infusion  9 mL/hr IntraVENous CONTINUOUS    naloxone (NARCAN) injection 0.4 mg  0.4 mg IntraVENous PRN    ondansetron (ZOFRAN) injection 4 mg  4 mg IntraVENous Q4H PRN    albuterol (PROVENTIL VENTOLIN) nebulizer solution 2.5 mg  2.5 mg Nebulization Q4H PRN    aspirin chewable tablet 81 mg  81 mg Oral DAILY    midazolam (VERSED) injection 1 mg  1 mg IntraVENous Q1H PRN    magnesium oxide (MAG-OX) tablet 400 mg  400 mg Oral BID    calcium chloride 1 g in 0.9% sodium chloride 100 mL IVPB  1 g IntraVENous PRN    bisacodyL (DULCOLAX) suppository 10 mg  10 mg Rectal DAILY PRN    senna-docusate (PERICOLACE) 8.6-50 mg per tablet 1 Tablet  1 Tablet Oral BID    ELECTROLYTE REPLACEMENT NOTE: Nurse to review Serum Potassium and Magnesuim levels and Initiate Electrolyte Replacement Protocol as needed  1 Each Other PRN    magnesium sulfate 1 g/100 ml IVPB (premix or compounded)  1 g IntraVENous PRN    alteplase (CATHFLO) 1 mg in sterile water (preservative free) 1 mL injection  1 mg InterCATHeter PRN    bacitracin 500 unit/gram packet 1 Packet  1 Packet Topical PRN    pantoprazole (PROTONIX) injection 40 mg  40 mg IntraVENous DAILY    And    sodium chloride (NS) flush 10 mL  10 mL IntraVENous DAILY    dexmedeTOMidine in 0.9 % NaCl (PRECEDEX) 400 mcg/100 mL (4 mcg/mL) infusion soln  0.1-1.5 mcg/kg/hr IntraVENous TITRATE    midazolam (VERSED) injection 1 mg  1 mg IntraVENous Q4H PRN    insulin lispro (HUMALOG) injection   SubCUTAneous Q6H    propofol (DIPRIVAN) 10 mg/mL infusion  0-50 mcg/kg/min IntraVENous TITRATE    sodium bicarbonate (8.4%) 25 mEq in dextrose 5% 1,000 mL - impella purge fluid   IntraVENous TITRATE    triamcinolone acetonide (KENALOG) 0.1 % cream   Topical BID    polyethylene glycol (MIRALAX) packet 17 g  17 g Oral DAILY    allopurinoL (ZYLOPRIM) tablet 50 mg  50 mg Oral DAILY    [Held by provider] epoetin isabel-epbx (RETACRIT) injection 20,000 Units  20,000 Units SubCUTAneous Q TUE, THU & SAT    montelukast (SINGULAIR) tablet 10 mg  10 mg Oral DAILY    levothyroxine (SYNTHROID) tablet 100 mcg  100 mcg Oral Once per day on Mon Tue Wed Thu Fri Sat    hydroxypropyl methylcellulose (ISOPTO TEARS) 0.5 % ophthalmic solution 1 Drop  1 Drop Both Eyes PRN    venlafaxine-SR (EFFEXOR-XR) capsule 75 mg  75 mg Oral DAILY WITH BREAKFAST    gabapentin (NEURONTIN) capsule 100 mg  100 mg Oral QHS    arformoteroL (BROVANA) neb solution 15 mcg  15 mcg Nebulization BID RT    And    budesonide (PULMICORT) 500 mcg/2 ml nebulizer suspension  500 mcg Nebulization BID RT    sodium chloride (NS) flush 5-40 mL  5-40 mL IntraVENous Q8H    acetaminophen (TYLENOL) tablet 650 mg  650 mg Oral Q6H PRN    Or    acetaminophen (TYLENOL) suppository 650 mg  650 mg Rectal Q6H PRN    glucose chewable tablet 16 g  4 Tablet Oral PRN    dextrose (D50W) injection syrg 12.5-25 g  25-50 mL IntraVENous PRN    glucagon (GLUCAGEN) injection 1 mg  1 mg IntraMUSCular PRN       Jaycob Manzano MD01/27/22     ATTENTION:   This medical record was transcribed using an electronic medical records/speech recognition system. Although proofread, it may and can contain electronic, spelling and other errors. Corrections may be executed at a later time. Please feel free to contact us for any clarifications as needed.     [facility] [place]  [facility], [facility]. [phone]

## 2022-01-27 NOTE — PROGRESS NOTES
1930: Bedside and Verbal shift change report given to Bridget Roldan RN (oncoming nurse) by Sadie Ferris RN (offgoing nurse). Report included the following information SBAR, Kardex, ED Summary, OR Summary, Procedure Summary, Intake/Output, MAR, Recent Results, Med Rec Status, Cardiac Rhythm BiV Paced, Alarm Parameters  and Dual Neuro Assessment. Drips: NS @ 19, Bival @ 0.1008, Bumex @ 1, Precedex @ 0.6, Fentanyl @ 100, Propofol @ 15, Bicarb Impella Purge @ 8     2000: Resumed pt care, VSS, no evidence of pain. Lines & gtt's verified. Full CHG provided     2200: Q4 labs drawn and sent     0000: K+ 3.9, replaced per protocol    0200: Q4 labs drawn and sent     0345: K+ 3.9, replaced per protocol     0400: AM labs drawn and sent     0500: ABG: pO2 96.5; pCO2 50.4; pH 7.396;  HCO3 31, 97.3%O2 Sat. No changes made  Carboxy: tHb 5.2; O2 86; SWAN recalibrated    0730: Bedside and Verbal shift change report given to Elvin Wesley RN (oncoming nurse) by Bridget Roldna RN (offgoing nurse). Report included the following information SBAR, Kardex, ED Summary, OR Summary, Procedure Summary, Intake/Output, MAR, Recent Results, Med Rec Status, Cardiac Rhythm BiV Paced, Alarm Parameters  and Dual Neuro Assessment.      Drips: NS @ 19, Bival @ 0.1008, Bumex @ 1, Precedex @ 0.6, Fentanyl @ 100, Propofol @ 15, Bicarb impella purge @ 8

## 2022-01-27 NOTE — PROCEDURES
Findings  1. Normal left and right-sided filling pressures  2. Moderate pulmonary hypertension with mean PA pressure of 38 mmHg and PVR of 4-5 Woods units  3. Normal cardiac output with cardiac index of 3 L/min/m² based on estimated Aye calculation  4. Severe native one-vessel coronary artery disease involving mid LAD 80% stenosis. This was evaluated with IVUS and treated with placement of 2.75 x 12 mm [place] drug-eluting stent which was postdilated with IVUS guidance with 3 mm noncompliant balloon at up to 20 jeferson. There was a more proximal lesion in LAD which was about 50% angiographically and was assessed with IVUS. This lesion was calcified, eccentric with MLA of 5.9 mm² and hence was deferred    Access right radial no issues  Contrast 20 cc    Recommendations  1. Guideline directed medical therapy for heart failure with reduced ejection fraction  2. Plavix based dual antiplatelet therapy.

## 2022-01-28 NOTE — CARDIO/PULMONARY
Cardiac Rehab: Pt is s/p stent 1/27/22. [id number] [facility] cardiac rehab contact information is on AVS and referral initiated. Pt currently intubated.

## 2022-01-28 NOTE — PROGRESS NOTES
[facility]   [address] [facility], [id number] [place] [address]  Phone: [phone]   [phone]    [email or web address]     Nephrology Progress Note    Patient Name : Clementina Siegel      : 1955     MRN : [id number]  Date of Admission : 2022  Date of Servive : 22    CC:  Follow up for ARF       Assessment and Plan   RODNEY on CKD :  - Suspect 2/2 CRS  - Cr stable, UOP good on bumex drip  - diuretics per AHF team   - daily labs for now  - no urgent need for RRT at this time    CKD stage IV:  - Etiology: DM, HTN, CRS  - Renal US: suggestive of CKD, B/L benign renal cysts   - baseline Cr 2.4-2.6 mg/dl      Acute on chronic HFrEF  NI CMP, LVEF 15 to 20%  NYHA class III-IV  S/p BiV pacer/ICD-s/p CRT  R axillary Impella implanted 22  LAD PCI on 22  Transplant w/u underway - EGD and colon , pan CT w/o contrast today    Hypervolemia:  - RHC showing severely elevated filling pressures, pulm HTN  - improving overall  - diuretics as above    Morbid obesity  Type II DM  HTN  Hypothyroidism  Pulmonary hypertension  Gout  Psoriasis       Interval History:  Seen and examined. S/p LHC and stent in LAD yesterday. Off Roro, PA pressures, CVP stable. Cr stable, good UOP. Review of Systems: A comprehensive review of systems was negative except for that written in the HPI.     Current Medications:   Current Facility-Administered Medications   Medication Dose Route Frequency    allopurinoL (ZYLOPRIM) tablet 100 mg  100 mg Oral DAILY    sodium phosphate 30 mmol in 0.9% sodium chloride 250 mL infusion   IntraVENous ONCE    cefTRIAXone (ROCEPHIN) 1 g in 0.9% sodium chloride 10 mL IV syringe  1 g IntraVENous Q24H    insulin NPH (NOVOLIN N, HUMULIN N) injection 30 Units  30 Units SubCUTAneous QHS    sodium chloride (NS) flush 5-40 mL  5-40 mL IntraVENous PRN    clopidogreL (PLAVIX) tablet 75 mg  75 mg Oral DAILY    digoxin (LANOXIN) tablet 0.0625 mg  0.0625 mg Oral EVERY OTHER DAY    insulin NPH (NOVOLIN N, HUMULIN N) injection 25 Units  25 Units SubCUTAneous DAILY    bumetanide (BUMEX) 0.25 mg/mL infusion  1 mg/hr IntraVENous CONTINUOUS    DOBUTamine (DOBUTREX) 500 mg/250 mL (2,000 mcg/mL) infusion  0-10 mcg/kg/min IntraVENous TITRATE    albumin human 25% (BUMINATE) solution 12.5 g  12.5 g IntraVENous DAILY    milrinone (PRIMACOR) 20 MG/100 ML D5W infusion  0.125 mcg/kg/min IntraVENous CONTINUOUS    vasopressin (VASOSTRICT) 20 Units in 0.9% sodium chloride 100 mL infusion  0-0.1 Units/min IntraVENous TITRATE    PHENYLephrine (NORMA-SYNEPHRINE) 30 mg in 0.9% sodium chloride 250 mL infusion   mcg/min IntraVENous TITRATE    niCARdipine (CARDENE) 25 mg in 0.9% sodium chloride 250 mL infusion  0-15 mg/hr IntraVENous TITRATE    bivalirudin (ANGIOMAX) 250 mg in 0.9% sodium chloride (MBP/ADV) 50 mL MBP  0.02-2.5 mg/kg/hr IntraVENous TITRATE    hydrALAZINE (APRESOLINE) 20 mg/mL injection 5 mg  5 mg IntraVENous Q4H PRN    bumetanide (BUMEX) injection 1 mg  1 mg IntraVENous Q4H PRN    ceFAZolin (ANCEF) 1 g in sterile water (preservative free) 10 mL IV syringe  1 g IntraVENous Q12H    fentaNYL (PF) 1,500 mcg/30 mL (50 mcg/mL) infusion  0-200 mcg/hr IntraVENous TITRATE    heparin (porcine) in 0.9% NaCl 30,000 unit/1,000 mL perfusion irrigation 50-1,000 mL  50-1,000 mL Other PRN    chlorhexidine (PERIDEX) 0.12 % mouthwash 15 mL  15 mL Oral Q12H    0.9% sodium chloride infusion  9 mL/hr IntraVENous CONTINUOUS    naloxone (NARCAN) injection 0.4 mg  0.4 mg IntraVENous PRN    ondansetron (ZOFRAN) injection 4 mg  4 mg IntraVENous Q4H PRN    albuterol (PROVENTIL VENTOLIN) nebulizer solution 2.5 mg  2.5 mg Nebulization Q4H PRN    aspirin chewable tablet 81 mg  81 mg Oral DAILY    midazolam (VERSED) injection 1 mg  1 mg IntraVENous Q1H PRN    magnesium oxide (MAG-OX) tablet 400 mg  400 mg Oral BID    calcium chloride 1 g in 0.9% sodium chloride 100 mL IVPB  1 g IntraVENous PRN    bisacodyL (DULCOLAX) suppository 10 mg  10 mg Rectal DAILY PRN    senna-docusate (PERICOLACE) 8.6-50 mg per tablet 1 Tablet  1 Tablet Oral BID    ELECTROLYTE REPLACEMENT NOTE: Nurse to review Serum Potassium and Magnesuim levels and Initiate Electrolyte Replacement Protocol as needed  1 Each Other PRN    magnesium sulfate 1 g/100 ml IVPB (premix or compounded)  1 g IntraVENous PRN    alteplase (CATHFLO) 1 mg in sterile water (preservative free) 1 mL injection  1 mg InterCATHeter PRN    bacitracin 500 unit/gram packet 1 Packet  1 Packet Topical PRN    pantoprazole (PROTONIX) injection 40 mg  40 mg IntraVENous DAILY    And    sodium chloride (NS) flush 10 mL  10 mL IntraVENous DAILY    dexmedeTOMidine in 0.9 % NaCl (PRECEDEX) 400 mcg/100 mL (4 mcg/mL) infusion soln  0.1-1.5 mcg/kg/hr IntraVENous TITRATE    midazolam (VERSED) injection 1 mg  1 mg IntraVENous Q4H PRN    insulin lispro (HUMALOG) injection   SubCUTAneous Q6H    propofol (DIPRIVAN) 10 mg/mL infusion  0-50 mcg/kg/min IntraVENous TITRATE    sodium bicarbonate (8.4%) 25 mEq in dextrose 5% 1,000 mL - impella purge fluid   IntraVENous TITRATE    triamcinolone acetonide (KENALOG) 0.1 % cream   Topical BID    polyethylene glycol (MIRALAX) packet 17 g  17 g Oral DAILY    [Held by provider] epoetin isabel-epbx (RETACRIT) injection 20,000 Units  20,000 Units SubCUTAneous Q TUE, THU & SAT    montelukast (SINGULAIR) tablet 10 mg  10 mg Oral DAILY    levothyroxine (SYNTHROID) tablet 100 mcg  100 mcg Oral Once per day on Mon Tue Wed Thu Fri Sat    hydroxypropyl methylcellulose (ISOPTO TEARS) 0.5 % ophthalmic solution 1 Drop  1 Drop Both Eyes PRN    venlafaxine-SR (EFFEXOR-XR) capsule 75 mg  75 mg Oral DAILY WITH BREAKFAST    gabapentin (NEURONTIN) capsule 100 mg  100 mg Oral QHS    arformoteroL (BROVANA) neb solution 15 mcg  15 mcg Nebulization BID RT    And    budesonide (PULMICORT) 500 mcg/2 ml nebulizer suspension  500 mcg Nebulization BID RT    sodium chloride (NS) flush 5-40 mL  5-40 mL IntraVENous Q8H    acetaminophen (TYLENOL) tablet 650 mg  650 mg Oral Q6H PRN    Or    acetaminophen (TYLENOL) suppository 650 mg  650 mg Rectal Q6H PRN    glucose chewable tablet 16 g  4 Tablet Oral PRN    dextrose (D50W) injection syrg 12.5-25 g  25-50 mL IntraVENous PRN    glucagon (GLUCAGEN) injection 1 mg  1 mg IntraMUSCular PRN      Allergies   Allergen Reactions    Ciprofloxacin Anaphylaxis    Shellfish Derived Anaphylaxis    Sildenafil Other (comments)    Ace Inhibitors Unknown (comments)    Biaxin [Clarithromycin] Other (comments)     Metal taste    Candesartan Cough    Pcn [Penicillins] Hives       Objective:  Vitals:    Vitals:    01/28/22 0500 01/28/22 0600 01/28/22 0700 01/28/22 0800   BP:    114/71   Pulse: 85 75 82 81   Resp: 17 15 14 15   Temp:    (!) 100.9 °F (38.3 °C)   SpO2: 98% 96% 98% 98%   Weight:       Height:         Intake and Output:  01/28 0701 - 01/28 1900  In: -   Out: 175 [Urine:175]  01/26 1901 - 01/28 0700  In: 5949.5 [I.V.:4790.7]  Out: 5430 [Urine:5430]    Physical Examination:    General: Sedated on the vent  HEENT:           ETT in place   Neck:  Supple, no mass  Resp:  Reduced bibasilar  Breath sounds  CV:  RRR, trace LE edema  GI:  Soft, NT, + BS, obese  Neurologic:  Sedated  :                  Henley in place    [x]    High complexity decision making was performed  []    Patient is at high-risk of decompensation with multiple organ involvement    Lab Data Personally Reviewed: I have reviewed all the pertinent labs, microbiology data and radiology studies during assessment.     Recent Labs     01/28/22  0349 01/27/22  1844 01/27/22  1649 01/27/22  1645 01/27/22  1001 01/27/22  0419 01/27/22  0208 01/27/22  0208 01/26/22  2213 01/26/22  2213 01/26/22  1331 01/26/22  1331 01/26/22  1000 01/26/22  0418 01/26/22  0417     --  144  --   --  145  --   --   --   --   --  145 143   < > 143   K 4.0 3.7 4.0  --  3.9 4.0   < > 3.9   < > 3.9   < > 3.5 3.6   < > 4.1     --  108  --   --  109*  --   --   --   --   --  107 107   < > 108   CO2 28  --  28  --   --  31  --   --   --   --   --  29 30   < > 30   *  --  199*  --   --  103*  --   --   --   --   --  173* 164*   < > 159*   BUN 77*  --  72*  --   --  70*  --   --   --   --   --  68* 69*   < > 68*   CREA 2.44*  --  2.34*  --   --  2.34*  --   --   --   --   --  2.25* 2.25*   < > 2.24*   CA 9.7  --  9.7  --   --  9.7  --   --   --   --   --  9.6 9.8   < > 9.6   MG  --  2.3  --  2.5* 2.9*  --   --  2.4  --  2.3   < > 2.4  --    < >  --    PHOS 1.6*  --  2.6  --   --  2.5*  --   --   --   --   --  2.7 2.6   < >  --    ALB 3.8  --  3.6  --   --  3.7  --   --   --   --   --  3.4* 3.3*   < > 3.2*   ALT 8*  --   --   --   --  8*  --   --   --   --   --   --   --   --  8*    < > = values in this interval not displayed. Recent Labs     01/28/22  0349 01/27/22  1649 01/27/22  1001 01/27/22  0419 01/26/22 0417   WBC 10.9  --   --  7.1 7.3   HGB 8.2* 6.9* 7.0* 7.1* 7.5*   HCT 29.9* 26.6* 27.1* 27.2* 28.5*     --   --  195 184     Lab Results   Component Value Date/Time    Specimen Description: URINE 10/22/2013 01:32 PM    Specimen Description: URINE 01/23/2013 04:40 PM    Specimen Description: LEG TISSUE 02/12/2009 12:00 AM    Specimen Description: LEG (LEFT) 01/29/2009 03:06 AM     Lab Results   Component Value Date/Time    Culture result: NO GROWTH 3 DAYS 01/25/2022 12:36 PM    Culture result: MODERATE ESCHERICHIA COLI (A) 01/25/2022 12:36 PM    Culture result: MODERATE NORMAL RESPIRATORY ROSANNA 01/25/2022 12:36 PM    Culture result: MRSA NOT PRESENT 01/19/2022 12:41 PM    Culture result:  01/19/2022 12:41 PM     Screening of patient nares for MRSA is for surveillance purposes and, if positive, to facilitate isolation considerations in high risk settings.  It is not intended for automatic decolonization interventions per se as regimens are not sufficiently effective to warrant routine use.     Culture result: NO GROWTH 5 DAYS 01/19/2022 12:41 PM     Recent Results (from the past 24 hour(s))   POTASSIUM    Collection Time: 01/27/22 10:01 AM   Result Value Ref Range    Potassium 3.9 3.5 - 5.1 mmol/L   MAGNESIUM    Collection Time: 01/27/22 10:01 AM   Result Value Ref Range    Magnesium 2.9 (H) 1.6 - 2.4 mg/dL   HGB & HCT    Collection Time: 01/27/22 10:01 AM   Result Value Ref Range    HGB 7.0 (L) 11.5 - 16.0 g/dL    HCT 27.1 (L) 35.0 - 47.0 %   PTT    Collection Time: 01/27/22 10:07 AM   Result Value Ref Range    aPTT 56.1 (H) 22.1 - 31.0 sec    aPTT, therapeutic range     58.0 - 77.0 SECS   GLUCOSE, POC    Collection Time: 01/27/22 11:23 AM   Result Value Ref Range    Glucose (POC) 147 (H) 65 - 117 mg/dL    Performed by Lewie Olszewski    POC ACTIVATED CLOTTING TIME    Collection Time: 01/27/22  2:19 PM   Result Value Ref Range    Activated Clotting Time (POC) 309 (H) 79 - 138 SECS   POC ACTIVATED CLOTTING TIME    Collection Time: 01/27/22  3:01 PM   Result Value Ref Range    Activated Clotting Time (POC) 267 (H) 79 - 138 SECS   MAGNESIUM    Collection Time: 01/27/22  4:45 PM   Result Value Ref Range    Magnesium 2.5 (H) 1.6 - 2.4 mg/dL   RENAL FUNCTION PANEL    Collection Time: 01/27/22  4:49 PM   Result Value Ref Range    Sodium 144 136 - 145 mmol/L    Potassium 4.0 3.5 - 5.1 mmol/L    Chloride 108 97 - 108 mmol/L    CO2 28 21 - 32 mmol/L    Anion gap 8 5 - 15 mmol/L    Glucose 199 (H) 65 - 100 mg/dL    BUN 72 (H) 6 - 20 MG/DL    Creatinine 2.34 (H) 0.55 - 1.02 MG/DL    BUN/Creatinine ratio 31 (H) 12 - 20      GFR est AA 25 (L) >60 ml/min/1.73m2    GFR est non-AA 21 (L) >60 ml/min/1.73m2    Calcium 9.7 8.5 - 10.1 MG/DL    Phosphorus 2.6 2.6 - 4.7 MG/DL    Albumin 3.6 3.5 - 5.0 g/dL   HGB & HCT    Collection Time: 01/27/22  4:49 PM   Result Value Ref Range    HGB 6.9 (L) 11.5 - 16.0 g/dL    HCT 26.6 (L) 35.0 - 47.0 %   GLUCOSE, POC    Collection Time: 01/27/22  5:25 PM   Result Value Ref Range    Glucose (POC) 195 (H) 65 - 117 mg/dL    Performed by SHAD Mcdonald    Collection Time: 01/27/22  6:30 PM   Result Value Ref Range    HISTORY CHECKED? Historical check performed    POTASSIUM    Collection Time: 01/27/22  6:44 PM   Result Value Ref Range    Potassium 3.7 3.5 - 5.1 mmol/L   MAGNESIUM    Collection Time: 01/27/22  6:44 PM   Result Value Ref Range    Magnesium 2.3 1.6 - 2.4 mg/dL   GLUCOSE, POC    Collection Time: 01/28/22 12:35 AM   Result Value Ref Range    Glucose (POC) 287 (H) 65 - 117 mg/dL    Performed by Brad Sorto    NT-PRO BNP    Collection Time: 01/28/22  3:49 AM   Result Value Ref Range    NT pro-BNP 12,338 (H) <814 PG/ML   METABOLIC PANEL, COMPREHENSIVE    Collection Time: 01/28/22  3:49 AM   Result Value Ref Range    Sodium 143 136 - 145 mmol/L    Potassium 4.0 3.5 - 5.1 mmol/L    Chloride 108 97 - 108 mmol/L    CO2 28 21 - 32 mmol/L    Anion gap 7 5 - 15 mmol/L    Glucose 311 (H) 65 - 100 mg/dL    BUN 77 (H) 6 - 20 MG/DL    Creatinine 2.44 (H) 0.55 - 1.02 MG/DL    BUN/Creatinine ratio 32 (H) 12 - 20      GFR est AA 24 (L) >60 ml/min/1.73m2    GFR est non-AA 20 (L) >60 ml/min/1.73m2    Calcium 9.7 8.5 - 10.1 MG/DL    Bilirubin, total 0.8 0.2 - 1.0 MG/DL    ALT (SGPT) 8 (L) 12 - 78 U/L    AST (SGOT) 20 15 - 37 U/L    Alk.  phosphatase 79 45 - 117 U/L    Protein, total 7.7 6.4 - 8.2 g/dL    Albumin 3.8 3.5 - 5.0 g/dL    Globulin 3.9 2.0 - 4.0 g/dL    A-G Ratio 1.0 (L) 1.1 - 2.2     DIGOXIN    Collection Time: 01/28/22  3:49 AM   Result Value Ref Range    Digoxin level 1.0 0.90 - 2.00 NG/ML   LACTIC ACID    Collection Time: 01/28/22  3:49 AM   Result Value Ref Range    Lactic acid 0.7 0.4 - 2.0 MMOL/L   PROCALCITONIN    Collection Time: 01/28/22  3:49 AM   Result Value Ref Range    Procalcitonin 0.62 ng/mL   PHOSPHORUS    Collection Time: 01/28/22  3:49 AM   Result Value Ref Range    Phosphorus 1.6 (L) 2.6 - 4.7 MG/DL   LD Collection Time: 01/28/22  3:49 AM   Result Value Ref Range     (H) 81 - 246 U/L   PTT    Collection Time: 01/28/22  3:49 AM   Result Value Ref Range    aPTT 55.1 (H) 22.1 - 31.0 sec    aPTT, therapeutic range     58.0 - 77.0 SECS   CBC W/O DIFF    Collection Time: 01/28/22  3:49 AM   Result Value Ref Range    WBC 10.9 3.6 - 11.0 K/uL    RBC 3.29 (L) 3.80 - 5.20 M/uL    HGB 8.2 (L) 11.5 - 16.0 g/dL    HCT 29.9 (L) 35.0 - 47.0 %    MCV 90.9 80.0 - 99.0 FL    MCH 24.9 (L) 26.0 - 34.0 PG    MCHC 27.4 (L) 30.0 - 36.5 g/dL    RDW 18.9 (H) 11.5 - 14.5 %    PLATELET 987 634 - 019 K/uL    MPV 9.7 8.9 - 12.9 FL    NRBC 0.0 0  WBC    ABSOLUTE NRBC 0.00 0.00 - 0.01 K/uL   SED RATE (ESR)    Collection Time: 01/28/22  3:49 AM   Result Value Ref Range    Sed rate, automated 5 0 - 30 mm/hr   GLUCOSE, POC    Collection Time: 01/28/22  5:03 AM   Result Value Ref Range    Glucose (POC) 300 (H) 65 - 117 mg/dL    Performed by Sarahy Metzger    CARBOXYHEMOGLOBIN    Collection Time: 01/28/22  5:15 AM   Result Value Ref Range    Carboxy-Hgb 2.3 (H) 1 - 2 %    Methemoglobin 0.3 0 - 1.4 %    tHb 7.6 (LL) 14 - 17 g/dL    Oxyhemoglobin 65.8 (LL) 94 - 97 %    O2 SATURATION 68 (L) 95 - 99 %    SITE SG      Sample source MIXED VENOUS      Critical value read back Called to Milo Weller RN on 01/28/2022 at 05:20    CARBOXYHEMOGLOBIN    Collection Time: 01/28/22  5:18 AM   Result Value Ref Range    Carboxy-Hgb 1.3 1 - 2 %    Methemoglobin 0.1 0 - 1.4 %    tHb 11.2 (L) 14 - 17 g/dL    Oxyhemoglobin 72.7 (LL) 94 - 97 %    O2 SATURATION 74 (L) 95 - 99 %    SITE SG      Sample source MIXED VENOUS      Critical value read back Called to Alejandra MORRIS on 01/28/2022 at 05:22            I have reviewed the flowsheets. Chart and Pertinent Notes have been reviewed. No change in PMH ,family and social history from Consult note.       Josy Telles MD  [facility]

## 2022-01-28 NOTE — PROGRESS NOTES
1930: Bedside shift report received from JERRI HAYWARD [facility]. 2000: pt bathed with chg, linens changed. Pt tolerated well. 0230: hydralazine given for sbp >110.  0400: am labs drawn. Tylenol given for fever of 101.5.  0730: Bedside and Verbal shift change report given to RN (oncoming nurse) by Simon Marie (offgoing nurse). Report included the following information SBAR, Kardex, Procedure Summary, Intake/Output, Accordion, Recent Results and Cardiac Rhythm bv paced.

## 2022-01-28 NOTE — PROGRESS NOTES
LVAD/TRANSPLANT [address]   Date: 2022   Committee Members:   Dr. Criss Dale MD PhD (Medical Director), Dr. Laxmi Bauman MD (Surgical Director), JAYMIE Hernandez (Chief Nurse Practitioner), JAYMIE Cardoza ([facility] Nurse Practitioner), Carmen Becerril RN (VAD Coordinator), Rigoberto Rasheed RN (VAD Coordinator), BAL Frances (), Meet Ac MS ([facility] Practice Manager)   Criteria Met:   Chronic systolic heart failure   Stage D, NYHA class IV symptoms   Failure to respond to GDMT for at least 45 of the last 60 days   Resting cardiac index < 2.2 L/min/m2   Inotrope dependence for more than 14 days   Intra-aortic balloon pump or Impella dependence for more than 7 days   Absolute contraindications:   Too well for advanced therapy with EF >25%   Relative contraindications:   Severe renal dysfunction with Creatinine 2.2-2.4, would be high risk VAD candidate   High likelihood of recovery. Decision:   Declined high risk candidate and too well for advanced therapies at this time.     Plan of care discussion:  N/A   Additional comments:  N/A   Prepared by:   Rigoberto Rasheed RN   VAD Coordinator

## 2022-01-28 NOTE — PROGRESS NOTES
Cardiac Electrophysiology Hospital Progress Note     Subjective:       Jovan Shetty is a 78 y.o. patient who now has Impella, has Medtronic biventricular ICD (gen change [id number]/579146, leads 09/25/2008) that is at [facility].         Interim:   S/p Impella placement on 01/18/2022, now in CCU.  She had VF 1/19, has appearance of Torsades.  Required ICD shock x 2, both successful. Hypokalemia resolved.       LHC/RHC on 01/17/2022 had shown severely elevated bilateral filling pressures with severe pulmonary HTN, LAD with 80% lesion. Dr Darrell Charles did PCI for LAD and right heart cath yesterday  He reported better numbers and appeared to be euvolumic now  I reviewed echo 1/28/22 with him, LVEF slightly up 30% with impella still in place      endoscopy done    No sustained VT on telemetry        HPI:   Presented to the ER on 01/04/2021 with hypoxia, improved on supplemental oxygen. Labs showed stable anemia.  WBC elevated, hyponatremic, hypokalemic.  D dimer elevated.  Chronic CKD. Nephrologist spoke to me directly regarding severe renal failure, but not much worse than last admission. Rapid COVID negative.  CXR showed pulmonary edema vs atypical pneumonia.  VQ scan showed low probability for PE.       NICM, LVEF 15-20% during recent admission.  LVEF previously normalized with CRT.  NYHA III-IV.  No ACEi/ARB due to renal dysfunction.  GDMT dosing limited by low BP. [address] 12/10/2021 showed severe pulmonary HTN (wedge 34 mmHg, PAP 80 mmHg). Cardiac cath in 2015 at [facility] showed no evidence of CAD. She did require LV lead reprogramming over the summer, but good LV capture since.  Good capture/function when checked during recent admission. BP controlled. PICC line placed 01/10/2022 in anticipation of home milrinone. Previous:   LVEF noted 15-20% in 12/2021. S/p Medtronic biventricular ICD (gen change 01/741029, leads 09/25/2008).        CKD stage IV.        Previously followed by  [facility] did not follow him to new practice.          Problem List       Acute respiratory failure with hypoxia [facility]) ICD-10-CM: J96.01   ICD-9-CM: 518.81 1/4/2022     CHF exacerbation ([facility] ICD-10-CM: I50.9   ICD-9-CM: 428.0 12/6/2021     Type 2 diabetes mellitus with diabetic neuropathy ([facility] ICD-10-CM: E11.40   ICD-9-CM: 250.60, 357.2 1/2/2020     Type 2 diabetes with nephropathy ([facility] ICD-10-CM: E11.21   ICD-9-CM: 250.40, 583.81 4/3/2018     Obesity, morbid ([facility] ICD-10-CM: E66.01   ICD-9-CM: 278.01 12/8/2017     Acquired hypothyroidism ICD-10-CM: E03.9   ICD-9-CM: 244.9 8/15/2016     Dysthymia ICD-10-CM: F34.1   ICD-9-CM: 300.4 8/15/2016     ICD (implantable cardioverter-defibrillator), biventricular, in situ ICD-10-CM: Z95.810   ICD-9-CM: V45.02 6/5/2014   Overview Signed 1/14/2015 11:11 AM by Harini Alvarez MD     Generator Medtronic change 1/14/2015         Dyslipidemia ICD-10-CM: J02.3   ICD-9-CM: 272.4 1/14/2014     CKD (chronic kidney disease) ICD-10-CM: N18.9   ICD-9-CM: 585.9 8/15/2012     Cardiomyopathy, nonischemic (HCC) ICD-10-CM: I42.8   ICD-9-CM: 425.4 Unknown   Overview Signed 10/10/2011  6:40 AM by Garrett Peters MD     initial dx 2001, bivHF 2008 with EF 15%, s/p biV-ICD 9/08, significant improvment in EF to 45-50%         Anemia in chronic renal disease (Chronic) ICD-10-CM: N18.9, D63.1   ICD-9-CM: 285.21 12/10/2008     HTN (hypertension) ICD-10-CM: I10   ICD-9-CM: 401.9 12/10/2008     GERD (gastroesophageal reflux disease) (Chronic) ICD-10-CM: K21.9   ICD-9-CM: 530.81 12/10/2008     Gout (Chronic) ICD-10-CM: M10.9   ICD-9-CM: 274.9 12/10/2008     Pulmonary HTN (HCC) (Chronic) ICD-10-CM: I27.20   ICD-9-CM: 416.8 12/10/2008            Current Facility-Administered Medications   Medication Dose Route Frequency    allopurinoL (ZYLOPRIM) tablet 100 mg  100 mg Oral DAILY    sodium phosphate 30 mmol in 0.9% sodium chloride 250 mL infusion   IntraVENous ONCE    insulin NPH (NOVOLIN N, HUMULIN N) injection 30 Units  30 Units SubCUTAneous QHS    cefepime (MAXIPIME) 2 g in sterile water (preservative free) 10 mL IV syringe  2 g IntraVENous Q24H    sodium chloride (NS) flush 5-40 mL  5-40 mL IntraVENous PRN    clopidogreL (PLAVIX) tablet 75 mg  75 mg Oral DAILY    digoxin (LANOXIN) tablet 0.0625 mg  0.0625 mg Oral EVERY OTHER DAY    insulin NPH (NOVOLIN N, HUMULIN N) injection 25 Units  25 Units SubCUTAneous DAILY    bumetanide (BUMEX) 0.25 mg/mL infusion  0.5 mg/hr IntraVENous CONTINUOUS    DOBUTamine (DOBUTREX) 500 mg/250 mL (2,000 mcg/mL) infusion  0-10 mcg/kg/min IntraVENous TITRATE    albumin human 25% (BUMINATE) solution 12.5 g  12.5 g IntraVENous DAILY    milrinone (PRIMACOR) 20 MG/100 ML D5W infusion  0.125 mcg/kg/min IntraVENous CONTINUOUS    vasopressin (VASOSTRICT) 20 Units in 0.9% sodium chloride 100 mL infusion  0-0.1 Units/min IntraVENous TITRATE    PHENYLephrine (NORMA-SYNEPHRINE) 30 mg in 0.9% sodium chloride 250 mL infusion   mcg/min IntraVENous TITRATE    niCARdipine (CARDENE) 25 mg in 0.9% sodium chloride 250 mL infusion  0-15 mg/hr IntraVENous TITRATE    bivalirudin (ANGIOMAX) 250 mg in 0.9% sodium chloride (MBP/ADV) 50 mL MBP  0.02-2.5 mg/kg/hr IntraVENous TITRATE    hydrALAZINE (APRESOLINE) 20 mg/mL injection 5 mg  5 mg IntraVENous Q4H PRN    bumetanide (BUMEX) injection 1 mg  1 mg IntraVENous Q4H PRN    fentaNYL (PF) 1,500 mcg/30 mL (50 mcg/mL) infusion  0-200 mcg/hr IntraVENous TITRATE    heparin (porcine) in 0.9% NaCl 30,000 unit/1,000 mL perfusion irrigation 50-1,000 mL  50-1,000 mL Other PRN    chlorhexidine (PERIDEX) 0.12 % mouthwash 15 mL  15 mL Oral Q12H    0.9% sodium chloride infusion  9 mL/hr IntraVENous CONTINUOUS    naloxone (NARCAN) injection 0.4 mg  0.4 mg IntraVENous PRN    ondansetron (ZOFRAN) injection 4 mg  4 mg IntraVENous Q4H PRN    albuterol (PROVENTIL VENTOLIN) nebulizer solution 2.5 mg  2.5 mg Nebulization Q4H PRN    aspirin chewable tablet 81 mg  81 mg Oral DAILY    midazolam (VERSED) injection 1 mg  1 mg IntraVENous Q1H PRN    magnesium oxide (MAG-OX) tablet 400 mg  400 mg Oral BID    calcium chloride 1 g in 0.9% sodium chloride 100 mL IVPB  1 g IntraVENous PRN    bisacodyL (DULCOLAX) suppository 10 mg  10 mg Rectal DAILY PRN    senna-docusate (PERICOLACE) 8.6-50 mg per tablet 1 Tablet  1 Tablet Oral BID    ELECTROLYTE REPLACEMENT NOTE: Nurse to review Serum Potassium and Magnesuim levels and Initiate Electrolyte Replacement Protocol as needed  1 Each Other PRN    magnesium sulfate 1 g/100 ml IVPB (premix or compounded)  1 g IntraVENous PRN    alteplase (CATHFLO) 1 mg in sterile water (preservative free) 1 mL injection  1 mg InterCATHeter PRN    bacitracin 500 unit/gram packet 1 Packet  1 Packet Topical PRN    pantoprazole (PROTONIX) injection 40 mg  40 mg IntraVENous DAILY    And    sodium chloride (NS) flush 10 mL  10 mL IntraVENous DAILY    dexmedeTOMidine in 0.9 % NaCl (PRECEDEX) 400 mcg/100 mL (4 mcg/mL) infusion soln  0.1-1.5 mcg/kg/hr IntraVENous TITRATE    midazolam (VERSED) injection 1 mg  1 mg IntraVENous Q4H PRN    insulin lispro (HUMALOG) injection   SubCUTAneous Q6H    propofol (DIPRIVAN) 10 mg/mL infusion  0-50 mcg/kg/min IntraVENous TITRATE    sodium bicarbonate (8.4%) 25 mEq in dextrose 5% 1,000 mL - impella purge fluid   IntraVENous TITRATE    triamcinolone acetonide (KENALOG) 0.1 % cream   Topical BID    polyethylene glycol (MIRALAX) packet 17 g  17 g Oral DAILY    [Held by provider] epoetin isabel-epbx (RETACRIT) injection 20,000 Units  20,000 Units SubCUTAneous Q TUE, THU & SAT    montelukast (SINGULAIR) tablet 10 mg  10 mg Oral DAILY    levothyroxine (SYNTHROID) tablet 100 mcg  100 mcg Oral Once per day on Mon Tue Wed Thu Fri Sat    hydroxypropyl methylcellulose (ISOPTO TEARS) 0.5 % ophthalmic solution 1 Drop  1 Drop Both Eyes PRN    venlafaxine-SR (EFFEXOR-XR) capsule 75 mg  75 mg Oral DAILY WITH BREAKFAST    gabapentin (NEURONTIN) capsule 100 mg  100 mg Oral QHS    arformoteroL (BROVANA) neb solution 15 mcg  15 mcg Nebulization BID RT    And    budesonide (PULMICORT) 500 mcg/2 ml nebulizer suspension  500 mcg Nebulization BID RT    sodium chloride (NS) flush 5-40 mL  5-40 mL IntraVENous Q8H    acetaminophen (TYLENOL) tablet 650 mg  650 mg Oral Q6H PRN    Or    acetaminophen (TYLENOL) suppository 650 mg  650 mg Rectal Q6H PRN    glucose chewable tablet 16 g  4 Tablet Oral PRN    dextrose (D50W) injection syrg 12.5-25 g  25-50 mL IntraVENous PRN    glucagon (GLUCAGEN) injection 1 mg  1 mg IntraMUSCular PRN     Allergies   Allergen Reactions   · Ciprofloxacin Anaphylaxis   · Shellfish Derived Anaphylaxis   · Ace Inhibitors Unknown (comments)   · Biaxin [Clarithromycin] Other (comments)   Metal taste   · Candesartan Cough   · Pcn [Penicillins] Hives     Past Medical History:   Diagnosis Date   · Acquired hypothyroidism 8/15/2016   · Anemia   RED-HF study   · Asthma   · Cardiomyopathy, nonischemic ([address]   initial dx 2001, bivHF 2008 with EF 15%, s/p biV-ICD 9/08, significant improvment in EF to 45-50%   · CKD (chronic kidney disease)   Dr Alicea Camera   · CKD (chronic kidney disease) 8/15/2012   · Depression   · Diabetes ([address]   · Diabetic neuropathy ([address]   · DM (diabetes mellitus) ([address] 8/15/2012   · GERD (gastroesophageal reflux disease)   · Gout   · Hypothyroidism   · ICD (implantable cardioverter-defibrillator), biventricular, in situ 6/5/2014   · Psoriasis     Past Surgical History:   Procedure Laterality Date   · CARDIAC CATHETERIZATION 2007; 01/06/15   normal cors   · ECHO 2D ADULT 4/2010   EF 45%, improved from 1/09 (25%)   · ECHO 2D ADULT 11/2011   LVH, EF 55-60%   · HX ORTHOPAEDIC   knee   · HX PACEMAKER PLACEMENT   AICD   · STRESS TEST LEXISCAN/CARDIOLITE 3/21/12   normal perfusion, global HK 40%     Family History   Problem Relation Age of Onset   · Heart Disease Mother   · Hypertension Mother   · Lupus Sister   · Diabetes Brother     Social History     Tobacco Use   · Smoking status: Never Smoker   · Smokeless tobacco: Never Used   Substance Use Topics   · Alcohol use: No         Review of Systems: Unable to perform due to intubated, sedated status.       Visit Vitals  BP 97/62   Pulse 80   Temp (!) 100.8 °F (38.2 °C)   Resp 15   Ht 5' 7\" (1.702 m)   Wt 237 lb (107.5 kg)   SpO2 98%   BMI 37.12 kg/m²             Physical Exam:   Constitutional: Well-developed and well-nourished. Head: Normocephalic and atraumatic. Eyes: Closed. ENT: Sedated.  ET tube in place. Neck: Right cordis with Erroll Battles. Cardiovascular: Normal rate, regular rhythm. Exam reveals no gallop and no friction rub. 2/6 systolic LSB murmur.     Pulmonary/Chest: On vent. Abdominal: Soft, Obese. GI/: Henley catheter in place. Musculoskeletal: no leg edema   Vasc/lymphatic: + right axillary Impella. Neurological: Sedated. Skin Left side BiV ICD unremarkable. Psychiatric: Sedated on propofol, Precedex. Assessment/Plan:     Imaging/Studies:   LHC/RHC (01/17/2022): Severely elevated left & right side filling pressures.  Severe PH, mixed pattern with elevated wedge as well as PVR of about 6 Woods units.  Mid LAD lesion 80% involving diagonal branch ostium.  Likely prior stent in mid LAD, patent.  Reduced CI of 1.65 L/min/m2 by thermodilution & 1.6 L/min/m2 by presumed oxygen consumption by Aye. 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 RHC without milrinone (12/10/2021): High wedge pressure (35 mmHg) indicating volume overload, precapillary.  Severe pulmonary HTN. Echo (12/08/2021): LVEF 15-20%, upper normal wall thickness, LV diastolic dysfunction.  Borderline low RVEF. Mod dilated LA, mildly dilated RA.  Mild to mod MR. Ramiro Kamara TR.  Mild to mod PH.       LHC/RHC (01/2015): No significant CAD.        Mixed CM: LAD revascularized    LVEF improved to 30%, now with better lateral wall motion. Unable to assess NYHA status due to current sedation, intubation.  Was on inotrope, but not currently. Nuclear amyloid scan equivocal.  cMRI not possible with 4194 LV lead (2008).  However, cMRI wouldn't . S/p Impella 1/18/22 as bridge to possible transplant or to improvement    [facility] transplant has been contacted by Dr Corbin Butler to do SBT and extubate today and wean off Impella  Discussed in person with Dr Dickson Husain and CHF team    Medtronic biventricular ICD (gen change 01/14/2015, leads 09/25/2008): Device check showed proper lead & generator function.     Device is BETTY 1/22/2022.  Replacement is on hold while she is still on Impella and is waiting for transplant decision  Full body non contrast CT today. She has NSVTs and is still biventricular pacing      CKD: Dialyze if needed. Nephrologist is following, creatinine is stable, day 1 post PCI with small amount of IV contrast.      Anemia: endoscopy on 01/26/2021, had cecal polyps sent to pathology.             Thank you for involving me in this patient's care and please call with further concerns or questions. Manjinder Grimm M.D.    Electrophysiology/Cardiology   [facility] [place]   [place] [id number]                     [id number] [facility] Asia Solano, [address] [place] [address]

## 2022-01-28 NOTE — CONSULTS
[address]  [address], [address]       GI PROGRESS NOTE  Will Leatha [facility]  [phone] office  [phone] NP/PA in-hospital cell phone M-F until 4:30PM  After 5PM or on weekends, please call  for physician on call      NAME: Vitaly Britton   :  1955   MRN:  [id number]       Subjective:   Patient was seen earlier this week for LVAD evaluation. EGD and colonoscopy were done on 22. Patient underwent cardiac catheterization yesterday with stent placement. She received 600 mg of Plavix yesterday and 75 mg today. Per RN, patient had reported bloody oral secretions and OG tube was connected to suction with 350 cc maroon-colored output and an additional 150 cc since that time. No bowel movement today. Bival gtt is on hold. Objective:     VITALS:   Last 24hrs VS reviewed since prior progress note. Most recent are:  Visit Vitals  /64 (BP 1 Location: Left arm)   Pulse 80   Temp 100.3 °F (37.9 °C)   Resp 18   Ht 5' 7\" (1.702 m)   Wt 107.5 kg (237 lb)   SpO2 99%   BMI 37.12 kg/m²       PHYSICAL EXAM:  General: Intubated  Neurologic:  Sedated  HEENT: OG tube in place with maroon output, 150 cc in canister  Lungs: On vent  Heart:  S1 S2  Abdomen: Soft, obese, no apparent tenderness, no guarding, no rebound. +Bowel sounds.    Extremities: Warm  Psych:   Unable to assess    Lab Data Reviewed:     Recent Results (from the past 24 hour(s))   POC ACTIVATED CLOTTING TIME    Collection Time: 22  2:19 PM   Result Value Ref Range    Activated Clotting Time (POC) 309 (H) 79 - 138 SECS   POC ACTIVATED CLOTTING TIME    Collection Time: 22  3:01 PM   Result Value Ref Range    Activated Clotting Time (POC) 267 (H) 79 - 138 SECS   MAGNESIUM    Collection Time: 22  4:45 PM   Result Value Ref Range    Magnesium 2.5 (H) 1.6 - 2.4 mg/dL   RENAL FUNCTION PANEL    Collection Time: 22  4:49 PM   Result Value Ref Range    Sodium 144 136 - 145 mmol/L    Potassium 4.0 3.5 - 5.1 mmol/L    Chloride 108 97 - 108 mmol/L    CO2 28 21 - 32 mmol/L    Anion gap 8 5 - 15 mmol/L    Glucose 199 (H) 65 - 100 mg/dL    BUN 72 (H) 6 - 20 MG/DL    Creatinine 2.34 (H) 0.55 - 1.02 MG/DL    BUN/Creatinine ratio 31 (H) 12 - 20      GFR est AA 25 (L) >60 ml/min/1.73m2    GFR est non-AA 21 (L) >60 ml/min/1.73m2    Calcium 9.7 8.5 - 10.1 MG/DL    Phosphorus 2.6 2.6 - 4.7 MG/DL    Albumin 3.6 3.5 - 5.0 g/dL   HGB & HCT    Collection Time: 01/27/22  4:49 PM   Result Value Ref Range    HGB 6.9 (L) 11.5 - 16.0 g/dL    HCT 26.6 (L) 35.0 - 47.0 %   GLUCOSE, POC    Collection Time: 01/27/22  5:25 PM   Result Value Ref Range    Glucose (POC) 195 (H) 65 - 117 mg/dL    Performed by Reese Lopez, ALLOCATE    Collection Time: 01/27/22  6:30 PM   Result Value Ref Range    HISTORY CHECKED?  Historical check performed    POTASSIUM    Collection Time: 01/27/22  6:44 PM   Result Value Ref Range    Potassium 3.7 3.5 - 5.1 mmol/L   MAGNESIUM    Collection Time: 01/27/22  6:44 PM   Result Value Ref Range    Magnesium 2.3 1.6 - 2.4 mg/dL   GLUCOSE, POC    Collection Time: 01/28/22 12:35 AM   Result Value Ref Range    Glucose (POC) 287 (H) 65 - 117 mg/dL    Performed by Brad Sorto    NT-PRO BNP    Collection Time: 01/28/22  3:49 AM   Result Value Ref Range    NT pro-BNP 12,338 (H) <395 PG/ML   METABOLIC PANEL, COMPREHENSIVE    Collection Time: 01/28/22  3:49 AM   Result Value Ref Range    Sodium 143 136 - 145 mmol/L    Potassium 4.0 3.5 - 5.1 mmol/L    Chloride 108 97 - 108 mmol/L    CO2 28 21 - 32 mmol/L    Anion gap 7 5 - 15 mmol/L    Glucose 311 (H) 65 - 100 mg/dL    BUN 77 (H) 6 - 20 MG/DL    Creatinine 2.44 (H) 0.55 - 1.02 MG/DL    BUN/Creatinine ratio 32 (H) 12 - 20      GFR est AA 24 (L) >60 ml/min/1.73m2    GFR est non-AA 20 (L) >60 ml/min/1.73m2    Calcium 9.7 8.5 - 10.1 MG/DL    Bilirubin, total 0.8 0.2 - 1.0 MG/DL    ALT (SGPT) 8 (L) 12 - 78 U/L    AST (SGOT) 20 15 - 37 U/L Alk. phosphatase 79 45 - 117 U/L    Protein, total 7.7 6.4 - 8.2 g/dL    Albumin 3.8 3.5 - 5.0 g/dL    Globulin 3.9 2.0 - 4.0 g/dL    A-G Ratio 1.0 (L) 1.1 - 2.2     DIGOXIN    Collection Time: 01/28/22  3:49 AM   Result Value Ref Range    Digoxin level 1.0 0.90 - 2.00 NG/ML   LACTIC ACID    Collection Time: 01/28/22  3:49 AM   Result Value Ref Range    Lactic acid 0.7 0.4 - 2.0 MMOL/L   PROCALCITONIN    Collection Time: 01/28/22  3:49 AM   Result Value Ref Range    Procalcitonin 0.62 ng/mL   PHOSPHORUS    Collection Time: 01/28/22  3:49 AM   Result Value Ref Range    Phosphorus 1.6 (L) 2.6 - 4.7 MG/DL   LD    Collection Time: 01/28/22  3:49 AM   Result Value Ref Range     (H) 81 - 246 U/L   PTT    Collection Time: 01/28/22  3:49 AM   Result Value Ref Range    aPTT 55.1 (H) 22.1 - 31.0 sec    aPTT, therapeutic range     58.0 - 77.0 SECS   CBC W/O DIFF    Collection Time: 01/28/22  3:49 AM   Result Value Ref Range    WBC 10.9 3.6 - 11.0 K/uL    RBC 3.29 (L) 3.80 - 5.20 M/uL    HGB 8.2 (L) 11.5 - 16.0 g/dL    HCT 29.9 (L) 35.0 - 47.0 %    MCV 90.9 80.0 - 99.0 FL    MCH 24.9 (L) 26.0 - 34.0 PG    MCHC 27.4 (L) 30.0 - 36.5 g/dL    RDW 18.9 (H) 11.5 - 14.5 %    PLATELET 905 505 - 373 K/uL    MPV 9.7 8.9 - 12.9 FL    NRBC 0.0 0  WBC    ABSOLUTE NRBC 0.00 0.00 - 0.01 K/uL   SED RATE (ESR)    Collection Time: 01/28/22  3:49 AM   Result Value Ref Range    Sed rate, automated 5 0 - 30 mm/hr   GLUCOSE, POC    Collection Time: 01/28/22  5:03 AM   Result Value Ref Range    Glucose (POC) 300 (H) 65 - 117 mg/dL    Performed by Dominguez Sanchez    CARBOXYHEMOGLOBIN    Collection Time: 01/28/22  5:15 AM   Result Value Ref Range    Carboxy-Hgb 2.3 (H) 1 - 2 %    Methemoglobin 0.3 0 - 1.4 %    tHb 7.6 (LL) 14 - 17 g/dL    Oxyhemoglobin 65.8 (LL) 94 - 97 %    O2 SATURATION 68 (L) 95 - 99 %    SITE SG      Sample source MIXED VENOUS      Critical value read back Called to Milo Weller RN on 01/28/2022 at 05:20    CARBOXYHEMOGLOBIN Collection Time: 01/28/22  5:18 AM   Result Value Ref Range    Carboxy-Hgb 1.3 1 - 2 %    Methemoglobin 0.1 0 - 1.4 %    tHb 11.2 (L) 14 - 17 g/dL    Oxyhemoglobin 72.7 (LL) 94 - 97 %    O2 SATURATION 74 (L) 95 - 99 %    SITE SG      Sample source MIXED VENOUS      Critical value read back Called to Alejandra MORRIS on 01/28/2022 at 05:22    TROPONIN-HIGH SENSITIVITY    Collection Time: 01/28/22  9:00 AM   Result Value Ref Range    Troponin-High Sensitivity 1,108 (HH) 0 - 51 ng/L   CK W/ CKMB & INDEX    Collection Time: 01/28/22  9:00 AM   Result Value Ref Range    CK - MB 1.2 <3.6 NG/ML    CK-MB Index 2.0 0.0 - 2.5      CK 60 26 - 192 U/L   GLUCOSE, POC    Collection Time: 01/28/22 12:14 PM   Result Value Ref Range    Glucose (POC) 275 (H) 65 - 117 mg/dL    Performed by Olivia Clay        Assessment:   · Acute on chronic iron deficiency anemia, new bloody output from OG tube 1/28: EGD (1/26/22) by Dr. Pierre Díaz: mild linear erythema in the stomach (biopsied). Pathology showed active gastritis, H. Pylori was negative. Colonoscopy (1/26/22) by Dr. Pierre Díaz: 2 polyps (greatest diameter 5 mm) in the cecum - removed; moderate pan-diverticulosis. Pathology showed tubular adenomas. Hgb stable at 8.2 this AM prior to bloody NG tube output.    · Heart failure: status post Impella  · Coronary artery disease: status post LAD stent 1/27/22  · Pulmonary hypertension  · Chronic kidney disease     Patient Active Problem List   Diagnosis Code    Anemia in chronic renal disease N18.9, D63.1    HTN (hypertension) I10    GERD (gastroesophageal reflux disease) K21.9    Gout M10.9    Pulmonary HTN ([address] I27.20    Cardiomyopathy, nonischemic (HCC) I42.8    CKD (chronic kidney disease) N18.9    Dyslipidemia E78.5    ICD (implantable cardioverter-defibrillator), biventricular, in situ Z95.810    Acquired hypothyroidism E03.9    Dysthymia F34.1    Obesity, morbid ([address] E66.01    Type 2 diabetes with nephropathy ([facility] E11.21    Type 2 diabetes mellitus with diabetic neuropathy (HCC) E11.40    CHF exacerbation (HCC) I50.9    Acute respiratory failure with hypoxia (HCC) J96.01     Plan:   · NPO  · PPI BID  · Repeat Hgb now. Trend CBC and transfuse as necessary. · Bival gtt in hold  · Discussed with cardiology - recommended continuation of Plavix and holding aspirin temporarily  · No immediate plan for endoscopic evaluation  · Patient was discussed with Dr. Manpreet Parker     Signed By: Ashley Bush     1/28/2022  1:22 PM       This patient was seen and examined by me in a face-to-face visit today. I reviewed the medical record including lab work, imaging and other provider notes. I confirmed the interval history as described above. I spoke to the patient, however the patient is unable to give a meaningful history. I discussed this case in detail with Seamus GAMBLE. I formulated an updated  assessment of this patient and guided our treatment plan. I agree with the above progress note. I agree with the history, exam and assessment and plan as outlined in the note. I would like to add the following:     Abd: normoactive BS, nt, nd, no rebound/guarding. Bleeding likely from biopsies of the stomach. NGT suction less bloody. Agree with above. High risk for repeat EGD and would only do as last resort. Consider keeping intubated for the next day or 2 in case repeat EGD needed.     Dr. Manpreet Parker

## 2022-01-28 NOTE — PROGRESS NOTES
[facility]  ICU Progress Note    Admit Date: 2022    S/P R Axillary Impella 5.5     Subjective/24 Hour Summary:   Pt seen with Dr. Julienne Pina. impella at P8,  bicarb purge & bival systemically. ANNE-MARIE to LAD yesterday, Roro off. Objective:   Vitals:  Blood pressure 119/73, pulse 80, temperature (!) 100.8 °F (38.2 °C), resp. rate 19, height 5' 7\" (1.702 m), weight 237 lb (107.5 kg), SpO2 100 %. Temp (24hrs), Av.4 °F (36.9 °C), Min:96.6 °F (35.9 °C), Max:101.2 °F (38.4 °C)    Hemodynamics:   CO: CO (l/min): 7.5 l/min   CI: CI (l/min/m2): 3.4 l/min/m2   CVP: CVP (mmHg): 8 mmHg (22 1100)   SVR: SVR (dyne*sec)/cm5: 832 (dyne*sec)/cm5 (22 4222)   PAP Systolic: PAP Systolic: 65 ( 6102)   PAP Diastolic: PAP Diastolic: 25 (54/05/15 3516)   PVR:     SV02: SVO2 (%): 72 % (22 1100)   SCV02:      EKG/Rhythm:  SR    Ventilator Settings:  Mode Rate Tidal Volume Pressure FiO2 PEEP   Assist control   460 ml  8 cm H2O 40 % 5 cm H20     Peak airway pressure: 33 cm H2O    Minute ventilation: 8.38 l/min        Oxygen Therapy:  Oxygen Therapy  O2 Sat (%): 100 % (22 1000)  Pulse via Oximetry: 80 beats per minute (22 0900)  O2 Device: Endotracheal tube;Ventilator (22)  Skin Assessment: Clean, dry, & intact (22)  Skin Protection for O2 Device: Yes (22)  Orientation: Bilateral (22)  Location: Cheek (22)  Interventions: Mouth Care;Reposition Device (22)  O2 Flow Rate (L/min): 6 l/min (weaned) (22 0740)  O2 Temperature: 98.4 °F (36.9 °C) (22 1940)  FIO2 (%): 40 % (22 0818)  ETCO2 (mmHg): 22 mmHg (22 2342)    CXR:  CXR Results  (Last 48 hours)               22 4435  XR CHEST PORT Final result    Impression:  Support lines in expected positions as above with no significant   change from prior and bilateral air space and interstitial opacities.        Narrative:  EXAM: XR CHEST PORT INDICATION: post-Impella       COMPARISON: Chest x-ray 1/27/2022. FINDINGS: A portable AP radiograph of the chest was obtained at 04:59 hours. No   significant change in expected position of endotracheal tube, enteric tube,   right central venous catheter, or Impella device from the right subclavian   approach. Pacemaker generator body projects over the left chest wall with intact   appearing leads traversing in expected course. The patient is on a cardiac   monitor. There is redemonstration of patchy airspace opacities and diffuse   interstitial prominence with no pleural effusion or pneumothorax. The cardiac   and mediastinal contours and pulmonary vascularity are normal.  The bones and   soft tissues are grossly within normal limits. 01/27/22 0508  XR CHEST PORT Final result    Impression:  No significant change. Narrative:  INDICATION: Heart failure, post-Impella       EXAM: CXR Portable. FINDINGS: Portable chest shows satisfactory support lines/devices without   significant change since yesterday. There is no apparent pneumothorax. Lungs   show unchanged relatively severe bilateral airspace disease/edema. Heart size is   stable. There is no midline shift. Admission Weight: Last Weight   Weight: 264 lb 1.8 oz (119.8 kg) Weight: 237 lb (107.5 kg)     Intake / Output / Drain:  Current Shift: 01/28 0701 - 01/28 1900  In: -   Out: 405 [Urine:405]  Last 24 hrs.:     Intake/Output Summary (Last 24 hours) at 1/28/2022 1140  Last data filed at 1/28/2022 1000  Gross per 24 hour   Intake 4410.32 ml   Output 3005 ml   Net 1405.32 ml       EXAM:  General:    NAD                                                                                          Lungs:   Clear to auscultation bilaterally. Incision:  No erythema, drainage or swelling. Heart:  Regular rate and rhythm, S1, S2 normal, no murmur, click, rub or gallop. Abdomen:   Soft, non-tender.  Bowel sounds normal. No masses,  No organomegaly. Extremities:  No edema. PPP. Neurologic: Intubated, sedated     Labs:   Recent Labs     22  0349 22  1649 22  1001 22  0419   WBC 10.9  --   --  7.1   HGB 8.2* 6.9*   < > 7.1*   HCT 29.9* 26.6*   < > 27.2*     --   --  195    < > = values in this interval not displayed. Recent Labs     22  1844 22  16422  1645 22  041     --   --  144  --    < >   K 4.0 3.7   < > 4.0  --    < >     --   --  108  --    < >   CO2 28  --   --  28  --    < >   BUN 77*  --   --  72*  --    < >   CREA 2.44*  --   --  2.34*  --    < >   *  --   --  199*  --    < >   CA 9.7  --   --  9.7  --    < >   MG  --  2.3  --   --  2.5*  --    PHOS 1.6*  --   --  2.6  --    < >    < > = values in this interval not displayed. Recent Labs     22  03422  1649 22  0419 22   AP 79  --   --  70   TP 7.7  --   --  7.6   ALB 3.8 3.6   < > 3.7   GLOB 3.9  --   --  3.9    < > = values in this interval not displayed. Recent Labs     22  03422  1007   APTT 55.1* 56.1*      Recent Labs     22  0456   PHI 7.40   PCO2I 50.4*   PO2I 97   FIO2I 40     Recent Labs     22  0900   CPK 60   CKMB 1.2        Assessment:     Active Problems:    Acute respiratory failure with hypoxia (HCC) (2022)         Plan/Recommendations/Medical Decision Makin. Acute on Chronic Systolic CHF class III/IV (BiV-ICD): S/P right axillary impella 5.5 . Cont bicarb via purge. Cont systemic bival. Per AHF. On ancef for prophylaxis. On digoxin  2. Acute on Chronic Respiratory insufficiency: On home O2. . Vent wean per intensivist. Hopeful to extubate today. 3. CAD: Multivessel CAD on [facility] 22. ASA 81. BB on hold due to cardiogenic shock. Recommend starting high intensity statin when feasible, per primary/HF. S/P ANNE-MARIE to LAD on . On DAPT. Per cardiology.   4. RODNEY on Chronic CKD Stage IV: Renal following. Bumex drip   5. Gout: allopurinol  6. DM2: on NPH Per primary service  7. GERD: Home meds  8. Hypothyroidism: on synthroid  9. Depression: Home meds  10. Anemia: on EPO, stable post op. Dispo: impella functioning well. Cont systemic bival & bicarb via purge. No cardiac surgery issues otherwise. Further HF management per AHF. Remainder of medical management per primary.     Signed By: TYE Stuart

## 2022-01-28 NOTE — PROGRESS NOTES
Dr. Stan Floyd. [phone]            Interventional cardiology Consult/Progress Note      Requesting/referring provider: Inell Dakins, MD     Reason for Consult: possible PCI of 80% LAD lesion    Interim: Off Nitric, PA pressures higher. Opens eyes to verbal stimulus. ,  Febrile today. Suspected GI bleed. Assessment/Plan:  1. CAD, LAD 80% stenosis -Underwent PCI to mid LAD yesterday with placement of 2.75 x 12 mm drug-eluting stent. Contrast was used sparingly with less than 20 cc of usage. Creatinine stable. Overall EF looks about 30% . Patient had possible upper GI bleeding today. Bivalirudin held. Recommend continuation of Plavix monotherapy at least.  Aspirin can be withheld temporarily. Since recent endoscopy had not shown any obvious major source of bleeding, I suspect GI bleed should resolve spontaneously with reduction in antithrombotic/antiplatelet therapy as well as PPIs. 2. Cardiomyopathy: probable acute component: By my assessment, EF now appears about 30%. 3. RODNEY/CKD: Creatinine high and at risk of dialysis however only small amount of dye(20 cc) used. We will watch carefully for any RODNEY. Overall his renal function has improved over the past 3 to 4 days. .  4.  GI bleed: Okay to hold off aspirin, bivalirudin. Continue Plavix monotherapy. We will have Dr. Linda Freeman continue follow-up from here    [x]    High complexity decision making was performed  [x]    Patient is at high-risk of decompensation with multiple organ involvement  [x]    Complex/difficult social determinants of health outcomes    Investigations personally reviewed and interpreted  Tele- AV pacing    Echo 1/20/22-    Left Ventricle: Limited study for impella position. Left ventricle is severely dilated. Severe global hypokinesis present. The EF by visual approximation is 20 - 25%. Impella catheter is present. 5.9 cm from AV.     Right Ventricle: Right ventricle is moderately dilated.     Left and right heart cath 1/17/22-  1. Severely elevated left and right-sided filling pressures  2. Severe pulmonary hypertension: Mixed pattern with elevated wedge as well as PVR of about 6 Woods units. 3.  Severe one-vessel, moderate one-vessel coronary artery disease. Mid LAD lesion is the worst which is about 80% involving diagonal branch ostium. Uncertain if CAD alone as a cause of her cardiomyopathy(probably less likely). Likely prior stent in mid LAD which is patent  4.  Reduced cardiac index of 1.65 L/min/m² by thermodilution and 1.6 L/min/m² by presumed O2 consumption by Aye     Access right radial, right internal jugular ultrasound-guided no issues  Contrast 15 cc. Limited pictures were taken due to underlying CKD     Recommendations  1. Guideline directed medical therapy for heart failure and coronary artery disease  2. Will defer to discussion between Dr. Whitney Krishnamurthy, nephrology and heart failure if they like to consider revascularization. Uncertain if LAD revascularization will lead to improvement in functional status or EF at this time. Nonetheless can be achieved with less than 20 to 30 cc of contrast.      Investigations reviewed    HPI: Traci Muhammad, a 78y.o. year-old who is seen for evaluation of HFrEF and CAD with 80% stenosis of LAD. She is an [place] American female with a history of NICM, chronic hypoxic respiratory failure secondary to pulmonary HTN, hypothyroidism, CKD, GERD, and DM II who presented to [facility] as a transfer from another facility for acute on chronic hypoxic respiratory failure. Upon arrival to the ED she was found to have O2 sats in the 70s, requiring 4L NC O2.  Rapid covid test was negative.  ProNT-BNP was 84976, K+5.2, BUN/CR x/2.54 and elevated d-dimer. Chest xray showing pulmonary edema vs. Atypical pneumonia. VQ scan showed low probability for PE. Per Dr. Josiah Stroud, LVEF 15-20% during recent admission.   LVEF previously normalized with CRT.  NYHA III-IV.  No ACEi/ARB due to renal dysfunction.  GDMT dosing limited by low BP. She  has a past medical history of Acquired hypothyroidism (8/15/2016), Anemia, Asthma, Cardiomyopathy, nonischemic ([address] CKD (chronic kidney disease), CKD (chronic kidney disease) (8/15/2012), Depression, Diabetes ([address] Diabetic neuropathy ([address] DM (diabetes mellitus) ([address] (8/15/2012), GERD (gastroesophageal reflux disease), Gout, Hypothyroidism, ICD (implantable cardioverter-defibrillator), biventricular, in situ (6/5/2014), and Psoriasis. She has no past medical history of Abuse, Adult physical abuse, Arrhythmia, Arthritis, Asthma, Autoimmune disease ([address] CAD (coronary artery disease), Calculus of kidney, Cancer ([address] Chronic pain, COPD, Headache(784.0), Hypercholesteremia, Liver disease, Psychotic disorder ([address] PUD (peptic ulcer disease), Seizures ([address] Stroke ([address] Thromboembolus ([address] or Unspecified deficiency anemia. Review of system:Patient intubated and sedated, on Impella. Family History   Problem Relation Age of Onset    Heart Disease Mother     Hypertension Mother     Lupus Sister     Diabetes Brother       Social History     Socioeconomic History    Marital status:    Tobacco Use    Smoking status: Never Smoker    Smokeless tobacco: Never Used   Substance and Sexual Activity    Alcohol use: No    Drug use: No    Sexual activity: Never   Social History Narrative    . Nonsmoker. Disability      PE  Vitals:    01/28/22 1200 01/28/22 1300 01/28/22 1400 01/28/22 1500   BP: 106/64      Pulse: 80 80 80 87   Resp: 13 18 19 26   Temp: 100.3 °F (37.9 °C)      SpO2: 99% 99% 99% 93%   Weight:       Height:        Body mass index is 37.12 kg/m². General:    Alert, cooperative, no distress. Psychiatric:    Normal Mood and affect    Eye/ENT:      Pupils equal, No asymmetry, Conjunctival pink.  Able to hear voice at normal amplitude   Lungs:      Visibly symmetric chest expansion, No palpable tenderness. Clear to auscultation bilaterally. Heart[de-identified]    Regular rate and rhythm, S1, S2 normal, no murmur, click, rub or gallop. No JVD, Normal palpable peripheral pulses. No cyanosis   Abdomen:     Soft, non-tender. Bowel sounds normal. No masses,  No      organomegaly. Extremities:   Extremities normal, atraumatic, no edema. Neurologic:   CN II-XII grossly intact.  No gross focal deficits           Recent Labs:  Lab Results   Component Value Date/Time    Cholesterol, total 151 01/11/2022 05:13 AM    HDL Cholesterol 60 01/11/2022 05:13 AM    LDL, calculated 75.8 01/11/2022 05:13 AM    Triglyceride 151 (H) 01/24/2022 04:18 AM    CHOL/HDL Ratio 2.5 01/11/2022 05:13 AM     Lab Results   Component Value Date/Time    Creatinine (POC) 2.1 (H) 01/23/2013 04:12 PM    Creatinine 2.44 (H) 01/28/2022 03:49 AM     Lab Results   Component Value Date/Time    BUN 77 (H) 01/28/2022 03:49 AM    BUN (POC) 36 (H) 01/23/2013 04:12 PM     Lab Results   Component Value Date/Time    Potassium 4.0 01/28/2022 03:49 AM     Lab Results   Component Value Date/Time    Hemoglobin A1c 7.7 (H) 01/11/2022 05:13 AM    Hemoglobin A1c, External 9.0 12/18/2015 12:00 AM     Lab Results   Component Value Date/Time    Hemoglobin (POC) 10.9 (L) 01/23/2013 04:12 PM    HGB 8.2 (L) 01/28/2022 03:49 AM     Lab Results   Component Value Date/Time    PLATELET 958 88/40/7909 03:49 AM       Reviewed:  Past Medical History:   Diagnosis Date    Acquired hypothyroidism 8/15/2016    Anemia     RED-HF study    Asthma     Cardiomyopathy, nonischemic ([facility]     initial dx 2001, bivHF 2008 with EF 15%, s/p biV-ICD 9/08, significant improvment in EF to 45-50%    CKD (chronic kidney disease)     Dr Evette Munguia    CKD (chronic kidney disease) 8/15/2012    Depression     Diabetes ([facility]     Diabetic neuropathy ([facility]     DM [facility]di[facility] mellitus) ([facility] 8/15/2012    GERD (gastroesophageal reflux disease)     Gout     Hypothyroidism     ICD (implantable cardioverter-defibrillator), biventricular, in situ 6/5/2014    Psoriasis      Social History     Tobacco Use   Smoking Status Never Smoker   Smokeless Tobacco Never Used     Social History     Substance and Sexual Activity   Alcohol Use No     Allergies   Allergen Reactions    Ciprofloxacin Anaphylaxis    Shellfish Derived Anaphylaxis    Sildenafil Other (comments)    Ace Inhibitors Unknown (comments)    Biaxin [Clarithromycin] Other (comments)     Metal taste    Candesartan Cough    Pcn [Penicillins] Hives     Family History   Problem Relation Age of Onset    Heart Disease Mother     Hypertension Mother     Lupus Sister     Diabetes Brother         Current Facility-Administered Medications   Medication Dose Route Frequency    allopurinoL (ZYLOPRIM) tablet 100 mg  100 mg Oral DAILY    insulin NPH (NOVOLIN N, HUMULIN N) injection 30 Units  30 Units SubCUTAneous QHS    cefepime (MAXIPIME) 2 g in sterile water (preservative free) 10 mL IV syringe  2 g IntraVENous Q24H    pantoprazole (PROTONIX) injection 40 mg  40 mg IntraVENous Q12H    And    [START ON 1/29/2022] sodium chloride (NS) flush 10 mL  10 mL IntraVENous DAILY    sodium chloride (NS) flush 5-40 mL  5-40 mL IntraVENous PRN    clopidogreL (PLAVIX) tablet 75 mg  75 mg Oral DAILY    digoxin (LANOXIN) tablet 0.0625 mg  0.0625 mg Oral EVERY OTHER DAY    insulin NPH (NOVOLIN N, HUMULIN N) injection 25 Units  25 Units SubCUTAneous DAILY    bumetanide (BUMEX) 0.25 mg/mL infusion  0.5 mg/hr IntraVENous CONTINUOUS    DOBUTamine (DOBUTREX) 500 mg/250 mL (2,000 mcg/mL) infusion  0-10 mcg/kg/min IntraVENous TITRATE    albumin human 25% (BUMINATE) solution 12.5 g  12.5 g IntraVENous DAILY    milrinone (PRIMACOR) 20 MG/100 ML D5W infusion  0.125 mcg/kg/min IntraVENous CONTINUOUS    vasopressin (VASOSTRICT) 20 Units in 0.9% sodium chloride 100 mL infusion  0-0.1 Units/min IntraVENous TITRATE    PHENYLephrine (NORMA-SYNEPHRINE) 30 mg in 0.9% sodium chloride 250 mL infusion   mcg/min IntraVENous TITRATE    niCARdipine (CARDENE) 25 mg in 0.9% sodium chloride 250 mL infusion  0-15 mg/hr IntraVENous TITRATE    [Held by provider] bivalirudin (ANGIOMAX) 250 mg in 0.9% sodium chloride (MBP/ADV) 50 mL MBP  0.02-2.5 mg/kg/hr IntraVENous TITRATE    hydrALAZINE (APRESOLINE) 20 mg/mL injection 5 mg  5 mg IntraVENous Q4H PRN    bumetanide (BUMEX) injection 1 mg  1 mg IntraVENous Q4H PRN    fentaNYL (PF) 1,500 mcg/30 mL (50 mcg/mL) infusion  0-200 mcg/hr IntraVENous TITRATE    heparin (porcine) in 0.9% NaCl 30,000 unit/1,000 mL perfusion irrigation 50-1,000 mL  50-1,000 mL Other PRN    chlorhexidine (PERIDEX) 0.12 % mouthwash 15 mL  15 mL Oral Q12H    0.9% sodium chloride infusion  9 mL/hr IntraVENous CONTINUOUS    naloxone (NARCAN) injection 0.4 mg  0.4 mg IntraVENous PRN    ondansetron (ZOFRAN) injection 4 mg  4 mg IntraVENous Q4H PRN    albuterol (PROVENTIL VENTOLIN) nebulizer solution 2.5 mg  2.5 mg Nebulization Q4H PRN    aspirin chewable tablet 81 mg  81 mg Oral DAILY    midazolam (VERSED) injection 1 mg  1 mg IntraVENous Q1H PRN    magnesium oxide (MAG-OX) tablet 400 mg  400 mg Oral BID    calcium chloride 1 g in 0.9% sodium chloride 100 mL IVPB  1 g IntraVENous PRN    bisacodyL (DULCOLAX) suppository 10 mg  10 mg Rectal DAILY PRN    senna-docusate (PERICOLACE) 8.6-50 mg per tablet 1 Tablet  1 Tablet Oral BID    ELECTROLYTE REPLACEMENT NOTE: Nurse to review Serum Potassium and Magnesuim levels and Initiate Electrolyte Replacement Protocol as needed  1 Each Other PRN    magnesium sulfate 1 g/100 ml IVPB (premix or compounded)  1 g IntraVENous PRN    alteplase (CATHFLO) 1 mg in sterile water (preservative free) 1 mL injection  1 mg InterCATHeter PRN    bacitracin 500 unit/gram packet 1 Packet  1 Packet Topical PRN    dexmedeTOMidine in 0.9 % NaCl (PRECEDEX) 400 mcg/100 mL (4 mcg/mL) infusion soln 0.1-1.5 mcg/kg/hr IntraVENous TITRATE    midazolam (VERSED) injection 1 mg  1 mg IntraVENous Q4H PRN    insulin lispro (HUMALOG) injection   SubCUTAneous Q6H    propofol (DIPRIVAN) 10 mg/mL infusion  0-50 mcg/kg/min IntraVENous TITRATE    sodium bicarbonate (8.4%) 25 mEq in dextrose 5% 1,000 mL - impella purge fluid   IntraVENous TITRATE    triamcinolone acetonide (KENALOG) 0.1 % cream   Topical BID    polyethylene glycol (MIRALAX) packet 17 g  17 g Oral DAILY    [Held by provider] epoetin isabel-epbx (RETACRIT) injection 20,000 Units  20,000 Units SubCUTAneous Q TUE, THU & SAT    montelukast (SINGULAIR) tablet 10 mg  10 mg Oral DAILY    levothyroxine (SYNTHROID) tablet 100 mcg  100 mcg Oral Once per day on Mon Tue Wed Thu Fri Sat    hydroxypropyl methylcellulose (ISOPTO TEARS) 0.5 % ophthalmic solution 1 Drop  1 Drop Both Eyes PRN    venlafaxine-SR (EFFEXOR-XR) capsule 75 mg  75 mg Oral DAILY WITH BREAKFAST    gabapentin (NEURONTIN) capsule 100 mg  100 mg Oral QHS    arformoteroL (BROVANA) neb solution 15 mcg  15 mcg Nebulization BID RT    And    budesonide (PULMICORT) 500 mcg/2 ml nebulizer suspension  500 mcg Nebulization BID RT    sodium chloride (NS) flush 5-40 mL  5-40 mL IntraVENous Q8H    acetaminophen (TYLENOL) tablet 650 mg  650 mg Oral Q6H PRN    Or    acetaminophen (TYLENOL) suppository 650 mg  650 mg Rectal Q6H PRN    glucose chewable tablet 16 g  4 Tablet Oral PRN    dextrose (D50W) injection syrg 12.5-25 g  25-50 mL IntraVENous PRN    glucagon (GLUCAGEN) injection 1 mg  1 mg IntraMUSCular PRN       Michael Herrera MD01/28/22     ATTENTION:   This medical record was transcribed using an electronic medical records/speech recognition system. Although proofread, it may and can contain electronic, spelling and other errors. Corrections may be executed at a later time. Please feel free to contact us for any clarifications as needed.     [facility] [place]  [address], [address],  [place], [place]. [phone]

## 2022-01-28 NOTE — PROGRESS NOTES
Comprehensive Nutrition Assessment    Type and Reason for Visit: Reassess    Nutrition Recommendations/Plan:      If extubated advance PATRICIA after swallow evaluation    If unable to extubate-consider resuming EN (see goal below) per GI    Nutrition Assessment:    Pt admitted d/t Acute respiratory failure with hypoxia. PMHx: NICM, pHTN, CKD, Hypothyroidism, GERD, DM 2. Acute decompensation of heart failure and acute renal failure on admission. Noted massive volume overload>25#. RODNEY on CKD IV-no need for RRT. Cr stable; good UOP on Bumex drip. Impella placed 1/18. PCI to LAD yesterday; ANNE-MARIE placed. EF up to 30%-good heart recovery. No longer needs LVAD/dual organ transplant. ?GI bleed today. S/P EGD and colonoscopy 1/26 as part of LVAD workup. GI re-consulted. Working towards extubation (?today). Tube feeding has been on hold since the 25th; was not resumed yesterday d/t heart catheterization and then CT abdomen/pelvis today. Unfortunately RN noted maroon colored output from NGT this afternoon. BG much higher in the past 24 hr-up to 300 mg/dl earlier this morning. A second dose of NPH ordered today. Phosphorus replaced today. Magnesium being maintained WNL with daily supplementation. BUN and Cr slowly trending up-suspect 2/2 diuresis. Weight down 5 kg in the past week; less edematous. Hopefully able to extubate today and advance diet as tolerated. If not extubated resume enteral feeding once GIB resolved. Suggested tube feeding goal off propofol: Two Preet HN @ 35 ml/hr with 2 packets Prosource daily and 50 ml water flush q 6 hr. This will provide 735 ml, 1575 calories, 159 gm CHO, 91 gm protein and 830 ml free water (tube feeding/flush) per day to meet estimated needs. Adjust flush prn.     Malnutrition Assessment:  Malnutrition Status:  No malnutrition    Context:  Acute illness       Nutritionally Significant Medications:   Propofol, Fentanyl, Albumin, Humalog, NPH bid, Synthroid, magnesium oxide, Protonix, Miralax, Pericolace, sodium phosphate, Bicarb drip in D5 @ 8 ml/hr    Estimated Daily Nutrient Needs:  Energy (kcal): 0249-6100 (14-17 kcal/kg); Weight Used for Energy Requirements: Current (107.5 kg)   Protein (g):  (1.5-1.8g/kg IBW); Weight Used for Protein Requirements: Ideal  Fluid (ml/day): Method Used for Fluid Requirements: 1 ml/kcal    Nutrition Related Findings:       BM: 1/26  Edema: Trace generalized, BUE, BLE  Wounds:  None       Current Nutrition Therapies:   Diet: NPO  Tube Feeding (on hold): Two Preet HN @ 20 ml/hr with 50 ml water flush q 6 hr and 2 packets Prosource bid  Additional Caloric Sources:  Propofol weaned off    Anthropometric Measures:  · Height:  5' 7\" (170.2 cm)  · Current Body Wt:  107.5 kg (236 lb 15.9 oz)   · Admission Body Wt:  264 lb 1.8 oz    · Ideal Body Wt:  135:  175.6 %   · BMI Categories:  Obese class 2 (BMI 35.0-39. 9)     Wt Readings from Last 10 Encounters:   01/28/22 107.5 kg (237 lb)   12/22/21 115.8 kg (255 lb 6.4 oz)   12/15/21 116.3 kg (256 lb 6.4 oz)   12/13/21 116.1 kg (256 lb)   11/01/21 115.8 kg (255 lb 6.4 oz)   07/14/21 120.1 kg (264 lb 12.8 oz)   07/13/21 118.4 kg (261 lb)   05/26/21 119.7 kg (263 lb 12.8 oz)   11/04/20 119.7 kg (264 lb)   02/20/20 116.6 kg (257 lb)       Nutrition Diagnosis:   · Inadequate protein-energy intake related to altered GI function as evidenced by  (tube feeding held for procedures and new GIB.)    Nutrition Interventions:   Food and/or Nutrient Delivery: Start tube feeding vs oral diet  Nutrition Education and Counseling: No recommendations at this time  Coordination of Nutrition Care: Continue to monitor while inpatient,Interdisciplinary rounds    Goals:  Resume nutrition support (EN vs PN) in next 24 hr if unable to extubate and advance diet.        Nutrition Monitoring and Evaluation:   Behavioral-Environmental Outcomes: None identified  Food/Nutrient Intake Outcomes: Enteral nutrition intake/tolerance,Parenteral nutrition intake/tolerance  Physical Signs/Symptoms Outcomes: Biochemical data,Fluid status or edema,Hemodynamic status,Weight,GI status    Discharge Planning:     Too soon to determine     Parley Skiff, RD CNSC  Contact: [employer]

## 2022-01-28 NOTE — PROGRESS NOTES
Spiritual Care Assessment/Progress Note  [facility]      NAME: Clementina Siegel      MRN: [id number]  AGE: 78 y.o. SEX: female  Alevism Affiliation: Non Gnosticist   Language: English     1/28/2022     Total Time (in minutes): 5     Spiritual Assessment begun in [facility] through conversation with:         []Patient        [] Family    [] Friend(s)        Reason for Consult: Palliative Care, Initial/Spiritual Assessment     Spiritual beliefs: (Please include comment if needed)     [] Identifies with a ariel tradition:         [] Supported by a ariel community:            [] Claims no spiritual orientation:           [] Seeking spiritual identity:                [] Adheres to an individual form of spirituality:           [x] Not able to assess:                           Identified resources for coping:      [] Prayer                               [] Music                  [] Guided Imagery     [] Family/friends                 [] Pet visits     [] Devotional reading                         [x] Unknown     [] Other:                                              Interventions offered during this visit: (See comments for more details)    Patient Interventions: Initial visit           Plan of Care:     [] Support spiritual and/or cultural needs    [] Support AMD and/or advance care planning process      [] Support grieving process   [] Coordinate Rites and/or Rituals    [] Coordination with community clergy   [] No spiritual needs identified at this time   [] Detailed Plan of Care below (See Comments)  [] Make referral to Music Therapy  [] Make referral to Pet Therapy     [] Make referral to Addiction services  [] Make referral to [facility]  [] Make referral to Spiritual Care Partner  [] No future visits requested        [x] Contact Spiritual Care for further referrals     Comments: Attempted Initial Spiritual Assessment for this pt in [facility] 4222.  Reviewed pt's chart prior to this visit. Pt was unable to be assessed at this time. No family/friends present at time of visit. Contact Spiritual Care Services for any spiritual or emotional support needs. Princess Mendiola MDiv.  Staff   Request  Support/Spiritual Care Services on [phone])

## 2022-01-28 NOTE — PROGRESS NOTES
[facility] in [place], [place]  Inpatient Progress Note      Patient name: Ana María Soliz  Patient : 1955  Patient MRN: [id number]  Consulting MD: Stephanie Garcia MD  Date of service: 22    REASON FOR REFERRAL:  Management of cardiogenic shock     PLAN OF CARE:   77 y/o female with chronic renal failure and new onset severe cardiomyopathy, LVEF 15% (diagnosed 21), stage D, NYHA class IV; admitted for pulmonary edema and severe volume overloadd by RHC  Most likely etiology of acute deterioration of LVEF is chronic volume overload with compensatory tachycardia in the setting of progressive renal failure +/- coronary artery disease (80% LAD)   Patient requested aggressive medical approach to allow her heart to recover or receive heart/kidney transplantation, before considering hospice  D/w patient, her family, CT surgery, nephrology and primary cardiologist plan to support her with Impella as bridge to LV recovery or transplant with the following anticipated outcomes:  1/3 chance of LV recovery and discharge home on medical therapy or chronic dialysis  1/3 chance of LV non-recovery on Impella, evaluation and transfer for listing for heart transplant/kidney  1/3 chance of LV non-recovery and evaluation that reveals patient is not candidate for LVAD/heart/kidney transplant and then wean of support to comfort care/hospice  Impella 5.5 implanted by Dr. Keira Puentes  and patient underwent aggressive diuresis with normalization of filling pressures and improvement of LV function ranging between 30-35% to 25-30%, s/p PCI to LAD today followed by Impella wean after extubation; appears to have recovered heart function and unlikely will need heart/kidney transplant.   In case of failure of Impella wean, patient's only contraindication to transplant is active infection requiring antibiotic use; otherwise there are no apparent contraindications; [facility] will accept patient on integrillin drip if LV does not recover after revascularization and on dialysis if needed for volume management; anticipated waiting time at BMI 38 and blood type AB for hear/kidney transplant is currently approximately 10 days; d/w Dr. Nalini Gold from [facility]. RECOMMENDATIONS:  POD # 9 Impella 5.5 implant   Cont P8 for now, will plan on weaning Impella after extubation for improved EF 30%  Vent management per Intensivist- plant to wean to extubate    Repeat TTE today  Maintain PAC for hemodynamic optimization; goal CI > 2.3, CVP 8-10 mmHg SVR 1000, SBP < 110 mmHg (via radial arterial line)   Intolerant to sildenafil due to marked hypotension   Decrease Bumex gtt to 0.5mg/hr; goal net even   Keep K> 4 and Mg >2  Hydralazine 5 mg IV q4h PRN SBP > 110 mmHg   Intolerant of GDMT due to hemodynamic in stability   BiV-ICD programmed at 80 bpm; device is at EOL, recommend to delay ICD generator change until final plans for transplant are established - Per Dr. Shalini Huerta patient is not pacer dependent  Place defib patches on patient for ppx  Cont digoxin 0.0625 mg to every other day; goal 0.7-1.2   Allopurinol 50mg daily per nephrology  S/p Venofer 200 mg IV x 2   CT head pending  CTchest/abd/pelvis w/o contrast for txp eval- no acute abnormalities, no masses  GI scopes complete, + tubular adenomas,  no H.  Pylori,   Continue Plavix for LAD stent  Epo level elevated- hold epogen and follow levels  Blood cultures NGTD  Sputum with E Coli- changed to cefepime   pTT goal  40-60 due to persistent anemia on bivalirudin   Will need OP PSG  Will offer family testing for VUS x 2 as OP  Palliative consult appreciated   Nutritionist consult  Heart failure education   Advanced care plan present on file       All other care per primary team     IMPRESSION:  Volume overload  Acute on Chronic systolic heart failure, requiring Impella 5.5 implant 1/18/22   Stage D, NYHA class IV symptoms  Non-ischemic cardiomyopathy, LVEF 15%  PYP equivocal for amyloid   CAD with 80% LAD lesion s/p PCI 1/27/22  Pulmonary hypertension, severe  RV failure  S/p BiV-ICD  Cardiac risk factors:  HL  DM2  Morbid obesity, BMI 40  [employer] 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 Important upcoming milestones or family events: none  The patient identifies the following as important for living well: remaining idependent and mobile; being able to get out of the house with . Patient verbalized willingness to be on home milrinone and evaluation for both heart and kidney transplants. Verbalizes she would have family supporting her decision on this. SHAMIKA Dooley is a 78 y.o.  female with a history of NICM, chronic hypoxic respiratory failure secondary to pulmonary HTN, hypothyroidism, CKD, GERD, and DM II who presented to [facility] as a transfer from another facility for acute on chronic hypoxic respiratory failure. Reports running out of O2 at home resulting in SOB and dizziness. Upon arrival to the ED she was found to have O2 sats in the 70s, requiring 4L NC O2. Rapid covid test was negative. ProNT-BNP was 96223, K+5.2, BUN/CR x/2.54 and elevated d-dimer. Chest xray showing pulmonary edema vs. Atypical pneumonia. VQ scan showed low probability for PE. Per Dr. Nury Aguilar, NICM, LVEF 15-20% during recent admission. LVEF previously normalized with CRT. NYHA III-IV. No ACEi/ARB due to renal dysfunction. GDMT dosing limited by low BP.      Patient's PCP is Dr. Rodríguez Uribe, and she sees Dr. Nury Aguilar primarily for cardiac care due to Dr. Yesenia Andres transfering practice. Patient historically seen by nephrology but has not had follow up care in the past three years. CARDIAC IMAGING:  Echo 1/20/22 - LVEF 20-25%, RV moderately dilated, Impella catheter 5.9 cm from AV   Echo 12/8/21- LVEF 15-20%, mild to Mod MR, LVIDd 5.09cm, TAPSE 1.94cm  Echo 4/22/19- LVEF 60%, trace MR,  LVIDd 4.24cm, TAPSE 1.65cm   Echo 4/24/18- LVEF 60%, trivial MR, LVIDd 4.79cm, TAPSE 2.25cm      EKG- 1/4/22 ST with A sense and V paced rhythm     [facility] 2015- No significant CAD  NST 2014- reversible LAD involvement      ICD interrogation     HEMODYNAMICS:  [facility] 1/17/21: PAP 83/52 mmHg, RAP 27 mmHg, PCWP 45 mmHg, CI 1.6  [facility] 12/10/21: PAP 76/48/57, RAP 20, PCWP 35, CI 2.26     CPEST not done  6MW not done     OTHER IMAGING:  CXR Results  (Last 48 hours)                             01/13/22 1035   XR CHEST PORT Final result      Impression:   No significant change in congestion and interstitial and alveolar   opacities which may reflect edema or infectious infiltrate. Narrative:   EXAM: XR CHEST PORT       INDICATION: pul edema       COMPARISON: Chest x-ray 1/10/2022. FINDINGS: A portable AP radiograph of the chest was obtained at 10:19 hours. The   patient is on a cardiac monitor. The lungs appear grossly stable with congestion   and interstitial/airspace opacities with no pneumothorax or pleural effusion. Right PICC line traverses expected course of tip in the region of the atriocaval   junction. Pacemaker-ICD generator body projects over the left chest wall with   intact appearing leads traversing in expected course. . The cardiac and   mediastinal contours and pulmonary vascularity are normal.  Atherosclerotic   calcifications affect the aortic arch. The chest wall structures and visualized   upper abdomen show no acute findings with incidental note of degenerative spine   and shoulder changes.                       CT Results (most recent):  Results from [facility] Encounter encounter on 01/04/22    CT ABD PELV WO CONT    Narrative  EXAM: CT ABD PELV WO CONT, CT CHEST WO CONT    INDICATION: LVAD/TRANSPLANT WORK UP    COMPARISON: Chest x-ray of earlier today, abdominal ultrasound 1/22/2022, CT  abdomen pelvis 1/16/2018. TECHNIQUE:  5 mm axial images were obtained through the chest, abdomen, and  pelvis. Oral and IV contrast were not administered. Coronal and sagittal  reconstructions were generated. CT dose reduction was achieved through use of a  standardized protocol tailored for this examination and automatic exposure  control for dose modulation. FINDINGS:    THYROID: No nodule. MEDIASTINUM: No mass or lymphadenopathy. ZEENAT: No mass or lymphadenopathy. THORACIC AORTA: Atherosclerotic calcination without aneurysm. MAIN PULMONARY ARTERY: Normal in caliber. TRACHEA/BRONCHI: Endotracheal tube in expected position. Patent. ESOPHAGUS: No dilation or wall thickening. Indwelling enteric tube traverses  length of esophagus to extend into the stomach. HEART: Impella device, pacemaker leads, and coronary artery calcifications noted  without cardiac enlargement or pericardial effusion. PLEURA: No effusion or pneumothorax. LUNGS: Patchy areas of focal airspace opacity and ground glass opacity with  linear posterior and bibasilar atelectasis. No nodule or mass. LIVER: No mass or biliary dilation. GALLBLADDER: Unremarkable. SPLEEN: Borderline enlarged with no focal lesion. PANCREAS: No mass or ductal dilation. ADRENALS: Unremarkable. KIDNEYS: No significant change in 10 mm left renal pelvis calculus without  hydronephrosis. Exophytic lateral interpolar right renal cyst and anterior  interpolar left renal cyst redemonstrated. No other calculi or solid renal mass  evident. STOMACH: Indwelling enteric tube. Otherwise unremarkable  SMALL BOWEL: No dilatation or wall thickening. COLON: No dilatation or wall thickening. APPENDIX: Unremarkable.   PERITONEUM: No ascites or pneumoperitoneum. RETROPERITONEUM: Atherosclerotic calcification without aneurysm. No enlarged  lymphadenopathy. REPRODUCTIVE ORGANS: Coarse calcifications compatible with uterine leiomyoma  with otherwise unremarkable appearance to uterus and ovaries. URINARY BLADDER: Collapsed around Henley catheter and balloon. BONES: Degenerative spine change. No acute fracture or aggressive lesion. ADDITIONAL COMMENTS: N/A    Impression  1. Groundglass and scattered focal airspace opacities within the lungs  concerning for possible pneumonic infiltrates. 2. Support lines as above. 3. No acute intra-abdominal process with redemonstration of nonobstructing left  renal pelvis 10 mm stone and additional incidentals as above. PHYSICAL EXAM:  Visit Vitals  Visit Vitals  BP (!) 141/82 (BP 1 Location: Left arm, BP Patient Position: Other (Comment))   Pulse 79   Temp (!) 100.8 °F (38.2 °C)   Resp 12   Ht 5' 7\" (1.702 m)   Wt 237 lb (107.5 kg)   SpO2 100%   BMI 37.12 kg/m²          Hemodynamics:   CO: CO (l/min): 5.9 l/min   CI: CI (l/min/m2): 2.7 l/min/m2   CVP: CVP (mmHg): 9 mmHg (01/28/22 0900)   SVR: SVR (dyne*sec)/cm5: 868 (dyne*sec)/cm5 (gave hydralazine) (01/28/22 3866)   PAP Systolic: PAP Systolic: 63 (31/23/12 7513)   PAP Diastolic: PAP Diastolic: 20 (02/11/01 9749)   PVR:     SV02: SVO2 (%):  (not connected, at CT) (01/28/22 1000)   SCV02:      Impella 5.5  P-8  Flow: 4.3lpm   Purge flow 7.5    Physical Exam  Physical Exam  Vitals and nursing note reviewed. Constitutional:       Comments: Intubated and sedated    Cardiovascular:      Rate and Rhythm: Normal rate and regular rhythm. Pulses: Normal pulses. Heart sounds: Normal heart sounds. Pulmonary:      Breath sounds: Decreased air movement present. Comments: Intubated   Abdominal:      General: There is no distension. Palpations: Abdomen is soft. Musculoskeletal:         General: No swelling.    Skin:     General: Skin is warm and dry.          ROS unable to obtain due to patient condition (intubated, sedated). PAST MEDICAL HISTORY:  Past Medical History:   Diagnosis Date    Acquired hypothyroidism 8/15/2016    Anemia     RED-HF study    Asthma     Cardiomyopathy, [facility]ischemic [facility]     initial dx 2001, bivHF 2008 with EF 15%, s/p biV-ICD 9/08, significant improvment in EF to 45-50%    CKD (chronic kidney disease)     Dr Maci Mark    CKD (chronic kidney disease) 8/15/2012    Depression     Diabetes ([facility]     Diabetic neuropathy (HCC)     DM (diabetes mellitus) ([facility] 8/15/2012    GERD (gastroesophageal reflux disease)     Gout     Hypothyroidism     ICD (implantable cardioverter-defibrillator), biventricular, in situ 6/5/2014    Psoriasis        PAST SURGICAL HISTORY:  Past Surgical History:   Procedure Laterality Date    CARDIAC CATHETERIZATION  2007; 01/06/15    normal cors    COLONOSCOPY N/A 1/26/2022    COL[facility]arry [facility]    ECHO 2D ADULT  4/2010    EF 45%, improved from 1/09 (25%)    ECHO 2D ADULT  11/2011    LVH, EF 55-60%    HX ORTHOPAEDIC      knee    HX PACEMAKER PLACEMENT      AICD    STRESS TEST LEXISCAN/CARDIOLITE  3/21/12    normal perfusion, global HK 40%       FAMILY HISTORY:  Family History   Problem Relation Age of Onset    Heart Disease Mother     Hypertension Mother     Lupus Sister     Diabetes Brother        SOCIAL HISTORY:  Social History     Socioeconomic History    Marital status:    Tobacco Use    Smoking status: Never Smoker    Smokeless tobacco: Never Used   Substance and Sexual Activity    Alcohol use: No    Drug use: No    Sexual activity: Never   Social History Narrative    . Nonsmoker. Disability       LABORATORY RESULTS:     Labs Latest Ref Rng & Units 1/28/2022 1/27/2022 1/27/2022 1/27/2022 1/27/2022 1/27/2022 1/26/2022   WBC 3.6 - 11.0 K/uL 10.9 - - - 7.1 - -   RBC 3.80 - 5.20 M/uL 3.29(L) - - - 2.95(L) - -   Hemoglobin 11.5 - 16.0 g/dL 8. 2(L) - 6. 9(L) 7.0(L) 7. 1(L) - -   Hematocrit 35.0 - 47.0 % 29. 9(L) - 26. 6(L) 27. 1(L) 27. 2(L) - -   MCV 80.0 - 99.0 FL 90.9 - - - 92.2 - -   Platelets 992 - 099 K/uL 210 - - - 195 - -   Lymphocytes 12 - 49 % - - - - - - -   Monocytes 5 - 13 % - - - - - - -   Eosinophils 0 - 7 % - - - - - - -   Basophils 0 - 1 % - - - - - - -   Albumin 3.5 - 5.0 g/dL 3.8 - 3.6 - 3.7 - -   Calcium 8.5 - 10.1 MG/DL 9.7 - 9.7 - 9.7 - -   Glucose 65 - 100 mg/dL 311(H) - 199(H) - 103(H) - -   BUN 6 - 20 MG/DL 77(H) - 72(H) - 70(H) - -   Creatinine 0.55 - 1.02 MG/DL 2.44(H) - 2.34(H) - 2.34(H) - -   Sodium 136 - 145 mmol/L 143 - 144 - 145 - -   Potassium 3.5 - 5.1 mmol/L 4.0 3.7 4.0 3.9 4.0 3.9 3.9   TSH 0.36 - 3.74 uIU/mL - - - - - - -   LDH 81 - 246 U/L 446(H) - - - 360(H) - -   Some recent data might be hidden     Lab Results   Component Value Date/Time    TSH 3.08 01/11/2022 05:13 AM    TSH 1.85 12/15/2021 01:06 PM    TSH 1.01 11/05/2020 09:11 AM    TSH 2.740 04/30/2019 11:21 AM    TSH 0.519 02/21/2012 10:53 AM    TSH 8.29 (H) 01/20/2010 01:59 PM       ALLERGY:  Allergies   Allergen Reactions    Ciprofloxacin Anaphylaxis    Shellfish Derived Anaphylaxis    Sildenafil Other (comments)    Ace Inhibitors Unknown (comments)    Biaxin [Clarithromycin] Other (comments)     Metal taste    Candesartan Cough    Pcn [Penicillins] Hives        CURRENT MEDICATIONS:    Current Facility-Administered Medications:     allopurinoL (ZYLOPRIM) tablet 100 mg, 100 mg, Oral, DAILY, Jessica Oliveira MD, 100 mg at 01/28/22 0819    sodium phosphate 30 mmol in 0.9% sodium chloride 250 mL infusion, , IntraVENous, ONCE, Christiano Deshpande MD, Last Rate: 65 mL/hr at 01/28/22 0900, New Bag at 01/28/22 0900    insulin NPH (NOVOLIN N, HUMULIN N) injection 30 Units, 30 Units, SubCUTAneous, QHS, Christiano Deshpande MD    cefepime (MAXIPIME) 2 g in sterile water (preservative free) 10 mL IV syringe, 2 g, IntraVENous, Q24H, Christiano Deshpande MD    sodium chloride (NS) flush 5-40 mL, 5-40 mL, IntraVENous, PRN, Brandon Dale MD    clopidogreL (PLAVIX) tablet 75 mg, 75 mg, Oral, DAILY, Brandon Dale MD, 75 mg at 01/28/22 2115    digoxin (LANOXIN) tablet 0.0625 mg, 0.0625 mg, Oral, EVERY OTHER DAY, Amber Weaver NP, 0.0625 mg at 01/27/22 0816    insulin NPH (NOVOLIN N, HUMULIN N) injection 25 Units, 25 Units, SubCUTAneous, DAILY, Boo Toussaint MD, 25 Units at 01/28/22 0820    bumetanide (BUMEX) 0.25 mg/mL infusion, 0.5 mg/hr, IntraVENous, CONTINUOUS, Jeanne Valentin MD, Last Rate: 2 mL/hr at 01/28/22 0935, 0.5 mg/hr at 01/28/22 0935    DOBUTamine (DOBUTREX) 500 mg/250 mL (2,000 mcg/mL) infusion, 0-10 mcg/kg/min, IntraVENous, TITRATE, Jenn Albarado NP, Held at 01/23/22 1200    albumin human 25% (BUMINATE) solution 12.5 g, 12.5 g, IntraVENous, DAILY, Nela Albarado NP, Last Rate: 0 mL/hr at 01/26/22 1138, 12.5 g at 01/28/22 0819    milrinone (PRIMACOR) 20 MG/100 ML D5W infusion, 0.125 mcg/kg/min, IntraVENous, CONTINUOUS, Jeanne Valentin MD, Stopped at 01/22/22 2242    vasopressin (VASOSTRICT) 20 Units in 0.9% sodium chloride 100 mL infusion, 0-0.1 Units/min, IntraVENous, TITRATE, Jenn Albarado NP, Stopped at 01/23/22 1048    PHENYLephrine (NORMA-SYNEPHRINE) 30 mg in 0.9% sodium chloride 250 mL infusion,  mcg/min, IntraVENous, TITRATE, Boo Toussaint MD, Held at 01/21/22 1200    niCARdipine (CARDENE) 25 mg in 0.9% sodium chloride 250 mL infusion, 0-15 mg/hr, IntraVENous, TITRATE, Jesse ODELL MD, Stopped at 01/19/22 2109    bivalirudin (ANGIOMAX) 250 mg in 0.9% sodium chloride (MBP/ADV) 50 mL MBP, 0.02-2.5 mg/kg/hr, IntraVENous, TITRATE, Nela Albarado NP, Last Rate: 2.4 mL/hr at 01/27/22 1612, 0.1008 mg/kg/hr at 01/27/22 1612    hydrALAZINE (APRESOLINE) 20 mg/mL injection 5 mg, 5 mg, IntraVENous, Q4H PRN, Nela Albarado NP, 5 mg at 01/28/22 0845    bumetanide (BUMEX) injection 1 mg, 1 mg, IntraVENous, Q4H PRN, Wayne Cordon NP, 1 mg at 01/22/22 1721    fentaNYL (PF) 1,500 mcg/30 mL (50 mcg/mL) infusion, 0-200 mcg/hr, IntraVENous, TITRATE, Vernon ODELL MD, Last Rate: 2 mL/hr at 01/28/22 0337, 100 mcg/hr at 01/28/22 0337    heparin (porcine) in 0.9% NaCl 30,000 unit/1,000 mL perfusion irrigation 50-1,000 mL, 50-1,000 mL, Other, PRN, Sam Gomez PA    chlorhexidine (PERIDEX) 0.12 % mouthwash 15 mL, 15 mL, Oral, Q12H, Davian Vázquez DO, 15 mL at 01/28/22 0848    0.9% sodium chloride infusion, 9 mL/hr, IntraVENous, CONTINUOUS, Sam Gomez PA, Last Rate: 9 mL/hr at 01/27/22 0000, 9 mL/hr at 01/27/22 0000    naloxone (NARCAN) injection 0.4 mg, 0.4 mg, IntraVENous, PRN, Sam Gomez PA    ondansetron (ZOFRAN) injection 4 mg, 4 mg, IntraVENous, Q4H PRN, Sam Gomez PA    albuterol (PROVENTIL VENTOLIN) nebulizer solution 2.5 mg, 2.5 mg, Nebulization, Q4H PRN, Sam Gomez PA    aspirin chewable tablet 81 mg, 81 mg, Oral, DAILY, Daquan Gomez PA, 81 mg at 01/28/22 0819    midazolam (VERSED) injection 1 mg, 1 mg, IntraVENous, Q1H PRN, Sam Gomez PA    magnesium oxide (MAG-OX) tablet 400 mg, 400 mg, Oral, BID, Daquan Gomez PA, 400 mg at 01/28/22 9399    calcium chloride 1 g in 0.9% sodium chloride 100 mL IVPB, 1 g, IntraVENous, PRN, Sam Gomez PA    bisacodyL (DULCOLAX) suppository 10 mg, 10 mg, Rectal, DAILY PRN, Sam Gomez PA    senna-docusate (PERICOLACE) 8.6-50 mg per tablet 1 Tablet, 1 Tablet, Oral, BID, Daquan Gomez PA, 1 Tablet at 01/28/22 5355    ELECTROLYTE REPLACEMENT NOTE: Nurse to review Serum Potassium and Magnesuim levels and Initiate Electrolyte Replacement Protocol as needed, 1 Each, Other, PRN, Sam Gomez PA    magnesium sulfate 1 g/100 ml IVPB (premix or compounded), 1 g, IntraVENous, PRN, Sam Gomez PA    alteplase (CATHFLO) 1 mg in sterile water (preservative free) 1 mL injection, 1 mg, InterCATHeter, PRN, Daquan Gomez PA bacitracin 500 unit/gram packet 1 Packet, 1 Packet, Topical, PRN, Thomas Gomez PA    pantoprazole (PROTONIX) injection 40 mg, 40 mg, IntraVENous, DAILY, 40 mg at 01/28/22 0820 **AND** sodium chloride (NS) flush 10 mL, 10 mL, IntraVENous, DAILY, Davian Vázquez DO, 10 mL at 01/28/22 0820    dexmedeTOMidine in 0.9 % NaCl (PRECEDEX) 400 mcg/100 mL (4 mcg/mL) infusion soln, 0.1-1.5 mcg/kg/hr, IntraVENous, TITRATE, Davian Vázquez DO, Last Rate: 42.5 mL/hr at 01/28/22 1000, 1.5 mcg/kg/hr at 01/28/22 1000    midazolam (VERSED) injection 1 mg, 1 mg, IntraVENous, Q4H PRN, Davian Vázquez DO, 1 mg at 01/18/22 1719    insulin lispro (HUMALOG) injection, , SubCUTAneous, Q6H, Davian Vázquez DO, 8 Units at 01/28/22 0505    propofol (DIPRIVAN) 10 mg/mL infusion, 0-50 mcg/kg/min, IntraVENous, TITRATE, Davian Vázquez DO, Last Rate: 13.6 mL/hr at 01/28/22 0752, 20 mcg/kg/min at 01/28/22 0752    sodium bicarbonate (8.4%) 25 mEq in dextrose 5% 1,000 mL - impella purge fluid, , IntraVENous, TITRATE, Sam Gomez PA, Last Rate: 8 mL/hr at 01/27/22 0000, New Bag at 01/27/22 0000    triamcinolone acetonide (KENALOG) 0.1 % cream, , Topical, BID, Thomas Gomez PA, Given at 01/28/22 0848    polyethylene glycol (MIRALAX) packet 17 g, 17 g, Oral, DAILY, Thomas Gomez PA, 17 g at 01/28/22 0820    [Held by provider] epoetin isabel-epbx (RETACRIT) injection 20,000 Units, 20,000 Units, SubCUTAneous, Q TUE, THU & SAT, Sam Gomez PA, 20,000 Units at 01/25/22 2006    montelukast (SINGULAIR) tablet 10 mg, 10 mg, Oral, DAILY, Sam Gomez PA, 10 mg at 01/28/22 1378    levothyroxine (SYNTHROID) tablet 100 mcg, 100 mcg, Oral, Once per day on Mon Tue Wed Thu Fri Sat, Sam Gomez PA, 100 mcg at 01/28/22 5619    hydroxypropyl methylcellulose (ISOPTO TEARS) 0.5 % ophthalmic solution 1 Drop, 1 Drop, Both Eyes, PRN, Thomas Gomez PA, 1 Drop at 01/06/22 1727    venlafaxine-SR (EFFEXOR-XR) capsule 75 mg, 75 mg, Oral, DAILY WITH BREAKFAST, Shalom Gomez PA, 75 mg at 01/17/22 1025    gabapentin (NEURONTIN) capsule 100 mg, 100 mg, Oral, QHS, Shalom Gomez PA, 100 mg at 01/27/22 2153    arformoteroL (BROVANA) neb solution 15 mcg, 15 mcg, Nebulization, BID RT, 15 mcg at 01/27/22 1943 **AND** budesonide (PULMICORT) 500 mcg/2 ml nebulizer suspension, 500 mcg, Nebulization, BID RT, Sam Gomez PA, 500 mcg at 01/27/22 1943    sodium chloride (NS) flush 5-40 mL, 5-40 mL, IntraVENous, Q8H, Sam Gomez PA, 10 mL at 01/27/22 1619    acetaminophen (TYLENOL) tablet 650 mg, 650 mg, Oral, Q6H PRN, 650 mg at 01/28/22 0429 **OR** acetaminophen (TYLENOL) suppository 650 mg, 650 mg, Rectal, Q6H PRN, Sam Gomez PA    glucose chewable tablet 16 g, 4 Tablet, Oral, PRN, Sam Gomez PA    dextrose (D50W) injection syrg 12.5-25 g, 25-50 mL, IntraVENous, PRN, Sam Gomez PA    glucagon (GLUCAGEN) injection 1 mg, 1 mg, IntraMUSCular, PRN, Shalom Gomez PA    PATIENT CARE TEAM:  Patient Care Team:  Lj Galvez MD as PCP - General (Internal Medicine)  Lj Galvez MD as PCP - [facility] Provider  Prieto Mendieta MD as Consulting Provider (Internal Medicine)  Pawel Crabtree MD (Dermatology)  Amira Roberts MD (Nephrology)  Jessy Mojica MD as Consulting Provider (Cardiology)  Harmeet Terrell MD (Cardiology)  Ny Montano MD as Consulting Provider (Pulmonary Disease)     Thank you for allowing me to participate in this patient's care. Jordan Smith NP  [facility] [address], [address]  Phone: [phone]      [facility] ATTENDING ADDENDUM    Patient was seen and examined in person. Data and notes were reviewed. I have discussed and agree with the plan as noted in the NP note above without further additions.     Felicita Espinoza MD PhD  [address] [address]

## 2022-01-28 NOTE — PROGRESS NOTES
0730 Shift report received from [facility] RN    0930 Bumex gtt dose down to 0.5 mg/hr    1000 Down to CT    1200 Copious amounts of brown/bloody oral secretions, TF held for transfer. OGT connected to suction, 350cc maroon output in canister. Intensivist and HF NP made aware. Orders for bid protonix, hold bival, consult GI.  Will continue to hold TF    1230 Weaning Propofol for SBT    1300 Propofol off, patient placed on SIMV    1415 On SBT    1500 Failed SBT for RSBSI 125 + agitation

## 2022-01-28 NOTE — PROGRESS NOTES
[facility]    ICU TEAM Progress Note    Name: Melba Juares   : 1955   MRN: [id number]   Date: 2022           ICU Assessment   77-year-old female with past medical history significant for moderate pulmonary hypertension on home oxygen therapy, CKD, nonischemic cardiomyopathy who was admitted to the hospital on  due to acute on chronic hypoxemic respiratory failure in light of fluid overload. Had a left heart cath on  which demonstrated single one-vessel moderate disease (mid LAD lesion) which was thought to be not a cause of cardiomyopathy. Subsequently had a right axillary Impella placed by CT surgery for potential double transplant. 1. Cardiogenic Shock  2. Pulmonary hypertension  3. Acute hypoxemic respiratory failure  4. Status post Impella placement  5. RODNEY on CKD stage IV        Interval events     - remains intubated, Impella in situ, now off inhaled nitric oxide, Bloody OG o/p     - getting [facility] today, remains intubated, Impella in situ, on inhaled nitric oxide     - s/p EGD/colo today for Ca workup for possible heart/kidney Tx, remains intubated, Impella in situ, on inhaled nitric oxide    [id number] intubated, Impella in situ, on inhaled nitric oxide           ICU Comprehensive Plan of Care:     Neuro-analgesia/sedation with opioids, propofol and Precedex as needed, continue gabapentin and venlafaxine, other delirium prevention strategies    Cardiac-continue hemodynamic monitoring, EF has improved to about 35 % on most recent echo, Impella at P8, now s/p LAD stent, on aspirin, plavix digoxin, weaned off inhaled nitric oxide, follow-up cardiac surgery/advised heart failure recommendations     Pulmonary-bilateral infiltrates, continue lung protective mechanical ventilation, continue montelukast, hopefully we can extubate soon    GI- s/p EGD/colo, now with bloody OG o/p, PPI q12 for now.  F/u GI recs    Renal-CKD-undulating Cr level-currently diuresing-on Bumex drip, follow-up nephrology recommendations, monitor urine output closely, dose meds renally, correct electrolyte derangements as needed, continue allopurinol    Hematology-on systemic Angiomax-monitor for bleeding - now with bloody OG output, continue EPO    ID- Impella in situ, e coli in sputum - escalate to cefepime    Endocrinology-keep glucose less than 180, continue Synthroid    Objective:   Vital Signs:  Visit Vitals  /73   Pulse 80   Temp (!) 100.8 °F (38.2 °C)   Resp 19   Ht 5' 7\" (1.702 m)   Wt 107.5 kg (237 lb)   SpO2 100%   BMI 37.12 kg/m²    O2 Flow Rate (L/min): 6 l/min (weaned) O2 Device: Endotracheal tube,Ventilator Temp (24hrs), Av.3 °F (36.8 °C), Min:96.6 °F (35.9 °C), Max:101.2 °F (38.4 °C)    CVP (mmHg): 8 mmHg (22 1100)      Intake/Output:     Intake/Output Summary (Last 24 hours) at 2022 1209  Last data filed at 2022 1000  Gross per 24 hour   Intake 4344.32 ml   Output 2830 ml   Net 1514.32 ml       Physical Exam:    General-intubated, sedated  Neuro-pupils reactive, opens eyes to voice, withdraws in all 4  Cardiac-RRR  Lungs-clear anteriorly  Abdomen-soft, nontender, nondistended  Extremities-warm, 1+ edema    LABS AND  DATA: Personally reviewed  Recent Labs     22  03422  1649 22  1001 22  0419   WBC 10.9  --   --  7.1   HGB 8.2* 6.9*   < > 7.1*   HCT 29.9* 26.6*   < > 27.2*     --   --  195    < > = values in this interval not displayed.      Recent Labs     22  0349 22  1844 22  1649 22  1649 22  1645 22  0419     --   --  144  --    < >   K 4.0 3.7   < > 4.0  --    < >     --   --  108  --    < >   CO2 28  --   --  28  --    < >   BUN 77*  --   --  72*  --    < >   CREA 2.44*  --   --  2.34*  --    < >   *  --   --  199*  --    < >   CA 9.7  --   --  9.7  --    < >   MG  --  2.3  --   --  2.5*  --    PHOS 1.6*  --   --  2.6  --    < >    < > = values in this interval not displayed. Recent Labs     01/28/22  0349 01/27/22  1649 01/27/22  0419 01/27/22  0419   AP 79  --   --  70   TP 7.7  --   --  7.6   ALB 3.8 3.6   < > 3.7   GLOB 3.9  --   --  3.9    < > = values in this interval not displayed. Recent Labs     01/28/22  0349 01/27/22  1007   APTT 55.1* 56.1*      Recent Labs     01/27/22  0456   PHI 7.40   PCO2I 50.4*   PO2I 97   FIO2I 40     Recent Labs     01/28/22  0900   CPK 60   CKMB 1.2       Hemodynamics:   PAP: PAP Systolic: 65 (75/98/24 7154) CO: CO (l/min): 7.5 l/min (01/28/22 1100)   Wedge:   CI: CI (l/min/m2): 3.4 l/min/m2 (01/28/22 1100)   CVP:  CVP (mmHg): 8 mmHg (01/28/22 1100) SVR:       PVR:       Ventilator Settings:  Mode Rate Tidal Volume Pressure FiO2 PEEP   Assist control   460 ml  8 cm H2O 40 % 5 cm H20     Peak airway pressure: 33 cm H2O    Minute ventilation: 8.38 l/min        MEDS: Reviewed    Chest X-Ray:  CXR Results  (Last 48 hours)               01/28/22 0425  XR CHEST PORT Final result    Impression:  Support lines in expected positions as above with no significant   change from prior and bilateral air space and interstitial opacities. Narrative:  EXAM: XR CHEST PORT       INDICATION: post-Impella       COMPARISON: Chest x-ray 1/27/2022. FINDINGS: A portable AP radiograph of the chest was obtained at 04:59 hours. No   significant change in expected position of endotracheal tube, enteric tube,   right central venous catheter, or Impella device from the right subclavian   approach. Pacemaker generator body projects over the left chest wall with intact   appearing leads traversing in expected course. The patient is on a cardiac   monitor. There is redemonstration of patchy airspace opacities and diffuse   interstitial prominence with no pleural effusion or pneumothorax. The cardiac   and mediastinal contours and pulmonary vascularity are normal.  The bones and   soft tissues are grossly within normal limits. 01/27/22 0508  XR CHEST PORT Final result    Impression:  No significant change. Narrative:  INDICATION: Heart failure, post-Impella       EXAM: CXR Portable. FINDINGS: Portable chest shows satisfactory support lines/devices without   significant change since yesterday. There is no apparent pneumothorax. Lungs   show unchanged relatively severe bilateral airspace disease/edema. Heart size is   stable. There is no midline shift. NOTE OF PERSONAL INVOLVEMENT IN CARE   This patient has a high probability of imminent, clinically significant deterioration, which requires the highest level of preparedness to intervene urgently. I participated in the decision-making and personally managed or directed the management of the following life and organ supporting interventions that required my frequent assessment to treat or prevent imminent deterioration. I personally spent 40 minutes of critical care time.       Signed By: Topher Edwards MD     January 28, 2022    '

## 2022-01-29 NOTE — PROGRESS NOTES
1930: Bedside shift report received from [facility]. Shift summary: hydralazine given for sbp >110. No further bloody output from OG tube. K replaced per protocol. 9175: Propofol stopped for SAT. 0630: Dr Rosibel Bowles at bedside, updated on patient status. OK to restart tube feeds this AM.  Pt waking up following commands appropriately, denies pain. 0645: Pt coughing and bucking the ventilator. Orders received to restart propofol for now, will turn off for SBT. 0730: Bedside and Verbal shift change report given to [facility] (oncoming nurse) by Cash Jones (offgoing nurse). Report included the following information SBAR, Kardex, Intake/Output, Accordion and Cardiac Rhythm bv paced.

## 2022-01-29 NOTE — OP NOTES
[address]   OPERATIVE REPORT    Name:  Nasra Lamar  MR#:  [id number]  :  1955  ACCOUNT #:  [de-identified]  DATE OF SERVICE:  2022      PREOPERATIVE DIAGNOSIS:  Cardiogenic shock. POSTOPERATIVE DIAGNOSIS:  Cardiogenic shock. PROCEDURE PERFORMED:  1. Right axillary artery exploration with construction of a 10-mm chimney graft used for introduction of a percutaneous left ventricular assist device (Impella 5.5; CPT code 91393). 2.  Insertion of percutaneous left ventricular assist device (Impella 5.5) through right axillary artery chimney graft (CPT code 18494). SURGEON:  Gerry Calvillo MD    ASSISTANT:  TYE Last    ANESTHESIA:  General endotracheal anesthesia. COMPLICATIONS:  None. SPECIMENS REMOVED:  None. IMPLANTS:  None. ESTIMATED BLOOD LOSS:  10 mL. INDICATION:  The patient is a very pleasant 39-year-old woman suffering from cardiogenic shock. She is now being brought to the operating room to have a right axillary Impella placed. PROCEDURE:  The patient was brought to the operating room, had right radial A-line placed without complications, [place]-Guille catheter was placed without complications, and underwent general endotracheal anesthesia without complications. Chest and abdomen were prepped and draped in the usual sterile fashion. A right infraclavicular incision was made. Cautery dissection was used to dissect down to the level of the right axillary artery. We then gave an appropriate dose of heparin, clamped the artery, opened the artery with a 75 blade, and further extended with forward and reverse Sotomayor. We then performed a 10-mm Hemashield graft to right axillary artery anastomosis using a 5-0 Prolene suture. We then glued it, released the clamps, and accessed the left ventricular cavity using an AL2 catheter and J-wire, exchanged over the Impella wire and then brought the Impella in. The Impella    .    Vicryl suture was used to close the incision as well as staples.   I was present for the entire procedure.; ; PA-C assistance was needed due to difficulty of procedure, dissection, and identification of pertinent anatomy throughout the case           Akanksha Sanchez MD      [id number]  D:  01/29/2022 9:54  T:  01/29/2022 12:47  JOB #:  [id number]

## 2022-01-29 NOTE — PROGRESS NOTES
[facility]  ICU Progress Note    Admit Date: 2022    S/P R Axillary Impella 5.5     Subjective/24 Hour Summary:   Pt seen with Dr. Janet Lopez. impella decreased to P6,  bicarb purge. ANNE-MARIE to LAD . Issues with bleeding from OG-Bival and ASA on hold per GI and Cardiology     Objective:   Vitals:  Blood pressure 106/69, pulse 80, temperature 96.9 °F (36.1 °C), resp. rate 22, height 5' 7\" (1.702 m), weight 232 lb 9.4 oz (105.5 kg), SpO2 100 %.   Temp (24hrs), Av.8 °F (37.1 °C), Min:96.9 °F (36.1 °C), Max:100.6 °F (38.1 °C)    Hemodynamics:   CO: CO (l/min): 5.9 l/min   CI: CI (l/min/m2): 2.7 l/min/m2   CVP: CVP (mmHg): 7 mmHg (22 07)   SVR: SVR (dyne*sec)/cm5: 1250 (dyne*sec)/cm5 (22 0920)   PAP Systolic: PAP Systolic: 56 ( 6096)   PAP Diastolic: PAP Diastolic: 25 (44// 6831)   PVR:     SV02: SVO2 (%): 73 % (22 07)   SCV02:      EKG/Rhythm:  SR    Ventilator Settings:  Mode Rate Tidal Volume Pressure FiO2 PEEP   Assist control,Pressure control   460 ml  8 cm H2O 40 % 5 cm H20     Peak airway pressure: 23 cm H2O    Minute ventilation: 7.1 l/min        Oxygen Therapy:  Oxygen Therapy  O2 Sat (%): 100 % (22 0758)  Pulse via Oximetry: 80 beats per minute (22 0758)  O2 Device: Endotracheal tube;Ventilator (22 0758)  Skin Assessment: Clean, dry, & intact (22 0400)  Skin Protection for O2 Device: Yes (22)  Orientation: Anterior (22)  Location: Cheek (22)  Interventions: Mouth Care;Reposition Device (22)  O2 Flow Rate (L/min): 6 l/min (weaned) (22 0740)  O2 Temperature: 98.4 °F (36.9 °C) (22 1940)  FIO2 (%): 40 % (22 0758)  ETCO2 (mmHg): 22 mmHg (22 2342)    CXR:  CXR Results  (Last 48 hours)               22 4325  XR CHEST PORT Final result    Impression:  Support lines in expected positions as above with no significant   change from prior and bilateral air space and interstitial opacities. Narrative:  EXAM: XR CHEST PORT       INDICATION: post-Impella       COMPARISON: Chest x-ray 1/27/2022. FINDINGS: A portable AP radiograph of the chest was obtained at 04:59 hours. No   significant change in expected position of endotracheal tube, enteric tube,   right central venous catheter, or Impella device from the right subclavian   approach. Pacemaker generator body projects over the left chest wall with intact   appearing leads traversing in expected course. The patient is on a cardiac   monitor. There is redemonstration of patchy airspace opacities and diffuse   interstitial prominence with no pleural effusion or pneumothorax. The cardiac   and mediastinal contours and pulmonary vascularity are normal.  The bones and   soft tissues are grossly within normal limits. Admission Weight: Last Weight   Weight: 264 lb 1.8 oz (119.8 kg) Weight: 232 lb 9.4 oz (105.5 kg)     Intake / Output / Drain:  Current Shift: No intake/output data recorded. Last 24 hrs.:     Intake/Output Summary (Last 24 hours) at 1/29/2022 0924  Last data filed at 1/29/2022 0700  Gross per 24 hour   Intake 2088.79 ml   Output 3290 ml   Net -1201.21 ml       EXAM:  General:    Sedated and intubated             Lungs:   Clear to auscultation bilaterally. Incision:  No erythema, drainage or swelling. Heart:  Regular rate and rhythm, S1, S2 normal, no murmur, click, rub or gallop. Abdomen:   Soft, non-tender. Bowel sounds normal. No masses,  No organomegaly. Extremities:  No edema. PPP. Neurologic: Intubated, sedated     Labs:   Recent Labs     01/29/22  0420 01/28/22  1505 01/28/22  0349 01/28/22  0349   WBC 10.0  --   --  10.9   HGB 8.0* 8.3*   < > 8.2*   HCT 29.0* 30.0*   < > 29.9*     --   --  210    < > = values in this interval not displayed.      Recent Labs     01/29/22  0420 01/28/22  1507   * 146*   K 3.0* 3.8   * 111*   CO2 28 25   BUN 78* 78*   CREA 2.42* 2.62*   * 262*   CA 9.6 10.0   MG 2.3 2.4   PHOS 4.7 4.5     Recent Labs     22  0420 22  1507 22  0349 22  0349   AP 70  --   --  79   TP 7.6  --   --  7.7   ALB 3.4* 3.8   < > 3.8   GLOB 4.2*  --   --  3.9    < > = values in this interval not displayed. Recent Labs     22  0420 22  1507   APTT 26.4 38.0*      Recent Labs     22  0456   PHI 7.40   PCO2I 50.4*   PO2I 97   FIO2I 40     Recent Labs     22  0900   CPK 60   CKMB 1.2        Assessment:     Active Problems:    Acute respiratory failure with hypoxia (HCC) (2022)         Plan/Recommendations/Medical Decision Makin. Acute on Chronic Systolic CHF class III/IV (BiV-ICD): S/P right axillary impella 5.5 . Cont bicarb via purge. Systemic bival on hold due to GIB. On ancef for prophylaxis. On digoxin. FS managing. GDMT as able  2. Acute on Chronic Respiratory insufficiency: On home O2. . Vent wean per intensivist. Will attempt SBT again today  3. CAD: Multivessel CAD on [facility] 22. ASA 81. BB on hold due to cardiogenic shock. Recommend starting high intensity statin when feasible, per primary/HF. S/P ANNE-MARIE to LAD on . On Plavix monotherapy per Cardiology due to GI bleeding  4. RODNEY on Chronic CKD Stage IV: Renal following. Bumex drip   5. Gout: allopurinol  6. DM2: on NPH Per primary service  7. GERD: Home meds  8. Hypothyroidism: on synthroid  9. Depression: Home meds  10. Anemia: on EPO, stable post op. Dispo: impella functioning well. Cont bicarb via purge. Tentative plan to discontinue Impella on Monday if we are able to continue weaning. No cardiac surgery issues otherwise. Further HF management per AHF. Remainder of medical management per primary.     Signed By: David Latif NP

## 2022-01-29 NOTE — PROGRESS NOTES
[facility] in [place], [place]  Inpatient Progress Note      Patient name: Juan Taylor  Patient : 1955  Patient MRN: [id number]  Consulting MD: Walt Clay MD  Date of service: 22    REASON FOR REFERRAL:  Management of cardiogenic shock     PLAN OF CARE:   · 77 y/o female with chronic renal failure and new onset severe cardiomyopathy, LVEF 15% (diagnosed 21), stage D, NYHA class IV; admitted for pulmonary edema and severe volume overloadd by C  · Most likely etiology of acute deterioration of LVEF is chronic volume overload with compensatory tachycardia in the setting of progressive renal failure +/- coronary artery disease (80% LAD)   · Patient requested aggressive medical approach to allow her heart to recover or receive heart/kidney transplantation, before considering hospice  · D/w patient, her family, CT surgery, nephrology and primary cardiologist plan to support her with Impella as bridge to LV recovery or transplant with the following anticipated outcomes:  · 1/3 chance of LV recovery and discharge home on medical therapy or chronic dialysis  · 1/3 chance of LV non-recovery on Impella, evaluation and transfer for listing for heart transplant/kidney  · 1/3 chance of LV non-recovery and evaluation that reveals patient is not candidate for LVAD/heart/kidney transplant and then wean of support to comfort care/hospice  · Impella 5.5 implanted by Dr. [employer]  and patient underwent aggressive diuresis with normalization of filling pressures and improvement of LV function ranging between 30-35% to 25-30%, s/p PCI to LAD today followed by Impella wean after extubation; appears to have recovered heart function and unlikely will need heart/kidney transplant.   · In case of failure of Impella wean, patient's only contraindication to transplant is active infection requiring antibiotic use; otherwise there are no apparent contraindications; [facility] will accept patient on integrillin drip if LV does not recover after revascularization and on dialysis if needed for volume management; anticipated waiting time at BMI 38 and blood type AB for hear/kidney transplant is currently approximately 10 days; d/w Dr. Pritesh Holden from [facility]. RECOMMENDATIONS:  POD # 10 Impella 5.5 implant   Decrease to P6- plan on weaning Impella over the weekend   Vent management per Intensivist- plan to wean to extubate    Maintain PAC for hemodynamic optimization; goal CI > 2.3, CVP 8-10 mmHg SVR 1000, SBP < 110 mmHg (via radial arterial line)   Intolerant to sildenafil due to marked hypotension   Decrease Bumex gtt to 0.25mg/hr; goal net even   Keep K> 4 and Mg >2  Hydralazine 5 mg IV q4h PRN SBP > 110 mmHg   Intolerant of GDMT due to hemodynamic in stability   BiV-ICD programmed at 80 bpm; device is at EOL, recommend to delay ICD generator change until final plans for transplant are established - Per Dr. Lianne Lamar patient is not pacer dependent  Place defib patches on patient for ppx  Cont digoxin 0.0625 mg to every other day; goal 0.7-1.2   Allopurinol 50mg daily per nephrology  S/p Venofer 200 mg IV x 2   CT head no acute abnormalitiy  CTchest/abd/pelvis w/o contrast for txp eval- no acute abnormalities, no masses  GI scopes complete, + tubular adenomas,  no H. Pylori,   Continue ASA/Plavix for LAD stent  Epo level elevated- hold epogen and follow levels  Blood cultures NGTD  Sputum with E Coli- changed to cefepime   pTT goal  40-60 due to persistent anemia on bivalirudin- on hold for GI bleeding.  Will resume tomorrow   Will need OP PSG  Will offer family testing for VUS x 2 as OP  Palliative consult appreciated   Nutritionist consult  Heart failure education   Advanced care plan present on file       All other care per primary team     IMPRESSION:  Volume overload  Acute on Chronic systolic heart failure, requiring Impella 5.5 implant 1/18/22   · Stage D, NYHA class IV symptoms  · Non-ischemic cardiomyopathy, LVEF 15%  · PYP equivocal for amyloid   CAD with 80% LAD lesion s/p PCI 1/27/22  Pulmonary hypertension, severe  RV failure  S/p BiV-ICD  Cardiac risk factors:  · HL  · DM2  · Morbid obesity, BMI 40  [employer] 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 Important upcoming milestones or family events: none  The patient identifies the following as important for living well: remaining idependent and mobile; being able to get out of the house with . Patient verbalized willingness to be on home milrinone and evaluation for both heart and kidney transplants. Verbalizes she would have family supporting her decision on this. SHAMIKA Siegel is a 78 y.o.  female with a history of NICM, chronic hypoxic respiratory failure secondary to pulmonary HTN, hypothyroidism, CKD, GERD, and DM II who presented to [facility] as a transfer from another facility for acute on chronic hypoxic respiratory failure. Reports running out of O2 at home resulting in SOB and dizziness. Upon arrival to the ED she was found to have O2 sats in the 70s, requiring 4L NC O2. Rapid covid test was negative. ProNT-BNP was 86961, K+5.2, BUN/CR x/2.54 and elevated d-dimer. Chest xray showing pulmonary edema vs. Atypical pneumonia. VQ scan showed low probability for PE. Per Dr. Darrell Waddell, [facility], LVEF 15-20% during recent admission. LVEF previously normalized with CRT. NYHA III-IV. No ACEi/ARB due to renal dysfunction. GDMT dosing limited by low BP. Patient's PCP is Dr. Yadira Botello, and she sees Dr. Cristi Cortes primarily for cardiac care due to Dr. Mendiola Members transfering practice. Patient historically seen by nephrology but has not had follow up care in the past three years. CARDIAC IMAGING:  Echo 1/20/22 - LVEF 20-25%, RV moderately dilated, Impella catheter 5.9 cm from AV   Echo 12/8/21- LVEF 15-20%, mild to Mod MR, LVIDd 5.09cm, TAPSE 1.94cm  Echo 4/22/19- LVEF 60%, trace MR,  LVIDd 4.24cm, TAPSE 1.65cm   Echo 4/24/18- LVEF 60%, trivial MR, LVIDd 4.79cm, TAPSE 2.25cm      EKG- 1/4/22 ST with A sense and V paced rhythm     [facility] 2015- No significant CAD  NST 2014- reversible LAD involvement      ICD interrogation     HEMODYNAMICS:  [facility] 1/17/21: PAP 83/52 mmHg, RAP 27 mmHg, PCWP 45 mmHg, CI 1.6  [facility] 12/10/21: PAP 76/48/57, RAP 20, PCWP 35, CI 2.26     CPEST not done  6MW not done     OTHER IMAGING:  CXR Results  (Last 48 hours)                             01/13/22 1035   XR CHEST PORT Final result      Impression:   No significant change in congestion and interstitial and alveolar   opacities which may reflect edema or infectious infiltrate. Narrative:   EXAM: XR CHEST PORT       INDICATION: pul edema       COMPARISON: Chest x-ray 1/10/2022. FINDINGS: A portable AP radiograph of the chest was obtained at 10:19 hours. The   patient is on a cardiac monitor. The lungs appear grossly stable with congestion   and interstitial/airspace opacities with no pneumothorax or pleural effusion. Right PICC line traverses expected course of tip in the region of the atriocaval   junction. Pacemaker-ICD generator body projects over the left chest wall with   intact appearing leads traversing in expected course. . The cardiac and   mediastinal contours and pulmonary vascularity are normal.  Atherosclerotic   calcifications affect the aortic arch.  The chest wall structures and visualized   upper abdomen show no acute findings with incidental note of degenerative spine   and shoulder changes. CT Results (most recent):  Results from Hospital Encounter encounter on 01/04/22    CT ABD PELV WO CONT    Narrative  EXAM: CT ABD PELV WO CONT, CT CHEST WO CONT    INDICATION: LVAD/TRANSPLANT WORK UP    COMPARISON: Chest x-ray of earlier today, abdominal ultrasound 1/22/2022, CT  abdomen pelvis 1/16/2018. TECHNIQUE:  5 mm axial images were obtained through the chest, abdomen, and  pelvis. Oral and IV contrast were not administered. Coronal and sagittal  reconstructions were generated. CT dose reduction was achieved through use of a  standardized protocol tailored for this examination and automatic exposure  control for dose modulation. FINDINGS:    THYROID: No nodule. MEDIASTINUM: No mass or lymphadenopathy. ZEENAT: No mass or lymphadenopathy. THORACIC AORTA: Atherosclerotic calcination without aneurysm. MAIN PULMONARY ARTERY: Normal in caliber. TRACHEA/BRONCHI: Endotracheal tube in expected position. Patent. ESOPHAGUS: No dilation or wall thickening. Indwelling enteric tube traverses  length of esophagus to extend into the stomach. HEART: Impella device, pacemaker leads, and coronary artery calcifications noted  without cardiac enlargement or pericardial effusion. PLEURA: No effusion or pneumothorax. LUNGS: Patchy areas of focal airspace opacity and ground glass opacity with  linear posterior and bibasilar atelectasis. No nodule or mass. LIVER: No mass or biliary dilation. GALLBLADDER: Unremarkable. SPLEEN: Borderline enlarged with no focal lesion. PANCREAS: No mass or ductal dilation. ADRENALS: Unremarkable. KIDNEYS: No significant change in 10 mm left renal pelvis calculus without  hydronephrosis. Exophytic lateral interpolar right renal cyst and anterior  interpolar left renal cyst redemonstrated. No other calculi or solid renal mass  evident. STOMACH: Indwelling enteric tube.  Otherwise unremarkable  SMALL BOWEL: No dilatation or wall thickening. COLON: No dilatation or wall thickening. APPENDIX: Unremarkable. PERITONEUM: No ascites or pneumoperitoneum. RETROPERITONEUM: Atherosclerotic calcification without aneurysm. No enlarged  lymphadenopathy. REPRODUCTIVE ORGANS: Coarse calcifications compatible with uterine leiomyoma  with otherwise unremarkable appearance to uterus and ovaries. URINARY BLADDER: Collapsed around Henley catheter and balloon. BONES: Degenerative spine change. No acute fracture or aggressive lesion. ADDITIONAL COMMENTS: N/A    Impression  1. Groundglass and scattered focal airspace opacities within the lungs  concerning for possible pneumonic infiltrates. 2. Support lines as above. 3. No acute intra-abdominal process with redemonstration of nonobstructing left  renal pelvis 10 mm stone and additional incidentals as above. PHYSICAL EXAM:  Visit Vitals  Visit Vitals  /69   Pulse 80   Temp 96.9 °F (36.1 °C)   Resp 20   Ht 5' 7\" (1.702 m)   Wt 232 lb 9.4 oz (105.5 kg)   SpO2 98%   BMI 36.43 kg/m²          Hemodynamics:   CO: CO (l/min): 5.9 l/min   CI: CI (l/min/m2): 2.7 l/min/m2   CVP: CVP (mmHg): 7 mmHg (01/29/22 0700)   SVR: SVR (dyne*sec)/cm5: 1250 (dyne*sec)/cm5 (01/29/22 2306)   PAP Systolic: PAP Systolic: 56 (29/72/13 2515)   PAP Diastolic: PAP Diastolic: 25 (17/66/84 6739)   PVR:     SV02: SVO2 (%): 73 % (01/29/22 0700)   SCV02:      Impella 5.5  P-6  Flow: 3.5lpm   Purge flow 7.7    Physical Exam  Physical Exam  Vitals and nursing note reviewed. Constitutional:       Comments: Intubated    Cardiovascular:      Rate and Rhythm: Normal rate and regular rhythm. Pulses: Normal pulses. Heart sounds: Normal heart sounds. Pulmonary:      Breath sounds: Decreased air movement present. Comments: Intubated   Abdominal:      General: There is no distension. Palpations: Abdomen is soft. Musculoskeletal:         General: No swelling.    Skin:     General: Skin is warm and dry. Neurological:      Mental Status: She is alert. Review of Systems   Unable to perform ROS: Acuity of condition          PAST MEDICAL HISTORY:  Past Medical History:   Diagnosis Date    Acquired hypothyroidism 8/15/2016    Anemia     RED-HF study    Asthma     Cardiomyopathy, nonischemic ([facility] [address]     initial dx 2001, bivHF 2008 with EF 15%, s/p biV-ICD 9/08, significant improvment in EF to 45-50%    CKD (chronic kidney disease)     Dr Kin Alicia    CKD (chronic kidney disease) 8/15/2012    Depression     Diabetes ([facility] [address]     Diabetic neuropathy ([facility] [address]     DM (diabetes mellitus) ([facility] [address] 8/15/2012    GERD (gastroesophageal reflux disease)     Gout     Hypothyroidism     ICD (implantable cardioverter-defibrillator), biventricular, in situ 6/5/2014    Psoriasis        PAST SURGICAL HISTORY:  Past Surgical History:   Procedure Laterality Date    CARDIAC CATHETERIZATION  2007; 01/06/15    normal cors    COLONOSCOPY N/A 1/26/2022    COLONOSCOPY performed by Cristobal Khan MD at [address] [place] [address] Dr ECHO 2D ADULT  4/2010    EF 45%, improved from 1/09 (25%)    ECHO 2D ADULT  11/2011    LVH, EF 55-60%    HX ORTHOPAEDIC      knee    HX PACEMAKER PLACEMENT      AICD    STRESS TEST LEXISCAN/CARDIOLITE  3/21/12    normal perfusion, global HK 40%       FAMILY HISTORY:  Family History   Problem Relation Age of Onset    Heart Disease Mother     Hypertension Mother     Lupus Sister     Diabetes Brother        SOCIAL HISTORY:  Social History     Socioeconomic History    Marital status:    Tobacco Use    Smoking status: Never Smoker    Smokeless tobacco: Never Used   Substance and Sexual Activity    Alcohol use: No    Drug use: No    Sexual activity: Never   Social History Narrative    . Nonsmoker.  Disability       LABORATORY RESULTS:     Labs Latest Ref Rng & Units 1/29/2022 1/28/2022 1/28/2022 1/28/2022 1/27/2022 1/27/2022 1/27/2022   WBC 3.6 - 11.0 K/uL 10.0 - - 10.9 - - - RBC 3.80 - 5.20 M/uL 3.19(L) - - 3.29(L) - - -   Hemoglobin 11.5 - 16.0 g/dL 8. 0(L) - 8. 3(L) 8.2(L) - 6. 9(L) 7. 0(L)   Hematocrit 35.0 - 47.0 % 29. 0(L) - 30. 0(L) 29. 9(L) - 26. 6(L) 27. 1(L)   MCV 80.0 - 99.0 FL 90.9 - - 90.9 - - -   Platelets 254 - 288 K/uL 215 - - 210 - - -   Lymphocytes 12 - 49 % - - - - - - -   Monocytes 5 - 13 % - - - - - - -   Eosinophils 0 - 7 % - - - - - - -   Basophils 0 - 1 % - - - - - - -   Albumin 3.5 - 5.0 g/dL 3.4(L) 3.8 - 3.8 - 3.6 -   Calcium 8.5 - 10.1 MG/DL 9.6 10.0 - 9.7 - 9.7 -   Glucose 65 - 100 mg/dL 233(H) 262(H) - 311(H) - 199(H) -   BUN 6 - 20 MG/DL 78(H) 78(H) - 77(H) - 72(H) -   Creatinine 0.55 - 1.02 MG/DL 2.42(H) 2.62(H) - 2.44(H) - 2.34(H) -   Sodium 136 - 145 mmol/L 146(H) 146(H) - 143 - 144 -   Potassium 3.5 - 5.1 mmol/L 3. 0(L) 3.8 - 4.0 3.7 4.0 3.9   TSH 0.36 - 3.74 uIU/mL - - - - - - -   LDH 81 - 246 U/L 385(H) - - 446(H) - - -   Some recent data might be hidden     Lab Results   Component Value Date/Time    TSH 3.08 01/11/2022 05:13 AM    TSH 1.85 12/15/2021 01:06 PM    TSH 1.01 11/05/2020 09:11 AM    TSH 2.740 04/30/2019 11:21 AM    TSH 0.519 02/21/2012 10:53 AM    TSH 8.29 (H) 01/20/2010 01:59 PM       ALLERGY:  Allergies   Allergen Reactions    Ciprofloxacin Anaphylaxis    Shellfish Derived Anaphylaxis    Sildenafil Other (comments)    Ace Inhibitors Unknown (comments)    Biaxin [Clarithromycin] Other (comments)     Metal taste    Candesartan Cough    Pcn [Penicillins] Hives        CURRENT MEDICATIONS:    Current Facility-Administered Medications:     potassium chloride 20 mEq in 50 ml IVPB, 20 mEq, IntraVENous, Q1H, Dario ODELL MD, Last Rate: 50 mL/hr at 01/29/22 0642, 20 mEq at 01/29/22 7881    allopurinoL (ZYLOPRIM) tablet 100 mg, 100 mg, Oral, DAILY, Kaela Cantu MD, 100 mg at 01/28/22 0819    insulin NPH (NOVOLIN N, HUMULIN N) injection 30 Units, 30 Units, SubCUTAneous, QHS, Chela DORSEY MD, 30 Units at 01/28/22 5167   cefepime (MAXIPIME) 2 g in sterile water (preservative free) 10 mL IV syringe, 2 g, IntraVENous, Q24H, Christiano Deshpande MD, Last Rate: 200 mL/hr at 01/28/22 1134, 2 g at 01/28/22 1134    pantoprazole (PROTONIX) injection 40 mg, 40 mg, IntraVENous, Q12H, 40 mg at 01/28/22 2112 **AND** sodium chloride (NS) flush 10 mL, 10 mL, IntraVENous, DAILY, Christiano Deshpande MD    sodium chloride (NS) flush 5-40 mL, 5-40 mL, IntraVENous, PRN, Efrain Scott MD    clopidogreL (PLAVIX) tablet 75 mg, 75 mg, Oral, DAILY, Efrain Scott MD, 75 mg at 01/28/22 0819    digoxin (LANOXIN) tablet 0.0625 mg, 0.0625 mg, Oral, EVERY OTHER DAY, Amber Weaver NP, 0.0625 mg at 01/27/22 0816    insulin NPH (NOVOLIN N, HUMULIN N) injection 25 Units, 25 Units, SubCUTAneous, DAILY, Rema Valdovinos MD, 25 Units at 01/28/22 0820    bumetanide (BUMEX) 0.25 mg/mL infusion, 0.25 mg/hr, IntraVENous, CONTINUOUS, Amber Weaver NP, Last Rate: 2 mL/hr at 01/28/22 0935, 0.5 mg/hr at 01/28/22 0935    DOBUTamine (DOBUTREX) 500 mg/250 mL (2,000 mcg/mL) infusion, 0-10 mcg/kg/min, IntraVENous, TITRATE, Keanu Albarado Mai, NP, Held at 01/23/22 1200    albumin human 25% (BUMINATE) solution 12.5 g, 12.5 g, IntraVENous, DAILY, Austin Albarado NP, Last Rate: 0 mL/hr at 01/26/22 1138, 12.5 g at 01/28/22 0819    milrinone (PRIMACOR) 20 MG/100 ML D5W infusion, 0.125 mcg/kg/min, IntraVENous, CONTINUOUS, Jessica Oliveira MD, Stopped at 01/22/22 2242    vasopressin (VASOSTRICT) 20 Units in 0.9% sodium chloride 100 mL infusion, 0-0.1 Units/min, IntraVENous, TITRATE, Austin Albarado NP, Stopped at 01/23/22 1048    PHENYLephrine (NORMA-SYNEPHRINE) 30 mg in 0.9% sodium chloride 250 mL infusion,  mcg/min, IntraVENous, TITRATE, Rema Valdovinos MD, Held at 01/21/22 1200    niCARdipine (CARDENE) 25 mg in 0.9% sodium chloride 250 mL infusion, 0-15 mg/hr, IntraVENous, TITRATE, Shimon Wang MD, Stopped at 01/19/22 2109    [Held by provider] bivalirudin (ANGIOMAX) 250 mg in 0.9% sodium chloride (MBP/ADV) 50 mL MBP, 0.02-2.5 mg/kg/hr, IntraVENous, TITRATE, Polliard, Iline Phalen, NP, Stopped at 01/28/22 1220    hydrALAZINE (APRESOLINE) 20 mg/mL injection 5 mg, 5 mg, IntraVENous, Q4H PRN, Gume Albarado NP, 5 mg at 01/29/22 0202    bumetanide (BUMEX) injection 1 mg, 1 mg, IntraVENous, Q4H PRN, Gume Albarado NP, 1 mg at 01/22/22 1721    fentaNYL (PF) 1,500 mcg/30 mL (50 mcg/mL) infusion, 0-200 mcg/hr, IntraVENous, TITRATE, Caty ODELL MD, Last Rate: 1 mL/hr at 01/28/22 2219, 50 mcg/hr at 01/28/22 2219    heparin (porcine) in 0.9% NaCl 30,000 unit/1,000 mL perfusion irrigation 50-1,000 mL, 50-1,000 mL, Other, PRN, Sam Gomez PA    chlorhexidine (PERIDEX) 0.12 % mouthwash 15 mL, 15 mL, Oral, Q12H, Davian Vázquez DO, 15 mL at 01/28/22 2112    0.9% sodium chloride infusion, 9 mL/hr, IntraVENous, CONTINUOUS, Sam Gomez PA, Last Rate: 9 mL/hr at 01/27/22 0000, 9 mL/hr at 01/27/22 0000    naloxone (NARCAN) injection 0.4 mg, 0.4 mg, IntraVENous, PRN, Sam Gomez PA    ondansetron [facility]) injection 4 mg, 4 mg, IntraVENous, Q4H PRN, Sam Gomez PA    albuterol (PROVENTIL VENTOLIN) nebulizer solution 2.5 mg, 2.5 mg, Nebulization, Q4H PRN, Sam Gomez PA    aspirin chewable tablet 81 mg, 81 mg, Oral, DAILY, TYE Plascencia, 81 mg at 01/28/22 0882    midazolam (VERSED) injection 1 mg, 1 mg, IntraVENous, Q1H PRN, Sam Gomez PA    magnesium oxide (MAG-OX) tablet 400 mg, 400 mg, Oral, BID, Shalom Gomez PA, 400 mg at 01/28/22 1746    calcium chloride 1 g in 0.9% sodium chloride 100 mL IVPB, 1 g, IntraVENous, PRN, Sam Gomez PA    bisacodyL (DULCOLAX) suppository 10 mg, 10 mg, Rectal, DAILY PRN, Sam Gomez PA    senna-docusate (PERICOLACE) 8.6-50 mg per tablet 1 Tablet, 1 Tablet, Oral, BID, Shalom Gomez PA, 1 Tablet at 01/28/22 1746    ELECTROLYTE REPLACEMENT NOTE: Nurse to review Serum Potassium and Magnesuim levels and Initiate Electrolyte Replacement Protocol as needed, 1 Each, Other, PRN, Shalom Gomez PA    magnesium sulfate 1 g/100 ml IVPB (premix or compounded), 1 g, IntraVENous, PRN, Sam Gomez PA    alteplase (CATHFLO) 1 mg in sterile water (preservative free) 1 mL injection, 1 mg, InterCATHeter, PRN, Sam Gomez PA    bacitracin 500 unit/gram packet 1 Packet, 1 Packet, Topical, PRN, Shalom Gomez PA    dexmedeTOMidine in 0.9 % NaCl (PRECEDEX) 400 mcg/100 mL (4 mcg/mL) infusion soln, 0.1-1.5 mcg/kg/hr, IntraVENous, TITRATE, Davian Vázquez DO, Last Rate: 42.5 mL/hr at 01/29/22 0606, 1.5 mcg/kg/hr at 01/29/22 0606    midazolam (VERSED) injection 1 mg, 1 mg, IntraVENous, Q4H PRN, Davian Vázquez DO, 1 mg at 01/18/22 1719    insulin lispro (HUMALOG) injection, , SubCUTAneous, Q6H, Davian Vázquez DO, 6 Units at 01/29/22 8797    propofol (DIPRIVAN) 10 mg/mL infusion, 0-50 mcg/kg/min, IntraVENous, TITRATE, Davian Vázquez DO, Last Rate: 13.6 mL/hr at 01/29/22 0642, 20 mcg/kg/min at 01/29/22 0642    sodium bicarbonate (8.4%) 25 mEq in dextrose 5% 1,000 mL - impella purge fluid, , IntraVENous, TITRATE, Sam Gomez PA, Last Rate: 8 mL/hr at 01/27/22 0000, New Bag at 01/27/22 0000    triamcinolone acetonide (KENALOG) 0.1 % cream, , Topical, BID, Shalom Gomez PA, Given at 01/28/22 1746    polyethylene glycol (MIRALAX) packet 17 g, 17 g, Oral, DAILY, Shalom Gomez PA, 17 g at 01/28/22 0820    [Held by provider] epoetin isabel-epbx (RETACRIT) injection 20,000 Units, 20,000 Units, SubCUTAneous, Q TUE, THU & SAT, Shalom Gomez PA, 20,000 Units at 01/25/22 2006    montelukast (SINGULAIR) tablet 10 mg, 10 mg, Oral, DAILY, Sam Gomez PA, 10 mg at 01/28/22 7209    levothyroxine (SYNTHROID) tablet 100 mcg, 100 mcg, Oral, Once per day on Mon Tue Wed Thu Fri Sat, Sam Gomez PA, 100 mcg at 01/29/22 6946    hydroxypropyl methylcellulose (ISOPTO TEARS) 0.5 % ophthalmic solution 1 Drop, 1 Drop, Both Eyes, PRN, Vishnu Gomez PA, 1 Drop at 01/06/22 1727    venlafaxine-SR (EFFEXOR-XR) capsule 75 mg, 75 mg, Oral, DAILY WITH BREAKFAST, Vishnu Gomez PA, 75 mg at 01/17/22 1025    gabapentin (NEURONTIN) capsule 100 mg, 100 mg, Oral, QHS, Vishnu Gomez PA, 100 mg at 01/28/22 2114    arformoteroL (BROVANA) neb solution 15 mcg, 15 mcg, Nebulization, BID RT, 15 mcg at 01/28/22 1939 **AND** budesonide (PULMICORT) 500 mcg/2 ml nebulizer suspension, 500 mcg, Nebulization, BID RT, Sam Gomez PA, 500 mcg at 01/28/22 1939    sodium chloride (NS) flush 5-40 mL, 5-40 mL, IntraVENous, Q8H, Sam Gomez PA, 10 mL at 01/29/22 0600    acetaminophen (TYLENOL) tablet 650 mg, 650 mg, Oral, Q6H PRN, 650 mg at 01/28/22 0429 **OR** acetaminophen (TYLENOL) suppository 650 mg, 650 mg, Rectal, Q6H PRN, Sam Gomez PA    glucose chewable tablet 16 g, 4 Tablet, Oral, PRN, Sam Gomez PA    dextrose (D50W) injection syrg 12.5-25 g, 25-50 mL, IntraVENous, PRN, Vishnu Gomez PA    glucagon (GLUCAGEN) injection 1 mg, 1 mg, IntraMUSCular, PRN, Vishnu Gomez PA    PATIENT CARE TEAM:  Patient Care Team:  Diay Aceves MD as PCP - General (Internal Medicine)  Diya Aceves MD as PCP - [facility]  Daphne Kincaid MD as Consulting Provider (Internal Medicine)  Enid Morrison MD (Dermatology)  Armani Pardo MD (Nephrology)  Barrie Lau MD as Consulting Provider (Cardiology)  Peter Juarez MD (Cardiology)  Ganga Clemons MD as Consulting Provider (Pulmonary Disease)     Thank you for allowing me to participate in this patient's care.     Precious Raya NP  [address] [place] [address], [address]  Phone: [phone]

## 2022-01-29 NOTE — PROGRESS NOTES
0730 Handoff report received from [facility].    1440 Pt completed SBT     1513 Extubated to BIPAP @ 12/5 40% FiO2 O2 Sats 100%    1730 Pt taken off BIPAP, NC at 6L/min    1926 PT's O2 Sats in 80's and holding. Replaced on BIPAP. Leave on overnight.

## 2022-01-29 NOTE — PROGRESS NOTES
[facility]    ICU TEAM Progress Note    Name: Melba Juares   : 1955   MRN: [id number]   Date: 2022           ICU Assessment   77-year-old female with past medical history significant for moderate pulmonary hypertension on home oxygen therapy, CKD, nonischemic cardiomyopathy who was admitted to the hospital on  due to acute on chronic hypoxemic respiratory failure in light of fluid overload. Had a left heart cath on  which demonstrated single one-vessel moderate disease (mid LAD lesion) which was thought to be not a cause of cardiomyopathy. Subsequently had a right axillary Impella placed by CT surgery for potential double transplant. 1. Cardiogenic Shock  2. Pulmonary hypertension  3. Acute hypoxemic respiratory failure  4. Status post Impella placement  5. RODNEY on CKD stage IV        Interval events     - remains intubated, Impella in situ     - remains intubated, Impella in situ, now off inhaled nitric oxide, Bloody OG o/p     - getting [facility] today, remains intubated, Impella in situ, on inhaled nitric oxide     - s/p EGD/colo today for Ca workup for possible heart/kidney Tx, remains intubated, Impella in situ, on inhaled nitric oxide    [id number] intubated, Impella in situ, on inhaled nitric oxide           ICU Comprehensive Plan of Care:     Neuro-analgesia/sedation with opioids, propofol and Precedex as needed, continue gabapentin and venlafaxine, other delirium prevention strategies    Cardiac-continue hemodynamic monitoring, EF has improved to about 35 % on most recent echo, Impella at P6, now s/p LAD stent, on aspirin, plavix, digoxin, weaned off inhaled nitric oxide, follow-up cardiac surgery/advised heart failure recommendations     Pulmonary-bilateral infiltrates, continue lung protective mechanical ventilation, continue montelukast, hopefully we can extubate soon    GI- s/p EGD/colo  for Ca work up, PPI q12 for now for GI bleed. F/u GI recs    Renal-CKD-undulating Cr level-currently diuresing-on Bumex drip, follow-up nephrology recommendations, monitor urine output closely, dose meds renally, correct electrolyte derangements as needed, continue allopurinol    Hematology-systemic Angiomax-on hold for GI bleed , f/u CS recs, continue EPO    ID- Impella in situ, e coli in sputum - cont cefepime    Endocrinology-keep glucose less than 180, continue Synthroid    Objective:   Vital Signs:  Visit Vitals  /71 (BP 1 Location: Left arm, BP Patient Position: At rest)   Pulse 72   Temp 97.8 °F (36.6 °C)   Resp 25   Ht 5' 7\" (1.702 m)   Wt 105.5 kg (232 lb 9.4 oz)   SpO2 99%   BMI 36.43 kg/m²    O2 Flow Rate (L/min): 6 l/min (weaned) O2 Device: Endotracheal tube,Ventilator Temp (24hrs), Av.2 °F (36.8 °C), Min:96.9 °F (36.1 °C), Max:100.6 °F (38.1 °C)    CVP (mmHg): 5 mmHg (22 1000)      Intake/Output:     Intake/Output Summary (Last 24 hours) at 2022 1459  Last data filed at 2022 1200  Gross per 24 hour   Intake 1356.59 ml   Output 2825 ml   Net -1468.41 ml       Physical Exam:    General-intubated, sedated  Neuro-pupils reactive, opens eyes to voice, withdraws in all 4  Cardiac-RRR  Lungs-clear anteriorly  Abdomen-soft, nontender, nondistended  Extremities-warm, 1+ edema    LABS AND  DATA: Personally reviewed  Recent Labs     22  0420 22  1505 22  0349 22  0349   WBC 10.0  --   --  10.9   HGB 8.0* 8.3*   < > 8.2*   HCT 29.0* 30.0*   < > 29.9*     --   --  210    < > = values in this interval not displayed.      Recent Labs     22  0420 22  1507   * 146*   K 3.0* 3.8   * 111*   CO2 28 25   BUN 78* 78*   CREA 2.42* 2.62*   * 262*   CA 9.6 10.0   MG 2.3 2.4   PHOS 4.7 4.5     Recent Labs     22  0420 22  1507 22  0349 22  034   AP 70  --   --  79   TP 7.6  --   --  7.7   ALB 3.4* 3.8   < > 3.8   GLOB 4.2*  --   --  3.9    < > = values in this interval not displayed. Recent Labs     01/29/22  0420 01/28/22  1507   APTT 26.4 38.0*      Recent Labs     01/27/22  0456   PHI 7.40   PCO2I 50.4*   PO2I 97   FIO2I 40     Recent Labs     01/28/22  0900   CPK 60   CKMB 1.2       Hemodynamics:   PAP: PAP Systolic: 58 (77/86/59 9428) CO: CO (l/min): 4.9 l/min (01/29/22 1200)   Wedge:   CI: CI (l/min/m2): 2.2 l/min/m2 (01/29/22 1200)   CVP:  CVP (mmHg): 5 mmHg (01/29/22 1000) SVR:       PVR:       Ventilator Settings:  Mode Rate Tidal Volume Pressure FiO2 PEEP   Spontaneous,Pressure support   460 ml  5 cm H2O 40 % 5 cm H20     Peak airway pressure: 10 cm H2O    Minute ventilation: 7.11 l/min        MEDS: Reviewed    Chest X-Ray:  CXR Results  (Last 48 hours)               01/29/22 0440  XR CHEST PORT Final result    Impression:      1. Stable lines, tubes and devices. 2. Stable low lung volumes and bilateral infiltrates. .  . Narrative:  INDICATION:  post-Impella        EXAM: Chest single view. COMPARISON: 1/28/2022. FINDINGS: A single frontal view of the chest at 0426 hours shows low lung   volumes with diffuse bilateral stable lung infiltrates. ET tube, gastric tube,   PA catheter, AICD and Impella device all stable. .  The heart, mediastinum and   pulmonary vasculature are stable . The bony thorax is unremarkable for age. Benjamin Crumble 01/28/22 0425  XR CHEST PORT Final result    Impression:  Support lines in expected positions as above with no significant   change from prior and bilateral air space and interstitial opacities. Narrative:  EXAM: XR CHEST PORT       INDICATION: post-Impella       COMPARISON: Chest x-ray 1/27/2022. FINDINGS: A portable AP radiograph of the chest was obtained at 04:59 hours. No   significant change in expected position of endotracheal tube, enteric tube,   right central venous catheter, or Impella device from the right subclavian   approach.  Pacemaker generator body projects over the left chest wall with intact   appearing leads traversing in expected course. The patient is on a cardiac   monitor. There is redemonstration of patchy airspace opacities and diffuse   interstitial prominence with no pleural effusion or pneumothorax. The cardiac   and mediastinal contours and pulmonary vascularity are normal.  The bones and   soft tissues are grossly within normal limits. NOTE OF PERSONAL INVOLVEMENT IN CARE   This patient has a high probability of imminent, clinically significant deterioration, which requires the highest level of preparedness to intervene urgently. I participated in the decision-making and personally managed or directed the management of the following life and organ supporting interventions that required my frequent assessment to treat or prevent imminent deterioration. I personally spent 40 minutes of critical care time.       Signed By: Esthela Cronin MD     January 29, 2022    '

## 2022-01-29 NOTE — PROGRESS NOTES
[facility]   [address] [facility], [id number] [place] [address]  Phone: [phone]   [phone]    [email or web address]     Nephrology Progress Note    Patient Name : Aletha Smith      : 1955     MRN : [id number]  Date of Admission : 2022  Date of Servive : 22    CC:  Follow up for ARF       Assessment and Plan   RODNEY on CKD :  - Suspect 2/2 CRS  - Cr stable, UOP good on bumex drip  - cont present care  - daily labs  - no need for RRT at this time    CKD stage IV:  - Etiology: DM, HTN, CRS  - Renal US: suggestive of CKD, B/L benign renal cysts   - baseline Cr 2.4-2.6 mg/dl      Acute on chronic HFrEF  NI CMP, LVEF 15 to 20%  NYHA class III-IV  S/p BiV pacer/ICD-s/p CRT  R axillary Impella implanted 22  LAD PCI on 22  Transplant w/u underway - EGD and colon , pan CT w/o contrast today    Hypervolemia:  - RHC showing severely elevated filling pressures, pulm HTN  - improving overall  - diuretics as above    Morbid obesity  Type II DM  HTN  Hypothyroidism  Pulmonary hypertension  Gout  Psoriasis       Interval History:  Seen and examined. Stable UOP. Not on pressors. Impella P6, on bumex drip at 0.25mg/hr. On SBT now    Review of Systems: A comprehensive review of systems was negative except for that written in the HPI.     Current Medications:   Current Facility-Administered Medications   Medication Dose Route Frequency    potassium chloride 20 mEq in 50 ml IVPB  20 mEq IntraVENous Q1H    potassium bicarb-citric acid (EFFER-K) tablet 40 mEq  40 mEq Oral DAILY    [START ON 2022] insulin NPH (NOVOLIN N, HUMULIN N) injection 30 Units  30 Units SubCUTAneous DAILY    allopurinoL (ZYLOPRIM) tablet 100 mg  100 mg Oral DAILY    insulin NPH (NOVOLIN N, HUMULIN N) injection 30 Units  30 Units SubCUTAneous QHS    cefepime (MAXIPIME) 2 g in sterile water (preservative free) 10 mL IV syringe  2 g IntraVENous Q24H    pantoprazole (PROTONIX) injection 40 mg  40 mg IntraVENous Q12H    And    sodium chloride (NS) flush 10 mL  10 mL IntraVENous DAILY    sodium chloride (NS) flush 5-40 mL  5-40 mL IntraVENous PRN    clopidogreL (PLAVIX) tablet 75 mg  75 mg Oral DAILY    digoxin (LANOXIN) tablet 0.0625 mg  0.0625 mg Oral EVERY OTHER DAY    bumetanide (BUMEX) 0.25 mg/mL infusion  0.25 mg/hr IntraVENous CONTINUOUS    DOBUTamine (DOBUTREX) 500 mg/250 mL (2,000 mcg/mL) infusion  0-10 mcg/kg/min IntraVENous TITRATE    albumin human 25% (BUMINATE) solution 12.5 g  12.5 g IntraVENous DAILY    milrinone (PRIMACOR) 20 MG/100 ML D5W infusion  0.125 mcg/kg/min IntraVENous CONTINUOUS    vasopressin (VASOSTRICT) 20 Units in 0.9% sodium chloride 100 mL infusion  0-0.1 Units/min IntraVENous TITRATE    PHENYLephrine (NORMA-SYNEPHRINE) 30 mg in 0.9% sodium chloride 250 mL infusion   mcg/min IntraVENous TITRATE    niCARdipine (CARDENE) 25 mg in 0.9% sodium chloride 250 mL infusion  0-15 mg/hr IntraVENous TITRATE    [Held by provider] bivalirudin (ANGIOMAX) 250 mg in 0.9% sodium chloride (MBP/ADV) 50 mL MBP  0.02-2.5 mg/kg/hr IntraVENous TITRATE    hydrALAZINE (APRESOLINE) 20 mg/mL injection 5 mg  5 mg IntraVENous Q4H PRN    bumetanide (BUMEX) injection 1 mg  1 mg IntraVENous Q4H PRN    fentaNYL (PF) 1,500 mcg/30 mL (50 mcg/mL) infusion  0-200 mcg/hr IntraVENous TITRATE    heparin (porcine) in 0.9% NaCl 30,000 unit/1,000 mL perfusion irrigation 50-1,000 mL  50-1,000 mL Other PRN    chlorhexidine (PERIDEX) 0.12 % mouthwash 15 mL  15 mL Oral Q12H    0.9% sodium chloride infusion  9 mL/hr IntraVENous CONTINUOUS    naloxone (NARCAN) injection 0.4 mg  0.4 mg IntraVENous PRN    ondansetron (ZOFRAN) injection 4 mg  4 mg IntraVENous Q4H PRN    albuterol (PROVENTIL VENTOLIN) nebulizer solution 2.5 mg  2.5 mg Nebulization Q4H PRN    aspirin chewable tablet 81 mg  81 mg Oral DAILY    midazolam (VERSED) injection 1 mg  1 mg IntraVENous Q1H PRN    magnesium oxide (MAG-OX) tablet 400 mg  400 mg Oral BID    calcium chloride 1 g in 0.9% sodium chloride 100 mL IVPB  1 g IntraVENous PRN    bisacodyL (DULCOLAX) suppository 10 mg  10 mg Rectal DAILY PRN    senna-docusate (PERICOLACE) 8.6-50 mg per tablet 1 Tablet  1 Tablet Oral BID    ELECTROLYTE REPLACEMENT NOTE: Nurse to review Serum Potassium and Magnesuim levels and Initiate Electrolyte Replacement Protocol as needed  1 Each Other PRN    magnesium sulfate 1 g/100 ml IVPB (premix or compounded)  1 g IntraVENous PRN    alteplase (CATHFLO) 1 mg in sterile water (preservative free) 1 mL injection  1 mg InterCATHeter PRN    bacitracin 500 unit/gram packet 1 Packet  1 Packet Topical PRN    dexmedeTOMidine in 0.9 % NaCl (PRECEDEX) 400 mcg/100 mL (4 mcg/mL) infusion soln  0.1-1.5 mcg/kg/hr IntraVENous TITRATE    midazolam (VERSED) injection 1 mg  1 mg IntraVENous Q4H PRN    insulin lispro (HUMALOG) injection   SubCUTAneous Q6H    propofol (DIPRIVAN) 10 mg/mL infusion  0-50 mcg/kg/min IntraVENous TITRATE    sodium bicarbonate (8.4%) 25 mEq in dextrose 5% 1,000 mL - impella purge fluid   IntraVENous TITRATE    triamcinolone acetonide (KENALOG) 0.1 % cream   Topical BID    polyethylene glycol (MIRALAX) packet 17 g  17 g Oral DAILY    [Held by provider] epoetin isabel-epbx (RETACRIT) injection 20,000 Units  20,000 Units SubCUTAneous Q TUE, THU & SAT    montelukast (SINGULAIR) tablet 10 mg  10 mg Oral DAILY    levothyroxine (SYNTHROID) tablet 100 mcg  100 mcg Oral Once per day on Mon Tue Wed Thu Fri Sat    hydroxypropyl methylcellulose (ISOPTO TEARS) 0.5 % ophthalmic solution 1 Drop  1 Drop Both Eyes PRN    venlafaxine-SR (EFFEXOR-XR) capsule 75 mg  75 mg Oral DAILY WITH BREAKFAST    gabapentin (NEURONTIN) capsule 100 mg  100 mg Oral QHS    arformoteroL (BROVANA) neb solution 15 mcg  15 mcg Nebulization BID RT    And    budesonide (PULMICORT) 500 mcg/2 ml nebulizer suspension  500 mcg Nebulization BID RT  sodium chloride (NS) flush 5-40 mL  5-40 mL IntraVENous Q8H    acetaminophen (TYLENOL) tablet 650 mg  650 mg Oral Q6H PRN    Or    acetaminophen (TYLENOL) suppository 650 mg  650 mg Rectal Q6H PRN    glucose chewable tablet 16 g  4 Tablet Oral PRN    dextrose (D50W) injection syrg 12.5-25 g  25-50 mL IntraVENous PRN    glucagon (GLUCAGEN) injection 1 mg  1 mg IntraMUSCular PRN      Allergies   Allergen Reactions    Ciprofloxacin Anaphylaxis    Shellfish Derived Anaphylaxis    Sildenafil Other (comments)    Ace Inhibitors Unknown (comments)    Biaxin [Clarithromycin] Other (comments)     Metal taste    Candesartan Cough    Pcn [Penicillins] Hives       Objective:  Vitals:    Vitals:    01/29/22 0758 01/29/22 0800 01/29/22 0900 01/29/22 1000   BP:  118/74     Pulse: 80 82 80 80   Resp: 22 24 20 16   Temp:  97.6 °F (36.4 °C)     SpO2: 100% 96% 100% 100%   Weight:       Height:         Intake and Output:  No intake/output data recorded. 01/27 1901 - 01/29 0700  In: 5516.2 [I.V.:4417.4]  Out: 4850 [Urine:4350]    Physical Examination:    General: Sedated on the vent  HEENT:           ETT in place   Neck:  Supple, no mass  Resp:  Reduced bibasilar  Breath sounds  CV:  RRR, trace LE edema  GI:  Soft, NT, + BS, obese  Neurologic:  Sedated  :                  Henley in place    [x]    High complexity decision making was performed  []    Patient is at high-risk of decompensation with multiple organ involvement    Lab Data Personally Reviewed: I have reviewed all the pertinent labs, microbiology data and radiology studies during assessment.     Recent Labs     01/29/22  0420 01/28/22  1507 01/28/22  0349 01/27/22  1844 01/27/22  1649 01/27/22  1645 01/27/22  1001 01/27/22  1001 01/27/22  0419 01/27/22  0208   * 146* 143  --  144  --   --   --  145  --    K 3.0* 3.8 4.0 3.7 4.0  --    < > 3.9 4.0   < >   * 111* 108  --  108  --   --   --  109*  --    CO2 28 25 28  --  28  --   --   --  31  --    GLU 233* 262* 311*  --  199*  --   --   --  103*  --    BUN 78* 78* 77*  --  72*  --   --   --  70*  --    CREA 2.42* 2.62* 2.44*  --  2.34*  --   --   --  2.34*  --    CA 9.6 10.0 9.7  --  9.7  --   --   --  9.7  --    MG 2.3 2.4  --  2.3  --  2.5*  --  2.9*  --    < >   PHOS 4.7 4.5 1.6*  --  2.6  --   --   --  2.5*  --    ALB 3.4* 3.8 3.8  --  3.6  --   --   --  3.7  --    ALT 8*  --  8*  --   --   --   --   --  8*  --     < > = values in this interval not displayed. Recent Labs     01/29/22  0420 01/28/22  1505 01/28/22  0349 01/27/22  1649 01/27/22  1001 01/27/22  0419 01/27/22  0419   WBC 10.0  --  10.9  --   --   --  7.1   HGB 8.0* 8.3* 8.2* 6.9* 7.0*   < > 7.1*   HCT 29.0* 30.0* 29.9* 26.6* 27.1*   < > 27.2*     --  210  --   --   --  195    < > = values in this interval not displayed. Lab Results   Component Value Date/Time    Specimen Description: URINE 10/22/2013 01:32 PM    Specimen Description: URINE 01/23/2013 04:40 PM    Specimen Description: LEG TISSUE 02/12/2009 12:00 AM    Specimen Description: LEG (LEFT) 01/29/2009 03:06 AM     Lab Results   Component Value Date/Time    Culture result: NO GROWTH 4 DAYS 01/25/2022 12:36 PM    Culture result: MODERATE ESCHERICHIA COLI (A) 01/25/2022 12:36 PM    Culture result: MODERATE NORMAL RESPIRATORY ROSANNA 01/25/2022 12:36 PM    Culture result: MRSA NOT PRESENT 01/19/2022 12:41 PM    Culture result:  01/19/2022 12:41 PM     Screening of patient nares for MRSA is for surveillance purposes and, if positive, to facilitate isolation considerations in high risk settings. It is not intended for automatic decolonization interventions per se as regimens are not sufficiently effective to warrant routine use.     Culture result: NO GROWTH 5 DAYS 01/19/2022 12:41 PM     Recent Results (from the past 24 hour(s))   GLUCOSE, POC    Collection Time: 01/28/22 12:14 PM   Result Value Ref Range    Glucose (POC) 275 (H) 65 - 117 mg/dL    Performed by Olivia Clay TROPONIN-HIGH SENSITIVITY    Collection Time: 01/28/22  3:05 PM   Result Value Ref Range    Troponin-High Sensitivity 1,217 (HH) 0 - 51 ng/L   HGB & HCT    Collection Time: 01/28/22  3:05 PM   Result Value Ref Range    HGB 8.3 (L) 11.5 - 16.0 g/dL    HCT 30.0 (L) 35.0 - 47.0 %   PTT    Collection Time: 01/28/22  3:07 PM   Result Value Ref Range    aPTT 38.0 (H) 22.1 - 31.0 sec    aPTT, therapeutic range     58.0 - 77.0 SECS   MAGNESIUM    Collection Time: 01/28/22  3:07 PM   Result Value Ref Range    Magnesium 2.4 1.6 - 2.4 mg/dL   RENAL FUNCTION PANEL    Collection Time: 01/28/22  3:07 PM   Result Value Ref Range    Sodium 146 (H) 136 - 145 mmol/L    Potassium 3.8 3.5 - 5.1 mmol/L    Chloride 111 (H) 97 - 108 mmol/L    CO2 25 21 - 32 mmol/L    Anion gap 10 5 - 15 mmol/L    Glucose 262 (H) 65 - 100 mg/dL    BUN 78 (H) 6 - 20 MG/DL    Creatinine 2.62 (H) 0.55 - 1.02 MG/DL    BUN/Creatinine ratio 30 (H) 12 - 20      GFR est AA 22 (L) >60 ml/min/1.73m2    GFR est non-AA 18 (L) >60 ml/min/1.73m2    Calcium 10.0 8.5 - 10.1 MG/DL    Phosphorus 4.5 2.6 - 4.7 MG/DL    Albumin 3.8 3.5 - 5.0 g/dL   GLUCOSE, POC    Collection Time: 01/28/22  5:56 PM   Result Value Ref Range    Glucose (POC) 264 (H) 65 - 117 mg/dL    Performed by Kesah Horvath    GLUCOSE, POC    Collection Time: 01/28/22  9:02 PM   Result Value Ref Range    Glucose (POC) 211 (H) 65 - 117 mg/dL    Performed by Marleen Murillo    NT-PRO BNP    Collection Time: 01/29/22  4:20 AM   Result Value Ref Range    NT pro-BNP 27,452 (H) <673 PG/ML   METABOLIC PANEL, COMPREHENSIVE    Collection Time: 01/29/22  4:20 AM   Result Value Ref Range    Sodium 146 (H) 136 - 145 mmol/L    Potassium 3.0 (L) 3.5 - 5.1 mmol/L    Chloride 111 (H) 97 - 108 mmol/L    CO2 28 21 - 32 mmol/L    Anion gap 7 5 - 15 mmol/L    Glucose 233 (H) 65 - 100 mg/dL    BUN 78 (H) 6 - 20 MG/DL    Creatinine 2.42 (H) 0.55 - 1.02 MG/DL    BUN/Creatinine ratio 32 (H) 12 - 20      GFR est AA 24 (L) >60 ml/min/1.73m2    GFR est non-AA 20 (L) >60 ml/min/1.73m2    Calcium 9.6 8.5 - 10.1 MG/DL    Bilirubin, total 0.5 0.2 - 1.0 MG/DL    ALT (SGPT) 8 (L) 12 - 78 U/L    AST (SGOT) 14 (L) 15 - 37 U/L    Alk.  phosphatase 70 45 - 117 U/L    Protein, total 7.6 6.4 - 8.2 g/dL    Albumin 3.4 (L) 3.5 - 5.0 g/dL    Globulin 4.2 (H) 2.0 - 4.0 g/dL    A-G Ratio 0.8 (L) 1.1 - 2.2     DIGOXIN    Collection Time: 01/29/22  4:20 AM   Result Value Ref Range    Digoxin level 0.9 0.90 - 2.00 NG/ML   LACTIC ACID    Collection Time: 01/29/22  4:20 AM   Result Value Ref Range    Lactic acid 0.6 0.4 - 2.0 MMOL/L   PROCALCITONIN    Collection Time: 01/29/22  4:20 AM   Result Value Ref Range    Procalcitonin 0.95 ng/mL   PHOSPHORUS    Collection Time: 01/29/22  4:20 AM   Result Value Ref Range    Phosphorus 4.7 2.6 - 4.7 MG/DL   LD    Collection Time: 01/29/22  4:20 AM   Result Value Ref Range     (H) 81 - 246 U/L   PTT    Collection Time: 01/29/22  4:20 AM   Result Value Ref Range    aPTT 26.4 22.1 - 31.0 sec    aPTT, therapeutic range     58.0 - 77.0 SECS   CBC W/O DIFF    Collection Time: 01/29/22  4:20 AM   Result Value Ref Range    WBC 10.0 3.6 - 11.0 K/uL    RBC 3.19 (L) 3.80 - 5.20 M/uL    HGB 8.0 (L) 11.5 - 16.0 g/dL    HCT 29.0 (L) 35.0 - 47.0 %    MCV 90.9 80.0 - 99.0 FL    MCH 25.1 (L) 26.0 - 34.0 PG    MCHC 27.6 (L) 30.0 - 36.5 g/dL    RDW 19.0 (H) 11.5 - 14.5 %    PLATELET 118 752 - 415 K/uL    MPV 9.7 8.9 - 12.9 FL    NRBC 0.0 0  WBC    ABSOLUTE NRBC 0.00 0.00 - 0.01 K/uL   MAGNESIUM    Collection Time: 01/29/22  4:20 AM   Result Value Ref Range    Magnesium 2.3 1.6 - 2.4 mg/dL   CARBOXYHEMOGLOBIN    Collection Time: 01/29/22  5:26 AM   Result Value Ref Range    Carboxy-Hgb 1.1 1 - 2 %    Methemoglobin 0.2 0 - 1.4 %    tHb 8.7 (L) 14 - 17 g/dL    Oxyhemoglobin 64.4 (LL) 94 - 97 %    O2 SATURATION 65 (L) 95 - 99 %    SITE SG      Sample source VENOUS BLOOD      Critical value read back Called to Machine Talker on 01/29/2022 at 05:29    GLUCOSE, POC    Collection Time: 01/29/22  6:24 AM   Result Value Ref Range    Glucose (POC) 260 (H) 65 - 117 mg/dL    Performed by Jeniffer Ewing            I have reviewed the flowsheets. Chart and Pertinent Notes have been reviewed. No change in PMH ,family and social history from Consult note.       Cici Morales MD  [facility]

## 2022-01-29 NOTE — PROGRESS NOTES
[address]  [address], [address]       GI PROGRESS NOTE        NAME: Claire James   :  1955   MRN:  [id number]       Subjective:   Per RN, no more blood from OG tube, non bloody small bowel movements    Objective:     VITALS:   Last 24hrs VS reviewed since prior progress note. Most recent are:  Visit Vitals  /69   Pulse 80   Temp 96.9 °F (36.1 °C)   Resp 22   Ht 5' 7\" (1.702 m)   Wt 105.5 kg (232 lb 9.4 oz)   SpO2 100%   BMI 36.43 kg/m²       PHYSICAL EXAM:  General: Intubated  Neurologic:  Sedated  HEENT: OG tube in place  Lungs: On vent  Heart:  S1 S2  Abdomen: Soft, obese, no apparent tenderness, no guarding, no rebound. +Bowel sounds.    Extremities: Warm  Psych:   Unable to assess    Lab Data Reviewed:     Recent Results (from the past 24 hour(s))   GLUCOSE, POC    Collection Time: 22 12:14 PM   Result Value Ref Range    Glucose (POC) 275 (H) 65 - 117 mg/dL    Performed by Matthew Burks    TROPONIN-HIGH SENSITIVITY    Collection Time: 22  3:05 PM   Result Value Ref Range    Troponin-High Sensitivity 1,217 (HH) 0 - 51 ng/L   HGB & HCT    Collection Time: 22  3:05 PM   Result Value Ref Range    HGB 8.3 (L) 11.5 - 16.0 g/dL    HCT 30.0 (L) 35.0 - 47.0 %   PTT    Collection Time: 22  3:07 PM   Result Value Ref Range    aPTT 38.0 (H) 22.1 - 31.0 sec    aPTT, therapeutic range     58.0 - 77.0 SECS   MAGNESIUM    Collection Time: 22  3:07 PM   Result Value Ref Range    Magnesium 2.4 1.6 - 2.4 mg/dL   RENAL FUNCTION PANEL    Collection Time: 22  3:07 PM   Result Value Ref Range    Sodium 146 (H) 136 - 145 mmol/L    Potassium 3.8 3.5 - 5.1 mmol/L    Chloride 111 (H) 97 - 108 mmol/L    CO2 25 21 - 32 mmol/L    Anion gap 10 5 - 15 mmol/L    Glucose 262 (H) 65 - 100 mg/dL    BUN 78 (H) 6 - 20 MG/DL    Creatinine 2.62 (H) 0.55 - 1.02 MG/DL    BUN/Creatinine ratio 30 (H) 12 - 20      GFR est AA 22 (L) >60 ml/min/1.73m2    GFR est non-AA 18 (L) >60 ml/min/1.73m2    Calcium 10.0 8.5 - 10.1 MG/DL    Phosphorus 4.5 2.6 - 4.7 MG/DL    Albumin 3.8 3.5 - 5.0 g/dL   GLUCOSE, POC    Collection Time: 01/28/22  5:56 PM   Result Value Ref Range    Glucose (POC) 264 (H) 65 - 117 mg/dL    Performed by Jacqueline Silveira    GLUCOSE, POC    Collection Time: 01/28/22  9:02 PM   Result Value Ref Range    Glucose (POC) 211 (H) 65 - 117 mg/dL    Performed by Tasia Homans    NT-PRO BNP    Collection Time: 01/29/22  4:20 AM   Result Value Ref Range    NT pro-BNP 27,452 (H) <063 PG/ML   METABOLIC PANEL, COMPREHENSIVE    Collection Time: 01/29/22  4:20 AM   Result Value Ref Range    Sodium 146 (H) 136 - 145 mmol/L    Potassium 3.0 (L) 3.5 - 5.1 mmol/L    Chloride 111 (H) 97 - 108 mmol/L    CO2 28 21 - 32 mmol/L    Anion gap 7 5 - 15 mmol/L    Glucose 233 (H) 65 - 100 mg/dL    BUN 78 (H) 6 - 20 MG/DL    Creatinine 2.42 (H) 0.55 - 1.02 MG/DL    BUN/Creatinine ratio 32 (H) 12 - 20      GFR est AA 24 (L) >60 ml/min/1.73m2    GFR est non-AA 20 (L) >60 ml/min/1.73m2    Calcium 9.6 8.5 - 10.1 MG/DL    Bilirubin, total 0.5 0.2 - 1.0 MG/DL    ALT (SGPT) 8 (L) 12 - 78 U/L    AST (SGOT) 14 (L) 15 - 37 U/L    Alk.  phosphatase 70 45 - 117 U/L    Protein, total 7.6 6.4 - 8.2 g/dL    Albumin 3.4 (L) 3.5 - 5.0 g/dL    Globulin 4.2 (H) 2.0 - 4.0 g/dL    A-G Ratio 0.8 (L) 1.1 - 2.2     DIGOXIN    Collection Time: 01/29/22  4:20 AM   Result Value Ref Range    Digoxin level 0.9 0.90 - 2.00 NG/ML   LACTIC ACID    Collection Time: 01/29/22  4:20 AM   Result Value Ref Range    Lactic acid 0.6 0.4 - 2.0 MMOL/L   PROCALCITONIN    Collection Time: 01/29/22  4:20 AM   Result Value Ref Range    Procalcitonin 0.95 ng/mL   PHOSPHORUS    Collection Time: 01/29/22  4:20 AM   Result Value Ref Range    Phosphorus 4.7 2.6 - 4.7 MG/DL   LD    Collection Time: 01/29/22  4:20 AM   Result Value Ref Range     (H) 81 - 246 U/L   PTT    Collection Time: 01/29/22  4:20 AM   Result Value Ref Range aPTT 26.4 22.1 - 31.0 sec    aPTT, therapeutic range     58.0 - 77.0 SECS   CBC W/O DIFF    Collection Time: 01/29/22  4:20 AM   Result Value Ref Range    WBC 10.0 3.6 - 11.0 K/uL    RBC 3.19 (L) 3.80 - 5.20 M/uL    HGB 8.0 (L) 11.5 - 16.0 g/dL    HCT 29.0 (L) 35.0 - 47.0 %    MCV 90.9 80.0 - 99.0 FL    MCH 25.1 (L) 26.0 - 34.0 PG    MCHC 27.6 (L) 30.0 - 36.5 g/dL    RDW 19.0 (H) 11.5 - 14.5 %    PLATELET 578 806 - 472 K/uL    MPV 9.7 8.9 - 12.9 FL    NRBC 0.0 0  WBC    ABSOLUTE NRBC 0.00 0.00 - 0.01 K/uL   MAGNESIUM    Collection Time: 01/29/22  4:20 AM   Result Value Ref Range    Magnesium 2.3 1.6 - 2.4 mg/dL   CARBOXYHEMOGLOBIN    Collection Time: 01/29/22  5:26 AM   Result Value Ref Range    Carboxy-Hgb 1.1 1 - 2 %    Methemoglobin 0.2 0 - 1.4 %    tHb 8.7 (L) 14 - 17 g/dL    Oxyhemoglobin 64.4 (LL) 94 - 97 %    O2 SATURATION 65 (L) 95 - 99 %    SITE SG      Sample source VENOUS BLOOD      Critical value read back Called to [address] [place] [address] on 01/29/2022 at 05:29    GLUCOSE, POC    Collection Time: 01/29/22  6:24 AM   Result Value Ref Range    Glucose (POC) 260 (H) 65 - 117 mg/dL    Performed by Maryam Avalos        Assessment:   · Acute on chronic iron deficiency anemia, new bloody output from OG tube 1/28: EGD (1/26/22) by Dr. Carlo Santillan: mild linear erythema in the stomach (biopsied). Pathology showed active gastritis, H. Pylori was negative. Colonoscopy (1/26/22) by Dr. Carlo Santillan: 2 polyps (greatest diameter 5 mm) in the cecum - removed; moderate pan-diverticulosis. Pathology showed tubular adenomas. Hgb stable at 8.2 this AM prior to bloody NG tube output. Bleeding likely from biopsies from EGD. Resolution.    · Heart failure: status post Impella  · Coronary artery disease: status post LAD stent 1/27/22  · Pulmonary hypertension  · Chronic kidney disease     Patient Active Problem List   Diagnosis Code    Anemia in chronic renal disease N18.9, D63.1    HTN (hypertension) I10    GERD (gastroesophageal reflux disease) K21.9    Gout M10.9    Pulmonary HTN (HCC) I27.20    Cardiomyopathy, nonischemic (HCC) I42.8    CKD (chronic kidney disease) N18.9    Dyslipidemia E78.5    ICD (implantable cardioverter-defibrillator), biventricular, in situ Z95.810    Acquired hypothyroidism E03.9    Dysthymia F34.1    Obesity, morbid (HCC) E66.01    Type 2 diabetes with nephropathy (HCC) E11.21    Type 2 diabetes mellitus with diabetic neuropathy (HCC) E11.40    CHF exacerbation (HCC) I50.9    Acute respiratory failure with hypoxia (HCC) J96.01     Plan:   · NPO  · PPI BID  · Bival gtt on hold. Consider restart tomorrow. Restart today if necessary  · Hold ASA  · High risk for repeat EGD and would only do as last resort.       Signed By: Jolynn Zhao MD     1/29/2022  1:22 PM

## 2022-01-30 NOTE — PROGRESS NOTES
[facility]   [address] [facility], [id number] [place] [address]  Phone: [phone]   [phone]    [email or web address]     Nephrology Progress Note    Patient Name : Claire James      : 1955     MRN : [id number]  Date of Admission : 2022  Date of Servive : 22    CC:  Follow up for ARF       Assessment and Plan   RODNEY on CKD :  - Suspect 2/2 CRS  - Cr stable, UOP good on bumex drip  - diuril x 1 today  - cont bumex drip  - daily labs and I/os    CKD stage IV:  - Etiology: DM, HTN, CRS  - Renal US: suggestive of CKD, B/L benign renal cysts   - baseline Cr 2.4-2.6 mg/dl     HyperNa:  - increase FW flushes  - diuril x 1 which should help with Na excretion     Acute on chronic HFrEF  NI CMP, LVEF 15 to 20%  NYHA class III-IV  S/p BiV pacer/ICD-s/p CRT  R axillary Impella implanted 22  LAD PCI on 22  Transplant w/u underway - EGD and colon , pan CT w/o contrast  neg for acute issues    Hypervolemia:  - RHC showing severely elevated filling pressures, pulm HTN  - improving overall  - diuretics as above    Morbid obesity  Type II DM  HTN  Hypothyroidism  Pulmonary hypertension  Gout  Psoriasis       Interval History:  Seen and examined. Extubated, doing well. On NC O2 this AM. UOP > 3.5 L, net neg overall in the past 24 hrs. Na up to 150 today. Getting TF now. Awake and following commands. Review of Systems: A comprehensive review of systems was negative except for that written in the HPI.     Current Medications:   Current Facility-Administered Medications   Medication Dose Route Frequency    potassium bicarb-citric acid (EFFER-K) tablet 40 mEq  40 mEq Oral DAILY    insulin NPH (NOVOLIN N, HUMULIN N) injection 30 Units  30 Units SubCUTAneous DAILY    hydrALAZINE (APRESOLINE) tablet 10 mg  10 mg Oral TID    allopurinoL (ZYLOPRIM) tablet 100 mg  100 mg Oral DAILY    insulin NPH (NOVOLIN N, HUMULIN N) injection 30 Units  30 Units SubCUTAneous QHS    cefepime (MAXIPIME) 2 g in sterile water (preservative free) 10 mL IV syringe  2 g IntraVENous Q24H    pantoprazole (PROTONIX) injection 40 mg  40 mg IntraVENous Q12H    And    sodium chloride (NS) flush 10 mL  10 mL IntraVENous DAILY    sodium chloride (NS) flush 5-40 mL  5-40 mL IntraVENous PRN    clopidogreL (PLAVIX) tablet 75 mg  75 mg Oral DAILY    digoxin (LANOXIN) tablet 0.0625 mg  0.0625 mg Oral EVERY OTHER DAY    bumetanide (BUMEX) 0.25 mg/mL infusion  0.25 mg/hr IntraVENous CONTINUOUS    DOBUTamine (DOBUTREX) 500 mg/250 mL (2,000 mcg/mL) infusion  0-10 mcg/kg/min IntraVENous TITRATE    albumin human 25% (BUMINATE) solution 12.5 g  12.5 g IntraVENous DAILY    milrinone (PRIMACOR) 20 MG/100 ML D5W infusion  0.125 mcg/kg/min IntraVENous CONTINUOUS    vasopressin (VASOSTRICT) 20 Units in 0.9% sodium chloride 100 mL infusion  0-0.1 Units/min IntraVENous TITRATE    PHENYLephrine (NORMA-SYNEPHRINE) 30 mg in 0.9% sodium chloride 250 mL infusion   mcg/min IntraVENous TITRATE    niCARdipine (CARDENE) 25 mg in 0.9% sodium chloride 250 mL infusion  0-15 mg/hr IntraVENous TITRATE    [Held by provider] bivalirudin (ANGIOMAX) 250 mg in 0.9% sodium chloride (MBP/ADV) 50 mL MBP  0.02-2.5 mg/kg/hr IntraVENous TITRATE    hydrALAZINE (APRESOLINE) 20 mg/mL injection 5 mg  5 mg IntraVENous Q4H PRN    bumetanide (BUMEX) injection 1 mg  1 mg IntraVENous Q4H PRN    fentaNYL (PF) 1,500 mcg/30 mL (50 mcg/mL) infusion  0-200 mcg/hr IntraVENous TITRATE    heparin (porcine) in 0.9% NaCl 30,000 unit/1,000 mL perfusion irrigation 50-1,000 mL  50-1,000 mL Other PRN    chlorhexidine (PERIDEX) 0.12 % mouthwash 15 mL  15 mL Oral Q12H    0.9% sodium chloride infusion  9 mL/hr IntraVENous CONTINUOUS    naloxone (NARCAN) injection 0.4 mg  0.4 mg IntraVENous PRN    ondansetron (ZOFRAN) injection 4 mg  4 mg IntraVENous Q4H PRN    albuterol (PROVENTIL VENTOLIN) nebulizer solution 2.5 mg  2.5 mg Nebulization Q4H PRN    aspirin chewable tablet 81 mg  81 mg Oral DAILY    midazolam (VERSED) injection 1 mg  1 mg IntraVENous Q1H PRN    magnesium oxide (MAG-OX) tablet 400 mg  400 mg Oral BID    calcium chloride 1 g in 0.9% sodium chloride 100 mL IVPB  1 g IntraVENous PRN    bisacodyL (DULCOLAX) suppository 10 mg  10 mg Rectal DAILY PRN    senna-docusate (PERICOLACE) 8.6-50 mg per tablet 1 Tablet  1 Tablet Oral BID    ELECTROLYTE REPLACEMENT NOTE: Nurse to review Serum Potassium and Magnesuim levels and Initiate Electrolyte Replacement Protocol as needed  1 Each Other PRN    magnesium sulfate 1 g/100 ml IVPB (premix or compounded)  1 g IntraVENous PRN    alteplase (CATHFLO) 1 mg in sterile water (preservative free) 1 mL injection  1 mg InterCATHeter PRN    bacitracin 500 unit/gram packet 1 Packet  1 Packet Topical PRN    dexmedeTOMidine in 0.9 % NaCl (PRECEDEX) 400 mcg/100 mL (4 mcg/mL) infusion soln  0.1-1.5 mcg/kg/hr IntraVENous TITRATE    midazolam (VERSED) injection 1 mg  1 mg IntraVENous Q4H PRN    insulin lispro (HUMALOG) injection   SubCUTAneous Q6H    sodium bicarbonate (8.4%) 25 mEq in dextrose 5% 1,000 mL - impella purge fluid   IntraVENous TITRATE    triamcinolone acetonide (KENALOG) 0.1 % cream   Topical BID    polyethylene glycol (MIRALAX) packet 17 g  17 g Oral DAILY    [Held by provider] epoetin isabel-epbx (RETACRIT) injection 20,000 Units  20,000 Units SubCUTAneous Q TUE, THU & SAT    montelukast (SINGULAIR) tablet 10 mg  10 mg Oral DAILY    levothyroxine (SYNTHROID) tablet 100 mcg  100 mcg Oral Once per day on Mon Tue Wed Thu Fri Sat    hydroxypropyl methylcellulose (ISOPTO TEARS) 0.5 % ophthalmic solution 1 Drop  1 Drop Both Eyes PRN    venlafaxine-SR (EFFEXOR-XR) capsule 75 mg  75 mg Oral DAILY WITH BREAKFAST    gabapentin (NEURONTIN) capsule 100 mg  100 mg Oral QHS    arformoteroL (BROVANA) neb solution 15 mcg  15 mcg Nebulization BID RT    And    budesonide (PULMICORT) 500 mcg/2 ml nebulizer suspension  500 mcg Nebulization BID RT    sodium chloride (NS) flush 5-40 mL  5-40 mL IntraVENous Q8H    acetaminophen (TYLENOL) tablet 650 mg  650 mg Oral Q6H PRN    Or    acetaminophen (TYLENOL) suppository 650 mg  650 mg Rectal Q6H PRN    glucose chewable tablet 16 g  4 Tablet Oral PRN    dextrose (D50W) injection syrg 12.5-25 g  25-50 mL IntraVENous PRN    glucagon (GLUCAGEN) injection 1 mg  1 mg IntraMUSCular PRN      Allergies   Allergen Reactions    Ciprofloxacin Anaphylaxis    Shellfish Derived Anaphylaxis    Sildenafil Other (comments)    Ace Inhibitors Unknown (comments)    Biaxin [Clarithromycin] Other (comments)     Metal taste    Candesartan Cough    Pcn [Penicillins] Hives       Objective:  Vitals:    Vitals:    01/30/22 0600 01/30/22 0700 01/30/22 0800 01/30/22 0801   BP: 116/66  103/63    Pulse: 82 93 83    Resp: 20 23 15    Temp:   98.5 °F (36.9 °C)    SpO2: 100% 100% 99% 100%   Weight:       Height:         Intake and Output:  No intake/output data recorded. 01/28 1901 - 01/30 0700  In: 3203.9 [I.V.:3013.9]  Out: 5075 [Urine:5075]    Physical Examination:    General: Awake, alert  HEENT:           EOMI  Neck:  Supple, no mass  Resp:  Lungs mostly clear  CV:  RRR, trace LE edema  GI:  Soft, NT, + BS, obese  Neurologic:  Awake, following commands  :                  Henley in place    [x]    High complexity decision making was performed  []    Patient is at high-risk of decompensation with multiple organ involvement    Lab Data Personally Reviewed: I have reviewed all the pertinent labs, microbiology data and radiology studies during assessment.     Recent Labs     01/30/22  0404 01/29/22  0420 01/28/22  1507 01/28/22  0349 01/27/22  1844 01/27/22  1649 01/27/22  1649 01/27/22  1645 01/27/22  1001   * 146* 146* 143  --   --  144  --   --    K 3.4* 3.0* 3.8 4.0 3.7   < > 4.0  --    < >   * 111* 111* 108  --   --  108  --   --    CO2 29 28 25 28  -- --  28  --   --    * 233* 262* 311*  --   --  199*  --   --    BUN 73* 78* 78* 77*  --   --  72*  --   --    CREA 2.36* 2.42* 2.62* 2.44*  --   --  2.34*  --   --    CA 10.0 9.6 10.0 9.7  --   --  9.7  --   --    MG 2.5* 2.3 2.4  --  2.3  --   --  2.5*  --    PHOS 3.5 4.7 4.5 1.6*  --   --  2.6  --   --    ALB 3.5 3.4* 3.8 3.8  --   --  3.6  --   --    ALT 7* 8*  --  8*  --   --   --   --   --     < > = values in this interval not displayed. Recent Labs     01/30/22  0404 01/29/22  0420 01/28/22  1505 01/28/22  0349 01/27/22  1649   WBC 11.4* 10.0  --  10.9  --    HGB 8.3* 8.0* 8.3* 8.2* 6.9*   HCT 31.0* 29.0* 30.0* 29.9* 26.6*    215  --  210  --      Lab Results   Component Value Date/Time    Specimen Description: URINE 10/22/2013 01:32 PM    Specimen Description: URINE 01/23/2013 04:40 PM    Specimen Description: LEG TISSUE 02/12/2009 12:00 AM    Specimen Description: LEG (LEFT) 01/29/2009 03:06 AM     Lab Results   Component Value Date/Time    Culture result: NO GROWTH 5 DAYS 01/25/2022 12:36 PM    Culture result: MODERATE ESCHERICHIA COLI (A) 01/25/2022 12:36 PM    Culture result: MODERATE NORMAL RESPIRATORY ROSANNA 01/25/2022 12:36 PM    Culture result: MRSA NOT PRESENT 01/19/2022 12:41 PM    Culture result:  01/19/2022 12:41 PM     Screening of patient nares for MRSA is for surveillance purposes and, if positive, to facilitate isolation considerations in high risk settings. It is not intended for automatic decolonization interventions per se as regimens are not sufficiently effective to warrant routine use.     Culture result: NO GROWTH 5 DAYS 01/19/2022 12:41 PM     Recent Results (from the past 24 hour(s))   GLUCOSE, POC    Collection Time: 01/29/22  1:11 PM   Result Value Ref Range    Glucose (POC) 153 (H) 65 - 117 mg/dL    Performed by Lilibeth Parmar, POC    Collection Time: 01/29/22  5:50 PM   Result Value Ref Range    Glucose (POC) 161 (H) 65 - 117 mg/dL    Performed by Brian Caceres Magdalena (PCT)    GLUCOSE, POC    Collection Time: 01/29/22 11:13 PM   Result Value Ref Range    Glucose (POC) 163 (H) 65 - 117 mg/dL    Performed by Olga Stevens    Collection Time: 01/30/22  4:04 AM   Result Value Ref Range    Magnesium 2.5 (H) 1.6 - 2.4 mg/dL   NT-PRO BNP    Collection Time: 01/30/22  4:04 AM   Result Value Ref Range    NT pro-BNP 25,722 (H) <796 PG/ML   METABOLIC PANEL, COMPREHENSIVE    Collection Time: 01/30/22  4:04 AM   Result Value Ref Range    Sodium 150 (H) 136 - 145 mmol/L    Potassium 3.4 (L) 3.5 - 5.1 mmol/L    Chloride 114 (H) 97 - 108 mmol/L    CO2 29 21 - 32 mmol/L    Anion gap 7 5 - 15 mmol/L    Glucose 185 (H) 65 - 100 mg/dL    BUN 73 (H) 6 - 20 MG/DL    Creatinine 2.36 (H) 0.55 - 1.02 MG/DL    BUN/Creatinine ratio 31 (H) 12 - 20      GFR est AA 25 (L) >60 ml/min/1.73m2    GFR est non-AA 21 (L) >60 ml/min/1.73m2    Calcium 10.0 8.5 - 10.1 MG/DL    Bilirubin, total 0.6 0.2 - 1.0 MG/DL    ALT (SGPT) 7 (L) 12 - 78 U/L    AST (SGOT) 13 (L) 15 - 37 U/L    Alk.  phosphatase 70 45 - 117 U/L    Protein, total 8.0 6.4 - 8.2 g/dL    Albumin 3.5 3.5 - 5.0 g/dL    Globulin 4.5 (H) 2.0 - 4.0 g/dL    A-G Ratio 0.8 (L) 1.1 - 2.2     DIGOXIN    Collection Time: 01/30/22  4:04 AM   Result Value Ref Range    Digoxin level 0.9 0.90 - 2.00 NG/ML   LACTIC ACID    Collection Time: 01/30/22  4:04 AM   Result Value Ref Range    Lactic acid 0.6 0.4 - 2.0 MMOL/L   PROCALCITONIN    Collection Time: 01/30/22  4:04 AM   Result Value Ref Range    Procalcitonin 0.71 ng/mL   PHOSPHORUS    Collection Time: 01/30/22  4:04 AM   Result Value Ref Range    Phosphorus 3.5 2.6 - 4.7 MG/DL   LD    Collection Time: 01/30/22  4:04 AM   Result Value Ref Range     (H) 81 - 246 U/L   PTT    Collection Time: 01/30/22  4:04 AM   Result Value Ref Range    aPTT 26.3 22.1 - 31.0 sec    aPTT, therapeutic range     58.0 - 77.0 SECS   CBC W/O DIFF    Collection Time: 01/30/22  4:04 AM   Result Value Ref Range    WBC 11. 4 (H) 3.6 - 11.0 K/uL    RBC 3.36 (L) 3.80 - 5.20 M/uL    HGB 8.3 (L) 11.5 - 16.0 g/dL    HCT 31.0 (L) 35.0 - 47.0 %    MCV 92.3 80.0 - 99.0 FL    MCH 24.7 (L) 26.0 - 34.0 PG    MCHC 26.8 (L) 30.0 - 36.5 g/dL    RDW 18.9 (H) 11.5 - 14.5 %    PLATELET 563 387 - 644 K/uL    MPV 9.9 8.9 - 12.9 FL    NRBC 0.2 (H) 0  WBC    ABSOLUTE NRBC 0.02 (H) 0.00 - 0.01 K/uL   POC G3 - PUL    Collection Time: 01/30/22  5:01 AM   Result Value Ref Range    pH (POC) 7.44 7.35 - 7.45      pCO2 (POC) 40.3 35.0 - 45.0 MMHG    pO2 (POC) 151 (H) 80 - 100 MMHG    HCO3 (POC) 27.3 (H) 22 - 26 MMOL/L    sO2 (POC) 99.4 (H) 92 - 97 %    Base excess (POC) 2.9 mmol/L    Site DRAWN FROM ARTERIAL LINE      Device: NASAL CANNULA      Allens test (POC) NOT APPLICABLE      Specimen type (POC) ARTERIAL     CARBOXYHEMOGLOBIN    Collection Time: 01/30/22  5:30 AM   Result Value Ref Range    Carboxy-Hgb 1.4 1 - 2 %    Methemoglobin 0.2 0 - 1.4 %    tHb 9.1 (L) 14 - 17 g/dL    Oxyhemoglobin 80.8 (LL) 94 - 97 %    O2 SATURATION 82 (L) 95 - 99 %    SITE SG      Sample source VENOUS BLOOD     GLUCOSE, POC    Collection Time: 01/30/22  6:31 AM   Result Value Ref Range    Glucose (POC) 197 (H) 65 - 117 mg/dL    Performed by Crissy Beebe            I have reviewed the flowsheets. Chart and Pertinent Notes have been reviewed. No change in PMH ,family and social history from Consult note.       Perlita Murray MD  [facility]

## 2022-01-30 NOTE — PROGRESS NOTES
1930: Bedside shift report received from [facility]. 1945: Pt on 6 L nasal cannula with increased WOB, o2 sats 88%. Pt placed on bipap.  0000: tube feeds restarted. 0100: Pt placed on nasal cannula, tolerating well O2 sats 100%. 0400: abg obtained. WNL  Labs drawn, swan calibrated svo2 - 82 %. 0530: Pt SOB, placed bipap on. Pt resting comfortably. 0630: K replaced per protocol. 0730: Bedside and Verbal shift change report given to Donte RN (oncoming nurse) by Raul Zaldivar RN (offgoing nurse). Report included the following information SBAR, Kardex, Procedure Summary, MAR, Accordion and Cardiac Rhythm paced.

## 2022-01-30 NOTE — PROGRESS NOTES
[facility]  ICU Progress Note    Admit Date: 2022    S/P R Axillary Impella 5.5     Subjective/24 Hour Summary:   Pt seen with Dr. Janet Lopez. impella decreased to P5,  bicarb purge. ANNE-MARIE to LAD . Extubated yesterday. Patient is hoarse but communicative. Objective:   Vitals:  Blood pressure 103/63, pulse 83, temperature 98.5 °F (36.9 °C), resp. rate 15, height 5' 7\" (1.702 m), weight 230 lb 13.2 oz (104.7 kg), SpO2 100 %. Temp (24hrs), Av.5 °F (36.9 °C), Min:97.8 °F (36.6 °C), Max:99 °F (37.2 °C)    Hemodynamics:   CO: CO (l/min): 7.2 l/min   CI: CI (l/min/m2): 3.2 l/min/m2   CVP: CVP (mmHg): 12 mmHg (22)   SVR: SVR (dyne*sec)/cm5: 732 (dyne*sec)/cm5 (22 9780)   PAP Systolic: PAP Systolic: 59 (44/28/13 3113)   PAP Diastolic: PAP Diastolic: 23 (86/64/97 9246)   PVR:     SV02: SVO2 (%): 77 % (22 1000)   SCV02:      EKG/Rhythm:  SR      Oxygen Therapy:  Oxygen Therapy  O2 Sat (%): 100 % (22)  Pulse via Oximetry: 80 beats per minute (22)  O2 Device: Nasal cannula (22)  Skin Assessment: Clean, dry, & intact (22)  Skin Protection for O2 Device: Yes (22)  Orientation: Anterior (22)  Location: Cheek (22)  Interventions: Mouth Care;Reposition Device (22)  O2 Flow Rate (L/min): 3 l/min (22)  O2 Temperature: 98.4 °F (36.9 °C) (22 1940)  FIO2 (%): 40 % (22 0500)  ETCO2 (mmHg): 22 mmHg (22 2342)    CXR:  CXR Results  (Last 48 hours)               22 0442  XR CHEST PORT Final result    Impression:  Lines and tubes as described. Interval extubation. Improving   pulmonary edema       Narrative:  EXAM: XR CHEST PORT       INDICATION: post-Impella       COMPARISON: Prior day       FINDINGS: A portable AP radiograph of the chest was obtained at 0418 hours. The   patient is on a cardiac monitor. The endotracheal tube is not seen. An AICD is   in place.  A pacemaker is in place. An pelvic device is unchanged in position. The [place] catheter extends to the main pulmonary artery. Feeding tube   extends below the diaphragm. Pulmonary edema has decreased. The cardiac and   mediastinal contours are stable. The bones and soft tissues are grossly within   normal limits. 01/29/22 0440  XR CHEST PORT Final result    Impression:      1. Stable lines, tubes and devices. 2. Stable low lung volumes and bilateral infiltrates. .  . Narrative:  INDICATION:  post-Impella        EXAM: Chest single view. COMPARISON: 1/28/2022. FINDINGS: A single frontal view of the chest at 0426 hours shows low lung   volumes with diffuse bilateral stable lung infiltrates. ET tube, gastric tube,   PA catheter, AICD and Impella device all stable. .  The heart, mediastinum and   pulmonary vasculature are stable . The bony thorax is unremarkable for age. .                 Admission Weight: Last Weight   Weight: 264 lb 1.8 oz (119.8 kg) Weight: 230 lb 13.2 oz (104.7 kg)     Intake / Output / Drain:  Current Shift: No intake/output data recorded. Last 24 hrs.:     Intake/Output Summary (Last 24 hours) at 1/30/2022 1055  Last data filed at 1/30/2022 0600  Gross per 24 hour   Intake 1847.32 ml   Output 3425 ml   Net -1577.68 ml       EXAM:  General:  No acute distress         Lungs:   Clear to auscultation bilaterally. Incision:  No erythema, drainage or swelling. Heart:  Regular rate and rhythm, S1, S2 normal, no murmur, click, rub or gallop. Abdomen:   Soft, non-tender. Bowel sounds normal. No masses,  No organomegaly. Extremities:  No edema. PPP.     Neurologic: Grossly normal     Labs:   Recent Labs     01/30/22  0404 01/29/22 0420   WBC 11.4* 10.0   HGB 8.3* 8.0*   HCT 31.0* 29.0*    215     Recent Labs     01/30/22  0404 01/29/22 0420   * 146*   K 3.4* 3.0*   * 111*   CO2 29 28   BUN 73* 78*   CREA 2.36* 2.42*   * 233*   CA 10.0 9.6   MG 2. 5* 2.3   PHOS 3.5 4.7     Recent Labs     22  0404 22  0420   AP 70 70   TP 8.0 7.6   ALB 3.5 3.4*   GLOB 4.5* 4.2*     Recent Labs     22  0404 22  0420   APTT 26.3 26.4      Recent Labs     22  0501   PHI 7.44   PCO2I 40.3   PO2I 151*     Recent Labs     22  0900   CPK 60   CKMB 1.2        Assessment:     Active Problems:    Acute respiratory failure with hypoxia (HCC) (2022)         Plan/Recommendations/Medical Decision Makin. Acute on Chronic Systolic CHF class III/IV (BiV-ICD): S/P right axillary impella 5.5 . Cont bicarb via purge. Systemic bival on hold due to GIB. On ancef for prophylaxis. On digoxin. AHFS managing. GDMT as able  2. Acute on Chronic Respiratory insufficiency: On home O2. Luis Hyde Extubated . Continue to work toward previous home home oxygen requirements. Increase activity, IS  3. CAD: Multivessel CAD on [facility] 22. ASA 81. BB on hold due to cardiogenic shock. Recommend starting high intensity statin when feasible, per primary/HF. S/P ANNE-MARIE to LAD on . On Plavix monotherapy per Cardiology due to GI bleeding  4. RODNEY on Chronic CKD Stage IV: Renal following. Bumex drip   5. Gout: allopurinol  6. DM2: on NPH Per primary service  7. GERD: Home meds  8. Hypothyroidism: on synthroid  9. Depression: Home meds  10. Anemia: on EPO, stable post op. Dispo: impella functioning well. Cont bicarb via purge. Tentative plan to discontinue Impella early this week if we are able to continue weaning. No cardiac surgery issues otherwise. Further HF management per AHF. Remainder of medical management per primary.     Signed By: Shavonne Owens NP

## 2022-01-30 NOTE — PROGRESS NOTES
[facility] in [place], [place]  Inpatient Progress Note      Patient name: Rivera Ervin  Patient : 1955  Patient MRN: [id number]  Consulting MD: Jackie Parrish MD  Date of service: 22    REASON FOR REFERRAL:  Management of cardiogenic shock     PLAN OF CARE:   · 79 y/o female with chronic renal failure and new onset severe cardiomyopathy, LVEF 15% (diagnosed 21), stage D, NYHA class IV; admitted for pulmonary edema and severe volume overloadd by RHC  · Most likely etiology of acute deterioration of LVEF is chronic volume overload with compensatory tachycardia in the setting of progressive renal failure +/- coronary artery disease (80% LAD)   · Patient requested aggressive medical approach to allow her heart to recover or receive heart/kidney transplantation, before considering hospice  · D/w patient, her family, CT surgery, nephrology and primary cardiologist plan to support her with Impella as bridge to LV recovery or transplant with the following anticipated outcomes:  · 1/3 chance of LV recovery and discharge home on medical therapy or chronic dialysis  · 1/3 chance of LV non-recovery on Impella, evaluation and transfer for listing for heart transplant/kidney  · 1/3 chance of LV non-recovery and evaluation that reveals patient is not candidate for LVAD/heart/kidney transplant and then wean of support to comfort care/hospice  · Impella 5.5 implanted by Dr. Montey Aschoff  and patient underwent aggressive diuresis with normalization of filling pressures and improvement of LV function ranging between 30-35% to 25-30%, s/p PCI to LAD today followed by Impella wean after extubation; appears to have recovered heart function and unlikely will need heart/kidney transplant.   · In case of failure of Impella wean, patient's only contraindication to transplant is active infection requiring antibiotic use; otherwise there are no apparent contraindications; [facility] will accept patient on integrillin drip if LV does not recover after revascularization and on dialysis if needed for volume management; anticipated waiting time at BMI 38 and blood type AB for hear/kidney transplant is currently approximately 10 days; d/w Dr. Filemon Bolanos from [facility]. RECOMMENDATIONS:  POD # 12 Impella 5.5 implant   Decrease to P5- plan on weaning Impella over the weekend  Repeat TTE tomorrow   Pulmonary hygiene, NC    Maintain PAC for hemodynamic optimization; goal CI > 2.3, CVP 8-10 mmHg SVR 1000, SBP < 110 mmHg (via radial arterial line)   Intolerant to sildenafil due to marked hypotension   Decrease Bumex gtt to 0.25mg/hr; goal net even to 1L  Diuril x 1 per nephrology  Increase free water flushes   Keep K> 4 and Mg >2  Hydralazine 5 mg IV q4h PRN SBP > 110 mmHg   Hydralazine 10mg TID due to persistent HTN  Intolerant of GDMT due to hemodynamic in stability   BiV-ICD programmed at 80 bpm; device is at EOL, recommend to delay ICD generator change until final plans for transplant are established - Per Dr. Hopkins Race patient is not pacer dependent  Place defib patches on patient for ppx  Cont digoxin 0.0625 mg to every other day; goal 0.7-1.2   Allopurinol 50mg daily per nephrology  S/p Venofer 200 mg IV x 2   CT head no acute abnormalitiy  CTchest/abd/pelvis w/o contrast for txp eval- no acute abnormalities, no masses  GI scopes complete, + tubular adenomas,  no H. Pylori,   Continue ASA/Plavix for LAD stent  Epo level elevated- hold epogen and follow levels  Blood cultures NGTD  Sputum with E Coli- changed to cefepime   pTT goal  40-60 due to persistent anemia on bivalirudin- on hold for GI bleeding.    Will need OP PSG  Will offer family testing for VUS x 2 as OP  Palliative consult appreciated   Nutritionist consult  Heart failure education   Advanced care plan present on file       All other care per primary team     IMPRESSION:  Volume overload  Acute on Chronic systolic heart failure, requiring Impella 5.5 implant 1/18/22   · Stage D, NYHA class IV symptoms  · Non-ischemic cardiomyopathy, LVEF 15%  · PYP equivocal for amyloid   CAD with 80% LAD lesion s/p PCI 1/27/22  Pulmonary hypertension, severe  RV failure  S/p BiV-ICD  Cardiac risk factors:  · HL  · DM2  · Morbid obesity, BMI 40  [employer] genetic testing VUS x 2 ( KCNH2, MYH6)  Acute on chronic renal failure, stage IV  GERD  Anemia of chronic disease  Gout  VF s/p ICD shock x 2      Interval Events:  POD #12 Impella placement   Impella decreased to p5  Net -1L  Bival on hold   Creatinine 2.36  proBNP  25,000   Extubated   Afebrile overnight   K 3.4  PCT trending down to 0.71     LIFE GOALS:  Patient's personal goals include: being home with family, still ambulating around without getting too tired. Important upcoming milestones or family events: none  The patient identifies the following as important for living well: remaining idependent and mobile; being able to get out of the house with . Patient verbalized willingness to be on home milrinone and evaluation for both heart and kidney transplants. Verbalizes she would have family supporting her decision on this. SHAMIKA Murray is a 78 y.o.  female with a history of NICM, chronic hypoxic respiratory failure secondary to pulmonary HTN, hypothyroidism, CKD, GERD, and DM II who presented to [facility] as a transfer from another facility for acute on chronic hypoxic respiratory failure. Reports running out of O2 at home resulting in SOB and dizziness. Upon arrival to the ED she was found to have O2 sats in the 70s, requiring 4L NC O2. Rapid covid test was negative. ProNT-BNP was 41911, K+5.2, BUN/CR x/2.54 and elevated d-dimer. Chest xray showing pulmonary edema vs. Atypical pneumonia. VQ scan showed low probability for PE. Per Dr. Lianne Lamar, NIC, LVEF 15-20% during recent admission. LVEF previously normalized with CRT. NYHA III-IV. No ACEi/ARB due to renal dysfunction. GDMT dosing limited by low BP. Patient's PCP is Dr. Howard Baptiste, and she sees Dr. Roger Calvert primarily for cardiac care due to Dr. Marylee Fendt transfering practice. Patient historically seen by nephrology but has not had follow up care in the past three years. CARDIAC IMAGING:  Echo 1/20/22 - LVEF 20-25%, RV moderately dilated, Impella catheter 5.9 cm from AV   Echo 12/8/21- LVEF 15-20%, mild to Mod MR, LVIDd 5.09cm, TAPSE 1.94cm  Echo 4/22/19- LVEF 60%, trace MR,  LVIDd 4.24cm, TAPSE 1.65cm   Echo 4/24/18- LVEF 60%, trivial MR, LVIDd 4.79cm, TAPSE 2.25cm      EKG- 1/4/22 ST with A sense and V paced rhythm     [facility] 2015- No significant CAD  NST 2014- reversible LAD involvement      ICD interrogation     HEMODYNAMICS:  [facility] 1/17/21: PAP 83/52 mmHg, RAP 27 mmHg, PCWP 45 mmHg, CI 1.6  [facility] 12/10/21: PAP 76/48/57, RAP 20, PCWP 35, CI 2.26     CPEST not done  6MW not done     OTHER IMAGING:  CXR Results  (Last 48 hours)                             01/13/22 1035   XR CHEST PORT Final result      Impression:   No significant change in congestion and interstitial and alveolar   opacities which may reflect edema or infectious infiltrate. Narrative:   EXAM: XR CHEST PORT       INDICATION: pul edema       COMPARISON: Chest x-ray 1/10/2022. FINDINGS: A portable AP radiograph of the chest was obtained at 10:19 hours. The   patient is on a cardiac monitor. The lungs appear grossly stable with congestion   and interstitial/airspace opacities with no pneumothorax or pleural effusion. Right PICC line traverses expected course of tip in the region of the atriocaval   junction. Pacemaker-ICD generator body projects over the left chest wall with   intact appearing leads traversing in expected course. . The cardiac and   mediastinal contours and pulmonary vascularity are normal.  Atherosclerotic   calcifications affect the aortic arch.  The chest wall structures and visualized   upper abdomen show no acute findings with incidental note of degenerative spine   and shoulder changes. CT Results (most recent):  Results from Hospital Encounter encounter on 01/04/22    CT ABD PELV WO CONT    Narrative  EXAM: CT ABD PELV WO CONT, CT CHEST WO CONT    INDICATION: LVAD/TRANSPLANT WORK UP    COMPARISON: Chest x-ray of earlier today, abdominal ultrasound 1/22/2022, CT  abdomen pelvis 1/16/2018. TECHNIQUE:  5 mm axial images were obtained through the chest, abdomen, and  pelvis. Oral and IV contrast were not administered. Coronal and sagittal  reconstructions were generated. CT dose reduction was achieved through use of a  standardized protocol tailored for this examination and automatic exposure  control for dose modulation. FINDINGS:    THYROID: No nodule. MEDIASTINUM: No mass or lymphadenopathy. ZEENAT: No mass or lymphadenopathy. THORACIC AORTA: Atherosclerotic calcination without aneurysm. MAIN PULMONARY ARTERY: Normal in caliber. TRACHEA/BRONCHI: Endotracheal tube in expected position. Patent. ESOPHAGUS: No dilation or wall thickening. Indwelling enteric tube traverses  length of esophagus to extend into the stomach. HEART: Impella device, pacemaker leads, and coronary artery calcifications noted  without cardiac enlargement or pericardial effusion. PLEURA: No effusion or pneumothorax. LUNGS: Patchy areas of focal airspace opacity and ground glass opacity with  linear posterior and bibasilar atelectasis. No nodule or mass. LIVER: No mass or biliary dilation. GALLBLADDER: Unremarkable. SPLEEN: Borderline enlarged with no focal lesion. PANCREAS: No mass or ductal dilation. ADRENALS: Unremarkable. KIDNEYS: No significant change in 10 mm left renal pelvis calculus without  hydronephrosis. Exophytic lateral interpolar right renal cyst and anterior  interpolar left renal cyst redemonstrated. No other calculi or solid renal mass  evident. STOMACH: Indwelling enteric tube. Otherwise unremarkable  SMALL BOWEL: No dilatation or wall thickening. COLON: No dilatation or wall thickening. APPENDIX: Unremarkable. PERITONEUM: No ascites or pneumoperitoneum. RETROPERITONEUM: Atherosclerotic calcification without aneurysm. No enlarged  lymphadenopathy. REPRODUCTIVE ORGANS: Coarse calcifications compatible with uterine leiomyoma  with otherwise unremarkable appearance to uterus and ovaries. URINARY BLADDER: Collapsed around Henley catheter and balloon. BONES: Degenerative spine change. No acute fracture or aggressive lesion. ADDITIONAL COMMENTS: N/A    Impression  1. Groundglass and scattered focal airspace opacities within the lungs  concerning for possible pneumonic infiltrates. 2. Support lines as above. 3. No acute intra-abdominal process with redemonstration of nonobstructing left  renal pelvis 10 mm stone and additional incidentals as above. PHYSICAL EXAM:  Visit Vitals  Visit Vitals  /63 (BP 1 Location: Left lower arm)   Pulse 83   Temp 98.5 °F (36.9 °C)   Resp 15   Ht 5' 7\" (1.702 m)   Wt 230 lb 13.2 oz (104.7 kg)   SpO2 100%   BMI 36.15 kg/m²          Hemodynamics:   CO: CO (l/min): 7.1 l/min   CI: CI (l/min/m2): 3.2 l/min/m2   CVP: CVP (mmHg): 12 mmHg (01/30/22 0800)   SVR: SVR (dyne*sec)/cm5: 732 (dyne*sec)/cm5 (01/30/22 1619)   PAP Systolic: PAP Systolic: 50 (43/29/12 0837)   PAP Diastolic: PAP Diastolic: 17 (21/13/94 8329)   PVR:     SV02: SVO2 (%): 81 % (01/30/22 0800)   SCV02:      Impella 5.5  P-5  Flow: 3.2lpm   Purge flow 8    Physical Exam  Physical Exam  Vitals and nursing note reviewed. Cardiovascular:      Rate and Rhythm: Normal rate and regular rhythm. Pulses: Normal pulses. Heart sounds: Normal heart sounds. Pulmonary:      Effort: Pulmonary effort is normal. No respiratory distress. Breath sounds: Decreased air movement present. Abdominal:      General: There is no distension. Palpations: Abdomen is soft. Musculoskeletal:         General: No swelling. Skin:     General: Skin is warm and dry. Neurological:      Mental Status: She is alert. Review of Systems   Constitutional: Positive for malaise/fatigue. Negative for chills and fever. Respiratory: Negative for shortness of breath. Cardiovascular: Negative for chest pain and leg swelling. Neurological: Negative for dizziness and headaches. Psychiatric/Behavioral: Negative for depression.           PAST MEDICAL HISTORY:  Past Medical History:   Diagnosis Date    Acquired hypothyroidism 8/15/2016    Anemia     RED-HF study    Asthma     Cardiomyopathy, nonischemic ([facility]     initial dx 2001, bivHF 2008 with EF 15%, s/p biV-ICD 9/08, significant improvment in EF to 45-50%    CKD (chronic kidney disease)     Dr Bruna Birmingham    CKD (chronic kidney disease) 8/15/2012    Depression     Diabetes ([facility]     Diabetic neuropathy ([facility] [facility]     DM (diabetes mellitus) ([facility] [address] [facility] 8/15/2012    GERD (gastroesophageal reflux disease)     Gout     Hypothyroidism     ICD (implantable cardioverter-defibrillator), biventricular, in situ 6/5/2014    Psoriasis        PAST SURGICAL HISTORY:  Past Surgical History:   Procedure Laterality Date    CARDIAC CATHETERIZATION  2007; 01/06/15    normal cors    COLONOSCOPY N/A 1/26/2022    COLONOSCOPY performed by Harjit Steele MD at [address] [place] [address]  ECHO 2D ADULT  4/2010    EF 45%, improved from 1/09 (25%)    ECHO 2D ADULT  11/2011    LVH, EF 55-60%    HX ORTHOPAEDIC      knee    HX PACEMAKER PLACEMENT      AICD    STRESS TEST LEXISCAN/CARDIOLITE  3/21/12    normal perfusion, global HK 40%       FAMILY HISTORY:  Family History   Problem Relation Age of Onset    Heart Disease Mother     Hypertension Mother     Lupus Sister     Diabetes Brother        SOCIAL HISTORY:  Social History     Socioeconomic History    Marital status:    Tobacco Use    Smoking status: Never Smoker    Smokeless tobacco: Never Used Substance and Sexual Activity    Alcohol use: No    Drug use: No    Sexual activity: Never   Social History Narrative    . Nonsmoker. Disability       LABORATORY RESULTS:     Labs Latest Ref Rng & Units 1/30/2022 1/29/2022 1/28/2022 1/28/2022 1/28/2022 1/27/2022 1/27/2022   WBC 3.6 - 11.0 K/uL 11. 4(H) 10.0 - - 10.9 - -   RBC 3.80 - 5.20 M/uL 3.36(L) 3.19(L) - - 3.29(L) - -   Hemoglobin 11.5 - 16.0 g/dL 8. 3(L) 8.0(L) - 8. 3(L) 8.2(L) - 6. 9(L)   Hematocrit 35.0 - 47.0 % 31. 0(L) 29. 0(L) - 30. 0(L) 29. 9(L) - 26. 6(L)   MCV 80.0 - 99.0 FL 92.3 90.9 - - 90.9 - -   Platelets 706 - 585 K/uL 259 215 - - 210 - -   Lymphocytes 12 - 49 % - - - - - - -   Monocytes 5 - 13 % - - - - - - -   Eosinophils 0 - 7 % - - - - - - -   Basophils 0 - 1 % - - - - - - -   Albumin 3.5 - 5.0 g/dL 3.5 3. 4(L) 3.8 - 3.8 - 3.6   Calcium 8.5 - 10.1 MG/DL 10.0 9.6 10.0 - 9.7 - 9.7   Glucose 65 - 100 mg/dL 185(H) 233(H) 262(H) - 311(H) - 199(H)   BUN 6 - 20 MG/DL 73(H) 78(H) 78(H) - 77(H) - 72(H)   Creatinine 0.55 - 1.02 MG/DL 2.36(H) 2.42(H) 2.62(H) - 2.44(H) - 2.34(H)   Sodium 136 - 145 mmol/L 150(H) 146(H) 146(H) - 143 - 144   Potassium 3.5 - 5.1 mmol/L 3.4(L) 3.0(L) 3.8 - 4.0 3.7 4.0   TSH 0.36 - 3.74 uIU/mL - - - - - - -   LDH 81 - 246 U/L 406(H) 385(H) - - 446(H) - -   Some recent data might be hidden     Lab Results   Component Value Date/Time    TSH 3.08 01/11/2022 05:13 AM    TSH 1.85 12/15/2021 01:06 PM    TSH 1.01 11/05/2020 09:11 AM    TSH 2.740 04/30/2019 11:21 AM    TSH 0.519 02/21/2012 10:53 AM    TSH 8.29 (H) 01/20/2010 01:59 PM       ALLERGY:  Allergies   Allergen Reactions    Ciprofloxacin Anaphylaxis    Shellfish Derived Anaphylaxis    Sildenafil Other (comments)    Ace Inhibitors Unknown (comments)    Biaxin [Clarithromycin] Other (comments)     Metal taste    Candesartan Cough    Pcn [Penicillins] Hives        CURRENT MEDICATIONS:    Current Facility-Administered Medications:     potassium chloride 20 mEq in 50 ml IVPB, 20 mEq, IntraVENous, Q1H, Edouard ODELL MD, Last Rate: 50 mL/hr at 01/30/22 0800, 20 mEq at 01/30/22 0800    potassium bicarb-citric acid (EFFER-K) tablet 40 mEq, 40 mEq, Oral, DAILY, Christiano Deshpande MD, 40 mEq at 01/29/22 1014    insulin NPH (NOVOLIN N, HUMULIN N) injection 30 Units, 30 Units, SubCUTAneous, DAILY, Christiano Deshpande MD    hydrALAZINE (APRESOLINE) tablet 10 mg, 10 mg, Oral, TID, Owen, Amber SUNITA NP, 10 mg at 01/29/22 2033    allopurinoL (ZYLOPRIM) tablet 100 mg, 100 mg, Oral, DAILY, Kimberly Suggs MD, 100 mg at 01/29/22 0823    insulin NPH (NOVOLIN N, HUMULIN N) injection 30 Units, 30 Units, SubCUTAneous, QHS, Christiano Deshpande MD, 30 Units at 01/29/22 2325    cefepime (MAXIPIME) 2 g in sterile water (preservative free) 10 mL IV syringe, 2 g, IntraVENous, Q24H, Christiano Deshpande MD, Last Rate: 200 mL/hr at 01/29/22 1007, 2 g at 01/29/22 1007    pantoprazole (PROTONIX) injection 40 mg, 40 mg, IntraVENous, Q12H, 40 mg at 01/29/22 2033 **AND** sodium chloride (NS) flush 10 mL, 10 mL, IntraVENous, DAILY, Christiano Deshpande MD, 10 mL at 01/29/22 1012    sodium chloride (NS) flush 5-40 mL, 5-40 mL, IntraVENous, PRN, Ricardo Harrington MD    clopidogreL (PLAVIX) tablet 75 mg, 75 mg, Oral, DAILY, Ricadro Harrington MD, 75 mg at 01/29/22 0815    digoxin (LANOXIN) tablet 0.0625 mg, 0.0625 mg, Oral, EVERY OTHER DAY, Amber Wevaer NP, 0.0625 mg at 01/29/22 0815    bumetanide (BUMEX) 0.25 mg/mL infusion, 0.25 mg/hr, IntraVENous, CONTINUOUS, Amber Weaver NP, Last Rate: 1 mL/hr at 01/29/22 1327, 0.25 mg/hr at 01/29/22 1327    DOBUTamine (DOBUTREX) 500 mg/250 mL (2,000 mcg/mL) infusion, 0-10 mcg/kg/min, IntraVENous, TITRATE, Per Albarado NP, Held at 01/23/22 1200    albumin human 25% (BUMINATE) solution 12.5 g, 12.5 g, IntraVENous, DAILY, Ginny Albarado NP, Last Rate: 0 mL/hr at 01/26/22 1138, 12.5 g at 01/29/22 0813    milrinone (PRIMACOR) 20 MG/100 ML D5W infusion, 0.125 mcg/kg/min, IntraVENous, CONTINUOUS, Penny Calix MD, Stopped at 01/22/22 2242    vasopressin (VASOSTRICT) 20 Units in 0.9% sodium chloride 100 mL infusion, 0-0.1 Units/min, IntraVENous, TITRATE, Arjun Albarado NP, Stopped at 01/23/22 1048    PHENYLephrine (NORMA-SYNEPHRINE) 30 mg in 0.9% sodium chloride 250 mL infusion,  mcg/min, IntraVENous, TITRATE, Sean Henriquez MD, Held at 01/21/22 1200    niCARdipine (CARDENE) 25 mg in 0.9% sodium chloride 250 mL infusion, 0-15 mg/hr, IntraVENous, TITRATE, Lexii ODELL MD, Stopped at 01/19/22 2109    [Held by provider] bivalirudin (ANGIOMAX) 250 mg in 0.9% sodium chloride (MBP/ADV) 50 mL MBP, 0.02-2.5 mg/kg/hr, IntraVENous, TITRATE, Arjun Albarado NP, Stopped at 01/28/22 1220    hydrALAZINE (APRESOLINE) 20 mg/mL injection 5 mg, 5 mg, IntraVENous, Q4H PRN, Arjun Albarado NP, 5 mg at 01/30/22 0351    bumetanide (BUMEX) injection 1 mg, 1 mg, IntraVENous, Q4H PRN, Polliard, Valera Homans T, NP, 1 mg at 01/22/22 1721    fentaNYL (PF) 1,500 mcg/30 mL (50 mcg/mL) infusion, 0-200 mcg/hr, IntraVENous, TITRATE, Lexii ODELL MD, Stopped at 01/29/22 1408    heparin (porcine) in 0.9% NaCl 30,000 unit/1,000 mL perfusion irrigation 50-1,000 mL, 50-1,000 mL, Other, PRN, Sam Gomez PA    chlorhexidine (PERIDEX) 0.12 % mouthwash 15 mL, 15 mL, Oral, Q12H, Davian Vázquez DO, 15 mL at 01/29/22 2100    0.9% sodium chloride infusion, 9 mL/hr, IntraVENous, CONTINUOUS, Sam Gomez PA, Last Rate: 9 mL/hr at 01/27/22 0000, 9 mL/hr at 01/27/22 0000    naloxone (NARCAN) injection 0.4 mg, 0.4 mg, IntraVENous, PRN, Sam Gomez PA    ondansetron [facility]) injection 4 mg, 4 mg, IntraVENous, Q4H PRN, Sam Gomez PA    albuterol (PROVENTIL VENTOLIN) nebulizer solution 2.5 mg, 2.5 mg, Nebulization, Q4H PRN, Sam Gomez PA    aspirin chewable tablet 81 mg, 81 mg, Oral, DAILY, Marjan Gomez TYE LEDESMA, 81 mg at 01/29/22 0814    midazolam (VERSED) injection 1 mg, 1 mg, IntraVENous, Q1H PRN, Sam Gomez PA    magnesium oxide (MAG-OX) tablet 400 mg, 400 mg, Oral, BID, Saul Gomez PA, 400 mg at 01/29/22 1840    calcium chloride 1 g in 0.9% sodium chloride 100 mL IVPB, 1 g, IntraVENous, PRN, Sam Gomez PA    bisacodyL (DULCOLAX) suppository 10 mg, 10 mg, Rectal, DAILY PRN, Sam Gomez PA    senna-docusate (PERICOLACE) 8.6-50 mg per tablet 1 Tablet, 1 Tablet, Oral, BID, Saul Gomez PA, 1 Tablet at 01/29/22 1841    ELECTROLYTE REPLACEMENT NOTE: Nurse to review Serum Potassium and Magnesuim levels and Initiate Electrolyte Replacement Protocol as needed, 1 Each, Other, PRN, Saul Gomez PA    magnesium sulfate 1 g/100 ml IVPB (premix or compounded), 1 g, IntraVENous, PRN, Sam Gomez PA    alteplase (CATHFLO) 1 mg in sterile water (preservative free) 1 mL injection, 1 mg, InterCATHeter, PRN, Sam Gomez PA    bacitracin 500 unit/gram packet 1 Packet, 1 Packet, Topical, PRN, Saul Gomez PA    dexmedeTOMidine in 0.9 % NaCl (PRECEDEX) 400 mcg/100 mL (4 mcg/mL) infusion soln, 0.1-1.5 mcg/kg/hr, IntraVENous, TITRATE, Davian Vázquez DO, Last Rate: 42.5 mL/hr at 01/30/22 0648, 1.5 mcg/kg/hr at 01/30/22 0648    midazolam (VERSED) injection 1 mg, 1 mg, IntraVENous, Q4H PRN, Davian Vázquez DO, 1 mg at 01/18/22 1719    insulin lispro (HUMALOG) injection, , SubCUTAneous, Q6H, Davian Vázquez DO, 2 Units at 01/30/22 3288    sodium bicarbonate (8.4%) 25 mEq in dextrose 5% 1,000 mL - impella purge fluid, , IntraVENous, TITRATE, Sam Gomez PA, Last Rate: 8 mL/hr at 01/27/22 0000, New Bag at 01/27/22 0000    triamcinolone acetonide (KENALOG) 0.1 % cream, , Topical, BID, Saul Gomez PA, Given at 01/29/22 1845    polyethylene glycol (MIRALAX) packet 17 g, 17 g, Oral, DAILY, Saul Gomez PA, 17 g at 01/29/22 0813  [facility] by provider] epoetin isabel-epbx (RETACRIT) injection 20,000 Units, 20,000 Units, SubCUTAneous, Q TUE, THU & SAT, Birgit Gomez PA, 20,000 Units at 01/25/22 2006    montelukast (SINGULAIR) tablet 10 mg, 10 mg, Oral, DAILY, Birgit Gomez PA, 10 mg at 01/29/22 0815    levothyroxine (SYNTHROID) tablet 100 mcg, 100 mcg, Oral, Once per day on Mon Tue Wed Thu Fri Sat, [facility], 100 mcg at 01/29/22 9156    hydroxypropyl methylcellulose (ISOPTO TEARS) 0.5 % ophthalmic solution 1 Drop, 1 Drop, Both Eyes, PRN, Birgit Gomez PA, 1 Drop at 01/06/22 1727    venlafaxine-SR (EFFEXOR-XR) capsule 75 mg, 75 mg, Oral, DAILY WITH BREAKFAST, Birgit Gomez PA, 75 mg at 01/17/22 1025    gabapentin (NEURONTIN) capsule 100 mg, 100 mg, Oral, QHS, Birgit Gomez PA, 100 mg at 01/29/22 2033    arformoteroL (BROVANA) neb solution 15 mcg, 15 mcg, Nebulization, BID RT, 15 mcg at 01/30/22 0801 **AND** budesonide (PULMICORT) 500 mcg/2 ml nebulizer suspension, 500 mcg, Nebulization, BID RT, Sam Gomez PA, 500 mcg at 01/30/22 0801    sodium chloride (NS) flush 5-40 mL, 5-40 mL, IntraVENous, Q8H, Sam Gomez PA, 10 mL at 01/30/22 0600    acetaminophen (TYLENOL) tablet 650 mg, 650 mg, Oral, Q6H PRN, 650 mg at 01/28/22 0429 **OR** acetaminophen (TYLENOL) suppository 650 mg, 650 mg, Rectal, Q6H PRN, Sam Gomez PA    glucose chewable tablet 16 g, 4 Tablet, Oral, PRN, Sam Gomez PA    dextrose (D50W) injection syrg 12.5-25 g, 25-50 mL, IntraVENous, PRN, Birgit Gomez PA    glucagon (GLUCAGEN) injection 1 mg, 1 mg, IntraMUSCular, PRN, Birgit Gomez PA    PATIENT CARE TEAM:  Patient Care Team:  Kentrell Kaur MD as PCP - General (Internal Medicine)  Kentrell Kaur MD as PCP - REHABILITATION [facility] Provider  Sharran Schilder, MD as Consulting Provider (Internal Medicine)  Roberto Fox MD (Dermatology)  Al Meade MD (Nephrology)  Paddy Brink MD as Consulting Provider (Cardiology)  Peter Juarez MD (Cardiology)  Ganga Clemons MD as Consulting Provider (Pulmonary Disease)     Thank you for allowing me to participate in this patient's care.     Precious Raya NP  [address] [place] [address], [address]  Phone: [phone]

## 2022-01-30 NOTE — PROGRESS NOTES
[address]  [address], [address]       GI PROGRESS NOTE        NAME: Marlon Harris   :  1955   MRN:  [id number]       Subjective:   Tube feeds started    Objective:     VITALS:   Last 24hrs VS reviewed since prior progress note. Most recent are:  Visit Vitals  /63 (BP 1 Location: Left lower arm)   Pulse 83   Temp 98.5 °F (36.9 °C)   Resp 15   Ht 5' 7\" (1.702 m)   Wt 104.7 kg (230 lb 13.2 oz)   SpO2 100%   BMI 36.15 kg/m²       PHYSICAL EXAM:  General: extubated  Neurologic:  No acute distress  HEENT: NG tube in place  Lungs:  CTA B  Heart:  S1 S2  Abdomen: Soft, obese, no apparent tenderness, no guarding, no rebound. +Bowel sounds.    Extremities: Warm      Lab Data Reviewed:     Recent Results (from the past 24 hour(s))   GLUCOSE, POC    Collection Time: 22  1:11 PM   Result Value Ref Range    Glucose (POC) 153 (H) 65 - 117 mg/dL    Performed by Esperanza Rain    GLUCOSE, POC    Collection Time: 22  5:50 PM   Result Value Ref Range    Glucose (POC) 161 (H) 65 - 117 mg/dL    Performed by Alicia Kwon (PCT)    GLUCOSE, POC    Collection Time: 22 11:13 PM   Result Value Ref Range    Glucose (POC) 163 (H) 65 - 117 mg/dL    Performed by Dominguez Sanchez    MAGNESIUM    Collection Time: 22  4:04 AM   Result Value Ref Range    Magnesium 2.5 (H) 1.6 - 2.4 mg/dL   NT-PRO BNP    Collection Time: 22  4:04 AM   Result Value Ref Range    NT pro-BNP 25,722 (H) <551 PG/ML   METABOLIC PANEL, COMPREHENSIVE    Collection Time: 22  4:04 AM   Result Value Ref Range    Sodium 150 (H) 136 - 145 mmol/L    Potassium 3.4 (L) 3.5 - 5.1 mmol/L    Chloride 114 (H) 97 - 108 mmol/L    CO2 29 21 - 32 mmol/L    Anion gap 7 5 - 15 mmol/L    Glucose 185 (H) 65 - 100 mg/dL    BUN 73 (H) 6 - 20 MG/DL    Creatinine 2.36 (H) 0.55 - 1.02 MG/DL    BUN/Creatinine ratio 31 (H) 12 - 20      GFR est AA 25 (L) >60 ml/min/1.73m2    GFR est non-AA 21 (L) >60 ml/min/1.73m2    Calcium 10.0 8.5 - 10.1 MG/DL    Bilirubin, total 0.6 0.2 - 1.0 MG/DL    ALT (SGPT) 7 (L) 12 - 78 U/L    AST (SGOT) 13 (L) 15 - 37 U/L    Alk.  phosphatase 70 45 - 117 U/L    Protein, total 8.0 6.4 - 8.2 g/dL    Albumin 3.5 3.5 - 5.0 g/dL    Globulin 4.5 (H) 2.0 - 4.0 g/dL    A-G Ratio 0.8 (L) 1.1 - 2.2     DIGOXIN    Collection Time: 01/30/22  4:04 AM   Result Value Ref Range    Digoxin level 0.9 0.90 - 2.00 NG/ML   LACTIC ACID    Collection Time: 01/30/22  4:04 AM   Result Value Ref Range    Lactic acid 0.6 0.4 - 2.0 MMOL/L   PROCALCITONIN    Collection Time: 01/30/22  4:04 AM   Result Value Ref Range    Procalcitonin 0.71 ng/mL   PHOSPHORUS    Collection Time: 01/30/22  4:04 AM   Result Value Ref Range    Phosphorus 3.5 2.6 - 4.7 MG/DL   LD    Collection Time: 01/30/22  4:04 AM   Result Value Ref Range     (H) 81 - 246 U/L   PTT    Collection Time: 01/30/22  4:04 AM   Result Value Ref Range    aPTT 26.3 22.1 - 31.0 sec    aPTT, therapeutic range     58.0 - 77.0 SECS   CBC W/O DIFF    Collection Time: 01/30/22  4:04 AM   Result Value Ref Range    WBC 11.4 (H) 3.6 - 11.0 K/uL    RBC 3.36 (L) 3.80 - 5.20 M/uL    HGB 8.3 (L) 11.5 - 16.0 g/dL    HCT 31.0 (L) 35.0 - 47.0 %    MCV 92.3 80.0 - 99.0 FL    MCH 24.7 (L) 26.0 - 34.0 PG    MCHC 26.8 (L) 30.0 - 36.5 g/dL    RDW 18.9 (H) 11.5 - 14.5 %    PLATELET 130 453 - 078 K/uL    MPV 9.9 8.9 - 12.9 FL    NRBC 0.2 (H) 0  WBC    ABSOLUTE NRBC 0.02 (H) 0.00 - 0.01 K/uL   POC G3 - PUL    Collection Time: 01/30/22  5:01 AM   Result Value Ref Range    pH (POC) 7.44 7.35 - 7.45      pCO2 (POC) 40.3 35.0 - 45.0 MMHG    pO2 (POC) 151 (H) 80 - 100 MMHG    HCO3 (POC) 27.3 (H) 22 - 26 MMOL/L    sO2 (POC) 99.4 (H) 92 - 97 %    Base excess (POC) 2.9 mmol/L    Site DRAWN FROM ARTERIAL LINE      Device: NASAL CANNULA      Allens test (POC) NOT APPLICABLE      Specimen type (POC) ARTERIAL     CARBOXYHEMOGLOBIN    Collection Time: 01/30/22  5:30 AM   Result Value Ref Range    Carboxy-Hgb 1.4 1 - 2 %    Methemoglobin 0.2 0 - 1.4 %    tHb 9.1 (L) 14 - 17 g/dL    Oxyhemoglobin 80.8 (LL) 94 - 97 %    O2 SATURATION 82 (L) 95 - 99 %    SITE SG      Sample source VENOUS BLOOD     GLUCOSE, POC    Collection Time: 01/30/22  6:31 AM   Result Value Ref Range    Glucose (POC) 197 (H) 65 - 117 mg/dL    Performed by Екатерина Lazcano        Assessment:   · Acute on chronic iron deficiency anemia, new bloody output from OG tube 1/28: EGD (1/26/22) by Dr. [facility]: mild linear erythema in the stomach (biopsied). Pathology showed active gastritis, H. Pylori was negative. Colonoscopy (1/26/22) by Dr. Bella Johansen: 2 polyps (greatest diameter 5 mm) in the cecum - removed; moderate pan-diverticulosis. Pathology showed tubular adenomas. Hgb stable at 8.2 this AM prior to bloody NG tube output. Bleeding likely from biopsies from EGD. Resolution. · Heart failure: status post Impella  · Coronary artery disease: status post LAD stent 1/27/22  · Pulmonary hypertension  · Chronic kidney disease     Patient Active Problem List   Diagnosis Code    Anemia in chronic renal disease N18.9, D63.1    HTN (hypertension) I10    GERD (gastroesophageal reflux disease) K21.9    Gout M10.9    Pulmonary HTN ([address] I27.20    Cardiomyopathy, nonischemic (HCC) I42.8    CKD (chronic kidney disease) N18.9    Dyslipidemia E78.5    ICD (implantable cardioverter-defibrillator), biventricular, in situ Z95.810    Acquired hypothyroidism E03.9    Dysthymia F34.1    Obesity, morbid ([address] E66.01    Type 2 diabetes with nephropathy ([address] E11.21    Type 2 diabetes mellitus with diabetic neuropathy ([address] E11.40    CHF exacerbation ([address] I50.9    Acute respiratory failure with hypoxia (HCC) J96.01     Plan:   · PPI BID  · Bival gtt on hold. Restart per Cardiology  · Resume ASA in 2 days if needed  · Will sign off. Please call with any questions.      Signed By: Evangelina Zhang MD     1/30/2022  1:22 PM

## 2022-01-30 NOTE — PROGRESS NOTES
0730 Bedside handoff report received from ARTURO Mcdonald including SBAR and Kardex    0900 Pt. Off BIPAP with 4L on NC. Tolerated well. 1200 Pt. HR in 140s during bath. Replaced on BIPAP and HR returned to baseline. 1400 Pt. Placed on NC 4L and tolerating well.  at the bedside. 1800 Pt. Placed on BIPAP to sleep.

## 2022-01-30 NOTE — PROGRESS NOTES
[facility]    ICU TEAM Progress Note    Name: Real Fatima   : 1955   MRN: [id number]   Date: 2022           ICU Assessment   58-year-old female with past medical history significant for moderate pulmonary hypertension on home oxygen therapy, CKD, nonischemic cardiomyopathy who was admitted to the hospital on  due to acute on chronic hypoxemic respiratory failure in light of fluid overload. Had a left heart cath on  which demonstrated single one-vessel moderate disease (mid LAD lesion) which was thought to be not a cause of cardiomyopathy. Subsequently had a right axillary Impella placed by CT surgery for potential double transplant. 1. Cardiogenic Shock  2. Pulmonary hypertension  3. Acute hypoxemic respiratory failure  4. Status post Impella placement  5. RODNEY on CKD stage IV        Interval events     - Extubated to BiPAP , impella in situ     - remains intubated, Impella in situ     - remains intubated, Impella in situ, now off inhaled nitric oxide, Bloody OG o/p     - getting [facility] today, remains intubated, Impella in situ, on inhaled nitric oxide     - s/p EGD/colo today for Ca workup for possible heart/kidney Tx, remains intubated, Impella in situ, on inhaled nitric oxide    04-22-denonge intubated, Impella in situ, on inhaled nitric oxide           ICU Comprehensive Plan of Care:     Neuro- delirium prevention strategies, precedex as needed for hyperactive delirium    Cardiac-continue hemodynamic monitoring, EF has improved to about 35 % on most recent echo, Impella at P5-6, now s/p LAD stent, on aspirin, plavix, digoxin, follow-up cardiac surgery/advised heart failure recommendations     Pulmonary- BiPAP as tolerated for WOB, at risk of needing to be reintubated    GI- s/p EGD/colo  for Ca work up, PPI q12 for for GI bleed .  F/u GI recs    Renal-CKD-undulating Cr level-currently diuresing-on Bumex drip/diuril, follow-up nephrology recommendations, monitor urine output closely, dose meds renally, correct electrolyte derangements as needed, continue allopurinol    Hematology-systemic Angiomax-on hold for GI bleed , f/u CS recs, continue EPO    ID- Impella in situ, e coli in sputum - cont cefepime    Endocrinology-keep glucose less than 180, continue Synthroid    Objective:   Vital Signs:  Visit Vitals  /63 (BP 1 Location: Left lower arm)   Pulse 83   Temp 98.5 °F (36.9 °C)   Resp 15   Ht 5' 7\" (1.702 m)   Wt 104.7 kg (230 lb 13.2 oz)   SpO2 100%   BMI 36.15 kg/m²    O2 Flow Rate (L/min): 3 l/min O2 Device: Nasal cannula Temp (24hrs), Av.6 °F (37 °C), Min:98.1 °F (36.7 °C), Max:99 °F (37.2 °C)    CVP (mmHg): 12 mmHg (22 0800)      Intake/Output:     Intake/Output Summary (Last 24 hours) at 2022 1221  Last data filed at 2022 1100  Gross per 24 hour   Intake 1847.32 ml   Output 2850 ml   Net -1002.68 ml       Physical Exam:    General-in mild to moderate resp distress  Neuro- Grossly non focal, impressive generalized weakness  Cardiac-RRR  Lungs-clear anteriorly  Abdomen-soft, nontender, nondistended  Extremities-warm    LABS AND  DATA: Personally reviewed  Recent Labs     22  0404 22  0420   WBC 11.4* 10.0   HGB 8.3* 8.0*   HCT 31.0* 29.0*    215     Recent Labs     22  0404 22  0420   * 146*   K 3.4* 3.0*   * 111*   CO2 29 28   BUN 73* 78*   CREA 2.36* 2.42*   * 233*   CA 10.0 9.6   MG 2.5* 2.3   PHOS 3.5 4.7     Recent Labs     22  0404 22  0420   AP 70 70   TP 8.0 7.6   ALB 3.5 3.4*   GLOB 4.5* 4.2*     Recent Labs     22  0404 22  0420   APTT 26.3 26.4      Recent Labs     22  0501   PHI 7.44   PCO2I 40.3   PO2I 151*     Recent Labs     22  0900   CPK 60   CKMB 1.2       Hemodynamics:   PAP: PAP Systolic: 63 (94 2769) CO: CO (l/min): 6.8 l/min (22 1100)   Wedge:   CI: CI (l/min/m2): 3 l/min/m2 (22 1100)   CVP:  CVP (mmHg): 12 mmHg (01/30/22 0800) SVR:       PVR:       Ventilator Settings:  Mode Rate Tidal Volume Pressure FiO2 PEEP   Spontaneous,Pressure support   460 ml  5 cm H2O 40 % 5 cm H20     Peak airway pressure: 10 cm H2O    Minute ventilation: 10.7 l/min        MEDS: Reviewed    Chest X-Ray:  CXR Results  (Last 48 hours)               01/30/22 0442  XR CHEST PORT Final result    Impression:  Lines and tubes as described. Interval extubation. Improving   pulmonary edema       Narrative:  EXAM: XR CHEST PORT       INDICATION: post-Impella       COMPARISON: Prior day       FINDINGS: A portable AP radiograph of the chest was obtained at 0418 hours. The   patient is on a cardiac monitor. The endotracheal tube is not seen. An AICD is   in place. A pacemaker is in place. An pelvic device is unchanged in position. The [place] catheter extends to the main pulmonary artery. Feeding tube   extends below the diaphragm. Pulmonary edema has decreased. The cardiac and   mediastinal contours are stable. The bones and soft tissues are grossly within   normal limits. 01/29/22 0440  XR CHEST PORT Final result    Impression:      1. Stable lines, tubes and devices. 2. Stable low lung volumes and bilateral infiltrates. .  . Narrative:  INDICATION:  post-Impella        EXAM: Chest single view. COMPARISON: 1/28/2022. FINDINGS: A single frontal view of the chest at 0426 hours shows low lung   volumes with diffuse bilateral stable lung infiltrates. ET tube, gastric tube,   PA catheter, AICD and Impella device all stable. .  The heart, mediastinum and   pulmonary vasculature are stable . The bony thorax is unremarkable for age. Samantha Jc NOTE OF PERSONAL INVOLVEMENT IN CARE   This patient has a high probability of imminent, clinically significant deterioration, which requires the highest level of preparedness to intervene urgently.  I participated in the decision-making and personally managed or directed the management of the following life and organ supporting interventions that required my frequent assessment to treat or prevent imminent deterioration. I personally spent 40 minutes of critical care time.       Signed By: Charito Boswell MD     January 30, 2022    '

## 2022-01-30 NOTE — PROGRESS NOTES
SLP Contact Note    SLP consult received and appreciated. Pt chart reviewed. Pt has had a prolonged intubation and with hoarse vocal quality. Given the risk for silent aspiration, will require a Flexible Endoscopic Evaluation of Swallow (FEES) at bedside prior to diet initiation. Would recommend allowing pt to have ice chips today, and then will plan to complete FEES tomorrow.        Thank you,  CYNTHIA Hardy.Ed, [id number] [facility]  Speech-Language Pathologist

## 2022-01-31 NOTE — PROGRESS NOTES
[facility]   [address] [address] [facility], [address] [facility]  Phone: [phone]   [phone]    [email or web address]     Nephrology Progress Note    Patient Name : Parker Lucero      : 1955     MRN : [id number]  Date of Admission : 2022  Date of Servive : 22    CC:  Follow up for ARF       Assessment and Plan   RODNEY on CKD :  - CRS. - Cr now rising due to IVVD / over diuresis. PCWP 11, CVP 6cm, Hypercalcemic, Hypernatremic --> not sure there is a need for Bumex gtt or Diuril.  - Noted Impella being weaned ? Next steps   - she is not a candidate for RRT/ dialysis unless its a bridge to Transplant w/ LVAD  - consider Goals of Care discussion     Hypernatremia   Hypercalcemia   Hypokalemia   Met alkalosis   - 2/2 over diuresis   - defer diuretics to AHF team   - continue FW flushes     CKD stage IV:  - Etiology: DM, HTN, CRS  - Renal US: suggestive of CKD, B/L benign renal cysts   - baseline Cr 2.4-2.6 mg/dl      Acute on chronic HFrEF  NI CMP, LVEF 15 to 20%  NYHA class III-IV  S/p BiV pacer/ICD-s/p CRT  R axillary Impella implanted 22  LAD PCI on 22  Transplant w/u underway - EGD and colon , pan CT w/o contrast  neg for acute issues      Morbid obesity  Type II DM  HTN  Hypothyroidism  Pulmonary hypertension  Gout  Psoriasis       Interval History:  Seen and examined. Extubated to BIPAP. Getting TF w/ water flushes. Na remains elevated. Ca, Bicarb high, K LOW and being replaced. CVP 6 cm . Impella at P-5. Review of Systems: Review of systems not obtained due to patient factors.     Current Medications:   Current Facility-Administered Medications   Medication Dose Route Frequency    epoetin isabel-epbx (RETACRIT) injection 10,000 Units  10,000 Units SubCUTAneous Q MON, WED & FRI    potassium bicarb-citric acid (EFFER-K) tablet 40 mEq  40 mEq Oral DAILY    insulin NPH (NOVOLIN N, HUMULIN N) injection 30 Units  30 Units SubCUTAneous DAILY    [Held by provider] hydrALAZINE (APRESOLINE) tablet 10 mg  10 mg Oral TID    allopurinoL (ZYLOPRIM) tablet 100 mg  100 mg Oral DAILY    insulin NPH (NOVOLIN N, HUMULIN N) injection 30 Units  30 Units SubCUTAneous QHS    cefepime (MAXIPIME) 2 g in sterile water (preservative free) 10 mL IV syringe  2 g IntraVENous Q24H    pantoprazole (PROTONIX) injection 40 mg  40 mg IntraVENous Q12H    And    sodium chloride (NS) flush 10 mL  10 mL IntraVENous DAILY    sodium chloride (NS) flush 5-40 mL  5-40 mL IntraVENous PRN    clopidogreL (PLAVIX) tablet 75 mg  75 mg Oral DAILY    digoxin (LANOXIN) tablet 0.0625 mg  0.0625 mg Oral EVERY OTHER DAY    bumetanide (BUMEX) 0.25 mg/mL infusion  0.25 mg/hr IntraVENous CONTINUOUS    DOBUTamine (DOBUTREX) 500 mg/250 mL (2,000 mcg/mL) infusion  0-10 mcg/kg/min IntraVENous TITRATE    albumin human 25% (BUMINATE) solution 12.5 g  12.5 g IntraVENous DAILY    milrinone (PRIMACOR) 20 MG/100 ML D5W infusion  0.125 mcg/kg/min IntraVENous CONTINUOUS    vasopressin (VASOSTRICT) 20 Units in 0.9% sodium chloride 100 mL infusion  0-0.1 Units/min IntraVENous TITRATE    PHENYLephrine (NORMA-SYNEPHRINE) 30 mg in 0.9% sodium chloride 250 mL infusion   mcg/min IntraVENous TITRATE    niCARdipine (CARDENE) 25 mg in 0.9% sodium chloride 250 mL infusion  0-15 mg/hr IntraVENous TITRATE    [Held by provider] bivalirudin (ANGIOMAX) 250 mg in 0.9% sodium chloride (MBP/ADV) 50 mL MBP  0.02-2.5 mg/kg/hr IntraVENous TITRATE    hydrALAZINE (APRESOLINE) 20 mg/mL injection 5 mg  5 mg IntraVENous Q4H PRN    bumetanide (BUMEX) injection 1 mg  1 mg IntraVENous Q4H PRN    fentaNYL (PF) 1,500 mcg/30 mL (50 mcg/mL) infusion  0-200 mcg/hr IntraVENous TITRATE    heparin (porcine) in 0.9% NaCl 30,000 unit/1,000 mL perfusion irrigation 50-1,000 mL  50-1,000 mL Other PRN    chlorhexidine (PERIDEX) 0.12 % mouthwash 15 mL  15 mL Oral Q12H    0.9% sodium chloride infusion  9 mL/hr IntraVENous CONTINUOUS    naloxone (NARCAN) injection 0.4 mg  0.4 mg IntraVENous PRN    ondansetron (ZOFRAN) injection 4 mg  4 mg IntraVENous Q4H PRN    albuterol (PROVENTIL VENTOLIN) nebulizer solution 2.5 mg  2.5 mg Nebulization Q4H PRN    aspirin chewable tablet 81 mg  81 mg Oral DAILY    midazolam (VERSED) injection 1 mg  1 mg IntraVENous Q1H PRN    magnesium oxide (MAG-OX) tablet 400 mg  400 mg Oral BID    calcium chloride 1 g in 0.9% sodium chloride 100 mL IVPB  1 g IntraVENous PRN    bisacodyL (DULCOLAX) suppository 10 mg  10 mg Rectal DAILY PRN    senna-docusate (PERICOLACE) 8.6-50 mg per tablet 1 Tablet  1 Tablet Oral BID    ELECTROLYTE REPLACEMENT NOTE: Nurse to review Serum Potassium and Magnesuim levels and Initiate Electrolyte Replacement Protocol as needed  1 Each Other PRN    magnesium sulfate 1 g/100 ml IVPB (premix or compounded)  1 g IntraVENous PRN    alteplase (CATHFLO) 1 mg in sterile water (preservative free) 1 mL injection  1 mg InterCATHeter PRN    bacitracin 500 unit/gram packet 1 Packet  1 Packet Topical PRN    dexmedeTOMidine in 0.9 % NaCl (PRECEDEX) 400 mcg/100 mL (4 mcg/mL) infusion soln  0.1-1.5 mcg/kg/hr IntraVENous TITRATE    midazolam (VERSED) injection 1 mg  1 mg IntraVENous Q4H PRN    insulin lispro (HUMALOG) injection   SubCUTAneous Q6H    triamcinolone acetonide (KENALOG) 0.1 % cream   Topical BID    polyethylene glycol (MIRALAX) packet 17 g  17 g Oral DAILY    montelukast (SINGULAIR) tablet 10 mg  10 mg Oral DAILY    levothyroxine (SYNTHROID) tablet 100 mcg  100 mcg Oral Once per day on Mon Tue Wed Thu Fri Sat    hydroxypropyl methylcellulose (ISOPTO TEARS) 0.5 % ophthalmic solution 1 Drop  1 Drop Both Eyes PRN    venlafaxine-SR (EFFEXOR-XR) capsule 75 mg  75 mg Oral DAILY WITH BREAKFAST    gabapentin (NEURONTIN) capsule 100 mg  100 mg Oral QHS    arformoteroL (BROVANA) neb solution 15 mcg  15 mcg Nebulization BID RT    And    budesonide (PULMICORT) 500 mcg/2 ml nebulizer suspension  500 mcg Nebulization BID RT    sodium chloride (NS) flush 5-40 mL  5-40 mL IntraVENous Q8H    acetaminophen (TYLENOL) tablet 650 mg  650 mg Oral Q6H PRN    Or    acetaminophen (TYLENOL) suppository 650 mg  650 mg Rectal Q6H PRN    glucose chewable tablet 16 g  4 Tablet Oral PRN    dextrose (D50W) injection syrg 12.5-25 g  25-50 mL IntraVENous PRN    glucagon (GLUCAGEN) injection 1 mg  1 mg IntraMUSCular PRN      Allergies   Allergen Reactions    Ciprofloxacin Anaphylaxis    Shellfish Derived Anaphylaxis    Sildenafil Other (comments)    Ace Inhibitors Unknown (comments)    Biaxin [Clarithromycin] Other (comments)     Metal taste    Candesartan Cough    Pcn [Penicillins] Hives       Objective:  Vitals:    Vitals:    01/31/22 0400 01/31/22 0412 01/31/22 0500 01/31/22 0600   BP:       Pulse: 81  80 80   Resp: 24  20 23   Temp: 99.2 °F (37.3 °C)      SpO2: 97% 99% 100% 100%   Weight:    104.2 kg (229 lb 11.5 oz)   Height:         Intake and Output:  01/30 1901 - 01/31 0700  In: 1956 [I.V.:1486]  Out: 1090 [Urine:1090]  01/29 0701 - 01/30 1900  In: 1877.3 [I.V.:1687.3]  Out: 4425 [Urine:4425]    Physical Examination:  General: Awake, alert, BIPAP  HEENT:           DHT   Neck:  Supple, no mass  Resp:  Diminished at bases   CV:  RRR, no LE edema   GI:  Soft, NT, + BS, obese  Neurologic:  Awake, following commands  :                  Henley in place    [x]    High complexity decision making was performed  []    Patient is at high-risk of decompensation with multiple organ involvement    Lab Data Personally Reviewed: I have reviewed all the pertinent labs, microbiology data and radiology studies during assessment.     Recent Labs     01/31/22  0444 01/30/22  0404 01/29/22  0420 01/28/22  1507   * 150* 146* 146*   K 3.5 3.4* 3.0* 3.8   * 114* 111* 111*   CO2 34* 29 28 25   * 185* 233* 262*   BUN 80* 73* 78* 78*   CREA 2.56* 2.36* 2.42* 2.62*   CA 10.4* 10.0 9.6 10.0   MG  --  2.5* 2.3 2.4   PHOS 2.6 3.5 4.7 4.5   ALB 3.6 3.5 3.4* 3.8   ALT 9* 7* 8*  --      Recent Labs     01/31/22  0444 01/30/22  0404 01/29/22  0420 01/28/22  1505   WBC 11.3* 11.4* 10.0  --    HGB 8.2* 8.3* 8.0* 8.3*   HCT 30.9* 31.0* 29.0* 30.0*    259 215  --      Lab Results   Component Value Date/Time    Specimen Description: URINE 10/22/2013 01:32 PM    Specimen Description: URINE 01/23/2013 04:40 PM    Specimen Description: LEG TISSUE 02/12/2009 12:00 AM    Specimen Description: LEG (LEFT) 01/29/2009 03:06 AM     Lab Results   Component Value Date/Time    Culture result: NO GROWTH 5 DAYS 01/25/2022 12:36 PM    Culture result: MODERATE ESCHERICHIA COLI (A) 01/25/2022 12:36 PM    Culture result: MODERATE NORMAL RESPIRATORY ROSANNA 01/25/2022 12:36 PM    Culture result: MRSA NOT PRESENT 01/19/2022 12:41 PM    Culture result:  01/19/2022 12:41 PM     Screening of patient nares for MRSA is for surveillance purposes and, if positive, to facilitate isolation considerations in high risk settings. It is not intended for automatic decolonization interventions per se as regimens are not sufficiently effective to warrant routine use.     Culture result: NO GROWTH 5 DAYS 01/19/2022 12:41 PM     Recent Results (from the past 24 hour(s))   GLUCOSE, POC    Collection Time: 01/30/22 11:29 AM   Result Value Ref Range    Glucose (POC) 234 (H) 65 - 117 mg/dL    Performed by Vince Hagan    GLUCOSE, POC    Collection Time: 01/30/22  5:06 PM   Result Value Ref Range    Glucose (POC) 158 (H) 65 - 117 mg/dL    Performed by Vince Hagan    GLUCOSE, POC    Collection Time: 01/30/22  9:30 PM   Result Value Ref Range    Glucose (POC) 243 (H) 65 - 117 mg/dL    Performed by Dutch Angela, POC    Collection Time: 01/30/22 11:55 PM   Result Value Ref Range    Glucose (POC) 263 (H) 65 - 117 mg/dL    Performed by Tami Sheth    POC G3 - PUL    Collection Time: 01/31/22  4:08 AM   Result Value Ref Range    FIO2 (POC) 4 %    pH (POC) 7.44 7.35 - 7.45      pCO2 (POC) 48.9 (H) 35.0 - 45.0 MMHG    pO2 (POC) 73 (L) 80 - 100 MMHG    HCO3 (POC) 33.3 (H) 22 - 26 MMOL/L    sO2 (POC) 94.7 92 - 97 %    Base excess (POC) 8.2 mmol/L    Site RIGHT RADIAL      Allens test (POC) NOT APPLICABLE      Specimen type (POC) ARTERIAL     NT-PRO BNP    Collection Time: 01/31/22  4:44 AM   Result Value Ref Range    NT pro-BNP >35,000 (H) <240 PG/ML   METABOLIC PANEL, COMPREHENSIVE    Collection Time: 01/31/22  4:44 AM   Result Value Ref Range    Sodium 151 (H) 136 - 145 mmol/L    Potassium 3.5 3.5 - 5.1 mmol/L    Chloride 115 (H) 97 - 108 mmol/L    CO2 34 (H) 21 - 32 mmol/L    Anion gap 2 (L) 5 - 15 mmol/L    Glucose 251 (H) 65 - 100 mg/dL    BUN 80 (H) 6 - 20 MG/DL    Creatinine 2.56 (H) 0.55 - 1.02 MG/DL    BUN/Creatinine ratio 31 (H) 12 - 20      GFR est AA 23 (L) >60 ml/min/1.73m2    GFR est non-AA 19 (L) >60 ml/min/1.73m2    Calcium 10.4 (H) 8.5 - 10.1 MG/DL    Bilirubin, total 0.5 0.2 - 1.0 MG/DL    ALT (SGPT) 9 (L) 12 - 78 U/L    AST (SGOT) 13 (L) 15 - 37 U/L    Alk.  phosphatase 72 45 - 117 U/L    Protein, total 8.1 6.4 - 8.2 g/dL    Albumin 3.6 3.5 - 5.0 g/dL    Globulin 4.5 (H) 2.0 - 4.0 g/dL    A-G Ratio 0.8 (L) 1.1 - 2.2     DIGOXIN    Collection Time: 01/31/22  4:44 AM   Result Value Ref Range    Digoxin level 0.8 (L) 0.90 - 2.00 NG/ML   LACTIC ACID    Collection Time: 01/31/22  4:44 AM   Result Value Ref Range    Lactic acid 0.8 0.4 - 2.0 MMOL/L   LD    Collection Time: 01/31/22  4:44 AM   Result Value Ref Range     (H) 81 - 246 U/L   PTT    Collection Time: 01/31/22  4:44 AM   Result Value Ref Range    aPTT 25.4 22.1 - 31.0 sec    aPTT, therapeutic range     58.0 - 77.0 SECS   CBC W/O DIFF    Collection Time: 01/31/22  4:44 AM   Result Value Ref Range    WBC 11.3 (H) 3.6 - 11.0 K/uL    RBC 3.34 (L) 3.80 - 5.20 M/uL    HGB 8.2 (L) 11.5 - 16.0 g/dL    HCT 30.9 (L) 35.0 - 47.0 %    MCV 92.5 80.0 - 99.0 FL    MCH 24.6 (L) 26.0 - 34.0 PG    MCHC 26.5 (L) 30.0 - 36.5 g/dL    RDW 19.2 (H) 11.5 - 14.5 %    PLATELET 455 310 - 633 K/uL    MPV 10.2 8.9 - 12.9 FL    NRBC 0.2 (H) 0  WBC    ABSOLUTE NRBC 0.02 (H) 0.00 - 0.01 K/uL   TROPONIN-HIGH SENSITIVITY    Collection Time: 01/31/22  4:44 AM   Result Value Ref Range    Troponin-High Sensitivity 470 (HH) 0 - 51 ng/L   SED RATE (ESR)    Collection Time: 01/31/22  4:44 AM   Result Value Ref Range    Sed rate, automated 90 (H) 0 - 30 mm/hr   CK-MB,QUANT. Collection Time: 01/31/22  4:44 AM   Result Value Ref Range    CK - MB <1.0 <3.6 NG/ML    CK-MB Index Cannot be calculated 0.0 - 2.5     CK    Collection Time: 01/31/22  4:44 AM   Result Value Ref Range    CK 35 26 - 192 U/L   PHOSPHORUS    Collection Time: 01/31/22  4:44 AM   Result Value Ref Range    Phosphorus 2.6 2.6 - 4.7 MG/DL   GLUCOSE, POC    Collection Time: 01/31/22  5:31 AM   Result Value Ref Range    Glucose (POC) 227 (H) 65 - 117 mg/dL    Performed by Karen Oven    Collection Time: 01/31/22  5:43 AM   Result Value Ref Range    Carboxy-Hgb 1.3 1 - 2 %    Methemoglobin 0.3 0 - 1.4 %    tHb 15.1 14 - 17 g/dL    Oxyhemoglobin 58.1 (LL) 94 - 97 %    O2 SATURATION 59 (L) 95 - 99 %    SITE SG      Sample source MIXED VENOUS      Critical value read back Called to [place] RN on 01/31/2022 at 05:46            I have reviewed the flowsheets. Chart and Pertinent Notes have been reviewed. No change in PMH ,family and social history from Consult note.       Emelyn Alcantar [facility]

## 2022-01-31 NOTE — PROGRESS NOTES
Cardiac Electrophysiology Hospital Progress Note     Subjective:       Rivera Ervin is a 78 y.o. patient who is s/p Impella, has Medtronic biventricular ICD (gen change 01/142015, leads 09/25/2008) that has been BETTY.         Interim:   S/p Impella placement on 01/18/2022, now in CCU.  She had VF 1/19, has appearance of Torsades.  Required ICD shock x 2, both successful. Some brief NSVT since then, but nothing sustained. Now s/p PCI LAD on 01/27/2022.  Right & left side pressures improved compared to prior cath on 01/17/2022.  LVEF improved to 30% per echo on 01/28/2022. Weaned off nitric oxide & vent, now on BiPAP.  Planning to attempt to wean Impella as tolerated.         HPI:   Presented to the ER on 01/04/2021 with hypoxia, improved on supplemental oxygen. Labs showed stable anemia.  WBC elevated, hyponatremic, hypokalemic.  D dimer elevated.  Chronic CKD. Nephrologist spoke to me directly regarding severe renal failure, but not much worse than last admission. Rapid COVID negative.  CXR showed pulmonary edema vs atypical pneumonia.  VQ scan showed low probability for PE.       NICM, LVEF 15-20% during recent admission.  LVEF previously normalized with CRT.  NYHA III-IV.  No ACEi/ARB due to renal dysfunction.  GDMT dosing limited by low BP. [address] 12/10/2021 showed severe pulmonary HTN (wedge 34 mmHg, PAP 80 mmHg). Cardiac cath in 2015 at [facility] showed no evidence of CAD. She did require LV lead reprogramming over the summer, but good LV capture since.  Good capture/function when checked during recent admission. BP controlled. PICC line placed 01/10/2022 in anticipation of home milrinone. Previous:   LVEF noted 15-20% in 12/2021. S/p Medtronic biventricular ICD (gen change 01/142015, leads 09/25/2008).     CKD stage IV.        Previously followed by Dr. Javier Mendoza, states did not follow him to new practice.          Problem List       Acute respiratory failure with hypoxia [facility]) ICD-10-CM: J96.01   ICD-9-CM: 518.81 1/4/2022     CHF exacerbation ([facility] ICD-10-CM: I50.9   ICD-9-CM: 428.0 12/6/2021     Type 2 diabetes mellitus with diabetic neuropathy ([facility] ICD-10-CM: E11.40   ICD-9-CM: 250.60, 357.2 1/2/2020     Type 2 diabetes with nephropathy ([facility] ICD-10-CM: E11.21   ICD-9-CM: 250.40, 583.81 4/3/2018     Obesity, morbid ([facility] ICD-10-CM: E66.01   ICD-9-CM: 278.01 12/8/2017     Acquired hypothyroidism ICD-10-CM: E03.9   ICD-9-CM: 244.9 8/15/2016     Dysthymia ICD-10-CM: F34.1   ICD-9-CM: 300.4 8/15/2016     ICD (implantable cardioverter-defibrillator), biventricular, in situ ICD-10-CM: Z95.810   ICD-9-CM: V45.02 6/5/2014   Overview Signed 1/14/2015 11:11 AM by Christine Lugo MD     Generator Medtronic change 1/14/2015         Dyslipidemia ICD-10-CM: C37.5   ICD-9-CM: 272.4 1/14/2014     CKD (chronic kidney disease) ICD-10-CM: N18.9   ICD-9-CM: 585.9 8/15/2012     Cardiomyopathy, nonischemic (HCC) ICD-10-CM: I42.8   ICD-9-CM: 425.4 Unknown   Overview Signed 10/10/2011  6:40 AM by Jimmy Ibrahim MD     initial dx 2001, bivHF 2008 with EF 15%, s/p biV-ICD 9/08, significant improvment in EF to 45-50%         Anemia in chronic renal disease (Chronic) ICD-10-CM: N18.9, D63.1   ICD-9-CM: 285.21 12/10/2008     HTN (hypertension) ICD-10-CM: I10   ICD-9-CM: 401.9 12/10/2008     GERD (gastroesophageal reflux disease) (Chronic) ICD-10-CM: K21.9   ICD-9-CM: 530.81 12/10/2008     Gout (Chronic) ICD-10-CM: M10.9   ICD-9-CM: 274.9 12/10/2008     Pulmonary HTN (HCC) (Chronic) ICD-10-CM: I27.20   ICD-9-CM: 416.8 12/10/2008       Current Facility-Administered Medications   Medication Dose Route Frequency    epoetin isabel-epbx (RETACRIT) injection 10,000 Units  10,000 Units SubCUTAneous Q MON, WED & FRI    sodium bicarbonate (8.4%) 25 mEq in dextrose 5% 1,000 mL infusion   Other TITRATE    cefTRIAXone (ROCEPHIN) 2 g in 0.9% sodium chloride 20 mL IV syringe  2 g IntraVENous Q24H    bumetanide (BUMEX) injection 1 mg  1 mg IntraVENous BID    potassium bicarb-citric acid (EFFER-K) tablet 40 mEq  40 mEq Oral DAILY    insulin NPH (NOVOLIN N, HUMULIN N) injection 30 Units  30 Units SubCUTAneous DAILY    allopurinoL (ZYLOPRIM) tablet 100 mg  100 mg Oral DAILY    insulin NPH (NOVOLIN N, HUMULIN N) injection 30 Units  30 Units SubCUTAneous QHS    pantoprazole (PROTONIX) injection 40 mg  40 mg IntraVENous Q12H    And    sodium chloride (NS) flush 10 mL  10 mL IntraVENous DAILY    sodium chloride (NS) flush 5-40 mL  5-40 mL IntraVENous PRN    clopidogreL (PLAVIX) tablet 75 mg  75 mg Oral DAILY    digoxin (LANOXIN) tablet 0.0625 mg  0.0625 mg Oral EVERY OTHER DAY    DOBUTamine (DOBUTREX) 500 mg/250 mL (2,000 mcg/mL) infusion  0-10 mcg/kg/min IntraVENous TITRATE    albumin human 25% (BUMINATE) solution 12.5 g  12.5 g IntraVENous DAILY    milrinone (PRIMACOR) 20 MG/100 ML D5W infusion  0.125 mcg/kg/min IntraVENous CONTINUOUS    vasopressin (VASOSTRICT) 20 Units in 0.9% sodium chloride 100 mL infusion  0-0.1 Units/min IntraVENous TITRATE    PHENYLephrine (NORMA-SYNEPHRINE) 30 mg in 0.9% sodium chloride 250 mL infusion   mcg/min IntraVENous TITRATE    niCARdipine (CARDENE) 25 mg in 0.9% sodium chloride 250 mL infusion  0-15 mg/hr IntraVENous TITRATE    [Held by provider] bivalirudin (ANGIOMAX) 250 mg in 0.9% sodium chloride (MBP/ADV) 50 mL MBP  0.02-2.5 mg/kg/hr IntraVENous TITRATE    hydrALAZINE (APRESOLINE) 20 mg/mL injection 5 mg  5 mg IntraVENous Q4H PRN    bumetanide (BUMEX) injection 1 mg  1 mg IntraVENous Q4H PRN    heparin (porcine) in 0.9% NaCl 30,000 unit/1,000 mL perfusion irrigation 50-1,000 mL  50-1,000 mL Other PRN    chlorhexidine (PERIDEX) 0.12 % mouthwash 15 mL  15 mL Oral Q12H    0.9% sodium chloride infusion  9 mL/hr IntraVENous CONTINUOUS    naloxone (NARCAN) injection 0.4 mg  0.4 mg IntraVENous PRN    ondansetron (ZOFRAN) injection 4 mg  4 mg IntraVENous Q4H PRN  albuterol (PROVENTIL VENTOLIN) nebulizer solution 2.5 mg  2.5 mg Nebulization Q4H PRN    aspirin chewable tablet 81 mg  81 mg Oral DAILY    magnesium oxide (MAG-OX) tablet 400 mg  400 mg Oral BID    calcium chloride 1 g in 0.9% sodium chloride 100 mL IVPB  1 g IntraVENous PRN    bisacodyL (DULCOLAX) suppository 10 mg  10 mg Rectal DAILY PRN    senna-docusate (PERICOLACE) 8.6-50 mg per tablet 1 Tablet  1 Tablet Oral BID    ELECTROLYTE REPLACEMENT NOTE: Nurse to review Serum Potassium and Magnesuim levels and Initiate Electrolyte Replacement Protocol as needed  1 Each Other PRN    magnesium sulfate 1 g/100 ml IVPB (premix or compounded)  1 g IntraVENous PRN    alteplase (CATHFLO) 1 mg in sterile water (preservative free) 1 mL injection  1 mg InterCATHeter PRN    bacitracin 500 unit/gram packet 1 Packet  1 Packet Topical PRN    dexmedeTOMidine in 0.9 % NaCl (PRECEDEX) 400 mcg/100 mL (4 mcg/mL) infusion soln  0.1-1.5 mcg/kg/hr IntraVENous TITRATE    insulin lispro (HUMALOG) injection   SubCUTAneous Q6H    triamcinolone acetonide (KENALOG) 0.1 % cream   Topical BID    polyethylene glycol (MIRALAX) packet 17 g  17 g Oral DAILY    montelukast (SINGULAIR) tablet 10 mg  10 mg Oral DAILY    levothyroxine (SYNTHROID) tablet 100 mcg  100 mcg Oral Once per day on Mon Tue Wed Thu Fri Sat    hydroxypropyl methylcellulose (ISOPTO TEARS) 0.5 % ophthalmic solution 1 Drop  1 Drop Both Eyes PRN    venlafaxine-SR (EFFEXOR-XR) capsule 75 mg  75 mg Oral DAILY WITH BREAKFAST    gabapentin (NEURONTIN) capsule 100 mg  100 mg Oral QHS    arformoteroL (BROVANA) neb solution 15 mcg  15 mcg Nebulization BID RT    And    budesonide (PULMICORT) 500 mcg/2 ml nebulizer suspension  500 mcg Nebulization BID RT    sodium chloride (NS) flush 5-40 mL  5-40 mL IntraVENous Q8H    acetaminophen (TYLENOL) tablet 650 mg  650 mg Oral Q6H PRN    Or    acetaminophen (TYLENOL) suppository 650 mg  650 mg Rectal Q6H PRN    glucose chewable tablet 16 g  4 Tablet Oral PRN    dextrose (D50W) injection syrg 12.5-25 g  25-50 mL IntraVENous PRN    glucagon (GLUCAGEN) injection 1 mg  1 mg IntraMUSCular PRN         Allergies   Allergen Reactions   · Ciprofloxacin Anaphylaxis   · Shellfish Derived Anaphylaxis   · Ace Inhibitors Unknown (comments)   · Biaxin [Clarithromycin] Other (comments)   Metal taste   · Candesartan Cough   · Pcn [Penicillins] Hives     Past Medical History:   Diagnosis Date   · Acquired hypothyroidism 8/15/2016   · Anemia   RED-HF study   · Asthma   · Cardiomyopathy, nonischemic ([address]   initial dx 2001, bivHF 2008 with EF 15%, s/p biV-ICD 9/08, significant improvment in EF to 45-50%   · CKD (chronic kidney disease)   Dr Kin Alicia   · CKD (chronic kidney disease) 8/15/2012   · Depression   · Diabetes ([address]   · Diabetic neuropathy ([facility]   · DM (diabetes mellitus) ([facility] 8/15/2012   · GERD (gastroesophageal reflux disease)   · Gout   · Hypothyroidism   · ICD (implantable cardioverter-defibrillator), biventricular, in situ 6/5/2014   · Psoriasis     Past Surgical History:   Procedure Laterality Date   · CARDIAC CATHETERIZATION 2007; 01/06/15   normal cors   · ECHO 2D ADULT 4/2010   EF 45%, improved from 1/09 (25%)   · ECHO 2D ADULT 11/2011   LVH, EF 55-60%   · HX ORTHOPAEDIC   knee   · HX PACEMAKER PLACEMENT   AICD   · STRESS TEST LEXISCAN/CARDIOLITE 3/21/12   normal perfusion, global HK 40%     Family History   Problem Relation Age of Onset   · Heart Disease Mother   · Hypertension Mother   · Lupus Sister   · Diabetes Brother     Social History     Tobacco Use   · Smoking status: Never Smoker   · Smokeless tobacco: Never Used   Substance Use Topics   · Alcohol use:  No         Review of Systems: Unable to perform due to BiPAP in place.         Visit Vitals  /64   Pulse 80   Temp 99 °F (37.2 °C)   Resp 21   Ht 5' 7\" (1.702 m)   Wt 229 lb 4.5 oz (104 kg)   SpO2 100%   BMI 35.91 kg/m²        Physical Exam:   Constitutional: Well-developed and well-nourished. Head: Normocephalic and atraumatic. Eyes: Open, move spontaneously. ENT: BiPAP in place. Unable to talk  Neck: Right cordis with Mary Manuel. Cardiovascular: Normal rate, regular rhythm. Exam reveals no gallop and no friction rub. 2/6 systolic LSB murmur.     Pulmonary/Chest: Difficult to hear breath sounds due to BiPAP. Abdominal: Soft, obese. GI/: Henley catheter in place. Musculoskeletal: Symmetrical.   Vasc/lymphatic: + right axillary Impella. Neurological: Responsive to speech. Skin Left side BiV ICD unremarkable. Psychiatric: Calm. Assessment/Plan:     Imaging/Studies:   Limited echo (01/28/2022): LVEF 30%, mod increased wall thickness. Cardiac cath (01/27/2022): Normal right & left side filling pressures.  Severe native one vessel CAD involving mid LAD 80% stenosis.  ANNE-MARIE placed, ballooned.  Also with more prox calcified LAD 50% lesion, not treated.  Moderate PH. Limited echo (01/26/2022): LVEF 25-30%.  Trace pericardial effusion. Limited echo (01/24/2022): LVEF 30-35%, mod LVH.  MR improved compared to 12/2022.  Mild AR.  Trivial pericardial effusion. LHC/RHC (01/17/2022): Severely elevated left & right side filling pressures.  Severe PH, mixed pattern with elevated wedge as well as PVR of about 6 Woods units.  Mid LAD lesion 80% involving diagonal branch ostium.  Likely prior stent in mid LAD, patent.  Reduced CI of 1.65 L/min/m2 by thermodilution & 1.6 L/min/m2 by presumed oxygen consumption by Aye. 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 RHC without milrinone (12/10/2021): High wedge pressure (35 mmHg) indicating volume overload, precapillary.  Severe pulmonary HTN. Echo (12/08/2021): LVEF 15-20%, upper normal wall thickness, LV diastolic dysfunction.  Borderline low RVEF. Mod dilated LA, mildly dilated RA.  Mild to mod MR. Raquel Belt TR.  Mild to mod PH.       LHC/RHC (01/2015):  No significant CAD. Mixed CM: Now s/p PCI LAD on 01/27/2022.  Limited echo on 01/28/2022 showed LVEF improved to 30%. Echo is pending again today    Nuclear amyloid scan equivocal.  cMRI not possible with 4194 LV lead (2008).  However, cMRI wouldn't . S/p Impella 1/18/22 as bridge to possible transplant or to improvement   It is on P5 this morning and continued to be weaned off  If not responding and not candidate for heart/ renal transplant, then she will be referred to hospice. [facility] transplant has been contacted by Dr Ingrid Elam, but if LV function improved she will not be candidate for transplant    Medtronic biventricular ICD (gen change 01/14/2015, leads 09/25/2008): Still biv pacing  Device is BETTY 1/22/2022.  Replacement is on hold while she is still on Impella and is waiting for transplant eligibility.  Shocks for VF drained battery further.  I have talked with her  already regarding generator change, & he gave consent to proceed whenever ready.        CKD: worse now. She had PCI last week but Impella is also being weaned off and she was on diuretic     Anemia: EGD and colonoscopy on 01/26/2021, had cecal polyps           Thank you for involving me in this patient's care and please call with further concerns or questions. Galen Collnis M.D.    Electrophysiology/Cardiology   [facility] [place]   [place] [address]                     [id number] [facility], [address] [place] [address] [id number] [place] [address]

## 2022-01-31 NOTE — PROGRESS NOTES
SLP Contact Note    Attempted to complete FEES today. Pt currently on BiPAP and cannot be given PO. Will hold for now.       Thank you,  WENCESLAO RoweEd, [id number] [facility]  Speech-Language Pathologist

## 2022-01-31 NOTE — PROGRESS NOTES
1930: SBAR received from [place]. 0730: Bedside and Verbal shift change report given to [address]  (oncoming nurse) by Richard Collins  (offgoing nurse). Report included the following information SBAR, Kardex, Intake/Output, MAR and Recent Results.

## 2022-01-31 NOTE — PROGRESS NOTES
[facility]    ICU TEAM Progress Note    Name: Ana María Soliz   : 1955   MRN: [id number]   Date: 2022      I  Subjective:   Progress Note: 2022      Reason for ICU Admission: Cardiogenic shock    Interval history:  65-year-old female with past medical history significant for moderate pulmonary hypertension on home oxygen therapy, CKD, nonischemic cardiomyopathy who was admitted to the hospital on  due to acute on chronic hypoxemic respiratory failure in light of fluid overload. Had a left heart cath on  which demonstrated single one-vessel moderate disease (mid LAD lesion) which was thought to be not a cause of cardiomyopathy. Subsequently had a right axillary 5.5 Impella placed by CT surgery for potential double transplant. ANNE-MARIE to LAD on . Extubated on . Overnight Events:   : No acute event   - Extubated to BiPAP , impella in situ   - remains intubated, Impella in situ   - remains intubated, Impella in situ, now off inhaled nitric oxide, Bloody OG o/p   - getting LHC today, remains intubated, Impella in situ, on inhaled nitric oxide   - s/p EGD/colo today for Ca workup for possible heart/kidney Tx, remains intubated, Impella in situ, on inhaled nitric oxide  [id number] intubated, Impella in situ, on inhaled nitric oxide    Active Problem List:     Problem List  Date Reviewed: 2021          Codes Class    Acute respiratory failure with hypoxia (HCC) ICD-10-CM: J96.01  ICD-9-CM: 518.81         CHF exacerbation (HCC) ICD-10-CM: I50.9  ICD-9-CM: 428.0         Type 2 diabetes mellitus with diabetic neuropathy (HCC) ICD-10-CM: E11.40  ICD-9-CM: 250.60, 357.2         Type 2 diabetes with nephropathy (HCC) ICD-10-CM: E11.21  ICD-9-CM: 250.40, 583.81         Obesity, morbid (HCC) ICD-10-CM: E66.01  ICD-9-CM: 278.01         Acquired hypothyroidism ICD-10-CM: E03.9  ICD-9-CM: 244. 9         Dysthymia ICD-10-CM: F34.1  ICD-9-CM: 300.4         ICD (implantable cardioverter-defibrillator), biventricular, in situ ICD-10-CM: Z95.810  ICD-9-CM: V45.02     Overview Signed 1/14/2015 11:11 AM by Alex Gooden MD     Generator [employer] change 1/14/2015             Dyslipidemia ICD-10-CM: O21.2  ICD-9-CM: 272.4         CKD (chronic kidney disease) ICD-10-CM: N18.9  ICD-9-CM: 015. 9         Cardiomyopathy, nonischemic ([facility] [address] ICD-10-CM: I42.8  ICD-9-CM: 425.4     Overview Signed 10/10/2011  6:40 AM by Cheral Simmonds, MD     initial dx 2001, bivHF 2008 with EF 15%, s/p biV-ICD 9/08, significant improvment in EF to 45-50%             Anemia in chronic renal disease (Chronic) ICD-10-CM: N18.9, D63.1  ICD-9-CM: 285.21         HTN (hypertension) ICD-10-CM: I10  ICD-9-CM: 401.9         GERD (gastroesophageal reflux disease) (Chronic) ICD-10-CM: K21.9  ICD-9-CM: 530.81         Gout (Chronic) ICD-10-CM: M10.9  ICD-9-CM: 274.9         Pulmonary HTN (HCC) (Chronic) ICD-10-CM: I27.20  ICD-9-CM: 416.8               Past Medical History:      has a past medical history of Acquired hypothyroidism (8/15/2016), Anemia, Asthma, Cardiomyopathy, nonischemic ([address] CKD (chronic kidney disease), CKD (chronic kidney disease) (8/15/2012), Depression, Diabetes ([address] Diabetic neuropathy ([facility] [address] DM (diabetes mellitus) ([facility] [address] (8/15/2012), GERD (gastroesophageal reflux disease), Gout, Hypothyroidism, ICD (implantable cardioverter-defibrillator), biventricular, in situ (6/5/2014), and Psoriasis. She has no past medical history of Abuse, Adult physical abuse, Arrhythmia, Arthritis, Asthma, Autoimmune disease ([address] CAD (coronary artery disease), Calculus of kidney, Cancer ([address] Chronic pain, COPD, Headache(784.0), Hypercholesteremia, Liver disease, Psychotic disorder ([address] PUD (peptic ulcer disease), Seizures ([address] Stroke ([address] Thromboembolus ([facility] [address] or Unspecified deficiency anemia.     Past Surgical History:      has a past surgical history that includes echo 2d adult (4/2010); cardiac catheterization (2007; 01/06/15); echo 2d adult (11/2011); stress test lexiscan/cardiolite (3/21/12); hx pacemaker placement; hx orthopaedic; and colonoscopy (N/A, 1/26/2022). Home Medications:     Prior to Admission medications    Medication Sig Start Date End Date Taking? Authorizing Provider   levocetirizine (XYZAL) 5 mg tablet TAKE 1 TABLET BY MOUTH EVERY DAY 1/6/22  Yes Margie Lowery MD   apremilast José Miguel Hodgson) 30 mg tab Take 30 mg by mouth two (2) times daily as needed. Yes Provider, Historical   azelastine (ASTEPRO) 205.5 mcg (0.15 %) 1 [place] by Both Nostrils route two (2) times daily as needed. Yes Provider, Historical   docusate sodium (COLACE) 100 mg capsule Take 100 mg by mouth daily as needed for Constipation. Yes Provider, Historical   levothyroxine (SYNTHROID) 100 mcg tablet Take 100 mcg by mouth six (6) days a week. Everyday except Sunday   Yes Provider, Historical   allopurinoL (ZYLOPRIM) 100 mg tablet TAKE 1 TABLET BY MOUTH EVERY DAY 1/5/22  Yes Margie Lowery MD   calcitRIOL (ROCALTROL) 0.5 mcg capsule TAKE 1 CAPSULE BY MOUTH EVERY DAY 1/5/22  Yes Margie Lowery MD   carvediloL (COREG) 6.25 mg tablet Take 1 Tablet by mouth two (2) times daily (with meals). 12/22/21  Yes Nicolle DORSEY NP   isosorbide dinitrate (ISORDIL) 5 mg tablet Take 1 Tablet by mouth three (3) times daily. 12/22/21  Yes Nicolle DORSEY NP   hydrALAZINE (APRESOLINE) 10 mg tablet Take 1 Tablet by mouth three (3) times daily. 12/22/21  Yes Madelyn Roblero NP   benzonatate (Tessalon Perles) 100 mg capsule Take 100 mg by mouth three (3) times daily as needed for Cough. Yes Jamar, MD Bee   bumetanide (BUMEX) 2 mg tablet Take 1 tab in the morning and 0.5 tab in the evening 11/5/21  Yes Marlena Goodpasture, MD   gabapentin (NEURONTIN) 100 mg capsule Take 1 Capsule by mouth nightly.  Max Daily Amount: 100 mg. 10/29/21  Yes Margie Lowery MD   budesonide (PULMICORT) 180 mcg/actuation aepb inhaler Take 2 Puffs by inhalation two (2) times a day. 5/27/21  Yes Teri Quinteros MD   ammonium lactate (AMLACTIN) 12 % topical cream Apply  to affected area two (2) times a day. rub in to affected area well 11/4/20  Yes Teri Quinteros MD   insulin NPH/insulin regular (NOVOLIN 70/30) 100 unit/mL (70-30) injection 70 units two times a day 8/15/16  Yes Micheal Holland MD   calcipotriene (DOVONEX) 0.005 % topical cream Apply  to affected area three (3) times daily. Yes Provider, Historical   triamcinolone acetonide (KENALOG) 0.5 % ointment Apply  to affected area two (2) times daily as needed. use thin layer    Yes Provider, Historical   multivitamin (ONE A DAY) tablet Take 1 Tab by mouth daily. Yes Provider, Historical   ferrous sulfate (IRON) 325 mg (65 mg elemental iron) tablet Take 325 mg by mouth daily. Yes Provider, Historical   aspirin 81 mg tablet Take 81 mg by mouth daily. Yes Provider, Historical   montelukast (SINGULAIR) 10 mg tablet TAKE 1 TABLET BY MOUTH EVERY DAY 1/27/22   Teri Quinteros MD   fluticasone propionate (FLONASE) 50 mcg/actuation nasal spray USE 1 SPRAY IN EACH NOSTRIL DAILY 1/24/22   Teri Quinteros MD   venlafaxine-SR [facility]) 75 mg capsule TAKE 1 CAPSULE BY MOUTH EVERY DAY 1/21/22   Teri Quinteros MD   cholecalciferol (VITAMIN D3) (2,000 UNITS /50 MCG) cap capsule TAKE 1 CAPSULE BY MOUTH TWO (2) TIMES A DAY. 1/11/22   Teri Quinteros MD       Allergies/Social/Family History:      Allergies   Allergen Reactions    Ciprofloxacin Anaphylaxis    Shellfish Derived Anaphylaxis    Sildenafil Other (comments)    Ace Inhibitors Unknown (comments)    Biaxin [Clarithromycin] Other (comments)     Metal taste    Candesartan Cough    Pcn [Penicillins] Hives      Social History     Tobacco Use    Smoking status: Never Smoker    Smokeless tobacco: Never Used   Substance Use Topics    Alcohol use: No      Family History   Problem Relation Age of Onset    Heart Disease Mother     Hypertension Mother     Lupus Sister     Diabetes Brother        Review of Systems:     10 system reviewed and negative but as in interval history    Objective:   Vital Signs:  Visit Vitals  /64 (BP 1 Location: Left lower arm, BP Patient Position: At rest;Lying)   Pulse 80   Temp 99.2 °F (37.3 °C)   Resp 16   Ht 5' 7\" (1.702 m)   Wt 104.2 kg (229 lb 11.5 oz)   SpO2 100%   BMI 35.98 kg/m²    O2 Flow Rate (L/min): 4 l/min O2 Device: BIPAP Temp (24hrs), Av.2 °F (37.3 °C), Min:98.5 °F (36.9 °C), Max:99.6 °F (37.6 °C)    CVP (mmHg): 6 mmHg (22 0700)      Intake/Output:     Intake/Output Summary (Last 24 hours) at 2022 0750  Last data filed at 2022 0700  Gross per 24 hour   Intake 2030.98 ml   Output 2190 ml   Net -159.02 ml       Physical Exam:    General-chronically ill looking female on BiPAP, no acute distress  Neuro- Grossly non focal, impressive generalized weakness  Cardiac-RRR  Lungs-clear anteriorly  Abdomen-soft, nontender, nondistended  Extremities-warm    LABS AND  DATA: Personally reviewed  Recent Labs     22   WBC 11.3* 11.4*   HGB 8.2* 8.3*   HCT 30.9* 31.0*    259     Recent Labs     22  0404 22  0420 22  0420   * 150*   < > 146*   K 3.5 3.4*   < > 3.0*   * 114*   < > 111*   CO2 34* 29   < > 28   BUN 80* 73*   < > 78*   CREA 2.56* 2.36*   < > 2.42*   * 185*   < > 233*   CA 10.4* 10.0   < > 9.6   MG  --  2.5*  --  2.3   PHOS 2.6 3.5   < > 4.7    < > = values in this interval not displayed.      Recent Labs     22 22  0404   AP 72 70   TP 8.1 8.0   ALB 3.6 3.5   GLOB 4.5* 4.5*     Recent Labs     224 22  0404   APTT 25.4 26.3      Recent Labs     22  0408 22  0501   PHI 7.44 7.44   PCO2I 48.9* 40.3   PO2I 73* 151*   FIO2I 4  --      Recent Labs     22  0900   CPK 35 60   CKMB <1.0 1.2       Hemodynamics:   PAP: PAP Systolic: 47 (48/57/52 4877) CO: CO (l/min): 5.2 l/min (01/31/22 0700)   Wedge:   CI: CI (l/min/m2): 2.3 l/min/m2 (01/31/22 0700)   CVP:  CVP (mmHg): 6 mmHg (01/31/22 0700) SVR:       PVR:       Ventilator Settings:  Mode Rate Tidal Volume Pressure FiO2 PEEP   Spontaneous,Pressure support   460 ml  5 cm H2O 40 % 5 cm H20     Peak airway pressure: 10 cm H2O    Minute ventilation: 6.1 l/min        MEDS: Reviewed    Chest X-Ray:  CXR Results  (Last 48 hours)               01/30/22 0442  XR CHEST PORT Final result    Impression:  Lines and tubes as described. Interval extubation. Improving   pulmonary edema       Narrative:  EXAM: XR CHEST PORT       INDICATION: post-Impella       COMPARISON: Prior day       FINDINGS: A portable AP radiograph of the chest was obtained at 0418 hours. The   patient is on a cardiac monitor. The endotracheal tube is not seen. An AICD is   in place. A pacemaker is in place. An pelvic device is unchanged in position. The [place] catheter extends to the main pulmonary artery. Feeding tube   extends below the diaphragm. Pulmonary edema has decreased. The cardiac and   mediastinal contours are stable. The bones and soft tissues are grossly within   normal limits. Assessment and Plan:   1. Cardiogenic Shock  2. Acute hypoxemic respiratory failure  3. Status post Impella placement  4. RODNEY on CKD stage IV    Neuro- delirium prevention strategies, precedex as needed for hyperactive delirium     Cardiac-continue hemodynamic monitoring, EF has improved to about 35 % on most recent echo, Impella at P5, now s/p LAD stent, on aspirin, plavix, digoxin, follow-up cardiac surgery/advised heart failure recommendations      Pulmonary- BiPAP as tolerated for WOB, at risk of needing to be reintubated     GI- s/p EGD/colo 1/26 for Ca work up, PPI q12 for for GI bleed 1/28.  F/u GI recs     Renal-CKD-undulating Cr level-currently diuresing-on Bumex drip/diuril, follow-up nephrology recommendations, monitor urine output closely, dose meds renally, correct electrolyte derangements as needed, continue allopurinol     Hematology-systemic Angiomax-on hold for GI bleed 1/28, f/u CS recs, continue EPO     ID- Impella in situ, e coli in sputum -change cefepime to ceftriaxone on January 31.     Endocrinology-keep glucose less than 180, continue Synthroid    DISPOSITION  Stay in ICU    CRITICAL CARE CONSULTANT NOTE  I had a face to face encounter with the patient, reviewed and interpreted patient data including clinical events, labs, images, vital signs, I/O's, and examined patient. I have discussed the case and the plan and management of the patient's care with the consulting services, the bedside nurses and the respiratory therapist.      NOTE OF PERSONAL INVOLVEMENT IN CARE   This patient has a high probability of imminent, clinically significant deterioration, which requires the highest level of preparedness to intervene urgently. I participated in the decision-making and personally managed or directed the management of the following life and organ supporting interventions that required my frequent assessment to treat or prevent imminent deterioration. I personally spent 75 minutes of critical care time. This is time spent at this critically ill patient's bedside actively involved in patient care as well as the coordination of care and discussions with the patient's family. This does not include any procedural time which has been billed separately. Layne Harris M.D.   Staff Intensivist/Pulmonologist  [facility]  1/31/2022

## 2022-01-31 NOTE — PROGRESS NOTES
[facility]  ICU Progress Note    Admit Date: 2022    S/P R Axillary Impella 5.5     Subjective/24 Hour Summary:   Pt seen with Dr. Christiano Edmond. impella at P5,  bicarb purge. Patient is hoarse but communicative-ST following. Objective:   Vitals:  Blood pressure 122/64, pulse 80, temperature 99.2 °F (37.3 °C), resp. rate 16, height 5' 7\" (1.702 m), weight 229 lb 11.5 oz (104.2 kg), SpO2 100 %. Temp (24hrs), Av.2 °F (37.3 °C), Min:98.5 °F (36.9 °C), Max:99.6 °F (37.6 °C)    Hemodynamics:   CO: CO (l/min): 5.2 l/min   CI: CI (l/min/m2): 2.3 l/min/m2   CVP: CVP (mmHg): 6 mmHg (22)   SVR: SVR (dyne*sec)/cm5: 1230 (dyne*sec)/cm5 (22 2877)   PAP Systolic: PAP Systolic: 47 (43 4154)   PAP Diastolic: PAP Diastolic: 21 (95/69/56 7461)   PVR:     SV02: SVO2 (%): 60 % (22 07)   SCV02:      EKG/Rhythm:  SR      Oxygen Therapy:  Oxygen Therapy  O2 Sat (%): 100 % (22 07)  Pulse via Oximetry: 88 beats per minute (22)  O2 Device: BIPAP (225)  Skin Assessment: Clean, dry, & intact (22 1700)  Skin Protection for O2 Device: Yes (22)  Orientation: Anterior (22)  Location: Cheek (22)  Interventions: Mouth Care;Reposition Device (22)  O2 Flow Rate (L/min): 4 l/min (22)  O2 Temperature: 98.4 °F (36.9 °C) (22 1940)  FIO2 (%): 40 % (22 0415)  ETCO2 (mmHg): 22 mmHg (22 2342)    CXR:  CXR Results  (Last 48 hours)               22 0442  XR CHEST PORT Final result    Impression:  Lines and tubes as described. Interval extubation. Improving   pulmonary edema       Narrative:  EXAM: XR CHEST PORT       INDICATION: post-Impella       COMPARISON: Prior day       FINDINGS: A portable AP radiograph of the chest was obtained at 0418 hours. The   patient is on a cardiac monitor. The endotracheal tube is not seen. An AICD is   in place. A pacemaker is in place.  An pelvic device is unchanged in position. The [place] catheter extends to the main pulmonary artery. Feeding tube   extends below the diaphragm. Pulmonary edema has decreased. The cardiac and   mediastinal contours are stable. The bones and soft tissues are grossly within   normal limits. Admission Weight: Last Weight   Weight: 264 lb 1.8 oz (119.8 kg) Weight: 229 lb 11.5 oz (104.2 kg)     Intake / Output / Drain:  Current Shift: No intake/output data recorded. Last 24 hrs.:     Intake/Output Summary (Last 24 hours) at 1/31/2022 0740  Last data filed at 1/31/2022 0700  Gross per 24 hour   Intake 2030.98 ml   Output 2190 ml   Net -159.02 ml       EXAM:  General:  No acute distress         Lungs:   Clear to auscultation bilaterally. Incision:  No erythema, drainage or swelling. Heart:  Regular rate and rhythm, S1, S2 normal, no murmur, click, rub or gallop. Abdomen:   Soft, non-tender. Bowel sounds normal. No masses,  No organomegaly. Extremities:  No edema. PPP. Neurologic: Grossly normal     Labs:   Recent Labs     01/31/22 0444 01/30/22 0404   WBC 11.3* 11.4*   HGB 8.2* 8.3*   HCT 30.9* 31.0*    259     Recent Labs     01/31/22 0444 01/30/22 0404 01/29/22  0420 01/29/22  0420   * 150*   < > 146*   K 3.5 3.4*   < > 3.0*   * 114*   < > 111*   CO2 34* 29   < > 28   BUN 80* 73*   < > 78*   CREA 2.56* 2.36*   < > 2.42*   * 185*   < > 233*   CA 10.4* 10.0   < > 9.6   MG  --  2.5*  --  2.3   PHOS 2.6 3.5   < > 4.7    < > = values in this interval not displayed.      Recent Labs     01/31/22 0444 01/30/22 0404   AP 72 70   TP 8.1 8.0   ALB 3.6 3.5   GLOB 4.5* 4.5*     Recent Labs     01/31/22 0444 01/30/22 0404   APTT 25.4 26.3      Recent Labs     01/31/22 0408 01/30/22  0501   PHI 7.44 7.44   PCO2I 48.9* 40.3   PO2I 73* 151*   FIO2I 4  --      Recent Labs     01/31/22  0444 01/28/22  0900   CPK 35 60   CKMB <1.0 1.2        Assessment:     Active Problems:    Acute respiratory failure with hypoxia ([facility] [address] (2022)         Plan/Recommendations/Medical Decision Makin. Acute on Chronic Systolic CHF class III/IV (BiV-ICD): S/P right axillary impella 5.5 . Cont bicarb via purge. Systemic bival on hold due to GIB. On ancef for prophylaxis. On digoxin. AHFS managing. GDMT as able. BNP > 35 k today. 2. Acute on Chronic Respiratory insufficiency: On home O2. Irina Hu Extubated . Continue to work toward previous home home oxygen requirements. Increase activity, IS  3. CAD: Multivessel CAD on [facility] 22. ASA 81. BB on hold due to cardiogenic shock. Recommend starting high intensity statin when feasible, per primary/HF. S/P ANNE-MARIE to LAD on . On Plavix monotherapy per Cardiology due to GI bleeding  4. RODNEY on Chronic CKD Stage IV: Renal following. Bumex drip. Creat slightly worse today  5. Gout: allopurinol  6. DM2: on NPH Per primary service  7. GERD: Home meds  8. Hypothyroidism: on synthroid  9. Depression: Home meds  10. Anemia: on EPO, stable post op. Dispo: impella functioning well. Cont bicarb via purge. Will leave Impella at P5 today. No cardiac surgery issues otherwise. Further HF management per AHF. Remainder of medical management per primary.     Signed By: Rommel Hollins NP

## 2022-01-31 NOTE — ROUTINE PROCESS
07:30 SBAR from Nawaf Silva MAHI    13:00 Impella rate decreased to P 4 per order    17:30  at bedside visiting    19:30 Bedside shift change report given to Berna magana (oncoming nurse) by MOM (offgoing nurse). Report included the following information SBAR, Kardex, Procedure Summary, Intake/Output, MAR, Accordion, Recent Results, Med Rec Status, Cardiac Rhythm V paced, Alarm Parameters , Pre Procedure Checklist, Procedure Verification and Quality Measures.

## 2022-01-31 NOTE — PROGRESS NOTES
[facility] in [place], [place]  Inpatient Progress Note      Patient name: Neil Guzman  Patient : 1955  Patient MRN: [id number]  Consulting MD: Jake Oconnor MD  Date of service: 22    REASON FOR REFERRAL:  Management of cardiogenic shock     PLAN OF CARE:   · 77 y/o female with chronic renal failure and new onset severe cardiomyopathy, LVEF 15% (diagnosed 21), stage D, NYHA class IV; admitted for pulmonary edema and severe volume overloadd by RHC  · Most likely etiology of acute deterioration of LVEF is chronic volume overload with compensatory tachycardia in the setting of progressive renal failure +/- coronary artery disease (80% LAD)   · Patient requested aggressive medical approach to allow her heart to recover or receive heart/kidney transplantation, before considering hospice  · D/w patient, her family, CT surgery, nephrology and primary cardiologist plan to support her with Impella as bridge to LV recovery or transplant with the following anticipated outcomes:  · 1/3 chance of LV recovery and discharge home on medical therapy or chronic dialysis  · 1/3 chance of LV non-recovery on Impella, evaluation and transfer for listing for heart transplant/kidney  · 1/3 chance of LV non-recovery and evaluation that reveals patient is not candidate for LVAD/heart/kidney transplant and then wean of support to comfort care/hospice  · Impella 5.5 implanted by Dr. Andrew Wagner  and patient underwent aggressive diuresis with normalization of filling pressures and improvement of LV function ranging between 30-35% to 25-30%, s/p PCI to LAD today followed by Impella wean after extubation; appears to have recovered heart function and unlikely will need heart/kidney transplant.   · In case of failure of Impella wean, patient's only contraindication to transplant is active infection requiring antibiotic use; otherwise there are no apparent contraindications; [facility] will accept patient on integrillin drip if LV does not recover after revascularization and on dialysis if needed for volume management; anticipated waiting time at BMI 38 and blood type AB for hear/kidney transplant is currently approximately 10 days; d/w Dr. Silviano Cosby from [facility]. RECOMMENDATIONS:  POD # 13 Impella 5.5 implant   Decrease to P4- watch hemodynamics   Repeat TTE - EF remains 30% on P5  Pulmonary hygiene, NC    Maintain PAC for hemodynamic optimization; goal CI > 2.3, CVP 8-10 mmHg SVR 1000, SBP < 110 mmHg (via radial arterial line)   Intolerant to sildenafil due to marked hypotension   Transition to 1mg BID IV; goal net even to 1L  Increase free water flushes   Keep K> 4 and Mg >2  Hydralazine 5 mg IV q4h PRN SBP > 110 mmHg   Hydralazine on hold per Dr. Nancy Burt remotely   Intolerant of GDMT due to hemodynamic in stability   BiV-ICD programmed at 80 bpm; device is at EOL, recommend to delay ICD generator change until final plans for transplant are established - Per Dr. Mark Parker patient is not pacer dependent  Place defib patches on patient for ppx  Cont digoxin 0.0625 mg to every other day; goal 0.7-1.2   Allopurinol 50mg daily per nephrology  S/p Venofer 200 mg IV x 2   CT head no acute abnormalitiy  CTchest/abd/pelvis w/o contrast for txp eval- no acute abnormalities, no masses  GI scopes complete, + tubular adenomas,  no H. Pylori,   Continue ASA/Plavix for LAD stent  Epo level elevated- hold epogen and follow levels  Blood cultures NGTD  Sputum with E Coli- changed to cefepime   pTT goal  40-60 due to persistent anemia on bivalirudin- on hold for GI bleeding.    Will need OP PSG  Will offer family testing for VUS x 2 as OP  Palliative consult appreciated   Nutritionist consult  Heart failure education   Advanced care plan present on file     All other care per primary team     IMPRESSION:  Volume overload  Acute on Chronic systolic heart failure, requiring Impella 5.5 implant 1/18/22 · Stage D, NYHA class IV symptoms  · Non-ischemic cardiomyopathy, LVEF 15%  · PYP equivocal for amyloid   CAD with 80% LAD lesion s/p PCI 1/27/22  Pulmonary hypertension, severe  RV failure  S/p BiV-ICD  Cardiac risk factors:  · HL  · DM2  · Morbid obesity, BMI 40  [employer] genetic testing VUS x 2 ( KCNH2, MYH6)  Acute on chronic renal failure, stage IV  GERD  Anemia of chronic disease  Gout  VF s/p ICD shock x 2      Interval Events:  POD #13 Impella placement   Impella  p5  Bival on hold   Creatinine  Up to 2.56  proBNP up to 35,000   Afebrile overnight   K 3.5  PCT 0.71  Trop down to 470     LIFE GOALS:  Patient's personal goals include: being home with family, still ambulating around without getting too tired. Important upcoming milestones or family events: none  The patient identifies the following as important for living well: remaining idependent and mobile; being able to get out of the house with . Patient verbalized willingness to be on home milrinone and evaluation for both heart and kidney transplants. Verbalizes she would have family supporting her decision on this. SHAMIKA Schroeder is a 78 y.o.  female with a history of NICM, chronic hypoxic respiratory failure secondary to pulmonary HTN, hypothyroidism, CKD, GERD, and DM II who presented to [facility] as a transfer from another facility for acute on chronic hypoxic respiratory failure. Reports running out of O2 at home resulting in SOB and dizziness. Upon arrival to the ED she was found to have O2 sats in the 70s, requiring 4L NC O2. Rapid covid test was negative. ProNT-BNP was 59815, K+5.2, BUN/CR x/2.54 and elevated d-dimer. Chest xray showing pulmonary edema vs. Atypical pneumonia. VQ scan showed low probability for PE. Per Dr. Rashi Valdivia, NIC, LVEF 15-20% during recent admission. LVEF previously normalized with CRT. NYHA III-IV. No ACEi/ARB due to renal dysfunction.   GDMT dosing limited by low BP.     Patient's PCP is Dr. Galen Blair, and she sees Dr. Jonathan Santillan primarily for cardiac care due to Dr. Herrera Gone transfering practice. Patient historically seen by nephrology but has not had follow up care in the past three years. CARDIAC IMAGING:  Echo 1/20/22 - LVEF 20-25%, RV moderately dilated, Impella catheter 5.9 cm from AV   Echo 12/8/21- LVEF 15-20%, mild to Mod MR, LVIDd 5.09cm, TAPSE 1.94cm  Echo 4/22/19- LVEF 60%, trace MR,  LVIDd 4.24cm, TAPSE 1.65cm   Echo 4/24/18- LVEF 60%, trivial MR, LVIDd 4.79cm, TAPSE 2.25cm      EKG- 1/4/22 ST with A sense and V paced rhythm     [facility] 2015- No significant CAD  NST 2014- reversible LAD involvement      ICD interrogation     HEMODYNAMICS:  [facility] 1/17/21: PAP 83/52 mmHg, RAP 27 mmHg, PCWP 45 mmHg, CI 1.6  [facility] 12/10/21: PAP 76/48/57, RAP 20, PCWP 35, CI 2.26     CPEST not done  6MW not done     OTHER IMAGING:  CXR Results  (Last 48 hours)                             01/13/22 1035   XR CHEST PORT Final result      Impression:   No significant change in congestion and interstitial and alveolar   opacities which may reflect edema or infectious infiltrate. Narrative:   EXAM: XR CHEST PORT       INDICATION: pul edema       COMPARISON: Chest x-ray 1/10/2022. FINDINGS: A portable AP radiograph of the chest was obtained at 10:19 hours. The   patient is on a cardiac monitor. The lungs appear grossly stable with congestion   and interstitial/airspace opacities with no pneumothorax or pleural effusion. Right PICC line traverses expected course of tip in the region of the atriocaval   junction. Pacemaker-ICD generator body projects over the left chest wall with   intact appearing leads traversing in expected course. . The cardiac and   mediastinal contours and pulmonary vascularity are normal.  Atherosclerotic   calcifications affect the aortic arch.  The chest wall structures and visualized   upper abdomen show no acute findings with incidental note of degenerative spine and shoulder changes. CT Results (most recent):  Results from Hospital Encounter encounter on 01/04/22    CT ABD PELV WO CONT    Narrative  EXAM: CT ABD PELV WO CONT, CT CHEST WO CONT    INDICATION: LVAD/TRANSPLANT WORK UP    COMPARISON: Chest x-ray of earlier today, abdominal ultrasound 1/22/2022, CT  abdomen pelvis 1/16/2018. TECHNIQUE:  5 mm axial images were obtained through the chest, abdomen, and  pelvis. Oral and IV contrast were not administered. Coronal and sagittal  reconstructions were generated. CT dose reduction was achieved through use of a  standardized protocol tailored for this examination and automatic exposure  control for dose modulation. FINDINGS:    THYROID: No nodule. MEDIASTINUM: No mass or lymphadenopathy. ZEENAT: No mass or lymphadenopathy. THORACIC AORTA: Atherosclerotic calcination without aneurysm. MAIN PULMONARY ARTERY: Normal in caliber. TRACHEA/BRONCHI: Endotracheal tube in expected position. Patent. ESOPHAGUS: No dilation or wall thickening. Indwelling enteric tube traverses  length of esophagus to extend into the stomach. HEART: Impella device, pacemaker leads, and coronary artery calcifications noted  without cardiac enlargement or pericardial effusion. PLEURA: No effusion or pneumothorax. LUNGS: Patchy areas of focal airspace opacity and ground glass opacity with  linear posterior and bibasilar atelectasis. No nodule or mass. LIVER: No mass or biliary dilation. GALLBLADDER: Unremarkable. SPLEEN: Borderline enlarged with no focal lesion. PANCREAS: No mass or ductal dilation. ADRENALS: Unremarkable. KIDNEYS: No significant change in 10 mm left renal pelvis calculus without  hydronephrosis. Exophytic lateral interpolar right renal cyst and anterior  interpolar left renal cyst redemonstrated. No other calculi or solid renal mass  evident. STOMACH: Indwelling enteric tube.  Otherwise unremarkable  SMALL BOWEL: No dilatation or wall thickening. COLON: No dilatation or wall thickening. APPENDIX: Unremarkable. PERITONEUM: No ascites or pneumoperitoneum. RETROPERITONEUM: Atherosclerotic calcification without aneurysm. No enlarged  lymphadenopathy. REPRODUCTIVE ORGANS: Coarse calcifications compatible with uterine leiomyoma  with otherwise unremarkable appearance to uterus and ovaries. URINARY BLADDER: Collapsed around Henley catheter and balloon. BONES: Degenerative spine change. No acute fracture or aggressive lesion. ADDITIONAL COMMENTS: N/A    Impression  1. Groundglass and scattered focal airspace opacities within the lungs  concerning for possible pneumonic infiltrates. 2. Support lines as above. 3. No acute intra-abdominal process with redemonstration of nonobstructing left  renal pelvis 10 mm stone and additional incidentals as above. PHYSICAL EXAM:  Visit Vitals  Visit Vitals  /64   Pulse 80   Temp 99 °F (37.2 °C)   Resp 21   Ht 5' 7\" (1.702 m)   Wt 229 lb 4.5 oz (104 kg)   SpO2 100%   BMI 35.91 kg/m²          Hemodynamics:   CO: CO (l/min): 6.2 l/min   CI: CI (l/min/m2): 2.8 l/min/m2   CVP: CVP (mmHg): 11 mmHg (01/31/22 1000)   SVR: SVR (dyne*sec)/cm5: 1223 (dyne*sec)/cm5 (01/31/22 0017)   PAP Systolic: PAP Systolic: 54 (46/14/84 2338)   PAP Diastolic: PAP Diastolic: 27 (15/20/64 0009)   PVR:     SV02: SVO2 (%): 56 % (01/31/22 1000)   SCV02:      Impella 5.5  P-5  Flow: 2.9lpm   Purge flow 5.3    Physical Exam  Physical Exam  Vitals and nursing note reviewed. Cardiovascular:      Rate and Rhythm: Normal rate and regular rhythm. Pulses: Normal pulses. Heart sounds: Normal heart sounds. Pulmonary:      Effort: Pulmonary effort is normal. No respiratory distress. Breath sounds: Decreased air movement present. Abdominal:      General: There is no distension. Palpations: Abdomen is soft. Musculoskeletal:         General: No swelling. Skin:     General: Skin is warm and dry. Neurological:      Mental Status: She is alert. Review of Systems   Constitutional: Positive for malaise/fatigue. Negative for chills and fever. Respiratory: Negative for shortness of breath. Cardiovascular: Negative for chest pain and leg swelling. Neurological: Negative for dizziness and headaches. Psychiatric/Behavioral: Negative for depression.           PAST MEDICAL HISTORY:  Past Medical History:   Diagnosis Date    Acquired hypothyroidism 8/15/2016    Anemia     RED-HF study    Asthma     Cardiomyopathy, nonischemic ([facility]     initial dx 2001, bivHF 2008 with EF 15%, s/p biV-ICD 9/08, significant improvment in EF to 45-50%    CKD (chronic kidney disease)     Dr Corie Hatfield    CKD (chronic kidney disease) 8/15/2012    Depression     Diabetes ([facility]     Diabetic neuropathy ([facility]     DM (diabetes mellitus) ([facility] 8/15/2012    GERD (gastroesophageal reflux disease)     Gout     Hypothyroidism     ICD (implantable cardioverter-defibrillator), biventricular, in situ 6/5/2014    Psoriasis        PAST SURGICAL HISTORY:  Past Surgical History:   Procedure Laterality Date    CARDIAC CATHETERIZATION  2007; 01/06/15    normal cors    COLONOSCOPY N/A 1/26/2022    COLONOSCOPY performed by Chey Kendrick MD at [address] [place] [address] Dr ECHO 2D ADULT  4/2010    EF 45%, improved from 1/09 (25%)    ECHO 2D ADULT  11/2011    LVH, EF 55-60%    HX ORTHOPAEDIC      knee    HX PACEMAKER PLACEMENT      AICD    STRESS TEST LEXISCAN/CARDIOLITE  3/21/12    normal perfusion, global HK 40%       FAMILY HISTORY:  Family History   Problem Relation Age of Onset    Heart Disease Mother     Hypertension Mother     Lupus Sister     Diabetes Brother        SOCIAL HISTORY:  Social History     Socioeconomic History    Marital status:    Tobacco Use    Smoking status: Never Smoker    Smokeless tobacco: Never Used   Substance and Sexual Activity    Alcohol use: No    Drug use: No    Sexual activity: Never Social History Narrative    . Nonsmoker. Disability       LABORATORY RESULTS:     Labs Latest Ref Rng & Units 1/31/2022 1/30/2022 1/29/2022 1/28/2022 1/28/2022 1/28/2022 1/27/2022   WBC 3.6 - 11.0 K/uL 11. 3(H) 11. 4(H) 10.0 - - 10.9 -   RBC 3.80 - 5.20 M/uL 3.34(L) 3.36(L) 3.19(L) - - 3.29(L) -   Hemoglobin 11.5 - 16.0 g/dL 8. 2(L) 8. 3(L) 8.0(L) - 8. 3(L) 8.2(L) -   Hematocrit 35.0 - 47.0 % 30. 9(L) 31. 0(L) 29. 0(L) - 30. 0(L) 29. 9(L) -   MCV 80.0 - 99.0 FL 92.5 92.3 90.9 - - 90.9 -   Platelets 284 - 609 K/uL 313 259 215 - - 210 -   Lymphocytes 12 - 49 % - - - - - - -   Monocytes 5 - 13 % - - - - - - -   Eosinophils 0 - 7 % - - - - - - -   Basophils 0 - 1 % - - - - - - -   Albumin 3.5 - 5.0 g/dL 3.6 3.5 3. 4(L) 3.8 - 3.8 -   Calcium 8.5 - 10.1 MG/DL 10. 4(H) 10.0 9.6 10.0 - 9.7 -   Glucose 65 - 100 mg/dL 251(H) 185(H) 233(H) 262(H) - 311(H) -   BUN 6 - 20 MG/DL 80(H) 73(H) 78(H) 78(H) - 77(H) -   Creatinine 0.55 - 1.02 MG/DL 2.56(H) 2.36(H) 2.42(H) 2.62(H) - 2.44(H) -   Sodium 136 - 145 mmol/L 151(H) 150(H) 146(H) 146(H) - 143 -   Potassium 3.5 - 5.1 mmol/L 3.5 3. 4(L) 3.0(L) 3.8 - 4.0 3.7   TSH 0.36 - 3.74 uIU/mL - - - - - - -   LDH 81 - 246 U/L 388(H) 406(H) 385(H) - - 446(H) -   Some recent data might be hidden     Lab Results   Component Value Date/Time    TSH 3.08 01/11/2022 05:13 AM    TSH 1.85 12/15/2021 01:06 PM    TSH 1.01 11/05/2020 09:11 AM    TSH 2.740 04/30/2019 11:21 AM    TSH 0.519 02/21/2012 10:53 AM    TSH 8.29 (H) 01/20/2010 01:59 PM       ALLERGY:  Allergies   Allergen Reactions    Ciprofloxacin Anaphylaxis    Shellfish Derived Anaphylaxis    Sildenafil Other (comments)    Ace Inhibitors Unknown (comments)    Biaxin [Clarithromycin] Other (comments)     Metal taste    Candesartan Cough    Pcn [Penicillins] Hives        CURRENT MEDICATIONS:    Current Facility-Administered Medications:     epoetin isabel-epbx (RETACRIT) injection 10,000 Units, 10,000 Units, SubCUTAneous, Q MON, WED & FRI, Reina Rivas MD    sodium bicarbonate (8.4%) 25 mEq in dextrose 5% 1,000 mL infusion, , Other, TITRATE, Sid Chase MD    cefTRIAXone (ROCEPHIN) 2 g in 0.9% sodium chloride 20 mL IV syringe, 2 g, IntraVENous, Q24H, Ian James MD, 2 g at 01/31/22 0910    bumetanide (BUMEX) injection 1 mg, 1 mg, IntraVENous, BID, Amber Weaver NP    potassium bicarb-citric acid (EFFER-K) tablet 40 mEq, 40 mEq, Oral, DAILY, Christiano Deshpande MD, 40 mEq at 01/31/22 0910    insulin NPH (NOVOLIN N, HUMULIN N) injection 30 Units, 30 Units, SubCUTAneous, DAILY, Christiano Deshpande MD, 30 Units at 01/31/22 0907    allopurinoL (ZYLOPRIM) tablet 100 mg, 100 mg, Oral, DAILY, Fede Clinton MD, 100 mg at 01/31/22 0908    insulin NPH (NOVOLIN N, HUMULIN N) injection 30 Units, 30 Units, SubCUTAneous, QHS, Christiano Deshpande MD, 30 Units at 01/30/22 2131    pantoprazole (PROTONIX) injection 40 mg, 40 mg, IntraVENous, Q12H, 40 mg at 01/31/22 0911 **AND** sodium chloride (NS) flush 10 mL, 10 mL, IntraVENous, DAILY, Christiano Deshpande MD, 10 mL at 01/31/22 0911    sodium chloride (NS) flush 5-40 mL, 5-40 mL, IntraVENous, PRN, Mason Butcher MD    clopidogreL (PLAVIX) tablet 75 mg, 75 mg, Oral, DAILY, Mason Butcher MD, 75 mg at 01/31/22 0908    digoxin (LANOXIN) tablet 0.0625 mg, 0.0625 mg, Oral, EVERY OTHER DAY, Amber Weaver NP, 0.0625 mg at 01/31/22 0908    DOBUTamine (DOBUTREX) 500 mg/250 mL (2,000 mcg/mL) infusion, 0-10 mcg/kg/min, IntraVENous, TITRATE, Merry Albarado, NP, Held at 01/23/22 1200    albumin human 25% (BUMINATE) solution 12.5 g, 12.5 g, IntraVENous, DAILY, Rafael Albarado NP, Last Rate: 0 mL/hr at 01/26/22 1138, 12.5 g at 01/31/22 0906    milrinone (PRIMACOR) 20 MG/100 ML D5W infusion, 0.125 mcg/kg/min, IntraVENous, CONTINUOUS, Fede Clinton MD, Stopped at 01/22/22 2242    vasopressin (VASOSTRICT) 20 Units in 0.9% sodium chloride 100 mL infusion, 0-0.1 Units/min, IntraVENous, TITRATE, Jojo Albarado NP, Stopped at 01/23/22 1048    PHENYLephrine (NORMA-SYNEPHRINE) 30 mg in 0.9% sodium chloride 250 mL infusion,  mcg/min, IntraVENous, TITRATE, Daniel Rick MD, Held at 01/21/22 1200    niCARdipine (CARDENE) 25 mg in 0.9% sodium chloride 250 mL infusion, 0-15 mg/hr, IntraVENous, TITRATE, Wang Tracy MD, Stopped at 01/19/22 2109    [Held by provider] bivalirudin (ANGIOMAX) 250 mg in 0.9% sodium chloride (MBP/ADV) 50 mL MBP, 0.02-2.5 mg/kg/hr, IntraVENous, TITRATE, Jojo Albarado NP, Stopped at 01/28/22 1220    hydrALAZINE (APRESOLINE) 20 mg/mL injection 5 mg, 5 mg, IntraVENous, Q4H PRN, Jojo Albarado NP, 5 mg at 01/30/22 0351    bumetanide (BUMEX) injection 1 mg, 1 mg, IntraVENous, Q4H PRN, Jojo Albarado NP, 1 mg at 01/22/22 1721    heparin (porcine) in 0.9% NaCl 30,000 unit/1,000 mL perfusion irrigation 50-1,000 mL, 50-1,000 mL, Other, PRN, Sam Gomez PA    chlorhexidine (PERIDEX) 0.12 % mouthwash 15 mL, 15 mL, Oral, Q12H, Davian Vázquez DO, 15 mL at 01/31/22 0910    0.9% sodium chloride infusion, 9 mL/hr, IntraVENous, CONTINUOUS, Sam Gomez PA, Last Rate: 9 mL/hr at 01/31/22 0356, 9 mL/hr at 01/31/22 0356    naloxone (NARCAN) injection 0.4 mg, 0.4 mg, IntraVENous, PRN, Sam Gomez PA    ondansetron [facility]) injection 4 mg, 4 mg, IntraVENous, Q4H PRN, Sam Gomez PA    albuterol (PROVENTIL VENTOLIN) nebulizer solution 2.5 mg, 2.5 mg, Nebulization, Q4H PRN, Sam Gomez PA    aspirin chewable tablet 81 mg, 81 mg, Oral, DAILY, TYE Conte, 81 mg at 01/31/22 0908    magnesium oxide (MAG-OX) tablet 400 mg, 400 mg, Oral, BID, Vonda Gomez PA, 400 mg at 01/31/22 7960    calcium chloride 1 g in 0.9% sodium chloride 100 mL IVPB, 1 g, IntraVENous, PRN, Sam Gomez PA    bisacodyL (DULCOLAX) suppository 10 mg, 10 mg, Rectal, DAILY PRN, Vonda Gomez PA Hardin senna-docusate (PERICOLACE) 8.6-50 mg per tablet 1 Tablet, 1 Tablet, Oral, BID, Saul Gomez PA, 1 Tablet at 01/31/22 0908    ELECTROLYTE REPLACEMENT NOTE: Nurse to review Serum Potassium and Magnesuim levels and Initiate Electrolyte Replacement Protocol as needed, 1 Each, Other, PRN, Saul Gomez PA    magnesium sulfate 1 g/100 ml IVPB (premix or compounded), 1 g, IntraVENous, PRN, Sam Gomez PA    alteplase (CATHFLO) 1 mg in sterile water (preservative free) 1 mL injection, 1 mg, InterCATHeter, PRN, Sam Gomez PA    bacitracin 500 unit/gram packet 1 Packet, 1 Packet, Topical, PRN, Saul Gomez PA    dexmedeTOMidine in 0.9 % NaCl (PRECEDEX) 400 mcg/100 mL (4 mcg/mL) infusion soln, 0.1-1.5 mcg/kg/hr, IntraVENous, TITRATE, Davian Vázquez DO, Last Rate: 36.9 mL/hr at 01/31/22 0923, 1.3 mcg/kg/hr at 01/31/22 0923    insulin lispro (HUMALOG) injection, , SubCUTAneous, Q6H, Davian Vázquez DO, 4 Units at 01/31/22 0534    triamcinolone acetonide (KENALOG) 0.1 % cream, , Topical, BID, Saul Gomez PA, Given at 01/31/22 0911    polyethylene glycol (MIRALAX) packet 17 g, 17 g, Oral, DAILY, Saul Gomez PA, 17 g at 01/31/22 0910    montelukast (SINGULAIR) tablet 10 mg, 10 mg, Oral, DAILY, Sam Gomez PA, 10 mg at 01/31/22 9483    levothyroxine (SYNTHROID) tablet 100 mcg, 100 mcg, Oral, Once per day on Mon Tue Wed Thu Fri Sat, Sam Gomez PA, 100 mcg at 01/31/22 0533    hydroxypropyl methylcellulose (ISOPTO TEARS) 0.5 % ophthalmic solution 1 Drop, 1 Drop, Both Eyes, PRN, Sam Gomez PA, 1 Drop at 01/06/22 1727    venlafaxine-SR (EFFEXOR-XR) capsule 75 mg, 75 mg, Oral, DAILY WITH BREAKFAST, Saul Gomez PA, 75 mg at 01/17/22 1025    gabapentin (NEURONTIN) capsule 100 mg, 100 mg, Oral, QHS, Saul Gomez PA, 100 mg at 01/30/22 2131    arformoteroL (BROVANA) neb solution 15 mcg, 15 mcg, Nebulization, BID RT, 15 mcg at 01/30/22 1937 **AND** budesonide (PULMICORT) 500 mcg/2 ml nebulizer suspension, 500 mcg, Nebulization, BID RT, Vonda Gomez PA, 500 mcg at 01/30/22 1937    sodium chloride (NS) flush 5-40 mL, 5-40 mL, IntraVENous, Q8H, Sam Gomez PA, 10 mL at 01/31/22 0533    acetaminophen (TYLENOL) tablet 650 mg, 650 mg, Oral, Q6H PRN, 650 mg at 01/30/22 1014 **OR** acetaminophen (TYLENOL) suppository 650 mg, 650 mg, Rectal, Q6H PRN, Sma Gomez PA    glucose chewable tablet 16 g, 4 Tablet, Oral, PRN, Vonda Gomez PA    dextrose (D50W) injection syrg 12.5-25 g, 25-50 mL, IntraVENous, PRN, Vonda Gomez PA    glucagon (GLUCAGEN) injection 1 mg, 1 mg, IntraMUSCular, PRN, Vonda Gomez PA    PATIENT CARE TEAM:  Patient Care Team:  Janette Cornejo MD as PCP - General (Internal Medicine)  Janette Cornejo MD as PCP - REHABILITATION [facility]  Ayaan Romero MD as Consulting Provider (Internal Medicine)  John Oakes MD (Dermatology)  Chava Smallwood MD (Nephrology)  Dank Monzon MD as Consulting Provider (Cardiology)  Levon Hoyt MD (Cardiology)  Yves Maynard MD as Consulting Provider (Pulmonary Disease)     Thank you for allowing me to participate in this patient's care.     Kia Rivas NP  [address] [place] [address], [address]  Phone: [phone]

## 2022-02-01 NOTE — PROGRESS NOTES
ADVANCED HEART FAILURE NOTE    · From cardiac and renal standpoint all efforts have been done to achieve euvolemic environment and allow heart to recover with revascularization, as per patient request, with improvement of hemodynamics and improved LVEF to 30%. · Patient is now too well for heart/kidney transplant or LVAD from cardiac standpoint due to LVEF > 25%% and with potential for further improvement of LVEF within 3 months after revascularization  · Renal function remains severely impaired but too well for dialysis or transplant   · Neither heart or kidney function is now responsible for symptoms of dyspnea and RA hypoxia, given normal hemodynamics    · However, in the process of cardiac/renal optimization we have discovered that patient had severe underlying interstitial lung disease c/b severe pulmonary hypertension and that her acute exacerbation was likely triggered by superimposed acute infectious lung disease, which is now driving her ongoing respiratory failure and affecting immediate prognosis. · Over last days patient experienced worsening hypoxia requiring BIPAP  · Diffuse infectious process that was identified by chest CT and CXR infiltrates showed no radiographic improvement (those are labeled as \"pulmonary edema\", however, not of cardiac origin given very low wedge)  · Ongoing low-grade fevers, sputum positive for E. Coli and worsening leukocytosis despite broadening IV antibiotics  · Severe pulmonary hypertension with PVR 3.98 and TPG 27 suggested long-standing primary pulmonary origin of pulmonary hypertension; severely elevated DPA 26mmHg suggests that [place] [address] is unlikely reversible  · The mortality rate with this degree of PAH with severely elevated PADP-PAWP > 6 (now 15) approaches 60% in critically ill in published studies.     · Due to critical and worsening lung disease c/b severe PAH despite optimized cardiac status and euvolemia, would recommend palliative care consultation to discuss limits of care with recommendations to transition to comfort care/hospice.     D/w Dr. Modesta Peterson and bedside staff    Souleymane Peralta MD  F Cardiology

## 2022-02-01 NOTE — PROGRESS NOTES
0730: Bedside shift change report given to Sharon Das RN (oncoming nurse) by Annalee Raya RN (offgoing nurse). Report included the following information SBAR, Kardex, ED Summary, OR Summary, Procedure Summary, Intake/Output, MAR, Recent Results, Cardiac Rhythm Biventricular Pacer, Alarm Parameters  and Dual Neuro Assessment. 0800: Pt c/o 4/10 headache. PRN Tylenol administered. 0805: Dr Keira Puentes, CTS, assessing pt @ the bedside. No new orders received. 0930: Dr Patricio Lopez and Steven Oscar NP, Heart Failure, assessing pt @ the bedside. See MD note regarding pt prognosis of plan of care. 1215: Pt asymptomatic VT sustaining in 130s. EKG obtained, Dr Franklin Polo and Dr Tish Tejada notified; orders placed for 300mg Amio bolus and Amio gtt @ 1g per MD Tish Tejada.      5640: Amio bolus delayed; RN was not notified that medications had arrived via tube system on other unit. 1320: Amio gtt delayed; medication bag began leaking slowly, RN requested new bag from pharmacy. Defib pads placed on pt.     1213: Pt's  @ the bedside. Dr Franklin Polo providing pt's  with updated prognosis. CODE STATUS DNR/DNI.     1800: Pt VT on the monitor, sustaining 130s; pt A&O x 2, but increased WOB on BIPAP; Dr Franklin Polo made aware. Orders placed for 2mg Morphine IVP Q3H per MD.  BIPAP FiO2 increased. 1930: Bedside shift change report given to Debby Connor RN (oncoming nurse) by Sharon Das RN (offgoing nurse). Report included the following information SBAR, Kardex, ED Summary, OR Summary, Procedure Summary, Intake/Output, MAR, Recent Results, Cardiac Rhythm Biventricular Paced/VT and Dual Neuro Assessment.

## 2022-02-01 NOTE — PROGRESS NOTES
[address]  Inpatient Progress Note      Patient name: Marcy Hayden  Patient : 1955  Patient MRN: [id number]  Consulting MD: Effie Sandhoff, MD  Date of service: 22    REASON FOR REFERRAL:  Management of cardiogenic shock     PLAN OF CARE:   Please see note from Dr. Aroldo Avitia:  POD # 14 Impella 5.5 implant   Cont Impella P6   Repeat TTE - EF remains 30%   Pulmonary hygiene, NC, Bipap    Maintain PAC for hemodynamic optimization; goal CI > 2.3, CVP 8-10 mmHg SVR 1000, SBP < 110 mmHg (via radial arterial line)   Intolerant to sildenafil due to marked hypotension   Hold diuretics for now  Albumin today  Increase free water flushes   Keep K> 4 and Mg >2  Hydralazine 5 mg IV q4h PRN SBP > 110 mmHg   Hydralazine on hold per Dr. Elliott Mendoza remotely   Intolerant of GDMT due to hemodynamic in stability   BiV-ICD programmed at 80 bpm; device is at EOL, recommend to delay ICD generator change until final plans for transplant are established - Per Dr. Shandra Riggs patient is not pacer dependent  Place defib patches on patient for ppx  Cont digoxin 0.0625 mg to every other day; goal 0.7-1.2   Allopurinol 50mg daily per nephrology  S/p Venofer 200 mg IV x 2   CT head no acute abnormalitiy  CTchest/abd/pelvis w/o contrast for txp eval- no acute abnormalities, no masses  GI scopes complete, + tubular adenomas,  no H. Pylori,   Continue ASA/Plavix for LAD stent  Epo level elevated- hold epogen and follow levels  Blood cultures NGTD  Sputum with E Coli  On ceftriaxone  pTT goal  40-60 due to persistent anemia on bivalirudin- on hold for GI bleeding.    Will need OP PSG  Will offer family testing for VUS x 2 as OP  Palliative consult appreciated   Nutritionist consult  Heart failure education   Advanced care plan present on file     All other care per primary team     IMPRESSION:  Volume overload  Acute on Chronic systolic heart failure, requiring Impella 5.5 implant 1/18/22   Stage D, NYHA class IV symptoms  Non-ischemic cardiomyopathy, LVEF 15%  PYP equivocal for amyloid   CAD with 80% LAD lesion s/p PCI 1/27/22  Pulmonary hypertension, severe  RV failure  S/p BiV-ICD  Cardiac risk factors:  HL  DM2  Morbid obesity, BMI 40  [employer] genetic testing VUS x 2 ( KCNH2, MYH6)  Acute on chronic renal failure, stage IV  GERD  Anemia of chronic disease  Gout  VF s/p ICD shock x 2      Interval Events:  POD #14 Impella placement   Impella  p6  Bival on hold   Creatinine  Up to 2.84  proBNP up to [address]  Afebrile overnight   K 3.4  PCT trending down to 0.51     LIFE GOALS:  Patient's personal goals include: being home with family, still ambulating around without getting too tired. Important upcoming milestones or family events: none  The patient identifies the following as important for living well: remaining idependent and mobile; being able to get out of the house with . Patient verbalized willingness to be on home milrinone and evaluation for both heart and kidney transplants. Verbalizes she would have family supporting her decision on this. HPI  Viola Demarco is a 78 y.o.  female with a history of NICM, chronic hypoxic respiratory failure secondary to pulmonary HTN, hypothyroidism, CKD, GERD, and DM II who presented to [facility] as a transfer from another facility for acute on chronic hypoxic respiratory failure. Reports running out of O2 at home resulting in SOB and dizziness. Upon arrival to the ED she was found to have O2 sats in the 70s, requiring 4L NC O2. Rapid covid test was negative. ProNT-BNP was 37445, K+5.2, BUN/CR x/2.54 and elevated d-dimer. Chest xray showing pulmonary edema vs. Atypical pneumonia. VQ scan showed low probability for PE. Per Dr. Shalini Huerta, [facility], LVEF 15-20% during recent admission. LVEF previously normalized with CRT. NYHA III-IV. No ACEi/ARB due to renal dysfunction.   GDMT dosing limited by low BP. Patient's PCP is Dr. Galen Blair, and she sees Dr. Jonathan Santillan primarily for cardiac care due to Dr. Javier Mendoza transfering practice. Patient historically seen by nephrology but has not had follow up care in the past three years. CARDIAC IMAGING:  Echo 1/20/22 - LVEF 20-25%, RV moderately dilated, Impella catheter 5.9 cm from AV   Echo 12/8/21- LVEF 15-20%, mild to Mod MR, LVIDd 5.09cm, TAPSE 1.94cm  Echo 4/22/19- LVEF 60%, trace MR,  LVIDd 4.24cm, TAPSE 1.65cm   Echo 4/24/18- LVEF 60%, trivial MR, LVIDd 4.79cm, TAPSE 2.25cm      EKG- 1/4/22 ST with A sense and V paced rhythm     [facility] 2015- No significant CAD  NST 2014- reversible LAD involvement      ICD interrogation     HEMODYNAMICS:  [facility] 1/17/21: PAP 83/52 mmHg, RAP 27 mmHg, PCWP 45 mmHg, CI 1.6  [facility] 12/10/21: PAP 76/48/57, RAP 20, PCWP 35, CI 2.26     CPEST not done  6MW not done     OTHER IMAGING:  CXR Results  (Last 48 hours)                             01/13/22 1035   XR CHEST PORT Final result      Impression:   No significant change in congestion and interstitial and alveolar   opacities which may reflect edema or infectious infiltrate. Narrative:   EXAM: XR CHEST PORT       INDICATION: pul edema       COMPARISON: Chest x-ray 1/10/2022. FINDINGS: A portable AP radiograph of the chest was obtained at 10:19 hours. The   patient is on a cardiac monitor. The lungs appear grossly stable with congestion   and interstitial/airspace opacities with no pneumothorax or pleural effusion. Right PICC line traverses expected course of tip in the region of the atriocaval   junction. Pacemaker-ICD generator body projects over the left chest wall with   intact appearing leads traversing in expected course. . The cardiac and   mediastinal contours and pulmonary vascularity are normal.  Atherosclerotic   calcifications affect the aortic arch.  The chest wall structures and visualized   upper abdomen show no acute findings with incidental note of degenerative spine   and shoulder changes. CT Results (most recent):  Results from Hospital Encounter encounter on 01/04/22    CT ABD PELV WO CONT    Narrative  EXAM: CT ABD PELV WO CONT, CT CHEST WO CONT    INDICATION: LVAD/TRANSPLANT WORK UP    COMPARISON: Chest x-ray of earlier today, abdominal ultrasound 1/22/2022, CT  abdomen pelvis 1/16/2018. TECHNIQUE:  5 mm axial images were obtained through the chest, abdomen, and  pelvis. Oral and IV contrast were not administered. Coronal and sagittal  reconstructions were generated. CT dose reduction was achieved through use of a  standardized protocol tailored for this examination and automatic exposure  control for dose modulation. FINDINGS:    THYROID: No nodule. MEDIASTINUM: No mass or lymphadenopathy. ZEENAT: No mass or lymphadenopathy. THORACIC AORTA: Atherosclerotic calcination without aneurysm. MAIN PULMONARY ARTERY: Normal in caliber. TRACHEA/BRONCHI: Endotracheal tube in expected position. Patent. ESOPHAGUS: No dilation or wall thickening. Indwelling enteric tube traverses  length of esophagus to extend into the stomach. HEART: Impella device, pacemaker leads, and coronary artery calcifications noted  without cardiac enlargement or pericardial effusion. PLEURA: No effusion or pneumothorax. LUNGS: Patchy areas of focal airspace opacity and ground glass opacity with  linear posterior and bibasilar atelectasis. No nodule or mass. LIVER: No mass or biliary dilation. GALLBLADDER: Unremarkable. SPLEEN: Borderline enlarged with no focal lesion. PANCREAS: No mass or ductal dilation. ADRENALS: Unremarkable. KIDNEYS: No significant change in 10 mm left renal pelvis calculus without  hydronephrosis. Exophytic lateral interpolar right renal cyst and anterior  interpolar left renal cyst redemonstrated. No other calculi or solid renal mass  evident. STOMACH: Indwelling enteric tube.  Otherwise unremarkable  SMALL BOWEL: No dilatation or wall thickening. COLON: No dilatation or wall thickening. APPENDIX: Unremarkable. PERITONEUM: No ascites or pneumoperitoneum. RETROPERITONEUM: Atherosclerotic calcification without aneurysm. No enlarged  lymphadenopathy. REPRODUCTIVE ORGANS: Coarse calcifications compatible with uterine leiomyoma  with otherwise unremarkable appearance to uterus and ovaries. URINARY BLADDER: Collapsed around Henley catheter and balloon. BONES: Degenerative spine change. No acute fracture or aggressive lesion. ADDITIONAL COMMENTS: N/A    Impression  1. Groundglass and scattered focal airspace opacities within the lungs  concerning for possible pneumonic infiltrates. 2. Support lines as above. 3. No acute intra-abdominal process with redemonstration of nonobstructing left  renal pelvis 10 mm stone and additional incidentals as above. PHYSICAL EXAM:  Visit Vitals  Visit Vitals  /89   Pulse (!) 106   Temp 98.9 °F (37.2 °C)   Resp 24   Ht 5' 7\" (1.702 m)   Wt 231 lb 11.3 oz (105.1 kg)   SpO2 99%   BMI 36.29 kg/m²          Hemodynamics:   CO: CO (l/min): 6.3 l/min   CI: CI (l/min/m2): 2.9 l/min/m2   CVP: CVP (mmHg): 12 mmHg (02/01/22 0900)   SVR: SVR (dyne*sec)/cm5: 1213 (dyne*sec)/cm5 (02/01/22 7224)   PAP Systolic: PAP Systolic: 68 (84/68/88 9385)   PAP Diastolic: PAP Diastolic: 31 (40/35/00 2031)   PVR:     SV02: SVO2 (%): 68 % (02/01/22 0800)   SCV02:      Impella 5.5  P-6  Flow: 3.5lpm   Purge flow 7.9    Physical Exam  Physical Exam  Vitals and nursing note reviewed. Cardiovascular:      Rate and Rhythm: Normal rate and regular rhythm. Pulses: Normal pulses. Heart sounds: Normal heart sounds. Pulmonary:      Effort: Pulmonary effort is normal. No respiratory distress. Breath sounds: Decreased air movement present. Abdominal:      General: There is no distension. Palpations: Abdomen is soft. Musculoskeletal:         General: No swelling.    Skin:     General: Skin is warm and dry. Neurological:      Mental Status: She is alert. Review of Systems   Constitutional: Positive for malaise/fatigue. Negative for chills and fever. Respiratory: Negative for shortness of breath. Cardiovascular: Negative for chest pain and leg swelling. Neurological: Negative for dizziness and headaches. Psychiatric/Behavioral: Negative for depression.           PAST MEDICAL HISTORY:  Past Medical History:   Diagnosis Date    Acquired hypothyroidism 8/15/2016    Anemia     RED-HF study    Asthma     Cardiomyopathy, nonischemic ([facility]     initial dx 2001, bivHF 2008 with EF 15%, s/p biV-ICD 9/08, significant improvment in EF to 45-50%    CKD (chronic kidney disease)     Dr Franca Solis    CKD (chronic kidney disease) 8/15/2012    Depression     Diabetes ([facility]     Diabetic neuropathy (HCC)     DM (diabetes mellitus) ([facility] [address] [facility] 8/15/2012    GERD (gastroesophageal reflux disease)     Gout     Hypothyroidism     ICD (implantable cardioverter-defibrillator), biventricular, in situ 6/5/2014    Psoriasis        PAST SURGICAL HISTORY:  Past Surgical History:   Procedure Laterality Date    CARDIAC CATHETERIZATION  2007; 01/06/15    normal cors    COLONOSCOPY N/A 1/26/2022    COLONOSCOPY performed by Wilfredo Bello [facility] at [facility]    ECHO 2D ADULT  4/2010    EF 45%, improved from 1/09 (25%)    ECHO 2D ADULT  11/2011    LVH, EF 55-60%    HX ORTHOPAEDIC      knee    HX PACEMAKER PLACEMENT      AICD    STRESS TEST LEXISCAN/CARDIOLITE  3/21/12    normal perfusion, global HK 40%       FAMILY HISTORY:  Family History   Problem Relation Age of Onset    Heart Disease Mother     Hypertension Mother     Lupus Sister     Diabetes Brother        SOCIAL HISTORY:  Social History     Socioeconomic History    Marital status:    Tobacco Use    Smoking status: Never Smoker    Smokeless tobacco: Never Used   Substance and Sexual Activity    Alcohol use: No    Drug use: No    Sexual activity: Never   Social History Narrative    . Nonsmoker. Disability       LABORATORY RESULTS:     Labs Latest Ref Rng & Units 2/1/2022 2/1/2022 1/31/2022 1/30/2022 1/29/2022 1/28/2022 1/28/2022   WBC 3.6 - 11.0 K/uL - 16. 5(H) 11. 3(H) 11. 4(H) 10.0 - -   RBC 3.80 - 5.20 M/uL - 3.52(L) 3.34(L) 3.36(L) 3.19(L) - -   Hemoglobin 11.5 - 16.0 g/dL - 8. 7(L) 8.2(L) 8. 3(L) 8.0(L) - 8. 3(L)   Hematocrit 35.0 - 47.0 % - 33. 0(L) 30. 9(L) 31. 0(L) 29. 0(L) - 30. 0(L)   MCV 80.0 - 99.0 FL - 93.8 92.5 92.3 90.9 - -   Platelets 118 - 138 K/uL - 400 313 259 215 - -   Lymphocytes 12 - 49 % - - - - - - -   Monocytes 5 - 13 % - - - - - - -   Eosinophils 0 - 7 % - - - - - - -   Basophils 0 - 1 % - - - - - - -   Albumin 3.5 - 5.0 g/dL - 3.7 3.6 3.5 3. 4(L) 3.8 -   Calcium 8.5 - 10.1 MG/DL 10. 2(H) 10. 6(H) 10. 4(H) 10.0 9.6 10.0 -   Glucose 65 - 100 mg/dL - 270(H) 251(H) 185(H) 233(H) 262(H) -   BUN 6 - 20 MG/DL - 86(H) 80(H) 73(H) 78(H) 78(H) -   Creatinine 0.55 - 1.02 MG/DL - 2.84(H) 2.56(H) 2.36(H) 2.42(H) 2.62(H) -   Sodium 136 - 145 mmol/L - 154(H) 151(H) 150(H) 146(H) 146(H) -   Potassium 3.5 - 5.1 mmol/L - 3.4(L) 3.5 3. 4(L) 3.0(L) 3.8 -   TSH 0.36 - 3.74 uIU/mL - - - - - - -   LDH 81 - 246 U/L - 425(H) 388(H) 406(H) 385(H) - -   Some recent data might be hidden     Lab Results   Component Value Date/Time    TSH 3.08 01/11/2022 05:13 AM    TSH 1.85 12/15/2021 01:06 PM    TSH 1.01 11/05/2020 09:11 AM    TSH 2.740 04/30/2019 11:21 AM    TSH 0.519 02/21/2012 10:53 AM    TSH 8.29 (H) 01/20/2010 01:59 PM       ALLERGY:  Allergies   Allergen Reactions    Ciprofloxacin Anaphylaxis    Shellfish Derived Anaphylaxis    Sildenafil Other (comments)    Ace Inhibitors Unknown (comments)    Biaxin [Clarithromycin] Other (comments)     Metal taste    Candesartan Cough    Pcn [Penicillins] Hives        CURRENT MEDICATIONS:    Current Facility-Administered Medications:     potassium phosphate 30 mmol in dextrose 5% 250 mL infusion, , IntraVENous, ONCE, Isidro Wen MD, Last Rate: 65 mL/hr at 02/01/22 1002, New Bag at 02/01/22 1002    insulin NPH (NOVOLIN N, HUMULIN N) injection 40 Units, 40 Units, SubCUTAneous, QHS, Ian James MD    [START ON 2/2/2022] insulin NPH (NOVOLIN N, HUMULIN N) injection 40 Units, 40 Units, SubCUTAneous, DAILY, Ian James MD    epoetin isabel-epbx (RETACRIT) injection 10,000 Units, 10,000 Units, SubCUTAneous, Q MON, WED & FRI, Sid Chase MD, 10,000 Units at 01/31/22 2119    sodium bicarbonate (8.4%) 25 mEq in dextrose 5% 1,000 mL infusion, , Other, TITRATE, Sid Chase MD, Last Rate: 8.1 mL/hr at 01/31/22 0800, Rate Verify at 01/31/22 0800    cefTRIAXone (ROCEPHIN) 2 g in 0.9% sodium chloride 20 mL IV syringe, 2 g, IntraVENous, Q24H, Ian James MD, 2 g at 02/01/22 0819    [Held by provider] bumetanide (BUMEX) injection 1 mg, 1 mg, IntraVENous, BID, Amber Weaver NP, 1 mg at 02/01/22 0820    potassium bicarb-citric acid (EFFER-K) tablet 40 mEq, 40 mEq, Oral, DAILY, Christiano Deshpande MD, 40 mEq at 02/01/22 0820    allopurinoL (ZYLOPRIM) tablet 100 mg, 100 mg, Oral, DAILY, Magnolia Madden MD, 100 mg at 02/01/22 8624    pantoprazole (PROTONIX) injection 40 mg, 40 mg, IntraVENous, Q12H, 40 mg at 02/01/22 0820 **AND** sodium chloride (NS) flush 10 mL, 10 mL, IntraVENous, DAILY, Christiano Deshpande MD, 10 mL at 02/01/22 8309    sodium chloride (NS) flush 5-40 mL, 5-40 mL, IntraVENous, PRN, Ledy Ramos MD    clopidogreL (PLAVIX) tablet 75 mg, 75 mg, Oral, DAILY, Ledy Ramos MD, 75 mg at 02/01/22 1336    digoxin (LANOXIN) tablet 0.0625 mg, 0.0625 mg, Oral, EVERY OTHER DAY, Amber Weaver NP, 0.0625 mg at 01/31/22 0908    DOBUTamine (DOBUTREX) 500 mg/250 mL (2,000 mcg/mL) infusion, 0-10 mcg/kg/min, IntraVENous, TITRATE, Zeinab Albarado, NP, Held at 01/23/22 1200    albumin human 25% (BUMINATE) solution 12.5 g, 12.5 g, IntraVENous, DAILY, Michelle Albarado NP, Last Rate: 0 mL/hr at 01/26/22 1138, 12.5 g at 02/01/22 0819    milrinone (PRIMACOR) 20 MG/100 ML D5W infusion, 0.125 mcg/kg/min, IntraVENous, CONTINUOUS, Collin Jefferson MD, Stopped at 01/22/22 2242    vasopressin (VASOSTRICT) 20 Units in 0.9% sodium chloride 100 mL infusion, 0-0.1 Units/min, IntraVENous, TITRATE, Ama Albarado NP, Stopped at 01/23/22 1048    PHENYLephrine (NORMA-SYNEPHRINE) 30 mg in 0.9% sodium chloride 250 mL infusion,  mcg/min, IntraVENous, TITRATE, Mik Fuchs MD, Held at 01/21/22 1200    niCARdipine (CARDENE) 25 mg in 0.9% sodium chloride 250 mL infusion, 0-15 mg/hr, IntraVENous, TITRATE, Leatha ODELL MD, Stopped at 01/19/22 2109    [Held by provider] bivalirudin (ANGIOMAX) 250 mg in 0.9% sodium chloride (MBP/ADV) 50 mL MBP, 0.02-2.5 mg/kg/hr, IntraVENous, TITRATE, Ama Albarado NP, Stopped at 01/28/22 1220    hydrALAZINE (APRESOLINE) 20 mg/mL injection 5 mg, 5 mg, IntraVENous, Q4H PRN, Ama Albarado NP, 5 mg at 02/01/22 0119    bumetanide (BUMEX) injection 1 mg, 1 mg, IntraVENous, Q4H PRN, Ama Albarado NP, 1 mg at 01/31/22 2324    heparin (porcine) in 0.9% NaCl 30,000 unit/1,000 mL perfusion irrigation 50-1,000 mL, 50-1,000 mL, Other, PRN, Sam Gomez PA    chlorhexidine (PERIDEX) 0.12 % mouthwash 15 mL, 15 mL, Oral, Q12H, Davian Vázquez DO, 15 mL at 02/01/22 0822    0.9% sodium chloride infusion, 9 mL/hr, IntraVENous, CONTINUOUS, Sam Gomez PA, Last Rate: 9 mL/hr at 02/01/22 0547, 9 mL/hr at 02/01/22 0547    naloxone (NARCAN) injection 0.4 mg, 0.4 mg, IntraVENous, PRN, Sam Gomez PA    ondansetron (ZOFRAN) injection 4 mg, 4 mg, IntraVENous, Q4H PRN, Sam Gomez PA    albuterol (PROVENTIL VENTOLIN) nebulizer solution 2.5 mg, 2.5 mg, Nebulization, Q4H PRN, Sam Gomez PA    aspirin chewable tablet 81 mg, 81 mg, Oral, DAILY, Daquan Gomez PA, 81 mg at 02/01/22 0820    magnesium oxide (MAG-OX) tablet 400 mg, 400 mg, Oral, BID, Long, TYE Walker, 400 mg at 02/01/22 0820    calcium chloride 1 g in 0.9% sodium chloride 100 mL IVPB, 1 g, IntraVENous, PRN, Sam Gomez PA    bisacodyL (DULCOLAX) suppository 10 mg, 10 mg, Rectal, DAILY PRN, Sam Gomez PA    senna-docusate (PERICOLACE) 8.6-50 mg per tablet 1 Tablet, 1 Tablet, Oral, BID, Daquan Gomez PA, 1 Tablet at 02/01/22 0820    ELECTROLYTE REPLACEMENT NOTE: Nurse to review Serum Potassium and Magnesuim levels and Initiate Electrolyte Replacement Protocol as needed, 1 Each, Other, PRN, Sam Gomez PA    magnesium sulfate 1 g/100 ml IVPB (premix or compounded), 1 g, IntraVENous, PRN, Sam Gomez PA    alteplase (CATHFLO) 1 mg in sterile water (preservative free) 1 mL injection, 1 mg, InterCATHeter, PRN, Sam Gomez PA    bacitracin 500 unit/gram packet 1 Packet, 1 Packet, Topical, PRN, Sam Gomez PA    dexmedeTOMidine in 0.9 % NaCl (PRECEDEX) 400 mcg/100 mL (4 mcg/mL) infusion soln, 0.1-1.5 mcg/kg/hr, IntraVENous, TITRATE, Davian Vázquez DO, Stopped at 01/31/22 1405    insulin lispro (HUMALOG) injection, , SubCUTAneous, Q6H, Davian Vázquez DO, 6 Units at 02/01/22 0554    triamcinolone acetonide (KENALOG) 0.1 % cream, , Topical, BID, Daquan Gomez PA, Given at 02/01/22 4225    polyethylene glycol (MIRALAX) packet 17 g, 17 g, Oral, DAILY, Sam Gomez PA, 17 g at 02/01/22 0820    montelukast (SINGULAIR) tablet 10 mg, 10 mg, Oral, DAILY, Daquan Gomez PA, 10 mg at 02/01/22 1817    levothyroxine (SYNTHROID) tablet 100 mcg, 100 mcg, Oral, Once per day on Mon Tue Wed Thu Fri Sat, Sam Gomez PA, 100 mcg at 02/01/22 0544    hydroxypropyl methylcellulose (ISOPTO TEARS) 0.5 % ophthalmic solution 1 Drop, 1 Drop, Both Eyes, PRN, Daquan Gomez PA, 1 Drop at 01/06/22 1727    venlafaxine-SR (EFFEXOR-XR) capsule 75 mg, 75 mg, Oral, DAILY WITH BREAKFAST, Daquan Gomez PA, 75 mg at 02/01/22 0820    gabapentin (NEURONTIN) capsule 100 mg, 100 mg, Oral, QHS, Jessica Gomez PA, 100 mg at 01/31/22 2117    arformoteroL (BROVANA) neb solution 15 mcg, 15 mcg, Nebulization, BID RT, 15 mcg at 01/31/22 1943 **AND** budesonide (PULMICORT) 500 mcg/2 ml nebulizer suspension, 500 mcg, Nebulization, BID RT, Sam Gomez PA, 500 mcg at 01/31/22 1944    sodium chloride (NS) flush 5-40 mL, 5-40 mL, IntraVENous, Q8H, Sam Gomez PA, 10 mL at 02/01/22 0544    acetaminophen (TYLENOL) tablet 650 mg, 650 mg, Oral, Q6H PRN, 650 mg at 02/01/22 5233 **OR** acetaminophen (TYLENOL) suppository 650 mg, 650 mg, Rectal, Q6H PRN, Sam Gomez PA    glucose chewable tablet 16 g, 4 Tablet, Oral, PRN, Sam Gomez PA    dextrose (D50W) injection syrg 12.5-25 g, 25-50 mL, IntraVENous, PRN, Sam Gomez PA    glucagon (GLUCAGEN) injection 1 mg, 1 mg, IntraMUSCular, PRN, Jessica Gomez PA    PATIENT CARE TEAM:  Patient Care Team:  German Antoine MD as PCP - General (Internal Medicine)  German Antoine MD as PCP - [facility]  Wing Lundy MD as Consulting Provider (Internal Medicine)  Darryle Kaufmann, MD (Dermatology)  Shana Beltran MD (Nephrology)  Venkatesh Borja MD as Consulting Provider (Cardiology)  Yvrose Soni MD (Cardiology)  Sylvie Rm MD as Consulting Provider (Pulmonary Disease)     Thank you for allowing me to participate in this patient's care. Wisam Herrera NP  [facility] [address] [facility] [address]  [address], [address]  Phone: [phone]      [facility] ATTENDING ADDENDUM    Patient was seen and examined in person. Data and notes were reviewed. I have discussed and agree with the plan as noted in the NP note above without further additions.     Андрей Christie MD PhD  [id number]

## 2022-02-01 NOTE — PROGRESS NOTES
[facility]   [address] [facility], [address] [facility]  Phone: [phone]   [phone]    [email or web address]     Nephrology Progress Note    Patient Name : Meera Thayer      : 1955     MRN : [id number]  Date of Admission : 2022  Date of Servive : 22    CC:  Follow up for ARF       Assessment and Plan   RODNEY on CKD :  - CRS. - Cr now rising from over diuresis and weaning cardiac support   - Not suspecting contrast nephropathy from 20 cc IV contrast on    - Keep I >>O  - ok to continue Bumex 1 mg BID but hindering correction of hypernatremia and hypercalcemia   - labs daily  - Keep valdez and strict I.O     Hypernatremia   - 2/2 over diuresis   - avoid diuril / metolazone   - increased FW flushes but unlikely to correct until diuretics held     Hypercalcemia   - 2/2 IVVD from diuretics, worsened by thiazide use   - check PTH, Vitamin D     Hypokalemia   Hypo Phos   - replacement ordered     CKD stage IV:  - Etiology: DM, HTN, CRS  - Renal US: suggestive of CKD, B/L benign renal cysts   - baseline Cr 2.4-2.6 mg/dl      Acute on chronic HFrEF  NI CMP, LVEF 15 to 20%  NYHA class III-IV  S/p BiV pacer/ICD-s/p CRT  R axillary Impella implanted 22  LAD PCI on 22  Transplant w/u underway - EGD and colon , pan CT w/o contrast  neg for acute issues    Morbid obesity  Type II DM  HTN  Hypothyroidism  Pulmonary hypertension  Gout  Psoriasis       Interval History:  Seen and examined. She did not tolerate Impella weaning to P-4, increased PA pressures, needed BIPAP, received additional dose of Bumex. UOP 1.8L. Cr worse. She had unpaced rhythm for short period   K and Phos low this am   She denies any N/V/abdo pain. SOB about the same     Review of Systems: Review of systems not obtained due to patient factors.     Current Medications:   Current Facility-Administered Medications   Medication Dose Route Frequency    potassium phosphate 30 mmol in dextrose 5% 250 mL infusion   IntraVENous ONCE    epoetin isabel-epbx (RETACRIT) injection 10,000 Units  10,000 Units SubCUTAneous Q MON, WED & FRI    sodium bicarbonate (8.4%) 25 mEq in dextrose 5% 1,000 mL infusion   Other TITRATE    cefTRIAXone (ROCEPHIN) 2 g in 0.9% sodium chloride 20 mL IV syringe  2 g IntraVENous Q24H    bumetanide (BUMEX) injection 1 mg  1 mg IntraVENous BID    potassium bicarb-citric acid (EFFER-K) tablet 40 mEq  40 mEq Oral DAILY    insulin NPH (NOVOLIN N, HUMULIN N) injection 30 Units  30 Units SubCUTAneous DAILY    allopurinoL (ZYLOPRIM) tablet 100 mg  100 mg Oral DAILY    insulin NPH (NOVOLIN N, HUMULIN N) injection 30 Units  30 Units SubCUTAneous QHS    pantoprazole (PROTONIX) injection 40 mg  40 mg IntraVENous Q12H    And    sodium chloride (NS) flush 10 mL  10 mL IntraVENous DAILY    sodium chloride (NS) flush 5-40 mL  5-40 mL IntraVENous PRN    clopidogreL (PLAVIX) tablet 75 mg  75 mg Oral DAILY    digoxin (LANOXIN) tablet 0.0625 mg  0.0625 mg Oral EVERY OTHER DAY    DOBUTamine (DOBUTREX) 500 mg/250 mL (2,000 mcg/mL) infusion  0-10 mcg/kg/min IntraVENous TITRATE    albumin human 25% (BUMINATE) solution 12.5 g  12.5 g IntraVENous DAILY    milrinone (PRIMACOR) 20 MG/100 ML D5W infusion  0.125 mcg/kg/min IntraVENous CONTINUOUS    vasopressin (VASOSTRICT) 20 Units in 0.9% sodium chloride 100 mL infusion  0-0.1 Units/min IntraVENous TITRATE    PHENYLephrine (NORMA-SYNEPHRINE) 30 mg in 0.9% sodium chloride 250 mL infusion   mcg/min IntraVENous TITRATE    niCARdipine (CARDENE) 25 mg in 0.9% sodium chloride 250 mL infusion  0-15 mg/hr IntraVENous TITRATE    [Held by provider] bivalirudin (ANGIOMAX) 250 mg in 0.9% sodium chloride (MBP/ADV) 50 mL MBP  0.02-2.5 mg/kg/hr IntraVENous TITRATE    hydrALAZINE (APRESOLINE) 20 mg/mL injection 5 mg  5 mg IntraVENous Q4H PRN    bumetanide (BUMEX) injection 1 mg  1 mg IntraVENous Q4H PRN    heparin (porcine) in 0.9% NaCl 30,000 unit/1,000 mL perfusion irrigation 50-1,000 mL  50-1,000 mL Other PRN    chlorhexidine (PERIDEX) 0.12 % mouthwash 15 mL  15 mL Oral Q12H    0.9% sodium chloride infusion  9 mL/hr IntraVENous CONTINUOUS    naloxone (NARCAN) injection 0.4 mg  0.4 mg IntraVENous PRN    ondansetron (ZOFRAN) injection 4 mg  4 mg IntraVENous Q4H PRN    albuterol (PROVENTIL VENTOLIN) nebulizer solution 2.5 mg  2.5 mg Nebulization Q4H PRN    aspirin chewable tablet 81 mg  81 mg Oral DAILY    magnesium oxide (MAG-OX) tablet 400 mg  400 mg Oral BID    calcium chloride 1 g in 0.9% sodium chloride 100 mL IVPB  1 g IntraVENous PRN    bisacodyL (DULCOLAX) suppository 10 mg  10 mg Rectal DAILY PRN    senna-docusate (PERICOLACE) 8.6-50 mg per tablet 1 Tablet  1 Tablet Oral BID    ELECTROLYTE REPLACEMENT NOTE: Nurse to review Serum Potassium and Magnesuim levels and Initiate Electrolyte Replacement Protocol as needed  1 Each Other PRN    magnesium sulfate 1 g/100 ml IVPB (premix or compounded)  1 g IntraVENous PRN    alteplase (CATHFLO) 1 mg in sterile water (preservative free) 1 mL injection  1 mg InterCATHeter PRN    bacitracin 500 unit/gram packet 1 Packet  1 Packet Topical PRN    dexmedeTOMidine in 0.9 % NaCl (PRECEDEX) 400 mcg/100 mL (4 mcg/mL) infusion soln  0.1-1.5 mcg/kg/hr IntraVENous TITRATE    insulin lispro (HUMALOG) injection   SubCUTAneous Q6H    triamcinolone acetonide (KENALOG) 0.1 % cream   Topical BID    polyethylene glycol (MIRALAX) packet 17 g  17 g Oral DAILY    montelukast (SINGULAIR) tablet 10 mg  10 mg Oral DAILY    levothyroxine (SYNTHROID) tablet 100 mcg  100 mcg Oral Once per day on Mon Tue Wed Thu Fri Sat    hydroxypropyl methylcellulose (ISOPTO TEARS) 0.5 % ophthalmic solution 1 Drop  1 Drop Both Eyes PRN    venlafaxine-SR (EFFEXOR-XR) capsule 75 mg  75 mg Oral DAILY WITH BREAKFAST    gabapentin (NEURONTIN) capsule 100 mg  100 mg Oral QHS    arformoteroL (BROVANA) neb solution 15 mcg  15 mcg Nebulization BID RT    And    budesonide (PULMICORT) 500 mcg/2 ml nebulizer suspension  500 mcg Nebulization BID RT    sodium chloride (NS) flush 5-40 mL  5-40 mL IntraVENous Q8H    acetaminophen (TYLENOL) tablet 650 mg  650 mg Oral Q6H PRN    Or    acetaminophen (TYLENOL) suppository 650 mg  650 mg Rectal Q6H PRN    glucose chewable tablet 16 g  4 Tablet Oral PRN    dextrose (D50W) injection syrg 12.5-25 g  25-50 mL IntraVENous PRN    glucagon (GLUCAGEN) injection 1 mg  1 mg IntraMUSCular PRN      Allergies   Allergen Reactions    Ciprofloxacin Anaphylaxis    Shellfish Derived Anaphylaxis    Sildenafil Other (comments)    Ace Inhibitors Unknown (comments)    Biaxin [Clarithromycin] Other (comments)     Metal taste    Candesartan Cough    Pcn [Penicillins] Hives       Objective:  Vitals:    Vitals:    02/01/22 0300 02/01/22 0400 02/01/22 0500 02/01/22 0600   BP: 115/77 122/82 118/86 (!) 118/94   Pulse: 98 (!) 120 (!) 118 (!) 123   Resp: 22 26 24 (!) 35   Temp:  99.2 °F (37.3 °C)     SpO2: 99% 100% 100% 99%   Weight:    105.1 kg (231 lb 11.3 oz)   Height:         Intake and Output:  01/31 1901 - 02/01 0700  In: 1227.2 [I.V.:252.2]  Out: 80 [Urine:810]  01/30 0701 - 01/31 1900  In: 3547.6 [I.V.:2007.6]  Out: 3185 [Urine:3185]    Physical Examination:  General: Awake, alert, BIPAP  HEENT:           DHT   Neck:  Supple, no mass  Resp:  Diminished at bases   CV:  RRR, no LE edema   GI:  Soft, NT, + BS, obese  Neurologic:  Awake, following commands  :                  Henley in place    [x]    High complexity decision making was performed  []    Patient is at high-risk of decompensation with multiple organ involvement    Lab Data Personally Reviewed: I have reviewed all the pertinent labs, microbiology data and radiology studies during assessment.     Recent Labs     02/01/22  0421 01/31/22  0444 01/30/22  0404   * 151* 150*   K 3.4* 3.5 3.4*   * 115* 114*   CO2 33* 34* 29   GLU 270* 251* 185*   BUN 86* 80* 73*   CREA 2.84* 2.56* 2.36*   CA 10.6* 10.4* 10.0   MG 2.9* 2.9* 2.5*   PHOS 1.6* 2.6 3.5   ALB 3.7 3.6 3.5   ALT 10* 9* 7*     Recent Labs     02/01/22  0421 01/31/22  0444 01/30/22  0404   WBC 16.5* 11.3* 11.4*   HGB 8.7* 8.2* 8.3*   HCT 33.0* 30.9* 31.0*    313 259     Lab Results   Component Value Date/Time    Specimen Description: URINE 10/22/2013 01:32 PM    Specimen Description: URINE 01/23/2013 04:40 PM    Specimen Description: LEG TISSUE 02/12/2009 12:00 AM    Specimen Description: LEG (LEFT) 01/29/2009 03:06 AM     Lab Results   Component Value Date/Time    Culture result: NO GROWTH 5 DAYS 01/25/2022 12:36 PM    Culture result: MODERATE ESCHERICHIA COLI (A) 01/25/2022 12:36 PM    Culture result: MODERATE NORMAL RESPIRATORY ROSANNA 01/25/2022 12:36 PM    Culture result: MRSA NOT PRESENT 01/19/2022 12:41 PM    Culture result:  01/19/2022 12:41 PM     Screening of patient nares for MRSA is for surveillance purposes and, if positive, to facilitate isolation considerations in high risk settings. It is not intended for automatic decolonization interventions per se as regimens are not sufficiently effective to warrant routine use.     Culture result: NO GROWTH 5 DAYS 01/19/2022 12:41 PM     Recent Results (from the past 24 hour(s))   ECHO ADULT FOLLOW-UP OR LIMITED    Collection Time: 01/31/22  8:35 AM   Result Value Ref Range    IVSd 1.2 (A) 0.6 - 0.9 cm    LVIDd 5.2 3.9 - 5.3 cm    LVIDs 4.5 cm    LVPWd 1.0 (A) 0.6 - 0.9 cm    Fractional Shortening 2D 13 28 - 44 %    LVIDd Index 2.43 cm/m2    LVIDs Index 2.10 cm/m2    LV RWT Ratio 0.38     LV Mass 2D 220.8 (A) 67 - 162 g    LV Mass 2D Index 103.2 (A) 43 - 95 g/m2   GLUCOSE, POC    Collection Time: 01/31/22 12:36 PM   Result Value Ref Range    Glucose (POC) 214 (H) 65 - 117 mg/dL    Performed by Rajni Carvajal, POC    Collection Time: 01/31/22  5:20 PM   Result Value Ref Range    Glucose (POC) 119 (H) 65 - 117 mg/dL    Performed by Narinder Sung    GLUCOSE, POC    Collection Time: 01/31/22  5:48 PM   Result Value Ref Range    Glucose (POC) 107 65 - 117 mg/dL    Performed by Keyana MARIE (CON)    GLUCOSE, POC    Collection Time: 01/31/22  9:15 PM   Result Value Ref Range    Glucose (POC) 209 (H) 65 - 117 mg/dL    Performed by Marcela Pedro    GLUCOSE, POC    Collection Time: 02/01/22 12:24 AM   Result Value Ref Range    Glucose (POC) 256 (H) 65 - 117 mg/dL    Performed by Marcela [facility]    METABOLIC PANEL, COMPREHENSIVE    Collection Time: 02/01/22  4:21 AM   Result Value Ref Range    Sodium 154 (H) 136 - 145 mmol/L    Potassium 3.4 (L) 3.5 - 5.1 mmol/L    Chloride 115 (H) 97 - 108 mmol/L    CO2 33 (H) 21 - 32 mmol/L    Anion gap 6 5 - 15 mmol/L    Glucose 270 (H) 65 - 100 mg/dL    BUN 86 (H) 6 - 20 MG/DL    Creatinine 2.84 (H) 0.55 - 1.02 MG/DL    BUN/Creatinine ratio 30 (H) 12 - 20      GFR est AA 20 (L) >60 ml/min/1.73m2    GFR est non-AA 17 (L) >60 ml/min/1.73m2    Calcium 10.6 (H) 8.5 - 10.1 MG/DL    Bilirubin, total 0.5 0.2 - 1.0 MG/DL    ALT (SGPT) 10 (L) 12 - 78 U/L    AST (SGOT) 19 15 - 37 U/L    Alk.  phosphatase 77 45 - 117 U/L    Protein, total 8.1 6.4 - 8.2 g/dL    Albumin 3.7 3.5 - 5.0 g/dL    Globulin 4.4 (H) 2.0 - 4.0 g/dL    A-G Ratio 0.8 (L) 1.1 - 2.2     DIGOXIN    Collection Time: 02/01/22  4:21 AM   Result Value Ref Range    Digoxin level 0.8 (L) 0.90 - 2.00 NG/ML   LACTIC ACID    Collection Time: 02/01/22  4:21 AM   Result Value Ref Range    Lactic acid 1.3 0.4 - 2.0 MMOL/L   PROCALCITONIN    Collection Time: 02/01/22  4:21 AM   Result Value Ref Range    Procalcitonin 0.51 ng/mL   PTT    Collection Time: 02/01/22  4:21 AM   Result Value Ref Range    aPTT 23.1 22.1 - 31.0 sec    aPTT, therapeutic range     58.0 - 77.0 SECS   CBC W/O DIFF    Collection Time: 02/01/22  4:21 AM   Result Value Ref Range    WBC 16.5 (H) 3.6 - 11.0 K/uL    RBC 3.52 (L) 3.80 - 5.20 M/uL    HGB 8.7 (L) 11.5 - 16.0 g/dL    HCT 33.0 (L) 35.0 - 47.0 %    MCV 93.8 80.0 - 99.0 FL    MCH 24.7 (L) 26.0 - 34.0 PG    MCHC 26.4 (L) 30.0 - 36.5 g/dL    RDW 19.8 (H) 11.5 - 14.5 %    PLATELET 594 971 - 165 K/uL    MPV 10.3 8.9 - 12.9 FL    NRBC 0.0 0  WBC    ABSOLUTE NRBC 0.00 0.00 - 0.01 K/uL   LD    Collection Time: 02/01/22  4:21 AM   Result Value Ref Range     (H) 81 - 246 U/L   MAGNESIUM    Collection Time: 02/01/22  4:21 AM   Result Value Ref Range    Magnesium 2.9 (H) 1.6 - 2.4 mg/dL   PHOSPHORUS    Collection Time: 02/01/22  4:21 AM   Result Value Ref Range    Phosphorus 1.6 (L) 2.6 - 4.7 MG/DL   POC G3 - PUL    Collection Time: 02/01/22  4:40 AM   Result Value Ref Range    FIO2 (POC) 35 %    pH (POC) 7.46 (H) 7.35 - 7.45      pCO2 (POC) 48.5 (H) 35.0 - 45.0 MMHG    pO2 (POC) 89 80 - 100 MMHG    HCO3 (POC) 34.7 (H) 22 - 26 MMOL/L    sO2 (POC) 97.2 (H) 92 - 97 %    Base excess (POC) 9.6 mmol/L    Site RIGHT RADIAL      Device: BIPAP MASK      Set Rate 8 bpm    Allens test (POC) NOT APPLICABLE      Inspiratory Time 12 sec    Specimen type (POC) ARTERIAL     CARBOXYHEMOGLOBIN    Collection Time: 02/01/22  5:07 AM   Result Value Ref Range    Carboxy-Hgb 1.0 1 - 2 %    Methemoglobin 0.3 0 - 1.4 %    tHb 9.6 (L) 14 - 17 g/dL    Oxyhemoglobin 67.6 (LL) 94 - 97 %    O2 SATURATION 69 (L) 95 - 99 %    SITE SG      Sample source MIXED VENOUS      Critical value read back Called to Arlin Holloway on 02/01/2022 at 05:08    GLUCOSE, POC    Collection Time: 02/01/22  5:54 AM   Result Value Ref Range    Glucose (POC) 269 (H) 65 - 117 mg/dL    Performed by Arsenio Mclaughiln            I have reviewed the flowsheets. Chart and Pertinent Notes have been reviewed. No change in PMH ,family and social history from Consult note.       Emelyn Brooks [facility]

## 2022-02-01 NOTE — PROGRESS NOTES
Spiritual Care Assessment/Progress Note  [facility]      NAME: Juan Taylor      MRN: [id number]  AGE: 78 y.o. SEX: female  Sikhism Affiliation: Non Quaker   Language: English     2/1/2022     Total Time (in minutes): 14     Spiritual Assessment begun in [facility] 4 CORONARY CARE through conversation with:         []Patient        [] Family    [] Friend(s)        Reason for Consult: Initial visit     Spiritual beliefs: (Please include comment if needed)     [] Identifies with a ariel tradition:         [] Supported by a ariel community:            [] Claims no spiritual orientation:           [] Seeking spiritual identity:                [] Adheres to an individual form of spirituality:           [x] Not able to assess:                           Identified resources for coping:      [] Prayer                               [] Music                  [] Guided Imagery     [] Family/friends                 [] Pet visits     [] Devotional reading                         [x] Unknown     [] Other:                                               Interventions offered during this visit: (See comments for more details)    Patient Interventions: Initial visit           Plan of Care:     [x] Support spiritual and/or cultural needs    [] Support AMD and/or advance care planning process      [] Support grieving process   [] Coordinate Rites and/or Rituals    [] Coordination with community clergy   [] No spiritual needs identified at this time   [] Detailed Plan of Care below (See Comments)  [] Make referral to Music Therapy  [] Make referral to Pet Therapy     [] Make referral to Addiction services  [] Make referral to [facility]  [] Make referral to Spiritual Care Partner  [] No future visits requested        [] Contact Spiritual Care for further referrals     Comments: Phone call to conduct spiritual assessment for Room 4222. Patient unable to communicate. No family present.  Unable to contact family via phone. Provided ministry of presence, silent prayer, and collaborated with staff. Advised nurse to contact [facility] for any further referrals. Visited by: Mery Perez.  Amrita Ruiz, [address] [place] [address] Road paging Service [phone] ([phone])

## 2022-02-01 NOTE — PROGRESS NOTES
Transitions of Care Plan    · RUR: 25% - high  · Clinical Update: Impella; milrinone; extubated on bipap  · Consults: CT surgery; AHFC; Nephrology; EP Cardiology  · Baseline: independent; resides w spouse  · Barriers to Discharge: medical  · Disposition: pending medical progress  · Estimated Discharge Date: 2+ days     Clinical update per chart review and/or patient discussed during Interdisciplinary Rounds:     Patient is not medically stable for discharge due to ongoing medical needs:     · Impella support - unable to wean  · Extubate to Bipap   · Milrinone gtt  · Palliative Medicine consulted today for poor prognosis and goals of care     CM continues to follow treatment plan for medical progress and disposition needs.     Disposition:  Pending medical progress    Odalys Hu, [address]  Available via [employer] or

## 2022-02-01 NOTE — PROGRESS NOTES
Brief critical care follow-up note and advance care planning:  I met with patient's  Mr. Aundrea Tadeo at bedside. I updated him about his wife's current condition and about the assessment and recommendation of the advanced heart failure team which I agree with. He stated that he expected bad outcome as his wife been deteriorating lately. His main goal is for her to be comfortable if we cannot improve her quality of life. We all agreed on changing the CODE STATUS to DNR/DNI. We will further discuss next best step to ensure her comfort tomorrow when he visit late afternoon. I notified bedside nurses about the changing CODE STATUS.

## 2022-02-01 NOTE — PROGRESS NOTES
[facility]  ICU Progress Note    Admit Date: 2022    S/P R Axillary Impella 5.5     Subjective/24 Hour Summary:   Pt seen with Dr. Christiano Edmond. impella at P5,  bicarb purge. Did not tolerate impella wean to p4, now on bipap 35%. Required extra diuretics. Objective:   Vitals:  Blood pressure 123/88, pulse 92, temperature 99.2 °F (37.3 °C), resp. rate 21, height 5' 7\" (1.702 m), weight 231 lb 11.3 oz (105.1 kg), SpO2 98 %. Temp (24hrs), Av.1 °F (37.3 °C), Min:98.6 °F (37 °C), Max:99.7 °F (37.6 °C)    Hemodynamics:   CO: CO (l/min): 6.2 l/min   CI: CI (l/min/m2): 2.8 l/min/m2   CVP: CVP (mmHg): 5 mmHg (22 07)   SVR: SVR (dyne*sec)/cm5: 1006 (dyne*sec)/cm5 (22 1262)   PAP Systolic: PAP Systolic: 58 (09/62/15 2230)   PAP Diastolic: PAP Diastolic: 25 (91//04 5009)   PVR:     SV02: SVO2 (%): 67 % (22 07)   SCV02:      EKG/Rhythm:  SR-ST       Oxygen Therapy:  Oxygen Therapy  O2 Sat (%): 98 % (22 07)  Pulse via Oximetry: 121 beats per minute (22 07)  O2 Device: BIPAP (22 0400)  Skin Assessment: Clean, dry, & intact (22)  Skin Protection for O2 Device: Yes (22)  Orientation: Anterior (22)  Location: Cheek;Forehead (22)  Interventions: Mouth Care (22)  O2 Flow Rate (L/min): 5 l/min (22)  O2 Temperature: 98.4 °F (36.9 °C) (01/22/22 1940)  FIO2 (%): 35 % (22 0400)  ETCO2 (mmHg): 22 mmHg (22 2342)    CXR:  CXR Results  (Last 48 hours)               22 0434  XR CHEST PORT Final result    Impression:  1. No change mild perihilar interstitial edema       Narrative:  EXAM: XR CHEST PORT       INDICATION: post-Impella       COMPARISON: 2021       FINDINGS: A portable AP radiograph of the chest was obtained at 0403 hours. The   patient is on a cardiac monitor. The cardiac assist device remains in place. ICD   is noted.        Right-sided PICC line overlies the upper portion of the right atrium. Lung volumes are low. Mild interstitial edema is unchanged. 01/31/22 0454  XR CHEST PORT Final result    Impression:  1. No change mild interstitial edema       Narrative:  EXAM: XR CHEST PORT       INDICATION: post-Impella       COMPARISON: 1/30/2022       FINDINGS: A portable AP radiograph of the chest was obtained at 0438 hours. The   patient is on a cardiac monitor. The ICD remains in place. [place] catheter   has its tip in the main pulmonary artery. Cardiac assist device is unchanged. Lungs demonstrate persistent low lung volumes. Interstitial edema pattern is   unchanged. Admission Weight: Last Weight   Weight: 264 lb 1.8 oz (119.8 kg) Weight: 231 lb 11.3 oz (105.1 kg)     Intake / Output / Drain:  Current Shift: No intake/output data recorded. Last 24 hrs.:     Intake/Output Summary (Last 24 hours) at 2/1/2022 0749  Last data filed at 2/1/2022 0700  Gross per 24 hour   Intake 2992.94 ml   Output 1855 ml   Net 1137.94 ml       EXAM:  General:  No acute distress         Lungs:   Clear to auscultation bilaterally. Incision:  No erythema, drainage or swelling. Heart:  Regular rate and rhythm, S1, S2 normal, no murmur, click, rub or gallop. Abdomen:   Soft, non-tender. Bowel sounds normal. No masses,  No organomegaly. Extremities:  No edema. PPP.     Neurologic: Grossly normal     Labs:   Recent Labs     02/01/22 0421 01/31/22 0444   WBC 16.5* 11.3*   HGB 8.7* 8.2*   HCT 33.0* 30.9*    313     Recent Labs     02/01/22 0421 01/31/22 0444   * 151*   K 3.4* 3.5   * 115*   CO2 33* 34*   BUN 86* 80*   CREA 2.84* 2.56*   * 251*   CA 10.2*  10.6* 10.4*   MG 2.9* 2.9*   PHOS 1.6* 2.6     Recent Labs     02/01/22 0421 01/31/22 0444   AP 77 72   TP 8.1 8.1   ALB 3.7 3.6   GLOB 4.4* 4.5*     Recent Labs     02/01/22  0421 01/31/22  0444   APTT 23.1 25.4      Recent Labs     02/01/22  0440 01/31/22  0408   PHI 7.46* 7.44   PCO2I 48.5* 48.9*   PO2I 89 73*   FIO2I 35 4     Recent Labs     22  0444   CPK 35   CKMB <1.0        Assessment:     Active Problems:    Acute respiratory failure with hypoxia (HCC) (2022)         Plan/Recommendations/Medical Decision Makin. Acute on Chronic Systolic CHF class III/IV (BiV-ICD): S/P right axillary impella 5.5 -weaning as able. Cont bicarb via purge. Systemic bival on hold due to GIB. On ceftriaxone for prophylaxis. On digoxin. AHFS managing. GDMT as able. BNP > 35 k today. TTE  shows LV 30% on P5  2. Acute on Chronic Respiratory insufficiency: On home O2. Lexii Crumble Extubated . Continue to work toward previous home home oxygen requirements. Increase activity, IS  3. CAD: Multivessel CAD on [address] [place] [address] 22. ASA 81. BB on hold due to cardiogenic shock. Recommend starting high intensity statin when feasible, per primary/HF. S/P ANNE-MARIE to LAD on . On Plavix monotherapy per Cardiology due to GI bleeding  4. RODNEY on Chronic CKD Stage IV: Renal following. On diuretics,  Worsening hypernatremia/hypercalcemia--on free water flushes. 5. Gout: allopurinol  6. DM2: on NPH Per primary service  7. GERD: protonix  8. Hypothyroidism: on synthroid  9. Depression: Home meds  10. Anemia: on EPO, stable post op. Dispo: impella functioning well. Cont bicarb via purge. No cardiac surgery issues otherwise. Further HF management per AHF. Remainder of medical management per primary.     Signed By: Vesta Diallo NP

## 2022-02-01 NOTE — PROGRESS NOTES
SLP Contact Note    Noted pt remains on BiPAP. Will continue to hold SLP.       Thank you,  WENCESLAO MeyerEd, [address] [facility]  Speech-Language Pathologist

## 2022-02-01 NOTE — PROGRESS NOTES
[facility]    ICU TEAM Progress Note    Name: Deborah Salvador   : 1955   MRN: [id number]   Date: 2022      I  Subjective:   Progress Note: 2022      Reason for ICU Admission: Cardiogenic shock     Interval history:  19-year-old female with past medical history significant for moderate pulmonary hypertension on home oxygen therapy, CKD, nonischemic cardiomyopathy who was admitted to the hospital on  due to acute on chronic hypoxemic respiratory failure in light of fluid overload. Had a left heart cath on  which demonstrated single one-vessel moderate disease (mid LAD lesion) which was thought to be not a cause of cardiomyopathy.  Subsequently had a right axillary 5.5 Impella placed by CT surgery for potential double transplant. ANNE-MARIE to LAD on . Extubated on . Overnight Events:   : Did not tolerate weaning Impella to P4. Had to go up to P6 around 1 AM.  Needed extra diuretics. Off Precedex. Tenuous respiratory status. Went back on BiPAP overnight. Minimal support setting. Off pressors and inotropes.   : No acute event   - Extubated to BiPAP , impella in situ   - remains intubated, Impella in situ   - remains intubated, Impella in situ, now off inhaled nitric oxide, Bloody OG o/p   - getting [facility] today, remains intubated, Impella in situ, on inhaled nitric oxide   - s/p EGD/colo today for Ca workup for possible heart/kidney Tx, remains intubated, Impella in situ, on inhaled nitric oxide  [id number] intubated, Impella in situ, on inhaled nitric oxide      Active Problem List:     Problem List  Date Reviewed: 2021          Codes Class    Acute respiratory failure with hypoxia (HCC) ICD-10-CM: J96.01  ICD-9-CM: 518.81         CHF exacerbation (HCC) ICD-10-CM: I50.9  ICD-9-CM: 428.0         Type 2 diabetes mellitus with diabetic neuropathy (HCC) ICD-10-CM: E11.40  ICD-9-CM: 250.60, 357.2         Type 2 diabetes with nephropathy ([facility] ICD-10-CM: E11.21  ICD-9-CM: 250.40, 583.81         Obesity, morbid ([facility] ICD-10-CM: E66.01  ICD-9-CM: 278.01         Acquired hypothyroidism ICD-10-CM: E03.9  ICD-9-CM: 775. 9         Dysthymia ICD-10-CM: F34.1  ICD-9-CM: 300.4         ICD (implantable cardioverter-defibrillator), biventricular, in situ ICD-10-CM: Z95.810  ICD-9-CM: V45.02     Overview Signed 1/14/2015 11:11 AM by Guille Patel MD     Generator [employer] change 1/14/2015             Dyslipidemia ICD-10-CM: I07.3  ICD-9-CM: 272.4         CKD (chronic kidney disease) ICD-10-CM: N18.9  ICD-9-CM: 553. 9         Cardiomyopathy, nonischemic ([facility] ICD-10-CM: I42.8  ICD-9-CM: 425.4     Overview Signed 10/10/2011  6:40 AM by Melvina Montenegro MD     initial dx 2001, bivHF 2008 with EF 15%, s/p biV-ICD 9/08, significant improvment in EF to 45-50%             Anemia in chronic renal disease (Chronic) ICD-10-CM: N18.9, D63.1  ICD-9-CM: 285.21         HTN (hypertension) ICD-10-CM: I10  ICD-9-CM: 401.9         GERD (gastroesophageal reflux disease) (Chronic) ICD-10-CM: K21.9  ICD-9-CM: 530.81         Gout (Chronic) ICD-10-CM: M10.9  ICD-9-CM: 274.9         Pulmonary HTN (HCC) (Chronic) ICD-10-CM: I27.20  ICD-9-CM: 416.8               Past Medical History:      has a past medical history of Acquired hypothyroidism (8/15/2016), Anemia, Asthma, Cardiomyopathy, nonischemic ([facility] CKD (chronic kidney disease), CKD (chronic kidney disease) (8/15/2012), Depression, Diabetes ([facility] Diabetic neuropathy ([address] DM (diabetes mellitus) ([facility] (8/15/2012), GERD (gastroesophageal reflux disease), Gout, Hypothyroidism, ICD (implantable cardioverter-defibrillator), biventricular, in situ (6/5/2014), and Psoriasis. She has no past medical history of Abuse, Adult physical abuse, Arrhythmia, Arthritis, Asthma, Autoimmune disease ([facility] CAD (coronary artery disease), Calculus of kidney, Cancer ([facility] Chronic pain, COPD, Headache(784.0), Hypercholesteremia, Liver disease, Psychotic disorder ([facility] PUD (peptic ulcer disease), Seizures ([facility] Stroke ([facility] Thromboembolus ([facility] or Unspecified deficiency anemia. Past Surgical History:      has a past surgical history that includes echo 2d adult (4/2010); cardiac catheterization (2007; 01/06/15); echo 2d adult (11/2011); stress test lexiscan/cardiolite (3/21/12); hx pacemaker placement; hx orthopaedic; and colonoscopy (N/A, 1/26/2022). Home Medications:     Prior to Admission medications    Medication Sig Start Date End Date Taking? Authorizing Provider   levocetirizine (XYZAL) 5 mg tablet TAKE 1 TABLET BY MOUTH EVERY DAY 1/6/22  Yes Ruddy Crews MD   apremilast Darlean Isaías) 30 mg tab Take 30 mg by mouth two (2) times daily as needed. Yes Provider, Historical   azelastine (ASTEPRO) 205.5 mcg (0.15 %) 1 [place] by Both Nostrils route two (2) times daily as needed. Yes Provider, Historical   docusate sodium (COLACE) 100 mg capsule Take 100 mg by mouth daily as needed for Constipation. Yes Provider, Historical   levothyroxine (SYNTHROID) 100 mcg tablet Take 100 mcg by mouth six (6) days a week. Everyday except Sunday   Yes Provider, Historical   allopurinoL (ZYLOPRIM) 100 mg tablet TAKE 1 TABLET BY MOUTH EVERY DAY 1/5/22  Yes Ruddy Crews MD   calcitRIOL (ROCALTROL) 0.5 mcg capsule TAKE 1 CAPSULE BY MOUTH EVERY DAY 1/5/22  Yes Ruddy Crews MD   carvediloL (COREG) 6.25 mg tablet Take 1 Tablet by mouth two (2) times daily (with meals). 12/22/21  Yes Alberto DORSEY NP   isosorbide dinitrate (ISORDIL) 5 mg tablet Take 1 Tablet by mouth three (3) times daily. 12/22/21  Yes Alberto DORSEY NP   hydrALAZINE (APRESOLINE) 10 mg tablet Take 1 Tablet by mouth three (3) times daily. 12/22/21  Yes Bravo Lucero NP   benzonatate (Tessalon Perles) 100 mg capsule Take 100 mg by mouth three (3) times daily as needed for Cough.    Yes Other, MD Bee   bumetanide (BUMEX) 2 mg tablet Take 1 tab in the morning and 0.5 tab in the evening 11/5/21  Yes Damaris Hernandez MD   gabapentin (NEURONTIN) 100 mg capsule Take 1 Capsule by mouth nightly. Max Daily Amount: 100 mg. 10/29/21  Yes Eladia Griffith MD   budesonide (PULMICORT) 180 mcg/actuation aepb inhaler Take 2 Puffs by inhalation two (2) times a day. 5/27/21  Yes Eladia Griffith MD   ammonium lactate (AMLACTIN) 12 % topical cream Apply  to affected area two (2) times a day. rub in to affected area well 11/4/20  Yes Eladia Griffith MD   insulin NPH/insulin regular (NOVOLIN 70/30) 100 unit/mL (70-30) injection 70 units two times a day 8/15/16  Yes Mary Lou Holland MD   calcipotriene (DOVONEX) 0.005 % topical cream Apply  to affected area three (3) times daily. Yes Provider, Historical   triamcinolone acetonide (KENALOG) 0.5 % ointment Apply  to affected area two (2) times daily as needed. use thin layer    Yes Provider, Historical   multivitamin (ONE A DAY) tablet Take 1 Tab by mouth daily. Yes Provider, Historical   ferrous sulfate (IRON) 325 mg (65 mg elemental iron) tablet Take 325 mg by mouth daily. Yes Provider, Historical   aspirin 81 mg tablet Take 81 mg by mouth daily. Yes Provider, Historical   montelukast (SINGULAIR) 10 mg tablet TAKE 1 TABLET BY MOUTH EVERY DAY 1/27/22   Eladia Griffith MD   fluticasone propionate (FLONASE) 50 mcg/actuation nasal spray USE 1 SPRAY IN EACH NOSTRIL DAILY 1/24/22   Eladia Griffith MD   venlafaxine-SR [facility] 75 mg capsule TAKE 1 CAPSULE BY MOUTH EVERY DAY 1/21/22   Eladia Griffith MD   cholecalciferol (VITAMIN D3) (2,000 UNITS /50 MCG) cap capsule TAKE 1 CAPSULE BY MOUTH TWO (2) TIMES A DAY. 1/11/22   Eladia Griffith MD       Allergies/Social/Family History:      Allergies   Allergen Reactions    Ciprofloxacin Anaphylaxis    Shellfish Derived Anaphylaxis    Sildenafil Other (comments)    Ace Inhibitors Unknown (comments)    Biaxin [Clarithromycin] Other (comments)     Metal taste    Candesartan Cough    Pcn [Penicillins] Hives      Social History     Tobacco Use    Smoking status: Never Smoker    Smokeless tobacco: Never Used   Substance Use Topics    Alcohol use: No      Family History   Problem Relation Age of Onset    Heart Disease Mother     Hypertension Mother     Lupus Sister     Diabetes Brother        Review of Systems:   10 system reviewed and they are negative but as in interval history    Objective:   Vital Signs:  Visit Vitals  /88 (BP 1 Location: Left upper arm, BP Patient Position: At rest)   Pulse (!) 118   Temp 98.9 °F (37.2 °C)   Resp 20   Ht 5' 7\" (1.702 m)   Wt 105.1 kg (231 lb 11.3 oz)   SpO2 98%   BMI 36.29 kg/m²    O2 Flow Rate (L/min): 5 l/min O2 Device: BIPAP Temp (24hrs), Av.1 °F (37.3 °C), Min:98.6 °F (37 °C), Max:99.7 °F (37.6 °C)    CVP (mmHg): 2 mmHg (22 0800)      Intake/Output:     Intake/Output Summary (Last 24 hours) at 2022 0846  Last data filed at 2022 0800  Gross per 24 hour   Intake 3017.04 ml   Output 1805 ml   Net 1212.04 ml       Physical Exam:    General-chronically ill looking female on BiPAP, no acute distress  Neuro- Grossly non focal, speech is clear and nods appropriately, generalized weakness  Cardiac-RRR  Lungs-clear anteriorly  Abdomen-soft, nontender, nondistended  Extremities-warm    LABS AND  DATA: Personally reviewed  Recent Labs     22  044   WBC 16.5* 11.3*   HGB 8.7* 8.2*   HCT 33.0* 30.9*    313     Recent Labs     22  044   * 151*   K 3.4* 3.5   * 115*   CO2 33* 34*   BUN 86* 80*   CREA 2.84* 2.56*   * 251*   CA 10.2*  10.6* 10.4*   MG 2.9* 2.9*   PHOS 1.6* 2.6     Recent Labs     22  044   AP 77 72   TP 8.1 8.1   ALB 3.7 3.6   GLOB 4.4* 4.5*     Recent Labs     22  0421 22  0444   APTT 23.1 25.4      Recent Labs     22  0440 22  0408   PHI 7.46* 7.44   PCO2I 48.5* 48.9*   PO2I 89 73*   FIO2I 35 4     Recent Labs     01/31/22 0444   CPK 35   CKMB <1.0       Hemodynamics:   PAP: PAP Systolic: 53 (35/10/63 9166) CO: CO (l/min): 6.3 l/min (02/01/22 0800)   [place]:   CI: CI (l/min/m2): 2.9 l/min/m2 (02/01/22 0800)   CVP:  CVP (mmHg): 2 mmHg (02/01/22 0800) SVR:       PVR:       Ventilator Settings:  Mode Rate Tidal Volume Pressure FiO2 PEEP   Spontaneous,Pressure support   460 ml  5 cm H2O 35 % 5 cm H20     Peak airway pressure: 10 cm H2O    Minute ventilation: 8.8 l/min        MEDS: Reviewed    Chest X-Ray:  CXR Results  (Last 48 hours)               02/01/22 0434  XR CHEST PORT Final result    Impression:  1. No change mild perihilar interstitial edema       Narrative:  EXAM: XR CHEST PORT       INDICATION: post-Impella       COMPARISON: 1/31/2021       FINDINGS: A portable AP radiograph of the chest was obtained at 0403 hours. The   patient is on a cardiac monitor. The cardiac assist device remains in place. ICD   is noted. Right-sided PICC line overlies the upper portion of the right atrium. Lung volumes are low. Mild interstitial edema is unchanged. 01/31/22 0454  XR CHEST PORT Final result    Impression:  1. No change mild interstitial edema       Narrative:  EXAM: XR CHEST PORT       INDICATION: post-Impella       COMPARISON: 1/30/2022       FINDINGS: A portable AP radiograph of the chest was obtained at 0438 hours. The   patient is on a cardiac monitor. The ICD remains in place. [place]-Gu[place] catheter   has its tip in the main pulmonary artery. Cardiac assist device is unchanged. Lungs demonstrate persistent low lung volumes. Interstitial edema pattern is   unchanged. Assessment and Plan:   1. Cardiogenic Shock  2. Acute hypoxemic respiratory failure  3. Status post Impella placement  4.  RODNEY on CKD stage IV     Neuro- delirium prevention strategies, precedex as needed for hyperactive delirium [currently off]    Cardiac-continue hemodynamic monitoring, EF has improved to about 35 % on most recent echo, Impella at P6, now s/p LAD stent, on aspirin, plavix, digoxin, did not tolerate weaning Impella to P4 on January 31 and had to go up again to P6 ,follow-up cardiac surgery/advised heart failure recommendations      Pulmonary- BiPAP as tolerated for WOB, at risk of needing to be reintubated     GI- s/p EGD/colo 1/26 for Ca work up, PPI q12 for for GI bleed 1/28. F/u GI recs     Renal-CKD-undulating Cr level-currently diuresing-on Bumex drip/diuril, follow-up nephrology recommendations, monitor urine output closely, dose meds renally, correct electrolyte derangements as needed, continue allopurinol     Hematology-systemic Angiomax-on hold for GI bleed 1/28, f/u CS recs, continue EPO     ID- Impella in situ, e coli in sputum -change cefepime to ceftriaxone on January 31.     Endocrinology-keep glucose less than 180, continue Synthroid    Appreciate heart failure evaluation and management, discussed the case with them today, at this point patient is not a candidate for heart/kidney transplant giving improvement in functionality however the limiting factor at this time seems to be the severe pulmonary hypertension. We all agreed it is reasonable to consider transitioning to comfort measure. We will reach out to the  and palliative care team.    [address]  Stay in ICU    CRITICAL CARE CONSULTANT NOTE  I had a face to face encounter with the patient, reviewed and interpreted patient data including clinical events, labs, images, vital signs, I/O's, and examined patient. I have discussed the case and the plan and management of the patient's care with the consulting services, the bedside nurses and the respiratory therapist.      NOTE OF PERSONAL INVOLVEMENT IN CARE   This patient has a high probability of imminent, clinically significant deterioration, which requires the highest level of preparedness to intervene urgently.  I participated in the decision-making and personally managed or directed the management of the following life and organ supporting interventions that required my frequent assessment to treat or prevent imminent deterioration. I personally spent 40 minutes of critical care time. This is time spent at this critically ill patient's bedside actively involved in patient care as well as the coordination of care and discussions with the patient's family. This does not include any procedural time which has been billed separately. David Dumont M.D.   Staff Intensivist/Pulmonologist  [facility], [place] [facility]  2/1/2022

## 2022-02-01 NOTE — PROGRESS NOTES
1930: SBAR received from [employer]. 2100: Patient acutely ST in 130s, O2 91, SvO2 in the 40s, CVP 16 - placed patient back on bipap. Hemodynamics and oxygenation improved on bipap.   2300: Patient tachycardic, CVP 14, PAPs 80s/30s - spoke with MD - increaed P level on impella to 5 and gave prn bumex. 0115:  PAPs 85-90s/35-45s / tachycardic / SBP 13s / increaed P level to 6 and gave prn hydralazine. 0300: [place] dislodged/unable to reposition - arvind removed. Cuff pressures reading accurately. Will monitor for now. 0630: [place] re-calibrated. Jimmy Ibarra at bedside update provided - new orders placed. 0700: Dr. Beverley Gonzalez at bedside - update provided. 0730: Bedside and Verbal shift change report given to [address]  (oncoming nurse) by Francy Moon  (offgoing nurse). Report included the following information SBAR, Kardex, Intake/Output, MAR and Recent Results.

## 2022-02-01 NOTE — PROGRESS NOTES
Cardiac Electrophysiology Hospital Progress Note     Subjective:       Real Fatima is a 78 y.o. patient who is s/p Impella, has Medtronic biventricular ICD (gen change 01/138626, leads 09/25/2008) that has been BETTY.         Interim:   S/p Impella placement on 01/18/2022, now in CCU.  She had VF 1/19, has appearance of Torsades.  Required ICD shock x 2, both successful. Some brief NSVT since then, but nothing sustained. Now s/p PCI LAD on 01/27/2022.  Right & left side pressures improved compared to prior cath on 01/17/2022.  LVEF improved to 30% per echo on 01/28/2022, stable on echo yesterday. Weaned off nitric oxide & vent, now on BiPAP.  Weaned to nasal cannula at some point yesterday, but decompensated & now back in BiPAP.  Bumex IV gtt is off.  Attempting to wean Impella, thus far not able to do so. Telemetry review shows sinus tach, questionable biventricular pacing.         HPI:   Presented to the ER on 01/04/2021 with hypoxia, improved on supplemental oxygen. Labs showed stable anemia.  WBC elevated, hyponatremic, hypokalemic.  D dimer elevated.  Chronic CKD. Nephrologist spoke to me directly regarding severe renal failure, but not much worse than last admission. Rapid COVID negative.  CXR showed pulmonary edema vs atypical pneumonia.  VQ scan showed low probability for PE.       NICM, LVEF 15-20% during recent admission.  LVEF previously normalized with CRT.  NYHA III-IV.  No ACEi/ARB due to renal dysfunction.  GDMT dosing limited by low BP. [address] 12/10/2021 showed severe pulmonary HTN (wedge 34 mmHg, PAP 80 mmHg). [facility] in [facility] showed no evidence of CAD. She did require LV lead reprogramming over the summer, but good LV capture since.  Good capture/function when checked during recent admission. BP controlled. PICC line placed 01/10/2022 in anticipation of home milrinone. Previous:   LVEF noted 15-20% in 12/2021.      S/p Medtronic biventricular ICD (gen change 01/498232, leads 09/25/2008).     CKD stage IV.        Previously followed by Dr. Raphael Linares, states did not follow him to new practice.          Problem List       Acute respiratory failure with hypoxia [facility]) ICD-10-CM: J96.01   ICD-9-CM: 518.81 1/4/2022     CHF exacerbation (HCC) ICD-10-CM: I50.9   ICD-9-CM: 428.0 12/6/2021     Type 2 diabetes mellitus with diabetic neuropathy (HCC) ICD-10-CM: E11.40   ICD-9-CM: 250.60, 357.2 1/2/2020     Type 2 diabetes with nephropathy ([facility] ICD-10-CM: E11.21   ICD-9-CM: 250.40, 583.81 4/3/2018     Obesity, morbid ([facility] ICD-10-CM: E66.01   ICD-9-CM: 278.01 12/8/2017     Acquired hypothyroidism ICD-10-CM: E03.9   ICD-9-CM: 244.9 8/15/2016     Dysthymia ICD-10-CM: F34.1   ICD-9-CM: 300.4 8/15/2016     ICD (implantable cardioverter-defibrillator), biventricular, in situ ICD-10-CM: Z95.810   ICD-9-CM: V45.02 6/5/2014   Overview Signed 1/14/2015 11:11 AM by Amanda Garcia MD     Generator Medtronic change 1/14/2015         Dyslipidemia ICD-10-CM: Y35.4   ICD-9-CM: 272.4 1/14/2014     CKD (chronic kidney disease) ICD-10-CM: N18.9   ICD-9-CM: 585.9 8/15/2012     Cardiomyopathy, nonischemic (HCC) ICD-10-CM: I42.8   ICD-9-CM: 425.4 Unknown   Overview Signed 10/10/2011  6:40 AM by Patricio Eugene MD     initial dx 2001, bivHF 2008 with EF 15%, s/p biV-ICD 9/08, significant improvment in EF to 45-50%         Anemia in chronic renal disease (Chronic) ICD-10-CM: N18.9, D63.1   ICD-9-CM: 285.21 12/10/2008     HTN (hypertension) ICD-10-CM: I10   ICD-9-CM: 401.9 12/10/2008     GERD (gastroesophageal reflux disease) (Chronic) ICD-10-CM: K21.9   ICD-9-CM: 530.81 12/10/2008     Gout (Chronic) ICD-10-CM: M10.9   ICD-9-CM: 274.9 12/10/2008     Pulmonary HTN (HCC) (Chronic) ICD-10-CM: I27.20   ICD-9-CM: 416.8 12/10/2008        Current Facility-Administered Medications   Medication Dose Route Frequency   · potassium phosphate 30 mmol in dextrose 5% 250 mL infusion IntraVENous ONCE   · epoetin isabel-epbx (RETACRIT) injection 10,000 Units 10,000 Units SubCUTAneous Q MON, WED & FRI   · sodium bicarbonate (8.4%) 25 mEq in dextrose 5% 1,000 mL infusion Other TITRATE   · cefTRIAXone (ROCEPHIN) 2 g in 0.9% sodium chloride 20 mL IV syringe 2 g IntraVENous Q24H   · bumetanide (BUMEX) injection 1 mg 1 mg IntraVENous BID   · potassium bicarb-citric acid (EFFER-K) tablet 40 mEq 40 mEq Oral DAILY   · insulin NPH (NOVOLIN N, HUMULIN N) injection 30 Units 30 Units SubCUTAneous DAILY   · allopurinoL (ZYLOPRIM) tablet 100 mg 100 mg Oral DAILY   · insulin NPH (NOVOLIN N, HUMULIN N) injection 30 Units 30 Units SubCUTAneous QHS   · pantoprazole (PROTONIX) injection 40 mg 40 mg IntraVENous Q12H   And   · sodium chloride (NS) flush 10 mL 10 mL IntraVENous DAILY   · sodium chloride (NS) flush 5-40 mL 5-40 mL IntraVENous PRN   · clopidogreL (PLAVIX) tablet 75 mg 75 mg Oral DAILY   · digoxin (LANOXIN) tablet 0.0625 mg 0.0625 mg Oral EVERY OTHER DAY   · DOBUTamine (DOBUTREX) 500 mg/250 mL (2,000 mcg/mL) infusion 0-10 mcg/kg/min IntraVENous TITRATE   · albumin human 25% (BUMINATE) solution 12.5 g 12.5 g IntraVENous DAILY   · milrinone (PRIMACOR) 20 MG/100 ML D5W infusion 0.125 mcg/kg/min IntraVENous CONTINUOUS   · vasopressin (VASOSTRICT) 20 Units in 0.9% sodium chloride 100 mL infusion 0-0.1 Units/min IntraVENous TITRATE   · PHENYLephrine (NORMA-SYNEPHRINE) 30 mg in 0.9% sodium chloride 250 mL infusion  mcg/min IntraVENous TITRATE   · niCARdipine (CARDENE) 25 mg in 0.9% sodium chloride 250 mL infusion 0-15 mg/hr IntraVENous TITRATE   · [Held by provider] bivalirudin (ANGIOMAX) 250 mg in 0.9% sodium chloride (MBP/ADV) 50 mL MBP 0.02-2.5 mg/kg/hr IntraVENous TITRATE   · hydrALAZINE (APRESOLINE) 20 mg/mL injection 5 mg 5 mg IntraVENous Q4H PRN   · bumetanide (BUMEX) injection 1 mg 1 mg IntraVENous Q4H PRN   · heparin (porcine) in 0.9% NaCl 30,000 unit/1,000 mL perfusion irrigation 50-1,000 mL 50-1,000 mL Other PRN   · chlorhexidine (PERIDEX) 0.12 % mouthwash 15 mL 15 mL Oral Q12H   · 0.9% sodium chloride infusion 9 mL/hr IntraVENous CONTINUOUS   · naloxone (NARCAN) injection 0.4 mg 0.4 mg IntraVENous PRN   · ondansetron (ZOFRAN) injection 4 mg 4 mg IntraVENous Q4H PRN   · albuterol (PROVENTIL VENTOLIN) nebulizer solution 2.5 mg 2.5 mg Nebulization Q4H PRN   · aspirin chewable tablet 81 mg 81 mg Oral DAILY   · magnesium oxide (MAG-OX) tablet 400 mg 400 mg Oral BID   · calcium chloride 1 g in 0.9% sodium chloride 100 mL IVPB 1 g IntraVENous PRN   · bisacodyL (DULCOLAX) suppository 10 mg 10 mg Rectal DAILY PRN   · senna-docusate (PERICOLACE) 8.6-50 mg per tablet 1 Tablet 1 Tablet Oral BID   · ELECTROLYTE REPLACEMENT NOTE: Nurse to review Serum Potassium and Magnesuim levels and Initiate Electrolyte Replacement Protocol as needed 1 Each Other PRN   · magnesium sulfate 1 g/100 ml IVPB (premix or compounded) 1 g IntraVENous PRN   · alteplase (CATHFLO) 1 mg in sterile water (preservative free) 1 mL injection 1 mg InterCATHeter PRN   · bacitracin 500 unit/gram packet 1 Packet 1 Packet Topical PRN   · dexmedeTOMidine in 0.9 % NaCl (PRECEDEX) 400 mcg/100 mL (4 mcg/mL) infusion soln 0.1-1.5 mcg/kg/hr IntraVENous TITRATE   · insulin lispro (HUMALOG) injection SubCUTAneous Q6H   · triamcinolone acetonide (KENALOG) 0.1 % cream Topical BID   · polyethylene glycol (MIRALAX) packet 17 g 17 g Oral DAILY   · montelukast (SINGULAIR) tablet 10 mg 10 mg Oral DAILY   · levothyroxine (SYNTHROID) tablet 100 mcg 100 mcg Oral Once per day on Mon Tue Wed Thu Fri Sat   · hydroxypropyl methylcellulose (ISOPTO TEARS) 0.5 % ophthalmic solution 1 Drop 1 Drop Both Eyes PRN   · venlafaxine-SR (EFFEXOR-XR) capsule 75 mg 75 mg Oral DAILY WITH BREAKFAST   · gabapentin (NEURONTIN) capsule 100 mg 100 mg Oral QHS   · arformoteroL (BROVANA) neb solution 15 mcg 15 mcg Nebulization BID RT   And   · budesonide (PULMICORT) 500 mcg/2 ml nebulizer suspension 500 mcg Nebulization BID RT   · sodium chloride (NS) flush 5-40 mL 5-40 mL IntraVENous Q8H   · acetaminophen (TYLENOL) tablet 650 mg 650 mg Oral Q6H PRN   Or   · acetaminophen (TYLENOL) suppository 650 mg 650 mg Rectal Q6H PRN   · glucose chewable tablet 16 g 4 Tablet Oral PRN   · dextrose (D50W) injection syrg 12.5-25 g 25-50 mL IntraVENous PRN   · glucagon (GLUCAGEN) injection 1 mg 1 mg IntraMUSCular PRN         Allergies   Allergen Reactions   · Ciprofloxacin Anaphylaxis   · Shellfish Derived Anaphylaxis   · Ace Inhibitors Unknown (comments)   · Biaxin [Clarithromycin] Other (comments)   Metal taste   · Candesartan Cough   · Pcn [Penicillins] Hives     Past Medical History:   Diagnosis Date   · Acquired hypothyroidism 8/15/2016   · Anemia   RED-HF study   · Asthma   · Cardiomyopathy, nonischemic ([address]   initial dx 2001, bivHF 2008 with EF 15%, s/p biV-ICD 9/08, significant improvment in EF to 45-50%   · CKD (chronic kidney disease)   Dr Bruna Birmingham   · CKD (chronic kidney disease) 8/15/2012   · Depression   · Diabetes ([address]   · Diabetic neuropathy ([address]   · DM (diabetes mellitus) ([facility] 8/15/2012   · GERD (gastroesophageal reflux disease)   · Gout   · Hypothyroidism   · ICD (implantable cardioverter-defibrillator), biventricular, in situ 6/5/2014   · Psoriasis     Past Surgical History:   Procedure Laterality Date   · CARDIAC CATHETERIZATION 2007; 01/06/15   normal cors   · ECHO 2D ADULT 4/2010   EF 45%, improved from 1/09 (25%)   · ECHO 2D ADULT 11/2011   LVH, EF 55-60%   · HX ORTHOPAEDIC   knee   · HX PACEMAKER PLACEMENT   AICD   · STRESS TEST LEXISCAN/CARDIOLITE 3/21/12   normal perfusion, global HK 40%     Family History   Problem Relation Age of Onset   · Heart Disease Mother   · Hypertension Mother   · Lupus Sister   · Diabetes Brother     Social History     Tobacco Use   · Smoking status: Never Smoker   · Smokeless tobacco: Never Used   Substance Use Topics   · Alcohol use:  No         Review of Systems: Unable to perform due to BiPAP in place.         Visit Vitals   /88 (BP 1 Location: Left upper arm, BP Patient Position: At rest)   Pulse (!) 118   Temp 98.9 °F (37.2 °C)   Resp 20   Ht 5' 7\" (1.702 m)   Wt 231 lb 11.3 oz (105.1 kg)   SpO2 98%   BMI 36.29 kg/m²        Physical Exam:   Constitutional: Well-developed and well-nourished. Head: Normocephalic and atraumatic. Eyes: Open, move spontaneously. ENT: BiPAP in place. Unable to talk. Neck: Right cordis with Carylon Harrison. Cardiovascular: Fast rate, regular rhythm. Exam reveals no gallop and no friction rub. 2/6 systolic LSB murmur.     Pulmonary/Chest: Difficult to hear breath sounds due to BiPAP. Abdominal: Soft, obese. GI/: Henley catheter in place. Musculoskeletal: Symmetrical.   Vasc/lymphatic: + right axillary Impella. No edema. Neurological: Responsive to speech. Skin Left side BiV ICD unremarkable. Psychiatric: Calm. Assessment/Plan:     Imaging/Studies:   Limited echo (01/31/2022): LVEF 30%, mod LVH. Limited echo (01/28/2022): LVEF 30%, mod increased wall thickness. Cardiac cath (01/27/2022): Normal right & left side filling pressures.  Severe native one vessel CAD involving mid LAD 80% stenosis.  ANNE-MARIE placed, ballooned.  Also with more prox calcified LAD 50% lesion, not treated.  Moderate PH. Limited echo (01/26/2022): LVEF 25-30%.  Trace pericardial effusion. LHC/RHC (01/17/2022): Severely elevated left & right side filling pressures.  Severe PH, mixed pattern with elevated wedge as well as PVR of about 6 Woods units.  Mid LAD lesion 80% involving diagonal branch ostium.  Likely prior stent in mid LAD, patent.  Reduced CI of 1.65 L/min/m2 by thermodilution & 1.6 L/min/m2 by presumed oxygen consumption by Aye. 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 RHC without milrinone (12/10/2021):  High wedge pressure (35 mmHg) indicating volume overload, precapillary.  Severe pulmonary HTN. Echo (12/08/2021): LVEF 15-20%, upper normal wall thickness, LV diastolic dysfunction.  Borderline low RVEF. Mod dilated LA, mildly dilated RA.  Mild to mod MR. Chris Deng TR.  Mild to mod PH.       LHC/RHC (01/2015): No significant CAD. Mixed CM: Now s/p PCI LAD on 01/27/2022.  Limited echo on 01/28/2022 showed LVEF improved to 30%, stable on recheck 01/31/2022. Did not tolerate weaning of Impella    Nuclear amyloid scan equivocal.  cMRI not possible with 4194 LV lead (2008).  However, cMRI wouldn't . S/p Impella 1/18/22 as bridge to possible transplant or to improvement   It is on P6 this morning and attempting to be weaned. Shanti Cruz not responding and not candidate for heart/ renal transplant, then she will be referred to hospice. [facility] transplant has been contacted by Dr Jeremy Hammonds, but if LV function improved she will not be candidate for transplant.       Medtronic biventricular ICD (gen change 01/14/2015, leads 09/25/2008):  Device reached BETTY 1/22/2022.  Replacement is on hold while she is still on Impella and is waiting for transplant eligibility.  Shocks for VF drained battery further.  I have talked with her  already regarding generator change, & he gave consent to proceed whenever ready.       Questionable biventricular pacing on telemetry.  Check 12 lead ECG.     CKD: Worse now. Tomas Lai had PCI last week but Impella is also being weaned off and she was on diuretic.  Bumex IV gtt now discontinued, receiving 1 mg IV bid. Anemia: EGD and colonoscopy on 01/26/2021, had cecal polyps (tubular adenomas per pathology).           Thank you for involving me in this patient's care and please call with further concerns or questions. Shen Pearson M.D.    Electrophysiology/Cardiology   [facility] [place]   [address], [place] [address], [place] Gisella Sol[place], [address] [place] [address] [address] [facility] [address]

## 2022-02-02 NOTE — PROGRESS NOTES
Cardiac Electrophysiology Hospital Progress Note     Subjective:       Rohith Gibbons is a 78 y.o. patient who is s/p Impella, has Medtronic biventricular ICD (gen change 01/421218, leads 09/25/2008) that has been BETTY. Reportedly with NSVT yesterday by nurse, so I recommended amiodarone but telemetry review shows narrow complex tachycardia more consistent with AFL with RVR. Rates 120s-130s.  Amiodarone IV bolus given, now on IV gtt @ 0.5. Patient made DNR/DNI yesterday.       Interim:   S/p Impella placement on 01/18/2022, now in CCU.  She had VF 1/19, has appearance of Torsades.  Required ICD shock x 2, both successful. Some brief NSVT since then, but nothing sustained. Now s/p PCI LAD on 01/27/2022.  Right & left side pressures improved compared to prior cath on 01/17/2022.  LVEF improved to 30% per echo on 01/28/2022, stable on echo 01/31/2022. Weaned off nitric oxide & vent, now on BiPAP.  Attempting to wean Impella, thus far not able to do so.           HPI:   Presented to the ER on 01/04/2021 with hypoxia, improved on supplemental oxygen. Labs showed stable anemia.  WBC elevated, hyponatremic, hypokalemic.  D dimer elevated.  Chronic CKD. Nephrologist spoke to me directly regarding severe renal failure, but not much worse than last admission. Rapid COVID negative.  CXR showed pulmonary edema vs atypical pneumonia.  VQ scan showed low probability for PE.       NICM, LVEF 15-20% during recent admission.  LVEF previously normalized with CRT.  NYHA III-IV.  No ACEi/ARB due to renal dysfunction.  GDMT dosing limited by low BP. [address] 12/10/2021 showed severe pulmonary HTN (wedge 34 mmHg, PAP 80 mmHg). Cardiac cath in 2015 at [facility] showed no evidence of CAD. She did require LV lead reprogramming over the summer, but good LV capture since.  Good capture/function when checked during recent admission. BP controlled.      PICC line placed 01/10/2022 in anticipation of home milrinone. Previous:   LVEF noted 15-20% in 12/2021. S/p Medtronic biventricular ICD (gen change 01/779831, leads 09/25/2008).     CKD stage IV.        Previously followed by Dr. Jordan Florentino, states did not follow him to new practice.          Problem List       Acute respiratory failure with hypoxia [facility]) ICD-10-CM: J96.01   ICD-9-CM: 518.81 1/4/2022     CHF exacerbation (HCC) ICD-10-CM: I50.9   ICD-9-CM: 428.0 12/6/2021     Type 2 diabetes mellitus with diabetic neuropathy (HCC) ICD-10-CM: E11.40   ICD-9-CM: 250.60, 357.2 1/2/2020     Type 2 diabetes with nephropathy ([facility] [address] ICD-10-CM: E11.21   ICD-9-CM: 250.40, 583.81 4/3/2018     Obesity, morbid ([facility] [address] ICD-10-CM: E66.01   ICD-9-CM: 278.01 12/8/2017     Acquired hypothyroidism ICD-10-CM: E03.9   ICD-9-CM: 244.9 8/15/2016     Dysthymia ICD-10-CM: F34.1   ICD-9-CM: 300.4 8/15/2016     ICD (implantable cardioverter-defibrillator), biventricular, in situ ICD-10-CM: Z95.810   ICD-9-CM: V45.02 6/5/2014   Overview Signed 1/14/2015 11:11 AM by Harini Alvarez MD     Generator Medtronic change 1/14/2015         Dyslipidemia ICD-10-CM: Y43.8   ICD-9-CM: 272.4 1/14/2014     CKD (chronic kidney disease) ICD-10-CM: N18.9   ICD-9-CM: 585.9 8/15/2012     Cardiomyopathy, nonischemic (HCC) ICD-10-CM: I42.8   ICD-9-CM: 425.4 Unknown   Overview Signed 10/10/2011  6:40 AM by Garrett Peters MD     initial dx 2001, bivHF 2008 with EF 15%, s/p biV-ICD 9/08, significant improvment in EF to 45-50%         Anemia in chronic renal disease (Chronic) ICD-10-CM: N18.9, D63.1   ICD-9-CM: 285.21 12/10/2008     HTN (hypertension) ICD-10-CM: I10   ICD-9-CM: 401.9 12/10/2008     GERD (gastroesophageal reflux disease) (Chronic) ICD-10-CM: K21.9   ICD-9-CM: 530.81 12/10/2008     Gout (Chronic) ICD-10-CM: M10.9   ICD-9-CM: 274.9 12/10/2008     Pulmonary HTN (HCC) (Chronic) ICD-10-CM: I27.20   ICD-9-CM: 416.8 12/10/2008        Current Facility-Administered Medications   Medication Dose Route Frequency   · potassium phosphate 30 mmol in dextrose 5% 250 mL infusion IntraVENous ONCE   · epoetin isabel-epbx (RETACRIT) injection 10,000 Units 10,000 Units SubCUTAneous Q MON, WED & FRI   · sodium bicarbonate (8.4%) 25 mEq in dextrose 5% 1,000 mL infusion Other TITRATE   · cefTRIAXone (ROCEPHIN) 2 g in 0.9% sodium chloride 20 mL IV syringe 2 g IntraVENous Q24H   · bumetanide (BUMEX) injection 1 mg 1 mg IntraVENous BID   · potassium bicarb-citric acid (EFFER-K) tablet 40 mEq 40 mEq Oral DAILY   · insulin NPH (NOVOLIN N, HUMULIN N) injection 30 Units 30 Units SubCUTAneous DAILY   · allopurinoL (ZYLOPRIM) tablet 100 mg 100 mg Oral DAILY   · insulin NPH (NOVOLIN N, HUMULIN N) injection 30 Units 30 Units SubCUTAneous QHS   · pantoprazole (PROTONIX) injection 40 mg 40 mg IntraVENous Q12H   And   · sodium chloride (NS) flush 10 mL 10 mL IntraVENous DAILY   · sodium chloride (NS) flush 5-40 mL 5-40 mL IntraVENous PRN   · clopidogreL (PLAVIX) tablet 75 mg 75 mg Oral DAILY   · digoxin (LANOXIN) tablet 0.0625 mg 0.0625 mg Oral EVERY OTHER DAY   · DOBUTamine (DOBUTREX) 500 mg/250 mL (2,000 mcg/mL) infusion 0-10 mcg/kg/min IntraVENous TITRATE   · albumin human 25% (BUMINATE) solution 12.5 g 12.5 g IntraVENous DAILY   · milrinone (PRIMACOR) 20 MG/100 ML D5W infusion 0.125 mcg/kg/min IntraVENous CONTINUOUS   · vasopressin (VASOSTRICT) 20 Units in 0.9% sodium chloride 100 mL infusion 0-0.1 Units/min IntraVENous TITRATE   · PHENYLephrine (NORMA-SYNEPHRINE) 30 mg in 0.9% sodium chloride 250 mL infusion  mcg/min IntraVENous TITRATE   · niCARdipine (CARDENE) 25 mg in 0.9% sodium chloride 250 mL infusion 0-15 mg/hr IntraVENous TITRATE   · [Held by provider] bivalirudin (ANGIOMAX) 250 mg in 0.9% sodium chloride (MBP/ADV) 50 mL MBP 0.02-2.5 mg/kg/hr IntraVENous TITRATE   · hydrALAZINE (APRESOLINE) 20 mg/mL injection 5 mg 5 mg IntraVENous Q4H PRN   · bumetanide (BUMEX) injection 1 mg 1 mg IntraVENous Q4H PRN   · heparin (porcine) in 0.9% NaCl 30,000 unit/1,000 mL perfusion irrigation 50-1,000 mL 50-1,000 mL Other PRN   · chlorhexidine (PERIDEX) 0.12 % mouthwash 15 mL 15 mL Oral Q12H   · 0.9% sodium chloride infusion 9 mL/hr IntraVENous CONTINUOUS   · naloxone (NARCAN) injection 0.4 mg 0.4 mg IntraVENous PRN   · ondansetron (ZOFRAN) injection 4 mg 4 mg IntraVENous Q4H PRN   · albuterol (PROVENTIL VENTOLIN) nebulizer solution 2.5 mg 2.5 mg Nebulization Q4H PRN   · aspirin chewable tablet 81 mg 81 mg Oral DAILY   · magnesium oxide (MAG-OX) tablet 400 mg 400 mg Oral BID   · calcium chloride 1 g in 0.9% sodium chloride 100 mL IVPB 1 g IntraVENous PRN   · bisacodyL (DULCOLAX) suppository 10 mg 10 mg Rectal DAILY PRN   · senna-docusate (PERICOLACE) 8.6-50 mg per tablet 1 Tablet 1 Tablet Oral BID   · ELECTROLYTE REPLACEMENT NOTE: Nurse to review Serum Potassium and Magnesuim levels and Initiate Electrolyte Replacement Protocol as needed 1 Each Other PRN   · magnesium sulfate 1 g/100 ml IVPB (premix or compounded) 1 g IntraVENous PRN   · alteplase (CATHFLO) 1 mg in sterile water (preservative free) 1 mL injection 1 mg InterCATHeter PRN   · bacitracin 500 unit/gram packet 1 Packet 1 Packet Topical PRN   · dexmedeTOMidine in 0.9 % NaCl (PRECEDEX) 400 mcg/100 mL (4 mcg/mL) infusion soln 0.1-1.5 mcg/kg/hr IntraVENous TITRATE   · insulin lispro (HUMALOG) injection SubCUTAneous Q6H   · triamcinolone acetonide (KENALOG) 0.1 % cream Topical BID   · polyethylene glycol (MIRALAX) packet 17 g 17 g Oral DAILY   · montelukast (SINGULAIR) tablet 10 mg 10 mg Oral DAILY   · levothyroxine (SYNTHROID) tablet 100 mcg 100 mcg Oral Once per day on Mon Tue Wed Thu Fri Sat   · hydroxypropyl methylcellulose (ISOPTO TEARS) 0.5 % ophthalmic solution 1 Drop 1 Drop Both Eyes PRN   · venlafaxine-SR (EFFEXOR-XR) capsule 75 mg 75 mg Oral DAILY WITH BREAKFAST   · gabapentin (NEURONTIN) capsule 100 mg 100 mg Oral QHS   · arformoteroL (BROVANA) neb solution 15 mcg 15 mcg Nebulization BID RT   And   · budesonide (PULMICORT) 500 mcg/2 ml nebulizer suspension 500 mcg Nebulization BID RT   · sodium chloride (NS) flush 5-40 mL 5-40 mL IntraVENous Q8H   · acetaminophen (TYLENOL) tablet 650 mg 650 mg Oral Q6H PRN   Or   · acetaminophen (TYLENOL) suppository 650 mg 650 mg Rectal Q6H PRN   · glucose chewable tablet 16 g 4 Tablet Oral PRN   · dextrose (D50W) injection syrg 12.5-25 g 25-50 mL IntraVENous PRN   · glucagon (GLUCAGEN) injection 1 mg 1 mg IntraMUSCular PRN         Allergies   Allergen Reactions   · Ciprofloxacin Anaphylaxis   · Shellfish Derived Anaphylaxis   · Ace Inhibitors Unknown (comments)   · Biaxin [Clarithromycin] Other (comments)   Metal taste   · Candesartan Cough   · Pcn [Penicillins] Hives     Past Medical History:   Diagnosis Date   · Acquired hypothyroidism 8/15/2016   · Anemia   RED-HF study   · Asthma   · Cardiomyopathy, nonischemic ([address]   initial dx 2001, bivHF 2008 with EF 15%, s/p biV-ICD 9/08, significant improvment in EF to 45-50%   · CKD (chronic kidney disease)   Dr Steve Ferris   · CKD (chronic kidney disease) 8/15/2012   · Depression   · Diabetes ([address]   · Diabetic neuropathy ([address]   · DM (diabetes mellitus) ([address] 8/15/2012   · GERD (gastroesophageal reflux disease)   · Gout   · Hypothyroidism   · ICD (implantable cardioverter-defibrillator), biventricular, in situ 6/5/2014   · Psoriasis     Past Surgical History:   Procedure Laterality Date   · CARDIAC CATHETERIZATION 2007; 01/06/15   normal cors   · ECHO 2D ADULT 4/2010   EF 45%, improved from 1/09 (25%)   · ECHO 2D ADULT 11/2011   LVH, EF 55-60%   · HX ORTHOPAEDIC   knee   · HX PACEMAKER PLACEMENT   AICD   · STRESS TEST LEXISCAN/CARDIOLITE 3/21/12   normal perfusion, global HK 40%     Family History   Problem Relation Age of Onset   · Heart Disease Mother   · Hypertension Mother   · Lupus Sister   · Diabetes Brother     Social History     Tobacco Use   · Smoking status: Never Smoker   · Smokeless tobacco: Never Used Substance Use Topics   · Alcohol use: No         Review of Systems: Unable to perform due to BiPAP in place.         Visit Vitals  /89   Pulse (!) 114   Temp 99.1 °F (37.3 °C)   Resp 19   Ht 5' 7\" (1.702 m)   Wt 231 lb 4.2 oz (104.9 kg)   SpO2 94%   BMI 36.22 kg/m²          Physical Exam:   Constitutional: Well-developed and well-nourished. Head: Normocephalic and atraumatic. Eyes: Open, move spontaneously. ENT: BiPAP in place. Unable to talk. Neck: Right cordis with Mary Manuel. Cardiovascular: Fast rate, regular rhythm. Exam reveals no gallop and no friction rub. 2/6 systolic LSB murmur.     Pulmonary/Chest: Difficult to hear breath sounds due to BiPAP. Abdominal: Soft, obese. GI/: Henley catheter in place. Musculoskeletal: Symmetrical.   Vasc/lymphatic: + right axillary Impella. No edema. Neurological: Responsive to speech. Skin Left side BiV ICD unremarkable. Psychiatric: Calm. Assessment/Plan:     Imaging/Studies:   Limited echo (01/31/2022): LVEF 30%, mod LVH. Limited echo (01/28/2022): LVEF 30%, mod increased wall thickness. Cardiac cath (01/27/2022): Normal right & left side filling pressures.  Severe native one vessel CAD involving mid LAD 80% stenosis.  ANNE-MARIE placed, ballooned.  Also with more prox calcified LAD 50% lesion, not treated.  Moderate PH. Limited echo (01/26/2022): LVEF 25-30%.  Trace pericardial effusion. LHC/RHC (01/17/2022): Severely elevated left & right side filling pressures.  Severe PH, mixed pattern with elevated wedge as well as PVR of about 6 Woods units.  Mid LAD lesion 80% involving diagonal branch ostium.  Likely prior stent in mid LAD, patent.  Reduced CI of 1.65 L/min/m2 by thermodilution & 1.6 L/min/m2 by presumed oxygen consumption by Aye. 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 RHC without milrinone (12/10/2021):  High wedge pressure (35 mmHg) indicating volume overload, precapillary.  Severe pulmonary HTN. Echo (12/08/2021): LVEF 15-20%, upper normal wall thickness, LV diastolic dysfunction.  Borderline low RVEF. Mod dilated LA, mildly dilated RA.  Mild to mod MR. Edison Guzman TR.  Mild to mod PH.       LHC/RHC (01/2015): No significant CAD. Mixed CM: Now s/p PCI LAD on 01/27/2022.  Limited echo on 01/28/2022 showed LVEF improved to 30%, stable on recheck 01/31/2022.  Did not tolerate weaning of Impella. Nuclear amyloid scan equivocal.  cMRI not possible with 4194 LV lead (2008).  However, cMRI wouldn't . S/p Impella 1/18/22   Still on P6 this morning and attempting to be weaned.  Per Dr. Elissa Fothergill, she is now not a candidate for transplant.  She feels that current decompensation is due to severe interstitial lung disease & pulmonary HTN.  Patient now DNR/DNI. Medtronic biventricular ICD (gen change 01/14/2015, leads 09/25/2008):  Device reached BETTY 1/22/2022.  Replacement is no longer an option with DNR DNI decision. She is not able to tell me what she wants since she is on BIPAP  I will speak to her  regarding ICD deactivation given current DNR DNI status. AFL with RVR: Noted via telemetry, initially reported as NSVT.  Continue amiodarone IV gtt until further decision is made about comfort care or hospice.       CKD: Worse now. Maddy Judge had PCI last week but Impella is also being weaned off and she was on diuretic.  Bumex IV gtt now discontinued, receiving 1 mg IV bid. PA pressure is up again    Anemia: EGD and colonoscopy on 01/26/2021, had cecal polyps (tubular adenomas per pathology).           Thank you for involving me in this patient's care and please call with further concerns or questions. Eric Prakash M.D.    Electrophysiology/Cardiology   [facility]   [place] [id number]                     [id number] [facility], [facility] [address]                             [address] [place] [address]

## 2022-02-02 NOTE — PROGRESS NOTES
1930: Bedside report received from [facility]  0730: Bedside report given to Montez Tidwell RN  Problem: Falls - Risk of  Goal: *Absence of Falls  Description: Document Adrianna Don Fall Risk and appropriate interventions in the flowsheet. Outcome: Progressing Towards Goal     Problem: Patient Education: Go to Patient Education Activity  Goal: Patient/Family Education  Outcome: Progressing Towards Goal     Problem: Patient Education: Go to Patient Education Activity  Goal: Patient/Family Education  Outcome: Progressing Towards Goal     Problem: Patient Education: Go to Patient Education Activity  Goal: Patient/Family Education  Outcome: Progressing Towards Goal     Problem: Diabetes Self-Management  Goal: *Disease process and treatment process  Description: Define diabetes and identify own type of diabetes; list 3 options for treating diabetes. Outcome: Progressing Towards Goal  Goal: *Incorporating nutritional management into lifestyle  Description: Describe effect of type, amount and timing of food on blood glucose; list 3 methods for planning meals. Outcome: Progressing Towards Goal  Goal: *Incorporating physical activity into lifestyle  Description: State effect of exercise on blood glucose levels. Outcome: Progressing Towards Goal  Goal: *Developing strategies to promote health/change behavior  Description: Define the ABC's of diabetes; identify appropriate screenings, schedule and personal plan for screenings. Outcome: Progressing Towards Goal  Goal: *Using medications safely  Description: State effect of diabetes medications on diabetes; name diabetes medication taking, action and side effects. Outcome: Progressing Towards Goal  Goal: *Monitoring blood glucose, interpreting and using results  Description: Identify recommended blood glucose targets  and personal targets.   Outcome: Progressing Towards Goal  Goal: *Prevention, detection, treatment of acute complications  Description: List symptoms of hyper- and hypoglycemia; describe how to treat low blood sugar and actions for lowering  high blood glucose level. Outcome: Progressing Towards Goal  Goal: *Prevention, detection and treatment of chronic complications  Description: Define the natural course of diabetes and describe the relationship of blood glucose levels to long term complications of diabetes. Outcome: Progressing Towards Goal  Goal: *Developing strategies to address psychosocial issues  Description: Describe feelings about living with diabetes; identify support needed and support network  Outcome: Progressing Towards Goal  Goal: *Insulin pump training  Outcome: Progressing Towards Goal  Goal: *Sick day guidelines  Outcome: Progressing Towards Goal  Goal: *Patient Specific Goal (EDIT GOAL, INSERT TEXT)  Outcome: Progressing Towards Goal     Problem: Patient Education: Go to Patient Education Activity  Goal: Patient/Family Education  Outcome: Progressing Towards Goal     Problem: Diabetes Self-Management  Goal: *Disease process and treatment process  Description: Define diabetes and identify own type of diabetes; list 3 options for treating diabetes. Outcome: Progressing Towards Goal  Goal: *Incorporating nutritional management into lifestyle  Description: Describe effect of type, amount and timing of food on blood glucose; list 3 methods for planning meals. Outcome: Progressing Towards Goal  Goal: *Incorporating physical activity into lifestyle  Description: State effect of exercise on blood glucose levels. Outcome: Progressing Towards Goal  Goal: *Developing strategies to promote health/change behavior  Description: Define the ABC's of diabetes; identify appropriate screenings, schedule and personal plan for screenings. Outcome: Progressing Towards Goal  Goal: *Using medications safely  Description: State effect of diabetes medications on diabetes; name diabetes medication taking, action and side effects.   Outcome: Progressing Towards Goal  Goal: *Monitoring blood glucose, interpreting and using results  Description: Identify recommended blood glucose targets  and personal targets. Outcome: Progressing Towards Goal  Goal: *Prevention, detection, treatment of acute complications  Description: List symptoms of hyper- and hypoglycemia; describe how to treat low blood sugar and actions for lowering  high blood glucose level. Outcome: Progressing Towards Goal  Goal: *Prevention, detection and treatment of chronic complications  Description: Define the natural course of diabetes and describe the relationship of blood glucose levels to long term complications of diabetes.   Outcome: Progressing Towards Goal  Goal: *Developing strategies to address psychosocial issues  Description: Describe feelings about living with diabetes; identify support needed and support network  Outcome: Progressing Towards Goal  Goal: *Insulin pump training  Outcome: Progressing Towards Goal  Goal: *Sick day guidelines  Outcome: Progressing Towards Goal  Goal: *Patient Specific Goal (EDIT GOAL, INSERT TEXT)  Outcome: Progressing Towards Goal     Problem: Patient Education: Go to Patient Education Activity  Goal: Patient/Family Education  Outcome: Progressing Towards Goal

## 2022-02-02 NOTE — PROGRESS NOTES
I spoke to her  and he said DNR DNI and ok with ICD shock off   He wishes to bring her home for hospice but with Impella she cannot  I have called [employer] rep Cleatus Heir to turn off ICD  Her device is BETTY any way

## 2022-02-02 NOTE — PROGRESS NOTES
[facility]   [address] [facility], [address] [facility], later [facility]  Phone: [phone]   [phone]    [email or web address]     Nephrology Progress Note    Patient Name : Edith Crawford      : 1955     MRN : [id number]  Date of Admission : 2022  Date of Servive : 22    CC:  Follow up for ARF       Assessment and Plan   RODNEY on CKD :  - CRS. - Cr now rising from over diuresis and weaning cardiac support   - Not suspecting contrast nephropathy from 20 cc IV contrast on    - Continue to hold Bumex   - start D5W   - Poor candidate for RRT  - appreciate everyone`s efforts with her complex situation !! Hypernatremia   - unable to get free water   - start D5W and hopefully her mentation will improve     Hypercalcemia   - 2/2 IVVD from diuretics, worsened by thiazide use   - follow up PTH, Vitamin D   - Ca trending down     Hypokalemia   Hypo Phos   - replace PRN    CKD stage IV:  - Etiology: DM, HTN, CRS  - Renal US: suggestive of CKD, B/L benign renal cysts   - baseline Cr 2.4-2.6 mg/dl      Acute on chronic HFrEF  NI CMP, LVEF 15 to 20%  NYHA class III-IV  S/p BiV pacer/ICD-s/p CRT  R axillary Impella implanted 22  LAD PCI on 22    Morbid obesity  Type II DM  HTN  Hypothyroidism  Pulmonary hypertension  Gout  Psoriasis       Interval History:  Was made DNR/DNI yesterday. She was gagging while on BIPAP. TF stopped and not getting FW  Na and Cr worse. Maintained UOP  cc/ hr overnight. ABG - hypoxemia. SVO2 60s, CVP 5-6   Remained on Impella at P-6, high PA pressures     Review of Systems: Review of systems not obtained due to patient factors.     Current Medications:   Current Facility-Administered Medications   Medication Dose Route Frequency    dextrose 5% infusion  75 mL/hr IntraVENous CONTINUOUS    insulin NPH (NOVOLIN N, HUMULIN N) injection 40 Units  40 Units SubCUTAneous QHS    insulin NPH (NOVOLIN N, HUMULIN N) injection 40 Units  40 Units SubCUTAneous DAILY    amiodarone (CORDARONE) 375 mg/250 mL D5W infusion  0.5-1 mg/min IntraVENous CONTINUOUS    morphine injection 2 mg  2 mg IntraVENous Q3H PRN    epoetin isabel-epbx (RETACRIT) injection 10,000 Units  10,000 Units SubCUTAneous Q MON, WED & FRI    sodium bicarbonate (8.4%) 25 mEq in dextrose 5% 1,000 mL infusion   Other TITRATE    cefTRIAXone (ROCEPHIN) 2 g in 0.9% sodium chloride 20 mL IV syringe  2 g IntraVENous Q24H    [Held by provider] bumetanide (BUMEX) injection 1 mg  1 mg IntraVENous BID    potassium bicarb-citric acid (EFFER-K) tablet 40 mEq  40 mEq Oral DAILY    allopurinoL (ZYLOPRIM) tablet 100 mg  100 mg Oral DAILY    pantoprazole (PROTONIX) injection 40 mg  40 mg IntraVENous Q12H    And    sodium chloride (NS) flush 10 mL  10 mL IntraVENous DAILY    sodium chloride (NS) flush 5-40 mL  5-40 mL IntraVENous PRN    clopidogreL (PLAVIX) tablet 75 mg  75 mg Oral DAILY    digoxin (LANOXIN) tablet 0.0625 mg  0.0625 mg Oral EVERY OTHER DAY    DOBUTamine (DOBUTREX) 500 mg/250 mL (2,000 mcg/mL) infusion  0-10 mcg/kg/min IntraVENous TITRATE    albumin human 25% (BUMINATE) solution 12.5 g  12.5 g IntraVENous DAILY    milrinone (PRIMACOR) 20 MG/100 ML D5W infusion  0.125 mcg/kg/min IntraVENous CONTINUOUS    vasopressin (VASOSTRICT) 20 Units in 0.9% sodium chloride 100 mL infusion  0-0.1 Units/min IntraVENous TITRATE    PHENYLephrine (NORMA-SYNEPHRINE) 30 mg in 0.9% sodium chloride 250 mL infusion   mcg/min IntraVENous TITRATE    niCARdipine (CARDENE) 25 mg in 0.9% sodium chloride 250 mL infusion  0-15 mg/hr IntraVENous TITRATE    [Held by provider] bivalirudin (ANGIOMAX) 250 mg in 0.9% sodium chloride (MBP/ADV) 50 mL MBP  0.02-2.5 mg/kg/hr IntraVENous TITRATE    hydrALAZINE (APRESOLINE) 20 mg/mL injection 5 mg  5 mg IntraVENous Q4H PRN    bumetanide (BUMEX) injection 1 mg  1 mg IntraVENous Q4H PRN    heparin (porcine) in 0.9% NaCl 30,000 unit/1,000 mL perfusion irrigation 50-1,000 mL  50-1,000 mL Other PRN    chlorhexidine (PERIDEX) 0.12 % mouthwash 15 mL  15 mL Oral Q12H    0.9% sodium chloride infusion  9 mL/hr IntraVENous CONTINUOUS    naloxone (NARCAN) injection 0.4 mg  0.4 mg IntraVENous PRN    ondansetron (ZOFRAN) injection 4 mg  4 mg IntraVENous Q4H PRN    albuterol (PROVENTIL VENTOLIN) nebulizer solution 2.5 mg  2.5 mg Nebulization Q4H PRN    aspirin chewable tablet 81 mg  81 mg Oral DAILY    magnesium oxide (MAG-OX) tablet 400 mg  400 mg Oral BID    calcium chloride 1 g in 0.9% sodium chloride 100 mL IVPB  1 g IntraVENous PRN    bisacodyL (DULCOLAX) suppository 10 mg  10 mg Rectal DAILY PRN    senna-docusate (PERICOLACE) 8.6-50 mg per tablet 1 Tablet  1 Tablet Oral BID    ELECTROLYTE REPLACEMENT NOTE: Nurse to review Serum Potassium and Magnesuim levels and Initiate Electrolyte Replacement Protocol as needed  1 Each Other PRN    magnesium sulfate 1 g/100 ml IVPB (premix or compounded)  1 g IntraVENous PRN    alteplase (CATHFLO) 1 mg in sterile water (preservative free) 1 mL injection  1 mg InterCATHeter PRN    bacitracin 500 unit/gram packet 1 Packet  1 Packet Topical PRN    dexmedeTOMidine in 0.9 % NaCl (PRECEDEX) 400 mcg/100 mL (4 mcg/mL) infusion soln  0.1-1.5 mcg/kg/hr IntraVENous TITRATE    insulin lispro (HUMALOG) injection   SubCUTAneous Q6H    triamcinolone acetonide (KENALOG) 0.1 % cream   Topical BID    polyethylene glycol (MIRALAX) packet 17 g  17 g Oral DAILY    montelukast (SINGULAIR) tablet 10 mg  10 mg Oral DAILY    levothyroxine (SYNTHROID) tablet 100 mcg  100 mcg Oral Once per day on Mon Tue Wed Thu Fri Sat    hydroxypropyl methylcellulose (ISOPTO TEARS) 0.5 % ophthalmic solution 1 Drop  1 Drop Both Eyes PRN    venlafaxine-SR (EFFEXOR-XR) capsule 75 mg  75 mg Oral DAILY WITH BREAKFAST    gabapentin (NEURONTIN) capsule 100 mg  100 mg Oral QHS    arformoteroL (BROVANA) neb solution 15 mcg  15 mcg Nebulization BID RT And    budesonide (PULMICORT) 500 mcg/2 ml nebulizer suspension  500 mcg Nebulization BID RT    sodium chloride (NS) flush 5-40 mL  5-40 mL IntraVENous Q8H    acetaminophen (TYLENOL) tablet 650 mg  650 mg Oral Q6H PRN    Or    acetaminophen (TYLENOL) suppository 650 mg  650 mg Rectal Q6H PRN    glucose chewable tablet 16 g  4 Tablet Oral PRN    dextrose (D50W) injection syrg 12.5-25 g  25-50 mL IntraVENous PRN    glucagon (GLUCAGEN) injection 1 mg  1 mg IntraMUSCular PRN      Allergies   Allergen Reactions    Ciprofloxacin Anaphylaxis    Shellfish Derived Anaphylaxis    Sildenafil Other (comments)    Ace Inhibitors Unknown (comments)    Biaxin [Clarithromycin] Other (comments)     Metal taste    Candesartan Cough    Pcn [Penicillins] Hives       Objective:  Vitals:    Vitals:    02/02/22 0400 02/02/22 0422 02/02/22 0500 02/02/22 0600   BP: (!) 125/97  (!) 124/101 110/83   Pulse: (!) 127  (!) 128 (!) 127   Resp: (!) 34  26 25   Temp: 97.5 °F (36.4 °C)      SpO2: 100% 100% 100% 100%   Weight: 104.9 kg (231 lb 4.2 oz)      Height:         Intake and Output:  02/01 1901 - 02/02 0700  In: 418.5 [I.V.:368.5]  Out: 640 [Urine:640]  01/31 0701 - 02/01 1900  In: 4801 [I.V.:1631]  Out: 2705 [Urine:2705]    Physical Examination:  General: Lethargic, BIPAP  HEENT:           DHT   Neck:  Supple, no mass  Resp:  Diminished at bases   CV:  RRR, no LE edema   GI:  Soft, NT, + BS, obese  Neurologic:  Lethargic   :                  Henley in place    [x]    High complexity decision making was performed  []    Patient is at high-risk of decompensation with multiple organ involvement    Lab Data Personally Reviewed: I have reviewed all the pertinent labs, microbiology data and radiology studies during assessment.     Recent Labs     02/02/22  0403 02/01/22  0421 01/31/22  0444   * 154* 151*   K 4.0 3.4* 3.5   * 115* 115*   CO2 34* 33* 34*   * 270* 251*   BUN 98* 86* 80*   CREA 2.95* 2.84* 2.56*   CA 10.0 10.2*  10.6* 10.4*   MG  --  2.9* 2.9*   PHOS 3.1 1.6* 2.6   ALB 4.0 3.7 3.6   ALT 7* 10* 9*     Recent Labs     02/01/22  0421 01/31/22  0444   WBC 16.5* 11.3*   HGB 8.7* 8.2*   HCT 33.0* 30.9*    313     Lab Results   Component Value Date/Time    Specimen Description: URINE 10/22/2013 01:32 PM    Specimen Description: URINE 01/23/2013 04:40 PM    Specimen Description: LEG TISSUE 02/12/2009 12:00 AM    Specimen Description: LEG (LEFT) 01/29/2009 03:06 AM     Lab Results   Component Value Date/Time    Culture result: NO GROWTH 5 DAYS 01/25/2022 12:36 PM    Culture result: MODERATE ESCHERICHIA COLI (A) 01/25/2022 12:36 PM    Culture result: MODERATE NORMAL RESPIRATORY ROSANNA 01/25/2022 12:36 PM    Culture result: MRSA NOT PRESENT 01/19/2022 12:41 PM    Culture result:  01/19/2022 12:41 PM     Screening of patient nares for MRSA is for surveillance purposes and, if positive, to facilitate isolation considerations in high risk settings. It is not intended for automatic decolonization interventions per se as regimens are not sufficiently effective to warrant routine use. Culture result: NO GROWTH 5 DAYS 01/19/2022 12:41 PM     Recent Results (from the past 24 hour(s))   GLUCOSE, POC    Collection Time: 02/01/22 11:37 AM   Result Value Ref Range    Glucose (POC) 316 (H) 65 - 117 mg/dL    Performed by Ina OH(RN)    EKG, 12 LEAD, INITIAL    Collection Time: 02/01/22 12:14 PM   Result Value Ref Range    Ventricular Rate 129 BPM    Atrial Rate 37 BPM    QRS Duration 140 ms    Q-T Interval 400 ms    QTC Calculation (Bezet) 586 ms    Calculated R Axis -5 degrees    Calculated T Axis 87 degrees    Diagnosis       Wide QRS tachycardia  Nonspecific intraventricular block  T wave abnormality, consider lateral ischemia  When compared with ECG of 26-JAN-2022 10:59,  Wide QRS tachycardia has replaced Wide QRS rhythm  Vent.  rate has increased BY  49 BPM     GLUCOSE, POC    Collection Time: 02/01/22  6:05 PM Result Value Ref Range    Glucose (POC) 379 (H) 65 - 117 mg/dL    Performed by [facility](RN)    GLUCOSE, POC    Collection Time: 02/01/22  9:02 PM   Result Value Ref Range    Glucose (POC) 372 (H) 65 - 117 mg/dL    Performed by [address] [place] [address] St, POC    Collection Time: 02/01/22 11:57 PM   Result Value Ref Range    Glucose (POC) 348 (H) 65 - 117 mg/dL    Performed by [facility],[address]    Collection Time: 02/02/22  4:03 AM   Result Value Ref Range    Digoxin level 0.9 0.90 - 2.00 NG/ML   PROCALCITONIN    Collection Time: 02/02/22  4:03 AM   Result Value Ref Range    Procalcitonin 0.98 ng/mL   PHOSPHORUS    Collection Time: 02/02/22  4:03 AM   Result Value Ref Range    Phosphorus 3.1 2.6 - 4.7 MG/DL   PTT    Collection Time: 02/02/22  4:03 AM   Result Value Ref Range    aPTT 24.9 22.1 - 31.0 sec    aPTT, therapeutic range     58.0 - 77.0 SECS   SAMPLES BEING HELD    Collection Time: 02/02/22  4:03 AM   Result Value Ref Range    SAMPLES BEING HELD 2SST     COMMENT        Add-on orders for these samples will be processed based on acceptable specimen integrity and analyte stability, which may vary by analyte. LD    Collection Time: 02/02/22  4:03 AM   Result Value Ref Range     (H) 81 - 200 U/L   METABOLIC PANEL, COMPREHENSIVE    Collection Time: 02/02/22  4:03 AM   Result Value Ref Range    Sodium 152 (H) 136 - 145 mmol/L    Potassium 4.0 3.5 - 5.1 mmol/L    Chloride 113 (H) 97 - 108 mmol/L    CO2 34 (H) 21 - 32 mmol/L    Anion gap 5 5 - 15 mmol/L    Glucose 256 (H) 65 - 100 mg/dL    BUN 98 (H) 6 - 20 MG/DL    Creatinine 2.95 (H) 0.55 - 1.02 MG/DL    BUN/Creatinine ratio 33 (H) 12 - 20      GFR est AA 19 (L) >60 ml/min/1.73m2    GFR est non-AA 16 (L) >60 ml/min/1.73m2    Calcium 10.0 8.5 - 10.1 MG/DL    Bilirubin, total 0.6 0.2 - 1.0 MG/DL    ALT (SGPT) 7 (L) 12 - 78 U/L    AST (SGOT) 21 15 - 37 U/L    Alk.  phosphatase 77 45 - 117 U/L    Protein, total 7.9 6.4 - 8.2 g/dL    Albumin 4.0 3.5 - 5.0 g/dL    Globulin 3.9 2.0 - 4.0 g/dL    A-G Ratio 1.0 (L) 1.1 - 2.2     NT-PRO BNP    Collection Time: 02/02/22  4:03 AM   Result Value Ref Range    NT pro-BNP >35,000 (H) <125 PG/ML   POC G3 - PUL    Collection Time: 02/02/22  4:25 AM   Result Value Ref Range    FIO2 (POC) 50 %    pH (POC) 7.43 7.35 - 7.45      pCO2 (POC) 49.6 (H) 35.0 - 45.0 MMHG    pO2 (POC) 107 (H) 80 - 100 MMHG    HCO3 (POC) 32.9 (H) 22 - 26 MMOL/L    sO2 (POC) 98.2 (H) 92 - 97 %    Base excess (POC) 7.5 mmol/L    Site RIGHT RADIAL      Device: BIPAP MASK      Set Rate 8 bpm    PEEP/CPAP (POC) 12 cmH2O    Pressure support 5 cmH2O    Allens test (POC) Positive      Specimen type (POC) ARTERIAL     GLUCOSE, POC    Collection Time: 02/02/22  5:27 AM   Result Value Ref Range    Glucose (POC) 263 (H) 65 - 117 mg/dL    Performed by [facility]            I have reviewed the flowsheets. Chart and Pertinent Notes have been reviewed. No change in PMH ,family and social history from Consult note.       Emelyn Nixon [facility]

## 2022-02-02 NOTE — PROGRESS NOTES
[facility] in [place], [place]  Inpatient Progress Note      Patient name: Parker Lucero  Patient : 1955  Patient MRN: [id number]  Consulting MD: Gracy Grace MD  Date of service: 22    REASON FOR REFERRAL:  Management of cardiogenic shock     PLAN OF CARE:   Please see note from Dr. Arjun Bustamante:  POD # 15 Impella 5.5 implant   Cont Impella P6   Repeat TTE today   Pulmonary hygiene, NC, Bipap management per intensivist   Maintain PAC for hemodynamic optimization; goal CI > 2.3, CVP 8-10 mmHg SVR 1000, SBP < 110 mmHg (via radial arterial line)   Intolerant to sildenafil due to marked hypotension   Free H2O and TF on hold  D5W per nephrology  Keep K> 4 and Mg >2  Hydralazine 5 mg IV q4h PRN SBP > 110 mmHg   Intolerant of GDMT due to hemodynamic in stability   BiV-ICD programmed at 80 bpm; device is at EOL, recommend to delay ICD generator change until final plans for transplant are established - Per Dr. Marlen Moran patient is not pacer dependent  Cont digoxin 0.0625 mg to every other day; goal 0.7-1.2   Allopurinol 50mg daily per nephrology  S/p Venofer 200 mg IV x 2   CT head no acute abnormalitiy  CTchest/abd/pelvis w/o contrast for txp eval- no acute abnormalities, no masses  GI scopes complete, + tubular adenomas,  no H. Pylori,   Continue ASA/Plavix for LAD stent  Epo level elevated- hold epogen and follow levels  Blood cultures NGTD  Sputum with E Coli  On ceftriaxone  Bival on hold for now   Palliative consult appreciated     Agree with DNR/DNI, family discussion this afternoon with  but agree with transitioning to comfort care at this time.       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 Important upcoming milestones or family events: none  The patient identifies the following as important for living well: remaining idependent and mobile; being able to get out of the house with . Patient verbalized willingness to be on home milrinone and evaluation for both heart and kidney transplants. Verbalizes she would have family supporting her decision on this. SHAMIKA Siegel is a 78 y.o.  female with a history of NICM, chronic hypoxic respiratory failure secondary to pulmonary HTN, hypothyroidism, CKD, GERD, and DM II who presented to [facility] as a transfer from another facility for acute on chronic hypoxic respiratory failure. Reports running out of O2 at home resulting in SOB and dizziness. Upon arrival to the ED she was found to have O2 sats in the 70s, requiring 4L NC O2. Rapid covid test was negative. ProNT-BNP was 36758, K+5.2, BUN/CR x/2.54 and elevated d-dimer. Chest xray showing pulmonary edema vs. Atypical pneumonia. VQ scan showed low probability for PE. Per Dr. Darrell Waddell, [facility], LVEF 15-20% during recent admission. LVEF previously normalized with CRT. NYHA III-IV. No ACEi/ARB due to renal dysfunction. GDMT dosing limited by low BP. Patient's PCP is Dr. Dio Ennis, and she sees Dr. Darrell Waddell primarily for cardiac care due to Dr. [facility] transfering practice.   Patient historically seen by nephrology but has not had follow up care in the past three years. CARDIAC IMAGING:  Echo 1/20/22 - LVEF 20-25%, RV moderately dilated, Impella catheter 5.9 cm from AV   Echo 12/8/21- LVEF 15-20%, mild to Mod MR, LVIDd 5.09cm, TAPSE 1.94cm  Echo 4/22/19- LVEF 60%, trace MR,  LVIDd 4.24cm, TAPSE 1.65cm   Echo 4/24/18- LVEF 60%, trivial MR, LVIDd 4.79cm, TAPSE 2.25cm      EKG- 1/4/22 ST with A sense and V paced rhythm     [facility] 2015- No significant CAD  NST 2014- reversible LAD involvement      ICD interrogation     HEMODYNAMICS:  [facility] 1/17/21: PAP 83/52 mmHg, RAP 27 mmHg, PCWP 45 mmHg, CI 1.6  [facility] 12/10/21: PAP 76/48/57, RAP 20, PCWP 35, CI 2.26     CPEST not done  6MW not done     OTHER IMAGING:  CXR Results  (Last 48 hours)                             01/13/22 1035   XR CHEST PORT Final result      Impression:   No significant change in congestion and interstitial and alveolar   opacities which may reflect edema or infectious infiltrate. Narrative:   EXAM: XR CHEST PORT       INDICATION: pul edema       COMPARISON: Chest x-ray 1/10/2022. FINDINGS: A portable AP radiograph of the chest was obtained at 10:19 hours. The   patient is on a cardiac monitor. The lungs appear grossly stable with congestion   and interstitial/airspace opacities with no pneumothorax or pleural effusion. Right PICC line traverses expected course of tip in the region of the atriocaval   junction. Pacemaker-ICD generator body projects over the left chest wall with   intact appearing leads traversing in expected course. . The cardiac and   mediastinal contours and pulmonary vascularity are normal.  Atherosclerotic   calcifications affect the aortic arch. The chest wall structures and visualized   upper abdomen show no acute findings with incidental note of degenerative spine   and shoulder changes.                       CT Results (most recent):  Results from Hospital Encounter encounter on 01/04/22    CT ABD PELV WO CONT    Narrative  EXAM: CT ABD PELV WO CONT, CT CHEST WO CONT    INDICATION: LVAD/TRANSPLANT WORK UP    COMPARISON: Chest x-ray of earlier today, abdominal ultrasound 1/22/2022, CT  abdomen pelvis 1/16/2018. TECHNIQUE:  5 mm axial images were obtained through the chest, abdomen, and  pelvis. Oral and IV contrast were not administered. Coronal and sagittal  reconstructions were generated. CT dose reduction was achieved through use of a  standardized protocol tailored for this examination and automatic exposure  control for dose modulation. FINDINGS:    THYROID: No nodule. MEDIASTINUM: No mass or lymphadenopathy. ZEENAT: No mass or lymphadenopathy. THORACIC AORTA: Atherosclerotic calcination without aneurysm. MAIN PULMONARY ARTERY: Normal in caliber. TRACHEA/BRONCHI: Endotracheal tube in expected position. Patent. ESOPHAGUS: No dilation or wall thickening. Indwelling enteric tube traverses  length of esophagus to extend into the stomach. HEART: Impella device, pacemaker leads, and coronary artery calcifications noted  without cardiac enlargement or pericardial effusion. PLEURA: No effusion or pneumothorax. LUNGS: Patchy areas of focal airspace opacity and ground glass opacity with  linear posterior and bibasilar atelectasis. No nodule or mass. LIVER: No mass or biliary dilation. GALLBLADDER: Unremarkable. SPLEEN: Borderline enlarged with no focal lesion. PANCREAS: No mass or ductal dilation. ADRENALS: Unremarkable. KIDNEYS: No significant change in 10 mm left renal pelvis calculus without  hydronephrosis. Exophytic lateral interpolar right renal cyst and anterior  interpolar left renal cyst redemonstrated. No other calculi or solid renal mass  evident. STOMACH: Indwelling enteric tube. Otherwise unremarkable  SMALL BOWEL: No dilatation or wall thickening. COLON: No dilatation or wall thickening. APPENDIX: Unremarkable. PERITONEUM: No ascites or pneumoperitoneum. RETROPERITONEUM: Atherosclerotic calcification without aneurysm.  No enlarged  lymphadenopathy. REPRODUCTIVE ORGANS: Coarse calcifications compatible with uterine leiomyoma  with otherwise unremarkable appearance to uterus and ovaries. URINARY BLADDER: Collapsed around Henley catheter and balloon. BONES: Degenerative spine change. No acute fracture or aggressive lesion. ADDITIONAL COMMENTS: N/A    Impression  1. Groundglass and scattered focal airspace opacities within the lungs  concerning for possible pneumonic infiltrates. 2. Support lines as above. 3. No acute intra-abdominal process with redemonstration of nonobstructing left  renal pelvis 10 mm stone and additional incidentals as above. PHYSICAL EXAM:  Visit Vitals  Visit Vitals  BP 99/81   Pulse (!) 127   Temp 99.1 °F (37.3 °C)   Resp (!) 31   Ht 5' 7\" (1.702 m)   Wt 231 lb 4.2 oz (104.9 kg)   SpO2 90%   BMI 36.22 kg/m²          Hemodynamics:   CO: CO (l/min): 6.7 l/min   CI: CI (l/min/m2): 3 l/min/m2   CVP: CVP (mmHg): 4 mmHg (02/02/22 0800)   SVR: SVR (dyne*sec)/cm5: 991 (dyne*sec)/cm5 (02/02/22 3708)   PAP Systolic: PAP Systolic: 83 (36/35/42 0229)   PAP Diastolic: PAP Diastolic: 43 (55/84/61 8021)   PVR:     SV02: SVO2 (%): 70 % (02/02/22 0900)   SCV02:      Impella 5.5  P-6  Flow: 3.6lpm   Purge flow 7.8    Physical Exam  Physical Exam  Vitals and nursing note reviewed. Cardiovascular:      Rate and Rhythm: Normal rate and regular rhythm. Pulses: Normal pulses. Heart sounds: Normal heart sounds. Pulmonary:      Effort: Pulmonary effort is normal. No respiratory distress. Breath sounds: Decreased air movement present. Abdominal:      General: There is no distension. Palpations: Abdomen is soft. Musculoskeletal:         General: No swelling. Skin:     General: Skin is warm and dry. Neurological:      Mental Status: She is alert.           Review of Systems   Unable to perform ROS: Acuity of condition          PAST MEDICAL HISTORY:  Past Medical History:   Diagnosis Date Acquired hypothyroidism 8/15/2016    Anemia     RED-HF study    Asthma     Cardiomyopathy, nonischemic ([facility]     initial dx 2001, bivHF 2008 with EF 15%, s/p biV-ICD 9/08, significant improvment in EF to 45-50%    CKD (chronic kidney disease)     Dr Tank Lerner    CKD (chronic kidney disease) 8/15/2012    Depression     Diabetes ([facility]     Diabetic neuropathy (HCC)     DM (diabetes mellitus) ([facility] 8/15/2012    GERD (gastroesophageal reflux disease)     Gout     Hypothyroidism     ICD (implantable cardioverter-defibrillator), biventricular, in situ 6/5/2014    Psoriasis        PAST SURGICAL HISTORY:  Past Surgical History:   Procedure Laterality Date    CARDIAC CATHETERIZATION  2007; 01/06/15    normal cors    COLONOSCOPY N/A 1/26/2022    COLONOSCOPY performed by Cam Allen MD at [facility]    ECHO 2D ADULT  4/2010    EF 45%, improved from 1/09 (25%)    ECHO 2D ADULT  11/2011    LVH, EF 55-60%    HX ORTHOPAEDIC      knee    HX PACEMAKER PLACEMENT      AICD    STRESS TEST LEXISCAN/CARDIOLITE  3/21/12    normal perfusion, global HK 40%       FAMILY HISTORY:  Family History   Problem Relation Age of Onset    Heart Disease Mother     Hypertension Mother     Lupus Sister     Diabetes Brother        SOCIAL HISTORY:  Social History     Socioeconomic History    Marital status:    Tobacco Use    Smoking status: Never Smoker    Smokeless tobacco: Never Used   Substance and Sexual Activity    Alcohol use: No    Drug use: No    Sexual activity: Never   Social History Narrative    . Nonsmoker. Disability       LABORATORY RESULTS:     Labs Latest Ref Rng & Units 2/2/2022 2/1/2022 2/1/2022 1/31/2022 1/30/2022 1/29/2022 1/28/2022   WBC 3.6 - 11.0 K/uL - - 16. 5(H) 11. 3(H) 11. 4(H) 10.0 -   RBC 3.80 - 5.20 M/uL - - 3.52(L) 3.34(L) 3.36(L) 3.19(L) -   Hemoglobin 11.5 - 16.0 g/dL - - 8. 7(L) 8.2(L) 8. 3(L) 8.0(L) -   Hematocrit 35.0 - 47.0 % - - 33. 0(L) 30. 9(L) 31. 0(L) 29. 0(L) -   MCV 80.0 - 99.0 FL - - 93.8 92.5 92.3 90.9 -   Platelets 487 - 243 K/uL - - 400 313 259 215 -   Lymphocytes 12 - 49 % - - - - - - -   Monocytes 5 - 13 % - - - - - - -   Eosinophils 0 - 7 % - - - - - - -   Basophils 0 - 1 % - - - - - - -   Albumin 3.5 - 5.0 g/dL 4.0 - 3.7 3.6 3.5 3. 4(L) 3.8   Calcium 8.5 - 10.1 MG/DL 10.0 10. 2(H) 10. 6(H) 10. 4(H) 10.0 9.6 10.0   Glucose 65 - 100 mg/dL 256(H) - 270(H) 251(H) 185(H) 233(H) 262(H)   BUN 6 - 20 MG/DL 98(H) - 86(H) 80(H) 73(H) 78(H) 78(H)   Creatinine 0.55 - 1.02 MG/DL 2.95(H) - 2.84(H) 2.56(H) 2.36(H) 2.42(H) 2.62(H)   Sodium 136 - 145 mmol/L 152(H) - 154(H) 151(H) 150(H) 146(H) 146(H)   Potassium 3.5 - 5.1 mmol/L 4.0 - 3. 4(L) 3.5 3. 4(L) 3.0(L) 3.8   TSH 0.36 - 3.74 uIU/mL - - - - - - -   LDH 81 - 246 U/L 566(H) - 425(H) 388(H) 406(H) 385(H) -   Some recent data might be hidden     Lab Results   Component Value Date/Time    TSH 3.08 01/11/2022 05:13 AM    TSH 1.85 12/15/2021 01:06 PM    TSH 1.01 11/05/2020 09:11 AM    TSH 2.740 04/30/2019 11:21 AM    TSH 0.519 02/21/2012 10:53 AM    TSH 8.29 (H) 01/20/2010 01:59 PM       ALLERGY:  Allergies   Allergen Reactions    Ciprofloxacin Anaphylaxis    Shellfish Derived Anaphylaxis    Sildenafil Other (comments)    Ace Inhibitors Unknown (comments)    Biaxin [Clarithromycin] Other (comments)     Metal taste    Candesartan Cough    Pcn [Penicillins] Hives        CURRENT MEDICATIONS:    Current Facility-Administered Medications:     dextrose 5% infusion, 75 mL/hr, IntraVENous, CONTINUOUS, Sid Chase MD, Last Rate: 75 mL/hr at 02/02/22 0704, 75 mL/hr at 02/02/22 0704    insulin NPH (NOVOLIN N, HUMULIN N) injection 40 Units, 40 Units, SubCUTAneous, QHS, Ian James MD, 40 Units at 02/01/22 2102    insulin NPH (NOVOLIN N, HUMULIN N) injection 40 Units, 40 Units, SubCUTAneous, DAILY, Ian James MD, 40 Units at 02/02/22 5759    amiodarone (CORDARONE) 375 mg/250 mL D5W infusion, 0.5-1 mg/min, IntraVENous, CONTINUOUS, Asa Ortega MD, Last Rate: 20 mL/hr at 02/01/22 2058, 0.5 mg/min at 02/01/22 2058    morphine injection 2 mg, 2 mg, IntraVENous, Q3H PRN, Ian James MD, 2 mg at 02/02/22 0527    epoetin isabel-epbx (RETACRIT) injection 10,000 Units, 10,000 Units, SubCUTAneous, Q MON, WED & [place] MarcelllSid MD, 10,000 Units at 01/31/22 2119    sodium bicarbonate (8.4%) 25 mEq in dextrose 5% 1,000 mL infusion, , Other, TITRATE, Sid Chase MD, Last Rate: 7.7 mL/hr at 02/01/22 1356, New Bag at 02/01/22 1356    cefTRIAXone (ROCEPHIN) 2 g in 0.9% sodium chloride 20 mL IV syringe, 2 g, IntraVENous, Q24H, Ian James MD, 2 g at 02/02/22 0821    [Held by provider] bumetanide (BUMEX) injection 1 mg, 1 mg, IntraVENous, BID, Amber Weaver NP, 1 mg at 02/01/22 0820    potassium bicarb-citric acid (EFFER-K) tablet 40 mEq, 40 mEq, Oral, DAILY, Christiano Deshpande MD, 40 mEq at 02/02/22 3974    allopurinoL (ZYLOPRIM) tablet 100 mg, 100 mg, Oral, DAILY, Sadaf Marin MD, 100 mg at 02/02/22 6826    pantoprazole (PROTONIX) injection 40 mg, 40 mg, IntraVENous, Q12H, 40 mg at 02/02/22 0821 **AND** sodium chloride (NS) flush 10 mL, 10 mL, IntraVENous, DAILY, Christiano Deshpande MD, 10 mL at 02/02/22 0814    sodium chloride (NS) flush 5-40 mL, 5-40 mL, IntraVENous, PRN, Tiana Putnam MD    clopidogreL (PLAVIX) tablet 75 mg, 75 mg, Oral, DAILY, Tiana Putnam MD, 75 mg at 02/02/22 7293    digoxin (LANOXIN) tablet 0.0625 mg, 0.0625 mg, Oral, EVERY OTHER DAY, Amber Weaver, NP, 0.0625 mg at 02/02/22 3906    DOBUTamine (DOBUTREX) 500 mg/250 mL (2,000 mcg/mL) infusion, 0-10 mcg/kg/min, IntraVENous, TITRATE, Juvenitno Albarado, NP, Held at 01/23/22 1200    albumin human 25% (BUMINATE) solution 12.5 g, 12.5 g, IntraVENous, DAILY, Renate Albarado, NP, Last Rate: 0 mL/hr at 01/26/22 1138, 12.5 g at 02/02/22 0821    milrinone (PRIMACOR) 20 MG/100 ML D5W infusion, 0.125 mcg/kg/min, IntraVENous, CONTINUOUS, Sadaf Marin MD, Stopped at 01/22/22 2242    vasopressin (VASOSTRICT) 20 Units in 0.9% sodium chloride 100 mL infusion, 0-0.1 Units/min, IntraVENous, TITRATE, Verena Nancy Patches, NP, Stopped at 01/23/22 1048    PHENYLephrine (NORMA-SYNEPHRINE) 30 mg in 0.9% sodium chloride 250 mL infusion,  mcg/min, IntraVENous, TITRATE, Calista Halsted, MD, Held at 01/21/22 1200    niCARdipine (CARDENE) 25 mg in 0.9% sodium chloride 250 mL infusion, 0-15 mg/hr, IntraVENous, TITRATE, Easton ODELL MD, Stopped at 01/19/22 2109    [Held by provider] bivalirudin (ANGIOMAX) 250 mg in 0.9% sodium chloride (MBP/ADV) 50 mL MBP, 0.02-2.5 mg/kg/hr, IntraVENous, TITRATE, Polliard, Nancy Patches, NP, Stopped at 01/28/22 1220    hydrALAZINE (APRESOLINE) 20 mg/mL injection 5 mg, 5 mg, IntraVENous, Q4H PRN, Polliard, Nancy Patches, NP, 5 mg at 02/02/22 0706    bumetanide (BUMEX) injection 1 mg, 1 mg, IntraVENous, Q4H PRN, Millicent Albaradoyn Patches, NP, 1 mg at 01/31/22 2324    heparin (porcine) in 0.9% NaCl 30,000 unit/1,000 mL perfusion irrigation 50-1,000 mL, 50-1,000 mL, Other, PRN, Sam Gomez PA    chlorhexidine (PERIDEX) 0.12 % mouthwash 15 mL, 15 mL, Oral, Q12H, Davian Vázquez DO, 15 mL at 02/02/22 0814    0.9% sodium chloride infusion, 9 mL/hr, IntraVENous, CONTINUOUS, Sam Gomez PA, Last Rate: 9 mL/hr at 02/02/22 0400, 9 mL/hr at 02/02/22 0400    naloxone (NARCAN) injection 0.4 mg, 0.4 mg, IntraVENous, PRN, Sam Gomez PA    ondansetron (ZOFRAN) injection 4 mg, 4 mg, IntraVENous, Q4H PRN, Sam Gomez PA    albuterol (PROVENTIL VENTOLIN) nebulizer solution 2.5 mg, 2.5 mg, Nebulization, Q4H PRN, Sam Gomez PA    aspirin chewable tablet 81 mg, 81 mg, Oral, DAILY, Vishnu Gomez PA, 81 mg at 02/02/22 0816    magnesium oxide (MAG-OX) tablet 400 mg, 400 mg, Oral, BID, Vishnu Gomez PA, 400 mg at 02/02/22 3586    calcium chloride 1 g in 0.9% sodium chloride 100 mL IVPB, 1 g, IntraVENous, PRN, Vishnu Gomez PA    bisacodyL (DULCOLAX) suppository 10 mg, 10 mg, Rectal, DAILY PRN, Sam Gomez PA    senna-docusate (PERICOLACE) 8.6-50 mg per tablet 1 Tablet, 1 Tablet, Oral, BID, Bren Gomez PA, 1 Tablet at 02/01/22 0820    ELECTROLYTE REPLACEMENT NOTE: Nurse to review Serum Potassium and Magnesuim levels and Initiate Electrolyte Replacement Protocol as needed, 1 Each, Other, PRN, Sam Gomez PA    magnesium sulfate 1 g/100 ml IVPB (premix or compounded), 1 g, IntraVENous, PRN, Sam Gomez PA    alteplase (CATHFLO) 1 mg in sterile water (preservative free) 1 mL injection, 1 mg, InterCATHeter, PRN, Sam Gomez PA    bacitracin 500 unit/gram packet 1 Packet, 1 Packet, Topical, PRN, Sam Gomez PA    dexmedeTOMidine in 0.9 % NaCl (PRECEDEX) 400 mcg/100 mL (4 mcg/mL) infusion soln, 0.1-1.5 mcg/kg/hr, IntraVENous, TITRATE, Davian Vázquez DO, Stopped at 01/31/22 1405    insulin lispro (HUMALOG) injection, , SubCUTAneous, Q6H, Davian Vázquez DO, 6 Units at 02/02/22 0528    triamcinolone acetonide (KENALOG) 0.1 % cream, , Topical, BID, Bren Gomez PA, Given at 02/02/22 8955    polyethylene glycol (MIRALAX) packet 17 g, 17 g, Oral, DAILY, Sam Gomez PA, 17 g at 02/01/22 0820    montelukast (SINGULAIR) tablet 10 mg, 10 mg, Oral, DAILY, Bren Gomez PA, 10 mg at 02/02/22 6825    levothyroxine (SYNTHROID) tablet 100 mcg, 100 mcg, Oral, Once per day on Mon Tue Wed Thu Fri Sat, Sam Gomez PA, 100 mcg at 02/02/22 4029    hydroxypropyl methylcellulose (ISOPTO TEARS) 0.5 % ophthalmic solution 1 Drop, 1 Drop, Both Eyes, PRN, Bren Gomez PA, 1 Drop at 01/06/22 1727    venlafaxine-SR (EFFEXOR-XR) capsule 75 mg, 75 mg, Oral, DAILY WITH BREAKFAST, Bren Gomez PA, 75 mg at 02/02/22 6242    gabapentin (NEURONTIN) capsule 100 mg, 100 mg, Oral, QHS, Bren Gomez PA, 100 mg at 02/01/22 2102    arformoteroL (BROVANA) neb solution 15 mcg, 15 mcg, Nebulization, BID RT, 15 mcg at 02/02/22 0811 **AND** budesonide (PULMICORT) 500 mcg/2 ml nebulizer suspension, 500 mcg, Nebulization, BID RT, Francie Gomez PA, 500 mcg at 02/02/22 4718    sodium chloride (NS) flush 5-40 mL, 5-40 mL, IntraVENous, Q8H, Sam Gomez PA, 10 mL at 02/02/22 9575    acetaminophen (TYLENOL) tablet 650 mg, 650 mg, Oral, Q6H PRN, 650 mg at 02/01/22 8159 **OR** acetaminophen (TYLENOL) suppository 650 mg, 650 mg, Rectal, Q6H PRN, Sam Gomez PA    glucose chewable tablet 16 g, 4 Tablet, Oral, PRN, Sam Gomez PA    dextrose (D50W) injection syrg 12.5-25 g, 25-50 mL, IntraVENous, PRN, Sam Gomez PA    glucagon (GLUCAGEN) injection 1 mg, 1 mg, IntraMUSCular, PRN, Francie Gomez PA    PATIENT CARE TEAM:  Patient Care Team:  Yonatan May MD as PCP - General (Internal Medicine)  Yonatan May MD as PCP - [facility]  Caitlin Sandoval MD as Consulting Provider (Internal Medicine)  Kira Bailey MD (Dermatology)  Danny Gardner MD (Nephrology)  Fozia Clement MD as Consulting Provider (Cardiology)  Joesline Marin MD (Cardiology)  Cornell Cassidy MD as Consulting Provider (Pulmonary Disease)     Thank you for allowing me to participate in this patient's care. Funmilayo Carrillo NP  [facility] [address], [address]  Phone: [phone]      [facility] ATTENDING ADDENDUM    Patient was seen and examined in person. Data and notes were reviewed. I have discussed and agree with the plan as noted in the NP note above without further additions.     Allegra Silveira MD PhD  [phone]

## 2022-02-02 NOTE — PROGRESS NOTES
[facility]    ICU TEAM Progress Note    Name: Claire James   : 1955   MRN: [id number]   Date: 2022      I  Subjective:   Progress Note: 2022      Reason for ICU Admission: Cardiogenic shock     Interval history:  14-year-old female with past medical history significant for moderate pulmonary hypertension on home oxygen therapy, CKD, nonischemic cardiomyopathy who was admitted to the hospital on  due to acute on chronic hypoxemic respiratory failure in light of fluid overload. Had a left heart cath on  which demonstrated single one-vessel moderate disease (mid LAD lesion) which was thought to be not a cause of cardiomyopathy.  Subsequently had a right axillary 5.5 Impella placed by CT surgery for potential double transplant.  ANNE-MARIE to LAD on .  Extubated on . Overnight Events:   : CODE STATUS changed to DNR/DNI, episode of ventricular tachycardia, episode of hypoxia requiring BiPAP, worsening mental status with increased agitation and confusion at times  : Did not tolerate weaning Impella to P4. Had to go up to P6 around 1 AM.  Needed extra diuretics. Off Precedex. Tenuous respiratory status. Went back on BiPAP overnight. Minimal support setting. Off pressors and inotropes.   : No acute event   - Extubated to BiPAP , impella in situ   - remains intubated, Impella in situ   - remains intubated, Impella in situ, now off inhaled nitric oxide, Bloody OG o/p   - getting [facility] today, remains intubated, Impella in situ, on inhaled nitric oxide   - s/p EGD/colo today for Ca workup for possible heart/kidney Tx, remains intubated, Impella in situ, on inhaled nitric oxide  [id number] intubated, Impella in situ, on inhaled nitric oxide      Active Problem List:     Problem List  Date Reviewed: 2021          Codes Class    Acute respiratory failure with hypoxia (HCC) ICD-10-CM: J96.01  ICD-9-CM: 518.81         CHF exacerbation (HCC) ICD-10-CM: I50.9  ICD-9-CM: 428.0         Type 2 diabetes mellitus with diabetic neuropathy (HCC) ICD-10-CM: E11.40  ICD-9-CM: 250.60, 357.2         Type 2 diabetes with nephropathy (HCC) ICD-10-CM: E11.21  ICD-9-CM: 250.40, 583.81         Obesity, morbid (HCC) ICD-10-CM: E66.01  ICD-9-CM: 278.01         Acquired hypothyroidism ICD-10-CM: E03.9  ICD-9-CM: 744. 9         Dysthymia ICD-10-CM: F34.1  ICD-9-CM: 300.4         ICD (implantable cardioverter-defibrillator), biventricular, in situ ICD-10-CM: Z95.810  ICD-9-CM: V45.02     Overview Signed 1/14/2015 11:11 AM by Fozia Clement MD     Generator [employer] change 1/14/2015             Dyslipidemia ICD-10-CM: H83.6  ICD-9-CM: 272.4         CKD (chronic kidney disease) ICD-10-CM: N18.9  ICD-9-CM: 996. 9         Cardiomyopathy, nonischemic ([facility] ICD-10-CM: I42.8  ICD-9-CM: 425.4     Overview Signed 10/10/2011  6:40 AM by Joseline Marin MD     initial dx 2001, bivHF 2008 with EF 15%, s/p biV-ICD 9/08, significant improvment in EF to 45-50%             Anemia in chronic renal disease (Chronic) ICD-10-CM: N18.9, D63.1  ICD-9-CM: 285.21         HTN (hypertension) ICD-10-CM: I10  ICD-9-CM: 401.9         GERD (gastroesophageal reflux disease) (Chronic) ICD-10-CM: K21.9  ICD-9-CM: 530.81         Gout (Chronic) ICD-10-CM: M10.9  ICD-9-CM: 274.9         Pulmonary HTN (HCC) (Chronic) ICD-10-CM: I27.20  ICD-9-CM: 416.8               Past Medical History:      has a past medical history of Acquired hypothyroidism (8/15/2016), Anemia, Asthma, Cardiomyopathy, nonischemic ([facility] CKD (chronic kidney disease), CKD (chronic kidney disease) (8/15/2012), Depression, Diabetes ([facility] Diabetic neuropathy ([facility] DM (diabetes mellitus) ([facility] (8/15/2012), GERD (gastroesophageal reflux disease), Gout, Hypothyroidism, ICD (implantable cardioverter-defibrillator), biventricular, in situ (6/5/2014), and Psoriasis. She has no past medical history of Abuse, Adult physical abuse, Arrhythmia, Arthritis, Asthma, Autoimmune disease ([facility] CAD (coronary artery disease), Calculus of kidney, Cancer ([address] Chronic pain, COPD, Headache(784.0), Hypercholesteremia, Liver disease, Psychotic disorder ([address] PUD (peptic ulcer disease), Seizures ([address] Stroke ([facility] Thromboembolus ([facility] or Unspecified deficiency anemia. Past Surgical History:      has a past surgical history that includes echo 2d adult (4/2010); cardiac catheterization (2007; 01/06/15); echo 2d adult (11/2011); stress test lexiscan/cardiolite (3/21/12); hx pacemaker placement; hx orthopaedic; and colonoscopy (N/A, 1/26/2022). Home Medications:     Prior to Admission medications    Medication Sig Start Date End Date Taking? Authorizing Provider   levocetirizine (XYZAL) 5 mg tablet TAKE 1 TABLET BY MOUTH EVERY DAY 1/6/22  Yes Kenya Oliveira MD   apremilast Deepthi Begin) 30 mg tab Take 30 mg by mouth two (2) times daily as needed. Yes Provider, Historical   azelastine (ASTEPRO) 205.5 mcg (0.15 %) 1 [place] by Both Nostrils route two (2) times daily as needed. Yes Provider, Historical   docusate sodium (COLACE) 100 mg capsule Take 100 mg by mouth daily as needed for Constipation. Yes Provider, Historical   levothyroxine (SYNTHROID) 100 mcg tablet Take 100 mcg by mouth six (6) days a week. Everyday except Sunday   Yes Provider, Historical   allopurinoL (ZYLOPRIM) 100 mg tablet TAKE 1 TABLET BY MOUTH EVERY DAY 1/5/22  Yes Kenya Oliveira MD   calcitRIOL (ROCALTROL) 0.5 mcg capsule TAKE 1 CAPSULE BY MOUTH EVERY DAY 1/5/22  Yes Kenya Oliveira MD   carvediloL (COREG) 6.25 mg tablet Take 1 Tablet by mouth two (2) times daily (with meals). 12/22/21  Yes Conrado DORSEY NP   isosorbide dinitrate (ISORDIL) 5 mg tablet Take 1 Tablet by mouth three (3) times daily. 12/22/21  Yes Conrado DORSEY NP   hydrALAZINE (APRESOLINE) 10 mg tablet Take 1 Tablet by mouth three (3) times daily.  12/22/21  Yes Jolly Wisdom NP benzonatate (Tessalon Perles) 100 mg capsule Take 100 mg by mouth three (3) times daily as needed for Cough. Yes Other, Phys, MD   bumetanide (BUMEX) 2 mg tablet Take 1 tab in the morning and 0.5 tab in the evening 11/5/21  Yes Loralie Remedies, MD   gabapentin (NEURONTIN) 100 mg capsule Take 1 Capsule by mouth nightly. Max Daily Amount: 100 mg. 10/29/21  Yes Rafael Chamorro MD   budesonide (PULMICORT) 180 mcg/actuation aepb inhaler Take 2 Puffs by inhalation two (2) times a day. 5/27/21  Yes Rafael Chamorro MD   ammonium lactate (AMLACTIN) 12 % topical cream Apply  to affected area two (2) times a day. rub in to affected area well 11/4/20  Yes Rafael Chamorro MD   insulin NPH/insulin regular (NOVOLIN 70/30) 100 unit/mL (70-30) injection 70 units two times a day 8/15/16  Yes Amber Holland MD   calcipotriene (DOVONEX) 0.005 % topical cream Apply  to affected area three (3) times daily. Yes Provider, Historical   triamcinolone acetonide (KENALOG) 0.5 % ointment Apply  to affected area two (2) times daily as needed. use thin layer    Yes Provider, Historical   multivitamin (ONE A DAY) tablet Take 1 Tab by mouth daily. Yes Provider, Historical   ferrous sulfate (IRON) 325 mg (65 mg elemental iron) tablet Take 325 mg by mouth daily. Yes Provider, Historical   aspirin 81 mg tablet Take 81 mg by mouth daily. Yes Provider, Historical   montelukast (SINGULAIR) 10 mg tablet TAKE 1 TABLET BY MOUTH EVERY DAY 1/27/22   Rafael Chamorro MD   fluticasone propionate (FLONASE) 50 mcg/actuation nasal spray USE 1 SPRAY IN EACH NOSTRIL DAILY 1/24/22   Rafael Chamorro MD   venlafaxine-[facility] 75 mg capsule TAKE 1 CAPSULE BY MOUTH EVERY DAY 1/21/22   Rafael Chamorro MD   cholecalciferol (VITAMIN D3) (2,000 UNITS /50 MCG) cap capsule TAKE 1 CAPSULE BY MOUTH TWO (2) TIMES A DAY. 1/11/22   Rafael Chamorro MD       Allergies/Social/Family History:      Allergies   Allergen Reactions    Ciprofloxacin Anaphylaxis    Shellfish Derived Anaphylaxis    Sildenafil Other (comments)    Ace Inhibitors Unknown (comments)    Biaxin [Clarithromycin] Other (comments)     Metal taste    Candesartan Cough    Pcn [Penicillins] Hives      Social History     Tobacco Use    Smoking status: Never Smoker    Smokeless tobacco: Never Used   Substance Use Topics    Alcohol use: No      Family History   Problem Relation Age of Onset    Heart Disease Mother     Hypertension Mother     Lupus Sister     Diabetes Brother        Review of Systems:     Not able to obtain due to patient clinical condition    Objective:   Vital Signs:  Visit Vitals  BP (!) 116/91   Pulse (!) 129   Temp 97.5 °F (36.4 °C)   Resp 25   Ht 5' 7\" (1.702 m)   Wt 104.9 kg (231 lb 4.2 oz)   SpO2 99%   BMI 36.22 kg/m²    O2 Flow Rate (L/min): 5 l/min O2 Device: BIPAP Temp (24hrs), Av.5 °F (36.9 °C), Min:97.4 °F (36.3 °C), Max:99 °F (37.2 °C)    CVP (mmHg): 7 mmHg (22 0000)      Intake/Output:     Intake/Output Summary (Last 24 hours) at 2022 0748  Last data filed at 2022 0700  Gross per 24 hour   Intake 2260.5 ml   Output 1565 ml   Net 695.5 ml       Physical Exam:  General-chronically ill looking female on BiPAP, no acute distress  Neuro- Grossly non focal, speech is clear and nods appropriately, generalized weakness  Cardiac-RRR  Lungs-clear anteriorly  Abdomen-soft, nontender, nondistended  Extremities-warm    LABS AND  DATA: Personally reviewed  Recent Labs     22  04222   WBC 16.5* 11.3*   HGB 8.7* 8.2*   HCT 33.0* 30.9*    313     Recent Labs     22  0403 22  0421 22  0444 22  0444   * 154*   < > 151*   K 4.0 3.4*   < > 3.5   * 115*   < > 115*   CO2 34* 33*   < > 34*   BUN 98* 86*   < > 80*   CREA 2.95* 2.84*   < > 2.56*   * 270*   < > 251*   CA 10.0 10.2*  10.6*   < > 10.4*   MG  --  2.9*  --  2.9*   PHOS 3.1 1.6*   < > 2.6    < > = values in this interval not displayed. Recent Labs     02/02/22  0403 02/01/22 0421   AP 77 77   TP 7.9 8.1   ALB 4.0 3.7   GLOB 3.9 4.4*     Recent Labs     02/02/22  0403 02/01/22 0421   APTT 24.9 23.1      Recent Labs     02/02/22  0425 02/01/22  0440   PHI 7.43 7.46*   PCO2I 49.6* 48.5*   PO2I 107* 89   FIO2I 50 35     Recent Labs     01/31/22 0444   CPK 35   CKMB <1.0       Hemodynamics:   PAP: PAP Systolic: 86 (51/49/90 8060) CO: CO (l/min): 6.3 l/min (02/02/22 0700)   Wedge:   CI: CI (l/min/m2): 2.8 l/min/m2 (02/02/22 0700)   CVP:  CVP (mmHg): 7 mmHg (02/02/22 0000) SVR:       PVR:       Ventilator Settings:  Mode Rate Tidal Volume Pressure FiO2 PEEP   Spontaneous,Pressure support   460 ml  5 cm H2O 50 % 5 cm H20     Peak airway pressure: 10 cm H2O    Minute ventilation: 12.9 l/min        MEDS: Reviewed    Chest X-Ray:  CXR Results  (Last 48 hours)               02/01/22 0434  XR CHEST PORT Final result    Impression:  1. No change mild perihilar interstitial edema       Narrative:  EXAM: XR CHEST PORT       INDICATION: post-Impella       COMPARISON: 1/31/2021       FINDINGS: A portable AP radiograph of the chest was obtained at 0403 hours. The   patient is on a cardiac monitor. The cardiac assist device remains in place. ICD   is noted. Right-sided PICC line overlies the upper portion of the right atrium. Lung volumes are low. Mild interstitial edema is unchanged. Assessment and Plan:   1. Cardiogenic Shock  2. Acute hypoxemic respiratory failure  3. Status post Impella placement  4.  RODNEY on CKD stage IV     Neuro- delirium prevention strategies, precedex as needed for hyperactive delirium [currently off] adding morphine for comfort and air hunger     Cardiac-continue hemodynamic monitoring, EF has improved to about 35 % on most recent echo, Impella at P6, now s/p LAD stent, on aspirin, plavix, digoxin, did not tolerate weaning Impella to P4 on January 31 and had to go up again to P6 ,follow-up cardiac surgery/advised heart failure recommendations. I believe it is reasonable to consider transitioning to comfort measure. Specially after my discussion with the  on February 1.     Pulmonary- BiPAP as tolerated for WOB, CODE STATUS DNR/DNI     GI- s/p EGD/colo 1/26 for Ca work up, PPI q12 for for GI bleed 1/28. F/u GI recs     Renal-CKD-worsening creatinine, nephrology recommendations, off diuretics at this time. Monitor urine output closely, dose meds renally, correct electrolyte derangements as needed,      Hematology-systemic Angiomax-on hold for GI bleed 1/28, f/u CS recs, continue EPO     ID- Impella in situ, e coli in sputum -change cefepime to ceftriaxone on January 31.     Endocrinology-keep glucose less than 180, continue Synthroid    Overall it seems  started to consider transitioning to comfort measure. I appreciate palliative care effort. I will talk with him again this afternoon as well.       DISPOSITION  Stay in ICU    CRITICAL CARE CONSULTANT NOTE  I had a face to face encounter with the patient, reviewed and interpreted patient data including clinical events, labs, images, vital signs, I/O's, and examined patient. I have discussed the case and the plan and management of the patient's care with the consulting services, the bedside nurses and the respiratory therapist.      NOTE OF PERSONAL INVOLVEMENT IN CARE   This patient has a high probability of imminent, clinically significant deterioration, which requires the highest level of preparedness to intervene urgently. I participated in the decision-making and personally managed or directed the management of the following life and organ supporting interventions that required my frequent assessment to treat or prevent imminent deterioration. I personally spent 40 minutes of critical care time.   This is time spent at this critically ill patient's bedside actively involved in patient care as well as the coordination of care and discussions with the patient's family. This does not include any procedural time which has been billed separately. Sarah Eaton M.D.   Staff Intensivist/Pulmonologist  [facility]  2/2/2022

## 2022-02-02 NOTE — PROGRESS NOTES
0730: Bedside shift change report given to Soo Wynne RN (oncoming nurse) by Joel Rodriguez RN (offgoing nurse). Report included the following information SBAR, Kardex, ED Summary, OR Summary, Procedure Summary, Intake/Output, MAR, Recent Results, Cardiac Rhythm Biventricular Paced/VT and Dual Neuro Assessment. 0945: Dr Susy Francisco and Beatrice NP, Heart Failure, assessing pt @ the bedside. 1000: Pt placed on 6L NC from BIPAP. Tolerated. 1030: Dr Colette Ibrahim asked to started to wean Impella to P-4 by noon. 1100: Impella to P-5    1200: Impella to P-4    1545: [employer] representative @ the bedside to turn off ICD per Dr Jonathan Santillan. 1700: Impella to P-3.     1930: Bedside shift change report given to RN (oncoming nurse) by Soo Wynne RN (offgoing nurse). Report included the following information SBAR, Kardex, ED Summary, OR Summary, Procedure Summary, Intake/Output, MAR, Recent Results, Cardiac Rhythm A-Fib/ST/VT and Dual Neuro Assessment.

## 2022-02-02 NOTE — PROGRESS NOTES
SLP Contact Note    Noted pt remains on BiPAP. Has not been appropriate for SLP for three days and therefore will sign off. Please reconsult when pt appropriate to participate.       Thank you,  CYNTHIA Hardy.Ed, [id number] [facility]  Speech-Language Pathologist

## 2022-02-03 NOTE — PROGRESS NOTES
0800 Bedside shift change report given to Ghislaine MORRIS and [employer] Basenathalia Casitllo (oncoming nurse) by Lauro Valentin RN (offgoing nurse). Report included the following information SBAR, Kardex, Intake/Output, MAR, Recent Results, Cardiac Rhythm BiV paced and Alarm Parameters . 0820 change to comfort care per Dr. Chadwick Taylor bipap off, 2L NC    0855 Dr. Jessica Sharp updated: spO2 68-75%, SvO2 30s, Impella Ao 80/30, Increased work of breathing, BP 68/55. Attempted to call  cell & home #, no answer and voicemail inbox full. Orders to go back on bipap. Will attempt to contact  again.      0900 RT @ bedside, patient placed back on bipap on 12/5, rate of 8, 50% FiO2    1710 Dr. Jessica Sharp @ bedside, speaking with - transition to comfort care at this time     [address] [facility] pastoral care at bedside    1820 Dr. Jessica Sharp at bedside to pronounce patient's time of death     1822 patient disconnected from impella

## 2022-02-03 NOTE — PROGRESS NOTES
Cardiac Electrophysiology Hospital Progress Note     Subjective:       Ana María Soliz is a 78 y.o. patient who is s/p Impella, has Medtronic biventricular ICD (gen change 01/346914, leads 09/25/2008) that has been BETTY. Reportedly with NSVT yesterday by nurse, so I recommended amiodarone but telemetry review shows narrow complex tachycardia more consistent with AFL with RVR. Rates 120s-130s.  Amiodarone IV bolus given, now on IV gtt @ 0.5. Patient made DNR/DNI 02/01/2022. I spoke to her       Interim:   S/p Impella placement on 01/18/2022, now in CCU.  She had VF 1/19, has appearance of Torsades.  Required ICD shock x 2, both successful. Some brief NSVT since then, but nothing sustained. Now s/p PCI LAD on 01/27/2022.  Right & left side pressures improved compared to prior cath on 01/17/2022.  LVEF improved to 30% per echo on 01/28/2022, stable on echo 01/31/2022. Weaned off nitric oxide & vent, then off BiPAP on 02/02/2022.  Attempting to wean Impella, currently down to P3.  's desire is to take patient home on hospice, but unable to do so with Impella still in place. ICD therapies deactivated on 02/02/2022 after discussion with patient's  due to DNR/DNI status.           HPI:   Presented to the ER on 01/04/2021 with hypoxia, improved on supplemental oxygen. Labs showed stable anemia.  WBC elevated, hyponatremic, hypokalemic.  D dimer elevated.  Chronic CKD. Nephrologist spoke to me directly regarding severe renal failure, but not much worse than last admission. Rapid COVID negative.  CXR showed pulmonary edema vs atypical pneumonia.  VQ scan showed low probability for PE.       NICM, LVEF 15-20% during recent admission.  LVEF previously normalized with CRT.  NYHA III-IV.  No ACEi/ARB due to renal dysfunction.  GDMT dosing limited by low BP. [address] 12/10/2021 showed severe pulmonary HTN (wedge 34 mmHg, PAP 80 mmHg).      Cardiac cath in 2015 at [facility] showed no evidence of CAD. She did require LV lead reprogramming over the summer, but good LV capture since.  Good capture/function when checked during recent admission. BP controlled. PICC line placed 01/10/2022 in anticipation of home milrinone. Previous:   LVEF noted 15-20% in 12/2021. S/p Medtronic biventricular ICD (gen change 01/474156, leads 09/25/2008).     CKD stage IV.        Previously followed by Dr. Raphael Linares, states did not follow him to new practice.          Problem List       Acute respiratory failure with hypoxia [facility]) ICD-10-CM: J96.01   ICD-9-CM: 518.81 1/4/2022     CHF exacerbation (HCC) ICD-10-CM: I50.9   ICD-9-CM: 428.0 12/6/2021     Type 2 diabetes mellitus with diabetic neuropathy (HCC) ICD-10-CM: E11.40   ICD-9-CM: 250.60, 357.2 1/2/2020     Type 2 diabetes with nephropathy ([facility] ICD-10-CM: E11.21   ICD-9-CM: 250.40, 583.81 4/3/2018     Obesity, morbid ([facility] ICD-10-CM: E66.01   ICD-9-CM: 278.01 12/8/2017     Acquired hypothyroidism ICD-10-CM: E03.9   ICD-9-CM: 244.9 8/15/2016     Dysthymia ICD-10-CM: F34.1   ICD-9-CM: 300.4 8/15/2016     ICD (implantable cardioverter-defibrillator), biventricular, in situ ICD-10-CM: Z95.810   ICD-9-CM: V45.02 6/5/2014   Overview Signed 1/14/2015 11:11 AM by Amanda Garcia MD     Generator Medtronic change 1/14/2015         Dyslipidemia ICD-10-CM: N23.7   ICD-9-CM: 272.4 1/14/2014     CKD (chronic kidney disease) ICD-10-CM: N18.9   ICD-9-CM: 585.9 8/15/2012     Cardiomyopathy, nonischemic (HCC) ICD-10-CM: I42.8   ICD-9-CM: 425.4 Unknown   Overview Signed 10/10/2011  6:40 AM by Patricio Eugene MD     initial dx 2001, bivHF 2008 with EF 15%, s/p biV-ICD 9/08, significant improvment in EF to 45-50%         Anemia in chronic renal disease (Chronic) ICD-10-CM: N18.9, D63.1   ICD-9-CM: 285.21 12/10/2008     HTN (hypertension) ICD-10-CM: I10   ICD-9-CM: 401.9 12/10/2008     GERD (gastroesophageal reflux disease) (Chronic) ICD-10-CM: K21.9 ICD-9-CM: 530.81 12/10/2008     Gout (Chronic) ICD-10-CM: M10.9   ICD-9-CM: 274.9 12/10/2008     Pulmonary HTN (HCC) (Chronic) ICD-10-CM: I27.20   ICD-9-CM: 416.8 12/10/2008        Current Facility-Administered Medications   Medication Dose Route Frequency   · potassium phosphate 30 mmol in dextrose 5% 250 mL infusion IntraVENous ONCE   · epoetin isabel-epbx (RETACRIT) injection 10,000 Units 10,000 Units SubCUTAneous Q MON, WED & FRI   · sodium bicarbonate (8.4%) 25 mEq in dextrose 5% 1,000 mL infusion Other TITRATE   · cefTRIAXone (ROCEPHIN) 2 g in 0.9% sodium chloride 20 mL IV syringe 2 g IntraVENous Q24H   · bumetanide (BUMEX) injection 1 mg 1 mg IntraVENous BID   · potassium bicarb-citric acid (EFFER-K) tablet 40 mEq 40 mEq Oral DAILY   · insulin NPH (NOVOLIN N, HUMULIN N) injection 30 Units 30 Units SubCUTAneous DAILY   · allopurinoL (ZYLOPRIM) tablet 100 mg 100 mg Oral DAILY   · insulin NPH (NOVOLIN N, HUMULIN N) injection 30 Units 30 Units SubCUTAneous QHS   · pantoprazole (PROTONIX) injection 40 mg 40 mg IntraVENous Q12H   And   · sodium chloride (NS) flush 10 mL 10 mL IntraVENous DAILY   · sodium chloride (NS) flush 5-40 mL 5-40 mL IntraVENous PRN   · clopidogreL (PLAVIX) tablet 75 mg 75 mg Oral DAILY   · digoxin (LANOXIN) tablet 0.0625 mg 0.0625 mg Oral EVERY OTHER DAY   · DOBUTamine (DOBUTREX) 500 mg/250 mL (2,000 mcg/mL) infusion 0-10 mcg/kg/min IntraVENous TITRATE   · albumin human 25% (BUMINATE) solution 12.5 g 12.5 g IntraVENous DAILY   · milrinone (PRIMACOR) 20 MG/100 ML D5W infusion 0.125 mcg/kg/min IntraVENous CONTINUOUS   · vasopressin (VASOSTRICT) 20 Units in 0.9% sodium chloride 100 mL infusion 0-0.1 Units/min IntraVENous TITRATE   · PHENYLephrine (NORMA-SYNEPHRINE) 30 mg in 0.9% sodium chloride 250 mL infusion  mcg/min IntraVENous TITRATE   · niCARdipine (CARDENE) 25 mg in 0.9% sodium chloride 250 mL infusion 0-15 mg/hr IntraVENous TITRATE   · [Held by provider] bivalirudin (ANGIOMAX) 250 mg in 0.9% sodium chloride (MBP/ADV) 50 mL MBP 0.02-2.5 mg/kg/hr IntraVENous TITRATE   · hydrALAZINE (APRESOLINE) 20 mg/mL injection 5 mg 5 mg IntraVENous Q4H PRN   · bumetanide (BUMEX) injection 1 mg 1 mg IntraVENous Q4H PRN   · heparin (porcine) in 0.9% NaCl 30,000 unit/1,000 mL perfusion irrigation 50-1,000 mL 50-1,000 mL Other PRN   · chlorhexidine (PERIDEX) 0.12 % mouthwash 15 mL 15 mL Oral Q12H   · 0.9% sodium chloride infusion 9 mL/hr IntraVENous CONTINUOUS   · naloxone (NARCAN) injection 0.4 mg 0.4 mg IntraVENous PRN   · ondansetron (ZOFRAN) injection 4 mg 4 mg IntraVENous Q4H PRN   · albuterol (PROVENTIL VENTOLIN) nebulizer solution 2.5 mg 2.5 mg Nebulization Q4H PRN   · aspirin chewable tablet 81 mg 81 mg Oral DAILY   · magnesium oxide (MAG-OX) tablet 400 mg 400 mg Oral BID   · calcium chloride 1 g in 0.9% sodium chloride 100 mL IVPB 1 g IntraVENous PRN   · bisacodyL (DULCOLAX) suppository 10 mg 10 mg Rectal DAILY PRN   · senna-docusate (PERICOLACE) 8.6-50 mg per tablet 1 Tablet 1 Tablet Oral BID   · ELECTROLYTE REPLACEMENT NOTE: Nurse to review Serum Potassium and Magnesuim levels and Initiate Electrolyte Replacement Protocol as needed 1 Each Other PRN   · magnesium sulfate 1 g/100 ml IVPB (premix or compounded) 1 g IntraVENous PRN   · alteplase (CATHFLO) 1 mg in sterile water (preservative free) 1 mL injection 1 mg InterCATHeter PRN   · bacitracin 500 unit/gram packet 1 Packet 1 Packet Topical PRN   · dexmedeTOMidine in 0.9 % NaCl (PRECEDEX) 400 mcg/100 mL (4 mcg/mL) infusion soln 0.1-1.5 mcg/kg/hr IntraVENous TITRATE   · insulin lispro (HUMALOG) injection SubCUTAneous Q6H   · triamcinolone acetonide (KENALOG) 0.1 % cream Topical BID   · polyethylene glycol (MIRALAX) packet 17 g 17 g Oral DAILY   · montelukast (SINGULAIR) tablet 10 mg 10 mg Oral DAILY   · levothyroxine (SYNTHROID) tablet 100 mcg 100 mcg Oral Once per day on Mon Tue Wed Thu Fri Sat   · hydroxypropyl methylcellulose (ISOPTO TEARS) 0.5 % ophthalmic solution 1 Drop 1 Drop Both Eyes PRN   · venlafaxine-SR (EFFEXOR-XR) capsule 75 mg 75 mg Oral DAILY WITH BREAKFAST   · gabapentin (NEURONTIN) capsule 100 mg 100 mg Oral QHS   · arformoteroL (BROVANA) neb solution 15 mcg 15 mcg Nebulization BID RT   And   · budesonide (PULMICORT) 500 mcg/2 ml nebulizer suspension 500 mcg Nebulization BID RT   · sodium chloride (NS) flush 5-40 mL 5-40 mL IntraVENous Q8H   · acetaminophen (TYLENOL) tablet 650 mg 650 mg Oral Q6H PRN   Or   · acetaminophen (TYLENOL) suppository 650 mg 650 mg Rectal Q6H PRN   · glucose chewable tablet 16 g 4 Tablet Oral PRN   · dextrose (D50W) injection syrg 12.5-25 g 25-50 mL IntraVENous PRN   · glucagon (GLUCAGEN) injection 1 mg 1 mg IntraMUSCular PRN         Allergies   Allergen Reactions   · Ciprofloxacin Anaphylaxis   · Shellfish Derived Anaphylaxis   · Ace Inhibitors Unknown (comments)   · Biaxin [Clarithromycin] Other (comments)   Metal taste   · Candesartan Cough   · Pcn [Penicillins] Hives     Past Medical History:   Diagnosis Date   · Acquired hypothyroidism 8/15/2016   · Anemia   RED-HF study   · Asthma   · Cardiomyopathy, nonischemic ([address]   initial dx 2001, bivHF 2008 with EF 15%, s/p biV-ICD 9/08, significant improvment in EF to 45-50%   · CKD (chronic kidney disease)   Dr Jesika López   · CKD (chronic kidney disease) 8/15/2012   · Depression   · Diabetes ([address]   · Diabetic neuropathy ([address]   · DM (diabetes mellitus) ([address] 8/15/2012   · GERD (gastroesophageal reflux disease)   · Gout   · Hypothyroidism   · ICD (implantable cardioverter-defibrillator), biventricular, in situ 6/5/2014   · Psoriasis     Past Surgical History:   Procedure Laterality Date   · CARDIAC CATHETERIZATION 2007; 01/06/15   normal cors   · ECHO 2D ADULT 4/2010   EF 45%, improved from 1/09 (25%)   · ECHO 2D ADULT 11/2011   LVH, EF 55-60%   · HX ORTHOPAEDIC   knee   · HX PACEMAKER PLACEMENT   AICD   · STRESS TEST LEXISCAN/CARDIOLITE 3/21/12   normal perfusion, global HK 40%     Family History   Problem Relation Age of Onset   · Heart Disease Mother   · Hypertension Mother   · Lupus Sister   · Diabetes Brother     Social History     Tobacco Use   · Smoking status: Never Smoker   · Smokeless tobacco: Never Used   Substance Use Topics   · Alcohol use: No         Review of Systems: not able to provide  Somnolent   BIPAP        Visit Vitals   /73   Pulse 95   Temp 99 °F (37.2 °C)   Resp 26   Ht 5' 7\" (1.702 m)   Wt 233 lb 4 oz (105.8 kg)   SpO2 97%   BMI 36.53 kg/m²          Physical Exam:   Constitutional: Well-developed and well-nourished. Head: Normocephalic and atraumatic. Eyes: closed  ENT: BIPAP mask  Neck: Right cordis with Alejo Lord. Cardiovascular: mildly fast rate, regular rhythm. Exam reveals no gallop and no friction rub. Pulmonary/Chest: Difficult to hear breath sounds due to BiPAP. Right sided impella  Abdominal: Soft, obese. GI/: Henley catheter in place. Musculoskeletal: Symmetrical.   Vasc/lymphatic: + right axillary Impella. No edema. SCD on  Neurological: somnolent  Skin Left side BiV ICD unremarkable. Assessment/Plan:     Imaging/Studies:   Limited echo (01/31/2022): LVEF 30%, mod LVH. Limited echo (01/28/2022): LVEF 30%, mod increased wall thickness. Cardiac cath (01/27/2022): Normal right & left side filling pressures.  Severe native one vessel CAD involving mid LAD 80% stenosis.  ANNE-MARIE placed, ballooned.  Also with more prox calcified LAD 50% lesion, not treated.  Moderate PH. Limited echo (01/26/2022): LVEF 25-30%.  Trace pericardial effusion. LHC/RHC (01/17/2022): Severely elevated left & right side filling pressures.  Severe PH, mixed pattern with elevated wedge as well as PVR of about 6 Woods units.  Mid LAD lesion 80% involving diagonal branch ostium.  Likely prior stent in mid LAD, patent.  Reduced CI of 1.65 L/min/m2 by thermodilution & 1.6 L/min/m2 by presumed oxygen consumption by Aye.      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 RHC without milrinone (12/10/2021): High wedge pressure (35 mmHg) indicating volume overload, precapillary.  Severe pulmonary HTN. Echo (12/08/2021): LVEF 15-20%, upper normal wall thickness, LV diastolic dysfunction.  Borderline low RVEF. Mod dilated LA, mildly dilated RA.  Mild to mod MR. Regina Dinning TR.  Mild to mod PH.       LHC/RHC (01/2015): No significant CAD. Mixed CM: Now s/p PCI LAD on 01/27/2022.  Limited echo on 01/28/2022 showed LVEF improved to 30%, stable on recheck 01/31/2022.  P3 on Impella. SPAP increased to 88 mmHg    Nuclear amyloid scan equivocal.  cMRI not possible with 4194 LV lead (2008).  However, cMRI wouldn't . S/p Impella 1/18/22.  Down to P3 this morning and attempting to be weaned further.  Per Dr. Mike Nash, she is now not a candidate for transplant.  She feels that current decompensation is due to severe interstitial lung disease & pulmonary HTN.  Patient now DNR/DNI.  Deactivated ICD therapies yesterday. Medtronic biventricular ICD (gen change 01/14/2015, leads 09/25/2008):  Device reached BETTY 1/22/2022.  Replacement is no longer an option with DNR DNI decision. Patient's  agreed to ICD deactivation; this was done on 02/02/2022. AFL with RVR: Noted via telemetry, initially reported as NSVT.  Continue amiodarone IV gtt   decision is made about comfort care or hospice. Her  wants home hospice but still on impella and BIPAP in CCU. I have told him it may not be possible  Once she gets off the above devices then we can make more decision   I discussed with CCU attending  Will turn off amiodarone IV  She has sinus tach with biventricular pacing      CKD: Worse now. Gita Euceda had PCI last week but Impella is also being weaned off and she was on diuretic.  Bumex IV gtt now discontinued, receiving 1 mg IV bid. PA pressure is up again.      Anemia: EGD and colonoscopy on 01/26/2021, had cecal polyps (tubular adenomas per pathology).           Thank you for involving me in this patient's care and please call with further concerns or questions. Chandrakant Walker M.D.    Electrophysiology/Cardiology   [facility] [place]   [place] [id number]                     [id number] [facility], [address] [place] [address]

## 2022-02-03 NOTE — PROGRESS NOTES
[facility]   [address] [address], [address] [facility]  Phone: [phone]   [phone]    [email or web address]     Nephrology Progress Note    Patient Name : Meera Thayer      : 1955     MRN : [id number]  Date of Admission : 2022  Date of Servive : 22    CC:  Follow up for ARF       Assessment and Plan   RODNEY on CKD :  - CRS. - Cr now rising from over diuresis and weaning cardiac support   - not a candidate for dialysis   - unlikely to survive going home on hospice. She should transition to comfort care in the hospital     Hypernatremia   - continue w/ D5W    Hypercalcemia   - 2/2 IVVD from diuretics, worsened by thiazide use   -resolving w/ hydration     Hypokalemia   Hypo Phos   - replace PRN    CKD stage IV:  - Etiology: DM, HTN, CRS  - Renal US: suggestive of CKD, B/L benign renal cysts   - baseline Cr 2.4-2.6 mg/dl      Acute on chronic HFrEF  NI CMP, LVEF 15 to 20%  NYHA class III-IV  S/p BiV pacer/ICD-s/p CRT  R axillary Impella implanted 22  LAD PCI on 22    Morbid obesity  Type II DM  HTN  Hypothyroidism  Pulmonary hypertension  Gout  Psoriasis       Interval History:  Was made DNR/DNI yesterday.  unable to come for comfort care discussions   Impella at P-3  Bare minimum UOP, CR at 3.9  Na down from D5W  Remains on BIPAP and lethargic     Review of Systems: Review of systems not obtained due to patient factors.     Current Medications:   Current Facility-Administered Medications   Medication Dose Route Frequency    dextrose 5% infusion  75 mL/hr IntraVENous CONTINUOUS    insulin NPH (NOVOLIN N, HUMULIN N) injection 40 Units  40 Units SubCUTAneous QHS    insulin NPH (NOVOLIN N, HUMULIN N) injection 40 Units  40 Units SubCUTAneous DAILY    amiodarone (CORDARONE) 375 mg/250 mL D5W infusion  0.5-1 mg/min IntraVENous CONTINUOUS    morphine injection 2 mg  2 mg IntraVENous Q3H PRN    epoetin isabel-epbx (RETACRIT) injection 10,000 Units  10,000 Units SubCUTAneous Q MON, WED & FRI    sodium bicarbonate (8.4%) 25 mEq in dextrose 5% 1,000 mL infusion   Other TITRATE    cefTRIAXone (ROCEPHIN) 2 g in 0.9% sodium chloride 20 mL IV syringe  2 g IntraVENous Q24H    [Held by provider] bumetanide (BUMEX) injection 1 mg  1 mg IntraVENous BID    potassium bicarb-citric acid (EFFER-K) tablet 40 mEq  40 mEq Oral DAILY    allopurinoL (ZYLOPRIM) tablet 100 mg  100 mg Oral DAILY    pantoprazole (PROTONIX) injection 40 mg  40 mg IntraVENous Q12H    And    sodium chloride (NS) flush 10 mL  10 mL IntraVENous DAILY    sodium chloride (NS) flush 5-40 mL  5-40 mL IntraVENous PRN    clopidogreL (PLAVIX) tablet 75 mg  75 mg Oral DAILY    digoxin (LANOXIN) tablet 0.0625 mg  0.0625 mg Oral EVERY OTHER DAY    DOBUTamine (DOBUTREX) 500 mg/250 mL (2,000 mcg/mL) infusion  0-10 mcg/kg/min IntraVENous TITRATE    albumin human 25% (BUMINATE) solution 12.5 g  12.5 g IntraVENous DAILY    milrinone (PRIMACOR) 20 MG/100 ML D5W infusion  0.125 mcg/kg/min IntraVENous CONTINUOUS    vasopressin (VASOSTRICT) 20 Units in 0.9% sodium chloride 100 mL infusion  0-0.1 Units/min IntraVENous TITRATE    PHENYLephrine (NORMA-SYNEPHRINE) 30 mg in 0.9% sodium chloride 250 mL infusion   mcg/min IntraVENous TITRATE    niCARdipine (CARDENE) 25 mg in 0.9% sodium chloride 250 mL infusion  0-15 mg/hr IntraVENous TITRATE    [Held by provider] bivalirudin (ANGIOMAX) 250 mg in 0.9% sodium chloride (MBP/ADV) 50 mL MBP  0.02-2.5 mg/kg/hr IntraVENous TITRATE    hydrALAZINE (APRESOLINE) 20 mg/mL injection 5 mg  5 mg IntraVENous Q4H PRN    bumetanide (BUMEX) injection 1 mg  1 mg IntraVENous Q4H PRN    heparin (porcine) in 0.9% NaCl 30,000 unit/1,000 mL perfusion irrigation 50-1,000 mL  50-1,000 mL Other PRN    chlorhexidine (PERIDEX) 0.12 % mouthwash 15 mL  15 mL Oral Q12H    0.9% sodium chloride infusion  9 mL/hr IntraVENous CONTINUOUS    naloxone (NARCAN) injection 0.4 mg  0.4 mg IntraVENous PRN    ondansetron (ZOFRAN) injection 4 mg  4 mg IntraVENous Q4H PRN    albuterol (PROVENTIL VENTOLIN) nebulizer solution 2.5 mg  2.5 mg Nebulization Q4H PRN    aspirin chewable tablet 81 mg  81 mg Oral DAILY    magnesium oxide (MAG-OX) tablet 400 mg  400 mg Oral BID    calcium chloride 1 g in 0.9% sodium chloride 100 mL IVPB  1 g IntraVENous PRN    bisacodyL (DULCOLAX) suppository 10 mg  10 mg Rectal DAILY PRN    senna-docusate (PERICOLACE) 8.6-50 mg per tablet 1 Tablet  1 Tablet Oral BID    ELECTROLYTE REPLACEMENT NOTE: Nurse to review Serum Potassium and Magnesuim levels and Initiate Electrolyte Replacement Protocol as needed  1 Each Other PRN    magnesium sulfate 1 g/100 ml IVPB (premix or compounded)  1 g IntraVENous PRN    alteplase (CATHFLO) 1 mg in sterile water (preservative free) 1 mL injection  1 mg InterCATHeter PRN    bacitracin 500 unit/gram packet 1 Packet  1 Packet Topical PRN    dexmedeTOMidine in 0.9 % NaCl (PRECEDEX) 400 mcg/100 mL (4 mcg/mL) infusion soln  0.1-1.5 mcg/kg/hr IntraVENous TITRATE    insulin lispro (HUMALOG) injection   SubCUTAneous Q6H    triamcinolone acetonide (KENALOG) 0.1 % cream   Topical BID    polyethylene glycol (MIRALAX) packet 17 g  17 g Oral DAILY    montelukast (SINGULAIR) tablet 10 mg  10 mg Oral DAILY    levothyroxine (SYNTHROID) tablet 100 mcg  100 mcg Oral Once per day on Mon Tue Wed Thu Fri Sat    hydroxypropyl methylcellulose (ISOPTO TEARS) 0.5 % ophthalmic solution 1 Drop  1 Drop Both Eyes PRN    venlafaxine-SR (EFFEXOR-XR) capsule 75 mg  75 mg Oral DAILY WITH BREAKFAST    gabapentin (NEURONTIN) capsule 100 mg  100 mg Oral QHS    arformoteroL (BROVANA) neb solution 15 mcg  15 mcg Nebulization BID RT    And    budesonide (PULMICORT) 500 mcg/2 ml nebulizer suspension  500 mcg Nebulization BID RT    sodium chloride (NS) flush 5-40 mL  5-40 mL IntraVENous Q8H    acetaminophen (TYLENOL) tablet 650 mg  650 mg Oral Q6H PRN    Or    acetaminophen (TYLENOL) suppository 650 mg  650 mg Rectal Q6H PRN    glucose chewable tablet 16 g  4 Tablet Oral PRN    dextrose (D50W) injection syrg 12.5-25 g  25-50 mL IntraVENous PRN    glucagon (GLUCAGEN) injection 1 mg  1 mg IntraMUSCular PRN      Allergies   Allergen Reactions    Ciprofloxacin Anaphylaxis    Shellfish Derived Anaphylaxis    Sildenafil Other (comments)    Ace Inhibitors Unknown (comments)    Biaxin [Clarithromycin] Other (comments)     Metal taste    Candesartan Cough    Pcn [Penicillins] Hives       Objective:  Vitals:    Vitals:    02/03/22 0342 02/03/22 0400 02/03/22 0500 02/03/22 0600   BP:  105/74 107/76 108/73   Pulse:  (!) 102 (!) 104 95   Resp:  25 20 26   Temp:  99 °F (37.2 °C)     SpO2: 98% 98% 98% 97%   Weight:  105.8 kg (233 lb 4 oz)     Height:         Intake and Output:  No intake/output data recorded. 02/01 1901 - 02/03 0700  In: 2256.8 [I.V.:2206.8]  Out: 5225 [Urine:1495]    Physical Examination:  General: Lethargic, BIPAP  HEENT:           DHT   Neck:  Supple, no mass  Resp:  Diminished at bases   CV:  RRR, no LE edema   GI:  Soft, NT, + BS, obese  Neurologic:  Lethargic   :                  Henley in place    [x]    High complexity decision making was performed  []    Patient is at high-risk of decompensation with multiple organ involvement    Lab Data Personally Reviewed: I have reviewed all the pertinent labs, microbiology data and radiology studies during assessment.     Recent Labs     02/03/22 0424 02/02/22  0403 02/01/22  0421   * 152* 154*   K 4.3 4.0 3.4*   * 113* 115*   CO2 33* 34* 33*   * 256* 270*   * 98* 86*   CREA 3.95* 2.95* 2.84*   CA 9.7 10.0 10.2*  10.6*   MG  --  2.8* 2.9*   PHOS 2.8 3.1 1.6*   ALB 3.6 4.0 3.7   ALT 7* 7* 10*     Recent Labs     02/03/22  0424 02/01/22  0421   WBC 21.3* 16.5*   HGB 6.4* 8.7*   HCT 24.1* 33.0*    400     Lab Results   Component Value Date/Time    Specimen Description: URINE 10/22/2013 01:32 PM    Specimen Description: URINE 01/23/2013 04:40 PM    Specimen Description: LEG TISSUE 02/12/2009 12:00 AM    Specimen Description: LEG (LEFT) 01/29/2009 03:06 AM     Lab Results   Component Value Date/Time    Culture result: NO GROWTH 5 DAYS 01/25/2022 12:36 PM    Culture result: MODERATE ESCHERICHIA COLI (A) 01/25/2022 12:36 PM    Culture result: MODERATE NORMAL RESPIRATORY ROSANNA 01/25/2022 12:36 PM    Culture result: MRSA NOT PRESENT 01/19/2022 12:41 PM    Culture result:  01/19/2022 12:41 PM     Screening of patient nares for MRSA is for surveillance purposes and, if positive, to facilitate isolation considerations in high risk settings. It is not intended for automatic decolonization interventions per se as regimens are not sufficiently effective to warrant routine use.     Culture result: NO GROWTH 5 DAYS 01/19/2022 12:41 PM     Recent Results (from the past 24 hour(s))   GLUCOSE, POC    Collection Time: 02/02/22 12:11 PM   Result Value Ref Range    Glucose (POC) 210 (H) 65 - 117 mg/dL    Performed by Kaleb OH(ARTURO)    GLUCOSE, POC    Collection Time: 02/02/22  5:40 PM   Result Value Ref Range    Glucose (POC) 193 (H) 65 - 117 mg/dL    Performed by Kaleb OH(ARTURO)    GLUCOSE, POC    Collection Time: 02/02/22 11:39 PM   Result Value Ref Range    Glucose (POC) 252 (H) 65 - 117 mg/dL    Performed by Hazel Em    NT-PRO BNP    Collection Time: 02/03/22  4:24 AM   Result Value Ref Range    NT pro-BNP >35,000 (H) <370 PG/ML   METABOLIC PANEL, COMPREHENSIVE    Collection Time: 02/03/22  4:24 AM   Result Value Ref Range    Sodium 147 (H) 136 - 145 mmol/L    Potassium 4.3 3.5 - 5.1 mmol/L    Chloride 111 (H) 97 - 108 mmol/L    CO2 33 (H) 21 - 32 mmol/L    Anion gap 3 (L) 5 - 15 mmol/L    Glucose 238 (H) 65 - 100 mg/dL     (H) 6 - 20 MG/DL    Creatinine 3.95 (H) 0.55 - 1.02 MG/DL    BUN/Creatinine ratio 28 (H) 12 - 20      GFR est AA 14 (L) >60 ml/min/1.73m2 GFR est non-AA 11 (L) >60 ml/min/1.73m2    Calcium 9.7 8.5 - 10.1 MG/DL    Bilirubin, total 0.5 0.2 - 1.0 MG/DL    ALT (SGPT) 7 (L) 12 - 78 U/L    AST (SGOT) 22 15 - 37 U/L    Alk. phosphatase 65 45 - 117 U/L    Protein, total 7.5 6.4 - 8.2 g/dL    Albumin 3.6 3.5 - 5.0 g/dL    Globulin 3.9 2.0 - 4.0 g/dL    A-G Ratio 0.9 (L) 1.1 - 2.2     DIGOXIN    Collection Time: 02/03/22  4:24 AM   Result Value Ref Range    Digoxin level 1.2 0.90 - 2.00 NG/ML   LACTIC ACID    Collection Time: 02/03/22  4:24 AM   Result Value Ref Range    Lactic acid 1.4 0.4 - 2.0 MMOL/L   PROCALCITONIN    Collection Time: 02/03/22  4:24 AM   Result Value Ref Range    Procalcitonin 2.11 ng/mL   PHOSPHORUS    Collection Time: 02/03/22  4:24 AM   Result Value Ref Range    Phosphorus 2.8 2.6 - 4.7 MG/DL   PTT    Collection Time: 02/03/22  4:24 AM   Result Value Ref Range    aPTT 27.5 22.1 - 31.0 sec    aPTT, therapeutic range     58.0 - 77.0 SECS   CBC W/O DIFF    Collection Time: 02/03/22  4:24 AM   Result Value Ref Range    WBC 21.3 (H) 3.6 - 11.0 K/uL    RBC 2.56 (L) 3.80 - 5.20 M/uL    HGB 6.4 (L) 11.5 - 16.0 g/dL    HCT 24.1 (L) 35.0 - 47.0 %    MCV 94.1 80.0 - 99.0 FL    MCH 25.0 (L) 26.0 - 34.0 PG    MCHC 26.6 (L) 30.0 - 36.5 g/dL    RDW 20.4 (H) 11.5 - 14.5 %    PLATELET 585 870 - 494 K/uL    MPV 10.7 8.9 - 12.9 FL    NRBC 0.4 (H) 0  WBC    ABSOLUTE NRBC 0.09 (H) 0.00 - 0.01 K/uL   SAMPLES BEING HELD    Collection Time: 02/03/22  4:24 AM   Result Value Ref Range    SAMPLES BEING HELD 2PST     COMMENT        Add-on orders for these samples will be processed based on acceptable specimen integrity and analyte stability, which may vary by analyte.    LD    Collection Time: 02/03/22  4:24 AM   Result Value Ref Range     (H) 81 - 246 U/L   CARBOXYHEMOGLOBIN    Collection Time: 02/03/22  5:05 AM   Result Value Ref Range    Carboxy-Hgb 1.6 1 - 2 %    Methemoglobin 0.1 0 - 1.4 %    tHb 7.3 (LL) 14 - 17 g/dL    Oxyhemoglobin 44.9 (LL) 94 - 97 %    O2 SATURATION 46 (L) 95 - 99 %    SITE OTHER      Sample source VENOUS BLOOD      Critical value read back Called to MD Gurpreet on 02/03/2022 at 05:11    GLUCOSE, POC    Collection Time: 02/03/22  5:59 AM   Result Value Ref Range    Glucose (POC) 238 (H) 65 - 117 mg/dL    Performed by [facility]            I have reviewed the flowsheets. Chart and Pertinent Notes have been reviewed. No change in PMH ,family and social history from Consult note.       Emelyn Jackson [facility]

## 2022-02-03 NOTE — DISCHARGE SUMMARY
Admit date: 2022   Admitting Provider: Ban Faye MD    Discharge date: 2/3/2022  Discharging Provider: Juanjo Calvo MD   patient is . Time of death 200      * Admission Diagnoses: Acute respiratory failure with hypoxia ([facility] [address] [J96.01]    * Discharge Diagnoses:    1. Cardiogenic Shock  2. Acute hypoxemic respiratory failure  3. Status post Impella placement  4. RODNEY on CKD stage IV  5. Pulmonary hypertension: Multifactorial    * Hospital Course:   59-year-old female with past medical history significant for moderate pulmonary hypertension on home oxygen therapy, CKD, nonischemic cardiomyopathy who was admitted to the hospital on  due to acute on chronic hypoxemic respiratory failure in light of fluid overload. Had a left heart cath on  which demonstrated single one-vessel moderate disease (mid LAD lesion) which was thought to be not a cause of cardiomyopathy.  Subsequently had a right axillary 5.5 Impella placed by CT surgery for potential double transplant.  ANNE-MARIE to LAD on .  Extubated on . Her ejection fraction cardiac function improved however her pulmonary hypertension was insidious issue of contributing to her hypoxia and respiratory failure. Her kidney function also showed some improvement but unfortunately with the persistent pulmonary hypertension and her intolerance for trial of sildenafil it was felt by old consultant that further care is not possible. This was discussed with her  who transitioned her gradually from DNR/DNI to comfort measure. On the evening of February. He was at bedside ask us to remove the BiPAP and wean the Impella.   She was given morphine for increased work of breathing and passed away peacefully shortly afterward    Procedure(s):  [address] [facility]  RIGHT HEART CATH    Discharge Exam:  Pupils fixed dilated, no pulse, no respiratory effort    * Discharge Condition:     * Disposition: [place]      Signed:  Dimitris Bucio MD  2/3/2022  6:24 PM

## 2022-02-03 NOTE — PROGRESS NOTES
[facility]    ICU TEAM Progress Note    Name: Jovan Shetty   : 1955   MRN: [id number]   Date: 2/3/2022      I  Subjective:   Progress Note: 2/3/2022      Reason for ICU Admission: Cardiogenic shock     Interval history:  39-year-old female with past medical history significant for moderate pulmonary hypertension on home oxygen therapy, CKD, nonischemic cardiomyopathy who was admitted to the hospital on  due to acute on chronic hypoxemic respiratory failure in light of fluid overload. Had a left heart cath on  which demonstrated single one-vessel moderate disease (mid LAD lesion) which was thought to be not a cause of cardiomyopathy.  Subsequently had a right axillary 5.5 Impella placed by CT surgery for potential double transplant.  ANNE-MARIE to LAD on .  Extubated on . Overnight Events:   2/3: As documented by EP cardiologist, discussion with the  confirmed  wishes to transition to comfort measures. He is hoping to be able to take him home though this is questionable. Impella wean down to P3, that actually improved her heart rate. However PA pressure remained very high, worsening kidney function. Remained delirious and confused. : CODE STATUS changed to DNR/DNI, episode of ventricular tachycardia, episode of hypoxia requiring BiPAP, worsening mental status with increased agitation and confusion at times  : Did not tolerate weaning Impella to P4.  Had to go up to P6 around 1 AM.  Needed extra diuretics.  Off Precedex.  Tenuous respiratory status.  Went back on BiPAP overnight.  Minimal support setting.  Off pressors and inotropes.   : No acute event   - Extubated to BiPAP , impella in situ   - remains intubated, Impella in situ   - remains intubated, Impella in situ, now off inhaled nitric oxide, Bloody OG o/p   - getting [facility] today, remains intubated, Impella in situ, on inhaled nitric oxide   - s/p EGD/colo today for Ca workup for possible heart/kidney Tx, remains intubated, Impella in situ, on inhaled nitric oxide  1/-81-alblmiq intubated, Impella in situ, on inhaled nitric oxide      Active Problem List:     Problem List  Date Reviewed: 12/22/2021          Codes Class    Acute respiratory failure with hypoxia (HCC) ICD-10-CM: J96.01  ICD-9-CM: 518.81         CHF exacerbation (HCC) ICD-10-CM: I50.9  ICD-9-CM: 428.0         Type 2 diabetes mellitus with diabetic neuropathy (HCC) ICD-10-CM: E11.40  ICD-9-CM: 250.60, 357.2         Type 2 diabetes with nephropathy (HCC) ICD-10-CM: E11.21  ICD-9-CM: 250.40, 583.81         Obesity, morbid ([facility] ICD-10-CM: E66.01  ICD-9-CM: 278.01         Acquired hypothyroidism ICD-10-CM: E03.9  ICD-9-CM: 352. 9         Dysthymia ICD-10-CM: F34.1  ICD-9-CM: 300.4         ICD (implantable cardioverter-defibrillator), biventricular, in situ ICD-10-CM: Z95.810  ICD-9-CM: V45.02     Overview Signed 1/14/2015 11:11 AM by Taylor Jackson MD     Generator [employer] change 1/14/2015             Dyslipidemia ICD-10-CM: L80.6  ICD-9-CM: 272.4         CKD (chronic kidney disease) ICD-10-CM: N18.9  ICD-9-CM: 956. 9         Cardiomyopathy, nonischemic ([facility] ICD-10-CM: I42.8  ICD-9-CM: 425.4     Overview Signed 10/10/2011  6:40 AM by Derek Villarreal MD     initial dx 2001, bivHF 2008 with EF 15%, s/p biV-ICD 9/08, significant improvment in EF to 45-50%             Anemia in chronic renal disease (Chronic) ICD-10-CM: N18.9, D63.1  ICD-9-CM: 285.21         HTN (hypertension) ICD-10-CM: I10  ICD-9-CM: 401.9         GERD (gastroesophageal reflux disease) (Chronic) ICD-10-CM: K21.9  ICD-9-CM: 530.81         Gout (Chronic) ICD-10-CM: M10.9  ICD-9-CM: 274.9         Pulmonary HTN (HCC) (Chronic) ICD-10-CM: I27.20  ICD-9-CM: 416.8               Past Medical History:      has a past medical history of Acquired hypothyroidism (8/15/2016), Anemia, Asthma, Cardiomyopathy, nonischemic ([facility] CKD (chronic kidney disease), CKD (chronic kidney disease) (8/15/2012), Depression, Diabetes ([address] Diabetic neuropathy ([address] DM (diabetes mellitus) ([address] (8/15/2012), GERD (gastroesophageal reflux disease), Gout, Hypothyroidism, ICD (implantable cardioverter-defibrillator), biventricular, in situ (6/5/2014), and Psoriasis. She has no past medical history of Abuse, Adult physical abuse, Arrhythmia, Arthritis, Asthma, Autoimmune disease ([address] CAD (coronary artery disease), Calculus of kidney, Cancer ([address] Chronic pain, COPD, Headache(784.0), Hypercholesteremia, Liver disease, Psychotic disorder ([address] PUD (peptic ulcer disease), Seizures ([address] Stroke ([address] Thromboembolus ([address] or Unspecified deficiency anemia. Past Surgical History:      has a past surgical history that includes echo 2d adult (4/2010); cardiac catheterization (2007; 01/06/15); echo 2d adult (11/2011); stress test lexiscan/cardiolite (3/21/12); hx pacemaker placement; hx orthopaedic; and colonoscopy (N/A, 1/26/2022). Home Medications:     Prior to Admission medications    Medication Sig Start Date End Date Taking? Authorizing Provider   levocetirizine (XYZAL) 5 mg tablet TAKE 1 TABLET BY MOUTH EVERY DAY 1/6/22  Yes Al Mcdonough MD   apremilast [place]) 30 mg tab Take 30 mg by mouth two (2) times daily as needed. Yes Provider, Historical   azelastine (ASTEPRO) 205.5 mcg (0.15 %) 1 [place] by Both Nostrils route two (2) times daily as needed. Yes Provider, Historical   docusate sodium (COLACE) 100 mg capsule Take 100 mg by mouth daily as needed for Constipation. Yes Provider, Historical   levothyroxine (SYNTHROID) 100 mcg tablet Take 100 mcg by mouth six (6) days a week.  Everyday except Sunday   Yes Provider, Historical   allopurinoL (ZYLOPRIM) 100 mg tablet TAKE 1 TABLET BY MOUTH EVERY DAY 1/5/22  Yes Al Mcdonough MD   calcitRIOL (ROCALTROL) 0.5 mcg capsule TAKE 1 CAPSULE BY MOUTH EVERY DAY 1/5/22  Yes Al Mcdonough MD   carvediloL (COREG) 6.25 mg tablet Take 1 Tablet by mouth two (2) times daily (with meals). 12/22/21  Yes Seven Mask B, NP   isosorbide dinitrate (ISORDIL) 5 mg tablet Take 1 Tablet by mouth three (3) times daily. 12/22/21  Yes Seven Mask B, NP   hydrALAZINE (APRESOLINE) 10 mg tablet Take 1 Tablet by mouth three (3) times daily. 12/22/21  Yes Julia Kendall NP   benzonatate (Tessalon Perles) 100 mg capsule Take 100 mg by mouth three (3) times daily as needed for Cough. Yes Other, MD Bee   bumetanide (BUMEX) 2 mg tablet Take 1 tab in the morning and 0.5 tab in the evening 11/5/21  Yes Barrie Lau MD   gabapentin (NEURONTIN) 100 mg capsule Take 1 Capsule by mouth nightly. Max Daily Amount: 100 mg. 10/29/21  Yes Diya Aceves MD   budesonide (PULMICORT) 180 mcg/actuation aepb inhaler Take 2 Puffs by inhalation two (2) times a day. 5/27/21  Yes Diya Aceves MD   ammonium lactate (AMLACTIN) 12 % topical cream Apply  to affected area two (2) times a day. rub in to affected area well 11/4/20  Yes Diya Aceves MD   insulin NPH/insulin regular (NOVOLIN 70/30) 100 unit/mL (70-30) injection 70 units two times a day 8/15/16  Yes Misty Holland MD   calcipotriene (DOVONEX) 0.005 % topical cream Apply  to affected area three (3) times daily. Yes Provider, Historical   triamcinolone acetonide (KENALOG) 0.5 % ointment Apply  to affected area two (2) times daily as needed. use thin layer    Yes Provider, Historical   multivitamin (ONE A DAY) tablet Take 1 Tab by mouth daily. Yes Provider, Historical   ferrous sulfate (IRON) 325 mg (65 mg elemental iron) tablet Take 325 mg by mouth daily. Yes Provider, Historical   aspirin 81 mg tablet Take 81 mg by mouth daily.    Yes Provider, Historical   montelukast (SINGULAIR) 10 mg tablet TAKE 1 TABLET BY MOUTH EVERY DAY 1/27/22   Diya Aceves MD   fluticasone propionate (FLONASE) 50 mcg/actuation nasal spray USE 1 SPRAY IN EACH NOSTRIL DAILY 22   Gracie Ng MD   venlafaxine-SR (EFFEXOR-XR) 75 mg capsule TAKE 1 CAPSULE BY MOUTH EVERY DAY 22   Gracie Ng MD   cholecalciferol (VITAMIN D3) (2,000 UNITS /50 MCG) cap capsule TAKE 1 CAPSULE BY MOUTH TWO (2) TIMES A DAY. 22   Gracie Ng MD       Allergies/Social/Family History:      Allergies   Allergen Reactions    Ciprofloxacin Anaphylaxis    Shellfish Derived Anaphylaxis    Sildenafil Other (comments)    Ace Inhibitors Unknown (comments)    Biaxin [Clarithromycin] Other (comments)     Metal taste    Candesartan Cough    Pcn [Penicillins] Hives      Social History     Tobacco Use    Smoking status: Never Smoker    Smokeless tobacco: Never Used   Substance Use Topics    Alcohol use: No      Family History   Problem Relation Age of Onset    Heart Disease Mother     Hypertension Mother     Lupus Sister     Diabetes Brother        Review of Systems:     Not able to obtain due to patient medical condition    Objective:   Vital Signs:  Visit Vitals  /84   Pulse (!) 105   Temp 99 °F (37.2 °C)   Resp 25   Ht 5' 7\" (1.702 m)   Wt 105.8 kg (233 lb 4 oz)   SpO2 99%   BMI 36.53 kg/m²    O2 Flow Rate (L/min): 6 l/min O2 Device: BIPAP Temp (24hrs), Av.4 °F (37.4 °C), Min:98.5 °F (36.9 °C), Max:100.3 °F (37.9 °C)    CVP (mmHg): 11 mmHg (22 1600)      Intake/Output:     Intake/Output Summary (Last 24 hours) at 2/3/2022 0825  Last data filed at 2/3/2022 0700  Gross per 24 hour   Intake 1808.8 ml   Output 800 ml   Net 1008.8 ml       Physical Exam:    General-chronically ill looking female on BiPAP, no acute distress  Neuro- Grossly non focal, confused, moaning at times, not following command  Cardiac-paced rhythm, Impella at the P3  Lungs-fine crepitation bilaterally  Abdomen-soft, nontender, nondistended  Extremities-warm, edema    LABS AND  DATA: Personally reviewed  Recent Labs     22  0424 22  0421   WBC 21.3* 16.5*   HGB 6.4* 8.7*   HCT 24.1* 33.0*    400     Recent Labs     02/03/22  0424 02/02/22  0403 02/01/22  0421 02/01/22  0421   * 152*   < > 154*   K 4.3 4.0   < > 3.4*   * 113*   < > 115*   CO2 33* 34*   < > 33*   * 98*   < > 86*   CREA 3.95* 2.95*   < > 2.84*   * 256*   < > 270*   CA 9.7 10.0   < > 10.2*  10.6*   MG  --  2.8*  --  2.9*   PHOS 2.8 3.1   < > 1.6*    < > = values in this interval not displayed. Recent Labs     02/03/22 0424 02/02/22 0403   AP 65 77   TP 7.5 7.9   ALB 3.6 4.0   GLOB 3.9 3.9     Recent Labs     02/03/22 0424 02/02/22 0403   APTT 27.5 24.9      Recent Labs     02/02/22  0425 02/01/22  0440   PHI 7.43 7.46*   PCO2I 49.6* 48.5*   PO2I 107* 89   FIO2I 50 35     No results for input(s): CPK, CKMB, TROIQ, BNPP in the last 72 hours. Hemodynamics:   PAP: PAP Systolic: 90 (72/90/74 8210) CO: CO (l/min): 6.2 l/min (02/03/22 0700)   Wedge:   CI: CI (l/min/m2): 2.7 l/min/m2 (02/03/22 0700)   CVP:  CVP (mmHg): 11 mmHg (02/02/22 1600) SVR:       PVR:       Ventilator Settings:  Mode Rate Tidal Volume Pressure FiO2 PEEP   Spontaneous,Pressure support   460 ml  5 cm H2O 50 % 5 cm H20     Peak airway pressure: 10 cm H2O    Minute ventilation: 9 l/min        MEDS: Reviewed    Chest X-Ray:  CXR Results  (Last 48 hours)               02/03/22 0448  XR CHEST PORT Final result    Impression:  1. Interval development of asymmetric airspace disease right greater than left   is consistent with developing pneumonia. Clinical correlation is suggested. Narrative:  EXAM: XR CHEST PORT       INDICATION: post-Impella       COMPARISON: 2/2/2022       FINDINGS: A portable AP radiograph of the chest was obtained at 0428 hours. The   patient is on a cardiac monitor. The cardiac assist device remains in place. ICD   and [place]-Guille catheter are in satisfactory position. Feeding tube courses into   the stomach, distal tip is not seen. PICC line overlies the cavoatrial junction.        Lung volumes are low. Superimposed upon the interstitial edema are new areas of airspace disease   asymmetrically located as pronounced in the right upper lobe, right lung base   and left perihilar region. This asymmetric airspace disease suggests probable   development of pneumonia. Clinical correlation is suggested. Chris Andrews 02/02/22 0448  XR CHEST PORT Final result    Impression:  1. Increasing pulmonary edema       Narrative:  EXAM: XR CHEST PORT       INDICATION: post-Impella       COMPARISON: 2/1/2022       FINDINGS: A portable AP radiograph of the chest was obtained at 0431 hours. The   patient is on a cardiac monitor. Feeding tube courses into the stomach. The   distal tip is not seen. [place]-Guille catheter overlies main pulmonary artery. Right-sided PICC line overlies the cavoatrial junction. ICD is present. Cardiac   assist device is present. The lungs demonstrate increasing pulmonary edema pulmonary edema is now   moderate. The lung volumes are low there is mild atelectasis in left lower lobe. Assessment and Plan:   1. Cardiogenic Shock  2. Acute hypoxemic respiratory failure  3. Status post Impella placement  4. RODNEY on CKD stage IV  5. Pulmonary hypertension: Multifactorial    As detailed in my previous discussion with the  and as detailed in the EP cardiologist note and his discussion with the ,  initially changed the CODE STATUS to DNR/DNI, he understand the poor prognosis and the lack of further intervention. He agreed to transition to comfort measure. His hope that he can take her home with hospice, however he understand that this may be difficult to achieve. I started weaning Impella yesterday, now down to P3, that led to significant worsening in her kidney function however no significant change in hemodynamics or elevated pulmonary PA pressures. I will ask cardiac surgeon to evaluate possibility of removing Impella.   I will wean BiPAP and consider using morphine for air hunger and respiratory distress  Continue aspirin Plavix, hold medication not contributing to her comfort. I will call the  later today to update them on her progress and current status. DISPOSITION  Stay in ICU    CRITICAL CARE CONSULTANT NOTE  I had a face to face encounter with the patient, reviewed and interpreted patient data including clinical events, labs, images, vital signs, I/O's, and examined patient. I have discussed the case and the plan and management of the patient's care with the consulting services, the bedside nurses and the respiratory therapist.      NOTE OF PERSONAL INVOLVEMENT IN CARE   This patient has a high probability of imminent, clinically significant deterioration, which requires the highest level of preparedness to intervene urgently. I participated in the decision-making and personally managed or directed the management of the following life and organ supporting interventions that required my frequent assessment to treat or prevent imminent deterioration. I personally spent 40 minutes of critical care time. This is time spent at this critically ill patient's bedside actively involved in patient care as well as the coordination of care and discussions with the patient's family. This does not include any procedural time which has been billed separately. Benjamin Damon M.D.   Staff Intensivist/Pulmonologist  [facility]  2/3/2022

## 2022-02-03 NOTE — PROGRESS NOTES
Critical care follow-up note:  Patient's  at bedside. Stating that he is ready to transition to comfort measure. Removed BiPAP, morphine administered. Impella weaned to P2. Patient is hypoxic. Additional dose of morphine will be given and will be titrated to comfort.  understand that she may not survive the night. He has informed his son in [place] and also his sister. [address] notified.

## 2022-02-03 NOTE — PROGRESS NOTES
[facility]  ICU Progress Note    Admit Date: 2022    S/P R Axillary Impella 5.5     Subjective/24 Hour Summary:   Pt seen with Dr. Adryan Gudino at P3. Plans to transition to comfort care soon. Not tolerating being off of bipap. Objective:   Vitals:  Blood pressure 119/88, pulse (!) 110, temperature 99 °F (37.2 °C), resp. rate 22, height 5' 7\" (1.702 m), weight 233 lb 4 oz (105.8 kg), SpO2 100 %. Temp (24hrs), Av.4 °F (37.4 °C), Min:98.5 °F (36.9 °C), Max:100.3 °F (37.9 °C)    Hemodynamics:   CO: CO (l/min): 6.6 l/min   CI: CI (l/min/m2): 3 l/min/m2   CVP: CVP (mmHg): 11 mmHg (22)   SVR: SVR (dyne*sec)/cm5: 1123 (dyne*sec)/cm5 (22 9184)   PAP Systolic: PAP Systolic: 85 (61/01/62 9483)   PAP Diastolic: PAP Diastolic: 33 (43/79/46 8931)   PVR:     SV02: SVO2 (%): 46 % (22 0800)   SCV02:      EKG/Rhythm:  SR-ST       Oxygen Therapy:  Oxygen Therapy  O2 Sat (%): 100 % (22 0800)  Pulse via Oximetry: 110 beats per minute (22 0752)  O2 Device: BIPAP (22 0752)  Skin Assessment: Redness (see comment/note) (22)  Skin Protection for O2 Device: Yes (22)  Orientation: Anterior (22)  Location: Other (Comment) (22)  Interventions: Mouth Care (22)  O2 Flow Rate (L/min): 6 l/min (22)  O2 Temperature: 98.4 °F (36.9 °C) (22)  FIO2 (%): 50 % (22 0752)  ETCO2 (mmHg): 22 mmHg (22 2342)    CXR:  CXR Results  (Last 48 hours)               22 0448  XR CHEST PORT Final result    Impression:  1. Interval development of asymmetric airspace disease right greater than left   is consistent with developing pneumonia. Clinical correlation is suggested. Narrative:  EXAM: XR CHEST PORT       INDICATION: post-Impella       COMPARISON: 2022       FINDINGS: A portable AP radiograph of the chest was obtained at 0428 hours. The   patient is on a cardiac monitor.  The cardiac assist device remains in place. ICD   and [place]-Guille catheter are in satisfactory position. Feeding tube courses into   the stomach, distal tip is not seen. PICC line overlies the cavoatrial junction. Lung volumes are low. Superimposed upon the interstitial edema are new areas of airspace disease   asymmetrically located as pronounced in the right upper lobe, right lung base   and left perihilar region. This asymmetric airspace disease suggests probable   development of pneumonia. Clinical correlation is suggested. Bre Gandhi 02/02/22 0448  XR CHEST PORT Final result    Impression:  1. Increasing pulmonary edema       Narrative:  EXAM: XR CHEST PORT       INDICATION: post-Impella       COMPARISON: 2/1/2022       FINDINGS: A portable AP radiograph of the chest was obtained at 0431 hours. The   patient is on a cardiac monitor. Feeding tube courses into the stomach. The   distal tip is not seen. [place]-Guille catheter overlies main pulmonary artery. Right-sided PICC line overlies the cavoatrial junction. ICD is present. Cardiac   assist device is present. The lungs demonstrate increasing pulmonary edema pulmonary edema is now   moderate. The lung volumes are low there is mild atelectasis in left lower lobe. Admission Weight: Last Weight   Weight: 264 lb 1.8 oz (119.8 kg) Weight: 233 lb 4 oz (105.8 kg)     Intake / Output / Drain:  Current Shift: No intake/output data recorded. Last 24 hrs.:     Intake/Output Summary (Last 24 hours) at 2/3/2022 0941  Last data filed at 2/3/2022 0700  Gross per 24 hour   Intake 1712.8 ml   Output 785 ml   Net 927.8 ml       EXAM:  General:  No acute distress         Lungs:   Clear to auscultation bilaterally. Incision:  No erythema, drainage or swelling. Heart:  Regular rate and rhythm, S1, S2 normal, no murmur, click, rub or gallop. Abdomen:   Soft, non-tender. Bowel sounds normal. No masses,  No organomegaly. Extremities:  No edema. PPP. Neurologic: Grossly normal     Labs:   Recent Labs     224 22  042   WBC 21.3* 16.5*   HGB 6.4* 8.7*   HCT 24.1* 33.0*    400     Recent Labs     224 22  0403   * 152*   K 4.3 4.0   * 113*   CO2 33* 34*   * 98*   CREA 3.95* 2.95*   * 256*   CA 9.7 10.0   MG 3.0* 2.8*   PHOS 2.8 3.1     Recent Labs     22  0424 22  0403   AP 65 77   TP 7.5 7.9   ALB 3.6 4.0   GLOB 3.9 3.9     Recent Labs     22  04222  0403   APTT 27.5 24.9      Recent Labs     22  0425 22  0440   PHI 7.43 7.46*   PCO2I 49.6* 48.5*   PO2I 107* 89   FIO2I 50 35     No results for input(s): CPK, CKMB, TROIQ, BNPP in the last 72 hours. Assessment:     Active Problems:    Acute respiratory failure with hypoxia ([facility] (2022)         Plan/Recommendations/Medical Decision Makin. Acute on Chronic Systolic CHF class III/IV (BiV-ICD): S/P right axillary impella 5.5 -weaning as able. Cont bicarb via purge. Systemic bival on hold due to GIB. On ceftriaxone for prophylaxis. On digoxin. AHFS managing. GDMT as able. BNP > 35 k today. TTE  shows LV 30% on P5  2. Acute on Chronic Respiratory insufficiency: On home O2. Radha Montilla Extubated . Continue to work toward previous home home oxygen requirements. Increase activity, IS  3. CAD: Multivessel CAD on [facility] 22. ASA 81. BB on hold due to cardiogenic shock. Recommend starting high intensity statin when feasible, per primary/HF. S/P ANNE-MARIE to LAD on . On Plavix monotherapy per Cardiology due to GI bleeding  4. RODNEY on Chronic CKD Stage IV: Renal following. On diuretics,  Worsening hypernatremia/hypercalcemia--on free water flushes. 5. Gout: allopurinol  6. DM2: on NPH Per primary service  7. GERD: protonix  8. Hypothyroidism: on synthroid  9. Depression: Home meds  10. Anemia: on EPO, stable post op. Dispo: impella functioning well. Cont bicarb via purge.  No cardiac surgery issues otherwise. Planning comfort care as soon as family gives consent. Nothing further to add. No plans to remove the impella.     Signed By: TYE Galaviz

## 2022-02-03 NOTE — PROGRESS NOTES
[facility] in [facility], [employer]  Inpatient Progress Note      Patient name: Alex Schroeder  Patient : 1955  Patient MRN: [id number]  Consulting MD: Joanie Bronson MD  Date of service: 22    REASON FOR REFERRAL:  Management of cardiogenic shock     PLAN OF CARE:   At this time, her EF has improved to 45%  post LAD resvascularization, she does not meet criteria for impella support with improved hemodynamics. Her acute on chronic renal disease has progressed due to volume depletion/sepsis. She has profound pneumonia with underlying lung disease. Palliative care and intensivist to continue comfort care measure conversations with patient family. RECOMMENDATIONS:  POD # 16 Impella 5.5 implant   Cont Impella P3  Repeat TTE EF 45% post revascularization   Pulmonary hygiene, NC, Bipap management per intensivist   Free H2O and TF on hold  D5W per nephrology  Keep K> 4 and Mg >2  Hold digoxin for elevated level  goal 0.7-1.2   Continue ASA/Plavix for LAD stent  Blood cultures NGTD  Sputum with E Coli  On ceftriaxone  Palliative consult appreciated     Agree with DNR/DNI, family discussion this afternoon with  but agree with transitioning to comfort care at this time.       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 Important upcoming milestones or family events: none  The patient identifies the following as important for living well: remaining idependent and mobile; being able to get out of the house with . Patient verbalized willingness to be on home milrinone and evaluation for both heart and kidney transplants. Verbalizes she would have family supporting her decision on this. SHAMIKA Jonas is a 78 y.o.  female with a history of NICM, chronic hypoxic respiratory failure secondary to pulmonary HTN, hypothyroidism, CKD, GERD, and DM II who presented to [facility] as a transfer from another facility for acute on chronic hypoxic respiratory failure. Reports running out of O2 at home resulting in SOB and dizziness. Upon arrival to the ED she was found to have O2 sats in the 70s, requiring 4L NC O2. Rapid covid test was negative. ProNT-BNP was 93289, K+5.2, BUN/CR x/2.54 and elevated d-dimer. Chest xray showing pulmonary edema vs. Atypical pneumonia. VQ scan showed low probability for PE. Per Dr. Ryan Lazaro, NICM, LVEF 15-20% during recent admission. LVEF previously normalized with CRT. NYHA III-IV. No ACEi/ARB due to renal dysfunction. GDMT dosing limited by low BP. Patient's PCP is Dr. Sami Tyler, and she sees Dr. Ryan Lazaro primarily for cardiac care due to Dr. Kalie Govea transfering practice. Patient historically seen by nephrology but has not had follow up care in the past three years.      CARDIAC IMAGING:  Echo 1/20/22 - LVEF 20-25%, RV moderately dilated, Impella catheter 5.9 cm from AV   Echo 12/8/21- LVEF 15-20%, mild to Mod MR, LVIDd 5.09cm, TAPSE 1.94cm  Echo 4/22/19- LVEF 60%, trace MR,  LVIDd 4.24cm, TAPSE 1.65cm   Echo 4/24/18- LVEF 60%, trivial MR, LVIDd 4.79cm, TAPSE 2.25cm      EKG- 1/4/22 ST with A sense and V paced rhythm     [facility] 2015- No significant CAD  NST 2014- reversible LAD involvement      ICD interrogation     HEMODYNAMICS:  [facility] 1/17/21: PAP 83/52 mmHg, RAP 27 mmHg, PCWP 45 mmHg, CI 1.6  [facility] 12/10/21: PAP 76/48/57, RAP 20, PCWP 35, CI 2.26     CPEST not done  6MW not done     OTHER IMAGING:  CXR Results  (Last 48 hours)                             01/13/22 1035   XR CHEST PORT Final result      Impression:   No significant change in congestion and interstitial and alveolar   opacities which may reflect edema or infectious infiltrate. Narrative:   EXAM: XR CHEST PORT       INDICATION: pul edema       COMPARISON: Chest x-ray 1/10/2022. FINDINGS: A portable AP radiograph of the chest was obtained at 10:19 hours. The   patient is on a cardiac monitor. The lungs appear grossly stable with congestion   and interstitial/airspace opacities with no pneumothorax or pleural effusion. Right PICC line traverses expected course of tip in the region of the atriocaval   junction. Pacemaker-ICD generator body projects over the left chest wall with   intact appearing leads traversing in expected course. . The cardiac and   mediastinal contours and pulmonary vascularity are normal.  Atherosclerotic   calcifications affect the aortic arch. The chest wall structures and visualized   upper abdomen show no acute findings with incidental note of degenerative spine   and shoulder changes. CT Results (most recent):  Results from Hospital Encounter encounter on 01/04/22    CT ABD PELV WO CONT    Narrative  EXAM: CT ABD PELV WO CONT, CT CHEST WO CONT    INDICATION: LVAD/TRANSPLANT WORK UP    COMPARISON: Chest x-ray of earlier today, abdominal ultrasound 1/22/2022, CT  abdomen pelvis 1/16/2018. TECHNIQUE:  5 mm axial images were obtained through the chest, abdomen, and  pelvis. Oral and IV contrast were not administered. Coronal and sagittal  reconstructions were generated.  CT dose reduction was achieved through use of a  standardized protocol tailored for this examination and automatic exposure  control for dose modulation. FINDINGS:    THYROID: No nodule. MEDIASTINUM: No mass or lymphadenopathy. ZEENAT: No mass or lymphadenopathy. THORACIC AORTA: Atherosclerotic calcination without aneurysm. MAIN PULMONARY ARTERY: Normal in caliber. TRACHEA/BRONCHI: Endotracheal tube in expected position. Patent. ESOPHAGUS: No dilation or wall thickening. Indwelling enteric tube traverses  length of esophagus to extend into the stomach. HEART: Impella device, pacemaker leads, and coronary artery calcifications noted  without cardiac enlargement or pericardial effusion. PLEURA: No effusion or pneumothorax. LUNGS: Patchy areas of focal airspace opacity and ground glass opacity with  linear posterior and bibasilar atelectasis. No nodule or mass. LIVER: No mass or biliary dilation. GALLBLADDER: Unremarkable. SPLEEN: Borderline enlarged with no focal lesion. PANCREAS: No mass or ductal dilation. ADRENALS: Unremarkable. KIDNEYS: No significant change in 10 mm left renal pelvis calculus without  hydronephrosis. Exophytic lateral interpolar right renal cyst and anterior  interpolar left renal cyst redemonstrated. No other calculi or solid renal mass  evident. STOMACH: Indwelling enteric tube. Otherwise unremarkable  SMALL BOWEL: No dilatation or wall thickening. COLON: No dilatation or wall thickening. APPENDIX: Unremarkable. PERITONEUM: No ascites or pneumoperitoneum. RETROPERITONEUM: Atherosclerotic calcification without aneurysm. No enlarged  lymphadenopathy. REPRODUCTIVE ORGANS: Coarse calcifications compatible with uterine leiomyoma  with otherwise unremarkable appearance to uterus and ovaries. URINARY BLADDER: Collapsed around Henley catheter and balloon. BONES: Degenerative spine change. No acute fracture or aggressive lesion. ADDITIONAL COMMENTS: N/A    Impression  1.  Groundglass and scattered focal airspace opacities within the lungs  concerning for possible pneumonic infiltrates. 2. Support lines as above. 3. No acute intra-abdominal process with redemonstration of nonobstructing left  renal pelvis 10 mm stone and additional incidentals as above. PHYSICAL EXAM:  Visit Vitals  Visit Vitals  /88   Pulse (!) 110   Temp 99 °F (37.2 °C)   Resp 22   Ht 5' 7\" (1.702 m)   Wt 233 lb 4 oz (105.8 kg)   SpO2 100%   BMI 36.53 kg/m²          Hemodynamics:   CO: CO (l/min): 6.6 l/min   CI: CI (l/min/m2): 3 l/min/m2   CVP: CVP (mmHg): 11 mmHg (02/02/22 1600)   SVR: SVR (dyne*sec)/cm5: 1123 (dyne*sec)/cm5 (02/03/22 2978)   PAP Systolic: PAP Systolic: 85 (09/06/03 4901)   PAP Diastolic: PAP Diastolic: 33 (93/00/60 3967)   PVR:     SV02: SVO2 (%): 46 % (02/03/22 0800)   SCV02:      Impella 5.5  P-6  Flow: 3.6lpm   Purge flow 7.8    Physical Exam  Physical Exam  Vitals and nursing note reviewed. Cardiovascular:      Rate and Rhythm: Regular rhythm. Tachycardia present. Pulses: Normal pulses. Heart sounds: Normal heart sounds. Pulmonary:      Effort: Pulmonary effort is normal. No respiratory distress. Breath sounds: Decreased air movement present. Abdominal:      General: There is no distension. Palpations: Abdomen is soft. Musculoskeletal:         General: No swelling. Skin:     General: Skin is warm and dry. Neurological:      Mental Status: She is lethargic.           Review of Systems   Unable to perform ROS: Acuity of condition          PAST MEDICAL HISTORY:  Past Medical History:   Diagnosis Date    Acquired hypothyroidism 8/15/2016    Anemia     RED-HF study    Asthma     Cardiomyopathy, nonischemic ([address]     initial dx 2001, bivHF 2008 with EF 15%, s/p biV-ICD 9/08, significant improvment in EF to 45-50%    CKD (chronic kidney disease)     Dr Brii Julien    CKD (chronic kidney disease) 8/15/2012    Depression     Diabetes ([address]     Diabetic neuropathy ([facility] [address]     DM (diabetes mellitus) ([facility] [address] 8/15/2012    GERD (gastroesophageal reflux disease)     Gout     Hypothyroidism     ICD (implantable cardioverter-defibrillator), biventricular, in situ 6/5/2014    Psoriasis        PAST SURGICAL HISTORY:  Past Surgical History:   Procedure Laterality Date    CARDIAC CATHETERIZATION  2007; 01/06/15    normal cors    COLONOSCOPY N/A 1/26/2022    COLONOSCOPY performed by Blanca Herrera MD at [facility]    ECHO 2D ADULT  4/2010    EF 45%, improved from 1/09 (25%)    ECHO 2D ADULT  11/2011    LVH, EF 55-60%    HX ORTHOPAEDIC      knee    HX PACEMAKER PLACEMENT      AICD    STRESS TEST LEXISCAN/CARDIOLITE  3/21/12    normal perfusion, global HK 40%       FAMILY HISTORY:  Family History   Problem Relation Age of Onset    Heart Disease Mother     Hypertension Mother     Lupus Sister     Diabetes Brother        SOCIAL HISTORY:  Social History     Socioeconomic History    Marital status:    Tobacco Use    Smoking status: Never Smoker    Smokeless tobacco: Never Used   Substance and Sexual Activity    Alcohol use: No    Drug use: No    Sexual activity: Never   Social History Narrative    . Nonsmoker. Disability       LABORATORY RESULTS:     Labs Latest Ref Rng & Units 2/3/2022 2/2/2022 2/1/2022 2/1/2022 1/31/2022 1/30/2022 1/29/2022   WBC 3.6 - 11.0 K/uL 21. 3(H) - - 16. 5(H) 11. 3(H) 11. 4(H) 10.0   RBC 3.80 - 5.20 M/uL 2.56(L) - - 3.52(L) 3.34(L) 3.36(L) 3.19(L)   Hemoglobin 11.5 - 16.0 g/dL 6. 4(L) - - 8. 7(L) 8.2(L) 8. 3(L) 8.0(L)   Hematocrit 35.0 - 47.0 % 24. 1(L) - - 33. 0(L) 30. 9(L) 31. 0(L) 29. 0(L)   MCV 80.0 - 99.0 FL 94.1 - - 93.8 92.5 92.3 90.9   Platelets 444 - 531 K/uL 364 - - 400 313 259 215   Lymphocytes 12 - 49 % - - - - - - -   Monocytes 5 - 13 % - - - - - - -   Eosinophils 0 - 7 % - - - - - - -   Basophils 0 - 1 % - - - - - - -   Albumin 3.5 - 5.0 g/dL 3.6 4.0 - 3.7 3.6 3.5 3. 4(L)   Calcium 8.5 - 10.1 MG/DL 9.7 10.0 10. 2(H) 10. 6(H) 10. 4(H) 10.0 9.6   Glucose 65 - 100 mg/dL 238(H) 256(H) - 270(H) 251(H) 185(H) 233(H)   BUN 6 - 20 MG/(H) 98(H) - 86(H) 80(H) 73(H) 78(H)   Creatinine 0.55 - 1.02 MG/DL 3.95(H) 2.95(H) - 2.84(H) 2.56(H) 2.36(H) 2.42(H)   Sodium 136 - 145 mmol/L 147(H) 152(H) - 154(H) 151(H) 150(H) 146(H)   Potassium 3.5 - 5.1 mmol/L 4.3 4.0 - 3. 4(L) 3.5 3. 4(L) 3.0(L)   TSH 0.36 - 3.74 uIU/mL - - - - - - -   LDH 81 - 246 U/L 631(H) 566(H) - 425(H) 388(H) 406(H) 385(H)   Some recent data might be hidden     Lab Results   Component Value Date/Time    TSH 3.08 01/11/2022 05:13 AM    TSH 1.85 12/15/2021 01:06 PM    TSH 1.01 11/05/2020 09:11 AM    TSH 2.740 04/30/2019 11:21 AM    TSH 0.519 02/21/2012 10:53 AM    TSH 8.29 (H) 01/20/2010 01:59 PM       ALLERGY:  Allergies   Allergen Reactions    Ciprofloxacin Anaphylaxis    Shellfish Derived Anaphylaxis    Sildenafil Other (comments)    Ace Inhibitors Unknown (comments)    Biaxin [Clarithromycin] Other (comments)     Metal taste    Candesartan Cough    Pcn [Penicillins] Hives        CURRENT MEDICATIONS:    Current Facility-Administered Medications:     dextrose 5% infusion, 75 mL/hr, IntraVENous, CONTINUOUS, Sid Chase MD, Stopped at 02/03/22 0842    insulin NPH (NOVOLIN N, HUMULIN N) injection 40 Units, 40 Units, SubCUTAneous, QHS, Ian James MD, 40 Units at 02/02/22 2121    insulin NPH (NOVOLIN N, HUMULIN N) injection 40 Units, 40 Units, SubCUTAneous, DAILY, Ian James MD, 40 Units at 02/02/22 7330    amiodarone (CORDARONE) 375 mg/250 mL D5W infusion, 0.5-1 mg/min, IntraVENous, CONTINUOUS, Christopher Garcia MD, Stopped at 02/03/22 0700    morphine injection 2 mg, 2 mg, IntraVENous, Q3H PRN, Ian James MD, 2 mg at 02/03/22 0834    epoetin isabel-epbx (RETACRIT) injection 10,000 Units, 10,000 Units, SubCUTAneous, Q MON, WED & FRI, Sid Chase MD, 10,000 Units at 02/02/22 2121    sodium bicarbonate (8.4%) 25 mEq in dextrose 5% 1,000 mL infusion, , Other, TITRATE, Sid Chase MD WHIT, Last Rate: 7.5 mL/hr at 02/03/22 0414, New Bag at 02/03/22 0414    cefTRIAXone (ROCEPHIN) 2 g in 0.9% sodium chloride 20 mL IV syringe, 2 g, IntraVENous, Q24H, Ian James MD, 2 g at 02/03/22 0832    [Held by provider] bumetanide (BUMEX) injection 1 mg, 1 mg, IntraVENous, BID, Amber Weaver NP, 1 mg at 02/01/22 0820    potassium bicarb-citric acid (EFFER-K) tablet 40 mEq, 40 mEq, Oral, DAILY, Christiano Deshpande MD, 40 mEq at 02/03/22 8027    allopurinoL (ZYLOPRIM) tablet 100 mg, 100 mg, Oral, DAILY, Nancy Mendosa MD, 100 mg at 02/02/22 8660    pantoprazole (PROTONIX) injection 40 mg, 40 mg, IntraVENous, Q12H, 40 mg at 02/03/22 0832 **AND** sodium chloride (NS) flush 10 mL, 10 mL, IntraVENous, DAILY, Christiano Deshpande MD, 10 mL at 02/03/22 0853    sodium chloride (NS) flush 5-40 mL, 5-40 mL, IntraVENous, PRN, Gentry Paz MD    clopidogreL (PLAVIX) tablet 75 mg, 75 mg, Oral, DAILY, Gentry Paz MD, 75 mg at 02/03/22 9097    digoxin (LANOXIN) tablet 0.0625 mg, 0.0625 mg, Oral, EVERY OTHER DAY, Amber Weaver NP, 0.0625 mg at 02/02/22 7015    DOBUTamine (DOBUTREX) 500 mg/250 mL (2,000 mcg/mL) infusion, 0-10 mcg/kg/min, IntraVENous, TITRATE, Martha Albarado NP, Held at 01/23/22 1200    albumin human 25% (BUMINATE) solution 12.5 g, 12.5 g, IntraVENous, DAILY, Jia Albarado NP, Held at 02/03/22 0900    milrinone (PRIMACOR) 20 MG/100 ML D5W infusion, 0.125 mcg/kg/min, IntraVENous, CONTINUOUS, Nancy Mendosa MD, Stopped at 01/22/22 2242    vasopressin (VASOSTRICT) 20 Units in 0.9% sodium chloride 100 mL infusion, 0-0.1 Units/min, IntraVENous, TITRATE, Martha Albarado NP, Stopped at 01/23/22 1048    PHENYLephrine (NORMA-SYNEPHRINE) 30 mg in 0.9% sodium chloride 250 mL infusion,  mcg/min, IntraVENous, TITRATE, Maddi Boswell MD, Held at 01/21/22 1200    niCARdipine (CARDENE) 25 mg in 0.9% sodium chloride 250 mL infusion, 0-15 mg/hr, IntraVENous, TITRATE, Pamella Lizzie, Chanda Villanueva MD, Stopped at 01/19/22 2109    [Held by provider] bivalirudin (ANGIOMAX) 250 mg in 0.9% sodium chloride (MBP/ADV) 50 mL MBP, 0.02-2.5 mg/kg/hr, IntraVENous, TITRATE, Mallika Albarado NP, Stopped at 01/28/22 1220    hydrALAZINE (APRESOLINE) 20 mg/mL injection 5 mg, 5 mg, IntraVENous, Q4H PRN, Mallika Albarado NP, 5 mg at 02/02/22 0706    bumetanide (BUMEX) injection 1 mg, 1 mg, IntraVENous, Q4H PRN, Mallika Albarado NP, 1 mg at 01/31/22 2324    heparin (porcine) in 0.9% NaCl 30,000 unit/1,000 mL perfusion irrigation 50-1,000 mL, 50-1,000 mL, Other, PRN, Sam Gomez PA    chlorhexidine (PERIDEX) 0.12 % mouthwash 15 mL, 15 mL, Oral, Q12H, Davian Vázquez DO, 15 mL at 02/03/22 0840    0.9% sodium chloride infusion, 9 mL/hr, IntraVENous, CONTINUOUS, Long, Alexander Belva Hamman, PA, Last Rate: 9 mL/hr at 02/03/22 0413, 9 mL/hr at 02/03/22 0413    naloxone [facility]) injection 0.4 mg, 0.4 mg, IntraVENous, PRN, Sam Gomez PA    ondansetron (ZOFRAN) injection 4 mg, 4 mg, IntraVENous, Q4H PRN, Sam Gomez PA    albuterol (PROVENTIL VENTOLIN) nebulizer solution 2.5 mg, 2.5 mg, Nebulization, Q4H PRN, Sam Gomez PA    aspirin chewable tablet 81 mg, 81 mg, Oral, DAILY, Rosemarie Gomez PA, 81 mg at 02/03/22 1136    magnesium oxide (MAG-OX) tablet 400 mg, 400 mg, Oral, BID, Rosemarie Gomez PA, 400 mg at 02/03/22 1429    calcium chloride 1 g in 0.9% sodium chloride 100 mL IVPB, 1 g, IntraVENous, PRN, Sam Gomez PA    bisacodyL (DULCOLAX) suppository 10 mg, 10 mg, Rectal, DAILY PRN, Sam Gomez PA    senna-docusate (PERICOLACE) 8.6-50 mg per tablet 1 Tablet, 1 Tablet, Oral, BID, Rosemarie Gomez PA, 1 Tablet at 02/01/22 0820    ELECTROLYTE REPLACEMENT NOTE: Nurse to review Serum Potassium and Magnesuim levels and Initiate Electrolyte Replacement Protocol as needed, 1 Each, Other, PRN, Sam Gomez PA    magnesium sulfate 1 g/100 ml IVPB (premix or compounded), 1 g, IntraVENous, PRN, Sam Gomez PA    alteplase (CATHFLO) 1 mg in sterile water (preservative free) 1 mL injection, 1 mg, InterCATHeter, PRN, Sam Gomez PA    bacitracin 500 unit/gram packet 1 Packet, 1 Packet, Topical, PRN, Sam Gomez PA    dexmedeTOMidine in 0.9 % NaCl (PRECEDEX) 400 mcg/100 mL (4 mcg/mL) infusion soln, 0.1-1.5 mcg/kg/hr, IntraVENous, TITRATE, Davian Vázquez DO, Stopped at 01/31/22 1405    insulin lispro (HUMALOG) injection, , SubCUTAneous, Q6H, Davian Vázquez DO, 4 Units at 02/03/22 0605    triamcinolone acetonide (KENALOG) 0.1 % cream, , Topical, BID, An Gomez PA, Given at 02/02/22 1745    polyethylene glycol (MIRALAX) packet 17 g, 17 g, Oral, DAILY, Sam Gomez PA, 17 g at 02/01/22 0820    montelukast (SINGULAIR) tablet 10 mg, 10 mg, Oral, DAILY, Sam Gomez PA, 10 mg at 02/02/22 6520    levothyroxine (SYNTHROID) tablet 100 mcg, 100 mcg, Oral, Once per day on Mon Tue Wed Thu Fri Sat, Sam Gomez PA, 100 mcg at 02/03/22 4922    hydroxypropyl methylcellulose (ISOPTO TEARS) 0.5 % ophthalmic solution 1 Drop, 1 Drop, Both Eyes, PRN, An Gomez PA, 1 Drop at 01/06/22 1727    venlafaxine-SR (EFFEXOR-XR) capsule 75 mg, 75 mg, Oral, DAILY WITH BREAKFAST, An Gomez PA, 75 mg at 02/03/22 3789    gabapentin (NEURONTIN) capsule 100 mg, 100 mg, Oral, QHS, An Gomez PA, 100 mg at 02/02/22 2121    arformoteroL (BROVANA) neb solution 15 mcg, 15 mcg, Nebulization, BID RT, 15 mcg at 02/03/22 0751 **AND** budesonide (PULMICORT) 500 mcg/2 ml nebulizer suspension, 500 mcg, Nebulization, BID RT, Sam Gomez PA, 500 mcg at 02/03/22 0751    sodium chloride (NS) flush 5-40 mL, 5-40 mL, IntraVENous, Q8H, Sam Gomez PA, 10 mL at 02/03/22 1059    acetaminophen (TYLENOL) tablet 650 mg, 650 mg, Oral, Q6H PRN, 650 mg at 02/01/22 0823 **OR** acetaminophen (TYLENOL) suppository 650 mg, 650 mg, Rectal, Q6H PRN, Long, TYE Moore    glucose chewable tablet 16 g, 4 Tablet, Oral, PRN, Sam Gomez PA    dextrose (D50W) injection syrg 12.5-25 g, 25-50 mL, IntraVENous, PRN, Sam Gomez PA    glucagon (GLUCAGEN) injection 1 mg, 1 mg, IntraMUSCular, PRN, Michelle Gomez PA    PATIENT CARE TEAM:  Patient Care Team:  Gracie Ng MD as PCP - General (Internal Medicine)  Gracie Ng MD as PCP - [address] [place] [address] Provider  Cristhian Quijano MD as Consulting Provider (Internal Medicine)  Kike De La Vega MD (Dermatology)  Maria A Chatterjee MD (Nephrology)  Taco Austin MD as Consulting Provider (Cardiology)  Jose Espino MD (Cardiology)  Zeke Campa MD as Consulting Provider (Pulmonary Disease)     Thank you for allowing me to participate in this patient's care. Jabari Ohm, NP  [facility] [address] [facility]  [address] [facility], [address]  Phone: [phone]      [facility] ATTENDING ADDENDUM    Patient was seen and examined in person. Data and notes were reviewed. I have discussed and agree with the plan as noted in the NP note above without further additions.     Fabby Robledo MD PhD  [facility]

## 2022-02-03 NOTE — PROGRESS NOTES
Spiritual Care Assessment/Progress Note  [facility]      NAME: Viola Demarco      MRN: [id number]  AGE: 78 y.o. SEX: female  [employer] Affiliation: Non Hoahaoism   Language: English     2/3/2022     Total Time (in minutes): 47     Spiritual Assessment begun in [facility] CORONARY CARE through conversation with:         []Patient        [x] Family    [] Friend(s)        Reason for Consult: Family care,End-of-life support     Spiritual beliefs: (Please include comment if needed)     [x] Identifies with a ariel tradition:         [] Supported by a ariel community:            [] Claims no spiritual orientation:           [] Seeking spiritual identity:                [] Adheres to an individual form of spirituality:           [] Not able to assess:                           Identified resources for coping:      [x] Prayer ()                               [] Music                  [] Guided Imagery     [] Family/friends                 [] Pet visits     [] Devotional reading                         [] Unknown     [] Other                                              Interventions offered during this visit: (See comments for more details)    Patient Interventions: Prayer (actual),Prayer (assurance of)     Family/Friend(s):  Affirmation of emotions/emotional suffering,Catharsis/review of pertinent events in supportive environment,Affirmation of ariel,Iconic (affirming the presence of God/Higher Power),Initial Assessment,Normalization of emotional/spiritual concerns,Prayer (actual),Prayer (assurance of)     Plan of Care:     [] Support spiritual and/or cultural needs    [] Support AMD and/or advance care planning process      [] Support grieving process   [] Coordinate Rites and/or Rituals    [] Coordination with community clergy   [] No spiritual needs identified at this time   [] Detailed Plan of Care below (See Comments)  [] Make referral to Music Therapy  [] Make referral to Pet Therapy     [] Make referral to Addiction services  [] Make referral to [facility]  [] Make referral to Spiritual Care Partner  [] No future visits requested        [x] Contact Spiritual Care for further referrals     Comments: Paged by nursing staff with request for support for pt's  as pt transitions to comfort. Met pt's  Evaristo Aguirre at bedside. He reflected on how he is coping at this time. He finds comfort through his ariel.  offered listening presence and anticipatory grief support. Shared a spoken prayer at bedside. Evaristo Aguirre indicated that he was going to leave the hospital for some time. Assured him of ongoing prayers.  was present on unit at time of death. Offered moment of silence at bedside.     Joselyn Mckeon , Andrea  [phone]

## 2022-02-03 NOTE — PROGRESS NOTES
1930: Bedside report received from [facility]  0700: Dr. Francisca Rain at bedside, Sim Sesay stopped per md order. Hgb of 6.4 noted and discussed with Dr. Mt Fan, no new orders received at this time. 0730: Bedside report given to Ghislaine MORRIS        Problem: Falls - Risk of  Goal: *Absence of Falls  Description: Document Adrianna Don Fall Risk and appropriate interventions in the flowsheet. Outcome: Progressing Towards Goal     Problem: Patient Education: Go to Patient Education Activity  Goal: Patient/Family Education  Outcome: Progressing Towards Goal     Problem: Patient Education: Go to Patient Education Activity  Goal: Patient/Family Education  Outcome: Progressing Towards Goal     Problem: Patient Education: Go to Patient Education Activity  Goal: Patient/Family Education  Outcome: Progressing Towards Goal     Problem: Diabetes Self-Management  Goal: *Disease process and treatment process  Description: Define diabetes and identify own type of diabetes; list 3 options for treating diabetes. Outcome: Progressing Towards Goal  Goal: *Incorporating nutritional management into lifestyle  Description: Describe effect of type, amount and timing of food on blood glucose; list 3 methods for planning meals. Outcome: Progressing Towards Goal  Goal: *Incorporating physical activity into lifestyle  Description: State effect of exercise on blood glucose levels. Outcome: Progressing Towards Goal  Goal: *Developing strategies to promote health/change behavior  Description: Define the ABC's of diabetes; identify appropriate screenings, schedule and personal plan for screenings. Outcome: Progressing Towards Goal  Goal: *Using medications safely  Description: State effect of diabetes medications on diabetes; name diabetes medication taking, action and side effects.   Outcome: Progressing Towards Goal  Goal: *Monitoring blood glucose, interpreting and using results  Description: Identify recommended blood glucose targets  and personal targets. Outcome: Progressing Towards Goal  Goal: *Prevention, detection, treatment of acute complications  Description: List symptoms of hyper- and hypoglycemia; describe how to treat low blood sugar and actions for lowering  high blood glucose level. Outcome: Progressing Towards Goal  Goal: *Prevention, detection and treatment of chronic complications  Description: Define the natural course of diabetes and describe the relationship of blood glucose levels to long term complications of diabetes. Outcome: Progressing Towards Goal  Goal: *Developing strategies to address psychosocial issues  Description: Describe feelings about living with diabetes; identify support needed and support network  Outcome: Progressing Towards Goal  Goal: *Insulin pump training  Outcome: Progressing Towards Goal  Goal: *Sick day guidelines  Outcome: Progressing Towards Goal  Goal: *Patient Specific Goal (EDIT GOAL, INSERT TEXT)  Outcome: Progressing Towards Goal     Problem: Patient Education: Go to Patient Education Activity  Goal: Patient/Family Education  Outcome: Progressing Towards Goal     Problem: Diabetes Self-Management  Goal: *Disease process and treatment process  Description: Define diabetes and identify own type of diabetes; list 3 options for treating diabetes. Outcome: Progressing Towards Goal  Goal: *Incorporating nutritional management into lifestyle  Description: Describe effect of type, amount and timing of food on blood glucose; list 3 methods for planning meals. Outcome: Progressing Towards Goal  Goal: *Incorporating physical activity into lifestyle  Description: State effect of exercise on blood glucose levels. Outcome: Progressing Towards Goal  Goal: *Developing strategies to promote health/change behavior  Description: Define the ABC's of diabetes; identify appropriate screenings, schedule and personal plan for screenings.   Outcome: Progressing Towards Goal  Goal: *Using medications safely  Description: [facility] effect of diabetes medications on diabetes; name diabetes medication taking, action and side effects. Outcome: Progressing Towards Goal  Goal: *Monitoring blood glucose, interpreting and using results  Description: Identify recommended blood glucose targets  and personal targets. Outcome: Progressing Towards Goal  Goal: *Prevention, detection, treatment of acute complications  Description: List symptoms of hyper- and hypoglycemia; describe how to treat low blood sugar and actions for lowering  high blood glucose level. Outcome: Progressing Towards Goal  Goal: *Prevention, detection and treatment of chronic complications  Description: Define the natural course of diabetes and describe the relationship of blood glucose levels to long term complications of diabetes.   Outcome: Progressing Towards Goal  Goal: *Developing strategies to address psychosocial issues  Description: Describe feelings about living with diabetes; identify support needed and support network  Outcome: Progressing Towards Goal  Goal: *Insulin pump training  Outcome: Progressing Towards Goal  Goal: *Sick day guidelines  Outcome: Progressing Towards Goal  Goal: *Patient Specific Goal (EDIT GOAL, INSERT TEXT)  Outcome: Progressing Towards Goal

## 2022-03-06 DIAGNOSIS — Z76.0 ENCOUNTER FOR MEDICATION REFILL: ICD-10-CM

## 2022-03-06 DIAGNOSIS — I10 HYPERTENSION, UNSPECIFIED TYPE: ICD-10-CM

## 2022-03-06 RX ORDER — CARVEDILOL 25 MG/1
TABLET ORAL
Qty: 180 TABLET | Refills: 0 | OUTPATIENT
Start: 2022-03-06

## 2022-10-20 NOTE — TELEPHONE ENCOUNTER
Received refill request for Bumex 1 mg po tabs. Refill authorized.     Future Appointments   Date Time Provider Lizabeth Ennis   7/13/2021  1:20 PM MIRTA ROSE BS AMB   7/13/2021  2:00 PM MD ADAM Aguilar BS AMB   7/14/2021 10:30 AM Kanwal Britt MD [facility] [place] Bcc Histology Text: There were numerous aggregates of basaloid cells.

## 2025-03-07 NOTE — PROGRESS NOTES
[facility]  ICU Progress Note    Admit Date: 2022    S/P R Axillary Impella 5.5     Subjective/24 Hour Summary:   Pt seen with Dr. Kuldip Bennett. impella at P8,  bicarb purge & bival systemically. On NO, bumex gtt. Objective:   Vitals:  Blood pressure 108/67, pulse 80, temperature 99.7 °F (37.6 °C), resp. rate 12, height 5' 7\" (1.702 m), weight 240 lb 1.3 oz (108.9 kg), SpO2 100 %. Temp (24hrs), Av.3 °F (37.4 °C), Min:97.6 °F (36.4 °C), Max:100.3 °F (37.9 °C)    Hemodynamics:   CO: CO (l/min): 5.6 l/min   CI: CI (l/min/m2): 2.5 l/min/m2   CVP: CVP (mmHg): 3 mmHg (22)   SVR: SVR (dyne*sec)/cm5: 1139 (dyne*sec)/cm5 (22 2166)   PAP Systolic: PAP Systolic: 47 (85/86/67 6715)   PAP Diastolic: PAP Diastolic: 17 (97/83/83 2888)   PVR:     SV02: SVO2 (%): 88 % (22)   SCV02:      EKG/Rhythm:  SR    Ventilator Settings:  Mode Rate Tidal Volume Pressure FiO2 PEEP   Assist control   460 ml  8 cm H2O 40 % 5 cm H20     Peak airway pressure: 24 cm H2O    Minute ventilation: 5.59 l/min        Oxygen Therapy:  Oxygen Therapy  O2 Sat (%): 100 % (22)  Pulse via Oximetry: 80 beats per minute (22)  O2 Device: Endotracheal tube;Ventilator (22)  Skin Assessment: Clean, dry, & intact (22)  Skin Protection for O2 Device: Yes (22)  Orientation: Bilateral;Anterior (22)  Location: Cheek;Lip (22)  Interventions: Mouth Care;Reposition Device (01/27/22 0600)  O2 Flow Rate (L/min): 6 l/min (weaned) (22 0740)  O2 Temperature: 98.4 °F (36.9 °C) (22 1940)  FIO2 (%): 40 % (22 0419)  ETCO2 (mmHg): 22 mmHg (22 2342)    CXR:  CXR Results  (Last 48 hours)               22 0443  XR CHEST PORT Final result    Impression:  Stable bilateral pulmonary infiltrates. Narrative: Indication: Impella, follow-up abnormal chest x-ray       Comparison 2022.  Portable exam obtained at 435 Name: Capri Brandon      : 1968      MRN: [id number]  Encounter Provider: Abhijeet Mullen PA-C  Encounter Date: 3/7/2025   Encounter department: [facility] INTERNAL MEDICINE [facility] [address]  :  Assessment & Plan  Annual physical exam  Patient is a pleasant 56-year-old female presenting for annual physical.  Up-to-date on most health maintenance-offered tetanus vaccine but patient declined.  Due for baseline lab work and will get it done on Monday-will follow-up accordingly.  Getting mammogram on 3/25/2025       COPD with exacerbation (HCC)  Still smoking about 1/4 pack of cigarettes per day.  Discussed cessation however patient does not desire at this time.       Seborrheic keratosis  Patient would like mole on her back removed as it is getting bothersome and occasionally gets caught on her bra strap.  Orders:    Ambulatory Referral to Dermatology; Future          Tobacco Cessation Counseling: Tobacco cessation counseling was provided. The patient is sincerely urged to quit consumption of tobacco. She is not ready to quit tobacco.       History of Present Illness   Patient is a 56 year old female presenting for physical.   Overall doing well.  No major complaints/concerns at this time.  -smoking 1/4 pack a day  -dentist on may 14th  -Monday for eye doctor  -getting mammogram on 3/25/25  -no drug/alc use    -just started at [employer] in [place] and also working at a [employer] company too  Would like the mole on her back removed.      Review of Systems   Constitutional:  Negative for chills, fatigue and fever.   HENT:  Negative for ear discharge, ear pain, postnasal drip, rhinorrhea, sinus pressure, sinus pain, sore throat, tinnitus and trouble swallowing.    Eyes:  Negative for pain, discharge and itching.   Respiratory:  Negative for cough, shortness of breath and wheezing.    Cardiovascular:  Negative for chest pain, palpitations and leg swelling.   Gastrointestinal:  Negative for abdominal  "pain, constipation, diarrhea, nausea and vomiting.   Endocrine: Negative for polydipsia, polyphagia and polyuria.   Genitourinary:  Negative for difficulty urinating, frequency, hematuria and urgency.   Musculoskeletal:  Negative for arthralgias, joint swelling and myalgias.   Skin:  Negative for color change.        Mole on left portion of back below bra strap   Allergic/Immunologic: Negative for environmental allergies.   Neurological:  Negative for dizziness, weakness, light-headedness, numbness and headaches.   Hematological:  Negative for adenopathy.   Psychiatric/Behavioral:  Negative for decreased concentration and sleep disturbance. The patient is not nervous/anxious.        Objective   /82 (BP Location: Left arm)   Pulse 82   Resp 18   Ht 5' 7\" (1.702 m)   Wt 60.8 kg (134 lb)   SpO2 98%   BMI 20.99 kg/m²      Physical Exam  Vitals and nursing note reviewed.   Constitutional:       General: She is awake. She is not in acute distress.     Appearance: Normal appearance. She is well-developed, well-groomed and normal weight.   HENT:      Head: Normocephalic and atraumatic.      Right Ear: Hearing and external ear normal.      Left Ear: Hearing and external ear normal.      Nose: Nose normal.      Mouth/Throat:      Lips: Pink.      Mouth: Mucous membranes are moist.   Eyes:      General: Lids are normal. Vision grossly intact. Gaze aligned appropriately.      Conjunctiva/sclera: Conjunctivae normal.   Neck:      Vascular: No carotid bruit.      Trachea: Trachea and phonation normal.   Cardiovascular:      Rate and Rhythm: Normal rate and regular rhythm.      Heart sounds: Normal heart sounds, S1 normal and S2 normal. No murmur heard.     No friction rub. No gallop.   Pulmonary:      Effort: Pulmonary effort is normal. No respiratory distress.      Breath sounds: Normal breath sounds and air entry. No decreased breath sounds, wheezing, rhonchi or rales.   Abdominal:      General: Abdomen is flat. " demonstrates life-support   lines and tubes unchanged in position including Impella device. There has been   little change in the diffuse bilateral interstitial infiltrates compared to   prior exam.                 Admission Weight: Last Weight   Weight: 264 lb 1.8 oz (119.8 kg) Weight: 240 lb 1.3 oz (108.9 kg)     Intake / Output / Drain:  Current Shift: No intake/output data recorded. Last 24 hrs.:     Intake/Output Summary (Last 24 hours) at 1/27/2022 0759  Last data filed at 1/27/2022 0700  Gross per 24 hour   Intake 2058.83 ml   Output 3900 ml   Net -1841.17 ml       EXAM:  General:    NAD                                                                                          Lungs:   Clear to auscultation bilaterally. Incision:  No erythema, drainage or swelling. Heart:  Regular rate and rhythm, S1, S2 normal, no murmur, click, rub or gallop. Abdomen:   Soft, non-tender. Bowel sounds normal. No masses,  No organomegaly. Extremities:  No edema. PPP. Neurologic: Intubated, sedated     Labs:   Recent Labs     01/27/22 0419 01/26/22 0417   WBC 7.1 7.3   HGB 7.1* 7.5*   HCT 27.2* 28.5*    184     Recent Labs     01/27/22 0419 01/27/22  0208 01/26/22  2213 01/26/22  2213 01/26/22  1331 01/26/22  1331     --   --   --   --  145   K 4.0 3.9   < > 3.9   < > 3.5   *  --   --   --   --  107   CO2 31  --   --   --   --  29   BUN 70*  --   --   --   --  68*   CREA 2.34*  --   --   --   --  2.25*   *  --   --   --   --  173*   CA 9.7  --   --   --   --  9.6   MG  --  2.4  --  2.3   < > 2.4   PHOS 2.5*  --   --   --   --  2.7    < > = values in this interval not displayed. Recent Labs     01/27/22 0419 01/26/22  1331 01/26/22  1000 01/26/22 0417   AP 70  --   --  87   TP 7.6  --   --  7.3   ALB 3.7 3.4*   < > 3.2*   GLOB 3.9  --   --  4.1*    < > = values in this interval not displayed.      Recent Labs     01/27/22  0419 01/26/22  1809   APTT 58.4* 54.0*      Recent Labs 22  0456   PHI 7.40   PCO2I 50.4*   PO2I 97   FIO2I 40     No results for input(s): CPK, CKMB, TROIQ, BNPP in the last 72 hours. Assessment:     Active Problems:    Acute respiratory failure with hypoxia ([address] (2022)         Plan/Recommendations/Medical Decision Makin. Acute on Chronic Systolic CHF class III/IV (BiV-ICD): S/P right axillary impella 5.5 . Cont bicarb via purge. Weaning NO - goal down to 5 ppm. Cont systemic bival. Per AHF. On ancef for prophylaxis. On digoxin  2. Acute on Chronic Respiratory insufficiency: On home O2. . Vent wean per intensivist.  3. CAD: Multivessel CAD on [facility] 22. ASA 81. BB on hold due to cardiogenic shock. Recommend starting high intensity statin when feasible, per primary/HF  4. RODNEY on Chronic CKD Stage IV: Renal following. Bumex drip   5. Gout: allopurinol  6. DM2: on NPH Per primary service  7. GERD: Home meds  8. Hypothyroidism: on synthroid  9. Depression: Home meds  10. Anemia: on EPO, stable post op. Dispo: impella functioning well. Cont systemic bival & bicarb via purge. No cardiac surgery issues otherwise. Further HF management per AHF. Remainder of medical management per primary.     Signed By: Nicholas Rae NP Bowel sounds are normal.      Palpations: Abdomen is soft.      Tenderness: There is no abdominal tenderness.   Musculoskeletal:         General: No swelling.      Cervical back: Neck supple.      Right lower leg: No edema.      Left lower leg: No edema.   Skin:     General: Skin is warm.      Capillary Refill: Capillary refill takes less than 2 seconds.      Comments: Small SK notable right below bra strap on left portion of back   Neurological:      Mental Status: She is alert.   Psychiatric:         Attention and Perception: Attention and perception normal.         Mood and Affect: Mood and affect normal.         Speech: Speech normal.         Behavior: Behavior normal. Behavior is cooperative.         Thought Content: Thought content normal.         Cognition and Memory: Cognition and memory normal.         Judgment: Judgment normal.

## 2025-04-10 NOTE — PROGRESS NOTES
[facility]  Inpatient Progress Note      Patient name: Parker Lucero  Patient : 1955  Patient MRN: [id number]  Consulting MD: Sherif Pryor MD  Date of service: 22    REASON FOR REFERRAL:  Management of cardiogenic shock     PLAN OF CARE:   · 77 y/o female with chronic renal failure and new onset severe cardiomyopathy, LVEF 15% (diagnosed 21), stage D, NYHA class IV; admitted for pulmonary edema and severe volume overloadd by [facility]  · Most likely etiology of acute deterioration of LVEF is chronic volume overload with compensatory tachycardia in the setting of progressive renal failure +/- coronary artery disease (80% LAD)   · Patient requested aggressive medical approach to allow her heart to recover or receive heart/kidney transplantation, before considering hospice  · D/w patient, her family, CT surgery, nephrology and primary cardiologist plan to support her with Impella as bridge to LV recovery or transplant with the following anticipated outcomes:  · 1/3 chance of LV recovery and discharge home on medical therapy or chronic dialysis  · 1/3 chance of LV non-recovery on Impella, evaluation and transfer for listing for heart transplant/kidney  · 1/3 chance of LV non-recovery and evaluation that reveals patient is not candidate for LVAD/heart/kidney transplant and then wean of support to comfort care/hospice  · Impella 5.5 implanted by Dr. Henry Pierre  and patient underwent aggressive diuresis with normalization of filling pressures and improvement of LV function to 30-35%, awaiting PCI to LAD tomorrow followed by Impella wean after extubation; appears to have recovered heart function and unlikely will need heart/kidney transplant.   · In case of failure of Impella wean, patient's only contraindication to transplant is active infection requiring antibiotic use; otherwise there are no apparent contraindications; [facility] will accept patient on integrillin drip if LV does not recover after revascularization and on dialysis if needed for volume management; anticipated waiting time at BMI 38 and blood type AB for hear/kidney transplant is currently approximately 10 days; d/w Dr. Ben Chicas from [address] [place] [address].        RECOMMENDATIONS:  POD #7 Impella 5.5 implant   Vent management per Intensivist   TTE today  Cefazolin q12h for Impella site ppx   Maintain PAC for hemodynamic optimization; goal CI > 2.3, CVP 8-10 mmHg SVR 1000, SBP < 110 mmHg (via radial arterial line)   Continue to wean Roro as tolerated  Intolerant to sildenafil due to marked hypotension   Continue Bumex gtt at 1mg/hr; goal net (-)1-2L daily   Keep K> 4 and Mg >2  Hydralazine 5 mg IV q4h PRN SBP > 110 mmHg   Intolerant of GDMT due to hemodynamic in stability   BiV-ICD programmed at 80 bpm; device is at EOL, recommend to delay ICD generator change until final plans for transplant are established - Per Dr. Tish Tejada patient is not pacer dependent  Place defib patches on patient for ppx  Cont digoxin 0.0625 mg to every other day; goal 0.7-1.2   Allopurinol 50mg daily per nephrology  S/p Venofer 200 mg IV x 2   CT head/chest/abd/pelvis w/o contrast for txp eval  GI scopes complete, poly biopsy pending  Plan for possible LAD stent for revascularization on Thursday 1/27/22- ok to use Plavix if necessary for stent  Epo level elevated- hold epogen and follow levels  Pan culture pending for fevers NGTD  pTT goal  40-60 due to persistent anemia on bivalirudin   Will need OP PSG  Will offer family testing for VUS x 2  Palliative consult appreciated   Nutritionist consult  Heart failure education   Advanced care plan present on file  Schedule family meeting      All other care per primary team     IMPRESSION:  Volume overload  Acute on Chronic systolic heart failure, requiring Impella 5.5 implant 1/18/22   · Stage D, NYHA class IV symptoms  · Non-ischemic cardiomyopathy, LVEF 15%  · Normal coronaries  · PYP equivocal for amyloid   Pulmonary hypertension, severe  RV failure  S/p BiV-ICD  Cardiac risk factors:  · HL  · DM2  · Morbid obesity, BMI 40  [employer] genetic testing VUS x 2 ( KCNH2, MYH6)  Acute on chronic renal failure, stage IV  GERD  Anemia of chronic disease  Gout  VF s/p ICD shock x 2      Interval Events:  POD #8  Impella placement   Impella  P8  I/O even  Creatinine stable at 2.24  proBNP decreased to 25,000  Remains on Roro and intubated  Off pressors and inotropes   GI scopes this morning        LIFE GOALS:  Patient's personal goals include: being home with family, still ambulating around without getting too tired. Important upcoming milestones or family events: none  The patient identifies the following as important for living well: remaining idependent and mobile; being able to get out of the house with . Patient verbalized willingness to be on home milrinone and evaluation for both heart and kidney transplants. Verbalizes she would have family supporting her decision on this. SHAMIKA Ervin is a 78 y.o.  female with a history of NICM, chronic hypoxic respiratory failure secondary to pulmonary HTN, hypothyroidism, CKD, GERD, and DM II who presented to [facility] as a transfer from another facility for acute on chronic hypoxic respiratory failure. Reports running out of O2 at home resulting in SOB and dizziness. Upon arrival to the ED she was found to have O2 sats in the 70s, requiring 4L NC O2. Rapid covid test was negative. ProNT-BNP was 43497, K+5.2, BUN/CR x/2.54 and elevated d-dimer. Chest xray showing pulmonary edema vs. Atypical pneumonia. VQ scan showed low probability for PE. Per Dr. Jonathan Santillan, NICM, LVEF 15-20% during recent admission. LVEF previously normalized with CRT. NYHA III-IV. No ACEi/ARB due to renal dysfunction. GDMT dosing limited by low BP.      Patient's PCP is Dr. Galen Blair, and she sees Dr. Jonathan Santillan primarily for Detail Level: Detailed cardiac care due to Dr. Una Baker transfering practice. Patient historically seen by nephrology but has not had follow up care in the past three years. CARDIAC IMAGING:  Echo 1/20/22 - LVEF 20-25%, RV moderately dilated, Impella catheter 5.9 cm from AV   Echo 12/8/21- LVEF 15-20%, mild to Mod MR, LVIDd 5.09cm, TAPSE 1.94cm  Echo 4/22/19- LVEF 60%, trace MR,  LVIDd 4.24cm, TAPSE 1.65cm   Echo 4/24/18- LVEF 60%, trivial MR, LVIDd 4.79cm, TAPSE 2.25cm      EKG- 1/4/22 ST with A sense and V paced rhythm     C 2015- No significant CAD  NST 2014- reversible LAD involvement      ICD interrogation     HEMODYNAMICS:  [facility] 1/17/21: PAP 83/52 mmHg, RAP 27 mmHg, PCWP 45 mmHg, CI 1.6  [facility] 12/10/21: PAP 76/48/57, RAP 20, PCWP 35, CI 2.26     CPEST not done  6MW not done     OTHER IMAGING:  CXR Results  (Last 48 hours)                             01/13/22 1035   XR CHEST PORT Final result      Impression:   No significant change in congestion and interstitial and alveolar   opacities which may reflect edema or infectious infiltrate. Narrative:   EXAM: XR CHEST PORT       INDICATION: pul edema       COMPARISON: Chest x-ray 1/10/2022. FINDINGS: A portable AP radiograph of the chest was obtained at 10:19 hours. The   patient is on a cardiac monitor. The lungs appear grossly stable with congestion   and interstitial/airspace opacities with no pneumothorax or pleural effusion. Right PICC line traverses expected course of tip in the region of the atriocaval   junction. Pacemaker-ICD generator body projects over the left chest wall with   intact appearing leads traversing in expected course. . The cardiac and   mediastinal contours and pulmonary vascularity are normal.  Atherosclerotic   calcifications affect the aortic arch. The chest wall structures and visualized   upper abdomen show no acute findings with incidental note of degenerative spine   and shoulder changes.                            PHYSICAL EXAM:  Visit Vitals  Visit Vitals  /80   Pulse 80   Temp 98.6 °F (37 °C)   Resp 12   Ht 5' 7\" (1.702 m)   Wt 240 lb 1.3 oz (108.9 kg)   SpO2 95%   BMI 37.60 kg/m²          Hemodynamics:   CO: CO (l/min): 5.3 l/min   CI: CI (l/min/m2): 2.4 l/min/m2   CVP: CVP (mmHg): 10 mmHg (01/26/22 0945)   SVR: SVR (dyne*sec)/cm5: 1086 (dyne*sec)/cm5 (01/26/22 5703)   PAP Systolic: PAP Systolic: 49 (37/02/72 5441)   PAP Diastolic: PAP Diastolic: 23 (51/53/51 7679)   PVR:     SV02: SVO2 (%): 77 % (01/26/22 1100)   SCV02:      Impella 5.5  P-8  Flow: 4.4lpm   Purge flow 7.3    Physical Exam  Physical Exam  Vitals and nursing note reviewed. Constitutional:       Comments: Intubated and sedated    Cardiovascular:      Rate and Rhythm: Normal rate and regular rhythm. Pulses: Normal pulses. Heart sounds: Normal heart sounds. Pulmonary:      Breath sounds: Decreased air movement present. Comments: Intubated   Abdominal:      General: There is no distension. Palpations: Abdomen is soft. Musculoskeletal:         General: No swelling. Skin:     General: Skin is warm and dry. ROS unable to obtain due to patient condition (intubated, sedated).       PAST MEDICAL HISTORY:  Past Medical History:   Diagnosis Date    Acquired hypothyroidism 8/15/2016    Anemia     RED-HF study    Asthma     Cardiomyopathy, nonischemic ([address]     initial dx 2001, bivHF 2008 with EF 15%, s/p biV-ICD 9/08, significant improvment in EF to 45-50%    CKD (chronic kidney disease)     Dr Eileen Ford    CKD (chronic kidney disease) 8/15/2012    Depression     Diabetes ([address]     Diabetic neuropathy ([address]     DM (diabetes mellitus) ([address] 8/15/2012    GERD (gastroesophageal reflux disease)     Gout     Hypothyroidism     ICD (implantable cardioverter-defibrillator), biventricular, in situ 6/5/2014    Psoriasis        PAST SURGICAL HISTORY:  Past Surgical History:   Procedure Laterality Date    CARDIAC CATHETERIZATION  2007; 01/06/15 normal cors    ECHO 2D ADULT  4/2010    EF 45%, improved from 1/09 (25%)    ECHO 2D ADULT  11/2011    LVH, EF 55-60%    HX ORTHOPAEDIC      knee    HX PACEMAKER PLACEMENT      AICD    STRESS TEST LEXISCAN/CARDIOLITE  3/21/12    normal perfusion, global HK 40%       FAMILY HISTORY:  Family History   Problem Relation Age of Onset    Heart Disease Mother     Hypertension Mother     Lupus Sister     Diabetes Brother        SOCIAL HISTORY:  Social History     Socioeconomic History    Marital status:    Tobacco Use    Smoking status: Never Smoker    Smokeless tobacco: Never Used   Substance and Sexual Activity    Alcohol use: No    Drug use: No    Sexual activity: Never   Social History Narrative    . Nonsmoker. Disability       LABORATORY RESULTS:     Labs Latest Ref Rng & Units 1/26/2022 1/26/2022 1/25/2022 1/25/2022 1/25/2022 1/25/2022 1/25/2022   WBC 3.6 - 11.0 K/uL 7.3 - - - - - 8.2   RBC 3.80 - 5.20 M/uL 3.08(L) - - - - - 3.14(L)   Hemoglobin 11.5 - 16.0 g/dL 7. 5(L) - 7. 7(L) - - - 7. 6(L)   Hematocrit 35.0 - 47.0 % 28. 5(L) - 29. 4(L) - - - 29. 2(L)   MCV 80.0 - 99.0 FL 92.5 - - - - - 93.0   Platelets 608 - 138 K/uL 184 - - - - - 174   Lymphocytes 12 - 49 % - - - - - - -   Monocytes 5 - 13 % - - - - - - -   Eosinophils 0 - 7 % - - - - - - -   Basophils 0 - 1 % - - - - - - -   Albumin 3.5 - 5.0 g/dL 3.2(L) - - 3. 4(L) - - 3. 4(L)   Calcium 8.5 - 10.1 MG/DL 9.6 - - 9.8 - - 9.6   Glucose 65 - 100 mg/dL 159(H) - - 196(H) - - 230(H)   BUN 6 - 20 MG/DL 68(H) - - 78(H) - - 76(H)   Creatinine 0.55 - 1.02 MG/DL 2.24(H) - - 2.44(H) - - 2.37(H)   Sodium 136 - 145 mmol/L 143 - - 144 - - 142   Potassium 3.5 - 5.1 mmol/L 4.1 3.8 4.1 4.1 3.7 3.9 4.2   TSH 0.36 - 3.74 uIU/mL - - - - - - -   LDH 81 - 246 U/L 361(H) - - - - - 329(H)   Some recent data might be hidden     Lab Results   Component Value Date/Time    TSH 3.08 01/11/2022 05:13 AM    TSH 1.85 12/15/2021 01:06 PM    TSH 1.01 11/05/2020 09:11 AM TSH 2.740 04/30/2019 11:21 AM    TSH 0.519 02/21/2012 10:53 AM    TSH 8.29 (H) 01/20/2010 01:59 PM       ALLERGY:  Allergies   Allergen Reactions    Ciprofloxacin Anaphylaxis    Shellfish Derived Anaphylaxis    Sildenafil Other (comments)    Ace Inhibitors Unknown (comments)    Biaxin [Clarithromycin] Other (comments)     Metal taste    Candesartan Cough    Pcn [Penicillins] Hives        CURRENT MEDICATIONS:    Current Facility-Administered Medications:     insulin NPH (NOVOLIN N, HUMULIN N) injection 20 Units, 20 Units, SubCUTAneous, QHS, Christiano Deshpande MD, 20 Units at 01/25/22 2118    [START ON 1/27/2022] digoxin (LANOXIN) tablet 0.0625 mg, 0.0625 mg, Oral, EVERY OTHER DAY, Amber Weaver NP    0.9% sodium chloride infusion, 10 mL/hr, IntraVENous, CONTINUOUS, Flor Mitchell MD, Last Rate: 10 mL/hr at 01/26/22 0000, 10 mL/hr at 01/26/22 0000    insulin NPH (NOVOLIN N, HUMULIN N) injection 25 Units, 25 Units, SubCUTAneous, DAILY, Matilda Schofield MD, 25 Units at 01/26/22 1119    bumetanide (BUMEX) 0.25 mg/mL infusion, 1 mg/hr, IntraVENous, CONTINUOUS, Flor Mitchell MD, Last Rate: 4 mL/hr at 01/26/22 0749, 1 mg/hr at 01/26/22 0749    DOBUTamine (DOBUTREX) 500 mg/250 mL (2,000 mcg/mL) infusion, 0-10 mcg/kg/min, IntraVENous, TITRATE, Lefty Albarado NP, Held at 01/23/22 1200    0.9% sodium chloride infusion 250 mL, 250 mL, IntraVENous, PRN, Boby ODELL MD    albumin human 25% (BUMINATE) solution 12.5 g, 12.5 g, IntraVENous, DAILY, Yifan Albarado NP, Last Rate: 0 mL/hr at 01/25/22 0944, 12.5 g at 01/26/22 1038    milrinone (PRIMACOR) 20 MG/100 ML D5W infusion, 0.125 mcg/kg/min, IntraVENous, CONTINUOUS, Arvell Land, MD, Stopped at 01/22/22 2242    vasopressin (VASOSTRICT) 20 Units in 0.9% sodium chloride 100 mL infusion, 0-0.1 Units/min, IntraVENous, TITRATE, Lefty Albarado NP, Stopped at 01/23/22 1048    PHENYLephrine (NORMA-SYNEPHRINE) 30 mg in 0.9% sodium chloride 250 mL infusion,  mcg/min, IntraVENous, TITRATE, Boo Toussaint MD, Held at 01/21/22 1200    niCARdipine (CARDENE) 25 mg in 0.9% sodium chloride 250 mL infusion, 0-15 mg/hr, IntraVENous, TITRATE, Zechariah Bowles MD, Stopped at 01/19/22 2109    bivalirudin (ANGIOMAX) 250 mg in 0.9% sodium chloride (MBP/ADV) 50 mL MBP, 0.02-2.5 mg/kg/hr, IntraVENous, TITRATE, Jenn Albarado NP, Stopped at 01/26/22 0500    hydrALAZINE (APRESOLINE) 20 mg/mL injection 5 mg, 5 mg, IntraVENous, Q4H PRN, Nela Albarado NP, 5 mg at 01/24/22 1336    bumetanide (BUMEX) injection 1 mg, 1 mg, IntraVENous, Q4H PRN, Jenn Albarado, NP, 1 mg at 01/22/22 1721    ceFAZolin (ANCEF) 1 g in sterile water (preservative free) 10 mL IV syringe, 1 g, IntraVENous, Q12H, Nela Albarado NP, 1 g at 01/26/22 0103    fentaNYL (PF) 1,500 mcg/30 mL (50 mcg/mL) infusion, 0-200 mcg/hr, IntraVENous, TITRATE, Zechariah Bowles MD, Last Rate: 2 mL/hr at 01/26/22 0749, 100 mcg/hr at 01/26/22 0749    heparin (porcine) in 0.9% NaCl 30,000 unit/1,000 mL perfusion irrigation 50-1,000 mL, 50-1,000 mL, Other, PRN, Sam Gomez PA    sodium chloride (NS) flush 5-40 mL, 5-40 mL, IntraVENous, Q8H, Sam Gomez PA, 10 mL at 01/25/22 2119    chlorhexidine (PERIDEX) 0.12 % mouthwash 15 mL, 15 mL, Oral, Q12H, Davian Vázquez DO, 15 mL at 01/26/22 1036    sodium chloride (NS) flush 5-40 mL, 5-40 mL, IntraVENous, Q8H, Sam Gomez PA, 10 mL at 01/25/22 2119    0.45% sodium chloride infusion, 10 mL/hr, IntraVENous, CONTINUOUS, Sam Gomez PA    0.9% sodium chloride infusion, 9 mL/hr, IntraVENous, CONTINUOUS, Christopher Gomez PA, Last Rate: 9 mL/hr at 01/26/22 0000, 9 mL/hr at 01/26/22 0000    naloxone (NARCAN) injection 0.4 mg, 0.4 mg, IntraVENous, PRN, Sam Gomez PA    ondansetron (ZOFRAN) injection 4 mg, 4 mg, IntraVENous, Q4H PRN, Christopher Gomez PA    albuterol (PROVENTIL VENTOLIN) nebulizer solution 2.5 mg, 2.5 mg, Nebulization, Q4H PRN, Sam Gomez PA    aspirin chewable tablet 81 mg, 81 mg, Oral, DAILY, Shalom Gomez PA, 81 mg at 01/26/22 1055    midazolam (VERSED) injection 1 mg, 1 mg, IntraVENous, Q1H PRN, Sam Gomez PA    magnesium oxide (MAG-OX) tablet 400 mg, 400 mg, Oral, BID, Shalom Gomez PA, 400 mg at 01/26/22 1055    calcium chloride 1 g in 0.9% sodium chloride 100 mL IVPB, 1 g, IntraVENous, PRN, Sam Gomez PA    bisacodyL (DULCOLAX) suppository 10 mg, 10 mg, Rectal, DAILY PRN, Sam Gomez PA    senna-docusate (PERICOLACE) 8.6-50 mg per tablet 1 Tablet, 1 Tablet, Oral, BID, Shalom Gomez PA, 1 Tablet at 01/25/22 1758    ELECTROLYTE REPLACEMENT NOTE: Nurse to review Serum Potassium and Magnesuim levels and Initiate Electrolyte Replacement Protocol as needed, 1 Each, Other, PRN, Shalom Gomez PA    magnesium sulfate 1 g/100 ml IVPB (premix or compounded), 1 g, IntraVENous, PRN, Sam Gomez PA    alteplase (CATHFLO) 1 mg in sterile water (preservative free) 1 mL injection, 1 mg, InterCATHeter, PRN, Sam Gomez PA    bacitracin 500 unit/gram packet 1 Packet, 1 Packet, Topical, PRN, Sam Gomez PA    pantoprazole (PROTONIX) injection 40 mg, 40 mg, IntraVENous, DAILY, 40 mg at 01/26/22 1035 **AND** sodium chloride (NS) flush 10 mL, 10 mL, IntraVENous, DAILY, Davian Vázquez DO, 10 mL at 01/26/22 1036    dexmedeTOMidine in 0.9 % NaCl (PRECEDEX) 400 mcg/100 mL (4 mcg/mL) infusion soln, 0.1-1.5 mcg/kg/hr, IntraVENous, TITRATE, Davian Vázquez DO, Last Rate: 17 mL/hr at 01/26/22 1002, 0.6 mcg/kg/hr at 01/26/22 1002    midazolam (VERSED) injection 1 mg, 1 mg, IntraVENous, Q4H PRN, Davian Vázquez DO, 1 mg at 01/18/22 1719    insulin lispro (HUMALOG) injection, , SubCUTAneous, Q6H, Davian Vázquez DO, 2 Units at 01/26/22 1200    propofol (DIPRIVAN) 10 mg/mL infusion, 0-50 mcg/kg/min, IntraVENous, TITRATE, Davian Vázquez DO, Last Rate: 20.4 mL/hr at 01/26/22 1002, 30 mcg/kg/min at 01/26/22 1002    sodium bicarbonate (8.4%) 25 mEq in dextrose 5% 1,000 mL - impella purge fluid, , IntraVENous, TITRATE, Kyle Gomez PA, Last Rate: 8 mL/hr at 01/25/22 1938, New Bag at 01/25/22 1938    triamcinolone acetonide (KENALOG) 0.1 % cream, , Topical, BID, Kyle Gomez PA, Given at 01/26/22 1056    polyethylene glycol (MIRALAX) packet 17 g, 17 g, Oral, DAILY, Kyle Gomez PA, 17 g at 01/25/22 0845    allopurinoL (ZYLOPRIM) tablet 50 mg, 50 mg, Oral, DAILY, Sam Gomez PA, 50 mg at 01/26/22 1055    [Held by provider] epoetin isabel-epbx (RETACRIT) injection 20,000 Units, 20,000 Units, SubCUTAneous, Q TUE, THU & SAT, Kyle Gomez PA, 20,000 Units at 01/25/22 2006    montelukast (SINGULAIR) tablet 10 mg, 10 mg, Oral, DAILY, Sam Gomez PA, 10 mg at 01/26/22 1055    levothyroxine (SYNTHROID) tablet 100 mcg, 100 mcg, Oral, Once per day on Mon Tue Wed Thu Fri Sat, Sam Gomez PA, 100 mcg at 01/25/22 7020    hydroxypropyl methylcellulose (ISOPTO TEARS) 0.5 % ophthalmic solution 1 Drop, 1 Drop, Both Eyes, PRN, Sam Gomez PA, 1 Drop at 01/06/22 1727    venlafaxine-SR (EFFEXOR-XR) capsule 75 mg, 75 mg, Oral, DAILY WITH BREAKFAST, Kyle Gomez PA, 75 mg at 01/17/22 1025    gabapentin (NEURONTIN) capsule 100 mg, 100 mg, Oral, QHS, Sam Gomez PA, 100 mg at 01/25/22 2118    arformoteroL (BROVANA) neb solution 15 mcg, 15 mcg, Nebulization, BID RT, 15 mcg at 01/26/22 0722 **AND** budesonide (PULMICORT) 500 mcg/2 ml nebulizer suspension, 500 mcg, Nebulization, BID RT, Kyle Gomez PA, 500 mcg at 01/26/22 1899    sodium chloride (NS) flush 5-40 mL, 5-40 mL, IntraVENous, Q8H, Sam Gomez PA, 10 mL at 01/25/22 2119    acetaminophen (TYLENOL) tablet 650 mg, 650 mg, Oral, Q6H PRN, 650 mg at 01/25/22 0648 **OR** acetaminophen (TYLENOL) suppository 650 mg, 650 mg, Rectal, Q6H PRN, Vishnu Gomez PA    glucose chewable tablet 16 g, 4 Tablet, Oral, PRN, Vishnu Gomez PA    dextrose (D50W) injection syrg 12.5-25 g, 25-50 mL, IntraVENous, PRN, Vishnu Gomez PA    glucagon (GLUCAGEN) injection 1 mg, 1 mg, IntraMUSCular, PRN, Vishnu Gomez PA    PATIENT CARE TEAM:  Patient Care Team:  Diya Aceves MD as PCP - General (Internal Medicine)  Diya Aceves MD as PCP - [address] [place], [place] [address]led Provider  Daphne Kincaid MD as Consulting Provider (Internal Medicine)  Enid Morrison MD (Dermatology)  Armani Pardo MD (Nephrology)  Barrie Lau MD as Consulting Provider (Cardiology)  Peter Juarez MD (Cardiology)  Ganga Clemons MD as Consulting Provider (Pulmonary Disease)     Thank you for allowing me to participate in this patient's care. Precious Raya NP  [facility] [address] [facility]  [address] [facility], [address]  Phone: [phone]      [facility] ATTENDING ADDENDUM    Patient was seen and examined in person. Data and notes were reviewed. I have discussed and agree with the plan as noted in the NP note above without further additions.     Souleymane Peralta MD PhD  [address]

## (undated) DEVICE — Device

## (undated) DEVICE — BAG TRASH WST 122 CM 183 CM 1400 CC FEMALE LUER PVC DEPOT

## (undated) DEVICE — TOWEL SURG W17XL27IN STD BLU COT NONFENESTRATED PREWASHED

## (undated) DEVICE — SPONGE GZ W4XL4IN COT 12 PLY TYP VII WVN C FLD DSGN

## (undated) DEVICE — TUBING, SUCTION, 1/4" X 12', STRAIGHT: Brand: MEDLINE

## (undated) DEVICE — PRESSURE MONITORING SET: Brand: TRUWAVE

## (undated) DEVICE — DRESSING SIL W4XL5IN ANTIBACT GELLING FBR CYTOFORM

## (undated) DEVICE — CATH GUID COR EB35 6FR 100CM -- LAUNCHER

## (undated) DEVICE — PACK PROCEDURE SURG HRT CATH

## (undated) DEVICE — CATH ANGI BLLN DIL 3.0X08MM -- NC EUPHORA

## (undated) DEVICE — DEVICE COMPR REG 24 CM VASC BND

## (undated) DEVICE — TIDISHIELD TRANSPORT, CONTAINMENT COVER FOR BACK TABLE 4'6" (1.37M) TO 8' (2.43M) IN LENGTH: Brand: TIDISHIELD

## (undated) DEVICE — SYRINGE,TOOMEY,IRRIGATION,70CC,STERILE: Brand: MEDLINE

## (undated) DEVICE — FOGARTY - HYDRAGRIP SURGICAL - CLAMP INSERTS: Brand: FOGARTY SOFTJAW

## (undated) DEVICE — GUIDEWIRE VASC L150CM DIA0.025IN TIP L7CM J RAD 3MM PTFE

## (undated) DEVICE — SUTURE PROL SZ 5-0 L30IN NONABSORBABLE BLU L13MM RB-2 1/2 8710H

## (undated) DEVICE — PREP SKN CHLRAPRP APL 26ML STR --

## (undated) DEVICE — CONMED SCOPE SAVER BITE BLOCK, 14 X 20 MM: Brand: CONMED SCOPE SAVER

## (undated) DEVICE — Z DISCONTINUED USE 2751540 TUBING IRRIG L10IN DISP PMP ENDOGATOR

## (undated) DEVICE — RUNTHROUGH NS EXTRA FLOPPY PTCA GUIDEWIRE: Brand: RUNTHROUGH

## (undated) DEVICE — CLIP INT SM WIDE RED TI TRNSVRS GRV CHEVRON SHP W PRECIS

## (undated) DEVICE — ELECTROSURGICAL DEVICE HOLSTER;FOR USE WITH MAXIMUM PEAK VOLTAGE OF 4000 V: Brand: FORCE TRIVERSE

## (undated) DEVICE — SOLUTION IV 1000ML 140MEQ/L SOD 5MEQ/L K 3MEQ/L MG 27MEQ/L

## (undated) DEVICE — SNAP KOVER: Brand: UNBRANDED

## (undated) DEVICE — SPLINT WR POS F/ARTERIAL ACC -- BX/10

## (undated) DEVICE — BLOCK BITE L 20X27MM OD60FR BLU AD W/ STRP SLD POLYETH ENDO

## (undated) DEVICE — AGENT HEMSTAT W4XL4IN OXIDIZED REGENERATED CELOS ABSRB SFT

## (undated) DEVICE — REM POLYHESIVE ADULT PATIENT RETURN ELECTRODE: Brand: VALLEYLAB

## (undated) DEVICE — SUTURE PROL SZ 6-0 L30IN NONABSORBABLE BLU L13MM RB-2 1/2 8711H

## (undated) DEVICE — Device: Brand: ASAHI SION BLACK

## (undated) DEVICE — CUSTOM KT PTCA INFL DEV K05 00052M

## (undated) DEVICE — KIT MFLD ISOLATN NACL CNTRST PRT TBNG SPIK W/ PRSS TRNSDUC

## (undated) DEVICE — TRAP SURG QUAD PARABOLA SLOT DSGN SFTY SCRN TRAPEASE

## (undated) DEVICE — TTL1LYR 16FR10ML 100%SIL TMPST TR: Brand: MEDLINE

## (undated) DEVICE — SYR LR LCK 1ML GRAD NSAF 30ML --

## (undated) DEVICE — SYR 3ML LL TIP 1/10ML GRAD --

## (undated) DEVICE — ANGIOGRAPHIC CATHETER: Brand: IMPULSE™

## (undated) DEVICE — PUMP HEART IMPELLA CP03 --

## (undated) DEVICE — ANGIOGRAPHY KIT

## (undated) DEVICE — PROVE COVER: Brand: UNBRANDED

## (undated) DEVICE — PLEDGET SURG W3/16XL0.25IN THK1.65MM PTFE OVL FELT FOR THE

## (undated) DEVICE — Device: Brand: EAGLE EYE PLATINUM RX DIGITAL IVUS CATHETER

## (undated) DEVICE — TR BAND RADIAL ARTERY COMPRESSION DEVICE: Brand: TR BAND

## (undated) DEVICE — Device: Brand: PADPRO

## (undated) DEVICE — CO-SET DELIVERY SYSTEM FOR 123 ROOM TEMPATURE INJECTATE: Brand: CO-SET+

## (undated) DEVICE — KIT HND CTRL 3 W STPCOCK ROT END 54IN PREM HI PRSS TBNG AT

## (undated) DEVICE — COVER,TABLE,HEAVY DUTY,60"X90",STRL: Brand: MEDLINE

## (undated) DEVICE — GUIDEWIRE VASC L150CM DIA0.035IN FLX END L7CM J 3MM PTFE

## (undated) DEVICE — TUBING, SUCTION, 1/4" X 10', STRAIGHT: Brand: MEDLINE

## (undated) DEVICE — GLOVE SURG SZ 7.5 L11.2IN THK9.8MIL STRW LTX POLYMER BEAD

## (undated) DEVICE — GLOVE SURG SZ 6 THK91MIL LTX FREE SYN POLYISOPRENE ANTI

## (undated) DEVICE — SOL INJ SOD CL 0.9% 500ML BG --

## (undated) DEVICE — SUT SLK 2 60IN TIE MP BLK --

## (undated) DEVICE — SUTURE PERMA-HAND 0 L18IN NONABSORBABLE BLK CT-1 L36MM 1/2 C021D

## (undated) DEVICE — OPEN HEART A- RICHMOND: Brand: MEDLINE INDUSTRIES, INC.

## (undated) DEVICE — TURNPIKE LP 150CM CATHETER

## (undated) DEVICE — SYR 10ML LUER LOK 1/5ML GRAD --

## (undated) DEVICE — SPECIAL PROCEDURE DRAPE 32" X 34": Brand: SPECIAL PROCEDURE DRAPE

## (undated) DEVICE — CATH BLLN DIL 2.0X12MM RX -- EUPHORA

## (undated) DEVICE — SINGLE-USE BIOPSY FORCEPS: Brand: RADIAL JAW 4

## (undated) DEVICE — KIT MED IMAG CNTRST AGNT W/ IOPAMIDOL REUSE

## (undated) DEVICE — 1000ML,PRESSURE INFUSER W/STOPCOCK: Brand: MEDLINE

## (undated) DEVICE — CATHETER ART THERMODILUTION 6 FRX110 CM 4 LUMEN SWAN

## (undated) DEVICE — 3M™ TEGADERM™ TRANSPARENT FILM DRESSING FRAME STYLE, 1626W, 4 IN X 4-3/4 IN (10 CM X 12 CM), 50/CT 4CT/CASE: Brand: 3M™ TEGADERM™

## (undated) DEVICE — CATH DIAG AL2 IMPLS 6FRX100CM -- IMPULSE 16599-98

## (undated) DEVICE — GOWN,SIRUS,NONRNF,SETINSLV,XL,20/CS: Brand: MEDLINE

## (undated) DEVICE — SYSTEM TISS GLUE 5ML CONTAIN SYR PLUNG STD SYR TIP 12MM 5PKS/EA

## (undated) DEVICE — SUTURE VCRL SZ 0 L18IN ABSRB VLT L40MM CT 1/2 CIR J752D

## (undated) DEVICE — Z DUPLICATE USE 2103554 VALVE HEMOSTATIC BLEEDBK CTRL COPILOT

## (undated) DEVICE — CATHETER DIAG 6FR L110CM INTRO 6FR BLLN DIA9MM 1CC PULM ART

## (undated) DEVICE — BLUNTFILL: Brand: MONOJECT

## (undated) DEVICE — PINNACLE INTRODUCER SHEATH: Brand: PINNACLE

## (undated) DEVICE — GLIDESHEATH SLENDER STAINLESS STEEL KIT: Brand: GLIDESHEATH SLENDER

## (undated) DEVICE — BAG RED 3PLY 2MIL 30X40 IN

## (undated) DEVICE — BASIN ST MAJOR-NO CAUTERY: Brand: MEDLINE INDUSTRIES, INC.

## (undated) DEVICE — SYR 50ML LR LCK 1ML GRAD NSAF --

## (undated) DEVICE — WASTE KIT - ST MARY: Brand: MEDLINE INDUSTRIES, INC.